# Patient Record
Sex: MALE | Race: WHITE | NOT HISPANIC OR LATINO | Employment: OTHER | URBAN - METROPOLITAN AREA
[De-identification: names, ages, dates, MRNs, and addresses within clinical notes are randomized per-mention and may not be internally consistent; named-entity substitution may affect disease eponyms.]

---

## 2019-06-27 ENCOUNTER — HOSPITAL ENCOUNTER (OUTPATIENT)
Dept: LAB | Facility: HOSPITAL | Age: 69
Discharge: HOME | End: 2019-06-27
Attending: INTERNAL MEDICINE

## 2019-06-27 LAB
ALBUMIN SERPL-MCNC: 4.1 G/DL (ref 3.5–5)
ALBUMIN/GLOB SERPL: 1.4 {RATIO} (ref 1.4–2.6)
ALP SERPL-CCNC: 76 U/L (ref 56–155)
ALT SERPL-CCNC: 21 U/L (ref 10–40)
ANION GAP SERPL CALC-SCNC: 20 MMOL/L (ref 8–19)
AST SERPL-CCNC: 18 U/L (ref 15–50)
BASOPHILS # BLD AUTO: 0.04 10*3/UL (ref 0–0.2)
BASOPHILS NFR BLD AUTO: 0.7 % (ref 0–3)
BILIRUB SERPL-MCNC: 0.77 MG/DL (ref 0.2–1.3)
BUN SERPL-MCNC: 14 MG/DL (ref 5–25)
BUN/CREAT SERPL: 15 {RATIO} (ref 6–20)
CALCIUM SERPL-MCNC: 8.8 MG/DL (ref 8.7–10.4)
CHLORIDE SERPL-SCNC: 105 MMOL/L (ref 99–111)
CHOLEST SERPL-MCNC: 166 MG/DL (ref 107–200)
CHOLEST/HDLC SERPL: 3.8 {RATIO} (ref 3–6)
CONV ABS IMM GRAN: 0.02 10*3/UL (ref 0–0.2)
CONV CO2: 23 MMOL/L (ref 22–32)
CONV IMMATURE GRAN: 0.4 % (ref 0–1.8)
CONV TOTAL PROTEIN: 7 G/DL (ref 6.3–8.2)
CREAT UR-MCNC: 0.94 MG/DL (ref 0.7–1.2)
DEPRECATED RDW RBC AUTO: 47.4 FL (ref 35.1–43.9)
EOSINOPHIL # BLD AUTO: 0.08 10*3/UL (ref 0–0.7)
EOSINOPHIL # BLD AUTO: 1.4 % (ref 0–7)
ERYTHROCYTE [DISTWIDTH] IN BLOOD BY AUTOMATED COUNT: 13.2 % (ref 11.6–14.4)
GFR SERPLBLD BASED ON 1.73 SQ M-ARVRAT: >60 ML/MIN/{1.73_M2}
GLOBULIN UR ELPH-MCNC: 2.9 G/DL (ref 2–3.5)
GLUCOSE SERPL-MCNC: 126 MG/DL (ref 70–99)
HBA1C MFR BLD: 14.5 G/DL (ref 14–18)
HCT VFR BLD AUTO: 45.8 % (ref 42–52)
HDLC SERPL-MCNC: 44 MG/DL (ref 40–60)
LDLC SERPL CALC-MCNC: 106 MG/DL (ref 70–100)
LYMPHOCYTES # BLD AUTO: 0.94 10*3/UL (ref 1–5)
MCH RBC QN AUTO: 30.6 PG (ref 27–31)
MCHC RBC AUTO-ENTMCNC: 31.7 G/DL (ref 33–37)
MCV RBC AUTO: 96.6 FL (ref 80–96)
MONOCYTES # BLD AUTO: 0.53 10*3/UL (ref 0.2–1.2)
MONOCYTES NFR BLD AUTO: 9.6 % (ref 3–10)
NEUTROPHILS # BLD AUTO: 3.92 10*3/UL (ref 2–8)
NEUTROPHILS NFR BLD AUTO: 70.9 % (ref 30–85)
NRBC CBCN: 0 % (ref 0–0.7)
OSMOLALITY SERPL CALC.SUM OF ELEC: 300 MOSM/KG (ref 273–304)
PLATELET # BLD AUTO: 138 10*3/UL (ref 130–400)
PMV BLD AUTO: 11.8 FL (ref 9.4–12.4)
POTASSIUM SERPL-SCNC: 4.1 MMOL/L (ref 3.5–5.3)
RBC # BLD AUTO: 4.74 10*6/UL (ref 4.7–6.1)
SODIUM SERPL-SCNC: 144 MMOL/L (ref 135–147)
TRIGL SERPL-MCNC: 81 MG/DL (ref 40–150)
VARIANT LYMPHS NFR BLD MANUAL: 17 % (ref 20–45)
VLDLC SERPL-MCNC: 16 MG/DL (ref 5–37)
WBC # BLD AUTO: 5.53 10*3/UL (ref 4.8–10.8)

## 2020-10-19 ENCOUNTER — OFFICE VISIT (OUTPATIENT)
Dept: CARDIOLOGY | Facility: CLINIC | Age: 70
End: 2020-10-19

## 2020-10-19 VITALS
SYSTOLIC BLOOD PRESSURE: 100 MMHG | RESPIRATION RATE: 18 BRPM | BODY MASS INDEX: 42.66 KG/M2 | WEIGHT: 315 LBS | DIASTOLIC BLOOD PRESSURE: 68 MMHG | HEIGHT: 72 IN | HEART RATE: 60 BPM

## 2020-10-19 DIAGNOSIS — Z95.2 S/P AVR: ICD-10-CM

## 2020-10-19 DIAGNOSIS — I35.0 AORTIC STENOSIS, MILD: Primary | ICD-10-CM

## 2020-10-19 DIAGNOSIS — I10 ESSENTIAL HYPERTENSION: ICD-10-CM

## 2020-10-19 DIAGNOSIS — I48.21 PERMANENT ATRIAL FIBRILLATION (HCC): ICD-10-CM

## 2020-10-19 DIAGNOSIS — Z95.810 ICD (IMPLANTABLE CARDIOVERTER-DEFIBRILLATOR), DUAL, IN SITU: ICD-10-CM

## 2020-10-19 PROCEDURE — 93289 INTERROG DEVICE EVAL HEART: CPT | Performed by: INTERNAL MEDICINE

## 2020-10-19 PROCEDURE — 99212 OFFICE O/P EST SF 10 MIN: CPT | Performed by: INTERNAL MEDICINE

## 2020-10-19 RX ORDER — LISINOPRIL 10 MG/1
10 TABLET ORAL DAILY
COMMUNITY

## 2020-10-19 RX ORDER — METOPROLOL TARTRATE 100 MG/1
100 TABLET ORAL 2 TIMES DAILY
COMMUNITY
Start: 2020-10-10

## 2020-10-19 RX ORDER — ASPIRIN 325 MG
325 TABLET ORAL 2 TIMES DAILY
COMMUNITY

## 2020-10-19 RX ORDER — DILTIAZEM HYDROCHLORIDE 120 MG/1
120 TABLET, FILM COATED ORAL DAILY
COMMUNITY

## 2020-10-19 RX ORDER — FUROSEMIDE 40 MG/1
40 TABLET ORAL DAILY
COMMUNITY

## 2020-10-19 NOTE — PROGRESS NOTES
Subjective:     Encounter Date:10/19/2020      Patient ID: Woodrow Alejandro is a 69 y.o. male.    Chief Complaint:  Followup ICD and AVR    HPI:  Mr Alejandro is a 68 yo with a past medical history significant for HTN, permanent AF and a NICM first diagnosed over 10 years ago.  He had an ICD implanted for primary prevention at the time.  His EF recovered.  In 2014 he was found to have severe aortic stenosis and had a bioprosthetic AVR, ligation of the DANICA and a MAZE.    Since he was last seen about a year ago, he has been doing well without symptoms of palpitations, chest pain, dyspnea or edema.  He remains fairly active without limitations.      The following portions of the patient's history were reviewed and updated as appropriate: allergies, current medications, past family history, past medical history, past social history, past surgical history and problem list.    Problem List:  Patient Active Problem List   Diagnosis   • Essential hypertension   • Permanent atrial fibrillation (CMS/HCC)   • S/P AVR   • ICD (implantable cardioverter-defibrillator), dual, in situ       Past Medical History:  Past Medical History:   Diagnosis Date   • Aortic valve replaced    • Atrial fibrillation (CMS/HCC)    • Hypertension        Past Surgical History:  No past surgical history on file.    Social History:  Social History     Socioeconomic History   • Marital status:      Spouse name: Not on file   • Number of children: Not on file   • Years of education: Not on file   • Highest education level: Not on file       Allergies:  No Known Allergies    Immunizations:    There is no immunization history on file for this patient.    ROS:  Review of Systems   Constitution: Negative for malaise/fatigue.   Cardiovascular: Negative for chest pain, dyspnea on exertion, irregular heartbeat, leg swelling, near-syncope, orthopnea, palpitations and paroxysmal nocturnal dyspnea.   Respiratory: Negative for shortness of breath.   "  All other systems reviewed and are negative.         Objective:         /68 (BP Location: Left arm, Patient Position: Sitting)   Pulse 60   Resp 18   Ht 182.9 cm (72\")   Wt (!) 147 kg (324 lb 9.6 oz)   BMI 44.02 kg/m²     Constitutional:       General: Not in acute distress.     Appearance: Well-developed.   Eyes:      General: No scleral icterus.     Conjunctiva/sclera: Conjunctivae normal.      Pupils: Pupils are equal, round, and reactive to light.   HENT:      Head: Normocephalic and atraumatic.   Neck:      Musculoskeletal: Normal range of motion.      Thyroid: No thyromegaly.   Pulmonary:      Effort: Pulmonary effort is normal.      Breath sounds: Normal breath sounds.   Cardiovascular:      Normal rate. Irregularly irregular rhythm.   Abdominal:      General: Bowel sounds are normal.      Palpations: Abdomen is soft.   Musculoskeletal: Normal range of motion.   Skin:     General: Skin is warm and dry.   Neurological:      Mental Status: Alert and oriented to person, place, and time.         In-Office Procedure(s):  Procedures  ICD eval interpreted by me  MDTD [id number]  Battery 5 yrs to mariely    R wave 9mv  Threshold 0.9V  Impedance 399 ohms    HV 48/64    Events - none    ASCVD RIsk Score::  The ASCVD Risk score (Blas DC Jr., et al., 2013) failed to calculate for the following reasons:    Cannot find a previous HDL lab    Cannot find a previous total cholesterol lab    The smoking status is invalid    Recent Radiology:  Imaging Results (Most Recent)     None          Lab Review:   No visits with results within 2 Month(s) from this visit.   Latest known visit with results is:   No results found for any previous visit.                Assessment:          Diagnosis Plan   1. Aortic stenosis, mild  Adult Transthoracic Echo Complete W/ Cont if Necessary Per Protocol   2. S/P AVR     3. Permanent atrial fibrillation (CMS/HCC)     4. ICD (implantable cardioverter-defibrillator), dual, in situ     5. " Essential hypertension            Plan:      1. AVR - asymptomatic but no echo for years, will get echo  2. Permanent AF - rate controlled, s/p DANICA ligation and on ASA only, [id number] of 2(age, HTN)  3. HTN - controlled  4. NICM - EF recovered, on lisinopril and metoprolol, repeat echo    RTC 1 year      Level of Care:                 Kirk Torres MD  10/19/20  .

## 2020-10-22 PROBLEM — Z95.2 S/P AVR: Status: ACTIVE | Noted: 2020-10-22

## 2020-10-22 PROBLEM — Z95.810 ICD (IMPLANTABLE CARDIOVERTER-DEFIBRILLATOR), DUAL, IN SITU: Status: ACTIVE | Noted: 2020-10-22

## 2020-10-22 PROBLEM — I48.21 PERMANENT ATRIAL FIBRILLATION: Status: ACTIVE | Noted: 2020-10-22

## 2020-10-22 PROBLEM — I10 ESSENTIAL HYPERTENSION: Status: ACTIVE | Noted: 2020-10-22

## 2021-01-12 ENCOUNTER — HOSPITAL ENCOUNTER (OUTPATIENT)
Dept: OTHER | Facility: HOSPITAL | Age: 71
Discharge: HOME | End: 2021-01-12
Attending: INTERNAL MEDICINE

## 2021-02-08 ENCOUNTER — HOSPITAL ENCOUNTER (OUTPATIENT)
Dept: VACCINE CLINIC | Facility: HOSPITAL | Age: 71
Discharge: HOME | End: 2021-02-08
Attending: INTERNAL MEDICINE

## 2021-08-23 ENCOUNTER — HOSPITAL ENCOUNTER (OUTPATIENT)
Dept: CARDIOLOGY | Facility: HOSPITAL | Age: 71
Discharge: HOME | End: 2021-08-23
Admitting: INTERNAL MEDICINE

## 2021-08-23 VITALS
SYSTOLIC BLOOD PRESSURE: 140 MMHG | BODY MASS INDEX: 42.66 KG/M2 | WEIGHT: 315 LBS | DIASTOLIC BLOOD PRESSURE: 80 MMHG | HEIGHT: 72 IN | HEART RATE: 74 BPM

## 2021-08-23 DIAGNOSIS — I35.0 AORTIC STENOSIS, MILD: ICD-10-CM

## 2021-08-23 LAB
AORTIC DIMENSIONLESS INDEX: 0.4 (DI)
ASCENDING AORTA: 3.6 CM
BH CV ECHO MEAS - ACS: 1.3 CM
BH CV ECHO MEAS - AO MAX PG (FULL): 25.1 MMHG
BH CV ECHO MEAS - AO MAX PG: 28.9 MMHG
BH CV ECHO MEAS - AO MEAN PG (FULL): 15 MMHG
BH CV ECHO MEAS - AO MEAN PG: 17 MMHG
BH CV ECHO MEAS - AO ROOT AREA (BSA CORRECTED): 0.77
BH CV ECHO MEAS - AO ROOT AREA: 3.1 CM^2
BH CV ECHO MEAS - AO ROOT DIAM: 2 CM
BH CV ECHO MEAS - AO V2 MAX: 269 CM/SEC
BH CV ECHO MEAS - AO V2 MEAN: 193 CM/SEC
BH CV ECHO MEAS - AO V2 VTI: 56.8 CM
BH CV ECHO MEAS - ASC AORTA: 3.6 CM
BH CV ECHO MEAS - AVA(I,A): 1.1 CM^2
BH CV ECHO MEAS - AVA(I,D): 1.1 CM^2
BH CV ECHO MEAS - AVA(V,A): 1.1 CM^2
BH CV ECHO MEAS - AVA(V,D): 1.1 CM^2
BH CV ECHO MEAS - BSA(HAYCOCK): 2.8 M^2
BH CV ECHO MEAS - BSA: 2.6 M^2
BH CV ECHO MEAS - BZI_BMI: 43.4 KILOGRAMS/M^2
BH CV ECHO MEAS - BZI_METRIC_HEIGHT: 182.9 CM
BH CV ECHO MEAS - BZI_METRIC_WEIGHT: 145.2 KG
BH CV ECHO MEAS - CONTRAST EF 4CH: 55 CM2
BH CV ECHO MEAS - EDV(CUBED): 216 ML
BH CV ECHO MEAS - EDV(MOD-SP2): 142 ML
BH CV ECHO MEAS - EDV(MOD-SP4): 123 ML
BH CV ECHO MEAS - EDV(TEICH): 180 ML
BH CV ECHO MEAS - EF(CUBED): 88.7 %
BH CV ECHO MEAS - EF(MOD-BP): 55.2 %
BH CV ECHO MEAS - EF(MOD-SP2): 53.5 %
BH CV ECHO MEAS - EF(MOD-SP4): 57.7 %
BH CV ECHO MEAS - EF(TEICH): 82.1 %
BH CV ECHO MEAS - ESV(CUBED): 24.4 ML
BH CV ECHO MEAS - ESV(MOD-SP2): 66 ML
BH CV ECHO MEAS - ESV(MOD-SP4): 52 ML
BH CV ECHO MEAS - ESV(TEICH): 32.2 ML
BH CV ECHO MEAS - FS: 51.7 %
BH CV ECHO MEAS - IVS/LVPW: 1
BH CV ECHO MEAS - IVSD: 1.2 CM
BH CV ECHO MEAS - LAT PEAK E' VEL: 15.4 CM/SEC
BH CV ECHO MEAS - LV DIASTOLIC VOL/BSA (35-75): 47.3 ML/M^2
BH CV ECHO MEAS - LV MASS(C)D: 314 GRAMS
BH CV ECHO MEAS - LV MASS(C)DI: 120.7 GRAMS/M^2
BH CV ECHO MEAS - LV MAX PG: 3.8 MMHG
BH CV ECHO MEAS - LV MEAN PG: 2 MMHG
BH CV ECHO MEAS - LV SYSTOLIC VOL/BSA (12-30): 20 ML/M^2
BH CV ECHO MEAS - LV V1 MAX: 97.5 CM/SEC
BH CV ECHO MEAS - LV V1 MEAN: 73.5 CM/SEC
BH CV ECHO MEAS - LV V1 VTI: 20.5 CM
BH CV ECHO MEAS - LVIDD: 6 CM
BH CV ECHO MEAS - LVIDS: 2.9 CM
BH CV ECHO MEAS - LVLD AP2: 8.2 CM
BH CV ECHO MEAS - LVLD AP4: 8.2 CM
BH CV ECHO MEAS - LVLS AP2: 7.4 CM
BH CV ECHO MEAS - LVLS AP4: 7.7 CM
BH CV ECHO MEAS - LVOT AREA (M): 3.1 CM^2
BH CV ECHO MEAS - LVOT AREA: 3.1 CM^2
BH CV ECHO MEAS - LVOT DIAM: 2 CM
BH CV ECHO MEAS - LVPWD: 1.2 CM
BH CV ECHO MEAS - MED PEAK E' VEL: 11.1 CM/SEC
BH CV ECHO MEAS - MR MAX PG: 90.6 MMHG
BH CV ECHO MEAS - MR MAX VEL: 476 CM/SEC
BH CV ECHO MEAS - MV DEC SLOPE: 624 CM/SEC^2
BH CV ECHO MEAS - MV DEC TIME: 0.23 SEC
BH CV ECHO MEAS - MV E MAX VEL: 142 CM/SEC
BH CV ECHO MEAS - PA ACC TIME: 0.02 SEC
BH CV ECHO MEAS - PA MAX PG (FULL): 4.1 MMHG
BH CV ECHO MEAS - PA MAX PG: 7.8 MMHG
BH CV ECHO MEAS - PA PR(ACCEL): 71.8 MMHG
BH CV ECHO MEAS - PA V2 MAX: 140 CM/SEC
BH CV ECHO MEAS - PI END-D VEL: 109 CM/SEC
BH CV ECHO MEAS - PULM DIAS VEL: 52.2 CM/SEC
BH CV ECHO MEAS - PULM S/D: 0.99
BH CV ECHO MEAS - PULM SYS VEL: 51.8 CM/SEC
BH CV ECHO MEAS - PVA(V,A): 3.1 CM^2
BH CV ECHO MEAS - PVA(V,D): 3.1 CM^2
BH CV ECHO MEAS - QP/QS: 1.1
BH CV ECHO MEAS - RAP SYSTOLE: 3 MMHG
BH CV ECHO MEAS - RV MAX PG: 3.7 MMHG
BH CV ECHO MEAS - RV MEAN PG: 2 MMHG
BH CV ECHO MEAS - RV V1 MAX: 96.6 CM/SEC
BH CV ECHO MEAS - RV V1 MEAN: 66.2 CM/SEC
BH CV ECHO MEAS - RV V1 VTI: 15 CM
BH CV ECHO MEAS - RVOT AREA: 4.5 CM^2
BH CV ECHO MEAS - RVOT DIAM: 2.4 CM
BH CV ECHO MEAS - RVSP: 38 MMHG
BH CV ECHO MEAS - SI(AO): 68.6 ML/M^2
BH CV ECHO MEAS - SI(CUBED): 73.7 ML/M^2
BH CV ECHO MEAS - SI(LVOT): 24.8 ML/M^2
BH CV ECHO MEAS - SI(MOD-SP2): 29.2 ML/M^2
BH CV ECHO MEAS - SI(MOD-SP4): 27.3 ML/M^2
BH CV ECHO MEAS - SI(TEICH): 56.8 ML/M^2
BH CV ECHO MEAS - SV(AO): 178.4 ML
BH CV ECHO MEAS - SV(CUBED): 191.6 ML
BH CV ECHO MEAS - SV(LVOT): 64.4 ML
BH CV ECHO MEAS - SV(MOD-SP2): 76 ML
BH CV ECHO MEAS - SV(MOD-SP4): 71 ML
BH CV ECHO MEAS - SV(RVOT): 67.9 ML
BH CV ECHO MEAS - SV(TEICH): 147.8 ML
BH CV ECHO MEAS - TAPSE (>1.6): 2.4 CM
BH CV ECHO MEAS - TR MAX PG: 35 MMHG
BH CV ECHO MEAS - TR MAX VEL: 296 CM/SEC
BH CV ECHO MEASUREMENTS AVERAGE E/E' RATIO: 10.72
BH CV XLRA - RV BASE: 5 CM
BH CV XLRA - RV LENGTH: 6.9 CM
BH CV XLRA - RV MID: 3.9 CM
BH CV XLRA - TDI S': 13.1 CM/SEC
LEFT ATRIUM VOLUME INDEX: 63.1 ML/M2
MAXIMAL PREDICTED HEART RATE: 150 BPM
STRESS TARGET HR: 128 BPM

## 2021-08-23 PROCEDURE — 25010000002 PERFLUTREN (DEFINITY) 8.476 MG IN SODIUM CHLORIDE (PF) 0.9 % 10 ML INJECTION: Performed by: INTERNAL MEDICINE

## 2021-08-23 PROCEDURE — 93306 TTE W/DOPPLER COMPLETE: CPT

## 2021-08-23 PROCEDURE — 93306 TTE W/DOPPLER COMPLETE: CPT | Performed by: INTERNAL MEDICINE

## 2021-08-23 RX ADMIN — SODIUM CHLORIDE 2 ML: 9 INJECTION INTRAMUSCULAR; INTRAVENOUS; SUBCUTANEOUS at 15:32

## 2022-11-09 ENCOUNTER — TELEPHONE (OUTPATIENT)
Dept: CARDIOLOGY | Facility: CLINIC | Age: 72
End: 2022-11-09

## 2022-11-09 DIAGNOSIS — I35.0 AORTIC STENOSIS, MILD: Primary | ICD-10-CM

## 2022-11-09 NOTE — TELEPHONE ENCOUNTER
Caller: MAZIN     Relationship: SELF     Best call back number: [phone]    What is the best time to reach you: ANY    Who are you requesting to speak with (clinical staff, provider,  specific staff member): ANY      What was the call regarding: PT ADVISED SINCE JULIEN IS RETIRING HE IS WANTING TO SWITCH TO DR. MASSIEL BOWLES IN [place]. HE ADVISED THAT HE WAS TOLD DR. VICTORIA NEEDS TO PUT IN A REFERRAL FOR HIM TO BE ABLE TO SWITCH.    HE WOULD ALSO LIKE TO THANK DR. VICTORIA FOR ALL THAT HE IS DONE AND IF POSSIBLY TELL HIM ON THE PHONE.

## 2023-01-17 ENCOUNTER — OFFICE VISIT (OUTPATIENT)
Dept: CARDIOLOGY | Facility: CLINIC | Age: 73
End: 2023-01-17
Payer: MEDICARE

## 2023-01-17 VITALS
DIASTOLIC BLOOD PRESSURE: 77 MMHG | HEIGHT: 72 IN | WEIGHT: 315 LBS | BODY MASS INDEX: 42.66 KG/M2 | HEART RATE: 91 BPM | SYSTOLIC BLOOD PRESSURE: 112 MMHG

## 2023-01-17 DIAGNOSIS — Z95.810 ICD (IMPLANTABLE CARDIOVERTER-DEFIBRILLATOR), DUAL, IN SITU: ICD-10-CM

## 2023-01-17 DIAGNOSIS — I10 ESSENTIAL HYPERTENSION: ICD-10-CM

## 2023-01-17 DIAGNOSIS — Z95.2 S/P AVR: Primary | ICD-10-CM

## 2023-01-17 DIAGNOSIS — I48.21 PERMANENT ATRIAL FIBRILLATION: ICD-10-CM

## 2023-01-17 PROCEDURE — 99214 OFFICE O/P EST MOD 30 MIN: CPT | Performed by: INTERNAL MEDICINE

## 2023-01-17 NOTE — PROGRESS NOTES
Chief Complaint  Establish Care, Hypertension, and Atrial Fibrillation    Subjective        Woodrow Alejandro presents to [facility]  History of present illness:    Patient is a 72-year-old male with past medical history significant for bioprosthetic aortic valve replacement back in 2014.  He did not have bypass at that time.  He did have a nonischemic cardiomyopathy and had a Medtronic AICD placement.  His ejection fraction then returned back to normal.  He has a history of hypertension and chronic atrial fibrillation.  He had tried warfarin in the past and a couple of the newer anticoagulants but had significant side effects.  He has declined anticoagulation in previous notes from cardiologist and he declines anticoagulation today.  He did have ligation of his left atrial appendage at the time of the aortic valve replacement.  He notes no chest pain.  He notes a little bit of edema and is on Lasix.  He has noted he has not been eating as well during the holiday months.  He is also noted that he is less active but wants to become more active.  He notes no previous tobacco.  He notes occasional alcohol.  Mother had a valve surgery and is alive at age 91.  Father had COPD and lived to 91 years old.      Past Medical History:   Diagnosis Date   • Aortic valve replaced    • Atrial fibrillation (HCC)    • Hypertension          History reviewed. No pertinent surgical history.       Social History     Socioeconomic History   • Marital status:    Tobacco Use   • Smoking status: Never   • Smokeless tobacco: Never   Substance and Sexual Activity   • Alcohol use: Yes   • Drug use: Never   • Sexual activity: Defer         History reviewed. No pertinent family history.       No Known Allergies         Current Outpatient Medications:   •  aspirin 325 MG tablet, Take 325 mg by mouth 2 (two) times a day., Disp: , Rfl:   •  dilTIAZem (CARDIZEM) 120 MG tablet, Take 120 mg by mouth Daily., Disp:  ", Rfl:   •  furosemide (LASIX) 40 MG tablet, Take 40 mg by mouth Daily., Disp: , Rfl:   •  lisinopril (PRINIVIL,ZESTRIL) 10 MG tablet, Take 10 mg by mouth Daily., Disp: , Rfl:   •  metoprolol tartrate (LOPRESSOR) 100 MG tablet, 100 mg 2 (two) times a day., Disp: , Rfl:       ROS:  Cardiac review of systems negative.    Objective     /77   Pulse 91   Ht 182.9 cm (72\")   Wt (!) 160 kg (352 lb)   BMI 47.74 kg/m²       General Appearance:   · well developed  · well nourished  HENT:   · oropharynx moist  · lips not cyanotic  Respiratory:  · no respiratory distress  · normal breath sounds  · no rales  Cardiovascular:  · no jugular venous distention  · regular rhythm  · S1 normal, S2 normal  · no S3, no S4   · no murmur  · no rub, no thrill  · No carotid bruit  · pedal pulses normal  · lower extremity edema: none    Musculoskeletal:  · no clubbing of fingers.   · normocephalic, head atraumatic  Skin:   · warm, dry  Psychiatric:  · judgement and insight appropriate  · normal mood and affect    ECHO:  Results for orders placed during the hospital encounter of 08/23/21    Adult Transthoracic Echo Complete W/ Cont if Necessary Per Protocol    Interpretation Summary  · Estimated right ventricular systolic pressure from tricuspid regurgitation is mildly elevated (35-45 mmHg).  · The left ventricular cavity is moderately dilated.  · Left ventricular wall thickness is consistent with mild concentric hypertrophy.  · Calculated left ventricular EF = 55.2% Estimated left ventricular EF was in agreement with the calculated left ventricular EF.  · Left ventricular diastolic function was normal.  · The right ventricular cavity is mild to moderately dilated.  · Left atrial volume is severely increased.  · The right atrial cavity is severely dilated.  · There is a bioprosthetic aortic valve present.  · Aortic valve dimensionless index is 0.4 .  · Moderate tricuspid valve regurgitation is present.  · Mild dilation of the " ascending aorta is present.    STRESS:    CATH:  No results found for this or any previous visit.    BMP:   Glucose   Date Value Ref Range Status   06/27/2019 126 (H) 70 - 99 mg/dL Final     BUN   Date Value Ref Range Status   06/27/2019 14 5 - 25 mg/dL Final     Creatinine   Date Value Ref Range Status   06/27/2019 0.94 0.70 - 1.20 mg/dL Final     Sodium   Date Value Ref Range Status   06/27/2019 144 135 - 147 mmol/L Final     Potassium   Date Value Ref Range Status   06/27/2019 4.1 3.5 - 5.3 mmol/L Final     Chloride   Date Value Ref Range Status   06/27/2019 105 99 - 111 mmol/L Final     CO2   Date Value Ref Range Status   06/27/2019 23 22 - 32 mmol/L Final     Calcium   Date Value Ref Range Status   06/27/2019 8.8 8.7 - 10.4 mg/dL Final     BUN/Creatinine Ratio   Date Value Ref Range Status   06/27/2019 15 6 - 20 [ratio] Final     Anion Gap   Date Value Ref Range Status   06/27/2019 20 (H) 8 - 19 mmol/L Final     LIPIDS:  Triglycerides   Date Value Ref Range Status   06/27/2019 81 40 - 150 mg/dL Final     Comment:     <150 mg/dL-Normal  150-199 mg/dL-Borderline High  200-499 mg/dL-High  >500 mg/dL- Very High       HDL Cholesterol   Date Value Ref Range Status   06/27/2019 44 40 - 60 mg/dL Final     Comment:     <40 mg/dL-Low  >60 mg/dL- Desirable       LDL Cholesterol    Date Value Ref Range Status   06/27/2019 106 (H) 70 - 100 mg/dL Final     Comment:     Recommended  LDL Levels  <100 mg/dL-Optimal  100-129 mg/dL-Near Optimal/above optimal  130-159 mg/dL-Borderline High  160-189 mg/dL-High  >190 mg/dL-Very High       VLDL Cholesterol   Date Value Ref Range Status   06/27/2019 16 5 - 37 mg/dL Final         Procedures             ASSESSMENT:  Encounter Diagnoses   Name Primary?   • S/P AVR Yes   • Permanent atrial fibrillation (HCC)    • ICD (implantable cardioverter-defibrillator), dual, in situ    • Essential hypertension          PLAN:    1.  Will get him scheduled for an AICD check.  The patient does not  apparently have home monitoring.  It is a Medtronic device.  It was placed for nonischemic cardiomyopathy but his EF has returned to normal.  2.  Continue the aspirin.  The patient again declines anticoagulation.  He did have left atrial appendage ligation at the time of his bioprosthetic aortic valve replacement.  3.  Blood pressures under good control.  4.  We will consider repeating an echocardiogram the next time we see him.  The last 1 was October 19, 2020 which showed severe biatrial enlargement along with moderate left and right ventricular enlargement.  His EF was normal.  He had no aortic insufficiency and his max/mean pressure gradient across the bioprosthetic aortic valve was 29/17 mmHg.  5.  Encouraged the patient to start a regular exercise program and call us if any exertional cardiac complaints.  6.  We will see back in 1 year, sooner if needed.          Patient was given instructions and counseling regarding his condition or for health maintenance advice. Please see specific information pulled into the AVS if appropriate.         Parker Newman MD   1/17/2023  12:17 EST

## 2023-01-31 ENCOUNTER — CLINICAL SUPPORT NO REQUIREMENTS (OUTPATIENT)
Dept: CARDIOLOGY | Facility: CLINIC | Age: 73
End: 2023-01-31
Payer: MEDICARE

## 2023-01-31 DIAGNOSIS — I42.8 OTHER CARDIOMYOPATHY: ICD-10-CM

## 2023-01-31 DIAGNOSIS — Z95.810 ICD (IMPLANTABLE CARDIOVERTER-DEFIBRILLATOR), DUAL, IN SITU: Primary | ICD-10-CM

## 2023-01-31 DIAGNOSIS — I48.21 PERMANENT ATRIAL FIBRILLATION: ICD-10-CM

## 2023-01-31 PROCEDURE — 93282 PRGRMG EVAL IMPLANTABLE DFB: CPT | Performed by: INTERNAL MEDICINE

## 2023-01-31 NOTE — PROGRESS NOTES
Normal Dual Chamber VVI ICD Device Interrogation and Device Testing.  Normal evaluation of device function and lead measurements.  No optimization was needed of parameters or maximization of device longevity.  Patient refuses home remote monitoring, I did try to convince him because his leads have been implanted since 1998, he has previously been shocked and he has 2 1/2 years on battery but he still politely refused.

## 2023-10-26 ENCOUNTER — TRANSCRIBE ORDERS (OUTPATIENT)
Dept: LAB | Facility: HOSPITAL | Age: 73
End: 2023-10-26
Payer: MEDICARE

## 2023-10-26 ENCOUNTER — LAB (OUTPATIENT)
Dept: LAB | Facility: HOSPITAL | Age: 73
End: 2023-10-26
Payer: MEDICARE

## 2023-10-26 DIAGNOSIS — I10 HYPERTENSION, ESSENTIAL: ICD-10-CM

## 2023-10-26 DIAGNOSIS — R97.20 ELEVATED PSA: ICD-10-CM

## 2023-10-26 DIAGNOSIS — R97.20 ELEVATED PSA: Primary | ICD-10-CM

## 2023-10-26 DIAGNOSIS — E78.00 PURE HYPERCHOLESTEROLEMIA: ICD-10-CM

## 2023-10-26 DIAGNOSIS — M10.072 ACUTE IDIOPATHIC GOUT OF LEFT FOOT: ICD-10-CM

## 2023-10-26 DIAGNOSIS — I25.10 CHRONIC CORONARY ARTERY DISEASE: ICD-10-CM

## 2023-10-26 LAB
ALBUMIN SERPL-MCNC: 4.3 G/DL (ref 3.5–5.2)
ALBUMIN/GLOB SERPL: 1.6 G/DL
ALP SERPL-CCNC: 63 U/L (ref 39–117)
ALT SERPL W P-5'-P-CCNC: 19 U/L (ref 1–41)
ANION GAP SERPL CALCULATED.3IONS-SCNC: 11.3 MMOL/L (ref 5–15)
AST SERPL-CCNC: 20 U/L (ref 1–40)
BASOPHILS # BLD AUTO: 0.05 10*3/MM3 (ref 0–0.2)
BASOPHILS NFR BLD AUTO: 0.9 % (ref 0–1.5)
BILIRUB SERPL-MCNC: 0.6 MG/DL (ref 0–1.2)
BUN SERPL-MCNC: 23 MG/DL (ref 8–23)
BUN/CREAT SERPL: 23 (ref 7–25)
CALCIUM SPEC-SCNC: 9 MG/DL (ref 8.6–10.5)
CHLORIDE SERPL-SCNC: 106 MMOL/L (ref 98–107)
CHOLEST SERPL-MCNC: 188 MG/DL (ref 0–200)
CO2 SERPL-SCNC: 23.7 MMOL/L (ref 22–29)
CREAT SERPL-MCNC: 1 MG/DL (ref 0.76–1.27)
DEPRECATED RDW RBC AUTO: 42.2 FL (ref 37–54)
EGFRCR SERPLBLD CKD-EPI 2021: 80 ML/MIN/1.73
EOSINOPHIL # BLD AUTO: 0.12 10*3/MM3 (ref 0–0.4)
EOSINOPHIL NFR BLD AUTO: 2.1 % (ref 0.3–6.2)
ERYTHROCYTE [DISTWIDTH] IN BLOOD BY AUTOMATED COUNT: 12.7 % (ref 12.3–15.4)
GLOBULIN UR ELPH-MCNC: 2.7 GM/DL
GLUCOSE SERPL-MCNC: 131 MG/DL (ref 65–99)
HCT VFR BLD AUTO: 44.5 % (ref 37.5–51)
HDLC SERPL-MCNC: 50 MG/DL (ref 40–60)
HGB BLD-MCNC: 15.2 G/DL (ref 13–17.7)
IMM GRANULOCYTES # BLD AUTO: 0.02 10*3/MM3 (ref 0–0.05)
IMM GRANULOCYTES NFR BLD AUTO: 0.3 % (ref 0–0.5)
LDLC SERPL CALC-MCNC: 117 MG/DL (ref 0–100)
LDLC/HDLC SERPL: 2.29 {RATIO}
LYMPHOCYTES # BLD AUTO: 1.14 10*3/MM3 (ref 0.7–3.1)
LYMPHOCYTES NFR BLD AUTO: 19.7 % (ref 19.6–45.3)
MCH RBC QN AUTO: 31.5 PG (ref 26.6–33)
MCHC RBC AUTO-ENTMCNC: 34.2 G/DL (ref 31.5–35.7)
MCV RBC AUTO: 92.3 FL (ref 79–97)
MONOCYTES # BLD AUTO: 0.53 10*3/MM3 (ref 0.1–0.9)
MONOCYTES NFR BLD AUTO: 9.1 % (ref 5–12)
NEUTROPHILS NFR BLD AUTO: 3.94 10*3/MM3 (ref 1.7–7)
NEUTROPHILS NFR BLD AUTO: 67.9 % (ref 42.7–76)
NRBC BLD AUTO-RTO: 0 /100 WBC (ref 0–0.2)
PLATELET # BLD AUTO: 147 10*3/MM3 (ref 140–450)
PMV BLD AUTO: 11 FL (ref 6–12)
POTASSIUM SERPL-SCNC: 4.6 MMOL/L (ref 3.5–5.2)
PROT SERPL-MCNC: 7 G/DL (ref 6–8.5)
PSA SERPL-MCNC: 0.34 NG/ML (ref 0–4)
RBC # BLD AUTO: 4.82 10*6/MM3 (ref 4.14–5.8)
SODIUM SERPL-SCNC: 141 MMOL/L (ref 136–145)
TRIGL SERPL-MCNC: 117 MG/DL (ref 0–150)
URATE SERPL-MCNC: 9.1 MG/DL (ref 3.4–7)
VLDLC SERPL-MCNC: 21 MG/DL (ref 5–40)
WBC NRBC COR # BLD: 5.8 10*3/MM3 (ref 3.4–10.8)

## 2023-10-26 PROCEDURE — 36415 COLL VENOUS BLD VENIPUNCTURE: CPT

## 2023-10-26 PROCEDURE — 84153 ASSAY OF PSA TOTAL: CPT

## 2023-10-26 PROCEDURE — 84550 ASSAY OF BLOOD/URIC ACID: CPT

## 2023-10-26 PROCEDURE — 80061 LIPID PANEL: CPT

## 2023-10-26 PROCEDURE — 85025 COMPLETE CBC W/AUTO DIFF WBC: CPT

## 2023-10-26 PROCEDURE — 80053 COMPREHEN METABOLIC PANEL: CPT

## 2023-10-31 ENCOUNTER — OFFICE VISIT (OUTPATIENT)
Dept: INTERNAL MEDICINE | Facility: CLINIC | Age: 73
End: 2023-10-31
Payer: MEDICARE

## 2023-10-31 VITALS
SYSTOLIC BLOOD PRESSURE: 124 MMHG | OXYGEN SATURATION: 94 % | TEMPERATURE: 98 F | HEIGHT: 72 IN | HEART RATE: 71 BPM | DIASTOLIC BLOOD PRESSURE: 90 MMHG | WEIGHT: 315 LBS | BODY MASS INDEX: 42.66 KG/M2

## 2023-10-31 DIAGNOSIS — L30.9 DERMATITIS: ICD-10-CM

## 2023-10-31 DIAGNOSIS — Z95.810 ICD (IMPLANTABLE CARDIOVERTER-DEFIBRILLATOR), DUAL, IN SITU: ICD-10-CM

## 2023-10-31 DIAGNOSIS — I48.21 PERMANENT ATRIAL FIBRILLATION: ICD-10-CM

## 2023-10-31 DIAGNOSIS — Z95.2 S/P AVR: ICD-10-CM

## 2023-10-31 DIAGNOSIS — R73.01 IFG (IMPAIRED FASTING GLUCOSE): ICD-10-CM

## 2023-10-31 DIAGNOSIS — E55.9 VITAMIN D DEFICIENCY: ICD-10-CM

## 2023-10-31 DIAGNOSIS — I10 ESSENTIAL HYPERTENSION: Primary | ICD-10-CM

## 2023-10-31 DIAGNOSIS — E78.2 MIXED HYPERLIPIDEMIA: ICD-10-CM

## 2023-10-31 PROCEDURE — 99204 OFFICE O/P NEW MOD 45 MIN: CPT | Performed by: INTERNAL MEDICINE

## 2023-10-31 PROCEDURE — 3074F SYST BP LT 130 MM HG: CPT | Performed by: INTERNAL MEDICINE

## 2023-10-31 PROCEDURE — 1170F FXNL STATUS ASSESSED: CPT | Performed by: INTERNAL MEDICINE

## 2023-10-31 PROCEDURE — 3080F DIAST BP >= 90 MM HG: CPT | Performed by: INTERNAL MEDICINE

## 2023-10-31 RX ORDER — ROSUVASTATIN CALCIUM 10 MG/1
10 TABLET, COATED ORAL DAILY
Qty: 90 TABLET | Refills: 3 | Status: SHIPPED | OUTPATIENT
Start: 2023-10-31

## 2023-10-31 RX ORDER — TRIAMCINOLONE ACETONIDE 1 MG/G
1 CREAM TOPICAL 2 TIMES DAILY
Qty: 60 G | Refills: 2 | Status: SHIPPED | OUTPATIENT
Start: 2023-10-31

## 2023-10-31 NOTE — PROGRESS NOTES
"CHIEF COMPLAINT/ HPI:  Establish Care (New pt establish care. Lab follow up. )    Patient has underlying coronary disease previous defibrillator was replaced at least twice, has had an aortic valve replaced he says he is doing well his cats been scratching him on the right arm, he is got some redness there,          Objective   Vital Signs  Vitals:    10/31/23 0938   BP: 124/90   Pulse: 71   Temp: 98 °F (36.7 °C)   SpO2: 94%   Weight: (!) 154 kg (339 lb)   Height: 182.9 cm (72.01\")      Body mass index is 45.97 kg/m².  Review of Systems   Constitutional: Negative.    HENT: Negative.     Eyes: Negative.    Respiratory: Negative.     Cardiovascular: Negative.    Gastrointestinal: Negative.    Endocrine: Negative.    Genitourinary: Negative.    Musculoskeletal: Negative.    Skin:  Positive for rash and wound.   Allergic/Immunologic: Negative.    Neurological: Negative.    Hematological: Negative.    Psychiatric/Behavioral: Negative.        Physical Exam  Constitutional:       General: He is not in acute distress.     Appearance: Normal appearance. He is obese.   HENT:      Head: Normocephalic.      Mouth/Throat:      Mouth: Mucous membranes are moist.   Eyes:      Conjunctiva/sclera: Conjunctivae normal.      Pupils: Pupils are equal, round, and reactive to light.   Cardiovascular:      Rate and Rhythm: Normal rate and regular rhythm.      Pulses: Normal pulses.      Heart sounds: Normal heart sounds.   Pulmonary:      Effort: Pulmonary effort is normal.      Breath sounds: Normal breath sounds.   Abdominal:      General: Bowel sounds are normal.      Palpations: Abdomen is soft.   Musculoskeletal:         General: No swelling. Normal range of motion.      Cervical back: Neck supple.   Skin:     General: Skin is warm and dry.      Coloration: Skin is not jaundiced.      Findings: Lesion (Patient has a red slightly excoriated lesion on the dorsum of the right hand with some linear ulcerations and some scratches and " - A1c 9.3%  - patient states that he takes 10U insulin in AM and 40U in PM but is unclear what type  - ADA diet, accuchecks, hypoglycemic protocol  - glucose at goal with no insulin at this time  - will continue to monitor  - SSI  - start basal bolus if glucoses consistently >180     - A1c 9.3%  - patient states that he takes 10U insulin in AM and 40U in PM but is unclear what type  - ADA diet, accuchecks, hypoglycemic protocol  - glucose at goal with no insulin at this time  - will continue to monitor  - SSI  - start basal bolus if glucoses consistently >180     - A1c 9.3%  - patient states that he takes 10U insulin in AM and 40U in PM but is unclear what type  - ADA diet, accuchecks, hypoglycemic protocol  - glucose at goal with no insulin at this time  - will continue to monitor  - SSI  - start basal bolus if glucoses consistently >180     - patient does not know home med  - intermittently HTN  - will continue to monitor off Rx for now       - patient does not know home med, likely it is ACEi or ARB for DM and HTN  - intermittently HTN  - will continue to monitor off Rx for now- BP generally controlled     - presents with right forehead and right hand cellulitis. Does not meet sepsis criteria. Failed outpatient PCN  - started on clindamycin for presumed MRSA infection  - no abscess formation at this time  - XR R hand showing soft tissue swelling  - MRI pending  - blood cultures NGTD  - orthopaedics consulted, will watch along. NPO at [facility] in case I&D is needed  - continue clindamycin     - presents with right forehead and right hand cellulitis. Does not meet sepsis criteria. Failed outpatient PCN  - started on clindamycin for presumed MRSA infection  - no abscess formation at this time  - XR R hand showing soft tissue swelling  - MRI pending  - blood cultures NGTD  - orthopaedics consulted- I&D 5/7  - continue clindamycin     - presents with right forehead and right hand cellulitis. Does not meet sepsis criteria. Failed outpatient PCN  - started on clindamycin for presumed MRSA infection  - no abscess formation at this time  - XR R hand showing soft tissue swelling  - blood cultures NGTD  - orthopaedics consulted- I&D 5/7  - continue clindamycin     - presents with right forehead and right hand cellulitis. Does not meet sepsis criteria. Failed outpatient PCN  - started on clindamycin for presumed MRSA infection  - no abscess formation at this time  - XR R hand showing soft tissue swelling  - blood cultures NGTD  - orthopaedics consulted- I&D 5/7  - continue clindamycin  -staph species pending (wound culture)     - see above     - see above  - open draining wound  - continue to monitor  - continue clindamycin      Cr 1.8 on arrival from 1.2 in 2017  Given IVF in ED  Resolved, renal function now at baseline  Avoid nephrotoxins, renally dose all medications    Cr 1.8 on arrival from 1.2 in 2017. Unknown if this is MYLA or progression of CKD  Given IVF in ED  Will continue to monitor  Avoid nephrotoxins, renally dose all medications    "bruises on the right forearm) and rash present.   Neurological:      General: No focal deficit present.      Mental Status: He is alert and oriented to person, place, and time. Mental status is at baseline.   Psychiatric:         Mood and Affect: Mood normal.         Behavior: Behavior normal.         Thought Content: Thought content normal.         Judgment: Judgment normal.     Patient has cerumen impaction left partial on the right, he also has a large rubbery slightly mobile nontender mass in the right anterior lower neck region not attached to the thyroid that I can tell, no carotid bruits,  Result Review :   No results found for: \"PROBNP\", \"BNP\"  CMP          10/26/2023    09:07   CMP   Glucose 131    BUN 23    Creatinine 1.00    EGFR 80.0    Sodium 141    Potassium 4.6    Chloride 106    Calcium 9.0    Total Protein 7.0    Albumin 4.3    Globulin 2.7    Total Bilirubin 0.6    Alkaline Phosphatase 63    AST (SGOT) 20    ALT (SGPT) 19    Albumin/Globulin Ratio 1.6    BUN/Creatinine Ratio 23.0    Anion Gap 11.3      CBC w/diff          10/26/2023    09:07   CBC w/Diff   WBC 5.80    RBC 4.82    Hemoglobin 15.2    Hematocrit 44.5    MCV 92.3    MCH 31.5    MCHC 34.2    RDW 12.7    Platelets 147    Neutrophil Rel % 67.9    Immature Granulocyte Rel % 0.3    Lymphocyte Rel % 19.7    Monocyte Rel % 9.1    Eosinophil Rel % 2.1    Basophil Rel % 0.9       Lipid Panel          10/26/2023    09:07   Lipid Panel   Total Cholesterol 188    Triglycerides 117    HDL Cholesterol 50    VLDL Cholesterol 21    LDL Cholesterol  117    LDL/HDL Ratio 2.29       No results found for: \"TSH\"   No results found for: \"FREET4\"      PSA          10/26/2023    09:07   PSA   PSA 0.341                     Visit Diagnoses:    ICD-10-CM ICD-9-CM   1. Essential hypertension  I10 401.9   2. ICD (implantable cardioverter-defibrillator), dual, in situ  Z95.810 V45.02   3. Permanent atrial fibrillation  I48.21 427.31   4. S/P AVR  Z95.2 V43.3   5. " Dermatitis  L30.9 692.9   6. Mixed hyperlipidemia  E78.2 272.2   7. Vitamin D deficiency  E55.9 268.9   8. IFG (impaired fasting glucose)  R73.01 790.21       Assessment and Plan   Diagnoses and all orders for this visit:    1. Essential hypertension (Primary)  -     triamcinolone (KENALOG) 0.1 % cream; Apply 1 application  topically to the appropriate area as directed 2 (Two) Times a Day.  Dispense: 60 g; Refill: 2  -     MicroAlbumin, Urine, Random - Urine, Clean Catch; Future  -     Hemoglobin A1c; Future  -     Comprehensive Metabolic Panel; Future  -     Lipid Panel; Future  -     Vitamin D,25-Hydroxy; Future  -     Vitamin B12 anemia; Future  -     Folate anemia; Future  -     TSH+Free T4; Future    2. ICD (implantable cardioverter-defibrillator), dual, in situ  -     triamcinolone (KENALOG) 0.1 % cream; Apply 1 application  topically to the appropriate area as directed 2 (Two) Times a Day.  Dispense: 60 g; Refill: 2  -     MicroAlbumin, Urine, Random - Urine, Clean Catch; Future  -     Hemoglobin A1c; Future  -     Comprehensive Metabolic Panel; Future  -     Lipid Panel; Future  -     Vitamin D,25-Hydroxy; Future  -     Vitamin B12 anemia; Future  -     Folate anemia; Future  -     TSH+Free T4; Future    3. Permanent atrial fibrillation  -     triamcinolone (KENALOG) 0.1 % cream; Apply 1 application  topically to the appropriate area as directed 2 (Two) Times a Day.  Dispense: 60 g; Refill: 2  -     MicroAlbumin, Urine, Random - Urine, Clean Catch; Future  -     Hemoglobin A1c; Future  -     Comprehensive Metabolic Panel; Future  -     Lipid Panel; Future  -     Vitamin D,25-Hydroxy; Future  -     Vitamin B12 anemia; Future  -     Folate anemia; Future  -     TSH+Free T4; Future    4. S/P AVR  -     triamcinolone (KENALOG) 0.1 % cream; Apply 1 application  topically to the appropriate area as directed 2 (Two) Times a Day.  Dispense: 60 g; Refill: 2  -     MicroAlbumin, Urine, Random - Urine, Clean Catch;  Future  -     Hemoglobin A1c; Future  -     Comprehensive Metabolic Panel; Future  -     Lipid Panel; Future  -     Vitamin D,25-Hydroxy; Future  -     Vitamin B12 anemia; Future  -     Folate anemia; Future  -     TSH+Free T4; Future    5. Dermatitis  -     triamcinolone (KENALOG) 0.1 % cream; Apply 1 application  topically to the appropriate area as directed 2 (Two) Times a Day.  Dispense: 60 g; Refill: 2  -     MicroAlbumin, Urine, Random - Urine, Clean Catch; Future  -     Hemoglobin A1c; Future  -     Comprehensive Metabolic Panel; Future  -     Lipid Panel; Future  -     Vitamin D,25-Hydroxy; Future  -     Vitamin B12 anemia; Future  -     Folate anemia; Future  -     TSH+Free T4; Future    6. Mixed hyperlipidemia  -     triamcinolone (KENALOG) 0.1 % cream; Apply 1 application  topically to the appropriate area as directed 2 (Two) Times a Day.  Dispense: 60 g; Refill: 2  -     MicroAlbumin, Urine, Random - Urine, Clean Catch; Future  -     Hemoglobin A1c; Future  -     Comprehensive Metabolic Panel; Future  -     Lipid Panel; Future  -     Vitamin D,25-Hydroxy; Future  -     Vitamin B12 anemia; Future  -     Folate anemia; Future  -     TSH+Free T4; Future    7. Vitamin D deficiency  -     triamcinolone (KENALOG) 0.1 % cream; Apply 1 application  topically to the appropriate area as directed 2 (Two) Times a Day.  Dispense: 60 g; Refill: 2  -     MicroAlbumin, Urine, Random - Urine, Clean Catch; Future  -     Hemoglobin A1c; Future  -     Comprehensive Metabolic Panel; Future  -     Lipid Panel; Future  -     Vitamin D,25-Hydroxy; Future  -     Vitamin B12 anemia; Future  -     Folate anemia; Future  -     TSH+Free T4; Future    8. IFG (impaired fasting glucose)  -     triamcinolone (KENALOG) 0.1 % cream; Apply 1 application  topically to the appropriate area as directed 2 (Two) Times a Day.  Dispense: 60 g; Refill: 2  -     MicroAlbumin, Urine, Random - Urine, Clean Catch; Future  -     Hemoglobin A1c;  Future  -     Comprehensive Metabolic Panel; Future  -     Lipid Panel; Future  -     Vitamin D,25-Hydroxy; Future  -     Vitamin B12 anemia; Future  -     Folate anemia; Future  -     TSH+Free T4; Future    Other orders  -     rosuvastatin (Crestor) 10 MG tablet; Take 1 tablet by mouth Daily.  Dispense: 90 tablet; Refill: 3    Cerumen impactions bilateral left greater than right we will try and remove October 31, 2023    Class 3 Severe Obesity (BMI >=40). Obesity-related health conditions include the following: hypertension and coronary heart disease. Obesity is unchanged. BMI is is above average; BMI management plan is completed. We discussed portion control, increasing exercise, and Weight Watchers or other Commercial based weight reduction program.     Hypertension, continue lisinopril 10 mg daily, Lopressor 100 mg twice a day, diltiazem 120 mg daily, Lasix 40 mg daily, aspirin 325 mg bid    Right neck anterior fullness rubbery nodule, patient says been there about 10 years or more has not changed will follow clinically no studies for now,    Coronary artery disease, previous implantable cardio defibrillator    Status post aortic valve replacement, follows up with Dr. ASH    Chronic atrial fibrillation---only on asa , tried coumadin in past     Mixed hyperlipidemia--recommend Crestor 10 mg daily, October 31, 2023    PSA 0.3 October 26, 2023      Follow Up   Return in about 2 months (around 12/31/2023).  Patient was given instructions and counseling regarding his condition or for health maintenance advice. Please see specific information pulled into the AVS if appropriate.          right normal/left normal

## 2023-11-06 ENCOUNTER — TELEPHONE (OUTPATIENT)
Dept: INTERNAL MEDICINE | Facility: CLINIC | Age: 73
End: 2023-11-06
Payer: MEDICARE

## 2023-12-05 ENCOUNTER — LAB (OUTPATIENT)
Dept: LAB | Facility: HOSPITAL | Age: 73
End: 2023-12-05
Payer: MEDICARE

## 2023-12-05 DIAGNOSIS — I48.21 PERMANENT ATRIAL FIBRILLATION: ICD-10-CM

## 2023-12-05 DIAGNOSIS — Z95.2 S/P AVR: ICD-10-CM

## 2023-12-05 DIAGNOSIS — Z95.810 ICD (IMPLANTABLE CARDIOVERTER-DEFIBRILLATOR), DUAL, IN SITU: ICD-10-CM

## 2023-12-05 DIAGNOSIS — E55.9 VITAMIN D DEFICIENCY: ICD-10-CM

## 2023-12-05 DIAGNOSIS — L30.9 DERMATITIS: ICD-10-CM

## 2023-12-05 DIAGNOSIS — R73.01 IFG (IMPAIRED FASTING GLUCOSE): ICD-10-CM

## 2023-12-05 DIAGNOSIS — I10 ESSENTIAL HYPERTENSION: ICD-10-CM

## 2023-12-05 DIAGNOSIS — E78.2 MIXED HYPERLIPIDEMIA: ICD-10-CM

## 2023-12-05 LAB
25(OH)D3 SERPL-MCNC: 30.5 NG/ML (ref 30–100)
ALBUMIN SERPL-MCNC: 4.4 G/DL (ref 3.5–5.2)
ALBUMIN UR-MCNC: 4 MG/DL
ALBUMIN/GLOB SERPL: 1.6 G/DL
ALP SERPL-CCNC: 80 U/L (ref 39–117)
ALT SERPL W P-5'-P-CCNC: 18 U/L (ref 1–41)
ANION GAP SERPL CALCULATED.3IONS-SCNC: 11.8 MMOL/L (ref 5–15)
AST SERPL-CCNC: 19 U/L (ref 1–40)
BILIRUB SERPL-MCNC: 0.8 MG/DL (ref 0–1.2)
BUN SERPL-MCNC: 19 MG/DL (ref 8–23)
BUN/CREAT SERPL: 17.8 (ref 7–25)
CALCIUM SPEC-SCNC: 9.1 MG/DL (ref 8.6–10.5)
CHLORIDE SERPL-SCNC: 108 MMOL/L (ref 98–107)
CHOLEST SERPL-MCNC: 128 MG/DL (ref 0–200)
CO2 SERPL-SCNC: 22.2 MMOL/L (ref 22–29)
CREAT SERPL-MCNC: 1.07 MG/DL (ref 0.76–1.27)
EGFRCR SERPLBLD CKD-EPI 2021: 73.3 ML/MIN/1.73
FOLATE SERPL-MCNC: 8.75 NG/ML (ref 4.78–24.2)
GLOBULIN UR ELPH-MCNC: 2.7 GM/DL
GLUCOSE SERPL-MCNC: 123 MG/DL (ref 65–99)
HBA1C MFR BLD: 5.7 % (ref 4.8–5.6)
HDLC SERPL-MCNC: 46 MG/DL (ref 40–60)
LDLC SERPL CALC-MCNC: 63 MG/DL (ref 0–100)
LDLC/HDLC SERPL: 1.35 {RATIO}
POTASSIUM SERPL-SCNC: 4.5 MMOL/L (ref 3.5–5.2)
PROT SERPL-MCNC: 7.1 G/DL (ref 6–8.5)
SODIUM SERPL-SCNC: 142 MMOL/L (ref 136–145)
T4 FREE SERPL-MCNC: 1.42 NG/DL (ref 0.93–1.7)
TRIGL SERPL-MCNC: 100 MG/DL (ref 0–150)
TSH SERPL DL<=0.05 MIU/L-ACNC: 1.48 UIU/ML (ref 0.27–4.2)
VIT B12 BLD-MCNC: 864 PG/ML (ref 211–946)
VLDLC SERPL-MCNC: 19 MG/DL (ref 5–40)

## 2023-12-05 PROCEDURE — 82306 VITAMIN D 25 HYDROXY: CPT

## 2023-12-05 PROCEDURE — 82043 UR ALBUMIN QUANTITATIVE: CPT

## 2023-12-05 PROCEDURE — 36415 COLL VENOUS BLD VENIPUNCTURE: CPT

## 2023-12-05 PROCEDURE — 83036 HEMOGLOBIN GLYCOSYLATED A1C: CPT

## 2023-12-05 PROCEDURE — 82607 VITAMIN B-12: CPT

## 2023-12-05 PROCEDURE — 80053 COMPREHEN METABOLIC PANEL: CPT

## 2023-12-05 PROCEDURE — 80061 LIPID PANEL: CPT

## 2023-12-05 PROCEDURE — 84443 ASSAY THYROID STIM HORMONE: CPT

## 2023-12-05 PROCEDURE — 82746 ASSAY OF FOLIC ACID SERUM: CPT

## 2023-12-05 PROCEDURE — 84439 ASSAY OF FREE THYROXINE: CPT

## 2023-12-12 ENCOUNTER — OFFICE VISIT (OUTPATIENT)
Dept: INTERNAL MEDICINE | Facility: CLINIC | Age: 73
End: 2023-12-12
Payer: MEDICARE

## 2023-12-12 VITALS
OXYGEN SATURATION: 94 % | HEART RATE: 60 BPM | TEMPERATURE: 97.8 F | WEIGHT: 315 LBS | BODY MASS INDEX: 42.66 KG/M2 | RESPIRATION RATE: 18 BRPM | DIASTOLIC BLOOD PRESSURE: 84 MMHG | SYSTOLIC BLOOD PRESSURE: 119 MMHG | HEIGHT: 72 IN

## 2023-12-12 DIAGNOSIS — Z95.2 S/P AVR: ICD-10-CM

## 2023-12-12 DIAGNOSIS — E78.2 MIXED HYPERLIPIDEMIA: ICD-10-CM

## 2023-12-12 DIAGNOSIS — Z00.00 MEDICARE ANNUAL WELLNESS VISIT, SUBSEQUENT: Primary | ICD-10-CM

## 2023-12-12 DIAGNOSIS — E55.9 VITAMIN D DEFICIENCY: ICD-10-CM

## 2023-12-12 DIAGNOSIS — I48.21 PERMANENT ATRIAL FIBRILLATION: ICD-10-CM

## 2023-12-12 DIAGNOSIS — I10 ESSENTIAL HYPERTENSION: ICD-10-CM

## 2023-12-12 DIAGNOSIS — R73.01 IFG (IMPAIRED FASTING GLUCOSE): ICD-10-CM

## 2023-12-12 DIAGNOSIS — Z95.810 ICD (IMPLANTABLE CARDIOVERTER-DEFIBRILLATOR), DUAL, IN SITU: ICD-10-CM

## 2023-12-12 DIAGNOSIS — L30.9 DERMATITIS: ICD-10-CM

## 2023-12-12 RX ORDER — TRIAMCINOLONE ACETONIDE 1 MG/G
1 CREAM TOPICAL 2 TIMES DAILY
Qty: 454 G | Refills: 2 | Status: SHIPPED | OUTPATIENT
Start: 2023-12-12

## 2023-12-12 NOTE — PROGRESS NOTES
"CHIEF COMPLAINT/ HPI: Patient is here to follow-up with history of A-fib, says he is doing well review labs,  Follow-up (2 month follow up/ labs done)              Objective   Vital Signs  Vitals:    12/12/23 1225   BP: 119/84   BP Location: Left arm   Patient Position: Sitting   Cuff Size: Large Adult   Pulse: 60   Resp: 18   Temp: 97.8 °F (36.6 °C)   TempSrc: Temporal   SpO2: 94%   Weight: (!) 153 kg (336 lb 9.6 oz)   Height: 182.9 cm (72.01\")      Body mass index is 45.64 kg/m².  Review of Systems   Constitutional: Negative.    HENT: Negative.     Eyes: Negative.    Respiratory: Negative.     Cardiovascular: Negative.    Gastrointestinal: Negative.    Endocrine: Negative.    Genitourinary: Negative.    Musculoskeletal: Negative.    Allergic/Immunologic: Negative.    Neurological: Negative.    Hematological: Negative.    Psychiatric/Behavioral: Negative.        Physical Exam  Constitutional:       General: He is not in acute distress.     Appearance: Normal appearance. He is obese.   HENT:      Head: Normocephalic.      Mouth/Throat:      Mouth: Mucous membranes are moist.   Eyes:      Conjunctiva/sclera: Conjunctivae normal.      Pupils: Pupils are equal, round, and reactive to light.   Cardiovascular:      Rate and Rhythm: Normal rate and regular rhythm. Rhythm irregular.      Pulses: Normal pulses.      Heart sounds: Normal heart sounds.   Pulmonary:      Effort: Pulmonary effort is normal.      Breath sounds: Normal breath sounds.   Musculoskeletal:         General: No swelling. Normal range of motion.      Cervical back: Neck supple.   Skin:     General: Skin is warm and dry.      Coloration: Skin is not jaundiced.   Neurological:      General: No focal deficit present.      Mental Status: He is alert and oriented to person, place, and time. Mental status is at baseline.   Psychiatric:         Mood and Affect: Mood normal.         Behavior: Behavior normal.         Thought Content: Thought content normal.    " "     Judgment: Judgment normal.     Healing skin abrasions on the right forearm area,    Right neck fullness,, soft rubbery, nontender no changes    Patient has trace edema stasis changes to the lower legs bilateral,    Radial pulse is irregular controlled 55-65 rate,  Result Review :   No results found for: \"PROBNP\", \"BNP\"  CMP          10/26/2023    09:07 12/5/2023    08:58   CMP   Glucose 131  123    BUN 23  19    Creatinine 1.00  1.07    EGFR 80.0  73.3    Sodium 141  142    Potassium 4.6  4.5    Chloride 106  108    Calcium 9.0  9.1    Total Protein 7.0  7.1    Albumin 4.3  4.4    Globulin 2.7  2.7    Total Bilirubin 0.6  0.8    Alkaline Phosphatase 63  80    AST (SGOT) 20  19    ALT (SGPT) 19  18    Albumin/Globulin Ratio 1.6  1.6    BUN/Creatinine Ratio 23.0  17.8    Anion Gap 11.3  11.8      CBC w/diff          10/26/2023    09:07   CBC w/Diff   WBC 5.80    RBC 4.82    Hemoglobin 15.2    Hematocrit 44.5    MCV 92.3    MCH 31.5    MCHC 34.2    RDW 12.7    Platelets 147    Neutrophil Rel % 67.9    Immature Granulocyte Rel % 0.3    Lymphocyte Rel % 19.7    Monocyte Rel % 9.1    Eosinophil Rel % 2.1    Basophil Rel % 0.9       Lipid Panel          10/26/2023    09:07 12/5/2023    08:58   Lipid Panel   Total Cholesterol 188  128    Triglycerides 117  100    HDL Cholesterol 50  46    VLDL Cholesterol 21  19    LDL Cholesterol  117  63    LDL/HDL Ratio 2.29  1.35       Lab Results   Component Value Date    TSH 1.480 12/05/2023      Lab Results   Component Value Date    FREET4 1.42 12/05/2023      A1C Last 3 Results          12/5/2023    08:58   HGBA1C Last 3 Results   Hemoglobin A1C 5.70       PSA          10/26/2023    09:07   PSA   PSA 0.341                     Visit Diagnoses:    ICD-10-CM ICD-9-CM   1. Medicare annual wellness visit, subsequent  Z00.00 V70.0   2. Essential hypertension  I10 401.9   3. ICD (implantable cardioverter-defibrillator), dual, in situ  Z95.810 V45.02   4. Mixed hyperlipidemia  E78.2 " 272.2   5. Permanent atrial fibrillation  I48.21 427.31   6. S/P AVR  Z95.2 V43.3   7. IFG (impaired fasting glucose)  R73.01 790.21   8. Vitamin D deficiency  E55.9 268.9   9. Dermatitis  L30.9 692.9       Assessment and Plan   Diagnoses and all orders for this visit:    1. Medicare annual wellness visit, subsequent (Primary)    2. Essential hypertension  -     CBC & Differential; Future  -     Hemoglobin A1c; Future  -     Comprehensive Metabolic Panel; Future  -     Lipid Panel; Future    3. ICD (implantable cardioverter-defibrillator), dual, in situ  -     CBC & Differential; Future  -     Hemoglobin A1c; Future  -     Comprehensive Metabolic Panel; Future  -     Lipid Panel; Future    4. Mixed hyperlipidemia  -     CBC & Differential; Future  -     Hemoglobin A1c; Future  -     Comprehensive Metabolic Panel; Future  -     Lipid Panel; Future    5. Permanent atrial fibrillation  -     CBC & Differential; Future  -     Hemoglobin A1c; Future  -     Comprehensive Metabolic Panel; Future  -     Lipid Panel; Future    6. S/P AVR  -     CBC & Differential; Future  -     Hemoglobin A1c; Future  -     Comprehensive Metabolic Panel; Future  -     Lipid Panel; Future    7. IFG (impaired fasting glucose)  -     CBC & Differential; Future  -     Hemoglobin A1c; Future  -     Comprehensive Metabolic Panel; Future  -     Lipid Panel; Future    8. Vitamin D deficiency  -     CBC & Differential; Future  -     Hemoglobin A1c; Future  -     Comprehensive Metabolic Panel; Future  -     Lipid Panel; Future    9. Dermatitis  -     CBC & Differential; Future  -     Hemoglobin A1c; Future  -     Comprehensive Metabolic Panel; Future  -     Lipid Panel; Future    Other orders  -     triamcinolone (KENALOG) 0.1 % cream; Apply 1 application  topically to the appropriate area as directed 2 (Two) Times a Day.  Dispense: 454 g; Refill: 2        Class 3 Severe Obesity (BMI >=40). Obesity-related health conditions include the following:  hypertension and coronary heart disease. Obesity is unchanged. BMI is is above average; BMI management plan is completed. We discussed portion control, increasing exercise, and Weight Watchers or other Commercial based weight reduction program.     Hypertension, continue lisinopril 10 mg daily, Lopressor 100 mg twice a day, diltiazem 120 mg daily, Lasix 40 mg daily, aspirin 325 mg bid    Impaired fasting glucose, hemoglobin A1c 5.7 December 5, 2023 urine microalbumin is 4, normal December 5, 2023    Right neck anterior fullness rubbery nodule, patient says been there about 10 years or more ----has not changed will follow clinically no studies for now,    Coronary artery disease, previous implantable cardio defibrillator    Status post aortic valve replacement, follows up with Dr. ASH    Chronic atrial fibrillation---only on asa , tried coumadin in past     Mixed hyperlipidemia--cont Crestor 10 mg daily, October 31, 2023    PSA 0.3 October 26, 2023        Follow Up   Return in about 6 months (around 6/12/2024).  Patient was given instructions and counseling regarding his condition or for health maintenance advice. Please see specific information pulled into the AVS if appropriate.         Answers submitted by the patient for this visit:  Primary Reason for Visit (Submitted on 12/5/2023)  What is the primary reason for your visit?: Other  Other (Submitted on 12/5/2023)  Please describe your symptoms.: Follow up appointment  Have you had these symptoms before?: Yes  How long have you been having these symptoms?: Greater than 2 weeks  Please list any medications you are currently taking for this condition.: Lisinipril , Dilitzen, Metaproplol  Please describe any probable cause for these symptoms. : Chronic Afib

## 2023-12-12 NOTE — PROGRESS NOTES
The ABCs of the Annual Wellness Visit  Subsequent Medicare Wellness Visit    Subjective      Woodrow Alejandro is a 73 y.o. male who presents for a Subsequent Medicare Wellness Visit.    The following portions of the patient's history were reviewed and   updated as appropriate: allergies, current medications, past family history, past medical history, past social history, past surgical history, and problem list.    Compared to one year ago, the patient feels his physical   health is the same.    Compared to one year ago, the patient feels his mental   health is the same.    Recent Hospitalizations:  He was not admitted to the hospital during the last year.       Current Medical Providers:  Patient Care Team:  Juan Perez MD as PCP - General (Internal Medicine)    Outpatient Medications Prior to Visit   Medication Sig Dispense Refill    aspirin 325 MG tablet Take 1 tablet by mouth 2 (two) times a day.      dilTIAZem (CARDIZEM) 120 MG tablet Take 1 tablet by mouth Daily.      furosemide (LASIX) 40 MG tablet Take 1 tablet by mouth Daily.      lisinopril (PRINIVIL,ZESTRIL) 10 MG tablet Take 1 tablet by mouth Daily.      metoprolol tartrate (LOPRESSOR) 100 MG tablet 1 tablet 2 (two) times a day.      rosuvastatin (Crestor) 10 MG tablet Take 1 tablet by mouth Daily. 90 tablet 3    triamcinolone (KENALOG) 0.1 % cream Apply 1 application  topically to the appropriate area as directed 2 (Two) Times a Day. 60 g 2     No facility-administered medications prior to visit.       No opioid medication identified on active medication list. I have reviewed chart for other potential  high risk medication/s and harmful drug interactions in the elderly.        Aspirin is on active medication list. Aspirin use is indicated based on review of current medical condition/s. Pros and cons of this therapy have been discussed today. Benefits of this medication outweigh potential harm.  Patient has been encouraged to continue  "taking this medication.  .      Patient Active Problem List   Diagnosis    Essential hypertension    Permanent atrial fibrillation    S/P AVR    ICD (implantable cardioverter-defibrillator), dual, in situ    Dermatitis    IFG (impaired fasting glucose)    Mixed hyperlipidemia    Vitamin D deficiency    Medicare annual wellness visit, subsequent     Advance Care Planning   Advance Care Planning     Advance Directive is not on file.  ACP discussion was held with the patient during this visit. Patient has an advance directive (not in EMR), copy requested.     Objective    Vitals:    12/12/23 1225   BP: 119/84   BP Location: Left arm   Patient Position: Sitting   Cuff Size: Large Adult   Pulse: 60   Resp: 18   Temp: 97.8 °F (36.6 °C)   TempSrc: Temporal   SpO2: 94%   Weight: (!) 153 kg (336 lb 9.6 oz)   Height: 182.9 cm (72.01\")     Estimated body mass index is 45.64 kg/m² as calculated from the following:    Height as of this encounter: 182.9 cm (72.01\").    Weight as of this encounter: 153 kg (336 lb 9.6 oz).           Does the patient have evidence of cognitive impairment?   No    Lab Results   Component Value Date    TRIG 100 12/05/2023    HDL 46 12/05/2023    LDL 63 12/05/2023    VLDL 19 12/05/2023    HGBA1C 5.70 (H) 12/05/2023          HEALTH RISK ASSESSMENT    Smoking Status:  Social History     Tobacco Use   Smoking Status Never   Smokeless Tobacco Never     Alcohol Consumption:  Social History     Substance and Sexual Activity   Alcohol Use Yes     Fall Risk Screen:    STEADI Fall Risk Assessment was completed, and patient is at LOW risk for falls.Assessment completed on:10/31/2023    Depression Screening:      10/31/2023     9:45 AM   PHQ-2/PHQ-9 Depression Screening   Little Interest or Pleasure in Doing Things 0-->not at all   Feeling Down, Depressed or Hopeless 0-->not at all   PHQ-9: Brief Depression Severity Measure Score 0       Health Habits and Functional and Cognitive Screening:      10/31/2023     " 9:00 AM   Functional & Cognitive Status   Do you have difficulty preparing food and eating? No   Do you have difficulty bathing yourself, getting dressed or grooming yourself? No   Do you have difficulty using the toilet? No   Do you have difficulty moving around from place to place? No   Do you have trouble with steps or getting out of a bed or a chair? No   Do you need help using the phone?  No   Are you deaf or do you have serious difficulty hearing?  No   Do you need help to go to places out of walking distance? No   Do you need help shopping? No   Do you need help preparing meals?  No   Do you need help with housework?  No   Do you need help with laundry? No   Do you need help taking your medications? No   Do you need help managing money? No   Do you ever drive or ride in a car without wearing a seat belt? No   Do you have difficulty concentrating, remembering or making decisions? No       Age-appropriate Screening Schedule:  Refer to the list below for future screening recommendations based on patient's age, sex and/or medical conditions. Orders for these recommended tests are listed in the plan section. The patient has been provided with a written plan.    Health Maintenance   Topic Date Due    COLORECTAL CANCER SCREENING  Never done    Pneumococcal Vaccine 65+ (1 - PCV) Never done    TDAP/TD VACCINES (1 - Tdap) Never done    HEPATITIS C SCREENING  Never done    INFLUENZA VACCINE  Never done    COVID-19 Vaccine (4 - 2023-24 season) 09/01/2023    BMI FOLLOWUP  10/31/2024    LIPID PANEL  12/05/2024    ANNUAL WELLNESS VISIT  12/12/2024    ZOSTER VACCINE  Completed                  CMS Preventative Services Quick Reference  Risk Factors Identified During Encounter:    None Identified    The above risks/problems have been discussed with the patient.  Pertinent information has been shared with the patient in the After Visit Summary.    Diagnoses and all orders for this visit:    1. Medicare annual wellness visit,  subsequent (Primary)    2. Essential hypertension  -     CBC & Differential; Future  -     Hemoglobin A1c; Future  -     Comprehensive Metabolic Panel; Future  -     Lipid Panel; Future    3. ICD (implantable cardioverter-defibrillator), dual, in situ  -     CBC & Differential; Future  -     Hemoglobin A1c; Future  -     Comprehensive Metabolic Panel; Future  -     Lipid Panel; Future    4. Mixed hyperlipidemia  -     CBC & Differential; Future  -     Hemoglobin A1c; Future  -     Comprehensive Metabolic Panel; Future  -     Lipid Panel; Future    5. Permanent atrial fibrillation  -     CBC & Differential; Future  -     Hemoglobin A1c; Future  -     Comprehensive Metabolic Panel; Future  -     Lipid Panel; Future    6. S/P AVR  -     CBC & Differential; Future  -     Hemoglobin A1c; Future  -     Comprehensive Metabolic Panel; Future  -     Lipid Panel; Future    7. IFG (impaired fasting glucose)  -     CBC & Differential; Future  -     Hemoglobin A1c; Future  -     Comprehensive Metabolic Panel; Future  -     Lipid Panel; Future    8. Vitamin D deficiency  -     CBC & Differential; Future  -     Hemoglobin A1c; Future  -     Comprehensive Metabolic Panel; Future  -     Lipid Panel; Future    9. Dermatitis  -     CBC & Differential; Future  -     Hemoglobin A1c; Future  -     Comprehensive Metabolic Panel; Future  -     Lipid Panel; Future    Other orders  -     triamcinolone (KENALOG) 0.1 % cream; Apply 1 application  topically to the appropriate area as directed 2 (Two) Times a Day.  Dispense: 454 g; Refill: 2        Follow Up:   Next Medicare Wellness visit to be scheduled in 1 year.      An After Visit Summary and PPPS were made available to the patient.            Answers submitted by the patient for this visit:  Primary Reason for Visit (Submitted on 12/5/2023)  What is the primary reason for your visit?: Other  Other (Submitted on 12/5/2023)  Please describe your symptoms.: Follow up appointment  Have you had  these symptoms before?: Yes  How long have you been having these symptoms?: Greater than 2 weeks  Please list any medications you are currently taking for this condition.: Lisinipril , Dilitzen, Metaproplol  Please describe any probable cause for these symptoms. : Chronic Afib

## 2023-12-28 RX ORDER — METOPROLOL TARTRATE 100 MG/1
100 TABLET ORAL 2 TIMES DAILY
Qty: 180 TABLET | Refills: 3 | Status: SHIPPED | OUTPATIENT
Start: 2023-12-28

## 2023-12-28 RX ORDER — DILTIAZEM HYDROCHLORIDE 120 MG/1
120 TABLET, FILM COATED ORAL DAILY
Qty: 90 TABLET | Refills: 3 | Status: SHIPPED | OUTPATIENT
Start: 2023-12-28

## 2023-12-28 RX ORDER — FUROSEMIDE 40 MG/1
40 TABLET ORAL DAILY
Qty: 90 TABLET | Refills: 3 | Status: SHIPPED | OUTPATIENT
Start: 2023-12-28

## 2023-12-28 RX ORDER — LISINOPRIL 10 MG/1
10 TABLET ORAL DAILY
Qty: 90 TABLET | Refills: 3 | Status: SHIPPED | OUTPATIENT
Start: 2023-12-28

## 2023-12-28 NOTE — TELEPHONE ENCOUNTER
"Caller: Woodrow Alejandro \"Ortiz\"    Relationship: Self    Best call back number: [phone]    Requested Prescriptions:   Requested Prescriptions     Pending Prescriptions Disp Refills    dilTIAZem (CARDIZEM) 120 MG tablet       Sig: Take 1 tablet by mouth Daily.    furosemide (LASIX) 40 MG tablet       Sig: Take 1 tablet by mouth Daily.    metoprolol tartrate (LOPRESSOR) 100 MG tablet       Si tablet.    lisinopril (PRINIVIL,ZESTRIL) 10 MG tablet       Sig: Take 1 tablet by mouth Daily.        Pharmacy where request should be sent: [facility] [address] [place] [address] [place] - [address] [facility] [phone] [facility] [phone] FX     Last office visit with prescribing clinician: 2023   Last telemedicine visit with prescribing clinician: Visit date not found   Next office visit with prescribing clinician: 2024     Additional details provided by patient: PATIENT IS NEEDING THESE FILLED AND SENT TO [facility]. HE IS A NEW PATIENT WITH THE OFFICE.     Does the patient have less than a 3 day supply:  [x] Yes  [] No    Would you like a call back once the refill request has been completed: [] Yes [x] No    If the office needs to give you a call back, can they leave a voicemail: [] Yes [x] No    Riaz Abarca Rep   23 15:13 EST       "

## 2024-01-01 ENCOUNTER — APPOINTMENT (OUTPATIENT)
Dept: NUCLEAR MEDICINE | Facility: HOSPITAL | Age: 74
DRG: 003 | End: 2024-01-01
Payer: MEDICARE

## 2024-01-01 ENCOUNTER — APPOINTMENT (OUTPATIENT)
Dept: GENERAL RADIOLOGY | Facility: HOSPITAL | Age: 74
DRG: 003 | End: 2024-01-01
Payer: MEDICARE

## 2024-01-01 ENCOUNTER — APPOINTMENT (OUTPATIENT)
Dept: CARDIOLOGY | Facility: HOSPITAL | Age: 74
DRG: 003 | End: 2024-01-01
Payer: MEDICARE

## 2024-01-01 ENCOUNTER — ANESTHESIA (OUTPATIENT)
Dept: CARDIOVASCULAR ICU | Facility: HOSPITAL | Age: 74
End: 2024-01-01
Payer: MEDICARE

## 2024-01-01 ENCOUNTER — ANESTHESIA EVENT (OUTPATIENT)
Dept: CARDIOVASCULAR ICU | Facility: HOSPITAL | Age: 74
End: 2024-01-01
Payer: MEDICARE

## 2024-01-01 ENCOUNTER — ANCILLARY PROCEDURE (OUTPATIENT)
Dept: PERIOP | Facility: HOSPITAL | Age: 74
DRG: 003 | End: 2024-01-01
Payer: MEDICARE

## 2024-01-01 ENCOUNTER — APPOINTMENT (OUTPATIENT)
Dept: ULTRASOUND IMAGING | Facility: HOSPITAL | Age: 74
DRG: 003 | End: 2024-01-01
Payer: MEDICARE

## 2024-01-01 ENCOUNTER — HOSPITAL ENCOUNTER (INPATIENT)
Facility: HOSPITAL | Age: 74
LOS: 35 days | DRG: 003 | End: 2024-11-27
Attending: THORACIC SURGERY (CARDIOTHORACIC VASCULAR SURGERY) | Admitting: THORACIC SURGERY (CARDIOTHORACIC VASCULAR SURGERY)
Payer: MEDICARE

## 2024-01-01 ENCOUNTER — APPOINTMENT (OUTPATIENT)
Dept: CT IMAGING | Facility: HOSPITAL | Age: 74
DRG: 003 | End: 2024-01-01
Payer: MEDICARE

## 2024-01-01 ENCOUNTER — DOCUMENTATION (OUTPATIENT)
Dept: CARDIOVASCULAR ICU | Facility: HOSPITAL | Age: 74
End: 2024-01-01
Payer: MEDICARE

## 2024-01-01 VITALS
TEMPERATURE: 100.9 F | OXYGEN SATURATION: 75 % | DIASTOLIC BLOOD PRESSURE: 59 MMHG | WEIGHT: 315 LBS | RESPIRATION RATE: 29 BRPM | HEART RATE: 78 BPM | HEIGHT: 69 IN | BODY MASS INDEX: 46.65 KG/M2 | SYSTOLIC BLOOD PRESSURE: 108 MMHG

## 2024-01-01 DIAGNOSIS — Z95.2 S/P AVR: ICD-10-CM

## 2024-01-01 DIAGNOSIS — Z95.2 S/P MVR (MITRAL VALVE REPLACEMENT): ICD-10-CM

## 2024-01-01 DIAGNOSIS — T82.857A STENOSIS OF PROSTHETIC AORTIC VALVE, INITIAL ENCOUNTER: Primary | ICD-10-CM

## 2024-01-01 LAB
25(OH)D3 SERPL-MCNC: 15.7 NG/ML (ref 30–100)
ABO GROUP BLD: NORMAL
ACT BLD: 129 SECONDS (ref 82–152)
ACT BLD: 129 SECONDS (ref 82–152)
ACT BLD: 141 SECONDS (ref 82–152)
ACT BLD: 153 SECONDS (ref 82–152)
ACT BLD: 285 SECONDS (ref 82–152)
ACT BLD: 366 SECONDS (ref 82–152)
ACT BLD: 371 SECONDS (ref 82–152)
ACT BLD: 371 SECONDS (ref 82–152)
ACT BLD: 423 SECONDS (ref 82–152)
ACT BLD: 435 SECONDS (ref 82–152)
ACT BLD: 441 SECONDS (ref 82–152)
ACT BLD: 452 SECONDS (ref 82–152)
ACT BLD: 487 SECONDS (ref 82–152)
ACT BLD: 510 SECONDS (ref 82–152)
ALBUMIN SERPL-MCNC: 1.8 G/DL (ref 3.5–5.2)
ALBUMIN SERPL-MCNC: 2 G/DL (ref 3.5–5.2)
ALBUMIN SERPL-MCNC: 2.1 G/DL (ref 3.5–5.2)
ALBUMIN SERPL-MCNC: 2.1 G/DL (ref 3.5–5.2)
ALBUMIN SERPL-MCNC: 2.2 G/DL (ref 3.5–5.2)
ALBUMIN SERPL-MCNC: 2.3 G/DL (ref 3.5–5.2)
ALBUMIN SERPL-MCNC: 2.4 G/DL (ref 3.5–5.2)
ALBUMIN SERPL-MCNC: 2.5 G/DL (ref 3.5–5.2)
ALBUMIN SERPL-MCNC: 2.6 G/DL (ref 3.5–5.2)
ALBUMIN SERPL-MCNC: 2.7 G/DL (ref 3.5–5.2)
ALBUMIN SERPL-MCNC: 2.8 G/DL (ref 3.5–5.2)
ALBUMIN SERPL-MCNC: 2.8 G/DL (ref 3.5–5.2)
ALBUMIN SERPL-MCNC: 2.9 G/DL (ref 3.5–5.2)
ALBUMIN SERPL-MCNC: 3 G/DL (ref 3.5–5.2)
ALBUMIN/GLOB SERPL: 0.5 G/DL
ALBUMIN/GLOB SERPL: 0.6 G/DL
ALBUMIN/GLOB SERPL: 0.6 G/DL
ALBUMIN/GLOB SERPL: 0.7 G/DL
ALBUMIN/GLOB SERPL: 0.7 G/DL
ALBUMIN/GLOB SERPL: 0.8 G/DL
ALBUMIN/GLOB SERPL: 0.9 G/DL
ALBUMIN/GLOB SERPL: 1 G/DL
ALBUMIN/GLOB SERPL: 1.1 G/DL
ALBUMIN/GLOB SERPL: 1.2 G/DL
ALBUMIN/GLOB SERPL: 1.2 G/DL
ALBUMIN/GLOB SERPL: 1.4 G/DL
ALBUMIN/GLOB SERPL: 1.4 G/DL
ALBUMIN/GLOB SERPL: 1.6 G/DL
ALP SERPL-CCNC: 107 U/L (ref 39–117)
ALP SERPL-CCNC: 108 U/L (ref 39–117)
ALP SERPL-CCNC: 108 U/L (ref 39–117)
ALP SERPL-CCNC: 109 U/L (ref 39–117)
ALP SERPL-CCNC: 110 U/L (ref 39–117)
ALP SERPL-CCNC: 112 U/L (ref 39–117)
ALP SERPL-CCNC: 112 U/L (ref 39–117)
ALP SERPL-CCNC: 114 U/L (ref 39–117)
ALP SERPL-CCNC: 115 U/L (ref 39–117)
ALP SERPL-CCNC: 136 U/L (ref 39–117)
ALP SERPL-CCNC: 162 U/L (ref 39–117)
ALP SERPL-CCNC: 57 U/L (ref 39–117)
ALP SERPL-CCNC: 59 U/L (ref 39–117)
ALP SERPL-CCNC: 60 U/L (ref 39–117)
ALP SERPL-CCNC: 62 U/L (ref 39–117)
ALP SERPL-CCNC: 71 U/L (ref 39–117)
ALP SERPL-CCNC: 75 U/L (ref 39–117)
ALP SERPL-CCNC: 77 U/L (ref 39–117)
ALP SERPL-CCNC: 92 U/L (ref 39–117)
ALP SERPL-CCNC: 97 U/L (ref 39–117)
ALT SERPL W P-5'-P-CCNC: 10 U/L (ref 1–41)
ALT SERPL W P-5'-P-CCNC: 11 U/L (ref 1–41)
ALT SERPL W P-5'-P-CCNC: 13 U/L (ref 1–41)
ALT SERPL W P-5'-P-CCNC: 14 U/L (ref 1–41)
ALT SERPL W P-5'-P-CCNC: 15 U/L (ref 1–41)
ALT SERPL W P-5'-P-CCNC: 15 U/L (ref 1–41)
ALT SERPL W P-5'-P-CCNC: 16 U/L (ref 1–41)
ALT SERPL W P-5'-P-CCNC: 16 U/L (ref 1–41)
ALT SERPL W P-5'-P-CCNC: 17 U/L (ref 1–41)
ALT SERPL W P-5'-P-CCNC: 18 U/L (ref 1–41)
ALT SERPL W P-5'-P-CCNC: 20 U/L (ref 1–41)
ALT SERPL W P-5'-P-CCNC: 29 U/L (ref 1–41)
ALT SERPL W P-5'-P-CCNC: 52 U/L (ref 1–41)
ALT SERPL W P-5'-P-CCNC: 6 U/L (ref 1–41)
ALT SERPL W P-5'-P-CCNC: 7 U/L (ref 1–41)
ALT SERPL W P-5'-P-CCNC: 7 U/L (ref 1–41)
ALT SERPL W P-5'-P-CCNC: <5 U/L (ref 1–41)
ALT SERPL W P-5'-P-CCNC: <5 U/L (ref 1–41)
ANION GAP SERPL CALCULATED.3IONS-SCNC: 10.4 MMOL/L (ref 5–15)
ANION GAP SERPL CALCULATED.3IONS-SCNC: 10.6 MMOL/L (ref 5–15)
ANION GAP SERPL CALCULATED.3IONS-SCNC: 10.8 MMOL/L (ref 5–15)
ANION GAP SERPL CALCULATED.3IONS-SCNC: 10.9 MMOL/L (ref 5–15)
ANION GAP SERPL CALCULATED.3IONS-SCNC: 11 MMOL/L (ref 5–15)
ANION GAP SERPL CALCULATED.3IONS-SCNC: 11.2 MMOL/L (ref 5–15)
ANION GAP SERPL CALCULATED.3IONS-SCNC: 11.4 MMOL/L (ref 5–15)
ANION GAP SERPL CALCULATED.3IONS-SCNC: 11.5 MMOL/L (ref 5–15)
ANION GAP SERPL CALCULATED.3IONS-SCNC: 11.7 MMOL/L (ref 5–15)
ANION GAP SERPL CALCULATED.3IONS-SCNC: 11.7 MMOL/L (ref 5–15)
ANION GAP SERPL CALCULATED.3IONS-SCNC: 11.9 MMOL/L (ref 5–15)
ANION GAP SERPL CALCULATED.3IONS-SCNC: 11.9 MMOL/L (ref 5–15)
ANION GAP SERPL CALCULATED.3IONS-SCNC: 12 MMOL/L (ref 5–15)
ANION GAP SERPL CALCULATED.3IONS-SCNC: 12.1 MMOL/L (ref 5–15)
ANION GAP SERPL CALCULATED.3IONS-SCNC: 12.1 MMOL/L (ref 5–15)
ANION GAP SERPL CALCULATED.3IONS-SCNC: 12.2 MMOL/L (ref 5–15)
ANION GAP SERPL CALCULATED.3IONS-SCNC: 12.4 MMOL/L (ref 5–15)
ANION GAP SERPL CALCULATED.3IONS-SCNC: 12.4 MMOL/L (ref 5–15)
ANION GAP SERPL CALCULATED.3IONS-SCNC: 12.5 MMOL/L (ref 5–15)
ANION GAP SERPL CALCULATED.3IONS-SCNC: 12.5 MMOL/L (ref 5–15)
ANION GAP SERPL CALCULATED.3IONS-SCNC: 12.6 MMOL/L (ref 5–15)
ANION GAP SERPL CALCULATED.3IONS-SCNC: 12.6 MMOL/L (ref 5–15)
ANION GAP SERPL CALCULATED.3IONS-SCNC: 12.8 MMOL/L (ref 5–15)
ANION GAP SERPL CALCULATED.3IONS-SCNC: 12.8 MMOL/L (ref 5–15)
ANION GAP SERPL CALCULATED.3IONS-SCNC: 12.9 MMOL/L (ref 5–15)
ANION GAP SERPL CALCULATED.3IONS-SCNC: 13 MMOL/L (ref 5–15)
ANION GAP SERPL CALCULATED.3IONS-SCNC: 13.1 MMOL/L (ref 5–15)
ANION GAP SERPL CALCULATED.3IONS-SCNC: 13.2 MMOL/L (ref 5–15)
ANION GAP SERPL CALCULATED.3IONS-SCNC: 13.3 MMOL/L (ref 5–15)
ANION GAP SERPL CALCULATED.3IONS-SCNC: 13.3 MMOL/L (ref 5–15)
ANION GAP SERPL CALCULATED.3IONS-SCNC: 13.5 MMOL/L (ref 5–15)
ANION GAP SERPL CALCULATED.3IONS-SCNC: 13.9 MMOL/L (ref 5–15)
ANION GAP SERPL CALCULATED.3IONS-SCNC: 14 MMOL/L (ref 5–15)
ANION GAP SERPL CALCULATED.3IONS-SCNC: 14.1 MMOL/L (ref 5–15)
ANION GAP SERPL CALCULATED.3IONS-SCNC: 14.1 MMOL/L (ref 5–15)
ANION GAP SERPL CALCULATED.3IONS-SCNC: 14.5 MMOL/L (ref 5–15)
ANION GAP SERPL CALCULATED.3IONS-SCNC: 14.8 MMOL/L (ref 5–15)
ANION GAP SERPL CALCULATED.3IONS-SCNC: 14.9 MMOL/L (ref 5–15)
ANION GAP SERPL CALCULATED.3IONS-SCNC: 15 MMOL/L (ref 5–15)
ANION GAP SERPL CALCULATED.3IONS-SCNC: 15.1 MMOL/L (ref 5–15)
ANION GAP SERPL CALCULATED.3IONS-SCNC: 15.7 MMOL/L (ref 5–15)
ANION GAP SERPL CALCULATED.3IONS-SCNC: 16 MMOL/L (ref 5–15)
ANION GAP SERPL CALCULATED.3IONS-SCNC: 16.2 MMOL/L (ref 5–15)
ANION GAP SERPL CALCULATED.3IONS-SCNC: 16.4 MMOL/L (ref 5–15)
ANION GAP SERPL CALCULATED.3IONS-SCNC: 16.6 MMOL/L (ref 5–15)
ANION GAP SERPL CALCULATED.3IONS-SCNC: 16.7 MMOL/L (ref 5–15)
ANION GAP SERPL CALCULATED.3IONS-SCNC: 17 MMOL/L (ref 5–15)
ANION GAP SERPL CALCULATED.3IONS-SCNC: 17.2 MMOL/L (ref 5–15)
ANION GAP SERPL CALCULATED.3IONS-SCNC: 21.8 MMOL/L (ref 5–15)
ANION GAP SERPL CALCULATED.3IONS-SCNC: 27.4 MMOL/L (ref 5–15)
ANION GAP SERPL CALCULATED.3IONS-SCNC: 7.3 MMOL/L (ref 5–15)
ANION GAP SERPL CALCULATED.3IONS-SCNC: 8.8 MMOL/L (ref 5–15)
ANION GAP SERPL CALCULATED.3IONS-SCNC: 8.8 MMOL/L (ref 5–15)
ANION GAP SERPL CALCULATED.3IONS-SCNC: 9 MMOL/L (ref 5–15)
ANION GAP SERPL CALCULATED.3IONS-SCNC: 9 MMOL/L (ref 5–15)
ANION GAP SERPL CALCULATED.3IONS-SCNC: 9.9 MMOL/L (ref 5–15)
ANISOCYTOSIS BLD QL: ABNORMAL
ANISOCYTOSIS BLD QL: ABNORMAL
ANTI-M(COLD): NORMAL
ANTI-M: NORMAL
APTT PPP: 32.3 SECONDS (ref 22.7–35.4)
APTT PPP: 33.6 SECONDS (ref 22.7–35.4)
APTT PPP: 40.8 SECONDS (ref 22.7–35.4)
APTT PPP: 46.1 SECONDS (ref 22.7–35.4)
ARTERIAL PATENCY WRIST A: ABNORMAL
ARTERIAL PATENCY WRIST A: POSITIVE
AST SERPL-CCNC: 101 U/L (ref 1–40)
AST SERPL-CCNC: 131 U/L (ref 1–40)
AST SERPL-CCNC: 18 U/L (ref 1–40)
AST SERPL-CCNC: 19 U/L (ref 1–40)
AST SERPL-CCNC: 20 U/L (ref 1–40)
AST SERPL-CCNC: 21 U/L (ref 1–40)
AST SERPL-CCNC: 21 U/L (ref 1–40)
AST SERPL-CCNC: 23 U/L (ref 1–40)
AST SERPL-CCNC: 24 U/L (ref 1–40)
AST SERPL-CCNC: 24 U/L (ref 1–40)
AST SERPL-CCNC: 25 U/L (ref 1–40)
AST SERPL-CCNC: 26 U/L (ref 1–40)
AST SERPL-CCNC: 27 U/L (ref 1–40)
AST SERPL-CCNC: 30 U/L (ref 1–40)
AST SERPL-CCNC: 31 U/L (ref 1–40)
AST SERPL-CCNC: 31 U/L (ref 1–40)
AST SERPL-CCNC: 36 U/L (ref 1–40)
AST SERPL-CCNC: 65 U/L (ref 1–40)
AST SERPL-CCNC: 71 U/L (ref 1–40)
AST SERPL-CCNC: 73 U/L (ref 1–40)
AST SERPL-CCNC: 82 U/L (ref 1–40)
AST SERPL-CCNC: 83 U/L (ref 1–40)
AST SERPL-CCNC: 89 U/L (ref 1–40)
ATMOSPHERIC PRESS: 742.4 MMHG
ATMOSPHERIC PRESS: 745.6 MMHG
ATMOSPHERIC PRESS: 745.6 MMHG
ATMOSPHERIC PRESS: 746 MMHG
ATMOSPHERIC PRESS: 747.3 MMHG
ATMOSPHERIC PRESS: 748.1 MMHG
ATMOSPHERIC PRESS: 748.3 MMHG
ATMOSPHERIC PRESS: 748.8 MMHG
ATMOSPHERIC PRESS: 749.3 MMHG
ATMOSPHERIC PRESS: 749.4 MMHG
ATMOSPHERIC PRESS: 749.8 MMHG
ATMOSPHERIC PRESS: 750 MMHG
ATMOSPHERIC PRESS: 750 MMHG
ATMOSPHERIC PRESS: 750.1 MMHG
ATMOSPHERIC PRESS: 750.3 MMHG
ATMOSPHERIC PRESS: 750.4 MMHG
ATMOSPHERIC PRESS: 750.6 MMHG
ATMOSPHERIC PRESS: 750.6 MMHG
ATMOSPHERIC PRESS: 751.3 MMHG
ATMOSPHERIC PRESS: 751.4 MMHG
ATMOSPHERIC PRESS: 751.5 MMHG
ATMOSPHERIC PRESS: 751.6 MMHG
ATMOSPHERIC PRESS: 752 MMHG
ATMOSPHERIC PRESS: 752.2 MMHG
ATMOSPHERIC PRESS: 752.3 MMHG
ATMOSPHERIC PRESS: 752.6 MMHG
ATMOSPHERIC PRESS: 752.7 MMHG
ATMOSPHERIC PRESS: 753.6 MMHG
ATMOSPHERIC PRESS: 754 MMHG
ATMOSPHERIC PRESS: 754.2 MMHG
ATMOSPHERIC PRESS: 754.3 MMHG
ATMOSPHERIC PRESS: 754.8 MMHG
ATMOSPHERIC PRESS: 755 MMHG
ATMOSPHERIC PRESS: 755.3 MMHG
ATMOSPHERIC PRESS: 755.7 MMHG
ATMOSPHERIC PRESS: 756.2 MMHG
ATMOSPHERIC PRESS: 756.3 MMHG
ATMOSPHERIC PRESS: 756.7 MMHG
ATMOSPHERIC PRESS: 756.8 MMHG
ATMOSPHERIC PRESS: 756.9 MMHG
ATMOSPHERIC PRESS: 757.5 MMHG
ATMOSPHERIC PRESS: 757.6 MMHG
ATMOSPHERIC PRESS: 757.6 MMHG
BACTERIA BLD CULT: ABNORMAL
BACTERIA BLD CULT: ABNORMAL
BACTERIA SPEC AEROBE CULT: ABNORMAL
BACTERIA SPEC AEROBE CULT: NO GROWTH
BACTERIA SPEC AEROBE CULT: NORMAL
BACTERIA SPEC ANAEROBE CULT: NORMAL
BACTERIA SPEC RESP CULT: ABNORMAL
BACTERIA SPEC RESP CULT: ABNORMAL
BACTERIA SPEC RESP CULT: NO GROWTH
BACTERIA SPEC RESP CULT: NORMAL
BACTERIA SPEC RESP CULT: NORMAL
BACTERIA UR QL AUTO: ABNORMAL /HPF
BACTERIA UR QL AUTO: ABNORMAL /HPF
BACTERIA UR QL AUTO: NORMAL /HPF
BASE EXCESS BLDA CALC-SCNC: -0.3 MMOL/L (ref 0–2)
BASE EXCESS BLDA CALC-SCNC: -0.4 MMOL/L (ref 0–2)
BASE EXCESS BLDA CALC-SCNC: -0.6 MMOL/L (ref 0–2)
BASE EXCESS BLDA CALC-SCNC: -0.8 MMOL/L (ref 0–2)
BASE EXCESS BLDA CALC-SCNC: -1.6 MMOL/L (ref 0–2)
BASE EXCESS BLDA CALC-SCNC: -1.6 MMOL/L (ref 0–2)
BASE EXCESS BLDA CALC-SCNC: -10 MMOL/L (ref -5–5)
BASE EXCESS BLDA CALC-SCNC: -10.2 MMOL/L (ref 0–2)
BASE EXCESS BLDA CALC-SCNC: -2 MMOL/L (ref -5–5)
BASE EXCESS BLDA CALC-SCNC: -2.5 MMOL/L (ref 0–2)
BASE EXCESS BLDA CALC-SCNC: -2.7 MMOL/L (ref 0–2)
BASE EXCESS BLDA CALC-SCNC: -3 MMOL/L (ref -5–5)
BASE EXCESS BLDA CALC-SCNC: -3 MMOL/L (ref -5–5)
BASE EXCESS BLDA CALC-SCNC: -4 MMOL/L (ref -5–5)
BASE EXCESS BLDA CALC-SCNC: -5 MMOL/L (ref -5–5)
BASE EXCESS BLDA CALC-SCNC: -5.5 MMOL/L (ref 0–2)
BASE EXCESS BLDA CALC-SCNC: -6 MMOL/L (ref -5–5)
BASE EXCESS BLDA CALC-SCNC: -6.5 MMOL/L (ref 0–2)
BASE EXCESS BLDA CALC-SCNC: -6.7 MMOL/L (ref 0–2)
BASE EXCESS BLDA CALC-SCNC: -6.7 MMOL/L (ref 0–2)
BASE EXCESS BLDA CALC-SCNC: -7 MMOL/L (ref -5–5)
BASE EXCESS BLDA CALC-SCNC: -8.6 MMOL/L (ref 0–2)
BASE EXCESS BLDA CALC-SCNC: 0.1 MMOL/L (ref 0–2)
BASE EXCESS BLDA CALC-SCNC: 0.8 MMOL/L (ref 0–2)
BASE EXCESS BLDA CALC-SCNC: 1 MMOL/L (ref -5–5)
BASE EXCESS BLDA CALC-SCNC: 1.5 MMOL/L (ref 0–2)
BASE EXCESS BLDA CALC-SCNC: 1.7 MMOL/L (ref 0–2)
BASE EXCESS BLDA CALC-SCNC: 1.8 MMOL/L (ref 0–2)
BASE EXCESS BLDA CALC-SCNC: 2 MMOL/L (ref -5–5)
BASE EXCESS BLDA CALC-SCNC: 2.1 MMOL/L (ref 0–2)
BASE EXCESS BLDA CALC-SCNC: 2.2 MMOL/L (ref 0–2)
BASE EXCESS BLDA CALC-SCNC: 2.7 MMOL/L (ref 0–2)
BASE EXCESS BLDA CALC-SCNC: 2.7 MMOL/L (ref 0–2)
BASE EXCESS BLDA CALC-SCNC: 3 MMOL/L (ref -5–5)
BASE EXCESS BLDA CALC-SCNC: 3 MMOL/L (ref 0–2)
BASE EXCESS BLDA CALC-SCNC: 3.2 MMOL/L (ref 0–2)
BASE EXCESS BLDA CALC-SCNC: 3.4 MMOL/L (ref 0–2)
BASE EXCESS BLDA CALC-SCNC: 3.6 MMOL/L (ref 0–2)
BASE EXCESS BLDA CALC-SCNC: 4 MMOL/L (ref 0–2)
BASE EXCESS BLDA CALC-SCNC: 4.1 MMOL/L (ref 0–2)
BASE EXCESS BLDA CALC-SCNC: 4.5 MMOL/L (ref 0–2)
BASE EXCESS BLDA CALC-SCNC: 4.7 MMOL/L (ref 0–2)
BASE EXCESS BLDA CALC-SCNC: 5.5 MMOL/L (ref 0–2)
BASE EXCESS BLDV CALC-SCNC: -1 MMOL/L (ref -2–2)
BASE EXCESS BLDV CALC-SCNC: -1.3 MMOL/L (ref -2–2)
BASE EXCESS BLDV CALC-SCNC: -1.7 MMOL/L (ref -2–2)
BASE EXCESS BLDV CALC-SCNC: -1.9 MMOL/L (ref -2–2)
BASE EXCESS BLDV CALC-SCNC: -2.9 MMOL/L (ref -2–2)
BASE EXCESS BLDV CALC-SCNC: -3 MMOL/L (ref -2–2)
BASE EXCESS BLDV CALC-SCNC: 1.1 MMOL/L (ref -2–2)
BASE EXCESS BLDV CALC-SCNC: 1.4 MMOL/L (ref -2–2)
BASE EXCESS BLDV CALC-SCNC: 1.7 MMOL/L (ref -2–2)
BASE EXCESS BLDV CALC-SCNC: 3.1 MMOL/L (ref -2–2)
BASE EXCESS BLDV CALC-SCNC: 3.4 MMOL/L (ref -2–2)
BASOPHILS # BLD AUTO: 0.02 10*3/MM3 (ref 0–0.2)
BASOPHILS # BLD AUTO: 0.02 10*3/MM3 (ref 0–0.2)
BASOPHILS # BLD AUTO: 0.03 10*3/MM3 (ref 0–0.2)
BASOPHILS # BLD AUTO: 0.04 10*3/MM3 (ref 0–0.2)
BASOPHILS # BLD AUTO: 0.05 10*3/MM3 (ref 0–0.2)
BASOPHILS # BLD AUTO: 0.06 10*3/MM3 (ref 0–0.2)
BASOPHILS # BLD AUTO: 0.08 10*3/MM3 (ref 0–0.2)
BASOPHILS # BLD MANUAL: 0 10*3/MM3 (ref 0–0.2)
BASOPHILS # BLD MANUAL: 0 10*3/MM3 (ref 0–0.2)
BASOPHILS NFR BLD AUTO: 0.2 % (ref 0–1.5)
BASOPHILS NFR BLD AUTO: 0.3 % (ref 0–1.5)
BASOPHILS NFR BLD AUTO: 0.4 % (ref 0–1.5)
BASOPHILS NFR BLD AUTO: 0.5 % (ref 0–1.5)
BASOPHILS NFR BLD AUTO: 0.5 % (ref 0–1.5)
BASOPHILS NFR BLD MANUAL: 0 % (ref 0–1.5)
BASOPHILS NFR BLD MANUAL: 0 % (ref 0–1.5)
BDY SITE: ABNORMAL
BH BB BLOOD EXPIRATION DATE: NORMAL
BH BB BLOOD TYPE BARCODE: 1700
BH BB BLOOD TYPE BARCODE: 5100
BH BB BLOOD TYPE BARCODE: 6200
BH BB BLOOD TYPE BARCODE: 6200
BH BB BLOOD TYPE BARCODE: 7300
BH BB DISPENSE STATUS: NORMAL
BH BB PRODUCT CODE: NORMAL
BH BB UNIT NUMBER: NORMAL
BH CV ECHO MEAS - AO MAX PG: 10.1 MMHG
BH CV ECHO MEAS - AO MEAN PG: 4.4 MMHG
BH CV ECHO MEAS - AO V2 MAX: 158.8 CM/SEC
BH CV ECHO MEAS - AO V2 VTI: 21.8 CM
BH CV ECHO MEAS - EDV(CUBED): 114.7 ML
BH CV ECHO MEAS - EDV(CUBED): 61.4 ML
BH CV ECHO MEAS - EDV(MOD-SP2): 109 ML
BH CV ECHO MEAS - EDV(MOD-SP2): 115 ML
BH CV ECHO MEAS - EDV(MOD-SP2): 129 ML
BH CV ECHO MEAS - EDV(MOD-SP2): 151 ML
BH CV ECHO MEAS - EDV(MOD-SP2): 182 ML
BH CV ECHO MEAS - EDV(MOD-SP2): 62 ML
BH CV ECHO MEAS - EDV(MOD-SP2): 96 ML
BH CV ECHO MEAS - EDV(MOD-SP4): 106 ML
BH CV ECHO MEAS - EDV(MOD-SP4): 107 ML
BH CV ECHO MEAS - EDV(MOD-SP4): 109 ML
BH CV ECHO MEAS - EDV(MOD-SP4): 109 ML
BH CV ECHO MEAS - EDV(MOD-SP4): 114 ML
BH CV ECHO MEAS - EDV(MOD-SP4): 67 ML
BH CV ECHO MEAS - EDV(MOD-SP4): 88 ML
BH CV ECHO MEAS - EDV(MOD-SP4): 90 ML
BH CV ECHO MEAS - EF(MOD-BP): 57.8 %
BH CV ECHO MEAS - EF(MOD-BP): 59.3 %
BH CV ECHO MEAS - EF(MOD-BP): 61.1 %
BH CV ECHO MEAS - EF(MOD-BP): 67.8 %
BH CV ECHO MEAS - EF(MOD-BP): 67.9 %
BH CV ECHO MEAS - EF(MOD-BP): 68.7 %
BH CV ECHO MEAS - EF(MOD-BP): 79.1 %
BH CV ECHO MEAS - EF(MOD-SP2): 58.3 %
BH CV ECHO MEAS - EF(MOD-SP2): 59 %
BH CV ECHO MEAS - EF(MOD-SP2): 61.2 %
BH CV ECHO MEAS - EF(MOD-SP2): 64.6 %
BH CV ECHO MEAS - EF(MOD-SP2): 67.8 %
BH CV ECHO MEAS - EF(MOD-SP2): 70.6 %
BH CV ECHO MEAS - EF(MOD-SP2): 80.2 %
BH CV ECHO MEAS - EF(MOD-SP4): 61.5 %
BH CV ECHO MEAS - EF(MOD-SP4): 63.3 %
BH CV ECHO MEAS - EF(MOD-SP4): 64.2 %
BH CV ECHO MEAS - EF(MOD-SP4): 67.3 %
BH CV ECHO MEAS - EF(MOD-SP4): 68.2 %
BH CV ECHO MEAS - EF(MOD-SP4): 68.7 %
BH CV ECHO MEAS - EF(MOD-SP4): 69.8 %
BH CV ECHO MEAS - EF(MOD-SP4): 78.9 %
BH CV ECHO MEAS - ESV(CUBED): 18 ML
BH CV ECHO MEAS - ESV(CUBED): 28.8 ML
BH CV ECHO MEAS - ESV(MOD-SP2): 28 ML
BH CV ECHO MEAS - ESV(MOD-SP2): 32 ML
BH CV ECHO MEAS - ESV(MOD-SP2): 34 ML
BH CV ECHO MEAS - ESV(MOD-SP2): 36 ML
BH CV ECHO MEAS - ESV(MOD-SP2): 37 ML
BH CV ECHO MEAS - ESV(MOD-SP2): 50 ML
BH CV ECHO MEAS - ESV(MOD-SP2): 63 ML
BH CV ECHO MEAS - ESV(MOD-SP4): 21 ML
BH CV ECHO MEAS - ESV(MOD-SP4): 24 ML
BH CV ECHO MEAS - ESV(MOD-SP4): 28 ML
BH CV ECHO MEAS - ESV(MOD-SP4): 32 ML
BH CV ECHO MEAS - ESV(MOD-SP4): 33 ML
BH CV ECHO MEAS - ESV(MOD-SP4): 35 ML
BH CV ECHO MEAS - ESV(MOD-SP4): 39 ML
BH CV ECHO MEAS - ESV(MOD-SP4): 42 ML
BH CV ECHO MEAS - FS: 33.5 %
BH CV ECHO MEAS - FS: 36.9 %
BH CV ECHO MEAS - IVS/LVPW: 0.99 CM
BH CV ECHO MEAS - IVS/LVPW: 1.02 CM
BH CV ECHO MEAS - IVSD: 1.08 CM
BH CV ECHO MEAS - IVSD: 1.23 CM
BH CV ECHO MEAS - LAT PEAK E' VEL: 10.1 CM/SEC
BH CV ECHO MEAS - LAT PEAK E' VEL: 10.2 CM/SEC
BH CV ECHO MEAS - LAT PEAK E' VEL: 8.2 CM/SEC
BH CV ECHO MEAS - LAT PEAK E' VEL: 9.8 CM/SEC
BH CV ECHO MEAS - LV DIASTOLIC VOL/BSA (35-75): 25.4 CM2
BH CV ECHO MEAS - LV DIASTOLIC VOL/BSA (35-75): 33.8 CM2
BH CV ECHO MEAS - LV DIASTOLIC VOL/BSA (35-75): 43.1 CM2
BH CV ECHO MEAS - LV MASS(C)D: 140.8 GRAMS
BH CV ECHO MEAS - LV MASS(C)D: 228.2 GRAMS
BH CV ECHO MEAS - LV MAX PG: 5 MMHG
BH CV ECHO MEAS - LV MEAN PG: 1.93 MMHG
BH CV ECHO MEAS - LV SYSTOLIC VOL/BSA (12-30): 12.4 CM2
BH CV ECHO MEAS - LV SYSTOLIC VOL/BSA (12-30): 8 CM2
BH CV ECHO MEAS - LV SYSTOLIC VOL/BSA (12-30): 9.1 CM2
BH CV ECHO MEAS - LV V1 MAX: 111.4 CM/SEC
BH CV ECHO MEAS - LV V1 VTI: 14.8 CM
BH CV ECHO MEAS - LVIDD: 3.9 CM
BH CV ECHO MEAS - LVIDD: 4.9 CM
BH CV ECHO MEAS - LVIDS: 2.6 CM
BH CV ECHO MEAS - LVIDS: 3.1 CM
BH CV ECHO MEAS - LVPWD: 1.1 CM
BH CV ECHO MEAS - LVPWD: 1.21 CM
BH CV ECHO MEAS - MED PEAK E' VEL: 10.1 CM/SEC
BH CV ECHO MEAS - MED PEAK E' VEL: 10.3 CM/SEC
BH CV ECHO MEAS - MED PEAK E' VEL: 6.6 CM/SEC
BH CV ECHO MEAS - MED PEAK E' VEL: 7.2 CM/SEC
BH CV ECHO MEAS - MR MAX PG: 136.6 MMHG
BH CV ECHO MEAS - MR MAX VEL: 584.4 CM/SEC
BH CV ECHO MEAS - MV A MAX VEL: 81.7 CM/SEC
BH CV ECHO MEAS - MV DEC SLOPE: 479.5 CM/SEC2
BH CV ECHO MEAS - MV DEC SLOPE: 542.9 CM/SEC2
BH CV ECHO MEAS - MV DEC SLOPE: 559 CM/SEC2
BH CV ECHO MEAS - MV DEC SLOPE: 632.4 CM/SEC2
BH CV ECHO MEAS - MV DEC SLOPE: 716.7 CM/SEC2
BH CV ECHO MEAS - MV DEC TIME: 0.21 SEC
BH CV ECHO MEAS - MV DEC TIME: 0.24 SEC
BH CV ECHO MEAS - MV DEC TIME: 0.25 SEC
BH CV ECHO MEAS - MV DEC TIME: 0.27 SEC
BH CV ECHO MEAS - MV DEC TIME: 556 SEC
BH CV ECHO MEAS - MV E MAX VEL: 119 CM/SEC
BH CV ECHO MEAS - MV E MAX VEL: 135.9 CM/SEC
BH CV ECHO MEAS - MV E MAX VEL: 137 CM/SEC
BH CV ECHO MEAS - MV E MAX VEL: 140 CM/SEC
BH CV ECHO MEAS - MV E MAX VEL: 144 CM/SEC
BH CV ECHO MEAS - MV E MAX VEL: 145 CM/SEC
BH CV ECHO MEAS - MV E MAX VEL: 149 CM/SEC
BH CV ECHO MEAS - MV E MAX VEL: 151 CM/SEC
BH CV ECHO MEAS - MV E/A: 1.78
BH CV ECHO MEAS - MV MAX PG: 10.1 MMHG
BH CV ECHO MEAS - MV MAX PG: 10.7 MMHG
BH CV ECHO MEAS - MV MAX PG: 13 MMHG
BH CV ECHO MEAS - MV MAX PG: 16.6 MMHG
BH CV ECHO MEAS - MV MAX PG: 7.1 MMHG
BH CV ECHO MEAS - MV MEAN PG: 1.73 MMHG
BH CV ECHO MEAS - MV MEAN PG: 2.28 MMHG
BH CV ECHO MEAS - MV MEAN PG: 4.4 MMHG
BH CV ECHO MEAS - MV MEAN PG: 5.2 MMHG
BH CV ECHO MEAS - MV MEAN PG: 5.6 MMHG
BH CV ECHO MEAS - MV MEAN PG: 5.9 MMHG
BH CV ECHO MEAS - MV P1/2T: 74.3 MSEC
BH CV ECHO MEAS - MV P1/2T: 76.6 MSEC
BH CV ECHO MEAS - MV P1/2T: 87.5 MSEC
BH CV ECHO MEAS - MV P1/2T: 91.9 MSEC
BH CV ECHO MEAS - MV P1/2T: 94.3 MSEC
BH CV ECHO MEAS - MV V2 VTI: 29.4 CM
BH CV ECHO MEAS - MV V2 VTI: 34.6 CM
BH CV ECHO MEAS - MV V2 VTI: 40.8 CM
BH CV ECHO MEAS - MV V2 VTI: 42.4 CM
BH CV ECHO MEAS - MV V2 VTI: 44.1 CM
BH CV ECHO MEAS - MVA(P1/2T): 2.33 CM2
BH CV ECHO MEAS - MVA(P1/2T): 2.4 CM2
BH CV ECHO MEAS - MVA(P1/2T): 2.5 CM2
BH CV ECHO MEAS - MVA(P1/2T): 2.9 CM2
BH CV ECHO MEAS - MVA(P1/2T): 3 CM2
BH CV ECHO MEAS - PA ACC TIME: 0.12 SEC
BH CV ECHO MEAS - PA V2 MAX: 139.8 CM/SEC
BH CV ECHO MEAS - RAP SYSTOLE: 15 MMHG
BH CV ECHO MEAS - RAP SYSTOLE: 15 MMHG
BH CV ECHO MEAS - RAP SYSTOLE: 3 MMHG
BH CV ECHO MEAS - RAP SYSTOLE: 3 MMHG
BH CV ECHO MEAS - RAP SYSTOLE: 8 MMHG
BH CV ECHO MEAS - RAP SYSTOLE: 8 MMHG
BH CV ECHO MEAS - RV MAX PG: 4.6 MMHG
BH CV ECHO MEAS - RV V1 MAX: 107.7 CM/SEC
BH CV ECHO MEAS - RV V1 VTI: 14.7 CM
BH CV ECHO MEAS - RVDD: 3.4 CM
BH CV ECHO MEAS - RVSP: 37 MMHG
BH CV ECHO MEAS - RVSP: 41 MMHG
BH CV ECHO MEAS - RVSP: 41 MMHG
BH CV ECHO MEAS - RVSP: 47 MMHG
BH CV ECHO MEAS - RVSP: 59.2 MMHG
BH CV ECHO MEAS - RVSP: 66.2 MMHG
BH CV ECHO MEAS - SV(MOD-SP2): 146 ML
BH CV ECHO MEAS - SV(MOD-SP2): 34 ML
BH CV ECHO MEAS - SV(MOD-SP2): 62 ML
BH CV ECHO MEAS - SV(MOD-SP2): 77 ML
BH CV ECHO MEAS - SV(MOD-SP2): 78 ML
BH CV ECHO MEAS - SV(MOD-SP2): 79 ML
BH CV ECHO MEAS - SV(MOD-SP2): 88 ML
BH CV ECHO MEAS - SV(MOD-SP4): 46 ML
BH CV ECHO MEAS - SV(MOD-SP4): 57 ML
BH CV ECHO MEAS - SV(MOD-SP4): 60 ML
BH CV ECHO MEAS - SV(MOD-SP4): 67 ML
BH CV ECHO MEAS - SV(MOD-SP4): 70 ML
BH CV ECHO MEAS - SV(MOD-SP4): 72 ML
BH CV ECHO MEAS - SV(MOD-SP4): 74 ML
BH CV ECHO MEAS - SV(MOD-SP4): 90 ML
BH CV ECHO MEAS - SVI(MOD-SP2): 12.8 ML/M2
BH CV ECHO MEAS - SVI(MOD-SP2): 55.2 ML/M2
BH CV ECHO MEAS - SVI(MOD-SP4): 17.4 ML/M2
BH CV ECHO MEAS - SVI(MOD-SP4): 21.4 ML/M2
BH CV ECHO MEAS - SVI(MOD-SP4): 34 ML/M2
BH CV ECHO MEAS - TAPSE (>1.6): 1.01 CM
BH CV ECHO MEAS - TAPSE (>1.6): 1.35 CM
BH CV ECHO MEAS - TAPSE (>1.6): 1.37 CM
BH CV ECHO MEAS - TR MAX PG: 29.8 MMHG
BH CV ECHO MEAS - TR MAX PG: 33.1 MMHG
BH CV ECHO MEAS - TR MAX PG: 34.2 MMHG
BH CV ECHO MEAS - TR MAX PG: 35.3 MMHG
BH CV ECHO MEAS - TR MAX PG: 38 MMHG
BH CV ECHO MEAS - TR MAX PG: 44.2 MMHG
BH CV ECHO MEAS - TR MAX PG: 44.4 MMHG
BH CV ECHO MEAS - TR MAX PG: 51.2 MMHG
BH CV ECHO MEAS - TR MAX VEL: 272.8 CM/SEC
BH CV ECHO MEAS - TR MAX VEL: 285 CM/SEC
BH CV ECHO MEAS - TR MAX VEL: 287.8 CM/SEC
BH CV ECHO MEAS - TR MAX VEL: 292.3 CM/SEC
BH CV ECHO MEAS - TR MAX VEL: 297.3 CM/SEC
BH CV ECHO MEAS - TR MAX VEL: 308.1 CM/SEC
BH CV ECHO MEAS - TR MAX VEL: 332.5 CM/SEC
BH CV ECHO MEAS - TR MAX VEL: 333.3 CM/SEC
BH CV ECHO MEAS - TR MAX VEL: 357.9 CM/SEC
BH CV ECHO MEAS RV FREE WALL STRAIN: -6 %
BH CV ECHO MEAS RV FREE WALL STRAIN: -7.7 %
BH CV ECHO MEAS RV FREE WALL STRAIN: -8.1 %
BH CV ECHO MEASUREMENTS AVERAGE E/E' RATIO: 14.07
BH CV ECHO MEASUREMENTS AVERAGE E/E' RATIO: 14.12
BH CV ECHO MEASUREMENTS AVERAGE E/E' RATIO: 17.26
BH CV ECHO MEASUREMENTS AVERAGE E/E' RATIO: 17.79
BH CV ECHO SHUNT ASSESSMENT PERFORMED (HIDDEN SCRIPTING): 1
BH CV GROIN LEFT HEMATOMA LONG LENGTH: 2.25 CM
BH CV LEA LEFT SFA PROX PSV: 85.8 CM/S
BH CV LEFT GROIN HEMATOMA LONG WIDTH: 1.33 CM
BH CV LEFT GROIN PSA PROCEDURE SCRIPTING LRR: 1
BH CV LOW VAS LEFT GASTRONEMIUS VESSEL: 1
BH CV LOWER VASCULAR LEFT COMMON FEMORAL AUGMENT: NORMAL
BH CV LOWER VASCULAR LEFT COMMON FEMORAL AUGMENT: NORMAL
BH CV LOWER VASCULAR LEFT COMMON FEMORAL COMPETENT: NORMAL
BH CV LOWER VASCULAR LEFT COMMON FEMORAL COMPETENT: NORMAL
BH CV LOWER VASCULAR LEFT COMMON FEMORAL COMPRESS: NORMAL
BH CV LOWER VASCULAR LEFT COMMON FEMORAL COMPRESS: NORMAL
BH CV LOWER VASCULAR LEFT COMMON FEMORAL PHASIC: NORMAL
BH CV LOWER VASCULAR LEFT COMMON FEMORAL PHASIC: NORMAL
BH CV LOWER VASCULAR LEFT COMMON FEMORAL SPONT: NORMAL
BH CV LOWER VASCULAR LEFT COMMON FEMORAL SPONT: NORMAL
BH CV LOWER VASCULAR LEFT DISTAL FEMORAL COMPRESS: NORMAL
BH CV LOWER VASCULAR LEFT DISTAL FEMORAL COMPRESS: NORMAL
BH CV LOWER VASCULAR LEFT GASTRONEMIUS COMPRESS: NORMAL
BH CV LOWER VASCULAR LEFT GASTRONEMIUS COMPRESS: NORMAL
BH CV LOWER VASCULAR LEFT GASTRONEMIUS THROMBUS: NORMAL
BH CV LOWER VASCULAR LEFT GREATER SAPH AK COMPRESS: NORMAL
BH CV LOWER VASCULAR LEFT GREATER SAPH AK COMPRESS: NORMAL
BH CV LOWER VASCULAR LEFT GREATER SAPH BK COMPRESS: NORMAL
BH CV LOWER VASCULAR LEFT GREATER SAPH BK COMPRESS: NORMAL
BH CV LOWER VASCULAR LEFT LESSER SAPH COMPRESS: NORMAL
BH CV LOWER VASCULAR LEFT LESSER SAPH COMPRESS: NORMAL
BH CV LOWER VASCULAR LEFT MID FEMORAL AUGMENT: NORMAL
BH CV LOWER VASCULAR LEFT MID FEMORAL AUGMENT: NORMAL
BH CV LOWER VASCULAR LEFT MID FEMORAL COMPETENT: NORMAL
BH CV LOWER VASCULAR LEFT MID FEMORAL COMPETENT: NORMAL
BH CV LOWER VASCULAR LEFT MID FEMORAL COMPRESS: NORMAL
BH CV LOWER VASCULAR LEFT MID FEMORAL COMPRESS: NORMAL
BH CV LOWER VASCULAR LEFT MID FEMORAL PHASIC: NORMAL
BH CV LOWER VASCULAR LEFT MID FEMORAL PHASIC: NORMAL
BH CV LOWER VASCULAR LEFT MID FEMORAL SPONT: NORMAL
BH CV LOWER VASCULAR LEFT MID FEMORAL SPONT: NORMAL
BH CV LOWER VASCULAR LEFT PERONEAL COMPRESS: NORMAL
BH CV LOWER VASCULAR LEFT PERONEAL COMPRESS: NORMAL
BH CV LOWER VASCULAR LEFT POPLITEAL AUGMENT: NORMAL
BH CV LOWER VASCULAR LEFT POPLITEAL AUGMENT: NORMAL
BH CV LOWER VASCULAR LEFT POPLITEAL COMPETENT: NORMAL
BH CV LOWER VASCULAR LEFT POPLITEAL COMPETENT: NORMAL
BH CV LOWER VASCULAR LEFT POPLITEAL COMPRESS: NORMAL
BH CV LOWER VASCULAR LEFT POPLITEAL COMPRESS: NORMAL
BH CV LOWER VASCULAR LEFT POPLITEAL PHASIC: NORMAL
BH CV LOWER VASCULAR LEFT POPLITEAL PHASIC: NORMAL
BH CV LOWER VASCULAR LEFT POPLITEAL SPONT: NORMAL
BH CV LOWER VASCULAR LEFT POPLITEAL SPONT: NORMAL
BH CV LOWER VASCULAR LEFT POSTERIOR TIBIAL COMPRESS: NORMAL
BH CV LOWER VASCULAR LEFT POSTERIOR TIBIAL COMPRESS: NORMAL
BH CV LOWER VASCULAR LEFT PROFUNDA FEMORAL COMPRESS: NORMAL
BH CV LOWER VASCULAR LEFT PROFUNDA FEMORAL COMPRESS: NORMAL
BH CV LOWER VASCULAR LEFT PROXIMAL FEMORAL COMPRESS: NORMAL
BH CV LOWER VASCULAR LEFT PROXIMAL FEMORAL COMPRESS: NORMAL
BH CV LOWER VASCULAR LEFT SAPHENOFEMORAL JUNCTION COMPRESS: NORMAL
BH CV LOWER VASCULAR LEFT SAPHENOFEMORAL JUNCTION COMPRESS: NORMAL
BH CV LOWER VASCULAR RIGHT COMMON FEMORAL AUGMENT: NORMAL
BH CV LOWER VASCULAR RIGHT COMMON FEMORAL AUGMENT: NORMAL
BH CV LOWER VASCULAR RIGHT COMMON FEMORAL COMPETENT: NORMAL
BH CV LOWER VASCULAR RIGHT COMMON FEMORAL COMPETENT: NORMAL
BH CV LOWER VASCULAR RIGHT COMMON FEMORAL COMPRESS: NORMAL
BH CV LOWER VASCULAR RIGHT COMMON FEMORAL COMPRESS: NORMAL
BH CV LOWER VASCULAR RIGHT COMMON FEMORAL PHASIC: NORMAL
BH CV LOWER VASCULAR RIGHT COMMON FEMORAL PHASIC: NORMAL
BH CV LOWER VASCULAR RIGHT COMMON FEMORAL SPONT: NORMAL
BH CV LOWER VASCULAR RIGHT COMMON FEMORAL SPONT: NORMAL
BH CV LOWER VASCULAR RIGHT DISTAL FEMORAL COMPRESS: NORMAL
BH CV LOWER VASCULAR RIGHT DISTAL FEMORAL COMPRESS: NORMAL
BH CV LOWER VASCULAR RIGHT GASTRONEMIUS COMPRESS: NORMAL
BH CV LOWER VASCULAR RIGHT GASTRONEMIUS COMPRESS: NORMAL
BH CV LOWER VASCULAR RIGHT GREATER SAPH AK COMPRESS: NORMAL
BH CV LOWER VASCULAR RIGHT GREATER SAPH AK COMPRESS: NORMAL
BH CV LOWER VASCULAR RIGHT GREATER SAPH BK COMPRESS: NORMAL
BH CV LOWER VASCULAR RIGHT GREATER SAPH BK COMPRESS: NORMAL
BH CV LOWER VASCULAR RIGHT LESSER SAPH COMPRESS: NORMAL
BH CV LOWER VASCULAR RIGHT LESSER SAPH COMPRESS: NORMAL
BH CV LOWER VASCULAR RIGHT MID FEMORAL AUGMENT: NORMAL
BH CV LOWER VASCULAR RIGHT MID FEMORAL AUGMENT: NORMAL
BH CV LOWER VASCULAR RIGHT MID FEMORAL COMPETENT: NORMAL
BH CV LOWER VASCULAR RIGHT MID FEMORAL COMPETENT: NORMAL
BH CV LOWER VASCULAR RIGHT MID FEMORAL COMPRESS: NORMAL
BH CV LOWER VASCULAR RIGHT MID FEMORAL COMPRESS: NORMAL
BH CV LOWER VASCULAR RIGHT MID FEMORAL PHASIC: NORMAL
BH CV LOWER VASCULAR RIGHT MID FEMORAL PHASIC: NORMAL
BH CV LOWER VASCULAR RIGHT MID FEMORAL SPONT: NORMAL
BH CV LOWER VASCULAR RIGHT MID FEMORAL SPONT: NORMAL
BH CV LOWER VASCULAR RIGHT PERONEAL COMPRESS: NORMAL
BH CV LOWER VASCULAR RIGHT PERONEAL COMPRESS: NORMAL
BH CV LOWER VASCULAR RIGHT POPLITEAL AUGMENT: NORMAL
BH CV LOWER VASCULAR RIGHT POPLITEAL AUGMENT: NORMAL
BH CV LOWER VASCULAR RIGHT POPLITEAL COMPETENT: NORMAL
BH CV LOWER VASCULAR RIGHT POPLITEAL COMPETENT: NORMAL
BH CV LOWER VASCULAR RIGHT POPLITEAL COMPRESS: NORMAL
BH CV LOWER VASCULAR RIGHT POPLITEAL COMPRESS: NORMAL
BH CV LOWER VASCULAR RIGHT POPLITEAL PHASIC: NORMAL
BH CV LOWER VASCULAR RIGHT POPLITEAL PHASIC: NORMAL
BH CV LOWER VASCULAR RIGHT POPLITEAL SPONT: NORMAL
BH CV LOWER VASCULAR RIGHT POPLITEAL SPONT: NORMAL
BH CV LOWER VASCULAR RIGHT POSTERIOR TIBIAL COMPRESS: NORMAL
BH CV LOWER VASCULAR RIGHT POSTERIOR TIBIAL COMPRESS: NORMAL
BH CV LOWER VASCULAR RIGHT PROFUNDA FEMORAL COMPRESS: NORMAL
BH CV LOWER VASCULAR RIGHT PROFUNDA FEMORAL COMPRESS: NORMAL
BH CV LOWER VASCULAR RIGHT PROXIMAL FEMORAL COMPRESS: NORMAL
BH CV LOWER VASCULAR RIGHT PROXIMAL FEMORAL COMPRESS: NORMAL
BH CV LOWER VASCULAR RIGHT SAPHENOFEMORAL JUNCTION COMPRESS: NORMAL
BH CV LOWER VASCULAR RIGHT SAPHENOFEMORAL JUNCTION COMPRESS: NORMAL
BH CV REST NUCLEAR ISOTOPE DOSE: 8.6 MCI
BH CV STRESS COMMENTS STAGE 1: NORMAL
BH CV STRESS DOSE REGADENOSON STAGE 1: 0.4
BH CV STRESS DURATION MIN STAGE 1: 0
BH CV STRESS DURATION SEC STAGE 1: 10
BH CV STRESS NUCLEAR ISOTOPE DOSE: 30.2 MCI
BH CV STRESS PROTOCOL 1: NORMAL
BH CV STRESS RECOVERY BP: NORMAL MMHG
BH CV STRESS RECOVERY HR: 91 BPM
BH CV STRESS STAGE 1: 1
BH CV UPPER VENOUS LEFT AXILLARY AUGMENT: NORMAL
BH CV UPPER VENOUS LEFT AXILLARY COLOR: 1
BH CV UPPER VENOUS LEFT AXILLARY COMPRESS: NORMAL
BH CV UPPER VENOUS LEFT AXILLARY PHASIC: NORMAL
BH CV UPPER VENOUS LEFT AXILLARY SPONT: NORMAL
BH CV UPPER VENOUS LEFT AXILLARY THROMBUS: NORMAL
BH CV UPPER VENOUS LEFT BASILIC FOREARM COMPRESS: NORMAL
BH CV UPPER VENOUS LEFT BASILIC UPPER COMPRESS: NORMAL
BH CV UPPER VENOUS LEFT BRACHIAL COMPRESS: NORMAL
BH CV UPPER VENOUS LEFT CEPHALIC FOREARM COLOR: 1
BH CV UPPER VENOUS LEFT CEPHALIC FOREARM COMPRESS: NORMAL
BH CV UPPER VENOUS LEFT CEPHALIC FOREARM THROMBUS: NORMAL
BH CV UPPER VENOUS LEFT CEPHALIC UPPER COMPRESS: NORMAL
BH CV UPPER VENOUS LEFT INTERNAL JUGULAR AUGMENT: NORMAL
BH CV UPPER VENOUS LEFT INTERNAL JUGULAR COMPRESS: NORMAL
BH CV UPPER VENOUS LEFT INTERNAL JUGULAR PHASIC: NORMAL
BH CV UPPER VENOUS LEFT INTERNAL JUGULAR SPONT: NORMAL
BH CV UPPER VENOUS LEFT RADIAL COMPRESS: NORMAL
BH CV UPPER VENOUS LEFT SUBCLAVIAN AUGMENT: NORMAL
BH CV UPPER VENOUS LEFT SUBCLAVIAN COMPRESS: NORMAL
BH CV UPPER VENOUS LEFT SUBCLAVIAN PHASIC: NORMAL
BH CV UPPER VENOUS LEFT SUBCLAVIAN SPONT: NORMAL
BH CV UPPER VENOUS LEFT ULNAR COMPRESS: NORMAL
BH CV UPPER VENOUS RIGHT AXILLARY AUGMENT: NORMAL
BH CV UPPER VENOUS RIGHT AXILLARY COMPRESS: NORMAL
BH CV UPPER VENOUS RIGHT AXILLARY PHASIC: NORMAL
BH CV UPPER VENOUS RIGHT AXILLARY SPONT: NORMAL
BH CV UPPER VENOUS RIGHT BASILIC FOREARM COMPRESS: NORMAL
BH CV UPPER VENOUS RIGHT BASILIC UPPER COLOR: 1
BH CV UPPER VENOUS RIGHT BASILIC UPPER COMPRESS: NORMAL
BH CV UPPER VENOUS RIGHT BASILIC UPPER THROMBUS: NORMAL
BH CV UPPER VENOUS RIGHT BRACHIAL COMPRESS: NORMAL
BH CV UPPER VENOUS RIGHT CEPHALIC FOREARM COMPRESS: NORMAL
BH CV UPPER VENOUS RIGHT CEPHALIC UPPER COMPRESS: NORMAL
BH CV UPPER VENOUS RIGHT INTERNAL JUGULAR AUGMENT: NORMAL
BH CV UPPER VENOUS RIGHT INTERNAL JUGULAR COMPRESS: NORMAL
BH CV UPPER VENOUS RIGHT INTERNAL JUGULAR PHASIC: NORMAL
BH CV UPPER VENOUS RIGHT INTERNAL JUGULAR SPONT: NORMAL
BH CV UPPER VENOUS RIGHT RADIAL COMPRESS: NORMAL
BH CV UPPER VENOUS RIGHT SUBCLAVIAN AUGMENT: NORMAL
BH CV UPPER VENOUS RIGHT SUBCLAVIAN COMPRESS: NORMAL
BH CV UPPER VENOUS RIGHT SUBCLAVIAN PHASIC: NORMAL
BH CV UPPER VENOUS RIGHT SUBCLAVIAN SPONT: NORMAL
BH CV UPPER VENOUS RIGHT ULNAR COMPRESS: NORMAL
BH CV VAS PRELIMINARY FINDINGS SCRIPTING: 1
BH CV VAS PRELIMINARY FINDINGS SCRIPTING: 1
BH CV XLRA - RV BASE: 3.9 CM
BH CV XLRA - RV BASE: 4.6 CM
BH CV XLRA - RV BASE: 5 CM
BH CV XLRA - RV BASE: 5.5 CM
BH CV XLRA - RV LENGTH: 6.5 CM
BH CV XLRA - RV LENGTH: 7 CM
BH CV XLRA - RV LENGTH: 7.4 CM
BH CV XLRA - RV LENGTH: 7.7 CM
BH CV XLRA - RV MID: 4.4 CM
BH CV XLRA - RV MID: 4.8 CM
BH CV XLRA - TDI S': 11.1 CM/SEC
BH CV XLRA - TDI S': 11.6 CM/SEC
BH CV XLRA - TDI S': 6.2 CM/SEC
BH CV XLRA - TDI S': 8.3 CM/SEC
BH CV XLRA MEAS - DIST GSV CALF DIST LEFT: 0.39 CM
BH CV XLRA MEAS - DIST GSV CALF DIST RIGHT: 0.6 CM
BH CV XLRA MEAS - DIST GSV THIGH DIST LEFT: 0.27 CM
BH CV XLRA MEAS - DIST GSV THIGH DIST RIGHT: 0.45 CM
BH CV XLRA MEAS - DIST LSV CALF DIST LEFT: 0.35 CM
BH CV XLRA MEAS - DIST LSV CALF DIST RIGHT: 0.31 CM
BH CV XLRA MEAS - GSV ANKLE DIST LEFT: 0.41 CM
BH CV XLRA MEAS - GSV ANKLE DIST RIGHT: 0.42 CM
BH CV XLRA MEAS - GSV KNEE DIST LEFT: 0.32 CM
BH CV XLRA MEAS - GSV KNEE DIST RIGHT: 0.39 CM
BH CV XLRA MEAS - GSV ORIGIN DIST LEFT: 0.56 CM
BH CV XLRA MEAS - GSV ORIGIN DIST RIGHT: 0.91 CM
BH CV XLRA MEAS - MID GSV CALF LEFT: 0.31 CM
BH CV XLRA MEAS - MID GSV CALF RIGHT: 0.38 CM
BH CV XLRA MEAS - MID GSV THIGH  LEFT: 0.29 CM
BH CV XLRA MEAS - MID GSV THIGH  RIGHT: 0.53 CM
BH CV XLRA MEAS - MID LSV CALF DIST LEFT: 0.4 CM
BH CV XLRA MEAS - MID LSV CALF DIST RIGHT: 0.34 CM
BH CV XLRA MEAS - PROX GSV CALF DIST LEFT: 0.3 CM
BH CV XLRA MEAS - PROX GSV CALF DIST RIGHT: 0.36 CM
BH CV XLRA MEAS - PROX GSV THIGH  LEFT: 0.54 CM
BH CV XLRA MEAS - PROX GSV THIGH  RIGHT: 0.44 CM
BH CV XLRA MEAS - PROX LSV CALF DIST LEFT: 0.42 CM
BH CV XLRA MEAS - PROX LSV CALF DIST RIGHT: 0.38 CM
BH CV XLRA MEAS LEFT DIST CCA EDV: -17.6 CM/SEC
BH CV XLRA MEAS LEFT DIST CCA PSV: -68 CM/SEC
BH CV XLRA MEAS LEFT DIST ICA EDV: -34.9 CM/SEC
BH CV XLRA MEAS LEFT DIST ICA PSV: -123 CM/SEC
BH CV XLRA MEAS LEFT ICA/CCA RATIO: 1.81
BH CV XLRA MEAS LEFT MID ICA EDV: -18.7 CM/SEC
BH CV XLRA MEAS LEFT MID ICA PSV: -61.6 CM/SEC
BH CV XLRA MEAS LEFT PROX CCA EDV: 13.7 CM/SEC
BH CV XLRA MEAS LEFT PROX CCA PSV: 65.3 CM/SEC
BH CV XLRA MEAS LEFT PROX ECA EDV: -13.7 CM/SEC
BH CV XLRA MEAS LEFT PROX ECA PSV: -73.5 CM/SEC
BH CV XLRA MEAS LEFT PROX ICA EDV: 17.6 CM/SEC
BH CV XLRA MEAS LEFT PROX ICA PSV: 60.9 CM/SEC
BH CV XLRA MEAS LEFT PROX SCLA PSV: 95.3 CM/SEC
BH CV XLRA MEAS LEFT VERTEBRAL A EDV: 22.6 CM/SEC
BH CV XLRA MEAS LEFT VERTEBRAL A PSV: 64.4 CM/SEC
BH CV XLRA MEAS RIGHT DIST CCA EDV: -12.3 CM/SEC
BH CV XLRA MEAS RIGHT DIST CCA PSV: -67.3 CM/SEC
BH CV XLRA MEAS RIGHT DIST ICA EDV: -23.6 CM/SEC
BH CV XLRA MEAS RIGHT DIST ICA PSV: -88.2 CM/SEC
BH CV XLRA MEAS RIGHT ICA/CCA RATIO: 1.46
BH CV XLRA MEAS RIGHT MID ICA EDV: -29.8 CM/SEC
BH CV XLRA MEAS RIGHT MID ICA PSV: -98.2 CM/SEC
BH CV XLRA MEAS RIGHT PROX CCA EDV: 13.3 CM/SEC
BH CV XLRA MEAS RIGHT PROX CCA PSV: 60.4 CM/SEC
BH CV XLRA MEAS RIGHT PROX ECA EDV: -10.3 CM/SEC
BH CV XLRA MEAS RIGHT PROX ECA PSV: -64.9 CM/SEC
BH CV XLRA MEAS RIGHT PROX ICA EDV: -21.6 CM/SEC
BH CV XLRA MEAS RIGHT PROX ICA PSV: -68.8 CM/SEC
BH CV XLRA MEAS RIGHT PROX SCLA PSV: 97 CM/SEC
BH CV XLRA MEAS RIGHT VERTEBRAL A EDV: 14.9 CM/SEC
BH CV XLRA MEAS RIGHT VERTEBRAL A PSV: 53.6 CM/SEC
BILIRUB CONJ SERPL-MCNC: 0.4 MG/DL (ref 0–0.3)
BILIRUB SERPL-MCNC: 0.4 MG/DL (ref 0–1.2)
BILIRUB SERPL-MCNC: 0.5 MG/DL (ref 0–1.2)
BILIRUB SERPL-MCNC: 0.6 MG/DL (ref 0–1.2)
BILIRUB SERPL-MCNC: 0.7 MG/DL (ref 0–1.2)
BILIRUB SERPL-MCNC: 0.8 MG/DL (ref 0–1.2)
BILIRUB SERPL-MCNC: 0.9 MG/DL (ref 0–1.2)
BILIRUB SERPL-MCNC: 0.9 MG/DL (ref 0–1.2)
BILIRUB SERPL-MCNC: 1.1 MG/DL (ref 0–1.2)
BILIRUB SERPL-MCNC: 1.5 MG/DL (ref 0–1.2)
BILIRUB UR QL STRIP: NEGATIVE
BLD GP AB SCN SERPL QL ELUTION: NEGATIVE
BLD GP AB SCN SERPL QL: NEGATIVE
BLD GP AB SCN SERPL QL: POSITIVE
BLD GP AB SCN SERPL QL: POSITIVE
BOTTLE TYPE: ABNORMAL
BOTTLE TYPE: ABNORMAL
BUN SERPL-MCNC: 11 MG/DL (ref 8–23)
BUN SERPL-MCNC: 11 MG/DL (ref 8–23)
BUN SERPL-MCNC: 14 MG/DL (ref 8–23)
BUN SERPL-MCNC: 15 MG/DL (ref 8–23)
BUN SERPL-MCNC: 16 MG/DL (ref 8–23)
BUN SERPL-MCNC: 17 MG/DL (ref 8–23)
BUN SERPL-MCNC: 17 MG/DL (ref 8–23)
BUN SERPL-MCNC: 18 MG/DL (ref 8–23)
BUN SERPL-MCNC: 19 MG/DL (ref 8–23)
BUN SERPL-MCNC: 19 MG/DL (ref 8–23)
BUN SERPL-MCNC: 20 MG/DL (ref 8–23)
BUN SERPL-MCNC: 20 MG/DL (ref 8–23)
BUN SERPL-MCNC: 21 MG/DL (ref 8–23)
BUN SERPL-MCNC: 23 MG/DL (ref 8–23)
BUN SERPL-MCNC: 24 MG/DL (ref 8–23)
BUN SERPL-MCNC: 25 MG/DL (ref 8–23)
BUN SERPL-MCNC: 26 MG/DL (ref 8–23)
BUN SERPL-MCNC: 26 MG/DL (ref 8–23)
BUN SERPL-MCNC: 28 MG/DL (ref 8–23)
BUN SERPL-MCNC: 28 MG/DL (ref 8–23)
BUN SERPL-MCNC: 30 MG/DL (ref 8–23)
BUN SERPL-MCNC: 31 MG/DL (ref 8–23)
BUN SERPL-MCNC: 33 MG/DL (ref 8–23)
BUN SERPL-MCNC: 35 MG/DL (ref 8–23)
BUN SERPL-MCNC: 35 MG/DL (ref 8–23)
BUN SERPL-MCNC: 37 MG/DL (ref 8–23)
BUN SERPL-MCNC: 39 MG/DL (ref 8–23)
BUN SERPL-MCNC: 40 MG/DL (ref 8–23)
BUN SERPL-MCNC: 41 MG/DL (ref 8–23)
BUN SERPL-MCNC: 42 MG/DL (ref 8–23)
BUN SERPL-MCNC: 42 MG/DL (ref 8–23)
BUN SERPL-MCNC: 44 MG/DL (ref 8–23)
BUN SERPL-MCNC: 45 MG/DL (ref 8–23)
BUN SERPL-MCNC: 47 MG/DL (ref 8–23)
BUN SERPL-MCNC: 47 MG/DL (ref 8–23)
BUN SERPL-MCNC: 48 MG/DL (ref 8–23)
BUN SERPL-MCNC: 55 MG/DL (ref 8–23)
BUN SERPL-MCNC: 55 MG/DL (ref 8–23)
BUN SERPL-MCNC: 56 MG/DL (ref 8–23)
BUN SERPL-MCNC: 58 MG/DL (ref 8–23)
BUN SERPL-MCNC: 63 MG/DL (ref 8–23)
BUN SERPL-MCNC: 64 MG/DL (ref 8–23)
BUN SERPL-MCNC: 69 MG/DL (ref 8–23)
BUN SERPL-MCNC: 7 MG/DL (ref 8–23)
BUN SERPL-MCNC: 7 MG/DL (ref 8–23)
BUN SERPL-MCNC: 70 MG/DL (ref 8–23)
BUN SERPL-MCNC: 80 MG/DL (ref 8–23)
BUN SERPL-MCNC: 82 MG/DL (ref 8–23)
BUN SERPL-MCNC: 9 MG/DL (ref 8–23)
BUN SERPL-MCNC: 92 MG/DL (ref 8–23)
BUN/CREAT SERPL: 10.6 (ref 7–25)
BUN/CREAT SERPL: 11.1 (ref 7–25)
BUN/CREAT SERPL: 11.4 (ref 7–25)
BUN/CREAT SERPL: 11.6 (ref 7–25)
BUN/CREAT SERPL: 11.8 (ref 7–25)
BUN/CREAT SERPL: 13.8 (ref 7–25)
BUN/CREAT SERPL: 14.8 (ref 7–25)
BUN/CREAT SERPL: 15 (ref 7–25)
BUN/CREAT SERPL: 15.5 (ref 7–25)
BUN/CREAT SERPL: 16.2 (ref 7–25)
BUN/CREAT SERPL: 16.6 (ref 7–25)
BUN/CREAT SERPL: 17.1 (ref 7–25)
BUN/CREAT SERPL: 18.2 (ref 7–25)
BUN/CREAT SERPL: 21.3 (ref 7–25)
BUN/CREAT SERPL: 21.4 (ref 7–25)
BUN/CREAT SERPL: 22.4 (ref 7–25)
BUN/CREAT SERPL: 22.4 (ref 7–25)
BUN/CREAT SERPL: 22.8 (ref 7–25)
BUN/CREAT SERPL: 23.1 (ref 7–25)
BUN/CREAT SERPL: 23.6 (ref 7–25)
BUN/CREAT SERPL: 24.1 (ref 7–25)
BUN/CREAT SERPL: 24.2 (ref 7–25)
BUN/CREAT SERPL: 25 (ref 7–25)
BUN/CREAT SERPL: 25.7 (ref 7–25)
BUN/CREAT SERPL: 26.1 (ref 7–25)
BUN/CREAT SERPL: 26.1 (ref 7–25)
BUN/CREAT SERPL: 26.4 (ref 7–25)
BUN/CREAT SERPL: 27 (ref 7–25)
BUN/CREAT SERPL: 28.6 (ref 7–25)
BUN/CREAT SERPL: 28.7 (ref 7–25)
BUN/CREAT SERPL: 28.9 (ref 7–25)
BUN/CREAT SERPL: 29 (ref 7–25)
BUN/CREAT SERPL: 29.7 (ref 7–25)
BUN/CREAT SERPL: 30.2 (ref 7–25)
BUN/CREAT SERPL: 30.3 (ref 7–25)
BUN/CREAT SERPL: 30.7 (ref 7–25)
BUN/CREAT SERPL: 31.4 (ref 7–25)
BUN/CREAT SERPL: 32 (ref 7–25)
BUN/CREAT SERPL: 32.1 (ref 7–25)
BUN/CREAT SERPL: 32.5 (ref 7–25)
BUN/CREAT SERPL: 32.7 (ref 7–25)
BUN/CREAT SERPL: 32.8 (ref 7–25)
BUN/CREAT SERPL: 32.8 (ref 7–25)
BUN/CREAT SERPL: 33.3 (ref 7–25)
BUN/CREAT SERPL: 33.3 (ref 7–25)
BUN/CREAT SERPL: 33.9 (ref 7–25)
BUN/CREAT SERPL: 34.4 (ref 7–25)
BUN/CREAT SERPL: 35.1 (ref 7–25)
BUN/CREAT SERPL: 35.3 (ref 7–25)
BUN/CREAT SERPL: 35.5 (ref 7–25)
BUN/CREAT SERPL: 35.6 (ref 7–25)
BUN/CREAT SERPL: 36 (ref 7–25)
BUN/CREAT SERPL: 36.1 (ref 7–25)
BUN/CREAT SERPL: 36.6 (ref 7–25)
BUN/CREAT SERPL: 37.2 (ref 7–25)
BUN/CREAT SERPL: 38 (ref 7–25)
BUN/CREAT SERPL: 38.1 (ref 7–25)
BUN/CREAT SERPL: 38.5 (ref 7–25)
BUN/CREAT SERPL: 38.6 (ref 7–25)
BUN/CREAT SERPL: 38.7 (ref 7–25)
BUN/CREAT SERPL: 40 (ref 7–25)
BUN/CREAT SERPL: 40 (ref 7–25)
BUN/CREAT SERPL: 40.3 (ref 7–25)
BUN/CREAT SERPL: 40.9 (ref 7–25)
BUN/CREAT SERPL: 41.1 (ref 7–25)
BUN/CREAT SERPL: 42.7 (ref 7–25)
BUN/CREAT SERPL: 6.3 (ref 7–25)
BUN/CREAT SERPL: 7.4 (ref 7–25)
BUN/CREAT SERPL: 7.7 (ref 7–25)
BUN/CREAT SERPL: 7.7 (ref 7–25)
BUN/CREAT SERPL: 7.8 (ref 7–25)
BUN/CREAT SERPL: 7.9 (ref 7–25)
BUN/CREAT SERPL: 8.4 (ref 7–25)
BUN/CREAT SERPL: 8.5 (ref 7–25)
BUN/CREAT SERPL: 8.8 (ref 7–25)
BUN/CREAT SERPL: 9.7 (ref 7–25)
BURR CELLS BLD QL SMEAR: ABNORMAL
CA-I SERPL ISE-MCNC: 1.02 MMOL/L (ref 1.15–1.35)
CA-I SERPL ISE-MCNC: 1.03 MMOL/L (ref 1.15–1.35)
CA-I SERPL ISE-MCNC: 1.04 MMOL/L (ref 1.15–1.35)
CA-I SERPL ISE-MCNC: 1.04 MMOL/L (ref 1.15–1.35)
CA-I SERPL ISE-MCNC: 1.06 MMOL/L (ref 1.15–1.35)
CA-I SERPL ISE-MCNC: 1.06 MMOL/L (ref 1.15–1.35)
CA-I SERPL ISE-MCNC: 1.07 MMOL/L (ref 1.15–1.35)
CA-I SERPL ISE-MCNC: 1.08 MMOL/L (ref 1.15–1.35)
CA-I SERPL ISE-MCNC: 1.09 MMOL/L (ref 1.15–1.35)
CA-I SERPL ISE-MCNC: 1.1 MMOL/L (ref 1.15–1.35)
CA-I SERPL ISE-MCNC: 1.12 MMOL/L (ref 1.15–1.35)
CA-I SERPL ISE-MCNC: 1.13 MMOL/L (ref 1.15–1.35)
CA-I SERPL ISE-MCNC: 1.13 MMOL/L (ref 1.15–1.35)
CA-I SERPL ISE-MCNC: 1.14 MMOL/L (ref 1.15–1.35)
CA-I SERPL ISE-MCNC: 1.15 MMOL/L (ref 1.15–1.35)
CA-I SERPL ISE-MCNC: 1.15 MMOL/L (ref 1.15–1.35)
CA-I SERPL ISE-MCNC: 1.17 MMOL/L (ref 1.15–1.35)
CA-I SERPL ISE-MCNC: 1.22 MMOL/L (ref 1.15–1.35)
CA-I SERPL ISE-MCNC: 1.27 MMOL/L (ref 1.15–1.35)
CALCIUM SPEC-SCNC: 6.4 MG/DL (ref 8.6–10.5)
CALCIUM SPEC-SCNC: 7.3 MG/DL (ref 8.6–10.5)
CALCIUM SPEC-SCNC: 7.4 MG/DL (ref 8.6–10.5)
CALCIUM SPEC-SCNC: 7.5 MG/DL (ref 8.6–10.5)
CALCIUM SPEC-SCNC: 7.5 MG/DL (ref 8.6–10.5)
CALCIUM SPEC-SCNC: 7.6 MG/DL (ref 8.6–10.5)
CALCIUM SPEC-SCNC: 7.6 MG/DL (ref 8.6–10.5)
CALCIUM SPEC-SCNC: 7.7 MG/DL (ref 8.6–10.5)
CALCIUM SPEC-SCNC: 7.8 MG/DL (ref 8.6–10.5)
CALCIUM SPEC-SCNC: 7.9 MG/DL (ref 8.6–10.5)
CALCIUM SPEC-SCNC: 8 MG/DL (ref 8.6–10.5)
CALCIUM SPEC-SCNC: 8.1 MG/DL (ref 8.6–10.5)
CALCIUM SPEC-SCNC: 8.2 MG/DL (ref 8.6–10.5)
CALCIUM SPEC-SCNC: 8.3 MG/DL (ref 8.6–10.5)
CALCIUM SPEC-SCNC: 8.4 MG/DL (ref 8.6–10.5)
CALCIUM SPEC-SCNC: 8.5 MG/DL (ref 8.6–10.5)
CALCIUM SPEC-SCNC: 8.6 MG/DL (ref 8.6–10.5)
CALCIUM SPEC-SCNC: 8.6 MG/DL (ref 8.6–10.5)
CALCIUM SPEC-SCNC: 8.7 MG/DL (ref 8.6–10.5)
CALCIUM SPEC-SCNC: 8.8 MG/DL (ref 8.6–10.5)
CALCIUM SPEC-SCNC: 9.1 MG/DL (ref 8.6–10.5)
CALCIUM SPEC-SCNC: 9.1 MG/DL (ref 8.6–10.5)
CATHETER CULTURE: NO GROWTH
CATHETER CULTURE: NORMAL
CHLORIDE SERPL-SCNC: 101 MMOL/L (ref 98–107)
CHLORIDE SERPL-SCNC: 101 MMOL/L (ref 98–107)
CHLORIDE SERPL-SCNC: 102 MMOL/L (ref 98–107)
CHLORIDE SERPL-SCNC: 103 MMOL/L (ref 98–107)
CHLORIDE SERPL-SCNC: 104 MMOL/L (ref 98–107)
CHLORIDE SERPL-SCNC: 105 MMOL/L (ref 98–107)
CHLORIDE SERPL-SCNC: 106 MMOL/L (ref 98–107)
CHLORIDE SERPL-SCNC: 107 MMOL/L (ref 98–107)
CHLORIDE SERPL-SCNC: 108 MMOL/L (ref 98–107)
CHLORIDE SERPL-SCNC: 109 MMOL/L (ref 98–107)
CHLORIDE SERPL-SCNC: 110 MMOL/L (ref 98–107)
CHLORIDE SERPL-SCNC: 111 MMOL/L (ref 98–107)
CHLORIDE SERPL-SCNC: 112 MMOL/L (ref 98–107)
CHLORIDE SERPL-SCNC: 117 MMOL/L (ref 98–107)
CHLORIDE SERPL-SCNC: 99 MMOL/L (ref 98–107)
CHOLEST SERPL-MCNC: 120 MG/DL (ref 0–200)
CHOLEST SERPL-MCNC: 131 MG/DL (ref 0–200)
CLARITY UR: ABNORMAL
CLARITY UR: CLEAR
CLARITY UR: CLEAR
CLOSE TME COLL+ADP + EPINEP PNL BLD: 34 % (ref 86–100)
CLOSE TME COLL+ADP + EPINEP PNL BLD: 43 % (ref 86–100)
CLOSE TME COLL+ADP + EPINEP PNL BLD: 48 % (ref 86–100)
CO2 BLDA-SCNC: 14.5 MMOL/L (ref 23–27)
CO2 BLDA-SCNC: 16.1 MMOL/L (ref 23–27)
CO2 BLDA-SCNC: 17 MMOL/L (ref 23–27)
CO2 BLDA-SCNC: 18.4 MMOL/L (ref 23–27)
CO2 BLDA-SCNC: 19.6 MMOL/L (ref 23–27)
CO2 BLDA-SCNC: 20 MMOL/L (ref 24–29)
CO2 BLDA-SCNC: 20 MMOL/L (ref 24–29)
CO2 BLDA-SCNC: 20.6 MMOL/L (ref 23–27)
CO2 BLDA-SCNC: 21 MMOL/L (ref 24–29)
CO2 BLDA-SCNC: 21.2 MMOL/L (ref 23–27)
CO2 BLDA-SCNC: 21.3 MMOL/L (ref 23–27)
CO2 BLDA-SCNC: 22.1 MMOL/L (ref 23–27)
CO2 BLDA-SCNC: 22.4 MMOL/L (ref 23–27)
CO2 BLDA-SCNC: 22.6 MMOL/L (ref 23–27)
CO2 BLDA-SCNC: 23 MMOL/L (ref 24–29)
CO2 BLDA-SCNC: 23.4 MMOL/L (ref 23–27)
CO2 BLDA-SCNC: 23.4 MMOL/L (ref 23–27)
CO2 BLDA-SCNC: 23.5 MMOL/L (ref 23–27)
CO2 BLDA-SCNC: 23.7 MMOL/L (ref 23–27)
CO2 BLDA-SCNC: 24 MMOL/L (ref 23–27)
CO2 BLDA-SCNC: 24 MMOL/L (ref 23–27)
CO2 BLDA-SCNC: 24 MMOL/L (ref 24–29)
CO2 BLDA-SCNC: 24.5 MMOL/L (ref 23–27)
CO2 BLDA-SCNC: 24.7 MMOL/L (ref 23–27)
CO2 BLDA-SCNC: 24.7 MMOL/L (ref 23–27)
CO2 BLDA-SCNC: 25 MMOL/L (ref 24–29)
CO2 BLDA-SCNC: 25.7 MMOL/L (ref 23–27)
CO2 BLDA-SCNC: 26 MMOL/L (ref 24–29)
CO2 BLDA-SCNC: 27 MMOL/L (ref 23–27)
CO2 BLDA-SCNC: 27 MMOL/L (ref 23–27)
CO2 BLDA-SCNC: 27.3 MMOL/L (ref 23–27)
CO2 BLDA-SCNC: 27.4 MMOL/L (ref 23–27)
CO2 BLDA-SCNC: 27.4 MMOL/L (ref 23–27)
CO2 BLDA-SCNC: 27.7 MMOL/L (ref 23–27)
CO2 BLDA-SCNC: 28 MMOL/L (ref 23–27)
CO2 BLDA-SCNC: 28 MMOL/L (ref 24–29)
CO2 BLDA-SCNC: 28.3 MMOL/L (ref 23–27)
CO2 BLDA-SCNC: 28.3 MMOL/L (ref 23–27)
CO2 BLDA-SCNC: 28.4 MMOL/L (ref 23–27)
CO2 BLDA-SCNC: 28.5 MMOL/L (ref 23–27)
CO2 BLDA-SCNC: 28.7 MMOL/L (ref 23–27)
CO2 BLDA-SCNC: 28.8 MMOL/L (ref 23–27)
CO2 BLDA-SCNC: 28.9 MMOL/L (ref 23–27)
CO2 BLDA-SCNC: 28.9 MMOL/L (ref 23–27)
CO2 BLDA-SCNC: 29 MMOL/L (ref 23–27)
CO2 BLDA-SCNC: 29 MMOL/L (ref 24–29)
CO2 BLDA-SCNC: 29 MMOL/L (ref 24–29)
CO2 BLDA-SCNC: 29.1 MMOL/L (ref 23–27)
CO2 BLDA-SCNC: 29.9 MMOL/L (ref 23–27)
CO2 BLDA-SCNC: 29.9 MMOL/L (ref 23–27)
CO2 BLDA-SCNC: 31.1 MMOL/L (ref 23–27)
CO2 SERPL-SCNC: 12.8 MMOL/L (ref 22–29)
CO2 SERPL-SCNC: 13 MMOL/L (ref 22–29)
CO2 SERPL-SCNC: 13.8 MMOL/L (ref 22–29)
CO2 SERPL-SCNC: 14.3 MMOL/L (ref 22–29)
CO2 SERPL-SCNC: 16.1 MMOL/L (ref 22–29)
CO2 SERPL-SCNC: 16.3 MMOL/L (ref 22–29)
CO2 SERPL-SCNC: 16.6 MMOL/L (ref 22–29)
CO2 SERPL-SCNC: 17 MMOL/L (ref 22–29)
CO2 SERPL-SCNC: 17 MMOL/L (ref 22–29)
CO2 SERPL-SCNC: 17.4 MMOL/L (ref 22–29)
CO2 SERPL-SCNC: 17.6 MMOL/L (ref 22–29)
CO2 SERPL-SCNC: 18 MMOL/L (ref 22–29)
CO2 SERPL-SCNC: 18.6 MMOL/L (ref 22–29)
CO2 SERPL-SCNC: 19 MMOL/L (ref 22–29)
CO2 SERPL-SCNC: 19 MMOL/L (ref 22–29)
CO2 SERPL-SCNC: 19.2 MMOL/L (ref 22–29)
CO2 SERPL-SCNC: 19.4 MMOL/L (ref 22–29)
CO2 SERPL-SCNC: 19.6 MMOL/L (ref 22–29)
CO2 SERPL-SCNC: 19.9 MMOL/L (ref 22–29)
CO2 SERPL-SCNC: 19.9 MMOL/L (ref 22–29)
CO2 SERPL-SCNC: 20 MMOL/L (ref 22–29)
CO2 SERPL-SCNC: 20.1 MMOL/L (ref 22–29)
CO2 SERPL-SCNC: 20.1 MMOL/L (ref 22–29)
CO2 SERPL-SCNC: 20.2 MMOL/L (ref 22–29)
CO2 SERPL-SCNC: 20.3 MMOL/L (ref 22–29)
CO2 SERPL-SCNC: 20.6 MMOL/L (ref 22–29)
CO2 SERPL-SCNC: 20.7 MMOL/L (ref 22–29)
CO2 SERPL-SCNC: 20.7 MMOL/L (ref 22–29)
CO2 SERPL-SCNC: 20.8 MMOL/L (ref 22–29)
CO2 SERPL-SCNC: 20.8 MMOL/L (ref 22–29)
CO2 SERPL-SCNC: 21 MMOL/L (ref 22–29)
CO2 SERPL-SCNC: 21.1 MMOL/L (ref 22–29)
CO2 SERPL-SCNC: 21.3 MMOL/L (ref 22–29)
CO2 SERPL-SCNC: 21.4 MMOL/L (ref 22–29)
CO2 SERPL-SCNC: 21.6 MMOL/L (ref 22–29)
CO2 SERPL-SCNC: 21.8 MMOL/L (ref 22–29)
CO2 SERPL-SCNC: 21.9 MMOL/L (ref 22–29)
CO2 SERPL-SCNC: 22 MMOL/L (ref 22–29)
CO2 SERPL-SCNC: 22.5 MMOL/L (ref 22–29)
CO2 SERPL-SCNC: 22.5 MMOL/L (ref 22–29)
CO2 SERPL-SCNC: 22.6 MMOL/L (ref 22–29)
CO2 SERPL-SCNC: 23 MMOL/L (ref 22–29)
CO2 SERPL-SCNC: 23.1 MMOL/L (ref 22–29)
CO2 SERPL-SCNC: 23.2 MMOL/L (ref 22–29)
CO2 SERPL-SCNC: 23.5 MMOL/L (ref 22–29)
CO2 SERPL-SCNC: 23.5 MMOL/L (ref 22–29)
CO2 SERPL-SCNC: 23.9 MMOL/L (ref 22–29)
CO2 SERPL-SCNC: 23.9 MMOL/L (ref 22–29)
CO2 SERPL-SCNC: 24 MMOL/L (ref 22–29)
CO2 SERPL-SCNC: 24.2 MMOL/L (ref 22–29)
CO2 SERPL-SCNC: 24.6 MMOL/L (ref 22–29)
CO2 SERPL-SCNC: 24.6 MMOL/L (ref 22–29)
CO2 SERPL-SCNC: 24.9 MMOL/L (ref 22–29)
CO2 SERPL-SCNC: 25 MMOL/L (ref 22–29)
CO2 SERPL-SCNC: 25 MMOL/L (ref 22–29)
CO2 SERPL-SCNC: 25.1 MMOL/L (ref 22–29)
CO2 SERPL-SCNC: 25.5 MMOL/L (ref 22–29)
CO2 SERPL-SCNC: 25.7 MMOL/L (ref 22–29)
CO2 SERPL-SCNC: 26 MMOL/L (ref 22–29)
CO2 SERPL-SCNC: 26 MMOL/L (ref 22–29)
CO2 SERPL-SCNC: 26.1 MMOL/L (ref 22–29)
CO2 SERPL-SCNC: 26.2 MMOL/L (ref 22–29)
CO2 SERPL-SCNC: 26.4 MMOL/L (ref 22–29)
COLOR UR: YELLOW
CREAT SERPL-MCNC: 0.6 MG/DL (ref 0.76–1.27)
CREAT SERPL-MCNC: 0.63 MG/DL (ref 0.76–1.27)
CREAT SERPL-MCNC: 0.64 MG/DL (ref 0.76–1.27)
CREAT SERPL-MCNC: 0.64 MG/DL (ref 0.76–1.27)
CREAT SERPL-MCNC: 0.65 MG/DL (ref 0.76–1.27)
CREAT SERPL-MCNC: 0.66 MG/DL (ref 0.76–1.27)
CREAT SERPL-MCNC: 0.66 MG/DL (ref 0.76–1.27)
CREAT SERPL-MCNC: 0.68 MG/DL (ref 0.76–1.27)
CREAT SERPL-MCNC: 0.69 MG/DL (ref 0.76–1.27)
CREAT SERPL-MCNC: 0.7 MG/DL (ref 0.76–1.27)
CREAT SERPL-MCNC: 0.7 MG/DL (ref 0.76–1.27)
CREAT SERPL-MCNC: 0.76 MG/DL (ref 0.76–1.27)
CREAT SERPL-MCNC: 0.76 MG/DL (ref 0.76–1.27)
CREAT SERPL-MCNC: 0.78 MG/DL (ref 0.76–1.27)
CREAT SERPL-MCNC: 0.79 MG/DL (ref 0.76–1.27)
CREAT SERPL-MCNC: 0.81 MG/DL (ref 0.76–1.27)
CREAT SERPL-MCNC: 0.83 MG/DL (ref 0.76–1.27)
CREAT SERPL-MCNC: 0.84 MG/DL (ref 0.76–1.27)
CREAT SERPL-MCNC: 0.84 MG/DL (ref 0.76–1.27)
CREAT SERPL-MCNC: 0.86 MG/DL (ref 0.76–1.27)
CREAT SERPL-MCNC: 0.9 MG/DL (ref 0.76–1.27)
CREAT SERPL-MCNC: 0.9 MG/DL (ref 0.76–1.27)
CREAT SERPL-MCNC: 0.94 MG/DL (ref 0.76–1.27)
CREAT SERPL-MCNC: 0.95 MG/DL (ref 0.76–1.27)
CREAT SERPL-MCNC: 1.01 MG/DL (ref 0.76–1.27)
CREAT SERPL-MCNC: 1.05 MG/DL (ref 0.76–1.27)
CREAT SERPL-MCNC: 1.06 MG/DL (ref 0.76–1.27)
CREAT SERPL-MCNC: 1.08 MG/DL (ref 0.76–1.27)
CREAT SERPL-MCNC: 1.09 MG/DL (ref 0.76–1.27)
CREAT SERPL-MCNC: 1.1 MG/DL (ref 0.76–1.27)
CREAT SERPL-MCNC: 1.14 MG/DL (ref 0.76–1.27)
CREAT SERPL-MCNC: 1.15 MG/DL (ref 0.76–1.27)
CREAT SERPL-MCNC: 1.16 MG/DL (ref 0.76–1.27)
CREAT SERPL-MCNC: 1.19 MG/DL (ref 0.76–1.27)
CREAT SERPL-MCNC: 1.21 MG/DL (ref 0.76–1.27)
CREAT SERPL-MCNC: 1.22 MG/DL (ref 0.76–1.27)
CREAT SERPL-MCNC: 1.23 MG/DL (ref 0.76–1.27)
CREAT SERPL-MCNC: 1.27 MG/DL (ref 0.76–1.27)
CREAT SERPL-MCNC: 1.32 MG/DL (ref 0.76–1.27)
CREAT SERPL-MCNC: 1.37 MG/DL (ref 0.76–1.27)
CREAT SERPL-MCNC: 1.43 MG/DL (ref 0.76–1.27)
CREAT SERPL-MCNC: 1.45 MG/DL (ref 0.76–1.27)
CREAT SERPL-MCNC: 1.46 MG/DL (ref 0.76–1.27)
CREAT SERPL-MCNC: 1.49 MG/DL (ref 0.76–1.27)
CREAT SERPL-MCNC: 1.53 MG/DL (ref 0.76–1.27)
CREAT SERPL-MCNC: 1.55 MG/DL (ref 0.76–1.27)
CREAT SERPL-MCNC: 1.56 MG/DL (ref 0.76–1.27)
CREAT SERPL-MCNC: 1.59 MG/DL (ref 0.76–1.27)
CREAT SERPL-MCNC: 1.63 MG/DL (ref 0.76–1.27)
CREAT SERPL-MCNC: 1.66 MG/DL (ref 0.76–1.27)
CREAT SERPL-MCNC: 1.67 MG/DL (ref 0.76–1.27)
CREAT SERPL-MCNC: 1.72 MG/DL (ref 0.76–1.27)
CREAT SERPL-MCNC: 1.77 MG/DL (ref 0.76–1.27)
CREAT SERPL-MCNC: 1.81 MG/DL (ref 0.76–1.27)
CREAT SERPL-MCNC: 1.84 MG/DL (ref 0.76–1.27)
CREAT SERPL-MCNC: 1.85 MG/DL (ref 0.76–1.27)
CREAT SERPL-MCNC: 1.89 MG/DL (ref 0.76–1.27)
CREAT SERPL-MCNC: 1.91 MG/DL (ref 0.76–1.27)
CREAT SERPL-MCNC: 1.93 MG/DL (ref 0.76–1.27)
CREAT SERPL-MCNC: 1.94 MG/DL (ref 0.76–1.27)
CREAT SERPL-MCNC: 1.95 MG/DL (ref 0.76–1.27)
CREAT SERPL-MCNC: 1.95 MG/DL (ref 0.76–1.27)
CREAT SERPL-MCNC: 2.4 MG/DL (ref 0.76–1.27)
CREAT SERPL-MCNC: 2.41 MG/DL (ref 0.76–1.27)
CREAT SERPL-MCNC: 2.52 MG/DL (ref 0.76–1.27)
CREAT SERPL-MCNC: 2.81 MG/DL (ref 0.76–1.27)
CREAT UR-MCNC: 12.9 MG/DL
CREAT UR-MCNC: 13.8 MG/DL
CROSSMATCH INTERPRETATION: NORMAL
CYTO UR: NORMAL
D-LACTATE SERPL-SCNC: 1.3 MMOL/L (ref 0.5–2)
D-LACTATE SERPL-SCNC: 2.7 MMOL/L (ref 0.5–2)
DAT C3: NEGATIVE
DAT IGG-SP REAG RBC-IMP: POSITIVE
DAT POLY-SP REAG RBC QL: POSITIVE
DEPRECATED RDW RBC AUTO: 47.8 FL (ref 37–54)
DEPRECATED RDW RBC AUTO: 48.6 FL (ref 37–54)
DEPRECATED RDW RBC AUTO: 49.3 FL (ref 37–54)
DEPRECATED RDW RBC AUTO: 49.7 FL (ref 37–54)
DEPRECATED RDW RBC AUTO: 50.3 FL (ref 37–54)
DEPRECATED RDW RBC AUTO: 50.4 FL (ref 37–54)
DEPRECATED RDW RBC AUTO: 51 FL (ref 37–54)
DEPRECATED RDW RBC AUTO: 52.4 FL (ref 37–54)
DEPRECATED RDW RBC AUTO: 52.7 FL (ref 37–54)
DEPRECATED RDW RBC AUTO: 52.8 FL (ref 37–54)
DEPRECATED RDW RBC AUTO: 54.5 FL (ref 37–54)
DEPRECATED RDW RBC AUTO: 54.5 FL (ref 37–54)
DEPRECATED RDW RBC AUTO: 54.8 FL (ref 37–54)
DEPRECATED RDW RBC AUTO: 54.9 FL (ref 37–54)
DEPRECATED RDW RBC AUTO: 55 FL (ref 37–54)
DEPRECATED RDW RBC AUTO: 55.1 FL (ref 37–54)
DEPRECATED RDW RBC AUTO: 55.1 FL (ref 37–54)
DEPRECATED RDW RBC AUTO: 55.3 FL (ref 37–54)
DEPRECATED RDW RBC AUTO: 55.3 FL (ref 37–54)
DEPRECATED RDW RBC AUTO: 55.4 FL (ref 37–54)
DEPRECATED RDW RBC AUTO: 55.5 FL (ref 37–54)
DEPRECATED RDW RBC AUTO: 55.7 FL (ref 37–54)
DEPRECATED RDW RBC AUTO: 55.7 FL (ref 37–54)
DEPRECATED RDW RBC AUTO: 56.4 FL (ref 37–54)
DEPRECATED RDW RBC AUTO: 56.4 FL (ref 37–54)
DEPRECATED RDW RBC AUTO: 56.5 FL (ref 37–54)
DEPRECATED RDW RBC AUTO: 56.6 FL (ref 37–54)
DEPRECATED RDW RBC AUTO: 56.6 FL (ref 37–54)
DEPRECATED RDW RBC AUTO: 56.8 FL (ref 37–54)
DEPRECATED RDW RBC AUTO: 57.2 FL (ref 37–54)
DEPRECATED RDW RBC AUTO: 57.3 FL (ref 37–54)
DEPRECATED RDW RBC AUTO: 57.4 FL (ref 37–54)
DEPRECATED RDW RBC AUTO: 57.5 FL (ref 37–54)
DEPRECATED RDW RBC AUTO: 57.5 FL (ref 37–54)
DEPRECATED RDW RBC AUTO: 57.8 FL (ref 37–54)
DEPRECATED RDW RBC AUTO: 57.9 FL (ref 37–54)
DEPRECATED RDW RBC AUTO: 57.9 FL (ref 37–54)
DEPRECATED RDW RBC AUTO: 58.1 FL (ref 37–54)
DEPRECATED RDW RBC AUTO: 58.1 FL (ref 37–54)
DEPRECATED RDW RBC AUTO: 58.4 FL (ref 37–54)
DEPRECATED RDW RBC AUTO: 58.9 FL (ref 37–54)
DEVICE COMMENT: ABNORMAL
DIGOXIN SERPL-MCNC: 1 NG/ML (ref 0.6–1.2)
DIGOXIN SERPL-MCNC: 1.5 NG/ML (ref 0.6–1.2)
EGFRCR SERPLBLD CKD-EPI 2021: 101.3 ML/MIN/1.73
EGFRCR SERPLBLD CKD-EPI 2021: 22.9 ML/MIN/1.73
EGFRCR SERPLBLD CKD-EPI 2021: 26.1 ML/MIN/1.73
EGFRCR SERPLBLD CKD-EPI 2021: 27.5 ML/MIN/1.73
EGFRCR SERPLBLD CKD-EPI 2021: 27.6 ML/MIN/1.73
EGFRCR SERPLBLD CKD-EPI 2021: 35.7 ML/MIN/1.73
EGFRCR SERPLBLD CKD-EPI 2021: 35.7 ML/MIN/1.73
EGFRCR SERPLBLD CKD-EPI 2021: 35.9 ML/MIN/1.73
EGFRCR SERPLBLD CKD-EPI 2021: 36.1 ML/MIN/1.73
EGFRCR SERPLBLD CKD-EPI 2021: 36.3 ML/MIN/1.73
EGFRCR SERPLBLD CKD-EPI 2021: 37 ML/MIN/1.73
EGFRCR SERPLBLD CKD-EPI 2021: 38 ML/MIN/1.73
EGFRCR SERPLBLD CKD-EPI 2021: 38.2 ML/MIN/1.73
EGFRCR SERPLBLD CKD-EPI 2021: 39 ML/MIN/1.73
EGFRCR SERPLBLD CKD-EPI 2021: 40.1 ML/MIN/1.73
EGFRCR SERPLBLD CKD-EPI 2021: 41.2 ML/MIN/1.73
EGFRCR SERPLBLD CKD-EPI 2021: 42.7 ML/MIN/1.73
EGFRCR SERPLBLD CKD-EPI 2021: 43 ML/MIN/1.73
EGFRCR SERPLBLD CKD-EPI 2021: 43.9 ML/MIN/1.73
EGFRCR SERPLBLD CKD-EPI 2021: 45.3 ML/MIN/1.73
EGFRCR SERPLBLD CKD-EPI 2021: 46.3 ML/MIN/1.73
EGFRCR SERPLBLD CKD-EPI 2021: 46.7 ML/MIN/1.73
EGFRCR SERPLBLD CKD-EPI 2021: 47.4 ML/MIN/1.73
EGFRCR SERPLBLD CKD-EPI 2021: 49.2 ML/MIN/1.73
EGFRCR SERPLBLD CKD-EPI 2021: 50.2 ML/MIN/1.73
EGFRCR SERPLBLD CKD-EPI 2021: 50.6 ML/MIN/1.73
EGFRCR SERPLBLD CKD-EPI 2021: 51.7 ML/MIN/1.73
EGFRCR SERPLBLD CKD-EPI 2021: 54.1 ML/MIN/1.73
EGFRCR SERPLBLD CKD-EPI 2021: 56.6 ML/MIN/1.73
EGFRCR SERPLBLD CKD-EPI 2021: 59.3 ML/MIN/1.73
EGFRCR SERPLBLD CKD-EPI 2021: 61.6 ML/MIN/1.73
EGFRCR SERPLBLD CKD-EPI 2021: 62.2 ML/MIN/1.73
EGFRCR SERPLBLD CKD-EPI 2021: 62.8 ML/MIN/1.73
EGFRCR SERPLBLD CKD-EPI 2021: 64.1 ML/MIN/1.73
EGFRCR SERPLBLD CKD-EPI 2021: 66.1 ML/MIN/1.73
EGFRCR SERPLBLD CKD-EPI 2021: 66.8 ML/MIN/1.73
EGFRCR SERPLBLD CKD-EPI 2021: 67.5 ML/MIN/1.73
EGFRCR SERPLBLD CKD-EPI 2021: 70.4 ML/MIN/1.73
EGFRCR SERPLBLD CKD-EPI 2021: 71.2 ML/MIN/1.73
EGFRCR SERPLBLD CKD-EPI 2021: 72 ML/MIN/1.73
EGFRCR SERPLBLD CKD-EPI 2021: 73.6 ML/MIN/1.73
EGFRCR SERPLBLD CKD-EPI 2021: 74.5 ML/MIN/1.73
EGFRCR SERPLBLD CKD-EPI 2021: 78 ML/MIN/1.73
EGFRCR SERPLBLD CKD-EPI 2021: 84 ML/MIN/1.73
EGFRCR SERPLBLD CKD-EPI 2021: 85.1 ML/MIN/1.73
EGFRCR SERPLBLD CKD-EPI 2021: 89.6 ML/MIN/1.73
EGFRCR SERPLBLD CKD-EPI 2021: 89.6 ML/MIN/1.73
EGFRCR SERPLBLD CKD-EPI 2021: 90.9 ML/MIN/1.73
EGFRCR SERPLBLD CKD-EPI 2021: 91.5 ML/MIN/1.73
EGFRCR SERPLBLD CKD-EPI 2021: 91.5 ML/MIN/1.73
EGFRCR SERPLBLD CKD-EPI 2021: 92.4 ML/MIN/1.73
EGFRCR SERPLBLD CKD-EPI 2021: 93.1 ML/MIN/1.73
EGFRCR SERPLBLD CKD-EPI 2021: 93.2 ML/MIN/1.73
EGFRCR SERPLBLD CKD-EPI 2021: 93.6 ML/MIN/1.73
EGFRCR SERPLBLD CKD-EPI 2021: 94.3 ML/MIN/1.73
EGFRCR SERPLBLD CKD-EPI 2021: 94.9 ML/MIN/1.73
EGFRCR SERPLBLD CKD-EPI 2021: 96.7 ML/MIN/1.73
EGFRCR SERPLBLD CKD-EPI 2021: 96.7 ML/MIN/1.73
EGFRCR SERPLBLD CKD-EPI 2021: 97.1 ML/MIN/1.73
EGFRCR SERPLBLD CKD-EPI 2021: 98.1 ML/MIN/1.73
EGFRCR SERPLBLD CKD-EPI 2021: 98.4 ML/MIN/1.73
EGFRCR SERPLBLD CKD-EPI 2021: 98.9 ML/MIN/1.73
EGFRCR SERPLBLD CKD-EPI 2021: 99 ML/MIN/1.73
EGFRCR SERPLBLD CKD-EPI 2021: 99.3 ML/MIN/1.73
EGFRCR SERPLBLD CKD-EPI 2021: 99.3 ML/MIN/1.73
EGFRCR SERPLBLD CKD-EPI 2021: 99.8 ML/MIN/1.73
EOSINOPHIL # BLD AUTO: 0.02 10*3/MM3 (ref 0–0.4)
EOSINOPHIL # BLD AUTO: 0.03 10*3/MM3 (ref 0–0.4)
EOSINOPHIL # BLD AUTO: 0.13 10*3/MM3 (ref 0–0.4)
EOSINOPHIL # BLD AUTO: 0.13 10*3/MM3 (ref 0–0.4)
EOSINOPHIL # BLD AUTO: 0.16 10*3/MM3 (ref 0–0.4)
EOSINOPHIL # BLD AUTO: 0.16 10*3/MM3 (ref 0–0.4)
EOSINOPHIL # BLD AUTO: 0.19 10*3/MM3 (ref 0–0.4)
EOSINOPHIL # BLD AUTO: 0.19 10*3/MM3 (ref 0–0.4)
EOSINOPHIL # BLD AUTO: 0.2 10*3/MM3 (ref 0–0.4)
EOSINOPHIL # BLD AUTO: 0.21 10*3/MM3 (ref 0–0.4)
EOSINOPHIL # BLD AUTO: 0.23 10*3/MM3 (ref 0–0.4)
EOSINOPHIL # BLD AUTO: 0.23 10*3/MM3 (ref 0–0.4)
EOSINOPHIL # BLD AUTO: 0.31 10*3/MM3 (ref 0–0.4)
EOSINOPHIL # BLD AUTO: 0.32 10*3/MM3 (ref 0–0.4)
EOSINOPHIL # BLD AUTO: 0.4 10*3/MM3 (ref 0–0.4)
EOSINOPHIL # BLD AUTO: 0.46 10*3/MM3 (ref 0–0.4)
EOSINOPHIL # BLD AUTO: 0.49 10*3/MM3 (ref 0–0.4)
EOSINOPHIL # BLD AUTO: 0.58 10*3/MM3 (ref 0–0.4)
EOSINOPHIL # BLD MANUAL: 0 10*3/MM3 (ref 0–0.4)
EOSINOPHIL # BLD MANUAL: 0.64 10*3/MM3 (ref 0–0.4)
EOSINOPHIL NFR BLD AUTO: 0.1 % (ref 0.3–6.2)
EOSINOPHIL NFR BLD AUTO: 0.2 % (ref 0.3–6.2)
EOSINOPHIL NFR BLD AUTO: 1.1 % (ref 0.3–6.2)
EOSINOPHIL NFR BLD AUTO: 1.1 % (ref 0.3–6.2)
EOSINOPHIL NFR BLD AUTO: 1.2 % (ref 0.3–6.2)
EOSINOPHIL NFR BLD AUTO: 1.2 % (ref 0.3–6.2)
EOSINOPHIL NFR BLD AUTO: 1.6 % (ref 0.3–6.2)
EOSINOPHIL NFR BLD AUTO: 1.6 % (ref 0.3–6.2)
EOSINOPHIL NFR BLD AUTO: 1.7 % (ref 0.3–6.2)
EOSINOPHIL NFR BLD AUTO: 1.9 % (ref 0.3–6.2)
EOSINOPHIL NFR BLD AUTO: 1.9 % (ref 0.3–6.2)
EOSINOPHIL NFR BLD AUTO: 2.3 % (ref 0.3–6.2)
EOSINOPHIL NFR BLD AUTO: 2.4 % (ref 0.3–6.2)
EOSINOPHIL NFR BLD AUTO: 3 % (ref 0.3–6.2)
EOSINOPHIL NFR BLD AUTO: 3 % (ref 0.3–6.2)
EOSINOPHIL NFR BLD AUTO: 3.5 % (ref 0.3–6.2)
EOSINOPHIL NFR BLD MANUAL: 0 % (ref 0.3–6.2)
EOSINOPHIL NFR BLD MANUAL: 4 % (ref 0.3–6.2)
ERYTHROCYTE [DISTWIDTH] IN BLOOD BY AUTOMATED COUNT: 14.8 % (ref 12.3–15.4)
ERYTHROCYTE [DISTWIDTH] IN BLOOD BY AUTOMATED COUNT: 14.8 % (ref 12.3–15.4)
ERYTHROCYTE [DISTWIDTH] IN BLOOD BY AUTOMATED COUNT: 15.2 % (ref 12.3–15.4)
ERYTHROCYTE [DISTWIDTH] IN BLOOD BY AUTOMATED COUNT: 15.2 % (ref 12.3–15.4)
ERYTHROCYTE [DISTWIDTH] IN BLOOD BY AUTOMATED COUNT: 15.4 % (ref 12.3–15.4)
ERYTHROCYTE [DISTWIDTH] IN BLOOD BY AUTOMATED COUNT: 15.4 % (ref 12.3–15.4)
ERYTHROCYTE [DISTWIDTH] IN BLOOD BY AUTOMATED COUNT: 15.5 % (ref 12.3–15.4)
ERYTHROCYTE [DISTWIDTH] IN BLOOD BY AUTOMATED COUNT: 15.6 % (ref 12.3–15.4)
ERYTHROCYTE [DISTWIDTH] IN BLOOD BY AUTOMATED COUNT: 15.7 % (ref 12.3–15.4)
ERYTHROCYTE [DISTWIDTH] IN BLOOD BY AUTOMATED COUNT: 15.7 % (ref 12.3–15.4)
ERYTHROCYTE [DISTWIDTH] IN BLOOD BY AUTOMATED COUNT: 16 % (ref 12.3–15.4)
ERYTHROCYTE [DISTWIDTH] IN BLOOD BY AUTOMATED COUNT: 16.1 % (ref 12.3–15.4)
ERYTHROCYTE [DISTWIDTH] IN BLOOD BY AUTOMATED COUNT: 16.2 % (ref 12.3–15.4)
ERYTHROCYTE [DISTWIDTH] IN BLOOD BY AUTOMATED COUNT: 16.2 % (ref 12.3–15.4)
ERYTHROCYTE [DISTWIDTH] IN BLOOD BY AUTOMATED COUNT: 16.3 % (ref 12.3–15.4)
ERYTHROCYTE [DISTWIDTH] IN BLOOD BY AUTOMATED COUNT: 16.3 % (ref 12.3–15.4)
ERYTHROCYTE [DISTWIDTH] IN BLOOD BY AUTOMATED COUNT: 16.6 % (ref 12.3–15.4)
ERYTHROCYTE [DISTWIDTH] IN BLOOD BY AUTOMATED COUNT: 16.9 % (ref 12.3–15.4)
ERYTHROCYTE [DISTWIDTH] IN BLOOD BY AUTOMATED COUNT: 17 % (ref 12.3–15.4)
ERYTHROCYTE [DISTWIDTH] IN BLOOD BY AUTOMATED COUNT: 17.1 % (ref 12.3–15.4)
ERYTHROCYTE [DISTWIDTH] IN BLOOD BY AUTOMATED COUNT: 17.2 % (ref 12.3–15.4)
ERYTHROCYTE [DISTWIDTH] IN BLOOD BY AUTOMATED COUNT: 17.2 % (ref 12.3–15.4)
ERYTHROCYTE [DISTWIDTH] IN BLOOD BY AUTOMATED COUNT: 17.3 % (ref 12.3–15.4)
ERYTHROCYTE [DISTWIDTH] IN BLOOD BY AUTOMATED COUNT: 17.4 % (ref 12.3–15.4)
ERYTHROCYTE [DISTWIDTH] IN BLOOD BY AUTOMATED COUNT: 17.5 % (ref 12.3–15.4)
ERYTHROCYTE [DISTWIDTH] IN BLOOD BY AUTOMATED COUNT: 17.6 % (ref 12.3–15.4)
ERYTHROCYTE [DISTWIDTH] IN BLOOD BY AUTOMATED COUNT: 17.7 % (ref 12.3–15.4)
FIBRINOGEN PPP-MCNC: 288 MG/DL (ref 219–464)
FIBRINOGEN PPP-MCNC: 289 MG/DL (ref 219–464)
FIBRINOGEN PPP-MCNC: 313 MG/DL (ref 219–464)
FIBRINOGEN PPP-MCNC: 345 MG/DL (ref 219–464)
FIBRINOGEN PPP-MCNC: 461 MG/DL (ref 219–464)
FUNGUS WND CULT: NORMAL
GLOBULIN UR ELPH-MCNC: 1.7 GM/DL
GLOBULIN UR ELPH-MCNC: 1.9 GM/DL
GLOBULIN UR ELPH-MCNC: 2 GM/DL
GLOBULIN UR ELPH-MCNC: 2.1 GM/DL
GLOBULIN UR ELPH-MCNC: 2.3 GM/DL
GLOBULIN UR ELPH-MCNC: 2.3 GM/DL
GLOBULIN UR ELPH-MCNC: 2.4 GM/DL
GLOBULIN UR ELPH-MCNC: 2.8 GM/DL
GLOBULIN UR ELPH-MCNC: 3 GM/DL
GLOBULIN UR ELPH-MCNC: 3 GM/DL
GLOBULIN UR ELPH-MCNC: 3.1 GM/DL
GLOBULIN UR ELPH-MCNC: 3.2 GM/DL
GLOBULIN UR ELPH-MCNC: 3.3 GM/DL
GLOBULIN UR ELPH-MCNC: 3.3 GM/DL
GLOBULIN UR ELPH-MCNC: 3.4 GM/DL
GLOBULIN UR ELPH-MCNC: 3.5 GM/DL
GLOBULIN UR ELPH-MCNC: 3.5 GM/DL
GLOBULIN UR ELPH-MCNC: 3.6 GM/DL
GLOBULIN UR ELPH-MCNC: 3.7 GM/DL
GLOBULIN UR ELPH-MCNC: 3.9 GM/DL
GLUCOSE BLDC GLUCOMTR-MCNC: 101 MG/DL (ref 70–130)
GLUCOSE BLDC GLUCOMTR-MCNC: 104 MG/DL (ref 70–130)
GLUCOSE BLDC GLUCOMTR-MCNC: 105 MG/DL (ref 70–130)
GLUCOSE BLDC GLUCOMTR-MCNC: 106 MG/DL (ref 70–130)
GLUCOSE BLDC GLUCOMTR-MCNC: 107 MG/DL (ref 70–130)
GLUCOSE BLDC GLUCOMTR-MCNC: 108 MG/DL (ref 70–130)
GLUCOSE BLDC GLUCOMTR-MCNC: 108 MG/DL (ref 70–130)
GLUCOSE BLDC GLUCOMTR-MCNC: 111 MG/DL (ref 70–130)
GLUCOSE BLDC GLUCOMTR-MCNC: 112 MG/DL (ref 70–130)
GLUCOSE BLDC GLUCOMTR-MCNC: 113 MG/DL (ref 70–130)
GLUCOSE BLDC GLUCOMTR-MCNC: 114 MG/DL (ref 70–130)
GLUCOSE BLDC GLUCOMTR-MCNC: 115 MG/DL (ref 70–130)
GLUCOSE BLDC GLUCOMTR-MCNC: 115 MG/DL (ref 70–130)
GLUCOSE BLDC GLUCOMTR-MCNC: 117 MG/DL (ref 70–130)
GLUCOSE BLDC GLUCOMTR-MCNC: 118 MG/DL (ref 70–130)
GLUCOSE BLDC GLUCOMTR-MCNC: 120 MG/DL (ref 70–130)
GLUCOSE BLDC GLUCOMTR-MCNC: 121 MG/DL (ref 70–130)
GLUCOSE BLDC GLUCOMTR-MCNC: 121 MG/DL (ref 70–130)
GLUCOSE BLDC GLUCOMTR-MCNC: 122 MG/DL (ref 70–130)
GLUCOSE BLDC GLUCOMTR-MCNC: 123 MG/DL (ref 70–130)
GLUCOSE BLDC GLUCOMTR-MCNC: 123 MG/DL (ref 70–130)
GLUCOSE BLDC GLUCOMTR-MCNC: 124 MG/DL (ref 70–130)
GLUCOSE BLDC GLUCOMTR-MCNC: 124 MG/DL (ref 70–130)
GLUCOSE BLDC GLUCOMTR-MCNC: 125 MG/DL (ref 70–130)
GLUCOSE BLDC GLUCOMTR-MCNC: 127 MG/DL (ref 70–130)
GLUCOSE BLDC GLUCOMTR-MCNC: 127 MG/DL (ref 70–130)
GLUCOSE BLDC GLUCOMTR-MCNC: 129 MG/DL (ref 70–130)
GLUCOSE BLDC GLUCOMTR-MCNC: 129 MG/DL (ref 70–130)
GLUCOSE BLDC GLUCOMTR-MCNC: 130 MG/DL (ref 70–130)
GLUCOSE BLDC GLUCOMTR-MCNC: 131 MG/DL (ref 70–130)
GLUCOSE BLDC GLUCOMTR-MCNC: 132 MG/DL (ref 70–130)
GLUCOSE BLDC GLUCOMTR-MCNC: 132 MG/DL (ref 70–130)
GLUCOSE BLDC GLUCOMTR-MCNC: 133 MG/DL (ref 70–130)
GLUCOSE BLDC GLUCOMTR-MCNC: 134 MG/DL (ref 70–130)
GLUCOSE BLDC GLUCOMTR-MCNC: 135 MG/DL (ref 70–130)
GLUCOSE BLDC GLUCOMTR-MCNC: 136 MG/DL (ref 70–130)
GLUCOSE BLDC GLUCOMTR-MCNC: 136 MG/DL (ref 70–130)
GLUCOSE BLDC GLUCOMTR-MCNC: 137 MG/DL (ref 70–130)
GLUCOSE BLDC GLUCOMTR-MCNC: 137 MG/DL (ref 70–130)
GLUCOSE BLDC GLUCOMTR-MCNC: 138 MG/DL (ref 70–130)
GLUCOSE BLDC GLUCOMTR-MCNC: 139 MG/DL (ref 70–130)
GLUCOSE BLDC GLUCOMTR-MCNC: 139 MG/DL (ref 70–130)
GLUCOSE BLDC GLUCOMTR-MCNC: 140 MG/DL (ref 70–130)
GLUCOSE BLDC GLUCOMTR-MCNC: 141 MG/DL (ref 70–130)
GLUCOSE BLDC GLUCOMTR-MCNC: 141 MG/DL (ref 70–130)
GLUCOSE BLDC GLUCOMTR-MCNC: 142 MG/DL (ref 70–130)
GLUCOSE BLDC GLUCOMTR-MCNC: 143 MG/DL (ref 70–130)
GLUCOSE BLDC GLUCOMTR-MCNC: 143 MG/DL (ref 70–130)
GLUCOSE BLDC GLUCOMTR-MCNC: 144 MG/DL (ref 70–130)
GLUCOSE BLDC GLUCOMTR-MCNC: 144 MG/DL (ref 70–130)
GLUCOSE BLDC GLUCOMTR-MCNC: 145 MG/DL (ref 70–130)
GLUCOSE BLDC GLUCOMTR-MCNC: 146 MG/DL (ref 70–130)
GLUCOSE BLDC GLUCOMTR-MCNC: 146 MG/DL (ref 70–130)
GLUCOSE BLDC GLUCOMTR-MCNC: 147 MG/DL (ref 70–130)
GLUCOSE BLDC GLUCOMTR-MCNC: 148 MG/DL (ref 70–130)
GLUCOSE BLDC GLUCOMTR-MCNC: 149 MG/DL (ref 70–130)
GLUCOSE BLDC GLUCOMTR-MCNC: 150 MG/DL (ref 70–130)
GLUCOSE BLDC GLUCOMTR-MCNC: 151 MG/DL (ref 70–130)
GLUCOSE BLDC GLUCOMTR-MCNC: 152 MG/DL (ref 70–130)
GLUCOSE BLDC GLUCOMTR-MCNC: 154 MG/DL (ref 70–130)
GLUCOSE BLDC GLUCOMTR-MCNC: 155 MG/DL (ref 70–130)
GLUCOSE BLDC GLUCOMTR-MCNC: 155 MG/DL (ref 70–130)
GLUCOSE BLDC GLUCOMTR-MCNC: 156 MG/DL (ref 70–130)
GLUCOSE BLDC GLUCOMTR-MCNC: 156 MG/DL (ref 70–130)
GLUCOSE BLDC GLUCOMTR-MCNC: 157 MG/DL (ref 70–130)
GLUCOSE BLDC GLUCOMTR-MCNC: 158 MG/DL (ref 70–130)
GLUCOSE BLDC GLUCOMTR-MCNC: 159 MG/DL (ref 70–130)
GLUCOSE BLDC GLUCOMTR-MCNC: 160 MG/DL (ref 70–130)
GLUCOSE BLDC GLUCOMTR-MCNC: 161 MG/DL (ref 70–130)
GLUCOSE BLDC GLUCOMTR-MCNC: 162 MG/DL (ref 70–130)
GLUCOSE BLDC GLUCOMTR-MCNC: 163 MG/DL (ref 70–130)
GLUCOSE BLDC GLUCOMTR-MCNC: 163 MG/DL (ref 70–130)
GLUCOSE BLDC GLUCOMTR-MCNC: 164 MG/DL (ref 70–130)
GLUCOSE BLDC GLUCOMTR-MCNC: 165 MG/DL (ref 70–130)
GLUCOSE BLDC GLUCOMTR-MCNC: 165 MG/DL (ref 70–130)
GLUCOSE BLDC GLUCOMTR-MCNC: 166 MG/DL (ref 70–130)
GLUCOSE BLDC GLUCOMTR-MCNC: 167 MG/DL (ref 70–130)
GLUCOSE BLDC GLUCOMTR-MCNC: 168 MG/DL (ref 70–130)
GLUCOSE BLDC GLUCOMTR-MCNC: 169 MG/DL (ref 70–130)
GLUCOSE BLDC GLUCOMTR-MCNC: 170 MG/DL (ref 70–130)
GLUCOSE BLDC GLUCOMTR-MCNC: 172 MG/DL (ref 70–130)
GLUCOSE BLDC GLUCOMTR-MCNC: 172 MG/DL (ref 70–130)
GLUCOSE BLDC GLUCOMTR-MCNC: 173 MG/DL (ref 70–130)
GLUCOSE BLDC GLUCOMTR-MCNC: 176 MG/DL (ref 70–130)
GLUCOSE BLDC GLUCOMTR-MCNC: 176 MG/DL (ref 70–130)
GLUCOSE BLDC GLUCOMTR-MCNC: 179 MG/DL (ref 70–130)
GLUCOSE BLDC GLUCOMTR-MCNC: 180 MG/DL (ref 70–130)
GLUCOSE BLDC GLUCOMTR-MCNC: 181 MG/DL (ref 70–130)
GLUCOSE BLDC GLUCOMTR-MCNC: 183 MG/DL (ref 70–130)
GLUCOSE BLDC GLUCOMTR-MCNC: 184 MG/DL (ref 70–130)
GLUCOSE BLDC GLUCOMTR-MCNC: 186 MG/DL (ref 70–130)
GLUCOSE BLDC GLUCOMTR-MCNC: 188 MG/DL (ref 70–130)
GLUCOSE BLDC GLUCOMTR-MCNC: 189 MG/DL (ref 70–130)
GLUCOSE BLDC GLUCOMTR-MCNC: 192 MG/DL (ref 70–130)
GLUCOSE BLDC GLUCOMTR-MCNC: 193 MG/DL (ref 70–130)
GLUCOSE BLDC GLUCOMTR-MCNC: 194 MG/DL (ref 70–130)
GLUCOSE BLDC GLUCOMTR-MCNC: 195 MG/DL (ref 70–130)
GLUCOSE BLDC GLUCOMTR-MCNC: 197 MG/DL (ref 70–130)
GLUCOSE BLDC GLUCOMTR-MCNC: 198 MG/DL (ref 70–130)
GLUCOSE BLDC GLUCOMTR-MCNC: 200 MG/DL (ref 70–130)
GLUCOSE BLDC GLUCOMTR-MCNC: 200 MG/DL (ref 70–130)
GLUCOSE BLDC GLUCOMTR-MCNC: 203 MG/DL (ref 70–130)
GLUCOSE BLDC GLUCOMTR-MCNC: 206 MG/DL (ref 70–130)
GLUCOSE BLDC GLUCOMTR-MCNC: 207 MG/DL (ref 70–130)
GLUCOSE BLDC GLUCOMTR-MCNC: 208 MG/DL (ref 70–130)
GLUCOSE BLDC GLUCOMTR-MCNC: 208 MG/DL (ref 70–130)
GLUCOSE BLDC GLUCOMTR-MCNC: 213 MG/DL (ref 70–130)
GLUCOSE BLDC GLUCOMTR-MCNC: 216 MG/DL (ref 70–130)
GLUCOSE BLDC GLUCOMTR-MCNC: 22 MG/DL (ref 70–130)
GLUCOSE BLDC GLUCOMTR-MCNC: 241 MG/DL (ref 70–130)
GLUCOSE BLDC GLUCOMTR-MCNC: 243 MG/DL (ref 70–130)
GLUCOSE BLDC GLUCOMTR-MCNC: 71 MG/DL (ref 70–130)
GLUCOSE BLDC GLUCOMTR-MCNC: 88 MG/DL (ref 70–130)
GLUCOSE BLDC GLUCOMTR-MCNC: 90 MG/DL (ref 70–130)
GLUCOSE BLDC GLUCOMTR-MCNC: 92 MG/DL (ref 70–130)
GLUCOSE BLDC GLUCOMTR-MCNC: 93 MG/DL (ref 70–130)
GLUCOSE BLDC GLUCOMTR-MCNC: 94 MG/DL (ref 70–130)
GLUCOSE BLDC GLUCOMTR-MCNC: 95 MG/DL (ref 70–130)
GLUCOSE BLDC GLUCOMTR-MCNC: 98 MG/DL (ref 70–130)
GLUCOSE BLDC GLUCOMTR-MCNC: 98 MG/DL (ref 70–130)
GLUCOSE BLDC GLUCOMTR-MCNC: 99 MG/DL (ref 70–130)
GLUCOSE SERPL-MCNC: 100 MG/DL (ref 65–99)
GLUCOSE SERPL-MCNC: 100 MG/DL (ref 65–99)
GLUCOSE SERPL-MCNC: 102 MG/DL (ref 65–99)
GLUCOSE SERPL-MCNC: 108 MG/DL (ref 65–99)
GLUCOSE SERPL-MCNC: 109 MG/DL (ref 65–99)
GLUCOSE SERPL-MCNC: 110 MG/DL (ref 65–99)
GLUCOSE SERPL-MCNC: 111 MG/DL (ref 65–99)
GLUCOSE SERPL-MCNC: 114 MG/DL (ref 65–99)
GLUCOSE SERPL-MCNC: 115 MG/DL (ref 65–99)
GLUCOSE SERPL-MCNC: 116 MG/DL (ref 65–99)
GLUCOSE SERPL-MCNC: 117 MG/DL (ref 65–99)
GLUCOSE SERPL-MCNC: 121 MG/DL (ref 65–99)
GLUCOSE SERPL-MCNC: 121 MG/DL (ref 65–99)
GLUCOSE SERPL-MCNC: 122 MG/DL (ref 65–99)
GLUCOSE SERPL-MCNC: 122 MG/DL (ref 65–99)
GLUCOSE SERPL-MCNC: 123 MG/DL (ref 65–99)
GLUCOSE SERPL-MCNC: 126 MG/DL (ref 65–99)
GLUCOSE SERPL-MCNC: 127 MG/DL (ref 65–99)
GLUCOSE SERPL-MCNC: 129 MG/DL (ref 65–99)
GLUCOSE SERPL-MCNC: 130 MG/DL (ref 65–99)
GLUCOSE SERPL-MCNC: 130 MG/DL (ref 65–99)
GLUCOSE SERPL-MCNC: 131 MG/DL (ref 65–99)
GLUCOSE SERPL-MCNC: 134 MG/DL (ref 65–99)
GLUCOSE SERPL-MCNC: 136 MG/DL (ref 65–99)
GLUCOSE SERPL-MCNC: 137 MG/DL (ref 65–99)
GLUCOSE SERPL-MCNC: 138 MG/DL (ref 65–99)
GLUCOSE SERPL-MCNC: 138 MG/DL (ref 65–99)
GLUCOSE SERPL-MCNC: 139 MG/DL (ref 65–99)
GLUCOSE SERPL-MCNC: 140 MG/DL (ref 65–99)
GLUCOSE SERPL-MCNC: 140 MG/DL (ref 65–99)
GLUCOSE SERPL-MCNC: 142 MG/DL (ref 65–99)
GLUCOSE SERPL-MCNC: 142 MG/DL (ref 65–99)
GLUCOSE SERPL-MCNC: 143 MG/DL (ref 65–99)
GLUCOSE SERPL-MCNC: 146 MG/DL (ref 65–99)
GLUCOSE SERPL-MCNC: 147 MG/DL (ref 65–99)
GLUCOSE SERPL-MCNC: 148 MG/DL (ref 65–99)
GLUCOSE SERPL-MCNC: 149 MG/DL (ref 65–99)
GLUCOSE SERPL-MCNC: 150 MG/DL (ref 65–99)
GLUCOSE SERPL-MCNC: 151 MG/DL (ref 65–99)
GLUCOSE SERPL-MCNC: 151 MG/DL (ref 65–99)
GLUCOSE SERPL-MCNC: 152 MG/DL (ref 65–99)
GLUCOSE SERPL-MCNC: 153 MG/DL (ref 65–99)
GLUCOSE SERPL-MCNC: 157 MG/DL (ref 65–99)
GLUCOSE SERPL-MCNC: 159 MG/DL (ref 65–99)
GLUCOSE SERPL-MCNC: 160 MG/DL (ref 65–99)
GLUCOSE SERPL-MCNC: 161 MG/DL (ref 65–99)
GLUCOSE SERPL-MCNC: 168 MG/DL (ref 65–99)
GLUCOSE SERPL-MCNC: 171 MG/DL (ref 65–99)
GLUCOSE SERPL-MCNC: 172 MG/DL (ref 65–99)
GLUCOSE SERPL-MCNC: 172 MG/DL (ref 65–99)
GLUCOSE SERPL-MCNC: 173 MG/DL (ref 65–99)
GLUCOSE SERPL-MCNC: 174 MG/DL (ref 65–99)
GLUCOSE SERPL-MCNC: 174 MG/DL (ref 65–99)
GLUCOSE SERPL-MCNC: 175 MG/DL (ref 65–99)
GLUCOSE SERPL-MCNC: 178 MG/DL (ref 65–99)
GLUCOSE SERPL-MCNC: 180 MG/DL (ref 65–99)
GLUCOSE SERPL-MCNC: 180 MG/DL (ref 65–99)
GLUCOSE SERPL-MCNC: 183 MG/DL (ref 65–99)
GLUCOSE SERPL-MCNC: 183 MG/DL (ref 65–99)
GLUCOSE SERPL-MCNC: 186 MG/DL (ref 65–99)
GLUCOSE SERPL-MCNC: 186 MG/DL (ref 65–99)
GLUCOSE SERPL-MCNC: 204 MG/DL (ref 65–99)
GLUCOSE SERPL-MCNC: 208 MG/DL (ref 65–99)
GLUCOSE SERPL-MCNC: 219 MG/DL (ref 65–99)
GLUCOSE SERPL-MCNC: 96 MG/DL (ref 65–99)
GLUCOSE UR STRIP-MCNC: NEGATIVE MG/DL
GRAM STN SPEC: ABNORMAL
GRAM STN SPEC: NORMAL
GRAN CASTS URNS QL MICRO: ABNORMAL /LPF
HBA1C MFR BLD: 5.3 % (ref 4.8–5.6)
HBV SURFACE AB SER RIA-ACNC: NORMAL
HBV SURFACE AG SERPL QL IA: NORMAL
HCO3 BLDA-SCNC: 13.8 MMOL/L (ref 22–28)
HCO3 BLDA-SCNC: 15.4 MMOL/L (ref 22–28)
HCO3 BLDA-SCNC: 16.3 MMOL/L (ref 22–28)
HCO3 BLDA-SCNC: 17.5 MMOL/L (ref 22–28)
HCO3 BLDA-SCNC: 18.2 MMOL/L (ref 22–26)
HCO3 BLDA-SCNC: 18.6 MMOL/L (ref 22–28)
HCO3 BLDA-SCNC: 19.2 MMOL/L (ref 22–26)
HCO3 BLDA-SCNC: 19.3 MMOL/L (ref 22–28)
HCO3 BLDA-SCNC: 20.3 MMOL/L (ref 22–26)
HCO3 BLDA-SCNC: 20.3 MMOL/L (ref 22–28)
HCO3 BLDA-SCNC: 20.4 MMOL/L (ref 22–28)
HCO3 BLDA-SCNC: 21.5 MMOL/L (ref 22–28)
HCO3 BLDA-SCNC: 22 MMOL/L (ref 22–26)
HCO3 BLDA-SCNC: 22.4 MMOL/L (ref 22–28)
HCO3 BLDA-SCNC: 22.5 MMOL/L (ref 22–28)
HCO3 BLDA-SCNC: 22.7 MMOL/L (ref 22–26)
HCO3 BLDA-SCNC: 23 MMOL/L (ref 22–28)
HCO3 BLDA-SCNC: 23.1 MMOL/L (ref 22–28)
HCO3 BLDA-SCNC: 23.2 MMOL/L (ref 22–26)
HCO3 BLDA-SCNC: 23.5 MMOL/L (ref 22–28)
HCO3 BLDA-SCNC: 24.1 MMOL/L (ref 22–26)
HCO3 BLDA-SCNC: 24.2 MMOL/L (ref 22–26)
HCO3 BLDA-SCNC: 24.5 MMOL/L (ref 22–28)
HCO3 BLDA-SCNC: 25.2 MMOL/L (ref 22–26)
HCO3 BLDA-SCNC: 25.9 MMOL/L (ref 22–28)
HCO3 BLDA-SCNC: 25.9 MMOL/L (ref 22–28)
HCO3 BLDA-SCNC: 26.1 MMOL/L (ref 22–28)
HCO3 BLDA-SCNC: 26.3 MMOL/L (ref 22–26)
HCO3 BLDA-SCNC: 26.3 MMOL/L (ref 22–28)
HCO3 BLDA-SCNC: 26.5 MMOL/L (ref 22–26)
HCO3 BLDA-SCNC: 26.5 MMOL/L (ref 22–26)
HCO3 BLDA-SCNC: 26.6 MMOL/L (ref 22–26)
HCO3 BLDA-SCNC: 26.6 MMOL/L (ref 22–26)
HCO3 BLDA-SCNC: 26.7 MMOL/L (ref 22–28)
HCO3 BLDA-SCNC: 26.7 MMOL/L (ref 22–28)
HCO3 BLDA-SCNC: 26.8 MMOL/L (ref 22–26)
HCO3 BLDA-SCNC: 26.8 MMOL/L (ref 22–28)
HCO3 BLDA-SCNC: 27.1 MMOL/L (ref 22–28)
HCO3 BLDA-SCNC: 27.2 MMOL/L (ref 22–26)
HCO3 BLDA-SCNC: 27.2 MMOL/L (ref 22–26)
HCO3 BLDA-SCNC: 27.2 MMOL/L (ref 22–28)
HCO3 BLDA-SCNC: 27.4 MMOL/L (ref 22–28)
HCO3 BLDA-SCNC: 27.5 MMOL/L (ref 22–28)
HCO3 BLDA-SCNC: 27.7 MMOL/L (ref 22–28)
HCO3 BLDA-SCNC: 27.8 MMOL/L (ref 22–28)
HCO3 BLDA-SCNC: 27.9 MMOL/L (ref 22–28)
HCO3 BLDA-SCNC: 28.6 MMOL/L (ref 22–28)
HCO3 BLDA-SCNC: 28.7 MMOL/L (ref 22–28)
HCO3 BLDA-SCNC: 29.8 MMOL/L (ref 22–28)
HCO3 BLDV-SCNC: 21.1 MMOL/L (ref 22–28)
HCO3 BLDV-SCNC: 21.5 MMOL/L (ref 22–28)
HCO3 BLDV-SCNC: 22.4 MMOL/L (ref 22–28)
HCO3 BLDV-SCNC: 22.6 MMOL/L (ref 22–28)
HCO3 BLDV-SCNC: 23.5 MMOL/L (ref 22–28)
HCO3 BLDV-SCNC: 23.6 MMOL/L (ref 22–28)
HCO3 BLDV-SCNC: 26.4 MMOL/L (ref 22–28)
HCO3 BLDV-SCNC: 26.6 MMOL/L (ref 22–28)
HCO3 BLDV-SCNC: 26.7 MMOL/L (ref 22–28)
HCO3 BLDV-SCNC: 27.2 MMOL/L (ref 22–28)
HCO3 BLDV-SCNC: 27.3 MMOL/L (ref 22–28)
HCT VFR BLD AUTO: 19.5 % (ref 37.5–51)
HCT VFR BLD AUTO: 21.3 % (ref 37.5–51)
HCT VFR BLD AUTO: 22.5 % (ref 37.5–51)
HCT VFR BLD AUTO: 24.1 % (ref 37.5–51)
HCT VFR BLD AUTO: 24.3 % (ref 37.5–51)
HCT VFR BLD AUTO: 24.4 % (ref 37.5–51)
HCT VFR BLD AUTO: 24.7 % (ref 37.5–51)
HCT VFR BLD AUTO: 24.9 % (ref 37.5–51)
HCT VFR BLD AUTO: 24.9 % (ref 37.5–51)
HCT VFR BLD AUTO: 25 % (ref 37.5–51)
HCT VFR BLD AUTO: 25.1 % (ref 37.5–51)
HCT VFR BLD AUTO: 25.3 % (ref 37.5–51)
HCT VFR BLD AUTO: 25.4 % (ref 37.5–51)
HCT VFR BLD AUTO: 25.5 % (ref 37.5–51)
HCT VFR BLD AUTO: 25.6 % (ref 37.5–51)
HCT VFR BLD AUTO: 26 % (ref 37.5–51)
HCT VFR BLD AUTO: 26.1 % (ref 37.5–51)
HCT VFR BLD AUTO: 26.4 % (ref 37.5–51)
HCT VFR BLD AUTO: 26.4 % (ref 37.5–51)
HCT VFR BLD AUTO: 26.5 % (ref 37.5–51)
HCT VFR BLD AUTO: 26.6 % (ref 37.5–51)
HCT VFR BLD AUTO: 26.7 % (ref 37.5–51)
HCT VFR BLD AUTO: 26.7 % (ref 37.5–51)
HCT VFR BLD AUTO: 26.8 % (ref 37.5–51)
HCT VFR BLD AUTO: 27 % (ref 37.5–51)
HCT VFR BLD AUTO: 27.1 % (ref 37.5–51)
HCT VFR BLD AUTO: 27.3 % (ref 37.5–51)
HCT VFR BLD AUTO: 27.5 % (ref 37.5–51)
HCT VFR BLD AUTO: 27.6 % (ref 37.5–51)
HCT VFR BLD AUTO: 27.7 % (ref 37.5–51)
HCT VFR BLD AUTO: 27.7 % (ref 37.5–51)
HCT VFR BLD AUTO: 27.9 % (ref 37.5–51)
HCT VFR BLD AUTO: 28 % (ref 37.5–51)
HCT VFR BLD AUTO: 28.4 % (ref 37.5–51)
HCT VFR BLD AUTO: 28.6 % (ref 37.5–51)
HCT VFR BLD AUTO: 28.8 % (ref 37.5–51)
HCT VFR BLD AUTO: 29.3 % (ref 37.5–51)
HCT VFR BLD AUTO: 29.4 % (ref 37.5–51)
HCT VFR BLD AUTO: 29.8 % (ref 37.5–51)
HCT VFR BLD AUTO: 31.4 % (ref 37.5–51)
HCT VFR BLDA CALC: 19 % (ref 38–51)
HCT VFR BLDA CALC: 19 % (ref 38–51)
HCT VFR BLDA CALC: 20 % (ref 38–51)
HCT VFR BLDA CALC: 21 % (ref 38–51)
HCT VFR BLDA CALC: 22 % (ref 38–51)
HCT VFR BLDA CALC: 23 % (ref 38–51)
HCT VFR BLDA CALC: 24 % (ref 38–51)
HCT VFR BLDA CALC: 26 % (ref 38–51)
HCT VFR BLDA CALC: 27 % (ref 38–51)
HCT VFR BLDA CALC: 33 % (ref 38–51)
HDLC SERPL-MCNC: 32 MG/DL (ref 40–60)
HDLC SERPL-MCNC: 36 MG/DL (ref 40–60)
HEMODILUTION: NO
HGB BLD-MCNC: 10 G/DL (ref 13–17.7)
HGB BLD-MCNC: 6.7 G/DL (ref 13–17.7)
HGB BLD-MCNC: 7 G/DL (ref 13–17.7)
HGB BLD-MCNC: 7.6 G/DL (ref 13–17.7)
HGB BLD-MCNC: 7.7 G/DL (ref 13–17.7)
HGB BLD-MCNC: 7.8 G/DL (ref 13–17.7)
HGB BLD-MCNC: 7.9 G/DL (ref 13–17.7)
HGB BLD-MCNC: 7.9 G/DL (ref 13–17.7)
HGB BLD-MCNC: 8 G/DL (ref 13–17.7)
HGB BLD-MCNC: 8 G/DL (ref 13–17.7)
HGB BLD-MCNC: 8.1 G/DL (ref 13–17.7)
HGB BLD-MCNC: 8.2 G/DL (ref 13–17.7)
HGB BLD-MCNC: 8.4 G/DL (ref 13–17.7)
HGB BLD-MCNC: 8.5 G/DL (ref 13–17.7)
HGB BLD-MCNC: 8.6 G/DL (ref 13–17.7)
HGB BLD-MCNC: 8.7 G/DL (ref 13–17.7)
HGB BLD-MCNC: 8.8 G/DL (ref 13–17.7)
HGB BLD-MCNC: 8.9 G/DL (ref 13–17.7)
HGB BLD-MCNC: 9 G/DL (ref 13–17.7)
HGB BLD-MCNC: 9 G/DL (ref 13–17.7)
HGB BLD-MCNC: 9.1 G/DL (ref 13–17.7)
HGB BLD-MCNC: 9.1 G/DL (ref 13–17.7)
HGB BLD-MCNC: 9.2 G/DL (ref 13–17.7)
HGB BLD-MCNC: 9.5 G/DL (ref 13–17.7)
HGB BLD-MCNC: 9.6 G/DL (ref 13–17.7)
HGB BLD-MCNC: 9.7 G/DL (ref 13–17.7)
HGB BLD-MCNC: 9.8 G/DL (ref 13–17.7)
HGB BLDA-MCNC: 11.2 G/DL (ref 12–17)
HGB BLDA-MCNC: 6.5 G/DL (ref 12–17)
HGB BLDA-MCNC: 6.5 G/DL (ref 12–17)
HGB BLDA-MCNC: 6.8 G/DL (ref 12–17)
HGB BLDA-MCNC: 7.1 G/DL (ref 12–17)
HGB BLDA-MCNC: 7.5 G/DL (ref 12–17)
HGB BLDA-MCNC: 7.8 G/DL (ref 12–17)
HGB BLDA-MCNC: 8.2 G/DL (ref 12–17)
HGB BLDA-MCNC: 8.8 G/DL (ref 12–17)
HGB BLDA-MCNC: 9.2 G/DL (ref 12–17)
HGB UR QL STRIP.AUTO: NEGATIVE
HYALINE CASTS UR QL AUTO: ABNORMAL /LPF
HYALINE CASTS UR QL AUTO: ABNORMAL /LPF
HYALINE CASTS UR QL AUTO: NORMAL /LPF
IMM GRANULOCYTES # BLD AUTO: 0.08 10*3/MM3 (ref 0–0.05)
IMM GRANULOCYTES # BLD AUTO: 0.09 10*3/MM3 (ref 0–0.05)
IMM GRANULOCYTES # BLD AUTO: 0.14 10*3/MM3 (ref 0–0.05)
IMM GRANULOCYTES # BLD AUTO: 0.15 10*3/MM3 (ref 0–0.05)
IMM GRANULOCYTES # BLD AUTO: 0.16 10*3/MM3 (ref 0–0.05)
IMM GRANULOCYTES # BLD AUTO: 0.17 10*3/MM3 (ref 0–0.05)
IMM GRANULOCYTES # BLD AUTO: 0.18 10*3/MM3 (ref 0–0.05)
IMM GRANULOCYTES # BLD AUTO: 0.19 10*3/MM3 (ref 0–0.05)
IMM GRANULOCYTES # BLD AUTO: 0.2 10*3/MM3 (ref 0–0.05)
IMM GRANULOCYTES # BLD AUTO: 0.27 10*3/MM3 (ref 0–0.05)
IMM GRANULOCYTES # BLD AUTO: 0.27 10*3/MM3 (ref 0–0.05)
IMM GRANULOCYTES # BLD AUTO: 0.34 10*3/MM3 (ref 0–0.05)
IMM GRANULOCYTES # BLD AUTO: 0.35 10*3/MM3 (ref 0–0.05)
IMM GRANULOCYTES # BLD AUTO: 0.36 10*3/MM3 (ref 0–0.05)
IMM GRANULOCYTES # BLD AUTO: 0.42 10*3/MM3 (ref 0–0.05)
IMM GRANULOCYTES # BLD AUTO: 0.5 10*3/MM3 (ref 0–0.05)
IMM GRANULOCYTES # BLD AUTO: 0.51 10*3/MM3 (ref 0–0.05)
IMM GRANULOCYTES # BLD AUTO: 0.69 10*3/MM3 (ref 0–0.05)
IMM GRANULOCYTES NFR BLD AUTO: 0.7 % (ref 0–0.5)
IMM GRANULOCYTES NFR BLD AUTO: 0.9 % (ref 0–0.5)
IMM GRANULOCYTES NFR BLD AUTO: 1.2 % (ref 0–0.5)
IMM GRANULOCYTES NFR BLD AUTO: 1.5 % (ref 0–0.5)
IMM GRANULOCYTES NFR BLD AUTO: 1.6 % (ref 0–0.5)
IMM GRANULOCYTES NFR BLD AUTO: 1.6 % (ref 0–0.5)
IMM GRANULOCYTES NFR BLD AUTO: 2 % (ref 0–0.5)
IMM GRANULOCYTES NFR BLD AUTO: 2.2 % (ref 0–0.5)
IMM GRANULOCYTES NFR BLD AUTO: 2.5 % (ref 0–0.5)
IMM GRANULOCYTES NFR BLD AUTO: 2.7 % (ref 0–0.5)
IMM GRANULOCYTES NFR BLD AUTO: 3 % (ref 0–0.5)
IMM GRANULOCYTES NFR BLD AUTO: 3.3 % (ref 0–0.5)
IMM GRANULOCYTES NFR BLD AUTO: 4 % (ref 0–0.5)
IMM GRANULOCYTES NFR BLD AUTO: 4.4 % (ref 0–0.5)
INHALED O2 CONCENTRATION: 100 %
INHALED O2 CONCENTRATION: 30 %
INHALED O2 CONCENTRATION: 40 %
INHALED O2 CONCENTRATION: 50 %
INHALED O2 CONCENTRATION: 70 %
INHALED O2 CONCENTRATION: 80 %
INHALED O2 CONCENTRATION: 85 %
INHALED O2 CONCENTRATION: 85 %
INHALED O2 CONCENTRATION: 90 %
INHALED O2 CONCENTRATION: 90 %
INR PPP: 1.1 (ref 0.9–1.1)
INR PPP: 1.16 (ref 0.9–1.1)
INR PPP: 1.2 (ref 0.9–1.1)
INR PPP: 1.21 (ref 0.9–1.1)
INR PPP: 1.35 (ref 0.9–1.1)
INR PPP: 1.38 (ref 0.9–1.1)
INR PPP: 1.87 (ref 0.9–1.1)
INR PPP: 2.3 (ref 0.8–1.2)
INR PPP: 2.3 (ref 0.8–1.2)
INSPIRATORY TIME: 1
ISOLATED FROM: ABNORMAL
KETONES UR QL STRIP: ABNORMAL
KETONES UR QL STRIP: NEGATIVE
KETONES UR QL STRIP: NEGATIVE
LAB AP CASE REPORT: NORMAL
LDLC SERPL CALC-MCNC: 66 MG/DL (ref 0–100)
LDLC SERPL CALC-MCNC: 77 MG/DL (ref 0–100)
LDLC/HDLC SERPL: 1.82 {RATIO}
LDLC/HDLC SERPL: 2.35 {RATIO}
LEFT ATRIUM VOLUME INDEX: 39.8 ML/M2
LEFT ATRIUM VOLUME INDEX: 45.9 ML/M2
LEFT ATRIUM VOLUME INDEX: 47.3 ML/M2
LEFT GROIN CFA SYS: 123 CM/SEC
LEUKOCYTE ESTERASE UR QL STRIP.AUTO: ABNORMAL
LEUKOCYTE ESTERASE UR QL STRIP.AUTO: ABNORMAL
LEUKOCYTE ESTERASE UR QL STRIP.AUTO: NEGATIVE
LV EF NUC BP: 55 %
LYMPHOCYTES # BLD AUTO: 0.51 10*3/MM3 (ref 0.7–3.1)
LYMPHOCYTES # BLD AUTO: 0.55 10*3/MM3 (ref 0.7–3.1)
LYMPHOCYTES # BLD AUTO: 0.55 10*3/MM3 (ref 0.7–3.1)
LYMPHOCYTES # BLD AUTO: 0.56 10*3/MM3 (ref 0.7–3.1)
LYMPHOCYTES # BLD AUTO: 0.64 10*3/MM3 (ref 0.7–3.1)
LYMPHOCYTES # BLD AUTO: 0.64 10*3/MM3 (ref 0.7–3.1)
LYMPHOCYTES # BLD AUTO: 0.66 10*3/MM3 (ref 0.7–3.1)
LYMPHOCYTES # BLD AUTO: 0.67 10*3/MM3 (ref 0.7–3.1)
LYMPHOCYTES # BLD AUTO: 0.77 10*3/MM3 (ref 0.7–3.1)
LYMPHOCYTES # BLD AUTO: 0.79 10*3/MM3 (ref 0.7–3.1)
LYMPHOCYTES # BLD AUTO: 0.8 10*3/MM3 (ref 0.7–3.1)
LYMPHOCYTES # BLD AUTO: 0.81 10*3/MM3 (ref 0.7–3.1)
LYMPHOCYTES # BLD AUTO: 0.81 10*3/MM3 (ref 0.7–3.1)
LYMPHOCYTES # BLD AUTO: 0.84 10*3/MM3 (ref 0.7–3.1)
LYMPHOCYTES # BLD AUTO: 0.89 10*3/MM3 (ref 0.7–3.1)
LYMPHOCYTES # BLD AUTO: 0.98 10*3/MM3 (ref 0.7–3.1)
LYMPHOCYTES # BLD AUTO: 1.3 10*3/MM3 (ref 0.7–3.1)
LYMPHOCYTES # BLD AUTO: 1.38 10*3/MM3 (ref 0.7–3.1)
LYMPHOCYTES # BLD MANUAL: 0.64 10*3/MM3 (ref 0.7–3.1)
LYMPHOCYTES # BLD MANUAL: 0.77 10*3/MM3 (ref 0.7–3.1)
LYMPHOCYTES NFR BLD AUTO: 3.5 % (ref 19.6–45.3)
LYMPHOCYTES NFR BLD AUTO: 3.8 % (ref 19.6–45.3)
LYMPHOCYTES NFR BLD AUTO: 4.2 % (ref 19.6–45.3)
LYMPHOCYTES NFR BLD AUTO: 4.3 % (ref 19.6–45.3)
LYMPHOCYTES NFR BLD AUTO: 4.6 % (ref 19.6–45.3)
LYMPHOCYTES NFR BLD AUTO: 4.7 % (ref 19.6–45.3)
LYMPHOCYTES NFR BLD AUTO: 4.8 % (ref 19.6–45.3)
LYMPHOCYTES NFR BLD AUTO: 5.2 % (ref 19.6–45.3)
LYMPHOCYTES NFR BLD AUTO: 5.2 % (ref 19.6–45.3)
LYMPHOCYTES NFR BLD AUTO: 5.7 % (ref 19.6–45.3)
LYMPHOCYTES NFR BLD AUTO: 5.8 % (ref 19.6–45.3)
LYMPHOCYTES NFR BLD AUTO: 5.9 % (ref 19.6–45.3)
LYMPHOCYTES NFR BLD AUTO: 5.9 % (ref 19.6–45.3)
LYMPHOCYTES NFR BLD AUTO: 6 % (ref 19.6–45.3)
LYMPHOCYTES NFR BLD AUTO: 7.3 % (ref 19.6–45.3)
LYMPHOCYTES NFR BLD AUTO: 8 % (ref 19.6–45.3)
LYMPHOCYTES NFR BLD AUTO: 8.6 % (ref 19.6–45.3)
LYMPHOCYTES NFR BLD AUTO: 8.8 % (ref 19.6–45.3)
LYMPHOCYTES NFR BLD MANUAL: 2 % (ref 5–12)
LYMPHOCYTES NFR BLD MANUAL: 5.1 % (ref 5–12)
Lab: ABNORMAL
Lab: ABNORMAL
MAGNESIUM SERPL-MCNC: 1.6 MG/DL (ref 1.6–2.4)
MAGNESIUM SERPL-MCNC: 1.8 MG/DL (ref 1.6–2.4)
MAGNESIUM SERPL-MCNC: 1.9 MG/DL (ref 1.6–2.4)
MAGNESIUM SERPL-MCNC: 1.9 MG/DL (ref 1.6–2.4)
MAGNESIUM SERPL-MCNC: 2 MG/DL (ref 1.6–2.4)
MAGNESIUM SERPL-MCNC: 2.1 MG/DL (ref 1.6–2.4)
MAGNESIUM SERPL-MCNC: 2.2 MG/DL (ref 1.6–2.4)
MAGNESIUM SERPL-MCNC: 2.3 MG/DL (ref 1.6–2.4)
MAGNESIUM SERPL-MCNC: 2.4 MG/DL (ref 1.6–2.4)
MAGNESIUM SERPL-MCNC: 2.5 MG/DL (ref 1.6–2.4)
MAGNESIUM SERPL-MCNC: 2.6 MG/DL (ref 1.6–2.4)
MAGNESIUM SERPL-MCNC: 2.8 MG/DL (ref 1.6–2.4)
MAGNESIUM SERPL-MCNC: 2.8 MG/DL (ref 1.6–2.4)
MAGNESIUM SERPL-MCNC: 3 MG/DL (ref 1.6–2.4)
MAGNESIUM SERPL-MCNC: 3.1 MG/DL (ref 1.6–2.4)
MAXIMAL PREDICTED HEART RATE: 147 BPM
MCH RBC QN AUTO: 28.2 PG (ref 26.6–33)
MCH RBC QN AUTO: 28.2 PG (ref 26.6–33)
MCH RBC QN AUTO: 28.4 PG (ref 26.6–33)
MCH RBC QN AUTO: 28.5 PG (ref 26.6–33)
MCH RBC QN AUTO: 28.5 PG (ref 26.6–33)
MCH RBC QN AUTO: 28.8 PG (ref 26.6–33)
MCH RBC QN AUTO: 28.8 PG (ref 26.6–33)
MCH RBC QN AUTO: 28.9 PG (ref 26.6–33)
MCH RBC QN AUTO: 29.1 PG (ref 26.6–33)
MCH RBC QN AUTO: 29.2 PG (ref 26.6–33)
MCH RBC QN AUTO: 29.3 PG (ref 26.6–33)
MCH RBC QN AUTO: 29.4 PG (ref 26.6–33)
MCH RBC QN AUTO: 29.6 PG (ref 26.6–33)
MCH RBC QN AUTO: 29.6 PG (ref 26.6–33)
MCH RBC QN AUTO: 29.8 PG (ref 26.6–33)
MCH RBC QN AUTO: 29.8 PG (ref 26.6–33)
MCH RBC QN AUTO: 29.9 PG (ref 26.6–33)
MCH RBC QN AUTO: 29.9 PG (ref 26.6–33)
MCH RBC QN AUTO: 30 PG (ref 26.6–33)
MCH RBC QN AUTO: 30.1 PG (ref 26.6–33)
MCH RBC QN AUTO: 30.2 PG (ref 26.6–33)
MCH RBC QN AUTO: 30.3 PG (ref 26.6–33)
MCH RBC QN AUTO: 30.3 PG (ref 26.6–33)
MCH RBC QN AUTO: 30.4 PG (ref 26.6–33)
MCH RBC QN AUTO: 30.5 PG (ref 26.6–33)
MCH RBC QN AUTO: 30.5 PG (ref 26.6–33)
MCH RBC QN AUTO: 30.7 PG (ref 26.6–33)
MCH RBC QN AUTO: 31.1 PG (ref 26.6–33)
MCH RBC QN AUTO: 31.1 PG (ref 26.6–33)
MCH RBC QN AUTO: 31.4 PG (ref 26.6–33)
MCH RBC QN AUTO: 31.5 PG (ref 26.6–33)
MCH RBC QN AUTO: 31.7 PG (ref 26.6–33)
MCH RBC QN AUTO: 31.8 PG (ref 26.6–33)
MCHC RBC AUTO-ENTMCNC: 30.4 G/DL (ref 31.5–35.7)
MCHC RBC AUTO-ENTMCNC: 30.9 G/DL (ref 31.5–35.7)
MCHC RBC AUTO-ENTMCNC: 30.9 G/DL (ref 31.5–35.7)
MCHC RBC AUTO-ENTMCNC: 31.1 G/DL (ref 31.5–35.7)
MCHC RBC AUTO-ENTMCNC: 31.5 G/DL (ref 31.5–35.7)
MCHC RBC AUTO-ENTMCNC: 31.6 G/DL (ref 31.5–35.7)
MCHC RBC AUTO-ENTMCNC: 31.6 G/DL (ref 31.5–35.7)
MCHC RBC AUTO-ENTMCNC: 31.7 G/DL (ref 31.5–35.7)
MCHC RBC AUTO-ENTMCNC: 31.8 G/DL (ref 31.5–35.7)
MCHC RBC AUTO-ENTMCNC: 31.8 G/DL (ref 31.5–35.7)
MCHC RBC AUTO-ENTMCNC: 32 G/DL (ref 31.5–35.7)
MCHC RBC AUTO-ENTMCNC: 32.1 G/DL (ref 31.5–35.7)
MCHC RBC AUTO-ENTMCNC: 32.2 G/DL (ref 31.5–35.7)
MCHC RBC AUTO-ENTMCNC: 32.3 G/DL (ref 31.5–35.7)
MCHC RBC AUTO-ENTMCNC: 32.3 G/DL (ref 31.5–35.7)
MCHC RBC AUTO-ENTMCNC: 32.4 G/DL (ref 31.5–35.7)
MCHC RBC AUTO-ENTMCNC: 32.5 G/DL (ref 31.5–35.7)
MCHC RBC AUTO-ENTMCNC: 32.8 G/DL (ref 31.5–35.7)
MCHC RBC AUTO-ENTMCNC: 32.9 G/DL (ref 31.5–35.7)
MCHC RBC AUTO-ENTMCNC: 33 G/DL (ref 31.5–35.7)
MCHC RBC AUTO-ENTMCNC: 33.2 G/DL (ref 31.5–35.7)
MCHC RBC AUTO-ENTMCNC: 33.7 G/DL (ref 31.5–35.7)
MCHC RBC AUTO-ENTMCNC: 33.8 G/DL (ref 31.5–35.7)
MCHC RBC AUTO-ENTMCNC: 34 G/DL (ref 31.5–35.7)
MCHC RBC AUTO-ENTMCNC: 34.1 G/DL (ref 31.5–35.7)
MCHC RBC AUTO-ENTMCNC: 34.2 G/DL (ref 31.5–35.7)
MCHC RBC AUTO-ENTMCNC: 34.4 G/DL (ref 31.5–35.7)
MCHC RBC AUTO-ENTMCNC: 34.7 G/DL (ref 31.5–35.7)
MCHC RBC AUTO-ENTMCNC: 34.8 G/DL (ref 31.5–35.7)
MCHC RBC AUTO-ENTMCNC: 34.8 G/DL (ref 31.5–35.7)
MCV RBC AUTO: 87.7 FL (ref 79–97)
MCV RBC AUTO: 88.6 FL (ref 79–97)
MCV RBC AUTO: 88.8 FL (ref 79–97)
MCV RBC AUTO: 88.9 FL (ref 79–97)
MCV RBC AUTO: 89.1 FL (ref 79–97)
MCV RBC AUTO: 89.3 FL (ref 79–97)
MCV RBC AUTO: 89.9 FL (ref 79–97)
MCV RBC AUTO: 89.9 FL (ref 79–97)
MCV RBC AUTO: 90 FL (ref 79–97)
MCV RBC AUTO: 90 FL (ref 79–97)
MCV RBC AUTO: 90.3 FL (ref 79–97)
MCV RBC AUTO: 90.3 FL (ref 79–97)
MCV RBC AUTO: 90.4 FL (ref 79–97)
MCV RBC AUTO: 90.5 FL (ref 79–97)
MCV RBC AUTO: 90.6 FL (ref 79–97)
MCV RBC AUTO: 90.6 FL (ref 79–97)
MCV RBC AUTO: 90.7 FL (ref 79–97)
MCV RBC AUTO: 91 FL (ref 79–97)
MCV RBC AUTO: 91 FL (ref 79–97)
MCV RBC AUTO: 91.1 FL (ref 79–97)
MCV RBC AUTO: 91.4 FL (ref 79–97)
MCV RBC AUTO: 91.6 FL (ref 79–97)
MCV RBC AUTO: 91.7 FL (ref 79–97)
MCV RBC AUTO: 91.9 FL (ref 79–97)
MCV RBC AUTO: 91.9 FL (ref 79–97)
MCV RBC AUTO: 92 FL (ref 79–97)
MCV RBC AUTO: 92.1 FL (ref 79–97)
MCV RBC AUTO: 92.2 FL (ref 79–97)
MCV RBC AUTO: 92.3 FL (ref 79–97)
MCV RBC AUTO: 92.4 FL (ref 79–97)
MCV RBC AUTO: 92.6 FL (ref 79–97)
MCV RBC AUTO: 93.1 FL (ref 79–97)
MCV RBC AUTO: 93.4 FL (ref 79–97)
MCV RBC AUTO: 93.4 FL (ref 79–97)
MCV RBC AUTO: 93.5 FL (ref 79–97)
MCV RBC AUTO: 94.2 FL (ref 79–97)
MEAN AIRWAY PRESSURE: 12
MEAN AIRWAY PRESSURE: 14
MEAN AIRWAY PRESSURE: 15
MEAN AIRWAY PRESSURE: 16
MEAN AIRWAY PRESSURE: 19
MEAN AIRWAY PRESSURE: 20
MEAN AIRWAY PRESSURE: 20
METAMYELOCYTES NFR BLD MANUAL: 1 % (ref 0–0)
MODALITY: ABNORMAL
MONOCYTES # BLD AUTO: 0.67 10*3/MM3 (ref 0.1–0.9)
MONOCYTES # BLD AUTO: 0.75 10*3/MM3 (ref 0.1–0.9)
MONOCYTES # BLD AUTO: 0.76 10*3/MM3 (ref 0.1–0.9)
MONOCYTES # BLD AUTO: 0.76 10*3/MM3 (ref 0.1–0.9)
MONOCYTES # BLD AUTO: 0.77 10*3/MM3 (ref 0.1–0.9)
MONOCYTES # BLD AUTO: 0.82 10*3/MM3 (ref 0.1–0.9)
MONOCYTES # BLD AUTO: 0.84 10*3/MM3 (ref 0.1–0.9)
MONOCYTES # BLD AUTO: 0.84 10*3/MM3 (ref 0.1–0.9)
MONOCYTES # BLD AUTO: 0.86 10*3/MM3 (ref 0.1–0.9)
MONOCYTES # BLD AUTO: 0.89 10*3/MM3 (ref 0.1–0.9)
MONOCYTES # BLD AUTO: 0.91 10*3/MM3 (ref 0.1–0.9)
MONOCYTES # BLD AUTO: 0.94 10*3/MM3 (ref 0.1–0.9)
MONOCYTES # BLD AUTO: 0.94 10*3/MM3 (ref 0.1–0.9)
MONOCYTES # BLD AUTO: 0.96 10*3/MM3 (ref 0.1–0.9)
MONOCYTES # BLD AUTO: 1.1 10*3/MM3 (ref 0.1–0.9)
MONOCYTES # BLD AUTO: 1.1 10*3/MM3 (ref 0.1–0.9)
MONOCYTES # BLD AUTO: 1.28 10*3/MM3 (ref 0.1–0.9)
MONOCYTES # BLD AUTO: 1.33 10*3/MM3 (ref 0.1–0.9)
MONOCYTES # BLD: 0.32 10*3/MM3 (ref 0.1–0.9)
MONOCYTES # BLD: 1.27 10*3/MM3 (ref 0.1–0.9)
MONOCYTES NFR BLD AUTO: 4.1 % (ref 5–12)
MONOCYTES NFR BLD AUTO: 4.3 % (ref 5–12)
MONOCYTES NFR BLD AUTO: 5.1 % (ref 5–12)
MONOCYTES NFR BLD AUTO: 5.5 % (ref 5–12)
MONOCYTES NFR BLD AUTO: 5.7 % (ref 5–12)
MONOCYTES NFR BLD AUTO: 5.9 % (ref 5–12)
MONOCYTES NFR BLD AUTO: 6.2 % (ref 5–12)
MONOCYTES NFR BLD AUTO: 6.3 % (ref 5–12)
MONOCYTES NFR BLD AUTO: 6.9 % (ref 5–12)
MONOCYTES NFR BLD AUTO: 7 % (ref 5–12)
MONOCYTES NFR BLD AUTO: 7.4 % (ref 5–12)
MONOCYTES NFR BLD AUTO: 7.6 % (ref 5–12)
MONOCYTES NFR BLD AUTO: 7.6 % (ref 5–12)
MONOCYTES NFR BLD AUTO: 7.7 % (ref 5–12)
MONOCYTES NFR BLD AUTO: 7.8 % (ref 5–12)
MONOCYTES NFR BLD AUTO: 8.3 % (ref 5–12)
MONOCYTES NFR BLD AUTO: 8.5 % (ref 5–12)
MONOCYTES NFR BLD AUTO: 8.9 % (ref 5–12)
MRSA DNA SPEC QL NAA+PROBE: NORMAL
MYCOBACTERIUM SPEC CULT: NORMAL
MYELOCYTES NFR BLD MANUAL: 4 % (ref 0–0)
NEUTROPHILS # BLD AUTO: 13.52 10*3/MM3 (ref 1.7–7)
NEUTROPHILS # BLD AUTO: 22.9 10*3/MM3 (ref 1.7–7)
NEUTROPHILS NFR BLD AUTO: 10.92 10*3/MM3 (ref 1.7–7)
NEUTROPHILS NFR BLD AUTO: 11.32 10*3/MM3 (ref 1.7–7)
NEUTROPHILS NFR BLD AUTO: 11.42 10*3/MM3 (ref 1.7–7)
NEUTROPHILS NFR BLD AUTO: 12.13 10*3/MM3 (ref 1.7–7)
NEUTROPHILS NFR BLD AUTO: 12.24 10*3/MM3 (ref 1.7–7)
NEUTROPHILS NFR BLD AUTO: 13.16 10*3/MM3 (ref 1.7–7)
NEUTROPHILS NFR BLD AUTO: 13.36 10*3/MM3 (ref 1.7–7)
NEUTROPHILS NFR BLD AUTO: 13.94 10*3/MM3 (ref 1.7–7)
NEUTROPHILS NFR BLD AUTO: 14.05 10*3/MM3 (ref 1.7–7)
NEUTROPHILS NFR BLD AUTO: 14.3 10*3/MM3 (ref 1.7–7)
NEUTROPHILS NFR BLD AUTO: 14.79 10*3/MM3 (ref 1.7–7)
NEUTROPHILS NFR BLD AUTO: 16.35 10*3/MM3 (ref 1.7–7)
NEUTROPHILS NFR BLD AUTO: 8.21 10*3/MM3 (ref 1.7–7)
NEUTROPHILS NFR BLD AUTO: 8.57 10*3/MM3 (ref 1.7–7)
NEUTROPHILS NFR BLD AUTO: 8.77 10*3/MM3 (ref 1.7–7)
NEUTROPHILS NFR BLD AUTO: 80.6 % (ref 42.7–76)
NEUTROPHILS NFR BLD AUTO: 80.9 % (ref 42.7–76)
NEUTROPHILS NFR BLD AUTO: 81.4 % (ref 42.7–76)
NEUTROPHILS NFR BLD AUTO: 81.4 % (ref 42.7–76)
NEUTROPHILS NFR BLD AUTO: 81.5 % (ref 42.7–76)
NEUTROPHILS NFR BLD AUTO: 81.6 % (ref 42.7–76)
NEUTROPHILS NFR BLD AUTO: 82 % (ref 42.7–76)
NEUTROPHILS NFR BLD AUTO: 82.9 % (ref 42.7–76)
NEUTROPHILS NFR BLD AUTO: 82.9 % (ref 42.7–76)
NEUTROPHILS NFR BLD AUTO: 83.5 % (ref 42.7–76)
NEUTROPHILS NFR BLD AUTO: 84.1 % (ref 42.7–76)
NEUTROPHILS NFR BLD AUTO: 85.2 % (ref 42.7–76)
NEUTROPHILS NFR BLD AUTO: 85.3 % (ref 42.7–76)
NEUTROPHILS NFR BLD AUTO: 85.5 % (ref 42.7–76)
NEUTROPHILS NFR BLD AUTO: 85.5 % (ref 42.7–76)
NEUTROPHILS NFR BLD AUTO: 85.9 % (ref 42.7–76)
NEUTROPHILS NFR BLD AUTO: 86.4 % (ref 42.7–76)
NEUTROPHILS NFR BLD AUTO: 87.5 % (ref 42.7–76)
NEUTROPHILS NFR BLD AUTO: 9.07 10*3/MM3 (ref 1.7–7)
NEUTROPHILS NFR BLD AUTO: 9.27 10*3/MM3 (ref 1.7–7)
NEUTROPHILS NFR BLD AUTO: 9.34 10*3/MM3 (ref 1.7–7)
NEUTROPHILS NFR BLD MANUAL: 84.8 % (ref 42.7–76)
NEUTROPHILS NFR BLD MANUAL: 91.8 % (ref 42.7–76)
NIGHT BLUE STAIN TISS: NORMAL
NITRITE UR QL STRIP: NEGATIVE
NITRITE UR QL STRIP: NEGATIVE
NITRITE UR QL STRIP: POSITIVE
NOTIFIED WHO: ABNORMAL
NOTIFIED WHO: ABNORMAL
NRBC BLD AUTO-RTO: 0 /100 WBC (ref 0–0.2)
NRBC BLD AUTO-RTO: 0.2 /100 WBC (ref 0–0.2)
NRBC BLD AUTO-RTO: 0.3 /100 WBC (ref 0–0.2)
NRBC BLD AUTO-RTO: 0.4 /100 WBC (ref 0–0.2)
NRBC BLD AUTO-RTO: 0.4 /100 WBC (ref 0–0.2)
NRBC BLD AUTO-RTO: 0.5 /100 WBC (ref 0–0.2)
NRBC BLD AUTO-RTO: 0.7 /100 WBC (ref 0–0.2)
NRBC BLD AUTO-RTO: 0.8 /100 WBC (ref 0–0.2)
NRBC SPEC MANUAL: 1 /100 WBC (ref 0–0.2)
NT-PROBNP SERPL-MCNC: 7084 PG/ML (ref 0–900)
O2 A-A PPRESDIFF RESPIRATORY: 0.1 MMHG
O2 A-A PPRESDIFF RESPIRATORY: 0.1 MMHG
O2 A-A PPRESDIFF RESPIRATORY: 0.2 MMHG
O2 A-A PPRESDIFF RESPIRATORY: 0.3 MMHG
O2 A-A PPRESDIFF RESPIRATORY: 0.4 MMHG
PATH REPORT.FINAL DX SPEC: NORMAL
PATH REPORT.GROSS SPEC: NORMAL
PAW @ PEAK INSP FLOW SETTING VENT: 23 CMH2O
PAW @ PEAK INSP FLOW SETTING VENT: 29 CMH2O
PAW @ PEAK INSP FLOW SETTING VENT: 30 CMH2O
PAW @ PEAK INSP FLOW SETTING VENT: 31 CMH2O
PAW @ PEAK INSP FLOW SETTING VENT: 31 CMH2O
PAW @ PEAK INSP FLOW SETTING VENT: 33 CMH2O
PAW @ PEAK INSP FLOW SETTING VENT: 35 CMH2O
PAW @ PEAK INSP FLOW SETTING VENT: 36 CMH2O
PAW @ PEAK INSP FLOW SETTING VENT: 37 CMH2O
PCO2 BLDA: 22.9 MM HG (ref 35–45)
PCO2 BLDA: 23.7 MM HG (ref 35–45)
PCO2 BLDA: 25.6 MM HG (ref 35–45)
PCO2 BLDA: 27.8 MM HG (ref 35–45)
PCO2 BLDA: 28 MM HG (ref 35–45)
PCO2 BLDA: 28.5 MM HG (ref 35–45)
PCO2 BLDA: 29.7 MM HG (ref 35–45)
PCO2 BLDA: 30 MM HG (ref 35–45)
PCO2 BLDA: 31.4 MM HG (ref 35–45)
PCO2 BLDA: 32.1 MM HG (ref 35–45)
PCO2 BLDA: 32.1 MM HG (ref 35–45)
PCO2 BLDA: 32.7 MM HG (ref 35–45)
PCO2 BLDA: 33.9 MM HG (ref 35–45)
PCO2 BLDA: 34.1 MM HG (ref 35–45)
PCO2 BLDA: 34.8 MM HG (ref 35–45)
PCO2 BLDA: 34.9 MM HG (ref 35–45)
PCO2 BLDA: 35.3 MM HG (ref 35–45)
PCO2 BLDA: 35.8 MM HG (ref 35–45)
PCO2 BLDA: 35.8 MM HG (ref 35–45)
PCO2 BLDA: 37.1 MM HG (ref 35–45)
PCO2 BLDA: 37.6 MM HG (ref 35–45)
PCO2 BLDA: 37.8 MM HG (ref 35–45)
PCO2 BLDA: 38.2 MM HG (ref 35–45)
PCO2 BLDA: 39.2 MM HG (ref 35–45)
PCO2 BLDA: 39.8 MM HG (ref 35–45)
PCO2 BLDA: 40 MM HG (ref 35–45)
PCO2 BLDA: 40.2 MM HG (ref 35–45)
PCO2 BLDA: 41 MM HG (ref 35–45)
PCO2 BLDA: 41.1 MM HG (ref 35–45)
PCO2 BLDA: 41.2 MM HG (ref 35–45)
PCO2 BLDA: 41.8 MM HG (ref 35–45)
PCO2 BLDA: 42.1 MM HG (ref 35–45)
PCO2 BLDA: 42.5 MM HG (ref 35–45)
PCO2 BLDA: 42.6 MM HG (ref 35–45)
PCO2 BLDA: 42.6 MM HG (ref 35–45)
PCO2 BLDA: 43.5 MM HG (ref 35–45)
PCO2 BLDA: 43.8 MM HG (ref 35–45)
PCO2 BLDA: 44.6 MM HG (ref 35–45)
PCO2 BLDA: 44.9 MM HG (ref 35–45)
PCO2 BLDA: 45.7 MM HG (ref 35–45)
PCO2 BLDA: 46 MM HG (ref 35–45)
PCO2 BLDA: 46.6 MM HG (ref 35–45)
PCO2 BLDA: 46.9 MM HG (ref 35–45)
PCO2 BLDA: 48.6 MM HG (ref 35–45)
PCO2 BLDA: 49.1 MM HG (ref 35–45)
PCO2 BLDA: 49.5 MM HG (ref 35–45)
PCO2 BLDA: 50 MM HG (ref 35–45)
PCO2 BLDA: 50.1 MM HG (ref 35–45)
PCO2 BLDA: 50.9 MM HG (ref 35–45)
PCO2 BLDV: 32.8 MM HG (ref 41–51)
PCO2 BLDV: 33.5 MM HG (ref 41–51)
PCO2 BLDV: 34.7 MM HG (ref 41–51)
PCO2 BLDV: 35.9 MM HG (ref 41–51)
PCO2 BLDV: 36.1 MM HG (ref 41–51)
PCO2 BLDV: 37.4 MM HG (ref 41–51)
PCO2 BLDV: 38.8 MM HG (ref 41–51)
PCO2 BLDV: 39.3 MM HG (ref 41–51)
PCO2 BLDV: 43.2 MM HG (ref 41–51)
PCO2 BLDV: 45.5 MM HG (ref 41–51)
PCO2 BLDV: 48.5 MM HG (ref 41–51)
PEEP RESPIRATORY: 10 CM[H2O]
PEEP RESPIRATORY: 5 CM[H2O]
PEEP RESPIRATORY: 8 CM[H2O]
PERCENT MAX PREDICTED HR: 66.67 %
PH BLDA: 7.17 PH UNITS (ref 7.35–7.6)
PH BLDA: 7.28 PH UNITS (ref 7.35–7.6)
PH BLDA: 7.29 PH UNITS (ref 7.35–7.45)
PH BLDA: 7.29 PH UNITS (ref 7.35–7.6)
PH BLDA: 7.3 PH UNITS (ref 7.35–7.6)
PH BLDA: 7.33 PH UNITS (ref 7.35–7.6)
PH BLDA: 7.33 PH UNITS (ref 7.35–7.6)
PH BLDA: 7.34 PH UNITS (ref 7.35–7.6)
PH BLDA: 7.35 PH UNITS (ref 7.35–7.45)
PH BLDA: 7.36 PH UNITS (ref 7.35–7.45)
PH BLDA: 7.36 PH UNITS (ref 7.35–7.6)
PH BLDA: 7.37 PH UNITS (ref 7.35–7.45)
PH BLDA: 7.37 PH UNITS (ref 7.35–7.6)
PH BLDA: 7.38 PH UNITS (ref 7.35–7.45)
PH BLDA: 7.39 PH UNITS (ref 7.35–7.45)
PH BLDA: 7.39 PH UNITS (ref 7.35–7.45)
PH BLDA: 7.39 PH UNITS (ref 7.35–7.6)
PH BLDA: 7.4 PH UNITS (ref 7.35–7.45)
PH BLDA: 7.4 PH UNITS (ref 7.35–7.45)
PH BLDA: 7.4 PH UNITS (ref 7.35–7.6)
PH BLDA: 7.4 PH UNITS (ref 7.35–7.6)
PH BLDA: 7.41 PH UNITS (ref 7.35–7.6)
PH BLDA: 7.42 PH UNITS (ref 7.35–7.45)
PH BLDA: 7.42 PH UNITS (ref 7.35–7.45)
PH BLDA: 7.42 PH UNITS (ref 7.35–7.6)
PH BLDA: 7.43 PH UNITS (ref 7.35–7.45)
PH BLDA: 7.44 PH UNITS (ref 7.35–7.45)
PH BLDA: 7.44 PH UNITS (ref 7.35–7.6)
PH BLDA: 7.45 PH UNITS (ref 7.35–7.45)
PH BLDA: 7.46 PH UNITS (ref 7.35–7.45)
PH BLDA: 7.47 PH UNITS (ref 7.35–7.45)
PH BLDA: 7.49 PH UNITS (ref 7.35–7.45)
PH BLDA: 7.49 PH UNITS (ref 7.35–7.45)
PH BLDA: 7.5 PH UNITS (ref 7.35–7.45)
PH BLDA: 7.5 PH UNITS (ref 7.35–7.45)
PH BLDV: 7.36 PH UNITS (ref 7.31–7.41)
PH BLDV: 7.37 PH UNITS (ref 7.31–7.41)
PH BLDV: 7.39 PH UNITS (ref 7.31–7.41)
PH BLDV: 7.4 PH UNITS (ref 7.31–7.41)
PH BLDV: 7.41 PH UNITS (ref 7.31–7.41)
PH BLDV: 7.41 PH UNITS (ref 7.31–7.41)
PH BLDV: 7.42 PH UNITS (ref 7.31–7.41)
PH BLDV: 7.45 PH UNITS (ref 7.31–7.41)
PH BLDV: 7.5 PH UNITS (ref 7.31–7.41)
PH UR STRIP.AUTO: 5.5 [PH] (ref 5–8)
PH UR STRIP.AUTO: 6 [PH] (ref 5–8)
PH UR STRIP.AUTO: <=5 [PH] (ref 5–8)
PHOSPHATE SERPL-MCNC: 1.9 MG/DL (ref 2.5–4.5)
PHOSPHATE SERPL-MCNC: 1.9 MG/DL (ref 2.5–4.5)
PHOSPHATE SERPL-MCNC: 2 MG/DL (ref 2.5–4.5)
PHOSPHATE SERPL-MCNC: 2.3 MG/DL (ref 2.5–4.5)
PHOSPHATE SERPL-MCNC: 2.4 MG/DL (ref 2.5–4.5)
PHOSPHATE SERPL-MCNC: 2.6 MG/DL (ref 2.5–4.5)
PHOSPHATE SERPL-MCNC: 2.7 MG/DL (ref 2.5–4.5)
PHOSPHATE SERPL-MCNC: 2.8 MG/DL (ref 2.5–4.5)
PHOSPHATE SERPL-MCNC: 2.9 MG/DL (ref 2.5–4.5)
PHOSPHATE SERPL-MCNC: 3 MG/DL (ref 2.5–4.5)
PHOSPHATE SERPL-MCNC: 3.1 MG/DL (ref 2.5–4.5)
PHOSPHATE SERPL-MCNC: 3.2 MG/DL (ref 2.5–4.5)
PHOSPHATE SERPL-MCNC: 3.3 MG/DL (ref 2.5–4.5)
PHOSPHATE SERPL-MCNC: 3.4 MG/DL (ref 2.5–4.5)
PHOSPHATE SERPL-MCNC: 3.6 MG/DL (ref 2.5–4.5)
PHOSPHATE SERPL-MCNC: 3.7 MG/DL (ref 2.5–4.5)
PHOSPHATE SERPL-MCNC: 3.8 MG/DL (ref 2.5–4.5)
PHOSPHATE SERPL-MCNC: 3.9 MG/DL (ref 2.5–4.5)
PHOSPHATE SERPL-MCNC: 3.9 MG/DL (ref 2.5–4.5)
PHOSPHATE SERPL-MCNC: 4.1 MG/DL (ref 2.5–4.5)
PHOSPHATE SERPL-MCNC: 4.2 MG/DL (ref 2.5–4.5)
PHOSPHATE SERPL-MCNC: 4.3 MG/DL (ref 2.5–4.5)
PHOSPHATE SERPL-MCNC: 4.4 MG/DL (ref 2.5–4.5)
PHOSPHATE SERPL-MCNC: 5.6 MG/DL (ref 2.5–4.5)
PHOSPHATE SERPL-MCNC: 6.2 MG/DL (ref 2.5–4.5)
PHOSPHATE SERPL-MCNC: 6.3 MG/DL (ref 2.5–4.5)
PLAT MORPH BLD: NORMAL
PLAT MORPH BLD: NORMAL
PLATELET # BLD AUTO: 102 10*3/MM3 (ref 140–450)
PLATELET # BLD AUTO: 103 10*3/MM3 (ref 140–450)
PLATELET # BLD AUTO: 104 10*3/MM3 (ref 140–450)
PLATELET # BLD AUTO: 105 10*3/MM3 (ref 140–450)
PLATELET # BLD AUTO: 108 10*3/MM3 (ref 140–450)
PLATELET # BLD AUTO: 108 10*3/MM3 (ref 140–450)
PLATELET # BLD AUTO: 109 10*3/MM3 (ref 140–450)
PLATELET # BLD AUTO: 111 10*3/MM3 (ref 140–450)
PLATELET # BLD AUTO: 111 10*3/MM3 (ref 140–450)
PLATELET # BLD AUTO: 115 10*3/MM3 (ref 140–450)
PLATELET # BLD AUTO: 122 10*3/MM3 (ref 140–450)
PLATELET # BLD AUTO: 130 10*3/MM3 (ref 140–450)
PLATELET # BLD AUTO: 136 10*3/MM3 (ref 140–450)
PLATELET # BLD AUTO: 137 10*3/MM3 (ref 140–450)
PLATELET # BLD AUTO: 141 10*3/MM3 (ref 140–450)
PLATELET # BLD AUTO: 147 10*3/MM3 (ref 140–450)
PLATELET # BLD AUTO: 156 10*3/MM3 (ref 140–450)
PLATELET # BLD AUTO: 158 10*3/MM3 (ref 140–450)
PLATELET # BLD AUTO: 158 10*3/MM3 (ref 140–450)
PLATELET # BLD AUTO: 159 10*3/MM3 (ref 140–450)
PLATELET # BLD AUTO: 161 10*3/MM3 (ref 140–450)
PLATELET # BLD AUTO: 165 10*3/MM3 (ref 140–450)
PLATELET # BLD AUTO: 166 10*3/MM3 (ref 140–450)
PLATELET # BLD AUTO: 167 10*3/MM3 (ref 140–450)
PLATELET # BLD AUTO: 186 10*3/MM3 (ref 140–450)
PLATELET # BLD AUTO: 193 10*3/MM3 (ref 140–450)
PLATELET # BLD AUTO: 197 10*3/MM3 (ref 140–450)
PLATELET # BLD AUTO: 203 10*3/MM3 (ref 140–450)
PLATELET # BLD AUTO: 207 10*3/MM3 (ref 140–450)
PLATELET # BLD AUTO: 215 10*3/MM3 (ref 140–450)
PLATELET # BLD AUTO: 226 10*3/MM3 (ref 140–450)
PLATELET # BLD AUTO: 248 10*3/MM3 (ref 140–450)
PLATELET # BLD AUTO: 260 10*3/MM3 (ref 140–450)
PLATELET # BLD AUTO: 269 10*3/MM3 (ref 140–450)
PLATELET # BLD AUTO: 270 10*3/MM3 (ref 140–450)
PLATELET # BLD AUTO: 72 10*3/MM3 (ref 140–450)
PLATELET # BLD AUTO: 75 10*3/MM3 (ref 140–450)
PLATELET # BLD AUTO: 80 10*3/MM3 (ref 140–450)
PLATELET # BLD AUTO: 82 10*3/MM3 (ref 140–450)
PLATELET # BLD AUTO: 87 10*3/MM3 (ref 140–450)
PLATELET # BLD AUTO: 91 10*3/MM3 (ref 140–450)
PLATELET # BLD AUTO: 92 10*3/MM3 (ref 140–450)
PLATELET # BLD AUTO: 93 10*3/MM3 (ref 140–450)
PLATELET # BLD AUTO: 94 10*3/MM3 (ref 140–450)
PLATELET # BLD AUTO: 98 10*3/MM3 (ref 140–450)
PLATELET # BLD AUTO: 99 10*3/MM3 (ref 140–450)
PMV BLD AUTO: 10 FL (ref 6–12)
PMV BLD AUTO: 10.1 FL (ref 6–12)
PMV BLD AUTO: 10.2 FL (ref 6–12)
PMV BLD AUTO: 10.3 FL (ref 6–12)
PMV BLD AUTO: 10.3 FL (ref 6–12)
PMV BLD AUTO: 10.4 FL (ref 6–12)
PMV BLD AUTO: 10.5 FL (ref 6–12)
PMV BLD AUTO: 10.5 FL (ref 6–12)
PMV BLD AUTO: 10.8 FL (ref 6–12)
PMV BLD AUTO: 9.1 FL (ref 6–12)
PMV BLD AUTO: 9.2 FL (ref 6–12)
PMV BLD AUTO: 9.3 FL (ref 6–12)
PMV BLD AUTO: 9.4 FL (ref 6–12)
PMV BLD AUTO: 9.4 FL (ref 6–12)
PMV BLD AUTO: 9.5 FL (ref 6–12)
PMV BLD AUTO: 9.5 FL (ref 6–12)
PMV BLD AUTO: 9.6 FL (ref 6–12)
PMV BLD AUTO: 9.7 FL (ref 6–12)
PMV BLD AUTO: 9.8 FL (ref 6–12)
PO2 BLD: 120 MM[HG] (ref 0–500)
PO2 BLD: 125 MM[HG] (ref 0–500)
PO2 BLD: 139 MM[HG] (ref 0–500)
PO2 BLD: 139 MM[HG] (ref 0–500)
PO2 BLD: 141 MM[HG] (ref 0–500)
PO2 BLD: 142 MM[HG] (ref 0–500)
PO2 BLD: 144 MM[HG] (ref 0–500)
PO2 BLD: 153 MM[HG] (ref 0–500)
PO2 BLD: 153 MM[HG] (ref 0–500)
PO2 BLD: 155 MM[HG] (ref 0–500)
PO2 BLD: 156 MM[HG] (ref 0–500)
PO2 BLD: 157 MM[HG] (ref 0–500)
PO2 BLD: 157 MM[HG] (ref 0–500)
PO2 BLD: 164 MM[HG] (ref 0–500)
PO2 BLD: 166 MM[HG] (ref 0–500)
PO2 BLD: 168 MM[HG] (ref 0–500)
PO2 BLD: 185 MM[HG] (ref 0–500)
PO2 BLD: 188 MM[HG] (ref 0–500)
PO2 BLD: 203 MM[HG] (ref 0–500)
PO2 BLD: 209 MM[HG] (ref 0–500)
PO2 BLD: 212 MM[HG] (ref 0–500)
PO2 BLD: 217 MM[HG] (ref 0–500)
PO2 BLD: 220 MM[HG] (ref 0–500)
PO2 BLD: 221 MM[HG] (ref 0–500)
PO2 BLD: 226 MM[HG] (ref 0–500)
PO2 BLD: 240 MM[HG] (ref 0–500)
PO2 BLD: 250 MM[HG] (ref 0–500)
PO2 BLD: 275 MM[HG] (ref 0–500)
PO2 BLD: 283 MM[HG] (ref 0–500)
PO2 BLD: 50 MM[HG] (ref 0–500)
PO2 BLD: 60 MM[HG] (ref 0–500)
PO2 BLDA: 100 MM HG (ref 80–100)
PO2 BLDA: 100.7 MM HG (ref 80–100)
PO2 BLDA: 110.1 MM HG (ref 80–100)
PO2 BLDA: 112 MMHG (ref 80–105)
PO2 BLDA: 113.1 MM HG (ref 80–100)
PO2 BLDA: 119.6 MM HG (ref 80–100)
PO2 BLDA: 124 MM HG (ref 80–100)
PO2 BLDA: 125.1 MM HG (ref 80–100)
PO2 BLDA: 136 MMHG (ref 80–105)
PO2 BLDA: 139.4 MM HG (ref 80–100)
PO2 BLDA: 149 MM HG (ref 80–100)
PO2 BLDA: 153.4 MM HG (ref 80–100)
PO2 BLDA: 157.5 MM HG (ref 80–100)
PO2 BLDA: 168.1 MM HG (ref 80–100)
PO2 BLDA: 188.1 MM HG (ref 80–100)
PO2 BLDA: 198 MMHG (ref 80–105)
PO2 BLDA: 279 MMHG (ref 80–105)
PO2 BLDA: 282.9 MM HG (ref 80–100)
PO2 BLDA: 318 MMHG (ref 80–105)
PO2 BLDA: 330 MMHG (ref 80–105)
PO2 BLDA: 365 MMHG (ref 80–105)
PO2 BLDA: 367 MMHG (ref 80–105)
PO2 BLDA: 370 MMHG (ref 80–105)
PO2 BLDA: 370 MMHG (ref 80–105)
PO2 BLDA: 408 MMHG (ref 80–105)
PO2 BLDA: 421 MMHG (ref 80–105)
PO2 BLDA: 43 MMHG (ref 80–105)
PO2 BLDA: 50.4 MM HG (ref 80–100)
PO2 BLDA: 59 MMHG (ref 80–105)
PO2 BLDA: 59.6 MM HG (ref 80–100)
PO2 BLDA: 62.9 MM HG (ref 80–100)
PO2 BLDA: 63.5 MM HG (ref 80–100)
PO2 BLDA: 65 MMHG (ref 80–105)
PO2 BLDA: 65.6 MM HG (ref 80–100)
PO2 BLDA: 69.4 MM HG (ref 80–100)
PO2 BLDA: 70.4 MM HG (ref 80–100)
PO2 BLDA: 71.1 MM HG (ref 80–100)
PO2 BLDA: 76 MMHG (ref 80–105)
PO2 BLDA: 76.6 MM HG (ref 80–100)
PO2 BLDA: 78.2 MM HG (ref 80–100)
PO2 BLDA: 78.5 MM HG (ref 80–100)
PO2 BLDA: 81 MM HG (ref 80–100)
PO2 BLDA: 82.1 MM HG (ref 80–100)
PO2 BLDA: 83.6 MM HG (ref 80–100)
PO2 BLDA: 86.9 MM HG (ref 80–100)
PO2 BLDA: 88.1 MM HG (ref 80–100)
PO2 BLDA: 88.4 MM HG (ref 80–100)
PO2 BLDA: 94 MMHG (ref 80–105)
PO2 BLDA: 95.9 MM HG (ref 80–100)
PO2 BLDV: 27.5 MM HG (ref 35–45)
PO2 BLDV: 30.8 MM HG (ref 35–45)
PO2 BLDV: 31.5 MM HG (ref 35–45)
PO2 BLDV: 31.8 MM HG (ref 35–45)
PO2 BLDV: 32.6 MM HG (ref 35–45)
PO2 BLDV: 35.3 MM HG (ref 35–45)
PO2 BLDV: 35.7 MM HG (ref 35–45)
PO2 BLDV: 36 MM HG (ref 35–45)
PO2 BLDV: 39.4 MM HG (ref 35–45)
PO2 BLDV: 39.8 MM HG (ref 35–45)
PO2 BLDV: 44 MM HG (ref 35–45)
POIKILOCYTOSIS BLD QL SMEAR: ABNORMAL
POLYCHROMASIA BLD QL SMEAR: ABNORMAL
POLYCHROMASIA BLD QL SMEAR: ABNORMAL
POTASSIUM BLDA-SCNC: 3.5 MMOL/L (ref 3.5–4.9)
POTASSIUM BLDA-SCNC: 3.5 MMOL/L (ref 3.5–4.9)
POTASSIUM BLDA-SCNC: 4 MMOL/L (ref 3.5–4.9)
POTASSIUM BLDA-SCNC: 4.1 MMOL/L (ref 3.5–4.9)
POTASSIUM BLDA-SCNC: 4.1 MMOL/L (ref 3.5–4.9)
POTASSIUM BLDA-SCNC: 4.2 MMOL/L (ref 3.5–4.9)
POTASSIUM BLDA-SCNC: 4.2 MMOL/L (ref 3.5–4.9)
POTASSIUM BLDA-SCNC: 4.4 MMOL/L (ref 3.5–4.9)
POTASSIUM BLDA-SCNC: 4.5 MMOL/L (ref 3.5–4.9)
POTASSIUM BLDA-SCNC: 4.6 MMOL/L (ref 3.5–4.9)
POTASSIUM BLDA-SCNC: 4.7 MMOL/L (ref 3.5–4.9)
POTASSIUM BLDA-SCNC: 4.8 MMOL/L (ref 3.5–4.9)
POTASSIUM BLDA-SCNC: 4.9 MMOL/L (ref 3.5–4.9)
POTASSIUM BLDA-SCNC: 5.2 MMOL/L (ref 3.5–4.9)
POTASSIUM BLDA-SCNC: 5.2 MMOL/L (ref 3.5–4.9)
POTASSIUM SERPL-SCNC: 2.7 MMOL/L (ref 3.5–5.2)
POTASSIUM SERPL-SCNC: 2.7 MMOL/L (ref 3.5–5.2)
POTASSIUM SERPL-SCNC: 3.2 MMOL/L (ref 3.5–5.2)
POTASSIUM SERPL-SCNC: 3.3 MMOL/L (ref 3.5–5.2)
POTASSIUM SERPL-SCNC: 3.4 MMOL/L (ref 3.5–5.2)
POTASSIUM SERPL-SCNC: 3.5 MMOL/L (ref 3.5–5.2)
POTASSIUM SERPL-SCNC: 3.6 MMOL/L (ref 3.5–5.2)
POTASSIUM SERPL-SCNC: 3.7 MMOL/L (ref 3.5–5.2)
POTASSIUM SERPL-SCNC: 3.8 MMOL/L (ref 3.5–5.2)
POTASSIUM SERPL-SCNC: 3.9 MMOL/L (ref 3.5–5.2)
POTASSIUM SERPL-SCNC: 4 MMOL/L (ref 3.5–5.2)
POTASSIUM SERPL-SCNC: 4.1 MMOL/L (ref 3.5–5.2)
POTASSIUM SERPL-SCNC: 4.2 MMOL/L (ref 3.5–5.2)
POTASSIUM SERPL-SCNC: 4.3 MMOL/L (ref 3.5–5.2)
POTASSIUM SERPL-SCNC: 4.4 MMOL/L (ref 3.5–5.2)
POTASSIUM SERPL-SCNC: 4.5 MMOL/L (ref 3.5–5.2)
POTASSIUM SERPL-SCNC: 4.6 MMOL/L (ref 3.5–5.2)
POTASSIUM SERPL-SCNC: 4.6 MMOL/L (ref 3.5–5.2)
POTASSIUM SERPL-SCNC: 4.7 MMOL/L (ref 3.5–5.2)
POTASSIUM SERPL-SCNC: 4.7 MMOL/L (ref 3.5–5.2)
POTASSIUM SERPL-SCNC: 4.8 MMOL/L (ref 3.5–5.2)
POTASSIUM SERPL-SCNC: 4.9 MMOL/L (ref 3.5–5.2)
POTASSIUM SERPL-SCNC: 5.2 MMOL/L (ref 3.5–5.2)
PREALB SERPL-MCNC: 6.8 MG/DL (ref 20–40)
PROCALCITONIN SERPL-MCNC: 0.68 NG/ML (ref 0–0.25)
PROCALCITONIN SERPL-MCNC: 1.17 NG/ML (ref 0–0.25)
PROT ?TM UR-MCNC: 12.4 MG/DL
PROT SERPL-MCNC: 4.4 G/DL (ref 6–8.5)
PROT SERPL-MCNC: 4.6 G/DL (ref 6–8.5)
PROT SERPL-MCNC: 4.7 G/DL (ref 6–8.5)
PROT SERPL-MCNC: 4.7 G/DL (ref 6–8.5)
PROT SERPL-MCNC: 4.8 G/DL (ref 6–8.5)
PROT SERPL-MCNC: 5 G/DL (ref 6–8.5)
PROT SERPL-MCNC: 5.5 G/DL (ref 6–8.5)
PROT SERPL-MCNC: 5.6 G/DL (ref 6–8.5)
PROT SERPL-MCNC: 5.6 G/DL (ref 6–8.5)
PROT SERPL-MCNC: 5.7 G/DL (ref 6–8.5)
PROT SERPL-MCNC: 5.8 G/DL (ref 6–8.5)
PROT SERPL-MCNC: 5.8 G/DL (ref 6–8.5)
PROT SERPL-MCNC: 6 G/DL (ref 6–8.5)
PROT SERPL-MCNC: 6.2 G/DL (ref 6–8.5)
PROT SERPL-MCNC: 6.3 G/DL (ref 6–8.5)
PROT SERPL-MCNC: 6.5 G/DL (ref 6–8.5)
PROT UR QL STRIP: ABNORMAL
PROT/CREAT UR: 898.6 MG/G CREA (ref 0–200)
PROTHROMBIN TIME: 14.5 SECONDS (ref 11.7–14.2)
PROTHROMBIN TIME: 15 SECONDS (ref 11.7–14.2)
PROTHROMBIN TIME: 15.4 SECONDS (ref 11.7–14.2)
PROTHROMBIN TIME: 15.5 SECONDS (ref 11.7–14.2)
PROTHROMBIN TIME: 16.9 SECONDS (ref 11.7–14.2)
PROTHROMBIN TIME: 17.2 SECONDS (ref 11.7–14.2)
PROTHROMBIN TIME: 21.7 SECONDS (ref 11.7–14.2)
PROTHROMBIN TIME: 22.7 SECONDS (ref 12.8–15.2)
PROTHROMBIN TIME: 23.3 SECONDS (ref 12.8–15.2)
PROX PFA PSV LEFT: 160.8 CM/SEC
PROX SFA PSV LEFT: 85.8 CM/SEC
PSV: 21 CMH2O
PSV: 21 CMH2O
QT INTERVAL: 364 MS
QT INTERVAL: 380 MS
QT INTERVAL: 386 MS
QT INTERVAL: 388 MS
QT INTERVAL: 399 MS
QT INTERVAL: 404 MS
QT INTERVAL: 408 MS
QT INTERVAL: 429 MS
QT INTERVAL: 432 MS
QT INTERVAL: 450 MS
QTC INTERVAL: 488 MS
QTC INTERVAL: 488 MS
QTC INTERVAL: 503 MS
QTC INTERVAL: 505 MS
QTC INTERVAL: 519 MS
QTC INTERVAL: 520 MS
QTC INTERVAL: 520 MS
QTC INTERVAL: 532 MS
QTC INTERVAL: 542 MS
QTC INTERVAL: 575 MS
RBC # BLD AUTO: 2.11 10*6/MM3 (ref 4.14–5.8)
RBC # BLD AUTO: 2.33 10*6/MM3 (ref 4.14–5.8)
RBC # BLD AUTO: 2.43 10*6/MM3 (ref 4.14–5.8)
RBC # BLD AUTO: 2.64 10*6/MM3 (ref 4.14–5.8)
RBC # BLD AUTO: 2.67 10*6/MM3 (ref 4.14–5.8)
RBC # BLD AUTO: 2.7 10*6/MM3 (ref 4.14–5.8)
RBC # BLD AUTO: 2.71 10*6/MM3 (ref 4.14–5.8)
RBC # BLD AUTO: 2.73 10*6/MM3 (ref 4.14–5.8)
RBC # BLD AUTO: 2.74 10*6/MM3 (ref 4.14–5.8)
RBC # BLD AUTO: 2.78 10*6/MM3 (ref 4.14–5.8)
RBC # BLD AUTO: 2.78 10*6/MM3 (ref 4.14–5.8)
RBC # BLD AUTO: 2.79 10*6/MM3 (ref 4.14–5.8)
RBC # BLD AUTO: 2.79 10*6/MM3 (ref 4.14–5.8)
RBC # BLD AUTO: 2.8 10*6/MM3 (ref 4.14–5.8)
RBC # BLD AUTO: 2.8 10*6/MM3 (ref 4.14–5.8)
RBC # BLD AUTO: 2.82 10*6/MM3 (ref 4.14–5.8)
RBC # BLD AUTO: 2.83 10*6/MM3 (ref 4.14–5.8)
RBC # BLD AUTO: 2.85 10*6/MM3 (ref 4.14–5.8)
RBC # BLD AUTO: 2.87 10*6/MM3 (ref 4.14–5.8)
RBC # BLD AUTO: 2.87 10*6/MM3 (ref 4.14–5.8)
RBC # BLD AUTO: 2.89 10*6/MM3 (ref 4.14–5.8)
RBC # BLD AUTO: 2.9 10*6/MM3 (ref 4.14–5.8)
RBC # BLD AUTO: 2.9 10*6/MM3 (ref 4.14–5.8)
RBC # BLD AUTO: 2.91 10*6/MM3 (ref 4.14–5.8)
RBC # BLD AUTO: 2.95 10*6/MM3 (ref 4.14–5.8)
RBC # BLD AUTO: 2.95 10*6/MM3 (ref 4.14–5.8)
RBC # BLD AUTO: 2.97 10*6/MM3 (ref 4.14–5.8)
RBC # BLD AUTO: 2.98 10*6/MM3 (ref 4.14–5.8)
RBC # BLD AUTO: 2.99 10*6/MM3 (ref 4.14–5.8)
RBC # BLD AUTO: 3.02 10*6/MM3 (ref 4.14–5.8)
RBC # BLD AUTO: 3.02 10*6/MM3 (ref 4.14–5.8)
RBC # BLD AUTO: 3.04 10*6/MM3 (ref 4.14–5.8)
RBC # BLD AUTO: 3.06 10*6/MM3 (ref 4.14–5.8)
RBC # BLD AUTO: 3.09 10*6/MM3 (ref 4.14–5.8)
RBC # BLD AUTO: 3.09 10*6/MM3 (ref 4.14–5.8)
RBC # BLD AUTO: 3.11 10*6/MM3 (ref 4.14–5.8)
RBC # BLD AUTO: 3.12 10*6/MM3 (ref 4.14–5.8)
RBC # BLD AUTO: 3.23 10*6/MM3 (ref 4.14–5.8)
RBC # BLD AUTO: 3.3 10*6/MM3 (ref 4.14–5.8)
RBC # BLD AUTO: 3.36 10*6/MM3 (ref 4.14–5.8)
RBC # UR STRIP: ABNORMAL /HPF
RBC # UR STRIP: ABNORMAL /HPF
RBC # UR STRIP: NORMAL /HPF
READ BACK: YES
READ BACK: YES
REF LAB TEST METHOD: ABNORMAL
REF LAB TEST METHOD: ABNORMAL
REF LAB TEST METHOD: NORMAL
RH BLD: POSITIVE
SAO2 % BLDA: 100 % (ref 95–98)
SAO2 % BLDA: 76 % (ref 95–98)
SAO2 % BLDA: 89 % (ref 95–98)
SAO2 % BLDA: 89 % (ref 95–98)
SAO2 % BLDA: 91 % (ref 95–98)
SAO2 % BLDA: 97 % (ref 95–98)
SAO2 % BLDA: 98 % (ref 95–98)
SAO2 % BLDA: 99 % (ref 95–98)
SAO2 % BLDCOA: 83 % (ref 92–98.5)
SAO2 % BLDCOA: 87.4 % (ref 92–98.5)
SAO2 % BLDCOA: 93.2 % (ref 92–98.5)
SAO2 % BLDCOA: 93.6 % (ref 92–98.5)
SAO2 % BLDCOA: 94.4 % (ref 92–98.5)
SAO2 % BLDCOA: 94.5 % (ref 92–98.5)
SAO2 % BLDCOA: 94.9 % (ref 92–98.5)
SAO2 % BLDCOA: 95.4 % (ref 92–98.5)
SAO2 % BLDCOA: 95.5 % (ref 92–98.5)
SAO2 % BLDCOA: 96.3 % (ref 92–98.5)
SAO2 % BLDCOA: 96.3 % (ref 92–98.5)
SAO2 % BLDCOA: 96.6 % (ref 92–98.5)
SAO2 % BLDCOA: 96.6 % (ref 92–98.5)
SAO2 % BLDCOA: 96.8 % (ref 92–98.5)
SAO2 % BLDCOA: 97.1 % (ref 92–98.5)
SAO2 % BLDCOA: 97.2 % (ref 92–98.5)
SAO2 % BLDCOA: 97.3 % (ref 92–98.5)
SAO2 % BLDCOA: 97.6 % (ref 92–98.5)
SAO2 % BLDCOA: 98.1 % (ref 92–98.5)
SAO2 % BLDCOA: 98.2 % (ref 92–98.5)
SAO2 % BLDCOA: 98.7 % (ref 92–98.5)
SAO2 % BLDCOA: 98.9 % (ref 92–98.5)
SAO2 % BLDCOA: 99.1 % (ref 92–98.5)
SAO2 % BLDCOA: 99.1 % (ref 92–98.5)
SAO2 % BLDCOA: 99.4 % (ref 92–98.5)
SAO2 % BLDCOA: 99.4 % (ref 92–98.5)
SAO2 % BLDCOA: 99.5 % (ref 92–98.5)
SAO2 % BLDCOA: 99.5 % (ref 92–98.5)
SAO2 % BLDCOA: 99.6 % (ref 92–98.5)
SAO2 % BLDCOA: 99.9 % (ref 92–98.5)
SAO2 % BLDCOV: 52.5 % (ref 45–75)
SAO2 % BLDCOV: 60.2 % (ref 45–75)
SAO2 % BLDCOV: 61 % (ref 45–75)
SAO2 % BLDCOV: 62.5 % (ref 45–75)
SAO2 % BLDCOV: 64.3 % (ref 45–75)
SAO2 % BLDCOV: 64.4 % (ref 45–75)
SAO2 % BLDCOV: 66.5 % (ref 45–75)
SAO2 % BLDCOV: 69.7 % (ref 45–75)
SAO2 % BLDCOV: 74.6 % (ref 45–75)
SAO2 % BLDCOV: 80.2 % (ref 45–75)
SAO2 % BLDCOV: 80.2 % (ref 45–75)
SET MECH RESP RATE: 10
SET MECH RESP RATE: 12
SET MECH RESP RATE: 14
SET MECH RESP RATE: 16
SET MECH RESP RATE: 17
SET MECH RESP RATE: 20
SET MECH RESP RATE: 22
SET MECH RESP RATE: 24
SMUDGE CELLS BLD QL SMEAR: ABNORMAL
SODIUM SERPL-SCNC: 132 MMOL/L (ref 136–145)
SODIUM SERPL-SCNC: 134 MMOL/L (ref 136–145)
SODIUM SERPL-SCNC: 135 MMOL/L (ref 136–145)
SODIUM SERPL-SCNC: 136 MMOL/L (ref 136–145)
SODIUM SERPL-SCNC: 137 MMOL/L (ref 136–145)
SODIUM SERPL-SCNC: 138 MMOL/L (ref 136–145)
SODIUM SERPL-SCNC: 139 MMOL/L (ref 136–145)
SODIUM SERPL-SCNC: 140 MMOL/L (ref 136–145)
SODIUM SERPL-SCNC: 141 MMOL/L (ref 136–145)
SODIUM SERPL-SCNC: 142 MMOL/L (ref 136–145)
SODIUM SERPL-SCNC: 143 MMOL/L (ref 136–145)
SODIUM SERPL-SCNC: 144 MMOL/L (ref 136–145)
SODIUM SERPL-SCNC: 145 MMOL/L (ref 136–145)
SODIUM SERPL-SCNC: 146 MMOL/L (ref 136–145)
SODIUM SERPL-SCNC: 147 MMOL/L (ref 136–145)
SODIUM SERPL-SCNC: 148 MMOL/L (ref 136–145)
SODIUM SERPL-SCNC: 149 MMOL/L (ref 136–145)
SODIUM SERPL-SCNC: 150 MMOL/L (ref 136–145)
SODIUM UR-SCNC: 113 MMOL/L
SP GR UR STRIP: 1.02 (ref 1–1.03)
SP GR UR STRIP: 1.03 (ref 1–1.03)
SP GR UR STRIP: >1.03 (ref 1–1.03)
SQUAMOUS #/AREA URNS HPF: ABNORMAL /HPF
SQUAMOUS #/AREA URNS HPF: ABNORMAL /HPF
SQUAMOUS #/AREA URNS HPF: NORMAL /HPF
STRESS BASELINE BP: NORMAL MMHG
STRESS BASELINE HR: 86 BPM
STRESS PERCENT HR: 78 %
STRESS POST PEAK BP: NORMAL MMHG
STRESS POST PEAK HR: 98 BPM
STRESS TARGET HR: 125 BPM
T&S EXPIRATION DATE: NORMAL
T4 FREE SERPL-MCNC: 0.92 NG/DL (ref 0.92–1.68)
TOTAL RATE: 10 BREATHS/MINUTE
TOTAL RATE: 11 BREATHS/MINUTE
TOTAL RATE: 12 BREATHS/MINUTE
TOTAL RATE: 16 BREATHS/MINUTE
TOTAL RATE: 17 BREATHS/MINUTE
TOTAL RATE: 18 BREATHS/MINUTE
TOTAL RATE: 20 BREATHS/MINUTE
TOTAL RATE: 22 BREATHS/MINUTE
TOTAL RATE: 22 BREATHS/MINUTE
TOTAL RATE: 23 BREATHS/MINUTE
TOTAL RATE: 24 BREATHS/MINUTE
TOTAL RATE: 25 BREATHS/MINUTE
TOTAL RATE: 26 BREATHS/MINUTE
TOTAL RATE: 27 BREATHS/MINUTE
TOTAL RATE: 29 BREATHS/MINUTE
TOTAL RATE: 29 BREATHS/MINUTE
TOTAL RATE: 30 BREATHS/MINUTE
TOTAL RATE: 31 BREATHS/MINUTE
TOTAL RATE: 32 BREATHS/MINUTE
TOTAL RATE: 33 BREATHS/MINUTE
TOTAL RATE: 33 BREATHS/MINUTE
TOTAL RATE: 34 BREATHS/MINUTE
TRIGL SERPL-MCNC: 119 MG/DL (ref 0–150)
TRIGL SERPL-MCNC: 160 MG/DL (ref 0–150)
TRIGL SERPL-MCNC: 261 MG/DL (ref 0–150)
TRIGL SERPL-MCNC: 93 MG/DL (ref 0–150)
TSH SERPL DL<=0.05 MIU/L-ACNC: 4.14 UIU/ML (ref 0.27–4.2)
UNIT  ABO: NORMAL
UNIT  RH: NORMAL
URATE SERPL-MCNC: 10.3 MG/DL (ref 3.4–7)
URATE SERPL-MCNC: 4.7 MG/DL (ref 3.4–7)
URATE SERPL-MCNC: 5 MG/DL (ref 3.4–7)
URATE SERPL-MCNC: 5.1 MG/DL (ref 3.4–7)
URATE SERPL-MCNC: 5.2 MG/DL (ref 3.4–7)
URATE SERPL-MCNC: 5.5 MG/DL (ref 3.4–7)
URATE SERPL-MCNC: 5.9 MG/DL (ref 3.4–7)
URATE SERPL-MCNC: 6 MG/DL (ref 3.4–7)
URATE SERPL-MCNC: 6.3 MG/DL (ref 3.4–7)
URATE SERPL-MCNC: 7.1 MG/DL (ref 3.4–7)
URATE SERPL-MCNC: 7.3 MG/DL (ref 3.4–7)
URATE SERPL-MCNC: 9.5 MG/DL (ref 3.4–7)
UROBILINOGEN UR QL STRIP: ABNORMAL
VANCOMYCIN SERPL-MCNC: 20.6 MCG/ML (ref 5–40)
VANCOMYCIN SERPL-MCNC: 28.3 MCG/ML (ref 5–40)
VANCOMYCIN TROUGH SERPL-MCNC: 21.7 MCG/ML (ref 5–20)
VARIANT LYMPHS NFR BLD MANUAL: 3.1 % (ref 19.6–45.3)
VARIANT LYMPHS NFR BLD MANUAL: 4 % (ref 19.6–45.3)
VENTILATOR MODE: ABNORMAL
VENTILATOR MODE: AC
VLDLC SERPL-MCNC: 18 MG/DL (ref 5–40)
VLDLC SERPL-MCNC: 22 MG/DL (ref 5–40)
VT ON VENT VENT: 1042 ML
VT ON VENT VENT: 1049 ML
VT ON VENT VENT: 450 ML
VT ON VENT VENT: 460 ML
VT ON VENT VENT: 470 ML
VT ON VENT VENT: 487 ML
VT ON VENT VENT: 500 ML
VT ON VENT VENT: 550 ML
VT ON VENT VENT: 555 ML
VT ON VENT VENT: 592 ML
VT ON VENT VENT: 600 ML
VT ON VENT VENT: 600 ML
VT ON VENT VENT: 620 ML
VT ON VENT VENT: 631 ML
VT ON VENT VENT: 683 ML
VT ON VENT VENT: 713 ML
VT ON VENT VENT: 716 ML
VT ON VENT VENT: 748 ML
VT ON VENT VENT: 749 ML
VT ON VENT VENT: 750 ML
VT ON VENT VENT: 800 ML
VT ON VENT VENT: 827 ML
VT ON VENT VENT: 850 ML
VT ON VENT VENT: 850 ML
WBC # UR STRIP: ABNORMAL /HPF
WBC # UR STRIP: ABNORMAL /HPF
WBC # UR STRIP: NORMAL /HPF
WBC MORPH BLD: NORMAL
WBC NRBC COR # BLD AUTO: 10.14 10*3/MM3 (ref 3.4–10.8)
WBC NRBC COR # BLD AUTO: 10.53 10*3/MM3 (ref 3.4–10.8)
WBC NRBC COR # BLD AUTO: 10.61 10*3/MM3 (ref 3.4–10.8)
WBC NRBC COR # BLD AUTO: 10.77 10*3/MM3 (ref 3.4–10.8)
WBC NRBC COR # BLD AUTO: 11.06 10*3/MM3 (ref 3.4–10.8)
WBC NRBC COR # BLD AUTO: 11.09 10*3/MM3 (ref 3.4–10.8)
WBC NRBC COR # BLD AUTO: 11.58 10*3/MM3 (ref 3.4–10.8)
WBC NRBC COR # BLD AUTO: 11.59 10*3/MM3 (ref 3.4–10.8)
WBC NRBC COR # BLD AUTO: 12.67 10*3/MM3 (ref 3.4–10.8)
WBC NRBC COR # BLD AUTO: 12.8 10*3/MM3 (ref 3.4–10.8)
WBC NRBC COR # BLD AUTO: 12.81 10*3/MM3 (ref 3.4–10.8)
WBC NRBC COR # BLD AUTO: 13.11 10*3/MM3 (ref 3.4–10.8)
WBC NRBC COR # BLD AUTO: 13.31 10*3/MM3 (ref 3.4–10.8)
WBC NRBC COR # BLD AUTO: 13.41 10*3/MM3 (ref 3.4–10.8)
WBC NRBC COR # BLD AUTO: 13.65 10*3/MM3 (ref 3.4–10.8)
WBC NRBC COR # BLD AUTO: 13.92 10*3/MM3 (ref 3.4–10.8)
WBC NRBC COR # BLD AUTO: 14.44 10*3/MM3 (ref 3.4–10.8)
WBC NRBC COR # BLD AUTO: 14.64 10*3/MM3 (ref 3.4–10.8)
WBC NRBC COR # BLD AUTO: 15.03 10*3/MM3 (ref 3.4–10.8)
WBC NRBC COR # BLD AUTO: 15.06 10*3/MM3 (ref 3.4–10.8)
WBC NRBC COR # BLD AUTO: 15.44 10*3/MM3 (ref 3.4–10.8)
WBC NRBC COR # BLD AUTO: 15.94 10*3/MM3 (ref 3.4–10.8)
WBC NRBC COR # BLD AUTO: 15.95 10*3/MM3 (ref 3.4–10.8)
WBC NRBC COR # BLD AUTO: 16.14 10*3/MM3 (ref 3.4–10.8)
WBC NRBC COR # BLD AUTO: 16.38 10*3/MM3 (ref 3.4–10.8)
WBC NRBC COR # BLD AUTO: 16.42 10*3/MM3 (ref 3.4–10.8)
WBC NRBC COR # BLD AUTO: 16.99 10*3/MM3 (ref 3.4–10.8)
WBC NRBC COR # BLD AUTO: 17.24 10*3/MM3 (ref 3.4–10.8)
WBC NRBC COR # BLD AUTO: 17.25 10*3/MM3 (ref 3.4–10.8)
WBC NRBC COR # BLD AUTO: 17.3 10*3/MM3 (ref 3.4–10.8)
WBC NRBC COR # BLD AUTO: 18.32 10*3/MM3 (ref 3.4–10.8)
WBC NRBC COR # BLD AUTO: 18.43 10*3/MM3 (ref 3.4–10.8)
WBC NRBC COR # BLD AUTO: 18.69 10*3/MM3 (ref 3.4–10.8)
WBC NRBC COR # BLD AUTO: 18.78 10*3/MM3 (ref 3.4–10.8)
WBC NRBC COR # BLD AUTO: 19.03 10*3/MM3 (ref 3.4–10.8)
WBC NRBC COR # BLD AUTO: 19.62 10*3/MM3 (ref 3.4–10.8)
WBC NRBC COR # BLD AUTO: 23.14 10*3/MM3 (ref 3.4–10.8)
WBC NRBC COR # BLD AUTO: 24.28 10*3/MM3 (ref 3.4–10.8)
WBC NRBC COR # BLD AUTO: 24.38 10*3/MM3 (ref 3.4–10.8)
WBC NRBC COR # BLD AUTO: 24.95 10*3/MM3 (ref 3.4–10.8)
WBC NRBC COR # BLD AUTO: 8.21 10*3/MM3 (ref 3.4–10.8)
WBC NRBC COR # BLD AUTO: 8.81 10*3/MM3 (ref 3.4–10.8)
WBC NRBC COR # BLD AUTO: 9.08 10*3/MM3 (ref 3.4–10.8)
WBC NRBC COR # BLD AUTO: 9.47 10*3/MM3 (ref 3.4–10.8)

## 2024-01-01 PROCEDURE — 80069 RENAL FUNCTION PANEL: CPT | Performed by: NURSE PRACTITIONER

## 2024-01-01 PROCEDURE — 25010000002 MILRINONE LACTATE IN DEXTROSE 20-5 MG/100ML-% SOLUTION: Performed by: THORACIC SURGERY (CARDIOTHORACIC VASCULAR SURGERY)

## 2024-01-01 PROCEDURE — 93005 ELECTROCARDIOGRAM TRACING: CPT | Performed by: NURSE PRACTITIONER

## 2024-01-01 PROCEDURE — 25010000002 ENOXAPARIN PER 10 MG: Performed by: NURSE PRACTITIONER

## 2024-01-01 PROCEDURE — 25010000002 PENICILLIN G POTASSIUM PER 600000 UNITS: Performed by: STUDENT IN AN ORGANIZED HEALTH CARE EDUCATION/TRAINING PROGRAM

## 2024-01-01 PROCEDURE — 99233 SBSQ HOSP IP/OBS HIGH 50: CPT | Performed by: INTERNAL MEDICINE

## 2024-01-01 PROCEDURE — 25010000002 VASOPRESSIN 20 UNIT/ML SOLUTION: Performed by: THORACIC SURGERY (CARDIOTHORACIC VASCULAR SURGERY)

## 2024-01-01 PROCEDURE — 99232 SBSQ HOSP IP/OBS MODERATE 35: CPT | Performed by: INTERNAL MEDICINE

## 2024-01-01 PROCEDURE — 71045 X-RAY EXAM CHEST 1 VIEW: CPT

## 2024-01-01 PROCEDURE — C1713 ANCHOR/SCREW BN/BN,TIS/BN: HCPCS | Performed by: THORACIC SURGERY (CARDIOTHORACIC VASCULAR SURGERY)

## 2024-01-01 PROCEDURE — 82948 REAGENT STRIP/BLOOD GLUCOSE: CPT

## 2024-01-01 PROCEDURE — 94799 UNLISTED PULMONARY SVC/PX: CPT

## 2024-01-01 PROCEDURE — 25010000002 MAGNESIUM SULFATE 2 GM/50ML SOLUTION: Performed by: NURSE PRACTITIONER

## 2024-01-01 PROCEDURE — 25010000002 LIDOCAINE PER 10 MG: Performed by: THORACIC SURGERY (CARDIOTHORACIC VASCULAR SURGERY)

## 2024-01-01 PROCEDURE — 80053 COMPREHEN METABOLIC PANEL: CPT | Performed by: INTERNAL MEDICINE

## 2024-01-01 PROCEDURE — 85018 HEMOGLOBIN: CPT

## 2024-01-01 PROCEDURE — 36430 TRANSFUSION BLD/BLD COMPNT: CPT

## 2024-01-01 PROCEDURE — P9016 RBC LEUKOCYTES REDUCED: HCPCS

## 2024-01-01 PROCEDURE — 93356 MYOCRD STRAIN IMG SPCKL TRCK: CPT | Performed by: INTERNAL MEDICINE

## 2024-01-01 PROCEDURE — 63710000001 INSULIN GLARGINE PER 5 UNITS: Performed by: THORACIC SURGERY (CARDIOTHORACIC VASCULAR SURGERY)

## 2024-01-01 PROCEDURE — 93308 TTE F-UP OR LMTD: CPT | Performed by: INTERNAL MEDICINE

## 2024-01-01 PROCEDURE — 25010000002 AMIODARONE IN DEXTROSE 5% 360-4.14 MG/200ML-% SOLUTION: Performed by: THORACIC SURGERY (CARDIOTHORACIC VASCULAR SURGERY)

## 2024-01-01 PROCEDURE — 84100 ASSAY OF PHOSPHORUS: CPT | Performed by: NURSE PRACTITIONER

## 2024-01-01 PROCEDURE — 25010000002 BUMETANIDE PER 0.5 MG: Performed by: INTERNAL MEDICINE

## 2024-01-01 PROCEDURE — 93320 DOPPLER ECHO COMPLETE: CPT

## 2024-01-01 PROCEDURE — 99024 POSTOP FOLLOW-UP VISIT: CPT | Performed by: THORACIC SURGERY (CARDIOTHORACIC VASCULAR SURGERY)

## 2024-01-01 PROCEDURE — 93325 DOPPLER ECHO COLOR FLOW MAPG: CPT | Performed by: INTERNAL MEDICINE

## 2024-01-01 PROCEDURE — 93970 EXTREMITY STUDY: CPT | Performed by: SURGERY

## 2024-01-01 PROCEDURE — 82570 ASSAY OF URINE CREATININE: CPT

## 2024-01-01 PROCEDURE — 85027 COMPLETE CBC AUTOMATED: CPT | Performed by: THORACIC SURGERY (CARDIOTHORACIC VASCULAR SURGERY)

## 2024-01-01 PROCEDURE — 80069 RENAL FUNCTION PANEL: CPT | Performed by: INTERNAL MEDICINE

## 2024-01-01 PROCEDURE — 93356 MYOCRD STRAIN IMG SPCKL TRCK: CPT

## 2024-01-01 PROCEDURE — 93321 DOPPLER ECHO F-UP/LMTD STD: CPT

## 2024-01-01 PROCEDURE — 25010000002 CALCIUM GLUCONATE 2-0.675 GM/100ML-% SOLUTION: Performed by: NURSE PRACTITIONER

## 2024-01-01 PROCEDURE — 86880 COOMBS TEST DIRECT: CPT | Performed by: NURSE PRACTITIONER

## 2024-01-01 PROCEDURE — 80069 RENAL FUNCTION PANEL: CPT

## 2024-01-01 PROCEDURE — 25010000002 MILRINONE LACTATE IN DEXTROSE 20-5 MG/100ML-% SOLUTION: Performed by: NURSE PRACTITIONER

## 2024-01-01 PROCEDURE — 86927 PLASMA FRESH FROZEN: CPT

## 2024-01-01 PROCEDURE — 99153 MOD SED SAME PHYS/QHP EA: CPT

## 2024-01-01 PROCEDURE — 25010000002 PROPOFOL 10 MG/ML EMULSION: Performed by: NURSE PRACTITIONER

## 2024-01-01 PROCEDURE — P9035 PLATELET PHERES LEUKOREDUCED: HCPCS

## 2024-01-01 PROCEDURE — C1729 CATH, DRAINAGE: HCPCS | Performed by: THORACIC SURGERY (CARDIOTHORACIC VASCULAR SURGERY)

## 2024-01-01 PROCEDURE — 84145 PROCALCITONIN (PCT): CPT | Performed by: STUDENT IN AN ORGANIZED HEALTH CARE EDUCATION/TRAINING PROGRAM

## 2024-01-01 PROCEDURE — 87147 CULTURE TYPE IMMUNOLOGIC: CPT | Performed by: STUDENT IN AN ORGANIZED HEALTH CARE EDUCATION/TRAINING PROGRAM

## 2024-01-01 PROCEDURE — 25010000002 VANCOMYCIN 750 MG RECONSTITUTED SOLUTION 1 EACH VIAL: Performed by: STUDENT IN AN ORGANIZED HEALTH CARE EDUCATION/TRAINING PROGRAM

## 2024-01-01 PROCEDURE — 25010000002 AMIODARONE IN DEXTROSE 5% 360-4.14 MG/200ML-% SOLUTION: Performed by: INTERNAL MEDICINE

## 2024-01-01 PROCEDURE — 99232 SBSQ HOSP IP/OBS MODERATE 35: CPT | Performed by: STUDENT IN AN ORGANIZED HEALTH CARE EDUCATION/TRAINING PROGRAM

## 2024-01-01 PROCEDURE — 83735 ASSAY OF MAGNESIUM: CPT | Performed by: INTERNAL MEDICINE

## 2024-01-01 PROCEDURE — 25810000003 SODIUM CHLORIDE 0.9 % SOLUTION 250 ML FLEX CONT: Performed by: THORACIC SURGERY (CARDIOTHORACIC VASCULAR SURGERY)

## 2024-01-01 PROCEDURE — 82803 BLOOD GASES ANY COMBINATION: CPT | Performed by: INTERNAL MEDICINE

## 2024-01-01 PROCEDURE — 84100 ASSAY OF PHOSPHORUS: CPT | Performed by: INTERNAL MEDICINE

## 2024-01-01 PROCEDURE — 87040 BLOOD CULTURE FOR BACTERIA: CPT | Performed by: STUDENT IN AN ORGANIZED HEALTH CARE EDUCATION/TRAINING PROGRAM

## 2024-01-01 PROCEDURE — 93005 ELECTROCARDIOGRAM TRACING: CPT | Performed by: THORACIC SURGERY (CARDIOTHORACIC VASCULAR SURGERY)

## 2024-01-01 PROCEDURE — 94761 N-INVAS EAR/PLS OXIMETRY MLT: CPT

## 2024-01-01 PROCEDURE — 25010000002 MILRINONE LACTATE 10 MG/10ML SOLUTION 10 ML VIAL: Performed by: THORACIC SURGERY (CARDIOTHORACIC VASCULAR SURGERY)

## 2024-01-01 PROCEDURE — 25010000002 LIDOCAINE PER 10 MG: Performed by: NURSE PRACTITIONER

## 2024-01-01 PROCEDURE — 93325 DOPPLER ECHO COLOR FLOW MAPG: CPT

## 2024-01-01 PROCEDURE — 25010000002 MORPHINE PER 10 MG: Performed by: HOSPITALIST

## 2024-01-01 PROCEDURE — 25010000002 ENOXAPARIN PER 10 MG: Performed by: PHYSICIAN ASSISTANT

## 2024-01-01 PROCEDURE — 86920 COMPATIBILITY TEST SPIN: CPT

## 2024-01-01 PROCEDURE — 80053 COMPREHEN METABOLIC PANEL: CPT | Performed by: NURSE PRACTITIONER

## 2024-01-01 PROCEDURE — 94664 DEMO&/EVAL PT USE INHALER: CPT

## 2024-01-01 PROCEDURE — 84550 ASSAY OF BLOOD/URIC ACID: CPT | Performed by: INTERNAL MEDICINE

## 2024-01-01 PROCEDURE — 93308 TTE F-UP OR LMTD: CPT

## 2024-01-01 PROCEDURE — 25010000002 HEPARIN (PORCINE) PER 1000 UNITS: Performed by: NURSE PRACTITIONER

## 2024-01-01 PROCEDURE — 94003 VENT MGMT INPAT SUBQ DAY: CPT

## 2024-01-01 PROCEDURE — 85025 COMPLETE CBC W/AUTO DIFF WBC: CPT | Performed by: NURSE PRACTITIONER

## 2024-01-01 PROCEDURE — 80069 RENAL FUNCTION PANEL: CPT | Performed by: THORACIC SURGERY (CARDIOTHORACIC VASCULAR SURGERY)

## 2024-01-01 PROCEDURE — 93016 CV STRESS TEST SUPVJ ONLY: CPT | Performed by: INTERNAL MEDICINE

## 2024-01-01 PROCEDURE — 85025 COMPLETE CBC W/AUTO DIFF WBC: CPT | Performed by: INTERNAL MEDICINE

## 2024-01-01 PROCEDURE — 86922 COMPATIBILITY TEST ANTIGLOB: CPT

## 2024-01-01 PROCEDURE — 78452 HT MUSCLE IMAGE SPECT MULT: CPT

## 2024-01-01 PROCEDURE — 0 TECHNETIUM SESTAMIBI: Performed by: THORACIC SURGERY (CARDIOTHORACIC VASCULAR SURGERY)

## 2024-01-01 PROCEDURE — 99232 SBSQ HOSP IP/OBS MODERATE 35: CPT | Performed by: SURGERY

## 2024-01-01 PROCEDURE — 25010000002 CEFEPIME PER 500 MG: Performed by: THORACIC SURGERY (CARDIOTHORACIC VASCULAR SURGERY)

## 2024-01-01 PROCEDURE — 76942 ECHO GUIDE FOR BIOPSY: CPT

## 2024-01-01 PROCEDURE — 25010000002 VANCOMYCIN 1 G RECONSTITUTED SOLUTION 1 EACH VIAL: Performed by: INTERNAL MEDICINE

## 2024-01-01 PROCEDURE — 82306 VITAMIN D 25 HYDROXY: CPT | Performed by: NURSE PRACTITIONER

## 2024-01-01 PROCEDURE — 84478 ASSAY OF TRIGLYCERIDES: CPT | Performed by: INTERNAL MEDICINE

## 2024-01-01 PROCEDURE — 94760 N-INVAS EAR/PLS OXIMETRY 1: CPT

## 2024-01-01 PROCEDURE — 0BJ08ZZ INSPECTION OF TRACHEOBRONCHIAL TREE, VIA NATURAL OR ARTIFICIAL OPENING ENDOSCOPIC: ICD-10-PCS | Performed by: STUDENT IN AN ORGANIZED HEALTH CARE EDUCATION/TRAINING PROGRAM

## 2024-01-01 PROCEDURE — 85347 COAGULATION TIME ACTIVATED: CPT

## 2024-01-01 PROCEDURE — 86900 BLOOD TYPING SEROLOGIC ABO: CPT

## 2024-01-01 PROCEDURE — 25010000002 AMIODARONE IN DEXTROSE 5% 150-4.21 MG/100ML-% SOLUTION

## 2024-01-01 PROCEDURE — 25010000002 CEFEPIME PER 500 MG: Performed by: INTERNAL MEDICINE

## 2024-01-01 PROCEDURE — 83735 ASSAY OF MAGNESIUM: CPT | Performed by: THORACIC SURGERY (CARDIOTHORACIC VASCULAR SURGERY)

## 2024-01-01 PROCEDURE — 02RX0KZ REPLACEMENT OF THORACIC AORTA, ASCENDING/ARCH WITH NONAUTOLOGOUS TISSUE SUBSTITUTE, OPEN APPROACH: ICD-10-PCS | Performed by: THORACIC SURGERY (CARDIOTHORACIC VASCULAR SURGERY)

## 2024-01-01 PROCEDURE — 93970 EXTREMITY STUDY: CPT

## 2024-01-01 PROCEDURE — 63710000001 INSULIN GLARGINE PER 5 UNITS: Performed by: NURSE PRACTITIONER

## 2024-01-01 PROCEDURE — 84132 ASSAY OF SERUM POTASSIUM: CPT | Performed by: THORACIC SURGERY (CARDIOTHORACIC VASCULAR SURGERY)

## 2024-01-01 PROCEDURE — 25510000001 PERFLUTREN 6.52 MG/ML SUSPENSION 2 ML VIAL: Performed by: NURSE PRACTITIONER

## 2024-01-01 PROCEDURE — 87116 MYCOBACTERIA CULTURE: CPT | Performed by: THORACIC SURGERY (CARDIOTHORACIC VASCULAR SURGERY)

## 2024-01-01 PROCEDURE — 86706 HEP B SURFACE ANTIBODY: CPT | Performed by: INTERNAL MEDICINE

## 2024-01-01 PROCEDURE — 25010000002 CEFAZOLIN PER 500 MG: Performed by: NURSE PRACTITIONER

## 2024-01-01 PROCEDURE — 83735 ASSAY OF MAGNESIUM: CPT

## 2024-01-01 PROCEDURE — 86901 BLOOD TYPING SEROLOGIC RH(D): CPT

## 2024-01-01 PROCEDURE — 99232 SBSQ HOSP IP/OBS MODERATE 35: CPT

## 2024-01-01 PROCEDURE — 33530 CORONARY ARTERY BYPASS/REOP: CPT

## 2024-01-01 PROCEDURE — 94762 N-INVAS EAR/PLS OXIMTRY CONT: CPT

## 2024-01-01 PROCEDURE — 87340 HEPATITIS B SURFACE AG IA: CPT | Performed by: INTERNAL MEDICINE

## 2024-01-01 PROCEDURE — 82803 BLOOD GASES ANY COMBINATION: CPT

## 2024-01-01 PROCEDURE — 25010000002 MAGNESIUM SULFATE 2 GM/50ML SOLUTION: Performed by: THORACIC SURGERY (CARDIOTHORACIC VASCULAR SURGERY)

## 2024-01-01 PROCEDURE — 25010000002 CALCIUM GLUCONATE 2-0.675 GM/100ML-% SOLUTION: Performed by: THORACIC SURGERY (CARDIOTHORACIC VASCULAR SURGERY)

## 2024-01-01 PROCEDURE — 86850 RBC ANTIBODY SCREEN: CPT | Performed by: NURSE PRACTITIONER

## 2024-01-01 PROCEDURE — 85027 COMPLETE CBC AUTOMATED: CPT

## 2024-01-01 PROCEDURE — 25010000002 CEFTRIAXONE PER 250 MG: Performed by: STUDENT IN AN ORGANIZED HEALTH CARE EDUCATION/TRAINING PROGRAM

## 2024-01-01 PROCEDURE — 25010000002 EPINEPHRINE 1 MG/ML SOLUTION 30 ML VIAL: Performed by: NURSE PRACTITIONER

## 2024-01-01 PROCEDURE — 82330 ASSAY OF CALCIUM: CPT | Performed by: INTERNAL MEDICINE

## 2024-01-01 PROCEDURE — 25010000002 PROPOFOL 10 MG/ML EMULSION: Performed by: THORACIC SURGERY (CARDIOTHORACIC VASCULAR SURGERY)

## 2024-01-01 PROCEDURE — 83735 ASSAY OF MAGNESIUM: CPT | Performed by: NURSE PRACTITIONER

## 2024-01-01 PROCEDURE — 85027 COMPLETE CBC AUTOMATED: CPT | Performed by: INTERNAL MEDICINE

## 2024-01-01 PROCEDURE — 33967 INSERT I-AORT PERCUT DEVICE: CPT | Performed by: THORACIC SURGERY (CARDIOTHORACIC VASCULAR SURGERY)

## 2024-01-01 PROCEDURE — 30233N1 TRANSFUSION OF NONAUTOLOGOUS RED BLOOD CELLS INTO PERIPHERAL VEIN, PERCUTANEOUS APPROACH: ICD-10-PCS | Performed by: THORACIC SURGERY (CARDIOTHORACIC VASCULAR SURGERY)

## 2024-01-01 PROCEDURE — 85027 COMPLETE CBC AUTOMATED: CPT | Performed by: NURSE PRACTITIONER

## 2024-01-01 PROCEDURE — 02UG0JZ SUPPLEMENT MITRAL VALVE WITH SYNTHETIC SUBSTITUTE, OPEN APPROACH: ICD-10-PCS | Performed by: THORACIC SURGERY (CARDIOTHORACIC VASCULAR SURGERY)

## 2024-01-01 PROCEDURE — 81001 URINALYSIS AUTO W/SCOPE: CPT | Performed by: NURSE PRACTITIONER

## 2024-01-01 PROCEDURE — 25010000002 DIGOXIN PER 500 MCG: Performed by: NURSE PRACTITIONER

## 2024-01-01 PROCEDURE — 25010000002 VASOPRESSIN 20 UNIT/ML SOLUTION: Performed by: NURSE PRACTITIONER

## 2024-01-01 PROCEDURE — 80048 BASIC METABOLIC PNL TOTAL CA: CPT | Performed by: PHYSICIAN ASSISTANT

## 2024-01-01 PROCEDURE — 25010000002 VANCOMYCIN 1 G RECONSTITUTED SOLUTION 1 EACH VIAL: Performed by: THORACIC SURGERY (CARDIOTHORACIC VASCULAR SURGERY)

## 2024-01-01 PROCEDURE — 82803 BLOOD GASES ANY COMBINATION: CPT | Performed by: STUDENT IN AN ORGANIZED HEALTH CARE EDUCATION/TRAINING PROGRAM

## 2024-01-01 PROCEDURE — 25010000002 POTASSIUM CHLORIDE PER 2 MEQ: Performed by: THORACIC SURGERY (CARDIOTHORACIC VASCULAR SURGERY)

## 2024-01-01 PROCEDURE — 25010000002 MORPHINE PER 10 MG: Performed by: NURSE PRACTITIONER

## 2024-01-01 PROCEDURE — 63710000001 INSULIN REGULAR HUMAN PER 5 UNITS: Performed by: PHYSICIAN ASSISTANT

## 2024-01-01 PROCEDURE — 84132 ASSAY OF SERUM POTASSIUM: CPT | Performed by: INTERNAL MEDICINE

## 2024-01-01 PROCEDURE — 82803 BLOOD GASES ANY COMBINATION: CPT | Performed by: NURSE PRACTITIONER

## 2024-01-01 PROCEDURE — 25010000002 REGADENOSON 0.4 MG/5ML SOLUTION: Performed by: THORACIC SURGERY (CARDIOTHORACIC VASCULAR SURGERY)

## 2024-01-01 PROCEDURE — 25010000002 PENICILLIN G POTASSIUM PER 600000 UNITS: Performed by: NURSE PRACTITIONER

## 2024-01-01 PROCEDURE — 76376 3D RENDER W/INTRP POSTPROCES: CPT

## 2024-01-01 PROCEDURE — 25010000002 ENOXAPARIN PER 10 MG

## 2024-01-01 PROCEDURE — 84156 ASSAY OF PROTEIN URINE: CPT

## 2024-01-01 PROCEDURE — 85576 BLOOD PLATELET AGGREGATION: CPT | Performed by: NURSE PRACTITIONER

## 2024-01-01 PROCEDURE — 85007 BL SMEAR W/DIFF WBC COUNT: CPT | Performed by: NURSE PRACTITIONER

## 2024-01-01 PROCEDURE — P9041 ALBUMIN (HUMAN),5%, 50ML: HCPCS | Performed by: NURSE PRACTITIONER

## 2024-01-01 PROCEDURE — 25810000003 SODIUM CHLORIDE 0.9 % SOLUTION 250 ML FLEX CONT: Performed by: NURSE PRACTITIONER

## 2024-01-01 PROCEDURE — 85014 HEMATOCRIT: CPT

## 2024-01-01 PROCEDURE — 83605 ASSAY OF LACTIC ACID: CPT | Performed by: NURSE PRACTITIONER

## 2024-01-01 PROCEDURE — 0JPT0PZ REMOVAL OF CARDIAC RHYTHM RELATED DEVICE FROM TRUNK SUBCUTANEOUS TISSUE AND FASCIA, OPEN APPROACH: ICD-10-PCS | Performed by: THORACIC SURGERY (CARDIOTHORACIC VASCULAR SURGERY)

## 2024-01-01 PROCEDURE — 25010000002 HEPARIN (PORCINE) PER 1000 UNITS: Performed by: INTERNAL MEDICINE

## 2024-01-01 PROCEDURE — 25010000002 CALCIUM GLUCONATE 2-0.675 GM/100ML-% SOLUTION

## 2024-01-01 PROCEDURE — 87070 CULTURE OTHR SPECIMN AEROBIC: CPT | Performed by: THORACIC SURGERY (CARDIOTHORACIC VASCULAR SURGERY)

## 2024-01-01 PROCEDURE — P9047 ALBUMIN (HUMAN), 25%, 50ML: HCPCS | Performed by: PHYSICIAN ASSISTANT

## 2024-01-01 PROCEDURE — 93308 TTE F-UP OR LMTD: CPT | Performed by: STUDENT IN AN ORGANIZED HEALTH CARE EDUCATION/TRAINING PROGRAM

## 2024-01-01 PROCEDURE — 74176 CT ABD & PELVIS W/O CONTRAST: CPT

## 2024-01-01 PROCEDURE — 85384 FIBRINOGEN ACTIVITY: CPT | Performed by: THORACIC SURGERY (CARDIOTHORACIC VASCULAR SURGERY)

## 2024-01-01 PROCEDURE — 93321 DOPPLER ECHO F-UP/LMTD STD: CPT | Performed by: INTERNAL MEDICINE

## 2024-01-01 PROCEDURE — P9100 PATHOGEN TEST FOR PLATELETS: HCPCS

## 2024-01-01 PROCEDURE — 0JD63ZZ EXTRACTION OF CHEST SUBCUTANEOUS TISSUE AND FASCIA, PERCUTANEOUS APPROACH: ICD-10-PCS | Performed by: THORACIC SURGERY (CARDIOTHORACIC VASCULAR SURGERY)

## 2024-01-01 PROCEDURE — 25010000002 FENTANYL CITRATE (PF) 50 MCG/ML SOLUTION: Performed by: INTERNAL MEDICINE

## 2024-01-01 PROCEDURE — 87205 SMEAR GRAM STAIN: CPT | Performed by: NURSE PRACTITIONER

## 2024-01-01 PROCEDURE — 25010000002 VANCOMYCIN 10 G RECONSTITUTED SOLUTION: Performed by: STUDENT IN AN ORGANIZED HEALTH CARE EDUCATION/TRAINING PROGRAM

## 2024-01-01 PROCEDURE — 0B9J8ZX DRAINAGE OF LEFT LOWER LUNG LOBE, VIA NATURAL OR ARTIFICIAL OPENING ENDOSCOPIC, DIAGNOSTIC: ICD-10-PCS | Performed by: STUDENT IN AN ORGANIZED HEALTH CARE EDUCATION/TRAINING PROGRAM

## 2024-01-01 PROCEDURE — 85610 PROTHROMBIN TIME: CPT | Performed by: NURSE PRACTITIONER

## 2024-01-01 PROCEDURE — 25010000002 AMIODARONE IN DEXTROSE 5% 360-4.14 MG/200ML-% SOLUTION: Performed by: NURSE PRACTITIONER

## 2024-01-01 PROCEDURE — 82947 ASSAY GLUCOSE BLOOD QUANT: CPT

## 2024-01-01 PROCEDURE — 80162 ASSAY OF DIGOXIN TOTAL: CPT | Performed by: INTERNAL MEDICINE

## 2024-01-01 PROCEDURE — 99231 SBSQ HOSP IP/OBS SF/LOW 25: CPT | Performed by: THORACIC SURGERY (CARDIOTHORACIC VASCULAR SURGERY)

## 2024-01-01 PROCEDURE — A9500 TC99M SESTAMIBI: HCPCS | Performed by: THORACIC SURGERY (CARDIOTHORACIC VASCULAR SURGERY)

## 2024-01-01 PROCEDURE — 99232 SBSQ HOSP IP/OBS MODERATE 35: CPT | Performed by: NURSE PRACTITIONER

## 2024-01-01 PROCEDURE — 84450 TRANSFERASE (AST) (SGOT): CPT | Performed by: THORACIC SURGERY (CARDIOTHORACIC VASCULAR SURGERY)

## 2024-01-01 PROCEDURE — 02HV33Z INSERTION OF INFUSION DEVICE INTO SUPERIOR VENA CAVA, PERCUTANEOUS APPROACH: ICD-10-PCS | Performed by: ANESTHESIOLOGY

## 2024-01-01 PROCEDURE — C1889 IMPLANT/INSERT DEVICE, NOC: HCPCS | Performed by: THORACIC SURGERY (CARDIOTHORACIC VASCULAR SURGERY)

## 2024-01-01 PROCEDURE — 87015 SPECIMEN INFECT AGNT CONCNTJ: CPT | Performed by: THORACIC SURGERY (CARDIOTHORACIC VASCULAR SURGERY)

## 2024-01-01 PROCEDURE — C1769 GUIDE WIRE: HCPCS | Performed by: THORACIC SURGERY (CARDIOTHORACIC VASCULAR SURGERY)

## 2024-01-01 PROCEDURE — 74018 RADEX ABDOMEN 1 VIEW: CPT

## 2024-01-01 PROCEDURE — 80202 ASSAY OF VANCOMYCIN: CPT | Performed by: THORACIC SURGERY (CARDIOTHORACIC VASCULAR SURGERY)

## 2024-01-01 PROCEDURE — 25010000002 HEPARIN (PORCINE) PER 1000 UNITS

## 2024-01-01 PROCEDURE — 80061 LIPID PANEL: CPT | Performed by: NURSE PRACTITIONER

## 2024-01-01 PROCEDURE — 25810000003 SODIUM CHLORIDE 0.9 % SOLUTION 250 ML FLEX CONT: Performed by: INTERNAL MEDICINE

## 2024-01-01 PROCEDURE — 25010000002 ALBUMIN HUMAN 5% PER 50 ML: Performed by: THORACIC SURGERY (CARDIOTHORACIC VASCULAR SURGERY)

## 2024-01-01 PROCEDURE — 25010000002 AMIODARONE IN DEXTROSE 5% 360-4.14 MG/200ML-% SOLUTION: Performed by: PHYSICIAN ASSISTANT

## 2024-01-01 PROCEDURE — 87205 SMEAR GRAM STAIN: CPT | Performed by: THORACIC SURGERY (CARDIOTHORACIC VASCULAR SURGERY)

## 2024-01-01 PROCEDURE — 86870 RBC ANTIBODY IDENTIFICATION: CPT

## 2024-01-01 PROCEDURE — 25010000002 AMIODARONE IN DEXTROSE 5% 360-4.14 MG/200ML-% SOLUTION

## 2024-01-01 PROCEDURE — 82803 BLOOD GASES ANY COMBINATION: CPT | Performed by: HOSPITALIST

## 2024-01-01 PROCEDURE — 86923 COMPATIBILITY TEST ELECTRIC: CPT

## 2024-01-01 PROCEDURE — 86901 BLOOD TYPING SEROLOGIC RH(D): CPT | Performed by: NURSE PRACTITIONER

## 2024-01-01 PROCEDURE — 87075 CULTR BACTERIA EXCEPT BLOOD: CPT | Performed by: THORACIC SURGERY (CARDIOTHORACIC VASCULAR SURGERY)

## 2024-01-01 PROCEDURE — 99233 SBSQ HOSP IP/OBS HIGH 50: CPT | Performed by: STUDENT IN AN ORGANIZED HEALTH CARE EDUCATION/TRAINING PROGRAM

## 2024-01-01 PROCEDURE — 25810000003 SODIUM CHLORIDE 0.9 % SOLUTION 250 ML FLEX CONT: Performed by: STUDENT IN AN ORGANIZED HEALTH CARE EDUCATION/TRAINING PROGRAM

## 2024-01-01 PROCEDURE — 84134 ASSAY OF PREALBUMIN: CPT | Performed by: NURSE PRACTITIONER

## 2024-01-01 PROCEDURE — 25010000002 CEFEPIME PER 500 MG: Performed by: STUDENT IN AN ORGANIZED HEALTH CARE EDUCATION/TRAINING PROGRAM

## 2024-01-01 PROCEDURE — 82330 ASSAY OF CALCIUM: CPT | Performed by: PHYSICIAN ASSISTANT

## 2024-01-01 PROCEDURE — 87154 CUL TYP ID BLD PTHGN 6+ TRGT: CPT | Performed by: STUDENT IN AN ORGANIZED HEALTH CARE EDUCATION/TRAINING PROGRAM

## 2024-01-01 PROCEDURE — 25010000002: Performed by: NURSE PRACTITIONER

## 2024-01-01 PROCEDURE — 83880 ASSAY OF NATRIURETIC PEPTIDE: CPT | Performed by: NURSE PRACTITIONER

## 2024-01-01 PROCEDURE — 86900 BLOOD TYPING SEROLOGIC ABO: CPT | Performed by: NURSE PRACTITIONER

## 2024-01-01 PROCEDURE — 63710000001 INSULIN REGULAR HUMAN PER 5 UNITS: Performed by: THORACIC SURGERY (CARDIOTHORACIC VASCULAR SURGERY)

## 2024-01-01 PROCEDURE — 75574 CT ANGIO HRT W/3D IMAGE: CPT | Performed by: INTERNAL MEDICINE

## 2024-01-01 PROCEDURE — 80053 COMPREHEN METABOLIC PANEL: CPT | Performed by: PHYSICIAN ASSISTANT

## 2024-01-01 PROCEDURE — 25010000002 PENICILLIN G POTASSIUM PER 600000 UNITS

## 2024-01-01 PROCEDURE — 93010 ELECTROCARDIOGRAM REPORT: CPT | Performed by: INTERNAL MEDICINE

## 2024-01-01 PROCEDURE — 93880 EXTRACRANIAL BILAT STUDY: CPT | Performed by: SURGERY

## 2024-01-01 PROCEDURE — 93320 DOPPLER ECHO COMPLETE: CPT | Performed by: INTERNAL MEDICINE

## 2024-01-01 PROCEDURE — 93018 CV STRESS TEST I&R ONLY: CPT | Performed by: INTERNAL MEDICINE

## 2024-01-01 PROCEDURE — 25010000002 MAGNESIUM SULFATE 2 GM/50ML SOLUTION

## 2024-01-01 PROCEDURE — 70450 CT HEAD/BRAIN W/O DYE: CPT

## 2024-01-01 PROCEDURE — 99223 1ST HOSP IP/OBS HIGH 75: CPT | Performed by: THORACIC SURGERY (CARDIOTHORACIC VASCULAR SURGERY)

## 2024-01-01 PROCEDURE — 83036 HEMOGLOBIN GLYCOSYLATED A1C: CPT | Performed by: PHYSICIAN ASSISTANT

## 2024-01-01 PROCEDURE — 87070 CULTURE OTHR SPECIMN AEROBIC: CPT | Performed by: STUDENT IN AN ORGANIZED HEALTH CARE EDUCATION/TRAINING PROGRAM

## 2024-01-01 PROCEDURE — 97530 THERAPEUTIC ACTIVITIES: CPT

## 2024-01-01 PROCEDURE — 25010000002 MICAFUNGIN SODIUM 100 MG RECONSTITUTED SOLUTION 1 EACH VIAL: Performed by: INTERNAL MEDICINE

## 2024-01-01 PROCEDURE — P9047 ALBUMIN (HUMAN), 25%, 50ML: HCPCS | Performed by: NURSE PRACTITIONER

## 2024-01-01 PROCEDURE — 25010000002 ALBUMIN HUMAN 25% PER 50 ML: Performed by: PHYSICIAN ASSISTANT

## 2024-01-01 PROCEDURE — 25010000002 MIDAZOLAM PER 1 MG: Performed by: INTERNAL MEDICINE

## 2024-01-01 PROCEDURE — 25010000002 CEFAZOLIN PER 500 MG: Performed by: THORACIC SURGERY (CARDIOTHORACIC VASCULAR SURGERY)

## 2024-01-01 PROCEDURE — 84460 ALANINE AMINO (ALT) (SGPT): CPT | Performed by: THORACIC SURGERY (CARDIOTHORACIC VASCULAR SURGERY)

## 2024-01-01 PROCEDURE — 25010000002 DIGOXIN PER 500 MCG: Performed by: INTERNAL MEDICINE

## 2024-01-01 PROCEDURE — 93312 ECHO TRANSESOPHAGEAL: CPT | Performed by: INTERNAL MEDICINE

## 2024-01-01 PROCEDURE — 78452 HT MUSCLE IMAGE SPECT MULT: CPT | Performed by: INTERNAL MEDICINE

## 2024-01-01 PROCEDURE — 5A02210 ASSISTANCE WITH CARDIAC OUTPUT USING BALLOON PUMP, CONTINUOUS: ICD-10-PCS | Performed by: THORACIC SURGERY (CARDIOTHORACIC VASCULAR SURGERY)

## 2024-01-01 PROCEDURE — 99215 OFFICE O/P EST HI 40 MIN: CPT | Performed by: STUDENT IN AN ORGANIZED HEALTH CARE EDUCATION/TRAINING PROGRAM

## 2024-01-01 PROCEDURE — 80048 BASIC METABOLIC PNL TOTAL CA: CPT | Performed by: STUDENT IN AN ORGANIZED HEALTH CARE EDUCATION/TRAINING PROGRAM

## 2024-01-01 PROCEDURE — C1768 GRAFT, VASCULAR: HCPCS | Performed by: THORACIC SURGERY (CARDIOTHORACIC VASCULAR SURGERY)

## 2024-01-01 PROCEDURE — 25010000002 PHENYLEPHRINE 10 MG/ML SOLUTION 5 ML VIAL: Performed by: THORACIC SURGERY (CARDIOTHORACIC VASCULAR SURGERY)

## 2024-01-01 PROCEDURE — 25010000002 MORPHINE PER 10 MG: Performed by: THORACIC SURGERY (CARDIOTHORACIC VASCULAR SURGERY)

## 2024-01-01 PROCEDURE — 84550 ASSAY OF BLOOD/URIC ACID: CPT

## 2024-01-01 PROCEDURE — 85014 HEMATOCRIT: CPT | Performed by: THORACIC SURGERY (CARDIOTHORACIC VASCULAR SURGERY)

## 2024-01-01 PROCEDURE — 93005 ELECTROCARDIOGRAM TRACING: CPT | Performed by: PHYSICIAN ASSISTANT

## 2024-01-01 PROCEDURE — 25010000002 EPINEPHRINE 1 MG/ML SOLUTION 30 ML VIAL: Performed by: THORACIC SURGERY (CARDIOTHORACIC VASCULAR SURGERY)

## 2024-01-01 PROCEDURE — 85610 PROTHROMBIN TIME: CPT | Performed by: PHYSICIAN ASSISTANT

## 2024-01-01 PROCEDURE — 86860 RBC ANTIBODY ELUTION: CPT | Performed by: NURSE PRACTITIONER

## 2024-01-01 PROCEDURE — 87205 SMEAR GRAM STAIN: CPT | Performed by: STUDENT IN AN ORGANIZED HEALTH CARE EDUCATION/TRAINING PROGRAM

## 2024-01-01 PROCEDURE — 25010000002 LORAZEPAM PER 2 MG: Performed by: NURSE PRACTITIONER

## 2024-01-01 PROCEDURE — 93880 EXTRACRANIAL BILAT STUDY: CPT

## 2024-01-01 PROCEDURE — P9012 CRYOPRECIPITATE EACH UNIT: HCPCS

## 2024-01-01 PROCEDURE — 85610 PROTHROMBIN TIME: CPT | Performed by: THORACIC SURGERY (CARDIOTHORACIC VASCULAR SURGERY)

## 2024-01-01 PROCEDURE — 71250 CT THORAX DX C-: CPT

## 2024-01-01 PROCEDURE — 33967 INSERT I-AORT PERCUT DEVICE: CPT

## 2024-01-01 PROCEDURE — 25010000002 PHENYLEPHRINE 10 MG/ML SOLUTION 5 ML VIAL: Performed by: NURSE PRACTITIONER

## 2024-01-01 PROCEDURE — 82330 ASSAY OF CALCIUM: CPT | Performed by: THORACIC SURGERY (CARDIOTHORACIC VASCULAR SURGERY)

## 2024-01-01 PROCEDURE — 80202 ASSAY OF VANCOMYCIN: CPT | Performed by: STUDENT IN AN ORGANIZED HEALTH CARE EDUCATION/TRAINING PROGRAM

## 2024-01-01 PROCEDURE — 25810000003 SODIUM CHLORIDE 0.9 % SOLUTION: Performed by: NURSE PRACTITIONER

## 2024-01-01 PROCEDURE — 87070 CULTURE OTHR SPECIMN AEROBIC: CPT | Performed by: NURSE PRACTITIONER

## 2024-01-01 PROCEDURE — 87086 URINE CULTURE/COLONY COUNT: CPT | Performed by: NURSE PRACTITIONER

## 2024-01-01 PROCEDURE — 85610 PROTHROMBIN TIME: CPT

## 2024-01-01 PROCEDURE — 86870 RBC ANTIBODY IDENTIFICATION: CPT | Performed by: NURSE PRACTITIONER

## 2024-01-01 PROCEDURE — 25010000002 ONDANSETRON PER 1 MG: Performed by: NURSE PRACTITIONER

## 2024-01-01 PROCEDURE — 5A1D70Z PERFORMANCE OF URINARY FILTRATION, INTERMITTENT, LESS THAN 6 HOURS PER DAY: ICD-10-PCS | Performed by: INTERNAL MEDICINE

## 2024-01-01 PROCEDURE — 85025 COMPLETE CBC W/AUTO DIFF WBC: CPT | Performed by: PHYSICIAN ASSISTANT

## 2024-01-01 PROCEDURE — C1751 CATH, INF, PER/CENT/MIDLINE: HCPCS | Performed by: STUDENT IN AN ORGANIZED HEALTH CARE EDUCATION/TRAINING PROGRAM

## 2024-01-01 PROCEDURE — 25010000002 ALBUMIN HUMAN 25% PER 50 ML: Performed by: NURSE PRACTITIONER

## 2024-01-01 PROCEDURE — 76376 3D RENDER W/INTRP POSTPROCES: CPT | Performed by: INTERNAL MEDICINE

## 2024-01-01 PROCEDURE — 84443 ASSAY THYROID STIM HORMONE: CPT | Performed by: INTERNAL MEDICINE

## 2024-01-01 PROCEDURE — 85007 BL SMEAR W/DIFF WBC COUNT: CPT | Performed by: INTERNAL MEDICINE

## 2024-01-01 PROCEDURE — 21750 REPAIR OF STERNUM SEPARATION: CPT | Performed by: THORACIC SURGERY (CARDIOTHORACIC VASCULAR SURGERY)

## 2024-01-01 PROCEDURE — 82330 ASSAY OF CALCIUM: CPT | Performed by: NURSE PRACTITIONER

## 2024-01-01 PROCEDURE — 97166 OT EVAL MOD COMPLEX 45 MIN: CPT

## 2024-01-01 PROCEDURE — 93017 CV STRESS TEST TRACING ONLY: CPT

## 2024-01-01 PROCEDURE — 25010000002 LIDOCAINE 1 % SOLUTION

## 2024-01-01 PROCEDURE — P9041 ALBUMIN (HUMAN),5%, 50ML: HCPCS | Performed by: THORACIC SURGERY (CARDIOTHORACIC VASCULAR SURGERY)

## 2024-01-01 PROCEDURE — 80048 BASIC METABOLIC PNL TOTAL CA: CPT

## 2024-01-01 PROCEDURE — 83735 ASSAY OF MAGNESIUM: CPT | Performed by: PHYSICIAN ASSISTANT

## 2024-01-01 PROCEDURE — 25010000002 GLYCOPYRROLATE 0.2 MG/ML SOLUTION: Performed by: NURSE PRACTITIONER

## 2024-01-01 PROCEDURE — 84100 ASSAY OF PHOSPHORUS: CPT | Performed by: THORACIC SURGERY (CARDIOTHORACIC VASCULAR SURGERY)

## 2024-01-01 PROCEDURE — 33426 REPAIR OF MITRAL VALVE: CPT | Performed by: THORACIC SURGERY (CARDIOTHORACIC VASCULAR SURGERY)

## 2024-01-01 PROCEDURE — 85025 COMPLETE CBC W/AUTO DIFF WBC: CPT

## 2024-01-01 PROCEDURE — 80061 LIPID PANEL: CPT | Performed by: PHYSICIAN ASSISTANT

## 2024-01-01 PROCEDURE — 02HV33Z INSERTION OF INFUSION DEVICE INTO SUPERIOR VENA CAVA, PERCUTANEOUS APPROACH: ICD-10-PCS | Performed by: HOSPITALIST

## 2024-01-01 PROCEDURE — 0B110F4 BYPASS TRACHEA TO CUTANEOUS WITH TRACHEOSTOMY DEVICE, OPEN APPROACH: ICD-10-PCS | Performed by: THORACIC SURGERY (CARDIOTHORACIC VASCULAR SURGERY)

## 2024-01-01 PROCEDURE — 99223 1ST HOSP IP/OBS HIGH 75: CPT

## 2024-01-01 PROCEDURE — 33530 CORONARY ARTERY BYPASS/REOP: CPT | Performed by: THORACIC SURGERY (CARDIOTHORACIC VASCULAR SURGERY)

## 2024-01-01 PROCEDURE — 76882 US LMTD JT/FCL EVL NVASC XTR: CPT

## 2024-01-01 PROCEDURE — 94002 VENT MGMT INPAT INIT DAY: CPT

## 2024-01-01 PROCEDURE — 5A1221Z PERFORMANCE OF CARDIAC OUTPUT, CONTINUOUS: ICD-10-PCS | Performed by: THORACIC SURGERY (CARDIOTHORACIC VASCULAR SURGERY)

## 2024-01-01 PROCEDURE — 25010000002 POTASSIUM CHLORIDE PER 2 MEQ: Performed by: INTERNAL MEDICINE

## 2024-01-01 PROCEDURE — 25010000002 CALCIUM GLUCONATE-NACL 1-0.675 GM/50ML-% SOLUTION: Performed by: THORACIC SURGERY (CARDIOTHORACIC VASCULAR SURGERY)

## 2024-01-01 PROCEDURE — 25010000002 BUMETANIDE PER 0.5 MG: Performed by: THORACIC SURGERY (CARDIOTHORACIC VASCULAR SURGERY)

## 2024-01-01 PROCEDURE — 25010000002 ACETAMINOPHEN 10 MG/ML SOLUTION: Performed by: NURSE PRACTITIONER

## 2024-01-01 PROCEDURE — 87205 SMEAR GRAM STAIN: CPT | Performed by: PHYSICIAN ASSISTANT

## 2024-01-01 PROCEDURE — 33510 CABG VEIN SINGLE: CPT

## 2024-01-01 PROCEDURE — 25010000003 DEXTROSE 5 % SOLUTION: Performed by: INTERNAL MEDICINE

## 2024-01-01 PROCEDURE — 25010000002 MICAFUNGIN SODIUM 100 MG RECONSTITUTED SOLUTION 1 EACH VIAL: Performed by: STUDENT IN AN ORGANIZED HEALTH CARE EDUCATION/TRAINING PROGRAM

## 2024-01-01 PROCEDURE — 99214 OFFICE O/P EST MOD 30 MIN: CPT | Performed by: INTERNAL MEDICINE

## 2024-01-01 PROCEDURE — C1751 CATH, INF, PER/CENT/MIDLINE: HCPCS

## 2024-01-01 PROCEDURE — 25010000002 BUMETANIDE PER 0.5 MG: Performed by: NURSE PRACTITIONER

## 2024-01-01 PROCEDURE — 93312 ECHO TRANSESOPHAGEAL: CPT

## 2024-01-01 PROCEDURE — 85730 THROMBOPLASTIN TIME PARTIAL: CPT | Performed by: NURSE PRACTITIONER

## 2024-01-01 PROCEDURE — 25010000002 VANCOMYCIN 10 G RECONSTITUTED SOLUTION: Performed by: NURSE PRACTITIONER

## 2024-01-01 PROCEDURE — 25010000002 POTASSIUM CHLORIDE 10 MEQ/100ML SOLUTION: Performed by: THORACIC SURGERY (CARDIOTHORACIC VASCULAR SURGERY)

## 2024-01-01 PROCEDURE — 99152 MOD SED SAME PHYS/QHP 5/>YRS: CPT

## 2024-01-01 PROCEDURE — 83735 ASSAY OF MAGNESIUM: CPT | Performed by: STUDENT IN AN ORGANIZED HEALTH CARE EDUCATION/TRAINING PROGRAM

## 2024-01-01 PROCEDURE — 84439 ASSAY OF FREE THYROXINE: CPT | Performed by: INTERNAL MEDICINE

## 2024-01-01 PROCEDURE — 85576 BLOOD PLATELET AGGREGATION: CPT

## 2024-01-01 PROCEDURE — 87176 TISSUE HOMOGENIZATION CULTR: CPT | Performed by: THORACIC SURGERY (CARDIOTHORACIC VASCULAR SURGERY)

## 2024-01-01 PROCEDURE — 25010000002 LIDOCAINE PER 10 MG: Performed by: PHYSICIAN ASSISTANT

## 2024-01-01 PROCEDURE — 25010000003 DEXTROSE 5 % SOLUTION: Performed by: HOSPITALIST

## 2024-01-01 PROCEDURE — 33863 ASCENDING AORTIC GRAFT: CPT | Performed by: THORACIC SURGERY (CARDIOTHORACIC VASCULAR SURGERY)

## 2024-01-01 PROCEDURE — 25010000002 ALBUMIN HUMAN 5% PER 50 ML: Performed by: NURSE PRACTITIONER

## 2024-01-01 PROCEDURE — 82803 BLOOD GASES ANY COMBINATION: CPT | Performed by: THORACIC SURGERY (CARDIOTHORACIC VASCULAR SURGERY)

## 2024-01-01 PROCEDURE — 93926 LOWER EXTREMITY STUDY: CPT | Performed by: SURGERY

## 2024-01-01 PROCEDURE — 93005 ELECTROCARDIOGRAM TRACING: CPT

## 2024-01-01 PROCEDURE — 5A1955Z RESPIRATORY VENTILATION, GREATER THAN 96 CONSECUTIVE HOURS: ICD-10-PCS | Performed by: INTERNAL MEDICINE

## 2024-01-01 PROCEDURE — 93356 MYOCRD STRAIN IMG SPCKL TRCK: CPT | Performed by: STUDENT IN AN ORGANIZED HEALTH CARE EDUCATION/TRAINING PROGRAM

## 2024-01-01 PROCEDURE — 25010000002 AMIODARONE IN DEXTROSE 5% 150-4.21 MG/100ML-% SOLUTION: Performed by: PHYSICIAN ASSISTANT

## 2024-01-01 PROCEDURE — 75574 CT ANGIO HRT W/3D IMAGE: CPT

## 2024-01-01 PROCEDURE — 93926 LOWER EXTREMITY STUDY: CPT

## 2024-01-01 PROCEDURE — 25810000003 SODIUM CHLORIDE 0.9 % SOLUTION: Performed by: STUDENT IN AN ORGANIZED HEALTH CARE EDUCATION/TRAINING PROGRAM

## 2024-01-01 PROCEDURE — 83605 ASSAY OF LACTIC ACID: CPT | Performed by: INTERNAL MEDICINE

## 2024-01-01 PROCEDURE — 33863 ASCENDING AORTIC GRAFT: CPT

## 2024-01-01 PROCEDURE — 85049 AUTOMATED PLATELET COUNT: CPT | Performed by: THORACIC SURGERY (CARDIOTHORACIC VASCULAR SURGERY)

## 2024-01-01 PROCEDURE — 25010000002 METOCLOPRAMIDE PER 10 MG: Performed by: NURSE PRACTITIONER

## 2024-01-01 PROCEDURE — 25010000002 MICAFUNGIN SODIUM 100 MG RECONSTITUTED SOLUTION 1 EACH VIAL: Performed by: THORACIC SURGERY (CARDIOTHORACIC VASCULAR SURGERY)

## 2024-01-01 PROCEDURE — 81001 URINALYSIS AUTO W/SCOPE: CPT | Performed by: PHYSICIAN ASSISTANT

## 2024-01-01 PROCEDURE — 87102 FUNGUS ISOLATION CULTURE: CPT | Performed by: THORACIC SURGERY (CARDIOTHORACIC VASCULAR SURGERY)

## 2024-01-01 PROCEDURE — 82248 BILIRUBIN DIRECT: CPT | Performed by: INTERNAL MEDICINE

## 2024-01-01 PROCEDURE — 85384 FIBRINOGEN ACTIVITY: CPT | Performed by: NURSE PRACTITIONER

## 2024-01-01 PROCEDURE — 25010000002 HYDRALAZINE PER 20 MG

## 2024-01-01 PROCEDURE — 87206 SMEAR FLUORESCENT/ACID STAI: CPT | Performed by: THORACIC SURGERY (CARDIOTHORACIC VASCULAR SURGERY)

## 2024-01-01 PROCEDURE — 33510 CABG VEIN SINGLE: CPT | Performed by: THORACIC SURGERY (CARDIOTHORACIC VASCULAR SURGERY)

## 2024-01-01 PROCEDURE — 94640 AIRWAY INHALATION TREATMENT: CPT

## 2024-01-01 PROCEDURE — 25510000001 PERFLUTREN 6.52 MG/ML SUSPENSION 2 ML VIAL: Performed by: THORACIC SURGERY (CARDIOTHORACIC VASCULAR SURGERY)

## 2024-01-01 PROCEDURE — 93325 DOPPLER ECHO COLOR FLOW MAPG: CPT | Performed by: STUDENT IN AN ORGANIZED HEALTH CARE EDUCATION/TRAINING PROGRAM

## 2024-01-01 PROCEDURE — 0W9B3ZZ DRAINAGE OF LEFT PLEURAL CAVITY, PERCUTANEOUS APPROACH: ICD-10-PCS | Performed by: THORACIC SURGERY (CARDIOTHORACIC VASCULAR SURGERY)

## 2024-01-01 PROCEDURE — 21750 REPAIR OF STERNUM SEPARATION: CPT | Performed by: SPECIALIST/TECHNOLOGIST, OTHER

## 2024-01-01 PROCEDURE — 85730 THROMBOPLASTIN TIME PARTIAL: CPT | Performed by: THORACIC SURGERY (CARDIOTHORACIC VASCULAR SURGERY)

## 2024-01-01 PROCEDURE — 25010000002 FENTANYL CITRATE (PF) 50 MCG/ML SOLUTION: Performed by: ANESTHESIOLOGY

## 2024-01-01 PROCEDURE — 84300 ASSAY OF URINE SODIUM: CPT

## 2024-01-01 PROCEDURE — 25010000002 MIDAZOLAM PER 1 MG: Performed by: ANESTHESIOLOGY

## 2024-01-01 PROCEDURE — 85018 HEMOGLOBIN: CPT | Performed by: THORACIC SURGERY (CARDIOTHORACIC VASCULAR SURGERY)

## 2024-01-01 PROCEDURE — 71046 X-RAY EXAM CHEST 2 VIEWS: CPT

## 2024-01-01 PROCEDURE — 33426 REPAIR OF MITRAL VALVE: CPT

## 2024-01-01 PROCEDURE — 36600 WITHDRAWAL OF ARTERIAL BLOOD: CPT | Performed by: NURSE PRACTITIONER

## 2024-01-01 PROCEDURE — 87186 SC STD MICRODIL/AGAR DIL: CPT | Performed by: STUDENT IN AN ORGANIZED HEALTH CARE EDUCATION/TRAINING PROGRAM

## 2024-01-01 PROCEDURE — 25010000002 POTASSIUM CHLORIDE PER 2 MEQ: Performed by: PHYSICIAN ASSISTANT

## 2024-01-01 PROCEDURE — 87641 MR-STAPH DNA AMP PROBE: CPT | Performed by: STUDENT IN AN ORGANIZED HEALTH CARE EDUCATION/TRAINING PROGRAM

## 2024-01-01 PROCEDURE — 88300 SURGICAL PATH GROSS: CPT | Performed by: THORACIC SURGERY (CARDIOTHORACIC VASCULAR SURGERY)

## 2024-01-01 PROCEDURE — 97162 PT EVAL MOD COMPLEX 30 MIN: CPT

## 2024-01-01 PROCEDURE — 25510000001 IOPAMIDOL PER 1 ML: Performed by: THORACIC SURGERY (CARDIOTHORACIC VASCULAR SURGERY)

## 2024-01-01 PROCEDURE — 87070 CULTURE OTHR SPECIMN AEROBIC: CPT | Performed by: PHYSICIAN ASSISTANT

## 2024-01-01 PROCEDURE — 31600 PLANNED TRACHEOSTOMY: CPT | Performed by: THORACIC SURGERY (CARDIOTHORACIC VASCULAR SURGERY)

## 2024-01-01 PROCEDURE — 93321 DOPPLER ECHO F-UP/LMTD STD: CPT | Performed by: STUDENT IN AN ORGANIZED HEALTH CARE EDUCATION/TRAINING PROGRAM

## 2024-01-01 PROCEDURE — 85025 COMPLETE CBC W/AUTO DIFF WBC: CPT | Performed by: THORACIC SURGERY (CARDIOTHORACIC VASCULAR SURGERY)

## 2024-01-01 PROCEDURE — P9059 PLASMA, FRZ BETWEEN 8-24HOUR: HCPCS

## 2024-01-01 DEVICE — INCISIONLINE PLEDGET SFT 133X25.5MM: Type: IMPLANTABLE DEVICE | Site: HEART | Status: FUNCTIONAL

## 2024-01-01 DEVICE — SS SUTURE, 3 PER SLEEVE
Type: IMPLANTABLE DEVICE | Site: STERNUM | Status: FUNCTIONAL
Brand: MYO/WIRE II

## 2024-01-01 DEVICE — COR-KNOT MINI® COMBO KITBASE PACKAGE TYPE - KITEACH STERILE PACKAGE KIT CONTAINS (2) SINGLE PATIENT USE COR-KNOT MINI® DEVICES AND (12) COR-KNOT® QUICK LOADS®.
Type: IMPLANTABLE DEVICE | Site: HEART | Status: FUNCTIONAL
Brand: COR-KNOT MINI®

## 2024-01-01 DEVICE — PTCH VASC XENOSURE PLS BIO 1X6CM: Type: IMPLANTABLE DEVICE | Site: HEART | Status: FUNCTIONAL

## 2024-01-01 DEVICE — ABSORBABLE HEMOSTAT (OXIDIZED REGENERATED CELLULOSE)
Type: IMPLANTABLE DEVICE | Site: HEART | Status: FUNCTIONAL
Brand: SURGICEL NU-KNIT

## 2024-01-01 DEVICE — FLOSEAL WITH RECOTHROM - 5ML
Type: IMPLANTABLE DEVICE | Site: HEART | Status: FUNCTIONAL
Brand: FLOSEAL HEMOSTATIC MATRIX

## 2024-01-01 DEVICE — ABSORBABLE HEMOSTAT (OXIDIZED REGENERATED CELLULOSE)
Type: IMPLANTABLE DEVICE | Site: CHEST WALL | Status: FUNCTIONAL
Brand: SURGICEL

## 2024-01-01 DEVICE — SS SUTURE, 4 PER SLEEVE
Type: IMPLANTABLE DEVICE | Site: STERNUM | Status: FUNCTIONAL
Brand: MYO/WIRE II

## 2024-01-01 DEVICE — SEALANT HEMO SURG COSEAL PREMIX 8ML: Type: IMPLANTABLE DEVICE | Site: CHEST | Status: FUNCTIONAL

## 2024-01-01 DEVICE — CLIP LIGAT VASC HORIZON TI SM/WD RED 24CT: Type: IMPLANTABLE DEVICE | Site: CHEST | Status: FUNCTIONAL

## 2024-01-01 DEVICE — IMPLANTABLE DEVICE: Type: IMPLANTABLE DEVICE | Site: HEART | Status: FUNCTIONAL

## 2024-01-01 DEVICE — HEMOST ABS SURGIFOAM SZ100 8X12 10MM: Type: IMPLANTABLE DEVICE | Site: HEART | Status: FUNCTIONAL

## 2024-01-01 RX ORDER — PANTOPRAZOLE SODIUM 40 MG/10ML
40 INJECTION, POWDER, LYOPHILIZED, FOR SOLUTION INTRAVENOUS ONCE
Status: DISCONTINUED | OUTPATIENT
Start: 2024-01-01 | End: 2024-01-01

## 2024-01-01 RX ORDER — HYDROMORPHONE HYDROCHLORIDE 1 MG/ML
0.5 INJECTION, SOLUTION INTRAMUSCULAR; INTRAVENOUS; SUBCUTANEOUS
Status: DISCONTINUED | OUTPATIENT
Start: 2024-01-01 | End: 2024-01-01 | Stop reason: HOSPADM

## 2024-01-01 RX ORDER — POTASSIUM CHLORIDE 29.8 MG/ML
20 INJECTION INTRAVENOUS
Status: COMPLETED | OUTPATIENT
Start: 2024-01-01 | End: 2024-01-01

## 2024-01-01 RX ORDER — DEXTROSE MONOHYDRATE 50 MG/ML
50 INJECTION, SOLUTION INTRAVENOUS CONTINUOUS
Status: DISCONTINUED | OUTPATIENT
Start: 2024-01-01 | End: 2024-01-01

## 2024-01-01 RX ORDER — POTASSIUM CHLORIDE 29.8 MG/ML
20 INJECTION INTRAVENOUS ONCE
Status: COMPLETED | OUTPATIENT
Start: 2024-01-01 | End: 2024-01-01

## 2024-01-01 RX ORDER — LORAZEPAM 2 MG/ML
1 INJECTION INTRAMUSCULAR
Status: DISCONTINUED | OUTPATIENT
Start: 2024-01-01 | End: 2024-01-01 | Stop reason: HOSPADM

## 2024-01-01 RX ORDER — GLYCOPYRROLATE 0.2 MG/ML
0.2 INJECTION INTRAMUSCULAR; INTRAVENOUS
Status: DISCONTINUED | OUTPATIENT
Start: 2024-01-01 | End: 2024-01-01 | Stop reason: HOSPADM

## 2024-01-01 RX ORDER — MIDODRINE HYDROCHLORIDE 5 MG/1
15 TABLET ORAL EVERY 8 HOURS
Status: DISCONTINUED | OUTPATIENT
Start: 2024-01-01 | End: 2024-01-01

## 2024-01-01 RX ORDER — HYDROCODONE BITARTRATE AND ACETAMINOPHEN 5; 325 MG/1; MG/1
2 TABLET ORAL EVERY 4 HOURS PRN
Status: DISPENSED | OUTPATIENT
Start: 2024-01-01 | End: 2024-01-01

## 2024-01-01 RX ORDER — ACETAMINOPHEN 325 MG/1
650 TABLET ORAL EVERY 4 HOURS PRN
Status: DISCONTINUED | OUTPATIENT
Start: 2024-01-01 | End: 2024-01-01

## 2024-01-01 RX ORDER — ALBUMIN HUMAN 50 G/1000ML
1500 SOLUTION INTRAVENOUS AS NEEDED
Status: ACTIVE | OUTPATIENT
Start: 2024-01-01 | End: 2024-01-01

## 2024-01-01 RX ORDER — DEXTROSE MONOHYDRATE 25 G/50ML
25 INJECTION, SOLUTION INTRAVENOUS
Status: DISCONTINUED | OUTPATIENT
Start: 2024-01-01 | End: 2024-01-01

## 2024-01-01 RX ORDER — DOPAMINE HYDROCHLORIDE 160 MG/100ML
2-20 INJECTION, SOLUTION INTRAVENOUS CONTINUOUS PRN
Status: DISCONTINUED | OUTPATIENT
Start: 2024-01-01 | End: 2024-01-01

## 2024-01-01 RX ORDER — KETOROLAC TROMETHAMINE 15 MG/ML
15 INJECTION, SOLUTION INTRAMUSCULAR; INTRAVENOUS EVERY 6 HOURS PRN
Status: DISCONTINUED | OUTPATIENT
Start: 2024-01-01 | End: 2024-01-01 | Stop reason: HOSPADM

## 2024-01-01 RX ORDER — POTASSIUM CHLORIDE 1.5 G/1.58G
40 POWDER, FOR SOLUTION ORAL EVERY 4 HOURS
Status: COMPLETED | OUTPATIENT
Start: 2024-01-01 | End: 2024-01-01

## 2024-01-01 RX ORDER — MIDODRINE HYDROCHLORIDE 5 MG/1
10 TABLET ORAL
Status: DISCONTINUED | OUTPATIENT
Start: 2024-01-01 | End: 2024-01-01

## 2024-01-01 RX ORDER — LORAZEPAM 2 MG/ML
2 INJECTION INTRAMUSCULAR
Status: DISCONTINUED | OUTPATIENT
Start: 2024-01-01 | End: 2024-01-01 | Stop reason: HOSPADM

## 2024-01-01 RX ORDER — DEXTROSE MONOHYDRATE 25 G/50ML
10-50 INJECTION, SOLUTION INTRAVENOUS
Status: DISCONTINUED | OUTPATIENT
Start: 2024-01-01 | End: 2024-01-01

## 2024-01-01 RX ORDER — ONDANSETRON 4 MG/1
4 TABLET, ORALLY DISINTEGRATING ORAL EVERY 6 HOURS PRN
Status: DISCONTINUED | OUTPATIENT
Start: 2024-01-01 | End: 2024-01-01

## 2024-01-01 RX ORDER — ACETAMINOPHEN 650 MG/1
650 SUPPOSITORY RECTAL EVERY 4 HOURS PRN
Status: DISCONTINUED | OUTPATIENT
Start: 2024-01-01 | End: 2024-01-01 | Stop reason: HOSPADM

## 2024-01-01 RX ORDER — SODIUM BICARBONATE 650 MG/1
650 TABLET ORAL 2 TIMES DAILY
Status: COMPLETED | OUTPATIENT
Start: 2024-01-01 | End: 2024-01-01

## 2024-01-01 RX ORDER — SILDENAFIL CITRATE 20 MG/1
20 TABLET ORAL 3 TIMES DAILY
Status: DISCONTINUED | OUTPATIENT
Start: 2024-01-01 | End: 2024-01-01

## 2024-01-01 RX ORDER — NICOTINE POLACRILEX 4 MG
15 LOZENGE BUCCAL
Status: DISCONTINUED | OUTPATIENT
Start: 2024-01-01 | End: 2024-01-01

## 2024-01-01 RX ORDER — METOPROLOL TARTRATE 50 MG
200 TABLET ORAL ONCE
OUTPATIENT
Start: 2024-01-01 | End: 2024-01-01

## 2024-01-01 RX ORDER — SODIUM CHLORIDE 0.9 % (FLUSH) 0.9 %
10 SYRINGE (ML) INJECTION EVERY 12 HOURS SCHEDULED
Status: DISCONTINUED | OUTPATIENT
Start: 2024-01-01 | End: 2024-01-01

## 2024-01-01 RX ORDER — POTASSIUM CHLORIDE 29.8 MG/ML
20 INJECTION INTRAVENOUS 3 TIMES DAILY
Status: DISCONTINUED | OUTPATIENT
Start: 2024-01-01 | End: 2024-01-01

## 2024-01-01 RX ORDER — MAGNESIUM SULFATE HEPTAHYDRATE 40 MG/ML
2 INJECTION, SOLUTION INTRAVENOUS ONCE
Status: COMPLETED | OUTPATIENT
Start: 2024-01-01 | End: 2024-01-01

## 2024-01-01 RX ORDER — CALCIUM GLUCONATE 20 MG/ML
2000 INJECTION, SOLUTION INTRAVENOUS ONCE
Status: COMPLETED | OUTPATIENT
Start: 2024-01-01 | End: 2024-01-01

## 2024-01-01 RX ORDER — LIDOCAINE HYDROCHLORIDE 20 MG/ML
SOLUTION OROPHARYNGEAL
Status: COMPLETED | OUTPATIENT
Start: 2024-01-01 | End: 2024-01-01

## 2024-01-01 RX ORDER — HEPARIN SODIUM 5000 [USP'U]/ML
5000 INJECTION, SOLUTION INTRAVENOUS; SUBCUTANEOUS EVERY 8 HOURS SCHEDULED
Status: DISCONTINUED | OUTPATIENT
Start: 2024-01-01 | End: 2024-01-01

## 2024-01-01 RX ORDER — SODIUM CHLORIDE 9 MG/ML
40 INJECTION, SOLUTION INTRAVENOUS AS NEEDED
Status: ACTIVE | OUTPATIENT
Start: 2024-01-01 | End: 2024-01-01

## 2024-01-01 RX ORDER — ERGOCALCIFEROL 1.25 MG/1
50000 CAPSULE, LIQUID FILLED ORAL
Status: DISCONTINUED | OUTPATIENT
Start: 2024-01-01 | End: 2024-01-01

## 2024-01-01 RX ORDER — METOPROLOL TARTRATE 25 MG/1
12.5 TABLET, FILM COATED ORAL EVERY 12 HOURS SCHEDULED
Status: DISCONTINUED | OUTPATIENT
Start: 2024-01-01 | End: 2024-01-01

## 2024-01-01 RX ORDER — MAGNESIUM SULFATE HEPTAHYDRATE 40 MG/ML
INJECTION, SOLUTION INTRAVENOUS
Status: COMPLETED
Start: 2024-01-01 | End: 2024-01-01

## 2024-01-01 RX ORDER — ASPIRIN 81 MG/1
81 TABLET, CHEWABLE ORAL DAILY
Status: DISCONTINUED | OUTPATIENT
Start: 2024-01-01 | End: 2024-01-01

## 2024-01-01 RX ORDER — CALCIUM CHLORIDE, MAGNESIUM CHLORIDE, SODIUM CHLORIDE, SODIUM BICARBONATE, POTASSIUM CHLORIDE AND SODIUM PHOSPHATE DIBASIC DIHYDRATE 3.68; 3.05; 6.34; 3.09; .314; .187 G/L; G/L; G/L; G/L; G/L; G/L
1500 INJECTION INTRAVENOUS CONTINUOUS
Status: DISCONTINUED | OUTPATIENT
Start: 2024-01-01 | End: 2024-01-01

## 2024-01-01 RX ORDER — DEXMEDETOMIDINE HYDROCHLORIDE 4 UG/ML
.2-1.5 INJECTION, SOLUTION INTRAVENOUS
Status: DISCONTINUED | OUTPATIENT
Start: 2024-01-01 | End: 2024-01-01

## 2024-01-01 RX ORDER — CALCIUM GLUCONATE 20 MG/ML
1000 INJECTION, SOLUTION INTRAVENOUS ONCE
Status: DISCONTINUED | OUTPATIENT
Start: 2024-01-01 | End: 2024-01-01

## 2024-01-01 RX ORDER — BUMETANIDE 0.25 MG/ML
1 INJECTION, SOLUTION INTRAMUSCULAR; INTRAVENOUS ONCE
Status: COMPLETED | OUTPATIENT
Start: 2024-01-01 | End: 2024-01-01

## 2024-01-01 RX ORDER — DIGOXIN 0.25 MG/ML
250 INJECTION INTRAMUSCULAR; INTRAVENOUS ONCE
Status: COMPLETED | OUTPATIENT
Start: 2024-01-01 | End: 2024-01-01

## 2024-01-01 RX ORDER — SODIUM CHLORIDE 0.9 % (FLUSH) 0.9 %
10 SYRINGE (ML) INJECTION AS NEEDED
Status: DISCONTINUED | OUTPATIENT
Start: 2024-01-01 | End: 2024-01-01

## 2024-01-01 RX ORDER — ACETAMINOPHEN 325 MG/1
650 TABLET ORAL EVERY 4 HOURS PRN
Status: DISCONTINUED | OUTPATIENT
Start: 2024-01-01 | End: 2024-01-01 | Stop reason: HOSPADM

## 2024-01-01 RX ORDER — SODIUM CHLORIDE 0.9 % (FLUSH) 0.9 %
10 SYRINGE (ML) INJECTION AS NEEDED
Status: DISCONTINUED | OUTPATIENT
Start: 2024-01-01 | End: 2024-01-01 | Stop reason: HOSPADM

## 2024-01-01 RX ORDER — POTASSIUM CHLORIDE 7.45 MG/ML
10 INJECTION INTRAVENOUS
Status: COMPLETED | OUTPATIENT
Start: 2024-01-01 | End: 2024-01-01

## 2024-01-01 RX ORDER — LISINOPRIL 10 MG/1
10 TABLET ORAL DAILY
Status: DISCONTINUED | OUTPATIENT
Start: 2024-01-01 | End: 2024-01-01

## 2024-01-01 RX ORDER — BUMETANIDE 0.25 MG/ML
4 INJECTION, SOLUTION INTRAMUSCULAR; INTRAVENOUS EVERY 6 HOURS
Status: COMPLETED | OUTPATIENT
Start: 2024-01-01 | End: 2024-01-01

## 2024-01-01 RX ORDER — POTASSIUM CHLORIDE 1.5 G/1.58G
40 POWDER, FOR SOLUTION ORAL 2 TIMES DAILY
Status: DISCONTINUED | OUTPATIENT
Start: 2024-01-01 | End: 2024-01-01

## 2024-01-01 RX ORDER — ACETAMINOPHEN 160 MG/5ML
650 SOLUTION ORAL EVERY 4 HOURS PRN
Status: DISCONTINUED | OUTPATIENT
Start: 2024-01-01 | End: 2024-01-01 | Stop reason: HOSPADM

## 2024-01-01 RX ORDER — MAGNESIUM HYDROXIDE 1200 MG/15ML
LIQUID ORAL AS NEEDED
Status: DISCONTINUED | OUTPATIENT
Start: 2024-01-01 | End: 2024-01-01 | Stop reason: HOSPADM

## 2024-01-01 RX ORDER — HEPARIN SODIUM 1000 [USP'U]/ML
INJECTION, SOLUTION INTRAVENOUS; SUBCUTANEOUS AS NEEDED
Status: DISCONTINUED | OUTPATIENT
Start: 2024-01-01 | End: 2024-01-01 | Stop reason: HOSPADM

## 2024-01-01 RX ORDER — FAMOTIDINE 10 MG/ML
20 INJECTION, SOLUTION INTRAVENOUS ONCE
Status: COMPLETED | OUTPATIENT
Start: 2024-01-01 | End: 2024-01-01

## 2024-01-01 RX ORDER — GLYCOPYRROLATE 0.2 MG/ML
0.4 INJECTION INTRAMUSCULAR; INTRAVENOUS
Status: DISCONTINUED | OUTPATIENT
Start: 2024-01-01 | End: 2024-01-01 | Stop reason: HOSPADM

## 2024-01-01 RX ORDER — ALPRAZOLAM 0.25 MG/1
0.25 TABLET ORAL EVERY 8 HOURS PRN
Status: DISCONTINUED | OUTPATIENT
Start: 2024-01-01 | End: 2024-01-01 | Stop reason: HOSPADM

## 2024-01-01 RX ORDER — POTASSIUM CHLORIDE 1.5 G/1.58G
40 POWDER, FOR SOLUTION ORAL ONCE
Status: COMPLETED | OUTPATIENT
Start: 2024-01-01 | End: 2024-01-01

## 2024-01-01 RX ORDER — BUMETANIDE 0.25 MG/ML
3 INJECTION, SOLUTION INTRAMUSCULAR; INTRAVENOUS EVERY 8 HOURS
Status: DISCONTINUED | OUTPATIENT
Start: 2024-01-01 | End: 2024-01-01

## 2024-01-01 RX ORDER — IVABRADINE 5 MG/1
15 TABLET, FILM COATED ORAL ONCE
OUTPATIENT
Start: 2024-01-01 | End: 2024-01-01

## 2024-01-01 RX ORDER — MORPHINE SULFATE 20 MG/ML
5 SOLUTION ORAL
Status: DISCONTINUED | OUTPATIENT
Start: 2024-01-01 | End: 2024-01-01 | Stop reason: HOSPADM

## 2024-01-01 RX ORDER — ALBUMIN (HUMAN) 12.5 G/50ML
12.5 SOLUTION INTRAVENOUS ONCE
Status: COMPLETED | OUTPATIENT
Start: 2024-01-01 | End: 2024-01-01

## 2024-01-01 RX ORDER — LORAZEPAM 2 MG/ML
0.5 INJECTION INTRAMUSCULAR
Status: DISCONTINUED | OUTPATIENT
Start: 2024-01-01 | End: 2024-01-01 | Stop reason: HOSPADM

## 2024-01-01 RX ORDER — MORPHINE SULFATE 2 MG/ML
1 INJECTION, SOLUTION INTRAMUSCULAR; INTRAVENOUS EVERY 4 HOURS PRN
Status: DISPENSED | OUTPATIENT
Start: 2024-01-01 | End: 2024-01-01

## 2024-01-01 RX ORDER — SENNOSIDES A AND B 8.6 MG/1
2 TABLET, FILM COATED ORAL NIGHTLY PRN
Status: DISCONTINUED | OUTPATIENT
Start: 2024-01-01 | End: 2024-01-01 | Stop reason: HOSPADM

## 2024-01-01 RX ORDER — IOPAMIDOL 755 MG/ML
85 INJECTION, SOLUTION INTRAVASCULAR
Status: COMPLETED | OUTPATIENT
Start: 2024-01-01 | End: 2024-01-01

## 2024-01-01 RX ORDER — CALCIUM CHLORIDE 100 MG/ML
1 INJECTION INTRAVENOUS; INTRAVENTRICULAR ONCE
Status: COMPLETED | OUTPATIENT
Start: 2024-01-01 | End: 2024-01-01

## 2024-01-01 RX ORDER — ATORVASTATIN CALCIUM 20 MG/1
40 TABLET, FILM COATED ORAL NIGHTLY
Status: DISCONTINUED | OUTPATIENT
Start: 2024-01-01 | End: 2024-01-01

## 2024-01-01 RX ORDER — SODIUM CHLORIDE 0.9 % (FLUSH) 0.9 %
20 SYRINGE (ML) INJECTION AS NEEDED
Status: DISCONTINUED | OUTPATIENT
Start: 2024-01-01 | End: 2024-01-01

## 2024-01-01 RX ORDER — MORPHINE SULFATE 20 MG/ML
20 SOLUTION ORAL
Status: DISCONTINUED | OUTPATIENT
Start: 2024-01-01 | End: 2024-01-01 | Stop reason: HOSPADM

## 2024-01-01 RX ORDER — FUROSEMIDE 40 MG/1
40 TABLET ORAL DAILY
Status: DISCONTINUED | OUTPATIENT
Start: 2024-01-01 | End: 2024-01-01

## 2024-01-01 RX ORDER — SENNOSIDES A AND B 8.6 MG/1
2 TABLET, FILM COATED ORAL NIGHTLY
Status: DISCONTINUED | OUTPATIENT
Start: 2024-01-01 | End: 2024-01-01

## 2024-01-01 RX ORDER — PANTOPRAZOLE SODIUM 40 MG/1
40 TABLET, DELAYED RELEASE ORAL
Status: DISCONTINUED | OUTPATIENT
Start: 2024-01-01 | End: 2024-01-01

## 2024-01-01 RX ORDER — ENOXAPARIN SODIUM 100 MG/ML
40 INJECTION SUBCUTANEOUS EVERY 12 HOURS
Status: DISCONTINUED | OUTPATIENT
Start: 2024-01-01 | End: 2024-01-01

## 2024-01-01 RX ORDER — METOPROLOL TARTRATE 50 MG
100 TABLET ORAL ONCE
OUTPATIENT
Start: 2024-01-01

## 2024-01-01 RX ORDER — SACCHAROMYCES BOULARDII 250 MG
250 CAPSULE ORAL 2 TIMES DAILY
Status: DISCONTINUED | OUTPATIENT
Start: 2024-01-01 | End: 2024-01-01

## 2024-01-01 RX ORDER — ACETAMINOPHEN 650 MG/1
650 SUPPOSITORY RECTAL EVERY 4 HOURS
Status: DISPENSED | OUTPATIENT
Start: 2024-01-01 | End: 2024-01-01

## 2024-01-01 RX ORDER — ERGOCALCIFEROL (VITAMIN D2) 200 MCG/ML
100 DROPS ORAL DAILY
Status: DISCONTINUED | OUTPATIENT
Start: 2024-01-01 | End: 2024-01-01

## 2024-01-01 RX ORDER — IPRATROPIUM BROMIDE AND ALBUTEROL SULFATE 2.5; .5 MG/3ML; MG/3ML
3 SOLUTION RESPIRATORY (INHALATION)
Status: DISCONTINUED | OUTPATIENT
Start: 2024-01-01 | End: 2024-01-01

## 2024-01-01 RX ORDER — NITROGLYCERIN 0.4 MG/1
0.4 TABLET SUBLINGUAL
Status: DISCONTINUED | OUTPATIENT
Start: 2024-01-01 | End: 2024-01-01 | Stop reason: HOSPADM

## 2024-01-01 RX ORDER — MORPHINE SULFATE 2 MG/ML
4 INJECTION, SOLUTION INTRAMUSCULAR; INTRAVENOUS
Status: DISCONTINUED | OUTPATIENT
Start: 2024-01-01 | End: 2024-01-01 | Stop reason: HOSPADM

## 2024-01-01 RX ORDER — ALBUMIN (HUMAN) 12.5 G/50ML
12.5 SOLUTION INTRAVENOUS AS NEEDED
Status: CANCELLED | OUTPATIENT
Start: 2024-01-01 | End: 2024-01-01

## 2024-01-01 RX ORDER — METOPROLOL TARTRATE 50 MG
50 TABLET ORAL ONCE
OUTPATIENT
Start: 2024-01-01

## 2024-01-01 RX ORDER — DIGOXIN 0.25 MG/ML
500 INJECTION INTRAMUSCULAR; INTRAVENOUS ONCE
Status: COMPLETED | OUTPATIENT
Start: 2024-01-01 | End: 2024-01-01

## 2024-01-01 RX ORDER — MORPHINE SULFATE 2 MG/ML
6 INJECTION, SOLUTION INTRAMUSCULAR; INTRAVENOUS
Status: DISCONTINUED | OUTPATIENT
Start: 2024-01-01 | End: 2024-01-01 | Stop reason: HOSPADM

## 2024-01-01 RX ORDER — SODIUM CHLORIDE 9 MG/ML
40 INJECTION, SOLUTION INTRAVENOUS AS NEEDED
Status: DISCONTINUED | OUTPATIENT
Start: 2024-01-01 | End: 2024-01-01

## 2024-01-01 RX ORDER — DIPHENOXYLATE HYDROCHLORIDE AND ATROPINE SULFATE 2.5; .025 MG/1; MG/1
1 TABLET ORAL
Status: DISCONTINUED | OUTPATIENT
Start: 2024-01-01 | End: 2024-01-01 | Stop reason: HOSPADM

## 2024-01-01 RX ORDER — LORAZEPAM 2 MG/ML
0.5 CONCENTRATE ORAL
Status: DISCONTINUED | OUTPATIENT
Start: 2024-01-01 | End: 2024-01-01 | Stop reason: HOSPADM

## 2024-01-01 RX ORDER — LIDOCAINE HYDROCHLORIDE 10 MG/ML
0.5 INJECTION, SOLUTION INFILTRATION; PERINEURAL ONCE AS NEEDED
Status: DISCONTINUED | OUTPATIENT
Start: 2024-01-01 | End: 2024-01-01 | Stop reason: HOSPADM

## 2024-01-01 RX ORDER — MILRINONE LACTATE 1 MG/ML
5 VIAL (ML) INTRAVENOUS
Status: DISCONTINUED | OUTPATIENT
Start: 2024-01-01 | End: 2024-01-01

## 2024-01-01 RX ORDER — BUMETANIDE 0.25 MG/ML
2 INJECTION, SOLUTION INTRAMUSCULAR; INTRAVENOUS EVERY 8 HOURS
Status: COMPLETED | OUTPATIENT
Start: 2024-01-01 | End: 2024-01-01

## 2024-01-01 RX ORDER — POTASSIUM CHLORIDE 1.5 G/1.58G
20 POWDER, FOR SOLUTION ORAL ONCE
Status: COMPLETED | OUTPATIENT
Start: 2024-01-01 | End: 2024-01-01

## 2024-01-01 RX ORDER — HYDRALAZINE HYDROCHLORIDE 20 MG/ML
20 INJECTION INTRAMUSCULAR; INTRAVENOUS EVERY 6 HOURS PRN
Status: DISCONTINUED | OUTPATIENT
Start: 2024-01-01 | End: 2024-01-01

## 2024-01-01 RX ORDER — BISACODYL 10 MG
10 SUPPOSITORY, RECTAL RECTAL DAILY PRN
Status: DISCONTINUED | OUTPATIENT
Start: 2024-01-01 | End: 2024-01-01 | Stop reason: HOSPADM

## 2024-01-01 RX ORDER — POTASSIUM CHLORIDE 1.5 G/1.58G
40 POWDER, FOR SOLUTION ORAL EVERY 4 HOURS
Status: DISCONTINUED | OUTPATIENT
Start: 2024-01-01 | End: 2024-01-01

## 2024-01-01 RX ORDER — METOPROLOL TARTRATE 1 MG/ML
5 INJECTION, SOLUTION INTRAVENOUS
OUTPATIENT
Start: 2024-01-01

## 2024-01-01 RX ORDER — IBUPROFEN 600 MG/1
1 TABLET ORAL
Status: DISCONTINUED | OUTPATIENT
Start: 2024-01-01 | End: 2024-01-01

## 2024-01-01 RX ORDER — ASPIRIN 81 MG/1
81 TABLET ORAL DAILY
Status: DISCONTINUED | OUTPATIENT
Start: 2024-01-01 | End: 2024-01-01

## 2024-01-01 RX ORDER — IPRATROPIUM BROMIDE AND ALBUTEROL SULFATE 2.5; .5 MG/3ML; MG/3ML
SOLUTION RESPIRATORY (INHALATION)
Status: COMPLETED
Start: 2024-01-01 | End: 2024-01-01

## 2024-01-01 RX ORDER — POTASSIUM CHLORIDE 29.8 MG/ML
20 INJECTION INTRAVENOUS EVERY 4 HOURS
Status: COMPLETED | OUTPATIENT
Start: 2024-01-01 | End: 2024-01-01

## 2024-01-01 RX ORDER — AMIODARONE HYDROCHLORIDE 200 MG/1
400 TABLET ORAL
Status: DISCONTINUED | OUTPATIENT
Start: 2024-01-01 | End: 2024-01-01

## 2024-01-01 RX ORDER — MEPERIDINE HYDROCHLORIDE 25 MG/ML
25 INJECTION INTRAMUSCULAR; INTRAVENOUS; SUBCUTANEOUS EVERY 4 HOURS PRN
Status: ACTIVE | OUTPATIENT
Start: 2024-01-01 | End: 2024-01-01

## 2024-01-01 RX ORDER — CALCIUM GLUCONATE 20 MG/ML
1000 INJECTION, SOLUTION INTRAVENOUS ONCE
Status: COMPLETED | OUTPATIENT
Start: 2024-01-01 | End: 2024-01-01

## 2024-01-01 RX ORDER — FENTANYL CITRATE 50 UG/ML
INJECTION, SOLUTION INTRAMUSCULAR; INTRAVENOUS
Status: COMPLETED | OUTPATIENT
Start: 2024-01-01 | End: 2024-01-01

## 2024-01-01 RX ORDER — METOPROLOL TARTRATE 25 MG/1
25 TABLET, FILM COATED ORAL ONCE
Status: COMPLETED | OUTPATIENT
Start: 2024-01-01 | End: 2024-01-01

## 2024-01-01 RX ORDER — NOREPINEPHRINE BITARTRATE 0.03 MG/ML
.02-.2 INJECTION, SOLUTION INTRAVENOUS CONTINUOUS PRN
Status: DISCONTINUED | OUTPATIENT
Start: 2024-01-01 | End: 2024-01-01

## 2024-01-01 RX ORDER — LIDOCAINE HYDROCHLORIDE ANHYDROUS AND DEXTROSE MONOHYDRATE 5; 400 G/100ML; MG/100ML
1 INJECTION, SOLUTION INTRAVENOUS CONTINUOUS
Status: DISCONTINUED | OUTPATIENT
Start: 2024-01-01 | End: 2024-01-01

## 2024-01-01 RX ORDER — POLYETHYLENE GLYCOL 3350 17 G/17G
17 POWDER, FOR SOLUTION ORAL DAILY PRN
Status: DISCONTINUED | OUTPATIENT
Start: 2024-01-01 | End: 2024-01-01

## 2024-01-01 RX ORDER — POTASSIUM CHLORIDE 1.5 G/1.58G
40 POWDER, FOR SOLUTION ORAL EVERY 4 HOURS
Status: DISPENSED | OUTPATIENT
Start: 2024-01-01 | End: 2024-01-01

## 2024-01-01 RX ORDER — MIDAZOLAM HYDROCHLORIDE 1 MG/ML
2 INJECTION, SOLUTION INTRAMUSCULAR; INTRAVENOUS
Status: DISCONTINUED | OUTPATIENT
Start: 2024-01-01 | End: 2024-01-01

## 2024-01-01 RX ORDER — SODIUM CHLORIDE 0.9 % (FLUSH) 0.9 %
10 SYRINGE (ML) INJECTION EVERY 12 HOURS SCHEDULED
Status: DISCONTINUED | OUTPATIENT
Start: 2024-01-01 | End: 2024-01-01 | Stop reason: HOSPADM

## 2024-01-01 RX ORDER — PHENYLEPHRINE HCL IN 0.9% NACL 0.5 MG/5ML
.2-2 SYRINGE (ML) INTRAVENOUS CONTINUOUS PRN
Status: DISCONTINUED | OUTPATIENT
Start: 2024-01-01 | End: 2024-01-01

## 2024-01-01 RX ORDER — METOPROLOL TARTRATE 50 MG
150 TABLET ORAL ONCE
OUTPATIENT
Start: 2024-01-01

## 2024-01-01 RX ORDER — MIDAZOLAM HYDROCHLORIDE 1 MG/ML
INJECTION, SOLUTION INTRAMUSCULAR; INTRAVENOUS
Status: COMPLETED | OUTPATIENT
Start: 2024-01-01 | End: 2024-01-01

## 2024-01-01 RX ORDER — BUMETANIDE 0.25 MG/ML
4 INJECTION, SOLUTION INTRAMUSCULAR; INTRAVENOUS ONCE
Status: COMPLETED | OUTPATIENT
Start: 2024-01-01 | End: 2024-01-01

## 2024-01-01 RX ORDER — LORAZEPAM 2 MG/ML
1 CONCENTRATE ORAL
Status: DISCONTINUED | OUTPATIENT
Start: 2024-01-01 | End: 2024-01-01 | Stop reason: HOSPADM

## 2024-01-01 RX ORDER — BISACODYL 10 MG
10 SUPPOSITORY, RECTAL RECTAL DAILY PRN
Status: DISCONTINUED | OUTPATIENT
Start: 2024-01-01 | End: 2024-01-01

## 2024-01-01 RX ORDER — POTASSIUM CHLORIDE 1.5 G/1.58G
40 POWDER, FOR SOLUTION ORAL 3 TIMES DAILY
Status: DISCONTINUED | OUTPATIENT
Start: 2024-01-01 | End: 2024-01-01

## 2024-01-01 RX ORDER — ENOXAPARIN SODIUM 100 MG/ML
40 INJECTION SUBCUTANEOUS EVERY 24 HOURS
Status: DISCONTINUED | OUTPATIENT
Start: 2024-01-01 | End: 2024-01-01

## 2024-01-01 RX ORDER — MIDAZOLAM HYDROCHLORIDE 1 MG/ML
0.5 INJECTION, SOLUTION INTRAMUSCULAR; INTRAVENOUS
Status: DISCONTINUED | OUTPATIENT
Start: 2024-01-01 | End: 2024-01-01 | Stop reason: HOSPADM

## 2024-01-01 RX ORDER — ALBUTEROL SULFATE 0.83 MG/ML
SOLUTION RESPIRATORY (INHALATION)
Status: COMPLETED
Start: 2024-01-01 | End: 2024-01-01

## 2024-01-01 RX ORDER — FERROUS SULFATE 325(65) MG
325 TABLET ORAL
Status: DISCONTINUED | OUTPATIENT
Start: 2024-01-01 | End: 2024-01-01

## 2024-01-01 RX ORDER — CEFAZOLIN 1 G/1
INJECTION, POWDER, FOR SOLUTION INTRAVENOUS AS NEEDED
Status: DISCONTINUED | OUTPATIENT
Start: 2024-01-01 | End: 2024-01-01 | Stop reason: HOSPADM

## 2024-01-01 RX ORDER — HYDRALAZINE HYDROCHLORIDE 20 MG/ML
10 INJECTION INTRAMUSCULAR; INTRAVENOUS EVERY 4 HOURS PRN
Status: DISCONTINUED | OUTPATIENT
Start: 2024-01-01 | End: 2024-01-01 | Stop reason: HOSPADM

## 2024-01-01 RX ORDER — ALBUMIN HUMAN 50 G/1000ML
12.5 SOLUTION INTRAVENOUS ONCE
Status: COMPLETED | OUTPATIENT
Start: 2024-01-01 | End: 2024-01-01

## 2024-01-01 RX ORDER — ENOXAPARIN SODIUM 150 MG/ML
1 INJECTION SUBCUTANEOUS EVERY 12 HOURS
Status: COMPLETED | OUTPATIENT
Start: 2024-01-01 | End: 2024-01-01

## 2024-01-01 RX ORDER — AMOXICILLIN 250 MG
2 CAPSULE ORAL 2 TIMES DAILY PRN
Status: DISCONTINUED | OUTPATIENT
Start: 2024-01-01 | End: 2024-01-01

## 2024-01-01 RX ORDER — BUMETANIDE 0.25 MG/ML
4 INJECTION, SOLUTION INTRAMUSCULAR; INTRAVENOUS EVERY 8 HOURS
Status: COMPLETED | OUTPATIENT
Start: 2024-01-01 | End: 2024-01-01

## 2024-01-01 RX ORDER — ONDANSETRON 2 MG/ML
4 INJECTION INTRAMUSCULAR; INTRAVENOUS EVERY 6 HOURS PRN
Status: DISCONTINUED | OUTPATIENT
Start: 2024-01-01 | End: 2024-01-01 | Stop reason: HOSPADM

## 2024-01-01 RX ORDER — POTASSIUM CHLORIDE 1.5 G/1.58G
40 POWDER, FOR SOLUTION ORAL 3 TIMES DAILY
Status: DISPENSED | OUTPATIENT
Start: 2024-01-01 | End: 2024-01-01

## 2024-01-01 RX ORDER — SODIUM CHLORIDE, SODIUM LACTATE, POTASSIUM CHLORIDE, CALCIUM CHLORIDE 600; 310; 30; 20 MG/100ML; MG/100ML; MG/100ML; MG/100ML
9 INJECTION, SOLUTION INTRAVENOUS CONTINUOUS
Status: DISCONTINUED | OUTPATIENT
Start: 2024-01-01 | End: 2024-01-01

## 2024-01-01 RX ORDER — NITROGLYCERIN 0.4 MG/1
0.8 TABLET SUBLINGUAL
Status: COMPLETED | OUTPATIENT
Start: 2024-01-01 | End: 2024-01-01

## 2024-01-01 RX ORDER — NALOXONE HCL 0.4 MG/ML
0.4 VIAL (ML) INJECTION
Status: DISCONTINUED | OUTPATIENT
Start: 2024-01-01 | End: 2024-01-01 | Stop reason: HOSPADM

## 2024-01-01 RX ORDER — POTASSIUM CHLORIDE 750 MG/1
40 TABLET, FILM COATED, EXTENDED RELEASE ORAL ONCE
Status: DISCONTINUED | OUTPATIENT
Start: 2024-01-01 | End: 2024-01-01

## 2024-01-01 RX ORDER — POTASSIUM CHLORIDE 750 MG/1
20 TABLET, FILM COATED, EXTENDED RELEASE ORAL DAILY
Status: DISCONTINUED | OUTPATIENT
Start: 2024-01-01 | End: 2024-01-01

## 2024-01-01 RX ORDER — NOREPINEPHRINE BITARTRATE 0.03 MG/ML
.02-.3 INJECTION, SOLUTION INTRAVENOUS
Status: DISCONTINUED | OUTPATIENT
Start: 2024-01-01 | End: 2024-01-01

## 2024-01-01 RX ORDER — OXYCODONE HYDROCHLORIDE 10 MG/1
10 TABLET ORAL EVERY 4 HOURS PRN
Status: DISCONTINUED | OUTPATIENT
Start: 2024-01-01 | End: 2024-01-01

## 2024-01-01 RX ORDER — POLYETHYLENE GLYCOL 3350 17 G/17G
17 POWDER, FOR SOLUTION ORAL DAILY PRN
Status: DISCONTINUED | OUTPATIENT
Start: 2024-01-01 | End: 2024-01-01 | Stop reason: HOSPADM

## 2024-01-01 RX ORDER — HEPARIN SODIUM (PORCINE) LOCK FLUSH IV SOLN 100 UNIT/ML 100 UNIT/ML
10000 SOLUTION INTRAVENOUS ONCE
Status: DISCONTINUED | OUTPATIENT
Start: 2024-01-01 | End: 2024-01-01

## 2024-01-01 RX ORDER — AMIODARONE HYDROCHLORIDE 200 MG/1
200 TABLET ORAL EVERY 12 HOURS SCHEDULED
Status: DISCONTINUED | OUTPATIENT
Start: 2024-01-01 | End: 2024-01-01

## 2024-01-01 RX ORDER — MIDODRINE HYDROCHLORIDE 5 MG/1
10 TABLET ORAL EVERY 8 HOURS
Status: DISCONTINUED | OUTPATIENT
Start: 2024-01-01 | End: 2024-01-01

## 2024-01-01 RX ORDER — ACETAMINOPHEN 160 MG/5ML
650 SOLUTION ORAL EVERY 4 HOURS PRN
Status: DISCONTINUED | OUTPATIENT
Start: 2024-01-01 | End: 2024-01-01

## 2024-01-01 RX ORDER — ACETAMINOPHEN 10 MG/ML
1000 INJECTION, SOLUTION INTRAVENOUS EVERY 8 HOURS
Status: COMPLETED | OUTPATIENT
Start: 2024-01-01 | End: 2024-01-01

## 2024-01-01 RX ORDER — ACETAMINOPHEN 10 MG/ML
1000 INJECTION, SOLUTION INTRAVENOUS EVERY 8 HOURS PRN
Status: COMPLETED | OUTPATIENT
Start: 2024-01-01 | End: 2024-01-01

## 2024-01-01 RX ORDER — HYDROCODONE BITARTRATE AND ACETAMINOPHEN 5; 325 MG/1; MG/1
1 TABLET ORAL EVERY 8 HOURS PRN
Status: DISCONTINUED | OUTPATIENT
Start: 2024-01-01 | End: 2024-01-01 | Stop reason: HOSPADM

## 2024-01-01 RX ORDER — MILRINONE LACTATE 0.2 MG/ML
0.12 INJECTION, SOLUTION INTRAVENOUS CONTINUOUS PRN
Status: DISCONTINUED | OUTPATIENT
Start: 2024-01-01 | End: 2024-01-01

## 2024-01-01 RX ORDER — DILTIAZEM HCL 60 MG
120 TABLET ORAL EVERY 12 HOURS SCHEDULED
Status: DISCONTINUED | OUTPATIENT
Start: 2024-01-01 | End: 2024-01-01

## 2024-01-01 RX ORDER — MORPHINE SULFATE 2 MG/ML
2 INJECTION, SOLUTION INTRAMUSCULAR; INTRAVENOUS
Status: DISPENSED | OUTPATIENT
Start: 2024-01-01 | End: 2024-01-01

## 2024-01-01 RX ORDER — CLONIDINE HYDROCHLORIDE 0.1 MG/1
0.2 TABLET ORAL EVERY 6 HOURS SCHEDULED
Status: DISCONTINUED | OUTPATIENT
Start: 2024-01-01 | End: 2024-01-01

## 2024-01-01 RX ORDER — ALPRAZOLAM 0.25 MG/1
0.25 TABLET ORAL EVERY 8 HOURS PRN
Status: ACTIVE | OUTPATIENT
Start: 2024-01-01 | End: 2024-01-01

## 2024-01-01 RX ORDER — METOPROLOL TARTRATE 25 MG/1
25 TABLET, FILM COATED ORAL ONCE
OUTPATIENT
Start: 2024-01-01

## 2024-01-01 RX ORDER — SODIUM CHLORIDE 0.9 % (FLUSH) 0.9 %
3-10 SYRINGE (ML) INJECTION AS NEEDED
Status: DISCONTINUED | OUTPATIENT
Start: 2024-01-01 | End: 2024-01-01 | Stop reason: HOSPADM

## 2024-01-01 RX ORDER — DEXTROSE MONOHYDRATE 50 MG/ML
75 INJECTION, SOLUTION INTRAVENOUS CONTINUOUS
Status: DISCONTINUED | OUTPATIENT
Start: 2024-01-01 | End: 2024-01-01

## 2024-01-01 RX ORDER — SILDENAFIL CITRATE 20 MG/1
20 TABLET ORAL 3 TIMES DAILY
Status: DISCONTINUED | OUTPATIENT
Start: 2024-01-01 | End: 2024-01-01 | Stop reason: CLARIF

## 2024-01-01 RX ORDER — VANCOMYCIN 2 GRAM/500 ML IN 0.9 % SODIUM CHLORIDE INTRAVENOUS
15 ONCE
Status: COMPLETED | OUTPATIENT
Start: 2024-01-01 | End: 2024-01-01

## 2024-01-01 RX ORDER — ACETAMINOPHEN 325 MG/1
650 TABLET ORAL EVERY 4 HOURS
Status: ACTIVE | OUTPATIENT
Start: 2024-01-01 | End: 2024-01-01

## 2024-01-01 RX ORDER — ALBUMIN (HUMAN) 12.5 G/50ML
12.5 SOLUTION INTRAVENOUS AS NEEDED
Status: CANCELLED | OUTPATIENT
Start: 2024-01-01 | End: 2024-11-28

## 2024-01-01 RX ORDER — LIDOCAINE HYDROCHLORIDE 10 MG/ML
INJECTION, SOLUTION INFILTRATION; PERINEURAL
Status: COMPLETED
Start: 2024-01-01 | End: 2024-01-01

## 2024-01-01 RX ORDER — METOPROLOL TARTRATE 50 MG
50 TABLET ORAL
OUTPATIENT
Start: 2024-01-01

## 2024-01-01 RX ORDER — MIDODRINE HYDROCHLORIDE 5 MG/1
5 TABLET ORAL
Status: DISCONTINUED | OUTPATIENT
Start: 2024-01-01 | End: 2024-01-01

## 2024-01-01 RX ORDER — ACETAMINOPHEN 160 MG/5ML
650 SOLUTION ORAL EVERY 4 HOURS
Status: ACTIVE | OUTPATIENT
Start: 2024-01-01 | End: 2024-01-01

## 2024-01-01 RX ORDER — CALCIUM CARBONATE 500 MG/1
2 TABLET, CHEWABLE ORAL 2 TIMES DAILY PRN
Status: DISCONTINUED | OUTPATIENT
Start: 2024-01-01 | End: 2024-01-01

## 2024-01-01 RX ORDER — SODIUM CHLORIDE 0.9 % (FLUSH) 0.9 %
3 SYRINGE (ML) INJECTION EVERY 12 HOURS SCHEDULED
Status: DISCONTINUED | OUTPATIENT
Start: 2024-01-01 | End: 2024-01-01 | Stop reason: HOSPADM

## 2024-01-01 RX ORDER — REGADENOSON 0.08 MG/ML
0.4 INJECTION, SOLUTION INTRAVENOUS
Status: COMPLETED | OUTPATIENT
Start: 2024-01-01 | End: 2024-01-01

## 2024-01-01 RX ORDER — METOCLOPRAMIDE HYDROCHLORIDE 5 MG/ML
10 INJECTION INTRAMUSCULAR; INTRAVENOUS EVERY 6 HOURS
Status: DISPENSED | OUTPATIENT
Start: 2024-01-01 | End: 2024-01-01

## 2024-01-01 RX ORDER — CHLORHEXIDINE GLUCONATE 500 MG/1
1 CLOTH TOPICAL EVERY 12 HOURS
Status: COMPLETED | OUTPATIENT
Start: 2024-01-01 | End: 2024-01-01

## 2024-01-01 RX ORDER — MORPHINE SULFATE 2 MG/ML
2 INJECTION, SOLUTION INTRAMUSCULAR; INTRAVENOUS
Status: DISCONTINUED | OUTPATIENT
Start: 2024-01-01 | End: 2024-01-01 | Stop reason: HOSPADM

## 2024-01-01 RX ORDER — NITROGLYCERIN 0.4 MG/1
0.4 TABLET SUBLINGUAL
Status: COMPLETED | OUTPATIENT
Start: 2024-01-01 | End: 2024-01-01

## 2024-01-01 RX ORDER — BISACODYL 5 MG/1
5 TABLET, DELAYED RELEASE ORAL DAILY PRN
Status: DISCONTINUED | OUTPATIENT
Start: 2024-01-01 | End: 2024-01-01

## 2024-01-01 RX ORDER — MORPHINE SULFATE 2 MG/ML
INJECTION, SOLUTION INTRAMUSCULAR; INTRAVENOUS
Status: ACTIVE
Start: 2024-01-01 | End: 2024-01-01

## 2024-01-01 RX ORDER — ALBUTEROL SULFATE 0.83 MG/ML
2.5 SOLUTION RESPIRATORY (INHALATION) EVERY 4 HOURS PRN
Status: DISCONTINUED | OUTPATIENT
Start: 2024-01-01 | End: 2024-01-01 | Stop reason: HOSPADM

## 2024-01-01 RX ORDER — HYDROCODONE BITARTRATE AND ACETAMINOPHEN 5; 325 MG/1; MG/1
1 TABLET ORAL EVERY 6 HOURS PRN
Status: DISPENSED | OUTPATIENT
Start: 2024-01-01 | End: 2024-01-01

## 2024-01-01 RX ORDER — METOPROLOL TARTRATE 25 MG/1
12.5 TABLET, FILM COATED ORAL
Status: COMPLETED | OUTPATIENT
Start: 2024-01-01 | End: 2024-01-01

## 2024-01-01 RX ORDER — ACETYLCYSTEINE 200 MG/ML
3 SOLUTION ORAL; RESPIRATORY (INHALATION)
Status: DISCONTINUED | OUTPATIENT
Start: 2024-01-01 | End: 2024-01-01

## 2024-01-01 RX ORDER — MAGNESIUM SULFATE HEPTAHYDRATE 40 MG/ML
2 INJECTION, SOLUTION INTRAVENOUS EVERY 8 HOURS
Status: DISPENSED | OUTPATIENT
Start: 2024-01-01 | End: 2024-01-01

## 2024-01-01 RX ORDER — MORPHINE SULFATE 2 MG/ML
4 INJECTION, SOLUTION INTRAMUSCULAR; INTRAVENOUS
Status: DISCONTINUED | OUTPATIENT
Start: 2024-01-01 | End: 2024-01-01

## 2024-01-01 RX ORDER — BISACODYL 5 MG/1
10 TABLET, DELAYED RELEASE ORAL DAILY PRN
Status: DISCONTINUED | OUTPATIENT
Start: 2024-01-01 | End: 2024-01-01 | Stop reason: HOSPADM

## 2024-01-01 RX ORDER — BUMETANIDE 0.25 MG/ML
1 INJECTION, SOLUTION INTRAMUSCULAR; INTRAVENOUS ONCE
Status: DISCONTINUED | OUTPATIENT
Start: 2024-01-01 | End: 2024-01-01

## 2024-01-01 RX ORDER — CHLORHEXIDINE GLUCONATE ORAL RINSE 1.2 MG/ML
15 SOLUTION DENTAL EVERY 12 HOURS
Status: DISCONTINUED | OUTPATIENT
Start: 2024-01-01 | End: 2024-01-01

## 2024-01-01 RX ORDER — MUPIROCIN 20 MG/G
OINTMENT TOPICAL AS NEEDED
Status: DISCONTINUED | OUTPATIENT
Start: 2024-01-01 | End: 2024-01-01 | Stop reason: HOSPADM

## 2024-01-01 RX ORDER — LORAZEPAM 2 MG/ML
2 CONCENTRATE ORAL
Status: DISCONTINUED | OUTPATIENT
Start: 2024-01-01 | End: 2024-01-01 | Stop reason: HOSPADM

## 2024-01-01 RX ORDER — CHLORHEXIDINE GLUCONATE ORAL RINSE 1.2 MG/ML
15 SOLUTION DENTAL EVERY 12 HOURS SCHEDULED
Status: COMPLETED | OUTPATIENT
Start: 2024-01-01 | End: 2024-01-01

## 2024-01-01 RX ORDER — HEPARIN SODIUM (PORCINE) LOCK FLUSH IV SOLN 100 UNIT/ML 100 UNIT/ML
1000 SOLUTION INTRAVENOUS ONCE
Status: DISCONTINUED | OUTPATIENT
Start: 2024-01-01 | End: 2024-01-01

## 2024-01-01 RX ORDER — NITROGLYCERIN 0.4 MG/1
0.4 TABLET SUBLINGUAL
Status: DISCONTINUED | OUTPATIENT
Start: 2024-01-01 | End: 2024-01-01

## 2024-01-01 RX ORDER — BUSPIRONE HYDROCHLORIDE 5 MG/1
5 TABLET ORAL 3 TIMES DAILY
Status: DISCONTINUED | OUTPATIENT
Start: 2024-01-01 | End: 2024-01-01

## 2024-01-01 RX ORDER — SODIUM CHLORIDE 9 MG/ML
INJECTION, SOLUTION INTRAVENOUS
Status: COMPLETED | OUTPATIENT
Start: 2024-01-01 | End: 2024-01-01

## 2024-01-01 RX ORDER — CARVEDILOL 3.12 MG/1
3.12 TABLET ORAL EVERY 12 HOURS SCHEDULED
Status: DISCONTINUED | OUTPATIENT
Start: 2024-01-01 | End: 2024-01-01

## 2024-01-01 RX ORDER — PANTOPRAZOLE SODIUM 40 MG/1
40 TABLET, DELAYED RELEASE ORAL EVERY MORNING
Status: DISCONTINUED | OUTPATIENT
Start: 2024-01-01 | End: 2024-01-01

## 2024-01-01 RX ORDER — SODIUM CHLORIDE FOR INHALATION 7 %
4 VIAL, NEBULIZER (ML) INHALATION
Status: DISCONTINUED | OUTPATIENT
Start: 2024-01-01 | End: 2024-01-01

## 2024-01-01 RX ORDER — NITROGLYCERIN 20 MG/100ML
5-200 INJECTION INTRAVENOUS
Status: DISCONTINUED | OUTPATIENT
Start: 2024-01-01 | End: 2024-01-01

## 2024-01-01 RX ORDER — MAGNESIUM SULFATE HEPTAHYDRATE 40 MG/ML
INJECTION, SOLUTION INTRAVENOUS
Status: DISCONTINUED
Start: 2024-01-01 | End: 2024-01-01 | Stop reason: WASHOUT

## 2024-01-01 RX ORDER — ALBUMIN (HUMAN) 12.5 G/50ML
25 SOLUTION INTRAVENOUS ONCE
Status: COMPLETED | OUTPATIENT
Start: 2024-01-01 | End: 2024-01-01

## 2024-01-01 RX ORDER — MIDODRINE HYDROCHLORIDE 5 MG/1
15 TABLET ORAL
Status: DISCONTINUED | OUTPATIENT
Start: 2024-01-01 | End: 2024-01-01

## 2024-01-01 RX ORDER — ACETAMINOPHEN 650 MG/1
650 SUPPOSITORY RECTAL EVERY 4 HOURS PRN
Status: DISCONTINUED | OUTPATIENT
Start: 2024-01-01 | End: 2024-01-01

## 2024-01-01 RX ORDER — MILRINONE LACTATE 0.2 MG/ML
0.25 INJECTION, SOLUTION INTRAVENOUS CONTINUOUS PRN
Status: DISCONTINUED | OUTPATIENT
Start: 2024-01-01 | End: 2024-01-01 | Stop reason: HOSPADM

## 2024-01-01 RX ORDER — ENOXAPARIN SODIUM 100 MG/ML
30 INJECTION SUBCUTANEOUS EVERY 12 HOURS
Status: DISCONTINUED | OUTPATIENT
Start: 2024-01-01 | End: 2024-01-01

## 2024-01-01 RX ORDER — HEPARIN SODIUM (PORCINE) LOCK FLUSH IV SOLN 100 UNIT/ML 100 UNIT/ML
100 SOLUTION INTRAVENOUS ONCE
Status: DISCONTINUED | OUTPATIENT
Start: 2024-01-01 | End: 2024-01-01

## 2024-01-01 RX ORDER — AMOXICILLIN 250 MG
2 CAPSULE ORAL NIGHTLY
Status: DISCONTINUED | OUTPATIENT
Start: 2024-01-01 | End: 2024-01-01

## 2024-01-01 RX ORDER — CYCLOBENZAPRINE HCL 10 MG
10 TABLET ORAL EVERY 8 HOURS PRN
Status: DISCONTINUED | OUTPATIENT
Start: 2024-01-01 | End: 2024-01-01 | Stop reason: HOSPADM

## 2024-01-01 RX ORDER — FENTANYL CITRATE 50 UG/ML
50 INJECTION, SOLUTION INTRAMUSCULAR; INTRAVENOUS ONCE AS NEEDED
Status: COMPLETED | OUTPATIENT
Start: 2024-01-01 | End: 2024-01-01

## 2024-01-01 RX ADMIN — ATORVASTATIN CALCIUM 40 MG: 20 TABLET, FILM COATED ORAL at 20:21

## 2024-01-01 RX ADMIN — CYCLOBENZAPRINE 10 MG: 10 TABLET, FILM COATED ORAL at 15:41

## 2024-01-01 RX ADMIN — GLYCOPYRROLATE 0.4 MG: 0.2 INJECTION INTRAMUSCULAR; INTRAVENOUS at 12:05

## 2024-01-01 RX ADMIN — ATORVASTATIN CALCIUM 40 MG: 20 TABLET, FILM COATED ORAL at 20:19

## 2024-01-01 RX ADMIN — PANTOPRAZOLE SODIUM 40 MG: 40 TABLET, DELAYED RELEASE ORAL at 16:48

## 2024-01-01 RX ADMIN — MUPIROCIN 1 APPLICATION: 20 OINTMENT TOPICAL at 09:52

## 2024-01-01 RX ADMIN — ENOXAPARIN SODIUM 30 MG: 100 INJECTION SUBCUTANEOUS at 17:28

## 2024-01-01 RX ADMIN — IPRATROPIUM BROMIDE AND ALBUTEROL SULFATE 3 ML: 2.5; .5 SOLUTION RESPIRATORY (INHALATION) at 12:35

## 2024-01-01 RX ADMIN — Medication 10 ML: at 08:15

## 2024-01-01 RX ADMIN — MUPIROCIN 1 APPLICATION: 20 OINTMENT TOPICAL at 08:14

## 2024-01-01 RX ADMIN — MORPHINE SULFATE 2 MG: 2 INJECTION, SOLUTION INTRAMUSCULAR; INTRAVENOUS at 00:45

## 2024-01-01 RX ADMIN — Medication 250 MG: at 20:17

## 2024-01-01 RX ADMIN — SILDENAFIL 20 MG: 20 TABLET, FILM COATED ORAL at 15:41

## 2024-01-01 RX ADMIN — CHLORHEXIDINE GLUCONATE 15 ML: 1.2 RINSE ORAL at 11:42

## 2024-01-01 RX ADMIN — ANTI-FUNGAL POWDER MICONAZOLE NITRATE TALC FREE 1 APPLICATION: 1.42 POWDER TOPICAL at 20:13

## 2024-01-01 RX ADMIN — ENOXAPARIN SODIUM 30 MG: 100 INJECTION SUBCUTANEOUS at 17:40

## 2024-01-01 RX ADMIN — ENOXAPARIN SODIUM 40 MG: 100 INJECTION SUBCUTANEOUS at 17:12

## 2024-01-01 RX ADMIN — INSULIN HUMAN 2 UNITS: 100 INJECTION, SOLUTION PARENTERAL at 12:13

## 2024-01-01 RX ADMIN — IPRATROPIUM BROMIDE AND ALBUTEROL SULFATE 3 ML: 2.5; .5 SOLUTION RESPIRATORY (INHALATION) at 19:27

## 2024-01-01 RX ADMIN — CHLORHEXIDINE GLUCONATE 15 ML: 1.2 RINSE ORAL at 12:10

## 2024-01-01 RX ADMIN — ENOXAPARIN SODIUM 40 MG: 100 INJECTION SUBCUTANEOUS at 09:35

## 2024-01-01 RX ADMIN — VASOPRESSIN 0.05 UNITS/MIN: 20 INJECTION, SOLUTION INTRAVENOUS at 13:27

## 2024-01-01 RX ADMIN — LANSOPRAZOLE 15 MG: 15 TABLET, ORALLY DISINTEGRATING ORAL at 05:02

## 2024-01-01 RX ADMIN — SILDENAFIL 20 MG: 20 TABLET, FILM COATED ORAL at 18:16

## 2024-01-01 RX ADMIN — Medication 10 ML: at 08:55

## 2024-01-01 RX ADMIN — IPRATROPIUM BROMIDE AND ALBUTEROL SULFATE 3 ML: 2.5; .5 SOLUTION RESPIRATORY (INHALATION) at 02:36

## 2024-01-01 RX ADMIN — MIDAZOLAM 2 MG: 1 INJECTION INTRAMUSCULAR; INTRAVENOUS at 10:21

## 2024-01-01 RX ADMIN — BUSPIRONE HYDROCHLORIDE 5 MG: 5 TABLET ORAL at 15:07

## 2024-01-01 RX ADMIN — VASOPRESSIN 0.06 UNITS/MIN: 20 INJECTION, SOLUTION INTRAVENOUS at 13:12

## 2024-01-01 RX ADMIN — CEFEPIME 2000 MG: 2 INJECTION, POWDER, FOR SOLUTION INTRAVENOUS at 01:08

## 2024-01-01 RX ADMIN — EPINEPHRINE 0.02 MCG/KG/MIN: 1 INJECTION INTRAMUSCULAR; INTRAVENOUS; SUBCUTANEOUS at 09:19

## 2024-01-01 RX ADMIN — MILRINONE LACTATE 0.25 MCG/KG/MIN: 0.2 INJECTION, SOLUTION INTRAVENOUS at 02:03

## 2024-01-01 RX ADMIN — CHLORHEXIDINE GLUCONATE 15 ML: 1.2 RINSE ORAL at 12:07

## 2024-01-01 RX ADMIN — ANTI-FUNGAL POWDER MICONAZOLE NITRATE TALC FREE 1 APPLICATION: 1.42 POWDER TOPICAL at 08:13

## 2024-01-01 RX ADMIN — VASOPRESSIN 0.04 UNITS/MIN: 20 INJECTION, SOLUTION INTRAVENOUS at 15:51

## 2024-01-01 RX ADMIN — LANSOPRAZOLE 15 MG: 15 TABLET, ORALLY DISINTEGRATING ORAL at 06:07

## 2024-01-01 RX ADMIN — ACETAMINOPHEN 1000 MG: 10 INJECTION INTRAVENOUS at 17:11

## 2024-01-01 RX ADMIN — Medication 10 ML: at 11:48

## 2024-01-01 RX ADMIN — CEFEPIME 2000 MG: 2 INJECTION, POWDER, FOR SOLUTION INTRAVENOUS at 02:07

## 2024-01-01 RX ADMIN — MIDODRINE HYDROCHLORIDE 10 MG: 5 TABLET ORAL at 06:37

## 2024-01-01 RX ADMIN — ANTI-FUNGAL POWDER MICONAZOLE NITRATE TALC FREE 1 APPLICATION: 1.42 POWDER TOPICAL at 21:16

## 2024-01-01 RX ADMIN — Medication 100 MCG: at 08:40

## 2024-01-01 RX ADMIN — PENICILLIN G POTASSIUM 4 MILLION UNITS: 5000000 INJECTION, POWDER, FOR SOLUTION INTRAMUSCULAR; INTRAVENOUS at 06:27

## 2024-01-01 RX ADMIN — MIDODRINE HYDROCHLORIDE 15 MG: 5 TABLET ORAL at 10:47

## 2024-01-01 RX ADMIN — ANTI-FUNGAL POWDER MICONAZOLE NITRATE TALC FREE 1 APPLICATION: 1.42 POWDER TOPICAL at 20:47

## 2024-01-01 RX ADMIN — DEXMEDETOMIDINE HYDROCHLORIDE IN SODIUM CHLORIDE 0.5 MCG/KG/HR: 4 INJECTION INTRAVENOUS at 04:08

## 2024-01-01 RX ADMIN — DEXMEDETOMIDINE HYDROCHLORIDE IN SODIUM CHLORIDE 0.5 MCG/KG/HR: 4 INJECTION INTRAVENOUS at 17:12

## 2024-01-01 RX ADMIN — DEXMEDETOMIDINE HYDROCHLORIDE IN SODIUM CHLORIDE 0.5 MCG/KG/HR: 4 INJECTION INTRAVENOUS at 22:34

## 2024-01-01 RX ADMIN — Medication 1 APPLICATION: at 08:21

## 2024-01-01 RX ADMIN — INSULIN HUMAN 2 UNITS: 100 INJECTION, SOLUTION PARENTERAL at 11:42

## 2024-01-01 RX ADMIN — Medication 10 ML: at 11:26

## 2024-01-01 RX ADMIN — IPRATROPIUM BROMIDE AND ALBUTEROL SULFATE 3 ML: 2.5; .5 SOLUTION RESPIRATORY (INHALATION) at 10:43

## 2024-01-01 RX ADMIN — DEXMEDETOMIDINE HYDROCHLORIDE IN SODIUM CHLORIDE 0.04 MCG/KG/HR: 4 INJECTION INTRAVENOUS at 00:43

## 2024-01-01 RX ADMIN — ACETAMINOPHEN 650 MG: 650 LIQUID ORAL at 18:14

## 2024-01-01 RX ADMIN — Medication 1 APPLICATION: at 08:29

## 2024-01-01 RX ADMIN — SILDENAFIL 20 MG: 20 TABLET, FILM COATED ORAL at 17:41

## 2024-01-01 RX ADMIN — BUMETANIDE 2 MG: 0.25 INJECTION INTRAMUSCULAR; INTRAVENOUS at 02:12

## 2024-01-01 RX ADMIN — INSULIN HUMAN 2 UNITS: 100 INJECTION, SOLUTION PARENTERAL at 18:01

## 2024-01-01 RX ADMIN — SODIUM CHLORIDE 2 MILLION UNITS: 9 INJECTION, SOLUTION INTRAVENOUS at 17:41

## 2024-01-01 RX ADMIN — MIDODRINE HYDROCHLORIDE 15 MG: 5 TABLET ORAL at 11:45

## 2024-01-01 RX ADMIN — Medication 250 MG: at 08:41

## 2024-01-01 RX ADMIN — GLYCERIN, HYPROMELLOSE, POLYETHYLENE GLYCOL 1 DROP: .2; .2; 1 LIQUID OPHTHALMIC at 16:45

## 2024-01-01 RX ADMIN — ENOXAPARIN SODIUM 40 MG: 100 INJECTION SUBCUTANEOUS at 18:04

## 2024-01-01 RX ADMIN — PENICILLIN G POTASSIUM 4 MILLION UNITS: 5000000 INJECTION, POWDER, FOR SOLUTION INTRAMUSCULAR; INTRAVENOUS at 14:00

## 2024-01-01 RX ADMIN — PROPOFOL INJECTABLE EMULSION 20 MCG/KG/MIN: 10 INJECTION, EMULSION INTRAVENOUS at 02:41

## 2024-01-01 RX ADMIN — PROPOFOL INJECTABLE EMULSION 25 MCG/KG/MIN: 10 INJECTION, EMULSION INTRAVENOUS at 20:22

## 2024-01-01 RX ADMIN — CHLORHEXIDINE GLUCONATE 15 ML: 1.2 RINSE ORAL at 11:14

## 2024-01-01 RX ADMIN — HYDROCODONE BITARTRATE AND ACETAMINOPHEN 1 TABLET: 5; 325 TABLET ORAL at 00:47

## 2024-01-01 RX ADMIN — MAGNESIUM SULFATE HEPTAHYDRATE 2 G: 40 INJECTION, SOLUTION INTRAVENOUS at 09:18

## 2024-01-01 RX ADMIN — CHLORHEXIDINE GLUCONATE 15 ML: 1.2 RINSE ORAL at 23:34

## 2024-01-01 RX ADMIN — IPRATROPIUM BROMIDE AND ALBUTEROL SULFATE 3 ML: 2.5; .5 SOLUTION RESPIRATORY (INHALATION) at 23:04

## 2024-01-01 RX ADMIN — MILRINONE LACTATE 0.12 MCG/KG/MIN: 0.2 INJECTION, SOLUTION INTRAVENOUS at 20:17

## 2024-01-01 RX ADMIN — CEFEPIME 2000 MG: 2 INJECTION, POWDER, FOR SOLUTION INTRAVENOUS at 17:36

## 2024-01-01 RX ADMIN — VASOPRESSIN 0.05 UNITS/MIN: 20 INJECTION, SOLUTION INTRAVENOUS at 16:00

## 2024-01-01 RX ADMIN — PENICILLIN G POTASSIUM 4 MILLION UNITS: 5000000 INJECTION, POWDER, FOR SOLUTION INTRAMUSCULAR; INTRAVENOUS at 09:46

## 2024-01-01 RX ADMIN — SILDENAFIL 20 MG: 20 TABLET, FILM COATED ORAL at 20:13

## 2024-01-01 RX ADMIN — CEFEPIME 2000 MG: 2 INJECTION, POWDER, FOR SOLUTION INTRAVENOUS at 21:42

## 2024-01-01 RX ADMIN — IPRATROPIUM BROMIDE AND ALBUTEROL SULFATE 3 ML: 2.5; .5 SOLUTION RESPIRATORY (INHALATION) at 06:17

## 2024-01-01 RX ADMIN — LIDOCAINE HYDROCHLORIDE 1 MG/MIN: 4 INJECTION, SOLUTION INTRAVENOUS at 21:52

## 2024-01-01 RX ADMIN — SILDENAFIL 20 MG: 20 TABLET, FILM COATED ORAL at 08:10

## 2024-01-01 RX ADMIN — POTASSIUM CHLORIDE 40 MEQ: 1.5 POWDER, FOR SOLUTION ORAL at 01:31

## 2024-01-01 RX ADMIN — MAGNESIUM SULFATE HEPTAHYDRATE 2 G: 40 INJECTION, SOLUTION INTRAVENOUS at 13:29

## 2024-01-01 RX ADMIN — VASOPRESSIN 0.05 UNITS/MIN: 20 INJECTION, SOLUTION INTRAVENOUS at 08:46

## 2024-01-01 RX ADMIN — INSULIN HUMAN 6.3 UNITS/HR: 1 INJECTION, SOLUTION INTRAVENOUS at 09:18

## 2024-01-01 RX ADMIN — CALCIUM CHLORIDE, MAGNESIUM CHLORIDE, SODIUM CHLORIDE, SODIUM BICARBONATE, POTASSIUM CHLORIDE AND SODIUM PHOSPHATE DIBASIC DIHYDRATE 1500 ML/HR: 3.68; 3.05; 6.34; 3.09; .314; .187 INJECTION INTRAVENOUS at 03:07

## 2024-01-01 RX ADMIN — INSULIN HUMAN 2 UNITS: 100 INJECTION, SOLUTION PARENTERAL at 17:40

## 2024-01-01 RX ADMIN — Medication 5 MG: at 22:07

## 2024-01-01 RX ADMIN — IPRATROPIUM BROMIDE AND ALBUTEROL SULFATE 3 ML: 2.5; .5 SOLUTION RESPIRATORY (INHALATION) at 23:41

## 2024-01-01 RX ADMIN — CALCIUM CHLORIDE, MAGNESIUM CHLORIDE, SODIUM CHLORIDE, SODIUM BICARBONATE, POTASSIUM CHLORIDE AND SODIUM PHOSPHATE DIBASIC DIHYDRATE 1500 ML/HR: 3.68; 3.05; 6.34; 3.09; .314; .187 INJECTION INTRAVENOUS at 18:01

## 2024-01-01 RX ADMIN — CHLORHEXIDINE GLUCONATE 15 ML: 1.2 RINSE ORAL at 13:58

## 2024-01-01 RX ADMIN — VANCOMYCIN HYDROCHLORIDE 750 MG: 750 INJECTION, POWDER, LYOPHILIZED, FOR SOLUTION INTRAVENOUS at 06:17

## 2024-01-01 RX ADMIN — IPRATROPIUM BROMIDE AND ALBUTEROL SULFATE 3 ML: 2.5; .5 SOLUTION RESPIRATORY (INHALATION) at 10:50

## 2024-01-01 RX ADMIN — AMIODARONE HYDROCHLORIDE 0.5 MG/MIN: 1.8 INJECTION, SOLUTION INTRAVENOUS at 23:14

## 2024-01-01 RX ADMIN — PENICILLIN G POTASSIUM 4 MILLION UNITS: 5000000 INJECTION, POWDER, FOR SOLUTION INTRAMUSCULAR; INTRAVENOUS at 12:32

## 2024-01-01 RX ADMIN — PENICILLIN G POTASSIUM 4 MILLION UNITS: 5000000 INJECTION, POWDER, FOR SOLUTION INTRAMUSCULAR; INTRAVENOUS at 01:16

## 2024-01-01 RX ADMIN — DIGOXIN 250 MCG: 0.25 INJECTION INTRAMUSCULAR; INTRAVENOUS at 14:16

## 2024-01-01 RX ADMIN — MIDODRINE HYDROCHLORIDE 15 MG: 5 TABLET ORAL at 06:52

## 2024-01-01 RX ADMIN — CHLORHEXIDINE GLUCONATE 15 ML: 1.2 RINSE ORAL at 23:46

## 2024-01-01 RX ADMIN — Medication 10 ML: at 20:29

## 2024-01-01 RX ADMIN — PANTOPRAZOLE SODIUM 40 MG: 40 TABLET, DELAYED RELEASE ORAL at 18:40

## 2024-01-01 RX ADMIN — ANTI-FUNGAL POWDER MICONAZOLE NITRATE TALC FREE 1 APPLICATION: 1.42 POWDER TOPICAL at 08:55

## 2024-01-01 RX ADMIN — Medication 10 ML: at 10:36

## 2024-01-01 RX ADMIN — IPRATROPIUM BROMIDE AND ALBUTEROL SULFATE 3 ML: 2.5; .5 SOLUTION RESPIRATORY (INHALATION) at 06:45

## 2024-01-01 RX ADMIN — POTASSIUM CHLORIDE 40 MEQ: 1.5 POWDER, FOR SOLUTION ORAL at 18:48

## 2024-01-01 RX ADMIN — IPRATROPIUM BROMIDE AND ALBUTEROL SULFATE 3 ML: 2.5; .5 SOLUTION RESPIRATORY (INHALATION) at 23:26

## 2024-01-01 RX ADMIN — ACETAMINOPHEN 1000 MG: 10 INJECTION INTRAVENOUS at 16:29

## 2024-01-01 RX ADMIN — PENICILLIN G POTASSIUM 4 MILLION UNITS: 5000000 INJECTION, POWDER, FOR SOLUTION INTRAMUSCULAR; INTRAVENOUS at 17:17

## 2024-01-01 RX ADMIN — METOCLOPRAMIDE HYDROCHLORIDE 10 MG: 5 INJECTION INTRAMUSCULAR; INTRAVENOUS at 01:52

## 2024-01-01 RX ADMIN — AMIODARONE HYDROCHLORIDE 0.5 MG/MIN: 1.8 INJECTION, SOLUTION INTRAVENOUS at 11:03

## 2024-01-01 RX ADMIN — MIDODRINE HYDROCHLORIDE 15 MG: 5 TABLET ORAL at 21:52

## 2024-01-01 RX ADMIN — DILTIAZEM HYDROCHLORIDE 120 MG: 60 TABLET, FILM COATED ORAL at 22:07

## 2024-01-01 RX ADMIN — Medication 1.4 MCG/KG/MIN: at 00:19

## 2024-01-01 RX ADMIN — MAGNESIUM SULFATE HEPTAHYDRATE 2 G: 40 INJECTION, SOLUTION INTRAVENOUS at 08:35

## 2024-01-01 RX ADMIN — POTASSIUM CHLORIDE 20 MEQ: 1.5 POWDER, FOR SOLUTION ORAL at 01:05

## 2024-01-01 RX ADMIN — VASOPRESSIN 0.05 UNITS/MIN: 20 INJECTION, SOLUTION INTRAVENOUS at 18:25

## 2024-01-01 RX ADMIN — POTASSIUM CHLORIDE 20 MEQ: 29.8 INJECTION, SOLUTION INTRAVENOUS at 06:46

## 2024-01-01 RX ADMIN — ASPIRIN 81 MG: 81 TABLET, CHEWABLE ORAL at 08:17

## 2024-01-01 RX ADMIN — Medication 4 ML: at 07:09

## 2024-01-01 RX ADMIN — Medication 250 MG: at 20:13

## 2024-01-01 RX ADMIN — ANTI-FUNGAL POWDER MICONAZOLE NITRATE TALC FREE 1 APPLICATION: 1.42 POWDER TOPICAL at 20:43

## 2024-01-01 RX ADMIN — MILRINONE LACTATE 0.25 MCG/KG/MIN: 0.2 INJECTION, SOLUTION INTRAVENOUS at 16:41

## 2024-01-01 RX ADMIN — Medication 100 MCG: at 08:19

## 2024-01-01 RX ADMIN — AMIODARONE HYDROCHLORIDE 150 MG: 1.5 INJECTION, SOLUTION INTRAVENOUS at 04:58

## 2024-01-01 RX ADMIN — IPRATROPIUM BROMIDE AND ALBUTEROL SULFATE 3 ML: 2.5; .5 SOLUTION RESPIRATORY (INHALATION) at 07:02

## 2024-01-01 RX ADMIN — Medication 10 ML: at 20:09

## 2024-01-01 RX ADMIN — CHLORHEXIDINE GLUCONATE 15 ML: 1.2 RINSE ORAL at 01:00

## 2024-01-01 RX ADMIN — HEPARIN SODIUM 5000 UNITS: 5000 INJECTION INTRAVENOUS; SUBCUTANEOUS at 21:05

## 2024-01-01 RX ADMIN — POTASSIUM CHLORIDE 20 MEQ: 29.8 INJECTION, SOLUTION INTRAVENOUS at 15:19

## 2024-01-01 RX ADMIN — IPRATROPIUM BROMIDE AND ALBUTEROL SULFATE 3 ML: 2.5; .5 SOLUTION RESPIRATORY (INHALATION) at 22:33

## 2024-01-01 RX ADMIN — MIDODRINE HYDROCHLORIDE 15 MG: 5 TABLET ORAL at 11:21

## 2024-01-01 RX ADMIN — DEXMEDETOMIDINE HYDROCHLORIDE IN SODIUM CHLORIDE 0.7 MCG/KG/HR: 4 INJECTION INTRAVENOUS at 21:58

## 2024-01-01 RX ADMIN — AMIODARONE HYDROCHLORIDE 0.5 MG/MIN: 1.8 INJECTION, SOLUTION INTRAVENOUS at 10:40

## 2024-01-01 RX ADMIN — CHLORHEXIDINE GLUCONATE 15 ML: 1.2 RINSE ORAL at 00:39

## 2024-01-01 RX ADMIN — MORPHINE SULFATE 2 MG: 2 INJECTION, SOLUTION INTRAMUSCULAR; INTRAVENOUS at 21:13

## 2024-01-01 RX ADMIN — INSULIN HUMAN 2 UNITS: 100 INJECTION, SOLUTION PARENTERAL at 00:49

## 2024-01-01 RX ADMIN — IPRATROPIUM BROMIDE AND ALBUTEROL SULFATE 3 ML: 2.5; .5 SOLUTION RESPIRATORY (INHALATION) at 00:06

## 2024-01-01 RX ADMIN — PENICILLIN G POTASSIUM 4 MILLION UNITS: 5000000 INJECTION, POWDER, FOR SOLUTION INTRAMUSCULAR; INTRAVENOUS at 13:11

## 2024-01-01 RX ADMIN — PANTOPRAZOLE SODIUM 40 MG: 40 TABLET, DELAYED RELEASE ORAL at 08:11

## 2024-01-01 RX ADMIN — VASOPRESSIN 0.05 UNITS/MIN: 20 INJECTION, SOLUTION INTRAVENOUS at 18:10

## 2024-01-01 RX ADMIN — HEPARIN SODIUM 1000 UNITS: 1000 INJECTION, SOLUTION INTRAVENOUS; SUBCUTANEOUS at 11:18

## 2024-01-01 RX ADMIN — DEXMEDETOMIDINE HYDROCHLORIDE IN SODIUM CHLORIDE 0.5 MCG/KG/HR: 4 INJECTION INTRAVENOUS at 21:51

## 2024-01-01 RX ADMIN — MILRINONE LACTATE 0.25 MCG/KG/MIN: 0.2 INJECTION, SOLUTION INTRAVENOUS at 19:03

## 2024-01-01 RX ADMIN — CHLORHEXIDINE GLUCONATE 15 ML: 1.2 RINSE ORAL at 12:30

## 2024-01-01 RX ADMIN — MIDODRINE HYDROCHLORIDE 15 MG: 5 TABLET ORAL at 17:12

## 2024-01-01 RX ADMIN — ASPIRIN 81 MG: 81 TABLET, CHEWABLE ORAL at 08:43

## 2024-01-01 RX ADMIN — CHLORHEXIDINE GLUCONATE 15 ML: 1.2 RINSE ORAL at 12:15

## 2024-01-01 RX ADMIN — PANTOPRAZOLE SODIUM 40 MG: 40 TABLET, DELAYED RELEASE ORAL at 19:25

## 2024-01-01 RX ADMIN — SILDENAFIL 20 MG: 20 TABLET, FILM COATED ORAL at 08:18

## 2024-01-01 RX ADMIN — BUSPIRONE HYDROCHLORIDE 5 MG: 5 TABLET ORAL at 08:12

## 2024-01-01 RX ADMIN — DEXMEDETOMIDINE HYDROCHLORIDE IN SODIUM CHLORIDE 0.4 MCG/KG/HR: 4 INJECTION INTRAVENOUS at 18:19

## 2024-01-01 RX ADMIN — LANSOPRAZOLE 15 MG: 15 TABLET, ORALLY DISINTEGRATING ORAL at 06:15

## 2024-01-01 RX ADMIN — PROPOFOL 15 MCG/KG/MIN: 10 INJECTION, EMULSION INTRAVENOUS at 16:21

## 2024-01-01 RX ADMIN — ANTI-FUNGAL POWDER MICONAZOLE NITRATE TALC FREE 1 APPLICATION: 1.42 POWDER TOPICAL at 09:22

## 2024-01-01 RX ADMIN — ANTI-FUNGAL POWDER MICONAZOLE NITRATE TALC FREE 1 APPLICATION: 1.42 POWDER TOPICAL at 22:23

## 2024-01-01 RX ADMIN — PENICILLIN G POTASSIUM 4 MILLION UNITS: 5000000 INJECTION, POWDER, FOR SOLUTION INTRAMUSCULAR; INTRAVENOUS at 05:03

## 2024-01-01 RX ADMIN — Medication 10 ML: at 08:14

## 2024-01-01 RX ADMIN — SILDENAFIL 20 MG: 20 TABLET, FILM COATED ORAL at 16:04

## 2024-01-01 RX ADMIN — ASPIRIN 81 MG: 81 TABLET, CHEWABLE ORAL at 08:28

## 2024-01-01 RX ADMIN — INSULIN GLARGINE 20 UNITS: 100 INJECTION, SOLUTION SUBCUTANEOUS at 08:18

## 2024-01-01 RX ADMIN — AMIODARONE HYDROCHLORIDE 0.5 MG/MIN: 1.8 INJECTION, SOLUTION INTRAVENOUS at 13:00

## 2024-01-01 RX ADMIN — PENICILLIN G POTASSIUM 4 MILLION UNITS: 5000000 INJECTION, POWDER, FOR SOLUTION INTRAMUSCULAR; INTRAVENOUS at 04:06

## 2024-01-01 RX ADMIN — IPRATROPIUM BROMIDE AND ALBUTEROL SULFATE 3 ML: 2.5; .5 SOLUTION RESPIRATORY (INHALATION) at 07:03

## 2024-01-01 RX ADMIN — MILRINONE LACTATE 0.12 MCG/KG/MIN: 0.2 INJECTION, SOLUTION INTRAVENOUS at 18:23

## 2024-01-01 RX ADMIN — AMIODARONE HYDROCHLORIDE 1 MG/MIN: 1.8 INJECTION, SOLUTION INTRAVENOUS at 14:49

## 2024-01-01 RX ADMIN — IPRATROPIUM BROMIDE AND ALBUTEROL SULFATE 3 ML: 2.5; .5 SOLUTION RESPIRATORY (INHALATION) at 23:24

## 2024-01-01 RX ADMIN — HEPARIN SODIUM 5000 UNITS: 5000 INJECTION INTRAVENOUS; SUBCUTANEOUS at 21:25

## 2024-01-01 RX ADMIN — FERROUS SULFATE TAB 325 MG (65 MG ELEMENTAL FE) 325 MG: 325 (65 FE) TAB at 08:40

## 2024-01-01 RX ADMIN — PENICILLIN G POTASSIUM 4 MILLION UNITS: 5000000 INJECTION, POWDER, FOR SOLUTION INTRAMUSCULAR; INTRAVENOUS at 04:30

## 2024-01-01 RX ADMIN — POTASSIUM CHLORIDE 40 MEQ: 1.5 POWDER, FOR SOLUTION ORAL at 13:33

## 2024-01-01 RX ADMIN — IPRATROPIUM BROMIDE AND ALBUTEROL SULFATE 3 ML: 2.5; .5 SOLUTION RESPIRATORY (INHALATION) at 14:19

## 2024-01-01 RX ADMIN — PROPOFOL INJECTABLE EMULSION 10 MCG/KG/MIN: 10 INJECTION, EMULSION INTRAVENOUS at 21:00

## 2024-01-01 RX ADMIN — ACETAMINOPHEN 650 MG: 650 LIQUID ORAL at 12:09

## 2024-01-01 RX ADMIN — SILDENAFIL 20 MG: 20 TABLET, FILM COATED ORAL at 20:12

## 2024-01-01 RX ADMIN — POTASSIUM CHLORIDE 20 MEQ: 29.8 INJECTION, SOLUTION INTRAVENOUS at 23:26

## 2024-01-01 RX ADMIN — VASOPRESSIN 0.05 UNITS/MIN: 20 INJECTION, SOLUTION INTRAVENOUS at 21:49

## 2024-01-01 RX ADMIN — DIGOXIN 250 MCG: 0.25 INJECTION INTRAMUSCULAR; INTRAVENOUS at 10:48

## 2024-01-01 RX ADMIN — PROPOFOL INJECTABLE EMULSION 30 MCG/KG/MIN: 10 INJECTION, EMULSION INTRAVENOUS at 05:31

## 2024-01-01 RX ADMIN — Medication 10 ML: at 08:45

## 2024-01-01 RX ADMIN — AMIODARONE HYDROCHLORIDE 1 MG/MIN: 1.8 INJECTION, SOLUTION INTRAVENOUS at 03:35

## 2024-01-01 RX ADMIN — ACETYLCYSTEINE 3 ML: 200 SOLUTION ORAL; RESPIRATORY (INHALATION) at 15:05

## 2024-01-01 RX ADMIN — Medication 10 ML: at 08:00

## 2024-01-01 RX ADMIN — HYDROCODONE BITARTRATE AND ACETAMINOPHEN 2 TABLET: 5; 325 TABLET ORAL at 19:32

## 2024-01-01 RX ADMIN — Medication 10 ML: at 08:19

## 2024-01-01 RX ADMIN — DEXMEDETOMIDINE HYDROCHLORIDE IN SODIUM CHLORIDE 0.5 MCG/KG/HR: 4 INJECTION INTRAVENOUS at 14:50

## 2024-01-01 RX ADMIN — CEFAZOLIN 2000 MG: 2 INJECTION, POWDER, FOR SOLUTION INTRAMUSCULAR; INTRAVENOUS at 01:14

## 2024-01-01 RX ADMIN — 0.12% CHLORHEXIDINE GLUCONATE 15 ML: 1.2 RINSE ORAL at 08:15

## 2024-01-01 RX ADMIN — MUPIROCIN 1 APPLICATION: 20 OINTMENT TOPICAL at 11:16

## 2024-01-01 RX ADMIN — MORPHINE SULFATE 2 MG: 2 INJECTION, SOLUTION INTRAMUSCULAR; INTRAVENOUS at 19:15

## 2024-01-01 RX ADMIN — MIDODRINE HYDROCHLORIDE 15 MG: 5 TABLET ORAL at 06:44

## 2024-01-01 RX ADMIN — IPRATROPIUM BROMIDE AND ALBUTEROL SULFATE 3 ML: 2.5; .5 SOLUTION RESPIRATORY (INHALATION) at 19:25

## 2024-01-01 RX ADMIN — POTASSIUM CHLORIDE 20 MEQ: 29.8 INJECTION, SOLUTION INTRAVENOUS at 05:02

## 2024-01-01 RX ADMIN — PROPOFOL INJECTABLE EMULSION 20 MCG/KG/MIN: 10 INJECTION, EMULSION INTRAVENOUS at 21:16

## 2024-01-01 RX ADMIN — INSULIN HUMAN 2 UNITS: 100 INJECTION, SOLUTION PARENTERAL at 05:28

## 2024-01-01 RX ADMIN — PENICILLIN G POTASSIUM 4 MILLION UNITS: 5000000 INJECTION, POWDER, FOR SOLUTION INTRAMUSCULAR; INTRAVENOUS at 21:29

## 2024-01-01 RX ADMIN — BUMETANIDE 4 MG: 0.25 INJECTION INTRAMUSCULAR; INTRAVENOUS at 18:53

## 2024-01-01 RX ADMIN — POTASSIUM CHLORIDE 20 MEQ: 29.8 INJECTION, SOLUTION INTRAVENOUS at 06:11

## 2024-01-01 RX ADMIN — CHLORHEXIDINE GLUCONATE 15 ML: 1.2 RINSE ORAL at 23:10

## 2024-01-01 RX ADMIN — CALCIUM GLUCONATE 2000 MG: 20 INJECTION, SOLUTION INTRAVENOUS at 14:03

## 2024-01-01 RX ADMIN — SILDENAFIL 20 MG: 20 TABLET, FILM COATED ORAL at 20:24

## 2024-01-01 RX ADMIN — ACETYLCYSTEINE 3 ML: 200 SOLUTION ORAL; RESPIRATORY (INHALATION) at 07:02

## 2024-01-01 RX ADMIN — POTASSIUM CHLORIDE 20 MEQ: 29.8 INJECTION, SOLUTION INTRAVENOUS at 10:51

## 2024-01-01 RX ADMIN — Medication 1.4 MCG/KG/MIN: at 04:58

## 2024-01-01 RX ADMIN — CHLORHEXIDINE GLUCONATE 15 ML: 1.2 RINSE ORAL at 11:26

## 2024-01-01 RX ADMIN — INSULIN HUMAN 2 UNITS: 100 INJECTION, SOLUTION PARENTERAL at 00:16

## 2024-01-01 RX ADMIN — DIGOXIN 500 MCG: 0.25 INJECTION INTRAMUSCULAR; INTRAVENOUS at 08:36

## 2024-01-01 RX ADMIN — IPRATROPIUM BROMIDE AND ALBUTEROL SULFATE 3 ML: 2.5; .5 SOLUTION RESPIRATORY (INHALATION) at 19:29

## 2024-01-01 RX ADMIN — ENOXAPARIN SODIUM 40 MG: 100 INJECTION SUBCUTANEOUS at 05:06

## 2024-01-01 RX ADMIN — CALCIUM GLUCONATE 1000 MG: 20 INJECTION, SOLUTION INTRAVENOUS at 06:15

## 2024-01-01 RX ADMIN — FERROUS SULFATE TAB 325 MG (65 MG ELEMENTAL FE) 325 MG: 325 (65 FE) TAB at 08:22

## 2024-01-01 RX ADMIN — PROPOFOL INJECTABLE EMULSION 50 MCG/KG/MIN: 10 INJECTION, EMULSION INTRAVENOUS at 20:23

## 2024-01-01 RX ADMIN — PERFLUTREN 3 ML: 6.52 INJECTION, SUSPENSION INTRAVENOUS at 14:18

## 2024-01-01 RX ADMIN — ENOXAPARIN SODIUM 40 MG: 100 INJECTION SUBCUTANEOUS at 18:48

## 2024-01-01 RX ADMIN — DEXMEDETOMIDINE HYDROCHLORIDE IN SODIUM CHLORIDE 0.7 MCG/KG/HR: 4 INJECTION INTRAVENOUS at 12:02

## 2024-01-01 RX ADMIN — PANTOPRAZOLE SODIUM 40 MG: 40 TABLET, DELAYED RELEASE ORAL at 07:25

## 2024-01-01 RX ADMIN — AMIODARONE HYDROCHLORIDE 0.5 MG/MIN: 1.8 INJECTION, SOLUTION INTRAVENOUS at 20:46

## 2024-01-01 RX ADMIN — BUMETANIDE 2 MG: 0.25 INJECTION INTRAMUSCULAR; INTRAVENOUS at 17:12

## 2024-01-01 RX ADMIN — NOREPINEPHRINE BITARTRATE 0.09 MCG/KG/MIN: 1 SOLUTION INTRAVENOUS at 07:38

## 2024-01-01 RX ADMIN — CHLORHEXIDINE GLUCONATE 15 ML: 1.2 RINSE ORAL at 11:50

## 2024-01-01 RX ADMIN — PROPOFOL INJECTABLE EMULSION 10 MCG/KG/MIN: 10 INJECTION, EMULSION INTRAVENOUS at 00:49

## 2024-01-01 RX ADMIN — POTASSIUM CHLORIDE 20 MEQ: 1.5 POWDER, FOR SOLUTION ORAL at 05:54

## 2024-01-01 RX ADMIN — PENICILLIN G POTASSIUM 4 MILLION UNITS: 5000000 INJECTION, POWDER, FOR SOLUTION INTRAMUSCULAR; INTRAVENOUS at 02:02

## 2024-01-01 RX ADMIN — ANTI-FUNGAL POWDER MICONAZOLE NITRATE TALC FREE 1 APPLICATION: 1.42 POWDER TOPICAL at 08:29

## 2024-01-01 RX ADMIN — Medication 10 ML: at 20:47

## 2024-01-01 RX ADMIN — PENICILLIN G POTASSIUM 4 MILLION UNITS: 5000000 INJECTION, POWDER, FOR SOLUTION INTRAMUSCULAR; INTRAVENOUS at 18:21

## 2024-01-01 RX ADMIN — VANCOMYCIN HYDROCHLORIDE 2000 MG: 10 INJECTION, POWDER, LYOPHILIZED, FOR SOLUTION INTRAVENOUS at 13:56

## 2024-01-01 RX ADMIN — INSULIN HUMAN 2 UNITS: 100 INJECTION, SOLUTION PARENTERAL at 07:57

## 2024-01-01 RX ADMIN — METOPROLOL TARTRATE 75 MG: 50 TABLET, FILM COATED ORAL at 08:43

## 2024-01-01 RX ADMIN — Medication 5 MG: at 21:04

## 2024-01-01 RX ADMIN — PROPOFOL INJECTABLE EMULSION 25 MCG/KG/MIN: 10 INJECTION, EMULSION INTRAVENOUS at 23:47

## 2024-01-01 RX ADMIN — ACETAMINOPHEN 1000 MG: 10 INJECTION INTRAVENOUS at 09:00

## 2024-01-01 RX ADMIN — PROPOFOL 10 MCG/KG/MIN: 10 INJECTION, EMULSION INTRAVENOUS at 20:46

## 2024-01-01 RX ADMIN — PENICILLIN G POTASSIUM 4 MILLION UNITS: 5000000 INJECTION, POWDER, FOR SOLUTION INTRAMUSCULAR; INTRAVENOUS at 00:32

## 2024-01-01 RX ADMIN — VASOPRESSIN 0.05 UNITS/MIN: 20 INJECTION, SOLUTION INTRAVENOUS at 11:03

## 2024-01-01 RX ADMIN — Medication 10 ML: at 08:22

## 2024-01-01 RX ADMIN — PENICILLIN G POTASSIUM 4 MILLION UNITS: 5000000 INJECTION, POWDER, FOR SOLUTION INTRAMUSCULAR; INTRAVENOUS at 14:40

## 2024-01-01 RX ADMIN — Medication 4 ML: at 20:05

## 2024-01-01 RX ADMIN — CHLORHEXIDINE GLUCONATE 1 APPLICATION: 500 CLOTH TOPICAL at 20:55

## 2024-01-01 RX ADMIN — CALCIUM GLUCONATE 2000 MG: 20 INJECTION, SOLUTION INTRAVENOUS at 08:14

## 2024-01-01 RX ADMIN — Medication 100 MCG: at 08:15

## 2024-01-01 RX ADMIN — VASOPRESSIN 0.06 UNITS/MIN: 20 INJECTION, SOLUTION INTRAVENOUS at 13:36

## 2024-01-01 RX ADMIN — LORAZEPAM 2 MG: 2 INJECTION INTRAMUSCULAR; INTRAVENOUS at 13:08

## 2024-01-01 RX ADMIN — HEPARIN SODIUM 5000 UNITS: 5000 INJECTION INTRAVENOUS; SUBCUTANEOUS at 05:01

## 2024-01-01 RX ADMIN — CHLORHEXIDINE GLUCONATE 15 ML: 1.2 RINSE ORAL at 00:26

## 2024-01-01 RX ADMIN — DILTIAZEM HYDROCHLORIDE 120 MG: 60 TABLET, FILM COATED ORAL at 20:39

## 2024-01-01 RX ADMIN — HEPARIN SODIUM 5000 UNITS: 5000 INJECTION INTRAVENOUS; SUBCUTANEOUS at 06:05

## 2024-01-01 RX ADMIN — ANTI-FUNGAL POWDER MICONAZOLE NITRATE TALC FREE 1 APPLICATION: 1.42 POWDER TOPICAL at 08:50

## 2024-01-01 RX ADMIN — PROPOFOL INJECTABLE EMULSION 10 MCG/KG/MIN: 10 INJECTION, EMULSION INTRAVENOUS at 23:44

## 2024-01-01 RX ADMIN — AMIODARONE HYDROCHLORIDE 1 MG/MIN: 1.8 INJECTION, SOLUTION INTRAVENOUS at 04:38

## 2024-01-01 RX ADMIN — DEXMEDETOMIDINE HYDROCHLORIDE IN SODIUM CHLORIDE 0.3 MCG/KG/HR: 4 INJECTION INTRAVENOUS at 17:59

## 2024-01-01 RX ADMIN — MIDODRINE HYDROCHLORIDE 15 MG: 5 TABLET ORAL at 16:36

## 2024-01-01 RX ADMIN — ASPIRIN 81 MG: 81 TABLET, CHEWABLE ORAL at 08:58

## 2024-01-01 RX ADMIN — ASPIRIN 81 MG: 81 TABLET, CHEWABLE ORAL at 08:14

## 2024-01-01 RX ADMIN — Medication 10 ML: at 20:16

## 2024-01-01 RX ADMIN — DEXMEDETOMIDINE HYDROCHLORIDE IN SODIUM CHLORIDE 0.5 MCG/KG/HR: 4 INJECTION INTRAVENOUS at 14:34

## 2024-01-01 RX ADMIN — AMIODARONE HYDROCHLORIDE 1 MG/MIN: 1.8 INJECTION, SOLUTION INTRAVENOUS at 22:34

## 2024-01-01 RX ADMIN — MILRINONE LACTATE 0.25 MCG/KG/MIN: 0.2 INJECTION, SOLUTION INTRAVENOUS at 12:22

## 2024-01-01 RX ADMIN — MIDODRINE HYDROCHLORIDE 15 MG: 5 TABLET ORAL at 12:34

## 2024-01-01 RX ADMIN — Medication 1 APPLICATION: at 20:14

## 2024-01-01 RX ADMIN — FENTANYL CITRATE 25 MCG: 50 INJECTION, SOLUTION INTRAMUSCULAR; INTRAVENOUS at 10:24

## 2024-01-01 RX ADMIN — POTASSIUM CHLORIDE 20 MEQ: 29.8 INJECTION, SOLUTION INTRAVENOUS at 01:29

## 2024-01-01 RX ADMIN — SILDENAFIL 20 MG: 20 TABLET, FILM COATED ORAL at 14:06

## 2024-01-01 RX ADMIN — AMIODARONE HYDROCHLORIDE 150 MG: 1.5 INJECTION, SOLUTION INTRAVENOUS at 23:39

## 2024-01-01 RX ADMIN — MORPHINE SULFATE 2 MG: 2 INJECTION, SOLUTION INTRAMUSCULAR; INTRAVENOUS at 11:53

## 2024-01-01 RX ADMIN — SILDENAFIL 20 MG: 20 TABLET, FILM COATED ORAL at 08:19

## 2024-01-01 RX ADMIN — DEXMEDETOMIDINE HYDROCHLORIDE IN SODIUM CHLORIDE 0.5 MCG/KG/HR: 4 INJECTION INTRAVENOUS at 20:42

## 2024-01-01 RX ADMIN — PROPOFOL INJECTABLE EMULSION 25 MCG/KG/MIN: 10 INJECTION, EMULSION INTRAVENOUS at 16:36

## 2024-01-01 RX ADMIN — CHLORHEXIDINE GLUCONATE 15 ML: 1.2 RINSE ORAL at 23:45

## 2024-01-01 RX ADMIN — VASOPRESSIN 0.06 UNITS/MIN: 20 INJECTION, SOLUTION INTRAVENOUS at 23:22

## 2024-01-01 RX ADMIN — VASOPRESSIN 0.04 UNITS/MIN: 20 INJECTION, SOLUTION INTRAVENOUS at 21:06

## 2024-01-01 RX ADMIN — Medication 1 APPLICATION: at 08:33

## 2024-01-01 RX ADMIN — Medication 10 ML: at 21:07

## 2024-01-01 RX ADMIN — METOPROLOL TARTRATE 75 MG: 50 TABLET, FILM COATED ORAL at 08:21

## 2024-01-01 RX ADMIN — MILRINONE LACTATE 0.12 MCG/KG/MIN: 0.2 INJECTION, SOLUTION INTRAVENOUS at 20:36

## 2024-01-01 RX ADMIN — INSULIN GLARGINE 15 UNITS: 100 INJECTION, SOLUTION SUBCUTANEOUS at 09:35

## 2024-01-01 RX ADMIN — ACETAMINOPHEN 1000 MG: 10 INJECTION INTRAVENOUS at 11:45

## 2024-01-01 RX ADMIN — CHLORHEXIDINE GLUCONATE 15 ML: 1.2 RINSE ORAL at 11:22

## 2024-01-01 RX ADMIN — DEXMEDETOMIDINE HYDROCHLORIDE IN SODIUM CHLORIDE 0.4 MCG/KG/HR: 4 INJECTION INTRAVENOUS at 13:57

## 2024-01-01 RX ADMIN — LIDOCAINE HYDROCHLORIDE 1 MG/MIN: 4 INJECTION, SOLUTION INTRAVENOUS at 01:07

## 2024-01-01 RX ADMIN — PENICILLIN G POTASSIUM 4 MILLION UNITS: 5000000 INJECTION, POWDER, FOR SOLUTION INTRAMUSCULAR; INTRAVENOUS at 22:30

## 2024-01-01 RX ADMIN — VASOPRESSIN 0.06 UNITS/MIN: 20 INJECTION, SOLUTION INTRAVENOUS at 18:33

## 2024-01-01 RX ADMIN — BUMETANIDE 4 MG: 0.25 INJECTION INTRAMUSCULAR; INTRAVENOUS at 11:25

## 2024-01-01 RX ADMIN — MORPHINE SULFATE 2 MG: 2 INJECTION, SOLUTION INTRAMUSCULAR; INTRAVENOUS at 02:00

## 2024-01-01 RX ADMIN — PENICILLIN G POTASSIUM 4 MILLION UNITS: 5000000 INJECTION, POWDER, FOR SOLUTION INTRAMUSCULAR; INTRAVENOUS at 13:33

## 2024-01-01 RX ADMIN — MILRINONE LACTATE 0.25 MCG/KG/MIN: 0.2 INJECTION, SOLUTION INTRAVENOUS at 20:32

## 2024-01-01 RX ADMIN — PANTOPRAZOLE SODIUM 40 MG: 40 TABLET, DELAYED RELEASE ORAL at 06:04

## 2024-01-01 RX ADMIN — BUSPIRONE HYDROCHLORIDE 5 MG: 5 TABLET ORAL at 20:12

## 2024-01-01 RX ADMIN — DEXMEDETOMIDINE HYDROCHLORIDE IN SODIUM CHLORIDE 0.3 MCG/KG/HR: 4 INJECTION INTRAVENOUS at 01:18

## 2024-01-01 RX ADMIN — AMIODARONE HYDROCHLORIDE 0.5 MG/MIN: 1.8 INJECTION, SOLUTION INTRAVENOUS at 08:16

## 2024-01-01 RX ADMIN — MILRINONE LACTATE 0.25 MCG/KG/MIN: 0.2 INJECTION, SOLUTION INTRAVENOUS at 06:45

## 2024-01-01 RX ADMIN — Medication 10 ML: at 20:24

## 2024-01-01 RX ADMIN — ENOXAPARIN SODIUM 40 MG: 100 INJECTION SUBCUTANEOUS at 05:13

## 2024-01-01 RX ADMIN — ENOXAPARIN SODIUM 135 MG: 150 INJECTION SUBCUTANEOUS at 17:06

## 2024-01-01 RX ADMIN — CEFEPIME 2000 MG: 2 INJECTION, POWDER, FOR SOLUTION INTRAVENOUS at 10:05

## 2024-01-01 RX ADMIN — ACETAMINOPHEN 650 MG: 650 LIQUID ORAL at 10:29

## 2024-01-01 RX ADMIN — SILDENAFIL 20 MG: 20 TABLET, FILM COATED ORAL at 08:29

## 2024-01-01 RX ADMIN — ACETYLCYSTEINE 3 ML: 200 SOLUTION ORAL; RESPIRATORY (INHALATION) at 20:07

## 2024-01-01 RX ADMIN — ANTI-FUNGAL POWDER MICONAZOLE NITRATE TALC FREE 1 APPLICATION: 1.42 POWDER TOPICAL at 08:41

## 2024-01-01 RX ADMIN — Medication 1 APPLICATION: at 20:19

## 2024-01-01 RX ADMIN — POTASSIUM CHLORIDE 20 MEQ: 1.5 POWDER, FOR SOLUTION ORAL at 05:36

## 2024-01-01 RX ADMIN — PROPOFOL 10 MCG/KG/MIN: 10 INJECTION, EMULSION INTRAVENOUS at 11:45

## 2024-01-01 RX ADMIN — LANSOPRAZOLE 15 MG: 15 TABLET, ORALLY DISINTEGRATING ORAL at 06:14

## 2024-01-01 RX ADMIN — BUMETANIDE 3 MG: 0.25 INJECTION INTRAMUSCULAR; INTRAVENOUS at 05:04

## 2024-01-01 RX ADMIN — CYCLOBENZAPRINE 10 MG: 10 TABLET, FILM COATED ORAL at 20:13

## 2024-01-01 RX ADMIN — SILDENAFIL 20 MG: 20 TABLET, FILM COATED ORAL at 09:02

## 2024-01-01 RX ADMIN — Medication 10 ML: at 10:10

## 2024-01-01 RX ADMIN — Medication 10 ML: at 11:25

## 2024-01-01 RX ADMIN — ANTI-FUNGAL POWDER MICONAZOLE NITRATE TALC FREE 1 APPLICATION: 1.42 POWDER TOPICAL at 02:44

## 2024-01-01 RX ADMIN — BUMETANIDE 2 MG: 0.25 INJECTION INTRAMUSCULAR; INTRAVENOUS at 09:02

## 2024-01-01 RX ADMIN — VASOPRESSIN 0.03 UNITS/MIN: 20 INJECTION, SOLUTION INTRAVENOUS at 13:28

## 2024-01-01 RX ADMIN — ACETYLCYSTEINE 3 ML: 200 SOLUTION ORAL; RESPIRATORY (INHALATION) at 19:29

## 2024-01-01 RX ADMIN — MIDODRINE HYDROCHLORIDE 15 MG: 5 TABLET ORAL at 09:35

## 2024-01-01 RX ADMIN — BUSPIRONE HYDROCHLORIDE 5 MG: 5 TABLET ORAL at 20:52

## 2024-01-01 RX ADMIN — EPINEPHRINE 0.02 MCG/KG/MIN: 1 INJECTION INTRAMUSCULAR; INTRAVENOUS; SUBCUTANEOUS at 08:39

## 2024-01-01 RX ADMIN — PENICILLIN G POTASSIUM 4 MILLION UNITS: 5000000 INJECTION, POWDER, FOR SOLUTION INTRAMUSCULAR; INTRAVENOUS at 08:51

## 2024-01-01 RX ADMIN — PENICILLIN G POTASSIUM 4 MILLION UNITS: 5000000 INJECTION, POWDER, FOR SOLUTION INTRAMUSCULAR; INTRAVENOUS at 16:47

## 2024-01-01 RX ADMIN — VASOPRESSIN 0.06 UNITS/MIN: 20 INJECTION, SOLUTION INTRAVENOUS at 23:47

## 2024-01-01 RX ADMIN — FERROUS SULFATE TAB 325 MG (65 MG ELEMENTAL FE) 325 MG: 325 (65 FE) TAB at 08:57

## 2024-01-01 RX ADMIN — POTASSIUM CHLORIDE 40 MEQ: 1.5 POWDER, FOR SOLUTION ORAL at 00:32

## 2024-01-01 RX ADMIN — AMIODARONE HYDROCHLORIDE 1 MG/MIN: 1.8 INJECTION, SOLUTION INTRAVENOUS at 09:09

## 2024-01-01 RX ADMIN — DEXMEDETOMIDINE HYDROCHLORIDE IN SODIUM CHLORIDE 0.5 MCG/KG/HR: 4 INJECTION INTRAVENOUS at 20:59

## 2024-01-01 RX ADMIN — HYDROCODONE BITARTRATE AND ACETAMINOPHEN 2 TABLET: 5; 325 TABLET ORAL at 12:11

## 2024-01-01 RX ADMIN — ATORVASTATIN CALCIUM 40 MG: 20 TABLET, FILM COATED ORAL at 20:46

## 2024-01-01 RX ADMIN — VASOPRESSIN 0.05 UNITS/MIN: 20 INJECTION, SOLUTION INTRAVENOUS at 13:41

## 2024-01-01 RX ADMIN — INSULIN GLARGINE 20 UNITS: 100 INJECTION, SOLUTION SUBCUTANEOUS at 08:40

## 2024-01-01 RX ADMIN — PHENYLEPHRINE HYDROCHLORIDE 0.9 MCG/KG/MIN: 10 INJECTION INTRAVENOUS at 09:19

## 2024-01-01 RX ADMIN — Medication 250 MG: at 20:43

## 2024-01-01 RX ADMIN — PROPOFOL 10 MCG/KG/MIN: 10 INJECTION, EMULSION INTRAVENOUS at 16:23

## 2024-01-01 RX ADMIN — INSULIN HUMAN 3.6 UNITS/HR: 1 INJECTION, SOLUTION INTRAVENOUS at 10:48

## 2024-01-01 RX ADMIN — CEFEPIME 2000 MG: 2 INJECTION, POWDER, FOR SOLUTION INTRAVENOUS at 10:21

## 2024-01-01 RX ADMIN — VASOPRESSIN 0.06 UNITS/MIN: 20 INJECTION, SOLUTION INTRAVENOUS at 00:49

## 2024-01-01 RX ADMIN — AMIODARONE HYDROCHLORIDE 360 MG: 1.8 INJECTION, SOLUTION INTRAVENOUS at 23:35

## 2024-01-01 RX ADMIN — AMIODARONE HYDROCHLORIDE 1 MG/MIN: 1.8 INJECTION, SOLUTION INTRAVENOUS at 16:00

## 2024-01-01 RX ADMIN — PENICILLIN G POTASSIUM 4 MILLION UNITS: 5000000 INJECTION, POWDER, FOR SOLUTION INTRAMUSCULAR; INTRAVENOUS at 04:36

## 2024-01-01 RX ADMIN — Medication 250 MG: at 20:01

## 2024-01-01 RX ADMIN — INSULIN HUMAN 2 UNITS: 100 INJECTION, SOLUTION PARENTERAL at 00:11

## 2024-01-01 RX ADMIN — INSULIN HUMAN 2 UNITS: 100 INJECTION, SOLUTION PARENTERAL at 06:17

## 2024-01-01 RX ADMIN — ASPIRIN 81 MG: 81 TABLET, CHEWABLE ORAL at 08:36

## 2024-01-01 RX ADMIN — HEPARIN SODIUM 5000 UNITS: 5000 INJECTION INTRAVENOUS; SUBCUTANEOUS at 14:50

## 2024-01-01 RX ADMIN — IPRATROPIUM BROMIDE AND ALBUTEROL SULFATE 3 ML: 2.5; .5 SOLUTION RESPIRATORY (INHALATION) at 19:52

## 2024-01-01 RX ADMIN — POTASSIUM CHLORIDE 20 MEQ: 29.8 INJECTION, SOLUTION INTRAVENOUS at 17:48

## 2024-01-01 RX ADMIN — POTASSIUM CHLORIDE 20 MEQ: 29.8 INJECTION, SOLUTION INTRAVENOUS at 14:40

## 2024-01-01 RX ADMIN — ATORVASTATIN CALCIUM 40 MG: 20 TABLET, FILM COATED ORAL at 20:02

## 2024-01-01 RX ADMIN — BUMETANIDE 1 MG/HR: 0.25 INJECTION INTRAMUSCULAR; INTRAVENOUS at 12:36

## 2024-01-01 RX ADMIN — POTASSIUM CHLORIDE 20 MEQ: 29.8 INJECTION, SOLUTION INTRAVENOUS at 15:29

## 2024-01-01 RX ADMIN — Medication 10 ML: at 22:10

## 2024-01-01 RX ADMIN — SODIUM CHLORIDE 2 MILLION UNITS: 9 INJECTION, SOLUTION INTRAVENOUS at 06:37

## 2024-01-01 RX ADMIN — VASOPRESSIN 0.04 UNITS/MIN: 20 INJECTION, SOLUTION INTRAVENOUS at 20:43

## 2024-01-01 RX ADMIN — PENICILLIN G POTASSIUM 4 MILLION UNITS: 5000000 INJECTION, POWDER, FOR SOLUTION INTRAMUSCULAR; INTRAVENOUS at 20:31

## 2024-01-01 RX ADMIN — PROPOFOL INJECTABLE EMULSION 15 MCG/KG/MIN: 10 INJECTION, EMULSION INTRAVENOUS at 12:14

## 2024-01-01 RX ADMIN — VANCOMYCIN HYDROCHLORIDE 750 MG: 750 INJECTION, POWDER, LYOPHILIZED, FOR SOLUTION INTRAVENOUS at 12:03

## 2024-01-01 RX ADMIN — VASOPRESSIN 0.06 UNITS/MIN: 20 INJECTION, SOLUTION INTRAVENOUS at 05:21

## 2024-01-01 RX ADMIN — ENOXAPARIN SODIUM 40 MG: 100 INJECTION SUBCUTANEOUS at 06:26

## 2024-01-01 RX ADMIN — BUMETANIDE 2 MG: 0.25 INJECTION INTRAMUSCULAR; INTRAVENOUS at 10:03

## 2024-01-01 RX ADMIN — DEXMEDETOMIDINE HYDROCHLORIDE IN SODIUM CHLORIDE 0.6 MCG/KG/HR: 4 INJECTION INTRAVENOUS at 02:14

## 2024-01-01 RX ADMIN — PROPOFOL INJECTABLE EMULSION 15 MCG/KG/MIN: 10 INJECTION, EMULSION INTRAVENOUS at 07:02

## 2024-01-01 RX ADMIN — ACETYLCYSTEINE 3 ML: 200 SOLUTION ORAL; RESPIRATORY (INHALATION) at 19:26

## 2024-01-01 RX ADMIN — Medication 10 ML: at 20:41

## 2024-01-01 RX ADMIN — BUMETANIDE 0.5 MG/HR: 0.25 INJECTION INTRAMUSCULAR; INTRAVENOUS at 19:45

## 2024-01-01 RX ADMIN — AMIODARONE HYDROCHLORIDE 1 MG/MIN: 1.8 INJECTION, SOLUTION INTRAVENOUS at 22:50

## 2024-01-01 RX ADMIN — ANTI-FUNGAL POWDER MICONAZOLE NITRATE TALC FREE 1 APPLICATION: 1.42 POWDER TOPICAL at 09:02

## 2024-01-01 RX ADMIN — HYDROCODONE BITARTRATE AND ACETAMINOPHEN 1 TABLET: 5; 325 TABLET ORAL at 12:07

## 2024-01-01 RX ADMIN — PROPOFOL INJECTABLE EMULSION 30 MCG/KG/MIN: 10 INJECTION, EMULSION INTRAVENOUS at 14:20

## 2024-01-01 RX ADMIN — SILDENAFIL 20 MG: 20 TABLET, FILM COATED ORAL at 20:52

## 2024-01-01 RX ADMIN — Medication 1 APPLICATION: at 08:19

## 2024-01-01 RX ADMIN — PROPOFOL INJECTABLE EMULSION 20 MCG/KG/MIN: 10 INJECTION, EMULSION INTRAVENOUS at 08:58

## 2024-01-01 RX ADMIN — INSULIN HUMAN 2 UNITS: 100 INJECTION, SOLUTION PARENTERAL at 17:19

## 2024-01-01 RX ADMIN — DEXMEDETOMIDINE HYDROCHLORIDE IN SODIUM CHLORIDE 0.4 MCG/KG/HR: 4 INJECTION INTRAVENOUS at 16:50

## 2024-01-01 RX ADMIN — CALCIUM GLUCONATE 2000 MG: 20 INJECTION, SOLUTION INTRAVENOUS at 22:57

## 2024-01-01 RX ADMIN — Medication 0.9 MCG/KG/MIN: at 17:52

## 2024-01-01 RX ADMIN — LIDOCAINE HYDROCHLORIDE 2 MG/MIN: 4 INJECTION, SOLUTION INTRAVENOUS at 20:21

## 2024-01-01 RX ADMIN — BUSPIRONE HYDROCHLORIDE 5 MG: 5 TABLET ORAL at 16:04

## 2024-01-01 RX ADMIN — VASOPRESSIN 0.05 UNITS/MIN: 20 INJECTION, SOLUTION INTRAVENOUS at 19:39

## 2024-01-01 RX ADMIN — IPRATROPIUM BROMIDE AND ALBUTEROL SULFATE 3 ML: 2.5; .5 SOLUTION RESPIRATORY (INHALATION) at 03:19

## 2024-01-01 RX ADMIN — POTASSIUM CHLORIDE 10 MEQ: 7.46 INJECTION, SOLUTION INTRAVENOUS at 05:48

## 2024-01-01 RX ADMIN — AMIODARONE HYDROCHLORIDE 1 MG/MIN: 1.8 INJECTION, SOLUTION INTRAVENOUS at 08:05

## 2024-01-01 RX ADMIN — DEXMEDETOMIDINE HYDROCHLORIDE IN SODIUM CHLORIDE 0.5 MCG/KG/HR: 4 INJECTION INTRAVENOUS at 11:55

## 2024-01-01 RX ADMIN — AMIODARONE HYDROCHLORIDE 0.5 MG/MIN: 1.8 INJECTION, SOLUTION INTRAVENOUS at 08:22

## 2024-01-01 RX ADMIN — Medication 100 MCG: at 08:09

## 2024-01-01 RX ADMIN — CHLORHEXIDINE GLUCONATE 15 ML: 1.2 RINSE ORAL at 00:47

## 2024-01-01 RX ADMIN — ACETYLCYSTEINE 3 ML: 200 SOLUTION ORAL; RESPIRATORY (INHALATION) at 20:34

## 2024-01-01 RX ADMIN — INSULIN HUMAN 3 UNITS: 100 INJECTION, SOLUTION PARENTERAL at 00:05

## 2024-01-01 RX ADMIN — SILDENAFIL 20 MG: 20 TABLET, FILM COATED ORAL at 08:14

## 2024-01-01 RX ADMIN — NOREPINEPHRINE BITARTRATE 0.09 MCG/KG/MIN: 1 SOLUTION INTRAVENOUS at 09:20

## 2024-01-01 RX ADMIN — CHLORHEXIDINE GLUCONATE 15 ML: 1.2 RINSE ORAL at 00:00

## 2024-01-01 RX ADMIN — ACETYLCYSTEINE 3 ML: 200 SOLUTION ORAL; RESPIRATORY (INHALATION) at 14:31

## 2024-01-01 RX ADMIN — LANSOPRAZOLE 15 MG: 15 TABLET, ORALLY DISINTEGRATING ORAL at 05:56

## 2024-01-01 RX ADMIN — CALCIUM CHLORIDE, MAGNESIUM CHLORIDE, SODIUM CHLORIDE, SODIUM BICARBONATE, POTASSIUM CHLORIDE AND SODIUM PHOSPHATE DIBASIC DIHYDRATE 1500 ML/HR: 3.68; 3.05; 6.34; 3.09; .314; .187 INJECTION INTRAVENOUS at 11:21

## 2024-01-01 RX ADMIN — LANSOPRAZOLE 15 MG: 15 TABLET, ORALLY DISINTEGRATING ORAL at 05:47

## 2024-01-01 RX ADMIN — PANTOPRAZOLE SODIUM 40 MG: 40 TABLET, DELAYED RELEASE ORAL at 08:43

## 2024-01-01 RX ADMIN — MIDODRINE HYDROCHLORIDE 15 MG: 5 TABLET ORAL at 08:14

## 2024-01-01 RX ADMIN — AMIODARONE HYDROCHLORIDE 200 MG: 200 TABLET ORAL at 08:36

## 2024-01-01 RX ADMIN — PROPOFOL INJECTABLE EMULSION 30 MCG/KG/MIN: 10 INJECTION, EMULSION INTRAVENOUS at 14:50

## 2024-01-01 RX ADMIN — MILRINONE LACTATE 0.38 MCG/KG/MIN: 0.2 INJECTION, SOLUTION INTRAVENOUS at 05:21

## 2024-01-01 RX ADMIN — DEXMEDETOMIDINE HYDROCHLORIDE IN SODIUM CHLORIDE 0.5 MCG/KG/HR: 4 INJECTION INTRAVENOUS at 02:48

## 2024-01-01 RX ADMIN — PROPOFOL INJECTABLE EMULSION 15 MCG/KG/MIN: 10 INJECTION, EMULSION INTRAVENOUS at 22:11

## 2024-01-01 RX ADMIN — HEPARIN SODIUM 5000 UNITS: 5000 INJECTION INTRAVENOUS; SUBCUTANEOUS at 05:34

## 2024-01-01 RX ADMIN — IPRATROPIUM BROMIDE AND ALBUTEROL SULFATE 3 ML: 2.5; .5 SOLUTION RESPIRATORY (INHALATION) at 04:46

## 2024-01-01 RX ADMIN — POTASSIUM CHLORIDE 20 MEQ: 29.8 INJECTION, SOLUTION INTRAVENOUS at 12:16

## 2024-01-01 RX ADMIN — DILTIAZEM HYDROCHLORIDE 120 MG: 60 TABLET, FILM COATED ORAL at 08:43

## 2024-01-01 RX ADMIN — SILDENAFIL 20 MG: 20 TABLET, FILM COATED ORAL at 16:41

## 2024-01-01 RX ADMIN — Medication 1 APPLICATION: at 20:20

## 2024-01-01 RX ADMIN — CEFEPIME 2000 MG: 2 INJECTION, POWDER, FOR SOLUTION INTRAVENOUS at 10:26

## 2024-01-01 RX ADMIN — BUMETANIDE 2 MG/HR: 0.25 INJECTION INTRAMUSCULAR; INTRAVENOUS at 23:14

## 2024-01-01 RX ADMIN — MILRINONE LACTATE 0.25 MCG/KG/MIN: 0.2 INJECTION, SOLUTION INTRAVENOUS at 15:51

## 2024-01-01 RX ADMIN — VASOPRESSIN 0.06 UNITS/MIN: 20 INJECTION, SOLUTION INTRAVENOUS at 10:41

## 2024-01-01 RX ADMIN — IPRATROPIUM BROMIDE AND ALBUTEROL SULFATE 3 ML: 2.5; .5 SOLUTION RESPIRATORY (INHALATION) at 23:23

## 2024-01-01 RX ADMIN — PENICILLIN G POTASSIUM 4 MILLION UNITS: 5000000 INJECTION, POWDER, FOR SOLUTION INTRAMUSCULAR; INTRAVENOUS at 10:46

## 2024-01-01 RX ADMIN — PENICILLIN G POTASSIUM 4 MILLION UNITS: 5000000 INJECTION, POWDER, FOR SOLUTION INTRAMUSCULAR; INTRAVENOUS at 19:44

## 2024-01-01 RX ADMIN — IPRATROPIUM BROMIDE AND ALBUTEROL SULFATE 3 ML: 2.5; .5 SOLUTION RESPIRATORY (INHALATION) at 03:15

## 2024-01-01 RX ADMIN — PENICILLIN G POTASSIUM 4 MILLION UNITS: 5000000 INJECTION, POWDER, FOR SOLUTION INTRAMUSCULAR; INTRAVENOUS at 17:36

## 2024-01-01 RX ADMIN — DEXMEDETOMIDINE HYDROCHLORIDE IN SODIUM CHLORIDE 0.4 MCG/KG/HR: 4 INJECTION INTRAVENOUS at 10:33

## 2024-01-01 RX ADMIN — SILDENAFIL 20 MG: 20 TABLET, FILM COATED ORAL at 08:22

## 2024-01-01 RX ADMIN — ACETAMINOPHEN 650 MG: 650 LIQUID ORAL at 03:00

## 2024-01-01 RX ADMIN — CEFEPIME 2000 MG: 2 INJECTION, POWDER, FOR SOLUTION INTRAVENOUS at 01:14

## 2024-01-01 RX ADMIN — DEXMEDETOMIDINE HYDROCHLORIDE IN SODIUM CHLORIDE 0.6 MCG/KG/HR: 4 INJECTION INTRAVENOUS at 21:47

## 2024-01-01 RX ADMIN — ACETAMINOPHEN 650 MG: 650 LIQUID ORAL at 12:00

## 2024-01-01 RX ADMIN — ACETYLCYSTEINE 3 ML: 200 SOLUTION ORAL; RESPIRATORY (INHALATION) at 14:00

## 2024-01-01 RX ADMIN — MILRINONE LACTATE 0.25 MCG/KG/MIN: 0.2 INJECTION, SOLUTION INTRAVENOUS at 08:14

## 2024-01-01 RX ADMIN — VANCOMYCIN HYDROCHLORIDE 750 MG: 750 INJECTION, POWDER, LYOPHILIZED, FOR SOLUTION INTRAVENOUS at 09:33

## 2024-01-01 RX ADMIN — CHLORHEXIDINE GLUCONATE 15 ML: 1.2 RINSE ORAL at 23:05

## 2024-01-01 RX ADMIN — PENICILLIN G POTASSIUM 4 MILLION UNITS: 5000000 INJECTION, POWDER, FOR SOLUTION INTRAMUSCULAR; INTRAVENOUS at 01:17

## 2024-01-01 RX ADMIN — LIDOCAINE HYDROCHLORIDE 1 MG/MIN: 4 INJECTION, SOLUTION INTRAVENOUS at 13:42

## 2024-01-01 RX ADMIN — Medication 5 MG: at 20:39

## 2024-01-01 RX ADMIN — PENICILLIN G POTASSIUM 4 MILLION UNITS: 5000000 INJECTION, POWDER, FOR SOLUTION INTRAMUSCULAR; INTRAVENOUS at 01:47

## 2024-01-01 RX ADMIN — Medication 250 MG: at 20:12

## 2024-01-01 RX ADMIN — HEPARIN SODIUM 5000 UNITS: 5000 INJECTION INTRAVENOUS; SUBCUTANEOUS at 13:37

## 2024-01-01 RX ADMIN — DEXMEDETOMIDINE HYDROCHLORIDE IN SODIUM CHLORIDE 0.5 MCG/KG/HR: 4 INJECTION INTRAVENOUS at 04:43

## 2024-01-01 RX ADMIN — AMIODARONE HYDROCHLORIDE 1 MG/MIN: 1.8 INJECTION, SOLUTION INTRAVENOUS at 06:02

## 2024-01-01 RX ADMIN — METOCLOPRAMIDE HYDROCHLORIDE 10 MG: 5 INJECTION INTRAMUSCULAR; INTRAVENOUS at 06:41

## 2024-01-01 RX ADMIN — ANTI-FUNGAL POWDER MICONAZOLE NITRATE TALC FREE 1 APPLICATION: 1.42 POWDER TOPICAL at 20:02

## 2024-01-01 RX ADMIN — MILRINONE LACTATE 5 MG: 1 INJECTION, SOLUTION INTRAVENOUS at 03:48

## 2024-01-01 RX ADMIN — Medication 100 MCG: at 09:03

## 2024-01-01 RX ADMIN — DEXMEDETOMIDINE HYDROCHLORIDE IN SODIUM CHLORIDE 0.5 MCG/KG/HR: 4 INJECTION INTRAVENOUS at 08:35

## 2024-01-01 RX ADMIN — ANTI-FUNGAL POWDER MICONAZOLE NITRATE TALC FREE 1 APPLICATION: 1.42 POWDER TOPICAL at 20:16

## 2024-01-01 RX ADMIN — PENICILLIN G POTASSIUM 4 MILLION UNITS: 5000000 INJECTION, POWDER, FOR SOLUTION INTRAMUSCULAR; INTRAVENOUS at 07:15

## 2024-01-01 RX ADMIN — MILRINONE LACTATE 0.12 MCG/KG/MIN: 0.2 INJECTION, SOLUTION INTRAVENOUS at 13:53

## 2024-01-01 RX ADMIN — AMIODARONE HYDROCHLORIDE 0.5 MG/MIN: 1.8 INJECTION, SOLUTION INTRAVENOUS at 15:51

## 2024-01-01 RX ADMIN — AMIODARONE HYDROCHLORIDE 0.5 MG/MIN: 1.8 INJECTION, SOLUTION INTRAVENOUS at 20:21

## 2024-01-01 RX ADMIN — DIBASIC SODIUM PHOSPHATE, MONOBASIC POTASSIUM PHOSPHATE AND MONOBASIC SODIUM PHOSPHATE 2 TABLET: 852; 155; 130 TABLET ORAL at 18:14

## 2024-01-01 RX ADMIN — PROPOFOL INJECTABLE EMULSION 15 MCG/KG/MIN: 10 INJECTION, EMULSION INTRAVENOUS at 04:43

## 2024-01-01 RX ADMIN — MILRINONE LACTATE 0.25 MCG/KG/MIN: 0.2 INJECTION, SOLUTION INTRAVENOUS at 04:35

## 2024-01-01 RX ADMIN — MUPIROCIN 1 APPLICATION: 20 OINTMENT TOPICAL at 21:04

## 2024-01-01 RX ADMIN — CALCIUM GLUCONATE 2000 MG: 20 INJECTION, SOLUTION INTRAVENOUS at 14:21

## 2024-01-01 RX ADMIN — MIDODRINE HYDROCHLORIDE 15 MG: 5 TABLET ORAL at 17:05

## 2024-01-01 RX ADMIN — LANSOPRAZOLE 15 MG: 15 TABLET, ORALLY DISINTEGRATING ORAL at 06:03

## 2024-01-01 RX ADMIN — VASOPRESSIN 0.06 UNITS/MIN: 20 INJECTION, SOLUTION INTRAVENOUS at 03:33

## 2024-01-01 RX ADMIN — SILDENAFIL 20 MG: 20 TABLET, FILM COATED ORAL at 08:27

## 2024-01-01 RX ADMIN — PENICILLIN G POTASSIUM 4 MILLION UNITS: 5000000 INJECTION, POWDER, FOR SOLUTION INTRAMUSCULAR; INTRAVENOUS at 09:05

## 2024-01-01 RX ADMIN — PENICILLIN G POTASSIUM 4 MILLION UNITS: 5000000 INJECTION, POWDER, FOR SOLUTION INTRAMUSCULAR; INTRAVENOUS at 13:42

## 2024-01-01 RX ADMIN — Medication 10 ML: at 08:17

## 2024-01-01 RX ADMIN — HEPARIN SODIUM 5000 UNITS: 5000 INJECTION INTRAVENOUS; SUBCUTANEOUS at 05:56

## 2024-01-01 RX ADMIN — AMIODARONE HYDROCHLORIDE 1 MG/MIN: 1.8 INJECTION, SOLUTION INTRAVENOUS at 03:29

## 2024-01-01 RX ADMIN — ASPIRIN 81 MG: 81 TABLET, CHEWABLE ORAL at 10:09

## 2024-01-01 RX ADMIN — Medication 100 MCG: at 08:48

## 2024-01-01 RX ADMIN — CEFTRIAXONE 2000 MG: 2 INJECTION, POWDER, FOR SOLUTION INTRAMUSCULAR; INTRAVENOUS at 10:52

## 2024-01-01 RX ADMIN — SENNOSIDES 2 TABLET: 8.6 TABLET, FILM COATED ORAL at 20:31

## 2024-01-01 RX ADMIN — AMIODARONE HYDROCHLORIDE 0.5 MG/MIN: 1.8 INJECTION, SOLUTION INTRAVENOUS at 11:54

## 2024-01-01 RX ADMIN — PENICILLIN G POTASSIUM 4 MILLION UNITS: 5000000 INJECTION, POWDER, FOR SOLUTION INTRAMUSCULAR; INTRAVENOUS at 16:36

## 2024-01-01 RX ADMIN — POTASSIUM CHLORIDE 20 MEQ: 29.8 INJECTION, SOLUTION INTRAVENOUS at 22:05

## 2024-01-01 RX ADMIN — ENOXAPARIN SODIUM 40 MG: 100 INJECTION SUBCUTANEOUS at 23:10

## 2024-01-01 RX ADMIN — PENICILLIN G POTASSIUM 4 MILLION UNITS: 5000000 INJECTION, POWDER, FOR SOLUTION INTRAMUSCULAR; INTRAVENOUS at 01:32

## 2024-01-01 RX ADMIN — Medication 10 ML: at 10:05

## 2024-01-01 RX ADMIN — IPRATROPIUM BROMIDE AND ALBUTEROL SULFATE 3 ML: 2.5; .5 SOLUTION RESPIRATORY (INHALATION) at 14:26

## 2024-01-01 RX ADMIN — AMIODARONE HYDROCHLORIDE 1 MG/MIN: 1.8 INJECTION, SOLUTION INTRAVENOUS at 10:16

## 2024-01-01 RX ADMIN — POTASSIUM CHLORIDE 20 MEQ: 29.8 INJECTION, SOLUTION INTRAVENOUS at 13:38

## 2024-01-01 RX ADMIN — IPRATROPIUM BROMIDE AND ALBUTEROL SULFATE 3 ML: 2.5; .5 SOLUTION RESPIRATORY (INHALATION) at 14:52

## 2024-01-01 RX ADMIN — MIDODRINE HYDROCHLORIDE 15 MG: 5 TABLET ORAL at 18:30

## 2024-01-01 RX ADMIN — Medication 4 ML: at 15:34

## 2024-01-01 RX ADMIN — Medication 10 ML: at 21:50

## 2024-01-01 RX ADMIN — DEXMEDETOMIDINE HYDROCHLORIDE IN SODIUM CHLORIDE 0.4 MCG/KG/HR: 4 INJECTION INTRAVENOUS at 10:08

## 2024-01-01 RX ADMIN — VASOPRESSIN 0.04 UNITS/MIN: 20 INJECTION, SOLUTION INTRAVENOUS at 11:50

## 2024-01-01 RX ADMIN — PENICILLIN G POTASSIUM 4 MILLION UNITS: 5000000 INJECTION, POWDER, FOR SOLUTION INTRAMUSCULAR; INTRAVENOUS at 10:04

## 2024-01-01 RX ADMIN — IPRATROPIUM BROMIDE AND ALBUTEROL SULFATE 3 ML: 2.5; .5 SOLUTION RESPIRATORY (INHALATION) at 14:45

## 2024-01-01 RX ADMIN — SILDENAFIL 20 MG: 20 TABLET, FILM COATED ORAL at 20:22

## 2024-01-01 RX ADMIN — IPRATROPIUM BROMIDE AND ALBUTEROL SULFATE 3 ML: 2.5; .5 SOLUTION RESPIRATORY (INHALATION) at 15:50

## 2024-01-01 RX ADMIN — AMIODARONE HYDROCHLORIDE 1 MG/MIN: 1.8 INJECTION, SOLUTION INTRAVENOUS at 16:36

## 2024-01-01 RX ADMIN — PENICILLIN G POTASSIUM 4 MILLION UNITS: 5000000 INJECTION, POWDER, FOR SOLUTION INTRAMUSCULAR; INTRAVENOUS at 18:03

## 2024-01-01 RX ADMIN — ACETYLCYSTEINE 3 ML: 200 SOLUTION ORAL; RESPIRATORY (INHALATION) at 19:54

## 2024-01-01 RX ADMIN — IPRATROPIUM BROMIDE AND ALBUTEROL SULFATE 3 ML: 2.5; .5 SOLUTION RESPIRATORY (INHALATION) at 23:47

## 2024-01-01 RX ADMIN — IPRATROPIUM BROMIDE AND ALBUTEROL SULFATE 3 ML: 2.5; .5 SOLUTION RESPIRATORY (INHALATION) at 03:53

## 2024-01-01 RX ADMIN — IPRATROPIUM BROMIDE AND ALBUTEROL SULFATE 3 ML: 2.5; .5 SOLUTION RESPIRATORY (INHALATION) at 03:32

## 2024-01-01 RX ADMIN — MORPHINE SULFATE 2 MG: 2 INJECTION, SOLUTION INTRAMUSCULAR; INTRAVENOUS at 04:07

## 2024-01-01 RX ADMIN — MORPHINE SULFATE 2 MG: 2 INJECTION, SOLUTION INTRAMUSCULAR; INTRAVENOUS at 16:55

## 2024-01-01 RX ADMIN — ENOXAPARIN SODIUM 40 MG: 100 INJECTION SUBCUTANEOUS at 18:36

## 2024-01-01 RX ADMIN — AMIODARONE HYDROCHLORIDE 1 MG/MIN: 1.8 INJECTION, SOLUTION INTRAVENOUS at 10:47

## 2024-01-01 RX ADMIN — AMIODARONE HYDROCHLORIDE 1 MG/MIN: 1.8 INJECTION, SOLUTION INTRAVENOUS at 05:03

## 2024-01-01 RX ADMIN — IOPAMIDOL 85 ML: 755 INJECTION, SOLUTION INTRAVENOUS at 13:44

## 2024-01-01 RX ADMIN — PROPOFOL INJECTABLE EMULSION 25 MCG/KG/MIN: 10 INJECTION, EMULSION INTRAVENOUS at 00:32

## 2024-01-01 RX ADMIN — MILRINONE LACTATE 0.12 MCG/KG/MIN: 0.2 INJECTION, SOLUTION INTRAVENOUS at 14:49

## 2024-01-01 RX ADMIN — MIDODRINE HYDROCHLORIDE 15 MG: 5 TABLET ORAL at 06:31

## 2024-01-01 RX ADMIN — AMIODARONE HYDROCHLORIDE 1 MG/MIN: 1.8 INJECTION, SOLUTION INTRAVENOUS at 04:40

## 2024-01-01 RX ADMIN — IPRATROPIUM BROMIDE AND ALBUTEROL SULFATE 3 ML: 2.5; .5 SOLUTION RESPIRATORY (INHALATION) at 14:18

## 2024-01-01 RX ADMIN — METOPROLOL TARTRATE 75 MG: 50 TABLET, FILM COATED ORAL at 08:17

## 2024-01-01 RX ADMIN — MILRINONE LACTATE 0.12 MCG/KG/MIN: 0.2 INJECTION, SOLUTION INTRAVENOUS at 20:24

## 2024-01-01 RX ADMIN — AMIODARONE HYDROCHLORIDE 1 MG/MIN: 1.8 INJECTION, SOLUTION INTRAVENOUS at 05:12

## 2024-01-01 RX ADMIN — ACETAMINOPHEN 1000 MG: 10 INJECTION INTRAVENOUS at 02:12

## 2024-01-01 RX ADMIN — IPRATROPIUM BROMIDE AND ALBUTEROL SULFATE 3 ML: .5; 3 SOLUTION RESPIRATORY (INHALATION) at 19:27

## 2024-01-01 RX ADMIN — Medication 10 ML: at 20:20

## 2024-01-01 RX ADMIN — CALCIUM CHLORIDE, MAGNESIUM CHLORIDE, SODIUM CHLORIDE, SODIUM BICARBONATE, POTASSIUM CHLORIDE AND SODIUM PHOSPHATE DIBASIC DIHYDRATE 1500 ML/HR: 3.68; 3.05; 6.34; 3.09; .314; .187 INJECTION INTRAVENOUS at 01:00

## 2024-01-01 RX ADMIN — MUPIROCIN 1 APPLICATION: 20 OINTMENT TOPICAL at 10:06

## 2024-01-01 RX ADMIN — AMIODARONE HYDROCHLORIDE 0.5 MG/MIN: 1.8 INJECTION, SOLUTION INTRAVENOUS at 09:26

## 2024-01-01 RX ADMIN — ACETAMINOPHEN 325MG 650 MG: 325 TABLET ORAL at 06:56

## 2024-01-01 RX ADMIN — MIDODRINE HYDROCHLORIDE 15 MG: 5 TABLET ORAL at 17:03

## 2024-01-01 RX ADMIN — BUSPIRONE HYDROCHLORIDE 5 MG: 5 TABLET ORAL at 16:21

## 2024-01-01 RX ADMIN — CEFEPIME 2000 MG: 2 INJECTION, POWDER, FOR SOLUTION INTRAVENOUS at 09:38

## 2024-01-01 RX ADMIN — CHLORHEXIDINE GLUCONATE 15 ML: 1.2 RINSE ORAL at 23:27

## 2024-01-01 RX ADMIN — HEPARIN SODIUM 5000 UNITS: 5000 INJECTION INTRAVENOUS; SUBCUTANEOUS at 06:03

## 2024-01-01 RX ADMIN — PROPOFOL INJECTABLE EMULSION 25 MCG/KG/MIN: 10 INJECTION, EMULSION INTRAVENOUS at 16:07

## 2024-01-01 RX ADMIN — FERROUS SULFATE TAB 325 MG (65 MG ELEMENTAL FE) 325 MG: 325 (65 FE) TAB at 08:11

## 2024-01-01 RX ADMIN — DIGOXIN 250 MCG: 0.25 INJECTION INTRAMUSCULAR; INTRAVENOUS at 14:32

## 2024-01-01 RX ADMIN — Medication 250 MG: at 20:19

## 2024-01-01 RX ADMIN — IPRATROPIUM BROMIDE AND ALBUTEROL SULFATE 3 ML: 2.5; .5 SOLUTION RESPIRATORY (INHALATION) at 13:59

## 2024-01-01 RX ADMIN — IPRATROPIUM BROMIDE AND ALBUTEROL SULFATE 3 ML: 2.5; .5 SOLUTION RESPIRATORY (INHALATION) at 23:14

## 2024-01-01 RX ADMIN — Medication 10 ML: at 20:54

## 2024-01-01 RX ADMIN — BUSPIRONE HYDROCHLORIDE 5 MG: 5 TABLET ORAL at 20:02

## 2024-01-01 RX ADMIN — Medication 10 ML: at 08:06

## 2024-01-01 RX ADMIN — IPRATROPIUM BROMIDE AND ALBUTEROL SULFATE 3 ML: 2.5; .5 SOLUTION RESPIRATORY (INHALATION) at 10:21

## 2024-01-01 RX ADMIN — Medication 10 ML: at 08:49

## 2024-01-01 RX ADMIN — DILTIAZEM HYDROCHLORIDE 120 MG: 60 TABLET, FILM COATED ORAL at 08:24

## 2024-01-01 RX ADMIN — ATORVASTATIN CALCIUM 40 MG: 20 TABLET, FILM COATED ORAL at 21:29

## 2024-01-01 RX ADMIN — POTASSIUM CHLORIDE 20 MEQ: 29.8 INJECTION, SOLUTION INTRAVENOUS at 14:30

## 2024-01-01 RX ADMIN — SILDENAFIL 20 MG: 20 TABLET, FILM COATED ORAL at 14:40

## 2024-01-01 RX ADMIN — CEFAZOLIN 2000 MG: 2 INJECTION, POWDER, FOR SOLUTION INTRAMUSCULAR; INTRAVENOUS at 16:39

## 2024-01-01 RX ADMIN — IPRATROPIUM BROMIDE AND ALBUTEROL SULFATE 3 ML: 2.5; .5 SOLUTION RESPIRATORY (INHALATION) at 19:42

## 2024-01-01 RX ADMIN — INSULIN HUMAN 2 UNITS: 100 INJECTION, SOLUTION PARENTERAL at 17:12

## 2024-01-01 RX ADMIN — AMIODARONE HYDROCHLORIDE 1 MG/MIN: 1.8 INJECTION, SOLUTION INTRAVENOUS at 11:22

## 2024-01-01 RX ADMIN — ACETYLCYSTEINE 3 ML: 200 SOLUTION ORAL; RESPIRATORY (INHALATION) at 19:45

## 2024-01-01 RX ADMIN — ALPRAZOLAM 0.25 MG: 0.25 TABLET ORAL at 19:15

## 2024-01-01 RX ADMIN — MILRINONE LACTATE 0.25 MCG/KG/MIN: 0.2 INJECTION, SOLUTION INTRAVENOUS at 22:22

## 2024-01-01 RX ADMIN — IPRATROPIUM BROMIDE AND ALBUTEROL SULFATE 3 ML: 2.5; .5 SOLUTION RESPIRATORY (INHALATION) at 14:51

## 2024-01-01 RX ADMIN — ATORVASTATIN CALCIUM 40 MG: 20 TABLET, FILM COATED ORAL at 20:24

## 2024-01-01 RX ADMIN — PENICILLIN G POTASSIUM 4 MILLION UNITS: 5000000 INJECTION, POWDER, FOR SOLUTION INTRAMUSCULAR; INTRAVENOUS at 00:57

## 2024-01-01 RX ADMIN — SILDENAFIL 20 MG: 20 TABLET, FILM COATED ORAL at 16:09

## 2024-01-01 RX ADMIN — PENICILLIN G POTASSIUM 4 MILLION UNITS: 5000000 INJECTION, POWDER, FOR SOLUTION INTRAMUSCULAR; INTRAVENOUS at 04:48

## 2024-01-01 RX ADMIN — IPRATROPIUM BROMIDE AND ALBUTEROL SULFATE 3 ML: 2.5; .5 SOLUTION RESPIRATORY (INHALATION) at 03:35

## 2024-01-01 RX ADMIN — PROPOFOL INJECTABLE EMULSION 15 MCG/KG/MIN: 10 INJECTION, EMULSION INTRAVENOUS at 08:36

## 2024-01-01 RX ADMIN — ANTI-FUNGAL POWDER MICONAZOLE NITRATE TALC FREE 1 APPLICATION: 1.42 POWDER TOPICAL at 08:19

## 2024-01-01 RX ADMIN — Medication 4 ML: at 07:19

## 2024-01-01 RX ADMIN — VANCOMYCIN HYDROCHLORIDE 750 MG: 750 INJECTION, POWDER, LYOPHILIZED, FOR SOLUTION INTRAVENOUS at 09:21

## 2024-01-01 RX ADMIN — PROPOFOL INJECTABLE EMULSION 20 MCG/KG/MIN: 10 INJECTION, EMULSION INTRAVENOUS at 03:33

## 2024-01-01 RX ADMIN — POTASSIUM CHLORIDE 40 MEQ: 1.5 POWDER, FOR SOLUTION ORAL at 09:36

## 2024-01-01 RX ADMIN — PROPOFOL 11.04 MCG/KG/MIN: 10 INJECTION, EMULSION INTRAVENOUS at 06:09

## 2024-01-01 RX ADMIN — MILRINONE LACTATE 0.25 MCG/KG/MIN: 0.2 INJECTION, SOLUTION INTRAVENOUS at 02:21

## 2024-01-01 RX ADMIN — Medication 250 MG: at 08:14

## 2024-01-01 RX ADMIN — MIDODRINE HYDROCHLORIDE 15 MG: 5 TABLET ORAL at 11:12

## 2024-01-01 RX ADMIN — METOPROLOL TARTRATE 75 MG: 50 TABLET, FILM COATED ORAL at 20:39

## 2024-01-01 RX ADMIN — IPRATROPIUM BROMIDE AND ALBUTEROL SULFATE 3 ML: 2.5; .5 SOLUTION RESPIRATORY (INHALATION) at 10:35

## 2024-01-01 RX ADMIN — ACETYLCYSTEINE 3 ML: 200 SOLUTION ORAL; RESPIRATORY (INHALATION) at 06:45

## 2024-01-01 RX ADMIN — POTASSIUM CHLORIDE 20 MEQ: 29.8 INJECTION, SOLUTION INTRAVENOUS at 15:44

## 2024-01-01 RX ADMIN — POTASSIUM CHLORIDE 20 MEQ: 29.8 INJECTION, SOLUTION INTRAVENOUS at 02:59

## 2024-01-01 RX ADMIN — BUSPIRONE HYDROCHLORIDE 5 MG: 5 TABLET ORAL at 08:17

## 2024-01-01 RX ADMIN — IPRATROPIUM BROMIDE AND ALBUTEROL SULFATE 3 ML: 2.5; .5 SOLUTION RESPIRATORY (INHALATION) at 14:30

## 2024-01-01 RX ADMIN — ANTI-FUNGAL POWDER MICONAZOLE NITRATE TALC FREE 1 APPLICATION: 1.42 POWDER TOPICAL at 21:00

## 2024-01-01 RX ADMIN — FERROUS SULFATE TAB 325 MG (65 MG ELEMENTAL FE) 325 MG: 325 (65 FE) TAB at 08:17

## 2024-01-01 RX ADMIN — ENOXAPARIN SODIUM 40 MG: 100 INJECTION SUBCUTANEOUS at 16:09

## 2024-01-01 RX ADMIN — MILRINONE LACTATE 0.25 MCG/KG/MIN: 0.2 INJECTION, SOLUTION INTRAVENOUS at 00:49

## 2024-01-01 RX ADMIN — INSULIN HUMAN 2 UNITS: 100 INJECTION, SOLUTION PARENTERAL at 17:06

## 2024-01-01 RX ADMIN — Medication 100 MCG: at 08:36

## 2024-01-01 RX ADMIN — ACETAMINOPHEN 1000 MG: 10 INJECTION INTRAVENOUS at 00:12

## 2024-01-01 RX ADMIN — BUSPIRONE HYDROCHLORIDE 5 MG: 5 TABLET ORAL at 20:17

## 2024-01-01 RX ADMIN — AMIODARONE HYDROCHLORIDE 1 MG/MIN: 1.8 INJECTION, SOLUTION INTRAVENOUS at 00:11

## 2024-01-01 RX ADMIN — Medication 10 ML: at 08:41

## 2024-01-01 RX ADMIN — MILRINONE LACTATE 0.25 MCG/KG/MIN: 0.2 INJECTION, SOLUTION INTRAVENOUS at 04:41

## 2024-01-01 RX ADMIN — PENICILLIN G POTASSIUM 4 MILLION UNITS: 5000000 INJECTION, POWDER, FOR SOLUTION INTRAMUSCULAR; INTRAVENOUS at 01:15

## 2024-01-01 RX ADMIN — ASPIRIN 81 MG: 81 TABLET, CHEWABLE ORAL at 09:15

## 2024-01-01 RX ADMIN — IPRATROPIUM BROMIDE AND ALBUTEROL SULFATE 3 ML: 2.5; .5 SOLUTION RESPIRATORY (INHALATION) at 15:04

## 2024-01-01 RX ADMIN — IPRATROPIUM BROMIDE AND ALBUTEROL SULFATE 3 ML: 2.5; .5 SOLUTION RESPIRATORY (INHALATION) at 03:57

## 2024-01-01 RX ADMIN — DEXMEDETOMIDINE HYDROCHLORIDE IN SODIUM CHLORIDE 0.7 MCG/KG/HR: 4 INJECTION INTRAVENOUS at 15:02

## 2024-01-01 RX ADMIN — VASOPRESSIN 0.06 UNITS/MIN: 20 INJECTION, SOLUTION INTRAVENOUS at 04:39

## 2024-01-01 RX ADMIN — INSULIN HUMAN 2 UNITS: 100 INJECTION, SOLUTION PARENTERAL at 11:44

## 2024-01-01 RX ADMIN — POTASSIUM CHLORIDE 40 MEQ: 1.5 POWDER, FOR SOLUTION ORAL at 09:02

## 2024-01-01 RX ADMIN — Medication 250 MG: at 10:06

## 2024-01-01 RX ADMIN — PENICILLIN G POTASSIUM 4 MILLION UNITS: 5000000 INJECTION, POWDER, FOR SOLUTION INTRAMUSCULAR; INTRAVENOUS at 11:42

## 2024-01-01 RX ADMIN — AMIODARONE HYDROCHLORIDE 0.5 MG/MIN: 1.8 INJECTION, SOLUTION INTRAVENOUS at 21:34

## 2024-01-01 RX ADMIN — ANTI-FUNGAL POWDER MICONAZOLE NITRATE TALC FREE 1 APPLICATION: 1.42 POWDER TOPICAL at 10:11

## 2024-01-01 RX ADMIN — VASOPRESSIN 0.06 UNITS/MIN: 20 INJECTION, SOLUTION INTRAVENOUS at 22:06

## 2024-01-01 RX ADMIN — HEPARIN SODIUM 1000 UNITS: 1000 INJECTION, SOLUTION INTRAVENOUS; SUBCUTANEOUS at 09:30

## 2024-01-01 RX ADMIN — CALCIUM GLUCONATE 2000 MG: 20 INJECTION, SOLUTION INTRAVENOUS at 14:59

## 2024-01-01 RX ADMIN — ACETAMINOPHEN 650 MG: 650 LIQUID ORAL at 02:35

## 2024-01-01 RX ADMIN — ANTI-FUNGAL POWDER MICONAZOLE NITRATE TALC FREE 1 APPLICATION: 1.42 POWDER TOPICAL at 09:58

## 2024-01-01 RX ADMIN — IPRATROPIUM BROMIDE AND ALBUTEROL SULFATE 3 ML: 2.5; .5 SOLUTION RESPIRATORY (INHALATION) at 15:06

## 2024-01-01 RX ADMIN — SILDENAFIL 20 MG: 20 TABLET, FILM COATED ORAL at 20:02

## 2024-01-01 RX ADMIN — MILRINONE LACTATE 0.25 MCG/KG/MIN: 0.2 INJECTION, SOLUTION INTRAVENOUS at 20:20

## 2024-01-01 RX ADMIN — AMIODARONE HYDROCHLORIDE 0.5 MG/MIN: 1.8 INJECTION, SOLUTION INTRAVENOUS at 02:00

## 2024-01-01 RX ADMIN — PERFLUTREN 2 ML: 6.52 INJECTION, SUSPENSION INTRAVENOUS at 11:36

## 2024-01-01 RX ADMIN — ENOXAPARIN SODIUM 40 MG: 100 INJECTION SUBCUTANEOUS at 17:16

## 2024-01-01 RX ADMIN — Medication 250 MG: at 08:29

## 2024-01-01 RX ADMIN — DEXMEDETOMIDINE HYDROCHLORIDE IN SODIUM CHLORIDE 0.8 MCG/KG/HR: 4 INJECTION INTRAVENOUS at 06:10

## 2024-01-01 RX ADMIN — CEFEPIME 2000 MG: 2 INJECTION, POWDER, FOR SOLUTION INTRAVENOUS at 10:01

## 2024-01-01 RX ADMIN — POTASSIUM CHLORIDE 20 MEQ: 29.8 INJECTION, SOLUTION INTRAVENOUS at 13:31

## 2024-01-01 RX ADMIN — ATORVASTATIN CALCIUM 40 MG: 20 TABLET, FILM COATED ORAL at 20:17

## 2024-01-01 RX ADMIN — LANSOPRAZOLE 15 MG: 15 TABLET, ORALLY DISINTEGRATING ORAL at 06:05

## 2024-01-01 RX ADMIN — ACETAMINOPHEN 650 MG: 650 LIQUID ORAL at 22:36

## 2024-01-01 RX ADMIN — Medication 1 APPLICATION: at 08:25

## 2024-01-01 RX ADMIN — Medication 1 APPLICATION: at 20:04

## 2024-01-01 RX ADMIN — MILRINONE LACTATE 0.12 MCG/KG/MIN: 0.2 INJECTION, SOLUTION INTRAVENOUS at 19:45

## 2024-01-01 RX ADMIN — TECHNETIUM TC 99M SESTAMIBI 1 DOSE: 1 INJECTION INTRAVENOUS at 12:31

## 2024-01-01 RX ADMIN — INSULIN HUMAN 2 UNITS: 100 INJECTION, SOLUTION PARENTERAL at 00:29

## 2024-01-01 RX ADMIN — VASOPRESSIN 0.05 UNITS/MIN: 20 INJECTION, SOLUTION INTRAVENOUS at 13:16

## 2024-01-01 RX ADMIN — DEXMEDETOMIDINE HYDROCHLORIDE IN SODIUM CHLORIDE 0.05 MCG/KG/HR: 4 INJECTION INTRAVENOUS at 03:43

## 2024-01-01 RX ADMIN — MILRINONE LACTATE 0.12 MCG/KG/MIN: 0.2 INJECTION, SOLUTION INTRAVENOUS at 04:44

## 2024-01-01 RX ADMIN — PROPOFOL INJECTABLE EMULSION 30 MCG/KG/MIN: 10 INJECTION, EMULSION INTRAVENOUS at 12:30

## 2024-01-01 RX ADMIN — ASPIRIN 81 MG: 81 TABLET, CHEWABLE ORAL at 08:13

## 2024-01-01 RX ADMIN — SILDENAFIL 20 MG: 20 TABLET, FILM COATED ORAL at 14:44

## 2024-01-01 RX ADMIN — SODIUM BICARBONATE 100 MEQ: 84 INJECTION INTRAVENOUS at 08:43

## 2024-01-01 RX ADMIN — MORPHINE SULFATE 2 MG: 2 INJECTION, SOLUTION INTRAMUSCULAR; INTRAVENOUS at 21:37

## 2024-01-01 RX ADMIN — SILDENAFIL 20 MG: 20 TABLET, FILM COATED ORAL at 09:16

## 2024-01-01 RX ADMIN — PROPOFOL INJECTABLE EMULSION 10 MCG/KG/MIN: 10 INJECTION, EMULSION INTRAVENOUS at 00:59

## 2024-01-01 RX ADMIN — CHLORHEXIDINE GLUCONATE 15 ML: 1.2 RINSE ORAL at 13:43

## 2024-01-01 RX ADMIN — INSULIN HUMAN 2 UNITS: 100 INJECTION, SOLUTION PARENTERAL at 00:53

## 2024-01-01 RX ADMIN — ASPIRIN 81 MG: 81 TABLET, CHEWABLE ORAL at 08:19

## 2024-01-01 RX ADMIN — ENOXAPARIN SODIUM 135 MG: 150 INJECTION SUBCUTANEOUS at 09:50

## 2024-01-01 RX ADMIN — IPRATROPIUM BROMIDE AND ALBUTEROL SULFATE 3 ML: 2.5; .5 SOLUTION RESPIRATORY (INHALATION) at 10:37

## 2024-01-01 RX ADMIN — VASOPRESSIN 0.06 UNITS/MIN: 20 INJECTION, SOLUTION INTRAVENOUS at 11:16

## 2024-01-01 RX ADMIN — BUMETANIDE 2 MG: 0.25 INJECTION INTRAMUSCULAR; INTRAVENOUS at 17:41

## 2024-01-01 RX ADMIN — Medication 100 MCG: at 09:36

## 2024-01-01 RX ADMIN — ANTI-FUNGAL POWDER MICONAZOLE NITRATE TALC FREE 1 APPLICATION: 1.42 POWDER TOPICAL at 20:14

## 2024-01-01 RX ADMIN — BUMETANIDE 2 MG: 0.25 INJECTION INTRAMUSCULAR; INTRAVENOUS at 05:03

## 2024-01-01 RX ADMIN — HYDROCODONE BITARTRATE AND ACETAMINOPHEN 2 TABLET: 5; 325 TABLET ORAL at 14:09

## 2024-01-01 RX ADMIN — ALPRAZOLAM 0.25 MG: 0.25 TABLET ORAL at 15:54

## 2024-01-01 RX ADMIN — IPRATROPIUM BROMIDE AND ALBUTEROL SULFATE 3 ML: 2.5; .5 SOLUTION RESPIRATORY (INHALATION) at 06:53

## 2024-01-01 RX ADMIN — ASPIRIN 81 MG: 81 TABLET, CHEWABLE ORAL at 18:40

## 2024-01-01 RX ADMIN — POTASSIUM CHLORIDE 20 MEQ: 29.8 INJECTION, SOLUTION INTRAVENOUS at 02:34

## 2024-01-01 RX ADMIN — MILRINONE LACTATE 0.12 MCG/KG/MIN: 0.2 INJECTION, SOLUTION INTRAVENOUS at 11:06

## 2024-01-01 RX ADMIN — ANTI-FUNGAL POWDER MICONAZOLE NITRATE TALC FREE 1 APPLICATION: 1.42 POWDER TOPICAL at 08:22

## 2024-01-01 RX ADMIN — DEXMEDETOMIDINE HYDROCHLORIDE IN SODIUM CHLORIDE 0.8 MCG/KG/HR: 4 INJECTION INTRAVENOUS at 22:46

## 2024-01-01 RX ADMIN — MIDODRINE HYDROCHLORIDE 15 MG: 5 TABLET ORAL at 18:24

## 2024-01-01 RX ADMIN — INSULIN HUMAN 2 UNITS: 100 INJECTION, SOLUTION PARENTERAL at 05:17

## 2024-01-01 RX ADMIN — ANTI-FUNGAL POWDER MICONAZOLE NITRATE TALC FREE 1 APPLICATION: 1.42 POWDER TOPICAL at 08:15

## 2024-01-01 RX ADMIN — Medication 100 MCG: at 08:16

## 2024-01-01 RX ADMIN — POTASSIUM CHLORIDE 20 MEQ: 750 TABLET, EXTENDED RELEASE ORAL at 08:43

## 2024-01-01 RX ADMIN — IPRATROPIUM BROMIDE AND ALBUTEROL SULFATE 3 ML: 2.5; .5 SOLUTION RESPIRATORY (INHALATION) at 04:27

## 2024-01-01 RX ADMIN — CHLORHEXIDINE GLUCONATE 15 ML: 1.2 RINSE ORAL at 23:25

## 2024-01-01 RX ADMIN — Medication 10 ML: at 20:18

## 2024-01-01 RX ADMIN — MIDODRINE HYDROCHLORIDE 10 MG: 5 TABLET ORAL at 14:31

## 2024-01-01 RX ADMIN — PENICILLIN G POTASSIUM 4 MILLION UNITS: 5000000 INJECTION, POWDER, FOR SOLUTION INTRAMUSCULAR; INTRAVENOUS at 20:08

## 2024-01-01 RX ADMIN — CYCLOBENZAPRINE 10 MG: 10 TABLET, FILM COATED ORAL at 04:14

## 2024-01-01 RX ADMIN — AMIODARONE HYDROCHLORIDE 1 MG/MIN: 1.8 INJECTION, SOLUTION INTRAVENOUS at 22:59

## 2024-01-01 RX ADMIN — SILDENAFIL 20 MG: 20 TABLET, FILM COATED ORAL at 20:26

## 2024-01-01 RX ADMIN — ANTI-FUNGAL POWDER MICONAZOLE NITRATE TALC FREE 1 APPLICATION: 1.42 POWDER TOPICAL at 10:01

## 2024-01-01 RX ADMIN — Medication 4 ML: at 19:49

## 2024-01-01 RX ADMIN — AMIODARONE HYDROCHLORIDE 1 MG/MIN: 1.8 INJECTION, SOLUTION INTRAVENOUS at 03:55

## 2024-01-01 RX ADMIN — POTASSIUM CHLORIDE 20 MEQ: 29.8 INJECTION, SOLUTION INTRAVENOUS at 17:16

## 2024-01-01 RX ADMIN — CARVEDILOL 3.12 MG: 3.12 TABLET, FILM COATED ORAL at 08:12

## 2024-01-01 RX ADMIN — SILDENAFIL 20 MG: 20 TABLET, FILM COATED ORAL at 10:06

## 2024-01-01 RX ADMIN — INSULIN GLARGINE 20 UNITS: 100 INJECTION, SOLUTION SUBCUTANEOUS at 14:24

## 2024-01-01 RX ADMIN — SILDENAFIL 20 MG: 20 TABLET, FILM COATED ORAL at 20:38

## 2024-01-01 RX ADMIN — PROPOFOL INJECTABLE EMULSION 5 MCG/KG/MIN: 10 INJECTION, EMULSION INTRAVENOUS at 01:28

## 2024-01-01 RX ADMIN — PENICILLIN G POTASSIUM 4 MILLION UNITS: 5000000 INJECTION, POWDER, FOR SOLUTION INTRAMUSCULAR; INTRAVENOUS at 08:49

## 2024-01-01 RX ADMIN — HEPARIN SODIUM 5000 UNITS: 5000 INJECTION INTRAVENOUS; SUBCUTANEOUS at 21:42

## 2024-01-01 RX ADMIN — VASOPRESSIN 0.04 UNITS/MIN: 20 INJECTION, SOLUTION INTRAVENOUS at 03:48

## 2024-01-01 RX ADMIN — Medication 10 ML: at 20:03

## 2024-01-01 RX ADMIN — Medication 100 MCG: at 09:09

## 2024-01-01 RX ADMIN — Medication 10 ML: at 20:23

## 2024-01-01 RX ADMIN — ALBUTEROL SULFATE: 0.83 SOLUTION RESPIRATORY (INHALATION) at 12:00

## 2024-01-01 RX ADMIN — IPRATROPIUM BROMIDE AND ALBUTEROL SULFATE 3 ML: 2.5; .5 SOLUTION RESPIRATORY (INHALATION) at 06:58

## 2024-01-01 RX ADMIN — ACETAMINOPHEN 650 MG: 650 LIQUID ORAL at 03:19

## 2024-01-01 RX ADMIN — IPRATROPIUM BROMIDE AND ALBUTEROL SULFATE 3 ML: 2.5; .5 SOLUTION RESPIRATORY (INHALATION) at 23:27

## 2024-01-01 RX ADMIN — ASPIRIN 81 MG: 81 TABLET, CHEWABLE ORAL at 08:40

## 2024-01-01 RX ADMIN — LANSOPRAZOLE 15 MG: 15 TABLET, ORALLY DISINTEGRATING ORAL at 05:12

## 2024-01-01 RX ADMIN — PROPOFOL INJECTABLE EMULSION 5 MCG/KG/MIN: 10 INJECTION, EMULSION INTRAVENOUS at 09:47

## 2024-01-01 RX ADMIN — Medication 0.9 MCG/KG/MIN: at 22:04

## 2024-01-01 RX ADMIN — ANTI-FUNGAL POWDER MICONAZOLE NITRATE TALC FREE 1 APPLICATION: 1.42 POWDER TOPICAL at 08:25

## 2024-01-01 RX ADMIN — ATORVASTATIN CALCIUM 40 MG: 20 TABLET, FILM COATED ORAL at 20:38

## 2024-01-01 RX ADMIN — CALCIUM GLUCONATE 2000 MG: 20 INJECTION, SOLUTION INTRAVENOUS at 09:19

## 2024-01-01 RX ADMIN — LANSOPRAZOLE 15 MG: 15 TABLET, ORALLY DISINTEGRATING ORAL at 05:04

## 2024-01-01 RX ADMIN — BUSPIRONE HYDROCHLORIDE 5 MG: 5 TABLET ORAL at 08:22

## 2024-01-01 RX ADMIN — Medication 10 ML: at 13:39

## 2024-01-01 RX ADMIN — POTASSIUM CHLORIDE 20 MEQ: 29.8 INJECTION, SOLUTION INTRAVENOUS at 16:51

## 2024-01-01 RX ADMIN — NOREPINEPHRINE BITARTRATE 0.01 MCG/KG/MIN: 32 SOLUTION INTRAVENOUS at 20:46

## 2024-01-01 RX ADMIN — PROPOFOL INJECTABLE EMULSION 30 MCG/KG/MIN: 10 INJECTION, EMULSION INTRAVENOUS at 23:12

## 2024-01-01 RX ADMIN — DEXMEDETOMIDINE HYDROCHLORIDE IN SODIUM CHLORIDE 0.4 MCG/KG/HR: 4 INJECTION INTRAVENOUS at 06:30

## 2024-01-01 RX ADMIN — VASOPRESSIN 0.06 UNITS/MIN: 20 INJECTION, SOLUTION INTRAVENOUS at 16:32

## 2024-01-01 RX ADMIN — LANSOPRAZOLE 15 MG: 15 TABLET, ORALLY DISINTEGRATING ORAL at 05:34

## 2024-01-01 RX ADMIN — ASPIRIN 81 MG: 81 TABLET, CHEWABLE ORAL at 10:07

## 2024-01-01 RX ADMIN — CALCIUM CHLORIDE, MAGNESIUM CHLORIDE, SODIUM CHLORIDE, SODIUM BICARBONATE, POTASSIUM CHLORIDE AND SODIUM PHOSPHATE DIBASIC DIHYDRATE 1500 ML/HR: 3.68; 3.05; 6.34; 3.09; .314; .187 INJECTION INTRAVENOUS at 02:10

## 2024-01-01 RX ADMIN — IPRATROPIUM BROMIDE AND ALBUTEROL SULFATE 3 ML: 2.5; .5 SOLUTION RESPIRATORY (INHALATION) at 19:26

## 2024-01-01 RX ADMIN — Medication 100 MCG: at 10:47

## 2024-01-01 RX ADMIN — PANTOPRAZOLE SODIUM 40 MG: 40 TABLET, DELAYED RELEASE ORAL at 06:07

## 2024-01-01 RX ADMIN — Medication 250 MG: at 20:38

## 2024-01-01 RX ADMIN — CHLORHEXIDINE GLUCONATE 15 ML: 1.2 RINSE ORAL at 13:17

## 2024-01-01 RX ADMIN — IPRATROPIUM BROMIDE AND ALBUTEROL SULFATE 3 ML: 2.5; .5 SOLUTION RESPIRATORY (INHALATION) at 21:43

## 2024-01-01 RX ADMIN — HYDROCODONE BITARTRATE AND ACETAMINOPHEN 1 TABLET: 5; 325 TABLET ORAL at 23:30

## 2024-01-01 RX ADMIN — Medication 5 MG: at 20:08

## 2024-01-01 RX ADMIN — PERFLUTREN 2 ML: 6.52 INJECTION, SUSPENSION INTRAVENOUS at 10:07

## 2024-01-01 RX ADMIN — AMIODARONE HYDROCHLORIDE 1 MG/MIN: 1.8 INJECTION, SOLUTION INTRAVENOUS at 17:05

## 2024-01-01 RX ADMIN — AMIODARONE HYDROCHLORIDE 400 MG: 200 TABLET ORAL at 09:15

## 2024-01-01 RX ADMIN — MORPHINE SULFATE 2 MG: 2 INJECTION, SOLUTION INTRAMUSCULAR; INTRAVENOUS at 04:14

## 2024-01-01 RX ADMIN — PENICILLIN G POTASSIUM 4 MILLION UNITS: 5000000 INJECTION, POWDER, FOR SOLUTION INTRAMUSCULAR; INTRAVENOUS at 09:52

## 2024-01-01 RX ADMIN — PROPOFOL INJECTABLE EMULSION 25 MCG/KG/MIN: 10 INJECTION, EMULSION INTRAVENOUS at 21:48

## 2024-01-01 RX ADMIN — Medication 1 APPLICATION: at 20:43

## 2024-01-01 RX ADMIN — MIDODRINE HYDROCHLORIDE 15 MG: 5 TABLET ORAL at 12:56

## 2024-01-01 RX ADMIN — INSULIN HUMAN 3 UNITS: 100 INJECTION, SOLUTION PARENTERAL at 12:34

## 2024-01-01 RX ADMIN — FERROUS SULFATE TAB 325 MG (65 MG ELEMENTAL FE) 325 MG: 325 (65 FE) TAB at 08:43

## 2024-01-01 RX ADMIN — TECHNETIUM TC 99M SESTAMIBI 1 DOSE: 1 INJECTION INTRAVENOUS at 10:48

## 2024-01-01 RX ADMIN — 0.12% CHLORHEXIDINE GLUCONATE 15 ML: 1.2 RINSE ORAL at 21:04

## 2024-01-01 RX ADMIN — SODIUM CHLORIDE 1000 MG: 9 INJECTION, SOLUTION INTRAVENOUS at 10:38

## 2024-01-01 RX ADMIN — VASOPRESSIN 0.02 UNITS/MIN: 20 INJECTION, SOLUTION INTRAVENOUS at 12:32

## 2024-01-01 RX ADMIN — BUSPIRONE HYDROCHLORIDE 5 MG: 5 TABLET ORAL at 15:12

## 2024-01-01 RX ADMIN — ENOXAPARIN SODIUM 40 MG: 100 INJECTION SUBCUTANEOUS at 05:04

## 2024-01-01 RX ADMIN — PHENYLEPHRINE HYDROCHLORIDE 1 MCG/KG/MIN: 10 INJECTION INTRAVENOUS at 16:43

## 2024-01-01 RX ADMIN — AMIODARONE HYDROCHLORIDE 0.5 MG/MIN: 1.8 INJECTION, SOLUTION INTRAVENOUS at 15:21

## 2024-01-01 RX ADMIN — Medication 10 ML: at 00:11

## 2024-01-01 RX ADMIN — DILTIAZEM HYDROCHLORIDE 120 MG: 60 TABLET, FILM COATED ORAL at 20:51

## 2024-01-01 RX ADMIN — EPINEPHRINE 0.02 MCG/KG/MIN: 1 INJECTION INTRAMUSCULAR; INTRAVENOUS; SUBCUTANEOUS at 10:12

## 2024-01-01 RX ADMIN — PROPOFOL INJECTABLE EMULSION 40 MCG/KG/MIN: 10 INJECTION, EMULSION INTRAVENOUS at 19:57

## 2024-01-01 RX ADMIN — DEXMEDETOMIDINE HYDROCHLORIDE IN SODIUM CHLORIDE 0.4 MCG/KG/HR: 4 INJECTION INTRAVENOUS at 11:21

## 2024-01-01 RX ADMIN — VANCOMYCIN HYDROCHLORIDE 3000 MG: 10 INJECTION, POWDER, LYOPHILIZED, FOR SOLUTION INTRAVENOUS at 13:14

## 2024-01-01 RX ADMIN — IPRATROPIUM BROMIDE AND ALBUTEROL SULFATE 3 ML: 2.5; .5 SOLUTION RESPIRATORY (INHALATION) at 15:34

## 2024-01-01 RX ADMIN — POTASSIUM CHLORIDE 20 MEQ: 750 TABLET, EXTENDED RELEASE ORAL at 08:11

## 2024-01-01 RX ADMIN — Medication 10 ML: at 20:26

## 2024-01-01 RX ADMIN — MORPHINE SULFATE 4 MG: 2 INJECTION, SOLUTION INTRAMUSCULAR; INTRAVENOUS at 13:08

## 2024-01-01 RX ADMIN — ANTI-FUNGAL POWDER MICONAZOLE NITRATE TALC FREE 1 APPLICATION: 1.42 POWDER TOPICAL at 08:42

## 2024-01-01 RX ADMIN — ASPIRIN 81 MG: 81 TABLET, CHEWABLE ORAL at 09:35

## 2024-01-01 RX ADMIN — SILDENAFIL 20 MG: 20 TABLET, FILM COATED ORAL at 08:21

## 2024-01-01 RX ADMIN — PENICILLIN G POTASSIUM 4 MILLION UNITS: 5000000 INJECTION, POWDER, FOR SOLUTION INTRAMUSCULAR; INTRAVENOUS at 05:27

## 2024-01-01 RX ADMIN — DEXMEDETOMIDINE HYDROCHLORIDE IN SODIUM CHLORIDE 0.4 MCG/KG/HR: 4 INJECTION INTRAVENOUS at 05:31

## 2024-01-01 RX ADMIN — MILRINONE LACTATE 0.25 MCG/KG/MIN: 0.2 INJECTION, SOLUTION INTRAVENOUS at 10:30

## 2024-01-01 RX ADMIN — CALCIUM GLUCONATE 2000 MG: 20 INJECTION, SOLUTION INTRAVENOUS at 00:29

## 2024-01-01 RX ADMIN — FENTANYL CITRATE 25 MCG: 50 INJECTION, SOLUTION INTRAMUSCULAR; INTRAVENOUS at 10:29

## 2024-01-01 RX ADMIN — SILDENAFIL 20 MG: 20 TABLET, FILM COATED ORAL at 09:03

## 2024-01-01 RX ADMIN — BUSPIRONE HYDROCHLORIDE 5 MG: 5 TABLET ORAL at 20:26

## 2024-01-01 RX ADMIN — SENNOSIDES 2 TABLET: 8.6 TABLET, FILM COATED ORAL at 21:29

## 2024-01-01 RX ADMIN — BUMETANIDE 4 MG: 0.25 INJECTION INTRAMUSCULAR; INTRAVENOUS at 02:16

## 2024-01-01 RX ADMIN — PROPOFOL INJECTABLE EMULSION 15 MCG/KG/MIN: 10 INJECTION, EMULSION INTRAVENOUS at 10:30

## 2024-01-01 RX ADMIN — DEXMEDETOMIDINE HYDROCHLORIDE IN SODIUM CHLORIDE 0.4 MCG/KG/HR: 4 INJECTION INTRAVENOUS at 15:14

## 2024-01-01 RX ADMIN — ATORVASTATIN CALCIUM 40 MG: 20 TABLET, FILM COATED ORAL at 20:31

## 2024-01-01 RX ADMIN — Medication 1 APPLICATION: at 08:15

## 2024-01-01 RX ADMIN — Medication 100 MCG: at 09:04

## 2024-01-01 RX ADMIN — PROPOFOL INJECTABLE EMULSION 10 MCG/KG/MIN: 10 INJECTION, EMULSION INTRAVENOUS at 02:45

## 2024-01-01 RX ADMIN — ALBUTEROL SULFATE: 2.5 SOLUTION RESPIRATORY (INHALATION) at 12:00

## 2024-01-01 RX ADMIN — SODIUM BICARBONATE 650 MG TABLET 650 MG: at 22:23

## 2024-01-01 RX ADMIN — Medication 250 MG: at 09:15

## 2024-01-01 RX ADMIN — POTASSIUM CHLORIDE 20 MEQ: 29.8 INJECTION, SOLUTION INTRAVENOUS at 00:21

## 2024-01-01 RX ADMIN — SODIUM BICARBONATE 650 MG TABLET 650 MG: at 14:03

## 2024-01-01 RX ADMIN — PENICILLIN G POTASSIUM 4 MILLION UNITS: 5000000 INJECTION, POWDER, FOR SOLUTION INTRAMUSCULAR; INTRAVENOUS at 17:02

## 2024-01-01 RX ADMIN — IPRATROPIUM BROMIDE AND ALBUTEROL SULFATE 3 ML: 2.5; .5 SOLUTION RESPIRATORY (INHALATION) at 14:47

## 2024-01-01 RX ADMIN — SENNOSIDES 2 TABLET: 8.6 TABLET, FILM COATED ORAL at 21:19

## 2024-01-01 RX ADMIN — SILDENAFIL 20 MG: 20 TABLET, FILM COATED ORAL at 20:00

## 2024-01-01 RX ADMIN — PROPOFOL INJECTABLE EMULSION 10 MCG/KG/MIN: 10 INJECTION, EMULSION INTRAVENOUS at 18:52

## 2024-01-01 RX ADMIN — PENICILLIN G POTASSIUM 4 MILLION UNITS: 5000000 INJECTION, POWDER, FOR SOLUTION INTRAMUSCULAR; INTRAVENOUS at 15:48

## 2024-01-01 RX ADMIN — SILDENAFIL 20 MG: 20 TABLET, FILM COATED ORAL at 21:16

## 2024-01-01 RX ADMIN — PENICILLIN G POTASSIUM 4 MILLION UNITS: 5000000 INJECTION, POWDER, FOR SOLUTION INTRAMUSCULAR; INTRAVENOUS at 08:43

## 2024-01-01 RX ADMIN — MIDODRINE HYDROCHLORIDE 15 MG: 5 TABLET ORAL at 06:32

## 2024-01-01 RX ADMIN — Medication 100 MCG: at 17:01

## 2024-01-01 RX ADMIN — SILDENAFIL 20 MG: 20 TABLET, FILM COATED ORAL at 08:36

## 2024-01-01 RX ADMIN — INSULIN HUMAN 2 UNITS: 100 INJECTION, SOLUTION PARENTERAL at 06:38

## 2024-01-01 RX ADMIN — IPRATROPIUM BROMIDE AND ALBUTEROL SULFATE 3 ML: 2.5; .5 SOLUTION RESPIRATORY (INHALATION) at 06:48

## 2024-01-01 RX ADMIN — BUSPIRONE HYDROCHLORIDE 5 MG: 5 TABLET ORAL at 15:13

## 2024-01-01 RX ADMIN — DEXMEDETOMIDINE HYDROCHLORIDE IN SODIUM CHLORIDE 0.5 MCG/KG/HR: 4 INJECTION INTRAVENOUS at 14:46

## 2024-01-01 RX ADMIN — MORPHINE SULFATE 2 MG: 2 INJECTION, SOLUTION INTRAMUSCULAR; INTRAVENOUS at 13:29

## 2024-01-01 RX ADMIN — ACETYLCYSTEINE 3 ML: 200 SOLUTION ORAL; RESPIRATORY (INHALATION) at 07:39

## 2024-01-01 RX ADMIN — CALCIUM CHLORIDE, MAGNESIUM CHLORIDE, SODIUM CHLORIDE, SODIUM BICARBONATE, POTASSIUM CHLORIDE AND SODIUM PHOSPHATE DIBASIC DIHYDRATE 1500 ML/HR: 3.68; 3.05; 6.34; 3.09; .314; .187 INJECTION INTRAVENOUS at 18:02

## 2024-01-01 RX ADMIN — PENICILLIN G POTASSIUM 4 MILLION UNITS: 5000000 INJECTION, POWDER, FOR SOLUTION INTRAMUSCULAR; INTRAVENOUS at 21:05

## 2024-01-01 RX ADMIN — PROPOFOL INJECTABLE EMULSION 15 MCG/KG/MIN: 10 INJECTION, EMULSION INTRAVENOUS at 08:22

## 2024-01-01 RX ADMIN — PANTOPRAZOLE SODIUM 40 MG: 40 TABLET, DELAYED RELEASE ORAL at 17:06

## 2024-01-01 RX ADMIN — POTASSIUM CHLORIDE 40 MEQ: 1.5 POWDER, FOR SOLUTION ORAL at 10:00

## 2024-01-01 RX ADMIN — PROPOFOL INJECTABLE EMULSION 20 MCG/KG/MIN: 10 INJECTION, EMULSION INTRAVENOUS at 07:55

## 2024-01-01 RX ADMIN — AMIODARONE HYDROCHLORIDE 0.5 MG/MIN: 1.8 INJECTION, SOLUTION INTRAVENOUS at 12:16

## 2024-01-01 RX ADMIN — ENOXAPARIN SODIUM 40 MG: 100 INJECTION SUBCUTANEOUS at 16:47

## 2024-01-01 RX ADMIN — MILRINONE LACTATE 0.12 MCG/KG/MIN: 0.2 INJECTION, SOLUTION INTRAVENOUS at 08:16

## 2024-01-01 RX ADMIN — ACETYLCYSTEINE 3 ML: 200 SOLUTION ORAL; RESPIRATORY (INHALATION) at 19:31

## 2024-01-01 RX ADMIN — IPRATROPIUM BROMIDE AND ALBUTEROL SULFATE 3 ML: 2.5; .5 SOLUTION RESPIRATORY (INHALATION) at 03:38

## 2024-01-01 RX ADMIN — CEFEPIME 2000 MG: 2 INJECTION, POWDER, FOR SOLUTION INTRAVENOUS at 01:28

## 2024-01-01 RX ADMIN — IPRATROPIUM BROMIDE AND ALBUTEROL SULFATE 3 ML: 2.5; .5 SOLUTION RESPIRATORY (INHALATION) at 11:00

## 2024-01-01 RX ADMIN — IPRATROPIUM BROMIDE AND ALBUTEROL SULFATE 3 ML: 2.5; .5 SOLUTION RESPIRATORY (INHALATION) at 09:58

## 2024-01-01 RX ADMIN — MILRINONE LACTATE 0.25 MCG/KG/MIN: 0.2 INJECTION, SOLUTION INTRAVENOUS at 13:12

## 2024-01-01 RX ADMIN — CHLORHEXIDINE GLUCONATE 15 ML: 1.2 RINSE ORAL at 11:45

## 2024-01-01 RX ADMIN — CHLORHEXIDINE GLUCONATE 15 ML: 1.2 RINSE ORAL at 23:23

## 2024-01-01 RX ADMIN — ATORVASTATIN CALCIUM 40 MG: 20 TABLET, FILM COATED ORAL at 20:11

## 2024-01-01 RX ADMIN — MILRINONE LACTATE 0.12 MCG/KG/MIN: 0.2 INJECTION, SOLUTION INTRAVENOUS at 13:17

## 2024-01-01 RX ADMIN — SODIUM CHLORIDE 2 MILLION UNITS: 9 INJECTION, SOLUTION INTRAVENOUS at 10:44

## 2024-01-01 RX ADMIN — CALCIUM GLUCONATE 2000 MG: 20 INJECTION, SOLUTION INTRAVENOUS at 01:36

## 2024-01-01 RX ADMIN — INSULIN HUMAN 2 UNITS: 100 INJECTION, SOLUTION PARENTERAL at 12:00

## 2024-01-01 RX ADMIN — SILDENAFIL 20 MG: 20 TABLET, FILM COATED ORAL at 14:03

## 2024-01-01 RX ADMIN — AMIODARONE HYDROCHLORIDE 0.5 MG/MIN: 1.8 INJECTION, SOLUTION INTRAVENOUS at 09:13

## 2024-01-01 RX ADMIN — SODIUM CHLORIDE 1000 MG: 9 INJECTION, SOLUTION INTRAVENOUS at 10:03

## 2024-01-01 RX ADMIN — POTASSIUM CHLORIDE 20 MEQ: 1.5 FOR SOLUTION ORAL at 10:46

## 2024-01-01 RX ADMIN — AMIODARONE HYDROCHLORIDE 1 MG/MIN: 1.8 INJECTION, SOLUTION INTRAVENOUS at 05:31

## 2024-01-01 RX ADMIN — LANSOPRAZOLE 15 MG: 15 TABLET, ORALLY DISINTEGRATING ORAL at 05:54

## 2024-01-01 RX ADMIN — DEXMEDETOMIDINE HYDROCHLORIDE IN SODIUM CHLORIDE 0.4 MCG/KG/HR: 4 INJECTION INTRAVENOUS at 00:59

## 2024-01-01 RX ADMIN — Medication 250 MG: at 08:10

## 2024-01-01 RX ADMIN — HEPARIN SODIUM 5000 UNITS: 5000 INJECTION INTRAVENOUS; SUBCUTANEOUS at 06:09

## 2024-01-01 RX ADMIN — PERFLUTREN 2 ML: 6.52 INJECTION, SUSPENSION INTRAVENOUS at 14:34

## 2024-01-01 RX ADMIN — HYDROCODONE BITARTRATE AND ACETAMINOPHEN 2 TABLET: 5; 325 TABLET ORAL at 14:04

## 2024-01-01 RX ADMIN — POTASSIUM CHLORIDE 40 MEQ: 1.5 POWDER, FOR SOLUTION ORAL at 09:03

## 2024-01-01 RX ADMIN — IPRATROPIUM BROMIDE AND ALBUTEROL SULFATE 3 ML: 2.5; .5 SOLUTION RESPIRATORY (INHALATION) at 23:20

## 2024-01-01 RX ADMIN — Medication 10 ML: at 20:39

## 2024-01-01 RX ADMIN — DILTIAZEM HYDROCHLORIDE 120 MG: 60 TABLET, FILM COATED ORAL at 08:57

## 2024-01-01 RX ADMIN — POTASSIUM CHLORIDE 40 MEQ: 1.5 POWDER, FOR SOLUTION ORAL at 21:29

## 2024-01-01 RX ADMIN — IPRATROPIUM BROMIDE AND ALBUTEROL SULFATE 3 ML: 2.5; .5 SOLUTION RESPIRATORY (INHALATION) at 03:36

## 2024-01-01 RX ADMIN — BUSPIRONE HYDROCHLORIDE 5 MG: 5 TABLET ORAL at 20:13

## 2024-01-01 RX ADMIN — Medication 10 ML: at 20:05

## 2024-01-01 RX ADMIN — Medication 100 MCG: at 10:26

## 2024-01-01 RX ADMIN — ANTI-FUNGAL POWDER MICONAZOLE NITRATE TALC FREE 1 APPLICATION: 1.42 POWDER TOPICAL at 20:57

## 2024-01-01 RX ADMIN — INSULIN HUMAN 2 UNITS: 100 INJECTION, SOLUTION PARENTERAL at 23:27

## 2024-01-01 RX ADMIN — MUPIROCIN 1 APPLICATION: 20 OINTMENT TOPICAL at 20:02

## 2024-01-01 RX ADMIN — SILDENAFIL 20 MG: 20 TABLET, FILM COATED ORAL at 09:07

## 2024-01-01 RX ADMIN — METOPROLOL TARTRATE 75 MG: 50 TABLET, FILM COATED ORAL at 22:07

## 2024-01-01 RX ADMIN — VASOPRESSIN 0.05 UNITS/MIN: 20 INJECTION, SOLUTION INTRAVENOUS at 00:30

## 2024-01-01 RX ADMIN — ACETYLCYSTEINE 3 ML: 200 SOLUTION ORAL; RESPIRATORY (INHALATION) at 14:29

## 2024-01-01 RX ADMIN — MILRINONE LACTATE 0.38 MCG/KG/MIN: 0.2 INJECTION, SOLUTION INTRAVENOUS at 16:27

## 2024-01-01 RX ADMIN — CHLORHEXIDINE GLUCONATE 15 ML: 1.2 RINSE ORAL at 11:28

## 2024-01-01 RX ADMIN — EPINEPHRINE 0.02 MCG/KG/MIN: 1 INJECTION INTRAMUSCULAR; INTRAVENOUS; SUBCUTANEOUS at 11:50

## 2024-01-01 RX ADMIN — PROPOFOL 10 MCG/KG/MIN: 10 INJECTION, EMULSION INTRAVENOUS at 18:22

## 2024-01-01 RX ADMIN — SILDENAFIL 20 MG: 20 TABLET, FILM COATED ORAL at 08:40

## 2024-01-01 RX ADMIN — PROPOFOL 10 MCG/KG/MIN: 10 INJECTION, EMULSION INTRAVENOUS at 01:16

## 2024-01-01 RX ADMIN — SILDENAFIL 20 MG: 20 TABLET, FILM COATED ORAL at 08:12

## 2024-01-01 RX ADMIN — VASOPRESSIN 0.05 UNITS/MIN: 20 INJECTION, SOLUTION INTRAVENOUS at 20:12

## 2024-01-01 RX ADMIN — MIDODRINE HYDROCHLORIDE 15 MG: 5 TABLET ORAL at 09:52

## 2024-01-01 RX ADMIN — IPRATROPIUM BROMIDE AND ALBUTEROL SULFATE 3 ML: 2.5; .5 SOLUTION RESPIRATORY (INHALATION) at 07:20

## 2024-01-01 RX ADMIN — VASOPRESSIN 0.06 UNITS/MIN: 20 INJECTION, SOLUTION INTRAVENOUS at 10:18

## 2024-01-01 RX ADMIN — MAGNESIUM SULFATE HEPTAHYDRATE 2 G: 40 INJECTION, SOLUTION INTRAVENOUS at 12:32

## 2024-01-01 RX ADMIN — CALCIUM GLUCONATE 2000 MG: 20 INJECTION, SOLUTION INTRAVENOUS at 16:04

## 2024-01-01 RX ADMIN — VASOPRESSIN 0.03 UNITS/MIN: 20 INJECTION, SOLUTION INTRAVENOUS at 20:12

## 2024-01-01 RX ADMIN — VASOPRESSIN 0.05 UNITS/MIN: 20 INJECTION, SOLUTION INTRAVENOUS at 11:50

## 2024-01-01 RX ADMIN — BUMETANIDE 3 MG: 0.25 INJECTION INTRAMUSCULAR; INTRAVENOUS at 21:18

## 2024-01-01 RX ADMIN — PROPOFOL 15 MCG/KG/MIN: 10 INJECTION, EMULSION INTRAVENOUS at 04:11

## 2024-01-01 RX ADMIN — MIDODRINE HYDROCHLORIDE 15 MG: 5 TABLET ORAL at 16:54

## 2024-01-01 RX ADMIN — DEXMEDETOMIDINE HYDROCHLORIDE IN SODIUM CHLORIDE 0.5 MCG/KG/HR: 4 INJECTION INTRAVENOUS at 21:58

## 2024-01-01 RX ADMIN — METOPROLOL TARTRATE 75 MG: 50 TABLET, FILM COATED ORAL at 20:07

## 2024-01-01 RX ADMIN — IPRATROPIUM BROMIDE AND ALBUTEROL SULFATE 3 ML: 2.5; .5 SOLUTION RESPIRATORY (INHALATION) at 10:48

## 2024-01-01 RX ADMIN — HEPARIN SODIUM 5000 UNITS: 5000 INJECTION INTRAVENOUS; SUBCUTANEOUS at 22:09

## 2024-01-01 RX ADMIN — IPRATROPIUM BROMIDE AND ALBUTEROL SULFATE 3 ML: 2.5; .5 SOLUTION RESPIRATORY (INHALATION) at 14:29

## 2024-01-01 RX ADMIN — EPINEPHRINE 0.01 MCG/KG/MIN: 1 INJECTION INTRAMUSCULAR; INTRAVENOUS; SUBCUTANEOUS at 22:26

## 2024-01-01 RX ADMIN — CEFEPIME 2000 MG: 2 INJECTION, POWDER, FOR SOLUTION INTRAVENOUS at 10:43

## 2024-01-01 RX ADMIN — IPRATROPIUM BROMIDE AND ALBUTEROL SULFATE 3 ML: 2.5; .5 SOLUTION RESPIRATORY (INHALATION) at 23:50

## 2024-01-01 RX ADMIN — CHLORHEXIDINE GLUCONATE 15 ML: 1.2 RINSE ORAL at 00:59

## 2024-01-01 RX ADMIN — VASOPRESSIN 0.04 UNITS/MIN: 20 INJECTION, SOLUTION INTRAVENOUS at 17:54

## 2024-01-01 RX ADMIN — ANTI-FUNGAL POWDER MICONAZOLE NITRATE TALC FREE 1 APPLICATION: 1.42 POWDER TOPICAL at 08:14

## 2024-01-01 RX ADMIN — POTASSIUM CHLORIDE 20 MEQ: 29.8 INJECTION, SOLUTION INTRAVENOUS at 18:50

## 2024-01-01 RX ADMIN — AMIODARONE HYDROCHLORIDE 0.5 MG/MIN: 1.8 INJECTION, SOLUTION INTRAVENOUS at 21:28

## 2024-01-01 RX ADMIN — DEXMEDETOMIDINE HYDROCHLORIDE IN SODIUM CHLORIDE 0.5 MCG/KG/HR: 4 INJECTION INTRAVENOUS at 08:58

## 2024-01-01 RX ADMIN — HYDROCODONE BITARTRATE AND ACETAMINOPHEN 2 TABLET: 5; 325 TABLET ORAL at 20:22

## 2024-01-01 RX ADMIN — ALPRAZOLAM 0.25 MG: 0.25 TABLET ORAL at 12:09

## 2024-01-01 RX ADMIN — IPRATROPIUM BROMIDE AND ALBUTEROL SULFATE 3 ML: 2.5; .5 SOLUTION RESPIRATORY (INHALATION) at 23:43

## 2024-01-01 RX ADMIN — MIDODRINE HYDROCHLORIDE 10 MG: 5 TABLET ORAL at 23:02

## 2024-01-01 RX ADMIN — PROPOFOL INJECTABLE EMULSION 25 MCG/KG/MIN: 10 INJECTION, EMULSION INTRAVENOUS at 12:01

## 2024-01-01 RX ADMIN — ASPIRIN 81 MG: 81 TABLET, COATED ORAL at 08:21

## 2024-01-01 RX ADMIN — VASOPRESSIN 0.06 UNITS/MIN: 20 INJECTION, SOLUTION INTRAVENOUS at 19:11

## 2024-01-01 RX ADMIN — MIDODRINE HYDROCHLORIDE 15 MG: 5 TABLET ORAL at 14:24

## 2024-01-01 RX ADMIN — POTASSIUM CHLORIDE 40 MEQ: 1.5 POWDER, FOR SOLUTION ORAL at 23:45

## 2024-01-01 RX ADMIN — CALCIUM GLUCONATE 2000 MG: 20 INJECTION, SOLUTION INTRAVENOUS at 12:17

## 2024-01-01 RX ADMIN — HEPARIN SODIUM 5000 UNITS: 5000 INJECTION INTRAVENOUS; SUBCUTANEOUS at 21:20

## 2024-01-01 RX ADMIN — ACETYLCYSTEINE 3 ML: 200 SOLUTION ORAL; RESPIRATORY (INHALATION) at 14:19

## 2024-01-01 RX ADMIN — VASOPRESSIN 0.05 UNITS/MIN: 20 INJECTION, SOLUTION INTRAVENOUS at 22:58

## 2024-01-01 RX ADMIN — ASPIRIN 81 MG: 81 TABLET, CHEWABLE ORAL at 09:02

## 2024-01-01 RX ADMIN — INSULIN GLARGINE 15 UNITS: 100 INJECTION, SOLUTION SUBCUTANEOUS at 09:00

## 2024-01-01 RX ADMIN — MILRINONE LACTATE 0.38 MCG/KG/MIN: 0.2 INJECTION, SOLUTION INTRAVENOUS at 11:12

## 2024-01-01 RX ADMIN — MIDODRINE HYDROCHLORIDE 15 MG: 5 TABLET ORAL at 06:38

## 2024-01-01 RX ADMIN — IPRATROPIUM BROMIDE AND ALBUTEROL SULFATE 3 ML: 2.5; .5 SOLUTION RESPIRATORY (INHALATION) at 03:31

## 2024-01-01 RX ADMIN — ACETYLCYSTEINE 3 ML: 200 SOLUTION ORAL; RESPIRATORY (INHALATION) at 14:16

## 2024-01-01 RX ADMIN — Medication 250 MG: at 20:02

## 2024-01-01 RX ADMIN — DILTIAZEM HYDROCHLORIDE 120 MG: 60 TABLET, FILM COATED ORAL at 20:07

## 2024-01-01 RX ADMIN — ANTI-FUNGAL POWDER MICONAZOLE NITRATE TALC FREE 1 APPLICATION: 1.42 POWDER TOPICAL at 20:01

## 2024-01-01 RX ADMIN — Medication 250 MG: at 20:22

## 2024-01-01 RX ADMIN — IPRATROPIUM BROMIDE AND ALBUTEROL SULFATE 3 ML: 2.5; .5 SOLUTION RESPIRATORY (INHALATION) at 15:16

## 2024-01-01 RX ADMIN — IPRATROPIUM BROMIDE AND ALBUTEROL SULFATE 3 ML: 2.5; .5 SOLUTION RESPIRATORY (INHALATION) at 11:16

## 2024-01-01 RX ADMIN — INSULIN HUMAN 2 UNITS: 100 INJECTION, SOLUTION PARENTERAL at 12:26

## 2024-01-01 RX ADMIN — MILRINONE LACTATE 0.25 MCG/KG/MIN: 0.2 INJECTION, SOLUTION INTRAVENOUS at 16:18

## 2024-01-01 RX ADMIN — IPRATROPIUM BROMIDE AND ALBUTEROL SULFATE 3 ML: 2.5; .5 SOLUTION RESPIRATORY (INHALATION) at 15:20

## 2024-01-01 RX ADMIN — VASOPRESSIN 0.06 UNITS/MIN: 20 INJECTION, SOLUTION INTRAVENOUS at 22:01

## 2024-01-01 RX ADMIN — Medication 2 PACKET: at 16:41

## 2024-01-01 RX ADMIN — ACETYLCYSTEINE 3 ML: 200 SOLUTION ORAL; RESPIRATORY (INHALATION) at 14:46

## 2024-01-01 RX ADMIN — INSULIN HUMAN 2 UNITS: 100 INJECTION, SOLUTION PARENTERAL at 05:54

## 2024-01-01 RX ADMIN — EPINEPHRINE 0.01 MCG/KG/MIN: 1 INJECTION INTRAMUSCULAR; INTRAVENOUS; SUBCUTANEOUS at 23:46

## 2024-01-01 RX ADMIN — CHLORHEXIDINE GLUCONATE 15 ML: 1.2 RINSE ORAL at 11:44

## 2024-01-01 RX ADMIN — POTASSIUM CHLORIDE 20 MEQ: 29.8 INJECTION, SOLUTION INTRAVENOUS at 06:03

## 2024-01-01 RX ADMIN — DEXMEDETOMIDINE HYDROCHLORIDE IN SODIUM CHLORIDE 0.5 MCG/KG/HR: 4 INJECTION INTRAVENOUS at 12:59

## 2024-01-01 RX ADMIN — PENICILLIN G POTASSIUM 4 MILLION UNITS: 5000000 INJECTION, POWDER, FOR SOLUTION INTRAMUSCULAR; INTRAVENOUS at 10:41

## 2024-01-01 RX ADMIN — ASPIRIN 81 MG: 81 TABLET, CHEWABLE ORAL at 08:22

## 2024-01-01 RX ADMIN — ACETYLCYSTEINE 3 ML: 200 SOLUTION ORAL; RESPIRATORY (INHALATION) at 06:55

## 2024-01-01 RX ADMIN — DEXTROSE MONOHYDRATE 75 ML/HR: 50 INJECTION, SOLUTION INTRAVENOUS at 13:20

## 2024-01-01 RX ADMIN — PENICILLIN G POTASSIUM 4 MILLION UNITS: 5000000 INJECTION, POWDER, FOR SOLUTION INTRAMUSCULAR; INTRAVENOUS at 09:18

## 2024-01-01 RX ADMIN — VASOPRESSIN 0.05 UNITS/MIN: 20 INJECTION, SOLUTION INTRAVENOUS at 18:20

## 2024-01-01 RX ADMIN — ASPIRIN 81 MG: 81 TABLET, CHEWABLE ORAL at 08:48

## 2024-01-01 RX ADMIN — PENICILLIN G POTASSIUM 4 MILLION UNITS: 5000000 INJECTION, POWDER, FOR SOLUTION INTRAMUSCULAR; INTRAVENOUS at 06:38

## 2024-01-01 RX ADMIN — ATORVASTATIN CALCIUM 40 MG: 20 TABLET, FILM COATED ORAL at 22:21

## 2024-01-01 RX ADMIN — CALCIUM GLUCONATE 2000 MG: 20 INJECTION, SOLUTION INTRAVENOUS at 05:53

## 2024-01-01 RX ADMIN — CALCIUM GLUCONATE 2000 MG: 20 INJECTION, SOLUTION INTRAVENOUS at 11:05

## 2024-01-01 RX ADMIN — ACETYLCYSTEINE 3 ML: 200 SOLUTION ORAL; RESPIRATORY (INHALATION) at 22:36

## 2024-01-01 RX ADMIN — MICAFUNGIN SODIUM 100 MG: 100 INJECTION, POWDER, LYOPHILIZED, FOR SOLUTION INTRAVENOUS at 03:46

## 2024-01-01 RX ADMIN — ACETAMINOPHEN 650 MG: 650 LIQUID ORAL at 08:23

## 2024-01-01 RX ADMIN — MILRINONE LACTATE 0.25 MCG/KG/MIN: 0.2 INJECTION, SOLUTION INTRAVENOUS at 21:52

## 2024-01-01 RX ADMIN — MAGNESIUM SULFATE IN WATER 2 G: 40 INJECTION, SOLUTION INTRAVENOUS at 08:36

## 2024-01-01 RX ADMIN — MILRINONE LACTATE 0.25 MCG/KG/MIN: 0.2 INJECTION, SOLUTION INTRAVENOUS at 20:59

## 2024-01-01 RX ADMIN — BUMETANIDE 1 MG/HR: 0.25 INJECTION INTRAMUSCULAR; INTRAVENOUS at 04:28

## 2024-01-01 RX ADMIN — CHLORHEXIDINE GLUCONATE 15 ML: 1.2 RINSE ORAL at 11:25

## 2024-01-01 RX ADMIN — POTASSIUM CHLORIDE 20 MEQ: 750 TABLET, EXTENDED RELEASE ORAL at 08:17

## 2024-01-01 RX ADMIN — PROPOFOL INJECTABLE EMULSION 40 MCG/KG/MIN: 10 INJECTION, EMULSION INTRAVENOUS at 16:56

## 2024-01-01 RX ADMIN — SILDENAFIL 20 MG: 20 TABLET, FILM COATED ORAL at 16:07

## 2024-01-01 RX ADMIN — PROPOFOL INJECTABLE EMULSION 35 MCG/KG/MIN: 10 INJECTION, EMULSION INTRAVENOUS at 16:55

## 2024-01-01 RX ADMIN — POTASSIUM CHLORIDE 40 MEQ: 1.5 POWDER, FOR SOLUTION ORAL at 16:09

## 2024-01-01 RX ADMIN — DEXMEDETOMIDINE HYDROCHLORIDE IN SODIUM CHLORIDE 0.3 MCG/KG/HR: 4 INJECTION INTRAVENOUS at 17:35

## 2024-01-01 RX ADMIN — HYDROCODONE BITARTRATE AND ACETAMINOPHEN 1 TABLET: 5; 325 TABLET ORAL at 14:57

## 2024-01-01 RX ADMIN — CALCIUM GLUCONATE 2000 MG: 20 INJECTION, SOLUTION INTRAVENOUS at 09:00

## 2024-01-01 RX ADMIN — ANTI-FUNGAL POWDER MICONAZOLE NITRATE TALC FREE 1 APPLICATION: 1.42 POWDER TOPICAL at 08:20

## 2024-01-01 RX ADMIN — POTASSIUM CHLORIDE 20 MEQ: 1.5 POWDER, FOR SOLUTION ORAL at 01:26

## 2024-01-01 RX ADMIN — PENICILLIN G POTASSIUM 4 MILLION UNITS: 5000000 INJECTION, POWDER, FOR SOLUTION INTRAMUSCULAR; INTRAVENOUS at 04:38

## 2024-01-01 RX ADMIN — POTASSIUM CHLORIDE 40 MEQ: 1.5 POWDER, FOR SOLUTION ORAL at 01:15

## 2024-01-01 RX ADMIN — CALCIUM CHLORIDE, MAGNESIUM CHLORIDE, SODIUM CHLORIDE, SODIUM BICARBONATE, POTASSIUM CHLORIDE AND SODIUM PHOSPHATE DIBASIC DIHYDRATE 1500 ML/HR: 3.68; 3.05; 6.34; 3.09; .314; .187 INJECTION INTRAVENOUS at 02:11

## 2024-01-01 RX ADMIN — HEPARIN SODIUM 5000 UNITS: 5000 INJECTION INTRAVENOUS; SUBCUTANEOUS at 21:18

## 2024-01-01 RX ADMIN — MILRINONE LACTATE 0.25 MCG/KG/MIN: 0.2 INJECTION, SOLUTION INTRAVENOUS at 04:39

## 2024-01-01 RX ADMIN — IPRATROPIUM BROMIDE AND ALBUTEROL SULFATE 3 ML: 2.5; .5 SOLUTION RESPIRATORY (INHALATION) at 19:30

## 2024-01-01 RX ADMIN — IPRATROPIUM BROMIDE AND ALBUTEROL SULFATE 3 ML: 2.5; .5 SOLUTION RESPIRATORY (INHALATION) at 19:54

## 2024-01-01 RX ADMIN — METOPROLOL TARTRATE 25 MG: 25 TABLET, FILM COATED ORAL at 11:52

## 2024-01-01 RX ADMIN — ASPIRIN 81 MG: 81 TABLET, CHEWABLE ORAL at 08:12

## 2024-01-01 RX ADMIN — Medication 10 ML: at 20:51

## 2024-01-01 RX ADMIN — BUMETANIDE 4 MG: 0.25 INJECTION INTRAMUSCULAR; INTRAVENOUS at 08:52

## 2024-01-01 RX ADMIN — IPRATROPIUM BROMIDE AND ALBUTEROL SULFATE 3 ML: 2.5; .5 SOLUTION RESPIRATORY (INHALATION) at 06:35

## 2024-01-01 RX ADMIN — PROPOFOL 50 MCG/KG/MIN: 10 INJECTION, EMULSION INTRAVENOUS at 13:36

## 2024-01-01 RX ADMIN — DEXMEDETOMIDINE HYDROCHLORIDE IN SODIUM CHLORIDE 1 MCG/KG/HR: 4 INJECTION INTRAVENOUS at 03:38

## 2024-01-01 RX ADMIN — Medication 10 ML: at 09:36

## 2024-01-01 RX ADMIN — PANTOPRAZOLE SODIUM 40 MG: 40 TABLET, DELAYED RELEASE ORAL at 06:03

## 2024-01-01 RX ADMIN — IPRATROPIUM BROMIDE AND ALBUTEROL SULFATE 3 ML: 2.5; .5 SOLUTION RESPIRATORY (INHALATION) at 07:30

## 2024-01-01 RX ADMIN — PROPOFOL INJECTABLE EMULSION 10 MCG/KG/MIN: 10 INJECTION, EMULSION INTRAVENOUS at 18:12

## 2024-01-01 RX ADMIN — IPRATROPIUM BROMIDE AND ALBUTEROL SULFATE 3 ML: 2.5; .5 SOLUTION RESPIRATORY (INHALATION) at 19:58

## 2024-01-01 RX ADMIN — POTASSIUM CHLORIDE 20 MEQ: 1.5 POWDER, FOR SOLUTION ORAL at 10:42

## 2024-01-01 RX ADMIN — HEPARIN SODIUM 5000 UNITS: 5000 INJECTION INTRAVENOUS; SUBCUTANEOUS at 21:13

## 2024-01-01 RX ADMIN — PROPOFOL INJECTABLE EMULSION 25 MCG/KG/MIN: 10 INJECTION, EMULSION INTRAVENOUS at 18:03

## 2024-01-01 RX ADMIN — MIDODRINE HYDROCHLORIDE 15 MG: 5 TABLET ORAL at 07:15

## 2024-01-01 RX ADMIN — Medication 1 APPLICATION: at 21:00

## 2024-01-01 RX ADMIN — CEFEPIME 2000 MG: 2 INJECTION, POWDER, FOR SOLUTION INTRAVENOUS at 10:40

## 2024-01-01 RX ADMIN — PANTOPRAZOLE SODIUM 40 MG: 40 TABLET, DELAYED RELEASE ORAL at 16:10

## 2024-01-01 RX ADMIN — SILDENAFIL 20 MG: 20 TABLET, FILM COATED ORAL at 16:36

## 2024-01-01 RX ADMIN — IPRATROPIUM BROMIDE AND ALBUTEROL SULFATE 3 ML: 2.5; .5 SOLUTION RESPIRATORY (INHALATION) at 06:56

## 2024-01-01 RX ADMIN — MUPIROCIN 1 APPLICATION: 20 OINTMENT TOPICAL at 08:11

## 2024-01-01 RX ADMIN — LANSOPRAZOLE 15 MG: 15 TABLET, ORALLY DISINTEGRATING ORAL at 05:03

## 2024-01-01 RX ADMIN — PROPOFOL 10 MCG/KG/MIN: 10 INJECTION, EMULSION INTRAVENOUS at 20:12

## 2024-01-01 RX ADMIN — CEFEPIME 2000 MG: 2 INJECTION, POWDER, FOR SOLUTION INTRAVENOUS at 02:05

## 2024-01-01 RX ADMIN — BUMETANIDE 2 MG/HR: 0.25 INJECTION INTRAMUSCULAR; INTRAVENOUS at 11:48

## 2024-01-01 RX ADMIN — MIDODRINE HYDROCHLORIDE 15 MG: 5 TABLET ORAL at 11:50

## 2024-01-01 RX ADMIN — HYDROCODONE BITARTRATE AND ACETAMINOPHEN 2 TABLET: 5; 325 TABLET ORAL at 14:31

## 2024-01-01 RX ADMIN — POTASSIUM CHLORIDE 20 MEQ: 29.8 INJECTION, SOLUTION INTRAVENOUS at 00:30

## 2024-01-01 RX ADMIN — PENICILLIN G POTASSIUM 4 MILLION UNITS: 5000000 INJECTION, POWDER, FOR SOLUTION INTRAMUSCULAR; INTRAVENOUS at 01:51

## 2024-01-01 RX ADMIN — HYDROCODONE BITARTRATE AND ACETAMINOPHEN 2 TABLET: 5; 325 TABLET ORAL at 20:00

## 2024-01-01 RX ADMIN — PHENYLEPHRINE HYDROCHLORIDE 0.9 MCG/KG/MIN: 10 INJECTION INTRAVENOUS at 19:13

## 2024-01-01 RX ADMIN — PENICILLIN G POTASSIUM 4 MILLION UNITS: 5000000 INJECTION, POWDER, FOR SOLUTION INTRAMUSCULAR; INTRAVENOUS at 04:32

## 2024-01-01 RX ADMIN — MUPIROCIN 1 APPLICATION: 20 OINTMENT TOPICAL at 22:03

## 2024-01-01 RX ADMIN — PENICILLIN G POTASSIUM 4 MILLION UNITS: 5000000 INJECTION, POWDER, FOR SOLUTION INTRAMUSCULAR; INTRAVENOUS at 21:00

## 2024-01-01 RX ADMIN — PROPOFOL INJECTABLE EMULSION 5 MCG/KG/MIN: 10 INJECTION, EMULSION INTRAVENOUS at 12:59

## 2024-01-01 RX ADMIN — MORPHINE SULFATE 1 MG: 2 INJECTION, SOLUTION INTRAMUSCULAR; INTRAVENOUS at 13:57

## 2024-01-01 RX ADMIN — BUMETANIDE 2 MG/HR: 0.25 INJECTION INTRAMUSCULAR; INTRAVENOUS at 09:23

## 2024-01-01 RX ADMIN — MIDODRINE HYDROCHLORIDE 15 MG: 5 TABLET ORAL at 06:42

## 2024-01-01 RX ADMIN — POTASSIUM CHLORIDE 20 MEQ: 29.8 INJECTION, SOLUTION INTRAVENOUS at 02:58

## 2024-01-01 RX ADMIN — IPRATROPIUM BROMIDE AND ALBUTEROL SULFATE 3 ML: 2.5; .5 SOLUTION RESPIRATORY (INHALATION) at 03:28

## 2024-01-01 RX ADMIN — CHLORHEXIDINE GLUCONATE 15 ML: 1.2 RINSE ORAL at 12:01

## 2024-01-01 RX ADMIN — POTASSIUM CHLORIDE 20 MEQ: 29.8 INJECTION, SOLUTION INTRAVENOUS at 22:06

## 2024-01-01 RX ADMIN — ACETYLCYSTEINE 3 ML: 200 SOLUTION ORAL; RESPIRATORY (INHALATION) at 07:01

## 2024-01-01 RX ADMIN — Medication 10 ML: at 22:06

## 2024-01-01 RX ADMIN — PENICILLIN G POTASSIUM 4 MILLION UNITS: 5000000 INJECTION, POWDER, FOR SOLUTION INTRAMUSCULAR; INTRAVENOUS at 16:39

## 2024-01-01 RX ADMIN — MILRINONE LACTATE 0.12 MCG/KG/MIN: 0.2 INJECTION, SOLUTION INTRAVENOUS at 20:46

## 2024-01-01 RX ADMIN — IPRATROPIUM BROMIDE AND ALBUTEROL SULFATE 3 ML: 2.5; .5 SOLUTION RESPIRATORY (INHALATION) at 10:36

## 2024-01-01 RX ADMIN — PROPOFOL INJECTABLE EMULSION 40 MCG/KG/MIN: 10 INJECTION, EMULSION INTRAVENOUS at 09:08

## 2024-01-01 RX ADMIN — MIDODRINE HYDROCHLORIDE 15 MG: 5 TABLET ORAL at 11:22

## 2024-01-01 RX ADMIN — LIDOCAINE HYDROCHLORIDE 10 ML: 20 SOLUTION ORAL at 10:10

## 2024-01-01 RX ADMIN — BUMETANIDE 4 MG: 0.25 INJECTION INTRAMUSCULAR; INTRAVENOUS at 19:45

## 2024-01-01 RX ADMIN — MIDODRINE HYDROCHLORIDE 15 MG: 5 TABLET ORAL at 16:32

## 2024-01-01 RX ADMIN — Medication 4 ML: at 06:57

## 2024-01-01 RX ADMIN — LANSOPRAZOLE 15 MG: 15 TABLET, ORALLY DISINTEGRATING ORAL at 05:13

## 2024-01-01 RX ADMIN — PENICILLIN G POTASSIUM 4 MILLION UNITS: 5000000 INJECTION, POWDER, FOR SOLUTION INTRAMUSCULAR; INTRAVENOUS at 12:42

## 2024-01-01 RX ADMIN — IPRATROPIUM BROMIDE AND ALBUTEROL SULFATE 3 ML: 2.5; .5 SOLUTION RESPIRATORY (INHALATION) at 10:30

## 2024-01-01 RX ADMIN — PANTOPRAZOLE SODIUM 40 MG: 40 TABLET, DELAYED RELEASE ORAL at 08:15

## 2024-01-01 RX ADMIN — PROPOFOL INJECTABLE EMULSION 25 MCG/KG/MIN: 10 INJECTION, EMULSION INTRAVENOUS at 04:21

## 2024-01-01 RX ADMIN — MILRINONE LACTATE 0.25 MCG/KG/MIN: 0.2 INJECTION, SOLUTION INTRAVENOUS at 16:50

## 2024-01-01 RX ADMIN — PENICILLIN G POTASSIUM 4 MILLION UNITS: 5000000 INJECTION, POWDER, FOR SOLUTION INTRAMUSCULAR; INTRAVENOUS at 15:27

## 2024-01-01 RX ADMIN — IPRATROPIUM BROMIDE AND ALBUTEROL SULFATE 3 ML: 2.5; .5 SOLUTION RESPIRATORY (INHALATION) at 19:23

## 2024-01-01 RX ADMIN — CEFEPIME 2000 MG: 2 INJECTION, POWDER, FOR SOLUTION INTRAVENOUS at 10:52

## 2024-01-01 RX ADMIN — Medication 10 ML: at 08:30

## 2024-01-01 RX ADMIN — Medication 1 APPLICATION: at 08:55

## 2024-01-01 RX ADMIN — SILDENAFIL 20 MG: 20 TABLET, FILM COATED ORAL at 09:35

## 2024-01-01 RX ADMIN — MILRINONE LACTATE 0.25 MCG/KG/MIN: 0.2 INJECTION, SOLUTION INTRAVENOUS at 04:43

## 2024-01-01 RX ADMIN — Medication 4 ML: at 07:04

## 2024-01-01 RX ADMIN — METOPROLOL TARTRATE 75 MG: 50 TABLET, FILM COATED ORAL at 21:04

## 2024-01-01 RX ADMIN — SILDENAFIL 20 MG: 20 TABLET, FILM COATED ORAL at 14:32

## 2024-01-01 RX ADMIN — PROPOFOL INJECTABLE EMULSION 15 MCG/KG/MIN: 10 INJECTION, EMULSION INTRAVENOUS at 14:33

## 2024-01-01 RX ADMIN — PROPOFOL INJECTABLE EMULSION 10 MCG/KG/MIN: 10 INJECTION, EMULSION INTRAVENOUS at 12:32

## 2024-01-01 RX ADMIN — VASOPRESSIN 0.06 UNITS/MIN: 20 INJECTION, SOLUTION INTRAVENOUS at 12:06

## 2024-01-01 RX ADMIN — DEXMEDETOMIDINE HYDROCHLORIDE IN SODIUM CHLORIDE 0.2 MCG/KG/HR: 4 INJECTION INTRAVENOUS at 09:48

## 2024-01-01 RX ADMIN — METOPROLOL TARTRATE 75 MG: 50 TABLET, FILM COATED ORAL at 08:57

## 2024-01-01 RX ADMIN — MILRINONE LACTATE 0.25 MCG/KG/MIN: 0.2 INJECTION, SOLUTION INTRAVENOUS at 10:42

## 2024-01-01 RX ADMIN — AMIODARONE HYDROCHLORIDE 0.5 MG/MIN: 1.8 INJECTION, SOLUTION INTRAVENOUS at 16:27

## 2024-01-01 RX ADMIN — AMIODARONE HYDROCHLORIDE 0.5 MG/MIN: 1.8 INJECTION, SOLUTION INTRAVENOUS at 23:45

## 2024-01-01 RX ADMIN — DEXMEDETOMIDINE HYDROCHLORIDE IN SODIUM CHLORIDE 0.5 MCG/KG/HR: 4 INJECTION INTRAVENOUS at 04:58

## 2024-01-01 RX ADMIN — INSULIN GLARGINE 20 UNITS: 100 INJECTION, SOLUTION SUBCUTANEOUS at 08:22

## 2024-01-01 RX ADMIN — ACETAMINOPHEN 650 MG: 650 LIQUID ORAL at 03:41

## 2024-01-01 RX ADMIN — Medication 1 APPLICATION: at 20:27

## 2024-01-01 RX ADMIN — MILRINONE LACTATE 0.12 MCG/KG/MIN: 0.2 INJECTION, SOLUTION INTRAVENOUS at 04:08

## 2024-01-01 RX ADMIN — PENICILLIN G POTASSIUM 4 MILLION UNITS: 5000000 INJECTION, POWDER, FOR SOLUTION INTRAMUSCULAR; INTRAVENOUS at 08:35

## 2024-01-01 RX ADMIN — MICAFUNGIN SODIUM 200 MG: 100 INJECTION, POWDER, LYOPHILIZED, FOR SOLUTION INTRAVENOUS at 02:45

## 2024-01-01 RX ADMIN — PROPOFOL 10 MCG/KG/MIN: 10 INJECTION, EMULSION INTRAVENOUS at 10:00

## 2024-01-01 RX ADMIN — PROPOFOL INJECTABLE EMULSION 25 MCG/KG/MIN: 10 INJECTION, EMULSION INTRAVENOUS at 11:55

## 2024-01-01 RX ADMIN — INSULIN GLARGINE 20 UNITS: 100 INJECTION, SOLUTION SUBCUTANEOUS at 08:27

## 2024-01-01 RX ADMIN — POTASSIUM CHLORIDE 40 MEQ: 1.5 POWDER, FOR SOLUTION ORAL at 13:13

## 2024-01-01 RX ADMIN — BUMETANIDE 2 MG: 0.25 INJECTION INTRAMUSCULAR; INTRAVENOUS at 23:42

## 2024-01-01 RX ADMIN — POTASSIUM CHLORIDE 40 MEQ: 1.5 POWDER, FOR SOLUTION ORAL at 20:01

## 2024-01-01 RX ADMIN — DEXMEDETOMIDINE HYDROCHLORIDE IN SODIUM CHLORIDE 0.4 MCG/KG/HR: 4 INJECTION INTRAVENOUS at 21:29

## 2024-01-01 RX ADMIN — IPRATROPIUM BROMIDE AND ALBUTEROL SULFATE 3 ML: 2.5; .5 SOLUTION RESPIRATORY (INHALATION) at 07:27

## 2024-01-01 RX ADMIN — ACETYLCYSTEINE 3 ML: 200 SOLUTION ORAL; RESPIRATORY (INHALATION) at 06:59

## 2024-01-01 RX ADMIN — INSULIN HUMAN 2 UNITS: 100 INJECTION, SOLUTION PARENTERAL at 05:58

## 2024-01-01 RX ADMIN — DIGOXIN 250 MCG: 0.25 INJECTION INTRAMUSCULAR; INTRAVENOUS at 08:53

## 2024-01-01 RX ADMIN — AMIODARONE HYDROCHLORIDE 0.5 MG/MIN: 1.8 INJECTION, SOLUTION INTRAVENOUS at 14:58

## 2024-01-01 RX ADMIN — Medication 1 MCG/KG/MIN: at 11:13

## 2024-01-01 RX ADMIN — ENOXAPARIN SODIUM 40 MG: 100 INJECTION SUBCUTANEOUS at 05:02

## 2024-01-01 RX ADMIN — VASOPRESSIN 0.06 UNITS/MIN: 20 INJECTION, SOLUTION INTRAVENOUS at 18:22

## 2024-01-01 RX ADMIN — AMIODARONE HYDROCHLORIDE 1 MG/MIN: 1.8 INJECTION, SOLUTION INTRAVENOUS at 16:49

## 2024-01-01 RX ADMIN — VASOPRESSIN 0.04 UNITS/MIN: 20 INJECTION, SOLUTION INTRAVENOUS at 02:15

## 2024-01-01 RX ADMIN — AMIODARONE HYDROCHLORIDE 0.5 MG/MIN: 1.8 INJECTION, SOLUTION INTRAVENOUS at 20:22

## 2024-01-01 RX ADMIN — PROPOFOL INJECTABLE EMULSION 10 MCG/KG/MIN: 10 INJECTION, EMULSION INTRAVENOUS at 15:51

## 2024-01-01 RX ADMIN — MILRINONE LACTATE 0.25 MCG/KG/MIN: 0.2 INJECTION, SOLUTION INTRAVENOUS at 17:54

## 2024-01-01 RX ADMIN — BUMETANIDE 2 MG: 0.25 INJECTION INTRAMUSCULAR; INTRAVENOUS at 13:23

## 2024-01-01 RX ADMIN — ACETYLCYSTEINE 3 ML: 200 SOLUTION ORAL; RESPIRATORY (INHALATION) at 06:57

## 2024-01-01 RX ADMIN — ANTI-FUNGAL POWDER MICONAZOLE NITRATE TALC FREE 1 APPLICATION: 1.42 POWDER TOPICAL at 10:26

## 2024-01-01 RX ADMIN — VASOPRESSIN 0.05 UNITS/MIN: 20 INJECTION, SOLUTION INTRAVENOUS at 07:04

## 2024-01-01 RX ADMIN — Medication 10 ML: at 20:57

## 2024-01-01 RX ADMIN — MAGNESIUM SULFATE IN WATER 2 G: 40 INJECTION, SOLUTION INTRAVENOUS at 17:17

## 2024-01-01 RX ADMIN — VASOPRESSIN 0.06 UNITS/MIN: 20 INJECTION, SOLUTION INTRAVENOUS at 17:04

## 2024-01-01 RX ADMIN — CHLORHEXIDINE GLUCONATE 15 ML: 1.2 RINSE ORAL at 12:25

## 2024-01-01 RX ADMIN — PENICILLIN G POTASSIUM 4 MILLION UNITS: 5000000 INJECTION, POWDER, FOR SOLUTION INTRAMUSCULAR; INTRAVENOUS at 10:18

## 2024-01-01 RX ADMIN — ACETYLCYSTEINE 3 ML: 200 SOLUTION ORAL; RESPIRATORY (INHALATION) at 06:18

## 2024-01-01 RX ADMIN — INSULIN HUMAN 2 UNITS: 100 INJECTION, SOLUTION PARENTERAL at 06:15

## 2024-01-01 RX ADMIN — VASOPRESSIN 0.06 UNITS/MIN: 20 INJECTION, SOLUTION INTRAVENOUS at 01:34

## 2024-01-01 RX ADMIN — ALBUMIN (HUMAN) 12.5 G: 0.25 INJECTION, SOLUTION INTRAVENOUS at 16:36

## 2024-01-01 RX ADMIN — ANTI-FUNGAL POWDER MICONAZOLE NITRATE TALC FREE 1 APPLICATION: 1.42 POWDER TOPICAL at 08:33

## 2024-01-01 RX ADMIN — BUMETANIDE 4 MG: 0.25 INJECTION INTRAMUSCULAR; INTRAVENOUS at 10:54

## 2024-01-01 RX ADMIN — AMIODARONE HYDROCHLORIDE 1 MG/MIN: 1.8 INJECTION, SOLUTION INTRAVENOUS at 22:03

## 2024-01-01 RX ADMIN — CHLORHEXIDINE GLUCONATE 15 ML: 1.2 RINSE ORAL at 23:26

## 2024-01-01 RX ADMIN — DEXMEDETOMIDINE HYDROCHLORIDE IN SODIUM CHLORIDE 0.6 MCG/KG/HR: 4 INJECTION INTRAVENOUS at 02:03

## 2024-01-01 RX ADMIN — INSULIN HUMAN 3 UNITS: 100 INJECTION, SOLUTION PARENTERAL at 12:25

## 2024-01-01 RX ADMIN — PROPOFOL INJECTABLE EMULSION 10 MCG/KG/MIN: 10 INJECTION, EMULSION INTRAVENOUS at 08:48

## 2024-01-01 RX ADMIN — EPINEPHRINE 0.02 MCG/KG/MIN: 1 INJECTION INTRAMUSCULAR; INTRAVENOUS; SUBCUTANEOUS at 20:24

## 2024-01-01 RX ADMIN — CHLORHEXIDINE GLUCONATE 15 ML: 1.2 RINSE ORAL at 11:12

## 2024-01-01 RX ADMIN — Medication 10 ML: at 08:08

## 2024-01-01 RX ADMIN — AMIODARONE HYDROCHLORIDE 1 MG/MIN: 1.8 INJECTION, SOLUTION INTRAVENOUS at 11:06

## 2024-01-01 RX ADMIN — PROPOFOL INJECTABLE EMULSION 20 MCG/KG/MIN: 10 INJECTION, EMULSION INTRAVENOUS at 20:01

## 2024-01-01 RX ADMIN — CYCLOBENZAPRINE 10 MG: 10 TABLET, FILM COATED ORAL at 00:40

## 2024-01-01 RX ADMIN — MAGNESIUM SULFATE IN WATER 2 G: 40 INJECTION, SOLUTION INTRAVENOUS at 10:15

## 2024-01-01 RX ADMIN — SILDENAFIL 20 MG: 20 TABLET, FILM COATED ORAL at 14:50

## 2024-01-01 RX ADMIN — Medication 1 APPLICATION: at 21:58

## 2024-01-01 RX ADMIN — PENICILLIN G POTASSIUM 4 MILLION UNITS: 5000000 INJECTION, POWDER, FOR SOLUTION INTRAMUSCULAR; INTRAVENOUS at 08:14

## 2024-01-01 RX ADMIN — Medication 10 ML: at 20:13

## 2024-01-01 RX ADMIN — ALBUMIN (HUMAN) 12.5 G: 12.5 INJECTION, SOLUTION INTRAVENOUS at 19:12

## 2024-01-01 RX ADMIN — ACETYLCYSTEINE 3 ML: 200 SOLUTION ORAL; RESPIRATORY (INHALATION) at 14:56

## 2024-01-01 RX ADMIN — POTASSIUM CHLORIDE 40 MEQ: 1.5 POWDER, FOR SOLUTION ORAL at 22:08

## 2024-01-01 RX ADMIN — ACETYLCYSTEINE 3 ML: 200 SOLUTION ORAL; RESPIRATORY (INHALATION) at 21:41

## 2024-01-01 RX ADMIN — ACETYLCYSTEINE 3 ML: 200 SOLUTION ORAL; RESPIRATORY (INHALATION) at 19:42

## 2024-01-01 RX ADMIN — VASOPRESSIN 0.06 UNITS/MIN: 20 INJECTION, SOLUTION INTRAVENOUS at 08:59

## 2024-01-01 RX ADMIN — IPRATROPIUM BROMIDE AND ALBUTEROL SULFATE 3 ML: 2.5; .5 SOLUTION RESPIRATORY (INHALATION) at 07:06

## 2024-01-01 RX ADMIN — SODIUM BICARBONATE 50 MEQ: 84 INJECTION INTRAVENOUS at 00:11

## 2024-01-01 RX ADMIN — ASPIRIN 81 MG: 81 TABLET, COATED ORAL at 09:52

## 2024-01-01 RX ADMIN — PHENYLEPHRINE HYDROCHLORIDE 1 MCG/KG/MIN: 10 INJECTION INTRAVENOUS at 03:35

## 2024-01-01 RX ADMIN — AMIODARONE HYDROCHLORIDE 1 MG/MIN: 1.8 INJECTION, SOLUTION INTRAVENOUS at 10:13

## 2024-01-01 RX ADMIN — SILDENAFIL 20 MG: 20 TABLET, FILM COATED ORAL at 20:17

## 2024-01-01 RX ADMIN — DIBASIC SODIUM PHOSPHATE, MONOBASIC POTASSIUM PHOSPHATE AND MONOBASIC SODIUM PHOSPHATE 2 TABLET: 852; 155; 130 TABLET ORAL at 12:05

## 2024-01-01 RX ADMIN — PENICILLIN G POTASSIUM 4 MILLION UNITS: 5000000 INJECTION, POWDER, FOR SOLUTION INTRAMUSCULAR; INTRAVENOUS at 20:40

## 2024-01-01 RX ADMIN — ANTI-FUNGAL POWDER MICONAZOLE NITRATE TALC FREE 1 APPLICATION: 1.42 POWDER TOPICAL at 08:39

## 2024-01-01 RX ADMIN — METOPROLOL TARTRATE 12.5 MG: 25 TABLET, FILM COATED ORAL at 09:10

## 2024-01-01 RX ADMIN — PENICILLIN G POTASSIUM 4 MILLION UNITS: 5000000 INJECTION, POWDER, FOR SOLUTION INTRAMUSCULAR; INTRAVENOUS at 23:26

## 2024-01-01 RX ADMIN — INSULIN HUMAN 3 UNITS: 100 INJECTION, SOLUTION PARENTERAL at 17:16

## 2024-01-01 RX ADMIN — IPRATROPIUM BROMIDE AND ALBUTEROL SULFATE 3 ML: 2.5; .5 SOLUTION RESPIRATORY (INHALATION) at 06:59

## 2024-01-01 RX ADMIN — Medication 10 ML: at 08:58

## 2024-01-01 RX ADMIN — MAGNESIUM SULFATE HEPTAHYDRATE 2 G: 40 INJECTION, SOLUTION INTRAVENOUS at 13:57

## 2024-01-01 RX ADMIN — REGADENOSON 0.4 MG: 0.08 INJECTION, SOLUTION INTRAVENOUS at 12:31

## 2024-01-01 RX ADMIN — FUROSEMIDE 40 MG: 40 TABLET ORAL at 08:17

## 2024-01-01 RX ADMIN — SILDENAFIL 20 MG: 20 TABLET, FILM COATED ORAL at 21:29

## 2024-01-01 RX ADMIN — MAGNESIUM SULFATE IN WATER 2 G: 40 INJECTION, SOLUTION INTRAVENOUS at 11:50

## 2024-01-01 RX ADMIN — ENOXAPARIN SODIUM 40 MG: 100 INJECTION SUBCUTANEOUS at 16:00

## 2024-01-01 RX ADMIN — POTASSIUM CHLORIDE 20 MEQ: 29.8 INJECTION, SOLUTION INTRAVENOUS at 10:10

## 2024-01-01 RX ADMIN — ALBUMIN (HUMAN) 12.5 G: 0.25 INJECTION, SOLUTION INTRAVENOUS at 13:31

## 2024-01-01 RX ADMIN — CEFEPIME 2000 MG: 2 INJECTION, POWDER, FOR SOLUTION INTRAVENOUS at 22:43

## 2024-01-01 RX ADMIN — ATORVASTATIN CALCIUM 40 MG: 20 TABLET, FILM COATED ORAL at 20:13

## 2024-01-01 RX ADMIN — PENICILLIN G POTASSIUM 4 MILLION UNITS: 5000000 INJECTION, POWDER, FOR SOLUTION INTRAMUSCULAR; INTRAVENOUS at 17:40

## 2024-01-01 RX ADMIN — AMIODARONE HYDROCHLORIDE 1 MG/MIN: 1.8 INJECTION, SOLUTION INTRAVENOUS at 02:59

## 2024-01-01 RX ADMIN — BUMETANIDE 4 MG: 0.25 INJECTION INTRAMUSCULAR; INTRAVENOUS at 09:48

## 2024-01-01 RX ADMIN — DILTIAZEM HYDROCHLORIDE 120 MG: 60 TABLET, FILM COATED ORAL at 21:04

## 2024-01-01 RX ADMIN — ALBUMIN (HUMAN) 12.5 G: 12.5 INJECTION, SOLUTION INTRAVENOUS at 15:31

## 2024-01-01 RX ADMIN — DILTIAZEM HYDROCHLORIDE 120 MG: 60 TABLET, FILM COATED ORAL at 20:08

## 2024-01-01 RX ADMIN — VASOPRESSIN 0.06 UNITS/MIN: 20 INJECTION, SOLUTION INTRAVENOUS at 20:46

## 2024-01-01 RX ADMIN — ALBUMIN (HUMAN) 12.5 G: 12.5 INJECTION, SOLUTION INTRAVENOUS at 09:01

## 2024-01-01 RX ADMIN — Medication 0.09 MCG/KG/MIN: at 06:29

## 2024-01-01 RX ADMIN — POTASSIUM CHLORIDE 20 MEQ: 29.8 INJECTION, SOLUTION INTRAVENOUS at 09:19

## 2024-01-01 RX ADMIN — SILDENAFIL 20 MG: 20 TABLET, FILM COATED ORAL at 15:59

## 2024-01-01 RX ADMIN — ALBUMIN (HUMAN) 25 G: 0.25 INJECTION, SOLUTION INTRAVENOUS at 15:51

## 2024-01-01 RX ADMIN — Medication 10 ML: at 09:09

## 2024-01-01 RX ADMIN — SENNOSIDES 2 TABLET: 8.6 TABLET, FILM COATED ORAL at 20:22

## 2024-01-01 RX ADMIN — PENICILLIN G POTASSIUM 4 MILLION UNITS: 5000000 INJECTION, POWDER, FOR SOLUTION INTRAMUSCULAR; INTRAVENOUS at 18:16

## 2024-01-01 RX ADMIN — CALCIUM GLUCONATE 2000 MG: 20 INJECTION, SOLUTION INTRAVENOUS at 12:10

## 2024-01-01 RX ADMIN — CHLORHEXIDINE GLUCONATE 15 ML: 1.2 RINSE ORAL at 13:29

## 2024-01-01 RX ADMIN — Medication 0.1 MCG/KG/MIN: at 01:39

## 2024-01-01 RX ADMIN — DEXMEDETOMIDINE HYDROCHLORIDE IN SODIUM CHLORIDE 0.5 MCG/KG/HR: 4 INJECTION INTRAVENOUS at 00:31

## 2024-01-01 RX ADMIN — Medication 1 APPLICATION: at 08:56

## 2024-01-01 RX ADMIN — Medication 1 APPLICATION: at 20:10

## 2024-01-01 RX ADMIN — ATORVASTATIN CALCIUM 40 MG: 20 TABLET, FILM COATED ORAL at 20:12

## 2024-01-01 RX ADMIN — Medication 250 MG: at 08:34

## 2024-01-01 RX ADMIN — PENICILLIN G POTASSIUM 4 MILLION UNITS: 5000000 INJECTION, POWDER, FOR SOLUTION INTRAMUSCULAR; INTRAVENOUS at 13:10

## 2024-01-01 RX ADMIN — PROPOFOL INJECTABLE EMULSION 30 MCG/KG/MIN: 10 INJECTION, EMULSION INTRAVENOUS at 02:54

## 2024-01-01 RX ADMIN — PROPOFOL INJECTABLE EMULSION 40 MCG/KG/MIN: 10 INJECTION, EMULSION INTRAVENOUS at 13:57

## 2024-01-01 RX ADMIN — POTASSIUM CHLORIDE 20 MEQ: 1.5 POWDER, FOR SOLUTION ORAL at 21:28

## 2024-01-01 RX ADMIN — MIDODRINE HYDROCHLORIDE 15 MG: 5 TABLET ORAL at 06:43

## 2024-01-01 RX ADMIN — DEXMEDETOMIDINE HYDROCHLORIDE IN SODIUM CHLORIDE 1 MCG/KG/HR: 4 INJECTION INTRAVENOUS at 01:14

## 2024-01-01 RX ADMIN — Medication 10 ML: at 09:08

## 2024-01-01 RX ADMIN — MILRINONE LACTATE 0.25 MCG/KG/MIN: 0.2 INJECTION, SOLUTION INTRAVENOUS at 06:43

## 2024-01-01 RX ADMIN — BUSPIRONE HYDROCHLORIDE 5 MG: 5 TABLET ORAL at 20:38

## 2024-01-01 RX ADMIN — DEXMEDETOMIDINE HYDROCHLORIDE IN SODIUM CHLORIDE 0.5 MCG/KG/HR: 4 INJECTION INTRAVENOUS at 20:22

## 2024-01-01 RX ADMIN — HYDROCODONE BITARTRATE AND ACETAMINOPHEN 2 TABLET: 5; 325 TABLET ORAL at 09:00

## 2024-01-01 RX ADMIN — MILRINONE LACTATE 0.38 MCG/KG/MIN: 0.2 INJECTION, SOLUTION INTRAVENOUS at 22:46

## 2024-01-01 RX ADMIN — DEXMEDETOMIDINE HYDROCHLORIDE IN SODIUM CHLORIDE 0.4 MCG/KG/HR: 4 INJECTION INTRAVENOUS at 20:20

## 2024-01-01 RX ADMIN — HYDROCODONE BITARTRATE AND ACETAMINOPHEN 2 TABLET: 5; 325 TABLET ORAL at 09:08

## 2024-01-01 RX ADMIN — AMIODARONE HYDROCHLORIDE 1 MG/MIN: 1.8 INJECTION, SOLUTION INTRAVENOUS at 09:39

## 2024-01-01 RX ADMIN — PENICILLIN G POTASSIUM 4 MILLION UNITS: 5000000 INJECTION, POWDER, FOR SOLUTION INTRAMUSCULAR; INTRAVENOUS at 07:30

## 2024-01-01 RX ADMIN — INSULIN HUMAN 2 UNITS: 100 INJECTION, SOLUTION PARENTERAL at 12:10

## 2024-01-01 RX ADMIN — DEXMEDETOMIDINE HYDROCHLORIDE IN SODIUM CHLORIDE 0.6 MCG/KG/HR: 4 INJECTION INTRAVENOUS at 06:54

## 2024-01-01 RX ADMIN — AMIODARONE HYDROCHLORIDE 0.5 MG/MIN: 1.8 INJECTION, SOLUTION INTRAVENOUS at 20:39

## 2024-01-01 RX ADMIN — HYDROCODONE BITARTRATE AND ACETAMINOPHEN 1 TABLET: 5; 325 TABLET ORAL at 17:38

## 2024-01-01 RX ADMIN — Medication 2 PACKET: at 07:59

## 2024-01-01 RX ADMIN — PENICILLIN G POTASSIUM 4 MILLION UNITS: 5000000 INJECTION, POWDER, FOR SOLUTION INTRAMUSCULAR; INTRAVENOUS at 04:10

## 2024-01-01 RX ADMIN — PENICILLIN G POTASSIUM 4 MILLION UNITS: 5000000 INJECTION, POWDER, FOR SOLUTION INTRAMUSCULAR; INTRAVENOUS at 05:40

## 2024-01-01 RX ADMIN — IPRATROPIUM BROMIDE AND ALBUTEROL SULFATE 3 ML: 2.5; .5 SOLUTION RESPIRATORY (INHALATION) at 11:51

## 2024-01-01 RX ADMIN — ANTI-FUNGAL POWDER MICONAZOLE NITRATE TALC FREE 1 APPLICATION: 1.42 POWDER TOPICAL at 20:19

## 2024-01-01 RX ADMIN — PROPOFOL INJECTABLE EMULSION 25 MCG/KG/MIN: 10 INJECTION, EMULSION INTRAVENOUS at 06:38

## 2024-01-01 RX ADMIN — CEFEPIME 2000 MG: 2 INJECTION, POWDER, FOR SOLUTION INTRAVENOUS at 10:24

## 2024-01-01 RX ADMIN — PENICILLIN G POTASSIUM 4 MILLION UNITS: 5000000 INJECTION, POWDER, FOR SOLUTION INTRAMUSCULAR; INTRAVENOUS at 05:54

## 2024-01-01 RX ADMIN — INSULIN HUMAN 2 UNITS: 100 INJECTION, SOLUTION PARENTERAL at 13:36

## 2024-01-01 RX ADMIN — CEFEPIME 2000 MG: 2 INJECTION, POWDER, FOR SOLUTION INTRAVENOUS at 18:26

## 2024-01-01 RX ADMIN — SILDENAFIL 20 MG: 20 TABLET, FILM COATED ORAL at 14:11

## 2024-01-01 RX ADMIN — DEXMEDETOMIDINE HYDROCHLORIDE IN SODIUM CHLORIDE 0.6 MCG/KG/HR: 4 INJECTION INTRAVENOUS at 18:42

## 2024-01-01 RX ADMIN — CALCIUM GLUCONATE 2000 MG: 20 INJECTION, SOLUTION INTRAVENOUS at 12:03

## 2024-01-01 RX ADMIN — Medication 100 MCG: at 08:12

## 2024-01-01 RX ADMIN — NOREPINEPHRINE BITARTRATE 0.02 MCG/KG/MIN: 1 SOLUTION INTRAVENOUS at 22:31

## 2024-01-01 RX ADMIN — Medication 100 MCG: at 08:14

## 2024-01-01 RX ADMIN — IPRATROPIUM BROMIDE AND ALBUTEROL SULFATE 3 ML: 2.5; .5 SOLUTION RESPIRATORY (INHALATION) at 15:41

## 2024-01-01 RX ADMIN — BUMETANIDE 1 MG/HR: 0.25 INJECTION INTRAMUSCULAR; INTRAVENOUS at 12:10

## 2024-01-01 RX ADMIN — MILRINONE LACTATE 0.25 MCG/KG/MIN: 0.2 INJECTION, SOLUTION INTRAVENOUS at 07:57

## 2024-01-01 RX ADMIN — DEXMEDETOMIDINE HYDROCHLORIDE IN SODIUM CHLORIDE 0.5 MCG/KG/HR: 4 INJECTION INTRAVENOUS at 12:22

## 2024-01-01 RX ADMIN — SODIUM CHLORIDE 2 MILLION UNITS: 9 INJECTION, SOLUTION INTRAVENOUS at 03:47

## 2024-01-01 RX ADMIN — CEFAZOLIN 2000 MG: 2 INJECTION, POWDER, FOR SOLUTION INTRAMUSCULAR; INTRAVENOUS at 08:52

## 2024-01-01 RX ADMIN — Medication 100 MCG: at 09:15

## 2024-01-01 RX ADMIN — CEFEPIME 2000 MG: 2 INJECTION, POWDER, FOR SOLUTION INTRAVENOUS at 01:44

## 2024-01-01 RX ADMIN — PROPOFOL INJECTABLE EMULSION 15 MCG/KG/MIN: 10 INJECTION, EMULSION INTRAVENOUS at 11:50

## 2024-01-01 RX ADMIN — ATORVASTATIN CALCIUM 40 MG: 20 TABLET, FILM COATED ORAL at 20:01

## 2024-01-01 RX ADMIN — FENTANYL CITRATE 25 MCG: 50 INJECTION, SOLUTION INTRAMUSCULAR; INTRAVENOUS at 10:21

## 2024-01-01 RX ADMIN — PENICILLIN G POTASSIUM 4 MILLION UNITS: 5000000 INJECTION, POWDER, FOR SOLUTION INTRAMUSCULAR; INTRAVENOUS at 21:09

## 2024-01-01 RX ADMIN — ASPIRIN 81 MG: 81 TABLET, CHEWABLE ORAL at 08:15

## 2024-01-01 RX ADMIN — MORPHINE SULFATE 2 MG: 2 INJECTION, SOLUTION INTRAMUSCULAR; INTRAVENOUS at 08:17

## 2024-01-01 RX ADMIN — PERFLUTREN 2 ML: 6.52 INJECTION, SUSPENSION INTRAVENOUS at 07:54

## 2024-01-01 RX ADMIN — GLYCERIN, HYPROMELLOSE, POLYETHYLENE GLYCOL 1 DROP: .2; .2; 1 LIQUID OPHTHALMIC at 10:18

## 2024-01-01 RX ADMIN — HYDROCODONE BITARTRATE AND ACETAMINOPHEN 2 TABLET: 5; 325 TABLET ORAL at 04:44

## 2024-01-01 RX ADMIN — CALCIUM GLUCONATE 2000 MG: 20 INJECTION, SOLUTION INTRAVENOUS at 13:51

## 2024-01-01 RX ADMIN — CEFEPIME 2000 MG: 2 INJECTION, POWDER, FOR SOLUTION INTRAVENOUS at 12:05

## 2024-01-01 RX ADMIN — SODIUM CHLORIDE 4 MILLION UNITS: 9 INJECTION, SOLUTION INTRAVENOUS at 10:08

## 2024-01-01 RX ADMIN — Medication 4 ML: at 19:56

## 2024-01-01 RX ADMIN — Medication 250 MG: at 08:22

## 2024-01-01 RX ADMIN — HYDROCODONE BITARTRATE AND ACETAMINOPHEN 1 TABLET: 5; 325 TABLET ORAL at 12:25

## 2024-01-01 RX ADMIN — SODIUM CHLORIDE 4 MILLION UNITS: 9 INJECTION, SOLUTION INTRAVENOUS at 20:00

## 2024-01-01 RX ADMIN — DEXMEDETOMIDINE HYDROCHLORIDE IN SODIUM CHLORIDE 0.4 MCG/KG/HR: 4 INJECTION INTRAVENOUS at 12:11

## 2024-01-01 RX ADMIN — SENNOSIDES AND DOCUSATE SODIUM 2 TABLET: 50; 8.6 TABLET ORAL at 21:16

## 2024-01-01 RX ADMIN — INSULIN HUMAN 2 UNITS: 100 INJECTION, SOLUTION PARENTERAL at 06:06

## 2024-01-01 RX ADMIN — LANSOPRAZOLE 15 MG: 15 TABLET, ORALLY DISINTEGRATING ORAL at 05:24

## 2024-01-01 RX ADMIN — HYDROCODONE BITARTRATE AND ACETAMINOPHEN 2 TABLET: 5; 325 TABLET ORAL at 06:15

## 2024-01-01 RX ADMIN — Medication 100 MCG: at 08:28

## 2024-01-01 RX ADMIN — BUSPIRONE HYDROCHLORIDE 5 MG: 5 TABLET ORAL at 17:24

## 2024-01-01 RX ADMIN — ENOXAPARIN SODIUM 30 MG: 100 INJECTION SUBCUTANEOUS at 05:27

## 2024-01-01 RX ADMIN — SILDENAFIL 20 MG: 20 TABLET, FILM COATED ORAL at 15:17

## 2024-01-01 RX ADMIN — PENICILLIN G POTASSIUM 4 MILLION UNITS: 5000000 INJECTION, POWDER, FOR SOLUTION INTRAMUSCULAR; INTRAVENOUS at 02:11

## 2024-01-01 RX ADMIN — PENICILLIN G POTASSIUM 4 MILLION UNITS: 5000000 INJECTION, POWDER, FOR SOLUTION INTRAMUSCULAR; INTRAVENOUS at 22:05

## 2024-01-01 RX ADMIN — IPRATROPIUM BROMIDE AND ALBUTEROL SULFATE 3 ML: 2.5; .5 SOLUTION RESPIRATORY (INHALATION) at 23:44

## 2024-01-01 RX ADMIN — MICAFUNGIN SODIUM 200 MG: 100 INJECTION, POWDER, LYOPHILIZED, FOR SOLUTION INTRAVENOUS at 02:16

## 2024-01-01 RX ADMIN — CALCIUM GLUCONATE 2000 MG: 20 INJECTION, SOLUTION INTRAVENOUS at 04:38

## 2024-01-01 RX ADMIN — INSULIN GLARGINE 20 UNITS: 100 INJECTION, SOLUTION SUBCUTANEOUS at 09:02

## 2024-01-01 RX ADMIN — MILRINONE LACTATE 0.38 MCG/KG/MIN: 0.2 INJECTION, SOLUTION INTRAVENOUS at 13:55

## 2024-01-01 RX ADMIN — AMIODARONE HYDROCHLORIDE 200 MG: 200 TABLET ORAL at 13:46

## 2024-01-01 RX ADMIN — PENICILLIN G POTASSIUM 4 MILLION UNITS: 5000000 INJECTION, POWDER, FOR SOLUTION INTRAMUSCULAR; INTRAVENOUS at 17:30

## 2024-01-01 RX ADMIN — MILRINONE LACTATE 0.25 MCG/KG/MIN: 0.2 INJECTION, SOLUTION INTRAVENOUS at 11:50

## 2024-01-01 RX ADMIN — MAGNESIUM SULFATE HEPTAHYDRATE 2 G: 40 INJECTION, SOLUTION INTRAVENOUS at 13:33

## 2024-01-01 RX ADMIN — ANTI-FUNGAL POWDER MICONAZOLE NITRATE TALC FREE 1 APPLICATION: 1.42 POWDER TOPICAL at 20:04

## 2024-01-01 RX ADMIN — INSULIN HUMAN 2 UNITS: 100 INJECTION, SOLUTION PARENTERAL at 00:52

## 2024-01-01 RX ADMIN — IPRATROPIUM BROMIDE AND ALBUTEROL SULFATE 3 ML: 2.5; .5 SOLUTION RESPIRATORY (INHALATION) at 04:40

## 2024-01-01 RX ADMIN — ENOXAPARIN SODIUM 40 MG: 100 INJECTION SUBCUTANEOUS at 18:14

## 2024-01-01 RX ADMIN — POTASSIUM CHLORIDE 20 MEQ: 29.8 INJECTION, SOLUTION INTRAVENOUS at 04:22

## 2024-01-01 RX ADMIN — Medication 10 ML: at 08:29

## 2024-01-01 RX ADMIN — PROPOFOL 10 MCG/KG/MIN: 10 INJECTION, EMULSION INTRAVENOUS at 00:49

## 2024-01-01 RX ADMIN — VASOPRESSIN 0.05 UNITS/MIN: 20 INJECTION, SOLUTION INTRAVENOUS at 05:12

## 2024-01-01 RX ADMIN — IPRATROPIUM BROMIDE AND ALBUTEROL SULFATE 3 ML: 2.5; .5 SOLUTION RESPIRATORY (INHALATION) at 10:39

## 2024-01-01 RX ADMIN — AMIODARONE HYDROCHLORIDE 1 MG/MIN: 1.8 INJECTION, SOLUTION INTRAVENOUS at 17:20

## 2024-01-01 RX ADMIN — Medication 2 PACKET: at 11:05

## 2024-01-01 RX ADMIN — MORPHINE SULFATE 1 MG: 2 INJECTION, SOLUTION INTRAMUSCULAR; INTRAVENOUS at 12:34

## 2024-01-01 RX ADMIN — VASOPRESSIN 0.06 UNITS/MIN: 20 INJECTION, SOLUTION INTRAVENOUS at 05:29

## 2024-01-01 RX ADMIN — POTASSIUM CHLORIDE 10 MEQ: 7.46 INJECTION, SOLUTION INTRAVENOUS at 06:44

## 2024-01-01 RX ADMIN — PENICILLIN G POTASSIUM 4 MILLION UNITS: 5000000 INJECTION, POWDER, FOR SOLUTION INTRAMUSCULAR; INTRAVENOUS at 16:09

## 2024-01-01 RX ADMIN — CEFAZOLIN 2000 MG: 2 INJECTION, POWDER, FOR SOLUTION INTRAMUSCULAR; INTRAVENOUS at 00:12

## 2024-01-01 RX ADMIN — MORPHINE SULFATE 2 MG: 2 INJECTION, SOLUTION INTRAMUSCULAR; INTRAVENOUS at 16:44

## 2024-01-01 RX ADMIN — DEXMEDETOMIDINE HYDROCHLORIDE IN SODIUM CHLORIDE 0.5 MCG/KG/HR: 4 INJECTION INTRAVENOUS at 10:04

## 2024-01-01 RX ADMIN — AMIODARONE HYDROCHLORIDE 0.5 MG/MIN: 1.8 INJECTION, SOLUTION INTRAVENOUS at 00:59

## 2024-01-01 RX ADMIN — NITROGLYCERIN 0.8 MG: 0.4 TABLET SUBLINGUAL at 13:39

## 2024-01-01 RX ADMIN — POTASSIUM CHLORIDE 20 MEQ: 750 TABLET, EXTENDED RELEASE ORAL at 08:14

## 2024-01-01 RX ADMIN — SODIUM CHLORIDE 2 MILLION UNITS: 9 INJECTION, SOLUTION INTRAVENOUS at 23:30

## 2024-01-01 RX ADMIN — Medication 10 ML: at 08:18

## 2024-01-01 RX ADMIN — PROPOFOL INJECTABLE EMULSION 15 MCG/KG/MIN: 10 INJECTION, EMULSION INTRAVENOUS at 14:42

## 2024-01-01 RX ADMIN — BUSPIRONE HYDROCHLORIDE 5 MG: 5 TABLET ORAL at 09:02

## 2024-01-01 RX ADMIN — PENICILLIN G POTASSIUM 4 MILLION UNITS: 5000000 INJECTION, POWDER, FOR SOLUTION INTRAMUSCULAR; INTRAVENOUS at 01:14

## 2024-01-01 RX ADMIN — Medication 1 APPLICATION: at 20:47

## 2024-01-01 RX ADMIN — MUPIROCIN 1 APPLICATION: 20 OINTMENT TOPICAL at 08:53

## 2024-01-01 RX ADMIN — Medication 100 MCG: at 08:35

## 2024-01-01 RX ADMIN — MUPIROCIN 1 APPLICATION: 20 OINTMENT TOPICAL at 00:12

## 2024-01-01 RX ADMIN — INSULIN HUMAN 2 UNITS: 100 INJECTION, SOLUTION PARENTERAL at 11:25

## 2024-01-01 RX ADMIN — PENICILLIN G POTASSIUM 4 MILLION UNITS: 5000000 INJECTION, POWDER, FOR SOLUTION INTRAMUSCULAR; INTRAVENOUS at 20:26

## 2024-01-01 RX ADMIN — POTASSIUM CHLORIDE 40 MEQ: 1.5 POWDER, FOR SOLUTION ORAL at 16:19

## 2024-01-01 RX ADMIN — PENICILLIN G POTASSIUM 4 MILLION UNITS: 5000000 INJECTION, POWDER, FOR SOLUTION INTRAMUSCULAR; INTRAVENOUS at 16:00

## 2024-01-01 RX ADMIN — ATORVASTATIN CALCIUM 40 MG: 20 TABLET, FILM COATED ORAL at 21:57

## 2024-01-01 RX ADMIN — AMIODARONE HYDROCHLORIDE 1 MG/MIN: 1.8 INJECTION, SOLUTION INTRAVENOUS at 04:48

## 2024-01-01 RX ADMIN — Medication 1 APPLICATION: at 08:13

## 2024-01-01 RX ADMIN — ATORVASTATIN CALCIUM 40 MG: 20 TABLET, FILM COATED ORAL at 21:16

## 2024-01-01 RX ADMIN — SILDENAFIL 20 MG: 20 TABLET, FILM COATED ORAL at 20:31

## 2024-01-01 RX ADMIN — Medication 100 MCG: at 10:11

## 2024-01-01 RX ADMIN — ACETAMINOPHEN 650 MG: 650 LIQUID ORAL at 23:22

## 2024-01-01 RX ADMIN — Medication 1 APPLICATION: at 15:59

## 2024-01-01 RX ADMIN — BUMETANIDE 4 MG: 0.25 INJECTION INTRAMUSCULAR; INTRAVENOUS at 20:20

## 2024-01-01 RX ADMIN — POTASSIUM CHLORIDE 20 MEQ: 750 TABLET, EXTENDED RELEASE ORAL at 08:40

## 2024-01-01 RX ADMIN — CALCIUM GLUCONATE 2000 MG: 20 INJECTION, SOLUTION INTRAVENOUS at 11:11

## 2024-01-01 RX ADMIN — AMIODARONE HYDROCHLORIDE 0.5 MG/MIN: 1.8 INJECTION, SOLUTION INTRAVENOUS at 11:51

## 2024-01-01 RX ADMIN — POTASSIUM CHLORIDE 20 MEQ: 29.8 INJECTION, SOLUTION INTRAVENOUS at 23:04

## 2024-01-01 RX ADMIN — DIGOXIN 250 MCG: 0.25 INJECTION INTRAMUSCULAR; INTRAVENOUS at 15:54

## 2024-01-01 RX ADMIN — PROPOFOL INJECTABLE EMULSION 40 MCG/KG/MIN: 10 INJECTION, EMULSION INTRAVENOUS at 11:35

## 2024-01-01 RX ADMIN — DILTIAZEM HYDROCHLORIDE 120 MG: 60 TABLET, FILM COATED ORAL at 08:10

## 2024-01-01 RX ADMIN — PENICILLIN G POTASSIUM 4 MILLION UNITS: 5000000 INJECTION, POWDER, FOR SOLUTION INTRAMUSCULAR; INTRAVENOUS at 00:39

## 2024-01-01 RX ADMIN — POTASSIUM CHLORIDE 20 MEQ: 29.8 INJECTION, SOLUTION INTRAVENOUS at 06:22

## 2024-01-01 RX ADMIN — MILRINONE LACTATE 0.12 MCG/KG/MIN: 0.2 INJECTION, SOLUTION INTRAVENOUS at 23:42

## 2024-01-01 RX ADMIN — HYDROCODONE BITARTRATE AND ACETAMINOPHEN 2 TABLET: 5; 325 TABLET ORAL at 11:28

## 2024-01-01 RX ADMIN — MORPHINE SULFATE 2 MG: 2 INJECTION, SOLUTION INTRAMUSCULAR; INTRAVENOUS at 04:52

## 2024-01-01 RX ADMIN — DEXMEDETOMIDINE HYDROCHLORIDE IN SODIUM CHLORIDE 0.5 MCG/KG/HR: 4 INJECTION INTRAVENOUS at 00:59

## 2024-01-01 RX ADMIN — MIDODRINE HYDROCHLORIDE 15 MG: 5 TABLET ORAL at 10:35

## 2024-01-01 RX ADMIN — MILRINONE LACTATE 0.25 MCG/KG/MIN: 0.2 INJECTION, SOLUTION INTRAVENOUS at 01:18

## 2024-01-01 RX ADMIN — SILDENAFIL 20 MG: 20 TABLET, FILM COATED ORAL at 15:21

## 2024-01-01 RX ADMIN — MIDODRINE HYDROCHLORIDE 10 MG: 5 TABLET ORAL at 06:32

## 2024-01-01 RX ADMIN — INSULIN GLARGINE 20 UNITS: 100 INJECTION, SOLUTION SUBCUTANEOUS at 08:09

## 2024-01-01 RX ADMIN — ASPIRIN 81 MG: 81 TABLET, CHEWABLE ORAL at 08:29

## 2024-01-01 RX ADMIN — ASPIRIN 81 MG: 81 TABLET, COATED ORAL at 08:14

## 2024-01-01 RX ADMIN — CHLORHEXIDINE GLUCONATE 15 ML: 1.2 RINSE ORAL at 00:41

## 2024-01-01 RX ADMIN — HYDROCODONE BITARTRATE AND ACETAMINOPHEN 1 TABLET: 5; 325 TABLET ORAL at 10:20

## 2024-01-01 RX ADMIN — CALCIUM GLUCONATE 2000 MG: 20 INJECTION, SOLUTION INTRAVENOUS at 13:57

## 2024-01-01 RX ADMIN — SILDENAFIL 20 MG: 20 TABLET, FILM COATED ORAL at 08:58

## 2024-01-01 RX ADMIN — DEXMEDETOMIDINE HYDROCHLORIDE IN SODIUM CHLORIDE 0.5 MCG/KG/HR: 4 INJECTION INTRAVENOUS at 18:23

## 2024-01-01 RX ADMIN — POTASSIUM CHLORIDE 40 MEQ: 1.5 FOR SOLUTION ORAL at 18:54

## 2024-01-01 RX ADMIN — AMIODARONE HYDROCHLORIDE 1 MG/MIN: 1.8 INJECTION, SOLUTION INTRAVENOUS at 09:30

## 2024-01-01 RX ADMIN — ANTI-FUNGAL POWDER MICONAZOLE NITRATE TALC FREE 1 APPLICATION: 1.42 POWDER TOPICAL at 09:04

## 2024-01-01 RX ADMIN — MILRINONE LACTATE 0.38 MCG/KG/MIN: 0.2 INJECTION, SOLUTION INTRAVENOUS at 14:20

## 2024-01-01 RX ADMIN — ASPIRIN 81 MG: 81 TABLET, CHEWABLE ORAL at 09:04

## 2024-01-01 RX ADMIN — VASOPRESSIN 0.06 UNITS/MIN: 20 INJECTION, SOLUTION INTRAVENOUS at 04:53

## 2024-01-01 RX ADMIN — INSULIN HUMAN 2 UNITS: 100 INJECTION, SOLUTION PARENTERAL at 17:52

## 2024-01-01 RX ADMIN — CHLORHEXIDINE GLUCONATE 15 ML: 1.2 RINSE ORAL at 23:12

## 2024-01-01 RX ADMIN — Medication 10 ML: at 08:36

## 2024-01-01 RX ADMIN — BUSPIRONE HYDROCHLORIDE 5 MG: 5 TABLET ORAL at 08:14

## 2024-01-01 RX ADMIN — BUMETANIDE 2 MG/HR: 0.25 INJECTION INTRAMUSCULAR; INTRAVENOUS at 22:04

## 2024-01-01 RX ADMIN — SODIUM CHLORIDE 4 MILLION UNITS: 9 INJECTION, SOLUTION INTRAVENOUS at 23:26

## 2024-01-01 RX ADMIN — IPRATROPIUM BROMIDE AND ALBUTEROL SULFATE 3 ML: 2.5; .5 SOLUTION RESPIRATORY (INHALATION) at 20:30

## 2024-01-01 RX ADMIN — ATORVASTATIN CALCIUM 40 MG: 20 TABLET, FILM COATED ORAL at 20:25

## 2024-01-01 RX ADMIN — DEXMEDETOMIDINE HYDROCHLORIDE IN SODIUM CHLORIDE 0.5 MCG/KG/HR: 4 INJECTION INTRAVENOUS at 08:09

## 2024-01-01 RX ADMIN — ACETYLCYSTEINE 3 ML: 200 SOLUTION ORAL; RESPIRATORY (INHALATION) at 15:55

## 2024-01-01 RX ADMIN — PENICILLIN G POTASSIUM 4 MILLION UNITS: 5000000 INJECTION, POWDER, FOR SOLUTION INTRAMUSCULAR; INTRAVENOUS at 10:00

## 2024-01-01 RX ADMIN — Medication 10 ML: at 20:04

## 2024-01-01 RX ADMIN — PROPOFOL INJECTABLE EMULSION 25 MCG/KG/MIN: 10 INJECTION, EMULSION INTRAVENOUS at 15:02

## 2024-01-01 RX ADMIN — VASOPRESSIN 0.05 UNITS/MIN: 20 INJECTION, SOLUTION INTRAVENOUS at 14:49

## 2024-01-01 RX ADMIN — DEXMEDETOMIDINE HYDROCHLORIDE IN SODIUM CHLORIDE 0.5 MCG/KG/HR: 4 INJECTION INTRAVENOUS at 11:18

## 2024-01-01 RX ADMIN — HYDROCODONE BITARTRATE AND ACETAMINOPHEN 2 TABLET: 5; 325 TABLET ORAL at 15:24

## 2024-01-01 RX ADMIN — ASPIRIN 81 MG: 81 TABLET, CHEWABLE ORAL at 08:35

## 2024-01-01 RX ADMIN — MUPIROCIN 1 APPLICATION: 20 OINTMENT TOPICAL at 21:16

## 2024-01-01 RX ADMIN — Medication 250 MG: at 20:25

## 2024-01-01 RX ADMIN — CEFEPIME 2000 MG: 2 INJECTION, POWDER, FOR SOLUTION INTRAVENOUS at 01:42

## 2024-01-01 RX ADMIN — PROPOFOL 10 MCG/KG/MIN: 10 INJECTION, EMULSION INTRAVENOUS at 19:52

## 2024-01-01 RX ADMIN — Medication 4 ML: at 19:20

## 2024-01-01 RX ADMIN — ENOXAPARIN SODIUM 40 MG: 100 INJECTION SUBCUTANEOUS at 05:01

## 2024-01-01 RX ADMIN — Medication 0.1 MCG/KG/MIN: at 21:47

## 2024-01-01 RX ADMIN — ENOXAPARIN SODIUM 40 MG: 100 INJECTION SUBCUTANEOUS at 05:54

## 2024-01-01 RX ADMIN — MAGNESIUM SULFATE HEPTAHYDRATE 2 G: 40 INJECTION, SOLUTION INTRAVENOUS at 14:06

## 2024-01-01 RX ADMIN — IPRATROPIUM BROMIDE AND ALBUTEROL SULFATE 3 ML: 2.5; .5 SOLUTION RESPIRATORY (INHALATION) at 06:57

## 2024-01-01 RX ADMIN — POTASSIUM CHLORIDE 20 MEQ: 29.8 INJECTION, SOLUTION INTRAVENOUS at 10:54

## 2024-01-01 RX ADMIN — DEXMEDETOMIDINE HYDROCHLORIDE IN SODIUM CHLORIDE 0.5 MCG/KG/HR: 4 INJECTION INTRAVENOUS at 16:36

## 2024-01-01 RX ADMIN — EPINEPHRINE 0.02 MCG/KG/MIN: 1 INJECTION INTRAMUSCULAR; INTRAVENOUS; SUBCUTANEOUS at 03:36

## 2024-01-01 RX ADMIN — Medication 10 ML: at 09:04

## 2024-01-01 RX ADMIN — LANSOPRAZOLE 15 MG: 15 TABLET, ORALLY DISINTEGRATING ORAL at 07:36

## 2024-01-01 RX ADMIN — AMIODARONE HYDROCHLORIDE 0.5 MG/MIN: 1.8 INJECTION, SOLUTION INTRAVENOUS at 21:41

## 2024-01-01 RX ADMIN — DEXMEDETOMIDINE HYDROCHLORIDE IN SODIUM CHLORIDE 0.04 MCG/KG/HR: 4 INJECTION INTRAVENOUS at 08:28

## 2024-01-01 RX ADMIN — CHLORHEXIDINE GLUCONATE 15 ML: 1.2 RINSE ORAL at 23:42

## 2024-01-01 RX ADMIN — HYDROCODONE BITARTRATE AND ACETAMINOPHEN 2 TABLET: 5; 325 TABLET ORAL at 09:15

## 2024-01-01 RX ADMIN — SILDENAFIL 20 MG: 20 TABLET, FILM COATED ORAL at 14:57

## 2024-01-01 RX ADMIN — PROPOFOL INJECTABLE EMULSION 25 MCG/KG/MIN: 10 INJECTION, EMULSION INTRAVENOUS at 00:20

## 2024-01-01 RX ADMIN — CEFAZOLIN 2000 MG: 2 INJECTION, POWDER, FOR SOLUTION INTRAMUSCULAR; INTRAVENOUS at 09:09

## 2024-01-01 RX ADMIN — DIGOXIN 250 MCG: 0.25 INJECTION INTRAMUSCULAR; INTRAVENOUS at 10:52

## 2024-01-01 RX ADMIN — PENICILLIN G POTASSIUM 4 MILLION UNITS: 5000000 INJECTION, POWDER, FOR SOLUTION INTRAMUSCULAR; INTRAVENOUS at 14:06

## 2024-01-01 RX ADMIN — HEPARIN SODIUM 5000 UNITS: 5000 INJECTION INTRAVENOUS; SUBCUTANEOUS at 13:57

## 2024-01-01 RX ADMIN — CEFEPIME 2000 MG: 2 INJECTION, POWDER, FOR SOLUTION INTRAVENOUS at 01:52

## 2024-01-01 RX ADMIN — PENICILLIN G POTASSIUM 4 MILLION UNITS: 5000000 INJECTION, POWDER, FOR SOLUTION INTRAMUSCULAR; INTRAVENOUS at 17:49

## 2024-01-01 RX ADMIN — PROPOFOL INJECTABLE EMULSION 20 MCG/KG/MIN: 10 INJECTION, EMULSION INTRAVENOUS at 21:24

## 2024-01-01 RX ADMIN — FERROUS SULFATE TAB 325 MG (65 MG ELEMENTAL FE) 325 MG: 325 (65 FE) TAB at 08:14

## 2024-01-01 RX ADMIN — IPRATROPIUM BROMIDE AND ALBUTEROL SULFATE 3 ML: 2.5; .5 SOLUTION RESPIRATORY (INHALATION) at 19:09

## 2024-01-01 RX ADMIN — AMIODARONE HYDROCHLORIDE 1 MG/MIN: 1.8 INJECTION, SOLUTION INTRAVENOUS at 21:24

## 2024-01-01 RX ADMIN — Medication 5 MG: at 20:51

## 2024-01-01 RX ADMIN — DEXTROSE MONOHYDRATE 50 ML/HR: 50 INJECTION, SOLUTION INTRAVENOUS at 10:14

## 2024-01-01 RX ADMIN — MILRINONE LACTATE 0.25 MCG/KG/MIN: 0.2 INJECTION, SOLUTION INTRAVENOUS at 19:23

## 2024-01-01 RX ADMIN — MIDODRINE HYDROCHLORIDE 15 MG: 5 TABLET ORAL at 11:30

## 2024-01-01 RX ADMIN — BUSPIRONE HYDROCHLORIDE 5 MG: 5 TABLET ORAL at 15:41

## 2024-01-01 RX ADMIN — SENNOSIDES 2 TABLET: 8.6 TABLET, FILM COATED ORAL at 20:00

## 2024-01-01 RX ADMIN — Medication 1 APPLICATION: at 09:02

## 2024-01-01 RX ADMIN — BUSPIRONE HYDROCHLORIDE 5 MG: 5 TABLET ORAL at 08:27

## 2024-01-01 RX ADMIN — PROPOFOL INJECTABLE EMULSION 25 MCG/KG/MIN: 10 INJECTION, EMULSION INTRAVENOUS at 06:09

## 2024-01-01 RX ADMIN — EPINEPHRINE 0.02 MCG/KG/MIN: 1 INJECTION INTRAMUSCULAR; INTRAVENOUS; SUBCUTANEOUS at 14:50

## 2024-01-01 RX ADMIN — Medication 10 ML: at 20:43

## 2024-01-01 RX ADMIN — VASOPRESSIN 0.05 UNITS/MIN: 20 INJECTION, SOLUTION INTRAVENOUS at 06:30

## 2024-01-01 RX ADMIN — AMIODARONE HYDROCHLORIDE 0.5 MG/MIN: 1.8 INJECTION, SOLUTION INTRAVENOUS at 13:45

## 2024-01-01 RX ADMIN — PENICILLIN G POTASSIUM 4 MILLION UNITS: 5000000 INJECTION, POWDER, FOR SOLUTION INTRAMUSCULAR; INTRAVENOUS at 01:21

## 2024-01-01 RX ADMIN — ACETYLCYSTEINE 3 ML: 200 SOLUTION ORAL; RESPIRATORY (INHALATION) at 06:34

## 2024-01-01 RX ADMIN — VASOPRESSIN 0.06 UNITS/MIN: 20 INJECTION, SOLUTION INTRAVENOUS at 10:25

## 2024-01-01 RX ADMIN — ENOXAPARIN SODIUM 40 MG: 100 INJECTION SUBCUTANEOUS at 17:47

## 2024-01-01 RX ADMIN — Medication 100 MCG: at 14:03

## 2024-01-01 RX ADMIN — POTASSIUM CHLORIDE 20 MEQ: 29.8 INJECTION, SOLUTION INTRAVENOUS at 09:46

## 2024-01-01 RX ADMIN — ACETYLCYSTEINE 3 ML: 200 SOLUTION ORAL; RESPIRATORY (INHALATION) at 15:25

## 2024-01-01 RX ADMIN — MICAFUNGIN SODIUM 100 MG: 100 INJECTION, POWDER, LYOPHILIZED, FOR SOLUTION INTRAVENOUS at 04:07

## 2024-01-01 RX ADMIN — PENICILLIN G POTASSIUM 4 MILLION UNITS: 5000000 INJECTION, POWDER, FOR SOLUTION INTRAMUSCULAR; INTRAVENOUS at 06:07

## 2024-01-01 RX ADMIN — PENICILLIN G POTASSIUM 4 MILLION UNITS: 5000000 INJECTION, POWDER, FOR SOLUTION INTRAMUSCULAR; INTRAVENOUS at 02:25

## 2024-01-01 RX ADMIN — INSULIN GLARGINE 20 UNITS: 100 INJECTION, SOLUTION SUBCUTANEOUS at 09:22

## 2024-01-01 RX ADMIN — PENICILLIN G POTASSIUM 4 MILLION UNITS: 5000000 INJECTION, POWDER, FOR SOLUTION INTRAMUSCULAR; INTRAVENOUS at 21:58

## 2024-01-01 RX ADMIN — INSULIN HUMAN 2 UNITS: 100 INJECTION, SOLUTION PARENTERAL at 18:27

## 2024-01-01 RX ADMIN — PHENYLEPHRINE HYDROCHLORIDE 1.3 MCG/KG/MIN: 10 INJECTION INTRAVENOUS at 20:20

## 2024-01-01 RX ADMIN — INSULIN GLARGINE 20 UNITS: 100 INJECTION, SOLUTION SUBCUTANEOUS at 08:33

## 2024-01-01 RX ADMIN — MILRINONE LACTATE 0.12 MCG/KG/MIN: 0.2 INJECTION, SOLUTION INTRAVENOUS at 18:57

## 2024-01-01 RX ADMIN — PENICILLIN G POTASSIUM 4 MILLION UNITS: 5000000 INJECTION, POWDER, FOR SOLUTION INTRAMUSCULAR; INTRAVENOUS at 04:47

## 2024-01-01 RX ADMIN — VASOPRESSIN 0.06 UNITS/MIN: 20 INJECTION, SOLUTION INTRAVENOUS at 23:32

## 2024-01-01 RX ADMIN — PROPOFOL INJECTABLE EMULSION 5 MCG/KG/MIN: 10 INJECTION, EMULSION INTRAVENOUS at 03:07

## 2024-01-01 RX ADMIN — PERFLUTREN 3 ML: 6.52 INJECTION, SUSPENSION INTRAVENOUS at 12:48

## 2024-01-01 RX ADMIN — VASOPRESSIN 0.05 UNITS/MIN: 20 INJECTION, SOLUTION INTRAVENOUS at 01:14

## 2024-01-01 RX ADMIN — Medication 0.1 MCG/KG/MIN: at 13:13

## 2024-01-01 RX ADMIN — DEXMEDETOMIDINE HYDROCHLORIDE IN SODIUM CHLORIDE 0.6 MCG/KG/HR: 4 INJECTION INTRAVENOUS at 20:23

## 2024-01-01 RX ADMIN — POTASSIUM CHLORIDE 40 MEQ: 1.5 POWDER, FOR SOLUTION ORAL at 21:01

## 2024-01-01 RX ADMIN — SILDENAFIL 20 MG: 20 TABLET, FILM COATED ORAL at 20:11

## 2024-01-01 RX ADMIN — CEFEPIME 2000 MG: 2 INJECTION, POWDER, FOR SOLUTION INTRAVENOUS at 17:34

## 2024-01-01 RX ADMIN — PENICILLIN G POTASSIUM 4 MILLION UNITS: 5000000 INJECTION, POWDER, FOR SOLUTION INTRAMUSCULAR; INTRAVENOUS at 16:55

## 2024-01-01 RX ADMIN — Medication 5 MG: at 22:05

## 2024-01-01 RX ADMIN — IPRATROPIUM BROMIDE AND ALBUTEROL SULFATE 3 ML: 2.5; .5 SOLUTION RESPIRATORY (INHALATION) at 20:06

## 2024-01-01 RX ADMIN — SILDENAFIL 20 MG: 20 TABLET, FILM COATED ORAL at 16:10

## 2024-01-01 RX ADMIN — DILTIAZEM HYDROCHLORIDE 120 MG: 60 TABLET, FILM COATED ORAL at 08:39

## 2024-01-01 RX ADMIN — PENICILLIN G POTASSIUM 4 MILLION UNITS: 5000000 INJECTION, POWDER, FOR SOLUTION INTRAMUSCULAR; INTRAVENOUS at 20:45

## 2024-01-01 RX ADMIN — POTASSIUM CHLORIDE 40 MEQ: 1.5 POWDER, FOR SOLUTION ORAL at 22:15

## 2024-01-01 RX ADMIN — HEPARIN SODIUM 5000 UNITS: 5000 INJECTION INTRAVENOUS; SUBCUTANEOUS at 14:25

## 2024-01-01 RX ADMIN — ACETYLCYSTEINE 3 ML: 200 SOLUTION ORAL; RESPIRATORY (INHALATION) at 06:50

## 2024-01-01 RX ADMIN — MICAFUNGIN SODIUM 200 MG: 100 INJECTION, POWDER, LYOPHILIZED, FOR SOLUTION INTRAVENOUS at 03:46

## 2024-01-01 RX ADMIN — AMIODARONE HYDROCHLORIDE 150 MG: 1.5 INJECTION, SOLUTION INTRAVENOUS at 14:30

## 2024-01-01 RX ADMIN — SENNOSIDES AND DOCUSATE SODIUM 2 TABLET: 50; 8.6 TABLET ORAL at 20:24

## 2024-01-01 RX ADMIN — LANSOPRAZOLE 15 MG: 15 TABLET, ORALLY DISINTEGRATING ORAL at 06:44

## 2024-01-01 RX ADMIN — PROPOFOL INJECTABLE EMULSION 20 MCG/KG/MIN: 10 INJECTION, EMULSION INTRAVENOUS at 03:56

## 2024-01-01 RX ADMIN — MIDODRINE HYDROCHLORIDE 5 MG: 5 TABLET ORAL at 10:42

## 2024-01-01 RX ADMIN — IPRATROPIUM BROMIDE AND ALBUTEROL SULFATE 3 ML: 2.5; .5 SOLUTION RESPIRATORY (INHALATION) at 19:44

## 2024-01-01 RX ADMIN — SILDENAFIL 20 MG: 20 TABLET, FILM COATED ORAL at 14:16

## 2024-01-01 RX ADMIN — PENICILLIN G POTASSIUM 4 MILLION UNITS: 5000000 INJECTION, POWDER, FOR SOLUTION INTRAMUSCULAR; INTRAVENOUS at 03:18

## 2024-01-01 RX ADMIN — PENICILLIN G POTASSIUM 4 MILLION UNITS: 5000000 INJECTION, POWDER, FOR SOLUTION INTRAMUSCULAR; INTRAVENOUS at 04:59

## 2024-01-01 RX ADMIN — ANTI-FUNGAL POWDER MICONAZOLE NITRATE TALC FREE 1 APPLICATION: 1.42 POWDER TOPICAL at 20:27

## 2024-01-01 RX ADMIN — CYCLOBENZAPRINE 10 MG: 10 TABLET, FILM COATED ORAL at 20:21

## 2024-01-01 RX ADMIN — INSULIN HUMAN 4 UNITS: 100 INJECTION, SOLUTION PARENTERAL at 11:14

## 2024-01-01 RX ADMIN — CALCIUM CHLORIDE 1 G: 100 INJECTION INTRAVENOUS; INTRAVENTRICULAR at 17:21

## 2024-01-01 RX ADMIN — PENICILLIN G POTASSIUM 4 MILLION UNITS: 5000000 INJECTION, POWDER, FOR SOLUTION INTRAMUSCULAR; INTRAVENOUS at 08:58

## 2024-01-01 RX ADMIN — INSULIN GLARGINE 20 UNITS: 100 INJECTION, SOLUTION SUBCUTANEOUS at 09:28

## 2024-01-01 RX ADMIN — POTASSIUM CHLORIDE 40 MEQ: 1.5 FOR SOLUTION ORAL at 04:34

## 2024-01-01 RX ADMIN — CHLORHEXIDINE GLUCONATE 15 ML: 1.2 RINSE ORAL at 00:15

## 2024-01-01 RX ADMIN — POTASSIUM CHLORIDE 20 MEQ: 29.8 INJECTION, SOLUTION INTRAVENOUS at 15:22

## 2024-01-01 RX ADMIN — VASOPRESSIN 0.04 UNITS/MIN: 20 INJECTION, SOLUTION INTRAVENOUS at 05:27

## 2024-01-01 RX ADMIN — CHLORHEXIDINE GLUCONATE 1 APPLICATION: 500 CLOTH TOPICAL at 08:29

## 2024-01-01 RX ADMIN — LIDOCAINE HYDROCHLORIDE 1 MG/MIN: 4 INJECTION, SOLUTION INTRAVENOUS at 04:33

## 2024-01-01 RX ADMIN — BUMETANIDE 2 MG/HR: 0.25 INJECTION INTRAMUSCULAR; INTRAVENOUS at 21:19

## 2024-01-01 RX ADMIN — Medication 250 MG: at 20:52

## 2024-01-01 RX ADMIN — MILRINONE LACTATE 5 MG: 1 INJECTION, SOLUTION INTRAVENOUS at 23:48

## 2024-01-01 RX ADMIN — SODIUM CHLORIDE 50 ML/HR: 9 INJECTION, SOLUTION INTRAVENOUS at 10:10

## 2024-01-01 RX ADMIN — INSULIN GLARGINE 20 UNITS: 100 INJECTION, SOLUTION SUBCUTANEOUS at 08:14

## 2024-01-01 RX ADMIN — CHLORHEXIDINE GLUCONATE 15 ML: 1.2 RINSE ORAL at 23:17

## 2024-01-01 RX ADMIN — HYDROCODONE BITARTRATE AND ACETAMINOPHEN 2 TABLET: 5; 325 TABLET ORAL at 16:31

## 2024-01-01 RX ADMIN — CHLORHEXIDINE GLUCONATE 15 ML: 1.2 RINSE ORAL at 11:16

## 2024-01-01 RX ADMIN — INSULIN GLARGINE 20 UNITS: 100 INJECTION, SOLUTION SUBCUTANEOUS at 10:07

## 2024-01-01 RX ADMIN — INSULIN HUMAN 2 UNITS: 100 INJECTION, SOLUTION PARENTERAL at 17:29

## 2024-01-01 RX ADMIN — HEPARIN SODIUM 5000 UNITS: 5000 INJECTION INTRAVENOUS; SUBCUTANEOUS at 21:54

## 2024-01-01 RX ADMIN — INSULIN HUMAN 1.2 UNITS/HR: 1 INJECTION, SOLUTION INTRAVENOUS at 01:39

## 2024-01-01 RX ADMIN — MIDAZOLAM 1 MG: 1 INJECTION INTRAMUSCULAR; INTRAVENOUS at 10:16

## 2024-01-01 RX ADMIN — Medication 1 APPLICATION: at 08:42

## 2024-01-01 RX ADMIN — LANSOPRAZOLE 15 MG: 15 TABLET, ORALLY DISINTEGRATING ORAL at 06:09

## 2024-01-01 RX ADMIN — PHENYLEPHRINE HYDROCHLORIDE 0.8 MCG/KG/MIN: 10 INJECTION INTRAVENOUS at 04:00

## 2024-01-01 RX ADMIN — CEFEPIME 2000 MG: 2 INJECTION, POWDER, FOR SOLUTION INTRAVENOUS at 01:54

## 2024-01-01 RX ADMIN — BUSPIRONE HYDROCHLORIDE 5 MG: 5 TABLET ORAL at 08:29

## 2024-01-01 RX ADMIN — DIBASIC SODIUM PHOSPHATE, MONOBASIC POTASSIUM PHOSPHATE AND MONOBASIC SODIUM PHOSPHATE 2 TABLET: 852; 155; 130 TABLET ORAL at 10:08

## 2024-01-01 RX ADMIN — Medication 10 ML: at 21:00

## 2024-01-01 RX ADMIN — MIDODRINE HYDROCHLORIDE 5 MG: 5 TABLET ORAL at 08:23

## 2024-01-01 RX ADMIN — SILDENAFIL 20 MG: 20 TABLET, FILM COATED ORAL at 13:27

## 2024-01-01 RX ADMIN — LIDOCAINE HYDROCHLORIDE 10 ML: 10 INJECTION, SOLUTION INFILTRATION; PERINEURAL at 10:46

## 2024-01-01 RX ADMIN — DEXMEDETOMIDINE HYDROCHLORIDE IN SODIUM CHLORIDE 0.5 MCG/KG/HR: 4 INJECTION INTRAVENOUS at 11:50

## 2024-01-01 RX ADMIN — MILRINONE LACTATE 0.38 MCG/KG/MIN: 0.2 INJECTION, SOLUTION INTRAVENOUS at 04:15

## 2024-01-01 RX ADMIN — VASOPRESSIN 0.05 UNITS/MIN: 20 INJECTION, SOLUTION INTRAVENOUS at 07:22

## 2024-01-01 RX ADMIN — ENOXAPARIN SODIUM 40 MG: 100 INJECTION SUBCUTANEOUS at 19:25

## 2024-01-01 RX ADMIN — POTASSIUM CHLORIDE 20 MEQ: 750 TABLET, EXTENDED RELEASE ORAL at 08:22

## 2024-01-01 RX ADMIN — SENNOSIDES 2 TABLET: 8.6 TABLET, FILM COATED ORAL at 21:57

## 2024-01-01 RX ADMIN — CEFEPIME 2000 MG: 2 INJECTION, POWDER, FOR SOLUTION INTRAVENOUS at 01:15

## 2024-01-01 RX ADMIN — CALCIUM GLUCONATE 2000 MG: 20 INJECTION, SOLUTION INTRAVENOUS at 12:14

## 2024-01-01 RX ADMIN — CALCIUM CHLORIDE, MAGNESIUM CHLORIDE, SODIUM CHLORIDE, SODIUM BICARBONATE, POTASSIUM CHLORIDE AND SODIUM PHOSPHATE DIBASIC DIHYDRATE 1500 ML/HR: 3.68; 3.05; 6.34; 3.09; .314; .187 INJECTION INTRAVENOUS at 05:16

## 2024-01-01 RX ADMIN — AMIODARONE HYDROCHLORIDE 1 MG/MIN: 1.8 INJECTION, SOLUTION INTRAVENOUS at 23:40

## 2024-01-01 RX ADMIN — PENICILLIN G POTASSIUM 4 MILLION UNITS: 5000000 INJECTION, POWDER, FOR SOLUTION INTRAMUSCULAR; INTRAVENOUS at 09:48

## 2024-01-01 RX ADMIN — Medication 250 MG: at 08:12

## 2024-01-01 RX ADMIN — HYDROCODONE BITARTRATE AND ACETAMINOPHEN 1 TABLET: 5; 325 TABLET ORAL at 08:19

## 2024-01-01 RX ADMIN — PENICILLIN G POTASSIUM 4 MILLION UNITS: 5000000 INJECTION, POWDER, FOR SOLUTION INTRAMUSCULAR; INTRAVENOUS at 01:19

## 2024-01-01 RX ADMIN — Medication 1 APPLICATION: at 10:01

## 2024-01-01 RX ADMIN — ACETYLCYSTEINE 3 ML: 200 SOLUTION ORAL; RESPIRATORY (INHALATION) at 06:56

## 2024-01-01 RX ADMIN — MIDODRINE HYDROCHLORIDE 15 MG: 5 TABLET ORAL at 16:51

## 2024-01-01 RX ADMIN — SILDENAFIL 20 MG: 20 TABLET, FILM COATED ORAL at 10:09

## 2024-01-01 RX ADMIN — CEFEPIME 2000 MG: 2 INJECTION, POWDER, FOR SOLUTION INTRAVENOUS at 18:25

## 2024-01-01 RX ADMIN — POTASSIUM CHLORIDE 40 MEQ: 1.5 POWDER, FOR SOLUTION ORAL at 21:38

## 2024-01-01 RX ADMIN — PROPOFOL INJECTABLE EMULSION 10 MCG/KG/MIN: 10 INJECTION, EMULSION INTRAVENOUS at 10:04

## 2024-01-01 RX ADMIN — Medication 1 APPLICATION: at 08:41

## 2024-01-01 RX ADMIN — ATORVASTATIN CALCIUM 40 MG: 20 TABLET, FILM COATED ORAL at 20:00

## 2024-01-01 RX ADMIN — BUSPIRONE HYDROCHLORIDE 5 MG: 5 TABLET ORAL at 16:44

## 2024-01-01 RX ADMIN — AMIODARONE HYDROCHLORIDE 0.5 MG/MIN: 1.8 INJECTION, SOLUTION INTRAVENOUS at 08:14

## 2024-01-01 RX ADMIN — INSULIN HUMAN 2 UNITS: 100 INJECTION, SOLUTION PARENTERAL at 06:36

## 2024-01-01 RX ADMIN — IPRATROPIUM BROMIDE AND ALBUTEROL SULFATE 3 ML: 2.5; .5 SOLUTION RESPIRATORY (INHALATION) at 03:11

## 2024-01-01 RX ADMIN — PHENYLEPHRINE HYDROCHLORIDE 1.1 MCG/KG/MIN: 10 INJECTION INTRAVENOUS at 05:27

## 2024-01-01 RX ADMIN — POTASSIUM CHLORIDE 20 MEQ: 29.8 INJECTION, SOLUTION INTRAVENOUS at 19:02

## 2024-01-01 RX ADMIN — ACETYLCYSTEINE 3 ML: 200 SOLUTION ORAL; RESPIRATORY (INHALATION) at 07:31

## 2024-01-01 RX ADMIN — PENICILLIN G POTASSIUM 4 MILLION UNITS: 5000000 INJECTION, POWDER, FOR SOLUTION INTRAMUSCULAR; INTRAVENOUS at 20:39

## 2024-01-01 RX ADMIN — Medication 5 MG: at 20:07

## 2024-01-01 RX ADMIN — NOREPINEPHRINE BITARTRATE 0.02 MCG/KG/MIN: 1 SOLUTION INTRAVENOUS at 11:49

## 2024-01-01 RX ADMIN — VANCOMYCIN HYDROCHLORIDE 750 MG: 750 INJECTION, POWDER, LYOPHILIZED, FOR SOLUTION INTRAVENOUS at 12:18

## 2024-01-01 RX ADMIN — ACETYLCYSTEINE 3 ML: 200 SOLUTION ORAL; RESPIRATORY (INHALATION) at 14:48

## 2024-01-01 RX ADMIN — MIDODRINE HYDROCHLORIDE 15 MG: 5 TABLET ORAL at 06:04

## 2024-01-01 RX ADMIN — CHLORHEXIDINE GLUCONATE 15 ML: 1.2 RINSE ORAL at 00:49

## 2024-01-01 RX ADMIN — ACETAMINOPHEN 650 MG: 650 LIQUID ORAL at 08:13

## 2024-01-01 RX ADMIN — METOPROLOL TARTRATE 75 MG: 50 TABLET, FILM COATED ORAL at 08:40

## 2024-01-01 RX ADMIN — HYDROCODONE BITARTRATE AND ACETAMINOPHEN 1 TABLET: 5; 325 TABLET ORAL at 18:23

## 2024-01-01 RX ADMIN — IPRATROPIUM BROMIDE AND ALBUTEROL SULFATE 3 ML: 2.5; .5 SOLUTION RESPIRATORY (INHALATION) at 10:54

## 2024-01-01 RX ADMIN — IPRATROPIUM BROMIDE AND ALBUTEROL SULFATE 3 ML: 2.5; .5 SOLUTION RESPIRATORY (INHALATION) at 00:05

## 2024-01-01 RX ADMIN — ONDANSETRON 4 MG: 2 INJECTION, SOLUTION INTRAMUSCULAR; INTRAVENOUS at 18:55

## 2024-01-01 RX ADMIN — BUSPIRONE HYDROCHLORIDE 5 MG: 5 TABLET ORAL at 17:41

## 2024-01-01 RX ADMIN — IPRATROPIUM BROMIDE AND ALBUTEROL SULFATE 3 ML: 2.5; .5 SOLUTION RESPIRATORY (INHALATION) at 10:53

## 2024-01-01 RX ADMIN — ACETYLCYSTEINE 3 ML: 200 SOLUTION ORAL; RESPIRATORY (INHALATION) at 14:26

## 2024-01-01 RX ADMIN — DEXMEDETOMIDINE HYDROCHLORIDE IN SODIUM CHLORIDE 0.4 MCG/KG/HR: 4 INJECTION INTRAVENOUS at 02:54

## 2024-01-01 RX ADMIN — ACETYLCYSTEINE 3 ML: 200 SOLUTION ORAL; RESPIRATORY (INHALATION) at 12:37

## 2024-01-01 RX ADMIN — CALCIUM CHLORIDE 1 G: 100 INJECTION INTRAVENOUS; INTRAVENTRICULAR at 07:04

## 2024-01-01 RX ADMIN — SILDENAFIL 20 MG: 20 TABLET, FILM COATED ORAL at 21:57

## 2024-01-01 RX ADMIN — CALCIUM GLUCONATE 2000 MG: 20 INJECTION, SOLUTION INTRAVENOUS at 11:20

## 2024-01-01 RX ADMIN — ANTI-FUNGAL POWDER MICONAZOLE NITRATE TALC FREE 1 APPLICATION: 1.42 POWDER TOPICAL at 20:10

## 2024-01-01 RX ADMIN — VASOPRESSIN 0.05 UNITS/MIN: 20 INJECTION, SOLUTION INTRAVENOUS at 05:35

## 2024-01-01 RX ADMIN — DEXMEDETOMIDINE HYDROCHLORIDE IN SODIUM CHLORIDE 0.4 MCG/KG/HR: 4 INJECTION INTRAVENOUS at 08:23

## 2024-01-01 RX ADMIN — CHLORHEXIDINE GLUCONATE 15 ML: 1.2 RINSE ORAL at 12:34

## 2024-01-01 RX ADMIN — CHLORHEXIDINE GLUCONATE 15 ML: 1.2 RINSE ORAL at 11:21

## 2024-01-01 RX ADMIN — PROPOFOL INJECTABLE EMULSION 25 MCG/KG/MIN: 10 INJECTION, EMULSION INTRAVENOUS at 13:11

## 2024-01-01 RX ADMIN — HYDROCODONE BITARTRATE AND ACETAMINOPHEN 2 TABLET: 5; 325 TABLET ORAL at 10:11

## 2024-01-01 RX ADMIN — BUSPIRONE HYDROCHLORIDE 5 MG: 5 TABLET ORAL at 08:41

## 2024-01-01 RX ADMIN — LANSOPRAZOLE 15 MG: 15 TABLET, ORALLY DISINTEGRATING ORAL at 05:28

## 2024-01-01 RX ADMIN — IPRATROPIUM BROMIDE AND ALBUTEROL SULFATE 3 ML: 2.5; .5 SOLUTION RESPIRATORY (INHALATION) at 10:20

## 2024-01-01 RX ADMIN — PROPOFOL 15 MCG/KG/MIN: 10 INJECTION, EMULSION INTRAVENOUS at 00:30

## 2024-01-01 RX ADMIN — SILDENAFIL 20 MG: 20 TABLET, FILM COATED ORAL at 20:21

## 2024-01-01 RX ADMIN — AMIODARONE HYDROCHLORIDE 1 MG/MIN: 1.8 INJECTION, SOLUTION INTRAVENOUS at 20:23

## 2024-01-01 RX ADMIN — DEXMEDETOMIDINE HYDROCHLORIDE IN SODIUM CHLORIDE 0.7 MCG/KG/HR: 4 INJECTION INTRAVENOUS at 04:36

## 2024-01-01 RX ADMIN — MILRINONE LACTATE 0.12 MCG/KG/MIN: 0.2 INJECTION, SOLUTION INTRAVENOUS at 00:53

## 2024-01-01 RX ADMIN — DEXMEDETOMIDINE HYDROCHLORIDE IN SODIUM CHLORIDE 0.5 MCG/KG/HR: 4 INJECTION INTRAVENOUS at 03:33

## 2024-01-01 RX ADMIN — Medication 250 MG: at 09:02

## 2024-01-01 RX ADMIN — Medication 1 APPLICATION: at 10:25

## 2024-01-01 RX ADMIN — SILDENAFIL 20 MG: 20 TABLET, FILM COATED ORAL at 14:25

## 2024-01-01 RX ADMIN — ANTI-FUNGAL POWDER MICONAZOLE NITRATE TALC FREE 1 APPLICATION: 1.42 POWDER TOPICAL at 20:20

## 2024-01-01 RX ADMIN — CHLORHEXIDINE GLUCONATE 15 ML: 1.2 RINSE ORAL at 12:26

## 2024-01-01 RX ADMIN — ACETYLCYSTEINE 3 ML: 200 SOLUTION ORAL; RESPIRATORY (INHALATION) at 07:03

## 2024-01-01 RX ADMIN — CEFEPIME 2000 MG: 2 INJECTION, POWDER, FOR SOLUTION INTRAVENOUS at 17:55

## 2024-01-01 RX ADMIN — METOPROLOL TARTRATE 75 MG: 50 TABLET, FILM COATED ORAL at 22:05

## 2024-01-01 RX ADMIN — HEPARIN SODIUM 5000 UNITS: 5000 INJECTION INTRAVENOUS; SUBCUTANEOUS at 08:22

## 2024-01-01 RX ADMIN — PENICILLIN G POTASSIUM 4 MILLION UNITS: 5000000 INJECTION, POWDER, FOR SOLUTION INTRAMUSCULAR; INTRAVENOUS at 21:19

## 2024-01-01 RX ADMIN — DILTIAZEM HYDROCHLORIDE 120 MG: 60 TABLET, FILM COATED ORAL at 22:05

## 2024-01-01 RX ADMIN — PANTOPRAZOLE SODIUM 40 MG: 40 TABLET, DELAYED RELEASE ORAL at 16:00

## 2024-01-01 RX ADMIN — POTASSIUM CHLORIDE 20 MEQ: 1.5 POWDER, FOR SOLUTION ORAL at 14:24

## 2024-01-01 RX ADMIN — Medication 100 MCG: at 10:10

## 2024-01-01 RX ADMIN — SILDENAFIL 20 MG: 20 TABLET, FILM COATED ORAL at 22:21

## 2024-01-01 RX ADMIN — PANTOPRAZOLE SODIUM 40 MG: 40 TABLET, DELAYED RELEASE ORAL at 17:49

## 2024-01-01 RX ADMIN — IPRATROPIUM BROMIDE AND ALBUTEROL SULFATE 3 ML: 2.5; .5 SOLUTION RESPIRATORY (INHALATION) at 03:39

## 2024-01-01 RX ADMIN — POTASSIUM CHLORIDE 40 MEQ: 1.5 FOR SOLUTION ORAL at 00:08

## 2024-01-01 RX ADMIN — MORPHINE SULFATE 2 MG: 2 INJECTION, SOLUTION INTRAMUSCULAR; INTRAVENOUS at 12:21

## 2024-01-01 RX ADMIN — BUMETANIDE 2 MG: 0.25 INJECTION INTRAMUSCULAR; INTRAVENOUS at 18:15

## 2024-01-01 RX ADMIN — PANTOPRAZOLE SODIUM 40 MG: 40 TABLET, DELAYED RELEASE ORAL at 06:42

## 2024-01-01 RX ADMIN — DEXMEDETOMIDINE HYDROCHLORIDE IN SODIUM CHLORIDE 0.6 MCG/KG/HR: 4 INJECTION INTRAVENOUS at 06:09

## 2024-01-01 RX ADMIN — DEXMEDETOMIDINE HYDROCHLORIDE IN SODIUM CHLORIDE 0.2 MCG/KG/HR: 4 INJECTION INTRAVENOUS at 12:27

## 2024-01-01 RX ADMIN — MIDODRINE HYDROCHLORIDE 10 MG: 5 TABLET ORAL at 17:16

## 2024-01-01 RX ADMIN — DEXMEDETOMIDINE HYDROCHLORIDE IN SODIUM CHLORIDE 0.5 MCG/KG/HR: 4 INJECTION INTRAVENOUS at 19:51

## 2024-01-01 RX ADMIN — POTASSIUM CHLORIDE 40 MEQ: 1.5 POWDER, FOR SOLUTION ORAL at 06:06

## 2024-01-01 RX ADMIN — METOPROLOL TARTRATE 75 MG: 50 TABLET, FILM COATED ORAL at 20:51

## 2024-01-01 RX ADMIN — Medication 1 APPLICATION: at 09:59

## 2024-01-01 RX ADMIN — BUMETANIDE 2 MG: 0.25 INJECTION INTRAMUSCULAR; INTRAVENOUS at 01:54

## 2024-01-01 RX ADMIN — Medication 4 ML: at 07:26

## 2024-01-01 RX ADMIN — Medication 10 ML: at 20:08

## 2024-01-01 RX ADMIN — FAMOTIDINE 20 MG: 10 INJECTION INTRAVENOUS at 10:16

## 2024-01-01 RX ADMIN — IPRATROPIUM BROMIDE AND ALBUTEROL SULFATE 3 ML: 2.5; .5 SOLUTION RESPIRATORY (INHALATION) at 00:08

## 2024-01-01 RX ADMIN — CEFEPIME 2000 MG: 2 INJECTION, POWDER, FOR SOLUTION INTRAVENOUS at 18:12

## 2024-01-01 RX ADMIN — AMIODARONE HYDROCHLORIDE 0.5 MG/MIN: 1.8 INJECTION, SOLUTION INTRAVENOUS at 20:37

## 2024-01-01 RX ADMIN — PROPOFOL INJECTABLE EMULSION 10 MCG/KG/MIN: 10 INJECTION, EMULSION INTRAVENOUS at 04:32

## 2024-01-01 RX ADMIN — IPRATROPIUM BROMIDE AND ALBUTEROL SULFATE 3 ML: 2.5; .5 SOLUTION RESPIRATORY (INHALATION) at 03:08

## 2024-01-01 RX ADMIN — PANTOPRAZOLE SODIUM 40 MG: 40 TABLET, DELAYED RELEASE ORAL at 06:27

## 2024-01-01 RX ADMIN — PANTOPRAZOLE SODIUM 40 MG: 40 TABLET, DELAYED RELEASE ORAL at 05:54

## 2024-01-01 RX ADMIN — IPRATROPIUM BROMIDE AND ALBUTEROL SULFATE 3 ML: 2.5; .5 SOLUTION RESPIRATORY (INHALATION) at 07:15

## 2024-01-01 RX ADMIN — BUSPIRONE HYDROCHLORIDE 5 MG: 5 TABLET ORAL at 20:19

## 2024-01-01 RX ADMIN — CEFEPIME 2000 MG: 2 INJECTION, POWDER, FOR SOLUTION INTRAVENOUS at 17:21

## 2024-01-01 RX ADMIN — CEFEPIME 2000 MG: 2 INJECTION, POWDER, FOR SOLUTION INTRAVENOUS at 18:02

## 2024-01-01 RX ADMIN — CEFEPIME 2000 MG: 2 INJECTION, POWDER, FOR SOLUTION INTRAVENOUS at 08:55

## 2024-01-01 RX ADMIN — PENICILLIN G POTASSIUM 4 MILLION UNITS: 5000000 INJECTION, POWDER, FOR SOLUTION INTRAMUSCULAR; INTRAVENOUS at 01:54

## 2024-01-01 RX ADMIN — PENICILLIN G POTASSIUM 4 MILLION UNITS: 5000000 INJECTION, POWDER, FOR SOLUTION INTRAMUSCULAR; INTRAVENOUS at 17:16

## 2024-01-01 RX ADMIN — POTASSIUM CHLORIDE 40 MEQ: 1.5 POWDER, FOR SOLUTION ORAL at 21:57

## 2024-01-01 RX ADMIN — HEPARIN SODIUM 5000 UNITS: 5000 INJECTION INTRAVENOUS; SUBCUTANEOUS at 13:29

## 2024-01-01 RX ADMIN — BUMETANIDE 1 MG: 0.25 INJECTION INTRAMUSCULAR; INTRAVENOUS at 06:36

## 2024-01-01 RX ADMIN — IPRATROPIUM BROMIDE AND ALBUTEROL SULFATE 3 ML: 2.5; .5 SOLUTION RESPIRATORY (INHALATION) at 10:32

## 2024-01-01 RX ADMIN — IPRATROPIUM BROMIDE AND ALBUTEROL SULFATE 3 ML: 2.5; .5 SOLUTION RESPIRATORY (INHALATION) at 00:30

## 2024-01-01 RX ADMIN — AMIODARONE HYDROCHLORIDE 1 MG/MIN: 1.8 INJECTION, SOLUTION INTRAVENOUS at 19:45

## 2024-01-01 RX ADMIN — SILDENAFIL 20 MG: 20 TABLET, FILM COATED ORAL at 20:01

## 2024-01-01 RX ADMIN — PHENYLEPHRINE HYDROCHLORIDE 0.5 MCG/KG/MIN: 10 INJECTION INTRAVENOUS at 20:24

## 2024-01-01 RX ADMIN — VASOPRESSIN 0.06 UNITS/MIN: 20 INJECTION, SOLUTION INTRAVENOUS at 04:00

## 2024-01-01 RX ADMIN — Medication 1 APPLICATION: at 20:16

## 2024-01-01 RX ADMIN — VASOPRESSIN 0.06 UNITS/MIN: 20 INJECTION, SOLUTION INTRAVENOUS at 03:30

## 2024-01-01 RX ADMIN — Medication 100 MCG: at 13:38

## 2024-01-01 RX ADMIN — PENICILLIN G POTASSIUM 4 MILLION UNITS: 5000000 INJECTION, POWDER, FOR SOLUTION INTRAMUSCULAR; INTRAVENOUS at 02:18

## 2024-01-01 RX ADMIN — ASPIRIN 81 MG: 81 TABLET, CHEWABLE ORAL at 08:10

## 2024-01-01 RX ADMIN — VASOPRESSIN 0.06 UNITS/MIN: 20 INJECTION, SOLUTION INTRAVENOUS at 18:54

## 2024-01-01 RX ADMIN — PENICILLIN G POTASSIUM 4 MILLION UNITS: 5000000 INJECTION, POWDER, FOR SOLUTION INTRAMUSCULAR; INTRAVENOUS at 14:21

## 2024-01-01 RX ADMIN — Medication 10 ML: at 21:31

## 2024-01-01 RX ADMIN — Medication 1 APPLICATION: at 20:02

## 2024-01-01 RX ADMIN — CEFEPIME 2000 MG: 2 INJECTION, POWDER, FOR SOLUTION INTRAVENOUS at 10:17

## 2024-01-01 RX ADMIN — AMIODARONE HYDROCHLORIDE 150 MG: 1.5 INJECTION, SOLUTION INTRAVENOUS at 23:13

## 2024-01-01 RX ADMIN — Medication 4 ML: at 19:25

## 2024-01-01 RX ADMIN — PENICILLIN G POTASSIUM 4 MILLION UNITS: 5000000 INJECTION, POWDER, FOR SOLUTION INTRAMUSCULAR; INTRAVENOUS at 09:09

## 2024-01-01 RX ADMIN — PENICILLIN G POTASSIUM 4 MILLION UNITS: 5000000 INJECTION, POWDER, FOR SOLUTION INTRAMUSCULAR; INTRAVENOUS at 11:46

## 2024-01-01 RX ADMIN — DEXMEDETOMIDINE HYDROCHLORIDE IN SODIUM CHLORIDE 0.7 MCG/KG/HR: 4 INJECTION INTRAVENOUS at 07:58

## 2024-01-01 RX ADMIN — ANTI-FUNGAL POWDER MICONAZOLE NITRATE TALC FREE 1 APPLICATION: 1.42 POWDER TOPICAL at 20:12

## 2024-01-01 RX ADMIN — DEXMEDETOMIDINE HYDROCHLORIDE IN SODIUM CHLORIDE 0.2 MCG/KG/HR: 4 INJECTION INTRAVENOUS at 04:01

## 2024-01-01 RX ADMIN — METOPROLOL TARTRATE 75 MG: 50 TABLET, FILM COATED ORAL at 08:14

## 2024-01-01 RX ADMIN — POTASSIUM CHLORIDE 20 MEQ: 750 TABLET, EXTENDED RELEASE ORAL at 08:57

## 2024-01-01 RX ADMIN — ANTI-FUNGAL POWDER MICONAZOLE NITRATE TALC FREE 1 APPLICATION: 1.42 POWDER TOPICAL at 21:59

## 2024-01-01 RX ADMIN — HEPARIN SODIUM 5000 UNITS: 5000 INJECTION INTRAVENOUS; SUBCUTANEOUS at 05:02

## 2024-01-01 RX ADMIN — DILTIAZEM HYDROCHLORIDE 120 MG: 60 TABLET, FILM COATED ORAL at 08:15

## 2024-01-01 RX ADMIN — VASOPRESSIN 0.05 UNITS/MIN: 20 INJECTION, SOLUTION INTRAVENOUS at 02:08

## 2024-01-01 RX ADMIN — ASPIRIN 81 MG: 81 TABLET, CHEWABLE ORAL at 09:07

## 2024-01-01 RX ADMIN — METOPROLOL TARTRATE 75 MG: 50 TABLET, FILM COATED ORAL at 20:08

## 2024-01-01 RX ADMIN — MIDODRINE HYDROCHLORIDE 10 MG: 5 TABLET ORAL at 15:41

## 2024-01-01 RX ADMIN — Medication 10 ML: at 19:45

## 2024-01-01 RX ADMIN — DIBASIC SODIUM PHOSPHATE, MONOBASIC POTASSIUM PHOSPHATE AND MONOBASIC SODIUM PHOSPHATE 2 TABLET: 852; 155; 130 TABLET ORAL at 20:22

## 2024-01-01 RX ADMIN — SILDENAFIL 20 MG: 20 TABLET, FILM COATED ORAL at 08:13

## 2024-01-01 RX ADMIN — DEXMEDETOMIDINE HYDROCHLORIDE IN SODIUM CHLORIDE 0.4 MCG/KG/HR: 4 INJECTION INTRAVENOUS at 06:43

## 2024-01-01 RX ADMIN — VASOPRESSIN 0.06 UNITS/MIN: 20 INJECTION, SOLUTION INTRAVENOUS at 21:41

## 2024-01-01 RX ADMIN — PENICILLIN G POTASSIUM 4 MILLION UNITS: 5000000 INJECTION, POWDER, FOR SOLUTION INTRAMUSCULAR; INTRAVENOUS at 13:34

## 2024-01-01 RX ADMIN — IPRATROPIUM BROMIDE AND ALBUTEROL SULFATE 3 ML: 2.5; .5 SOLUTION RESPIRATORY (INHALATION) at 19:18

## 2024-01-01 RX ADMIN — POTASSIUM CHLORIDE 40 MEQ: 1.5 POWDER, FOR SOLUTION ORAL at 15:24

## 2024-01-01 RX ADMIN — VANCOMYCIN HYDROCHLORIDE 750 MG: 750 INJECTION, POWDER, LYOPHILIZED, FOR SOLUTION INTRAVENOUS at 21:37

## 2024-01-01 RX ADMIN — DILTIAZEM HYDROCHLORIDE 120 MG: 60 TABLET, FILM COATED ORAL at 08:16

## 2024-01-01 RX ADMIN — VASOPRESSIN 0.04 UNITS/MIN: 20 INJECTION, SOLUTION INTRAVENOUS at 12:01

## 2024-01-01 RX ADMIN — PENICILLIN G POTASSIUM 4 MILLION UNITS: 5000000 INJECTION, POWDER, FOR SOLUTION INTRAMUSCULAR; INTRAVENOUS at 04:43

## 2024-01-01 RX ADMIN — INSULIN HUMAN 3 UNITS: 100 INJECTION, SOLUTION PARENTERAL at 23:45

## 2024-01-01 RX ADMIN — PENICILLIN G POTASSIUM 4 MILLION UNITS: 5000000 INJECTION, POWDER, FOR SOLUTION INTRAMUSCULAR; INTRAVENOUS at 23:24

## 2024-01-01 RX ADMIN — SILDENAFIL 20 MG: 20 TABLET, FILM COATED ORAL at 15:53

## 2024-01-01 RX ADMIN — AMIODARONE HYDROCHLORIDE 0.5 MG/MIN: 1.8 INJECTION, SOLUTION INTRAVENOUS at 20:17

## 2024-01-01 RX ADMIN — BUMETANIDE 1 MG: 0.25 INJECTION INTRAMUSCULAR; INTRAVENOUS at 15:07

## 2024-01-01 RX ADMIN — HEPARIN SODIUM 5000 UNITS: 5000 INJECTION INTRAVENOUS; SUBCUTANEOUS at 06:13

## 2024-01-01 RX ADMIN — SILDENAFIL 20 MG: 20 TABLET, FILM COATED ORAL at 08:35

## 2024-01-01 RX ADMIN — INSULIN GLARGINE 20 UNITS: 100 INJECTION, SOLUTION SUBCUTANEOUS at 08:29

## 2024-01-01 RX ADMIN — VASOPRESSIN 0.04 UNITS/MIN: 20 INJECTION, SOLUTION INTRAVENOUS at 04:12

## 2024-01-01 RX ADMIN — BUMETANIDE 2 MG: 0.25 INJECTION INTRAMUSCULAR; INTRAVENOUS at 21:16

## 2024-01-01 RX ADMIN — MIDODRINE HYDROCHLORIDE 15 MG: 5 TABLET ORAL at 06:47

## 2024-01-01 RX ADMIN — INSULIN HUMAN 2 UNITS: 100 INJECTION, SOLUTION PARENTERAL at 05:13

## 2024-01-01 RX ADMIN — SILDENAFIL 20 MG: 20 TABLET, FILM COATED ORAL at 08:48

## 2024-01-01 RX ADMIN — VASOPRESSIN 0.04 UNITS/MIN: 20 INJECTION, SOLUTION INTRAVENOUS at 14:11

## 2024-01-01 RX ADMIN — MUPIROCIN 1 APPLICATION: 20 OINTMENT TOPICAL at 20:20

## 2024-01-01 RX ADMIN — MILRINONE LACTATE 0.12 MCG/KG/MIN: 0.2 INJECTION, SOLUTION INTRAVENOUS at 04:53

## 2024-01-01 RX ADMIN — IPRATROPIUM BROMIDE AND ALBUTEROL SULFATE 3 ML: 2.5; .5 SOLUTION RESPIRATORY (INHALATION) at 19:34

## 2024-01-01 RX ADMIN — MIDODRINE HYDROCHLORIDE 15 MG: 5 TABLET ORAL at 10:48

## 2024-01-01 RX ADMIN — Medication 1 APPLICATION: at 20:22

## 2024-01-01 RX ADMIN — BUMETANIDE 2 MG: 0.25 INJECTION INTRAMUSCULAR; INTRAVENOUS at 08:20

## 2024-01-01 RX ADMIN — CEFEPIME 2000 MG: 2 INJECTION, POWDER, FOR SOLUTION INTRAVENOUS at 18:30

## 2024-01-01 RX ADMIN — SODIUM CHLORIDE 2 MILLION UNITS: 9 INJECTION, SOLUTION INTRAVENOUS at 12:33

## 2024-01-01 RX ADMIN — PROPOFOL INJECTABLE EMULSION 25 MCG/KG/MIN: 10 INJECTION, EMULSION INTRAVENOUS at 02:20

## 2024-01-01 RX ADMIN — Medication 10 ML: at 20:11

## 2024-01-01 RX ADMIN — ANTI-FUNGAL POWDER MICONAZOLE NITRATE TALC FREE 1 APPLICATION: 1.42 POWDER TOPICAL at 20:23

## 2024-01-01 RX ADMIN — Medication 10 ML: at 22:08

## 2024-01-01 RX ADMIN — AMIODARONE HYDROCHLORIDE 0.5 MG/MIN: 1.8 INJECTION, SOLUTION INTRAVENOUS at 13:17

## 2024-01-01 RX ADMIN — POTASSIUM CHLORIDE 40 MEQ: 1.5 POWDER, FOR SOLUTION ORAL at 08:36

## 2024-01-01 RX ADMIN — DEXMEDETOMIDINE HYDROCHLORIDE IN SODIUM CHLORIDE 0.5 MCG/KG/HR: 4 INJECTION INTRAVENOUS at 00:27

## 2024-01-01 RX ADMIN — SILDENAFIL 20 MG: 20 TABLET, FILM COATED ORAL at 14:34

## 2024-01-01 RX ADMIN — MILRINONE LACTATE 0.25 MCG/KG/MIN: 0.2 INJECTION, SOLUTION INTRAVENOUS at 18:57

## 2024-01-01 RX ADMIN — CEFEPIME 2000 MG: 2 INJECTION, POWDER, FOR SOLUTION INTRAVENOUS at 10:08

## 2024-01-01 RX ADMIN — MIDODRINE HYDROCHLORIDE 15 MG: 5 TABLET ORAL at 16:53

## 2024-01-01 RX ADMIN — ENOXAPARIN SODIUM 40 MG: 100 INJECTION SUBCUTANEOUS at 18:16

## 2024-01-01 RX ADMIN — LORAZEPAM 1 MG: 2 INJECTION INTRAMUSCULAR; INTRAVENOUS at 11:52

## 2024-01-01 RX ADMIN — AMIODARONE HYDROCHLORIDE 400 MG: 200 TABLET ORAL at 10:54

## 2024-01-01 RX ADMIN — VASOPRESSIN 0.05 UNITS/MIN: 20 INJECTION, SOLUTION INTRAVENOUS at 13:01

## 2024-01-01 RX ADMIN — ANTI-FUNGAL POWDER MICONAZOLE NITRATE TALC FREE 1 APPLICATION: 1.42 POWDER TOPICAL at 21:32

## 2024-01-01 RX ADMIN — POTASSIUM CHLORIDE 20 MEQ: 29.8 INJECTION, SOLUTION INTRAVENOUS at 04:32

## 2024-01-01 RX ADMIN — MAGNESIUM SULFATE IN WATER 2 G: 40 INJECTION, SOLUTION INTRAVENOUS at 09:19

## 2024-01-01 RX ADMIN — ENOXAPARIN SODIUM 30 MG: 100 INJECTION SUBCUTANEOUS at 06:43

## 2024-01-01 RX ADMIN — AMIODARONE HYDROCHLORIDE 0.5 MG/MIN: 1.8 INJECTION, SOLUTION INTRAVENOUS at 09:32

## 2024-01-01 RX ADMIN — Medication 250 MG: at 08:18

## 2024-01-01 RX ADMIN — VASOPRESSIN 0.05 UNITS/MIN: 20 INJECTION, SOLUTION INTRAVENOUS at 21:12

## 2024-01-01 RX ADMIN — SILDENAFIL 20 MG: 20 TABLET, FILM COATED ORAL at 20:25

## 2024-01-01 RX ADMIN — AMIODARONE HYDROCHLORIDE 1 MG/MIN: 1.8 INJECTION, SOLUTION INTRAVENOUS at 15:14

## 2024-01-01 RX ADMIN — MILRINONE LACTATE 0.12 MCG/KG/MIN: 0.2 INJECTION, SOLUTION INTRAVENOUS at 13:28

## 2024-01-01 RX ADMIN — VASOPRESSIN 0.06 UNITS/MIN: 20 INJECTION, SOLUTION INTRAVENOUS at 04:35

## 2024-01-01 RX ADMIN — BUMETANIDE 2 MG/HR: 0.25 INJECTION INTRAMUSCULAR; INTRAVENOUS at 08:35

## 2024-01-01 RX ADMIN — Medication 10 ML: at 08:26

## 2024-01-01 RX ADMIN — INSULIN HUMAN 3 UNITS: 100 INJECTION, SOLUTION PARENTERAL at 00:17

## 2024-01-01 RX ADMIN — HYDRALAZINE HYDROCHLORIDE 10 MG: 20 INJECTION INTRAMUSCULAR; INTRAVENOUS at 00:45

## 2024-01-01 RX ADMIN — POTASSIUM CHLORIDE 20 MEQ: 1.5 POWDER, FOR SOLUTION ORAL at 02:20

## 2024-01-01 RX ADMIN — MIDAZOLAM 2 MG: 1 INJECTION INTRAMUSCULAR; INTRAVENOUS at 10:23

## 2024-01-01 RX ADMIN — IPRATROPIUM BROMIDE AND ALBUTEROL SULFATE 3 ML: 2.5; .5 SOLUTION RESPIRATORY (INHALATION) at 03:26

## 2024-01-01 RX ADMIN — POTASSIUM CHLORIDE 40 MEQ: 1.5 POWDER, FOR SOLUTION ORAL at 03:19

## 2024-01-01 RX ADMIN — MIDODRINE HYDROCHLORIDE 15 MG: 5 TABLET ORAL at 17:24

## 2024-01-01 RX ADMIN — PERFLUTREN 2 ML: 6.52 INJECTION, SUSPENSION INTRAVENOUS at 11:11

## 2024-01-01 RX ADMIN — MAGNESIUM SULFATE HEPTAHYDRATE 2 G: 40 INJECTION, SOLUTION INTRAVENOUS at 09:52

## 2024-01-01 RX ADMIN — AMIODARONE HYDROCHLORIDE 200 MG: 200 TABLET ORAL at 20:00

## 2024-01-01 RX ADMIN — NOREPINEPHRINE BITARTRATE 0.02 MCG/KG/MIN: 32 SOLUTION INTRAVENOUS at 13:57

## 2024-01-01 RX ADMIN — INSULIN HUMAN 2 UNITS: 100 INJECTION, SOLUTION PARENTERAL at 01:18

## 2024-01-01 RX ADMIN — ANTI-FUNGAL POWDER MICONAZOLE NITRATE TALC FREE 1 APPLICATION: 1.42 POWDER TOPICAL at 08:56

## 2024-01-01 RX ADMIN — MORPHINE SULFATE 2 MG: 2 INJECTION, SOLUTION INTRAMUSCULAR; INTRAVENOUS at 00:40

## 2024-01-01 RX ADMIN — CHLORHEXIDINE GLUCONATE 15 ML: 1.2 RINSE ORAL at 11:40

## 2024-01-01 RX ADMIN — MORPHINE SULFATE 2 MG: 2 INJECTION, SOLUTION INTRAMUSCULAR; INTRAVENOUS at 08:45

## 2024-01-01 RX ADMIN — IPRATROPIUM BROMIDE AND ALBUTEROL SULFATE 3 ML: 2.5; .5 SOLUTION RESPIRATORY (INHALATION) at 14:23

## 2024-01-01 RX ADMIN — IPRATROPIUM BROMIDE AND ALBUTEROL SULFATE 3 ML: 2.5; .5 SOLUTION RESPIRATORY (INHALATION) at 19:19

## 2024-01-01 RX ADMIN — POTASSIUM CHLORIDE 20 MEQ: 29.8 INJECTION, SOLUTION INTRAVENOUS at 01:16

## 2024-01-01 RX ADMIN — MILRINONE LACTATE 0.25 MCG/KG/MIN: 0.2 INJECTION, SOLUTION INTRAVENOUS at 15:20

## 2024-01-01 RX ADMIN — SILDENAFIL 20 MG: 20 TABLET, FILM COATED ORAL at 20:19

## 2024-01-01 RX ADMIN — PROPOFOL 10 MCG/KG/MIN: 10 INJECTION, EMULSION INTRAVENOUS at 01:14

## 2024-01-01 RX ADMIN — INSULIN HUMAN 2 UNITS: 100 INJECTION, SOLUTION PARENTERAL at 17:57

## 2024-01-01 RX ADMIN — ENOXAPARIN SODIUM 40 MG: 100 INJECTION SUBCUTANEOUS at 05:32

## 2024-01-01 RX ADMIN — PENICILLIN G POTASSIUM 4 MILLION UNITS: 5000000 INJECTION, POWDER, FOR SOLUTION INTRAMUSCULAR; INTRAVENOUS at 06:42

## 2024-01-01 RX ADMIN — HEPARIN SODIUM 5000 UNITS: 5000 INJECTION INTRAVENOUS; SUBCUTANEOUS at 14:11

## 2024-01-01 RX ADMIN — DEXMEDETOMIDINE HYDROCHLORIDE IN SODIUM CHLORIDE 0.5 MCG/KG/HR: 4 INJECTION INTRAVENOUS at 02:58

## 2024-01-01 RX ADMIN — PENICILLIN G POTASSIUM 4 MILLION UNITS: 5000000 INJECTION, POWDER, FOR SOLUTION INTRAMUSCULAR; INTRAVENOUS at 12:58

## 2024-01-01 RX ADMIN — MILRINONE LACTATE 0.12 MCG/KG/MIN: 0.2 INJECTION, SOLUTION INTRAVENOUS at 04:28

## 2024-01-01 RX ADMIN — SENNOSIDES AND DOCUSATE SODIUM 2 TABLET: 50; 8.6 TABLET ORAL at 20:02

## 2024-01-01 RX ADMIN — ANTI-FUNGAL POWDER MICONAZOLE NITRATE TALC FREE 1 APPLICATION: 1.42 POWDER TOPICAL at 20:53

## 2024-01-01 RX ADMIN — VASOPRESSIN 0.05 UNITS/MIN: 20 INJECTION, SOLUTION INTRAVENOUS at 20:39

## 2024-01-01 RX ADMIN — AMIODARONE HYDROCHLORIDE 0.5 MG/MIN: 1.8 INJECTION, SOLUTION INTRAVENOUS at 09:06

## 2024-01-01 RX ADMIN — MILRINONE LACTATE 0.12 MCG/KG/MIN: 0.2 INJECTION, SOLUTION INTRAVENOUS at 13:00

## 2024-01-01 RX ADMIN — SODIUM CHLORIDE 4 MILLION UNITS: 9 INJECTION, SOLUTION INTRAVENOUS at 02:34

## 2024-01-01 RX ADMIN — ENOXAPARIN SODIUM 30 MG: 100 INJECTION SUBCUTANEOUS at 06:07

## 2024-01-01 RX ADMIN — AMIODARONE HYDROCHLORIDE 0.5 MG/MIN: 1.8 INJECTION, SOLUTION INTRAVENOUS at 17:54

## 2024-01-01 RX ADMIN — Medication 250 MG: at 08:19

## 2024-01-01 RX ADMIN — INSULIN HUMAN 2 UNITS: 100 INJECTION, SOLUTION PARENTERAL at 00:08

## 2024-01-01 RX ADMIN — PROPOFOL INJECTABLE EMULSION 30 MCG/KG/MIN: 10 INJECTION, EMULSION INTRAVENOUS at 10:16

## 2024-01-01 RX ADMIN — IPRATROPIUM BROMIDE AND ALBUTEROL SULFATE 3 ML: 2.5; .5 SOLUTION RESPIRATORY (INHALATION) at 00:15

## 2024-01-01 RX ADMIN — HYDROCODONE BITARTRATE AND ACETAMINOPHEN 1 TABLET: 5; 325 TABLET ORAL at 12:33

## 2024-01-01 RX ADMIN — INSULIN HUMAN 2 UNITS: 100 INJECTION, SOLUTION PARENTERAL at 12:03

## 2024-01-01 RX ADMIN — DEXMEDETOMIDINE HYDROCHLORIDE IN SODIUM CHLORIDE 0.5 MCG/KG/HR: 4 INJECTION INTRAVENOUS at 17:41

## 2024-01-01 RX ADMIN — Medication 10 ML: at 21:17

## 2024-01-01 RX ADMIN — INSULIN HUMAN 2 UNITS: 100 INJECTION, SOLUTION PARENTERAL at 18:14

## 2024-01-01 RX ADMIN — IPRATROPIUM BROMIDE AND ALBUTEROL SULFATE 3 ML: 2.5; .5 SOLUTION RESPIRATORY (INHALATION) at 04:15

## 2024-01-01 RX ADMIN — HEPARIN SODIUM 5000 UNITS: 5000 INJECTION INTRAVENOUS; SUBCUTANEOUS at 13:00

## 2024-01-01 RX ADMIN — CALCIUM GLUCONATE 2000 MG: 20 INJECTION, SOLUTION INTRAVENOUS at 13:30

## 2024-01-01 RX ADMIN — INSULIN HUMAN 2 UNITS: 100 INJECTION, SOLUTION PARENTERAL at 18:48

## 2024-01-01 RX ADMIN — PENICILLIN G POTASSIUM 4 MILLION UNITS: 5000000 INJECTION, POWDER, FOR SOLUTION INTRAMUSCULAR; INTRAVENOUS at 20:51

## 2024-01-01 RX ADMIN — PERFLUTREN 2 ML: 6.52 INJECTION, SUSPENSION INTRAVENOUS at 14:07

## 2024-01-01 RX ADMIN — DEXMEDETOMIDINE HYDROCHLORIDE IN SODIUM CHLORIDE 0.4 MCG/KG/HR: 4 INJECTION INTRAVENOUS at 16:41

## 2024-01-01 RX ADMIN — POTASSIUM CHLORIDE 40 MEQ: 1.5 POWDER, FOR SOLUTION ORAL at 06:50

## 2024-01-01 RX ADMIN — PROPOFOL 15 MCG/KG/MIN: 10 INJECTION, EMULSION INTRAVENOUS at 20:36

## 2024-01-01 RX ADMIN — VASOPRESSIN 0.04 UNITS/MIN: 20 INJECTION, SOLUTION INTRAVENOUS at 06:10

## 2024-01-01 RX ADMIN — DEXMEDETOMIDINE HYDROCHLORIDE IN SODIUM CHLORIDE 0.4 MCG/KG/HR: 4 INJECTION INTRAVENOUS at 13:50

## 2024-01-01 RX ADMIN — Medication 10 ML: at 08:16

## 2024-01-01 RX ADMIN — VASOPRESSIN 0.05 UNITS/MIN: 20 INJECTION, SOLUTION INTRAVENOUS at 06:53

## 2024-01-19 ENCOUNTER — OFFICE VISIT (OUTPATIENT)
Dept: CARDIOLOGY | Facility: CLINIC | Age: 74
End: 2024-01-19
Payer: MEDICARE

## 2024-01-19 VITALS
BODY MASS INDEX: 42.66 KG/M2 | WEIGHT: 315 LBS | HEIGHT: 72 IN | SYSTOLIC BLOOD PRESSURE: 112 MMHG | DIASTOLIC BLOOD PRESSURE: 68 MMHG | HEART RATE: 61 BPM

## 2024-01-19 DIAGNOSIS — Z95.2 S/P AVR: Primary | ICD-10-CM

## 2024-01-19 DIAGNOSIS — I10 ESSENTIAL HYPERTENSION: ICD-10-CM

## 2024-01-19 DIAGNOSIS — Z95.810 ICD (IMPLANTABLE CARDIOVERTER-DEFIBRILLATOR), DUAL, IN SITU: ICD-10-CM

## 2024-01-19 DIAGNOSIS — I48.21 PERMANENT ATRIAL FIBRILLATION: ICD-10-CM

## 2024-01-19 NOTE — PROGRESS NOTES
Chief Complaint  Follow-up (1 YEAR F/U. ), Hypertension, and Hyperlipidemia    Subjective        History of Present Illness  Woodrow Alejandro presents to [facility] for follow up.  Patient is a 72-year-old male with past medical history significant for bioprosthetic aortic valve replacement back in 2014.  He had nonischemic cardiomyopathy at that time and had [employer] AICD placement.  His ejection fraction then returned back to normal.  He has a history of hypertension and chronic atrial fibrillation.  He had tried warfarin in the past and a couple of the DOACs but had significant side effects.  He declines anticoagulation.  He did have ligation of his left atrial appendage at the time of the aortic valve replacement.  He notes no chest pain.  He denies any shortness of breath.  He has no dizziness, lightheadedness, syncope or presyncope.      Past Medical History:   Diagnosis Date    Aortic valve replaced     Atrial fibrillation     Hypertension        ALLERGY  No Known Allergies     History reviewed. No pertinent surgical history.     Social History     Socioeconomic History    Marital status:    Tobacco Use    Smoking status: Never    Smokeless tobacco: Never   Vaping Use    Vaping Use: Never used   Substance and Sexual Activity    Alcohol use: Yes    Drug use: Never    Sexual activity: Defer       History reviewed. No pertinent family history.     Current Outpatient Medications on File Prior to Visit   Medication Sig    aspirin 325 MG tablet Take 1 tablet by mouth 2 (two) times a day.    dilTIAZem (CARDIZEM) 120 MG tablet Take 1 tablet by mouth Daily.    furosemide (LASIX) 40 MG tablet Take 1 tablet by mouth Daily.    lisinopril (PRINIVIL,ZESTRIL) 10 MG tablet Take 1 tablet by mouth Daily.    metoprolol tartrate (LOPRESSOR) 100 MG tablet Take 1 tablet by mouth 2 (Two) Times a Day.    triamcinolone (KENALOG) 0.1 % cream Apply 1 application  topically to the appropriate  "area as directed 2 (Two) Times a Day.    [DISCONTINUED] rosuvastatin (Crestor) 10 MG tablet Take 1 tablet by mouth Daily. (Patient not taking: Reported on 1/19/2024)    [DISCONTINUED] triamcinolone (KENALOG) 0.1 % cream Apply 1 application  topically to the appropriate area as directed 2 (Two) Times a Day. (Patient not taking: Reported on 1/19/2024)     No current facility-administered medications on file prior to visit.       Objective   Vitals:    01/19/24 1138   BP: 112/68   Pulse: 61   Weight: (!) 155 kg (341 lb 9.6 oz)   Height: 182.9 cm (72.01\")       Physical Exam  Constitutional:       General: He is awake. He is not in acute distress.     Appearance: Normal appearance. He is obese.   HENT:      Head: Normocephalic.      Nose: Nose normal. No congestion.   Eyes:      Extraocular Movements: Extraocular movements intact.      Conjunctiva/sclera: Conjunctivae normal.      Pupils: Pupils are equal, round, and reactive to light.   Neck:      Thyroid: No thyromegaly.      Vascular: No JVD.   Cardiovascular:      Rate and Rhythm: Normal rate and regular rhythm.      Chest Wall: PMI is not displaced.      Pulses: Normal pulses.      Heart sounds: Normal heart sounds, S1 normal and S2 normal. No murmur heard.     No friction rub. No gallop. No S3 or S4 sounds.   Pulmonary:      Effort: Pulmonary effort is normal.      Breath sounds: Normal breath sounds. No wheezing, rhonchi or rales.   Abdominal:      General: Bowel sounds are normal.      Palpations: Abdomen is soft.      Tenderness: There is no abdominal tenderness.   Musculoskeletal:      Cervical back: No tenderness.      Right lower leg: No edema.      Left lower leg: No edema.   Lymphadenopathy:      Cervical: No cervical adenopathy.   Skin:     General: Skin is warm and dry.      Capillary Refill: Capillary refill takes less than 2 seconds.      Coloration: Skin is not cyanotic.      Findings: No petechiae or rash.      Nails: There is no clubbing. " "  Neurological:      Mental Status: He is alert.   Psychiatric:         Mood and Affect: Mood normal.         Behavior: Behavior is cooperative.           Result Review     The following data was reviewed by JAVI Martines on 01/19/24.    No results found for: \"PROBNP\"  CMP          10/26/2023    09:07 12/5/2023    08:58   CMP   Glucose 131  123    BUN 23  19    Creatinine 1.00  1.07    EGFR 80.0  73.3    Sodium 141  142    Potassium 4.6  4.5    Chloride 106  108    Calcium 9.0  9.1    Total Protein 7.0  7.1    Albumin 4.3  4.4    Globulin 2.7  2.7    Total Bilirubin 0.6  0.8    Alkaline Phosphatase 63  80    AST (SGOT) 20  19    ALT (SGPT) 19  18    Albumin/Globulin Ratio 1.6  1.6    BUN/Creatinine Ratio 23.0  17.8    Anion Gap 11.3  11.8      CBC w/diff          10/26/2023    09:07   CBC w/Diff   WBC 5.80    RBC 4.82    Hemoglobin 15.2    Hematocrit 44.5    MCV 92.3    MCH 31.5    MCHC 34.2    RDW 12.7    Platelets 147    Neutrophil Rel % 67.9    Immature Granulocyte Rel % 0.3    Lymphocyte Rel % 19.7    Monocyte Rel % 9.1    Eosinophil Rel % 2.1    Basophil Rel % 0.9       Lipid Panel          10/26/2023    09:07 12/5/2023    08:58   Lipid Panel   Total Cholesterol 188  128    Triglycerides 117  100    HDL Cholesterol 50  46    VLDL Cholesterol 21  19    LDL Cholesterol  117  63    LDL/HDL Ratio 2.29  1.35        Results for orders placed during the hospital encounter of 08/23/21    Adult Transthoracic Echo Complete W/ Cont if Necessary Per Protocol    Interpretation Summary  · Estimated right ventricular systolic pressure from tricuspid regurgitation is mildly elevated (35-45 mmHg).  · The left ventricular cavity is moderately dilated.  · Left ventricular wall thickness is consistent with mild concentric hypertrophy.  · Calculated left ventricular EF = 55.2% Estimated left ventricular EF was in agreement with the calculated left ventricular EF.  · Left ventricular diastolic function was normal.  · The " right ventricular cavity is mild to moderately dilated.  · Left atrial volume is severely increased.  · The right atrial cavity is severely dilated.  · There is a bioprosthetic aortic valve present.  · Aortic valve dimensionless index is 0.4 .  · Moderate tricuspid valve regurgitation is present.  · Mild dilation of the ascending aorta is present.           Procedures    Assessment & Plan  Diagnoses and all orders for this visit:    1. S/P AVR (Primary)  -     Adult Transthoracic Echo Complete w/ Color, Spectral and Contrast if necessary per protocol; Future    2. Permanent atrial fibrillation  -     Adult Transthoracic Echo Complete w/ Color, Spectral and Contrast if necessary per protocol; Future    3. ICD (implantable cardioverter-defibrillator), dual, in situ    4. Essential hypertension      1.  Status post AVR back in 2014 with left atrial appendage closure.  Plan to repeat an echocardiogram.  Patient seems to be doing well from that standpoint.  2.  History of permanent atrial fibrillation.  Patient declines anticoagulation.  Continue the aspirin.  3.  Device check is scheduled for the upcoming week.  4.  Blood pressure is under good control.  Continue current medications.  Follow Up   Return in about 1 year (around 1/19/2025) for With Dr. Newman.    Patient was given instructions and counseling regarding his condition or for health maintenance advice. Please see specific information pulled into the AVS if appropriate.     Maryann Soriano, APRN  01/19/24  13:15 EST    Dictated Utilizing Dragon Dictation

## 2024-01-31 ENCOUNTER — CLINICAL SUPPORT NO REQUIREMENTS (OUTPATIENT)
Dept: CARDIOLOGY | Facility: CLINIC | Age: 74
End: 2024-01-31
Payer: MEDICARE

## 2024-01-31 DIAGNOSIS — I48.21 PERMANENT ATRIAL FIBRILLATION: ICD-10-CM

## 2024-01-31 DIAGNOSIS — Z95.2 S/P AVR: ICD-10-CM

## 2024-01-31 DIAGNOSIS — Z95.810 ICD (IMPLANTABLE CARDIOVERTER-DEFIBRILLATOR), DUAL, IN SITU: Primary | ICD-10-CM

## 2024-01-31 DIAGNOSIS — I42.8 OTHER CARDIOMYOPATHY: ICD-10-CM

## 2024-01-31 NOTE — PROGRESS NOTES
Normal VVIR ICD Device Interrogation and Device Testing.  Normal evaluation of device function and lead measurements.  No optimization was needed of parameters or maximization of device longevity.  Patient is not on home remote follow up and only wants to come in once a year.  Mr. Alejandro has 21 months remaining on battery.  I explained to Mr. Alejandro that once he is down to a year remaining, that I will start doing more aggressive follow ups.  He understands that once the device reaches MARIAH that it will beep everyday.

## 2024-02-21 ENCOUNTER — HOSPITAL ENCOUNTER (OUTPATIENT)
Dept: CARDIOLOGY | Facility: HOSPITAL | Age: 74
Discharge: HOME | End: 2024-02-21
Payer: MEDICARE

## 2024-02-21 DIAGNOSIS — Z95.2 S/P AVR: ICD-10-CM

## 2024-02-21 DIAGNOSIS — I48.21 PERMANENT ATRIAL FIBRILLATION: ICD-10-CM

## 2024-02-21 PROCEDURE — 93306 TTE W/DOPPLER COMPLETE: CPT

## 2024-02-21 PROCEDURE — 25010000002 SULFUR HEXAFLUORIDE MICROSPH 60.7-25 MG RECONSTITUTED SUSPENSION: Performed by: INTERNAL MEDICINE

## 2024-02-21 RX ADMIN — SULFUR HEXAFLUORIDE 3 ML: KIT at 09:50

## 2024-02-23 LAB
AORTIC DIMENSIONLESS INDEX: 0.23 (DI)
BH CV ECHO MEAS - AO MAX PG: 37 MMHG
BH CV ECHO MEAS - AO MEAN PG: 22 MMHG
BH CV ECHO MEAS - AO ROOT DIAM: 3.3 CM
BH CV ECHO MEAS - AO V2 MAX: 304 CM/SEC
BH CV ECHO MEAS - AO V2 VTI: 70.4 CM
BH CV ECHO MEAS - AVA(I,D): 0.89 CM2
BH CV ECHO MEAS - EDV(CUBED): 231.5 ML
BH CV ECHO MEAS - EDV(MOD-SP2): 152 ML
BH CV ECHO MEAS - EDV(MOD-SP4): 109 ML
BH CV ECHO MEAS - EF(MOD-BP): 58.5 %
BH CV ECHO MEAS - EF(MOD-SP2): 65.4 %
BH CV ECHO MEAS - EF(MOD-SP4): 51.2 %
BH CV ECHO MEAS - ESV(CUBED): 74.1 ML
BH CV ECHO MEAS - ESV(MOD-SP2): 52.6 ML
BH CV ECHO MEAS - ESV(MOD-SP4): 53.2 ML
BH CV ECHO MEAS - FS: 31.6 %
BH CV ECHO MEAS - IVS/LVPW: 1.02 CM
BH CV ECHO MEAS - IVSD: 0.99 CM
BH CV ECHO MEAS - LA DIMENSION: 6.8 CM
BH CV ECHO MEAS - LAT PEAK E' VEL: 12.9 CM/SEC
BH CV ECHO MEAS - LV DIASTOLIC VOL/BSA (35-75): 40.8 CM2
BH CV ECHO MEAS - LV MASS(C)D: 249.7 GRAMS
BH CV ECHO MEAS - LV MAX PG: 2.23 MMHG
BH CV ECHO MEAS - LV MEAN PG: 1 MMHG
BH CV ECHO MEAS - LV SYSTOLIC VOL/BSA (12-30): 19.9 CM2
BH CV ECHO MEAS - LV V1 MAX: 74.6 CM/SEC
BH CV ECHO MEAS - LV V1 VTI: 16.5 CM
BH CV ECHO MEAS - LVIDD: 6.1 CM
BH CV ECHO MEAS - LVIDS: 4.2 CM
BH CV ECHO MEAS - LVOT AREA: 3.8 CM2
BH CV ECHO MEAS - LVOT DIAM: 2.2 CM
BH CV ECHO MEAS - LVPWD: 0.97 CM
BH CV ECHO MEAS - MED PEAK E' VEL: 12 CM/SEC
BH CV ECHO MEAS - MV DEC SLOPE: 487 CM/SEC2
BH CV ECHO MEAS - MV DEC TIME: 0.15 SEC
BH CV ECHO MEAS - MV E MAX VEL: 160 CM/SEC
BH CV ECHO MEAS - MV MAX PG: 9.46 MMHG
BH CV ECHO MEAS - MV MEAN PG: 3 MMHG
BH CV ECHO MEAS - MV P1/2T: 92.6 MSEC
BH CV ECHO MEAS - MV V2 VTI: 33.8 CM
BH CV ECHO MEAS - MVA(P1/2T): 2.38 CM2
BH CV ECHO MEAS - MVA(VTI): 1.86 CM2
BH CV ECHO MEAS - PA ACC TIME: 0.09 SEC
BH CV ECHO MEAS - PA V2 MAX: 98.9 CM/SEC
BH CV ECHO MEAS - PI END-D VEL: 112 CM/SEC
BH CV ECHO MEAS - SI(MOD-SP2): 37.2 ML/M2
BH CV ECHO MEAS - SI(MOD-SP4): 20.9 ML/M2
BH CV ECHO MEAS - SV(LVOT): 62.7 ML
BH CV ECHO MEAS - SV(MOD-SP2): 99.4 ML
BH CV ECHO MEAS - SV(MOD-SP4): 55.8 ML
BH CV ECHO MEAS - TAPSE (>1.6): 2.36 CM
BH CV ECHO MEAS - TR MAX PG: 24 MMHG
BH CV ECHO MEAS - TR MAX VEL: 245 CM/SEC
BH CV ECHO MEASUREMENTS AVERAGE E/E' RATIO: 12.85
BH CV XLRA - RV BASE: 5.2 CM
BH CV XLRA - RV MID: 4.1 CM
BH CV XLRA - TDI S': 7.9 CM/SEC
IVRT: 79 MS
LEFT ATRIUM VOLUME INDEX: 84.6 ML/M2

## 2024-03-04 ENCOUNTER — OFFICE VISIT (OUTPATIENT)
Dept: INTERNAL MEDICINE | Facility: CLINIC | Age: 74
End: 2024-03-04
Payer: MEDICARE

## 2024-03-04 VITALS
OXYGEN SATURATION: 92 % | SYSTOLIC BLOOD PRESSURE: 104 MMHG | TEMPERATURE: 98.2 F | WEIGHT: 315 LBS | HEART RATE: 60 BPM | HEIGHT: 72 IN | DIASTOLIC BLOOD PRESSURE: 72 MMHG | BODY MASS INDEX: 42.66 KG/M2

## 2024-03-04 DIAGNOSIS — M17.0 PRIMARY OSTEOARTHRITIS OF BOTH KNEES: Primary | ICD-10-CM

## 2024-03-04 PROCEDURE — 3074F SYST BP LT 130 MM HG: CPT | Performed by: INTERNAL MEDICINE

## 2024-03-04 PROCEDURE — 99213 OFFICE O/P EST LOW 20 MIN: CPT | Performed by: INTERNAL MEDICINE

## 2024-03-04 PROCEDURE — 3078F DIAST BP <80 MM HG: CPT | Performed by: INTERNAL MEDICINE

## 2024-03-04 PROCEDURE — 1170F FXNL STATUS ASSESSED: CPT | Performed by: INTERNAL MEDICINE

## 2024-03-04 RX ORDER — DEXAMETHASONE 4 MG/1
4 TABLET ORAL 2 TIMES DAILY WITH MEALS
Qty: 10 TABLET | Refills: 0 | Status: SHIPPED | OUTPATIENT
Start: 2024-03-04

## 2024-03-04 NOTE — PROGRESS NOTES
"CHIEF COMPLAINT/ HPI:  Pain (Bilateral Knee pain, Pt taking ibuprofen sometimes. Pt states maybe arthritis. )              Objective   Vital Signs  Vitals:    03/04/24 1413   BP: 104/72   Pulse: 60   Temp: 98.2 °F (36.8 °C)   SpO2: 92%   Weight: (!) 154 kg (340 lb)   Height: 182.9 cm (72.01\")      Body mass index is 46.1 kg/m².  Review of Systems   Constitutional: Negative.    HENT: Negative.     Eyes: Negative.    Respiratory: Negative.     Cardiovascular: Negative.    Gastrointestinal: Negative.    Endocrine: Negative.    Genitourinary: Negative.    Musculoskeletal:  Positive for arthralgias and gait problem.   Allergic/Immunologic: Negative.    Hematological: Negative.    Psychiatric/Behavioral: Negative.        Physical Exam  Constitutional:       General: He is not in acute distress.     Appearance: Normal appearance. He is obese.   HENT:      Head: Normocephalic.      Mouth/Throat:      Mouth: Mucous membranes are moist.   Eyes:      Conjunctiva/sclera: Conjunctivae normal.      Pupils: Pupils are equal, round, and reactive to light.   Cardiovascular:      Rate and Rhythm: Normal rate and regular rhythm.      Pulses: Normal pulses.      Heart sounds: Normal heart sounds.   Pulmonary:      Effort: Pulmonary effort is normal.      Breath sounds: Normal breath sounds.   Abdominal:      General: Bowel sounds are normal.      Palpations: Abdomen is soft.   Musculoskeletal:         General: Swelling present. Normal range of motion.      Cervical back: Neck supple.      Right lower leg: Edema present.      Left lower leg: Edema present.   Skin:     General: Skin is warm and dry.      Coloration: Skin is not jaundiced.   Neurological:      General: No focal deficit present.      Mental Status: He is alert and oriented to person, place, and time. Mental status is at baseline.   Psychiatric:         Mood and Affect: Mood normal.         Behavior: Behavior normal.         Thought Content: Thought content normal.         " "Judgment: Judgment normal.        Result Review :   No results found for: \"PROBNP\", \"BNP\"  CMP          10/26/2023    09:07 12/5/2023    08:58   CMP   Glucose 131  123    BUN 23  19    Creatinine 1.00  1.07    EGFR 80.0  73.3    Sodium 141  142    Potassium 4.6  4.5    Chloride 106  108    Calcium 9.0  9.1    Total Protein 7.0  7.1    Albumin 4.3  4.4    Globulin 2.7  2.7    Total Bilirubin 0.6  0.8    Alkaline Phosphatase 63  80    AST (SGOT) 20  19    ALT (SGPT) 19  18    Albumin/Globulin Ratio 1.6  1.6    BUN/Creatinine Ratio 23.0  17.8    Anion Gap 11.3  11.8      CBC w/diff          10/26/2023    09:07   CBC w/Diff   WBC 5.80    RBC 4.82    Hemoglobin 15.2    Hematocrit 44.5    MCV 92.3    MCH 31.5    MCHC 34.2    RDW 12.7    Platelets 147    Neutrophil Rel % 67.9    Immature Granulocyte Rel % 0.3    Lymphocyte Rel % 19.7    Monocyte Rel % 9.1    Eosinophil Rel % 2.1    Basophil Rel % 0.9       Lipid Panel          10/26/2023    09:07 12/5/2023    08:58   Lipid Panel   Total Cholesterol 188  128    Triglycerides 117  100    HDL Cholesterol 50  46    VLDL Cholesterol 21  19    LDL Cholesterol  117  63    LDL/HDL Ratio 2.29  1.35       Lab Results   Component Value Date    TSH 1.480 12/05/2023      Lab Results   Component Value Date    FREET4 1.42 12/05/2023      A1C Last 3 Results          12/5/2023    08:58   HGBA1C Last 3 Results   Hemoglobin A1C 5.70       PSA          10/26/2023    09:07   PSA   PSA 0.341                     Visit Diagnoses:    ICD-10-CM ICD-9-CM   1. Primary osteoarthritis of both knees  M17.0 715.16       Assessment and Plan   Diagnoses and all orders for this visit:    1. Primary osteoarthritis of both knees (Primary)  -     XR Knee 3 View Bilateral; Future    Other orders  -     diclofenac (VOLTAREN) 50 MG EC tablet; Take 1 tablet by mouth 2 (Two) Times a Day.  Dispense: 60 tablet; Refill: 5  -     dexAMETHasone (DECADRON) 4 MG tablet; Take 1 tablet by mouth 2 (Two) Times a Day With " Meals.  Dispense: 10 tablet; Refill: 0        Arthritis knees, left worse than right, discussed treatment options patient is really reluctant about surgery would like to consider injections, new treatment options in [place],--- consider Decadron for 5 days, Voltaren,, referral to orthopedic surgery versus the knee center in [place] for injections, patient has tried Osteo Bi-Flex, turmeric, consider physical therapy evaluation also, March 4, 2024    Class 3 Severe Obesity (BMI >=40). Obesity-related health conditions include the following: hypertension and coronary heart disease. Obesity is unchanged. BMI is is above average; BMI management plan is completed. We discussed portion control, increasing exercise, and Weight Watchers or other Commercial based weight reduction program.     Hypertension, continue lisinopril 10 mg daily, Lopressor 100 mg twice a day, diltiazem 120 mg daily, Lasix 40 mg daily, aspirin 325 mg bid    Impaired fasting glucose, hemoglobin A1c 5.7 December 5, 2023 urine microalbumin is 4, normal December 5, 2023    Right neck anterior fullness rubbery nodule, patient says been there about 10 years or more ----has not changed will follow clinically no studies for now,    Coronary artery disease, previous implantable cardio defibrillator    Status post aortic valve replacement, follows up with  A echocardiogram February 21, 2024 shows normal ejection fraction, no significant aortic valve issues, artificial valve appears to be intact, there is mild to moderate mitral regurgitation, some right atrial enlargement,    Chronic atrial fibrillation---only on asa , tried coumadin in past     Mixed hyperlipidemia--cont Crestor 10 mg daily, October 31, 2023    PSA 0.3 October 26, 2023                 Follow Up   No follow-ups on file.  Patient was given instructions and counseling regarding his condition or for health maintenance advice. Please see specific information pulled into the AVS if  appropriate.         Answers submitted by the patient for this visit:  Primary Reason for Visit (Submitted on 3/1/2024)  What is the primary reason for your visit?: Other  Other (Submitted on 3/1/2024)  Please describe your symptoms.: Knee joint pain progressively worse the last past year  Have you had these symptoms before?: Yes  How long have you been having these symptoms?: Greater than 2 weeks  Please describe any probable cause for these symptoms. : Arthritis

## 2024-04-11 ENCOUNTER — TELEPHONE (OUTPATIENT)
Dept: INTERNAL MEDICINE | Age: 74
End: 2024-04-11
Payer: MEDICARE

## 2024-04-11 DIAGNOSIS — I10 ESSENTIAL HYPERTENSION: Primary | ICD-10-CM

## 2024-04-11 RX ORDER — DILTIAZEM HYDROCHLORIDE 120 MG/1
120 TABLET, FILM COATED ORAL 2 TIMES DAILY
Qty: 180 TABLET | Refills: 3 | Status: SHIPPED | OUTPATIENT
Start: 2024-04-11

## 2024-04-11 NOTE — TELEPHONE ENCOUNTER
"Caller: Woodrow Alejandro \"Ortiz\"    Relationship: Self    Best call back number: [phone]     What medication are you requesting: dilTIAZem (CARDIZEM) 120 MG tablet 2 PILLS PER DAY    Have you had these symptoms before:    [x] Yes  [] No    Have you been treated for these symptoms before:   [x] Yes  [] No    If a prescription is needed, what is your preferred pharmacy and phone number: [facility] [address] [place] [address] [phone] [facility] [phone]      Additional notes: PATIENT STATES THAT HE HAS BEEN TAKING 2 PILLS PER DAY FOR QUITE SOME TIME BUT HIS MOST RECENT PRESCRIPTION STATES THAT HE IS TO TAKE 1 PER DAY    PATIENT WOULD LIKE FOR THAT TO BE CHANGED BACK TO 2 PILLS BEFORE HE HAS IT REFILLED AT THE PHARMACY  "

## 2024-05-06 ENCOUNTER — OFFICE VISIT (OUTPATIENT)
Dept: PODIATRY | Facility: CLINIC | Age: 74
End: 2024-05-06
Payer: MEDICARE

## 2024-05-06 VITALS
WEIGHT: 315 LBS | SYSTOLIC BLOOD PRESSURE: 106 MMHG | HEIGHT: 72 IN | DIASTOLIC BLOOD PRESSURE: 66 MMHG | TEMPERATURE: 97.7 F | HEART RATE: 59 BPM | OXYGEN SATURATION: 92 % | BODY MASS INDEX: 42.66 KG/M2

## 2024-05-06 DIAGNOSIS — M79.672 FOOT PAIN, BILATERAL: ICD-10-CM

## 2024-05-06 DIAGNOSIS — M79.671 FOOT PAIN, BILATERAL: ICD-10-CM

## 2024-05-06 DIAGNOSIS — B35.1 ONYCHOMYCOSIS: ICD-10-CM

## 2024-05-06 DIAGNOSIS — L60.0 ONYCHOCRYPTOSIS: Primary | ICD-10-CM

## 2024-05-06 PROCEDURE — 1160F RVW MEDS BY RX/DR IN RCRD: CPT | Performed by: PODIATRIST

## 2024-05-06 PROCEDURE — 11721 DEBRIDE NAIL 6 OR MORE: CPT | Performed by: PODIATRIST

## 2024-05-06 PROCEDURE — 99203 OFFICE O/P NEW LOW 30 MIN: CPT | Performed by: PODIATRIST

## 2024-05-06 PROCEDURE — 3074F SYST BP LT 130 MM HG: CPT | Performed by: PODIATRIST

## 2024-05-06 PROCEDURE — 3078F DIAST BP <80 MM HG: CPT | Performed by: PODIATRIST

## 2024-05-06 PROCEDURE — 1159F MED LIST DOCD IN RCRD: CPT | Performed by: PODIATRIST

## 2024-06-13 ENCOUNTER — LAB (OUTPATIENT)
Dept: LAB | Facility: HOSPITAL | Age: 74
End: 2024-06-13
Payer: MEDICARE

## 2024-06-13 DIAGNOSIS — E78.2 MIXED HYPERLIPIDEMIA: ICD-10-CM

## 2024-06-13 DIAGNOSIS — L30.9 DERMATITIS: ICD-10-CM

## 2024-06-13 DIAGNOSIS — E55.9 VITAMIN D DEFICIENCY: ICD-10-CM

## 2024-06-13 DIAGNOSIS — I48.21 PERMANENT ATRIAL FIBRILLATION: ICD-10-CM

## 2024-06-13 DIAGNOSIS — Z95.810 ICD (IMPLANTABLE CARDIOVERTER-DEFIBRILLATOR), DUAL, IN SITU: ICD-10-CM

## 2024-06-13 DIAGNOSIS — I10 ESSENTIAL HYPERTENSION: ICD-10-CM

## 2024-06-13 DIAGNOSIS — Z95.2 S/P AVR: ICD-10-CM

## 2024-06-13 DIAGNOSIS — R73.01 IFG (IMPAIRED FASTING GLUCOSE): ICD-10-CM

## 2024-06-13 LAB
ALBUMIN SERPL-MCNC: 4.5 G/DL (ref 3.5–5.2)
ALBUMIN/GLOB SERPL: 1.8 G/DL
ALP SERPL-CCNC: 81 U/L (ref 39–117)
ALT SERPL W P-5'-P-CCNC: 23 U/L (ref 1–41)
ANION GAP SERPL CALCULATED.3IONS-SCNC: 11.5 MMOL/L (ref 5–15)
AST SERPL-CCNC: 23 U/L (ref 1–40)
BASOPHILS # BLD AUTO: 0.05 10*3/MM3 (ref 0–0.2)
BASOPHILS NFR BLD AUTO: 0.8 % (ref 0–1.5)
BILIRUB SERPL-MCNC: 0.7 MG/DL (ref 0–1.2)
BUN SERPL-MCNC: 17 MG/DL (ref 8–23)
BUN/CREAT SERPL: 15.6 (ref 7–25)
CALCIUM SPEC-SCNC: 8.6 MG/DL (ref 8.6–10.5)
CHLORIDE SERPL-SCNC: 110 MMOL/L (ref 98–107)
CHOLEST SERPL-MCNC: 164 MG/DL (ref 0–200)
CO2 SERPL-SCNC: 21.5 MMOL/L (ref 22–29)
CREAT SERPL-MCNC: 1.09 MG/DL (ref 0.76–1.27)
DEPRECATED RDW RBC AUTO: 43.9 FL (ref 37–54)
EGFRCR SERPLBLD CKD-EPI 2021: 71.7 ML/MIN/1.73
EOSINOPHIL # BLD AUTO: 0.12 10*3/MM3 (ref 0–0.4)
EOSINOPHIL NFR BLD AUTO: 1.8 % (ref 0.3–6.2)
ERYTHROCYTE [DISTWIDTH] IN BLOOD BY AUTOMATED COUNT: 13 % (ref 12.3–15.4)
GLOBULIN UR ELPH-MCNC: 2.5 GM/DL
GLUCOSE SERPL-MCNC: 141 MG/DL (ref 65–99)
HBA1C MFR BLD: 5.7 % (ref 4.8–5.6)
HCT VFR BLD AUTO: 44.3 % (ref 37.5–51)
HDLC SERPL-MCNC: 48 MG/DL (ref 40–60)
HGB BLD-MCNC: 15.1 G/DL (ref 13–17.7)
IMM GRANULOCYTES # BLD AUTO: 0.01 10*3/MM3 (ref 0–0.05)
IMM GRANULOCYTES NFR BLD AUTO: 0.2 % (ref 0–0.5)
LDLC SERPL CALC-MCNC: 98 MG/DL (ref 0–100)
LDLC/HDLC SERPL: 2.02 {RATIO}
LYMPHOCYTES # BLD AUTO: 1.13 10*3/MM3 (ref 0.7–3.1)
LYMPHOCYTES NFR BLD AUTO: 17 % (ref 19.6–45.3)
MCH RBC QN AUTO: 32.1 PG (ref 26.6–33)
MCHC RBC AUTO-ENTMCNC: 34.1 G/DL (ref 31.5–35.7)
MCV RBC AUTO: 94.1 FL (ref 79–97)
MONOCYTES # BLD AUTO: 0.59 10*3/MM3 (ref 0.1–0.9)
MONOCYTES NFR BLD AUTO: 8.9 % (ref 5–12)
NEUTROPHILS NFR BLD AUTO: 4.74 10*3/MM3 (ref 1.7–7)
NEUTROPHILS NFR BLD AUTO: 71.3 % (ref 42.7–76)
NRBC BLD AUTO-RTO: 0 /100 WBC (ref 0–0.2)
PLATELET # BLD AUTO: 150 10*3/MM3 (ref 140–450)
PMV BLD AUTO: 11.1 FL (ref 6–12)
POTASSIUM SERPL-SCNC: 4.1 MMOL/L (ref 3.5–5.2)
PROT SERPL-MCNC: 7 G/DL (ref 6–8.5)
RBC # BLD AUTO: 4.71 10*6/MM3 (ref 4.14–5.8)
SODIUM SERPL-SCNC: 143 MMOL/L (ref 136–145)
TRIGL SERPL-MCNC: 96 MG/DL (ref 0–150)
VLDLC SERPL-MCNC: 18 MG/DL (ref 5–40)
WBC NRBC COR # BLD AUTO: 6.64 10*3/MM3 (ref 3.4–10.8)

## 2024-06-13 PROCEDURE — 85025 COMPLETE CBC W/AUTO DIFF WBC: CPT

## 2024-06-13 PROCEDURE — 83036 HEMOGLOBIN GLYCOSYLATED A1C: CPT

## 2024-06-13 PROCEDURE — 80061 LIPID PANEL: CPT

## 2024-06-13 PROCEDURE — 80053 COMPREHEN METABOLIC PANEL: CPT

## 2024-06-13 PROCEDURE — 36415 COLL VENOUS BLD VENIPUNCTURE: CPT

## 2024-06-17 ENCOUNTER — HOSPITAL ENCOUNTER (OUTPATIENT)
Dept: GENERAL RADIOLOGY | Facility: HOSPITAL | Age: 74
Discharge: HOME | End: 2024-06-17
Payer: MEDICARE

## 2024-06-17 DIAGNOSIS — M17.0 PRIMARY OSTEOARTHRITIS OF BOTH KNEES: ICD-10-CM

## 2024-06-17 DIAGNOSIS — M25.562 PAIN IN BOTH KNEES, UNSPECIFIED CHRONICITY: ICD-10-CM

## 2024-06-17 DIAGNOSIS — M25.561 PAIN IN BOTH KNEES, UNSPECIFIED CHRONICITY: ICD-10-CM

## 2024-06-17 PROCEDURE — 73562 X-RAY EXAM OF KNEE 3: CPT

## 2024-06-18 ENCOUNTER — TELEPHONE (OUTPATIENT)
Dept: INTERNAL MEDICINE | Age: 74
End: 2024-06-18
Payer: MEDICARE

## 2024-06-18 NOTE — TELEPHONE ENCOUNTER
HUB TO READ GIVEN TO PATIENT WHO STATES HE WOULD BE WILLING TO DO ORTHOPEDICS AND ALSO IS INQUIRING ABOUT CORTIZONE SHOTS IN THE KNEES FOR THE SUMMER AND MAYBE WINTER.     PATIENT IS ASKING FOR A CALL BACK TO ADVISE.

## 2024-06-18 NOTE — TELEPHONE ENCOUNTER
HUB may relay message to patient.     ----- Message from Juan Perez sent at 6/18/2024  8:55 AM EDT -----  Call patient tell him he has arthritis in the knee on both sides and he definitely needs to see an orthopedic doctor to discuss treatment plan otherwise he can continue with the current medications he is taking

## 2024-06-19 ENCOUNTER — TELEPHONE (OUTPATIENT)
Dept: INTERNAL MEDICINE | Age: 74
End: 2024-06-19
Payer: MEDICARE

## 2024-06-19 DIAGNOSIS — M25.562 ACUTE PAIN OF BOTH KNEES: Primary | ICD-10-CM

## 2024-06-19 DIAGNOSIS — M25.561 ACUTE PAIN OF BOTH KNEES: Primary | ICD-10-CM

## 2024-06-19 NOTE — TELEPHONE ENCOUNTER
----- Message from Juan Perez sent at 6/18/2024  8:55 AM EDT -----  Call patient tell him he has arthritis in the knee on both sides and he definitely needs to see an orthopedic doctor to discuss treatment plan otherwise he can continue with the current medications he is taking

## 2024-06-24 ENCOUNTER — OFFICE VISIT (OUTPATIENT)
Dept: INTERNAL MEDICINE | Age: 74
End: 2024-06-24
Payer: MEDICARE

## 2024-06-24 VITALS
WEIGHT: 315 LBS | OXYGEN SATURATION: 93 % | HEIGHT: 72 IN | TEMPERATURE: 98.7 F | BODY MASS INDEX: 42.66 KG/M2 | RESPIRATION RATE: 16 BRPM | DIASTOLIC BLOOD PRESSURE: 76 MMHG | HEART RATE: 59 BPM | SYSTOLIC BLOOD PRESSURE: 112 MMHG

## 2024-06-24 DIAGNOSIS — I10 ESSENTIAL HYPERTENSION: ICD-10-CM

## 2024-06-24 DIAGNOSIS — E78.2 MIXED HYPERLIPIDEMIA: ICD-10-CM

## 2024-06-24 DIAGNOSIS — M17.0 PRIMARY OSTEOARTHRITIS OF BOTH KNEES: ICD-10-CM

## 2024-06-24 DIAGNOSIS — Z12.5 SCREENING PSA (PROSTATE SPECIFIC ANTIGEN): ICD-10-CM

## 2024-06-24 DIAGNOSIS — R73.01 IFG (IMPAIRED FASTING GLUCOSE): ICD-10-CM

## 2024-06-24 DIAGNOSIS — E55.9 VITAMIN D DEFICIENCY: Primary | ICD-10-CM

## 2024-06-24 DIAGNOSIS — Z95.810 ICD (IMPLANTABLE CARDIOVERTER-DEFIBRILLATOR), DUAL, IN SITU: ICD-10-CM

## 2024-06-24 DIAGNOSIS — Z95.2 S/P AVR: ICD-10-CM

## 2024-06-24 PROCEDURE — 3078F DIAST BP <80 MM HG: CPT | Performed by: INTERNAL MEDICINE

## 2024-06-24 PROCEDURE — 99214 OFFICE O/P EST MOD 30 MIN: CPT | Performed by: INTERNAL MEDICINE

## 2024-06-24 PROCEDURE — 1125F AMNT PAIN NOTED PAIN PRSNT: CPT | Performed by: INTERNAL MEDICINE

## 2024-06-24 PROCEDURE — 3074F SYST BP LT 130 MM HG: CPT | Performed by: INTERNAL MEDICINE

## 2024-06-24 NOTE — PROGRESS NOTES
"CHIEF COMPLAINT/ HPI:  Osteoarthritis (Follow up //Pain today is 8/10 /stated there is swelling, warmth, and is a aching pain. /)              Objective   Vital Signs  Vitals:    06/24/24 1000   BP: 112/76   BP Location: Left arm   Patient Position: Sitting   Cuff Size: Adult   Pulse: 59   Resp: 16   Temp: 98.7 °F (37.1 °C)   TempSrc: Temporal   SpO2: 93%   Weight: (!) 155 kg (341 lb 9.6 oz)   Height: 182.9 cm (72\")      Body mass index is 46.33 kg/m².  Review of Systems knee pain, swelling gait dysfunction  Physical Exam  Constitutional:       General: He is not in acute distress.     Appearance: Normal appearance. He is obese.   HENT:      Head: Normocephalic.   Cardiovascular:      Rate and Rhythm: Normal rate and regular rhythm.      Pulses: Normal pulses.      Heart sounds: Normal heart sounds.   Pulmonary:      Effort: Pulmonary effort is normal.      Breath sounds: Normal breath sounds.   Abdominal:      General: Bowel sounds are normal.      Palpations: Abdomen is soft.   Musculoskeletal:         General: Swelling and deformity present. Normal range of motion.      Cervical back: Neck supple.   Skin:     General: Skin is warm and dry.      Coloration: Skin is not jaundiced.   Neurological:      General: No focal deficit present.      Mental Status: He is alert and oriented to person, place, and time. Mental status is at baseline.   Psychiatric:         Mood and Affect: Mood normal.         Behavior: Behavior normal.         Thought Content: Thought content normal.         Judgment: Judgment normal.        Result Review :   No results found for: \"PROBNP\", \"BNP\"  CMP          10/26/2023    09:07 12/5/2023    08:58 6/13/2024    09:45   CMP   Glucose 131  123  141    BUN 23  19  17    Creatinine 1.00  1.07  1.09    EGFR 80.0  73.3  71.7    Sodium 141  142  143    Potassium 4.6  4.5  4.1    Chloride 106  108  110    Calcium 9.0  9.1  8.6    Total Protein 7.0  7.1  7.0    Albumin 4.3  4.4  4.5    Globulin 2.7  2.7 "  2.5    Total Bilirubin 0.6  0.8  0.7    Alkaline Phosphatase 63  80  81    AST (SGOT) 20  19  23    ALT (SGPT) 19  18  23    Albumin/Globulin Ratio 1.6  1.6  1.8    BUN/Creatinine Ratio 23.0  17.8  15.6    Anion Gap 11.3  11.8  11.5      CBC w/diff          10/26/2023    09:07 6/13/2024    09:45   CBC w/Diff   WBC 5.80  6.64    RBC 4.82  4.71    Hemoglobin 15.2  15.1    Hematocrit 44.5  44.3    MCV 92.3  94.1    MCH 31.5  32.1    MCHC 34.2  34.1    RDW 12.7  13.0    Platelets 147  150    Neutrophil Rel % 67.9  71.3    Immature Granulocyte Rel % 0.3  0.2    Lymphocyte Rel % 19.7  17.0    Monocyte Rel % 9.1  8.9    Eosinophil Rel % 2.1  1.8    Basophil Rel % 0.9  0.8       Lipid Panel          10/26/2023    09:07 12/5/2023    08:58 6/13/2024    09:45   Lipid Panel   Total Cholesterol 188  128  164    Triglycerides 117  100  96    HDL Cholesterol 50  46  48    VLDL Cholesterol 21  19  18    LDL Cholesterol  117  63  98    LDL/HDL Ratio 2.29  1.35  2.02       Lab Results   Component Value Date    TSH 1.480 12/05/2023      Lab Results   Component Value Date    FREET4 1.42 12/05/2023      A1C Last 3 Results          12/5/2023    08:58 6/13/2024    09:45   HGBA1C Last 3 Results   Hemoglobin A1C 5.70  5.70       PSA          10/26/2023    09:07   PSA   PSA 0.341                     Visit Diagnoses:    ICD-10-CM ICD-9-CM   1. Vitamin D deficiency  E55.9 268.9   2. ICD (implantable cardioverter-defibrillator), dual, in situ  Z95.810 V45.02   3. Essential hypertension  I10 401.9   4. S/P AVR  Z95.2 V43.3   5. IFG (impaired fasting glucose)  R73.01 790.21   6. Primary osteoarthritis of both knees  M17.0 715.16   7. Mixed hyperlipidemia  E78.2 272.2   8. Screening PSA (prostate specific antigen)  Z12.5 V76.44       Assessment and Plan   Diagnoses and all orders for this visit:    1. Vitamin D deficiency (Primary)  -     CBC & Differential; Future  -     Comprehensive Metabolic Panel; Future  -     Lipid Panel; Future  -      Hemoglobin A1c; Future  -     PSA Screen; Future    2. ICD (implantable cardioverter-defibrillator), dual, in situ  -     CBC & Differential; Future  -     Comprehensive Metabolic Panel; Future  -     Lipid Panel; Future  -     Hemoglobin A1c; Future  -     PSA Screen; Future    3. Essential hypertension  -     CBC & Differential; Future  -     Comprehensive Metabolic Panel; Future  -     Lipid Panel; Future  -     Hemoglobin A1c; Future  -     PSA Screen; Future    4. S/P AVR  -     CBC & Differential; Future  -     Comprehensive Metabolic Panel; Future  -     Lipid Panel; Future  -     Hemoglobin A1c; Future  -     PSA Screen; Future    5. IFG (impaired fasting glucose)  -     CBC & Differential; Future  -     Comprehensive Metabolic Panel; Future  -     Lipid Panel; Future  -     Hemoglobin A1c; Future  -     PSA Screen; Future    6. Primary osteoarthritis of both knees  -     CBC & Differential; Future  -     Comprehensive Metabolic Panel; Future  -     Lipid Panel; Future  -     Hemoglobin A1c; Future  -     PSA Screen; Future    7. Mixed hyperlipidemia  -     CBC & Differential; Future  -     Comprehensive Metabolic Panel; Future  -     Lipid Panel; Future  -     Hemoglobin A1c; Future  -     PSA Screen; Future    8. Screening PSA (prostate specific antigen)  -     CBC & Differential; Future  -     Comprehensive Metabolic Panel; Future  -     Lipid Panel; Future  -     Hemoglobin A1c; Future  -     PSA Screen; Future    Other orders  -     Tirzepatide-Weight Management (ZEPBOUND) 2.5 MG/0.5ML solution auto-injector; Inject 0.5 mL under the skin into the appropriate area as directed 1 (One) Time Per Week.  Dispense: 2 mL; Refill: 5        Arthritis knees, left worse than right, discussed treatment options patient is really reluctant about surgery would like to consider injections, new treatment options in [place],--- consider Decadron for 5 days, Voltaren,, referral to orthopedic surgery versus the knee  center in [place] for injections, patient has tried Osteo Bi-Flex, turmeric, consider physical therapy evaluation also, March 4, 2024----patient has upcoming appoint Dr. Yepez, has tried different medications without much success, x-rays reviewed June 2024 showing severe tricompartmental arthritis bilateral,    Class 3 Severe Obesity (BMI >=40). Obesity-related health conditions include the following: hypertension and coronary heart disease. Obesity is unchanged. BMI is is above average; BMI management plan is completed. We discussed portion control, increasing exercise, and Weight Watchers or other Commercial based weight reduction program.--- Discussed losing weight prior to any upcoming knee surgery, consider weight loss medications to help if necessary--- we will send in zepbound to [facility] June 24, 2024 to try patient understands     Hypertension, continue lisinopril 10 mg daily, Lopressor 100 mg twice a day, diltiazem 120 mg daily, Lasix 40 mg daily, aspirin 325 mg bid    Impaired fasting glucose, hemoglobin A1c 5.7 December 5, 2023, no change June 13, 2024 ----urine microalbumin is 4, normal December 5, 2023    Right neck anterior fullness rubbery nodule, patient says been there about 10 years or more ----has not changed will follow clinically no studies for now,    Coronary artery disease, previous implantable cardio defibrillator---dr newman     Status post aortic valve replacement, follows up with Dr. Newman ---echocardiogram February 21, 2024 shows normal ejection fraction, no significant aortic valve issues, artificial valve appears to be intact, there is mild to moderate mitral regurgitation, some right atrial enlargement,    Chronic atrial fibrillation---only on asa , tried coumadin in past     Mixed hyperlipidemia--off Crestor 10 mg daily, ---    PSA 0.3 October 26, 2023                   Follow Up   Return in about 6 months (around 12/24/2024).  Patient was given instructions and counseling  regarding his condition or for health maintenance advice. Please see specific information pulled into the AVS if appropriate.           Answers submitted by the patient for this visit:  Primary Reason for Visit (Submitted on 6/18/2024)  What is the primary reason for your visit?: Other  Other (Submitted on 6/18/2024)  Please describe your symptoms.: Knee Pain  Have you had these symptoms before?: Yes  How long have you been having these symptoms?: Greater than 2 weeks

## 2024-06-25 ENCOUNTER — TELEPHONE (OUTPATIENT)
Dept: INTERNAL MEDICINE | Age: 74
End: 2024-06-25
Payer: MEDICARE

## 2024-06-25 NOTE — TELEPHONE ENCOUNTER
"  Caller: Woodrow Alejandro \"Ortiz\"    Relationship to patient: Self    Best call back number: [phone]     Patient is needing: NEEDING A LETTER OF NECESSITY FOR ZEPBOUND MEDICATION BECAUSE IT WAS DENIED.   "

## 2024-06-26 ENCOUNTER — TELEPHONE (OUTPATIENT)
Dept: INTERNAL MEDICINE | Age: 74
End: 2024-06-26

## 2024-06-26 NOTE — TELEPHONE ENCOUNTER
"    Caller: Woodrow Alejandro \"Ortiz\"    Relationship to patient: Self    Best call back number: [phone]     Patient is needing: PATIENT STATES THAT A PRIOR AUTHORIZATION IS NEEDED FOR HIS PRESCRIPTION FOR   Tirzepatide-Weight Management (ZEPBOUND) 2.5 MG/0.5ML solution auto-injector   THE FAX NUMBER IS: [phone]  ([facility])  CASE# [id number]  LETTER OF MEDICAL NECESSITY  NEEDED    " Left message for patient to discuss lab results. LFTs improved. Recommend checking LFTs again in 1 month. If continues to improve, not further follow up needed.

## 2024-06-28 ENCOUNTER — TELEPHONE (OUTPATIENT)
Dept: INTERNAL MEDICINE | Age: 74
End: 2024-06-28
Payer: MEDICARE

## 2024-06-28 NOTE — TELEPHONE ENCOUNTER
"Caller: Woodrow Alejandro \"Ortiz\"     Relationship to patient: Self     Best call back number: [phone]      Patient is needing: PATIENT STATES THAT A PRIOR AUTHORIZATION IS NEEDED FOR HIS PRESCRIPTION FOR   Tirzepatide-Weight Management (ZEPBOUND) 2.5 MG/0.5ML solution auto-injector   THE FAX NUMBER IS: [phone]  ([facility])  CASE# [id number]  LETTER OF MEDICAL NECESSITY  IS NEEDED.        Can you initiate this PA , I will ask Sharee about the letter portion.   "

## 2024-06-28 NOTE — TELEPHONE ENCOUNTER
This is the one that already got denied from the other day  the letter of necessity is the appeal that's all they need I'm pretty sure

## 2024-07-01 NOTE — TELEPHONE ENCOUNTER
"Caller: Woodrow Alejandro \"Ortiz\"    Relationship: Self    Best call back number: [phone]     What is the best time to reach you: ANY    Who are you requesting to speak with (clinical staff, provider,  specific staff member): CLINICAL STAFF    What was the call regarding: PATIENT CALLED AND WOULD LIKE AN UPDATE ON HIS ZEPBOUND  "

## 2024-07-02 ENCOUNTER — OFFICE VISIT (OUTPATIENT)
Dept: ORTHOPEDIC SURGERY | Facility: CLINIC | Age: 74
End: 2024-07-02
Payer: MEDICARE

## 2024-07-02 VITALS
HEART RATE: 60 BPM | DIASTOLIC BLOOD PRESSURE: 74 MMHG | WEIGHT: 315 LBS | SYSTOLIC BLOOD PRESSURE: 112 MMHG | HEIGHT: 72 IN | OXYGEN SATURATION: 96 % | BODY MASS INDEX: 42.66 KG/M2

## 2024-07-02 DIAGNOSIS — M17.0 BILATERAL PRIMARY OSTEOARTHRITIS OF KNEE: Primary | ICD-10-CM

## 2024-07-02 NOTE — TELEPHONE ENCOUNTER
"Caller: Woodrow Alejandro \"Ortiz\"    Relationship to patient: Self    Best call back number: [phone]     Patient is needing: REQUESTING AN UPDATE ON THIS.   "

## 2024-07-02 NOTE — PROGRESS NOTES
"Chief Complaint  Initial Evaluation of the Right Knee and Initial Evaluation of the Left Knee     Subjective      Woodrow Alejandro presents to [facility] for an evaluation of bilateral knee pain. He has had pain to his knee's for several years. He has chronic A-fib and has a defibrillator. He went to his family doctor where he had x-rays done on his knee's. He reports he has had prior steroid injections and gel injections in the past. He reports the steroid injections helped him a lot but the gel injections didn't give him much relief at all. He has pain with prolonged walking, going up and down the stairs and getting in and out of the car.       Allergies   Allergen Reactions    Diclofenac Sodium Dizziness        Social History     Socioeconomic History    Marital status:    Tobacco Use    Smoking status: Never    Smokeless tobacco: Never   Vaping Use    Vaping status: Never Used   Substance and Sexual Activity    Alcohol use: Yes     Alcohol/week: 4.0 standard drinks of alcohol     Types: 4 Cans of beer per week    Drug use: Never    Sexual activity: Not Currently        I reviewed the patient's chief complaint, history of present illness, review of systems, past medical history, surgical history, family history, social history, medications, and allergy list.     Review of Systems     Constitutional: Denies fevers, chills, weight loss  Cardiovascular: Denies chest pain, shortness of breath  Skin: Denies rashes, acute skin changes  Neurologic: Denies headache, loss of consciousness  MSK: Bilateral knee pain       Vital Signs:   /74   Pulse 60   Ht 182.9 cm (72\")   Wt (!) 155 kg (341 lb)   SpO2 96%   BMI 46.25 kg/m²            Ortho Exam    Physical Exam  General:Alert. No acute distress   Bilateral lower extremity: full extension, flexion to 120 degrees,  mild cellulitis and venosus insufficieny, mild varus deformity, stable to varus/valgus stress, stable to " anterior/posterior drawer, neurovascularly intact, calf soft, negative Lachman's, negative  Edwina's, positive EHL, FHL, GS, and TA. Sensation intact to all 5 nerves of the foot, non tender to the medial joint line  and lateral joint line      Procedures    Imaging Results (Most Recent)       None             Result Review :       XR Knee 3 View Right    Result Date: 6/18/2024  Narrative: XR KNEE 3 VW LEFT, XR KNEE 3 VW RIGHT Date of Exam: 6/17/2024 11:04 AM EDT Indication: BILATERAL KNEE PAIN Comparison: None available. Findings: Right knee: 3 views. Tricompartmental degenerative changes, moderate to severe in the medial and patellofemoral compartments. Mild varus deformity. No acute fracture or dislocation. Small suprapatellar effusion. No gross osseous lesion or periosteal reaction. Left knee: 3 views. Tricompartmental degenerative changes, severe in the medial compartment and moderate to severe in the patellofemoral. Mild varus deformity. No acute fracture or dislocation. No gross osseous lesion or periosteal reaction. Small suprapatellar effusion.     Impression: Impression: Bilateral tricompartmental osteoarthritis, worst in the medial and patellofemoral compartments, as above. Electronically Signed: Sumeet Green MD  6/18/2024 8:50 AM EDT  Workstation ID: [id number]    XR Knee 3 View Left    Result Date: 6/18/2024  Narrative: XR KNEE 3 VW LEFT, XR KNEE 3 VW RIGHT Date of Exam: 6/17/2024 11:04 AM EDT Indication: BILATERAL KNEE PAIN Comparison: None available. Findings: Right knee: 3 views. Tricompartmental degenerative changes, moderate to severe in the medial and patellofemoral compartments. Mild varus deformity. No acute fracture or dislocation. Small suprapatellar effusion. No gross osseous lesion or periosteal reaction. Left knee: 3 views. Tricompartmental degenerative changes, severe in the medial compartment and moderate to severe in the patellofemoral. Mild varus deformity. No acute fracture or  dislocation. No gross osseous lesion or periosteal reaction. Small suprapatellar effusion.     Impression: Impression: Bilateral tricompartmental osteoarthritis, worst in the medial and patellofemoral compartments, as above. Electronically Signed: Sumeet Green MD  6/18/2024 8:50 AM EDT  Workstation ID: [id number]            Assessment and Plan     Diagnoses and all orders for this visit:    1. Bilateral primary osteoarthritis of knee (Primary)        The patient presents here today for an evaluation of bilateral knee pain.     Will seek approval for bilateral knee Zilretta injections.     He will continue to lose weight and continue over the counter medications for pain control.     Educated on risk of elevated BMI.  Discussed options for weight loss/decreasing BMI prior to procedure including dietician consult, weight loss options and exercise program. and Call or return if worsening symptoms.    Follow Up     After Zilretta injection approved     Patient was given instructions and counseling regarding his condition or for health maintenance advice. Please see specific information pulled into the AVS if appropriate.     Scribed for Azar Yepez MD by Sylvie Gupta.  07/02/24   10:21 EDT    I have personally performed the services described in this document as scribed by the above individual and it is both accurate and complete. Azar Yepez MD 07/03/24

## 2024-07-15 ENCOUNTER — TELEPHONE (OUTPATIENT)
Dept: ORTHOPEDIC SURGERY | Facility: CLINIC | Age: 74
End: 2024-07-15
Payer: MEDICARE

## 2024-07-15 NOTE — TELEPHONE ENCOUNTER
----- Message from Letty ROBLES sent at 7/15/2024 10:26 AM EDT -----  Regarding: Bilateral Knee Zilretta  Bilateral knee Zilretta, no authorization required. Reference # [id number].   Yepez

## 2024-07-15 NOTE — TELEPHONE ENCOUNTER
APPT:  7-18 AT 2:00 PM JACQUI KNEE ZILRETTA INJ TO SEE DELON    PER PT, NO RECENT INJECTION.    [employer] MEDIC[employer]=NO PA REQ

## 2024-07-16 ENCOUNTER — TELEPHONE (OUTPATIENT)
Dept: INTERNAL MEDICINE | Age: 74
End: 2024-07-16

## 2024-07-16 NOTE — TELEPHONE ENCOUNTER
"  Caller: Woodrow Alejandro \"Ortiz\"    Relationship: Self    Best call back number:     [phone]     Who are you requesting to speak with (clinical staff, provider,  specific staff member): BETSEY    What was the call regarding: PATIENT STATES THAT HE NEEDS TO SPEAK WITH DR BURNS'S MA      "

## 2024-07-16 NOTE — TELEPHONE ENCOUNTER
Spoke w/ Pt  , he is going to call insurance and find out what is on formulary for oral weight loss, then call us back. *Pending

## 2024-07-18 ENCOUNTER — OFFICE VISIT (OUTPATIENT)
Dept: ORTHOPEDIC SURGERY | Facility: CLINIC | Age: 74
End: 2024-07-18
Payer: MEDICARE

## 2024-07-18 VITALS
DIASTOLIC BLOOD PRESSURE: 65 MMHG | OXYGEN SATURATION: 91 % | HEART RATE: 60 BPM | HEIGHT: 72 IN | SYSTOLIC BLOOD PRESSURE: 116 MMHG | BODY MASS INDEX: 42.66 KG/M2 | WEIGHT: 315 LBS

## 2024-07-18 DIAGNOSIS — M17.0 BILATERAL PRIMARY OSTEOARTHRITIS OF KNEE: Primary | ICD-10-CM

## 2024-07-18 RX ORDER — LIDOCAINE HYDROCHLORIDE 10 MG/ML
5 INJECTION, SOLUTION INFILTRATION; PERINEURAL
Status: COMPLETED | OUTPATIENT
Start: 2024-07-18 | End: 2024-07-18

## 2024-07-18 RX ADMIN — LIDOCAINE HYDROCHLORIDE 5 ML: 10 INJECTION, SOLUTION INFILTRATION; PERINEURAL at 14:26

## 2024-07-18 RX ADMIN — LIDOCAINE HYDROCHLORIDE 5 ML: 10 INJECTION, SOLUTION INFILTRATION; PERINEURAL at 14:28

## 2024-07-18 NOTE — PROGRESS NOTES
Chief Complaint  Follow-up of the Left Knee and Follow-up of the Right Knee    Subjective          Follow-up        Woodrow Alejandro is a 73 y.o. male  presents to [facility] for     Patient presents for follow-up evaluation of bilateral knee pain and bilateral knee osteoarthritis and to receive bilateral Zilretta injections.  Patient saw Dr. Yepez on 7/2/2024 Dr. Yepez discussed surgical versus conservative treatment patient and Dr. Yepez discussed weight loss prior to having knee replacement surgery patient is working on this.  He states his right knee is worse than his left he has had gel injections at the knee [facility] in [place] with no relief he has also tried steroid injections in the past.  He admits to pain with walking, going up and down stairs and getting in and out of a car.  He is here for bilateral Zilretta injections which were approved and he would like to move forward with this plan.      Allergies   Allergen Reactions    Diclofenac Sodium Dizziness        Social History     Socioeconomic History    Marital status:    Tobacco Use    Smoking status: Never    Smokeless tobacco: Never   Vaping Use    Vaping status: Never Used   Substance and Sexual Activity    Alcohol use: Yes     Alcohol/week: 4.0 standard drinks of alcohol     Types: 4 Cans of beer per week    Drug use: Never    Sexual activity: Not Currently        REVIEW OF SYSTEMS    Constitutional: Awake alert and oriented x3, no acute distress, denies fevers, chills, weight loss  Respiratory: No respiratory distress  Vascular: Brisk cap refill, Intact distal pulses, No cyanosis, compartments soft with no signs or symptoms of compartment syndrome or DVT.   Cardiovascular: Denies chest pain, shortness of breath  Skin: Denies rashes, acute skin changes  Neurologic: Denies headache, loss of consciousness  MSK: Bilateral knee pain      Objective   Vital Signs:   /65   Pulse 60    "Ht 182.9 cm (72.01\")   Wt (!) 155 kg (341 lb)   SpO2 91%   BMI 46.24 kg/m²     Body mass index is 46.24 kg/m².    Physical Exam       Bilateral lower extremity: full extension, flexion to 120 degrees,  mild cellulitis and venosus insufficieny, mild varus deformity, stable to varus/valgus stress, stable to anterior/posterior drawer, neurovascularly intact, calf soft, negative Lachman's, negative  Edwina's, positive EHL, FHL, GS, and TA. Sensation intact to all 5 nerves of the foot, non tender to the medial joint line  and lateral joint line         Large Joint :L Knee  Date/Time: 7/18/2024 2:26 PM  Consent given by: patient  Site marked: site marked  Timeout: Immediately prior to procedure a time out was called to verify the correct patient, procedure, equipment, support staff and site/side marked as required   Supporting Documentation  Indications: pain   Procedure Details  Location: knee - L knee  Preparation: Patient was prepped and draped in the usual sterile fashion  Needle gauge: 21 G.  Medications administered: 32 mg Triamcinolone Acetonide 32 MG; 5 mL lidocaine 1 %  Patient tolerance: patient tolerated the procedure well with no immediate complications      Large Joint: R knee  Date/Time: 7/18/2024 2:28 PM  Consent given by: patient  Site marked: site marked  Timeout: Immediately prior to procedure a time out was called to verify the correct patient, procedure, equipment, support staff and site/side marked as required   Supporting Documentation  Indications: pain   Procedure Details  Location: knee - R knee  Preparation: Patient was prepped and draped in the usual sterile fashion  Needle gauge: 21 G.  Medications administered: 32 mg Triamcinolone Acetonide 32 MG; 5 mL lidocaine 1 %  Patient tolerance: patient tolerated the procedure well with no immediate complications      This injection documentation was Scribed for CHANEL Arreola by Chikis Spaulding MA.  07/18/24   14:30 EDT   Imaging " Results (Most Recent)       None             Result Review :   The following data was reviewed by: CHANEL Arreola on 07/18/2024:               Assessment and Plan    Diagnoses and all orders for this visit:    1. Bilateral primary osteoarthritis of knee (Primary)    Other orders  -     Large Joint :L Knee  -     Large Joint: R knee        Discussed diagnosis and treatment options with the patient, he was advised to he can receive bilateral Zilretta injections which he tolerated well follow-up in 3 months per patient request    Call or return if worsening symptoms.    Follow Up   Return in about 3 months (around 10/18/2024) for Recheck.  Patient was given instructions and counseling regarding his condition or for health maintenance advice. Please see specific information pulled into the AVS if appropriate.       EMR Dragon/Transcription disclaimer:  Part of this note may be an electronic transcription/translation of spoken language to printed text using the Dragon Dictation System

## 2024-08-21 ENCOUNTER — TELEPHONE (OUTPATIENT)
Dept: INTERNAL MEDICINE | Age: 74
End: 2024-08-21

## 2024-08-28 PROCEDURE — 87086 URINE CULTURE/COLONY COUNT: CPT

## 2024-09-05 ENCOUNTER — LAB (OUTPATIENT)
Dept: LAB | Facility: HOSPITAL | Age: 74
End: 2024-09-05
Payer: MEDICARE

## 2024-09-05 ENCOUNTER — OFFICE VISIT (OUTPATIENT)
Dept: INTERNAL MEDICINE | Age: 74
End: 2024-09-05
Payer: MEDICARE

## 2024-09-05 VITALS
HEIGHT: 72 IN | DIASTOLIC BLOOD PRESSURE: 86 MMHG | WEIGHT: 315 LBS | BODY MASS INDEX: 42.66 KG/M2 | OXYGEN SATURATION: 93 % | SYSTOLIC BLOOD PRESSURE: 152 MMHG | HEART RATE: 83 BPM | TEMPERATURE: 98.2 F

## 2024-09-05 DIAGNOSIS — G25.2 COARSE TREMORS: ICD-10-CM

## 2024-09-05 DIAGNOSIS — Z95.2 S/P AVR: ICD-10-CM

## 2024-09-05 DIAGNOSIS — I11.0 HYPERTENSIVE HEART DISEASE WITH HEART FAILURE: ICD-10-CM

## 2024-09-05 DIAGNOSIS — R35.0 INCREASED URINARY FREQUENCY: ICD-10-CM

## 2024-09-05 DIAGNOSIS — E78.2 MIXED HYPERLIPIDEMIA: ICD-10-CM

## 2024-09-05 DIAGNOSIS — R53.1 WEAKNESS GENERALIZED: Primary | ICD-10-CM

## 2024-09-05 DIAGNOSIS — R73.01 IFG (IMPAIRED FASTING GLUCOSE): ICD-10-CM

## 2024-09-05 DIAGNOSIS — I48.21 PERMANENT ATRIAL FIBRILLATION: ICD-10-CM

## 2024-09-05 DIAGNOSIS — R80.9 PROTEINURIA, UNSPECIFIED TYPE: ICD-10-CM

## 2024-09-05 LAB
BASOPHILS # BLD AUTO: 0.02 10*3/MM3 (ref 0–0.2)
BASOPHILS NFR BLD AUTO: 0.2 % (ref 0–1.5)
CLUMPED PLATELETS: NORMAL
DEPRECATED RDW RBC AUTO: 47 FL (ref 37–54)
EOSINOPHIL # BLD AUTO: 0 10*3/MM3 (ref 0–0.4)
EOSINOPHIL NFR BLD AUTO: 0 % (ref 0.3–6.2)
ERYTHROCYTE [DISTWIDTH] IN BLOOD BY AUTOMATED COUNT: 14.1 % (ref 12.3–15.4)
HBA1C MFR BLD: 6.2 % (ref 4.8–5.6)
HCT VFR BLD AUTO: 36.6 % (ref 37.5–51)
HGB BLD-MCNC: 12.6 G/DL (ref 13–17.7)
IMM GRANULOCYTES # BLD AUTO: 0.1 10*3/MM3 (ref 0–0.05)
IMM GRANULOCYTES NFR BLD AUTO: 0.8 % (ref 0–0.5)
LYMPHOCYTES # BLD AUTO: 0.94 10*3/MM3 (ref 0.7–3.1)
LYMPHOCYTES NFR BLD AUTO: 7.4 % (ref 19.6–45.3)
MCH RBC QN AUTO: 31.6 PG (ref 26.6–33)
MCHC RBC AUTO-ENTMCNC: 34.4 G/DL (ref 31.5–35.7)
MCV RBC AUTO: 91.7 FL (ref 79–97)
MONOCYTES # BLD AUTO: 1.1 10*3/MM3 (ref 0.1–0.9)
MONOCYTES NFR BLD AUTO: 8.7 % (ref 5–12)
NEUTROPHILS NFR BLD AUTO: 10.49 10*3/MM3 (ref 1.7–7)
NEUTROPHILS NFR BLD AUTO: 82.9 % (ref 42.7–76)
PLAT MORPH BLD: NORMAL
PLATELET # BLD AUTO: 139 10*3/MM3 (ref 140–450)
PMV BLD AUTO: 11 FL (ref 6–12)
RBC # BLD AUTO: 3.99 10*6/MM3 (ref 4.14–5.8)
RBC MORPH BLD: NORMAL
WBC MORPH BLD: NORMAL
WBC NRBC COR # BLD AUTO: 12.65 10*3/MM3 (ref 3.4–10.8)

## 2024-09-05 PROCEDURE — 36415 COLL VENOUS BLD VENIPUNCTURE: CPT | Performed by: INTERNAL MEDICINE

## 2024-09-05 PROCEDURE — 1125F AMNT PAIN NOTED PAIN PRSNT: CPT | Performed by: INTERNAL MEDICINE

## 2024-09-05 PROCEDURE — 99214 OFFICE O/P EST MOD 30 MIN: CPT | Performed by: INTERNAL MEDICINE

## 2024-09-05 PROCEDURE — 3079F DIAST BP 80-89 MM HG: CPT | Performed by: INTERNAL MEDICINE

## 2024-09-05 PROCEDURE — 83036 HEMOGLOBIN GLYCOSYLATED A1C: CPT | Performed by: INTERNAL MEDICINE

## 2024-09-05 PROCEDURE — 85025 COMPLETE CBC W/AUTO DIFF WBC: CPT | Performed by: INTERNAL MEDICINE

## 2024-09-05 PROCEDURE — 85007 BL SMEAR W/DIFF WBC COUNT: CPT | Performed by: INTERNAL MEDICINE

## 2024-09-05 PROCEDURE — 3077F SYST BP >= 140 MM HG: CPT | Performed by: INTERNAL MEDICINE

## 2024-09-05 NOTE — PROGRESS NOTES
"CHIEF COMPLAINT/ HPI:  Urinary Frequency (Frequency in voiding , worse at night for over a month. Pt states urine at  was full of protein and sugars. Concern with pre-diabetes. Pt states weakness and increase in thirst. Pt is fasting if needed for labs. )    Recent dental work, did not feel well after that,          Objective   Vital Signs  Vitals:    09/05/24 1049   BP: 152/86   Pulse: 83   Temp: 98.2 °F (36.8 °C)   SpO2: 93%   Weight: (!) 145 kg (319 lb)   Height: 182.9 cm (72.01\")      Body mass index is 43.25 kg/m².  Review of Systems --urine freq , no weakness, no energy, urinating frequently, no appetite,, fatigued, sleeping a lot,  Not feeling well, shortness of breath, dyspnea on exertion    Physical Exam patient's cardiac exam reveals an irregularly irregular rhythm 1/6 systolic murmur he is got stasis dermatitis changes to the lower legs bilaterally, 1+ edema to 2+ bilateral lower legs, lungs are clear posterior bilateral, neck supple, fatty tissue mass right thyroid area felt unchanged from previous exams throat is clear dry mucosa sclera nonicteric alert and oriented x 3, abdomen soft obese  Result Review :   No results found for: \"PROBNP\", \"BNP\"  CMP          10/26/2023    09:07 12/5/2023    08:58 6/13/2024    09:45   CMP   Glucose 131  123  141    BUN 23  19  17    Creatinine 1.00  1.07  1.09    EGFR 80.0  73.3  71.7    Sodium 141  142  143    Potassium 4.6  4.5  4.1    Chloride 106  108  110    Calcium 9.0  9.1  8.6    Total Protein 7.0  7.1  7.0    Albumin 4.3  4.4  4.5    Globulin 2.7  2.7  2.5    Total Bilirubin 0.6  0.8  0.7    Alkaline Phosphatase 63  80  81    AST (SGOT) 20  19  23    ALT (SGPT) 19  18  23    Albumin/Globulin Ratio 1.6  1.6  1.8    BUN/Creatinine Ratio 23.0  17.8  15.6    Anion Gap 11.3  11.8  11.5      CBC w/diff          10/26/2023    09:07 6/13/2024    09:45   CBC w/Diff   WBC 5.80  6.64    RBC 4.82  4.71    Hemoglobin 15.2  15.1    Hematocrit 44.5  44.3    MCV 92.3  94.1  "   MCH 31.5  32.1    MCHC 34.2  34.1    RDW 12.7  13.0    Platelets 147  150    Neutrophil Rel % 67.9  71.3    Immature Granulocyte Rel % 0.3  0.2    Lymphocyte Rel % 19.7  17.0    Monocyte Rel % 9.1  8.9    Eosinophil Rel % 2.1  1.8    Basophil Rel % 0.9  0.8       Lipid Panel          10/26/2023    09:07 12/5/2023    08:58 6/13/2024    09:45   Lipid Panel   Total Cholesterol 188  128  164    Triglycerides 117  100  96    HDL Cholesterol 50  46  48    VLDL Cholesterol 21  19  18    LDL Cholesterol  117  63  98    LDL/HDL Ratio 2.29  1.35  2.02       Lab Results   Component Value Date    TSH 1.480 12/05/2023      Lab Results   Component Value Date    FREET4 1.42 12/05/2023      A1C Last 3 Results          12/5/2023    08:58 6/13/2024    09:45   HGBA1C Last 3 Results   Hemoglobin A1C 5.70  5.70       PSA          10/26/2023    09:07   PSA   PSA 0.341                     Visit Diagnoses:    ICD-10-CM ICD-9-CM   1. Weakness generalized  R53.1 780.79   2. Coarse tremors  G25.2 781.0   3. Increased urinary frequency  R35.0 788.41   4. IFG (impaired fasting glucose)  R73.01 790.21   5. S/P AVR  Z95.2 V43.3   6. Permanent atrial fibrillation  I48.21 427.31   7. Mixed hyperlipidemia  E78.2 272.2   8. Proteinuria, unspecified type  R80.9 791.0   9. Hypertensive heart disease with heart failure  I11.0 402.91     428.9       Assessment and Plan   Diagnoses and all orders for this visit:    1. Weakness generalized (Primary)  -     PSA DIAGNOSTIC; Future  -     Hemoglobin A1c; Future  -     Comprehensive Metabolic Panel; Future  -     CBC & Differential; Future  -     TSH+Free T4; Future  -     Lipase; Future  -     Amylase; Future  -     proBNP; Future    2. Coarse tremors  -     PSA DIAGNOSTIC; Future  -     Hemoglobin A1c; Future  -     Comprehensive Metabolic Panel; Future  -     CBC & Differential; Future  -     TSH+Free T4; Future  -     Lipase; Future  -     Amylase; Future  -     proBNP; Future    3. Increased urinary  frequency  -     PSA DIAGNOSTIC; Future  -     Hemoglobin A1c; Future  -     Comprehensive Metabolic Panel; Future  -     CBC & Differential; Future  -     TSH+Free T4; Future  -     Lipase; Future  -     Amylase; Future  -     proBNP; Future    4. IFG (impaired fasting glucose)  -     PSA DIAGNOSTIC; Future  -     Hemoglobin A1c; Future  -     Comprehensive Metabolic Panel; Future  -     CBC & Differential; Future  -     TSH+Free T4; Future  -     Lipase; Future  -     Amylase; Future  -     proBNP; Future    5. S/P AVR  -     PSA DIAGNOSTIC; Future  -     Hemoglobin A1c; Future  -     Comprehensive Metabolic Panel; Future  -     CBC & Differential; Future  -     TSH+Free T4; Future  -     Lipase; Future  -     Amylase; Future  -     proBNP; Future    6. Permanent atrial fibrillation  -     PSA DIAGNOSTIC; Future  -     Hemoglobin A1c; Future  -     Comprehensive Metabolic Panel; Future  -     CBC & Differential; Future  -     TSH+Free T4; Future  -     Lipase; Future  -     Amylase; Future  -     proBNP; Future    7. Mixed hyperlipidemia  -     PSA DIAGNOSTIC; Future  -     Hemoglobin A1c; Future  -     Comprehensive Metabolic Panel; Future  -     CBC & Differential; Future  -     TSH+Free T4; Future  -     Lipase; Future  -     Amylase; Future  -     proBNP; Future    8. Proteinuria, unspecified type  -     PSA DIAGNOSTIC; Future  -     Hemoglobin A1c; Future  -     Comprehensive Metabolic Panel; Future  -     CBC & Differential; Future  -     TSH+Free T4; Future  -     Lipase; Future  -     Amylase; Future  -     proBNP; Future    9. Hypertensive heart disease with heart failure  -     proBNP; Future    Urinary frequency  Proteinuria  Weight loss  General Malaise, discussion about differential diagnosis, will do blood work to rule out diabetes, other possibilities prostate infection etc.,  Patient is going to stop taking furosemide, he is going to also hold lisinopril, stop lisinopril for the next couple  weeks,      Arthritis knees, left worse than right, discussed treatment options patient is really reluctant about surgery would like to consider injections, new treatment options in [place],--- consider Decadron for 5 days, Voltaren,, referral to orthopedic surgery versus the knee center in [place] for injections, patient has tried Osteo Bi-Flex, turmeric, consider physical therapy evaluation also, March 4, 2024----patient has upcoming appoint Dr. Yepez, has tried different medications without much success, x-rays reviewed June 2024 showing severe tricompartmental arthritis bilateral,    Class 3 Severe Obesity (BMI >=40). Obesity-related health conditions include the following: hypertension and coronary heart disease. Obesity is unchanged. BMI is is above average; BMI management plan is completed. We discussed portion control, increasing exercise, and Weight Watchers or other Commercial based weight reduction program.--- Discussed losing weight prior to any upcoming knee surgery, consider weight loss medications to help if necessary--- we will send in zepbound to [facility] June 24, 2024 to try patient understands--- patient is never taken the medication due to approval process insurance issues etc., as of September 5, 2024----     Hypertension, currently with low blood pressures at home weakness, September 5, 2024 will stop lisinopril for now stop Lasix, continue Lopressor and diltiazem, follow-up lab work     Impaired fasting glucose, hemoglobin A1c 5.7 December 5, 2023, no change June 13, 2024 ----urine microalbumin is 4, normal December 5, 2023 as above    Right neck anterior fullness rubbery nodule, patient says been there about 10 years or more ----has not changed will follow clinically no studies for now,    Coronary artery disease, previous implantable cardio defibrillator---dr newman     Status post aortic valve replacement, follows up with Dr. Newman ---echocardiogram February 21, 2024 shows  normal ejection fraction, no significant aortic valve issues, artificial valve appears to be intact, there is mild to moderate mitral regurgitation, some right atrial enlargement,    Chronic atrial fibrillation---only on asa , tried coumadin in past, patient will continue rate control medications    Mixed hyperlipidemia--off Crestor 10 mg daily, ---    PSA 0.3 October 26, 2023                Follow Up   Return in about 2 weeks (around 9/19/2024).  Patient was given instructions and counseling regarding his condition or for health maintenance advice. Please see specific information pulled into the AVS if appropriate.           Answers submitted by the patient for this visit:  Primary Reason for Visit (Submitted on 9/4/2024)  What is the primary reason for your visit?: Painful Urination  Painful Urination Questionnaire (Submitted on 9/4/2024)  Chief Complaint: Dysuria  Chronicity: new  Onset: 1 to 4 weeks ago  Frequency: constantly  Progression since onset: unchanged  Pain - numeric: 10/10  Fever: no fever  Sexually active?: No  History of pyelonephritis?: No  discharge: Yes  hesitancy: Yes  sweats: Yes  Additional Information: Very frequent urination accompanied by no appetite and constant sleeping, high blood pressure, possibly high sugar, extreme fatigue, acid refux, hands shake, weakness

## 2024-09-06 ENCOUNTER — TELEPHONE (OUTPATIENT)
Dept: INTERNAL MEDICINE | Age: 74
End: 2024-09-06
Payer: MEDICARE

## 2024-09-06 ENCOUNTER — LAB (OUTPATIENT)
Dept: LAB | Facility: HOSPITAL | Age: 74
End: 2024-09-06
Payer: MEDICARE

## 2024-09-06 DIAGNOSIS — G25.2 COARSE TREMORS: ICD-10-CM

## 2024-09-06 DIAGNOSIS — E78.2 MIXED HYPERLIPIDEMIA: ICD-10-CM

## 2024-09-06 DIAGNOSIS — I11.0 HYPERTENSIVE HEART DISEASE WITH HEART FAILURE: ICD-10-CM

## 2024-09-06 DIAGNOSIS — R53.1 WEAKNESS GENERALIZED: ICD-10-CM

## 2024-09-06 DIAGNOSIS — R73.01 IFG (IMPAIRED FASTING GLUCOSE): ICD-10-CM

## 2024-09-06 DIAGNOSIS — Z95.2 S/P AVR: ICD-10-CM

## 2024-09-06 DIAGNOSIS — R35.0 INCREASED URINARY FREQUENCY: ICD-10-CM

## 2024-09-06 DIAGNOSIS — I48.21 PERMANENT ATRIAL FIBRILLATION: ICD-10-CM

## 2024-09-06 DIAGNOSIS — R80.9 PROTEINURIA, UNSPECIFIED TYPE: ICD-10-CM

## 2024-09-06 DIAGNOSIS — D72.825 BANDEMIA: Primary | ICD-10-CM

## 2024-09-06 LAB
ALBUMIN SERPL-MCNC: 3.1 G/DL (ref 3.5–5.2)
ALBUMIN/GLOB SERPL: 1 G/DL
ALP SERPL-CCNC: 69 U/L (ref 39–117)
ALT SERPL W P-5'-P-CCNC: 37 U/L (ref 1–41)
AMYLASE SERPL-CCNC: 39 U/L (ref 28–100)
ANION GAP SERPL CALCULATED.3IONS-SCNC: 14.4 MMOL/L (ref 5–15)
AST SERPL-CCNC: 38 U/L (ref 1–40)
BILIRUB SERPL-MCNC: 1 MG/DL (ref 0–1.2)
BUN SERPL-MCNC: 13 MG/DL (ref 8–23)
BUN/CREAT SERPL: 14 (ref 7–25)
CALCIUM SPEC-SCNC: 8.6 MG/DL (ref 8.6–10.5)
CHLORIDE SERPL-SCNC: 96 MMOL/L (ref 98–107)
CO2 SERPL-SCNC: 22.6 MMOL/L (ref 22–29)
CREAT SERPL-MCNC: 0.93 MG/DL (ref 0.76–1.27)
EGFRCR SERPLBLD CKD-EPI 2021: 86.7 ML/MIN/1.73
GLOBULIN UR ELPH-MCNC: 3.2 GM/DL
GLUCOSE SERPL-MCNC: 136 MG/DL (ref 65–99)
LIPASE SERPL-CCNC: 49 U/L (ref 13–60)
NT-PROBNP SERPL-MCNC: 3497 PG/ML (ref 0–900)
POTASSIUM SERPL-SCNC: 4 MMOL/L (ref 3.5–5.2)
PROT SERPL-MCNC: 6.3 G/DL (ref 6–8.5)
SODIUM SERPL-SCNC: 133 MMOL/L (ref 136–145)
TSH SERPL DL<=0.05 MIU/L-ACNC: 1.89 UIU/ML (ref 0.27–4.2)

## 2024-09-06 PROCEDURE — 80053 COMPREHEN METABOLIC PANEL: CPT

## 2024-09-06 PROCEDURE — 83690 ASSAY OF LIPASE: CPT

## 2024-09-06 PROCEDURE — 83880 ASSAY OF NATRIURETIC PEPTIDE: CPT

## 2024-09-06 PROCEDURE — 82150 ASSAY OF AMYLASE: CPT

## 2024-09-06 PROCEDURE — 84443 ASSAY THYROID STIM HORMONE: CPT

## 2024-09-06 NOTE — TELEPHONE ENCOUNTER
I called the patient's daughter some of the blood test have not been done and I do not know why they were not completed, but his diabetes test is not very high it is only 6.2, but I do not have his actual sugar number it appears that some of the chemistry panels appeared to be canceled for unclear reasons and I am not sure why, his white count was slightly elevated?  This can indicate an infection,, at this point I would not know what I am treating, if he is not any better he may need to come into the hospital or to the emergency room and be evaluated, consideration for getting the lab work and a chest x-ray which I have placed, I also ordered 2 blood cultures for him to do today before he starts taking some antibiotics, and the concern would be an aortic valve infection due to recent dental work and the fact that he is not feeling well I told her again that if he gets worse he is to come to the emergency room I have also sent in Augmentin 875 twice a day for 10 days to his pharmacy to start taking and they are going to go get the blood cultures a chest x-ray were waiting on the further lab studies to come back but again she knows to take him to the ER if he were to deteriorate

## 2024-09-06 NOTE — TELEPHONE ENCOUNTER
Caller: CHACORTA CLARK    Relationship: Emergency Contact    Best call back number: [phone]    What test was performed: BLOOD WORK     When was the test performed: 9.5.24    Where was the test performed: IN OFFICE     Additional notes: DAUGHTER STATES SHE SAW SOME OF THE RESULTS ON MYCHART AND IS CONCERNED. WOULD LIKE A CALL BACK ASAP.

## 2024-09-09 ENCOUNTER — TELEPHONE (OUTPATIENT)
Dept: INTERNAL MEDICINE | Age: 74
End: 2024-09-09

## 2024-09-09 NOTE — TELEPHONE ENCOUNTER
Caller: CHACORTA CLARK    Relationship: Emergency Contact    Best call back number: [phone]     What test was performed: BLOODWORK    When was the test performed: 9.6.24    Where was the test performed:  [facility]    Additional notes:

## 2024-09-09 NOTE — TELEPHONE ENCOUNTER
Call patient with an update find out how he is feeling if he is still running a fever and if he got his blood cultures done yet and is he taking the antibiotic??

## 2024-09-10 NOTE — TELEPHONE ENCOUNTER
Hub staff attempted to follow warm transfer process and was unsuccessful     Caller: CHACORTA CLARK    Relationship to patient: Emergency Contact    Best call back number: [phone] (patients number)    Patient is needing: PATIENTS DAUGHTER CALLED TO CHECK IN ON THIS, WOULD LIKE A CALL BACK TO DISCUSS    PATIENTS DAUGHTER STATES TO CALL PATIENTS NUMBER

## 2024-09-11 ENCOUNTER — TELEPHONE (OUTPATIENT)
Dept: INTERNAL MEDICINE | Age: 74
End: 2024-09-11
Payer: MEDICARE

## 2024-09-11 DIAGNOSIS — N41.0 ACUTE PROSTATITIS: ICD-10-CM

## 2024-09-11 DIAGNOSIS — I10 ESSENTIAL HYPERTENSION: Primary | ICD-10-CM

## 2024-09-11 DIAGNOSIS — R53.1 WEAKNESS GENERALIZED: ICD-10-CM

## 2024-09-11 DIAGNOSIS — I11.0 HYPERTENSIVE HEART DISEASE WITH HEART FAILURE: ICD-10-CM

## 2024-09-11 DIAGNOSIS — D72.825 BANDEMIA: ICD-10-CM

## 2024-09-11 DIAGNOSIS — R35.0 INCREASED URINARY FREQUENCY: ICD-10-CM

## 2024-09-11 NOTE — TELEPHONE ENCOUNTER
Spoke with patient, he still not feeling well he still very fatigued but no running no fevers he still getting up and urinating a lot during the night, I reviewed the labs from last week with him he is not going to do the blood cultures at this point, he has an appointment with me next week, I told him I like him to repeat the chemistry panel the white count since it was elevated the sodium was low I would like him to get a PSA test to make sure he does not have prostatitis I am going to check the proBNP level which was elevated last week and a sed rate, he still may need blood cultures and further testing, patient was also made aware if he gets worse or starts running fevers he is to come to the emergency room

## 2024-09-11 NOTE — TELEPHONE ENCOUNTER
Pt did not get the blood cultures done, pt does not have a fever and has 5 days of ABX left.   *asking for results of labs

## 2024-09-12 ENCOUNTER — LAB (OUTPATIENT)
Dept: LAB | Facility: HOSPITAL | Age: 74
End: 2024-09-12
Payer: MEDICARE

## 2024-09-12 DIAGNOSIS — R73.01 IFG (IMPAIRED FASTING GLUCOSE): ICD-10-CM

## 2024-09-12 DIAGNOSIS — I10 ESSENTIAL HYPERTENSION: ICD-10-CM

## 2024-09-12 DIAGNOSIS — I11.0 HYPERTENSIVE HEART DISEASE WITH HEART FAILURE: ICD-10-CM

## 2024-09-12 DIAGNOSIS — R53.1 WEAKNESS GENERALIZED: ICD-10-CM

## 2024-09-12 DIAGNOSIS — Z95.810 ICD (IMPLANTABLE CARDIOVERTER-DEFIBRILLATOR), DUAL, IN SITU: ICD-10-CM

## 2024-09-12 DIAGNOSIS — E55.9 VITAMIN D DEFICIENCY: ICD-10-CM

## 2024-09-12 DIAGNOSIS — Z12.5 SCREENING PSA (PROSTATE SPECIFIC ANTIGEN): ICD-10-CM

## 2024-09-12 DIAGNOSIS — E78.2 MIXED HYPERLIPIDEMIA: ICD-10-CM

## 2024-09-12 DIAGNOSIS — D72.825 BANDEMIA: ICD-10-CM

## 2024-09-12 DIAGNOSIS — M17.0 PRIMARY OSTEOARTHRITIS OF BOTH KNEES: ICD-10-CM

## 2024-09-12 DIAGNOSIS — N41.0 ACUTE PROSTATITIS: ICD-10-CM

## 2024-09-12 DIAGNOSIS — R35.0 INCREASED URINARY FREQUENCY: ICD-10-CM

## 2024-09-12 DIAGNOSIS — Z95.2 S/P AVR: ICD-10-CM

## 2024-09-12 LAB
ALBUMIN SERPL-MCNC: 3.4 G/DL (ref 3.5–5.2)
ALBUMIN/GLOB SERPL: 0.9 G/DL
ALP SERPL-CCNC: 81 U/L (ref 39–117)
ALT SERPL W P-5'-P-CCNC: 46 U/L (ref 1–41)
ANION GAP SERPL CALCULATED.3IONS-SCNC: 11.6 MMOL/L (ref 5–15)
AST SERPL-CCNC: 42 U/L (ref 1–40)
BASOPHILS # BLD AUTO: 0.04 10*3/MM3 (ref 0–0.2)
BASOPHILS NFR BLD AUTO: 0.3 % (ref 0–1.5)
BILIRUB SERPL-MCNC: 0.9 MG/DL (ref 0–1.2)
BUN SERPL-MCNC: 13 MG/DL (ref 8–23)
BUN/CREAT SERPL: 16.7 (ref 7–25)
CALCIUM SPEC-SCNC: 8.9 MG/DL (ref 8.6–10.5)
CHLORIDE SERPL-SCNC: 102 MMOL/L (ref 98–107)
CLUMPED PLATELETS: PRESENT
CO2 SERPL-SCNC: 24.4 MMOL/L (ref 22–29)
CREAT SERPL-MCNC: 0.78 MG/DL (ref 0.76–1.27)
DEPRECATED RDW RBC AUTO: 49.3 FL (ref 37–54)
EGFRCR SERPLBLD CKD-EPI 2021: 94.2 ML/MIN/1.73
EOSINOPHIL # BLD AUTO: 0.23 10*3/MM3 (ref 0–0.4)
EOSINOPHIL NFR BLD AUTO: 1.6 % (ref 0.3–6.2)
ERYTHROCYTE [DISTWIDTH] IN BLOOD BY AUTOMATED COUNT: 14.6 % (ref 12.3–15.4)
ERYTHROCYTE [SEDIMENTATION RATE] IN BLOOD: 60 MM/HR (ref 0–20)
GLOBULIN UR ELPH-MCNC: 3.6 GM/DL
GLUCOSE SERPL-MCNC: 120 MG/DL (ref 65–99)
HCT VFR BLD AUTO: 36.4 % (ref 37.5–51)
HGB BLD-MCNC: 12.1 G/DL (ref 13–17.7)
IMM GRANULOCYTES # BLD AUTO: 0.1 10*3/MM3 (ref 0–0.05)
IMM GRANULOCYTES NFR BLD AUTO: 0.7 % (ref 0–0.5)
LYMPHOCYTES # BLD AUTO: 0.94 10*3/MM3 (ref 0.7–3.1)
LYMPHOCYTES NFR BLD AUTO: 6.7 % (ref 19.6–45.3)
MCH RBC QN AUTO: 31 PG (ref 26.6–33)
MCHC RBC AUTO-ENTMCNC: 33.2 G/DL (ref 31.5–35.7)
MCV RBC AUTO: 93.3 FL (ref 79–97)
MONOCYTES # BLD AUTO: 0.7 10*3/MM3 (ref 0.1–0.9)
MONOCYTES NFR BLD AUTO: 5 % (ref 5–12)
NEUTROPHILS NFR BLD AUTO: 11.93 10*3/MM3 (ref 1.7–7)
NEUTROPHILS NFR BLD AUTO: 85.7 % (ref 42.7–76)
NRBC BLD AUTO-RTO: 0 /100 WBC (ref 0–0.2)
NT-PROBNP SERPL-MCNC: 2243 PG/ML (ref 0–900)
PLATELET # BLD AUTO: 227 10*3/MM3 (ref 140–450)
PMV BLD AUTO: 10.4 FL (ref 6–12)
POTASSIUM SERPL-SCNC: 4.6 MMOL/L (ref 3.5–5.2)
PROT SERPL-MCNC: 7 G/DL (ref 6–8.5)
PSA SERPL-MCNC: 0.22 NG/ML (ref 0–4)
RBC # BLD AUTO: 3.9 10*6/MM3 (ref 4.14–5.8)
RBC MORPH BLD: NORMAL
SODIUM SERPL-SCNC: 138 MMOL/L (ref 136–145)
WBC MORPH BLD: NORMAL
WBC NRBC COR # BLD AUTO: 13.94 10*3/MM3 (ref 3.4–10.8)

## 2024-09-12 PROCEDURE — 85652 RBC SED RATE AUTOMATED: CPT

## 2024-09-12 PROCEDURE — 36415 COLL VENOUS BLD VENIPUNCTURE: CPT

## 2024-09-12 PROCEDURE — 80053 COMPREHEN METABOLIC PANEL: CPT

## 2024-09-12 PROCEDURE — 85007 BL SMEAR W/DIFF WBC COUNT: CPT

## 2024-09-12 PROCEDURE — 85025 COMPLETE CBC W/AUTO DIFF WBC: CPT

## 2024-09-12 PROCEDURE — G0103 PSA SCREENING: HCPCS

## 2024-09-12 PROCEDURE — 83880 ASSAY OF NATRIURETIC PEPTIDE: CPT

## 2024-09-12 PROCEDURE — 87040 BLOOD CULTURE FOR BACTERIA: CPT

## 2024-09-13 ENCOUNTER — TELEPHONE (OUTPATIENT)
Dept: INTERNAL MEDICINE | Age: 74
End: 2024-09-13

## 2024-09-13 NOTE — TELEPHONE ENCOUNTER
Caller: CHACORTA CLARK (NOT ON VERBAL)    Relationship: Emergency Contact    Best call back number: [phone]     Caller requesting test results: DAUGHTER    What test was performed: BLOOD WORK    When was the test performed: 9/12/24    Where was the test performed: [facility]

## 2024-09-16 ENCOUNTER — TELEPHONE (OUTPATIENT)
Dept: INTERNAL MEDICINE | Age: 74
End: 2024-09-16

## 2024-09-16 NOTE — TELEPHONE ENCOUNTER
"  Caller: Woodrow Alejandro \"Ortiz\"    Relationship: Self    Best call back number:     [phone]       What was the call regarding: PATIENT STATES THAT HE IS FEELING BETTER BUT IS STILL FEELING A LITTLE WEAK AND HAVING TO GET UP FREQUENTLY IN THE NIGHT TO URINATE. HE STATES THAT HE HAS FINISHED HIS amoxicillin-clavulanate (AUGMENTIN) 875-125 MG per tablet  AND WANTS TO KNOW IF IT SHOULD BE REFILLED OR WHAT ELSE DR BURNS WANTS HIM TO DO ABOUT HIS SYMPTOMS.     PLEASE CALL PATIENT AND ADVISE       "

## 2024-09-17 ENCOUNTER — TELEPHONE (OUTPATIENT)
Dept: INTERNAL MEDICINE | Age: 74
End: 2024-09-17
Payer: MEDICARE

## 2024-09-17 DIAGNOSIS — D72.829 LEUKOCYTOSIS, UNSPECIFIED TYPE: ICD-10-CM

## 2024-09-17 DIAGNOSIS — A41.9 SEPSIS, DUE TO UNSPECIFIED ORGANISM, UNSPECIFIED WHETHER ACUTE ORGAN DYSFUNCTION PRESENT: Primary | ICD-10-CM

## 2024-09-17 LAB
BACTERIA SPEC AEROBE CULT: NORMAL
BACTERIA SPEC AEROBE CULT: NORMAL

## 2024-09-17 RX ORDER — LEVOFLOXACIN 750 MG/1
750 TABLET, FILM COATED ORAL DAILY
Qty: 10 TABLET | Refills: 0 | Status: SHIPPED | OUTPATIENT
Start: 2024-09-17

## 2024-09-17 NOTE — TELEPHONE ENCOUNTER
Pt is wanting to know his lab results and asking for stronger ABX as he is still feeling bad. Please advise, FEDERICO he has a upcoming appt this Thursday.

## 2024-09-17 NOTE — TELEPHONE ENCOUNTER
"      Hub staff attempted to follow warm transfer process and was unsuccessful     Caller: Woodrow Alejandro \"Ortiz\"    Relationship to patient: Self    Best call back number: [phone]     Patient is needing: PATIENT NEEDING TO SPEAK WITH CLINICAL STAFF.          "

## 2024-09-17 NOTE — TELEPHONE ENCOUNTER
"    Caller: Woodrow Alejandro \"Ortiz\"    Relationship to patient: Self    Best call back number: [phone]    Patient is needing: PATIENTS DAUGHTER CALLED REQUESTING TO SPEAK DIRECTLY WITH THE OFFICE. HUB UNABLE TO WARM TRANSFER. DAUGHTER WAS UPSET AND STATES THEY HAVE BEEN TRYING TO GET A CALL BACK SINCE FRIDAY REGARDING THE PATIENT STILL NOT FEELING WELL AND BEING OUT OF ANTIBIOTICS. DAUGHTER STATES SHE WOULD LIKE SOMEONE TO CALL HER FATHER BACK THIS MORNING AT THE NUMBER LISTED ABOVE.         "

## 2024-09-18 ENCOUNTER — HOSPITAL ENCOUNTER (OUTPATIENT)
Dept: CT IMAGING | Facility: HOSPITAL | Age: 74
Discharge: HOME | End: 2024-09-18
Admitting: INTERNAL MEDICINE
Payer: MEDICARE

## 2024-09-18 DIAGNOSIS — A41.9 SEPSIS, DUE TO UNSPECIFIED ORGANISM, UNSPECIFIED WHETHER ACUTE ORGAN DYSFUNCTION PRESENT: ICD-10-CM

## 2024-09-18 DIAGNOSIS — D72.829 LEUKOCYTOSIS, UNSPECIFIED TYPE: ICD-10-CM

## 2024-09-18 PROCEDURE — 74177 CT ABD & PELVIS W/CONTRAST: CPT

## 2024-09-18 PROCEDURE — 25510000001 IOPAMIDOL PER 1 ML: Performed by: INTERNAL MEDICINE

## 2024-09-18 PROCEDURE — 71260 CT THORAX DX C+: CPT

## 2024-09-18 RX ORDER — IOPAMIDOL 755 MG/ML
100 INJECTION, SOLUTION INTRAVASCULAR
Status: COMPLETED | OUTPATIENT
Start: 2024-09-18 | End: 2024-09-18

## 2024-09-18 RX ADMIN — IOPAMIDOL 100 ML: 755 INJECTION, SOLUTION INTRAVENOUS at 12:48

## 2024-09-19 ENCOUNTER — LAB (OUTPATIENT)
Dept: LAB | Facility: HOSPITAL | Age: 74
End: 2024-09-19
Payer: MEDICARE

## 2024-09-19 ENCOUNTER — OFFICE VISIT (OUTPATIENT)
Dept: INTERNAL MEDICINE | Age: 74
End: 2024-09-19
Payer: MEDICARE

## 2024-09-19 VITALS
OXYGEN SATURATION: 90 % | DIASTOLIC BLOOD PRESSURE: 78 MMHG | HEART RATE: 93 BPM | WEIGHT: 315 LBS | HEIGHT: 72 IN | BODY MASS INDEX: 42.66 KG/M2 | TEMPERATURE: 98.5 F | SYSTOLIC BLOOD PRESSURE: 125 MMHG

## 2024-09-19 DIAGNOSIS — R73.01 IFG (IMPAIRED FASTING GLUCOSE): ICD-10-CM

## 2024-09-19 DIAGNOSIS — I10 ESSENTIAL HYPERTENSION: ICD-10-CM

## 2024-09-19 DIAGNOSIS — R35.0 INCREASED URINARY FREQUENCY: ICD-10-CM

## 2024-09-19 DIAGNOSIS — Z95.2 S/P AVR: ICD-10-CM

## 2024-09-19 DIAGNOSIS — G25.2 COARSE TREMORS: Primary | ICD-10-CM

## 2024-09-19 DIAGNOSIS — I48.21 PERMANENT ATRIAL FIBRILLATION: ICD-10-CM

## 2024-09-19 DIAGNOSIS — R50.9 FEVER OF UNKNOWN ORIGIN (FUO): ICD-10-CM

## 2024-09-19 DIAGNOSIS — R53.1 WEAKNESS GENERALIZED: ICD-10-CM

## 2024-09-19 DIAGNOSIS — I10 ESSENTIAL HYPERTENSION: Primary | ICD-10-CM

## 2024-09-19 DIAGNOSIS — D72.825 BANDEMIA: ICD-10-CM

## 2024-09-19 LAB
ALBUMIN SERPL-MCNC: 3.6 G/DL (ref 3.5–5.2)
ALBUMIN/GLOB SERPL: 1.1 G/DL
ALP SERPL-CCNC: 89 U/L (ref 39–117)
ALT SERPL W P-5'-P-CCNC: 20 U/L (ref 1–41)
ANION GAP SERPL CALCULATED.3IONS-SCNC: 13 MMOL/L (ref 5–15)
AST SERPL-CCNC: 18 U/L (ref 1–40)
BASOPHILS # BLD AUTO: 0.05 10*3/MM3 (ref 0–0.2)
BASOPHILS NFR BLD AUTO: 0.3 % (ref 0–1.5)
BILIRUB SERPL-MCNC: 0.7 MG/DL (ref 0–1.2)
BUN SERPL-MCNC: 15 MG/DL (ref 8–23)
BUN/CREAT SERPL: 18.1 (ref 7–25)
CALCIUM SPEC-SCNC: 9.3 MG/DL (ref 8.6–10.5)
CHLORIDE SERPL-SCNC: 101 MMOL/L (ref 98–107)
CO2 SERPL-SCNC: 23 MMOL/L (ref 22–29)
CREAT SERPL-MCNC: 0.83 MG/DL (ref 0.76–1.27)
CRP SERPL-MCNC: 3.08 MG/DL (ref 0–0.5)
DEPRECATED RDW RBC AUTO: 48.1 FL (ref 37–54)
EGFRCR SERPLBLD CKD-EPI 2021: 92.4 ML/MIN/1.73
EOSINOPHIL # BLD AUTO: 0.04 10*3/MM3 (ref 0–0.4)
EOSINOPHIL NFR BLD AUTO: 0.3 % (ref 0.3–6.2)
ERYTHROCYTE [DISTWIDTH] IN BLOOD BY AUTOMATED COUNT: 14.8 % (ref 12.3–15.4)
ERYTHROCYTE [SEDIMENTATION RATE] IN BLOOD: 42 MM/HR (ref 0–20)
GLOBULIN UR ELPH-MCNC: 3.3 GM/DL
GLUCOSE SERPL-MCNC: 133 MG/DL (ref 65–99)
HCT VFR BLD AUTO: 34.1 % (ref 37.5–51)
HGB BLD-MCNC: 11.6 G/DL (ref 13–17.7)
IMM GRANULOCYTES # BLD AUTO: 0.09 10*3/MM3 (ref 0–0.05)
IMM GRANULOCYTES NFR BLD AUTO: 0.6 % (ref 0–0.5)
LYMPHOCYTES # BLD AUTO: 1.14 10*3/MM3 (ref 0.7–3.1)
LYMPHOCYTES NFR BLD AUTO: 7.7 % (ref 19.6–45.3)
MCH RBC QN AUTO: 30.9 PG (ref 26.6–33)
MCHC RBC AUTO-ENTMCNC: 34 G/DL (ref 31.5–35.7)
MCV RBC AUTO: 90.9 FL (ref 79–97)
MONOCYTES # BLD AUTO: 0.95 10*3/MM3 (ref 0.1–0.9)
MONOCYTES NFR BLD AUTO: 6.4 % (ref 5–12)
NEUTROPHILS NFR BLD AUTO: 12.5 10*3/MM3 (ref 1.7–7)
NEUTROPHILS NFR BLD AUTO: 84.7 % (ref 42.7–76)
NRBC BLD AUTO-RTO: 0 /100 WBC (ref 0–0.2)
PLATELET # BLD AUTO: 223 10*3/MM3 (ref 140–450)
PMV BLD AUTO: 9.2 FL (ref 6–12)
POTASSIUM SERPL-SCNC: 4.5 MMOL/L (ref 3.5–5.2)
PROT SERPL-MCNC: 6.9 G/DL (ref 6–8.5)
RBC # BLD AUTO: 3.75 10*6/MM3 (ref 4.14–5.8)
SODIUM SERPL-SCNC: 137 MMOL/L (ref 136–145)
WBC NRBC COR # BLD AUTO: 14.77 10*3/MM3 (ref 3.4–10.8)

## 2024-09-19 PROCEDURE — 3078F DIAST BP <80 MM HG: CPT | Performed by: INTERNAL MEDICINE

## 2024-09-19 PROCEDURE — 87468 ANAPLSMA PHGCYTOPHLM AMP PRB: CPT

## 2024-09-19 PROCEDURE — 87484 EHRLICHA CHAFFEENSIS AMP PRB: CPT

## 2024-09-19 PROCEDURE — 86618 LYME DISEASE ANTIBODY: CPT

## 2024-09-19 PROCEDURE — 99214 OFFICE O/P EST MOD 30 MIN: CPT | Performed by: INTERNAL MEDICINE

## 2024-09-19 PROCEDURE — 36415 COLL VENOUS BLD VENIPUNCTURE: CPT

## 2024-09-19 PROCEDURE — 87798 DETECT AGENT NOS DNA AMP: CPT

## 2024-09-19 PROCEDURE — 80053 COMPREHEN METABOLIC PANEL: CPT

## 2024-09-19 PROCEDURE — 85025 COMPLETE CBC W/AUTO DIFF WBC: CPT

## 2024-09-19 PROCEDURE — 1125F AMNT PAIN NOTED PAIN PRSNT: CPT | Performed by: INTERNAL MEDICINE

## 2024-09-19 PROCEDURE — 86140 C-REACTIVE PROTEIN: CPT

## 2024-09-19 PROCEDURE — 3074F SYST BP LT 130 MM HG: CPT | Performed by: INTERNAL MEDICINE

## 2024-09-19 PROCEDURE — 85652 RBC SED RATE AUTOMATED: CPT

## 2024-09-19 RX ORDER — AZITHROMYCIN 250 MG/1
TABLET, FILM COATED ORAL
Qty: 6 TABLET | Refills: 0 | Status: SHIPPED | OUTPATIENT
Start: 2024-09-19 | End: 2024-09-24

## 2024-09-20 ENCOUNTER — HOSPITAL ENCOUNTER (OUTPATIENT)
Dept: CARDIOLOGY | Facility: HOSPITAL | Age: 74
Discharge: HOME | End: 2024-09-20
Payer: MEDICARE

## 2024-09-20 DIAGNOSIS — Z95.2 S/P AVR: ICD-10-CM

## 2024-09-20 DIAGNOSIS — R35.0 INCREASED URINARY FREQUENCY: ICD-10-CM

## 2024-09-20 DIAGNOSIS — R53.1 WEAKNESS GENERALIZED: ICD-10-CM

## 2024-09-20 DIAGNOSIS — I10 ESSENTIAL HYPERTENSION: ICD-10-CM

## 2024-09-20 DIAGNOSIS — I48.21 PERMANENT ATRIAL FIBRILLATION: ICD-10-CM

## 2024-09-20 DIAGNOSIS — D72.825 BANDEMIA: ICD-10-CM

## 2024-09-20 DIAGNOSIS — R73.01 IFG (IMPAIRED FASTING GLUCOSE): ICD-10-CM

## 2024-09-20 PROCEDURE — 93306 TTE W/DOPPLER COMPLETE: CPT

## 2024-09-22 LAB
B BURGDOR IGG+IGM SER QL IA: NEGATIVE
BH CV ECHO MEAS - AO MAX PG: 55.1 MMHG
BH CV ECHO MEAS - AO MEAN PG: 29 MMHG
BH CV ECHO MEAS - AO ROOT DIAM: 4.4 CM
BH CV ECHO MEAS - AO V2 MAX: 371 CM/SEC
BH CV ECHO MEAS - AO V2 VTI: 70.1 CM
BH CV ECHO MEAS - AVA(I,D): 0.8 CM2
BH CV ECHO MEAS - EDV(CUBED): 157.5 ML
BH CV ECHO MEAS - EDV(MOD-SP2): 73.1 ML
BH CV ECHO MEAS - EDV(MOD-SP4): 73.9 ML
BH CV ECHO MEAS - EF(MOD-BP): 54.6 %
BH CV ECHO MEAS - EF(MOD-SP2): 57.3 %
BH CV ECHO MEAS - EF(MOD-SP4): 52.5 %
BH CV ECHO MEAS - ESV(CUBED): 59.3 ML
BH CV ECHO MEAS - ESV(MOD-SP2): 31.2 ML
BH CV ECHO MEAS - ESV(MOD-SP4): 35.1 ML
BH CV ECHO MEAS - FS: 27.8 %
BH CV ECHO MEAS - IVS/LVPW: 0.73 CM
BH CV ECHO MEAS - IVSD: 1.1 CM
BH CV ECHO MEAS - LA DIMENSION: 6.7 CM
BH CV ECHO MEAS - LAT PEAK E' VEL: 16 CM/SEC
BH CV ECHO MEAS - LV DIASTOLIC VOL/BSA (35-75): 28.4 CM2
BH CV ECHO MEAS - LV MASS(C)D: 295.6 GRAMS
BH CV ECHO MEAS - LV MAX PG: 3.5 MMHG
BH CV ECHO MEAS - LV MEAN PG: 2 MMHG
BH CV ECHO MEAS - LV SYSTOLIC VOL/BSA (12-30): 13.5 CM2
BH CV ECHO MEAS - LV V1 MAX: 93.2 CM/SEC
BH CV ECHO MEAS - LV V1 VTI: 17.9 CM
BH CV ECHO MEAS - LVIDD: 5.4 CM
BH CV ECHO MEAS - LVIDS: 3.9 CM
BH CV ECHO MEAS - LVOT AREA: 3.1 CM2
BH CV ECHO MEAS - LVOT DIAM: 2 CM
BH CV ECHO MEAS - LVPWD: 1.5 CM
BH CV ECHO MEAS - MED PEAK E' VEL: 10 CM/SEC
BH CV ECHO MEAS - MV DEC TIME: 0.15 SEC
BH CV ECHO MEAS - MV MEAN PG: 2 MMHG
BH CV ECHO MEAS - MV V2 VTI: 26.9 CM
BH CV ECHO MEAS - MVA(VTI): 2.09 CM2
BH CV ECHO MEAS - RVDD: 3.5 CM
BH CV ECHO MEAS - SV(LVOT): 56.2 ML
BH CV ECHO MEAS - SV(MOD-SP2): 41.9 ML
BH CV ECHO MEAS - SV(MOD-SP4): 38.8 ML
BH CV ECHO MEAS - SVI(LVOT): 21.6 ML/M2
BH CV ECHO MEAS - SVI(MOD-SP2): 16.1 ML/M2
BH CV ECHO MEAS - SVI(MOD-SP4): 14.9 ML/M2
BH CV ECHO MEAS - TAPSE (>1.6): 1.29 CM
BH CV ECHO MEAS - TR MAX PG: 39.7 MMHG
BH CV ECHO MEAS - TR MAX VEL: 315 CM/SEC
LEFT ATRIUM VOLUME INDEX: 39.2 ML/M2

## 2024-09-23 LAB — RICKETTSIA RICKETTSII DNA, RT: NOT DETECTED

## 2024-09-25 LAB
A PHAGOCYTOPH DNA BLD QL NAA+PROBE: NEGATIVE
EHRLICHIA SP., PCR: NEGATIVE

## 2024-09-26 ENCOUNTER — APPOINTMENT (OUTPATIENT)
Dept: CT IMAGING | Facility: HOSPITAL | Age: 74
DRG: 557 | End: 2024-09-26
Payer: MEDICARE

## 2024-09-26 ENCOUNTER — TELEPHONE (OUTPATIENT)
Dept: INTERNAL MEDICINE | Age: 74
End: 2024-09-26

## 2024-09-26 ENCOUNTER — HOSPITAL ENCOUNTER (INPATIENT)
Facility: HOSPITAL | Age: 74
LOS: 7 days | DRG: 557 | End: 2024-10-03
Attending: EMERGENCY MEDICINE | Admitting: HOSPITALIST
Payer: MEDICARE

## 2024-09-26 ENCOUNTER — APPOINTMENT (OUTPATIENT)
Dept: GENERAL RADIOLOGY | Facility: HOSPITAL | Age: 74
DRG: 557 | End: 2024-09-26
Payer: MEDICARE

## 2024-09-26 ENCOUNTER — APPOINTMENT (OUTPATIENT)
Dept: MRI IMAGING | Facility: HOSPITAL | Age: 74
DRG: 557 | End: 2024-09-26
Payer: MEDICARE

## 2024-09-26 DIAGNOSIS — R53.1 GENERALIZED WEAKNESS: ICD-10-CM

## 2024-09-26 DIAGNOSIS — R26.2 DIFFICULTY IN WALKING: ICD-10-CM

## 2024-09-26 DIAGNOSIS — D50.0 ANEMIA DUE TO GI BLOOD LOSS: ICD-10-CM

## 2024-09-26 DIAGNOSIS — S86.011A RUPTURE OF RIGHT ACHILLES TENDON, INITIAL ENCOUNTER: Primary | ICD-10-CM

## 2024-09-26 PROBLEM — S86.019A ACHILLES TENDON RUPTURE: Status: ACTIVE | Noted: 2024-09-26

## 2024-09-26 LAB
ALBUMIN SERPL-MCNC: 3.2 G/DL (ref 3.5–5.2)
ALBUMIN/GLOB SERPL: 0.9 G/DL
ALP SERPL-CCNC: 65 U/L (ref 39–117)
ALT SERPL W P-5'-P-CCNC: 11 U/L (ref 1–41)
ANION GAP SERPL CALCULATED.3IONS-SCNC: 13.4 MMOL/L (ref 5–15)
AST SERPL-CCNC: 16 U/L (ref 1–40)
BACTERIA UR QL AUTO: ABNORMAL /HPF
BASOPHILS # BLD AUTO: 0.06 10*3/MM3 (ref 0–0.2)
BASOPHILS NFR BLD AUTO: 0.3 % (ref 0–1.5)
BILIRUB SERPL-MCNC: 1 MG/DL (ref 0–1.2)
BILIRUB UR QL STRIP: ABNORMAL
BUN SERPL-MCNC: 13 MG/DL (ref 8–23)
BUN/CREAT SERPL: 14 (ref 7–25)
CALCIUM SPEC-SCNC: 8.4 MG/DL (ref 8.6–10.5)
CHLORIDE SERPL-SCNC: 96 MMOL/L (ref 98–107)
CLARITY UR: ABNORMAL
CO2 SERPL-SCNC: 21.6 MMOL/L (ref 22–29)
COLOR UR: ABNORMAL
CREAT SERPL-MCNC: 0.93 MG/DL (ref 0.76–1.27)
DEPRECATED RDW RBC AUTO: 55.7 FL (ref 37–54)
EGFRCR SERPLBLD CKD-EPI 2021: 86.7 ML/MIN/1.73
EOSINOPHIL # BLD AUTO: 0 10*3/MM3 (ref 0–0.4)
EOSINOPHIL NFR BLD AUTO: 0 % (ref 0.3–6.2)
ERYTHROCYTE [DISTWIDTH] IN BLOOD BY AUTOMATED COUNT: 16.4 % (ref 12.3–15.4)
FERRITIN SERPL-MCNC: 730.4 NG/ML (ref 30–400)
FOLATE SERPL-MCNC: 19.1 NG/ML (ref 4.78–24.2)
GEN 5 2HR TROPONIN T REFLEX: 62 NG/L
GLOBULIN UR ELPH-MCNC: 3.6 GM/DL
GLUCOSE SERPL-MCNC: 176 MG/DL (ref 65–99)
GLUCOSE UR STRIP-MCNC: NEGATIVE MG/DL
HCT VFR BLD AUTO: 36.5 % (ref 37.5–51)
HEMOCCULT STL QL IA: POSITIVE
HGB BLD-MCNC: 11.9 G/DL (ref 13–17.7)
HGB UR QL STRIP.AUTO: ABNORMAL
HOLD SPECIMEN: NORMAL
HOLD SPECIMEN: NORMAL
HYALINE CASTS UR QL AUTO: ABNORMAL /LPF
IMM GRANULOCYTES # BLD AUTO: 0.11 10*3/MM3 (ref 0–0.05)
IMM GRANULOCYTES NFR BLD AUTO: 0.6 % (ref 0–0.5)
INR PPP: 1.17 (ref 0.86–1.15)
IRON 24H UR-MRATE: 22 MCG/DL (ref 59–158)
IRON SATN MFR SERPL: 9 % (ref 20–50)
KETONES UR QL STRIP: ABNORMAL
LEUKOCYTE ESTERASE UR QL STRIP.AUTO: ABNORMAL
LYMPHOCYTES # BLD AUTO: 0.79 10*3/MM3 (ref 0.7–3.1)
LYMPHOCYTES NFR BLD AUTO: 4.1 % (ref 19.6–45.3)
MAGNESIUM SERPL-MCNC: 1.6 MG/DL (ref 1.6–2.4)
MAGNESIUM SERPL-MCNC: 1.8 MG/DL (ref 1.6–2.4)
MCH RBC QN AUTO: 30.7 PG (ref 26.6–33)
MCHC RBC AUTO-ENTMCNC: 32.6 G/DL (ref 31.5–35.7)
MCV RBC AUTO: 94.1 FL (ref 79–97)
MONOCYTES # BLD AUTO: 0.96 10*3/MM3 (ref 0.1–0.9)
MONOCYTES NFR BLD AUTO: 5 % (ref 5–12)
NEUTROPHILS NFR BLD AUTO: 17.28 10*3/MM3 (ref 1.7–7)
NEUTROPHILS NFR BLD AUTO: 90 % (ref 42.7–76)
NITRITE UR QL STRIP: NEGATIVE
NRBC BLD AUTO-RTO: 0 /100 WBC (ref 0–0.2)
PH UR STRIP.AUTO: 5.5 [PH] (ref 5–8)
PHOSPHATE SERPL-MCNC: 3.1 MG/DL (ref 2.5–4.5)
PLATELET # BLD AUTO: 179 10*3/MM3 (ref 140–450)
PMV BLD AUTO: 9 FL (ref 6–12)
POTASSIUM SERPL-SCNC: 4 MMOL/L (ref 3.5–5.2)
PROT SERPL-MCNC: 6.8 G/DL (ref 6–8.5)
PROT UR QL STRIP: ABNORMAL
PROTHROMBIN TIME: 15.2 SECONDS (ref 11.8–14.9)
RBC # BLD AUTO: 3.88 10*6/MM3 (ref 4.14–5.8)
RBC # UR STRIP: ABNORMAL /HPF
REF LAB TEST METHOD: ABNORMAL
RETICS # AUTO: 0.16 10*6/MM3 (ref 0.02–0.13)
RETICS/RBC NFR AUTO: 4.11 % (ref 0.7–1.9)
SODIUM SERPL-SCNC: 131 MMOL/L (ref 136–145)
SP GR UR STRIP: 1.02 (ref 1–1.03)
SQUAMOUS #/AREA URNS HPF: ABNORMAL /HPF
TIBC SERPL-MCNC: 247 MCG/DL (ref 298–536)
TRANS CELLS #/AREA URNS HPF: ABNORMAL /HPF
TRANSFERRIN SERPL-MCNC: 166 MG/DL (ref 200–360)
TROPONIN T DELTA: 1 NG/L
TROPONIN T SERPL HS-MCNC: 61 NG/L
UROBILINOGEN UR QL STRIP: ABNORMAL
VIT B12 BLD-MCNC: 495 PG/ML (ref 211–946)
WBC # UR STRIP: ABNORMAL /HPF
WBC NRBC COR # BLD AUTO: 19.2 10*3/MM3 (ref 3.4–10.8)
WHOLE BLOOD HOLD COAG: NORMAL
WHOLE BLOOD HOLD SPECIMEN: NORMAL

## 2024-09-26 PROCEDURE — 83540 ASSAY OF IRON: CPT | Performed by: HOSPITALIST

## 2024-09-26 PROCEDURE — 36415 COLL VENOUS BLD VENIPUNCTURE: CPT | Performed by: EMERGENCY MEDICINE

## 2024-09-26 PROCEDURE — 81001 URINALYSIS AUTO W/SCOPE: CPT | Performed by: EMERGENCY MEDICINE

## 2024-09-26 PROCEDURE — 82607 VITAMIN B-12: CPT | Performed by: HOSPITALIST

## 2024-09-26 PROCEDURE — 93005 ELECTROCARDIOGRAM TRACING: CPT

## 2024-09-26 PROCEDURE — 80053 COMPREHEN METABOLIC PANEL: CPT | Performed by: EMERGENCY MEDICINE

## 2024-09-26 PROCEDURE — 36415 COLL VENOUS BLD VENIPUNCTURE: CPT

## 2024-09-26 PROCEDURE — 84100 ASSAY OF PHOSPHORUS: CPT | Performed by: HOSPITALIST

## 2024-09-26 PROCEDURE — 83735 ASSAY OF MAGNESIUM: CPT | Performed by: HOSPITALIST

## 2024-09-26 PROCEDURE — 85045 AUTOMATED RETICULOCYTE COUNT: CPT | Performed by: HOSPITALIST

## 2024-09-26 PROCEDURE — 84484 ASSAY OF TROPONIN QUANT: CPT | Performed by: EMERGENCY MEDICINE

## 2024-09-26 PROCEDURE — 84466 ASSAY OF TRANSFERRIN: CPT | Performed by: HOSPITALIST

## 2024-09-26 PROCEDURE — 85610 PROTHROMBIN TIME: CPT | Performed by: EMERGENCY MEDICINE

## 2024-09-26 PROCEDURE — 99223 1ST HOSP IP/OBS HIGH 75: CPT | Performed by: HOSPITALIST

## 2024-09-26 PROCEDURE — 82746 ASSAY OF FOLIC ACID SERUM: CPT | Performed by: HOSPITALIST

## 2024-09-26 PROCEDURE — 99285 EMERGENCY DEPT VISIT HI MDM: CPT

## 2024-09-26 PROCEDURE — 73610 X-RAY EXAM OF ANKLE: CPT

## 2024-09-26 PROCEDURE — 82274 ASSAY TEST FOR BLOOD FECAL: CPT | Performed by: HOSPITALIST

## 2024-09-26 PROCEDURE — 25010000002 FUROSEMIDE PER 20 MG: Performed by: HOSPITALIST

## 2024-09-26 PROCEDURE — 93010 ELECTROCARDIOGRAM REPORT: CPT | Performed by: INTERNAL MEDICINE

## 2024-09-26 PROCEDURE — 82728 ASSAY OF FERRITIN: CPT | Performed by: HOSPITALIST

## 2024-09-26 PROCEDURE — 83735 ASSAY OF MAGNESIUM: CPT | Performed by: EMERGENCY MEDICINE

## 2024-09-26 PROCEDURE — 93005 ELECTROCARDIOGRAM TRACING: CPT | Performed by: EMERGENCY MEDICINE

## 2024-09-26 PROCEDURE — 73700 CT LOWER EXTREMITY W/O DYE: CPT

## 2024-09-26 PROCEDURE — 85025 COMPLETE CBC W/AUTO DIFF WBC: CPT | Performed by: EMERGENCY MEDICINE

## 2024-09-26 PROCEDURE — 71045 X-RAY EXAM CHEST 1 VIEW: CPT

## 2024-09-26 RX ORDER — AMOXICILLIN 250 MG
2 CAPSULE ORAL 2 TIMES DAILY
Status: DISCONTINUED | OUTPATIENT
Start: 2024-09-26 | End: 2024-10-03 | Stop reason: HOSPADM

## 2024-09-26 RX ORDER — ASPIRIN 325 MG
325 TABLET ORAL DAILY
Status: DISCONTINUED | OUTPATIENT
Start: 2024-09-26 | End: 2024-10-03 | Stop reason: HOSPADM

## 2024-09-26 RX ORDER — LISINOPRIL 10 MG/1
10 TABLET ORAL DAILY
Status: DISCONTINUED | OUTPATIENT
Start: 2024-09-26 | End: 2024-10-03 | Stop reason: HOSPADM

## 2024-09-26 RX ORDER — METOPROLOL TARTRATE 50 MG
100 TABLET ORAL 2 TIMES DAILY
Status: DISCONTINUED | OUTPATIENT
Start: 2024-09-26 | End: 2024-10-03 | Stop reason: HOSPADM

## 2024-09-26 RX ORDER — OXYCODONE HYDROCHLORIDE 5 MG/1
5 TABLET ORAL EVERY 6 HOURS PRN
Status: DISCONTINUED | OUTPATIENT
Start: 2024-09-26 | End: 2024-10-03 | Stop reason: HOSPADM

## 2024-09-26 RX ORDER — SODIUM CHLORIDE 0.9 % (FLUSH) 0.9 %
10 SYRINGE (ML) INJECTION AS NEEDED
Status: DISCONTINUED | OUTPATIENT
Start: 2024-09-26 | End: 2024-10-03 | Stop reason: HOSPADM

## 2024-09-26 RX ORDER — POLYETHYLENE GLYCOL 3350 17 G/17G
17 POWDER, FOR SOLUTION ORAL DAILY PRN
Status: DISCONTINUED | OUTPATIENT
Start: 2024-09-26 | End: 2024-10-03 | Stop reason: HOSPADM

## 2024-09-26 RX ORDER — ONDANSETRON 4 MG/1
4 TABLET, ORALLY DISINTEGRATING ORAL EVERY 6 HOURS PRN
Status: DISCONTINUED | OUTPATIENT
Start: 2024-09-26 | End: 2024-10-03 | Stop reason: HOSPADM

## 2024-09-26 RX ORDER — BISACODYL 10 MG
10 SUPPOSITORY, RECTAL RECTAL DAILY PRN
Status: DISCONTINUED | OUTPATIENT
Start: 2024-09-26 | End: 2024-10-03 | Stop reason: HOSPADM

## 2024-09-26 RX ORDER — FUROSEMIDE 40 MG
1 TABLET ORAL DAILY
Status: ON HOLD | COMMUNITY
Start: 2024-09-06

## 2024-09-26 RX ORDER — ACETAMINOPHEN 160 MG/5ML
650 SOLUTION ORAL EVERY 4 HOURS PRN
Status: DISCONTINUED | OUTPATIENT
Start: 2024-09-26 | End: 2024-10-03 | Stop reason: HOSPADM

## 2024-09-26 RX ORDER — DILTIAZEM HCL 60 MG
120 TABLET ORAL EVERY 12 HOURS SCHEDULED
Status: DISCONTINUED | OUTPATIENT
Start: 2024-09-26 | End: 2024-10-03 | Stop reason: HOSPADM

## 2024-09-26 RX ORDER — SODIUM CHLORIDE 0.9 % (FLUSH) 0.9 %
10 SYRINGE (ML) INJECTION EVERY 12 HOURS SCHEDULED
Status: DISCONTINUED | OUTPATIENT
Start: 2024-09-26 | End: 2024-10-03 | Stop reason: HOSPADM

## 2024-09-26 RX ORDER — LISINOPRIL 10 MG/1
1 TABLET ORAL DAILY
Status: ON HOLD | COMMUNITY
Start: 2024-09-06

## 2024-09-26 RX ORDER — FUROSEMIDE 10 MG/ML
40 INJECTION INTRAMUSCULAR; INTRAVENOUS EVERY 12 HOURS
Status: DISCONTINUED | OUTPATIENT
Start: 2024-09-26 | End: 2024-10-01

## 2024-09-26 RX ORDER — ACETAMINOPHEN 325 MG/1
650 TABLET ORAL EVERY 4 HOURS PRN
Status: DISCONTINUED | OUTPATIENT
Start: 2024-09-26 | End: 2024-10-03 | Stop reason: HOSPADM

## 2024-09-26 RX ORDER — SODIUM CHLORIDE 9 MG/ML
40 INJECTION, SOLUTION INTRAVENOUS AS NEEDED
Status: DISCONTINUED | OUTPATIENT
Start: 2024-09-26 | End: 2024-10-03 | Stop reason: HOSPADM

## 2024-09-26 RX ORDER — BISACODYL 5 MG/1
5 TABLET, DELAYED RELEASE ORAL DAILY PRN
Status: DISCONTINUED | OUTPATIENT
Start: 2024-09-26 | End: 2024-10-03 | Stop reason: HOSPADM

## 2024-09-26 RX ORDER — ONDANSETRON 2 MG/ML
4 INJECTION INTRAMUSCULAR; INTRAVENOUS EVERY 6 HOURS PRN
Status: DISCONTINUED | OUTPATIENT
Start: 2024-09-26 | End: 2024-10-03 | Stop reason: HOSPADM

## 2024-09-26 RX ORDER — ACETAMINOPHEN 650 MG/1
650 SUPPOSITORY RECTAL EVERY 4 HOURS PRN
Status: DISCONTINUED | OUTPATIENT
Start: 2024-09-26 | End: 2024-10-03 | Stop reason: HOSPADM

## 2024-09-26 RX ADMIN — DILTIAZEM HYDROCHLORIDE 120 MG: 60 TABLET, FILM COATED ORAL at 20:21

## 2024-09-26 RX ADMIN — METOPROLOL TARTRATE 100 MG: 50 TABLET, FILM COATED ORAL at 20:21

## 2024-09-26 RX ADMIN — Medication 10 ML: at 20:21

## 2024-09-26 RX ADMIN — LISINOPRIL 10 MG: 10 TABLET ORAL at 16:23

## 2024-09-26 RX ADMIN — Medication 5 MG: at 20:21

## 2024-09-26 RX ADMIN — SENNOSIDES AND DOCUSATE SODIUM 2 TABLET: 50; 8.6 TABLET ORAL at 20:21

## 2024-09-26 RX ADMIN — FUROSEMIDE 40 MG: 10 INJECTION, SOLUTION INTRAMUSCULAR; INTRAVENOUS at 16:23

## 2024-09-26 NOTE — TELEPHONE ENCOUNTER
Caller: CHACORTA CLARK    Relationship to patient: Emergency Contact    Best call back number:     [phone]     Patient is needing: PATIENT'S DAUGHTER CALLED TO INFORM DR BURNS THAT THE PATIENT WAS TAKEN BY AMBULANCE TO THE [facility] ER.     SHE STATES THAT THE PATIENT WAS WEAK AND SHAKING AND COULDN'T FEEL HIS LEGS.     PATIENT'S DAUGHTER STATES THAT SHE WOULD LIKE A CALLBACK FROM DR BURNS TO DISCUSS THE TEST RESULTS THAT THEY WERE ORIGINALLY COMING INTO THE OFFICE TO DISCUSS TODAY.

## 2024-09-26 NOTE — PAYOR COMM NOTE
"Woodrow Alejandro \"Ortiz\" (73 y.o. Male)     PATIENT INFORMATION  Name:  Woodrow Alejandro  MRN#:     [id number]  :  1950         ADMISSION INFORMATION  CLASS: Inpatient   DOS:  24    CURRENT ATTENDING PROVIDER INFORMATION  Name/NPI: Cecil Bradley DO [[id number]]  Phone:  Phone: [phone]  Fax:  [phone]      REQUESTING PROVIDER and RENDERING FACILITY  Name:  [facility]   NPI:  [id number]  TID:  [id number]  Address:      [address] [place] [address]  Phone:               [phone]  Fax:  [phone]    UTILIZATION REVIEW CONTACT INFORMATION  Phone:      [phone]  Fax:           [phone]      ADMISSION DIAGNOSIS  Achilles tendon rupture [S86.019A]  Generalized weakness [R53.1]  Rupture of right Achilles tendon, initial encounter [S86.011A]    ++++++++++++++++++++++++++++++++++++++++++++++++++++++++++++++++++++++++++++++++        Date of Birth   1950    Social Security Number       Address   [address] [place] [address]    Home Phone   [phone]    MRN   [id number]       Gnosticist   None    Marital Status                               Admission Date   24    Admission Type   Emergency    Admitting Provider   Cecil Bradley DO    Attending Provider   Cecil Bradley DO    Department, Room/Bed   [address] JOINT [place], 239/       Discharge Date       Discharge Disposition       Discharge Destination                                 Attending Provider: Cecil Bradley DO    Allergies: Diclofenac Sodium    Isolation: None   Infection: None   Code Status: CPR    Ht: 182.9 cm (72\")   Wt: 141 kg (309 lb 15.5 oz)    Admission Cmt: None   Principal Problem: Achilles tendon rupture [S86.019A]                   Active Insurance as of 2024       Primary Coverage       Payor Plan Insurance Group Employer/Plan Group    ANTHEM MEDICARE REPLACEMENT ANTHEM MEDICARE ADVANTAGE [id number]       Payor Plan " Address Payor Plan Phone Number Payor Plan Fax Number Effective Dates    [address] [phone]  2018 - None Entered    [facility] [id number]         Subscriber Name Subscriber Birth Date Member ID       WOODROW MELENDEZ 1950 [id number]                             Musculoskeletal Surgery or Procedure GRG Clinical Indications for Procedure       Indications Met   Last updated by Arina Salazar, RN on 2024 2724     Review Status Created By   Primary Completed Arina Salazar RN      Criteria Review   Musculoskeletal Surgery or Procedure GRG Clinical Indications for Procedure     Overall Determination: Indications Met     Criteria:  [×] Surgery or other procedure covered by this guideline is indicated for  1 or more  of the following  (1) (2) (3) (4):      [×] Procedure required for musculoskeletal congenital or acquired dysfunction; examples include :     Notes:  -- 2024  3:05 PM by Arina Salazar, RN --      To ED c/o pop in R ankle. Unable to stand. c/o severe pain. Has been on Levaquin PO for > 1 week 2/2 elevated white count.                   PMHx: Afib, HTN, Aortic valve replaced. OA.                   ED results:       CT chest: NAD      R ankle: No acute right ankle findings.      Prominent plantar calcaneal spur.                  Troponin 61      Na+ 131      PT/INR 15.2/1.17      WBC 19.20                  UA: trace ketones, trace blood, trace leukocytes, trace bacteria.                   Admit:       Ortho consult      Zofran IV prn      Lasix IV q12h      ck hemacult      PT/OT      Labs in AM.                   Additional labs pending.                   History & Physical        Cecil Bradley DO at 24 1342           [facility] ERIST HISTORY AND PHYSICAL  Date: 2024   Patient Name: Woodrow Melendez  : 1950  MRN: [id number]  Primary Care Physician:  Juan Perez MD  Date of admission: 2024    Subjectivepop in his ankle unable  to  Subjective     Chief Complaint: Pop in his ankle unable to stand    HPI:    Woodrow Alejandro is a 73 y.o. male who presents to the emergency room after having a pop in his ankle and now having difficulty standing.  Patient has been treated with Levaquin.    Arrival to the ED, patient's temperature 97.5, pulse of 85, respiratory 22, blood pressure 119/73, and he saturating 94% on room air.    Ankle x-ray shows no acute process.  Chest x-ray shows stable cardiomegaly.    High-sensitivity troponin is 61, sodium is 131, CO2 is 21.6, calcium is 8.4, INR is 1.17, white blood cell count is 19.20, hemoglobin is 11.9.    Personal History     Past Medical History:  Past Medical History:   Diagnosis Date    Aortic valve replaced     Arthritis 2018    Atrial fibrillation     Hypertension        Past Surgical History:  Past Surgical History:   Procedure Laterality Date    CARDIAC SURGERY         Family History:   Family History   Problem Relation Age of Onset    COPD Mother         Still living 93    Heart disease Mother     Arthritis Father     COPD Father          at 91.       Social History:   Social History     Socioeconomic History    Marital status:    Tobacco Use    Smoking status: Never     Passive exposure: Never    Smokeless tobacco: Never   Vaping Use    Vaping status: Never Used   Substance and Sexual Activity    Alcohol use: Yes     Alcohol/week: 4.0 standard drinks of alcohol     Types: 4 Cans of beer per week    Drug use: Never    Sexual activity: Not Currently       Home Medications:  aspirin, dilTIAZem, furosemide, levoFLOXacin, lisinopril, and metoprolol tartrate    Allergies:  Allergies   Allergen Reactions    Diclofenac Sodium Dizziness       Review of Systems   All systems were reviewed and negative except for: Lower extremity edema, pain    Objective  Objective     Vitals:   Temp:  [98.5 °F (36.9 °C)] 98.5 °F (36.9 °C)  Heart Rate:  [83-85] 83  Resp:  [22] 22  BP:  (110-119)/(73-77) 110/77    Physical Exam    Constitutional: Awake, alert, no acute distress   Eyes: Pupils equal, sclerae anicteric, no conjunctival injection   HENT: NCAT, mucous membranes moist   Neck: Supple, no thyromegaly, no lymphadenopathy, trachea midline   Respiratory: Clear to auscultation bilaterally, nonlabored respirations    Cardiovascular: RRR, no murmurs, rubs, or gallops, palpable pedal pulses bilaterally   Gastrointestinal: Positive bowel sounds, soft, nontender, nondistended   Musculoskeletal lower extremity   Psychiatric: Appropriate affect, cooperative   Neurologic: Oriented x 3, strength symmetric in all extremities, Cranial Nerves grossly intact to confrontation, speech clear   Skin: No rashes     Result Review   Result Review:  I have personally reviewed the results from the time of this admission to 9/26/2024 13:43 EDT and agree with these findings:  [x]  Laboratory  [x]  Microbiology  [x]  Radiology  [x]  EKG/Telemetry   [x]  Cardiology/Vascular   [x]  Pathology  [x]  Old records  []  Other:      Assessment & Plan  Assessment / Plan   Possible torn Achilles tendon  Anemia  Leukocytosis  Hypertension  Significant lower extremity edema      Plan:  -Ortho consulted for torn Achilles tendon.  MRI ordered.  -Complete anemia panel ordered  -Blood cultures ordered to determine source of leukocytosis  -Continue home blood pressure regimen  -Start IV Lasix.        VTE Prophylaxis:  Mechanical VTE prophylaxis orders are signed & held.          CODE STATUS:    Level Of Support Discussed With: Patient  Code Status (Patient has no pulse and is not breathing): CPR (Attempt to Resuscitate)  Medical Interventions (Patient has pulse or is breathing): Full Support      Admission Status:  I believe this patient meets inpatient status.    Electronically signed by Cecil Bradley DO, 09/26/24, 1:43 PM EDT.             Electronically signed by Cecil Bradley DO at 09/26/24 4772       Current Facility-Administered  Medications   Medication Dose Route Frequency Provider Last Rate Last Admin    acetaminophen (TYLENOL) tablet 650 mg  650 mg Oral Q4H PRN Bradley, Dimpi, DO        Or    acetaminophen (TYLENOL) 160 MG/5ML oral solution 650 mg  650 mg Oral Q4H PRN Bradley, Dimpi, DO        Or    acetaminophen (TYLENOL) suppository 650 mg  650 mg Rectal Q4H PRN Bradley, Dimpi, DO        [Held by provider] aspirin tablet 325 mg  325 mg Oral Daily Bradley, Dimpi, DO        sennosides-docusate (PERICOLACE) 8.6-50 MG per tablet 2 tablet  2 tablet Oral BID Bradley, Dimpi, DO        And    polyethylene glycol (MIRALAX) packet 17 g  17 g Oral Daily PRN Bradley, Dimpi, DO        And    bisacodyl (DULCOLAX) EC tablet 5 mg  5 mg Oral Daily PRN Bradley, Dimpi, DO        And    bisacodyl (DULCOLAX) suppository 10 mg  10 mg Rectal Daily PRN Bradley, Dimpi, DO        dilTIAZem (CARDIZEM) tablet 120 mg  120 mg Oral Q12H Bradley, Dimpi, DO        furosemide (LASIX) injection 40 mg  40 mg Intravenous Q12H Bradley, Dimpi, DO   40 mg at 09/26/24 1623    lisinopril (PRINIVIL,ZESTRIL) tablet 10 mg  10 mg Oral Daily Bradley, Dimpi, DO   10 mg at 09/26/24 1623    melatonin tablet 5 mg  5 mg Oral Nightly Bradley, Dimpi, DO        metoprolol tartrate (LOPRESSOR) tablet 100 mg  100 mg Oral BID Bradley, Dimpi, DO        ondansetron ODT (ZOFRAN-ODT) disintegrating tablet 4 mg  4 mg Oral Q6H PRN Bradley, Dimpi, DO        Or    ondansetron (ZOFRAN) injection 4 mg  4 mg Intravenous Q6H PRN Bradley, Dimpi, DO        oxyCODONE (ROXICODONE) immediate release tablet 5 mg  5 mg Oral Q6H PRN Bradley, Dimpi, DO        sodium chloride 0.9 % flush 10 mL  10 mL Intravenous PRN Juanito Duarte MD        sodium chloride 0.9 % flush 10 mL  10 mL Intravenous Q12H Bradley, Dimpi, DO        sodium chloride 0.9 % flush 10 mL  10 mL Intravenous PRN Bradley, Dimpi, DO        sodium chloride 0.9 % infusion 40 mL  40 mL Intravenous PRN Bradley, Dimpi, DO         Lab Results (last 24 hours)       Procedure Component Value  Units Date/Time    Magnesium [[id number]]  (Normal) Collected: 09/26/24 1430    Specimen: Blood from Arm, Left Updated: 09/26/24 1607     Magnesium 1.8 mg/dL     Phosphorus [[id number]]  (Normal) Collected: 09/26/24 1430    Specimen: Blood from Arm, Left Updated: 09/26/24 1607     Phosphorus 3.1 mg/dL     Ferritin [[id number]]  (Abnormal) Collected: 09/26/24 1430    Specimen: Blood from Arm, Left Updated: 09/26/24 1600     Ferritin 730.40 ng/mL     Narrative:      <12 ng/mL usually associated with Iron Deficiency Anemia. Above normal range levels may be due to Hepatic and/or Chronic Inflammatory Disease.  Results may be falsely decreased if patient taking Biotin.      Iron Profile [[id number]]  (Abnormal) Collected: 09/26/24 1430    Specimen: Blood from Arm, Left Updated: 09/26/24 1558     Iron 22 mcg/dL      Iron Saturation (TSAT) 9 %      Transferrin 166 mg/dL      TIBC 247 mcg/dL     Reticulocytes [[id number]]  (Abnormal) Collected: 09/26/24 1112    Specimen: Blood Updated: 09/26/24 1541     Reticulocyte % 4.11 %      Reticulocyte Absolute 0.1623 10*6/mm3     Folate [[id number]] Collected: 09/26/24 1112    Specimen: Blood Updated: 09/26/24 1538    Vitamin B12 [[id number]] Collected: 09/26/24 1112    Specimen: Blood Updated: 09/26/24 1538    High Sensitivity Troponin T 2Hr [[id number]]  (Abnormal) Collected: 09/26/24 1430    Specimen: Blood from Arm, Left Updated: 09/26/24 1504     HS Troponin T 62 ng/L      Troponin T Delta 1 ng/L     Narrative:      High Sensitive Troponin T Reference Range:  <14.0 ng/L- Negative Female for AMI  <22.0 ng/L- Negative Male for AMI  >=14 - Abnormal Female indicating possible myocardial injury.  >=22 - Abnormal Male indicating possible myocardial injury.   Clinicians would have to utilize clinical acumen, EKG, Troponin, and serial changes to determine if it is an Acute Myocardial Infarction or myocardial injury due to an underlying chronic condition.         Urinalysis, Microscopic Only -  Urine, Clean Catch [[id number]]  (Abnormal) Collected: 09/26/24 1254    Specimen: Urine, Clean Catch Updated: 09/26/24 1342     RBC, UA 3-5 /HPF      WBC, UA 3-5 /HPF      Bacteria, UA Trace /HPF      Squamous Epithelial Cells, UA 3-6 /HPF      Transitional Epithelial Cells, UA 3-6 /HPF      Hyaline Casts, UA 7-12 /LPF      Methodology Manual Light Microscopy    Urinalysis With Microscopic If Indicated (No Culture) - Urine, Clean Catch [[id number]]  (Abnormal) Collected: 09/26/24 1254    Specimen: Urine, Clean Catch Updated: 09/26/24 1335     Color, UA Dark Yellow     Appearance, UA Cloudy     pH, UA 5.5     Specific Gravity, UA 1.024     Glucose, UA Negative     Ketones, UA Trace     Bilirubin, UA Small (1+)     Blood, UA Trace     Protein, UA >=300 mg/dL (3+)     Leuk Esterase, UA Trace     Nitrite, UA Negative     Urobilinogen, UA 1.0 E.U./dL    Single High Sensitivity Troponin T [[id number]]  (Abnormal) Collected: 09/26/24 1112    Specimen: Blood Updated: 09/26/24 1201     HS Troponin T 61 ng/L     Narrative:      High Sensitive Troponin T Reference Range:  <14.0 ng/L- Negative Female for AMI  <22.0 ng/L- Negative Male for AMI  >=14 - Abnormal Female indicating possible myocardial injury.  >=22 - Abnormal Male indicating possible myocardial injury.   Clinicians would have to utilize clinical acumen, EKG, Troponin, and serial changes to determine if it is an Acute Myocardial Infarction or myocardial injury due to an underlying chronic condition.         Protime-INR [[id number]]  (Abnormal) Collected: 09/26/24 1112    Specimen: Blood Updated: 09/26/24 1147     Protime 15.2 Seconds      INR 1.17    Narrative:      Suggested Therapeutic Ranges For Oral Anticoagulant Therapy:  Level of Therapy                      INR Target Range  Standard Dose                            2.0-3.0  High Dose                                2.5-3.5  Patients not receiving anticoagulant  Therapy Normal Range                     0.86-1.15     Comprehensive Metabolic Panel [[id number]]  (Abnormal) Collected: 09/26/24 1112    Specimen: Blood Updated: 09/26/24 1139     Glucose 176 mg/dL      BUN 13 mg/dL      Creatinine 0.93 mg/dL      Sodium 131 mmol/L      Potassium 4.0 mmol/L      Chloride 96 mmol/L      CO2 21.6 mmol/L      Calcium 8.4 mg/dL      Total Protein 6.8 g/dL      Albumin 3.2 g/dL      ALT (SGPT) 11 U/L      AST (SGOT) 16 U/L      Alkaline Phosphatase 65 U/L      Total Bilirubin 1.0 mg/dL      Globulin 3.6 gm/dL      A/G Ratio 0.9 g/dL      BUN/Creatinine Ratio 14.0     Anion Gap 13.4 mmol/L      eGFR 86.7 mL/min/1.73     Narrative:      GFR Normal >60  Chronic Kidney Disease <60  Kidney Failure <15    The GFR formula is only valid for adults with stable renal function between ages 18 and 70.    Magnesium [[id number]]  (Normal) Collected: 09/26/24 1112    Specimen: Blood Updated: 09/26/24 1139     Magnesium 1.6 mg/dL     [place] Draw [[id number]] Collected: 09/26/24 1112    Specimen: Blood Updated: 09/26/24 1133    Narrative:      The following orders were created for panel order [place] Draw.  Procedure                               Abnormality         Status                     ---------                               -----------         ------                     Green Top (Gel)[[id number]]                                  Final result               Lavender Top[[id number]]                                     Final result               Gold Top - SST[[id number]]                                   Final result               Light Blue Top[[id number]]                                   Final result                 Please view results for these tests on the individual orders.    Green Top (Gel) [[id number]] Collected: 09/26/24 1112    Specimen: Blood Updated: 09/26/24 1133     Extra Tube Hold for add-ons.     Comment: Auto resulted.       CBC & Differential [[id number]]  (Abnormal) Collected: 09/26/24 1112    Specimen: Blood Updated: 09/26/24 1119     "Narrative:      The following orders were created for panel order CBC & Differential.  Procedure                               Abnormality         Status                     ---------                               -----------         ------                     CBC Auto Differential[[id number]]        Abnormal            Final result                 Please view results for these tests on the individual orders.    CBC Auto Differential [[id number]]  (Abnormal) Collected: 09/26/24 1112    Specimen: Blood Updated: 09/26/24 1119     WBC 19.20 10*3/mm3      RBC 3.88 10*6/mm3      Hemoglobin 11.9 g/dL      Hematocrit 36.5 %      MCV 94.1 fL      MCH 30.7 pg      MCHC 32.6 g/dL      RDW 16.4 %      RDW-SD 55.7 fl      MPV 9.0 fL      Platelets 179 10*3/mm3      Neutrophil % 90.0 %      Lymphocyte % 4.1 %      Monocyte % 5.0 %      Eosinophil % 0.0 %      Basophil % 0.3 %      Immature Grans % 0.6 %      Neutrophils, Absolute 17.28 10*3/mm3      Lymphocytes, Absolute 0.79 10*3/mm3      Monocytes, Absolute 0.96 10*3/mm3      Eosinophils, Absolute 0.00 10*3/mm3      Basophils, Absolute 0.06 10*3/mm3      Immature Grans, Absolute 0.11 10*3/mm3      nRBC 0.0 /100 WBC     Light Blue Top [[id number]] Collected: 09/26/24 1112    Specimen: Blood Updated: 09/26/24 1117     Extra Tube Hold for add-ons.     Comment: Auto resulted       Lavender Top [[id number]] Collected: 09/26/24 1112    Specimen: Blood Updated: 09/26/24 1116     Extra Tube hold for add-on     Comment: Auto resulted       Gold Top - SST [[id number]] Collected: 09/26/24 1112    Specimen: Blood Updated: 09/26/24 1116     Extra Tube Hold for add-ons.     Comment: Auto resulted.             Imaging Results (Last 24 Hours)       Procedure Component Value Units Date/Time    XR Ankle 3+ View Right [[id number]] Collected: 09/26/24 1154     Updated: 09/26/24 1158    Narrative:      XR ANKLE 3+ VW RIGHT    Date of Exam: 9/26/2024 11:52 AM EDT    Indication: \"pop\" in right ankle, rule " out fx (probable Yoko's rupture)    Comparison: None available.    Findings:  No fracture. No dislocation. No definite abnormal soft tissue thickening or fluid is seen along the course of the Achilles tendon at the posterior calcaneus. Moderate plantar calcaneal spur is present. Tibial talar alignment is normal.      Impression:      Impression:  No acute right ankle findings.  Prominent plantar calcaneal spur.      Electronically Signed: Siena Llamas MD    9/26/2024 11:55 AM EDT    Workstation ID: [id number]    XR Chest 1 View [[id number]] Collected: 09/26/24 1142     Updated: 09/26/24 1150    Narrative:      XR CHEST 1 VW    Date of Exam: 9/26/2024 11:28 AM EDT    Indication: Weak/Dizzy/AMS triage protocol    Comparison: CT chest 9/18/2024.    Findings:  There is mild linear subsegmental atelectasis in the right lower lobe. There are no dense lung consolidations. There is stable moderate cardiac enlargement with signs of aortic valve replacement and atrial appendage ligation. Pacemaker/defibrillator is   in place. No pleural effusion, pneumothorax or acute osseous abnormalities are identified.      Impression:      Impression:  Stable moderate cardiomegaly.  No acute chest findings.      Electronically Signed: Siena Llamas MD    9/26/2024 11:48 AM EDT    Workstation ID: [id number]          Orders (last 24 hrs)        Start     Ordered    09/27/24 0600  Basic Metabolic Panel  Daily       09/26/24 1536    09/27/24 0600  CBC & Differential  Daily       09/26/24 1536 09/26/24 2100  dilTIAZem (CARDIZEM) tablet 120 mg  Every 12 Hours Scheduled         09/26/24 1536 09/26/24 2100  metoprolol tartrate (LOPRESSOR) tablet 100 mg  2 Times Daily         09/26/24 1536 09/26/24 2100  sodium chloride 0.9 % flush 10 mL  Every 12 Hours Scheduled         09/26/24 1536 09/26/24 2100  melatonin tablet 5 mg  Nightly         09/26/24 1536 09/26/24 2100  sennosides-docusate (PERICOLACE) 8.6-50 MG per tablet 2 tablet  2  "Times Daily        Placed in \"And\" Linked Group    09/26/24 1536    09/26/24 1800  Oral Care  2 Times Daily       09/26/24 1536    09/26/24 1630  [Held by provider]  aspirin tablet 325 mg  Daily        (On hold since today at 1536 until manually unheld; held by Cecil Bradley DOHold Reason: Hold For Procedure)    09/26/24 1536    09/26/24 1630  lisinopril (PRINIVIL,ZESTRIL) tablet 10 mg  Daily         09/26/24 1536    09/26/24 1630  furosemide (LASIX) injection 40 mg  Every 12 Hours         09/26/24 1536    09/26/24 1600  Vital Signs  Every 4 Hours       09/26/24 1536    09/26/24 1537  Intake & Output  Every Shift       09/26/24 1536    09/26/24 1537  Weigh Patient  Once         09/26/24 1536    09/26/24 1537  Magnesium  Daily       09/26/24 1536    09/26/24 1537  Phosphorus  Daily       09/26/24 1536    09/26/24 1537  Insert Peripheral IV  Once         09/26/24 1536    09/26/24 1537  Saline Lock & Maintain IV Access  Continuous         09/26/24 1536    09/26/24 1537  Place Sequential Compression Device  Once         09/26/24 1536    09/26/24 1537  Maintain Sequential Compression Device  Continuous         09/26/24 1536    09/26/24 1537  Diet: Regular/House; Fluid Consistency: Thin (IDDSI 0)  Diet Effective Now         09/26/24 1536    09/26/24 1537  Inpatient Case Management  Consult  Once        Provider:  (Not yet assigned)    09/26/24 1536    09/26/24 1537  OT Consult: Eval & Treat ADL Performance Below Baseline  Once         09/26/24 1536    09/26/24 1537  PT Consult: Eval & Treat As Tolerated; Discharge Placement Assessment  Once         09/26/24 1536    09/26/24 1537  Iron Profile  Once         09/26/24 1536    09/26/24 1537  Ferritin  Once         09/26/24 1536    09/26/24 1537  Reticulocytes  Once         09/26/24 1536    09/26/24 1537  Vitamin B12  Once         09/26/24 1536    09/26/24 1537  Folate  Once         09/26/24 1536    09/26/24 1537  Occult Blood, Fecal By Immunoassay - Stool, Per " "Rectum  Once         09/26/24 1536    09/26/24 1536  ondansetron ODT (ZOFRAN-ODT) disintegrating tablet 4 mg  Every 6 Hours PRN        Placed in \"Or\" Linked Group    09/26/24 1536    09/26/24 1536  ondansetron (ZOFRAN) injection 4 mg  Every 6 Hours PRN        Placed in \"Or\" Linked Group    09/26/24 1536    09/26/24 1536  polyethylene glycol (MIRALAX) packet 17 g  Daily PRN        Placed in \"And\" Linked Group    09/26/24 1536    09/26/24 1536  bisacodyl (DULCOLAX) EC tablet 5 mg  Daily PRN        Placed in \"And\" Linked Group    09/26/24 1536    09/26/24 1536  bisacodyl (DULCOLAX) suppository 10 mg  Daily PRN        Placed in \"And\" Linked Group    09/26/24 1536    09/26/24 1536  acetaminophen (TYLENOL) tablet 650 mg  Every 4 Hours PRN        Placed in \"Or\" Linked Group    09/26/24 1536    09/26/24 1536  acetaminophen (TYLENOL) 160 MG/5ML oral solution 650 mg  Every 4 Hours PRN        Placed in \"Or\" Linked Group    09/26/24 1536    09/26/24 1536  acetaminophen (TYLENOL) suppository 650 mg  Every 4 Hours PRN        Placed in \"Or\" Linked Group    09/26/24 1536    09/26/24 1536  oxyCODONE (ROXICODONE) immediate release tablet 5 mg  Every 6 Hours PRN         09/26/24 1536    09/26/24 1536  sodium chloride 0.9 % flush 10 mL  As Needed         09/26/24 1536    09/26/24 1536  sodium chloride 0.9 % infusion 40 mL  As Needed         09/26/24 1536    09/26/24 1342  Code Status and Medical Interventions: CPR (Attempt to Resuscitate); Full Support  Continuous         09/26/24 1342    09/26/24 1316  Inpatient Admission  Once         09/26/24 1316    09/26/24 1312  High Sensitivity Troponin T 2Hr  PROCEDURE ONCE         09/26/24 1201    09/26/24 1308  Hospitalist (on-call MD unless specified)  Once        Specialty:  Hospitalist  Provider:  Cecil Bradley,     09/26/24 1307    09/26/24 1306  Urinalysis, Microscopic Only - Urine, Clean Catch  Once         09/26/24 1305    09/26/24 1209  MRI Ankle Right Without Contrast  1 Time " Imaging,   Status:  Canceled         09/26/24 1208    09/26/24 1139  Protime-INR  STAT         09/26/24 1138    09/26/24 1138  Orthopedics (on-call MD unless specified)  Once        Specialty:  Orthopedic Surgery  Provider:  Carmen Zuniga MD    09/26/24 1137    09/26/24 1133  XR Ankle 3+ View Right  1 Time Imaging         09/26/24 1132    09/26/24 1104  NPO Diet NPO Type: Strict NPO  Diet Effective Now,   Status:  Canceled         09/26/24 1103    09/26/24 1104  Undress & Gown  Once         09/26/24 1103    09/26/24 1104  Cardiac Monitoring  Continuous        Comments: Follow Standing Orders As Outlined in Process Instructions (Open Order Report to View Full Instructions)    09/26/24 1103    09/26/24 1104  Continuous Pulse Oximetry  Continuous         09/26/24 1103    09/26/24 1104  Vital Signs  Per Hospital Policy         09/26/24 1103    09/26/24 1104  Orthostatic Blood Pressure  Once         09/26/24 1103    09/26/24 1104  Fall Precautions  Continuous         09/26/24 1103    09/26/24 1104  XR Chest 1 View  1 Time Imaging         09/26/24 1103    09/26/24 1104  ECG 12 Lead ED Triage Standing Order; Weak / Dizzy / AMS  Once         09/26/24 1103    09/26/24 1104  POC Glucose Once  Once        Comments: Complete no more than 45 minutes prior to patient eating      09/26/24 1104    09/26/24 1104  Insert Peripheral IV  Once         09/26/24 1104    09/26/24 1104  [place] Draw  Once         09/26/24 1104    09/26/24 1104  CBC & Differential  Once         09/26/24 1104    09/26/24 1104  Comprehensive Metabolic Panel  Once         09/26/24 1104    09/26/24 1104  Single High Sensitivity Troponin T  Once         09/26/24 1104    09/26/24 1104  Magnesium  Once         09/26/24 1104    09/26/24 1104  Urinalysis With Microscopic If Indicated (No Culture) - Urine, Clean Catch  Once         09/26/24 1104    09/26/24 1104  Green Top (Gel)  PROCEDURE ONCE         09/26/24 1104    09/26/24 1104  Lavender Top  PROCEDURE ONCE          09/26/24 1104    09/26/24 1104  Gold Top - SST  PROCEDURE ONCE         09/26/24 1104    09/26/24 1104  Light Blue Top  PROCEDURE ONCE         09/26/24 1104    09/26/24 1104  CBC Auto Differential  PROCEDURE ONCE         09/26/24 1104    09/26/24 1103  sodium chloride 0.9 % flush 10 mL  As Needed         09/26/24 1104    09/06/24 0000  furosemide (LASIX) 40 MG tablet  Daily         09/26/24 1313    09/06/24 0000  lisinopril (PRINIVIL,ZESTRIL) 10 MG tablet  Daily         09/26/24 1313    Unscheduled  Oxygen Therapy- Nasal Cannula; Titrate 1-6 LPM Per SpO2; 90 - 95%  Continuous PRN       09/26/24 1103

## 2024-09-26 NOTE — CASE MANAGEMENT/SOCIAL WORK
Discharge Planning Assessment   Yudy     Patient Name: Woodrow Alejandro  MRN: [id number]  Today's Date: 9/26/2024    Admit Date: 9/26/2024        Discharge Needs Assessment       Row Name 09/26/24 1331       Living Environment    People in Home parent(s)    Name(s) of People in Home lives with mother, Maia, and cares for her    Current Living Arrangements home    Potentially Unsafe Housing Conditions none    In the past 12 months has the electric, gas, oil, or water company threatened to shut off services in your home? No    Primary Care Provided by self    Provides Primary Care For parent(s)    Family Caregiver if Needed none    Quality of Family Relationships supportive    Able to Return to Prior Arrangements yes       Resource/Environmental Concerns    Resource/Environmental Concerns none    Transportation Concerns none       Transportation Needs    In the past 12 months, has lack of transportation kept you from medical appointments or from getting medications? no    In the past 12 months, has lack of transportation kept you from meetings, work, or from getting things needed for daily living? No       Food Insecurity    Within the past 12 months, you worried that your food would run out before you got the money to buy more. Never true    Within the past 12 months, the food you bought just didn't last and you didn't have money to get more. Never true       Transition Planning    Patient/Family Anticipates Transition to home    Patient/Family Anticipated Services at Transition none    Transportation Anticipated car, drives self;family or friend will provide       Discharge Needs Assessment    Readmission Within the Last 30 Days no previous admission in last 30 days    Equipment Currently Used at Home none    Concerns to be Addressed no discharge needs identified    Anticipated Changes Related to Illness none    Equipment Needed After Discharge none                   Discharge Plan    No needs identified at  this time                 ILBIA Reeder

## 2024-09-26 NOTE — H&P
[facility]  Date: 2024   Patient Name: Woodrow Alejandro  : 1950  MRN: [id number]  Primary Care Physician:  Juan Perez MD  Date of admission: 2024    Subjective pop in his ankle unable to  Subjective     Chief Complaint: Pop in his ankle unable to stand    HPI:    Woodrow Alejandro is a 73 y.o. male who presents to the emergency room after having a pop in his ankle and now having difficulty standing.  Patient has been treated with Levaquin.    Arrival to the ED, patient's temperature 97.5, pulse of 85, respiratory 22, blood pressure 119/73, and he saturating 94% on room air.    Ankle x-ray shows no acute process.  Chest x-ray shows stable cardiomegaly.    High-sensitivity troponin is 61, sodium is 131, CO2 is 21.6, calcium is 8.4, INR is 1.17, white blood cell count is 19.20, hemoglobin is 11.9.    Personal History     Past Medical History:  Past Medical History:   Diagnosis Date    Aortic valve replaced     Arthritis 2018    Atrial fibrillation     Hypertension        Past Surgical History:  Past Surgical History:   Procedure Laterality Date    CARDIAC SURGERY         Family History:   Family History   Problem Relation Age of Onset    COPD Mother         Still living 93    Heart disease Mother     Arthritis Father     COPD Father          at 91.       Social History:   Social History     Socioeconomic History    Marital status:    Tobacco Use    Smoking status: Never     Passive exposure: Never    Smokeless tobacco: Never   Vaping Use    Vaping status: Never Used   Substance and Sexual Activity    Alcohol use: Yes     Alcohol/week: 4.0 standard drinks of alcohol     Types: 4 Cans of beer per week    Drug use: Never    Sexual activity: Not Currently       Home Medications:  aspirin, dilTIAZem, furosemide, levoFLOXacin, lisinopril, and metoprolol tartrate    Allergies:  Allergies   Allergen Reactions    Diclofenac Sodium  Dizziness       Review of Systems   All systems were reviewed and negative except for: Lower extremity edema, pain    Objective   Objective     Vitals:   Temp:  [98.5 °F (36.9 °C)] 98.5 °F (36.9 °C)  Heart Rate:  [83-85] 83  Resp:  [22] 22  BP: (110-119)/(73-77) 110/77    Physical Exam    Constitutional: Awake, alert, no acute distress   Eyes: Pupils equal, sclerae anicteric, no conjunctival injection   HENT: NCAT, mucous membranes moist   Neck: Supple, no thyromegaly, no lymphadenopathy, trachea midline   Respiratory: Clear to auscultation bilaterally, nonlabored respirations    Cardiovascular: RRR, no murmurs, rubs, or gallops, palpable pedal pulses bilaterally   Gastrointestinal: Positive bowel sounds, soft, nontender, nondistended   Musculoskeletal lower extremity   Psychiatric: Appropriate affect, cooperative   Neurologic: Oriented x 3, strength symmetric in all extremities, Cranial Nerves grossly intact to confrontation, speech clear   Skin: No rashes     Result Review    Result Review:  I have personally reviewed the results from the time of this admission to 9/26/2024 13:43 EDT and agree with these findings:  [x]  Laboratory  [x]  Microbiology  [x]  Radiology  [x]  EKG/Telemetry   [x]  Cardiology/Vascular   [x]  Pathology  [x]  Old records  []  Other:      Assessment & Plan   Assessment / Plan   Possible torn Achilles tendon  Anemia  Leukocytosis  Hypertension  Significant lower extremity edema      Plan:  -Ortho consulted for torn Achilles tendon.  MRI ordered.  -Complete anemia panel ordered  -Blood cultures ordered to determine source of leukocytosis  -Continue home blood pressure regimen  -Start IV Lasix.        VTE Prophylaxis:  Mechanical VTE prophylaxis orders are signed & held.          CODE STATUS:    Level Of Support Discussed With: Patient  Code Status (Patient has no pulse and is not breathing): CPR (Attempt to Resuscitate)  Medical Interventions (Patient has pulse or is breathing): Full  Support      Admission Status:  I believe this patient meets inpatient status.    Electronically signed by Cecil Bradley DO, 09/26/24, 1:43 PM EDT.

## 2024-09-26 NOTE — ED PROVIDER NOTES
Time: 11:32 AM EDT  Date of encounter:  2024  Independent Historian/Clinical History and Information was obtained by:   Patient and Family    History is limited by: N/A    Chief Complaint: Pop in right ankle, unable to stand now      History of Present Illness:  Patient is a 73 y.o. year old male who presents to the emergency department for evaluation of pop in right ankle now completely weak and unable to stand due to right ankle weakness.    It sounds like he has been on antibiotics including Levaquin recently for illness, and felt a sudden pop in the back of his right ankle and now unable to bear weight at all and unable to plantarflex at the ankle.    He is also having some separate issue of pain with raising his right leg at the hip and tenderness directly over the right anterior hip flexor muscle.    Sounds like he has been sick and weak and fatigued for some time now.          Patient Care Team  Primary Care Provider: Juan Perez MD    Past Medical History:     Allergies   Allergen Reactions    Diclofenac Sodium Dizziness     Past Medical History:   Diagnosis Date    Aortic valve replaced     Arthritis 2018    Atrial fibrillation     Hypertension      Past Surgical History:   Procedure Laterality Date    CARDIAC SURGERY       Family History   Problem Relation Age of Onset    COPD Mother         Still living 93    Heart disease Mother     Arthritis Father     COPD Father          at 91.       Home Medications:  Prior to Admission medications    Medication Sig Start Date End Date Taking? Authorizing Provider   aspirin 325 MG tablet Take 1 tablet by mouth 2 (two) times a day.    Provider, MD Eli   dilTIAZem (CARDIZEM) 120 MG tablet Take 1 tablet by mouth 2 (Two) Times a Day. 24   Juan Perez MD   levoFLOXacin (Levaquin) 750 MG tablet Take 1 tablet by mouth Daily. 24   Juan Perez MD   metoprolol tartrate (LOPRESSOR) 100 MG tablet Take 1  "tablet by mouth 2 (Two) Times a Day. 12/28/23   Juan Perez MD        Social History:   Social History     Tobacco Use    Smoking status: Never     Passive exposure: Never    Smokeless tobacco: Never   Vaping Use    Vaping status: Never Used   Substance Use Topics    Alcohol use: Yes     Alcohol/week: 4.0 standard drinks of alcohol     Types: 4 Cans of beer per week    Drug use: Never         Review of Systems:  Review of Systems   I performed a 10 point review of systems which was all negative, except for the positives found in the HPI above.  Physical Exam:  /52 (BP Location: Right arm, Patient Position: Lying)   Pulse 68   Temp 98.6 °F (37 °C) (Oral)   Resp 18   Ht 182.9 cm (72\")   Wt (!) 141 kg (309 lb 15.5 oz)   SpO2 93%   BMI 42.04 kg/m²     Physical Exam   General: Awake alert and in no obvious distress at rest    HEENT: Head normocephalic atraumatic, eyes PERRLA EOMI, nose normal, oropharynx normal.    Neck: Supple full range of motion, no meningismus, no lymphadenopathy    Heart: Regular rate and rhythm, no murmurs or rubs, 2+ radial pulses bilaterally    Lungs: Clear to auscultation bilaterally without wheezes or crackles, no respiratory distress    Abdomen: Soft, nontender, nondistended, no rebound or guarding    Skin: Warm, dry, no rash    Musculoskeletal: Normal range of motion of right ankle but patient unable to plantarflex much and abnormal [place] Squeeze test on the right, there appears to be a defect in the Achilles tendon felt on palpation concerning for at least a partial tear of the Achilles tendon, no lower extremity edema    Neurologic: Oriented x3, no motor deficits no sensory deficits    Psychiatric: Mood appears stable, no psychosis          Procedures:  Procedures      Medical Decision Making:      Comorbidities that affect care:    Obesity    External Notes reviewed:    None      The following orders were placed and all results were independently analyzed by " me:  Orders Placed This Encounter   Procedures    XR Chest 1 View    XR Ankle 3+ View Right    CT Lower Extremity Right Without Contrast    [place] Draw    Comprehensive Metabolic Panel    Single High Sensitivity Troponin T    Magnesium    Urinalysis With Microscopic If Indicated (No Culture) - Urine, Clean Catch    CBC Auto Differential    Protime-INR    High Sensitivity Troponin T 2Hr    Urinalysis, Microscopic Only - Urine, Clean Catch    Basic Metabolic Panel    Magnesium    Phosphorus    Iron Profile    Ferritin    Reticulocytes    Vitamin B12    Folate    Occult Blood, Fecal By Immunoassay - Stool, Per Rectum    CBC Auto Differential    Diet: Regular/House; Fluid Consistency: Thin (IDDSI 0)    Undress & Gown    Continuous Pulse Oximetry    Vital Signs    Orthostatic Blood Pressure    Vital Signs    Intake & Output    Weigh Patient    Oral Care    Saline Lock & Maintain IV Access    Place Sequential Compression Device    Maintain Sequential Compression Device    Apply tall boot (with a wedge) Until Discontinued    Code Status and Medical Interventions: CPR (Attempt to Resuscitate); Full Support    Orthopedics (on-call MD unless specified)    Hospitalist (on-call MD unless specified)    Inpatient Case Management  Consult    Inpatient Gastroenterology Consult    OT Consult: Eval & Treat ADL Performance Below Baseline    OT Consult: Eval & Treat ADL Performance Below Baseline, Discharge Placement Assessment, Adaptive Equipments Needs    OT Plan of Care Cert / Re-Cert    PT Consult: Eval & Treat As Tolerated; Discharge Placement Assessment    PT Consult: Eval & Treat As Tolerated; Discharge Placement Assessment, Functional Mobility Below Baseline    Oxygen Therapy- Nasal Cannula; Titrate 1-6 LPM Per SpO2; 90 - 95%    POC Glucose Once    ECG 12 Lead ED Triage Standing Order; Weak / Dizzy / AMS    ECG 12 Lead QT Measurement    Insert Peripheral IV    Insert Peripheral IV    Inpatient Admission    Fall  Precautions    CBC & Differential    Green Top (Gel)    Lavender Top    Gold Top - SST    Light Blue Top    CBC & Differential       Medications Given in the Emergency Department:  Medications   sodium chloride 0.9 % flush 10 mL (has no administration in time range)   aspirin tablet 325 mg ( Oral Dose Auto Held 10/12/24 0900)   dilTIAZem (CARDIZEM) tablet 120 mg (120 mg Oral Given 9/27/24 0855)   lisinopril (PRINIVIL,ZESTRIL) tablet 10 mg (10 mg Oral Given 9/27/24 0855)   metoprolol tartrate (LOPRESSOR) tablet 100 mg (100 mg Oral Given 9/27/24 0855)   sodium chloride 0.9 % flush 10 mL (10 mL Intravenous Given 9/27/24 0856)   sodium chloride 0.9 % flush 10 mL (has no administration in time range)   sodium chloride 0.9 % infusion 40 mL (has no administration in time range)   ondansetron ODT (ZOFRAN-ODT) disintegrating tablet 4 mg (has no administration in time range)     Or   ondansetron (ZOFRAN) injection 4 mg (has no administration in time range)   melatonin tablet 5 mg (5 mg Oral Given 9/26/24 2021)   sennosides-docusate (PERICOLACE) 8.6-50 MG per tablet 2 tablet (2 tablets Oral Not Given 9/27/24 0856)     And   polyethylene glycol (MIRALAX) packet 17 g (has no administration in time range)     And   bisacodyl (DULCOLAX) EC tablet 5 mg (has no administration in time range)     And   bisacodyl (DULCOLAX) suppository 10 mg (has no administration in time range)   acetaminophen (TYLENOL) tablet 650 mg (has no administration in time range)     Or   acetaminophen (TYLENOL) 160 MG/5ML oral solution 650 mg (has no administration in time range)     Or   acetaminophen (TYLENOL) suppository 650 mg (has no administration in time range)   oxyCODONE (ROXICODONE) immediate release tablet 5 mg (has no administration in time range)   furosemide (LASIX) injection 40 mg (40 mg Intravenous Given 9/27/24 0424)   potassium chloride (MICRO-K/KLOR-CON) CR capsule (40 mEq Oral Given 9/27/24 0855)        ED Course:         Labs:    Lab  Results (last 24 hours)       Procedure Component Value Units Date/Time    Occult Blood, Fecal By Immunoassay - Stool, Per Rectum [[id number]]  (Abnormal) Collected: 09/26/24 2101    Specimen: Stool from Per Rectum Updated: 09/26/24 2138     Occult Blood, Fecal by Immunoassay Positive    Basic Metabolic Panel [[id number]]  (Abnormal) Collected: 09/27/24 0421    Specimen: Blood from Hand, Right Updated: 09/27/24 0446     Glucose 155 mg/dL      BUN 19 mg/dL      Creatinine 1.04 mg/dL      Sodium 131 mmol/L      Potassium 3.4 mmol/L      Chloride 96 mmol/L      CO2 22.0 mmol/L      Calcium 8.7 mg/dL      BUN/Creatinine Ratio 18.3     Anion Gap 13.0 mmol/L      eGFR 75.8 mL/min/1.73     Narrative:      GFR Normal >60  Chronic Kidney Disease <60  Kidney Failure <15    The GFR formula is only valid for adults with stable renal function between ages 18 and 70.    CBC & Differential [[id number]]  (Abnormal) Collected: 09/27/24 0421    Specimen: Blood from Hand, Right Updated: 09/27/24 0428    Narrative:      The following orders were created for panel order CBC & Differential.  Procedure                               Abnormality         Status                     ---------                               -----------         ------                     CBC Auto Differential[[id number]]        Abnormal            Final result                 Please view results for these tests on the individual orders.    Magnesium [[id number]]  (Normal) Collected: 09/27/24 0421    Specimen: Blood from Hand, Right Updated: 09/27/24 0446     Magnesium 1.7 mg/dL     Phosphorus [[id number]]  (Normal) Collected: 09/27/24 0421    Specimen: Blood from Hand, Right Updated: 09/27/24 0446     Phosphorus 3.8 mg/dL     CBC Auto Differential [[id number]]  (Abnormal) Collected: 09/27/24 0421    Specimen: Blood from Hand, Right Updated: 09/27/24 0428     WBC 14.98 10*3/mm3      RBC 3.57 10*6/mm3      Hemoglobin 10.9 g/dL      Hematocrit 33.0 %      MCV 92.4 fL       MCH 30.5 pg      MCHC 33.0 g/dL      RDW 16.2 %      RDW-SD 54.5 fl      MPV 9.0 fL      Platelets 168 10*3/mm3      Neutrophil % 83.0 %      Lymphocyte % 7.5 %      Monocyte % 8.6 %      Eosinophil % 0.1 %      Basophil % 0.3 %      Immature Grans % 0.5 %      Neutrophils, Absolute 12.44 10*3/mm3      Lymphocytes, Absolute 1.13 10*3/mm3      Monocytes, Absolute 1.29 10*3/mm3      Eosinophils, Absolute 0.01 10*3/mm3      Basophils, Absolute 0.04 10*3/mm3      Immature Grans, Absolute 0.07 10*3/mm3      nRBC 0.0 /100 WBC              Imaging:    CT Lower Extremity Right Without Contrast    Result Date: 9/26/2024  CT LOWER EXTREMITY RIGHT WO CONTRAST Date of exam: 9/26/2024, 10:53 P.M., EDT. Indication: possible right Achilles tear (as per surgery) Comparison: 9/26/2024, 11:46 a.m. TECHNIQUE: Axial CT images were obtained of the right lower extremity (right ankle) without contrast administration. Reconstructed 2D coronal and sagittal images were also obtained. Automated exposure control and iterative construction methods were used. FINDINGS: No acute fracture or acute malalignment is seen. The right Achilles tendon has an irregular appearance located about 7.5 cm superior to its retrocalcaneal attachment. Soft tissue swelling is seen, especially involving the subcutaneous tissues, and particularly seen posteriorly as well as laterally, suggesting acute contusion(s). Consider MRI examination for further imaging assessment if clinically warranted and if not contraindicated. Degenerative and enthesopathic changes involve the right ankle and the right foot. No ectopic gas foci are seen. No definite focal sizable organized drainable fluid collection is seen. No unintended retained foreign body is suggested. Arterial calcifications are present. Additional benign soft tissue calcifications are present. Please note that the study was performed for soft tissue technique and not bone detail.     No acute fracture. No acute  malalignment. Please see above comments for further detail. Portions of this note were completed with a voice recognition program. Electronically Signed: Colton Mahajan MD  9/26/2024 11:05 PM EDT  Workstation ID: [id number]       Differential Diagnosis and Discussion:    Extremity Pain: Differential diagnosis includes but is not limited to soft tissue sprain, tendonitis, tendon injury, dislocation, fracture, deep vein thrombosis, arterial insufficiency, osteoarthritis, bursitis, and ligamentous damage.    All labs were reviewed and interpreted by me.  All X-rays impressions were independently interpreted by me.    MDM     Amount and/or Complexity of Data Reviewed  Clinical lab tests: reviewed  Decide to obtain previous medical records or to obtain history from someone other than the patient: yes               This patient is a pleasant 73-year-old male who has been feeling under the weather or ill for the past few weeks and has been on some Levaquin antibiotics, now presents with sudden pop in the right ankle and now unable to walk or bear any weight.    On my physical exam is concerning for a defect in the Achilles tendon on the right side and I consider rupture of the tendon or partial tear.    X-rays are negative for fracture or dislocation.    Patient is unable to walk or bear weight and I have consulted with orthopedic surgeon and we will bring him in for further imaging studies for probable Achilles tendon tear.                  Patient Care Considerations:          Consultants/Shared Management Plan:    Hospitalist: I have discussed the case with the admitting hospitalist who agrees to accept the patient for admission.  Consultant: I have discussed the case with the on-call orthopedic surgeon who agrees to consult on the patient.    Social Determinants of Health:    Patient has presented with family members who are responsible, reliable and will ensure follow up care.      Disposition and Care  Coordination:    Admit:   Through independent evaluation of the patient's history, physical, and imperical data, the patient meets criteria for inpatient admission to the hospital.        Final diagnoses:   Rupture of right Achilles tendon, initial encounter   Generalized weakness        ED Disposition       ED Disposition   Decision to Admit    Condition   --    Comment   Level of Care: Med/Surg [1]   Diagnosis: Achilles tendon rupture [[id number]]   Admitting Physician: YOLA SHIPLEY [[id number]]   Attending Physician: YOLA SHIPLEY [[id number]]   Certification: I Certify That Inpatient Hospital Services Are Medically Necessary For Greater Than 2 Midnights                 This medical record created using voice recognition software.             Juanito Duarte MD  09/27/24 2237

## 2024-09-26 NOTE — PLAN OF CARE
Goal Outcome Evaluation:  Plan of Care Reviewed With: patient, daughter           Outcome Evaluation: Patient alert and oriented x 4, calm, cooperative, and pleasant.  VS WNL.  LCTA.  Patient denies pain, but admits his R ankle/foot is painful if he moves it.  Patient sitting upright in chair with legs elevated.  +2 Edema noted to L LE, ankle, and foot.  Non pitting edema noted to R ankle and foot.  Patient awaiting Orthopedic consult.  Patient currently sitting upright in chair with call light in reach.

## 2024-09-27 ENCOUNTER — APPOINTMENT (OUTPATIENT)
Dept: MRI IMAGING | Facility: HOSPITAL | Age: 74
DRG: 557 | End: 2024-09-27
Payer: MEDICARE

## 2024-09-27 LAB
ANION GAP SERPL CALCULATED.3IONS-SCNC: 13 MMOL/L (ref 5–15)
BASOPHILS # BLD AUTO: 0.04 10*3/MM3 (ref 0–0.2)
BASOPHILS NFR BLD AUTO: 0.3 % (ref 0–1.5)
BUN SERPL-MCNC: 19 MG/DL (ref 8–23)
BUN/CREAT SERPL: 18.3 (ref 7–25)
CALCIUM SPEC-SCNC: 8.7 MG/DL (ref 8.6–10.5)
CHLORIDE SERPL-SCNC: 96 MMOL/L (ref 98–107)
CO2 SERPL-SCNC: 22 MMOL/L (ref 22–29)
CREAT SERPL-MCNC: 1.04 MG/DL (ref 0.76–1.27)
DEPRECATED RDW RBC AUTO: 54.5 FL (ref 37–54)
EGFRCR SERPLBLD CKD-EPI 2021: 75.8 ML/MIN/1.73
EOSINOPHIL # BLD AUTO: 0.01 10*3/MM3 (ref 0–0.4)
EOSINOPHIL NFR BLD AUTO: 0.1 % (ref 0.3–6.2)
ERYTHROCYTE [DISTWIDTH] IN BLOOD BY AUTOMATED COUNT: 16.2 % (ref 12.3–15.4)
GLUCOSE SERPL-MCNC: 155 MG/DL (ref 65–99)
HCT VFR BLD AUTO: 33 % (ref 37.5–51)
HGB BLD-MCNC: 10.9 G/DL (ref 13–17.7)
IMM GRANULOCYTES # BLD AUTO: 0.07 10*3/MM3 (ref 0–0.05)
IMM GRANULOCYTES NFR BLD AUTO: 0.5 % (ref 0–0.5)
LYMPHOCYTES # BLD AUTO: 1.13 10*3/MM3 (ref 0.7–3.1)
LYMPHOCYTES NFR BLD AUTO: 7.5 % (ref 19.6–45.3)
MAGNESIUM SERPL-MCNC: 1.7 MG/DL (ref 1.6–2.4)
MCH RBC QN AUTO: 30.5 PG (ref 26.6–33)
MCHC RBC AUTO-ENTMCNC: 33 G/DL (ref 31.5–35.7)
MCV RBC AUTO: 92.4 FL (ref 79–97)
MONOCYTES # BLD AUTO: 1.29 10*3/MM3 (ref 0.1–0.9)
MONOCYTES NFR BLD AUTO: 8.6 % (ref 5–12)
NEUTROPHILS NFR BLD AUTO: 12.44 10*3/MM3 (ref 1.7–7)
NEUTROPHILS NFR BLD AUTO: 83 % (ref 42.7–76)
NRBC BLD AUTO-RTO: 0 /100 WBC (ref 0–0.2)
PHOSPHATE SERPL-MCNC: 3.8 MG/DL (ref 2.5–4.5)
PLATELET # BLD AUTO: 168 10*3/MM3 (ref 140–450)
PMV BLD AUTO: 9 FL (ref 6–12)
POTASSIUM SERPL-SCNC: 3.4 MMOL/L (ref 3.5–5.2)
QT INTERVAL: 380 MS
QTC INTERVAL: 522 MS
RBC # BLD AUTO: 3.57 10*6/MM3 (ref 4.14–5.8)
SODIUM SERPL-SCNC: 131 MMOL/L (ref 136–145)
WBC NRBC COR # BLD AUTO: 14.98 10*3/MM3 (ref 3.4–10.8)

## 2024-09-27 PROCEDURE — 85025 COMPLETE CBC W/AUTO DIFF WBC: CPT | Performed by: HOSPITALIST

## 2024-09-27 PROCEDURE — 97161 PT EVAL LOW COMPLEX 20 MIN: CPT

## 2024-09-27 PROCEDURE — 80048 BASIC METABOLIC PNL TOTAL CA: CPT | Performed by: HOSPITALIST

## 2024-09-27 PROCEDURE — 25010000002 FUROSEMIDE PER 20 MG: Performed by: HOSPITALIST

## 2024-09-27 PROCEDURE — 99221 1ST HOSP IP/OBS SF/LOW 40: CPT | Performed by: ORTHOPAEDIC SURGERY

## 2024-09-27 PROCEDURE — 93010 ELECTROCARDIOGRAM REPORT: CPT | Performed by: INTERNAL MEDICINE

## 2024-09-27 PROCEDURE — 97535 SELF CARE MNGMENT TRAINING: CPT

## 2024-09-27 PROCEDURE — 97165 OT EVAL LOW COMPLEX 30 MIN: CPT

## 2024-09-27 PROCEDURE — 99232 SBSQ HOSP IP/OBS MODERATE 35: CPT | Performed by: FAMILY MEDICINE

## 2024-09-27 PROCEDURE — 83735 ASSAY OF MAGNESIUM: CPT | Performed by: HOSPITALIST

## 2024-09-27 PROCEDURE — 93005 ELECTROCARDIOGRAM TRACING: CPT | Performed by: FAMILY MEDICINE

## 2024-09-27 PROCEDURE — 84100 ASSAY OF PHOSPHORUS: CPT | Performed by: HOSPITALIST

## 2024-09-27 PROCEDURE — 99221 1ST HOSP IP/OBS SF/LOW 40: CPT | Performed by: INTERNAL MEDICINE

## 2024-09-27 RX ORDER — POTASSIUM CHLORIDE 750 MG/1
40 CAPSULE, EXTENDED RELEASE ORAL ONCE
Status: COMPLETED | OUTPATIENT
Start: 2024-09-27 | End: 2024-09-27

## 2024-09-27 RX ADMIN — DILTIAZEM HYDROCHLORIDE 120 MG: 60 TABLET, FILM COATED ORAL at 21:41

## 2024-09-27 RX ADMIN — Medication 5 MG: at 21:42

## 2024-09-27 RX ADMIN — DILTIAZEM HYDROCHLORIDE 120 MG: 60 TABLET, FILM COATED ORAL at 08:55

## 2024-09-27 RX ADMIN — FUROSEMIDE 40 MG: 10 INJECTION, SOLUTION INTRAMUSCULAR; INTRAVENOUS at 04:24

## 2024-09-27 RX ADMIN — Medication 10 ML: at 08:56

## 2024-09-27 RX ADMIN — POTASSIUM CHLORIDE 40 MEQ: 750 CAPSULE, EXTENDED RELEASE ORAL at 08:55

## 2024-09-27 RX ADMIN — FUROSEMIDE 40 MG: 10 INJECTION, SOLUTION INTRAMUSCULAR; INTRAVENOUS at 16:21

## 2024-09-27 RX ADMIN — Medication 10 ML: at 21:42

## 2024-09-27 RX ADMIN — LISINOPRIL 10 MG: 10 TABLET ORAL at 08:55

## 2024-09-27 RX ADMIN — METOPROLOL TARTRATE 100 MG: 50 TABLET, FILM COATED ORAL at 08:55

## 2024-09-27 RX ADMIN — METOPROLOL TARTRATE 100 MG: 50 TABLET, FILM COATED ORAL at 21:41

## 2024-09-27 NOTE — THERAPY EVALUATION
Patient Name: Woodrow Alejandro  : 1950    MRN: [id number]                              Today's Date: 2024       Admit Date: 2024    Visit Dx:     ICD-10-CM ICD-9-CM   1. Rupture of right Achilles tendon, initial encounter  S86.011A 845.09   2. Generalized weakness  R53.1 780.79   3. Difficulty in walking  R26.2 719.7     Patient Active Problem List   Diagnosis    Essential hypertension    Permanent atrial fibrillation    S/P AVR    ICD (implantable cardioverter-defibrillator), dual, in situ    Dermatitis    IFG (impaired fasting glucose)    Mixed hyperlipidemia    Vitamin D deficiency    Medicare annual wellness visit, subsequent    Primary osteoarthritis of both knees    Screening PSA (prostate specific antigen)    Bilateral primary osteoarthritis of knee    Coarse tremors    Weakness generalized    Increased urinary frequency    Bandemia    Achilles tendon rupture     Past Medical History:   Diagnosis Date    Aortic valve replaced     Arthritis 2018    Atrial fibrillation     Hypertension      Past Surgical History:   Procedure Laterality Date    CARDIAC SURGERY        General Information       Row Name 24 1337 24 1321       OT Time and Intention    Document Type therapy note (daily note)  -SC evaluation  -SC    Mode of Treatment individual therapy;occupational therapy  -SC individual therapy;occupational therapy  -SC      Row Name 24 1134          OT Time and Intention    Document Type --  -SC     Mode of Treatment --  -SC       Row Name 24 1337 24 1321       General Information    Patient Profile Reviewed yes  -SC yes  -SC    Prior Level of Function -- --  Patient lives and is primary CG for 94 y/o mother. PLOF is independence in the community. He has a walk in shower, stands to shower and did not previously use AE for mobility.  -SC    Existing Precautions/Restrictions other (see comments)  -SC other (see comments)  WBAT with boot  -SC      Row Name  09/27/24 1134          General Information    Patient Profile Reviewed --  -SC     Prior Level of Function --  Prior to hosp, patient was at [facility] for TPN and at [facility] for 2 wks prior due to pancreatitis. Before initial hospitalization, patient lived with wife and was independent in community. He used a cane at times for mobility and stands to shower.  -SC     Existing Precautions/Restrictions --  -SC     Barriers to Rehab --  -SC       Row Name 09/27/24 1321 09/27/24 1134       Occupational Profile    Reason for Services/Referral (Occupational Profile) Patient is a 73 year-old male admitted to [facility] on 9/26/24 s/p an achillies tendon injury.  OT consulted due to a decline in function/mobility.  No previous OT services for current condition.  -SC --  -SC    Successful Occupations (Occupational Profile) Patient is retired from [email or web address] at [employer].  -SC --    Patient Goals (Occupational Profile) I want to walk.  -SC --  -SC      Row Name 09/27/24 1321 09/27/24 1134       Living Environment    People in Home other (see comments)  Patient is primary CG for 93 year old mother.  -SC --  -SC      Row Name 09/27/24 1321 09/27/24 1134       Home Main Entrance    Number of Stairs, Main Entrance none  -SC --  -SC    Stair Railings, Main Entrance none  -SC --  -SC      Row Name 09/27/24 1321 09/27/24 1134       Stairs Within Home, Primary    Number of Stairs, Within Home, Primary ten  -SC none  -SC    Stair Railings, Within Home, Primary railings safe and in good condition  -SC none  -SC    Stairs Comment, Within Home, Primary Patient lives in a split level home. His sleeping area is downstairs and he must climb two sets of 5 stairs to main floor to care for his mother.  -SC --      Row Name 09/27/24 1337 09/27/24 1321       Cognition    Orientation Status (Cognition) oriented x 4  Patient is pleasant, cooperative and motivated to return to prior level of function.  -SC oriented x 4  -SC      Row Name 09/27/24 1134           Cognition    Orientation Status (Cognition) oriented x 3  -SC       Row Name 09/27/24 1321 09/27/24 1134       Safety Issues, Functional Mobility    Impairments Affecting Function (Mobility) balance;endurance/activity tolerance;strength  -SC endurance/activity tolerance  -SC              User Key  (r) = Recorded By, (t) = Taken By, (c) = Cosigned By      Initials Name Provider Type    SC Rosanna Corrales OT Occupational Therapist                     Mobility/ADL's       Row Name 09/27/24 1338          Transfers    Comment, (Transfers) Instructed patient on push up from chair vs pulling on walker. STS x 4 reps throughout session.  -SC       Row Name 09/27/24 1326          Sit-Stand Transfer    Sit-Stand [place] (Transfers) minimum assist (75% patient effort);1 person assist  -SC     Assistive Device (Sit-Stand Transfers) walker, front-wheeled  -[facility] Name 09/27/24 1326          Stand-Sit Transfer    Stand-Sit [place] (Transfers) minimum assist (75% patient effort);1 person assist  -SC     Assistive Device (Stand-Sit Transfers) walker, front-wheeled  -[facility] Name 09/27/24 1326          Functional Mobility    Functional Mobility- Comment Patient able to transfer from recliner to 3:1 at Min A using walker and NWB; all further mobility deferred due to patient waiting on newly ordered boot  -[facility] Name 09/27/24 1326          Activities of Daily Living    BADL Assessment/Intervention bathing;upper body dressing;lower body dressing;grooming;toileting  -SC       Row Name 09/27/24 1338 09/27/24 1326       Bathing Assessment/Intervention    [place] Level (Bathing) minimum assist (75% patient effort)  -SC minimum assist (75% patient effort)  -SC    Comment, (Bathing) Educated on use of long handled sponge for increased independence during bathing. Recommended use of shower chair for optimal safety.  -SC --      Row Name 09/27/24 1338 09/27/24 1326       Upper Body Dressing Assessment/Training     [place] Level (Upper Body Dressing) standby assist  -SC standby assist  -[facility] Name 09/27/24 1338 09/27/24 1326       Lower Body Dressing Assessment/Training    [place] Level (Lower Body Dressing) -- maximum assist (25% patient effort)  -SC    Comment, (Lower Body Dressing) Demonstrated sock aid and reacher for increased independence for LB dressing.  -SC --      Row Name 09/27/24 1338 09/27/24 1326       Grooming Assessment/Training    [place] Level (Grooming) standby assist  -SC standby assist  -SC    Comment, (Grooming) seated; set up  -SC --      Row Name 09/27/24 1326          Toileting Assessment/Training    [place] Level (Toileting) minimum assist (75% patient effort)  -SC               User Key  (r) = Recorded By, (t) = Taken By, (c) = Cosigned By      Initials Name Provider Type    SC Rosanna Corrales OT Occupational Therapist                   Obj/Interventions       Row Name 09/27/24 1329          Sensory Assessment (Somatosensory)    Sensory Assessment (Somatosensory) sensation intact  -[facility] 09/27/24 1329          Vision Assessment/Intervention    Visual Impairment/Limitations WFL  -[facility] 09/27/24 1329          Strength Comprehensive (MMT)    Comment, General Manual Muscle Testing (MMT) Assessment Biceps, triceps, and  4/5  -SC       Row Name 09/27/24 1329          Motor Skills    Motor Skills functional endurance;coordination  -SC     Coordination WFL  -SC     Functional Endurance fair-  -SC       Row Name 09/27/24 1329          Balance    Balance Interventions standing  -SC     Comment, Balance Min A  -SC               User Key  (r) = Recorded By, (t) = Taken By, (c) = Cosigned By      Initials Name Provider Type    SC Rosanna Corrales OT Occupational Therapist                   Goals/Plan       Row Name 09/27/24 1331          Bed Mobility Goal 1 (OT)    Activity/Assistive Device (Bed Mobility Goal 1, OT) bed mobility activities, all  -SC      [place] Level/Cues Needed (Bed Mobility Goal 1, OT) modified independence  -SC     Time Frame (Bed Mobility Goal 1, OT) long term goal (LTG);10 days  -SC       Row Name 09/27/24 1331          Transfer Goal 1 (OT)    Activity/Assistive Device (Transfer Goal 1, OT) transfers, all  -SC     [place] Level/Cues Needed (Transfer Goal 1, OT) modified independence  -SC     Time Frame (Transfer Goal 1, OT) long term goal (LTG);10 days  -SC       Row Name 09/27/24 1331          Bathing Goal 1 (OT)    Activity/Device (Bathing Goal 1, OT) bathing skills, all  -SC     [place] Level/Cues Needed (Bathing Goal 1, OT) modified independence  -SC     Time Frame (Bathing Goal 1, OT) long term goal (LTG);10 days  -SC       Row Name 09/27/24 1331          Dressing Goal 1 (OT)    Activity/Device (Dressing Goal 1, OT) dressing skills, all  -SC     [place]/Cues Needed (Dressing Goal 1, OT) modified independence  -SC     Time Frame (Dressing Goal 1, OT) long term goal (LTG);10 days  -SC       Row Name 09/27/24 1331          Toileting Goal 1 (OT)    Activity/Device (Toileting Goal 1, OT) toileting skills, all  -SC     [place] Level/Cues Needed (Toileting Goal 1, OT) modified independence  -SC     Time Frame (Toileting Goal 1, OT) long term goal (LTG);10 days  -SC       Row Name 09/27/24 1331          Strength Goal 1 (OT)    Strength Goal 1 (OT) Patient will improve upper body strength 4+/5 to support improved ADL function and mobility.  -SC     Time Frame (Strength Goal 1, OT) long term goal (LTG);10 days  -SC       Row Name 09/27/24 1331          Problem Specific Goal 1 (OT)    Problem Specific Goal 1 (OT) Patient will improve endurance to fair+ to support improved independence, function and safety in ADLs and during functional mobility.  -SC     Time Frame (Problem Specific Goal 1, OT) long term goal (LTG);10 days  -SC       Row Name 09/27/24 1331          Therapy Assessment/Plan (OT)    Planned Therapy Interventions  (OT) activity tolerance training;BADL retraining;adaptive equipment training;patient/caregiver education/training;occupation/activity based interventions;strengthening exercise;functional balance retraining  -SC               User Key  (r) = Recorded By, (t) = Taken By, (c) = Cosigned By      Initials Name Provider Type    SC Rosanna Corrales OT Occupational Therapist                   Clinical Impression       [facility] Name 09/27/24 1330          Pain Assessment    Pretreatment Pain Rating 0/10 - no pain  -SC     Posttreatment Pain Rating 0/10 - no pain  -SC       Row Name 09/27/24 1330          Plan of Care Review    Plan of Care Reviewed With patient  -SC     Progress no change  Evaluation  -SC     Outcome Evaluation Patient presents with limitations of strength, balance, and activity tolerance which impede his ability to perform ADLs/transfers as prior.  The skills of a therapist will be required to safely and effectively implement treatment plan to restore maximum level function.  -SC       Row Name 09/27/24 1330          Therapy Assessment/Plan (OT)    Rehab Potential (OT) good, to achieve stated therapy goals  -SC     Criteria for Skilled Therapeutic Interventions Met (OT) yes;meets criteria;skilled treatment is necessary  -SC     Therapy Frequency (OT) 5 times/wk  -SC       Row Name 09/27/24 1330          Therapy Plan Review/Discharge Plan (OT)    Anticipated Discharge Disposition (OT) inpatient rehabilitation facility  -SC       Row Name 09/27/24 1330          Positioning and Restraints    Pre-Treatment Position sitting in chair/recliner  -SC     Post Treatment Position chair  -SC     In Chair call light within reach;encouraged to call for assist;exit alarm on  -SC               User Key  (r) = Recorded By, (t) = Taken By, (c) = Cosigned By      Initials Name Provider Type    Rosanna Noel OT Occupational Therapist                   Outcome Measures       [facility] Name 09/27/24 133          How much help from  another is currently needed...    Putting on and taking off regular lower body clothing? 2  -SC     Bathing (including washing, rinsing, and drying) 2  -SC     Toileting (which includes using toilet bed pan or urinal) 3  -SC     Putting on and taking off regular upper body clothing 4  -SC     Taking care of personal grooming (such as brushing teeth) 4  -SC     Eating meals 4  -SC     AM-PAC 6 Clicks Score (OT) 19  -SC       Row Name 09/27/24 1100 09/27/24 0850       How much help from another person do you currently need...    Turning from your back to your side while in flat bed without using bedrails? 4  -LORIN (r) KL (t) LORIN (c) 4  -KD    Moving from lying on back to sitting on the side of a flat bed without bedrails? 4  -LORIN (r) KL (t) LORIN (c) 4  -KD    Moving to and from a bed to a chair (including a wheelchair)? 3  -LORIN (r) KL (t) LORIN (c) 3  -KD    Standing up from a chair using your arms (e.g., wheelchair, bedside chair)? 3  -LORIN (r) KL (t) LORIN (c) 3  -KD    Climbing 3-5 steps with a railing? 2  -LORIN (r) KL (t) LORIN (c) 2  -KD    To walk in hospital room? 2  -LORIN (r) KL (t) LORIN (c) 2  -KD    AM-PAC 6 Clicks Score (PT) 18  -LORIN (r) KL (t) 18  -KD    Highest Level of Mobility Goal 6 --> Walk 10 steps or more  -LORIN (r) KL (t) 6 --> Walk 10 steps or more  -KD      Row Name 09/27/24 1333 09/27/24 1100       Functional Assessment    Outcome Measure Options AM-PAC 6 Clicks Daily Activity (OT);Optimal Instrument  -SC AM-PAC 6 Clicks Basic Mobility (PT)  -LORIN (r) KL (t) LORIN (c)      Row Name 09/27/24 1333          Optimal Instrument    Optimal Instrument Optimal - 3  -SC     Bending/Stooping 4  -SC     Standing 3  -SC     Reaching 2  -SC     From the list, choose the 3 activities you would most like to be able to do without any difficulty Standing;Reaching;Bending/stooping  -SC     Total Score Optimal - 3 9  -SC               User Key  (r) = Recorded By, (t) = Taken By, (c) = Cosigned By      Initials Name Provider Type    LORIN Martinez  Mohsen PAYNE, PT Physical Therapist    SC Rosanna Corrales OT Occupational Therapist    Vicky Munoz RN Registered Nurse    Dori Elder, PT Student PT Student                      OT Recommendation and Plan  Planned Therapy Interventions (OT): activity tolerance training, BADL retraining, adaptive equipment training, patient/caregiver education/training, occupation/activity based interventions, strengthening exercise, functional balance retraining  Therapy Frequency (OT): 5 times/wk  Plan of Care Review  Plan of Care Reviewed With: patient  Progress: no change (Evaluation)  Outcome Evaluation: Patient presents with limitations of strength, balance, and activity tolerance which impede his ability to perform ADLs/transfers as prior.  The skills of a therapist will be required to safely and effectively implement treatment plan to restore maximum level function.     Time Calculation:         Time Calculation- OT       Row Name 09/27/24 1336             Time Calculation- OT    OT Received On 09/27/24  -SC      OT Goal Re-Cert Due Date 10/06/24  -SC         Timed Charges    44607 - OT Self Care/Mgmt Minutes 33  -SC         Untimed Charges    OT Eval/Re-eval Minutes 35  -SC         Total Minutes    Timed Charges Total Minutes 33  -SC      Untimed Charges Total Minutes 35  -SC       Total Minutes 68  -SC                User Key  (r) = Recorded By, (t) = Taken By, (c) = Cosigned By      Initials Name Provider Type    SC Rosanna Corrales OT Occupational Therapist                  Therapy Charges for Today       Code Description Service Date Service Provider Modifiers Qty    [id number]  OT SELF CARE/MGMT/TRAIN EA 15 MIN 9/27/2024 Rosanna Corrales OT GO 2    [id number]  OT EVAL LOW COMPLEXITY 3 9/27/2024 Rosanna Corrales OT GO 1                 Rosanna Corrales OT  9/27/2024

## 2024-09-27 NOTE — PLAN OF CARE
Goal Outcome Evaluation:  Plan of Care Reviewed With: patient        Progress: improving  Outcome Evaluation: Pt A&Ox4, VSS. Pt has denied pain throughout this shift. Pt has been stand and pivot from recliner to BSC. Pt awaiting further evaluation at this time.

## 2024-09-27 NOTE — THERAPY EVALUATION
Acute Care - Physical Therapy Initial Evaluation   Yudy     Patient Name: Woodrow Alejandro  : 1950  MRN: [id number]  Today's Date: 2024      Visit Dx:     ICD-10-CM ICD-9-CM   1. Rupture of right Achilles tendon, initial encounter  S86.011A 845.09   2. Generalized weakness  R53.1 780.79   3. Difficulty in walking  R26.2 719.7     Patient Active Problem List   Diagnosis    Essential hypertension    Permanent atrial fibrillation    S/P AVR    ICD (implantable cardioverter-defibrillator), dual, in situ    Dermatitis    IFG (impaired fasting glucose)    Mixed hyperlipidemia    Vitamin D deficiency    Medicare annual wellness visit, subsequent    Primary osteoarthritis of both knees    Screening PSA (prostate specific antigen)    Bilateral primary osteoarthritis of knee    Coarse tremors    Weakness generalized    Increased urinary frequency    Bandemia    Achilles tendon rupture     Past Medical History:   Diagnosis Date    Aortic valve replaced     Arthritis 2018    Atrial fibrillation     Hypertension      Past Surgical History:   Procedure Laterality Date    CARDIAC SURGERY       PT Assessment (Last 12 Hours)       PT Evaluation and Treatment       Row Name 24 1151          Physical Therapy Time and Intention    Subjective Information complains of;weakness;fatigue;pain (P)   -KL     Document Type evaluation (P)   -KL     Mode of Treatment individual therapy;physical therapy (P)   -KL     Total Minutes, Physical Therapy 40 (P)   -KL     Patient Effort adequate (P)   -KL     Symptoms Noted During/After Treatment increased pain (P)   -KL       Row Name 24 1151          General Information    Patient Profile Reviewed yes (P)   -KL     Patient Observations alert;cooperative;agree to therapy (P)   -KL     Prior Level of Function independent: (P)   -KL     Equipment Currently Used at Home none (P)   -KL     Existing Precautions/Restrictions no known precautions/restrictions (P)   -KL      Risks Reviewed patient: (P)   -KL     Benefits Reviewed patient: (P)   -KL     Barriers to Rehab none identified (P)   -       Row Name 09/27/24 1151          Previous Level of Function/Home Environm    Bathing/Grooming, Premorbid Functional Level independent (P)   -KL     Dressing, Premorbid Functional Level independent (P)   -KL     Eating/Feeding, Premorbid Functional Level independent (P)   -KL     Toileting, Premorbid Functional Level independent (P)   -KL     BADLs, Premorbid Functional Level independent (P)   -KL     IADLs, Premorbid Functional Level independent (P)   -KL     Bed Mobility, Premorbid Functional Level independent (P)   -KL     Transfers, Premorbid Functional Level independent (P)   -KL     Household Ambulation, Premorbid Functional Level independent (P)   -KL     Stairs, Premorbid Functional Level independent (P)   -KL     Community Ambulation, Premorbid Functional Level independent (P)   -KL       Row Name 09/27/24 1151          Living Environment    Current Living Arrangements home (P)   -KL     People in Home spouse (P)   -KL     Primary Care Provided by self (P)   -       Row Name 09/27/24 1151          Stairs Within Home, Primary    Number of Stairs, Within Home, Primary none (P)   -KL     Stair Railings, Within Home, Primary none (P)   -       Row Name 09/27/24 1151          Home Use of Assistive/Adaptive Equipment    Equipment Currently Used at Home none (P)   -       Row Name 09/27/24 1151          Range of Motion (ROM)    Range of Motion other (see comments) (P)   ROM is limited d/t WB precautions  -[facility] Name 09/27/24 1151          Strength (Manual Muscle Testing)    Strength (Manual Muscle Testing) other (see comments) (P)   MMTs not performed d/t WB precautions  -       Row Name 09/27/24 1151          Mobility    Extremity Weight-bearing Status right lower extremity (P)   -KL     Right Lower Extremity (Weight-bearing Status) non weight-bearing (NWB) (P)   -KL        Row Name 09/27/24 1151          Transfers    Transfers sit-stand transfer;stand-sit transfer (P)   -KL     Maintains Weight-bearing Status (Transfers) able to maintain (P)   -KL       Row Name 09/27/24 1151          Sit-Stand Transfer    Sit-Stand [place] (Transfers) minimum assist (75% patient effort);1 person assist (P)   -KL     Assistive Device (Sit-Stand Transfers) walker, front-wheeled (P)   -KL       Row Name 09/27/24 1151          Stand-Sit Transfer    Stand-Sit [place] (Transfers) minimum assist (75% patient effort);1 person assist (P)   -KL     Assistive Device (Stand-Sit Transfers) walker, front-wheeled (P)   -KL       Row Name 09/27/24 1151          Gait/Stairs (Locomotion)    Gait/Stairs Locomotion gait/ambulation assistive device (P)   -KL     [place] Level (Gait) minimum assist (75% patient effort);1 person assist (P)   -KL     Assistive Device (Gait) walker, front-wheeled (P)   -KL     Patient was able to Ambulate yes (P)   -KL     Distance in Feet (Gait) 1 (P)   -KL     Pattern (Gait) step-to (P)   -KL     Deviations/Abnormal Patterns (Gait) weight shifting decreased;stride length decreased (P)   -KL       Row Name 09/27/24 1151          Safety Issues, Functional Mobility    Impairments Affecting Function (Mobility) endurance/activity tolerance;range of motion (ROM) (P)   -KL       Row Name 09/27/24 1151          Balance    Balance Assessment standing dynamic balance (P)   -KL     Dynamic Standing Balance minimal assist;1-person assist (P)   -KL     Position/Device Used, Standing Balance walker, front-wheeled (P)   -KL     Balance Interventions standing (P)   -KL       Row Name 09/27/24 1151          Plan of Care Review    Plan of Care Reviewed With patient;daughter (P)   -KL     Progress improving (P)   -KL     Outcome Evaluation Pt presents to treatment with moderate strength and mobility deficits following admission to hospital. Was educated on ambulation with boot and was able to  ambulate 1 ft before sitting back down. Pt would continue to benefit from the use of skilled physical therapy to address BLE strength and muscular endurance required for ambulation. (P)   -       Row Name 09/27/24 1151          Positioning and Restraints    Pre-Treatment Position sitting in chair/recliner (P)   -KL     Post Treatment Position chair (P)   -KL     In Chair reclined (P)   -[facility] Name 09/27/24 1151          Therapy Assessment/Plan (PT)    Patient/Family Therapy Goals Statement (PT) Ambulate without pain (P)   -KL     Rehab Potential (PT) good, to achieve stated therapy goals (P)   -KL     Criteria for Skilled Interventions Met (PT) yes (P)   -KL     Therapy Frequency (PT) daily (P)   -KL     Predicted Duration of Therapy Intervention (PT) 10 days (P)   -KL     Problem List (PT) problems related to;balance;coordination;mobility;range of motion (ROM);strength;pain (P)   -KL     Activity Limitations Related to Problem List (PT) unable to ambulate safely;unable to transfer safely (P)   -       Row Name 09/27/24 1151          Therapy Plan Review/Discharge Plan (PT)    Therapy Plan Review (PT) evaluation/treatment results reviewed (P)   -       Row Name 09/27/24 1151          Physical Therapy Goals    Bed Mobility Goal Selection (PT) bed mobility, PT goal 1 (P)   -KL     Transfer Goal Selection (PT) transfer, PT goal 1 (P)   -KL     Gait Training Goal Selection (PT) gait training, PT goal 1 (P)   -[facility] Name 09/27/24 1151          Bed Mobility Goal 1 (PT)    Activity/Assistive Device (Bed Mobility Goal 1, PT) bed mobility activities, all (P)   -KL     [place] Level/Cues Needed (Bed Mobility Goal 1, PT) independent (P)   -KL     Time Frame (Bed Mobility Goal 1, PT) long term goal (LTG);10 days (P)   -       Row Name 09/27/24 1151          Transfer Goal 1 (PT)    Activity/Assistive Device (Transfer Goal 1, PT) transfers, all (P)   -KL     [place] Level/Cues Needed (Transfer Goal  1, PT) independent (P)   -     Time Frame (Transfer Goal 1, PT) long term goal (LTG);10 days (P)   -       Row Name 09/27/24 1151          Gait Training Goal 1 (PT)    Activity/Assistive Device (Gait Training Goal 1, PT) assistive device use (P)   -     [place] Level (Gait Training Goal 1, PT) independent (P)   -KL     Distance (Gait Training Goal 1, PT) 250 ft (P)   -KL     Time Frame (Gait Training Goal 1, PT) long term goal (LTG);10 days (P)   -               User Key  (r) = Recorded By, (t) = Taken By, (c) = Cosigned By      Initials Name Provider Type    Dori Elder, PT Student PT Student                    Physical Therapy Education       Title: PT OT SLP Therapies (Done)       Topic: Physical Therapy (Done)       Point: Mobility training (Done)       Learning Progress Summary             Patient Acceptance, E,TB, VU by  at 9/27/2024 1159                         Point: Home exercise program (Done)       Learning Progress Summary             Patient Acceptance, E,TB, VU by  at 9/27/2024 1159                         Point: Body mechanics (Done)       Learning Progress Summary             Patient Acceptance, E,TB, VU by  at 9/27/2024 1159                         Point: Precautions (Done)       Learning Progress Summary             Patient Acceptance, E,TB, VU by  at 9/27/2024 1159                                         User Key       Initials Effective Dates Name Provider Type Tay SHANKS 08/27/24 -  Dori Renee, PT Student PT Student PT                  PT Recommendation and Plan  Anticipated Discharge Disposition (PT): (P) inpatient rehabilitation facility  Planned Therapy Interventions (PT): (P) balance training, bed mobility training, home exercise program, gait training, ROM (range of motion), postural re-education, motor coordination training, stair training, strengthening, stretching, transfer training  Therapy Frequency (PT): (P) daily  Plan of Care Reviewed With: (P)  patient, daughter  Progress: (P) improving  Outcome Evaluation: (P) Pt presents to treatment with moderate strength and mobility deficits following admission to hospital. Was educated on ambulation with boot and was able to ambulate 1 ft before sitting back down. Pt would continue to benefit from the use of skilled physical therapy to address BLE strength and muscular endurance required for ambulation.   Outcome Measures       Row Name 09/27/24 1100             How much help from another person do you currently need...    Turning from your back to your side while in flat bed without using bedrails? 4 (P)   -KL      Moving from lying on back to sitting on the side of a flat bed without bedrails? 4 (P)   -KL      Moving to and from a bed to a chair (including a wheelchair)? 3 (P)   -KL      Standing up from a chair using your arms (e.g., wheelchair, bedside chair)? 3 (P)   -KL      Climbing 3-5 steps with a railing? 2 (P)   -KL      To walk in hospital room? 2 (P)   -KL      AM-PAC 6 Clicks Score (PT) 18 (P)   -KL      Highest Level of Mobility Goal 6 --> Walk 10 steps or more (P)   -KL         Functional Assessment    Outcome Measure Options AM-PAC 6 Clicks Basic Mobility (PT) (P)   -KL                User Key  (r) = Recorded By, (t) = Taken By, (c) = Cosigned By      Initials Name Provider Type    Dori Elder, PT Student PT Student                     Time Calculation:    PT Charges       Row Name 09/27/24 1151             Time Calculation    PT Received On 09/27/24 (P)   -      PT Goal Re-Cert Due Date 10/06/24 (P)   -KL         Time Calculation- PT    Total Timed Code Minutes- PT 40 minute(s) (P)   -KL         Untimed Charges    PT Eval/Re-eval Minutes 40 (P)   -KL         Total Minutes    Untimed Charges Total Minutes 40 (P)   -KL       Total Minutes 40 (P)   -                User Key  (r) = Recorded By, (t) = Taken By, (c) = Cosigned By      Initials Name Provider Type    Dori Elder, PT Student PT  Student                  Therapy Charges for Today       Code Description Service Date Service Provider Modifiers Qty    41512068834 HC PT EVAL LOW COMPLEXITY 3 9/27/2024 Dori Renee, PT Student GP 1            PT G-Codes  Outcome Measure Options: (P) AM-PAC 6 Clicks Basic Mobility (PT)  AM-PAC 6 Clicks Score (PT): (P) 18    Dori Renee PT Student  9/27/2024

## 2024-09-27 NOTE — PLAN OF CARE
Goal Outcome Evaluation:  Plan of Care Reviewed With: (P) patient, daughter        Progress: (P) improving  Outcome Evaluation: (P) Pt presents to treatment with moderate strength and mobility deficits following admission to hospital. Was educated on ambulation with boot and was able to ambulate 1 ft before sitting back down. Pt would continue to benefit from the use of skilled physical therapy to address BLE strength and muscular endurance required for ambulation.      Anticipated Discharge Disposition (PT): (P) inpatient rehabilitation facility

## 2024-09-27 NOTE — CONSULTS
[facility]  Initial Inpatient Consult Note    Referring Provider: Hospitalist    Reason for Consultation: FOBT positive, anemia    Subjective     History of present illness:    73 y.o. male with a past medical history of aortic valve replacement, atrial fibrillation, hypertension presented due to issues with his ankle.  GI was consulted due to patient having FOBT positive and anemia.  Patient states he does take aspirin along with Aleve and ibuprofen about 3 days a week and has done so for less than 6 months.  Patient denies any abdominal pain, nausea, vomiting, hematemesis, hematochezia, melena, and fevers.  Patient also states he has never had a colonoscopy and does not want to have a colonoscopy.    2024  Hgb-10.9  HCT-33.0  PT-15.2  INR-1.17  Iron-22  Ferritin-730.40  Iron sat-9  Transferrin-166  TIBC-247  WBC-14.98  Fecal occult-positive    Past Medical History:  Past Medical History:   Diagnosis Date    Aortic valve replaced     Arthritis 2018    Atrial fibrillation     Hypertension      Past Surgical History:  Past Surgical History:   Procedure Laterality Date    CARDIAC SURGERY        Social History:   Social History     Tobacco Use    Smoking status: Never     Passive exposure: Never    Smokeless tobacco: Never   Substance Use Topics    Alcohol use: Yes     Alcohol/week: 4.0 standard drinks of alcohol     Types: 4 Cans of beer per week      Family History:  Family History   Problem Relation Age of Onset    COPD Mother         Still living 93    Heart disease Mother     Arthritis Father     COPD Father          at 91.       Home Meds:  Medications Prior to Admission   Medication Sig Dispense Refill Last Dose    aspirin 325 MG tablet Take 1 tablet by mouth 2 (two) times a day.   2024    dilTIAZem (CARDIZEM) 120 MG tablet Take 1 tablet by mouth 2 (Two) Times a Day. 180 tablet 3 2024    furosemide (LASIX) 40 MG tablet Take 1 tablet by mouth Daily.   2024     levoFLOXacin (Levaquin) 750 MG tablet Take 1 tablet by mouth Daily. 10 tablet 0 9/25/2024    lisinopril (PRINIVIL,ZESTRIL) 10 MG tablet Take 1 tablet by mouth Daily.   9/25/2024    metoprolol tartrate (LOPRESSOR) 100 MG tablet Take 1 tablet by mouth 2 (Two) Times a Day. 180 tablet 3 9/26/2024     Current Meds:   [Held by provider] aspirin, 325 mg, Oral, Daily  dilTIAZem, 120 mg, Oral, Q12H  furosemide, 40 mg, Intravenous, Q12H  lisinopril, 10 mg, Oral, Daily  melatonin, 5 mg, Oral, Nightly  metoprolol tartrate, 100 mg, Oral, BID  senna-docusate sodium, 2 tablet, Oral, BID  sodium chloride, 10 mL, Intravenous, Q12H      Allergies:  Allergies   Allergen Reactions    Diclofenac Sodium Dizziness     Review of Systems  Pertinent items are noted in HPI 10 system review is negative except as mentioned HPI    Objective     Vital Signs  Temp:  [97.7 °F (36.5 °C)-98.7 °F (37.1 °C)] 98.6 °F (37 °C)  Heart Rate:  [] 68  Resp:  [18-20] 18  BP: ()/(40-74) 102/52  Physical Exam:  General Appearance:    Alert, cooperative, in no acute distress   Head:    Normocephalic, without obvious abnormality, atraumatic   Eyes:          conjunctivae and sclerae normal, no icterus   Throat:   no thrush, oral mucosa moist   Neck:   Supple, no adenopathy   Lungs:     Unlabored breathing     Heart:    Regular rhythm and normal rate    Chest Wall:    No abnormalities observed   Abdomen:     Soft, non distended, non tender   Extremities:   no edema, no redness   Skin:   No bruising or rash   Psychiatric:  normal mood and insight     Results Review:   I reviewed the patient's new clinical results.    Results from last 7 days   Lab Units 09/27/24  0421 09/26/24  1112   WBC 10*3/mm3 14.98* 19.20*   HEMOGLOBIN g/dL 10.9* 11.9*   HEMATOCRIT % 33.0* 36.5*   PLATELETS 10*3/mm3 168 179     Results from last 7 days   Lab Units 09/27/24  0421 09/26/24  1112   SODIUM mmol/L 131* 131*   POTASSIUM mmol/L 3.4* 4.0   CHLORIDE mmol/L 96* 96*   CO2  mmol/L 22.0 21.6*   BUN mg/dL 19 13   CREATININE mg/dL 1.04 0.93   CALCIUM mg/dL 8.7 8.4*   BILIRUBIN mg/dL  --  1.0   ALK PHOS U/L  --  65   ALT (SGPT) U/L  --  11   AST (SGOT) U/L  --  16   GLUCOSE mg/dL 155* 176*     Results from last 7 days   Lab Units 09/26/24  1112   INR  1.17*     Lab Results   Lab Value Date/Time    LIPASE 49 09/06/2024 1014       Radiology:  CT Lower Extremity Right Without Contrast    Result Date: 9/26/2024  No acute fracture. No acute malalignment. Please see above comments for further detail. Portions of this note were completed with a voice recognition program. Electronically Signed: Colton Mahajan MD  9/26/2024 11:05 PM EDT  Workstation ID: [id number]    XR Ankle 3+ View Right    Result Date: 9/26/2024  Impression: No acute right ankle findings. Prominent plantar calcaneal spur. Electronically Signed: Siena Llamas MD  9/26/2024 11:55 AM EDT  Workstation ID: [id number]    XR Chest 1 View    Result Date: 9/26/2024  Impression: Stable moderate cardiomegaly. No acute chest findings. Electronically Signed: Siena Llamas MD  9/26/2024 11:48 AM EDT  Workstation ID: [id number]      Assessment & Plan     Achilles tendon rupture       Assessment:  FOBT positive  Anemia    Plan:  Discussed with patient about performing colonoscopy on Monday-patient does not want to proceed with colonoscopy at this time due to age and other factors.  Advised patient we would continue to monitor H&H and that if his hemoglobin drop below 7 they would want to give blood transfusion  Advised patient to continue to monitor bowel movements  Patient understands and agrees to the plan      I discussed the patients findings and my recommendations with patient and family.    Electronically signed by JAVI Stroud, 09/27/24, 2:05 PM EDT.    Patient seen and data reviewed.  Patient has anemia with occult blood positive stool.  Never had a colonoscopy before.  Had a long discussion with the patient and his daughter about  the importance of doing EGD and colonoscopy and risk of not doing so including possibility of missing the diagnosis of early malignancy which by delaying diagnosis can become incurable and the cause of death.  They understood asked questions which were fully answered and requested not to do endoscopy procedures at this point.  They understand that if they change their mind they can always call my office.

## 2024-09-27 NOTE — NURSING NOTE
MRI ordered for patient this day.  MRI Screening completed and pacemaker and heart valve information supplied.  Patient does not qualify for an MRI because pacemaker is unsafe and mismatched leads.  Dr. Diallo notified.      Pericardial Tissue Heart Valve Serial # [id number] Model 3300TFX Position Aortic 25 MM 10/15/2014     Pacemaker   Guidant ICD 1861/630387 6/17/2002   Guidant Lead [id number] 9/2/1998   [employer] Lead [id number] 9/2/1998

## 2024-09-27 NOTE — PROGRESS NOTES
[facility]   Hospitalist Progress Note  Date: 2024  Patient Name: Woodrow Alejandro  : 1950  MRN: [id number]  Date of admission: 2024      Subjective   Subjective     Chief complaint: Pop in ankle, unable to stand    Summary:  73-year-old male with history of essential hypertension, permanent atrial fibrillation, history of bioprosthetic AVR, proteinuria, CAD, right upper lobe nodule, spleen lesion questionable cyst versus hemangioma, nonischemic cardiomyopathy with AICD in place, dyslipidemia, hospitalized after feeling a pop in his right ankle, has been on Levaquin for several days for a questionable cryptogenic infection according to him.  Questionable Achilles tear, orthopedics consulted, awaiting MRI coordination with rep to obtain MRI of the right ankle.  Found to be anemic, foun GI consulted, stool occult blood positive.  d    Interval follow-up: Seen and examined this morning, no acute distress, no acute major overnight events, still has difficulty with flexion and extension of his right ankle.  No fevers, chills, sweats.  Indicates he has had an ongoing infection and workup as outpatient for several weeks, in addition to most recently anemia, unclear etiology, had recent CT scans as outpatient, fecal occult blood positive on this hospitalization.  Holding aspirin.  Has not noticed bloody stools.    Review of systems:  All systems reviewed negative several weakness, fatigue, right ankle weakness and pain    Objective   Objective     Vitals:   Temp:  [97.7 °F (36.5 °C)-98.7 °F (37.1 °C)] 98.6 °F (37 °C)  Heart Rate:  [] 68  Resp:  [18-20] 18  BP: ()/(40-74) 102/52  Physical Exam               Constitutional: Awake, alert, no acute distress              Eyes: Pupils equal, sclerae anicteric, no conjunctival injection              HENT: NCAT, mucous membranes moist              Neck: Supple, no thyromegaly, no lymphadenopathy, trachea midline              Respiratory: Clear  to auscultation bilaterally, nonlabored respirations               Cardiovascular: RRR, no murmurs, rubs, or gallops, palpable pedal pulses bilaterally              Gastrointestinal: Positive bowel sounds, soft, nontender, nondistended              Musculoskeletal: Right lower extremity difficulty flexion and extension, distal extremity neurovascular              Psychiatric: Appropriate affect, cooperative              Neurologic: Oriented x 3, strength symmetric in all extremities, Cranial Nerves grossly intact to confrontation, speech clear              Skin: No rashes     Result Review    Result Review:  I have personally reviewed the pertinent results from the past 24 hours to 9/27/2024 12:53 EDT and agree with these findings:  [x]  Laboratory   CBC          9/19/2024    11:09 9/26/2024    11:12 9/27/2024    04:21   CBC   WBC 14.77  19.20  14.98    RBC 3.75  3.88  3.57    Hemoglobin 11.6  11.9  10.9    Hematocrit 34.1  36.5  33.0    MCV 90.9  94.1  92.4    MCH 30.9  30.7  30.5    MCHC 34.0  32.6  33.0    RDW 14.8  16.4  16.2    Platelets 223  179  168      BMP          9/19/2024    11:09 9/26/2024    11:12 9/27/2024    04:21   BMP   BUN 15  13  19    Creatinine 0.83  0.93  1.04    Sodium 137  131  131    Potassium 4.5  4.0  3.4    Chloride 101  96  96    CO2 23.0  21.6  22.0    Calcium 9.3  8.4  8.7      LIVER FUNCTION TESTS:      Lab 09/26/24  1112   TOTAL PROTEIN 6.8   ALBUMIN 3.2*   GLOBULIN 3.6   ALT (SGPT) 11   AST (SGOT) 16   BILIRUBIN 1.0   ALK PHOS 65       [x]  Microbiology   Microbiology Results (last 10 days)       ** No results found for the last 240 hours. **              [x]  Radiology CT Lower Extremity Right Without Contrast    Result Date: 9/26/2024  No acute fracture. No acute malalignment. Please see above comments for further detail. Portions of this note were completed with a voice recognition program. Electronically Signed: Colton Mahajan MD  9/26/2024 11:05 PM EDT  Workstation ID:  [id number]    XR Ankle 3+ View Right    Result Date: 9/26/2024  Impression: No acute right ankle findings. Prominent plantar calcaneal spur. Electronically Signed: Siena Llamas MD  9/26/2024 11:55 AM EDT  Workstation ID: [id number]    XR Chest 1 View    Result Date: 9/26/2024  Impression: Stable moderate cardiomegaly. No acute chest findings. Electronically Signed: Siena Llamas MD  9/26/2024 11:48 AM EDT  Workstation ID: [id number]       [x]  EKG/Telemetry   ECG 12 Lead QT Measurement   Final Result   HEART IPBF=985  bpm   RR Kormcgsh=519  ms   PA Interval=  ms   P Horizontal Axis=  deg   P Front Axis=  deg   QRSD Wdfazsvq=092  ms   QT Qjtjarlt=587  ms   OIkK=805  ms   QRS Axis=147  deg   T Wave Axis=-8  deg   - ABNORMAL ECG -   Atrial fibrillation   Ventricular premature complex   RBBB and LPFB   ST depr, consider ischemia, anterolateral lds   When compared with ECG of 26-Sep-2024 11:05:26,   No significant change   Electronically Signed By: Irineo Alatorre ([facility]) 2024-09-27 09:45:33   Date and Time of Study:2024-09-27 07:24:49      ECG 12 Lead ED Triage Standing Order; Weak / Dizzy / AMS   Preliminary Result   HEART RATE=94  bpm   RR Shfccetr=024  ms   PA Interval=  ms   P Horizontal Axis=  deg   P Front Axis=  deg   QRSD Yolggkqw=426  ms   QT Awallmoo=424  ms   CXsE=122  ms   QRS Axis=143  deg   T Wave Axis=-11  deg   - ABNORMAL ECG -   Atrial fibrillation   Paired ventricular premature complexes   RBBB and LPFB   Date and Time of Study:2024-09-26 11:05:26          [x]  Cardiology/Vascular   []  Pathology  [x]  Old records  []  Other:    Assessment & Plan   Assessment / Plan     Assessment/Plan:  Assessment:  Questionable right ankle Achilles tendon tear versus rupture  Acute anemia  Stool guaiac positive  Peripheral edema  History of bioprosthetic AVR  Proteinuria  CAD  AICD in place  Nonischemic cardiomyopathy  Dyslipidemia  Essential hypertension  Permanent atrial fibrillation     Plan:  Labs and imaging  reviewed  Orthopedic consultation appreciated; will need to work with AICD rep and radiology to coordinate MRI of the right ankle  Hold aspirin  Continue Cardizem 120 mg twice a day  Continue Lopressor 100 mg twice a day  Continue lisinopril 10 mg daily  Continue Lasix 40 mg IV twice daily to help with peripheral edema  Consulted GI, follow-up recommendations for positive stool guaiac in the setting of acute anemia  Potassium replacement 40 mill equivalents  A.m. labs  Full code  DVT prophylax with SCDs in the setting of questionable GI bleed  Clinical course dictate further management  Discussed with nurse at the bedside    VTE Prophylaxis:  Mechanical VTE prophylaxis orders are present.        CODE STATUS:   Level Of Support Discussed With: Patient  Code Status (Patient has no pulse and is not breathing): CPR (Attempt to Resuscitate)  Medical Interventions (Patient has pulse or is breathing): Full Support        Electronically signed by Joseluis Diallo MD, 9/27/2024, 12:53 EDT.    Portions of this documentation were transcribed electronically from a voice recognition software.  I confirm all data accurately represents the service(s) I performed at today's visit.

## 2024-09-27 NOTE — PLAN OF CARE
Goal Outcome Evaluation:  Plan of Care Reviewed With: patient           Outcome Evaluation: Patient alert and oriented x 4, calm, and cooperative.  VS WNL.  LCTA.  Patient recieved airwalk boot this shift and therapy services.  Gastro consulted r/t positive hemoccult test.  Patient up in chair and to commode with stand and pivot frequently throughout shift.  Daughter at bedside and supportive of patient.  Patient currently sitting upright in chair with call light in reach.

## 2024-09-27 NOTE — CONSULTS
[facility]   Consult Note    Patient Name: Woodrow Alejandro  : 1950  MRN: [id number]  Primary Care Physician:  Juan Perez MD  Referring Physician: Juan Perez, *  Date of admission: 2024    Inpatient Orthopedic Surgery Consult  Consult performed by: Carmen Zuniga MD  Consult ordered by: Juanito Duarte MD        Subjective   Subjective     Reason for Consult/ Chief Complaint: Right ankle pain/Achilles    History of Present Illness  Woodrow Alejandro is a 73 y.o. male felt a pop in his Achilles area yesterday.  Does have a history of recent Levaquin use.  Was admitted for possible Achilles tendon or tear and further GI workup.  Denies any significant fall or injury.  Does have tenderness in this.  Pain does not seem to be excruciating.    Review of Systems     Personal History     Past Medical History:   Diagnosis Date    Aortic valve replaced     Arthritis 2018    Atrial fibrillation     Hypertension        Past Surgical History:   Procedure Laterality Date    CARDIAC SURGERY         Family History: His family history includes Arthritis in his father; COPD in his father and mother; Heart disease in his mother.     Social History: He  reports that he has never smoked. He has never been exposed to tobacco smoke. He has never used smokeless tobacco. He reports current alcohol use of about 4.0 standard drinks of alcohol per week. He reports that he does not use drugs.    Home Medications:  aspirin, dilTIAZem, furosemide, levoFLOXacin, lisinopril, and metoprolol tartrate    Allergies:  He is allergic to diclofenac sodium.    Objective    Objective     Vitals:    Temp:  [97.7 °F (36.5 °C)-98.7 °F (37.1 °C)] 98.6 °F (37 °C)  Heart Rate:  [] 68  Resp:  [18-20] 18  BP: ()/(40-74) 102/52    Physical Exam  Awake and alert, sitting in chair.  Some mild to moderate tenderness to palpation.  No significant full-thickness defect.  Sensations intact.  Result Review     Result Review:  I have personally reviewed the results from the time of this admission to 9/27/2024 15:34 EDT and agree with these findings:  [x]  Laboratory list / accordion  []  Microbiology  [x]  Radiology  []  EKG/Telemetry   []  Cardiology/Vascular   []  Pathology  []  Old records  []  Other:  Most notable findings include: Cannot get MRI secondary to pacemaker      Assessment & Plan   Assessment / Plan     Brief Patient Summary:  Woodrow Alejandro is a 73 y.o. male felt a pop in his ankle with resultant pain.  Difficulty getting around.  Ordered a CAT scan to evaluate integrity of his tendon.  This was read as a partial tear.    Active Hospital Problems:  Active Hospital Problems    Diagnosis     **Achilles tendon rupture        Plan:   Boot, weight-bear as tolerated, PT/OT, follow-up placement 2 weeks as an outpatient.    Carmen Zuniga MD

## 2024-09-27 NOTE — PLAN OF CARE
Goal Outcome Evaluation:  Plan of Care Reviewed With: patient        Progress: no change (Evaluation)  Outcome Evaluation: Patient presents with limitations of strength, balance, and activity tolerance which impede his ability to perform ADLs/transfers as prior.  The skills of a therapist will be required to safely and effectively implement treatment plan to restore maximum level function.      Anticipated Discharge Disposition (OT): inpatient rehabilitation facility

## 2024-09-27 NOTE — H&P (VIEW-ONLY)
[facility], [facility]  Initial Inpatient Consult Note    Referring Provider: Hospitalist    Reason for Consultation: FOBT positive, anemia    Subjective     History of present illness:    73 y.o. male with a past medical history of aortic valve replacement, atrial fibrillation, hypertension presented due to issues with his ankle.  GI was consulted due to patient having FOBT positive and anemia.  Patient states he does take aspirin along with Aleve and ibuprofen about 3 days a week and has done so for less than 6 months.  Patient denies any abdominal pain, nausea, vomiting, hematemesis, hematochezia, melena, and fevers.  Patient also states he has never had a colonoscopy and does not want to have a colonoscopy.    2024  Hgb-10.9  HCT-33.0  PT-15.2  INR-1.17  Iron-22  Ferritin-730.40  Iron sat-9  Transferrin-166  TIBC-247  WBC-14.98  Fecal occult-positive    Past Medical History:  Past Medical History:   Diagnosis Date    Aortic valve replaced     Arthritis 2018    Atrial fibrillation     Hypertension      Past Surgical History:  Past Surgical History:   Procedure Laterality Date    CARDIAC SURGERY        Social History:   Social History     Tobacco Use    Smoking status: Never     Passive exposure: Never    Smokeless tobacco: Never   Substance Use Topics    Alcohol use: Yes     Alcohol/week: 4.0 standard drinks of alcohol     Types: 4 Cans of beer per week      Family History:  Family History   Problem Relation Age of Onset    COPD Mother         Still living 93    Heart disease Mother     Arthritis Father     COPD Father          at 91.       Home Meds:  Medications Prior to Admission   Medication Sig Dispense Refill Last Dose    aspirin 325 MG tablet Take 1 tablet by mouth 2 (two) times a day.   2024    dilTIAZem (CARDIZEM) 120 MG tablet Take 1 tablet by mouth 2 (Two) Times a Day. 180 tablet 3 2024    furosemide (LASIX) 40 MG tablet Take 1 tablet by mouth Daily.   2024     levoFLOXacin (Levaquin) 750 MG tablet Take 1 tablet by mouth Daily. 10 tablet 0 9/25/2024    lisinopril (PRINIVIL,ZESTRIL) 10 MG tablet Take 1 tablet by mouth Daily.   9/25/2024    metoprolol tartrate (LOPRESSOR) 100 MG tablet Take 1 tablet by mouth 2 (Two) Times a Day. 180 tablet 3 9/26/2024     Current Meds:   [Held by provider] aspirin, 325 mg, Oral, Daily  dilTIAZem, 120 mg, Oral, Q12H  furosemide, 40 mg, Intravenous, Q12H  lisinopril, 10 mg, Oral, Daily  melatonin, 5 mg, Oral, Nightly  metoprolol tartrate, 100 mg, Oral, BID  senna-docusate sodium, 2 tablet, Oral, BID  sodium chloride, 10 mL, Intravenous, Q12H      Allergies:  Allergies   Allergen Reactions    Diclofenac Sodium Dizziness     Review of Systems  Pertinent items are noted in HPI 10 system review is negative except as mentioned HPI    Objective     Vital Signs  Temp:  [97.7 °F (36.5 °C)-98.7 °F (37.1 °C)] 98.6 °F (37 °C)  Heart Rate:  [] 68  Resp:  [18-20] 18  BP: ()/(40-74) 102/52  Physical Exam:  General Appearance:    Alert, cooperative, in no acute distress   Head:    Normocephalic, without obvious abnormality, atraumatic   Eyes:          conjunctivae and sclerae normal, no icterus   Throat:   no thrush, oral mucosa moist   Neck:   Supple, no adenopathy   Lungs:     Unlabored breathing     Heart:    Regular rhythm and normal rate    Chest Wall:    No abnormalities observed   Abdomen:     Soft, non distended, non tender   Extremities:   no edema, no redness   Skin:   No bruising or rash   Psychiatric:  normal mood and insight     Results Review:   I reviewed the patient's new clinical results.    Results from last 7 days   Lab Units 09/27/24  0421 09/26/24  1112   WBC 10*3/mm3 14.98* 19.20*   HEMOGLOBIN g/dL 10.9* 11.9*   HEMATOCRIT % 33.0* 36.5*   PLATELETS 10*3/mm3 168 179     Results from last 7 days   Lab Units 09/27/24  0421 09/26/24  1112   SODIUM mmol/L 131* 131*   POTASSIUM mmol/L 3.4* 4.0   CHLORIDE mmol/L 96* 96*   CO2  mmol/L 22.0 21.6*   BUN mg/dL 19 13   CREATININE mg/dL 1.04 0.93   CALCIUM mg/dL 8.7 8.4*   BILIRUBIN mg/dL  --  1.0   ALK PHOS U/L  --  65   ALT (SGPT) U/L  --  11   AST (SGOT) U/L  --  16   GLUCOSE mg/dL 155* 176*     Results from last 7 days   Lab Units 09/26/24  1112   INR  1.17*     Lab Results   Lab Value Date/Time    LIPASE 49 09/06/2024 1014       Radiology:  CT Lower Extremity Right Without Contrast    Result Date: 9/26/2024  No acute fracture. No acute malalignment. Please see above comments for further detail. Portions of this note were completed with a voice recognition program. Electronically Signed: Colton Mahajan MD  9/26/2024 11:05 PM EDT  Workstation ID: [id number]    XR Ankle 3+ View Right    Result Date: 9/26/2024  Impression: No acute right ankle findings. Prominent plantar calcaneal spur. Electronically Signed: Siena Llamas MD  9/26/2024 11:55 AM EDT  Workstation ID: [id number]    XR Chest 1 View    Result Date: 9/26/2024  Impression: Stable moderate cardiomegaly. No acute chest findings. Electronically Signed: Siena Llamas MD  9/26/2024 11:48 AM EDT  Workstation ID: [id number]      Assessment & Plan     Achilles tendon rupture       Assessment:  FOBT positive  Anemia    Plan:  Discussed with patient about performing colonoscopy on Monday-patient does not want to proceed with colonoscopy at this time due to age and other factors.  Advised patient we would continue to monitor H&H and that if his hemoglobin drop below 7 they would want to give blood transfusion  Advised patient to continue to monitor bowel movements  Patient understands and agrees to the plan      I discussed the patients findings and my recommendations with patient and family.    Electronically signed by JAVI Stroud, 09/27/24, 2:05 PM EDT.    Patient seen and data reviewed.  Patient has anemia with occult blood positive stool.  Never had a colonoscopy before.  Had a long discussion with the patient and his daughter about  the importance of doing EGD and colonoscopy and risk of not doing so including possibility of missing the diagnosis of early malignancy which by delaying diagnosis can become incurable and the cause of death.  They understood asked questions which were fully answered and requested not to do endoscopy procedures at this point.  They understand that if they change their mind they can always call my office.

## 2024-09-28 LAB
ANION GAP SERPL CALCULATED.3IONS-SCNC: 12.3 MMOL/L (ref 5–15)
BASOPHILS # BLD AUTO: 0.05 10*3/MM3 (ref 0–0.2)
BASOPHILS NFR BLD AUTO: 0.3 % (ref 0–1.5)
BUN SERPL-MCNC: 21 MG/DL (ref 8–23)
BUN/CREAT SERPL: 23.6 (ref 7–25)
CALCIUM SPEC-SCNC: 8.5 MG/DL (ref 8.6–10.5)
CHLORIDE SERPL-SCNC: 96 MMOL/L (ref 98–107)
CO2 SERPL-SCNC: 21.7 MMOL/L (ref 22–29)
CREAT SERPL-MCNC: 0.89 MG/DL (ref 0.76–1.27)
DEPRECATED RDW RBC AUTO: 54.9 FL (ref 37–54)
EGFRCR SERPLBLD CKD-EPI 2021: 90.5 ML/MIN/1.73
EOSINOPHIL # BLD AUTO: 0.01 10*3/MM3 (ref 0–0.4)
EOSINOPHIL NFR BLD AUTO: 0.1 % (ref 0.3–6.2)
ERYTHROCYTE [DISTWIDTH] IN BLOOD BY AUTOMATED COUNT: 16 % (ref 12.3–15.4)
GLUCOSE SERPL-MCNC: 149 MG/DL (ref 65–99)
HCT VFR BLD AUTO: 32.4 % (ref 37.5–51)
HGB BLD-MCNC: 10.4 G/DL (ref 13–17.7)
IMM GRANULOCYTES # BLD AUTO: 0.08 10*3/MM3 (ref 0–0.05)
IMM GRANULOCYTES NFR BLD AUTO: 0.6 % (ref 0–0.5)
LYMPHOCYTES # BLD AUTO: 1.05 10*3/MM3 (ref 0.7–3.1)
LYMPHOCYTES NFR BLD AUTO: 7.3 % (ref 19.6–45.3)
MAGNESIUM SERPL-MCNC: 1.7 MG/DL (ref 1.6–2.4)
MCH RBC QN AUTO: 30 PG (ref 26.6–33)
MCHC RBC AUTO-ENTMCNC: 32.1 G/DL (ref 31.5–35.7)
MCV RBC AUTO: 93.4 FL (ref 79–97)
MONOCYTES # BLD AUTO: 1.52 10*3/MM3 (ref 0.1–0.9)
MONOCYTES NFR BLD AUTO: 10.6 % (ref 5–12)
NEUTROPHILS NFR BLD AUTO: 11.59 10*3/MM3 (ref 1.7–7)
NEUTROPHILS NFR BLD AUTO: 81.1 % (ref 42.7–76)
NRBC BLD AUTO-RTO: 0 /100 WBC (ref 0–0.2)
PHOSPHATE SERPL-MCNC: 3.4 MG/DL (ref 2.5–4.5)
PLATELET # BLD AUTO: 172 10*3/MM3 (ref 140–450)
PMV BLD AUTO: 9.7 FL (ref 6–12)
POTASSIUM SERPL-SCNC: 3.9 MMOL/L (ref 3.5–5.2)
RBC # BLD AUTO: 3.47 10*6/MM3 (ref 4.14–5.8)
SODIUM SERPL-SCNC: 130 MMOL/L (ref 136–145)
WBC NRBC COR # BLD AUTO: 14.3 10*3/MM3 (ref 3.4–10.8)

## 2024-09-28 PROCEDURE — 83735 ASSAY OF MAGNESIUM: CPT | Performed by: HOSPITALIST

## 2024-09-28 PROCEDURE — 84100 ASSAY OF PHOSPHORUS: CPT | Performed by: HOSPITALIST

## 2024-09-28 PROCEDURE — 25010000002 FUROSEMIDE PER 20 MG: Performed by: HOSPITALIST

## 2024-09-28 PROCEDURE — 80048 BASIC METABOLIC PNL TOTAL CA: CPT | Performed by: HOSPITALIST

## 2024-09-28 PROCEDURE — 85025 COMPLETE CBC W/AUTO DIFF WBC: CPT | Performed by: HOSPITALIST

## 2024-09-28 PROCEDURE — 99232 SBSQ HOSP IP/OBS MODERATE 35: CPT | Performed by: FAMILY MEDICINE

## 2024-09-28 RX ADMIN — SENNOSIDES AND DOCUSATE SODIUM 2 TABLET: 50; 8.6 TABLET ORAL at 20:51

## 2024-09-28 RX ADMIN — OXYCODONE 5 MG: 5 TABLET ORAL at 13:50

## 2024-09-28 RX ADMIN — DILTIAZEM HYDROCHLORIDE 120 MG: 60 TABLET, FILM COATED ORAL at 22:02

## 2024-09-28 RX ADMIN — METOPROLOL TARTRATE 100 MG: 50 TABLET, FILM COATED ORAL at 20:51

## 2024-09-28 RX ADMIN — DILTIAZEM HYDROCHLORIDE 120 MG: 60 TABLET, FILM COATED ORAL at 09:59

## 2024-09-28 RX ADMIN — Medication 5 MG: at 20:51

## 2024-09-28 RX ADMIN — ACETAMINOPHEN 650 MG: 325 TABLET ORAL at 19:05

## 2024-09-28 RX ADMIN — SENNOSIDES AND DOCUSATE SODIUM 2 TABLET: 50; 8.6 TABLET ORAL at 09:59

## 2024-09-28 RX ADMIN — LISINOPRIL 10 MG: 10 TABLET ORAL at 09:59

## 2024-09-28 RX ADMIN — FUROSEMIDE 40 MG: 10 INJECTION, SOLUTION INTRAMUSCULAR; INTRAVENOUS at 05:02

## 2024-09-28 RX ADMIN — Medication 10 ML: at 20:52

## 2024-09-28 RX ADMIN — METOPROLOL TARTRATE 100 MG: 50 TABLET, FILM COATED ORAL at 09:58

## 2024-09-28 RX ADMIN — Medication 10 ML: at 09:59

## 2024-09-28 RX ADMIN — FUROSEMIDE 40 MG: 10 INJECTION, SOLUTION INTRAMUSCULAR; INTRAVENOUS at 16:24

## 2024-09-28 RX ADMIN — OXYCODONE 5 MG: 5 TABLET ORAL at 06:24

## 2024-09-28 NOTE — PLAN OF CARE
Goal Outcome Evaluation:  Plan of Care Reviewed With: patient        Progress: no change  Outcome Evaluation: Patient A/Ox4. medicated for pain x1 with verbalized relief. BLE edematous and elevated. max assist of 2 for transfers with walker and gait belt with boot to RLE. patient voiding well. Patient family member at bedside. Patient currently in bed with call light in reach awaiting transfer.

## 2024-09-28 NOTE — PLAN OF CARE
Goal Outcome Evaluation:  Plan of Care Reviewed With: patient        Progress: no change  Outcome Evaluation: Pt. remains A & O X 4. BLE elevated PRN. Roxicodone given per orders once this shift for pain. Lasix administered per orders, pt voiding well, unable to measure due to difficulty catching urine.  No bowel movements this shift. Neurovascular checks remain intact, pain at times to bilateral ankles noted.

## 2024-09-28 NOTE — PROGRESS NOTES
Went to see this patient this morning.  Patient still does not want to consider doing EGD or colonoscopy.

## 2024-09-28 NOTE — PROGRESS NOTES
[facility]   Hospitalist Progress Note  Date: 2024  Patient Name: Woodrow Alejandro  : 1950  MRN: [id number]  Date of admission: 2024      Subjective   Subjective   Chief complaint: Pop in ankle, unable to stand    Summary:  73-year-old male with history of essential hypertension, permanent atrial fibrillation, history of bioprosthetic AVR, proteinuria, CAD, right upper lobe nodule, spleen lesion questionable cyst versus hemangioma, nonischemic cardiomyopathy with AICD in place, dyslipidemia, hospitalized after feeling a pop in his right ankle, has been on Levaquin for several days for a questionable cryptogenic infection according to him.  Questionable Achilles tear, orthopedics consulted, awaiting MRI coordination with rep to obtain MRI of the right ankle.  Found to be anemic, GI consulted, stool occult blood positive.  GI discussed colonoscopy with the patient, patient refused EGD and colonoscopy.  Repeat discussions patient, adamant about refusal for colonoscopy.  Could not get MRI due to incompatibility of AICD, Ortho recommendation boot, weightbearing as tolerated, outpatient therapy.    Interval follow-up: Seen and examined this morning, no acute distress, no acute major overnight events, still has difficulty with flexion and extension of his right ankle, boot at the bedside, discussed ongoing concerns of persistent elevation of white blood cell count and ongoing anemia and trace blood in stools, recommendation for colonoscopy, patient adamant about refusal of colonoscopy.  Daughter on the phone with him, patient still does not want to pursue colonoscopy despite counseling and education.  Remains weak and fatigued.  White blood cell count has been persistently elevated for quite some time, I suspect he has some intracolonic issue causing him to have persistent leukocytosis.  Hopefully he reconsiders colonoscopy for his benefit.  Lower extremity edema remains, remains on Lasix  IV.    Review of systems:  All systems reviewed negative several weakness, fatigue, right ankle weakness and pain    Objective   Objective     Vitals:   Temp:  [98.1 °F (36.7 °C)-99.1 °F (37.3 °C)] 99.1 °F (37.3 °C)  Heart Rate:  [] 65  Resp:  [12-18] 12  BP: ()/(61-67) 126/66  Physical Exam               Constitutional: Awake, alert, no acute distress sitting in the bedside chair              Eyes: Pupils equal, sclerae anicteric, no conjunctival injection              HENT: NCAT, mucous membranes moist              Neck: Supple, no thyromegaly, no lymphadenopathy, trachea midline              Respiratory: Clear to auscultation bilaterally, nonlabored respirations               Cardiovascular: RRR, no murmurs, rubs, or gallops, palpable pedal pulses bilaterally              Gastrointestinal: Positive bowel sounds, soft, nontender, nondistended              Musculoskeletal: Right lower extremity difficulty flexion and extension, distal extremity neurovascular              Psychiatric: Appropriate affect, cooperative              Neurologic: Oriented x 3, strength symmetric in all extremities, Cranial Nerves grossly intact to confrontation, speech clear              Skin: No rashes     Result Review    Result Review:  I have personally reviewed the pertinent results from the past 24 hours to 9/28/2024 11:59 EDT and agree with these findings:  [x]  Laboratory   CBC          9/26/2024    11:12 9/27/2024    04:21 9/28/2024    04:02   CBC   WBC 19.20  14.98  14.30    RBC 3.88  3.57  3.47    Hemoglobin 11.9  10.9  10.4    Hematocrit 36.5  33.0  32.4    MCV 94.1  92.4  93.4    MCH 30.7  30.5  30.0    MCHC 32.6  33.0  32.1    RDW 16.4  16.2  16.0    Platelets 179  168  172      BMP          9/26/2024    11:12 9/27/2024    04:21 9/28/2024    04:02   BMP   BUN 13  19  21    Creatinine 0.93  1.04  0.89    Sodium 131  131  130    Potassium 4.0  3.4  3.9    Chloride 96  96  96    CO2 21.6  22.0  21.7    Calcium 8.4   8.7  8.5      LIVER FUNCTION TESTS:      Lab 09/26/24  1112   TOTAL PROTEIN 6.8   ALBUMIN 3.2*   GLOBULIN 3.6   ALT (SGPT) 11   AST (SGOT) 16   BILIRUBIN 1.0   ALK PHOS 65       [x]  Microbiology   Microbiology Results (last 10 days)       ** No results found for the last 240 hours. **              [x]  Radiology CT Lower Extremity Right Without Contrast    Result Date: 9/26/2024  No acute fracture. No acute malalignment. Please see above comments for further detail. Portions of this note were completed with a voice recognition program. Electronically Signed: Colton Mahajan MD  9/26/2024 11:05 PM EDT  Workstation ID: [id number]    XR Ankle 3+ View Right    Result Date: 9/26/2024  Impression: No acute right ankle findings. Prominent plantar calcaneal spur. Electronically Signed: Siena Llamas MD  9/26/2024 11:55 AM EDT  Workstation ID: [id number]    XR Chest 1 View    Result Date: 9/26/2024  Impression: Stable moderate cardiomegaly. No acute chest findings. Electronically Signed: Siena Llamas MD  9/26/2024 11:48 AM EDT  Workstation ID: [id number]       [x]  EKG/Telemetry   ECG 12 Lead QT Measurement   Final Result   HEART IMDP=587  bpm   RR Tmvprdzc=625  ms   CT Interval=  ms   P Horizontal Axis=  deg   P Front Axis=  deg   QRSD Fycpnwrb=443  ms   QT Oajfslak=043  ms   XVoS=298  ms   QRS Axis=147  deg   T Wave Axis=-8  deg   - ABNORMAL ECG -   Atrial fibrillation   Ventricular premature complex   RBBB and LPFB   ST depr, consider ischemia, anterolateral lds   When compared with ECG of 26-Sep-2024 11:05:26,   No significant change   Electronically Signed By: Irineo Alatorre ([facility]) 2024-09-27 09:45:33   Date and Time of Study:2024-09-27 07:24:49      ECG 12 Lead ED Triage Standing Order; Weak / Dizzy / AMS   Preliminary Result   HEART RATE=94  bpm   RR Jttlttpp=200  ms   CT Interval=  ms   P Horizontal Axis=  deg   P Front Axis=  deg   QRSD Uywxezjn=859  ms   QT Pqsjfhpv=431  ms   NVlR=406  ms   QRS Axis=143  deg   T Wave  Axis=-11  deg   - ABNORMAL ECG -   Atrial fibrillation   Paired ventricular premature complexes   RBBB and LPFB   Date and Time of Study:2024-09-26 11:05:26          [x]  Cardiology/Vascular   []  Pathology  [x]  Old records  []  Other:    Assessment & Plan   Assessment / Plan     Assessment/Plan:  Assessment:  Questionable right ankle Achilles tendon tear versus rupture  Acute anemia  Stool guaiac positive  Peripheral edema  History of bioprosthetic AVR  Proteinuria  CAD  AICD in place  Nonischemic cardiomyopathy  Dyslipidemia  Essential hypertension  Permanent atrial fibrillation     Plan:  Labs and imaging reviewed  Counseled patient regarding recommendation for colonoscopy, patient declining, if patient reconsiders, notify MD  Orthopedic consultation appreciated; WBAT, Boot  Hold aspirin  Continue Cardizem 120 mg twice a day  Continue Lopressor 100 mg twice a day  Continue lisinopril 10 mg daily  Continue Lasix 40 mg IV twice daily to help with peripheral edema  Consulted GI, with positive stool guaiac, Dr. Mensah discussed with patient recommendation for colonoscopy, patient declined  A.m. labs  Full code  DVT prophylax with SCDs in the setting of concern for GI bleed  Clinical course dictate further management  Discussed with nurse at the bedside  VTE Prophylaxis:  Mechanical VTE prophylaxis orders are present.        CODE STATUS:   Level Of Support Discussed With: Patient  Code Status (Patient has no pulse and is not breathing): CPR (Attempt to Resuscitate)  Medical Interventions (Patient has pulse or is breathing): Full Support        Electronically signed by Joseluis Diallo MD, 9/28/2024, 11:59 EDT.    Portions of this documentation were transcribed electronically from a voice recognition software.  I confirm all data accurately represents the service(s) I performed at today's visit.

## 2024-09-28 NOTE — NURSING NOTE
"Patient daughter stated that the patient has changed his mind and now is agreeable to having a colonoscopy done. Dr Mensah notified and he stated\"ok I will see the patient again tomorrow and if he is agreeable at that time then plan to do colonoscopy on Monday\".   "

## 2024-09-28 NOTE — CONSULTS
[facility]   Consult Note    Patient Name: Woodrow Alejandro  : 1950  MRN: [id number]  Primary Care Physician:  Juan Perez MD  Referring Physician: Juan Perez, *  Date of admission: 2024    Consults  Subjective   Subjective     Reason for Consult/ Chief Complaint: Right ankle pain/Achilles    History of Present Illness  Woodrow Alejandro is a 73 y.o. male felt a pop in his Achilles area yesterday.  Does have a history of recent Levaquin use.  Was admitted for possible Achilles tendon or tear and further GI workup.  Denies any significant fall or injury.  Does have tenderness in this.  Pain does not seem to be excruciating.    Interval F/U: Boot when out of bed, pain control better, PT/OT    Review of Systems     Personal History     Past Medical History:   Diagnosis Date    Aortic valve replaced     Arthritis 2018    Atrial fibrillation     Hypertension        Past Surgical History:   Procedure Laterality Date    CARDIAC SURGERY         Family History: His family history includes Arthritis in his father; COPD in his father and mother; Heart disease in his mother.     Social History: He  reports that he has never smoked. He has never been exposed to tobacco smoke. He has never used smokeless tobacco. He reports current alcohol use of about 4.0 standard drinks of alcohol per week. He reports that he does not use drugs.    Home Medications:  aspirin, dilTIAZem, furosemide, levoFLOXacin, lisinopril, and metoprolol tartrate    Allergies:  He is allergic to diclofenac sodium.    Objective    Objective     Vitals:    Temp:  [98.1 °F (36.7 °C)-99.1 °F (37.3 °C)] 99.1 °F (37.3 °C)  Heart Rate:  [] 65  Resp:  [12-18] 12  BP: ()/(52-67) 126/66    Physical Exam  Awake and alert, sitting in chair.  Some mild to moderate tenderness to palpation.  No significant full-thickness defect.  Sensations intact.  Result Review    Result Review:  I have personally reviewed the  results from the time of this admission to 9/28/2024 09:09 EDT and agree with these findings:  [x]  Laboratory list / accordion  []  Microbiology  [x]  Radiology  []  EKG/Telemetry   []  Cardiology/Vascular   []  Pathology  []  Old records  []  Other:  Most notable findings include: Cannot get MRI secondary to pacemaker      Assessment & Plan   Assessment / Plan     Brief Patient Summary:  Woodrow Alejandro is a 73 y.o. male felt a pop in his ankle with resultant pain.  Difficulty getting around.  Ordered a CAT scan to evaluate integrity of his tendon.  This was read as a partial tear.    Active Hospital Problems:  Active Hospital Problems    Diagnosis     **Achilles tendon rupture        Plan:   Boot, weight-bear as tolerated, PT/OT, follow-up placement 2 weeks as an outpatient.    Carmen Zuniga MD

## 2024-09-29 LAB
ALBUMIN SERPL-MCNC: 2.9 G/DL (ref 3.5–5.2)
ALP SERPL-CCNC: 66 U/L (ref 39–117)
ALT SERPL W P-5'-P-CCNC: 15 U/L (ref 1–41)
ANION GAP SERPL CALCULATED.3IONS-SCNC: 13.7 MMOL/L (ref 5–15)
AST SERPL-CCNC: 21 U/L (ref 1–40)
BASOPHILS # BLD AUTO: 0.05 10*3/MM3 (ref 0–0.2)
BASOPHILS NFR BLD AUTO: 0.3 % (ref 0–1.5)
BILIRUB CONJ SERPL-MCNC: 0.4 MG/DL (ref 0–0.3)
BILIRUB INDIRECT SERPL-MCNC: 0.6 MG/DL
BILIRUB SERPL-MCNC: 1 MG/DL (ref 0–1.2)
BUN SERPL-MCNC: 20 MG/DL (ref 8–23)
BUN/CREAT SERPL: 23.8 (ref 7–25)
CALCIUM SPEC-SCNC: 8.9 MG/DL (ref 8.6–10.5)
CHLORIDE SERPL-SCNC: 94 MMOL/L (ref 98–107)
CO2 SERPL-SCNC: 23.3 MMOL/L (ref 22–29)
CREAT SERPL-MCNC: 0.84 MG/DL (ref 0.76–1.27)
DEPRECATED RDW RBC AUTO: 54.8 FL (ref 37–54)
EGFRCR SERPLBLD CKD-EPI 2021: 92.1 ML/MIN/1.73
EOSINOPHIL # BLD AUTO: 0.03 10*3/MM3 (ref 0–0.4)
EOSINOPHIL NFR BLD AUTO: 0.2 % (ref 0.3–6.2)
ERYTHROCYTE [DISTWIDTH] IN BLOOD BY AUTOMATED COUNT: 15.8 % (ref 12.3–15.4)
GLUCOSE SERPL-MCNC: 133 MG/DL (ref 65–99)
HCT VFR BLD AUTO: 34.6 % (ref 37.5–51)
HGB BLD-MCNC: 11.1 G/DL (ref 13–17.7)
IMM GRANULOCYTES # BLD AUTO: 0.12 10*3/MM3 (ref 0–0.05)
IMM GRANULOCYTES NFR BLD AUTO: 0.8 % (ref 0–0.5)
LYMPHOCYTES # BLD AUTO: 0.94 10*3/MM3 (ref 0.7–3.1)
LYMPHOCYTES NFR BLD AUTO: 6.4 % (ref 19.6–45.3)
MAGNESIUM SERPL-MCNC: 1.8 MG/DL (ref 1.6–2.4)
MCH RBC QN AUTO: 30.4 PG (ref 26.6–33)
MCHC RBC AUTO-ENTMCNC: 32.1 G/DL (ref 31.5–35.7)
MCV RBC AUTO: 94.8 FL (ref 79–97)
MONOCYTES # BLD AUTO: 1.53 10*3/MM3 (ref 0.1–0.9)
MONOCYTES NFR BLD AUTO: 10.4 % (ref 5–12)
NEUTROPHILS NFR BLD AUTO: 12.02 10*3/MM3 (ref 1.7–7)
NEUTROPHILS NFR BLD AUTO: 81.9 % (ref 42.7–76)
NRBC BLD AUTO-RTO: 0 /100 WBC (ref 0–0.2)
PHOSPHATE SERPL-MCNC: 3.2 MG/DL (ref 2.5–4.5)
PLATELET # BLD AUTO: 181 10*3/MM3 (ref 140–450)
PMV BLD AUTO: 9.4 FL (ref 6–12)
POTASSIUM SERPL-SCNC: 4 MMOL/L (ref 3.5–5.2)
PROT SERPL-MCNC: 7 G/DL (ref 6–8.5)
RBC # BLD AUTO: 3.65 10*6/MM3 (ref 4.14–5.8)
SODIUM SERPL-SCNC: 131 MMOL/L (ref 136–145)
WBC NRBC COR # BLD AUTO: 14.69 10*3/MM3 (ref 3.4–10.8)

## 2024-09-29 PROCEDURE — 25010000002 FUROSEMIDE PER 20 MG: Performed by: HOSPITALIST

## 2024-09-29 PROCEDURE — 85025 COMPLETE CBC W/AUTO DIFF WBC: CPT | Performed by: FAMILY MEDICINE

## 2024-09-29 PROCEDURE — 99232 SBSQ HOSP IP/OBS MODERATE 35: CPT | Performed by: INTERNAL MEDICINE

## 2024-09-29 PROCEDURE — 80076 HEPATIC FUNCTION PANEL: CPT | Performed by: FAMILY MEDICINE

## 2024-09-29 PROCEDURE — 83735 ASSAY OF MAGNESIUM: CPT | Performed by: FAMILY MEDICINE

## 2024-09-29 PROCEDURE — 80048 BASIC METABOLIC PNL TOTAL CA: CPT | Performed by: FAMILY MEDICINE

## 2024-09-29 PROCEDURE — 84100 ASSAY OF PHOSPHORUS: CPT | Performed by: FAMILY MEDICINE

## 2024-09-29 PROCEDURE — 99232 SBSQ HOSP IP/OBS MODERATE 35: CPT | Performed by: FAMILY MEDICINE

## 2024-09-29 RX ADMIN — ACETAMINOPHEN 650 MG: 325 TABLET ORAL at 23:39

## 2024-09-29 RX ADMIN — Medication 10 ML: at 08:26

## 2024-09-29 RX ADMIN — DILTIAZEM HYDROCHLORIDE 120 MG: 60 TABLET, FILM COATED ORAL at 20:22

## 2024-09-29 RX ADMIN — FUROSEMIDE 40 MG: 10 INJECTION, SOLUTION INTRAMUSCULAR; INTRAVENOUS at 04:39

## 2024-09-29 RX ADMIN — FUROSEMIDE 40 MG: 10 INJECTION, SOLUTION INTRAMUSCULAR; INTRAVENOUS at 15:30

## 2024-09-29 RX ADMIN — POLYETHYLENE GLYCOL 3350, SODIUM SULFATE ANHYDROUS, SODIUM BICARBONATE, SODIUM CHLORIDE, POTASSIUM CHLORIDE 2000 ML: 236; 22.74; 6.74; 5.86; 2.97 POWDER, FOR SOLUTION ORAL at 20:23

## 2024-09-29 RX ADMIN — LISINOPRIL 10 MG: 10 TABLET ORAL at 08:25

## 2024-09-29 RX ADMIN — METOPROLOL TARTRATE 100 MG: 50 TABLET, FILM COATED ORAL at 08:25

## 2024-09-29 RX ADMIN — ACETAMINOPHEN 650 MG: 325 TABLET ORAL at 15:30

## 2024-09-29 RX ADMIN — METOPROLOL TARTRATE 100 MG: 50 TABLET, FILM COATED ORAL at 20:22

## 2024-09-29 RX ADMIN — DILTIAZEM HYDROCHLORIDE 120 MG: 60 TABLET, FILM COATED ORAL at 10:07

## 2024-09-29 RX ADMIN — SENNOSIDES AND DOCUSATE SODIUM 2 TABLET: 50; 8.6 TABLET ORAL at 20:22

## 2024-09-29 RX ADMIN — Medication 5 MG: at 20:22

## 2024-09-29 RX ADMIN — Medication 10 ML: at 20:23

## 2024-09-29 NOTE — PLAN OF CARE
Goal Outcome Evaluation:  Plan of Care Reviewed With: patient           Outcome Evaluation: Patient Alert & oriented x3. able to make needs known. patient had elevated @ HS. Administer prn Tylenol. Tylenol was affective. patient voiding with no issues. no s/s or complaints of discomfort. call light within reach of patient. D/C plan: unsure at this time.

## 2024-09-29 NOTE — PROGRESS NOTES
[facility]  Inpatient Progress Note    Reason for Follow Up: Anemia    Subjective     Interval History:   Patient is doing fair.  No overt GI bleeding.  Patient is eating and having bowel movements without any problem.    Current Facility-Administered Medications:     acetaminophen (TYLENOL) tablet 650 mg, 650 mg, Oral, Q4H PRN, 650 mg at 09/28/24 1905 **OR** acetaminophen (TYLENOL) 160 MG/5ML oral solution 650 mg, 650 mg, Oral, Q4H PRN **OR** acetaminophen (TYLENOL) suppository 650 mg, 650 mg, Rectal, Q4H PRN, Bradley, Dimpi, DO    [Held by provider] aspirin tablet 325 mg, 325 mg, Oral, Daily, Bradley, Dimpi, DO    sennosides-docusate (PERICOLACE) 8.6-50 MG per tablet 2 tablet, 2 tablet, Oral, BID, 2 tablet at 09/28/24 2051 **AND** polyethylene glycol (MIRALAX) packet 17 g, 17 g, Oral, Daily PRN **AND** bisacodyl (DULCOLAX) EC tablet 5 mg, 5 mg, Oral, Daily PRN **AND** bisacodyl (DULCOLAX) suppository 10 mg, 10 mg, Rectal, Daily PRN, Bradley, Dimpi, DO    dilTIAZem (CARDIZEM) tablet 120 mg, 120 mg, Oral, Q12H, Bradley, Dimpi, DO, 120 mg at 09/28/24 2202    furosemide (LASIX) injection 40 mg, 40 mg, Intravenous, Q12H, Bradley, Dimpi, DO, 40 mg at 09/29/24 0439    lisinopril (PRINIVIL,ZESTRIL) tablet 10 mg, 10 mg, Oral, Daily, Bradley, Dimpi, DO, 10 mg at 09/29/24 0825    melatonin tablet 5 mg, 5 mg, Oral, Nightly, Bradley, Dimpi, DO, 5 mg at 09/28/24 2051    metoprolol tartrate (LOPRESSOR) tablet 100 mg, 100 mg, Oral, BID, Bradley, Dimpi, DO, 100 mg at 09/29/24 0825    ondansetron ODT (ZOFRAN-ODT) disintegrating tablet 4 mg, 4 mg, Oral, Q6H PRN **OR** ondansetron (ZOFRAN) injection 4 mg, 4 mg, Intravenous, Q6H PRN, Bradley, Dimpi, DO    oxyCODONE (ROXICODONE) immediate release tablet 5 mg, 5 mg, Oral, Q6H PRN, Bradley, Cecil, DO, 5 mg at 09/28/24 1350    polyethylene glycol (GoLYTELY) solution 2,000 mL, 2,000 mL, Oral, Q8H, Kurt Mensah MD    sodium chloride 0.9 % flush 10 mL, 10 mL, Intravenous, PRN,  Juanito Duarte MD    sodium chloride 0.9 % flush 10 mL, 10 mL, Intravenous, Q12H, Bradley, Dimpi, DO, 10 mL at 09/29/24 0826    sodium chloride 0.9 % flush 10 mL, 10 mL, Intravenous, PRN, Bradley, Dimpi, DO    sodium chloride 0.9 % infusion 40 mL, 40 mL, Intravenous, PRN, Bradley, Dimpi, DO  Review of Systems:    All system ROS is negative except what's mentioned in HPI.    Objective     Vital Signs  Temp:  [97.8 °F (36.6 °C)-100.7 °F (38.2 °C)] 99 °F (37.2 °C)  Heart Rate:  [] 94  Resp:  [18-24] 18  BP: ()/(51-81) 136/70  Body mass index is 42.04 kg/m².    Intake/Output Summary (Last 24 hours) at 9/29/2024 0903  Last data filed at 9/29/2024 0645  Gross per 24 hour   Intake 480 ml   Output 625 ml   Net -145 ml     No intake/output data recorded.     Physical Exam:  General     Alert and oriented, normal affect   Head:    Normocephalic, without obvious abnormality, atraumatic   Eyes:          conjunctivae and sclerae normal, no icterus, pupils round and reactive to light   Oral cavity: Moist and intact, normal dentation   Neck:   Supple, no adenopathy   Chest Wall/Lungs:    No abnormalities observed, equal on expansion bilaterally, no use of extrarespiratory muscles noted   Abdomen:     Soft, nondistended, nontender; normal bowel sounds, no hepatospleenomegaly   Extremities:   no edema, clubbing, or cyanosis   Skin:   No bruising or rash   Psychiatric:  normal mood and insight          Results Review:      Results from last 7 days   Lab Units 09/29/24  0541 09/28/24  0402 09/27/24  0421   WBC 10*3/mm3 14.69* 14.30* 14.98*   HEMOGLOBIN g/dL 11.1* 10.4* 10.9*   HEMATOCRIT % 34.6* 32.4* 33.0*   PLATELETS 10*3/mm3 181 172 168     Results from last 7 days   Lab Units 09/29/24  0541 09/28/24  0402 09/27/24  0421 09/26/24  1112   SODIUM mmol/L 131* 130* 131* 131*   POTASSIUM mmol/L 4.0 3.9 3.4* 4.0   CHLORIDE mmol/L 94* 96* 96* 96*   CO2 mmol/L 23.3 21.7* 22.0 21.6*   BUN mg/dL 20 21 19 13   CREATININE mg/dL 0.84  0.89 1.04 0.93   CALCIUM mg/dL 8.9 8.5* 8.7 8.4*   BILIRUBIN mg/dL 1.0  --   --  1.0   ALK PHOS U/L 66  --   --  65   ALT (SGPT) U/L 15  --   --  11   AST (SGOT) U/L 21  --   --  16   GLUCOSE mg/dL 133* 149* 155* 176*     Results from last 7 days   Lab Units 09/26/24  1112   INR  1.17*     Lab Results   Lab Value Date/Time    LIPASE 49 09/06/2024 1014         Assessment & Plan   Assessment:   Anemia due to GI blood loss.  Patient was hesitant to have colonoscopy.  I have had discussions with him on 2 different occasions explaining him the risks and benefits.  After talking to the family members patient has decided to proceed with colonoscopy.      Plan:   Colonoscopy in the morning    I discussed the patients findings and my recommendations with patient    Max Mensah MD

## 2024-09-29 NOTE — PLAN OF CARE
Goal Outcome Evaluation:  Plan of Care Reviewed With: patient        Progress: no change  Outcome Evaluation: Patient is alert and orientated x3 and able to make needs and wants known. Patient denies any pain or discomfort at this time. Patient is still agreeable to colonoscopy in the morning. No acute changes noted at this time. Plan of care reviewed with charge nurse, TAMANNA Olmos.

## 2024-09-29 NOTE — PROGRESS NOTES
[facility]   Hospitalist Progress Note  Date: 2024  Patient Name: Woodrow Alejandro  : 1950  MRN: [id number]  Date of admission: 2024      Subjective   Subjective   Chief complaint: Pop in ankle, unable to stand    Summary:  73-year-old male with history of essential hypertension, permanent atrial fibrillation, history of bioprosthetic AVR, proteinuria, CAD, right upper lobe nodule, spleen lesion questionable cyst versus hemangioma, nonischemic cardiomyopathy with AICD in place, dyslipidemia, hospitalized after feeling a pop in his right ankle, has been on Levaquin for several days for a questionable cryptogenic infection according to him.  Questionable Achilles tear, orthopedics consulted, awaiting MRI coordination with [facility] to obtain MRI of the right ankle.  Found to be anemic, GI consulted, stool occult blood positive.  GI discussed colonoscopy with the patient, patient refused EGD and colonoscopy.  Repeat discussions patient, adamant about refusal for colonoscopy, changed his mind 2024 to pursue endoscopic evaluations of his colon.  Could not get MRI due to incompatibility of AICD, Ortho recommendation boot, weightbearing as tolerated, outpatient therapy.    Interval follow-up: Seen and examined this morning, no acute distress, no acute major overnight events, still has difficulty with flexion and extension of his right ankle, tolerating wearing boot, agreed to pursue colonoscopy, GI following.  White blood cell count remains elevated at 14,000, hemoglobin 11.1, creatinine 0.84, BUN 20, potassium 4.0, sodium 131.    Review of systems:  All systems reviewed negative several weakness, fatigue, right ankle weakness and pain    Objective   Objective     Vitals:   Temp:  [97.8 °F (36.6 °C)-100.7 °F (38.2 °C)] 99 °F (37.2 °C)  Heart Rate:  [] 94  Resp:  [18-24] 18  BP: ()/(51-81) 136/70  Physical Exam               Constitutional: Awake, alert, no acute distress laying in  bed              Eyes: Pupils equal, sclerae anicteric, no conjunctival injection              HENT: NCAT, mucous membranes moist              Neck: Supple, no thyromegaly, no lymphadenopathy, trachea midline              Respiratory: Clear to auscultation bilaterally, nonlabored respirations               Cardiovascular: RRR, no murmurs, rubs, or gallops, palpable pedal pulses bilaterally              Gastrointestinal: Positive bowel sounds, soft, nontender, nondistended              Musculoskeletal: Right lower extremity difficulty flexion and extension, distal extremity neurovascular              Psychiatric: Appropriate affect, cooperative              Neurologic: Oriented x 3, strength symmetric in all extremities, Cranial Nerves grossly intact to confrontation, speech clear              Skin: No rashes   Result Review    Result Review:  I have personally reviewed the pertinent results from the past 24 hours to 9/29/2024 11:08 EDT and agree with these findings:  [x]  Laboratory   CBC          9/27/2024    04:21 9/28/2024    04:02 9/29/2024    05:41   CBC   WBC 14.98  14.30  14.69    RBC 3.57  3.47  3.65    Hemoglobin 10.9  10.4  11.1    Hematocrit 33.0  32.4  34.6    MCV 92.4  93.4  94.8    MCH 30.5  30.0  30.4    MCHC 33.0  32.1  32.1    RDW 16.2  16.0  15.8    Platelets 168  172  181      BMP          9/27/2024    04:21 9/28/2024    04:02 9/29/2024    05:41   BMP   BUN 19  21  20    Creatinine 1.04  0.89  0.84    Sodium 131  130  131    Potassium 3.4  3.9  4.0    Chloride 96  96  94    CO2 22.0  21.7  23.3    Calcium 8.7  8.5  8.9      LIVER FUNCTION TESTS:      Lab 09/29/24  0541 09/26/24  1112   TOTAL PROTEIN 7.0 6.8   ALBUMIN 2.9* 3.2*   GLOBULIN  --  3.6   ALT (SGPT) 15 11   AST (SGOT) 21 16   BILIRUBIN 1.0 1.0   INDIRECT BILIRUBIN 0.6  --    BILIRUBIN DIRECT 0.4*  --    ALK PHOS 66 65       [x]  Microbiology   Microbiology Results (last 10 days)       ** No results found for the last 240 hours. **               [x]  Radiology CT Lower Extremity Right Without Contrast    Result Date: 9/26/2024  No acute fracture. No acute malalignment. Please see above comments for further detail. Portions of this note were completed with a voice recognition program. Electronically Signed: Colton Mahajan MD  9/26/2024 11:05 PM EDT  Workstation ID: [id number]    XR Ankle 3+ View Right    Result Date: 9/26/2024  Impression: No acute right ankle findings. Prominent plantar calcaneal spur. Electronically Signed: Siena Llamas MD  9/26/2024 11:55 AM EDT  Workstation ID: [id number]    XR Chest 1 View    Result Date: 9/26/2024  Impression: Stable moderate cardiomegaly. No acute chest findings. Electronically Signed: Siena Llamas MD  9/26/2024 11:48 AM EDT  Workstation ID: [id number]       [x]  EKG/Telemetry   ECG 12 Lead QT Measurement   Final Result   HEART BCWP=549  bpm   RR Vpfpktso=779  ms   HI Interval=  ms   P Horizontal Axis=  deg   P Front Axis=  deg   QRSD Dajvujzb=393  ms   QT Elzovdxx=727  ms   QHwM=714  ms   QRS Axis=147  deg   T Wave Axis=-8  deg   - ABNORMAL ECG -   Atrial fibrillation   Ventricular premature complex   RBBB and LPFB   ST depr, consider ischemia, anterolateral lds   When compared with ECG of 26-Sep-2024 11:05:26,   No significant change   Electronically Signed By: Irineo Alatorre ([facility]) 2024-09-27 09:45:33   Date and Time of Study:2024-09-27 07:24:49      ECG 12 Lead ED Triage Standing Order; Weak / Dizzy / AMS   Preliminary Result   HEART RATE=94  bpm   RR Sqpdpmyc=031  ms   HI Interval=  ms   P Horizontal Axis=  deg   P Front Axis=  deg   QRSD Pyqtdeor=703  ms   QT Kcliscxr=077  ms   TVvI=447  ms   QRS Axis=143  deg   T Wave Axis=-11  deg   - ABNORMAL ECG -   Atrial fibrillation   Paired ventricular premature complexes   RBBB and LPFB   Date and Time of Study:2024-09-26 11:05:26          [x]  Cardiology/Vascular   []  Pathology  [x]  Old records  []  Other:    Assessment & Plan   Assessment / Plan      Assessment/Plan:  Assessment:  Questionable right ankle Achilles tendon tear versus rupture  Acute anemia  Stool guaiac positive  Peripheral edema  History of bioprosthetic AVR  Proteinuria  CAD  AICD in place  Nonischemic cardiomyopathy  Dyslipidemia  Essential hypertension  Permanent atrial fibrillation     Plan:  Labs and imaging reviewed  Start colon Prep for colonoscopy 9/30/2024  Orthopedic consultation appreciated; WBAT, Boot  Hold aspirin  Continue Cardizem 120 mg twice a day  Continue Lopressor 100 mg twice a day  Continue lisinopril 10 mg daily  Continue Lasix 40 mg IV twice daily to help with peripheral edema  Consulted GI, with positive stool guaiac, Dr. Mensah discussed with patient recommendation for colonoscopy, patient now accepting procedure  A.m. labs  Full code  DVT prophylax with SCDs in the setting of concern for GI bleed  Clinical course dictate further management  Discussed with nurse at the bedside  VTE Prophylaxis:  Mechanical VTE prophylaxis orders are present.        CODE STATUS:   Level Of Support Discussed With: Patient  Code Status (Patient has no pulse and is not breathing): CPR (Attempt to Resuscitate)  Medical Interventions (Patient has pulse or is breathing): Full Support        Electronically signed by Joseluis Diallo MD, 9/29/2024, 11:08 EDT.    Portions of this documentation were transcribed electronically from a voice recognition software.  I confirm all data accurately represents the service(s) I performed at today's visit.

## 2024-09-30 ENCOUNTER — ANESTHESIA EVENT (OUTPATIENT)
Dept: GASTROENTEROLOGY | Facility: HOSPITAL | Age: 74
End: 2024-09-30
Payer: MEDICARE

## 2024-09-30 ENCOUNTER — ANESTHESIA (OUTPATIENT)
Dept: GASTROENTEROLOGY | Facility: HOSPITAL | Age: 74
End: 2024-09-30
Payer: MEDICARE

## 2024-09-30 PROBLEM — D50.0 ANEMIA DUE TO GI BLOOD LOSS: Status: ACTIVE | Noted: 2024-09-26

## 2024-09-30 LAB
ALBUMIN SERPL-MCNC: 3 G/DL (ref 3.5–5.2)
ALP SERPL-CCNC: 65 U/L (ref 39–117)
ALT SERPL W P-5'-P-CCNC: 17 U/L (ref 1–41)
ANION GAP SERPL CALCULATED.3IONS-SCNC: 12.5 MMOL/L (ref 5–15)
AST SERPL-CCNC: 22 U/L (ref 1–40)
BASOPHILS # BLD AUTO: 0.05 10*3/MM3 (ref 0–0.2)
BASOPHILS NFR BLD AUTO: 0.3 % (ref 0–1.5)
BILIRUB CONJ SERPL-MCNC: 0.3 MG/DL (ref 0–0.3)
BILIRUB INDIRECT SERPL-MCNC: 0.5 MG/DL
BILIRUB SERPL-MCNC: 0.8 MG/DL (ref 0–1.2)
BUN SERPL-MCNC: 22 MG/DL (ref 8–23)
BUN/CREAT SERPL: 25.6 (ref 7–25)
CALCIUM SPEC-SCNC: 8.9 MG/DL (ref 8.6–10.5)
CHLORIDE SERPL-SCNC: 94 MMOL/L (ref 98–107)
CO2 SERPL-SCNC: 26.5 MMOL/L (ref 22–29)
CREAT SERPL-MCNC: 0.86 MG/DL (ref 0.76–1.27)
DEPRECATED RDW RBC AUTO: 52.3 FL (ref 37–54)
EGFRCR SERPLBLD CKD-EPI 2021: 91.4 ML/MIN/1.73
EOSINOPHIL # BLD AUTO: 0.09 10*3/MM3 (ref 0–0.4)
EOSINOPHIL NFR BLD AUTO: 0.6 % (ref 0.3–6.2)
ERYTHROCYTE [DISTWIDTH] IN BLOOD BY AUTOMATED COUNT: 15.3 % (ref 12.3–15.4)
GLUCOSE SERPL-MCNC: 139 MG/DL (ref 65–99)
HCT VFR BLD AUTO: 31.1 % (ref 37.5–51)
HGB BLD-MCNC: 10.2 G/DL (ref 13–17.7)
IMM GRANULOCYTES # BLD AUTO: 0.09 10*3/MM3 (ref 0–0.05)
IMM GRANULOCYTES NFR BLD AUTO: 0.6 % (ref 0–0.5)
LYMPHOCYTES # BLD AUTO: 1.03 10*3/MM3 (ref 0.7–3.1)
LYMPHOCYTES NFR BLD AUTO: 6.6 % (ref 19.6–45.3)
MAGNESIUM SERPL-MCNC: 1.8 MG/DL (ref 1.6–2.4)
MCH RBC QN AUTO: 30.4 PG (ref 26.6–33)
MCHC RBC AUTO-ENTMCNC: 32.8 G/DL (ref 31.5–35.7)
MCV RBC AUTO: 92.8 FL (ref 79–97)
MONOCYTES # BLD AUTO: 1.6 10*3/MM3 (ref 0.1–0.9)
MONOCYTES NFR BLD AUTO: 10.2 % (ref 5–12)
NEUTROPHILS NFR BLD AUTO: 12.82 10*3/MM3 (ref 1.7–7)
NEUTROPHILS NFR BLD AUTO: 81.7 % (ref 42.7–76)
NRBC BLD AUTO-RTO: 0 /100 WBC (ref 0–0.2)
PHOSPHATE SERPL-MCNC: 3.9 MG/DL (ref 2.5–4.5)
PLATELET # BLD AUTO: 183 10*3/MM3 (ref 140–450)
PMV BLD AUTO: 9.7 FL (ref 6–12)
POTASSIUM SERPL-SCNC: 3.3 MMOL/L (ref 3.5–5.2)
PROT SERPL-MCNC: 6.8 G/DL (ref 6–8.5)
RBC # BLD AUTO: 3.35 10*6/MM3 (ref 4.14–5.8)
SODIUM SERPL-SCNC: 133 MMOL/L (ref 136–145)
WBC NRBC COR # BLD AUTO: 15.68 10*3/MM3 (ref 3.4–10.8)

## 2024-09-30 PROCEDURE — 84100 ASSAY OF PHOSPHORUS: CPT | Performed by: FAMILY MEDICINE

## 2024-09-30 PROCEDURE — 83735 ASSAY OF MAGNESIUM: CPT | Performed by: FAMILY MEDICINE

## 2024-09-30 PROCEDURE — 43239 EGD BIOPSY SINGLE/MULTIPLE: CPT | Performed by: INTERNAL MEDICINE

## 2024-09-30 PROCEDURE — 25810000003 LACTATED RINGERS PER 1000 ML

## 2024-09-30 PROCEDURE — 25010000002 FENTANYL CITRATE (PF) 50 MCG/ML SOLUTION

## 2024-09-30 PROCEDURE — 25010000002 SUGAMMADEX 200 MG/2ML SOLUTION

## 2024-09-30 PROCEDURE — 85025 COMPLETE CBC W/AUTO DIFF WBC: CPT | Performed by: FAMILY MEDICINE

## 2024-09-30 PROCEDURE — 25010000002 POTASSIUM CHLORIDE 10 MEQ/100ML SOLUTION: Performed by: FAMILY MEDICINE

## 2024-09-30 PROCEDURE — 25010000002 ONDANSETRON PER 1 MG: Performed by: HOSPITALIST

## 2024-09-30 PROCEDURE — 25010000002 ONDANSETRON PER 1 MG

## 2024-09-30 PROCEDURE — 25010000002 FUROSEMIDE PER 20 MG: Performed by: HOSPITALIST

## 2024-09-30 PROCEDURE — 45390 COLONOSCOPY W/RESECTION: CPT | Performed by: INTERNAL MEDICINE

## 2024-09-30 PROCEDURE — 0DBH8ZX EXCISION OF CECUM, VIA NATURAL OR ARTIFICIAL OPENING ENDOSCOPIC, DIAGNOSTIC: ICD-10-PCS | Performed by: INTERNAL MEDICINE

## 2024-09-30 PROCEDURE — 99232 SBSQ HOSP IP/OBS MODERATE 35: CPT | Performed by: FAMILY MEDICINE

## 2024-09-30 PROCEDURE — 80076 HEPATIC FUNCTION PANEL: CPT | Performed by: FAMILY MEDICINE

## 2024-09-30 PROCEDURE — 45385 COLONOSCOPY W/LESION REMOVAL: CPT | Performed by: INTERNAL MEDICINE

## 2024-09-30 PROCEDURE — 80048 BASIC METABOLIC PNL TOTAL CA: CPT | Performed by: FAMILY MEDICINE

## 2024-09-30 PROCEDURE — 0DBK8ZX EXCISION OF ASCENDING COLON, VIA NATURAL OR ARTIFICIAL OPENING ENDOSCOPIC, DIAGNOSTIC: ICD-10-PCS | Performed by: INTERNAL MEDICINE

## 2024-09-30 PROCEDURE — 25010000002 PROPOFOL 500 MG/50ML EMULSION

## 2024-09-30 PROCEDURE — 0DB38ZX EXCISION OF LOWER ESOPHAGUS, VIA NATURAL OR ARTIFICIAL OPENING ENDOSCOPIC, DIAGNOSTIC: ICD-10-PCS | Performed by: INTERNAL MEDICINE

## 2024-09-30 PROCEDURE — 88305 TISSUE EXAM BY PATHOLOGIST: CPT | Performed by: INTERNAL MEDICINE

## 2024-09-30 DEVICE — DEV CLIP HEMO RESOLUTION360/ULTR 235CM 17MM STRL: Type: IMPLANTABLE DEVICE | Site: COLON | Status: FUNCTIONAL

## 2024-09-30 RX ORDER — SODIUM CHLORIDE, SODIUM LACTATE, POTASSIUM CHLORIDE, CALCIUM CHLORIDE 600; 310; 30; 20 MG/100ML; MG/100ML; MG/100ML; MG/100ML
INJECTION, SOLUTION INTRAVENOUS CONTINUOUS PRN
Status: DISCONTINUED | OUTPATIENT
Start: 2024-09-30 | End: 2024-09-30 | Stop reason: SURG

## 2024-09-30 RX ORDER — MAGNESIUM CARB/ALUMINUM HYDROX 105-160MG
296 TABLET,CHEWABLE ORAL ONCE
Status: COMPLETED | OUTPATIENT
Start: 2024-09-30 | End: 2024-09-30

## 2024-09-30 RX ORDER — ONDANSETRON 2 MG/ML
INJECTION INTRAMUSCULAR; INTRAVENOUS AS NEEDED
Status: DISCONTINUED | OUTPATIENT
Start: 2024-09-30 | End: 2024-09-30 | Stop reason: SURG

## 2024-09-30 RX ORDER — LIDOCAINE HYDROCHLORIDE 20 MG/ML
INJECTION, SOLUTION EPIDURAL; INFILTRATION; INTRACAUDAL; PERINEURAL AS NEEDED
Status: DISCONTINUED | OUTPATIENT
Start: 2024-09-30 | End: 2024-09-30 | Stop reason: SURG

## 2024-09-30 RX ORDER — ROCURONIUM BROMIDE 10 MG/ML
INJECTION, SOLUTION INTRAVENOUS AS NEEDED
Status: DISCONTINUED | OUTPATIENT
Start: 2024-09-30 | End: 2024-09-30 | Stop reason: SURG

## 2024-09-30 RX ORDER — SUCCINYLCHOLINE/SOD CL,ISO/PF 100 MG/5ML
SYRINGE (ML) INTRAVENOUS AS NEEDED
Status: DISCONTINUED | OUTPATIENT
Start: 2024-09-30 | End: 2024-09-30 | Stop reason: SURG

## 2024-09-30 RX ORDER — PHENYLEPHRINE HCL IN 0.9% NACL 1 MG/10 ML
SYRINGE (ML) INTRAVENOUS AS NEEDED
Status: DISCONTINUED | OUTPATIENT
Start: 2024-09-30 | End: 2024-09-30 | Stop reason: SURG

## 2024-09-30 RX ORDER — FENTANYL CITRATE 50 UG/ML
INJECTION, SOLUTION INTRAMUSCULAR; INTRAVENOUS AS NEEDED
Status: DISCONTINUED | OUTPATIENT
Start: 2024-09-30 | End: 2024-09-30 | Stop reason: SURG

## 2024-09-30 RX ORDER — PROPOFOL 10 MG/ML
INJECTION, EMULSION INTRAVENOUS AS NEEDED
Status: DISCONTINUED | OUTPATIENT
Start: 2024-09-30 | End: 2024-09-30 | Stop reason: SURG

## 2024-09-30 RX ORDER — POTASSIUM CHLORIDE 7.45 MG/ML
10 INJECTION INTRAVENOUS
Status: COMPLETED | OUTPATIENT
Start: 2024-09-30 | End: 2024-09-30

## 2024-09-30 RX ADMIN — SODIUM CHLORIDE, POTASSIUM CHLORIDE, SODIUM LACTATE AND CALCIUM CHLORIDE: 600; 310; 30; 20 INJECTION, SOLUTION INTRAVENOUS at 16:37

## 2024-09-30 RX ADMIN — POTASSIUM CHLORIDE 10 MEQ: 7.46 INJECTION, SOLUTION INTRAVENOUS at 10:48

## 2024-09-30 RX ADMIN — SUGAMMADEX 200 MG: 100 INJECTION, SOLUTION INTRAVENOUS at 17:33

## 2024-09-30 RX ADMIN — MAGNESIUM CITRATE 296 ML: 1.75 LIQUID ORAL at 09:04

## 2024-09-30 RX ADMIN — FENTANYL CITRATE 50 MCG: 50 INJECTION, SOLUTION INTRAMUSCULAR; INTRAVENOUS at 16:40

## 2024-09-30 RX ADMIN — DILTIAZEM HYDROCHLORIDE 120 MG: 60 TABLET, FILM COATED ORAL at 08:46

## 2024-09-30 RX ADMIN — Medication 10 ML: at 08:47

## 2024-09-30 RX ADMIN — ROCURONIUM BROMIDE 20 MG: 10 INJECTION, SOLUTION INTRAVENOUS at 17:15

## 2024-09-30 RX ADMIN — FUROSEMIDE 40 MG: 10 INJECTION, SOLUTION INTRAMUSCULAR; INTRAVENOUS at 18:15

## 2024-09-30 RX ADMIN — ONDANSETRON 4 MG: 2 INJECTION INTRAMUSCULAR; INTRAVENOUS at 11:51

## 2024-09-30 RX ADMIN — Medication 5 MG: at 20:38

## 2024-09-30 RX ADMIN — Medication 100 MG: at 16:40

## 2024-09-30 RX ADMIN — LIDOCAINE HYDROCHLORIDE 100 MG: 20 INJECTION, SOLUTION EPIDURAL; INFILTRATION; INTRACAUDAL; PERINEURAL at 16:40

## 2024-09-30 RX ADMIN — Medication 10 ML: at 20:38

## 2024-09-30 RX ADMIN — PROPOFOL 150 MG: 10 INJECTION, EMULSION INTRAVENOUS at 16:40

## 2024-09-30 RX ADMIN — POTASSIUM CHLORIDE 10 MEQ: 7.46 INJECTION, SOLUTION INTRAVENOUS at 11:51

## 2024-09-30 RX ADMIN — DILTIAZEM HYDROCHLORIDE 120 MG: 60 TABLET, FILM COATED ORAL at 20:38

## 2024-09-30 RX ADMIN — METOPROLOL TARTRATE 100 MG: 50 TABLET, FILM COATED ORAL at 20:38

## 2024-09-30 RX ADMIN — LISINOPRIL 10 MG: 10 TABLET ORAL at 08:47

## 2024-09-30 RX ADMIN — METOPROLOL TARTRATE 100 MG: 50 TABLET, FILM COATED ORAL at 08:47

## 2024-09-30 RX ADMIN — POTASSIUM CHLORIDE 10 MEQ: 7.46 INJECTION, SOLUTION INTRAVENOUS at 09:46

## 2024-09-30 RX ADMIN — ONDANSETRON 4 MG: 2 INJECTION INTRAMUSCULAR; INTRAVENOUS at 17:31

## 2024-09-30 RX ADMIN — FENTANYL CITRATE 50 MCG: 50 INJECTION, SOLUTION INTRAMUSCULAR; INTRAVENOUS at 17:04

## 2024-09-30 RX ADMIN — POLYETHYLENE GLYCOL 3350, SODIUM SULFATE ANHYDROUS, SODIUM BICARBONATE, SODIUM CHLORIDE, POTASSIUM CHLORIDE 2000 ML: 236; 22.74; 6.74; 5.86; 2.97 POWDER, FOR SOLUTION ORAL at 05:22

## 2024-09-30 RX ADMIN — POTASSIUM CHLORIDE 10 MEQ: 7.46 INJECTION, SOLUTION INTRAVENOUS at 08:46

## 2024-09-30 RX ADMIN — Medication 200 MCG: at 16:47

## 2024-09-30 RX ADMIN — FUROSEMIDE 40 MG: 10 INJECTION, SOLUTION INTRAMUSCULAR; INTRAVENOUS at 03:34

## 2024-09-30 NOTE — ANESTHESIA PREPROCEDURE EVALUATION
Anesthesia Evaluation     NPO Solid Status: > 8 hours  NPO Liquid Status: > 6 hours           Airway   Mallampati: II  TM distance: >3 FB  Neck ROM: full  No difficulty expected  Dental - normal exam     Pulmonary    (+) ,decreased breath sounds    ROS comment: Right upper lobe nodule  Cardiovascular - normal exam    Patient on routine beta blocker and Beta blocker given within 24 hours of surgery    (+) pacemaker ([employer]) ICD interrogated 6-12 months ago, hypertension 2 medications or greater, CAD, dysrhythmias (Right BBB) Atrial Fib, PVC, hyperlipidemia    ROS comment: Aortic Valve Replacement-2014    Neuro/Psych  (+) tremors  GI/Hepatic/Renal/Endo    (+) morbid obesity, GI bleeding     ROS Comment: History of Spleen Lesion    Musculoskeletal     Abdominal   (+) obese   Substance History      OB/GYN          Other   arthritis,     ROS/Med Hx Other: 9/26/24- 73-year-old male with history of essential hypertension, permanent atrial fibrillation, history of bioprosthetic AVR, proteinuria, CAD, right upper lobe nodule, spleen lesion questionable cyst versus hemangioma, nonischemic cardiomyopathy with AICD in place, dyslipidemia, hospitalized after feeling a pop in his right ankle, has been on Levaquin for several days for a questionable cryptogenic infection according to him.     09/30/24 05:48  Sodium: 133 (L)  Potassium: 3.3 (L)  Chloride: 94 (L)  CO2: 26.5  Anion Gap: 12.5  BUN: 22  Creatinine: 0.86  BUN/Creatinine Ratio: 25.6 (H)  eGFR: 91.4  Glucose: 139 (H)  Calcium: 8.9  Magnesium: 1.8  Phosphorus: 3.9  Alkaline Phosphatase: 65  Total Protein: 6.8  Albumin: 3.0 (L)  09/30/24 05:48  WBC: 15.68 (H)  RBC: 3.35 (L)  Hemoglobin: 10.2 (L)  Hematocrit: 31.1 (L)  Platelets: 183    ABNORMAL ECG -  Atrial fibrillation  Ventricular premature complex  RBBB and LPFB  ST depr, consider ischemia, anterolateral lds  When compared with ECG of 26-Sep-2024 11:05:26,  No significant change  Electronically Signed By:  Irineo Alatorre ([facility]) 2024-09-27 09:45:33  Date and Time of Study:2024-09-27 07:24:49    9/20/24- Echo  ·  Left ventricular ejection fraction appears to be 51 - 55%.  Inappropriate septal motion noted.  ·  The left ventricular cavity is borderline dilated.  ·  Left ventricular wall thickness is consistent with mild concentric hypertrophy.  ·  Left ventricular diastolic function was indeterminate.  ·  The right ventricular cavity is borderline dilated.  Electronic lead noted.  ·  The left atrial cavity is mild to moderately dilated.  ·  The right atrial cavity is mild to moderately  dilated.  ·  There is a bioprosthetic aortic valve present.  Max/mean pressure gradient is 55/29 mmHg.  No significant aortic insufficiency.  ·  Estimated right ventricular systolic pressure from tricuspid regurgitation is mildly elevated (35-45 mmHg).  ·  Mild to moderate mitral and tricuspid regurgitation.      Saw cardiologist in Jan 2023 with follow-up in 1 year                    Anesthesia Plan    ASA 4     general   total IV anesthesia  (Total IV Anesthesia    Patient understands anesthesia not responsible for dental damage.  )  intravenous induction     Anesthetic plan, risks, benefits, and alternatives have been provided, discussed and informed consent has been obtained with: patient.    Plan discussed with CRNA.        CODE STATUS:    Level Of Support Discussed With: Patient  Code Status (Patient has no pulse and is not breathing): CPR (Attempt to Resuscitate)  Medical Interventions (Patient has pulse or is breathing): Full Support

## 2024-09-30 NOTE — SIGNIFICANT NOTE
09/30/24 1512   OTHER   Discipline occupational therapist   Rehab Time/Intention   Session Not Performed patient unavailable for treatment  (in ENDO)

## 2024-09-30 NOTE — PLAN OF CARE
Goal Outcome Evaluation:  Plan of Care Reviewed With: spouse        Progress: no change  Outcome Evaluation: VSS. Patient AAO x4. Patient vomitted x2, medication administered per MAR. Colonoscopy completed during shift. No other concerns or complaints. Continue plan of care.

## 2024-09-30 NOTE — PLAN OF CARE
Goal Outcome Evaluation:  Plan of Care Reviewed With: patient              Pt Aaox4.VSS. Pt on NPO since midnight for colonoscopy procedure, consent form obtained during this shift. No acute events to report overnight. Cont POC

## 2024-09-30 NOTE — PROGRESS NOTES
[facility]   Hospitalist Progress Note  Date: 2024  Patient Name: Woodrow Alejandro  : 1950  MRN: [id number]  Date of admission: 2024      Subjective   Subjective   Chief complaint: Pop in ankle, unable to stand    Summary:  73-year-old male with history of essential hypertension, permanent atrial fibrillation, history of bioprosthetic AVR, proteinuria, CAD, right upper lobe nodule, spleen lesion questionable cyst versus hemangioma, nonischemic cardiomyopathy with AICD in place, dyslipidemia, hospitalized after feeling a pop in his right ankle, has been on Levaquin for several days for a questionable cryptogenic infection according to him.  Questionable Achilles tear, orthopedics consulted, awaiting MRI coordination with [facility] to obtain MRI of the right ankle.  Found to be anemic, GI consulted, stool occult blood positive.  GI discussed colonoscopy with the patient, patient refused EGD and colonoscopy.  Repeat discussions patient, adamant about refusal for colonoscopy, changed his mind 2024 to pursue endoscopic evaluations of his colon.  Could not get MRI due to incompatibility of AICD, Ortho recommendation boot, weightbearing as tolerated, outpatient therapy.    Interval follow-up: Seen and examined this morning, no acute distress, no acute major overnight events, still has difficulty with flexion and extension of his right ankle, tolerating wearing boot, tolerated colon prep overnight, stools are somewhat clear, sodium 133, potassium 3.3, creatinine 0.86, white blood cell count 15,000, hemoglobin 10.2.  Will see what colonoscopy shows today.    Review of systems:  All systems reviewed negative several weakness, fatigue, right ankle weakness and pain    Objective   Objective     Vitals:   Temp:  [98.1 °F (36.7 °C)-100.4 °F (38 °C)] 98.1 °F (36.7 °C)  Heart Rate:  [] 99  Resp:  [16-18] 18  BP: (104-130)/(55-79) 130/79  Physical Exam               Constitutional: Awake, alert, no  acute distress laying in bed comfortable              Eyes: Pupils equal, sclerae anicteric, no conjunctival injection              HENT: NCAT, mucous membranes moist              Neck: Supple, no thyromegaly, no lymphadenopathy, trachea midline              Respiratory: Clear to auscultation bilaterally, nonlabored respirations               Cardiovascular: RRR, no murmurs, rubs, or gallops, palpable pedal pulses bilaterally              Gastrointestinal: Positive bowel sounds, soft, nontender, nondistended              Musculoskeletal: Right lower extremity difficulty flexion and extension, distal extremity neurovascular              Psychiatric: Appropriate affect, cooperative              Neurologic: Oriented x 3, strength symmetric in all extremities, Cranial Nerves grossly intact to confrontation, speech clear              Skin: No rashes   Result Review    Result Review:  I have personally reviewed the pertinent results from the past 24 hours to 9/30/2024 09:56 EDT and agree with these findings:  [x]  Laboratory   CBC          9/28/2024    04:02 9/29/2024    05:41 9/30/2024    05:48   CBC   WBC 14.30  14.69  15.68    RBC 3.47  3.65  3.35    Hemoglobin 10.4  11.1  10.2    Hematocrit 32.4  34.6  31.1    MCV 93.4  94.8  92.8    MCH 30.0  30.4  30.4    MCHC 32.1  32.1  32.8    RDW 16.0  15.8  15.3    Platelets 172  181  183      BMP          9/28/2024    04:02 9/29/2024    05:41 9/30/2024    05:48   BMP   BUN 21  20  22    Creatinine 0.89  0.84  0.86    Sodium 130  131  133    Potassium 3.9  4.0  3.3    Chloride 96  94  94    CO2 21.7  23.3  26.5    Calcium 8.5  8.9  8.9      LIVER FUNCTION TESTS:      Lab 09/30/24  0548 09/29/24  0541 09/26/24  1112   TOTAL PROTEIN 6.8 7.0 6.8   ALBUMIN 3.0* 2.9* 3.2*   GLOBULIN  --   --  3.6   ALT (SGPT) 17 15 11   AST (SGOT) 22 21 16   BILIRUBIN 0.8 1.0 1.0   INDIRECT BILIRUBIN 0.5 0.6  --    BILIRUBIN DIRECT 0.3 0.4*  --    ALK PHOS 65 66 65       [x]  Microbiology    Microbiology Results (last 10 days)       ** No results found for the last 240 hours. **              [x]  Radiology CT Lower Extremity Right Without Contrast    Result Date: 9/26/2024  No acute fracture. No acute malalignment. Please see above comments for further detail. Portions of this note were completed with a voice recognition program. Electronically Signed: Colton Mahajan MD  9/26/2024 11:05 PM EDT  Workstation ID: [id number]    XR Ankle 3+ View Right    Result Date: 9/26/2024  Impression: No acute right ankle findings. Prominent plantar calcaneal spur. Electronically Signed: Siena Llamas MD  9/26/2024 11:55 AM EDT  Workstation ID: [id number]    XR Chest 1 View    Result Date: 9/26/2024  Impression: Stable moderate cardiomegaly. No acute chest findings. Electronically Signed: Siena Llamas MD  9/26/2024 11:48 AM EDT  Workstation ID: [id number]       [x]  EKG/Telemetry   ECG 12 Lead QT Measurement   Final Result   HEART JRWT=674  bpm   RR Awwvfdou=448  ms   MO Interval=  ms   P Horizontal Axis=  deg   P Front Axis=  deg   QRSD Kiplqpjj=916  ms   QT Kdjjnioy=273  ms   HVvU=484  ms   QRS Axis=147  deg   T Wave Axis=-8  deg   - ABNORMAL ECG -   Atrial fibrillation   Ventricular premature complex   RBBB and LPFB   ST depr, consider ischemia, anterolateral lds   When compared with ECG of 26-Sep-2024 11:05:26,   No significant change   Electronically Signed By: Irineo Alatorre ([facility]) 2024-09-27 09:45:33   Date and Time of Study:2024-09-27 07:24:49      ECG 12 Lead ED Triage Standing Order; Weak / Dizzy / AMS   Preliminary Result   HEART RATE=94  bpm   RR Yecmcdbd=736  ms   MO Interval=  ms   P Horizontal Axis=  deg   P Front Axis=  deg   QRSD Lnmsoxup=466  ms   QT Vlnhrecx=159  ms   ZFzV=513  ms   QRS Axis=143  deg   T Wave Axis=-11  deg   - ABNORMAL ECG -   Atrial fibrillation   Paired ventricular premature complexes   RBBB and LPFB   Date and Time of Study:2024-09-26 11:05:26          [x]  Cardiology/Vascular   []   Pathology  [x]  Old records  []  Other:    Assessment & Plan   Assessment / Plan     Assessment/Plan:  Assessment:  Questionable right ankle Achilles tendon tear versus rupture  Acute anemia  Stool guaiac positive  Peripheral edema  History of bioprosthetic AVR  Proteinuria  CAD  AICD in place  Nonischemic cardiomyopathy  Dyslipidemia  Essential hypertension  Permanent atrial fibrillation     Plan:  Labs and imaging reviewed  N.p.o. for colonoscopy resume diet thereafter  Potassium replacement 40 mill equivalents IV  Orthopedic consultation appreciated; WBAT, Boot  Hold aspirin  Continue Cardizem 120 mg twice a day  Continue Lopressor 100 mg twice a day  Continue lisinopril 10 mg daily  Continue Lasix 40 mg IV twice daily to help with peripheral edema, continued  Consulted GI, with positive stool guaiac, Dr. Mensah discussed with patient recommendation for colonoscopy, patient now accepting procedure  A.m. labs  Full code  DVT prophylax with SCDs in the setting of concern for GI bleed  Clinical course dictate further management  Discussed with nurse at the bedside  VTE Prophylaxis:  Mechanical VTE prophylaxis orders are present.        CODE STATUS:   Level Of Support Discussed With: Patient  Code Status (Patient has no pulse and is not breathing): CPR (Attempt to Resuscitate)  Medical Interventions (Patient has pulse or is breathing): Full Support        Electronically signed by Joseluis Diallo MD, 9/30/2024, 09:56 EDT.    Portions of this documentation were transcribed electronically from a voice recognition software.  I confirm all data accurately represents the service(s) I performed at today's visit.

## 2024-10-01 PROCEDURE — 25010000002 FUROSEMIDE PER 20 MG: Performed by: FAMILY MEDICINE

## 2024-10-01 PROCEDURE — 25010000002 FUROSEMIDE PER 20 MG: Performed by: HOSPITALIST

## 2024-10-01 PROCEDURE — 25010000002 PROCHLORPERAZINE 10 MG/2ML SOLUTION: Performed by: FAMILY MEDICINE

## 2024-10-01 PROCEDURE — 25010000002 ONDANSETRON PER 1 MG: Performed by: HOSPITALIST

## 2024-10-01 PROCEDURE — 99232 SBSQ HOSP IP/OBS MODERATE 35: CPT | Performed by: FAMILY MEDICINE

## 2024-10-01 RX ORDER — FUROSEMIDE 10 MG/ML
40 INJECTION INTRAMUSCULAR; INTRAVENOUS
Status: DISCONTINUED | OUTPATIENT
Start: 2024-10-01 | End: 2024-10-02

## 2024-10-01 RX ORDER — PROCHLORPERAZINE EDISYLATE 5 MG/ML
5 INJECTION INTRAMUSCULAR; INTRAVENOUS EVERY 6 HOURS PRN
Status: DISCONTINUED | OUTPATIENT
Start: 2024-10-01 | End: 2024-10-03 | Stop reason: HOSPADM

## 2024-10-01 RX ORDER — SIMETHICONE 80 MG
80 TABLET,CHEWABLE ORAL 4 TIMES DAILY PRN
Status: DISCONTINUED | OUTPATIENT
Start: 2024-10-01 | End: 2024-10-03 | Stop reason: HOSPADM

## 2024-10-01 RX ORDER — PANTOPRAZOLE SODIUM 40 MG/1
40 TABLET, DELAYED RELEASE ORAL
Status: DISCONTINUED | OUTPATIENT
Start: 2024-10-01 | End: 2024-10-03 | Stop reason: HOSPADM

## 2024-10-01 RX ADMIN — SIMETHICONE 80 MG: 80 TABLET, CHEWABLE ORAL at 18:00

## 2024-10-01 RX ADMIN — SENNOSIDES AND DOCUSATE SODIUM 2 TABLET: 50; 8.6 TABLET ORAL at 20:46

## 2024-10-01 RX ADMIN — Medication 10 ML: at 09:34

## 2024-10-01 RX ADMIN — LISINOPRIL 10 MG: 10 TABLET ORAL at 09:33

## 2024-10-01 RX ADMIN — Medication 5 MG: at 20:46

## 2024-10-01 RX ADMIN — Medication 10 ML: at 20:46

## 2024-10-01 RX ADMIN — DILTIAZEM HYDROCHLORIDE 120 MG: 60 TABLET, FILM COATED ORAL at 20:46

## 2024-10-01 RX ADMIN — ONDANSETRON 4 MG: 2 INJECTION INTRAMUSCULAR; INTRAVENOUS at 13:59

## 2024-10-01 RX ADMIN — METOPROLOL TARTRATE 100 MG: 50 TABLET, FILM COATED ORAL at 09:33

## 2024-10-01 RX ADMIN — DILTIAZEM HYDROCHLORIDE 120 MG: 60 TABLET, FILM COATED ORAL at 09:33

## 2024-10-01 RX ADMIN — PANTOPRAZOLE SODIUM 40 MG: 40 TABLET, DELAYED RELEASE ORAL at 13:59

## 2024-10-01 RX ADMIN — FUROSEMIDE 40 MG: 10 INJECTION, SOLUTION INTRAMUSCULAR; INTRAVENOUS at 18:00

## 2024-10-01 RX ADMIN — PANTOPRAZOLE SODIUM 40 MG: 40 TABLET, DELAYED RELEASE ORAL at 18:19

## 2024-10-01 RX ADMIN — FUROSEMIDE 40 MG: 10 INJECTION, SOLUTION INTRAMUSCULAR; INTRAVENOUS at 03:33

## 2024-10-01 RX ADMIN — ONDANSETRON 4 MG: 2 INJECTION INTRAMUSCULAR; INTRAVENOUS at 08:11

## 2024-10-01 RX ADMIN — PROCHLORPERAZINE EDISYLATE 5 MG: 5 INJECTION INTRAMUSCULAR; INTRAVENOUS at 18:00

## 2024-10-01 RX ADMIN — METOPROLOL TARTRATE 100 MG: 50 TABLET, FILM COATED ORAL at 20:46

## 2024-10-01 RX ADMIN — PROCHLORPERAZINE EDISYLATE 5 MG: 5 INJECTION INTRAMUSCULAR; INTRAVENOUS at 12:13

## 2024-10-01 NOTE — PLAN OF CARE
Goal Outcome Evaluation:  Plan of Care Reviewed With: patient        Progress: no change  Outcome Evaluation: Patient has had nausea on and off all day, with a fair amount of belching. Zofran and compazine only offer temporary relief. We will add Gas-X and see if this helps, but his EGD showed food and liquid in the stomach. He really seems like he does not want to do much. Refused PT after previously refusing colonoscopy and even nausea meds. No c/o pain, vitals WNL. Did not want to get up, has not eaten much.

## 2024-10-01 NOTE — ANESTHESIA POSTPROCEDURE EVALUATION
Patient: Woodrow Alejandro    Procedure Summary       Date: 09/30/24 Room / Location: [facility] ENDOSCOPY 4 / [facility] ENDOSCOPY    Anesthesia Start: 1637 Anesthesia Stop: 1751    Procedures:       COLONOSCOPY WITH HOT/COLD SNARE POLYPECTOMIES, ELEVIEW INJECTION,CLIPX1      ESOPHAGOGASTRODUODENOSCOPY WITH BIOPSIES Diagnosis:       Anemia due to GI blood loss      (Anemia due to GI blood loss [D50.0])    Surgeons: Kurt Mensah MD Provider: Gisselle Kathleen CRNA    Anesthesia Type: general ASA Status: 4            Anesthesia Type: general    Vitals  Vitals Value Taken Time   /77 09/30/24 1801   Temp 36.8 °C (98.3 °F) 09/30/24 1748   Pulse 105 09/30/24 1802   Resp 18 09/30/24 1801   SpO2 95 % 09/30/24 1802   Vitals shown include unfiled device data.        Post Anesthesia Care and Evaluation    Post-procedure mental status: acceptable.  Pain management: satisfactory to patient    Airway patency: patent  Anesthetic complications: No anesthetic complications    Cardiovascular status: acceptable  Respiratory status: acceptable    Comments: Per chart review

## 2024-10-01 NOTE — SIGNIFICANT NOTE
10/01/24 1138   Physical Therapy Time and Intention   Session Not Performed patient/family declined, not feeling well   Comment, Session Not Performed Pt has been nauseated, had meds, is rather lethargic, declined all treatment.

## 2024-10-01 NOTE — PLAN OF CARE
Goal Outcome Evaluation:  Plan of Care Reviewed With: patient           Problem: Pain Acute  Goal: Acceptable Pain Control and Functional Ability  Outcome: Progressing  Intervention: Prevent or Manage Pain  Recent Flowsheet Documentation  Taken 9/30/2024 2002 by Pina Hay RN  Sensory Stimulation Regulation:   quiet environment promoted   care clustered  Intervention: Optimize Psychosocial Wellbeing  Recent Flowsheet Documentation  Taken 9/30/2024 2002 by Pina Hay RN  Supportive Measures: active listening utilized  Diversional Activities:   television   smartphone     Problem: Fall Injury Risk  Goal: Absence of Fall and Fall-Related Injury  Outcome: Progressing     Problem: Adult Inpatient Plan of Care  Goal: Plan of Care Review  Outcome: Progressing  Flowsheets (Taken 10/1/2024 0447)  Plan of Care Reviewed With: patient  Goal: Patient-Specific Goal (Individualized)  Outcome: Progressing  Goal: Absence of Hospital-Acquired Illness or Injury  Outcome: Progressing  Intervention: Prevent and Manage VTE (Venous Thromboembolism) Risk  Recent Flowsheet Documentation  Taken 9/30/2024 2002 by Pina Hay RN  VTE Prevention/Management: sequential compression devices off  Goal: Optimal Comfort and Wellbeing  Outcome: Progressing  Intervention: Provide Person-Centered Care  Recent Flowsheet Documentation  Taken 9/30/2024 2002 by Pina Hay RN  Trust Relationship/Rapport:   care explained   choices provided   emotional support provided   empathic listening provided   questions answered   questions encouraged   reassurance provided   thoughts/feelings acknowledged  Goal: Readiness for Transition of Care  Outcome: Progressing     Problem: Hypertension Comorbidity  Goal: Blood Pressure in Desired Range  Outcome: Progressing     Problem: Skin Injury Risk Increased  Goal: Skin Health and Integrity  Outcome: Progressing        Pt alert and oriented. VSS. Pt vomited 2x during shift, medication  "offered  but refused intervention and  stated \"I'm okay\"  No acute events to report overnight. Continue plan if care.                              "

## 2024-10-01 NOTE — PROGRESS NOTES
[facility]   Hospitalist Progress Note  Date: 10/1/2024  Patient Name: Woodrow Alejandro  : 1950  MRN: [id number]  Date of admission: 2024      Subjective   Subjective   Chief complaint: Pop in ankle, unable to stand    Summary:  73-year-old male with history of essential hypertension, permanent atrial fibrillation, history of bioprosthetic AVR, proteinuria, CAD, right upper lobe nodule, spleen lesion questionable cyst versus hemangioma, nonischemic cardiomyopathy with AICD in place, dyslipidemia, hospitalized after feeling a pop in his right ankle, has been on Levaquin for several days for a questionable cryptogenic infection according to him.  Questionable Achilles tear, orthopedics consulted, awaiting MRI coordination with [facility] to obtain MRI of the right ankle.  Found to be anemic, GI consulted, stool occult blood positive.  GI discussed colonoscopy with the patient, patient refused EGD and colonoscopy.  Repeat discussions patient, adamant about refusal for colonoscopy, changed his mind 2024 to pursue endoscopic evaluations of his colon.  Could not get MRI due to incompatibility of AICD, Ortho recommendation boot, weightbearing as tolerated, outpatient therapy.  Status post EGD and colonoscopy, colonoscopy, polyps removed, EGD showed LA grade C reflux esophagitis with bleeding, biopsied, with excessive gastric fluid, placed on Protonix twice daily for 2 weeks thereafter will transition to daily.    Interval follow-up: Seen and examined this morning, no acute distress, no acute major overnight events, has some nausea this morning, found to have esophagitis on scopes, colon polyps, removed, lab holiday today.  Still has difficulty mobilizing, discussed with patient going to rehab, [facility] unable to offer bed, working through a list of facilities to find him 1.    Review of systems:  All systems reviewed negative several weakness, fatigue, right ankle weakness and pain, intermittent  nausea    Objective   Objective     Vitals:   Temp:  [97.3 °F (36.3 °C)-98.4 °F (36.9 °C)] 97.3 °F (36.3 °C)  Heart Rate:  [] 95  Resp:  [16-25] 18  BP: (118-144)/(65-89) 144/89  Flow (L/min):  [1-3] 2  Physical Exam               Constitutional: Awake, alert, no acute distress laying in bed comfortable sitting upright              Eyes: Pupils equal, sclerae anicteric, no conjunctival injection              HENT: NCAT, mucous membranes moist              Neck: Supple, no thyromegaly, no lymphadenopathy, trachea midline              Respiratory: Clear to auscultation bilaterally, nonlabored respirations               Cardiovascular: RRR, no murmurs, rubs, or gallops, palpable pedal pulses bilaterally              Gastrointestinal: Positive bowel sounds, soft, nontender, nondistended              Musculoskeletal: Right lower extremity difficulty flexion and extension, distal extremity neurovascular              Psychiatric: Appropriate affect, cooperative              Neurologic: Oriented x 3, strength symmetric in all extremities, Cranial Nerves grossly intact to confrontation, speech clear              Skin: No rashes   Result Review    Result Review:  I have personally reviewed the pertinent results from the past 24 hours to 10/1/2024 12:51 EDT and agree with these findings:  [x]  Laboratory   CBC          9/28/2024    04:02 9/29/2024    05:41 9/30/2024    05:48   CBC   WBC 14.30  14.69  15.68    RBC 3.47  3.65  3.35    Hemoglobin 10.4  11.1  10.2    Hematocrit 32.4  34.6  31.1    MCV 93.4  94.8  92.8    MCH 30.0  30.4  30.4    MCHC 32.1  32.1  32.8    RDW 16.0  15.8  15.3    Platelets 172  181  183      BMP          9/28/2024    04:02 9/29/2024    05:41 9/30/2024    05:48   BMP   BUN 21  20  22    Creatinine 0.89  0.84  0.86    Sodium 130  131  133    Potassium 3.9  4.0  3.3    Chloride 96  94  94    CO2 21.7  23.3  26.5    Calcium 8.5  8.9  8.9      LIVER FUNCTION TESTS:      Lab 09/30/24  0548  09/29/24  0541 09/26/24  1112   TOTAL PROTEIN 6.8 7.0 6.8   ALBUMIN 3.0* 2.9* 3.2*   GLOBULIN  --   --  3.6   ALT (SGPT) 17 15 11   AST (SGOT) 22 21 16   BILIRUBIN 0.8 1.0 1.0   INDIRECT BILIRUBIN 0.5 0.6  --    BILIRUBIN DIRECT 0.3 0.4*  --    ALK PHOS 65 66 65       [x]  Microbiology   Microbiology Results (last 10 days)       ** No results found for the last 240 hours. **              [x]  Radiology CT Lower Extremity Right Without Contrast    Result Date: 9/26/2024  No acute fracture. No acute malalignment. Please see above comments for further detail. Portions of this note were completed with a voice recognition program. Electronically Signed: Colton Mahajan MD  9/26/2024 11:05 PM EDT  Workstation ID: [id number]    XR Ankle 3+ View Right    Result Date: 9/26/2024  Impression: No acute right ankle findings. Prominent plantar calcaneal spur. Electronically Signed: Siena Llamas MD  9/26/2024 11:55 AM EDT  Workstation ID: [id number]    XR Chest 1 View    Result Date: 9/26/2024  Impression: Stable moderate cardiomegaly. No acute chest findings. Electronically Signed: Siena Llamas MD  9/26/2024 11:48 AM EDT  Workstation ID: [id number]       [x]  EKG/Telemetry   ECG 12 Lead QT Measurement   Final Result   HEART PDZU=602  bpm   RR Wmqsrdtm=852  ms   NJ Interval=  ms   P Horizontal Axis=  deg   P Front Axis=  deg   QRSD Perfpdor=495  ms   QT Ogqudimb=441  ms   BDlX=136  ms   QRS Axis=147  deg   T Wave Axis=-8  deg   - ABNORMAL ECG -   Atrial fibrillation   Ventricular premature complex   RBBB and LPFB   ST depr, consider ischemia, anterolateral lds   When compared with ECG of 26-Sep-2024 11:05:26,   No significant change   Electronically Signed By: Irineo Alatorre ([facility]) 2024-09-27 09:45:33   Date and Time of Study:2024-09-27 07:24:49      ECG 12 Lead ED Triage Standing Order; Weak / Dizzy / AMS   Preliminary Result   HEART RATE=94  bpm   RR Guxhvbig=392  ms   NJ Interval=  ms   P Horizontal Axis=  deg   P Front Axis=  deg    QRSD Wgzdbjcf=244  ms   QT Qfuwsrzw=462  ms   OUgV=484  ms   QRS Axis=143  deg   T Wave Axis=-11  deg   - ABNORMAL ECG -   Atrial fibrillation   Paired ventricular premature complexes   RBBB and LPFB   Date and Time of Study:2024-09-26 11:05:26          [x]  Cardiology/Vascular   []  Pathology  [x]  Old records  []  Other:    Assessment & Plan   Assessment / Plan     Assessment/Plan:  Assessment:  Questionable right ankle Achilles tendon tear versus rupture  Reflux esophagitis  Colon polyps  Acute anemia  Stool guaiac positive  Peripheral edema  History of bioprosthetic AVR  Proteinuria  CAD  AICD in place  Nonischemic cardiomyopathy  Dyslipidemia  Essential hypertension  Permanent atrial fibrillation     Plan:  Labs and imaging reviewed  Start Protonix 40 mg p.o. twice daily for 14 days thereafter transition to 40 mg daily  Zofran and Compazine for nausea  Wean off oxygen   working on placement  Orthopedic consultation appreciated; WBAT, Boot  Hold aspirin for 48 hours then resume on 10/3/2024  Continue Cardizem 120 mg twice a day  Continue Lopressor 100 mg twice a day  Continue lisinopril 10 mg daily  Continue Lasix 40 mg IV twice daily to help with peripheral edema, continued  Consulted GI, recommendations appreciated  A.m. labs  Full code  DVT prophylax with SCDs in the setting of concern for GI bleed  Clinical course dictate further management  Discussed with nurse at the bedside  VTE Prophylaxis:  Mechanical VTE prophylaxis orders are present.        CODE STATUS:   Level Of Support Discussed With: Patient  Code Status (Patient has no pulse and is not breathing): CPR (Attempt to Resuscitate)  Medical Interventions (Patient has pulse or is breathing): Full Support        Electronically signed by Joseluis Diallo MD, 10/1/2024, 12:51 EDT.    Portions of this documentation were transcribed electronically from a voice recognition software.  I confirm all data accurately represents the service(s) I  performed at today's visit.

## 2024-10-02 LAB
ALBUMIN SERPL-MCNC: 2.6 G/DL (ref 3.5–5.2)
ALP SERPL-CCNC: 71 U/L (ref 39–117)
ALT SERPL W P-5'-P-CCNC: 12 U/L (ref 1–41)
ANION GAP SERPL CALCULATED.3IONS-SCNC: 9.5 MMOL/L (ref 5–15)
AST SERPL-CCNC: 15 U/L (ref 1–40)
BASOPHILS # BLD AUTO: 0.06 10*3/MM3 (ref 0–0.2)
BASOPHILS NFR BLD AUTO: 0.5 % (ref 0–1.5)
BILIRUB CONJ SERPL-MCNC: 0.2 MG/DL (ref 0–0.3)
BILIRUB INDIRECT SERPL-MCNC: 0.3 MG/DL
BILIRUB SERPL-MCNC: 0.5 MG/DL (ref 0–1.2)
BUN SERPL-MCNC: 28 MG/DL (ref 8–23)
BUN/CREAT SERPL: 36.4 (ref 7–25)
CALCIUM SPEC-SCNC: 8.7 MG/DL (ref 8.6–10.5)
CHLORIDE SERPL-SCNC: 92 MMOL/L (ref 98–107)
CO2 SERPL-SCNC: 33.5 MMOL/L (ref 22–29)
CREAT SERPL-MCNC: 0.77 MG/DL (ref 0.76–1.27)
CYTO UR: NORMAL
DEPRECATED RDW RBC AUTO: 51.3 FL (ref 37–54)
EGFRCR SERPLBLD CKD-EPI 2021: 94.5 ML/MIN/1.73
EOSINOPHIL # BLD AUTO: 0.56 10*3/MM3 (ref 0–0.4)
EOSINOPHIL NFR BLD AUTO: 4.2 % (ref 0.3–6.2)
ERYTHROCYTE [DISTWIDTH] IN BLOOD BY AUTOMATED COUNT: 15 % (ref 12.3–15.4)
GLUCOSE SERPL-MCNC: 123 MG/DL (ref 65–99)
HCT VFR BLD AUTO: 32.4 % (ref 37.5–51)
HGB BLD-MCNC: 10.3 G/DL (ref 13–17.7)
IMM GRANULOCYTES # BLD AUTO: 0.08 10*3/MM3 (ref 0–0.05)
IMM GRANULOCYTES NFR BLD AUTO: 0.6 % (ref 0–0.5)
LAB AP CASE REPORT: NORMAL
LAB AP CLINICAL INFORMATION: NORMAL
LYMPHOCYTES # BLD AUTO: 1.01 10*3/MM3 (ref 0.7–3.1)
LYMPHOCYTES NFR BLD AUTO: 7.6 % (ref 19.6–45.3)
MAGNESIUM SERPL-MCNC: 2.4 MG/DL (ref 1.6–2.4)
MCH RBC QN AUTO: 29.7 PG (ref 26.6–33)
MCHC RBC AUTO-ENTMCNC: 31.8 G/DL (ref 31.5–35.7)
MCV RBC AUTO: 93.4 FL (ref 79–97)
MONOCYTES # BLD AUTO: 1.02 10*3/MM3 (ref 0.1–0.9)
MONOCYTES NFR BLD AUTO: 7.7 % (ref 5–12)
NEUTROPHILS NFR BLD AUTO: 10.57 10*3/MM3 (ref 1.7–7)
NEUTROPHILS NFR BLD AUTO: 79.4 % (ref 42.7–76)
NRBC BLD AUTO-RTO: 0 /100 WBC (ref 0–0.2)
PATH REPORT.FINAL DX SPEC: NORMAL
PATH REPORT.GROSS SPEC: NORMAL
PHOSPHATE SERPL-MCNC: 4.2 MG/DL (ref 2.5–4.5)
PLATELET # BLD AUTO: 233 10*3/MM3 (ref 140–450)
PMV BLD AUTO: 9.7 FL (ref 6–12)
POTASSIUM SERPL-SCNC: 3 MMOL/L (ref 3.5–5.2)
PROT SERPL-MCNC: 6.3 G/DL (ref 6–8.5)
QT INTERVAL: 388 MS
QTC INTERVAL: 487 MS
RBC # BLD AUTO: 3.47 10*6/MM3 (ref 4.14–5.8)
SODIUM SERPL-SCNC: 135 MMOL/L (ref 136–145)
WBC NRBC COR # BLD AUTO: 13.3 10*3/MM3 (ref 3.4–10.8)

## 2024-10-02 PROCEDURE — 85025 COMPLETE CBC W/AUTO DIFF WBC: CPT | Performed by: FAMILY MEDICINE

## 2024-10-02 PROCEDURE — 83735 ASSAY OF MAGNESIUM: CPT | Performed by: FAMILY MEDICINE

## 2024-10-02 PROCEDURE — 80048 BASIC METABOLIC PNL TOTAL CA: CPT | Performed by: FAMILY MEDICINE

## 2024-10-02 PROCEDURE — 25010000002 POTASSIUM CHLORIDE 10 MEQ/100ML SOLUTION: Performed by: FAMILY MEDICINE

## 2024-10-02 PROCEDURE — 80076 HEPATIC FUNCTION PANEL: CPT | Performed by: FAMILY MEDICINE

## 2024-10-02 PROCEDURE — 84100 ASSAY OF PHOSPHORUS: CPT | Performed by: FAMILY MEDICINE

## 2024-10-02 PROCEDURE — 99232 SBSQ HOSP IP/OBS MODERATE 35: CPT | Performed by: FAMILY MEDICINE

## 2024-10-02 RX ORDER — POTASSIUM CHLORIDE 7.45 MG/ML
10 INJECTION INTRAVENOUS
Status: COMPLETED | OUTPATIENT
Start: 2024-10-02 | End: 2024-10-02

## 2024-10-02 RX ORDER — FUROSEMIDE 40 MG
40 TABLET ORAL DAILY
Status: DISCONTINUED | OUTPATIENT
Start: 2024-10-02 | End: 2024-10-03 | Stop reason: HOSPADM

## 2024-10-02 RX ORDER — POTASSIUM CHLORIDE 750 MG/1
40 CAPSULE, EXTENDED RELEASE ORAL ONCE
Status: COMPLETED | OUTPATIENT
Start: 2024-10-02 | End: 2024-10-02

## 2024-10-02 RX ADMIN — POTASSIUM CHLORIDE 40 MEQ: 750 CAPSULE, EXTENDED RELEASE ORAL at 08:29

## 2024-10-02 RX ADMIN — PANTOPRAZOLE SODIUM 40 MG: 40 TABLET, DELAYED RELEASE ORAL at 17:35

## 2024-10-02 RX ADMIN — DILTIAZEM HYDROCHLORIDE 120 MG: 60 TABLET, FILM COATED ORAL at 08:29

## 2024-10-02 RX ADMIN — POTASSIUM CHLORIDE 10 MEQ: 7.46 INJECTION, SOLUTION INTRAVENOUS at 12:44

## 2024-10-02 RX ADMIN — POTASSIUM CHLORIDE 10 MEQ: 7.46 INJECTION, SOLUTION INTRAVENOUS at 11:26

## 2024-10-02 RX ADMIN — METOPROLOL TARTRATE 100 MG: 50 TABLET, FILM COATED ORAL at 22:26

## 2024-10-02 RX ADMIN — LISINOPRIL 10 MG: 10 TABLET ORAL at 08:30

## 2024-10-02 RX ADMIN — SENNOSIDES AND DOCUSATE SODIUM 2 TABLET: 50; 8.6 TABLET ORAL at 22:26

## 2024-10-02 RX ADMIN — METOPROLOL TARTRATE 100 MG: 50 TABLET, FILM COATED ORAL at 08:29

## 2024-10-02 RX ADMIN — Medication 5 MG: at 22:26

## 2024-10-02 RX ADMIN — POTASSIUM CHLORIDE 10 MEQ: 7.46 INJECTION, SOLUTION INTRAVENOUS at 10:07

## 2024-10-02 RX ADMIN — Medication 10 ML: at 08:31

## 2024-10-02 RX ADMIN — PANTOPRAZOLE SODIUM 40 MG: 40 TABLET, DELAYED RELEASE ORAL at 08:33

## 2024-10-02 RX ADMIN — SIMETHICONE 80 MG: 80 TABLET, CHEWABLE ORAL at 12:47

## 2024-10-02 RX ADMIN — Medication 10 ML: at 22:26

## 2024-10-02 RX ADMIN — DILTIAZEM HYDROCHLORIDE 120 MG: 60 TABLET, FILM COATED ORAL at 22:26

## 2024-10-02 RX ADMIN — POTASSIUM CHLORIDE 10 MEQ: 7.46 INJECTION, SOLUTION INTRAVENOUS at 08:30

## 2024-10-02 RX ADMIN — FUROSEMIDE 40 MG: 40 TABLET ORAL at 08:30

## 2024-10-02 NOTE — PLAN OF CARE
Goal Outcome Evaluation:  Plan of Care Reviewed With: patient        Progress: improving  Outcome Evaluation: Patient much better today, no nausea so far. No c/o pain, vitals WNL on 2L oxygen. Still not out of bed, hopefully tomorrow he will be ready. Dispo pending placement.

## 2024-10-02 NOTE — PROGRESS NOTES
[facility]   Hospitalist Progress Note  Date: 10/2/2024  Patient Name: Woodrow Alejandro  : 1950  MRN: [id number]  Date of admission: 2024      Subjective   Subjective     Chief complaint: Pop in ankle, unable to stand    Summary:  73-year-old male with history of essential hypertension, permanent atrial fibrillation, history of bioprosthetic AVR, proteinuria, CAD, right upper lobe nodule, spleen lesion questionable cyst versus hemangioma, nonischemic cardiomyopathy with AICD in place, dyslipidemia, hospitalized after feeling a pop in his right ankle, has been on Levaquin for several days for a questionable cryptogenic infection according to him.  Questionable Achilles tear, orthopedics consulted, awaiting MRI coordination with [facility] to obtain MRI of the right ankle.  Found to be anemic, GI consulted, stool occult blood positive.  GI discussed colonoscopy with the patient, patient refused EGD and colonoscopy.  Repeat discussions patient, adamant about refusal for colonoscopy, changed his mind 2024 to pursue endoscopic evaluations of his colon.  Could not get MRI due to incompatibility of AICD, Ortho recommendation boot, weightbearing as tolerated, outpatient therapy.  Status post EGD and colonoscopy, colonoscopy, polyps removed, EGD showed LA grade C reflux esophagitis with bleeding, biopsied, with excessive gastric fluid, placed on Protonix twice daily for 2 weeks thereafter will transition to daily.    Interval follow-up: Seen and examined this morning, no acute distress, no acute major overnight events, nausea and oral intolerance has significantly improved with twice daily introduction of PPI.  Creatinine 0.77, BUN 28, bicarb 33, potassium 3.0, sodium 135, white blood cell count 13,000, improved from previous day, hemoglobin 10.3.  Tissue pathology exam showed tubular adenoma, tubulovillous adenoma, and squamous mucosa with changes consistent with reflux esophagitis.  With introductions  of PPI, reflux esophagitis should improve.  Still has difficulty mobilizing his foot.  Will need to to pursue placement.    Review of systems:  All systems reviewed negative several weakness, fatigue, right ankle weakness     Objective   Objective     Vitals:   Temp:  [97.3 °F (36.3 °C)-98.3 °F (36.8 °C)] 97.7 °F (36.5 °C)  Heart Rate:  [80-95] 83  Resp:  [18] 18  BP: (108-168)/(61-89) 108/61  Flow (L/min):  [2] 2  Physical Exam               Constitutional: Awake, alert, no acute distress              Eyes: Pupils equal, sclerae anicteric, no conjunctival injection              HENT: NCAT, mucous membranes moist              Neck: Supple, no thyromegaly, no lymphadenopathy, trachea midline              Respiratory: Clear to auscultation bilaterally, nonlabored respirations               Cardiovascular: RRR, no murmurs, rubs, or gallops, palpable pedal pulses bilaterally              Gastrointestinal: Positive bowel sounds, soft, nontender, nondistended              Musculoskeletal: Right lower extremity difficulty flexion and extension, distal extremity neurovascular              Psychiatric: Appropriate affect, cooperative              Neurologic: Oriented x 3, strength symmetric in all extremities, Cranial Nerves grossly intact to confrontation, speech clear              Skin: No rashes     Result Review    Result Review:  I have personally reviewed the pertinent results from the past 24 hours to 10/2/2024 11:54 EDT and agree with these findings:  [x]  Laboratory   CBC          9/29/2024    05:41 9/30/2024    05:48 10/2/2024    05:13   CBC   WBC 14.69  15.68  13.30    RBC 3.65  3.35  3.47    Hemoglobin 11.1  10.2  10.3    Hematocrit 34.6  31.1  32.4    MCV 94.8  92.8  93.4    MCH 30.4  30.4  29.7    MCHC 32.1  32.8  31.8    RDW 15.8  15.3  15.0    Platelets 181  183  233      BMP          9/29/2024    05:41 9/30/2024    05:48 10/2/2024    05:13   BMP   BUN 20  22  28    Creatinine 0.84  0.86  0.77    Sodium 131   133  135    Potassium 4.0  3.3  3.0    Chloride 94  94  92    CO2 23.3  26.5  33.5    Calcium 8.9  8.9  8.7      LIVER FUNCTION TESTS:      Lab 10/02/24  0513 09/30/24  0548 09/29/24  0541 09/26/24  1112   TOTAL PROTEIN 6.3 6.8 7.0 6.8   ALBUMIN 2.6* 3.0* 2.9* 3.2*   GLOBULIN  --   --   --  3.6   ALT (SGPT) 12 17 15 11   AST (SGOT) 15 22 21 16   BILIRUBIN 0.5 0.8 1.0 1.0   INDIRECT BILIRUBIN 0.3 0.5 0.6  --    BILIRUBIN DIRECT 0.2 0.3 0.4*  --    ALK PHOS 71 65 66 65       [x]  Microbiology   Microbiology Results (last 10 days)       ** No results found for the last 240 hours. **              [x]  Radiology CT Lower Extremity Right Without Contrast    Result Date: 9/26/2024  No acute fracture. No acute malalignment. Please see above comments for further detail. Portions of this note were completed with a voice recognition program. Electronically Signed: Colton Mahajan MD  9/26/2024 11:05 PM EDT  Workstation ID: [id number]    XR Ankle 3+ View Right    Result Date: 9/26/2024  Impression: No acute right ankle findings. Prominent plantar calcaneal spur. Electronically Signed: Siean Llamas MD  9/26/2024 11:55 AM EDT  Workstation ID: [id number]    XR Chest 1 View    Result Date: 9/26/2024  Impression: Stable moderate cardiomegaly. No acute chest findings. Electronically Signed: Siena Llamas MD  9/26/2024 11:48 AM EDT  Workstation ID: [id number]       [x]  EKG/Telemetry   ECG 12 Lead QT Measurement   Final Result   HEART USXJ=971  bpm   RR Vpyeuznz=160  ms   PA Interval=  ms   P Horizontal Axis=  deg   P Front Axis=  deg   QRSD Ybgjmrbv=013  ms   QT Euwltkcz=626  ms   LDuV=390  ms   QRS Axis=147  deg   T Wave Axis=-8  deg   - ABNORMAL ECG -   Atrial fibrillation   Ventricular premature complex   RBBB and LPFB   ST depr, consider ischemia, anterolateral lds   When compared with ECG of 26-Sep-2024 11:05:26,   No significant change   Electronically Signed By: Irineo Alatorre ([facility]) 2024-09-27 09:45:33   Date and Time of  Study:2024-09-27 07:24:49      ECG 12 Lead ED Triage Standing Order; Weak / Dizzy / AMS   Preliminary Result   HEART RATE=94  bpm   RR Tzkstxvg=713  ms   TN Interval=  ms   P Horizontal Axis=  deg   P Front Axis=  deg   QRSD Fxdmqjes=661  ms   QT Zgvszbfa=493  ms   DOxU=049  ms   QRS Axis=143  deg   T Wave Axis=-11  deg   - ABNORMAL ECG -   Atrial fibrillation   Paired ventricular premature complexes   RBBB and LPFB   Date and Time of Study:2024-09-26 11:05:26      Telemetry Scan   Final Result          [x]  Cardiology/Vascular   []  Pathology  [x]  Old records  []  Other:    Assessment & Plan   Assessment / Plan     Assessment/Plan:  Assessment:  Questionable right ankle Achilles tendon tear versus rupture  Reflux esophagitis  Colon polyps  Acute anemia  Stool guaiac positive  Peripheral edema  History of bioprosthetic AVR  Proteinuria  CAD  AICD in place  Nonischemic cardiomyopathy  Dyslipidemia  Essential hypertension  Permanent atrial fibrillation     Plan:  Labs and imaging reviewed  Out of bed to chair  Continue Protonix 40 mg p.o. twice daily for 14 days thereafter transition to 40 mg daily  Zofran and Compazine for nausea   working on placement  Orthopedic consultation appreciated; WBAT, Boot  Hold aspirin for 48 hours then resume on 10/3/2024  Continue Cardizem 120 mg twice a day  Continue Lopressor 100 mg twice a day  Continue lisinopril 10 mg daily  Continue Lasix 40 mg daily  Potassium replacement 10 mill equivalents x 4 runs IV with additional p.o. 40 mill equivalents  Consulted GI, recommendations appreciated  A.m. labs  Full code  DVT prophylax with SCDs in the setting of concern for GI bleed  Clinical course dictate further management  Discussed with nurse at the bedside  VTE Prophylaxis:  Mechanical VTE prophylaxis orders are present.        CODE STATUS:   Level Of Support Discussed With: Patient  Code Status (Patient has no pulse and is not breathing): CPR (Attempt to  Resuscitate)  Medical Interventions (Patient has pulse or is breathing): Full Support        Electronically signed by Joseluis Diallo MD, 10/2/2024, 11:54 EDT.    Portions of this documentation were transcribed electronically from a voice recognition software.  I confirm all data accurately represents the service(s) I performed at today's visit.

## 2024-10-02 NOTE — CONSULTS
[facility]   Consult Note    Patient Name: Woodrow Alejandro  : 1950  MRN: [id number]  Primary Care Physician:  Juan Perez MD  Referring Physician: Juan Perez, *  Date of admission: 2024    Consults  Subjective   Subjective     Reason for Consult/ Chief Complaint: Right ankle pain/Achilles    Weakness - Generalized      Woodrow Alejandro is a 73 y.o. male felt a pop in his Achilles area yesterday.  Does have a history of recent Levaquin use.  Was admitted for possible Achilles tendon or tear and further GI workup.  Denies any significant fall or injury.  Does have tenderness in this.  Pain does not seem to be excruciating.    Interval F/U: Doing better, more awake and alert, he can now dorsiflex and plantarflex his ankle, recommend PT/OT, continue boot    Review of Systems     Personal History     Past Medical History:   Diagnosis Date    Aortic valve replaced     Arthritis 2018    Atrial fibrillation     Coronary artery disease     Hypertension        Past Surgical History:   Procedure Laterality Date    CARDIAC SURGERY      COLONOSCOPY      COLONOSCOPY N/A 2024    Procedure: COLONOSCOPY WITH HOT/COLD SNARE POLYPECTOMIES, ELEVIEW INJECTION,CLIPX1;  Surgeon: Kurt Mensah MD;  Location: [facility] ENDOSCOPY;  Service: Gastroenterology;  Laterality: N/A;  COLON POLYPS    ENDOSCOPY N/A 2024    Procedure: ESOPHAGOGASTRODUODENOSCOPY WITH BIOPSIES;  Surgeon: Kurt Mensah MD;  Location: [facility] ENDOSCOPY;  Service: Gastroenterology;  Laterality: N/A;  RETAINED FOOD IN STOMACH AND REFLUX ESOPHAGITIS    PACEMAKER IMPLANTATION         Family History: His family history includes Arthritis in his father; COPD in his father and mother; Heart disease in his mother.     Social History: He  reports that he has never smoked. He has never been exposed to tobacco smoke. He has never used smokeless tobacco. He reports current alcohol use of about 4.0 standard  drinks of alcohol per week. He reports that he does not use drugs.    Home Medications:  aspirin, dilTIAZem, furosemide, levoFLOXacin, lisinopril, and metoprolol tartrate    Allergies:  He is allergic to diclofenac sodium.    Objective    Objective     Vitals:    Temp:  [97.3 °F (36.3 °C)-98.3 °F (36.8 °C)] 97.8 °F (36.6 °C)  Heart Rate:  [80-98] 80  Resp:  [18] 18  BP: (109-168)/(61-89) 109/79  Flow (L/min):  [1-2] 2    Physical Exam  Awake and alert, sitting in chair.  Some mild to moderate tenderness to palpation.  No significant full-thickness defect.  Sensations intact.  Result Review    Result Review:  I have personally reviewed the results from the time of this admission to 10/2/2024 07:00 EDT and agree with these findings:  [x]  Laboratory list / accordion  []  Microbiology  [x]  Radiology  []  EKG/Telemetry   []  Cardiology/Vascular   []  Pathology  []  Old records  []  Other:  Most notable findings include: Cannot get MRI secondary to pacemaker      Assessment & Plan   Assessment / Plan     Brief Patient Summary:  Woodrow Alejandro is a 73 y.o. male felt a pop in his ankle with resultant pain.  Difficulty getting around.  Ordered a CAT scan to evaluate integrity of his tendon.  This was read as a partial tear.    Active Hospital Problems:  Active Hospital Problems    Diagnosis     **Achilles tendon rupture     Anemia due to GI blood loss        Plan:   PT/OT, boot when out of bed, gentle range of motion, possible rehab for immobility    Carmen Zuniga MD

## 2024-10-03 ENCOUNTER — HOSPITAL ENCOUNTER (INPATIENT)
Facility: HOSPITAL | Age: 74
DRG: 947 | End: 2024-10-03
Attending: FAMILY MEDICINE | Admitting: INTERNAL MEDICINE
Payer: MEDICARE

## 2024-10-03 VITALS
RESPIRATION RATE: 18 BRPM | WEIGHT: 309.97 LBS | SYSTOLIC BLOOD PRESSURE: 118 MMHG | BODY MASS INDEX: 41.98 KG/M2 | HEIGHT: 72 IN | TEMPERATURE: 98.2 F | OXYGEN SATURATION: 94 % | DIASTOLIC BLOOD PRESSURE: 67 MMHG | HEART RATE: 84 BPM

## 2024-10-03 DIAGNOSIS — S86.019S RUPTURE OF ACHILLES TENDON, UNSPECIFIED LATERALITY, SEQUELA: ICD-10-CM

## 2024-10-03 DIAGNOSIS — M17.0 PRIMARY OSTEOARTHRITIS OF BOTH KNEES: ICD-10-CM

## 2024-10-03 DIAGNOSIS — R53.81 PHYSICAL DEBILITY: ICD-10-CM

## 2024-10-03 DIAGNOSIS — R53.1 WEAKNESS GENERALIZED: ICD-10-CM

## 2024-10-03 DIAGNOSIS — R26.2 DIFFICULTY WALKING: ICD-10-CM

## 2024-10-03 DIAGNOSIS — M17.0 BILATERAL PRIMARY OSTEOARTHRITIS OF KNEE: ICD-10-CM

## 2024-10-03 DIAGNOSIS — Z78.9 DECREASED ACTIVITIES OF DAILY LIVING (ADL): Primary | ICD-10-CM

## 2024-10-03 LAB
ALBUMIN SERPL-MCNC: 2.8 G/DL (ref 3.5–5.2)
ALP SERPL-CCNC: 60 U/L (ref 39–117)
ALT SERPL W P-5'-P-CCNC: 15 U/L (ref 1–41)
ANION GAP SERPL CALCULATED.3IONS-SCNC: 9.6 MMOL/L (ref 5–15)
AST SERPL-CCNC: 19 U/L (ref 1–40)
BASOPHILS # BLD AUTO: 0.06 10*3/MM3 (ref 0–0.2)
BASOPHILS NFR BLD AUTO: 0.4 % (ref 0–1.5)
BILIRUB CONJ SERPL-MCNC: 0.2 MG/DL (ref 0–0.3)
BILIRUB INDIRECT SERPL-MCNC: 0.3 MG/DL
BILIRUB SERPL-MCNC: 0.5 MG/DL (ref 0–1.2)
BUN SERPL-MCNC: 28 MG/DL (ref 8–23)
BUN/CREAT SERPL: 36.4 (ref 7–25)
CALCIUM SPEC-SCNC: 8.4 MG/DL (ref 8.6–10.5)
CHLORIDE SERPL-SCNC: 94 MMOL/L (ref 98–107)
CO2 SERPL-SCNC: 30.4 MMOL/L (ref 22–29)
CREAT SERPL-MCNC: 0.77 MG/DL (ref 0.76–1.27)
DEPRECATED RDW RBC AUTO: 51.1 FL (ref 37–54)
EGFRCR SERPLBLD CKD-EPI 2021: 94.5 ML/MIN/1.73
EOSINOPHIL # BLD AUTO: 0.55 10*3/MM3 (ref 0–0.4)
EOSINOPHIL NFR BLD AUTO: 4.1 % (ref 0.3–6.2)
ERYTHROCYTE [DISTWIDTH] IN BLOOD BY AUTOMATED COUNT: 15 % (ref 12.3–15.4)
FLUAV SUBTYP SPEC NAA+PROBE: NOT DETECTED
FLUBV RNA ISLT QL NAA+PROBE: NOT DETECTED
GLUCOSE SERPL-MCNC: 137 MG/DL (ref 65–99)
HCT VFR BLD AUTO: 32.2 % (ref 37.5–51)
HGB BLD-MCNC: 10.3 G/DL (ref 13–17.7)
IMM GRANULOCYTES # BLD AUTO: 0.08 10*3/MM3 (ref 0–0.05)
IMM GRANULOCYTES NFR BLD AUTO: 0.6 % (ref 0–0.5)
LYMPHOCYTES # BLD AUTO: 0.9 10*3/MM3 (ref 0.7–3.1)
LYMPHOCYTES NFR BLD AUTO: 6.7 % (ref 19.6–45.3)
MAGNESIUM SERPL-MCNC: 2.2 MG/DL (ref 1.6–2.4)
MCH RBC QN AUTO: 29.9 PG (ref 26.6–33)
MCHC RBC AUTO-ENTMCNC: 32 G/DL (ref 31.5–35.7)
MCV RBC AUTO: 93.3 FL (ref 79–97)
MONOCYTES # BLD AUTO: 0.97 10*3/MM3 (ref 0.1–0.9)
MONOCYTES NFR BLD AUTO: 7.3 % (ref 5–12)
NEUTROPHILS NFR BLD AUTO: 10.8 10*3/MM3 (ref 1.7–7)
NEUTROPHILS NFR BLD AUTO: 80.9 % (ref 42.7–76)
NRBC BLD AUTO-RTO: 0 /100 WBC (ref 0–0.2)
PHOSPHATE SERPL-MCNC: 3.6 MG/DL (ref 2.5–4.5)
PLATELET # BLD AUTO: 224 10*3/MM3 (ref 140–450)
PMV BLD AUTO: 9.2 FL (ref 6–12)
POTASSIUM SERPL-SCNC: 4 MMOL/L (ref 3.5–5.2)
PROCALCITONIN SERPL-MCNC: 0.09 NG/ML (ref 0–0.25)
PROT SERPL-MCNC: 6.1 G/DL (ref 6–8.5)
RBC # BLD AUTO: 3.45 10*6/MM3 (ref 4.14–5.8)
RSV RNA NPH QL NAA+NON-PROBE: NOT DETECTED
SARS-COV-2 RNA RESP QL NAA+PROBE: NOT DETECTED
SODIUM SERPL-SCNC: 134 MMOL/L (ref 136–145)
WBC NRBC COR # BLD AUTO: 13.36 10*3/MM3 (ref 3.4–10.8)

## 2024-10-03 PROCEDURE — 80076 HEPATIC FUNCTION PANEL: CPT | Performed by: FAMILY MEDICINE

## 2024-10-03 PROCEDURE — 97110 THERAPEUTIC EXERCISES: CPT

## 2024-10-03 PROCEDURE — 97116 GAIT TRAINING THERAPY: CPT

## 2024-10-03 PROCEDURE — 97530 THERAPEUTIC ACTIVITIES: CPT

## 2024-10-03 PROCEDURE — 99239 HOSP IP/OBS DSCHRG MGMT >30: CPT | Performed by: FAMILY MEDICINE

## 2024-10-03 PROCEDURE — 84100 ASSAY OF PHOSPHORUS: CPT | Performed by: FAMILY MEDICINE

## 2024-10-03 PROCEDURE — 84145 PROCALCITONIN (PCT): CPT | Performed by: FAMILY MEDICINE

## 2024-10-03 PROCEDURE — 80048 BASIC METABOLIC PNL TOTAL CA: CPT | Performed by: FAMILY MEDICINE

## 2024-10-03 PROCEDURE — 87637 SARSCOV2&INF A&B&RSV AMP PRB: CPT | Performed by: FAMILY MEDICINE

## 2024-10-03 PROCEDURE — 83735 ASSAY OF MAGNESIUM: CPT | Performed by: FAMILY MEDICINE

## 2024-10-03 PROCEDURE — 85025 COMPLETE CBC W/AUTO DIFF WBC: CPT | Performed by: FAMILY MEDICINE

## 2024-10-03 RX ORDER — SIMETHICONE 80 MG
80 TABLET,CHEWABLE ORAL 4 TIMES DAILY PRN
Status: CANCELLED | OUTPATIENT
Start: 2024-10-03

## 2024-10-03 RX ORDER — ACETAMINOPHEN 325 MG/1
650 TABLET ORAL EVERY 4 HOURS PRN
Status: CANCELLED | OUTPATIENT
Start: 2024-10-03

## 2024-10-03 RX ORDER — ONDANSETRON 4 MG/1
4 TABLET, ORALLY DISINTEGRATING ORAL EVERY 6 HOURS PRN
Status: ACTIVE | OUTPATIENT
Start: 2024-10-03

## 2024-10-03 RX ORDER — MICONAZOLE NITRATE 20 MG/G
1 CREAM TOPICAL EVERY 12 HOURS SCHEDULED
Status: CANCELLED | OUTPATIENT
Start: 2024-10-03

## 2024-10-03 RX ORDER — ONDANSETRON 2 MG/ML
4 INJECTION INTRAMUSCULAR; INTRAVENOUS EVERY 6 HOURS PRN
Status: CANCELLED | OUTPATIENT
Start: 2024-10-03

## 2024-10-03 RX ORDER — SODIUM CHLORIDE 0.9 % (FLUSH) 0.9 %
10 SYRINGE (ML) INJECTION AS NEEDED
Status: CANCELLED | OUTPATIENT
Start: 2024-10-03

## 2024-10-03 RX ORDER — ASPIRIN 325 MG
325 TABLET ORAL DAILY
Status: CANCELLED | OUTPATIENT
Start: 2024-10-04

## 2024-10-03 RX ORDER — OXYCODONE HYDROCHLORIDE 5 MG/1
5 TABLET ORAL EVERY 6 HOURS PRN
Status: ACTIVE | OUTPATIENT
Start: 2024-10-03 | End: 2024-10-07

## 2024-10-03 RX ORDER — MICONAZOLE NITRATE 20 MG/G
1 CREAM TOPICAL EVERY 12 HOURS SCHEDULED
Status: DISCONTINUED | OUTPATIENT
Start: 2024-10-03 | End: 2024-10-04

## 2024-10-03 RX ORDER — DILTIAZEM HCL 60 MG
120 TABLET ORAL EVERY 12 HOURS SCHEDULED
Status: CANCELLED | OUTPATIENT
Start: 2024-10-03

## 2024-10-03 RX ORDER — SODIUM CHLORIDE 0.9 % (FLUSH) 0.9 %
10 SYRINGE (ML) INJECTION AS NEEDED
Status: ACTIVE | OUTPATIENT
Start: 2024-10-03

## 2024-10-03 RX ORDER — SODIUM CHLORIDE 9 MG/ML
40 INJECTION, SOLUTION INTRAVENOUS AS NEEDED
Status: ACTIVE | OUTPATIENT
Start: 2024-10-03

## 2024-10-03 RX ORDER — ONDANSETRON 2 MG/ML
4 INJECTION INTRAMUSCULAR; INTRAVENOUS EVERY 6 HOURS PRN
Status: ACTIVE | OUTPATIENT
Start: 2024-10-03

## 2024-10-03 RX ORDER — FUROSEMIDE 40 MG
40 TABLET ORAL DAILY
Status: CANCELLED | OUTPATIENT
Start: 2024-10-04

## 2024-10-03 RX ORDER — LISINOPRIL 10 MG/1
10 TABLET ORAL DAILY
Status: CANCELLED | OUTPATIENT
Start: 2024-10-04

## 2024-10-03 RX ORDER — METOPROLOL TARTRATE 50 MG
100 TABLET ORAL 2 TIMES DAILY
Status: DISPENSED | OUTPATIENT
Start: 2024-10-03

## 2024-10-03 RX ORDER — SODIUM CHLORIDE 0.9 % (FLUSH) 0.9 %
10 SYRINGE (ML) INJECTION EVERY 12 HOURS SCHEDULED
Status: ACTIVE | OUTPATIENT
Start: 2024-10-03

## 2024-10-03 RX ORDER — FUROSEMIDE 40 MG
40 TABLET ORAL DAILY
Status: DISCONTINUED | OUTPATIENT
Start: 2024-10-04 | End: 2024-10-14

## 2024-10-03 RX ORDER — SODIUM CHLORIDE 9 MG/ML
40 INJECTION, SOLUTION INTRAVENOUS AS NEEDED
Status: CANCELLED | OUTPATIENT
Start: 2024-10-03

## 2024-10-03 RX ORDER — SODIUM CHLORIDE 0.9 % (FLUSH) 0.9 %
10 SYRINGE (ML) INJECTION EVERY 12 HOURS SCHEDULED
Status: CANCELLED | OUTPATIENT
Start: 2024-10-03

## 2024-10-03 RX ORDER — POLYETHYLENE GLYCOL 3350 17 G/17G
17 POWDER, FOR SOLUTION ORAL DAILY PRN
Status: CANCELLED | OUTPATIENT
Start: 2024-10-03

## 2024-10-03 RX ORDER — ACETAMINOPHEN 650 MG/1
650 SUPPOSITORY RECTAL EVERY 4 HOURS PRN
Status: CANCELLED | OUTPATIENT
Start: 2024-10-03

## 2024-10-03 RX ORDER — PANTOPRAZOLE SODIUM 40 MG/1
40 TABLET, DELAYED RELEASE ORAL
Status: CANCELLED | OUTPATIENT
Start: 2024-10-03

## 2024-10-03 RX ORDER — PROCHLORPERAZINE EDISYLATE 5 MG/ML
5 INJECTION INTRAMUSCULAR; INTRAVENOUS EVERY 6 HOURS PRN
Status: ACTIVE | OUTPATIENT
Start: 2024-10-03 | End: 2024-10-17

## 2024-10-03 RX ORDER — DILTIAZEM HYDROCHLORIDE 30 MG/1
120 TABLET, FILM COATED ORAL EVERY 12 HOURS SCHEDULED
Status: DISPENSED | OUTPATIENT
Start: 2024-10-03

## 2024-10-03 RX ORDER — ONDANSETRON 4 MG/1
4 TABLET, ORALLY DISINTEGRATING ORAL EVERY 6 HOURS PRN
Status: CANCELLED | OUTPATIENT
Start: 2024-10-03

## 2024-10-03 RX ORDER — PROCHLORPERAZINE EDISYLATE 5 MG/ML
5 INJECTION INTRAMUSCULAR; INTRAVENOUS EVERY 6 HOURS PRN
Status: CANCELLED | OUTPATIENT
Start: 2024-10-03

## 2024-10-03 RX ORDER — ACETAMINOPHEN 160 MG/5ML
650 SOLUTION ORAL EVERY 4 HOURS PRN
Status: CANCELLED | OUTPATIENT
Start: 2024-10-03

## 2024-10-03 RX ORDER — POLYETHYLENE GLYCOL 3350 17 G/17G
17 POWDER, FOR SOLUTION ORAL DAILY PRN
Status: ACTIVE | OUTPATIENT
Start: 2024-10-03

## 2024-10-03 RX ORDER — ACETAMINOPHEN 650 MG/1
650 SUPPOSITORY RECTAL EVERY 4 HOURS PRN
Status: ACTIVE | OUTPATIENT
Start: 2024-10-03

## 2024-10-03 RX ORDER — ACETAMINOPHEN 325 MG/1
650 TABLET ORAL EVERY 4 HOURS PRN
Status: DISPENSED | OUTPATIENT
Start: 2024-10-03

## 2024-10-03 RX ORDER — AMOXICILLIN 250 MG
2 CAPSULE ORAL 2 TIMES DAILY
Status: CANCELLED | OUTPATIENT
Start: 2024-10-03

## 2024-10-03 RX ORDER — PANTOPRAZOLE SODIUM 40 MG/1
40 TABLET, DELAYED RELEASE ORAL
Status: DISPENSED | OUTPATIENT
Start: 2024-10-03

## 2024-10-03 RX ORDER — LISINOPRIL 2.5 MG/1
10 TABLET ORAL DAILY
Status: DISPENSED | OUTPATIENT
Start: 2024-10-04

## 2024-10-03 RX ORDER — ASPIRIN 325 MG
325 TABLET ORAL DAILY
Status: DISPENSED | OUTPATIENT
Start: 2024-10-04

## 2024-10-03 RX ORDER — MICONAZOLE NITRATE 20 MG/G
1 CREAM TOPICAL EVERY 12 HOURS SCHEDULED
Status: DISCONTINUED | OUTPATIENT
Start: 2024-10-03 | End: 2024-10-03 | Stop reason: HOSPADM

## 2024-10-03 RX ORDER — METOPROLOL TARTRATE 50 MG
100 TABLET ORAL 2 TIMES DAILY
Status: CANCELLED | OUTPATIENT
Start: 2024-10-03

## 2024-10-03 RX ORDER — BISACODYL 5 MG/1
5 TABLET, DELAYED RELEASE ORAL DAILY PRN
Status: ACTIVE | OUTPATIENT
Start: 2024-10-03

## 2024-10-03 RX ORDER — BISACODYL 10 MG
10 SUPPOSITORY, RECTAL RECTAL DAILY PRN
Status: CANCELLED | OUTPATIENT
Start: 2024-10-03

## 2024-10-03 RX ORDER — BISACODYL 5 MG/1
5 TABLET, DELAYED RELEASE ORAL DAILY PRN
Status: CANCELLED | OUTPATIENT
Start: 2024-10-03

## 2024-10-03 RX ORDER — SIMETHICONE 80 MG
80 TABLET,CHEWABLE ORAL 4 TIMES DAILY PRN
Status: ACTIVE | OUTPATIENT
Start: 2024-10-03

## 2024-10-03 RX ORDER — BISACODYL 10 MG
10 SUPPOSITORY, RECTAL RECTAL DAILY PRN
Status: ACTIVE | OUTPATIENT
Start: 2024-10-03

## 2024-10-03 RX ORDER — ACETAMINOPHEN 160 MG/5ML
650 SOLUTION ORAL EVERY 4 HOURS PRN
Status: ACTIVE | OUTPATIENT
Start: 2024-10-03

## 2024-10-03 RX ORDER — AMOXICILLIN 250 MG
2 CAPSULE ORAL 2 TIMES DAILY
Status: DISPENSED | OUTPATIENT
Start: 2024-10-03

## 2024-10-03 RX ORDER — OXYCODONE HYDROCHLORIDE 5 MG/1
5 TABLET ORAL EVERY 6 HOURS PRN
Status: CANCELLED | OUTPATIENT
Start: 2024-10-03 | End: 2024-10-06

## 2024-10-03 RX ADMIN — DILTIAZEM HYDROCHLORIDE 120 MG: 30 TABLET, FILM COATED ORAL at 22:46

## 2024-10-03 RX ADMIN — Medication 5 MG: at 22:48

## 2024-10-03 RX ADMIN — DILTIAZEM HYDROCHLORIDE 120 MG: 60 TABLET, FILM COATED ORAL at 09:32

## 2024-10-03 RX ADMIN — SENNOSIDES AND DOCUSATE SODIUM 2 TABLET: 50; 8.6 TABLET ORAL at 22:47

## 2024-10-03 RX ADMIN — TUBERCULIN PURIFIED PROTEIN DERIVATIVE 5 UNITS: 5 INJECTION, SOLUTION INTRADERMAL at 22:48

## 2024-10-03 RX ADMIN — METOPROLOL TARTRATE 100 MG: 50 TABLET, FILM COATED ORAL at 09:07

## 2024-10-03 RX ADMIN — PANTOPRAZOLE SODIUM 40 MG: 40 TABLET, DELAYED RELEASE ORAL at 17:14

## 2024-10-03 RX ADMIN — MICONAZOLE NITRATE 1 APPLICATION: 2 CREAM TOPICAL at 09:32

## 2024-10-03 RX ADMIN — PANTOPRAZOLE SODIUM 40 MG: 40 TABLET, DELAYED RELEASE ORAL at 09:07

## 2024-10-03 RX ADMIN — LISINOPRIL 10 MG: 10 TABLET ORAL at 09:07

## 2024-10-03 RX ADMIN — Medication 10 ML: at 09:08

## 2024-10-03 RX ADMIN — FUROSEMIDE 40 MG: 40 TABLET ORAL at 09:07

## 2024-10-03 RX ADMIN — METOPROLOL TARTRATE 100 MG: 50 TABLET, FILM COATED ORAL at 22:48

## 2024-10-03 NOTE — PLAN OF CARE
Problem: Adult Inpatient Plan of Care  Goal: Plan of Care Review  Outcome: Progressing  Flowsheets (Taken 10/3/2024 0609)  Progress: improving  Plan of Care Reviewed With: patient  Goal: Patient-Specific Goal (Individualized)  Outcome: Progressing  Goal: Absence of Hospital-Acquired Illness or Injury  Outcome: Progressing  Intervention: Identify and Manage Fall Risk  Recent Flowsheet Documentation  Taken 10/3/2024 0504 by Ritika Miller LPN  Safety Promotion/Fall Prevention: safety round/check completed  Taken 10/3/2024 0318 by Ritika Miller LPN  Safety Promotion/Fall Prevention: safety round/check completed  Taken 10/3/2024 0106 by Ritika Miller LPN  Safety Promotion/Fall Prevention: safety round/check completed  Taken 10/2/2024 2304 by Ritika Miller LPN  Safety Promotion/Fall Prevention: safety round/check completed  Taken 10/2/2024 2121 by Ritika Miller LPN  Safety Promotion/Fall Prevention: safety round/check completed  Taken 10/2/2024 1956 by Ritika Miller LPN  Safety Promotion/Fall Prevention: safety round/check completed  Intervention: Prevent Skin Injury  Recent Flowsheet Documentation  Taken 10/3/2024 0106 by Ritika Miller LPN  Skin Protection:   adhesive use limited   incontinence pads utilized  Intervention: Prevent and Manage VTE (Venous Thromboembolism) Risk  Recent Flowsheet Documentation  Taken 10/3/2024 0106 by Ritika Miller LPN  Range of Motion: active ROM (range of motion) encouraged  Intervention: Prevent Infection  Recent Flowsheet Documentation  Taken 10/3/2024 0106 by Ritika Miller LPN  Infection Prevention:   equipment surfaces disinfected   hand hygiene promoted  Goal: Optimal Comfort and Wellbeing  Outcome: Progressing  Intervention: Provide Person-Centered Care  Recent Flowsheet Documentation  Taken 10/3/2024 0106 by Ritika Miller LPN  Trust Relationship/Rapport:   care explained   choices provided   questions answered   reassurance provided   thoughts/feelings  acknowledged  Goal: Readiness for Transition of Care  Outcome: Progressing   Goal Outcome Evaluation:  Plan of Care Reviewed With: patient        Progress: improving       Vitals stable. No complaints of pain. Rested well throughout the night. Remains WDL on 2L NC. Will continue to monitor progress.

## 2024-10-03 NOTE — THERAPY TREATMENT NOTE
Patient Name: Woodrow Alejandro  : 1950    MRN: [id number]                              Today's Date: 10/3/2024       Admit Date: 2024    Visit Dx:     ICD-10-CM ICD-9-CM   1. Rupture of right Achilles tendon, initial encounter  S86.011A 845.09   2. Generalized weakness  R53.1 780.79   3. Difficulty in walking  R26.2 719.7   4. Anemia due to GI blood loss  D50.0 280.0     Patient Active Problem List   Diagnosis    Essential hypertension    Permanent atrial fibrillation    S/P AVR    ICD (implantable cardioverter-defibrillator), dual, in situ    Dermatitis    IFG (impaired fasting glucose)    Mixed hyperlipidemia    Vitamin D deficiency    Medicare annual wellness visit, subsequent    Primary osteoarthritis of both knees    Screening PSA (prostate specific antigen)    Bilateral primary osteoarthritis of knee    Coarse tremors    Weakness generalized    Increased urinary frequency    Bandemia    Achilles tendon rupture    Anemia due to GI blood loss     Past Medical History:   Diagnosis Date    Aortic valve replaced     Arthritis 2018    Atrial fibrillation     Coronary artery disease     Hypertension      Past Surgical History:   Procedure Laterality Date    CARDIAC SURGERY      COLONOSCOPY      COLONOSCOPY N/A 2024    Procedure: COLONOSCOPY WITH HOT/COLD SNARE POLYPECTOMIES, ELEVIEW INJECTION,CLIPX1;  Surgeon: Kurt Mensah MD;  Location: [facility] ENDOSCOPY;  Service: Gastroenterology;  Laterality: N/A;  COLON POLYPS    ENDOSCOPY N/A 2024    Procedure: ESOPHAGOGASTRODUODENOSCOPY WITH BIOPSIES;  Surgeon: Kurt Mensah MD;  Location: [facility] ENDOSCOPY;  Service: Gastroenterology;  Laterality: N/A;  RETAINED FOOD IN STOMACH AND REFLUX ESOPHAGITIS    PACEMAKER IMPLANTATION        General Information       Row Name 10/03/24 0937          OT Time and Intention    Document Type therapy note (daily note)  -PG     Mode of Treatment individual therapy;occupational therapy  -PG                User Key  (r) = Recorded By, (t) = Taken By, (c) = Cosigned By      Initials Name Provider Type    PG Kenrick Perez OT Occupational Therapist                     Mobility/ADL's       Row Name 10/03/24 0938          Transfers    Transfers bed-chair transfer  -PG       Row Name 10/03/24 0938          Bed-Chair Transfer    Bed-Chair Runnels (Transfers) contact guard;minimum assist (75% patient effort);verbal cues  -PG     Assistive Device (Bed-Chair Transfers) walker, standard  -PG     Comment, (Bed-Chair Transfer) Patient walked 20 feet in his room contact-guard assist and standard walker  -PG               User Key  (r) = Recorded By, (t) = Taken By, (c) = Cosigned By      Initials Name Provider Type    PG Kenrick Perez OT Occupational Therapist                   Obj/Interventions       Row Name 10/03/24 0938          Shoulder (Therapeutic Exercise)    Shoulder (Therapeutic Exercise) strengthening exercise  -PG     Shoulder Strengthening (Therapeutic Exercise) 15 repititions;green;resistance band  -PG       Row Name 10/03/24 0938          Elbow/Forearm (Therapeutic Exercise)    Elbow/Forearm (Therapeutic Exercise) strengthening exercise  -PG     Elbow/Forearm Strengthening (Therapeutic Exercise) 15 repititions;resistance band;green  -PG       Row Name 10/03/24 0938          Motor Skills    Therapeutic Exercise elbow/forearm;shoulder  -PG               User Key  (r) = Recorded By, (t) = Taken By, (c) = Cosigned By      Initials Name Provider Type    PG Kenrick Perez OT Occupational Therapist                   Goals/Plan    No documentation.                  Clinical Impression       Row Name 10/03/24 0939          Plan of Care Review    Progress improving  -PG     Outcome Evaluation Patient improving in therapy.  Patient completed chair transfer with contact-guard/minimal assist and was able to walk to the door using a standard walker and contact-guard assistance.  Patient participated in upper body  strengthening exercises with Thera-Band to improve ADL performance.  Recommend inpatient rehab before returning home  -PG               User Key  (r) = Recorded By, (t) = Taken By, (c) = Cosigned By      Initials Name Provider Type    PG Kenrick Perez OT Occupational Therapist                   Outcome Measures       Row Name 10/03/24 0940          How much help from another is currently needed...    Putting on and taking off regular lower body clothing? 1  -PG     Bathing (including washing, rinsing, and drying) 2  -PG     Toileting (which includes using toilet bed pan or urinal) 1  -PG     Putting on and taking off regular upper body clothing 3  -PG     Taking care of personal grooming (such as brushing teeth) 4  -PG     Eating meals 4  -PG     AM-PAC 6 Clicks Score (OT) 15  -PG       Row Name 10/03/24 0106          How much help from another person do you currently need...    Turning from your back to your side while in flat bed without using bedrails? 3  -MF     Moving from lying on back to sitting on the side of a flat bed without bedrails? 2  -MF     Moving to and from a bed to a chair (including a wheelchair)? 1  -MF     Standing up from a chair using your arms (e.g., wheelchair, bedside chair)? 1  -MF     Climbing 3-5 steps with a railing? 1  -MF     To walk in hospital room? 1  -MF     AM-PAC 6 Clicks Score (PT) 9  -MF     Highest Level of Mobility Goal 3 --> Sit at edge of bed  -MF       Row Name 10/03/24 0940          Functional Assessment    Outcome Measure Options Optimal Instrument;AM-PAC 6 Clicks Daily Activity (OT)  -PG       Row Name 10/03/24 0940          Optimal Instrument    Optimal Instrument Optimal - 3  -PG     Bending/Stooping 4  -PG     Standing 2  -PG     Reaching 2  -PG               User Key  (r) = Recorded By, (t) = Taken By, (c) = Cosigned By      Initials Name Provider Type    PG Kenrick Perez OT Occupational Therapist    Ritika Fletcher LPN Licensed Nurse                      OT  Recommendation and Plan     Plan of Care Review  Progress: improving  Outcome Evaluation: Patient improving in therapy.  Patient completed chair transfer with contact-guard/minimal assist and was able to walk to the door using a standard walker and contact-guard assistance.  Patient participated in upper body strengthening exercises with Thera-Band to improve ADL performance.  Recommend inpatient rehab before returning home     Time Calculation:         Time Calculation- OT       Row Name 10/03/24 0941             Time Calculation- OT    OT Received On 10/03/24  -PG      OT Goal Re-Cert Due Date 10/06/24  -PG         Timed Charges    89224 - OT Therapeutic Exercise Minutes 8  -PG      81092 - OT Therapeutic Activity Minutes 15  -PG         Total Minutes    Timed Charges Total Minutes 23  -PG       Total Minutes 23  -PG                User Key  (r) = Recorded By, (t) = Taken By, (c) = Cosigned By      Initials Name Provider Type    PG Kenrick Perez OT Occupational Therapist                  Therapy Charges for Today       Code Description Service Date Service Provider Modifiers Qty    [id number]  OT THER PROC EA 15 MIN 10/3/2024 Kenrick Perez OT GO 1    [id number]  OT THERAPEUTIC ACT EA 15 MIN 10/3/2024 Kenrick Perez OT GO 1                 Kenrick Perez OT  10/3/2024

## 2024-10-03 NOTE — THERAPY TREATMENT NOTE
Acute Care - Physical Therapy Treatment Note   Yudy     Patient Name: Woodrow Alejandro  : 1950  MRN: [id number]  Today's Date: 10/3/2024      Visit Dx:     ICD-10-CM ICD-9-CM   1. Rupture of right Achilles tendon, initial encounter  S86.011A 845.09   2. Generalized weakness  R53.1 780.79   3. Difficulty in walking  R26.2 719.7   4. Anemia due to GI blood loss  D50.0 280.0     Patient Active Problem List   Diagnosis    Essential hypertension    Permanent atrial fibrillation    S/P AVR    ICD (implantable cardioverter-defibrillator), dual, in situ    Dermatitis    IFG (impaired fasting glucose)    Mixed hyperlipidemia    Vitamin D deficiency    Medicare annual wellness visit, subsequent    Primary osteoarthritis of both knees    Screening PSA (prostate specific antigen)    Bilateral primary osteoarthritis of knee    Coarse tremors    Weakness generalized    Increased urinary frequency    Bandemia    Achilles tendon rupture    Anemia due to GI blood loss     Past Medical History:   Diagnosis Date    Aortic valve replaced     Arthritis 2018    Atrial fibrillation     Coronary artery disease     Hypertension      Past Surgical History:   Procedure Laterality Date    CARDIAC SURGERY      COLONOSCOPY      COLONOSCOPY N/A 2024    Procedure: COLONOSCOPY WITH HOT/COLD SNARE POLYPECTOMIES, ELEVIEW INJECTION,CLIPX1;  Surgeon: Kurt Mensah MD;  Location: [facility] ENDOSCOPY;  Service: Gastroenterology;  Laterality: N/A;  COLON POLYPS    ENDOSCOPY N/A 2024    Procedure: ESOPHAGOGASTRODUODENOSCOPY WITH BIOPSIES;  Surgeon: Kurt Mensah MD;  Location: [facility] ENDOSCOPY;  Service: Gastroenterology;  Laterality: N/A;  RETAINED FOOD IN STOMACH AND REFLUX ESOPHAGITIS    PACEMAKER IMPLANTATION       PT Assessment (Last 12 Hours)       PT Evaluation and Treatment       Row Name 10/03/24 1368          Physical Therapy Time and Intention    Subjective Information complains of;weakness;fatigue;pain   -DK     Document Type therapy note (daily note)  -DK     Mode of Treatment individual therapy;physical therapy  -DK     Patient Effort good  -DK     Symptoms Noted During/After Treatment fatigue;increased pain;shortness of breath  -DK     Comment Pt is very talkative, tends to stop performing activities / exercises when talking, requiring frequent cues to continue with tasks.  Pt requires assistance donning / doffing the CAM boot.  -       Row Name 10/03/24 1108          Pain    Pretreatment Pain Rating 5/10  -DK     Posttreatment Pain Rating 5/10  -DK     Pain Location - Side/Orientation Right  -DK     Pain Location generalized  -DK     Pain Location - foot  -DK     Pain Intervention(s) Repositioned;Ambulation/increased activity;Distraction;Therapeutic presence  -       Row Name 10/03/24 1108          Cognition    Affect/Mental Status (Cognition) confabulatory;WFL  -DK     Orientation Status (Cognition) oriented x 4  -DK     Follows Commands (Cognition) WFL  -DK     Cognitive Function WFL  -DK     Personal Safety Interventions gait belt;nonskid shoes/slippers when out of bed;supervised activity  -       Row Name 10/03/24 1108          Mobility    Extremity Weight-bearing Status right lower extremity  -DK     Right Lower Extremity (Weight-bearing Status) weight-bearing as tolerated (WBAT)  with CAM boot  -       Row Name 10/03/24 1108          Transfers    Transfers sit-stand transfer;stand-sit transfer  -       Row Name 10/03/24 1108          Sit-Stand Transfer    Sit-Stand Lillie (Transfers) contact guard;minimum assist (75% patient effort);1 person assist  -     Assistive Device (Sit-Stand Transfers) walker, front-wheeled  -       Row Name 10/03/24 1108          Stand-Sit Transfer    Stand-Sit Lillie (Transfers) contact guard;1 person assist  -     Assistive Device (Stand-Sit Transfers) walker, front-wheeled  -       Row Name 10/03/24 1108          Gait/Stairs (Locomotion)     Gait/Stairs Locomotion gait/ambulation independence;gait/ambulation assistive device;distance ambulated;gait pattern  -DK     [place] Level (Gait) contact guard;1 person assist  -DK     Assistive Device (Gait) walker, front-wheeled  -DK     Distance in Feet (Gait) 40  -DK     Pattern (Gait) step-to  -DK     Deviations/Abnormal Patterns (Gait) tapan decreased;festinating/shuffling;gait speed decreased;stride length decreased  -DK     Bilateral Gait Deviations forward flexed posture  -DK     Comment, (Gait/Stairs) Pt ambulated on room air with a rolling walker and a CAM boot on the right foot.  He returned to the recliner on alert post treatment.  -DK       Row Name 10/03/24 1108          Safety Issues, Functional Mobility    Safety Issues Affecting Function (Mobility) ability to follow commands;awareness of need for assistance;impulsivity;judgment;safety precaution awareness  -DK     Impairments Affecting Function (Mobility) balance;endurance/activity tolerance;pain;range of motion (ROM);strength  -DK       Row Name 10/03/24 1108          Balance    Balance Assessment sitting static balance;sitting dynamic balance;standing static balance;standing dynamic balance  -DK     Static Sitting Balance standby assist  -DK     Dynamic Sitting Balance standby assist  -DK     Position, Sitting Balance unsupported;sitting in chair  -DK     Static Standing Balance standby assist;contact guard;1-person assist  -DK     Dynamic Standing Balance contact guard;1-person assist  -DK     Position/Device Used, Standing Balance walker, front-wheeled  -DK     Balance Interventions standing;dynamic;tandem gait  -DK       Row Name 10/03/24 1108          Motor Skills    Motor Skills --  therapeutic exercises  -DK     Coordination WFL  -DK     Therapeutic Exercise hip;knee;ankle  -DK     Additional Documentation --  Pt requires frequent cues to stay focused on tasks, as he gets distracted while conversing.  -DK       Row Name 10/03/24  1108          Hip (Therapeutic Exercise)    Hip (Therapeutic Exercise) AROM (active range of motion)  -DK     Hip AROM (Therapeutic Exercise) bilateral;flexion;extension;aBduction;aDduction;sitting;20 repititions  -DK       Row Name 10/03/24 1108          Knee (Therapeutic Exercise)    Knee (Therapeutic Exercise) AROM (active range of motion)  -DK     Knee AROM (Therapeutic Exercise) bilateral;flexion;extension;LAQ (long arc quad);sitting;20 repititions  -       Row Name 10/03/24 1108          Ankle (Therapeutic Exercise)    Ankle (Therapeutic Exercise) AROM (active range of motion)  -DK     Ankle AROM (Therapeutic Exercise) bilateral;dorsiflexion;plantarflexion;sitting;20 repititions  -       Row Name 10/03/24 1108          Plan of Care Review    Plan of Care Reviewed With patient  -DK     Progress improving  -       Row Name 10/03/24 1108          Positioning and Restraints    Pre-Treatment Position sitting in chair/recliner  -DK     Post Treatment Position chair  -DK     In Chair reclined;call light within reach;encouraged to call for assist;exit alarm on;legs elevated  -       Row Name 10/03/24 1108          Therapy Assessment/Plan (PT)    Rehab Potential (PT) good, to achieve stated therapy goals  -DK     Criteria for Skilled Interventions Met (PT) skilled treatment is necessary  -DK     Therapy Frequency (PT) daily  -DK     Problem List (PT) problems related to;balance;mobility;strength;range of motion (ROM);pain  -DK     Activity Limitations Related to Problem List (PT) unable to ambulate safely;unable to transfer safely  -       Row Name 10/03/24 1108          Progress Summary (PT)    Progress Toward Functional Goals (PT) progress toward functional goals is good  -               User Key  (r) = Recorded By, (t) = Taken By, (c) = Cosigned By      Initials Name Provider Type    Melody Kwong PTA Physical Therapist Assistant                    Physical Therapy Education       Title: PT OT SLP  Therapies (Done)       Topic: Physical Therapy (Done)       Point: Mobility training (Done)       Learning Progress Summary             Patient Acceptance, E, VU by SC at 9/27/2024 1334    Acceptance, E,TB, VU by  at 9/27/2024 1159                         Point: Home exercise program (Done)       Learning Progress Summary             Patient Acceptance, E, VU by SC at 9/27/2024 1334    Acceptance, E,TB, VU by  at 9/27/2024 1159                         Point: Body mechanics (Done)       Learning Progress Summary             Patient Acceptance, E, VU by SC at 9/27/2024 1334    Acceptance, E,TB, VU by  at 9/27/2024 1159                         Point: Precautions (Done)       Learning Progress Summary             Patient Acceptance, E, VU by SC at 9/27/2024 1334    Acceptance, E,TB, VU by  at 9/27/2024 1159                                         User Key       Initials Effective Dates Name Provider Type Discipline    SC 02/05/24 -  Rosanna Corrales, OT Occupational Therapist OT     08/27/24 -  Dori Renee, PT Student PT Student PT                  PT Recommendation and Plan  Planned Therapy Interventions (PT): balance training, bed mobility training, gait training, home exercise program, strengthening, transfer training  Therapy Frequency (PT): daily  Progress Summary (PT)  Progress Toward Functional Goals (PT): progress toward functional goals is good  Plan of Care Reviewed With: patient  Progress: improving   Outcome Measures       Row Name 10/03/24 1107             How much help from another person do you currently need...    Turning from your back to your side while in flat bed without using bedrails? 3  -DK      Moving from lying on back to sitting on the side of a flat bed without bedrails? 2  -DK      Moving to and from a bed to a chair (including a wheelchair)? 3  -DK      Standing up from a chair using your arms (e.g., wheelchair, bedside chair)? 3  -DK      Climbing 3-5 steps with a railing? 2  -DK       To walk in hospital room? 3  -DK      AM-PAC 6 Clicks Score (PT) 16  -DK      Highest Level of Mobility Goal 5 --> Static standing  -DK         Functional Assessment    Outcome Measure Options AM-PAC 6 Clicks Basic Mobility (PT)  -DK                User Key  (r) = Recorded By, (t) = Taken By, (c) = Cosigned By      Initials Name Provider Type    Melody Kwong PTA Physical Therapist Assistant                     Time Calculation:    PT Charges       Row Name 10/03/24 1114             Time Calculation    PT Received On 10/03/24  -DK      PT Goal Re-Cert Due Date 10/06/24  -DK         Timed Charges    32847 - PT Therapeutic Exercise Minutes 18  -DK      73376 - Gait Training Minutes  8  -DK      07881 - PT Therapeutic Activity Minutes 18  -DK         Total Minutes    Timed Charges Total Minutes 44  -DK       Total Minutes 44  -DK                User Key  (r) = Recorded By, (t) = Taken By, (c) = Cosigned By      Initials Name Provider Type    Melody Kwong PTA Physical Therapist Assistant                  Therapy Charges for Today       Code Description Service Date Service Provider Modifiers Qty    73277828384 HC PT THER PROC EA 15 MIN 10/3/2024 Melody Garcia, PTA GP 1    22228471026 HC GAIT TRAINING EA 15 MIN 10/3/2024 Melody Garcia, GUDELIA GP 1    31927004466 HC PT THERAPEUTIC ACT EA 15 MIN 10/3/2024 Melody Garcia, GUDELIA GP 1            PT G-Codes  Outcome Measure Options: AM-PAC 6 Clicks Basic Mobility (PT)  AM-PAC 6 Clicks Score (PT): 16  AM-PAC 6 Clicks Score (OT): 15    Melody Garcia PTA  10/3/2024

## 2024-10-03 NOTE — SIGNIFICANT NOTE
10/03/24 1200   Coping/Psychosocial   Observed Emotional State calm;cooperative   Verbalized Emotional State relief;hopefulness   Trust Relationship/Rapport empathic listening provided   Involvement in Care interacting with patient   Additional Documentation Spiritual Care (Group)   Spiritual Care   Use of Spiritual Resources non-Jewish use of spiritual care   Spiritual Care Source  initiative   Spiritual Care Follow-Up follow-up, none required as presently assessed   Response to Spiritual Care receptive of support   Spiritual Care Interventions supportive conversation provided   Spiritual Care Visit Type initial   Receptivity to Spiritual Care visit welcomed

## 2024-10-03 NOTE — DISCHARGE SUMMARY
[facility]         HOSPITALIST  DISCHARGE SUMMARY    Patient Name: Woodrow Alejandro  : 1950  MRN: [id number]    Date of Admission: 2024  Date of Discharge:  10/3/2024    Primary Care Physician: Juan Perez MD    Consults       Date and Time Order Name Status Description    2024 12:51 PM Inpatient Gastroenterology Consult      2024  1:07 PM Hospitalist (on-call MD unless specified)      2024 11:37 AM Inpatient Orthopedic Surgery Consult Completed             Active and Resolved Hospital Problems:    Questionable right ankle Achilles tendon tear versus rupture  Reflux esophagitis  Colon polyps  Acute anemia  Stool guaiac positive  Peripheral edema  History of bioprosthetic AVR  Proteinuria  CAD  AICD in place  Nonischemic cardiomyopathy  Dyslipidemia  Essential hypertension  Permanent atrial fibrillation     Active Hospital Problems    Diagnosis POA   • **Achilles tendon rupture [S86.019A] Yes   • Anemia due to GI blood loss [D50.0] Unknown      Resolved Hospital Problems   No resolved problems to display.       Hospital Course     Hospital Course:  73-year-old male with history of essential hypertension, permanent atrial fibrillation, history of bioprosthetic AVR, proteinuria, CAD, right upper lobe nodule, spleen lesion questionable cyst versus hemangioma, nonischemic cardiomyopathy with AICD in place, dyslipidemia, hospitalized after feeling a pop in his right ankle, has been on Levaquin for several days for a questionable cryptogenic infection according to him.  Questionable Achilles tear, orthopedics consulted, awaiting MRI coordination with [facility] to obtain MRI of the right ankle.  Found to be anemic, GI consulted, stool occult blood positive.  GI discussed colonoscopy with the patient, patient refused EGD and colonoscopy.  Repeat discussions patient, adamant about refusal for colonoscopy, changed his mind 2024 to pursue endoscopic evaluations of  his colon.  Could not get MRI due to incompatibility of AICD, Ortho recommendation boot, weightbearing as tolerated, outpatient therapy.  Status post EGD and colonoscopy, colonoscopy, polyps removed, EGD showed LA grade C reflux esophagitis with bleeding, biopsied, with excessive gastric fluid, placed on Protonix twice daily for 2 weeks thereafter will transition to daily.  Given his boot and inability to ambulate safely, rehab was suggested, patient decided to pursue rehab placement.  Transition to [facility] nursing [facility] on 10/3/2024 pending negative COVID testing.  To follow-up with orthopedist as outpatient.  To resume aspirin 325 mg daily upon admission to the SNF.        Day of Discharge     Vital Signs:  Temp:  [97.3 °F (36.3 °C)-98.8 °F (37.1 °C)] 98.8 °F (37.1 °C)  Heart Rate:  [61-94] 61  Resp:  [18] 18  BP: ()/(57-67) 102/64  Flow (L/min):  [2] 2  Review of systems:  All systems reviewed negative several weakness, fatigue, right ankle weakness     Physical Exam                         Constitutional: Awake, alert, no acute distress              Eyes: Pupils equal, sclerae anicteric, no conjunctival injection              HENT: NCAT, mucous membranes moist              Neck: Supple, no thyromegaly, no lymphadenopathy, trachea midline              Respiratory: Clear to auscultation bilaterally, nonlabored respirations               Cardiovascular: RRR, no murmurs, rubs, or gallops, palpable pedal pulses bilaterally              Gastrointestinal: Positive bowel sounds, soft, nontender, nondistended              Musculoskeletal: Right lower extremity difficulty flexion and extension, distal extremity neurovascular              Psychiatric: Appropriate affect, cooperative              Neurologic: Oriented x 3, strength symmetric in all extremities, Cranial Nerves grossly intact to confrontation, speech clear              Skin: No rashes            Discharge Details        Discharge  Medications        ASK your doctor about these medications        Instructions Start Date   aspirin 325 MG tablet   325 mg, Oral, 2 times daily      dilTIAZem 120 MG tablet  Commonly known as: CARDIZEM   120 mg, Oral, 2 Times Daily      furosemide 40 MG tablet  Commonly known as: LASIX   1 tablet, Oral, Daily      levoFLOXacin 750 MG tablet  Commonly known as: Levaquin   750 mg, Oral, Daily      lisinopril 10 MG tablet  Commonly known as: PRINIVIL,ZESTRIL   1 tablet, Oral, Daily      metoprolol tartrate 100 MG tablet  Commonly known as: LOPRESSOR   100 mg, Oral, 2 Times Daily               Allergies   Allergen Reactions   • Diclofenac Sodium Dizziness       Discharge Disposition:  Skilled Nursing Facility ([facility])    Diet:  Hospital:  Diet Order   Procedures   • Diet: Regular/House; Fluid Consistency: Thin (IDDSI 0)       Discharge Activity:       CODE STATUS:  Code Status and Medical Interventions: CPR (Attempt to Resuscitate); Full Support   Ordered at: 09/26/24 1342     Level Of Support Discussed With:    Patient     Code Status (Patient has no pulse and is not breathing):    CPR (Attempt to Resuscitate)     Medical Interventions (Patient has pulse or is breathing):    Full Support         Future Appointments   Date Time Provider Department Center   10/8/2024  3:15 PM Juan Perez MD [facility] MEMCT [facility]   10/31/2024 10:45 AM Rangel Fernandez PA [facility] ORS RING [facility]   12/19/2024 10:15 AM Juan Perez MD [facility] MEMCT [facility]   1/17/2025 11:30 AM Parker Newman MD [facility] CD EDIXE [facility]   1/22/2025 10:00 AM [facility] CARD [place] DEVICE CHECK [facility] CD [place] [facility]           Pertinent  and/or Most Recent Results     PROCEDURES:   CT Lower Extremity Right Without Contrast    Result Date: 9/26/2024  CT LOWER EXTREMITY RIGHT WO CONTRAST Date of exam: 9/26/2024, 10:53 P.M., EDT. Indication: possible right Achilles tear (as per surgery) Comparison: 9/26/2024, 11:46 a.m. TECHNIQUE: Axial CT images  "were obtained of the right lower extremity (right ankle) without contrast administration. Reconstructed 2D coronal and sagittal images were also obtained. Automated exposure control and iterative construction methods were used. FINDINGS: No acute fracture or acute malalignment is seen. The right Achilles tendon has an irregular appearance located about 7.5 cm superior to its retrocalcaneal attachment. Soft tissue swelling is seen, especially involving the subcutaneous tissues, and particularly seen posteriorly as well as laterally, suggesting acute contusion(s). Consider MRI examination for further imaging assessment if clinically warranted and if not contraindicated. Degenerative and enthesopathic changes involve the right ankle and the right foot. No ectopic gas foci are seen. No definite focal sizable organized drainable fluid collection is seen. No unintended retained foreign body is suggested. Arterial calcifications are present. Additional benign soft tissue calcifications are present. Please note that the study was performed for soft tissue technique and not bone detail.     No acute fracture. No acute malalignment. Please see above comments for further detail. Portions of this note were completed with a voice recognition program. Electronically Signed: Colton Mahajan MD  9/26/2024 11:05 PM EDT  Workstation ID: [id number]    XR Ankle 3+ View Right    Result Date: 9/26/2024  XR ANKLE 3+ VW RIGHT Date of Exam: 9/26/2024 11:52 AM EDT Indication: \"pop\" in right ankle, rule out fx (probable Yoko's rupture) Comparison: None available. Findings: No fracture. No dislocation. No definite abnormal soft tissue thickening or fluid is seen along the course of the Achilles tendon at the posterior calcaneus. Moderate plantar calcaneal spur is present. Tibial talar alignment is normal.     Impression: No acute right ankle findings. Prominent plantar calcaneal spur. Electronically Signed: Siena Llamas MD  9/26/2024 11:55 " AM EDT  Workstation ID: [id number]    XR Chest 1 View    Result Date: 9/26/2024  XR CHEST 1 VW Date of Exam: 9/26/2024 11:28 AM EDT Indication: Weak/Dizzy/AMS triage protocol Comparison: CT chest 9/18/2024. Findings: There is mild linear subsegmental atelectasis in the right lower lobe. There are no dense lung consolidations. There is stable moderate cardiac enlargement with signs of aortic valve replacement and atrial appendage ligation. Pacemaker/defibrillator is in place. No pleural effusion, pneumothorax or acute osseous abnormalities are identified.     Impression: Stable moderate cardiomegaly. No acute chest findings. Electronically Signed: Siena Llamas MD  9/26/2024 11:48 AM EDT  Workstation ID: [id number]    Adult Transthoracic Echo Complete W/ Cont if Necessary Per Protocol    Result Date: 9/22/2024  •  Left ventricular ejection fraction appears to be 51 - 55%.  Inappropriate septal motion noted. •  The left ventricular cavity is borderline dilated. •  Left ventricular wall thickness is consistent with mild concentric hypertrophy. •  Left ventricular diastolic function was indeterminate. •  The right ventricular cavity is borderline dilated.  Electronic lead noted. •  The left atrial cavity is mild to moderately dilated. •  The right atrial cavity is mild to moderately  dilated. •  There is a bioprosthetic aortic valve present.  Max/mean pressure gradient is 55/29 mmHg.  No significant aortic insufficiency. •  Estimated right ventricular systolic pressure from tricuspid regurgitation is mildly elevated (35-45 mmHg). •  Mild to moderate mitral and tricuspid regurgitation.     CT Chest With Contrast Diagnostic    Result Date: 9/18/2024  CT CHEST W CONTRAST DIAGNOSTIC, CT ABDOMEN PELVIS W CONTRAST Date of Exam: 9/18/2024 12:15 PM EDT Indication: Abdominal pains sepsis fever. Comparison: None available. Technique: Axial CT images were obtained of the chest, abdomen and pelvis after the uneventful intravenous  administration of iodinated contrast.  Reconstructed coronal and sagittal images were also obtained. Automated exposure control and iterative construction methods were used. FINDINGS: Study is limited by motion artifact as well as streak artifact from the indwelling pacemaker. Chest: Mediastinum: Heart size is enlarged. Pacemaker from a left subclavian approach is in place. Streak artifact causes limitation of the study. Prosthetic valve at the aortic root noted. Aorta appears normal in caliber. Atherosclerotic changes are noted of the aorta and origin of the great vessels. Ascending aorta is normal in caliber. Evaluation of the base of the neck is somewhat limited by streak artifact from contrast bolus and left-sided pacemaker. Small lymph nodes within the mediastinum measure less than a centimeter in short axis diameter and are compatible with reactive changes. No suspicious pericardial effusion is noted Lungs/pleura: Lung volumes are low and there is motion limitation of the chest. Groundglass opacity in a perihilar and dependent distribution is favored represent atelectasis. Superimposed pneumonia cannot be excluded. No areas of dense consolidation noted. Peripheral noncalcified 5 mm nodule right upper lobe. No pleural effusion noted. Central airways appear grossly patent with limitation due to motion and low lung volumes. Soft Tissues/Bones: Question ossification of the syndesmophytes bilaterally as well as partial fusion of the SI joints. Abdomen/Pelvis: No free air is noted below the diaphragm Organs: Motion limited imaging of the right kidney demonstrates a 5 mm low-attenuation lesion too small to characterize but likely a cyst. Cortical scarring noted bilaterally no hydronephrosis noted. Spleen demonstrates a 4.7 x 3.6 cm low-attenuation lesion which may represent a cyst or hemangioma. Motion limited imaging of the liver, gallbladder and pancreas are grossly unremarkable adrenal glands are grossly  unremarkable GI/Bowel: Limited imaging of the stomach and small bowel demonstrate no acute abnormality ileocecal valve and appendix are grossly unremarkable in appearance. The colon demonstrates no acute abnormality Pelvis: Bladder is grossly unremarkable on limited imaging. Prostate is unremarkable. Calcification noted of the vas deferens. No suspicious fluid collection or adenopathy within the pelvis noted Peritoneum/Retroperitoneum: Atherosclerotic changes are noted of the aorta which is normal in caliber. There is no suspicious retroperitoneal adenopathy. Bones/Soft Tissues: No acute osseous abnormality partial fusion of the SI joints noted.     1. Study is limited by motion and streak artifact 2. Cardiomegaly without overt pulmonary edema 3. Groundglass opacities at the lung bases favored represent atelectasis. Atypical pneumonia cannot be excluded 3. Small nodular opacity right upper lobe. Optional follow-up at 12 months 4. No significant consolidation or pleural effusion 5. Low-attenuation lesion within the spleen likely a cyst or hemangioma 6. No definite acute intra-abdominal or intrapelvic abnormality noted. 7. Findings suggest possible ankylosing spondylitis. Please correlate clinically 8. Other findings as above Electronically Signed: Eric Canales MD  9/18/2024 1:31 PM EDT  Workstation ID: [id number]    CT Abdomen Pelvis With Contrast    Result Date: 9/18/2024  CT CHEST W CONTRAST DIAGNOSTIC, CT ABDOMEN PELVIS W CONTRAST Date of Exam: 9/18/2024 12:15 PM EDT Indication: Abdominal pains sepsis fever. Comparison: None available. Technique: Axial CT images were obtained of the chest, abdomen and pelvis after the uneventful intravenous administration of iodinated contrast.  Reconstructed coronal and sagittal images were also obtained. Automated exposure control and iterative construction methods were used. FINDINGS: Study is limited by motion artifact as well as streak artifact from the indwelling  pacemaker. Chest: Mediastinum: Heart size is enlarged. Pacemaker from a left subclavian approach is in place. Streak artifact causes limitation of the study. Prosthetic valve at the aortic root noted. Aorta appears normal in caliber. Atherosclerotic changes are noted of the aorta and origin of the great vessels. Ascending aorta is normal in caliber. Evaluation of the base of the neck is somewhat limited by streak artifact from contrast bolus and left-sided pacemaker. Small lymph nodes within the mediastinum measure less than a centimeter in short axis diameter and are compatible with reactive changes. No suspicious pericardial effusion is noted Lungs/pleura: Lung volumes are low and there is motion limitation of the chest. Groundglass opacity in a perihilar and dependent distribution is favored represent atelectasis. Superimposed pneumonia cannot be excluded. No areas of dense consolidation noted. Peripheral noncalcified 5 mm nodule right upper lobe. No pleural effusion noted. Central airways appear grossly patent with limitation due to motion and low lung volumes. Soft Tissues/Bones: Question ossification of the syndesmophytes bilaterally as well as partial fusion of the SI joints. Abdomen/Pelvis: No free air is noted below the diaphragm Organs: Motion limited imaging of the right kidney demonstrates a 5 mm low-attenuation lesion too small to characterize but likely a cyst. Cortical scarring noted bilaterally no hydronephrosis noted. Spleen demonstrates a 4.7 x 3.6 cm low-attenuation lesion which may represent a cyst or hemangioma. Motion limited imaging of the liver, gallbladder and pancreas are grossly unremarkable adrenal glands are grossly unremarkable GI/Bowel: Limited imaging of the stomach and small bowel demonstrate no acute abnormality ileocecal valve and appendix are grossly unremarkable in appearance. The colon demonstrates no acute abnormality Pelvis: Bladder is grossly unremarkable on limited imaging.  Prostate is unremarkable. Calcification noted of the vas deferens. No suspicious fluid collection or adenopathy within the pelvis noted Peritoneum/Retroperitoneum: Atherosclerotic changes are noted of the aorta which is normal in caliber. There is no suspicious retroperitoneal adenopathy. Bones/Soft Tissues: No acute osseous abnormality partial fusion of the SI joints noted.     1. Study is limited by motion and streak artifact 2. Cardiomegaly without overt pulmonary edema 3. Groundglass opacities at the lung bases favored represent atelectasis. Atypical pneumonia cannot be excluded 3. Small nodular opacity right upper lobe. Optional follow-up at 12 months 4. No significant consolidation or pleural effusion 5. Low-attenuation lesion within the spleen likely a cyst or hemangioma 6. No definite acute intra-abdominal or intrapelvic abnormality noted. 7. Findings suggest possible ankylosing spondylitis. Please correlate clinically 8. Other findings as above Electronically Signed: Eric Canales MD  9/18/2024 1:31 PM EDT  Workstation ID: [id number]       LAB RESULTS:      Lab 10/03/24  0512 10/02/24  0513 09/30/24  0548 09/29/24  0541 09/28/24  0402   WBC 13.36* 13.30* 15.68* 14.69* 14.30*   HEMOGLOBIN 10.3* 10.3* 10.2* 11.1* 10.4*   HEMATOCRIT 32.2* 32.4* 31.1* 34.6* 32.4*   PLATELETS 224 233 183 181 172   NEUTROS ABS 10.80* 10.57* 12.82* 12.02* 11.59*   IMMATURE GRANS (ABS) 0.08* 0.08* 0.09* 0.12* 0.08*   LYMPHS ABS 0.90 1.01 1.03 0.94 1.05   MONOS ABS 0.97* 1.02* 1.60* 1.53* 1.52*   EOS ABS 0.55* 0.56* 0.09 0.03 0.01   MCV 93.3 93.4 92.8 94.8 93.4   PROCALCITONIN 0.09  --   --   --   --          Lab 10/03/24  0512 10/02/24  0513 09/30/24  0548 09/29/24  0541 09/28/24  0402   SODIUM 134* 135* 133* 131* 130*   POTASSIUM 4.0 3.0* 3.3* 4.0 3.9   CHLORIDE 94* 92* 94* 94* 96*   CO2 30.4* 33.5* 26.5 23.3 21.7*   ANION GAP 9.6 9.5 12.5 13.7 12.3   BUN 28* 28* 22 20 21   CREATININE 0.77 0.77 0.86 0.84 0.89   EGFR 94.5 94.5  91.4 92.1 90.5   GLUCOSE 137* 123* 139* 133* 149*   CALCIUM 8.4* 8.7 8.9 8.9 8.5*   MAGNESIUM 2.2 2.4 1.8 1.8 1.7   PHOSPHORUS 3.6 4.2 3.9 3.2 3.4         Lab 10/03/24  0512 10/02/24  0513 09/30/24  0548 09/29/24  0541   TOTAL PROTEIN 6.1 6.3 6.8 7.0   ALBUMIN 2.8* 2.6* 3.0* 2.9*   ALT (SGPT) 15 12 17 15   AST (SGOT) 19 15 22 21   BILIRUBIN 0.5 0.5 0.8 1.0   INDIRECT BILIRUBIN 0.3 0.3 0.5 0.6   BILIRUBIN DIRECT 0.2 0.2 0.3 0.4*   ALK PHOS 60 71 65 66         Lab 09/26/24  1430   HSTROP T 62*             Lab 09/26/24  1430   IRON 22*   IRON SATURATION (TSAT) 9*   TIBC 247*   TRANSFERRIN 166*   FERRITIN 730.40*         Brief Urine Lab Results  (Last result in the past 365 days)        Color   Clarity   Blood   Leuk Est   Nitrite   Protein   CREAT   Urine HCG        09/26/24 1254 Dark Yellow   Cloudy   Trace   Trace   Negative   >=300 mg/dL (3+)                 Microbiology Results (last 10 days)       ** No results found for the last 240 hours. **            CT Lower Extremity Right Without Contrast    Result Date: 9/26/2024  Impression: No acute fracture. No acute malalignment. Please see above comments for further detail. Portions of this note were completed with a voice recognition program. Electronically Signed: Colton Mahajan MD  9/26/2024 11:05 PM EDT  Workstation ID: [id number]    XR Ankle 3+ View Right    Result Date: 9/26/2024  Impression: Impression: No acute right ankle findings. Prominent plantar calcaneal spur. Electronically Signed: Siena Llamas MD  9/26/2024 11:55 AM EDT  Workstation ID: [id number]    XR Chest 1 View    Result Date: 9/26/2024  Impression: Impression: Stable moderate cardiomegaly. No acute chest findings. Electronically Signed: Siena Llamas MD  9/26/2024 11:48 AM EDT  Workstation ID: [id number]    CT Chest With Contrast Diagnostic    Result Date: 9/18/2024  Impression: 1. Study is limited by motion and streak artifact 2. Cardiomegaly without overt pulmonary edema 3. Groundglass opacities  at the lung bases favored represent atelectasis. Atypical pneumonia cannot be excluded 3. Small nodular opacity right upper lobe. Optional follow-up at 12 months 4. No significant consolidation or pleural effusion 5. Low-attenuation lesion within the spleen likely a cyst or hemangioma 6. No definite acute intra-abdominal or intrapelvic abnormality noted. 7. Findings suggest possible ankylosing spondylitis. Please correlate clinically 8. Other findings as above Electronically Signed: Eric Canales MD  9/18/2024 1:31 PM EDT  Workstation ID: [id number]    CT Abdomen Pelvis With Contrast    Result Date: 9/18/2024  Impression: 1. Study is limited by motion and streak artifact 2. Cardiomegaly without overt pulmonary edema 3. Groundglass opacities at the lung bases favored represent atelectasis. Atypical pneumonia cannot be excluded 3. Small nodular opacity right upper lobe. Optional follow-up at 12 months 4. No significant consolidation or pleural effusion 5. Low-attenuation lesion within the spleen likely a cyst or hemangioma 6. No definite acute intra-abdominal or intrapelvic abnormality noted. 7. Findings suggest possible ankylosing spondylitis. Please correlate clinically 8. Other findings as above Electronically Signed: Eric Canales MD  9/18/2024 1:31 PM EDT  Workstation ID: [id number]             Results for orders placed during the hospital encounter of 09/20/24    Adult Transthoracic Echo Complete W/ Cont if Necessary Per Protocol    Interpretation Summary  •  Left ventricular ejection fraction appears to be 51 - 55%.  Inappropriate septal motion noted.  •  The left ventricular cavity is borderline dilated.  •  Left ventricular wall thickness is consistent with mild concentric hypertrophy.  •  Left ventricular diastolic function was indeterminate.  •  The right ventricular cavity is borderline dilated.  Electronic lead noted.  •  The left atrial cavity is mild to moderately dilated.  •  The right atrial cavity  is mild to moderately  dilated.  •  There is a bioprosthetic aortic valve present.  Max/mean pressure gradient is 55/29 mmHg.  No significant aortic insufficiency.  •  Estimated right ventricular systolic pressure from tricuspid regurgitation is mildly elevated (35-45 mmHg).  •  Mild to moderate mitral and tricuspid regurgitation.      Labs Pending at Discharge:  Pending Labs       Order Current Status    COVID PRE-OP / PRE-PROCEDURE SCREENING ORDER (NO ISOLATION) - Swab, Nasopharynx Collected (10/03/24 1334)    COVID-19, FLU A/B, RSV PCR 1 HR TAT - Swab, Nasopharynx Collected (10/03/24 1334)              Time spent on Discharge including face to face service:  35 minutes    Electronically signed by Joseluis Diallo MD, 10/03/24, 1:37 PM EDT.    Portions of this documentation were transcribed electronically from a voice recognition software.  I confirm all data accurately represents the service(s) I performed at today's visit.

## 2024-10-04 PROCEDURE — 97166 OT EVAL MOD COMPLEX 45 MIN: CPT

## 2024-10-04 PROCEDURE — 97535 SELF CARE MNGMENT TRAINING: CPT

## 2024-10-04 PROCEDURE — 99306 1ST NF CARE HIGH MDM 50: CPT | Performed by: PHYSICIAN ASSISTANT

## 2024-10-04 PROCEDURE — 97110 THERAPEUTIC EXERCISES: CPT

## 2024-10-04 PROCEDURE — 97530 THERAPEUTIC ACTIVITIES: CPT

## 2024-10-04 PROCEDURE — 97116 GAIT TRAINING THERAPY: CPT

## 2024-10-04 PROCEDURE — 97161 PT EVAL LOW COMPLEX 20 MIN: CPT

## 2024-10-04 RX ORDER — CLOBETASOL PROPIONATE 0.5 MG/G
1 CREAM TOPICAL
Status: DISCONTINUED | OUTPATIENT
Start: 2024-10-04 | End: 2024-10-10

## 2024-10-04 RX ORDER — FERROUS SULFATE 325(65) MG
325 TABLET ORAL
Status: DISPENSED | OUTPATIENT
Start: 2024-10-04

## 2024-10-04 RX ADMIN — LISINOPRIL 10 MG: 2.5 TABLET ORAL at 09:36

## 2024-10-04 RX ADMIN — FERROUS SULFATE TAB 325 MG (65 MG ELEMENTAL FE) 325 MG: 325 (65 FE) TAB at 09:36

## 2024-10-04 RX ADMIN — PANTOPRAZOLE SODIUM 40 MG: 40 TABLET, DELAYED RELEASE ORAL at 09:37

## 2024-10-04 RX ADMIN — FUROSEMIDE 40 MG: 40 TABLET ORAL at 09:36

## 2024-10-04 RX ADMIN — METOPROLOL TARTRATE 100 MG: 50 TABLET, FILM COATED ORAL at 09:36

## 2024-10-04 RX ADMIN — Medication 5 MG: at 21:13

## 2024-10-04 RX ADMIN — DILTIAZEM HYDROCHLORIDE 120 MG: 30 TABLET, FILM COATED ORAL at 21:13

## 2024-10-04 RX ADMIN — DILTIAZEM HYDROCHLORIDE 120 MG: 30 TABLET, FILM COATED ORAL at 09:37

## 2024-10-04 RX ADMIN — ASPIRIN 325 MG: 325 TABLET ORAL at 09:36

## 2024-10-04 RX ADMIN — MICONAZOLE NITRATE 1 APPLICATION: 2 CREAM TOPICAL at 09:38

## 2024-10-04 RX ADMIN — CLOBETASOL PROPIONATE CREAM USP, 0.05% 1 APPLICATION: 0.5 CREAM TOPICAL at 22:11

## 2024-10-04 RX ADMIN — METOPROLOL TARTRATE 100 MG: 50 TABLET, FILM COATED ORAL at 21:13

## 2024-10-04 RX ADMIN — PANTOPRAZOLE SODIUM 40 MG: 40 TABLET, DELAYED RELEASE ORAL at 17:02

## 2024-10-04 NOTE — PLAN OF CARE
Goal Outcome Evaluation:  Plan of Care Reviewed With: patient        Progress: no change  Outcome Evaluation: Aox4, call light and personal items within reach. 1x to the BSC, urinal at bedside. No c/o pain during the shift. Tolorated PT/OT well. Con't plan of care

## 2024-10-04 NOTE — H&P
[facility]  Date: 10/4/2024   Patient Name: Woodrow Alejandro  : 1950  MRN: [id number]  Primary Care Physician:  Juan Perez MD  Date of admission: 10/3/2024    Subjective   Subjective   Chief Complaint: Weakness after recent hospitalization    HPI:    Woodrow Alejandro is a 73 y.o. male recently hospitalized for anemia and also right Achilles tendon tear.  During hospitalization, gastroenterology, Dr. Mensah, performed EGD and colonoscopy.  EGD showed esophagitis with bleeding lower third of esophagus.  PPI recommended.  2 polyps (tubular adenomas) were removed on colonoscopy.  Orthopedist, Dr. Zuniga, was consulted and recommended gentle ROM, weightbearing as tolerated, PT, and walking boot for his Achilles tendon tear.  His home aspirin was subsequently resumed on 10/3/24.  He has been a good candidate for more rehab and has now been admitted to our SNF for more therapy.  He will be on PPI BID x 2 weeks, then transition to daily.    Patient is a very pleasant gentleman who is currently resting in recliner.  Alert and conversational.  In good spirits and pleasant demeanor.  He is already worked with OT and PT this morning.  He is doing well with therapy requirements thus far.  His right ankle range of motion has improved.  Right ankle pain reasonably controlled.  Has not had any bleeding issues.  He denies any lightheadedness or fatigue.  Hemoglobin remaines stable in the 10.3 range.  His white count remains elevated at 13.3.  Of note, there is some concern that recent Achilles tendon tear may be related to recent outpatient Levaquin Rx  Pertinent History   Past Medical History:    Active Ambulatory Problems     Diagnosis Date Noted    Essential hypertension 10/22/2020    Permanent atrial fibrillation 10/22/2020    S/P AVR 10/22/2020    ICD (implantable cardioverter-defibrillator), dual, in situ 10/22/2020    Dermatitis 10/31/2023    IFG (impaired  fasting glucose) 10/31/2023    Mixed hyperlipidemia 10/31/2023    Vitamin D deficiency 10/31/2023    Medicare annual wellness visit, subsequent 12/12/2023    Primary osteoarthritis of both knees 03/04/2024    Screening PSA (prostate specific antigen) 06/24/2024    Bilateral primary osteoarthritis of knee 07/18/2024    Coarse tremors 09/05/2024    Weakness generalized 09/05/2024    Increased urinary frequency 09/05/2024    Bandemia 09/19/2024    Achilles tendon rupture 09/26/2024    Anemia due to GI blood loss 09/26/2024     Resolved Ambulatory Problems     Diagnosis Date Noted    No Resolved Ambulatory Problems     Past Medical History:   Diagnosis Date    Aortic valve replaced     Arthritis 2018    Atrial fibrillation     Coronary artery disease     Hypertension        Past Surgical History:    Past Surgical History:   Procedure Laterality Date    CARDIAC SURGERY  2002    COLONOSCOPY      COLONOSCOPY N/A 9/30/2024    Procedure: COLONOSCOPY WITH HOT/COLD SNARE POLYPECTOMIES, ELEVIEW INJECTION,CLIPX1;  Surgeon: Kurt Mensah MD;  Location: [facility] ENDOSCOPY;  Service: Gastroenterology;  Laterality: N/A;  COLON POLYPS    ENDOSCOPY N/A 9/30/2024    Procedure: ESOPHAGOGASTRODUODENOSCOPY WITH BIOPSIES;  Surgeon: Kurt Mensah MD;  Location: [facility] ENDOSCOPY;  Service: Gastroenterology;  Laterality: N/A;  RETAINED FOOD IN STOMACH AND REFLUX ESOPHAGITIS    PACEMAKER IMPLANTATION         Social History:    for [employer] Currently.  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  Lives locally  Non-smoker  Social History     Socioeconomic History    Marital status:    Tobacco Use    Smoking status: Never     Passive exposure: Never    Smokeless tobacco: Never   Vaping Use    Vaping status: Never Used   Substance and Sexual Activity    Alcohol use: Yes     Alcohol/week: 4.0 standard drinks of alcohol     Types: 4 Cans of beer per week    Drug use: Never    Sexual activity: Not Currently       Family History:      Family History   Problem Relation Age of Onset    COPD Mother         Still living 93    Heart disease Mother     Arthritis Father     COPD Father          at 91.       Home Medications (reported)  Current Outpatient Medications   Medication Instructions    aspirin 325 mg, Oral, 2 times daily    dilTIAZem (CARDIZEM) 120 mg, Oral, 2 Times Daily    furosemide (LASIX) 40 MG tablet 1 tablet, Oral, Daily    levoFLOXacin (LEVAQUIN) 750 mg, Oral, Daily    lisinopril (PRINIVIL,ZESTRIL) 10 MG tablet 1 tablet, Oral, Daily    metoprolol tartrate (LOPRESSOR) 100 mg, Oral, 2 Times Daily       Allergies:  Allergies   Allergen Reactions    Diclofenac Sodium Dizziness       REVIEW OF SYSTEMS:   Weakness    Objective   Objective   Vitals:   Temp:  [97.9 °F (36.6 °C)-98.8 °F (37.1 °C)] 98.7 °F (37.1 °C)  Heart Rate:  [61-94] 84  Resp:  [16-18] 16  BP: ()/(50-67) 114/64  Flow (L/min):  [2] 2  PHYSICAL EXAM   CON: WN. WD. NAD.   NECK:  No thyromegaly. No stridor. Trachea midline.  RESP:  CTA. No wheezes. No crackles.  .   CV:  Rhythm irregular. Rate WNL. No murmur noted.  Trace plus bilateral pretibial edema.  Left chest AICD noted  GI:  Soft and nontender. Nondistended.  EXT: Peripheral pulses intact.  No joint deformities or cyanosis.  PSYCH:  Alert. Oriented. Normal affect and mood.  NEURO:  No dysarthria or aphasia.  SKIN: Bilateral chronic venous stasis changes.  No cellulitis    Result Review    Result Review:  I have personally reviewed the results from the time of this admission to 10/4/2024 06:49 EDT and agree with these findings:  []  Laboratory  []  Microbiology  []  Radiology  []  EKG/Telemetry   []  Cardiology/Vascular   []  Pathology  []  Old records  []  Other:    Assessment & Plan   Assessment / Plan   Assessment:    Weakness after recent hospitalization  Recent anemia  Recent EGD 24 by Dr. Mensah showing esophagitis (PPI recommended)  Recent colonoscopy 24 by Dr. Mensah with  polypectomy  Recent right Achilles tendon tear  Nonischemic cardiomyopathy, EF improved  A-fib (on aspirin only)  Bioprosthetic aortic valve replacement (Dr. Newman)  HTN  GERD  Anemia  CAD/AICD       Plan:    Admit to skilled nursing facility  Daily physical therapy  Monitor CBC periodically.  Monitor for any signs of infection.  Home aspirin has been resumed  Continue iron supplement  Continue PPI  Where boot when out of bed.  WBAT.  Gentle range of motion right ankle.  Additional recommendations pending clinical course    VTE Prophylaxis:  No VTE prophylaxis order currently exists.      CODE STATUS:      Level Of Support Discussed With: Patient  Code Status (Patient has no pulse and is not breathing): CPR (Attempt to Resuscitate)  Medical Interventions (Patient has pulse or is breathing): Full Support    More than 45 minutes total was spent today before, during, after patient encounter, including exam, discussion/counseling patient, coordinating care with healthcare staff, obtaining collateral history from family, reviewing prior studies/records/external documentation as well as documenting this encounter. Electronically signed by CHANEL Vargas, 10/04/24, 6:49 AM EDT.

## 2024-10-04 NOTE — PLAN OF CARE
Goal Outcome Evaluation:  Plan of Care Reviewed With: patient           Outcome Evaluation: Pt was a new admit to SNF yesterday. He is AO x4 and is a 2 assist to bsc with a walker but can be incontinent. No c/o pain or discomfort last night. Rash on back and buttocks. Needs boot on Right foot while OOB. Will continue with POC. Call light within reach.

## 2024-10-04 NOTE — THERAPY EVALUATION
SNF - Occupational Therapy Initial Evaluation   [facility]    Patient Name: Woodrow Alejandro  : 1950    MRN: [id number]                              Today's Date: 10/4/2024       Admit Date: 10/3/2024    Visit Dx:     ICD-10-CM ICD-9-CM   1. Decreased activities of daily living (ADL)  Z78.9 V49.89     Patient Active Problem List   Diagnosis    Essential hypertension    Permanent atrial fibrillation    S/P AVR    ICD (implantable cardioverter-defibrillator), dual, in situ    Dermatitis    IFG (impaired fasting glucose)    Mixed hyperlipidemia    Vitamin D deficiency    Medicare annual wellness visit, subsequent    Primary osteoarthritis of both knees    Screening PSA (prostate specific antigen)    Bilateral primary osteoarthritis of knee    Coarse tremors    Weakness generalized    Increased urinary frequency    Bandemia    Achilles tendon rupture    Anemia due to GI blood loss    Physical debility     Past Medical History:   Diagnosis Date    Aortic valve replaced     Arthritis 2018    Atrial fibrillation     Coronary artery disease     Hypertension      Past Surgical History:   Procedure Laterality Date    CARDIAC SURGERY      COLONOSCOPY      COLONOSCOPY N/A 2024    Procedure: COLONOSCOPY WITH HOT/COLD SNARE POLYPECTOMIES, ELEVIEW INJECTION,CLIPX1;  Surgeon: Kurt Mensah MD;  Location: [facility] ENDOSCOPY;  Service: Gastroenterology;  Laterality: N/A;  COLON POLYPS    ENDOSCOPY N/A 2024    Procedure: ESOPHAGOGASTRODUODENOSCOPY WITH BIOPSIES;  Surgeon: Kurt Mensah MD;  Location: [facility] ENDOSCOPY;  Service: Gastroenterology;  Laterality: N/A;  RETAINED FOOD IN STOMACH AND REFLUX ESOPHAGITIS    PACEMAKER IMPLANTATION        General Information       Row Name 10/04/24 1059 10/04/24 1042       OT Time and Intention    Document Type therapy note (daily note)  -EG evaluation  -EG    Mode of Treatment individual therapy;occupational therapy  -EG individual therapy;occupational therapy  " -EG      Row Name 10/04/24 1059 10/04/24 1042       General Information    Patient Profile Reviewed -- yes  Lives with 93-year-old mother whom he is typically the caregiver for; Driving and shopping prior; Ind. with all ADL's without the use of AD; walk-in shower with built in seat and grab bars; reports may own RW unsure from mothers previous needs  -EG    Prior Level of Function -- independent:;ADL's;cooking;cleaning;driving;all household mobility;community mobility  -EG    Existing Precautions/Restrictions fall;other (see comments)  CAM boot donned RLE  -EG fall;other (see comments)  CAM boot donned RLE  -EG    Barriers to Rehab -- none identified  -EG      Row Name 10/04/24 1042          Occupational Profile    Reason for Services/Referral (Occupational Profile) Patient is a 73-year-old male admitted to [facility] on 10/3/2024.  OT was consulted due to status post recent Achilles tendon injury of right lower extremity.  OT to assess for new onset of deficits and limitations in ADL/transfer performance.  No previous OT services for current condition.  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  -EG       Row Name 10/04/24 1042          Home Main Entrance    Number of Stairs, Main Entrance one  -EG     Stair Railings, Main Entrance railings safe and in good condition  -EG       Row Name 10/04/24 1042          Stairs Within Home, Primary    Stairs, Within Home, Primary 3 level split level home per patient and daughter report  -EG     Stair Railings, Within Home, Primary railings safe and in good condition  -EG       Row Name 10/04/24 1059 10/04/24 1042       Cognition    Orientation Status (Cognition) oriented x 4  -EG oriented x 4  -EG      Row Name 10/04/24 1059 10/04/24 1042       Safety Issues, Functional Mobility    Safety Issues " Affecting Function (Mobility) -- positioning of assistive device;judgment  -EG    Impairments Affecting Function (Mobility) balance;endurance/activity tolerance;pain;range of motion (ROM);strength  -EG balance;endurance/activity tolerance;pain;range of motion (ROM);strength  -EG              User Key  (r) = Recorded By, (t) = Taken By, (c) = Cosigned By      Initials Name Provider Type    EG Janessa Good OT Occupational Therapist                     Mobility/ADL's       Row Name 10/04/24 1045          Bed Mobility    Bed Mobility bed mobility (all) activities  -EG     All Activities, [place] (Bed Mobility) minimum assist (75% patient effort)  -EG       Row Name 10/04/24 1059 10/04/24 1045       Transfers    Transfers bed-chair transfer;sit-stand transfer;stand-sit transfer  -EG --    Comment, (Transfers) -- CGA for all transfers with use of rolling walker and cam boot donned  -EG      Row Name 10/04/24 1059          Bed-Chair Transfer    Bed-Chair [place] (Transfers) contact guard;minimum assist (75% patient effort);verbal cues  -EG     Assistive Device (Bed-Chair Transfers) walker, front-wheeled  -EG       Row Name 10/04/24 1059          Sit-Stand Transfer    Sit-Stand [place] (Transfers) contact guard;minimum assist (75% patient effort);1 person assist  -EG     Assistive Device (Sit-Stand Transfers) walker, front-wheeled  -EG       Row Name 10/04/24 1059          Stand-Sit Transfer    Stand-Sit [place] (Transfers) contact guard;1 person assist  -EG     Assistive Device (Stand-Sit Transfers) walker, front-wheeled  -EG       Row Name 10/04/24 1059 10/04/24 1045       Functional Mobility    Functional Mobility- Ind. Level contact guard assist;verbal cues required;nonverbal cues required (demo/gesture)  -EG contact guard assist;verbal cues required;nonverbal cues required (demo/gesture)  -EG    Functional Mobility- Device walker, front-wheeled  -EG walker, front-wheeled  -EG    Functional  Mobility- Comment CGA to perform functional mobility to doorway and back using rolling walker and cam boot donned on right lower extremity; small stepping pattern noted with reports of pain but no true instability during performance  -EG CGA to perform functional mobility to doorway and back using RW and CAM boot donned on RLE; small stepping pattern noted with reports of pain but no true unstability during performance  -EG      Row Name 10/04/24 1059 10/04/24 1045       Activities of Daily Living    BADL Assessment/Intervention bathing;upper body dressing;lower body dressing;grooming  -EG bathing;upper body dressing;lower body dressing;grooming;feeding;toileting  -EG      Row Name 10/04/24 1059 10/04/24 1045       Mobility    Extremity Weight-bearing Status right lower extremity  -EG right lower extremity  -EG    Right Lower Extremity (Weight-bearing Status) weight-bearing as tolerated (WBAT)  -EG weight-bearing as tolerated (WBAT)  With Cam boot  -EG      Row Name 10/04/24 1059 10/04/24 1045       Lower Body Dressing Assessment/Training    [place] Level (Lower Body Dressing) lower body dressing skills;moderate assist (50% patient effort)  -EG lower body dressing skills;moderate assist (50% patient effort)  -EG    Comment, (Lower Body Dressing) -- modA for assist with donning CAM boot and donning pants to waist at standing level due to impaired balance unsupported  -EG      Row Name 10/04/24 1059 10/04/24 1045       Upper Body Dressing Assessment/Training    [place] Level (Upper Body Dressing) upper body dressing skills;set up  -EG upper body dressing skills;set up  -EG      Row Name 10/04/24 1059 10/04/24 1045       Bathing Assessment/Intervention    [place] Level (Bathing) minimum assist (75% patient effort);bathing skills;lower body;upper body  -EG minimum assist (75% patient effort);bathing skills;lower body;upper body  -EG    Comment, (Bathing) -- sponge bath seated in recliner  -EG      Row  Name 10/04/24 1059 10/04/24 1045       Grooming Assessment/Training    [place] Level (Grooming) grooming skills;set up  -EG grooming skills;set up  -EG      Row Name 10/04/24 1045          Self-Feeding Assessment/Training    [place] Level (Feeding) feeding skills;set up  -EG       Row Name 10/04/24 1045          Toileting Assessment/Training    [place] Level (Toileting) toileting skills;minimum assist (75% patient effort);contact guard assist  -EG               User Key  (r) = Recorded By, (t) = Taken By, (c) = Cosigned By      Initials Name Provider Type    EG Janessa Good OT Occupational Therapist                   Obj/Interventions       Row Name 10/04/24 1100 10/04/24 1052       Sensory Assessment (Somatosensory)    Sensory Assessment (Somatosensory) sensation intact  -EG sensation intact  -EG      [facility] Name 10/04/24 1100 10/04/24 1052       Vision Assessment/Intervention    Visual Impairment/Limitations WFL  -EG WFL  -EG      Row Name 10/04/24 1052          Range of Motion Comprehensive    General Range of Motion bilateral upper extremity ROM WNL  -EG       Row Name 10/04/24 1052          Strength Comprehensive (MMT)    Comment, General Manual Muscle Testing (MMT) Assessment 4-/5 BUE's grossly all major joints  -EG       Row Name 10/04/24 1052          Motor Skills    Motor Skills coordination;functional endurance  -EG     Coordination WFL  -EG     Functional Endurance fair- with pain being main limiting factor at this time  -EG       Row Name 10/04/24 1100 10/04/24 1052       Balance    Balance Assessment -- sit to stand dynamic balance;standing static balance  -EG    Sit to Stand Dynamic Balance -- contact guard  -EG    Static Standing Balance -- contact guard  -EG    Position/Device Used, Standing Balance -- walker, front-wheeled  -EG    Balance Interventions standing;sit to stand;supported;static;dynamic;weight shifting activity;occupation based/functional task  -EG --              User  Key  (r) = Recorded By, (t) = Taken By, (c) = Cosigned By      Initials Name Provider Type    EG Janessa Good, OT Occupational Therapist                   Goals/Plan       Row Name 10/04/24 1055          Bed Mobility Goal 1 (OT)    Activity/Assistive Device (Bed Mobility Goal 1, OT) bed mobility activities, all  -EG     [place] Level/Cues Needed (Bed Mobility Goal 1, OT) modified independence  -EG     Time Frame (Bed Mobility Goal 1, OT) long term goal (LTG);30 days  -EG       Row Name 10/04/24 1055          Transfer Goal 1 (OT)    Activity/Assistive Device (Transfer Goal 1, OT) transfers, all  -EG     [place] Level/Cues Needed (Transfer Goal 1, OT) modified independence  -EG     Time Frame (Transfer Goal 1, OT) long term goal (LTG);30 days  -EG       Row Name 10/04/24 1055          Bathing Goal 1 (OT)    Activity/Device (Bathing Goal 1, OT) bathing skills, all  -EG     [place] Level/Cues Needed (Bathing Goal 1, OT) modified independence  -EG     Time Frame (Bathing Goal 1, OT) long term goal (LTG);30 days  -EG       Row Name 10/04/24 1055          Dressing Goal 1 (OT)    Activity/Device (Dressing Goal 1, OT) dressing skills, all  -EG     [place]/Cues Needed (Dressing Goal 1, OT) modified independence  -EG     Time Frame (Dressing Goal 1, OT) long term goal (LTG);30 days  -EG       Row Name 10/04/24 1055          Toileting Goal 1 (OT)    Activity/Device (Toileting Goal 1, OT) toileting skills, all  -EG     [place] Level/Cues Needed (Toileting Goal 1, OT) modified independence  -EG     Time Frame (Toileting Goal 1, OT) long term goal (LTG);30 days  -EG       Row Name 10/04/24 1055          Strength Goal 1 (OT)    Strength Goal 1 (OT) Patient will improve upper body strength 4+/5 to support improved ADL function and mobility.  -EG     Time Frame (Strength Goal 1, OT) long term goal (LTG);30 days  -EG       Row Name 10/04/24 1055          Problem Specific Goal 1 (OT)    Problem Specific  Goal 1 (OT) Patient will improve endurance to fair+ to support improved independence, function and safety in ADLs and during functional mobility.  -EG     Time Frame (Problem Specific Goal 1, OT) long term goal (LTG);30 days  -EG       Row Name 10/04/24 1055          Therapy Assessment/Plan (OT)    Planned Therapy Interventions (OT) activity tolerance training;functional balance retraining;occupation/activity based interventions;adaptive equipment training;BADL retraining;neuromuscular control/coordination retraining;patient/caregiver education/training;transfer/mobility retraining;strengthening exercise  -EG               User Key  (r) = Recorded By, (t) = Taken By, (c) = Cosigned By      Initials Name Provider Type    EG Janessa Good, JOSELIN Occupational Therapist                   Clinical Impression       Row Name 10/04/24 1101 10/04/24 1053       Pain Assessment    Pretreatment Pain Rating 0/10 - no pain  -EG 0/10 - no pain  -EG    Posttreatment Pain Rating 6/10  -EG 6/10  -EG    Pain Location - Side/Orientation Right  -EG Right  -EG    Pain Location generalized  -EG generalized  -EG    Pain Location - foot  -EG foot  -EG      Row Name 10/04/24 1101 10/04/24 1053       Plan of Care Review    Plan of Care Reviewed With patient  -EG patient  -EG    Progress no change  -EG no change  -EG    Outcome Evaluation Patient demonstrates need for OT services this date due to high level of pain in RLE causing in ability to perform ADL's and transfers ind; OT services noting impaired balance, endurance, strength and safety skills; Continue with outlined POC; Ax1w/RW and CAM boot donned; pleasant and cooperative; A&OX4  -EG Patient presents with limitations of decreased functional strength, balance, endurance and safety/insight skills requiring need for skilled OT services to facilitate return to prior level of function with ADLs.  -EG      Row Name 10/04/24 1101 10/04/24 1053       Therapy Assessment/Plan (OT)    Rehab  Potential (OT) good, to achieve stated therapy goals  -EG good, to achieve stated therapy goals  -EG    Criteria for Skilled Therapeutic Interventions Met (OT) yes;meets criteria;skilled treatment is necessary  -EG yes;meets criteria;skilled treatment is necessary  -EG    Therapy Frequency (OT) 5 times/wk  -EG 5 times/wk  -EG      Row Name 10/04/24 1053          Therapy Plan Review/Discharge Plan (OT)    Equipment Needs Upon Discharge (OT) walker, rolling;toileting equipment;dressing equipment  -EG     Anticipated Discharge Disposition (OT) home with home health  -EG       Row Name 10/04/24 1101 10/04/24 1053       Positioning and Restraints    Post Treatment Position chair  -EG chair  -EG    In Chair reclined;sitting;call light within reach;encouraged to call for assist;exit alarm on  -EG reclined;sitting;call light within reach;encouraged to call for assist;exit alarm on  -EG              User Key  (r) = Recorded By, (t) = Taken By, (c) = Cosigned By      Initials Name Provider Type    EG Janessa Good, JOSELIN Occupational Therapist                   Outcome Measures       Row Name 10/04/24 1102 10/04/24 1055       How much help from another is currently needed...    Putting on and taking off regular lower body clothing? 2  -EG 2  -EG    Bathing (including washing, rinsing, and drying) 2  -EG 2  -EG    Toileting (which includes using toilet bed pan or urinal) 3  -EG 3  -EG    Putting on and taking off regular upper body clothing 3  -EG 3  -EG    Taking care of personal grooming (such as brushing teeth) 4  -EG 4  -EG    Eating meals 4  -EG 4  -EG    AM-PAC 6 Clicks Score (OT) 18  -EG 18  -EG      Row Name 10/04/24 0910 10/04/24 0120       How much help from another person do you currently need...    Turning from your back to your side while in flat bed without using bedrails? 3  -AA 3  -BR    Moving from lying on back to sitting on the side of a flat bed without bedrails? 3  -AA 2  -BR    Moving to and from a bed to  a chair (including a wheelchair)? 3  -AA 3  -BR    Standing up from a chair using your arms (e.g., wheelchair, bedside chair)? 3  -AA 2  -BR    Climbing 3-5 steps with a railing? 2  -AA 2  -BR    To walk in hospital room? 3  -AA 3  -BR    AM-PAC 6 Clicks Score (PT) 17  -AA 15  -BR    Highest Level of Mobility Goal 5 --> Static standing  -AA 4 --> Transfer to chair/commode  -BR      Row Name 10/04/24 1102 10/04/24 1055       Functional Assessment    Outcome Measure Options AM-PAC 6 Clicks Daily Activity (OT);Optimal Instrument  -EG AM-PAC 6 Clicks Daily Activity (OT);Optimal Instrument  -EG      Row Name 10/04/24 1102 10/04/24 1055       Optimal Instrument    Optimal Instrument Optimal - 3  -EG Optimal - 3  -EG    Bending/Stooping 3  -EG 3  -EG    Standing 2  -EG 2  -EG    Reaching 1  -EG 1  -EG              User Key  (r) = Recorded By, (t) = Taken By, (c) = Cosigned By      Initials Name Provider Type    Kimberly Gonzalez, RN Registered Nurse    Eloisa Baca, RN Registered Nurse    Janessa Alvarez OT Occupational Therapist                  Section G  Mobility  Bed mobility - self performance: limited assistance (staff provide guided maneuvering of limbs or other non-weight bearing assistance)  Bed mobility support/assistance: One person assist  Transfer - self performance: limited assistance (staff provide guided maneuvering of limbs or other non-weight bearing assistance)  Transfer support/assistance: One person assist  Walking in room - self performance: limited assistance (staff provide guided maneuvering of limbs or other non-weight bearing assistance)  Walking in room support/assistance: One person assist  Walking in corridors/hallway - self performance: limited assistance (staff provide guided maneuvering of limbs or other non-weight bearing assistance)  Walking in corridors/hallway support/assistance: One person assist  Locomotion on unit - self performance: activity did not occur  Locomotion on unit  support/assistance: Activity did not occur  Locomotion off unit - self performance: activity did not occur  Locomotion off unit support/assistance: Activity did not occur  Dressing - self performance: limited assistance (staff provide guided maneuvering of limbs or other non-weight bearing assistance)  Dressing support/assistance: One person assist  Eating - self performance: independent  Eating support/assistance: Setup help only  Toileting - self performance: limited assistance (staff provide guided maneuvering of limbs or other non-weight bearing assistance)  Toileting support/assistance: One person assist  Personal hygiene - self performance: limited assistance (staff provide guided maneuvering of limbs or other non-weight bearing assistance)  Personal hygiene support/assistance: One person assist  Bathing  Bathing - self performance: Physical help with bathing (exclude washing back and hair for patient)  Bathing support/assistance: One person assist  Balance  Balance during transitions & walking: Not steady, requires assist to steady  Moving from seated to standing position: Not steady, requires assist to steady  Walking: Not steady, requires assist to steady  Turning around while walking: Not steady, requires assist to steady  Moving on and off toilet: Not steady, requires assist to steady  Surface-to-surface transfer: Not steady, requires assist to steady  Mobility devices: None were used  Range of Motion  Upper Extremity: No impairment  Lower Extremity: Impairment on one side  Section GG  Functional Ability/Goals, Adm (Section GG)  Self Care, Prior Functioning ([id number]): 3. Independent  Functional Cognition, Prior Functioning (EQ7780I): 3. Independent  Prior Device Use (PL9128): none of the above (Z)  Upper Extremity Range of Motion (QX4449K): No impairment  Lower Extremity Range of Motion (GA2849Q): Impairment on one side  Self Care, Admission (Section GG)  Eating: Self-Care Admission Performance  (MS9409Q6): setup or clean-up assistance (05)  Oral Hygiene: Self-Care Admission Performance ([id number]): setup or clean-up assistance (05)  Toileting Hygiene: Self-Care Admission Performance (FV4632B2): partial/moderate assistance (03)  Shower/Bathe Self: Self-Care Admission Performance (IC4609D6): partial/moderate assistance (03)  Upper Body Dressing: Self-Care Admission Performance (PQ3242T0): setup or clean-up assistance (05)  Lower Body Dressing: Self-Care Admission Performance (OD9170I4): partial/moderate assistance (03)  Putting On/Taking Off Footwear: Self-Care Admission Performance (GO8216M5): partial/moderate assistance (03)  Personal Hygiene: Self-Care Admission Performance (VA3356P3): supervision or touching assistance (04)  Mobility, Admission Performance (MH2183)  Chair/Bed-Chair Transfer: Mobility Admission Performance (AP4063O1): partial/moderate assistance (03)  Toilet Transfer: Mobility Admission Performance (FR1595T5): partial/moderate assistance (03)  Tub/shower Transfer: Mobility Admission Performance ([id number]): partial/moderate assistance (03)                Occupational Therapy Education       Title: PT OT SLP Therapies (Done)       Topic: Occupational Therapy (Done)       Point: ADL training (Done)       Description:   Instruct learner(s) on proper safety adaptation and remediation techniques during self care or transfers.   Instruct in proper use of assistive devices.                  Learning Progress Summary             Patient JING [place] VU by EG at 10/4/2024 1056    Comment: Education on compensatory techniques for ADL's  Education on AE  Education on fall risk prevention and general safety  Education on OT services                         Point: Home exercise program (Done)       Description:   Instruct learner(s) on appropriate technique for monitoring, assisting and/or progressing therapeutic exercises/activities.                  Learning Progress Summary             Patient Eager,  JING, VU by EG at 10/4/2024 1056    Comment: Education on compensatory techniques for ADL's  Education on AE  Education on fall risk prevention and general safety  Education on OT services                         Point: Precautions (Done)       Description:   Instruct learner(s) on prescribed precautions during self-care and functional transfers.                  Learning Progress Summary             Patient Maynorgilda JING VU by EG at 10/4/2024 1056    Comment: Education on compensatory techniques for ADL's  Education on AE  Education on fall risk prevention and general safety  Education on OT services                         Point: Body mechanics (Done)       Description:   Instruct learner(s) on proper positioning and spine alignment during self-care, functional mobility activities and/or exercises.                  Learning Progress Summary             Patient JING [place] VU by EG at 10/4/2024 1056    Comment: Education on compensatory techniques for ADL's  Education on AE  Education on fall risk prevention and general safety  Education on OT services                                         User Key       Initials Effective Dates Name Provider Type Discipline    EG 09/14/22 -  Janessa Good, OT Occupational Therapist OT                  OT Recommendation and Plan  Planned Therapy Interventions (OT): activity tolerance training, functional balance retraining, occupation/activity based interventions, adaptive equipment training, BADL retraining, neuromuscular control/coordination retraining, patient/caregiver education/training, transfer/mobility retraining, strengthening exercise  Therapy Frequency (OT): 5 times/wk  Plan of Care Review  Plan of Care Reviewed With: patient  Progress: no change  Outcome Evaluation: Patient demonstrates need for OT services this date due to high level of pain in RLE causing in ability to perform ADL's and transfers ind; OT services noting impaired balance, endurance, strength and safety  skills; Continue with outlined POC; Ax1w/RW and CAM boot donned; pleasant and cooperative; A&OX4     Time Calculation:   Evaluation Complexity (OT)  Review Occupational Profile/Medical/Therapy History Complexity: expanded/moderate complexity  Assessment, Occupational Performance/Identification of Deficit Complexity: 3-5 performance deficits  Clinical Decision Making Complexity (OT): detailed assessment/moderate complexity  Overall Complexity of Evaluation (OT): moderate complexity     Time Calculation- OT       Row Name 10/04/24 1102 10/04/24 1057          Time Calculation- OT    OT Received On 10/04/24  -EG 10/04/24  -EG     OT Goal Re-Cert Due Date 11/03/24  -EG 11/03/24  -EG        Timed Charges    50499 - OT Therapeutic Activity Minutes 16  -EG --     50741 - OT Self Care/Mgmt Minutes 29  -EG --        Untimed Charges    OT Eval/Re-eval Minutes -- 36  -EG        SNF Occupational Therapy Minutes    Skilled Minutes- OT 45 min  -EG --        Total Minutes    Timed Charges Total Minutes 45  -EG --     Untimed Charges Total Minutes -- 36  -EG      Total Minutes 45  -EG 36  -EG               User Key  (r) = Recorded By, (t) = Taken By, (c) = Cosigned By      Initials Name Provider Type    EG Janessa Good, OT Occupational Therapist                  Therapy Charges for Today       Code Description Service Date Service Provider Modifiers Qty    30742608570 HC OT EVAL MOD COMPLEXITY 3 10/4/2024 Janessa Good OT GO 1    10813340835 HC OT THERAPEUTIC ACT EA 15 MIN 10/4/2024 Janessa Good OT GO 1    46825238843 HC OT SELF CARE/MGMT/TRAIN EA 15 MIN 10/4/2024 Janessa Good OT GO 2                 Janessa Good OT  10/4/2024

## 2024-10-04 NOTE — THERAPY EVALUATION
SNF - Physical Therapy Initial Evaluation and Treatment Note   Yudy     Patient Name: Woodrow Alejandro  : 1950  MRN: [id number]  Today's Date: 10/4/2024      Visit Dx:    ICD-10-CM ICD-9-CM   1. Decreased activities of daily living (ADL)  Z78.9 V49.89   2. Difficulty walking  R26.2 719.7     Patient Active Problem List   Diagnosis    Essential hypertension    Permanent atrial fibrillation    S/P AVR    ICD (implantable cardioverter-defibrillator), dual, in situ    Dermatitis    IFG (impaired fasting glucose)    Mixed hyperlipidemia    Vitamin D deficiency    Medicare annual wellness visit, subsequent    Primary osteoarthritis of both knees    Screening PSA (prostate specific antigen)    Bilateral primary osteoarthritis of knee    Coarse tremors    Weakness generalized    Increased urinary frequency    Bandemia    Achilles tendon rupture    Anemia due to GI blood loss    Physical debility     Past Medical History:   Diagnosis Date    Aortic valve replaced     Arthritis 2018    Atrial fibrillation     Coronary artery disease     Hypertension      Past Surgical History:   Procedure Laterality Date    CARDIAC SURGERY      COLONOSCOPY      COLONOSCOPY N/A 2024    Procedure: COLONOSCOPY WITH HOT/COLD SNARE POLYPECTOMIES, ELEVIEW INJECTION,CLIPX1;  Surgeon: Kurt Mensah MD;  Location: [facility] ENDOSCOPY;  Service: Gastroenterology;  Laterality: N/A;  COLON POLYPS    ENDOSCOPY N/A 2024    Procedure: ESOPHAGOGASTRODUODENOSCOPY WITH BIOPSIES;  Surgeon: Kurt Mensah MD;  Location: [facility] ENDOSCOPY;  Service: Gastroenterology;  Laterality: N/A;  RETAINED FOOD IN STOMACH AND REFLUX ESOPHAGITIS    PACEMAKER IMPLANTATION         PT Assessment (Last 12 Hours)       PT Evaluation and Treatment       Row Name 10/04/24 1500          Physical Therapy Time and Intention    Subjective Information no complaints  -CS     Document Type evaluation;therapy note (daily note)  -CS     Mode of  Treatment individual therapy;physical therapy  -CS     Patient Effort good  -CS     Symptoms Noted During/After Treatment fatigue;increased pain  -CS       Row Name 10/04/24 1500          General Information    Patient Profile Reviewed yes  -CS     Patient Observations alert;cooperative;agree to therapy  -CS     Prior Level of Function independent:;all household mobility;gait;transfer;bed mobility;ADL's  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 He has a walk-in shower with grab bars and seat if needed. No home oxygen  -CS     Existing Precautions/Restrictions fall  CAM boot donned RLE and WBAT  -CS     Barriers to Rehab none identified  -CS       Row Name 10/04/24 1500          Living Environment    Current Living Arrangements home  -CS     People in Home parent(s)  -CS     Primary Care Provided by self  -CS       Row Name 10/04/24 1500          Home Main Entrance    Number of Stairs, Main Entrance one  -CS       Row Name 10/04/24 1500          Stairs Within Home, Primary    Stairs, Within Home, Primary split-level home three sets of steps with 5-8 steps per set  -CS     Stair Railings, Within Home, Primary railings safe and in good condition  -CS       Row Name 10/04/24 1500          Pain    Additional Documentation Pain Scale: FACES Pre/Post-Treatment (Group)  -CS       Row Name 10/04/24 1500          Pain Scale: FACES Pre/Post-Treatment    Pain: FACES Scale, Pretreatment 4-->hurts little more  -CS     Posttreatment Pain Rating 6-->hurts even more  -CS     Pain Location - Side/Orientation Right  -CS     Pain Location - foot;ankle  -CS       Row Name 10/04/24 1500          Cognition    Orientation Status (Cognition) oriented x 4  -CS       Row Name 10/04/24 1500          Range of Motion Comprehensive    General Range of Motion bilateral lower extremity ROM WFL  limited R ankle due to partial tear of achilles tendon  -CS        Row Name 10/04/24 1500          Strength Comprehensive (MMT)    General Manual Muscle Testing (MMT) Assessment lower extremity strength deficits identified  -CS     Comment, General Manual Muscle Testing (MMT) Assessment BLEs assessed at 4-/5 except for R ankle not tested due to partial achilles tendon tear  -       Row Name 10/04/24 1500          Bed Mobility    Bed Mobility bed mobility (all) activities  -     All Activities, Hodgeman (Bed Mobility) verbal cues;minimum assist (75% patient effort)  -CS     Bed Mobility, Safety Issues decreased use of legs for bridging/pushing;decreased use of arms for pushing/pulling  -     Assistive Device (Bed Mobility) bed rails;head of bed elevated  -       Row Name 10/04/24 1500          Transfers    Transfers sit-stand transfer;stand-sit transfer  -       Row Name 10/04/24 1500          Sit-Stand Transfer    Sit-Stand Hodgeman (Transfers) verbal cues;contact guard;minimum assist (75% patient effort)  -     Assistive Device (Sit-Stand Transfers) walker, front-wheeled  -       Row Name 10/04/24 1500          Stand-Sit Transfer    Stand-Sit Hodgeman (Transfers) verbal cues;contact guard;minimum assist (75% patient effort)  -     Assistive Device (Stand-Sit Transfers) walker, front-wheeled  -       Row Name 10/04/24 1500          Gait/Stairs (Locomotion)    Gait/Stairs Locomotion gait/ambulation assistive device  -     Hodgeman Level (Gait) verbal cues;contact guard  -     Assistive Device (Gait) walker, front-wheeled  -CS     Patient was able to Ambulate yes  -CS     Distance in Feet (Gait) 20  x2  -CS     Pattern (Gait) step-to  -CS     Deviations/Abnormal Patterns (Gait) gait speed decreased;stride length decreased;antalgic;weight shifting decreased  -CS     Bilateral Gait Deviations forward flexed posture  -CS     Right Sided Gait Deviations weight shift ability decreased  -       Row Name 10/04/24 1500          Safety Issues,  Functional Mobility    Safety Issues Affecting Function (Mobility) judgment;problem-solving;awareness of need for assistance;positioning of assistive device  -     Impairments Affecting Function (Mobility) balance;endurance/activity tolerance;pain;range of motion (ROM);strength  -       Row Name 10/04/24 1500          Balance    Balance Assessment standing dynamic balance  -     Dynamic Standing Balance verbal cues;minimal assist  -     Balance Interventions supported;sit to stand;static;weight shifting activity  -       Row Name 10/04/24 1500          Motor Skills    Therapeutic Exercise hip;knee;ankle  -       Row Name 10/04/24 1500          Hip (Therapeutic Exercise)    Hip (Therapeutic Exercise) strengthening exercise  -     Hip Strengthening (Therapeutic Exercise) bilateral;flexion;extension;aBduction;aDduction;30 repititions  -       Row Name 10/04/24 1500          Knee (Therapeutic Exercise)    Knee (Therapeutic Exercise) strengthening exercise;isometric exercises  -     Knee Isometrics (Therapeutic Exercise) bilateral;gluteal sets;quad sets;10 repetitions;2 sets;3 second hold  -     Knee Strengthening (Therapeutic Exercise) bilateral;heel slides;marching while seated;SLR (straight leg raise);SAQ (short arc quad);LAQ (long arc quad);30 repititions  -       Row Name 10/04/24 1500          Ankle (Therapeutic Exercise)    Ankle (Therapeutic Exercise) AROM (active range of motion)  -     Ankle AROM (Therapeutic Exercise) bilateral;dorsiflexion;plantarflexion;15 repititions;2 sets  -       Row Name 10/04/24 1500          Plan of Care Review    Plan of Care Reviewed With patient  -     Progress no change  -     Outcome Evaluation Pt presents with limitations that impede their ability to safely and independently transfer and ambulate. The skills of a therapist will be required to safely and effectively implement the following treatment plan to restore maximal level of function.  -        Row Name 10/04/24 1500          Positioning and Restraints    Pre-Treatment Position sitting in chair/recliner  -CS     Post Treatment Position chair  -CS     In Chair call light within reach;sitting;encouraged to call for assist;exit alarm on  -CS       Row Name 10/04/24 1500          Therapy Assessment/Plan (PT)    Rehab Potential (PT) good, to achieve stated therapy goals  -CS     Criteria for Skilled Interventions Met (PT) yes;skilled treatment is necessary  -CS     Therapy Frequency (PT) daily  -CS     Predicted Duration of Therapy Intervention (PT) 30 days  -CS     Problem List (PT) problems related to;balance;mobility;strength;pain;range of motion (ROM)  -CS     Activity Limitations Related to Problem List (PT) unable to ambulate safely;unable to transfer safely  -CS       Row Name 10/04/24 1500          PT Evaluation Complexity    History, PT Evaluation Complexity 1-2 personal factors and/or comorbidities  -CS     Examination of Body Systems (PT Eval Complexity) total of 4 or more elements  -CS     Clinical Presentation (PT Evaluation Complexity) stable  -CS     Clinical Decision Making (PT Evaluation Complexity) low complexity  -CS     Overall Complexity (PT Evaluation Complexity) low complexity  -CS       Row Name 10/04/24 1500          Therapy Plan Review/Discharge Plan (PT)    Therapy Plan Review (PT) evaluation/treatment results reviewed;patient  -CS       Row Name 10/04/24 1500          Physical Therapy Goals    Bed Mobility Goal Selection (PT) bed mobility, PT goal 1  -CS     Transfer Goal Selection (PT) transfer, PT goal 1  -CS     Gait Training Goal Selection (PT) gait training, PT goal 1  -CS     Stairs Goal Selection (PT) stairs, PT goal 1  -CS       Row Name 10/04/24 1500          Bed Mobility Goal 1 (PT)    Activity/Assistive Device (Bed Mobility Goal 1, PT) bed mobility activities, all  -CS     Bee Level/Cues Needed (Bed Mobility Goal 1, PT) independent  -CS     Time Frame (Bed  Mobility Goal 1, PT) long term goal (LTG);30 days  -CS       Row Name 10/04/24 1500          Transfer Goal 1 (PT)    Activity/Assistive Device (Transfer Goal 1, PT) sit-to-stand/stand-to-sit;bed-to-chair/chair-to-bed;walker, rolling  -CS     Halls Level/Cues Needed (Transfer Goal 1, PT) modified independence  -CS     Time Frame (Transfer Goal 1, PT) long term goal (LTG);30 days  -CS       Row Name 10/04/24 1500          Gait Training Goal 1 (PT)    Activity/Assistive Device (Gait Training Goal 1, PT) gait (walking locomotion);assistive device use;walker, rolling  -CS     Halls Level (Gait Training Goal 1, PT) modified independence  -CS     Distance (Gait Training Goal 1, PT) 300  -CS     Time Frame (Gait Training Goal 1, PT) long term goal (LTG);30 days  -CS       Row Name 10/04/24 1500          Stairs Goal 1 (PT)    Activity/Assistive Device (Stairs Goal 1, PT) ascending stairs;descending stairs;using handrail, left;using handrail, right  -CS     Halls Level/Cues Needed (Stairs Goal 1, PT) modified independence  -CS     Number of Stairs (Stairs Goal 1, PT) 10  -CS     Time Frame (Stairs Goal 1, PT) long term goal (LTG);30 days  -CS               User Key  (r) = Recorded By, (t) = Taken By, (c) = Cosigned By      Initials Name Provider Type    CS Fatemeh Unger PT Physical Therapist                  Section G              Section GG  Functional Ability/Goals, Adm (Section GG)  Indoor Mobility - Ambulation, Prior Function (OM1271K): 3. Independent  Stairs, Prior Function (EL5626F): 3. Independent     Mobility, Admission Performance (DV4085)  Roll Left & Right: Mobility Admission Performance (ZB9565C1): partial/moderate assistance (03)  Sit to Lying: Mobility Admission Performance (OG7318F7): partial/moderate assistance (03)  Lying to Sitting, Side of Bed: Mobility Admission Performance (QV2804B9): partial/moderate assistance (03)  Sit to Stand: Mobility Admission Performance (LG6242O5):  partial/moderate assistance (03)  Car Transfer: Mobility Admission Performance (DX6877W4): not attempted due to environmental limitations (10)  Walk 10 Feet: Mobility Admission Performance (PW1000L1): partial/moderate assistance (03)  Walk 50 Feet With Two Turns: Mobility Admission Performance (HW3695S4): not attempted, medical condition/safety concern (88)  Walk 150 Feet: Mobility Admission Performance (WC0709S2): not attempted, medical condition/safety concern (88)  Walk 10 Ft, Uneven Surfaces: Mobility Admission Performance (UF3299H5): not applicable (09)  1 Step/Curb: Mobility Admission Performance (RY8779Q6): not attempted, medical condition/safety concern (88)  4 Steps: Mobility Admission Performance (KA3557E2): not attempted, medical condition/safety concern (88)  12 Steps: Mobility Admission Performance (ZR9383P2): not attempted, medical condition/safety concern (88)  Picking up object: Mobility Admission Performance (SD5070T1): not attempted, medical condition/safety concern (88)  Use a Wheelchair and/or Scooter: Mobility (UF9678Y3): no (0)     RETIRED Mobility (GG), Discharge Goal (QH7461)  Use a Wheelchair and/or Scooter: Mobility (JN5683F8): no (0)     Mobility, Discharge Performance (XC2834)  Use a Wheelchair and/or Scooter: Mobility (SJ5151Q8): no (0)  Physical Therapy Education       Title: PT OT SLP Therapies (Done)       Topic: Physical Therapy (Resolved)       Point: Mobility training (Resolved)       Learner Progress:  Not documented in this visit.              Point: Home exercise program (Resolved)       Learner Progress:  Not documented in this visit.              Point: Body mechanics (Resolved)       Learner Progress:  Not documented in this visit.              Point: Precautions (Resolved)       Learner Progress:  Not documented in this visit.                                  PT Recommendation and Plan  Anticipated Discharge Disposition (PT): home with home health, home with outpatient  therapy services  Planned Therapy Interventions (PT): balance training, bed mobility training, gait training, transfer training, strengthening  Therapy Frequency (PT): daily  Plan of Care Reviewed With: patient  Progress: no change  Outcome Evaluation: Pt presents with limitations that impede their ability to safely and independently transfer and ambulate. The skills of a therapist will be required to safely and effectively implement the following treatment plan to restore maximal level of function.   Outcome Measures       Row Name 10/04/24 1400             How much help from another person do you currently need...    Turning from your back to your side while in flat bed without using bedrails? 2  -CS      Moving from lying on back to sitting on the side of a flat bed without bedrails? 2  -CS      Moving to and from a bed to a chair (including a wheelchair)? 3  -CS      Standing up from a chair using your arms (e.g., wheelchair, bedside chair)? 3  -CS      Climbing 3-5 steps with a railing? 2  -CS      To walk in hospital room? 3  -CS      AM-PAC 6 Clicks Score (PT) 15  -CS      Highest Level of Mobility Goal 4 --> Transfer to chair/commode  -CS         Functional Assessment    Outcome Measure Options AM-PAC 6 Clicks Basic Mobility (PT)  -CS                User Key  (r) = Recorded By, (t) = Taken By, (c) = Cosigned By      Initials Name Provider Type    Fatemeh Malcolm PT Physical Therapist                     Time Calculation:    PT Charges       Row Name 10/04/24 1458             Time Calculation    PT Received On 10/04/24  -      PT Goal Re-Cert Due Date 11/02/24  -         Timed Charges    09096 - PT Therapeutic Exercise Minutes 25  -CS      78686 - Gait Training Minutes  8  -CS      09675 - PT Therapeutic Activity Minutes 7  -CS         Untimed Charges    PT Eval/Re-eval Minutes 33  -CS         SNF Physical Therapy Minutes    Skilled Minutes- PT 40 min  -CS         Total Minutes    Timed Charges Total  Minutes 40  -CS      Untimed Charges Total Minutes 33  -CS       Total Minutes 73  -CS                User Key  (r) = Recorded By, (t) = Taken By, (c) = Cosigned By      Initials Name Provider Type    CS Fatemeh Unger, JONNY Physical Therapist                  Therapy Charges for Today       Code Description Service Date Service Provider Modifiers Qty    51409541074 HC PT THER PROC EA 15 MIN 10/4/2024 Fatemeh Unger, PT GP 2    84316691778 HC GAIT TRAINING EA 15 MIN 10/4/2024 Fatemeh Unger, PT GP 1    23219302220  PT EVAL LOW COMPLEXITY 3 10/4/2024 Fatemeh Unger, PT GP 1            PT G-Codes  Outcome Measure Options: AM-PAC 6 Clicks Basic Mobility (PT)  AM-PAC 6 Clicks Score (PT): 15  AM-PAC 6 Clicks Score (OT): 18    Fatemeh Unger PT  10/4/2024

## 2024-10-04 NOTE — SIGNIFICANT NOTE
Wound Eval / Progress Noted     Jimenes     Patient Name: Woodrow Alejandro  : 1950  MRN: [id number]  Today's Date: 10/4/2024                 Admit Date: 10/3/2024    Visit Dx:    ICD-10-CM ICD-9-CM   1. Decreased activities of daily living (ADL)  Z78.9 V49.89         Physical debility        Past Medical History:   Diagnosis Date    Aortic valve replaced     Arthritis 2018    Atrial fibrillation     Coronary artery disease     Hypertension         Past Surgical History:   Procedure Laterality Date    CARDIAC SURGERY      COLONOSCOPY      COLONOSCOPY N/A 2024    Procedure: COLONOSCOPY WITH HOT/COLD SNARE POLYPECTOMIES, ELEVIEW INJECTION,CLIPX1;  Surgeon: Kurt Mensah MD;  Location: [facility] ENDOSCOPY;  Service: Gastroenterology;  Laterality: N/A;  COLON POLYPS    ENDOSCOPY N/A 2024    Procedure: ESOPHAGOGASTRODUODENOSCOPY WITH BIOPSIES;  Surgeon: Kurt Mensah MD;  Location: [facility] ENDOSCOPY;  Service: Gastroenterology;  Laterality: N/A;  RETAINED FOOD IN STOMACH AND REFLUX ESOPHAGITIS    PACEMAKER IMPLANTATION           Physical Assessment:  Rash 10/03/24 1652 other (see comments) other (see comments) thoracic spine (Active)   Wound Image   10/04/24 1000   Distribution generalized 10/04/24 1000   Color red 10/04/24 1000   Configuration/Shape asymmetric;round 10/04/24 1000   Borders advancing;active;raised 10/04/24 1000   Characteristics itching;raised;scaly 10/04/24 1000   Lesion Size less than 0.5 cm 10/04/24 1000   Care, Rash cleansed with;sterile normal saline 10/04/24 1000       Rash 10/04/24 1240 Left cheek (Active)   Wound Image   10/04/24 1000   Distribution generalized 10/04/24 1000   Color red 10/04/24 1000   Configuration/Shape asymmetric;round 10/04/24 1000   Borders active;irregular;raised 10/04/24 1000   Characteristics itching 10/04/24 1000   Lesion Size less than 0.5 cm 10/04/24 1000   Care, Rash cleansed with;sterile normal saline 10/04/24 1000        Wound  Check / Follow-up:  Patient seen today for a wound consult.  Patient is awake alert and oriented at time of assessment, sitting in chair; patient is agreeable to wound check at this time.    Patient reports that he noticed a rash starting to form to his back over the last 3 days, unsure of cause; reports that the rash to his face had started yesterday  Spoke with primary RN etiology remains unknown.  Scattered raised bumps noted to the entire back with some areas with crusting present.  Patient does complain of itching and discomfort.  Cleanse with normal saline.  Will recommend application of topical steroid to the back and left cheek, along with daily bathing.    Patient denies any other skin break down at the time of assessment, declined to allow for further assessment.     Impression: rash to back and face    Short term goals:  regain skin integrity, skin protection, topical treatment, quality skin care and hygiene.    Nayeli Reyes RN    10/4/2024    12:42 EDT

## 2024-10-04 NOTE — PLAN OF CARE
Goal Outcome Evaluation:  Plan of Care Reviewed With: patient        Progress: no change  Outcome Evaluation: Pt presents with limitations that impede their ability to safely and independently transfer and ambulate. The skills of a therapist will be required to safely and effectively implement the following treatment plan to restore maximal level of function.      Anticipated Discharge Disposition (PT): home with home health, home with outpatient therapy services

## 2024-10-04 NOTE — PLAN OF CARE
Goal Outcome Evaluation:  Plan of Care Reviewed With: patient        Progress: no change  Outcome Evaluation: Patient presents with limitations of decreased functional strength, balance, endurance and safety/insight skills requiring need for skilled OT services to facilitate return to prior level of function with ADLs.      Anticipated Discharge Disposition (OT): home with home health

## 2024-10-05 PROCEDURE — 97110 THERAPEUTIC EXERCISES: CPT

## 2024-10-05 PROCEDURE — 97116 GAIT TRAINING THERAPY: CPT

## 2024-10-05 RX ADMIN — SENNOSIDES AND DOCUSATE SODIUM 2 TABLET: 50; 8.6 TABLET ORAL at 08:16

## 2024-10-05 RX ADMIN — Medication 5 MG: at 20:22

## 2024-10-05 RX ADMIN — FUROSEMIDE 40 MG: 40 TABLET ORAL at 08:16

## 2024-10-05 RX ADMIN — FERROUS SULFATE TAB 325 MG (65 MG ELEMENTAL FE) 325 MG: 325 (65 FE) TAB at 08:16

## 2024-10-05 RX ADMIN — METOPROLOL TARTRATE 100 MG: 50 TABLET, FILM COATED ORAL at 20:33

## 2024-10-05 RX ADMIN — DILTIAZEM HYDROCHLORIDE 120 MG: 30 TABLET, FILM COATED ORAL at 08:16

## 2024-10-05 RX ADMIN — DILTIAZEM HYDROCHLORIDE 120 MG: 30 TABLET, FILM COATED ORAL at 20:22

## 2024-10-05 RX ADMIN — ASPIRIN 325 MG: 325 TABLET ORAL at 08:16

## 2024-10-05 RX ADMIN — CLOBETASOL PROPIONATE CREAM USP, 0.05% 1 APPLICATION: 0.5 CREAM TOPICAL at 10:35

## 2024-10-05 RX ADMIN — PANTOPRAZOLE SODIUM 40 MG: 40 TABLET, DELAYED RELEASE ORAL at 17:22

## 2024-10-05 RX ADMIN — PANTOPRAZOLE SODIUM 40 MG: 40 TABLET, DELAYED RELEASE ORAL at 08:16

## 2024-10-05 RX ADMIN — CLOBETASOL PROPIONATE CREAM USP, 0.05% 1 APPLICATION: 0.5 CREAM TOPICAL at 22:16

## 2024-10-05 RX ADMIN — SENNOSIDES AND DOCUSATE SODIUM 2 TABLET: 50; 8.6 TABLET ORAL at 20:22

## 2024-10-05 NOTE — THERAPY TREATMENT NOTE
SNF - Physical Therapy Treatment Note   Yudy     Patient Name: Woodrow Alejandro  : 1950  MRN: [id number]  Today's Date: 10/5/2024      Visit Dx:    ICD-10-CM ICD-9-CM   1. Decreased activities of daily living (ADL)  Z78.9 V49.89   2. Difficulty walking  R26.2 719.7     Patient Active Problem List   Diagnosis    Essential hypertension    Permanent atrial fibrillation    S/P AVR    ICD (implantable cardioverter-defibrillator), dual, in situ    Dermatitis    IFG (impaired fasting glucose)    Mixed hyperlipidemia    Vitamin D deficiency    Medicare annual wellness visit, subsequent    Primary osteoarthritis of both knees    Screening PSA (prostate specific antigen)    Bilateral primary osteoarthritis of knee    Coarse tremors    Weakness generalized    Increased urinary frequency    Bandemia    Achilles tendon rupture    Anemia due to GI blood loss    Physical debility     Past Medical History:   Diagnosis Date    Aortic valve replaced     Arthritis 2018    Atrial fibrillation     Coronary artery disease     Hypertension      Past Surgical History:   Procedure Laterality Date    CARDIAC SURGERY      COLONOSCOPY      COLONOSCOPY N/A 2024    Procedure: COLONOSCOPY WITH HOT/COLD SNARE POLYPECTOMIES, ELEVIEW INJECTION,CLIPX1;  Surgeon: Kurt Mensah MD;  Location: [facility] ENDOSCOPY;  Service: Gastroenterology;  Laterality: N/A;  COLON POLYPS    ENDOSCOPY N/A 2024    Procedure: ESOPHAGOGASTRODUODENOSCOPY WITH BIOPSIES;  Surgeon: Kurt Mensah MD;  Location: [facility] ENDOSCOPY;  Service: Gastroenterology;  Laterality: N/A;  RETAINED FOOD IN STOMACH AND REFLUX ESOPHAGITIS    PACEMAKER IMPLANTATION         PT Assessment (Last 12 Hours)       PT Evaluation and Treatment       Row Name 10/05/24 0908          Physical Therapy Time and Intention    Document Type therapy note (daily note)  -WM     Mode of Treatment individual therapy;physical therapy  -WM     Patient Effort good  -WM      Symptoms Noted During/After Treatment fatigue  -       Row Name 10/05/24 0908          Sit-Stand Transfer    Sit-Stand Stella (Transfers) contact guard;minimum assist (75% patient effort);verbal cues  -     Assistive Device (Sit-Stand Transfers) walker, front-wheeled  -       Row Name 10/05/24 0908          Stand-Sit Transfer    Stand-Sit Stella (Transfers) contact guard;minimum assist (75% patient effort);verbal cues  -     Assistive Device (Stand-Sit Transfers) walker, front-wheeled  -       Row Name 10/05/24 0908          Gait/Stairs (Locomotion)    Stella Level (Gait) contact guard;verbal cues  -     Assistive Device (Gait) walker, front-wheeled  -     Distance in Feet (Gait) 40  -     Pattern (Gait) 4-point;step-through  -     Deviations/Abnormal Patterns (Gait) gait speed decreased;stride length decreased  -       Row Name 10/05/24 0908          Safety Issues, Functional Mobility    Impairments Affecting Function (Mobility) balance;endurance/activity tolerance;range of motion (ROM);strength  -       Row Name 10/05/24 0908          Hip (Therapeutic Exercise)    Hip (Therapeutic Exercise) isometric exercises  -     Hip Isometrics (Therapeutic Exercise) bilateral;gluteal sets;supine;10 repetitions;3 second hold;2 sets  -     Hip Strengthening (Therapeutic Exercise) bilateral;heel slides;marching while seated;sitting;20 repititions  Manual resistance  -       Row Name 10/05/24 0908          Knee (Therapeutic Exercise)    Knee AROM (Therapeutic Exercise) bilateral;LAQ (long arc quad);sitting;20 repititions  -     Knee Isometrics (Therapeutic Exercise) bilateral;quad sets;supine;10 repetitions;3 second hold;2 sets  -       Row Name 10/05/24 0908          Ankle (Therapeutic Exercise)    Ankle AROM (Therapeutic Exercise) left;dorsiflexion;plantarflexion;supine;20 repititions  -       Row Name 10/05/24 0908          Positioning and Restraints    Pre-Treatment Position  sitting in chair/recliner  -WM     Post Treatment Position chair  -WM     In Chair sitting;call light within reach;encouraged to call for assist;exit alarm on  -WM       Row Name 10/05/24 0908          Progress Summary (PT)    Progress Toward Functional Goals (PT) progress toward functional goals is good  -WM               User Key  (r) = Recorded By, (t) = Taken By, (c) = Cosigned By      Initials Name Provider Type     Dwaine Guzman PTA Physical Therapist Assistant                  Section G              Section GG                       Physical Therapy Education       Title: PT OT SLP Therapies (Done)       Topic: Physical Therapy (Resolved)       Point: Mobility training (Resolved)       Learner Progress:  Not documented in this visit.              Point: Home exercise program (Resolved)       Learner Progress:  Not documented in this visit.              Point: Body mechanics (Resolved)       Learner Progress:  Not documented in this visit.              Point: Precautions (Resolved)       Learner Progress:  Not documented in this visit.                                  PT Recommendation and Plan     Progress Summary (PT)  Progress Toward Functional Goals (PT): progress toward functional goals is good   Outcome Measures       Row Name 10/05/24 0912 10/04/24 1400          How much help from another person do you currently need...    Turning from your back to your side while in flat bed without using bedrails? 2  -WM 2  -CS     Moving from lying on back to sitting on the side of a flat bed without bedrails? 2  -WM 2  -CS     Moving to and from a bed to a chair (including a wheelchair)? 3  -WM 3  -CS     Standing up from a chair using your arms (e.g., wheelchair, bedside chair)? 3  -WM 3  -CS     Climbing 3-5 steps with a railing? 2  -WM 2  -CS     To walk in hospital room? 3  -WM 3  -CS     AM-PAC 6 Clicks Score (PT) 15  -WM 15  -CS     Highest Level of Mobility Goal 4 --> Transfer to chair/commode  -WM 4 -->  Transfer to chair/commode  -        Functional Assessment    Outcome Measure Options -- AM-PAC 6 Clicks Basic Mobility (PT)  -CS               User Key  (r) = Recorded By, (t) = Taken By, (c) = Cosigned By      Initials Name Provider Type    Dwaine Keith PTA Physical Therapist Assistant    Fatemeh Malcolm PT Physical Therapist                     Time Calculation:    PT Charges       Row Name 10/05/24 0907             Time Calculation    PT Received On 10/05/24  -WM         Timed Charges    48406 - PT Therapeutic Exercise Minutes 17  -WM      55005 - Gait Training Minutes  5  -WM      32638 - PT Therapeutic Activity Minutes 5  -WM         Total Minutes    Timed Charges Total Minutes 27  -WM       Total Minutes 27  -WM                User Key  (r) = Recorded By, (t) = Taken By, (c) = Cosigned By      Initials Name Provider Type    Dwaine Keith PTA Physical Therapist Assistant                      PT G-Codes  Outcome Measure Options: AM-PAC 6 Clicks Basic Mobility (PT)  AM-PAC 6 Clicks Score (PT): 15  AM-PAC 6 Clicks Score (OT): 18    Dwaine Guzman PTA  10/5/2024

## 2024-10-05 NOTE — PLAN OF CARE
Goal Outcome Evaluation:  Plan of Care Reviewed With: patient        Progress: improving  Outcome Evaluation: Resident alert and oriented x4. Able to make needs known to staff. Urinal used at bedside. Resident educated to call when urinal needs emptying instead of dumping in garbage can. Denies pain this shift. Call light and personal items within reach. Continue with current plan of care.

## 2024-10-06 LAB
ANION GAP SERPL CALCULATED.3IONS-SCNC: 13.9 MMOL/L (ref 5–15)
BILIRUB UR QL STRIP: NEGATIVE
BUN SERPL-MCNC: 18 MG/DL (ref 8–23)
BUN/CREAT SERPL: 18.6 (ref 7–25)
CALCIUM SPEC-SCNC: 8.1 MG/DL (ref 8.6–10.5)
CHLORIDE SERPL-SCNC: 98 MMOL/L (ref 98–107)
CLARITY UR: CLEAR
CO2 SERPL-SCNC: 23.1 MMOL/L (ref 22–29)
COLOR UR: YELLOW
CREAT SERPL-MCNC: 0.97 MG/DL (ref 0.76–1.27)
CRP SERPL-MCNC: 5.14 MG/DL (ref 0–0.5)
DEPRECATED RDW RBC AUTO: 53.1 FL (ref 37–54)
EGFRCR SERPLBLD CKD-EPI 2021: 82.4 ML/MIN/1.73
ERYTHROCYTE [DISTWIDTH] IN BLOOD BY AUTOMATED COUNT: 15.5 % (ref 12.3–15.4)
ERYTHROCYTE [SEDIMENTATION RATE] IN BLOOD: 58 MM/HR (ref 0–20)
GLUCOSE SERPL-MCNC: 133 MG/DL (ref 65–99)
GLUCOSE UR STRIP-MCNC: NEGATIVE MG/DL
HCT VFR BLD AUTO: 31.7 % (ref 37.5–51)
HGB BLD-MCNC: 10 G/DL (ref 13–17.7)
HGB UR QL STRIP.AUTO: NEGATIVE
INDURATION: 0 MM (ref 0–10)
KETONES UR QL STRIP: NEGATIVE
LEUKOCYTE ESTERASE UR QL STRIP.AUTO: NEGATIVE
Lab: NORMAL
Lab: NORMAL
MCH RBC QN AUTO: 29.6 PG (ref 26.6–33)
MCHC RBC AUTO-ENTMCNC: 31.5 G/DL (ref 31.5–35.7)
MCV RBC AUTO: 93.8 FL (ref 79–97)
NITRITE UR QL STRIP: NEGATIVE
PH UR STRIP.AUTO: 6.5 [PH] (ref 5–8)
PLATELET # BLD AUTO: 192 10*3/MM3 (ref 140–450)
PMV BLD AUTO: 9.5 FL (ref 6–12)
POTASSIUM SERPL-SCNC: 4 MMOL/L (ref 3.5–5.2)
PROCALCITONIN SERPL-MCNC: 0.09 NG/ML (ref 0–0.25)
PROT UR QL STRIP: ABNORMAL
RBC # BLD AUTO: 3.38 10*6/MM3 (ref 4.14–5.8)
SODIUM SERPL-SCNC: 135 MMOL/L (ref 136–145)
SP GR UR STRIP: 1.01 (ref 1–1.03)
TB SKIN TEST: NEGATIVE
UROBILINOGEN UR QL STRIP: ABNORMAL
WBC NRBC COR # BLD AUTO: 12.06 10*3/MM3 (ref 3.4–10.8)

## 2024-10-06 PROCEDURE — 84145 PROCALCITONIN (PCT): CPT | Performed by: PHYSICIAN ASSISTANT

## 2024-10-06 PROCEDURE — 99309 SBSQ NF CARE MODERATE MDM 30: CPT | Performed by: PHYSICIAN ASSISTANT

## 2024-10-06 PROCEDURE — 86140 C-REACTIVE PROTEIN: CPT | Performed by: PHYSICIAN ASSISTANT

## 2024-10-06 PROCEDURE — 81003 URINALYSIS AUTO W/O SCOPE: CPT | Performed by: PHYSICIAN ASSISTANT

## 2024-10-06 PROCEDURE — 85652 RBC SED RATE AUTOMATED: CPT | Performed by: PHYSICIAN ASSISTANT

## 2024-10-06 PROCEDURE — 80048 BASIC METABOLIC PNL TOTAL CA: CPT | Performed by: PHYSICIAN ASSISTANT

## 2024-10-06 PROCEDURE — 85027 COMPLETE CBC AUTOMATED: CPT | Performed by: PHYSICIAN ASSISTANT

## 2024-10-06 RX ORDER — DIPHENHYDRAMINE HCL 25 MG
50 CAPSULE ORAL EVERY 8 HOURS PRN
Status: ACTIVE | OUTPATIENT
Start: 2024-10-06 | End: 2024-10-20

## 2024-10-06 RX ADMIN — DILTIAZEM HYDROCHLORIDE 120 MG: 30 TABLET, FILM COATED ORAL at 09:49

## 2024-10-06 RX ADMIN — Medication 5 MG: at 20:06

## 2024-10-06 RX ADMIN — METOPROLOL TARTRATE 100 MG: 50 TABLET, FILM COATED ORAL at 09:48

## 2024-10-06 RX ADMIN — LISINOPRIL 10 MG: 2.5 TABLET ORAL at 09:49

## 2024-10-06 RX ADMIN — CLOBETASOL PROPIONATE CREAM USP, 0.05% 1 APPLICATION: 0.5 CREAM TOPICAL at 21:41

## 2024-10-06 RX ADMIN — SENNOSIDES AND DOCUSATE SODIUM 2 TABLET: 50; 8.6 TABLET ORAL at 09:49

## 2024-10-06 RX ADMIN — SENNOSIDES AND DOCUSATE SODIUM 2 TABLET: 50; 8.6 TABLET ORAL at 20:06

## 2024-10-06 RX ADMIN — METOPROLOL TARTRATE 100 MG: 50 TABLET, FILM COATED ORAL at 20:06

## 2024-10-06 RX ADMIN — FUROSEMIDE 40 MG: 40 TABLET ORAL at 09:49

## 2024-10-06 RX ADMIN — PANTOPRAZOLE SODIUM 40 MG: 40 TABLET, DELAYED RELEASE ORAL at 09:49

## 2024-10-06 RX ADMIN — PANTOPRAZOLE SODIUM 40 MG: 40 TABLET, DELAYED RELEASE ORAL at 17:44

## 2024-10-06 RX ADMIN — DILTIAZEM HYDROCHLORIDE 120 MG: 30 TABLET, FILM COATED ORAL at 20:06

## 2024-10-06 RX ADMIN — ASPIRIN 325 MG: 325 TABLET ORAL at 09:49

## 2024-10-06 RX ADMIN — CLOBETASOL PROPIONATE CREAM USP, 0.05% 1 APPLICATION: 0.5 CREAM TOPICAL at 10:15

## 2024-10-06 RX ADMIN — FERROUS SULFATE TAB 325 MG (65 MG ELEMENTAL FE) 325 MG: 325 (65 FE) TAB at 09:49

## 2024-10-06 NOTE — PLAN OF CARE
Goal Outcome Evaluation:  Plan of Care Reviewed With: patient        Progress: improving  Outcome Evaluation: Resident alert, oriented, able to make needs known to staff. transfers with assist of one to chair. uses urinal at BS independently. Some incontinence though d/t dexterity issues. PA in for f/u visit, realan voice his concern over having incont. issues. New orders received for bladderscan and UA. Specimen sent to lab, pendoing and bladderscan revealed 157cc of urine in bladder. Denies pain this shift. Continues to present with rash to back, neck, shoulders and ears, stable. Treatment as ordered. Resident pleasant and cooperative.

## 2024-10-06 NOTE — PROGRESS NOTES
[facility]   Hospitalist Progress Note       Patient Name: Woodrow Alejandro  : 1950  MRN: [id number]  Primary Care Physician: Juan Perez MD  Date of admission: 10/3/2024  Today's Date: 10/6/2024  Room / Bed:   310/1  Subjective   Chief Complaint: Weakness after recent hospitalization     HPI:  Woodrow Alejandro is a 73 y.o. male recently hospitalized for anemia and also right Achilles tendon tear.  During hospitalization, gastroenterology, Dr. Mensah, performed EGD and colonoscopy.  EGD showed esophagitis with bleeding lower third of esophagus.  PPI recommended.  2 polyps (tubular adenomas) were removed on colonoscopy.  Orthopedist, Dr. Zuniga, was consulted and recommended gentle ROM, weightbearing as tolerated, PT, and walking boot for his Achilles tendon tear.  His home aspirin was subsequently resumed on 10/3/24.  He has been a good candidate for more rehab and has now been admitted to our SNF for more therapy.  He will be on PPI BID x 2 weeks, then transition to daily.       Interval Followup: 10/6/2024    Up in chair.  Pleasant, appropriate, amiable.  R ankle pain and range of motion improving.  No set backs noted in rehab.  Tolerating current medical regimen.  Vital signs within normal parameters.  On room air.  Asking about urinary frequency. Not new.   Bladder scan post void w only 100cc.    Will check U/A...... neg for infection  Recent CT did not show any prostamegaly  WBC noted to be 12 (down from 19)      REVIEW OF SYSTEMS:   Weakness  Objective   Temp:  [98.4 °F (36.9 °C)-98.6 °F (37 °C)] 98.4 °F (36.9 °C)  Heart Rate:  [92-94] 92  Resp:  [18] 18  BP: (102-118)/(55-62) 113/59  PHYSICAL EXAM   CON: WN. WD. NAD.   NECK:  No thyromegaly. No stridor.   RESP:  CTA. No wheezes.   CV:  Rhythm irregular. Rate WNL. No murmur. Trace plus bilateral pretibial edema.  Left chest AICD noted   GI:  Soft and nontender. Nondistended.    EXT: Peripheral pulses intact.  No  cyanosis.  PSYCH:  Alert. Oriented. Normal affect and mood.  NEURO:  No dysarthria or aphasia.   SKIN: Bilateral chronic venous stasis changes noted.  Results from last 7 days   Lab Units 10/06/24  0450 10/03/24  0512 10/02/24  0513 09/30/24  0548   WBC 10*3/mm3 12.06* 13.36* 13.30* 15.68*   HEMOGLOBIN g/dL 10.0* 10.3* 10.3* 10.2*   HEMATOCRIT % 31.7* 32.2* 32.4* 31.1*   PLATELETS 10*3/mm3 192 224 233 183     Results from last 7 days   Lab Units 10/06/24  0450 10/03/24  0512 10/02/24  0513 09/30/24  0548   SODIUM mmol/L 135* 134* 135* 133*   POTASSIUM mmol/L 4.0 4.0 3.0* 3.3*   CO2 mmol/L 23.1 30.4* 33.5* 26.5   CHLORIDE mmol/L 98 94* 92* 94*   ANION GAP mmol/L 13.9 9.6 9.5 12.5   BUN mg/dL 18 28* 28* 22   CREATININE mg/dL 0.97 0.77 0.77 0.86   GLUCOSE mg/dL 133* 137* 123* 139*         COMPLEXITY OF DATA / DECISION MAKING     []  Moderate: One acute illness or mild exacerbation of chronic or 2 stable chronic or tx side effects   []  High:  Severe acute illness or severe exacerbation of chronic - potential for major debility / life threatening         I have personally reviewed the results from the time of this admission to 10/6/2024 14:51 EDT:  []  Laboratory:  []  Microbiology: []  Radiology:  []  Telemetry:   []  Cardiology/Vascular:  []  Pathology:  []  Prior external records:  []  Independent historian provided additional details:      []  Discussed case with specialists:    []  Independent interpretation of ECG/Imaging etc:             []  Moderate: Rx management, low risk surgery, suboptimal social situation   []  High:  Rx with close monitoring for toxicity, mod-high risk surgery, DNR decision, Comfort initiated, IV pain meds    Assessment / Plan   Assessment:     Weakness after recent hospitalization  Recent anemia  Recent EGD 9/30/24 by Dr. Mensah showing esophagitis (PPI recommended)  Recent colonoscopy 9/30/24 by Dr. Mensah with polypectomy  Recent right achilles tendon tear  Nonischemic cardiomyopathy,  EF improved  A-fib (on aspirin only)  Bioprosthetic aortic valve replacement (Dr. Newman)  HTN  GERD  Anemia  CAD/AICD  Noted on CT: Possible ankylosing spondylitis   Urinary frequency      Plan:     Continue daily physical therapy  Asking about urinary frequency. Bladder scan post void w only 100cc.  Check U/A...... neg for infection.  Reviewed recent CT which did not show any prostamegaly.  WBC noted to be 12 (down from 19)  Monitor CBC periodically.  Monitor for any signs of infection.  Home aspirin has been resumed  Continue iron supplement  Continue PPI  Where boot when out of bed.  WBAT.  Gentle range of motion right ankle.  Additional recommendations pending clinical courseAssessment:    VTE Prophylaxis:  No VTE prophylaxis order currently exists.    CODE STATUS:      Level Of Support Discussed With: Patient  Code Status (Patient has no pulse and is not breathing): CPR (Attempt to Resuscitate)  Medical Interventions (Patient has pulse or is breathing): Full Support       Electronically signed by CHANEL Vargas, 10/06/24, 2:51 PM EDT.

## 2024-10-06 NOTE — PLAN OF CARE
Goal Outcome Evaluation:  Plan of Care Reviewed With: patient        Progress: improving  Outcome Evaluation: Resident alert, oriented, able to make needs known to staff. Transfers with assist of one to chair/bed, uses urinal independently at BS. Difficulty with dexterity of holding urinal and keeping it steady x2 today and urine was spilled. incontinence care completed. continues to present with rash from neckears to buttock and front of thighs. cream applied as ordered. PA aware. Resident pleasant and cooperative.

## 2024-10-06 NOTE — PLAN OF CARE
Goal Outcome Evaluation:  Plan of Care Reviewed With: patient        Progress: improving  Outcome Evaluation: Resident alert and oriented x 4. Able to make needs known to staff. Used urinal this shift. Had many urinal spills. Educated to call for assistance as needed with urinal. Verbalizes understanding. Denies pain this shift. Transfers x1 assist from bed to chair with walker and gaitbelt. Call light, urinal, and personal items within reach. Continue with current plan of care.

## 2024-10-07 LAB — SARS-COV-2 RNA RESP QL NAA+PROBE: NOT DETECTED

## 2024-10-07 PROCEDURE — 97110 THERAPEUTIC EXERCISES: CPT

## 2024-10-07 PROCEDURE — 87635 SARS-COV-2 COVID-19 AMP PRB: CPT | Performed by: PHYSICIAN ASSISTANT

## 2024-10-07 PROCEDURE — 97530 THERAPEUTIC ACTIVITIES: CPT

## 2024-10-07 PROCEDURE — 97112 NEUROMUSCULAR REEDUCATION: CPT

## 2024-10-07 RX ORDER — OXYCODONE HYDROCHLORIDE 5 MG/1
5 TABLET ORAL EVERY 6 HOURS PRN
Status: ACTIVE | OUTPATIENT
Start: 2024-10-07 | End: 2024-10-21

## 2024-10-07 RX ADMIN — METOPROLOL TARTRATE 100 MG: 50 TABLET, FILM COATED ORAL at 20:11

## 2024-10-07 RX ADMIN — CLOBETASOL PROPIONATE CREAM USP, 0.05% 1 APPLICATION: 0.5 CREAM TOPICAL at 10:19

## 2024-10-07 RX ADMIN — LISINOPRIL 10 MG: 2.5 TABLET ORAL at 09:12

## 2024-10-07 RX ADMIN — FERROUS SULFATE TAB 325 MG (65 MG ELEMENTAL FE) 325 MG: 325 (65 FE) TAB at 09:13

## 2024-10-07 RX ADMIN — METOPROLOL TARTRATE 100 MG: 50 TABLET, FILM COATED ORAL at 09:13

## 2024-10-07 RX ADMIN — ASPIRIN 325 MG: 325 TABLET ORAL at 09:12

## 2024-10-07 RX ADMIN — Medication 5 MG: at 20:11

## 2024-10-07 RX ADMIN — DILTIAZEM HYDROCHLORIDE 120 MG: 30 TABLET, FILM COATED ORAL at 20:11

## 2024-10-07 RX ADMIN — PANTOPRAZOLE SODIUM 40 MG: 40 TABLET, DELAYED RELEASE ORAL at 17:21

## 2024-10-07 RX ADMIN — CLOBETASOL PROPIONATE CREAM USP, 0.05% 1 APPLICATION: 0.5 CREAM TOPICAL at 21:52

## 2024-10-07 RX ADMIN — PANTOPRAZOLE SODIUM 40 MG: 40 TABLET, DELAYED RELEASE ORAL at 09:13

## 2024-10-07 RX ADMIN — DILTIAZEM HYDROCHLORIDE 120 MG: 30 TABLET, FILM COATED ORAL at 09:12

## 2024-10-07 RX ADMIN — FUROSEMIDE 40 MG: 40 TABLET ORAL at 09:13

## 2024-10-07 NOTE — CONSULTS
"Nutrition Services    Patient Name: Woodrow Alejandro  YOB: 1950  MRN: [id number]  Admission date: 10/3/2024      CLINICAL NUTRITION ASSESSMENT      Reason for Assessment  Nursing Facility Admission   H&P:  Past Medical History:   Diagnosis Date    Aortic valve replaced     Arthritis 2018    Atrial fibrillation     Coronary artery disease     Hypertension         Current Problems:   Active Hospital Problems    Diagnosis     **Physical debility         Nutrition/Diet History         Narrative   Upon my visit, patient sitting in recliner, daughter in room. Pt reports NKFA. Denies difficulties c/s. Pt has his natural teeth, no reported mouth pain. No N/V/D/C. BM+. Drinks ONS on occasion, declines ordering at this time. EMR reports 100% meal intakes. No recent weight loss noted. No acute nutrition concerns or interventions at this time.     Section K completed.     Anthropometrics        Current Height, Weight Height: 182.9 cm (72\")  Weight: (!) 141 kg (310 lb 13.6 oz)   Current BMI Body mass index is 42.16 kg/m².   BMI Classification Obese Class III   % %   Adjusted Body Weight (ABW) N/A   Weight Hx  Wt Readings from Last 30 Encounters:   10/07/24 0448 (!) 141 kg (310 lb 13.6 oz)   10/03/24 1606 (!) 139 kg (306 lb)   09/26/24 1104 (!) 141 kg (309 lb 15.5 oz)   09/26/24 1103 (!) 145 kg (319 lb 10.7 oz)   09/19/24 1005 (!) 145 kg (319 lb)   09/05/24 1049 (!) 145 kg (319 lb)   07/18/24 1348 (!) 155 kg (341 lb)   07/02/24 1008 (!) 155 kg (341 lb)   06/24/24 1000 (!) 155 kg (341 lb 9.6 oz)   05/06/24 1401 (!) 154 kg (339 lb)   03/04/24 1413 (!) 154 kg (340 lb)   01/19/24 1138 (!) 155 kg (341 lb 9.6 oz)   12/12/23 1225 (!) 153 kg (336 lb 9.6 oz)   10/31/23 0938 (!) 154 kg (339 lb)   01/17/23 1137 (!) 160 kg (352 lb)   08/23/21 1531 (!) 145 kg (320 lb)   08/23/21 1427 (!) 145 kg (320 lb)   10/19/20 1014 (!) 147 kg (324 lb 9.6 oz)          Wt Change Observation -8.5% x 1 year, not clinically " significant     Estimated/Assessed Needs  Estimated Needs based on: Ideal Body Weight       Energy Requirements 30 kcal/kg    EST Needs (kcal/day) 2328 kcal       Protein Requirements 1.0-1.2 g/kg   EST Daily Needs (g/day) 78-94 g       Fluid Requirements 1 ml/kcal    Estimated Needs (mL/day) 2328 ml     Labs/Medications         Pertinent Labs Reviewed.   Results from last 7 days   Lab Units 10/06/24  0450 10/03/24  0512 10/02/24  0513   SODIUM mmol/L 135* 134* 135*   POTASSIUM mmol/L 4.0 4.0 3.0*   CHLORIDE mmol/L 98 94* 92*   CO2 mmol/L 23.1 30.4* 33.5*   BUN mg/dL 18 28* 28*   CREATININE mg/dL 0.97 0.77 0.77   CALCIUM mg/dL 8.1* 8.4* 8.7   BILIRUBIN mg/dL  --  0.5 0.5   ALK PHOS U/L  --  60 71   ALT (SGPT) U/L  --  15 12   AST (SGOT) U/L  --  19 15   GLUCOSE mg/dL 133* 137* 123*     Results from last 7 days   Lab Units 10/06/24  0450 10/03/24  0512 10/02/24  0513   MAGNESIUM mg/dL  --  2.2 2.4   PHOSPHORUS mg/dL  --  3.6 4.2   HEMOGLOBIN g/dL 10.0* 10.3* 10.3*   HEMATOCRIT % 31.7* 32.2* 32.4*     COVID19   Date Value Ref Range Status   10/07/2024 Not Detected Not Detected - Ref. Range Final     Lab Results   Component Value Date    HGBA1C 6.20 (H) 09/05/2024         Pertinent Medications Reviewed.     Malnutrition Severity Assessment              Nutrition Diagnosis         Nutrition Dx Problem 1 No nutrition diagnosis at this time.     Nutrition Intervention           Current Nutrition Orders & Evaluation of Intake       Current PO Diet Diet: Regular/House; Fluid Consistency: Thin (IDDSI 0)   Supplement No active supplement orders           Nutrition Intervention/Prescription        No nutrition interventions indicated at this time.        Medical Nutrition Therapy/Nutrition Education          Learner     Readiness Patient and Family  Acceptance     Method     Response Explanation  Verbalizes understanding     Monitor/Evaluation        Monitor Per protocol, PO intake, Pertinent labs, Weight, POC/GOC      Nutrition Discharge Plan         To be determined     Electronically signed by:  Christina Sampson RD  10/07/24 13:37 EDT

## 2024-10-07 NOTE — THERAPY TREATMENT NOTE
SNF - Occupational Therapy Treatment Note   Yudy    Patient Name: Woodrow Alejandro  : 1950    MRN: [id number]                              Today's Date: 10/7/2024       Admit Date: 10/3/2024    Visit Dx:     ICD-10-CM ICD-9-CM   1. Decreased activities of daily living (ADL)  Z78.9 V49.89   2. Difficulty walking  R26.2 719.7     Patient Active Problem List   Diagnosis    Essential hypertension    Permanent atrial fibrillation    S/P AVR    ICD (implantable cardioverter-defibrillator), dual, in situ    Dermatitis    IFG (impaired fasting glucose)    Mixed hyperlipidemia    Vitamin D deficiency    Medicare annual wellness visit, subsequent    Primary osteoarthritis of both knees    Screening PSA (prostate specific antigen)    Bilateral primary osteoarthritis of knee    Coarse tremors    Weakness generalized    Increased urinary frequency    Bandemia    Achilles tendon rupture    Anemia due to GI blood loss    Physical debility     Past Medical History:   Diagnosis Date    Aortic valve replaced     Arthritis 2018    Atrial fibrillation     Coronary artery disease     Hypertension      Past Surgical History:   Procedure Laterality Date    CARDIAC SURGERY      COLONOSCOPY      COLONOSCOPY N/A 2024    Procedure: COLONOSCOPY WITH HOT/COLD SNARE POLYPECTOMIES, ELEVIEW INJECTION,CLIPX1;  Surgeon: Kurt Mensah MD;  Location: [facility];  Service: Gastroenterology;  Laterality: N/A;  COLON POLYPS    ENDOSCOPY N/A 2024    Procedure: ESOPHAGOGASTRODUODENOSCOPY WITH BIOPSIES;  Surgeon: Kurt Mensah MD;  Location: [facility] ENDOSCOPY;  Service: Gastroenterology;  Laterality: N/A;  RETAINED FOOD IN STOMACH AND REFLUX ESOPHAGITIS    PACEMAKER IMPLANTATION        General Information       Row Name 10/07/24 1122          OT Time and Intention    Document Type therapy note (daily note)  -EG     Mode of Treatment individual therapy;occupational therapy  -EG       Row Name 10/07/24 1122           General Information    Existing Precautions/Restrictions fall  CAM boot donned on RLE WBAT  -EG       Row Name 10/07/24 1122          Cognition    Orientation Status (Cognition) oriented x 4  -EG       Row Name 10/07/24 1122          Safety Issues, Functional Mobility    Impairments Affecting Function (Mobility) balance;endurance/activity tolerance;range of motion (ROM);strength  -EG               User Key  (r) = Recorded By, (t) = Taken By, (c) = Cosigned By      Initials Name Provider Type    EG Janessa Good, JOSELIN Occupational Therapist                     Mobility/ADL's       Row Name 10/07/24 1122          Bed Mobility    Comment, (Bed Mobility) Up in chair upon OT arrival  -EG       Row Name 10/07/24 1122          Transfers    Transfers sit-stand transfer;stand-sit transfer  -EG     Comment, (Transfers) transfer in and out recliner; multiple chairs with arms in therapy gym all using RW and CAM boot donned RLE  -EG       Row Name 10/07/24 1122          Sit-Stand Transfer    Sit-Stand [place] (Transfers) contact guard  -EG     Assistive Device (Sit-Stand Transfers) walker, front-wheeled  -EG       Row Name 10/07/24 1122          Stand-Sit Transfer    Stand-Sit [place] (Transfers) contact guard  -EG     Assistive Device (Stand-Sit Transfers) walker, front-wheeled  -EG       Row Name 10/07/24 1122          Functional Mobility    Functional Mobility- Ind. Level contact guard assist;verbal cues required;nonverbal cues required (demo/gesture)  -EG     Functional Mobility- Device walker, front-wheeled  -EG     Functional Mobility- Comment Functional mobility performed to and from therapy gym using RW; short functional distances in gym no LOB; improved tolerance noted  -EG       Row Name 10/07/24 1122          Mobility    Extremity Weight-bearing Status right lower extremity  -EG     Right Lower Extremity (Weight-bearing Status) weight-bearing as tolerated (WBAT)  -EG               User Key  (r) =  Recorded By, (t) = Taken By, (c) = Cosigned By      Initials Name Provider Type    EG Janessa Good OT Occupational Therapist                   Obj/Interventions       Row Name 10/07/24 1124          Vision Assessment/Intervention    Visual Impairment/Limitations WFL  -EG       Row Name 10/07/24 1124          Shoulder (Therapeutic Exercise)    Shoulder (Therapeutic Exercise) strengthening exercise  -EG     Shoulder Strengthening (Therapeutic Exercise) bilateral;flexion;extension;scapular stabilization;horizontal aBduction/aDduction;external rotation;internal rotation;3 sets;15 repititions;sitting  4# dowel may[place]  -EG       Row Name 10/07/24 1124          Elbow/Forearm (Therapeutic Exercise)    Elbow/Forearm (Therapeutic Exercise) strengthening exercise  -EG     Elbow/Forearm Strengthening (Therapeutic Exercise) bilateral;flexion;extension;15 repititions;3 sets;sitting  4# dowel may[place]  -EG       Row Name 10/07/24 1124          Motor Skills    Motor Skills functional endurance  -EG     Functional Endurance fair on room air during mobility, exercise and balance training  -EG     Therapeutic Exercise shoulder;elbow/forearm;aerobic  -EG       Row Name 10/07/24 1124          Balance    Balance Interventions standing;sit to stand;supported;static;dynamic;weight shifting activity;occupation based/functional task;UE activity with balance activity  -EG     Comment, Balance Patient able to perform unsupported static standing with table top task 3:00 no LOb; BUE support standing alternating marches 2x15 no LOB; Patient with weight shifting laterally R/L no task performance 2x for 1-2 minutes  -EG       Row Name 10/07/24 1124          Aerobic Exercise    Type (Aerobic Exercise) arm bike  -EG     Comment, Aerobic Exercise (Therapeutic Exercise) Omnicycle performed 15:00 cardiac interval setting no rest break  -EG               User Key  (r) = Recorded By, (t) = Taken By, (c) = Cosigned By      Initials Name Provider Type    EG  Janessa Good, JOSELIN Occupational Therapist                   Goals/Plan    No documentation.                  Clinical Impression       Row Name 10/07/24 1128          Pain Assessment    Pretreatment Pain Rating 0/10 - no pain  -EG     Posttreatment Pain Rating 7/10  -EG     Pain Location - Side/Orientation Right  -EG       Row Name 10/07/24 1128          Plan of Care Review    Plan of Care Reviewed With patient  -EG     Progress improving  -EG     Outcome Evaluation Pleasant and cooperative; Limited complaints of pain unless standing with pressure on RLE; OT services noting improvements in endurance, balance and strength when performing mobility and transfers; OT will continue with POC; Ax1 w/RW and CAM boot donned  -EG       Row Name 10/07/24 1128          Therapy Assessment/Plan (OT)    Rehab Potential (OT) good, to achieve stated therapy goals  -EG     Criteria for Skilled Therapeutic Interventions Met (OT) yes;meets criteria;skilled treatment is necessary  -EG     Therapy Frequency (OT) 5 times/wk  -EG       Row Name 10/07/24 1128          Therapy Plan Review/Discharge Plan (OT)    Anticipated Discharge Disposition (OT) home with home health  -EG       Row Name 10/07/24 1128          Positioning and Restraints    Pre-Treatment Position sitting in chair/recliner  -EG     Post Treatment Position chair  -EG     In Chair reclined;sitting;call light within reach;encouraged to call for assist;exit alarm on  -EG               User Key  (r) = Recorded By, (t) = Taken By, (c) = Cosigned By      Initials Name Provider Type    EG Janessa Good, JOSELIN Occupational Therapist                   Outcome Measures       Row Name 10/07/24 1133          How much help from another is currently needed...    Putting on and taking off regular lower body clothing? 2  -EG     Bathing (including washing, rinsing, and drying) 2  -EG     Toileting (which includes using toilet bed pan or urinal) 3  -EG     Putting on and taking off regular  upper body clothing 3  -EG     Taking care of personal grooming (such as brushing teeth) 4  -EG     Eating meals 4  -EG     AM-PAC 6 Clicks Score (OT) 18  -EG       Row Name 10/07/24 0032          How much help from another person do you currently need...    Turning from your back to your side while in flat bed without using bedrails? 3  -TM     Moving from lying on back to sitting on the side of a flat bed without bedrails? 2  -TM     Moving to and from a bed to a chair (including a wheelchair)? 3  -TM     Standing up from a chair using your arms (e.g., wheelchair, bedside chair)? 3  -TM     Climbing 3-5 steps with a railing? 2  -TM     To walk in hospital room? 3  -TM     AM-PAC 6 Clicks Score (PT) 16  -TM     Highest Level of Mobility Goal 5 --> Static standing  -TM       Row Name 10/07/24 1133          Functional Assessment    Outcome Measure Options AM-[facility] 6 Clicks Daily Activity (OT);Optimal Instrument  -EG       Row Name 10/07/24 1133          Optimal Instrument    Optimal Instrument Optimal - 3  -EG     Bending/Stooping 3  -EG     Standing 2  -EG     Reaching 1  -EG               User Key  (r) = Recorded By, (t) = Taken By, (c) = Cosigned By      Initials Name Provider Type    TM Jenny Barba, RN Registered Nurse    EG Janessa Good OT Occupational Therapist                  Section G  Mobility  Bed mobility - self performance: limited assistance (staff provide guided maneuvering of limbs or other non-weight bearing assistance)  Bed mobility support/assistance: One person assist  Transfer - self performance: limited assistance (staff provide guided maneuvering of limbs or other non-weight bearing assistance)  Transfer support/assistance: One person assist  Walking in room - self performance: limited assistance (staff provide guided maneuvering of limbs or other non-weight bearing assistance)  Walking in room support/assistance: One person assist  Walking in corridors/hallway - self performance: limited  assistance (staff provide guided maneuvering of limbs or other non-weight bearing assistance)  Walking in corridors/hallway support/assistance: One person assist  Locomotion on unit - self performance: activity did not occur  Locomotion on unit support/assistance: Activity did not occur  Locomotion off unit - self performance: activity did not occur  Locomotion off unit support/assistance: Activity did not occur  Dressing - self performance: limited assistance (staff provide guided maneuvering of limbs or other non-weight bearing assistance)  Dressing support/assistance: One person assist  Eating - self performance: independent  Eating support/assistance: Setup help only  Toileting - self performance: limited assistance (staff provide guided maneuvering of limbs or other non-weight bearing assistance)  Toileting support/assistance: One person assist  Personal hygiene - self performance: limited assistance (staff provide guided maneuvering of limbs or other non-weight bearing assistance)  Personal hygiene support/assistance: One person assist  Bathing  Bathing - self performance: Physical help with bathing (exclude washing back and hair for patient)  Bathing support/assistance: One person assist  Balance  Balance during transitions & walking: Not steady, requires assist to steady  Moving from seated to standing position: Not steady, requires assist to steady  Walking: Not steady, requires assist to steady  Turning around while walking: Not steady, requires assist to steady  Moving on and off toilet: Not steady, requires assist to steady  Surface-to-surface transfer: Not steady, requires assist to steady  Mobility devices: None were used  Range of Motion  Upper Extremity: No impairment  Lower Extremity: Impairment on one side  Section GG  Functional Ability/Goals, Adm (Section GG)  Self Care, Prior Functioning (CG3720H): 3. Independent  Functional Cognition, Prior Functioning (WG9908I): 3. Independent  Prior Device  Use (EN7987): none of the above (Z)  Upper Extremity Range of Motion (LD9746T): No impairment  Lower Extremity Range of Motion (ZG8834A): Impairment on one side  Self Care, Admission (Section GG)  Eating: Self-Care Admission Performance (YS1344E3): setup or clean-up assistance (05)  Oral Hygiene: Self-Care Admission Performance (EY1736D0): setup or clean-up assistance (05)  Toileting Hygiene: Self-Care Admission Performance (ZG3413K5): partial/moderate assistance (03)  Shower/Bathe Self: Self-Care Admission Performance (FP2498R3): partial/moderate assistance (03)  Upper Body Dressing: Self-Care Admission Performance (SD4353P4): setup or clean-up assistance (05)  Lower Body Dressing: Self-Care Admission Performance (OQ4173Y4): partial/moderate assistance (03)  Putting On/Taking Off Footwear: Self-Care Admission Performance (BC4817C9): partial/moderate assistance (03)  Personal Hygiene: Self-Care Admission Performance (OH2383X6): supervision or touching assistance (04)  Mobility, Admission Performance (HW6121)  Chair/Bed-Chair Transfer: Mobility Admission Performance (AB2426F5): partial/moderate assistance (03)  Toilet Transfer: Mobility Admission Performance (PZ5972I0): partial/moderate assistance (03)  Tub/shower Transfer: Mobility Admission Performance (OY1990GU4): partial/moderate assistance (03)                Occupational Therapy Education       Title: PT OT SLP Therapies (Done)       Topic: Occupational Therapy (Done)       Point: ADL training (Done)       Description:   Instruct learner(s) on proper safety adaptation and remediation techniques during self care or transfers.   Instruct in proper use of assistive devices.                  Learning Progress Summary             Patient JING Moulton, VU by EG at 10/4/2024 3665    Comment: Education on compensatory techniques for ADL's  Education on AE  Education on fall risk prevention and general safety  Education on OT services                         Point: Home  exercise program (Done)       Description:   Instruct learner(s) on appropriate technique for monitoring, assisting and/or progressing therapeutic exercises/activities.                  Learning Progress Summary             Patient JING Moulton VU by ROMANA at 10/4/2024 1056    Comment: Education on compensatory techniques for ADL's  Education on AE  Education on fall risk prevention and general safety  Education on OT services                         Point: Precautions (Done)       Description:   Instruct learner(s) on prescribed precautions during self-care and functional transfers.                  Learning Progress Summary             JING Palacio VU by ROMANA at 10/4/2024 1056    Comment: Education on compensatory techniques for ADL's  Education on AE  Education on fall risk prevention and general safety  Education on OT services                         Point: Body mechanics (Done)       Description:   Instruct learner(s) on proper positioning and spine alignment during self-care, functional mobility activities and/or exercises.                  Learning Progress Summary             JING Palacio VU by ROMANA at 10/4/2024 1056    Comment: Education on compensatory techniques for ADL's  Education on AE  Education on fall risk prevention and general safety  Education on OT services                                         User Key       Initials Effective Dates Name Provider Type Discipline    EG 09/14/22 -  Janessa Good, OT Occupational Therapist OT                  OT Recommendation and Plan  Planned Therapy Interventions (OT): activity tolerance training, functional balance retraining, occupation/activity based interventions, adaptive equipment training, BADL retraining, neuromuscular control/coordination retraining, patient/caregiver education/training, transfer/mobility retraining, strengthening exercise  Therapy Frequency (OT): 5 times/wk  Plan of Care Review  Plan of Care Reviewed With: patient  Progress:  improving  Outcome Evaluation: Pleasant and cooperative; Limited complaints of pain unless standing with pressure on RLE; OT services noting improvements in endurance, balance and strength when performing mobility and transfers; OT will continue with POC; Ax1 w/RW and CAM boot donned     Time Calculation:   Evaluation Complexity (OT)  Review Occupational Profile/Medical/Therapy History Complexity: expanded/moderate complexity  Assessment, Occupational Performance/Identification of Deficit Complexity: 3-5 performance deficits  Clinical Decision Making Complexity (OT): detailed assessment/moderate complexity  Overall Complexity of Evaluation (OT): moderate complexity     Time Calculation- OT       Row Name 10/07/24 1133             Time Calculation- OT    OT Received On 10/07/24  -EG      OT Goal Re-Cert Due Date 11/03/24  -EG         Timed Charges    89939 - OT Therapeutic Exercise Minutes 31  -EG      44282 -  OT Neuromuscular Reeducation Minutes 12  -EG      97776 - OT Therapeutic Activity Minutes 11  -EG         SNF Occupational Therapy Minutes    Skilled Minutes- OT 54 min  -EG         Total Minutes    Timed Charges Total Minutes 54  -EG       Total Minutes 54  -EG                User Key  (r) = Recorded By, (t) = Taken By, (c) = Cosigned By      Initials Name Provider Type    EG Janessa Good OT Occupational Therapist                  Therapy Charges for Today       Code Description Service Date Service Provider Modifiers Qty    54082480842 HC OT NEUROMUSC RE EDUCATION EA 15 MIN 10/7/2024 Janessa Good OT GO 1    29310011373 HC OT THER PROC EA 15 MIN 10/7/2024 Janessa Good OT GO 2    53683992665 HC OT THERAPEUTIC ACT EA 15 MIN 10/7/2024 Janessa Good OT GO 1                 Janessa Good OT  10/7/2024

## 2024-10-07 NOTE — PLAN OF CARE
Goal Outcome Evaluation:              Outcome Evaluation: Pt.very pleasant today, tolerated food intake will, visiting with family in good spirits. Independent with bedside urinal. No c/o of pain or discomfort today.

## 2024-10-07 NOTE — THERAPY TREATMENT NOTE
SNF - Physical Therapy Treatment Note   Yudy     Patient Name: Woodrow Alejandro  : 1950  MRN: [id number]  Today's Date: 10/7/2024      Visit Dx:    ICD-10-CM ICD-9-CM   1. Decreased activities of daily living (ADL)  Z78.9 V49.89   2. Difficulty walking  R26.2 719.7     Patient Active Problem List   Diagnosis    Essential hypertension    Permanent atrial fibrillation    S/P AVR    ICD (implantable cardioverter-defibrillator), dual, in situ    Dermatitis    IFG (impaired fasting glucose)    Mixed hyperlipidemia    Vitamin D deficiency    Medicare annual wellness visit, subsequent    Primary osteoarthritis of both knees    Screening PSA (prostate specific antigen)    Bilateral primary osteoarthritis of knee    Coarse tremors    Weakness generalized    Increased urinary frequency    Bandemia    Achilles tendon rupture    Anemia due to GI blood loss    Physical debility     Past Medical History:   Diagnosis Date    Aortic valve replaced     Arthritis 2018    Atrial fibrillation     Coronary artery disease     Hypertension      Past Surgical History:   Procedure Laterality Date    CARDIAC SURGERY      COLONOSCOPY      COLONOSCOPY N/A 2024    Procedure: COLONOSCOPY WITH HOT/COLD SNARE POLYPECTOMIES, ELEVIEW INJECTION,CLIPX1;  Surgeon: Kurt Mensah MD;  Location: [facility] END[facility]COPY;  Service: Gastroenterology;  Laterality: N/A;  COLON POLYPS    ENDOSCOPY N/A 2024    Procedure: ESOPHAGOGASTRODUODENOSCOPY WITH BIOPSIES;  Surgeon: Kurt Mensah MD;  Location: [facility] ENDOSCOPY;  Service: Gastroenterology;  Laterality: N/A;  RETAINED FOOD IN STOMACH AND REFLUX ESOPHAGITIS    PACEMAKER IMPLANTATION         PT Assessment (Last 12 Hours)       PT Evaluation and Treatment       Row Name 10/07/24 1415          Physical Therapy Time and Intention    Document Type therapy note (daily note)  -WM     Mode of Treatment individual therapy;physical therapy  -WM     Patient Effort good  -WM      Symptoms Noted During/After Treatment fatigue  -       Row Name 10/07/24 1415          Sit-Stand Transfer    Sit-Stand [place] (Transfers) contact guard;verbal cues  -     Assistive Device (Sit-Stand Transfers) walker, front-wheeled  -WM       Row Name 10/07/24 1415          Stand-Sit Transfer    Stand-Sit [place] (Transfers) contact guard;verbal cues  -     Assistive Device (Stand-Sit Transfers) walker, front-wheeled  -WM       Row Name 10/07/24 1415          Gait/Stairs (Locomotion)    [place] Level (Gait) contact guard;verbal cues  -     Assistive Device (Gait) walker, front-wheeled  -WM     Distance in Feet (Gait) 75  x2  -WM     Pattern (Gait) 4-point;step-through  -WM     Deviations/Abnormal Patterns (Gait) gait speed decreased;stride length decreased  -       Row Name 10/07/24 1415          Safety Issues, Functional Mobility    Impairments Affecting Function (Mobility) balance;endurance/activity tolerance;range of motion (ROM);strength  -       Row Name 10/07/24 1415          Hip (Therapeutic Exercise)    Hip Strengthening (Therapeutic Exercise) bilateral;aBduction;aDduction;marching while seated;sitting;2 lb free weight;resistance band;green;15 repititions;2 sets  -       Row Name 10/07/24 1415          Knee (Therapeutic Exercise)    Knee Strengthening (Therapeutic Exercise) bilateral;LAQ (long arc quad);hamstring curls;sitting;2 lb free weight;resistance band;green;15 repititions;2 sets  -       Row Name 10/07/24 1415          Ankle (Therapeutic Exercise)    Ankle AROM (Therapeutic Exercise) left;dorsiflexion;plantarflexion;supine;30 repititions  -       Row Name 10/07/24 1415          Aerobic Exercise    Comment, Aerobic Exercise (Therapeutic Exercise) NuStep x 10 minutes, load 3  -       Row Name 10/07/24 1415          Positioning and Restraints    Pre-Treatment Position sitting in chair/recliner  -     Post Treatment Position chair  -     In Chair sitting;call light  within reach;encouraged to call for assist;exit alarm on  -WM       Row Name 10/07/24 1415          Progress Summary (PT)    Progress Toward Functional Goals (PT) progress toward functional goals is good  -WM               User Key  (r) = Recorded By, (t) = Taken By, (c) = Cosigned By      Initials Name Provider Type    WM Dwaine Guzman PTA Physical Therapist Assistant                  Section G              Section GG                       Physical Therapy Education       Title: PT OT SLP Therapies (Done)       Topic: Physical Therapy (Resolved)       Point: Mobility training (Resolved)       Learner Progress:  Not documented in this visit.              Point: Home exercise program (Resolved)       Learner Progress:  Not documented in this visit.              Point: Body mechanics (Resolved)       Learner Progress:  Not documented in this visit.              Point: Precautions (Resolved)       Learner Progress:  Not documented in this visit.                                  PT Recommendation and Plan     Progress Summary (PT)  Progress Toward Functional Goals (PT): progress toward functional goals is good   Outcome Measures       Row Name 10/07/24 1419 10/05/24 0912          How much help from another person do you currently need...    Turning from your back to your side while in flat bed without using bedrails? 3  -WM 2  -WM     Moving from lying on back to sitting on the side of a flat bed without bedrails? 2  -WM 2  -WM     Moving to and from a bed to a chair (including a wheelchair)? 3  -WM 3  -WM     Standing up from a chair using your arms (e.g., wheelchair, bedside chair)? 3  -WM 3  -WM     Climbing 3-5 steps with a railing? 2  -WM 2  -WM     To walk in hospital room? 3  -WM 3  -WM     AM-PAC 6 Clicks Score (PT) 16  -WM 15  -WM     Highest Level of Mobility Goal 5 --> Static standing  -WM 4 --> Transfer to chair/commode  -WM               User Key  (r) = Recorded By, (t) = Taken By, (c) = Cosigned By       Initials Name Provider Type    Dwaine Keith PTA Physical Therapist Assistant                     Time Calculation:    PT Charges       Row Name 10/07/24 1414             Time Calculation    PT Received On 10/07/24  -WM         Timed Charges    39880 - PT Therapeutic Exercise Minutes 24  -WM      29375 - Gait Training Minutes  6  -WM      94819 - PT Therapeutic Activity Minutes 9  -WM         SNF Physical Therapy Minutes    Skilled Minutes- PT 39 min  -WM         Total Minutes    Timed Charges Total Minutes 39  -WM       Total Minutes 39  -WM                User Key  (r) = Recorded By, (t) = Taken By, (c) = Cosigned By      Initials Name Provider Type    Dwaine Keith PTA Physical Therapist Assistant                      PT G-Codes  Outcome Measure Options: AM-PAC 6 Clicks Daily Activity (OT), Optimal Instrument  AM-PAC 6 Clicks Score (PT): 16  AM-PAC 6 Clicks Score (OT): 18    Dwaine Guzman PTA  10/7/2024

## 2024-10-07 NOTE — PLAN OF CARE
Goal Outcome Evaluation:  Plan of Care Reviewed With: patient        Progress: improving  Outcome Evaluation: Alert and oriented; forgetful. Able to make needs known to staff. Transfers to BR/BSc x1 person assist wearing camboot. Requested bedpan this morning for BM. Using urinal to void with assistance of staff. Wound care completed for rash. No complaints of pain voiced. Low grade temp this shift. Call light within reach; care plan ongoing.

## 2024-10-08 PROCEDURE — 97535 SELF CARE MNGMENT TRAINING: CPT

## 2024-10-08 PROCEDURE — 97110 THERAPEUTIC EXERCISES: CPT

## 2024-10-08 PROCEDURE — 97530 THERAPEUTIC ACTIVITIES: CPT

## 2024-10-08 RX ADMIN — DILTIAZEM HYDROCHLORIDE 120 MG: 30 TABLET, FILM COATED ORAL at 21:06

## 2024-10-08 RX ADMIN — FUROSEMIDE 40 MG: 40 TABLET ORAL at 08:10

## 2024-10-08 RX ADMIN — FERROUS SULFATE TAB 325 MG (65 MG ELEMENTAL FE) 325 MG: 325 (65 FE) TAB at 08:10

## 2024-10-08 RX ADMIN — CLOBETASOL PROPIONATE CREAM USP, 0.05% 1 APPLICATION: 0.5 CREAM TOPICAL at 09:59

## 2024-10-08 RX ADMIN — CLOBETASOL PROPIONATE CREAM USP, 0.05% 1 APPLICATION: 0.5 CREAM TOPICAL at 21:08

## 2024-10-08 RX ADMIN — METOPROLOL TARTRATE 100 MG: 50 TABLET, FILM COATED ORAL at 08:10

## 2024-10-08 RX ADMIN — METOPROLOL TARTRATE 100 MG: 50 TABLET, FILM COATED ORAL at 21:05

## 2024-10-08 RX ADMIN — DILTIAZEM HYDROCHLORIDE 120 MG: 30 TABLET, FILM COATED ORAL at 08:10

## 2024-10-08 RX ADMIN — ASPIRIN 325 MG: 325 TABLET ORAL at 08:10

## 2024-10-08 RX ADMIN — PANTOPRAZOLE SODIUM 40 MG: 40 TABLET, DELAYED RELEASE ORAL at 17:49

## 2024-10-08 RX ADMIN — SENNOSIDES AND DOCUSATE SODIUM 2 TABLET: 50; 8.6 TABLET ORAL at 08:10

## 2024-10-08 RX ADMIN — SENNOSIDES AND DOCUSATE SODIUM 2 TABLET: 50; 8.6 TABLET ORAL at 21:05

## 2024-10-08 RX ADMIN — Medication 5 MG: at 21:05

## 2024-10-08 RX ADMIN — PANTOPRAZOLE SODIUM 40 MG: 40 TABLET, DELAYED RELEASE ORAL at 08:10

## 2024-10-08 RX ADMIN — LISINOPRIL 10 MG: 2.5 TABLET ORAL at 08:10

## 2024-10-08 NOTE — PLAN OF CARE
Goal Outcome Evaluation:  Plan of Care Reviewed With: patient        Progress: improving  Outcome Evaluation: Alert and oriented x4; able to make needs known to staff. Transfers to BR x1 person assist using gait belt and walker wearing right camboot. Using urinal with staff assistance.  No requests for pain medication tonight. Low grade temp tonight of 99.7. Rash improving. Rested between care. Call light within reach; care plan ongoing.

## 2024-10-08 NOTE — THERAPY TREATMENT NOTE
SNF - Occupational Therapy Treatment Note   [place]    Patient Name: Woodrow Alejandro  : 1950    MRN: [id number]                              Today's Date: 10/8/2024       Admit Date: 10/3/2024    Visit Dx:     ICD-10-CM ICD-9-CM   1. Decreased activities of daily living (ADL)  Z78.9 V49.89   2. Difficulty walking  R26.2 719.7     Patient Active Problem List   Diagnosis    Essential hypertension    Permanent atrial fibrillation    S/P AVR    ICD (implantable cardioverter-defibrillator), dual, in situ    Dermatitis    IFG (impaired fasting glucose)    Mixed hyperlipidemia    Vitamin D deficiency    Medicare annual wellness visit, subsequent    Primary osteoarthritis of both knees    Screening PSA (prostate specific antigen)    Bilateral primary osteoarthritis of knee    Coarse tremors    Weakness generalized    Increased urinary frequency    Bandemia    Achilles tendon rupture    Anemia due to GI blood loss    Physical debility     Past Medical History:   Diagnosis Date    Aortic valve replaced     Arthritis 2018    Atrial fibrillation     Coronary artery disease     Hypertension      Past Surgical History:   Procedure Laterality Date    CARDIAC SURGERY      COLONOSCOPY      COLONOSCOPY N/A 2024    Procedure: COLONOSCOPY WITH HOT/COLD SNARE POLYPECTOMIES, ELEVIEW INJECTION,CLIPX1;  Surgeon: Kurt Mensah MD;  Location: [facility] ENDOSCOPY;  Service: Gastroenterology;  Laterality: N/A;  COLON POLYPS    ENDOSCOPY N/A 2024    Procedure: ESOPHAGOGASTRODUODENOSCOPY WITH BIOPSIES;  Surgeon: Kurt Mensah MD;  Location: [facility] ENDOSCOPY;  Service: Gastroenterology;  Laterality: N/A;  RETAINED FOOD IN STOMACH AND REFLUX ESOPHAGITIS    PACEMAKER IMPLANTATION        General Information       Row Name 10/08/24 1147          OT Time and Intention    Document Type therapy note (daily note)  -EG     Mode of Treatment individual therapy;occupational therapy  -EG       Row Name 10/08/24 1144           General Information    Existing Precautions/Restrictions fall  CAM boot donned RLE at all times when up  -EG       Row Name 10/08/24 1147          Cognition    Orientation Status (Cognition) oriented x 4  -EG       Row Name 10/08/24 1147          Safety Issues, Functional Mobility    Impairments Affecting Function (Mobility) balance;endurance/activity tolerance;range of motion (ROM);strength  -EG               User Key  (r) = Recorded By, (t) = Taken By, (c) = Cosigned By      Initials Name Provider Type    EG Janessa Good OT Occupational Therapist                     Mobility/ADL's       Row Name 10/08/24 1147          Transfers    Transfers bed-chair transfer;sit-stand transfer;stand-sit transfer;toilet transfer  -EG     Comment, (Transfers) in and out of recliner chair 2x during session using RW  -EG       Row Name 10/08/24 1147          Bed-Chair Transfer    Bed-Chair [place] (Transfers) contact guard;verbal cues  -EG     Assistive Device (Bed-Chair Transfers) walker, front-wheeled  -EG       Row Name 10/08/24 1147          Sit-Stand Transfer    Sit-Stand [place] (Transfers) contact guard;verbal cues  -EG     Assistive Device (Sit-Stand Transfers) walker, front-wheeled  -EG     Comment, (Sit-Stand Transfer) functional repetitions performed from recliner 3x5 to promote carryover and strength ind.  -EG       Row Name 10/08/24 1147          Stand-Sit Transfer    Stand-Sit [place] (Transfers) contact guard;verbal cues  -EG     Assistive Device (Stand-Sit Transfers) walker, front-wheeled  -EG       Row Name 10/08/24 1147          Toilet Transfer    [place] Level (Toilet Transfer) contact guard;verbal cues  -EG     Assistive Device (Toilet Transfer) walker, front-wheeled;grab bars/safety frame;commode chair  -EG       Row Name 10/08/24 1147          Functional Mobility    Functional Mobility- Ind. Level contact guard assist;verbal cues required;nonverbal cues required (demo/gesture)  -EG      Functional Mobility- Device walker, front-wheeled  -EG     Functional Mobility- Comment Mobility performed in room during ADL's back and forth to sink as well as bathroom as needed; short functional distances with reports of fatigue requiring rest breaks, no LOB noted during performance; occ. cues for safe and proper use of AD  -EG       Row Name 10/08/24 1147          Activities of Daily Living    BADL Assessment/Intervention bathing;upper body dressing;lower body dressing;grooming;toileting  -EG       Row Name 10/08/24 1147          Mobility    Extremity Weight-bearing Status right lower extremity  -EG     Right Lower Extremity (Weight-bearing Status) weight-bearing as tolerated (WBAT)  W/CAM boot donned  -EG       Row Name 10/08/24 1147          Lower Body Dressing Assessment/Training    [place] Level (Lower Body Dressing) lower body dressing skills;moderate assist (50% patient effort)  -EG     Assistive Devices (Lower Body Dressing) reacher  -EG     Comment, (Lower Body Dressing) OT provided patient with reacher; need for continued training and education to promote carryover on use and ind. performance; Patient continues to require assist for CAM boot donning  -EG       Row Name 10/08/24 1147          Upper Body Dressing Assessment/Training    [place] Level (Upper Body Dressing) upper body dressing skills;set up  -EG       Row Name 10/08/24 1147          Bathing Assessment/Intervention    [place] Level (Bathing) minimum assist (75% patient effort);bathing skills;lower body;upper body  -EG     Comment, (Bathing) Sponge bath performed while seated in recliner chair; extended time due to reporting need for rest breaks at times  -EG       Row Name 10/08/24 1147          Grooming Assessment/Training    [place] Level (Grooming) grooming skills;set up;wash face, hands;hair care, combing/brushing  -EG       Row Name 10/08/24 1147          Toileting Assessment/Training    [place] Level  (Toileting) toileting skills;minimum assist (75% patient effort);contact guard assist  -EG     Assistive Devices (Toileting) grab bar/safety frame;commode chair  -EG     Comment, (Toileting) Extended time this date due to bowel movements; emphasis and training/education on compensatory techniques for hyg performance in standing; Educated on use of rest breaks as needed; compensatory stradegies as needed; continues to require a spot check for hyg/ cleanliness at this time  -EG               User Key  (r) = Recorded By, (t) = Taken By, (c) = Cosigned By      Initials Name Provider Type    EG Janessa Good OT Occupational Therapist                   Obj/Interventions       Row Name 10/08/24 1151          Sensory Assessment (Somatosensory)    Sensory Assessment (Somatosensory) UE sensation intact  -EG       Row Name 10/08/24 1151          Vision Assessment/Intervention    Visual Impairment/Limitations WFL  -EG       Row Name 10/08/24 1151          Balance    Balance Interventions standing;sit to stand;supported;static;dynamic;weight shifting activity;occupation based/functional task  -EG     Comment, Balance Patient able to participate in static unsupported standing at recliner with no LOB; When asked to perform weight shifting or reaching patient will express doubts in abilities but is capable of performing  -EG               User Key  (r) = Recorded By, (t) = Taken By, (c) = Cosigned By      Initials Name Provider Type    EG Janessa Good, JOSELIN Occupational Therapist                   Goals/Plan    No documentation.                  Clinical Impression       Row Name 10/08/24 1159          Pain Scale: FACES Pre/Post-Treatment    Pain: FACES Scale, Pretreatment 2-->hurts little bit  -EG     Posttreatment Pain Rating 6-->hurts even more  -EG     Pain Location - Side/Orientation Right  -EG     Pain Location - ankle;foot  -EG       Row Name 10/08/24 1155          Plan of Care Review    Plan of Care Reviewed With  patient  -EG     Progress improving  -EG     Outcome Evaluation Pleasant and cooperative; Pain is a factor but able to push self most of the time; Patient expresses doubts on self performance but is capable of performing tasks; Noted improvement in ADL's this date with emphasis and education on toileting and hyg tasks; OT will continue to treat and follow POC; Ax1 w/RW and CAM boot donned.  -EG       Row Name 10/08/24 1154          Therapy Assessment/Plan (OT)    Rehab Potential (OT) good, to achieve stated therapy goals  -EG     Criteria for Skilled Therapeutic Interventions Met (OT) yes;meets criteria;skilled treatment is necessary  -EG       Row Name 10/08/24 1154          Therapy Plan Review/Discharge Plan (OT)    Anticipated Discharge Disposition (OT) home with home health  -EG       Row Name 10/08/24 1154          Positioning and Restraints    Pre-Treatment Position sitting in chair/recliner  -EG     Post Treatment Position chair  -EG     In Chair reclined;sitting;call light within reach;encouraged to call for assist;exit alarm on  -EG               User Key  (r) = Recorded By, (t) = Taken By, (c) = Cosigned By      Initials Name Provider Type    EG Janessa Good, OT Occupational Therapist                   Outcome Measures       Row Name 10/08/24 1156          How much help from another is currently needed...    Putting on and taking off regular lower body clothing? 2  -EG     Bathing (including washing, rinsing, and drying) 2  -EG     Toileting (which includes using toilet bed pan or urinal) 3  -EG     Putting on and taking off regular upper body clothing 3  -EG     Taking care of personal grooming (such as brushing teeth) 4  -EG     Eating meals 4  -EG     AM-PAC 6 Clicks Score (OT) 18  -EG       Row Name 10/08/24 1052          How much help from another person do you currently need...    Turning from your back to your side while in flat bed without using bedrails? 3  -FH     Moving from lying on back to  sitting on the side of a flat bed without bedrails? 2  -FH     Moving to and from a bed to a chair (including a wheelchair)? 3  -FH     Standing up from a chair using your arms (e.g., wheelchair, bedside chair)? 3  -FH     Climbing 3-5 steps with a railing? 2  -FH     To walk in hospital room? 3  -FH     AM-PAC 6 Clicks Score (PT) 16  -FH     Highest Level of Mobility Goal 5 --> Static standing  -FH       Row Name 10/08/24 1156          Functional Assessment    Outcome Measure Options AM-PAC 6 Clicks Daily Activity (OT);Optimal Instrument  -EG       Row Name 10/08/24 1156          Optimal Instrument    Optimal Instrument Optimal - 3  -EG     Bending/Stooping 3  -EG     Standing 2  -EG     Reaching 1  -EG               User Key  (r) = Recorded By, (t) = Taken By, (c) = Cosigned By      Initials Name Provider Type    Margie Tracey, RN Registered Nurse    EG Janessa Good OT Occupational Therapist                  Section G  Mobility  Bed mobility - self performance: limited assistance (staff provide guided maneuvering of limbs or other non-weight bearing assistance)  Bed mobility support/assistance: One person assist  Transfer - self performance: limited assistance (staff provide guided maneuvering of limbs or other non-weight bearing assistance)  Transfer support/assistance: One person assist  Walking in room - self performance: limited assistance (staff provide guided maneuvering of limbs or other non-weight bearing assistance)  Walking in room support/assistance: One person assist  Walking in corridors/hallway - self performance: limited assistance (staff provide guided maneuvering of limbs or other non-weight bearing assistance)  Walking in corridors/hallway support/assistance: One person assist  Locomotion on unit - self performance: activity did not occur  Locomotion on unit support/assistance: Activity did not occur  Locomotion off unit - self performance: activity did not occur  Locomotion off  unit support/assistance: Activity did not occur  Dressing - self performance: limited assistance (staff provide guided maneuvering of limbs or other non-weight bearing assistance)  Dressing support/assistance: One person assist  Eating - self performance: independent  Eating support/assistance: Setup help only  Toileting - self performance: limited assistance (staff provide guided maneuvering of limbs or other non-weight bearing assistance)  Toileting support/assistance: One person assist  Personal hygiene - self performance: limited assistance (staff provide guided maneuvering of limbs or other non-weight bearing assistance)  Personal hygiene support/assistance: One person assist  Bathing  Bathing - self performance: Physical help with bathing (exclude washing back and hair for patient)  Bathing support/assistance: One person assist  Balance  Balance during transitions & walking: Not steady, requires assist to steady  Moving from seated to standing position: Not steady, requires assist to steady  Walking: Not steady, requires assist to steady  Turning around while walking: Not steady, requires assist to steady  Moving on and off toilet: Not steady, requires assist to steady  Surface-to-surface transfer: Not steady, requires assist to steady  Mobility devices: None were used  Range of Motion  Upper Extremity: No impairment  Lower Extremity: Impairment on one side  Section GG  Functional Ability/Goals, Adm (Section GG)  Self Care, Prior Functioning (VW2076W): 3. Independent  Functional Cognition, Prior Functioning (EJ3093U): 3. Independent  Prior Device Use (KU4565): none of the above (Z)  Upper Extremity Range of Motion (ZX6858K): No impairment  Lower Extremity Range of Motion (NU5661M): Impairment on one side  Self Care, Admission (Section GG)  Eating: Self-Care Admission Performance (QS0569C2): setup or clean-up assistance (05)  Oral Hygiene: Self-Care Admission Performance (RH4387K0): setup or clean-up assistance  (05)  Toileting Hygiene: Self-Care Admission Performance (OD1986C3): partial/moderate assistance (03)  Shower/Bathe Self: Self-Care Admission Performance (KC5526A7): partial/moderate assistance (03)  Upper Body Dressing: Self-Care Admission Performance (SI7177S7): setup or clean-up assistance (05)  Lower Body Dressing: Self-Care Admission Performance ([id number]): partial/moderate assistance (03)  Putting On/Taking Off Footwear: Self-Care Admission Performance (II9375W4): partial/moderate assistance (03)  Personal Hygiene: Self-Care Admission Performance (LO3734Y5): supervision or touching assistance (04)  Mobility, Admission Performance (LY8925)  Chair/Bed-Chair Transfer: Mobility Admission Performance (ES2202P2): partial/moderate assistance (03)  Toilet Transfer: Mobility Admission Performance (ME1245F7): partial/moderate assistance (03)  Tub/shower Transfer: Mobility Admission Performance (BD0031MO3): partial/moderate assistance (03)                Occupational Therapy Education       Title: PT OT SLP Therapies (Done)       Topic: Occupational Therapy (Done)       Point: ADL training (Done)       Description:   Instruct learner(s) on proper safety adaptation and remediation techniques during self care or transfers.   Instruct in proper use of assistive devices.                  Learning Progress Summary             JING Palacio VU by ROMANA at 10/4/2024 1056    Comment: Education on compensatory techniques for ADL's  Education on AE  Education on fall risk prevention and general safety  Education on OT services                         Point: Home exercise program (Done)       Description:   Instruct learner(s) on appropriate technique for monitoring, assisting and/or progressing therapeutic exercises/activities.                  Learning Progress Summary             JING Palacio VU by ROMANA at 10/4/2024 1056    Comment: Education on compensatory techniques for ADL's  Education on AE  Education on fall risk  prevention and general safety  Education on OT services                         Point: Precautions (Done)       Description:   Instruct learner(s) on prescribed precautions during self-care and functional transfers.                  Learning Progress Summary             Patient JING Moulton VU by ROMANA at 10/4/2024 1056    Comment: Education on compensatory techniques for ADL's  Education on AE  Education on fall risk prevention and general safety  Education on OT services                         Point: Body mechanics (Done)       Description:   Instruct learner(s) on proper positioning and spine alignment during self-care, functional mobility activities and/or exercises.                  Learning Progress Summary             Patient JING [place] VU by ROMANA at 10/4/2024 1056    Comment: Education on compensatory techniques for ADL's  Education on AE  Education on fall risk prevention and general safety  Education on OT services                                         User Key       Initials Effective Dates Name Provider Type Discipline    EG 09/14/22 -  Janessa Good OT Occupational Therapist OT                  OT Recommendation and Plan  Planned Therapy Interventions (OT): activity tolerance training, functional balance retraining, occupation/activity based interventions, adaptive equipment training, BADL retraining, neuromuscular control/coordination retraining, patient/caregiver education/training, transfer/mobility retraining, strengthening exercise  Therapy Frequency (OT): 5 times/wk  Plan of Care Review  Plan of Care Reviewed With: patient  Progress: improving  Outcome Evaluation: Pleasant and cooperative; Pain is a factor but able to push self most of the time; Patient expresses doubts on self performance but is capable of performing tasks; Noted improvement in ADL's this date with emphasis and education on toileting and hyg tasks; OT will continue to treat and follow POC; Ax1 w/RW and CAM boot donned.     Time  Calculation:   Evaluation Complexity (OT)  Review Occupational Profile/Medical/Therapy History Complexity: expanded/moderate complexity  Assessment, Occupational Performance/Identification of Deficit Complexity: 3-5 performance deficits  Clinical Decision Making Complexity (OT): detailed assessment/moderate complexity  Overall Complexity of Evaluation (OT): moderate complexity     Time Calculation- OT       Row Name 10/08/24 1157             Time Calculation- OT    OT Received On 10/08/24  -EG      OT Goal Re-Cert Due Date 11/03/24  -EG         Timed Charges    89732 - OT Therapeutic Activity Minutes 16  -EG      41247 - OT Self Care/Mgmt Minutes 37  -EG         SNF Occupational Therapy Minutes    Skilled Minutes- OT 53 min  -EG         Total Minutes    Timed Charges Total Minutes 53  -EG       Total Minutes 53  -EG                User Key  (r) = Recorded By, (t) = Taken By, (c) = Cosigned By      Initials Name Provider Type    EG Janessa Good OT Occupational Therapist                  Therapy Charges for Today       Code Description Service Date Service Provider Modifiers Qty    64684398654 HC OT NEUROMUSC RE EDUCATION EA 15 MIN 10/7/2024 Janessa Good OT GO 1    91919718761 HC OT THER PROC EA 15 MIN 10/7/2024 Janessa Good OT GO 2    99522928059 HC OT THERAPEUTIC ACT EA 15 MIN 10/7/2024 Janessa Good OT GO 1    57412674806 HC OT THERAPEUTIC ACT EA 15 MIN 10/8/2024 Janessa Good OT GO 1    72436907327 HC OT SELF CARE/MGMT/TRAIN EA 15 MIN 10/8/2024 Janessa Good OT GO 3                 Janessa Good OT  10/8/2024

## 2024-10-08 NOTE — THERAPY TREATMENT NOTE
SNF - Physical Therapy Treatment Note   Yudy     Patient Name: Woodrow Alejandro  : 1950  MRN: [id number]  Today's Date: 10/8/2024      Visit Dx:    ICD-10-CM ICD-9-CM   1. Decreased activities of daily living (ADL)  Z78.9 V49.89   2. Difficulty walking  R26.2 719.7     Patient Active Problem List   Diagnosis    Essential hypertension    Permanent atrial fibrillation    S/P AVR    ICD (implantable cardioverter-defibrillator), dual, in situ    Dermatitis    IFG (impaired fasting glucose)    Mixed hyperlipidemia    Vitamin D deficiency    Medicare annual wellness visit, subsequent    Primary osteoarthritis of both knees    Screening PSA (prostate specific antigen)    Bilateral primary osteoarthritis of knee    Coarse tremors    Weakness generalized    Increased urinary frequency    Bandemia    Achilles tendon rupture    Anemia due to GI blood loss    Physical debility     Past Medical History:   Diagnosis Date    Aortic valve replaced     Arthritis 2018    Atrial fibrillation     Coronary artery disease     Hypertension      Past Surgical History:   Procedure Laterality Date    CARDIAC SURGERY      COLONOSCOPY      COLONOSCOPY N/A 2024    Procedure: COLONOSCOPY WITH HOT/COLD SNARE POLYPECTOMIES, ELEVIEW INJECTION,CLIPX1;  Surgeon: Kurt Mensah MD;  Location: [facility] ENDOSCOPY;  Service: Gastroenterology;  Laterality: N/A;  COLON POLYPS    ENDOSCOPY N/A 2024    Procedure: ESOPHAGOGASTRODUODENOSCOPY WITH BIOPSIES;  Surgeon: Kurt Mensah MD;  Location: [facility] ENDOSCOPY;  Service: Gastroenterology;  Laterality: N/A;  RETAINED FOOD IN STOMACH AND REFLUX ESOPHAGITIS    PACEMAKER IMPLANTATION         PT Assessment (Last 12 Hours)       PT Evaluation and Treatment       Row Name 10/08/24 1500          Physical Therapy Time and Intention    Subjective Information complains of;fatigue  -CS     Document Type therapy note (daily note)  -CS     Mode of Treatment individual  therapy;physical therapy  -CS     Patient Effort poor  -CS     Comment Pt reporting he is not willing to do any further exercises, go to the gym or walking for the therapy session.  -CS       Row Name 10/08/24 1500          Pain Scale: FACES Pre/Post-Treatment    Pain: FACES Scale, Pretreatment 6-->hurts even more  -CS     Posttreatment Pain Rating 6-->hurts even more  -CS     Pain Location - Side/Orientation Right  -CS     Pain Location - ankle;foot  -CS       Row Name 10/08/24 1500          Motor Skills    Therapeutic Exercise hip;ankle;knee  -CS       Row Name 10/08/24 1500          Hip (Therapeutic Exercise)    Hip (Therapeutic Exercise) AROM (active range of motion)  -CS     Hip AROM (Therapeutic Exercise) bilateral;flexion;extension;aBduction;aDduction;15 repititions  -CS       Row Name 10/08/24 1500          Knee (Therapeutic Exercise)    Knee (Therapeutic Exercise) AROM (active range of motion)  -     Knee AROM (Therapeutic Exercise) bilateral;LAQ (long arc quad);heel slides;SLR (straight leg raise);15 repititions  -       Row Name 10/08/24 1500          Ankle (Therapeutic Exercise)    Ankle (Therapeutic Exercise) AROM (active range of motion)  -     Ankle AROM (Therapeutic Exercise) bilateral;dorsiflexion;plantarflexion  -CS       Row Name 10/08/24 1500          Plan of Care Review    Plan of Care Reviewed With patient  -CS     Progress no change  -CS     Outcome Evaluation Continue plan of care  -       Row Name 10/08/24 1500          Progress Summary (PT)    Progress Toward Functional Goals (PT) progress toward functional goals is fair  -CS               User Key  (r) = Recorded By, (t) = Taken By, (c) = Cosigned By      Initials Name Provider Type    Fatemeh Malcolm, PT Physical Therapist                  Section G              Section GG  Functional Ability/Goals, Adm (Section GG)  Indoor Mobility - Ambulation, Prior Function (HT8061H): 3. Independent  Stairs, Prior Function (XQ8665J): 3.  Independent     Mobility, Admission Performance (GO1734)  Roll Left & Right: Mobility Admission Performance (EG9082U4): partial/moderate assistance (03)  Sit to Lying: Mobility Admission Performance (EZ3673F8): partial/moderate assistance (03)  Lying to Sitting, Side of Bed: Mobility Admission Performance (ZK2096R3): partial/moderate assistance (03)  Sit to Stand: Mobility Admission Performance (HO5658V3): partial/moderate assistance (03)  Car Transfer: Mobility Admission Performance (NU7385U7): not attempted due to environmental limitations (10)  Walk 10 Feet: Mobility Admission Performance (DK2466F8): partial/moderate assistance (03)  Walk 50 Feet With Two Turns: Mobility Admission Performance (IW8321Z3): not attempted, medical condition/safety concern (88)  Walk 150 Feet: Mobility Admission Performance (KN3081Y0): not attempted, medical condition/safety concern (88)  Walk 10 Ft, Uneven Surfaces: Mobility Admission Performance (FG1515N9): not applicable (09)  1 Step/Curb: Mobility Admission Performance (VA1961O8): not attempted, medical condition/safety concern (88)  4 Steps: Mobility Admission Performance (WB7198F8): not attempted, medical condition/safety concern (88)  12 Steps: Mobility Admission Performance (XV0605I1): not attempted, medical condition/safety concern (88)  Picking up object: Mobility Admission Performance (AE4671N7): not attempted, medical condition/safety concern (88)  Use a Wheelchair and/or Scooter: Mobility (FW2197R3): no (0)     RETIRED Mobility (GG), Discharge Goal (DX0454)  Use a Wheelchair and/or Scooter: Mobility (DT7979D6): no (0)     Mobility, Discharge Performance (EL6125)  Use a Wheelchair and/or Scooter: Mobility (XM1317F2): no (0)  Physical Therapy Education       Title: PT OT SLP Therapies (Done)       Topic: Physical Therapy (Resolved)       Point: Mobility training (Resolved)       Learner Progress:  Not documented in this visit.              Point: Home exercise program  (Resolved)       Learner Progress:  Not documented in this visit.              Point: Body mechanics (Resolved)       Learner Progress:  Not documented in this visit.              Point: Precautions (Resolved)       Learner Progress:  Not documented in this visit.                                  PT Recommendation and Plan  Anticipated Discharge Disposition (PT): home with home health, home with outpatient therapy services  Planned Therapy Interventions (PT): balance training, bed mobility training, gait training, transfer training, strengthening  Therapy Frequency (PT): 6 times/wk  Progress Summary (PT)  Progress Toward Functional Goals (PT): progress toward functional goals is fair  Plan of Care Reviewed With: patient  Progress: no change  Outcome Evaluation: Continue plan of care   Outcome Measures       Row Name 10/07/24 1419             How much help from another person do you currently need...    Turning from your back to your side while in flat bed without using bedrails? 3  -WM      Moving from lying on back to sitting on the side of a flat bed without bedrails? 2  -WM      Moving to and from a bed to a chair (including a wheelchair)? 3  -WM      Standing up from a chair using your arms (e.g., wheelchair, bedside chair)? 3  -WM      Climbing 3-5 steps with a railing? 2  -WM      To walk in hospital room? 3  -WM      AM-PAC 6 Clicks Score (PT) 16  -WM      Highest Level of Mobility Goal 5 --> Static standing  -WM                User Key  (r) = Recorded By, (t) = Taken By, (c) = Cosigned By      Initials Name Provider Type    Dwaine Keith PTA Physical Therapist Assistant                     Time Calculation:    PT Charges       Row Name 10/08/24 1518             Time Calculation    PT Received On 10/08/24  -CS         Timed Charges    26086 - PT Therapeutic Exercise Minutes 15  -CS         Total Minutes    Timed Charges Total Minutes 15  -CS       Total Minutes 15  -CS                User Key  (r) =  Recorded By, (t) = Taken By, (c) = Cosigned By      Initials Name Provider Type    CS Fatemeh Unger, PT Physical Therapist                  Therapy Charges for Today       Code Description Service Date Service Provider Modifiers Qty    58214495882 HC PT THER PROC EA 15 MIN 10/8/2024 Fatemeh Unger, JONNY GP 1            PT G-Codes  Outcome Measure Options: AM-PAC 6 Clicks Daily Activity (OT), Optimal Instrument  AM-PAC 6 Clicks Score (PT): 16  AM-PAC 6 Clicks Score (OT): 18    Fatemeh Unger PT  10/8/2024

## 2024-10-08 NOTE — PLAN OF CARE
Goal Outcome Evaluation:  Plan of Care Reviewed With: patient        Progress: improving  Outcome Evaluation: Resident alert, orietned, able to make needs known to staff. transfers with assit of one to chair/bathroom. continues to present with scrotal edema, and edema to lower extremites. Has difficulty with applying urinal and had staff assistance. No incontinence this shift. participated in therapy as ordered, tolerated well. new orders for labs in am. Resident pleasant and cooperative.

## 2024-10-09 ENCOUNTER — TELEPHONE (OUTPATIENT)
Dept: GASTROENTEROLOGY | Facility: CLINIC | Age: 74
End: 2024-10-09
Payer: MEDICARE

## 2024-10-09 LAB
ANION GAP SERPL CALCULATED.3IONS-SCNC: 12.8 MMOL/L (ref 5–15)
BASOPHILS # BLD AUTO: 0.03 10*3/MM3 (ref 0–0.2)
BASOPHILS NFR BLD AUTO: 0.2 % (ref 0–1.5)
BUN SERPL-MCNC: 14 MG/DL (ref 8–23)
BUN/CREAT SERPL: 17.7 (ref 7–25)
CALCIUM SPEC-SCNC: 8.4 MG/DL (ref 8.6–10.5)
CHLORIDE SERPL-SCNC: 97 MMOL/L (ref 98–107)
CO2 SERPL-SCNC: 23.2 MMOL/L (ref 22–29)
CREAT SERPL-MCNC: 0.79 MG/DL (ref 0.76–1.27)
DEPRECATED RDW RBC AUTO: 53.6 FL (ref 37–54)
EGFRCR SERPLBLD CKD-EPI 2021: 93.8 ML/MIN/1.73
EOSINOPHIL # BLD AUTO: 0.24 10*3/MM3 (ref 0–0.4)
EOSINOPHIL NFR BLD AUTO: 1.8 % (ref 0.3–6.2)
ERYTHROCYTE [DISTWIDTH] IN BLOOD BY AUTOMATED COUNT: 15.9 % (ref 12.3–15.4)
GLUCOSE SERPL-MCNC: 136 MG/DL (ref 65–99)
HCT VFR BLD AUTO: 30.2 % (ref 37.5–51)
HGB BLD-MCNC: 9.8 G/DL (ref 13–17.7)
IMM GRANULOCYTES # BLD AUTO: 0.12 10*3/MM3 (ref 0–0.05)
IMM GRANULOCYTES NFR BLD AUTO: 0.9 % (ref 0–0.5)
LYMPHOCYTES # BLD AUTO: 1.01 10*3/MM3 (ref 0.7–3.1)
LYMPHOCYTES NFR BLD AUTO: 7.7 % (ref 19.6–45.3)
MAGNESIUM SERPL-MCNC: 1.7 MG/DL (ref 1.6–2.4)
MCH RBC QN AUTO: 30 PG (ref 26.6–33)
MCHC RBC AUTO-ENTMCNC: 32.5 G/DL (ref 31.5–35.7)
MCV RBC AUTO: 92.4 FL (ref 79–97)
MONOCYTES # BLD AUTO: 0.89 10*3/MM3 (ref 0.1–0.9)
MONOCYTES NFR BLD AUTO: 6.8 % (ref 5–12)
NEUTROPHILS NFR BLD AUTO: 10.86 10*3/MM3 (ref 1.7–7)
NEUTROPHILS NFR BLD AUTO: 82.6 % (ref 42.7–76)
NRBC BLD AUTO-RTO: 0 /100 WBC (ref 0–0.2)
PLATELET # BLD AUTO: 169 10*3/MM3 (ref 140–450)
PMV BLD AUTO: 9.7 FL (ref 6–12)
POTASSIUM SERPL-SCNC: 3.9 MMOL/L (ref 3.5–5.2)
RBC # BLD AUTO: 3.27 10*6/MM3 (ref 4.14–5.8)
SODIUM SERPL-SCNC: 133 MMOL/L (ref 136–145)
WBC NRBC COR # BLD AUTO: 13.15 10*3/MM3 (ref 3.4–10.8)

## 2024-10-09 PROCEDURE — 99308 SBSQ NF CARE LOW MDM 20: CPT | Performed by: PHYSICIAN ASSISTANT

## 2024-10-09 PROCEDURE — 97110 THERAPEUTIC EXERCISES: CPT

## 2024-10-09 PROCEDURE — 97530 THERAPEUTIC ACTIVITIES: CPT

## 2024-10-09 PROCEDURE — 80048 BASIC METABOLIC PNL TOTAL CA: CPT | Performed by: PHYSICIAN ASSISTANT

## 2024-10-09 PROCEDURE — 85025 COMPLETE CBC W/AUTO DIFF WBC: CPT | Performed by: PHYSICIAN ASSISTANT

## 2024-10-09 PROCEDURE — 97112 NEUROMUSCULAR REEDUCATION: CPT

## 2024-10-09 PROCEDURE — 97116 GAIT TRAINING THERAPY: CPT

## 2024-10-09 PROCEDURE — 83735 ASSAY OF MAGNESIUM: CPT | Performed by: PHYSICIAN ASSISTANT

## 2024-10-09 RX ADMIN — DILTIAZEM HYDROCHLORIDE 120 MG: 30 TABLET, FILM COATED ORAL at 22:07

## 2024-10-09 RX ADMIN — PANTOPRAZOLE SODIUM 40 MG: 40 TABLET, DELAYED RELEASE ORAL at 17:39

## 2024-10-09 RX ADMIN — METOPROLOL TARTRATE 100 MG: 50 TABLET, FILM COATED ORAL at 08:48

## 2024-10-09 RX ADMIN — CLOBETASOL PROPIONATE CREAM USP, 0.05% 1 APPLICATION: 0.5 CREAM TOPICAL at 22:16

## 2024-10-09 RX ADMIN — DILTIAZEM HYDROCHLORIDE 120 MG: 30 TABLET, FILM COATED ORAL at 08:48

## 2024-10-09 RX ADMIN — PANTOPRAZOLE SODIUM 40 MG: 40 TABLET, DELAYED RELEASE ORAL at 08:48

## 2024-10-09 RX ADMIN — FERROUS SULFATE TAB 325 MG (65 MG ELEMENTAL FE) 325 MG: 325 (65 FE) TAB at 08:48

## 2024-10-09 RX ADMIN — METOPROLOL TARTRATE 100 MG: 50 TABLET, FILM COATED ORAL at 22:07

## 2024-10-09 RX ADMIN — SENNOSIDES AND DOCUSATE SODIUM 2 TABLET: 50; 8.6 TABLET ORAL at 08:48

## 2024-10-09 RX ADMIN — Medication 5 MG: at 22:07

## 2024-10-09 RX ADMIN — FUROSEMIDE 40 MG: 40 TABLET ORAL at 08:47

## 2024-10-09 RX ADMIN — SENNOSIDES AND DOCUSATE SODIUM 2 TABLET: 50; 8.6 TABLET ORAL at 22:07

## 2024-10-09 RX ADMIN — ASPIRIN 325 MG: 325 TABLET ORAL at 08:47

## 2024-10-09 RX ADMIN — CLOBETASOL PROPIONATE CREAM USP, 0.05% 1 APPLICATION: 0.5 CREAM TOPICAL at 15:51

## 2024-10-09 NOTE — PLAN OF CARE
Goal Outcome Evaluation:           Progress: no change  Outcome Evaluation: Rsd. is alert and oriented x4, able to make needs known to staff.  Rsd. transfers x1 assist with gaitbelt and rolling walker but refused to transfer to BSC/, requested bedpan this shift.  Urinal remains at bedside.  Rsd. needs assistance with urinal due to anatomy.  Rsd. denies pain, and has rested intermittently in between care.  Call light and personal items within reach.  Rsd. reminded to use call light for assistance, verbalizes understanding.  Will continue to monitor and notify on-coming staff.  Current plan of care remains in place at this time.

## 2024-10-09 NOTE — PROGRESS NOTES
[facility]   Hospitalist Progress Note       Patient Name: Woodrow Alejandro  : 1950  MRN: [id number]  Primary Care Physician: Juan Perez MD  Date of admission: 10/3/2024  Today's Date: 10/9/2024  Room / Bed:   [facility]/1  Subjective   Chief Complaint: Weakness after recent hospitalization     HPI:  Woodrow Alejandro is a 73 y.o. male recently hospitalized for anemia and also right Achilles tendon tear.  During hospitalization, gastroenterology, Dr. Mensah, performed EGD and colonoscopy.  EGD showed esophagitis with bleeding lower third of esophagus.  PPI recommended.  2 polyps (tubular adenomas) were removed on colonoscopy.  Orthopedist, Dr. Zuniga, was consulted and recommended gentle ROM, weightbearing as tolerated, PT, and walking boot for his Achilles tendon tear.  His home aspirin was subsequently resumed on 10/3/24.  He has been a good candidate for more rehab and has now been admitted to our SNF for more therapy.  He will be on PPI BID x 2 weeks, then transition to daily.       Interval Followup: Patient seen and examined resting comfortably no acute events the night hemodynamic stable remains in walking boot for partial tear of Achilles tendon leukocyte count remains mildly elevated at 13 but no signs of acute infection will continue to monitor.  Hemoglobin remained stable monitor for signs of bleeding    REVIEW OF SYSTEMS:   Weakness fatigue  Objective   Temp:  [99.1 °F (37.3 °C)-99.3 °F (37.4 °C)] 99.3 °F (37.4 °C)  Heart Rate:  [] 85  Resp:  [18] 18  BP: ()/(42-60) 100/56  PHYSICAL EXAM   Constitutional: Awake alert oriented no acute distress  Respiratory: Clear  Cardiovascular: RRR  GI: Abdomen soft nontender  Results from last 7 days   Lab Units 10/09/24  0451 10/06/24  0450 10/03/24  0512   WBC 10*3/mm3 13.15* 12.06* 13.36*   HEMOGLOBIN g/dL 9.8* 10.0* 10.3*   HEMATOCRIT % 30.2* 31.7* 32.2*   PLATELETS 10*3/mm3 169 192 224     Results from last 7 days   Lab Units  10/09/24  0451 10/06/24  0450 10/03/24  0512   SODIUM mmol/L 133* 135* 134*   POTASSIUM mmol/L 3.9 4.0 4.0   CO2 mmol/L 23.2 23.1 30.4*   CHLORIDE mmol/L 97* 98 94*   ANION GAP mmol/L 12.8 13.9 9.6   BUN mg/dL 14 18 28*   CREATININE mg/dL 0.79 0.97 0.77   GLUCOSE mg/dL 136* 133* 137*         COMPLEXITY OF DATA / DECISION MAKING     []  Moderate: One acute illness or mild exacerbation of chronic or 2 stable chronic or tx side effects   []  High:  Severe acute illness or severe exacerbation of chronic - potential for major debility / life threatening         I have personally reviewed the results from the time of this admission to 10/9/2024 12:01 EDT:  []  Laboratory:  []  Microbiology: []  Radiology:  []  Telemetry:   []  Cardiology/Vascular:  []  Pathology:  []  Prior external records:  []  Independent historian provided additional details:      []  Discussed case with specialists:    []  Independent interpretation of ECG/Imaging etc:             []  Moderate: Rx management, low risk surgery, suboptimal social situation   []  High:  Rx with close monitoring for toxicity, mod-high risk surgery, DNR decision, Comfort initiated, IV pain meds    Assessment / Plan   Assessment:     Weakness after recent hospitalization  Recent anemia  Recent EGD 9/30/24 by Dr. Mensah showing esophagitis (PPI recommended)  Recent colonoscopy 9/30/24 by Dr. Mensah with polypectomy  Recent right achilles tendon tear  Nonischemic cardiomyopathy, EF improved  A-fib (on aspirin only)  Bioprosthetic aortic valve replacement (Dr. Newman)  HTN  GERD  Anemia  CAD/AICD  Noted on CT: Possible ankylosing spondylitis   Urinary frequency      Plan:     Continue daily physical therapy  Asking about urinary frequency. Bladder scan post void w only 100cc.  Check U/A...... neg for infection.  Reviewed recent CT which did not show any prostamegaly.  Monitor CBC periodically.  Monitor for any signs of infection.  Home aspirin has been resumed  Continue iron  supplement  Continue PPI  Where boot when out of bed.  WBAT.  Gentle range of motion right ankle.  Additional recommendations pending clinical course    VTE Prophylaxis:  No VTE prophylaxis order currently exists.    CODE STATUS:      Level Of Support Discussed With: Patient  Code Status (Patient has no pulse and is not breathing): CPR (Attempt to Resuscitate)  Medical Interventions (Patient has pulse or is breathing): Full Support       Electronically signed by CHANEL Melo, 10/09/24, 12:04 PM EDT.

## 2024-10-09 NOTE — TELEPHONE ENCOUNTER
Patient currently admitted to [facility].  Will defer and follow.    Care Gap updated:  3 year colon recall placed

## 2024-10-09 NOTE — TELEPHONE ENCOUNTER
----- Message from Jimena Dean sent at 10/3/2024 11:36 AM EDT -----  Patient had colonoscopy with Dr. Mensah on 9/30/2024  Findings included-  One 5 mm polyp in the cecum.  One 18 mm polyp in the ascending colon.  Clip was placed.  Mucosal resection was performed.    Path-fragments of tubular adenoma (cecum)  Fragments of tubulovillous adenoma(Ascending colon)    Patient needs repeat colonoscopy in 3 years for surveillance    Patient also had EGD with Dr. Mensah on 9/30/2024  Findings included-  LA grade C reflux esophagitis with bleeding.  Excessive gastric fluid.  A medium amount of food in the stomach.  Normal duodenum.    Patient should be using Protonix 40 mg p.o. daily

## 2024-10-09 NOTE — THERAPY TREATMENT NOTE
SNF - Physical Therapy Treatment Note   Yudy     Patient Name: Woodrow Alejandro  : 1950  MRN: [id number]  Today's Date: 10/9/2024      Visit Dx:    ICD-10-CM ICD-9-CM   1. Decreased activities of daily living (ADL)  Z78.9 V49.89   2. Difficulty walking  R26.2 719.7     Patient Active Problem List   Diagnosis    Essential hypertension    Permanent atrial fibrillation    S/P AVR    ICD (implantable cardioverter-defibrillator), dual, in situ    Dermatitis    IFG (impaired fasting glucose)    Mixed hyperlipidemia    Vitamin D deficiency    Medicare annual wellness visit, subsequent    Primary osteoarthritis of both knees    Screening PSA (prostate specific antigen)    Bilateral primary osteoarthritis of knee    Coarse tremors    Weakness generalized    Increased urinary frequency    Bandemia    Achilles tendon rupture    Anemia due to GI blood loss    Physical debility     Past Medical History:   Diagnosis Date    Aortic valve replaced     Arthritis 2018    Atrial fibrillation     Coronary artery disease     Hypertension      Past Surgical History:   Procedure Laterality Date    CARDIAC SURGERY      COLONOSCOPY      COLONOSCOPY N/A 2024    Procedure: COLONOSCOPY WITH HOT/COLD SNARE POLYPECTOMIES, ELEVIEW INJECTION,CLIPX1;  Surgeon: Kurt Mensah MD;  Location: [facility] ENDOSCOPY;  Service: Gastroenterology;  Laterality: N/A;  COLON POLYPS    ENDOSCOPY N/A 2024    Procedure: ESOPHAGOGASTRODUODENOSCOPY WITH BIOPSIES;  Surgeon: Kurt Mensah MD;  Location: [facility] ENDOSCOPY;  Service: Gastroenterology;  Laterality: N/A;  RETAINED FOOD IN STOMACH AND REFLUX ESOPHAGITIS    PACEMAKER IMPLANTATION         PT Assessment (Last 12 Hours)       PT Evaluation and Treatment       Row Name 10/09/24 0858          Physical Therapy Time and Intention    Subjective Information no complaints  -VK     Document Type therapy note (daily note)  -VK     Mode of Treatment individual therapy;physical  therapy  -VK     Patient Effort adequate  -VK     Comment Pt asking when treatment will be over frequently throughout session  -VK       Row Name 10/09/24 0858          General Information    Patient Profile Reviewed yes  -VK     Existing Precautions/Restrictions fall  CAM boot donned RLE at all times when up  -VK       Row Name 10/09/24 0858          Cognition    Affect/Mental Status (Cognition) WFL  -VK     Orientation Status (Cognition) oriented x 4  -VK     Personal Safety Interventions nonskid shoes/slippers when out of bed;gait belt;fall prevention program maintained  -VK       Row Name 10/09/24 0858          Mobility    Extremity Weight-bearing Status right lower extremity  -VK     Right Lower Extremity (Weight-bearing Status) weight-bearing as tolerated (WBAT)  -VK       Row Name 10/09/24 0858          Transfers    Transfers sit-stand transfer;stand-sit transfer  -VK       Row Name 10/09/24 0858          Sit-Stand Transfer    Sit-Stand [place] (Transfers) contact guard;verbal cues  -VK     Assistive Device (Sit-Stand Transfers) walker, front-wheeled  -VK       Row Name 10/09/24 0858          Stand-Sit Transfer    Stand-Sit [place] (Transfers) contact guard;verbal cues  -VK     Assistive Device (Stand-Sit Transfers) walker, front-wheeled  -VK       Row Name 10/09/24 0858          Gait/Stairs (Locomotion)    [place] Level (Gait) contact guard;verbal cues  -VK     Assistive Device (Gait) walker, front-wheeled  -VK     Patient was able to Ambulate yes  -VK     Distance in Feet (Gait) 40  -VK     Pattern (Gait) 4-point;step-through  -VK     Deviations/Abnormal Patterns (Gait) gait speed decreased;stride length decreased;tapan decreased  -VK     Bilateral Gait Deviations forward flexed posture;heel strike decreased  -VK     Right Sided Gait Deviations weight shift ability decreased  -VK       Row Name 10/09/24 0858          Safety Issues, Functional Mobility    Impairments Affecting Function  (Mobility) balance;endurance/activity tolerance;range of motion (ROM);strength  -VK       Row Name 10/09/24 0858          Balance    Sit to Stand Dynamic Balance contact guard  -VK     Static Standing Balance contact guard  -VK     Dynamic Standing Balance contact guard  -VK     Position/Device Used, Standing Balance walker, front-wheeled  -VK       Row Name 10/09/24 0858          Hip (Therapeutic Exercise)    Hip Isometrics (Therapeutic Exercise) aDduction;10 repetitions;2 sets  -VK     Hip Strengthening (Therapeutic Exercise) bilateral;flexion;10 repetitions;2 sets  -VK       Row Name 10/09/24 0858          Knee (Therapeutic Exercise)    Knee Strengthening (Therapeutic Exercise) bilateral;LAQ (long arc quad);10 repetitions;2 sets  -VK       Row Name 10/09/24 0858          Ankle (Therapeutic Exercise)    Ankle (Therapeutic Exercise) AROM (active range of motion)  -VK     Ankle AROM (Therapeutic Exercise) right;dorsiflexion;plantarflexion;inversion;eversion;other (see comments);10 repetitions;2 sets  Towel scrunches  -VK       Row Name 10/09/24 0858          Positioning and Restraints    Pre-Treatment Position sitting in chair/recliner  -VK     Post Treatment Position chair  -VK     In Chair with OT  In gym  -VK       Row Name 10/09/24 0858          Progress Summary (PT)    Progress Toward Functional Goals (PT) progress toward functional goals is fair  -VK               User Key  (r) = Recorded By, (t) = Taken By, (c) = Cosigned By      Initials Name Provider Type    VK Karla Gaines PTA Physical Therapist Assistant                  Section G              Section GG                       Physical Therapy Education       Title: PT OT SLP Therapies (Done)       Topic: Physical Therapy (Resolved)       Point: Mobility training (Resolved)       Learner Progress:  Not documented in this visit.              Point: Home exercise program (Resolved)       Learner Progress:  Not documented in this visit.              Point:  Body mechanics (Resolved)       Learner Progress:  Not documented in this visit.              Point: Precautions (Resolved)       Learner Progress:  Not documented in this visit.                                  PT Recommendation and Plan     Progress Summary (PT)  Progress Toward Functional Goals (PT): progress toward functional goals is fair   Outcome Measures       Row Name 10/09/24 1300 10/07/24 1419          How much help from another person do you currently need...    Turning from your back to your side while in flat bed without using bedrails? 3  -VK 3  -WM     Moving from lying on back to sitting on the side of a flat bed without bedrails? 3  -VK 2  -WM     Moving to and from a bed to a chair (including a wheelchair)? 3  -VK 3  -WM     Standing up from a chair using your arms (e.g., wheelchair, bedside chair)? 3  -VK 3  -WM     Climbing 3-5 steps with a railing? 2  -VK 2  -WM     To walk in hospital room? 3  -VK 3  -WM     AM-PAC 6 Clicks Score (PT) 17  -VK 16  -WM     Highest Level of Mobility Goal 5 --> Static standing  -VK 5 --> Static standing  -WM               User Key  (r) = Recorded By, (t) = Taken By, (c) = Cosigned By      Initials Name Provider Type    WM Dwaine uGzman PTA Physical Therapist Assistant    Karla Ignacio PTA Physical Therapist Assistant                     Time Calculation:    PT Charges       Row Name 10/09/24 1333             Time Calculation    PT Received On 10/09/24  -VK         Timed Charges    19396 - PT Therapeutic Exercise Minutes 18  -VK      04817 - Gait Training Minutes  8  -VK      24477 - PT Therapeutic Activity Minutes 12  -VK         SNF Physical Therapy Minutes    Skilled Minutes- PT 38 min  -VK         Total Minutes    Timed Charges Total Minutes 38  -VK       Total Minutes 38  -VK                User Key  (r) = Recorded By, (t) = Taken By, (c) = Cosigned By      Initials Name Provider Type    VK Karla Gaines PTA Physical Therapist Assistant                   Therapy Charges for Today       Code Description Service Date Service Provider Modifiers Qty    67670820791 HC PT THER PROC EA 15 MIN 10/9/2024 Karla Gaines, PTA GP 1    72678960826 HC GAIT TRAINING EA 15 MIN 10/9/2024 Karla Gaines, PTA GP 1    01489923953 HC PT THERAPEUTIC ACT EA 15 MIN 10/9/2024 Karla Gaines, PTA GP 1            PT G-Codes  Outcome Measure Options: AM-PAC 6 Clicks Daily Activity (OT), Optimal Instrument  AM-PAC 6 Clicks Score (PT): 17  AM-PAC 6 Clicks Score (OT): 18    Karla Gaines PTA  10/9/2024

## 2024-10-09 NOTE — PLAN OF CARE
Goal Outcome Evaluation:  Plan of Care Reviewed With: patient        Progress: no change  Outcome Evaluation: PT is AAOx4, VSS, no c/o of SOA, pain or N/V/D, x1 assist with walker, up in chair, last BM noted 10/8, compliant with tx plan, bed/chair in low/locked position, alarms audible, call light and personal items within reach, continue with current POC at this time.

## 2024-10-09 NOTE — PROGRESS NOTES
"Nursing Facility Medication Regimen Review    Potentially Clinically Significant Medication Issues during this review: [x]Identified   []Not identified    Provider acknowledgement required?   [x]Yes   []No    Woodrow Alejandro is a 73 y.o.male admitted by Sriram Pelayo MD , on 10/3/2024  4:08 PM , for Physical debility [R53.81]    Visit Vitals  /56 (BP Location: Right arm, Patient Position: Sitting)   Pulse 85   Temp 99.3 °F (37.4 °C) (Oral)   Resp 18   Ht 182.9 cm (72\")   Wt (!) 139 kg (307 lb 1.6 oz)   SpO2 90%   BMI 41.65 kg/m²        Lab Results   Component Value Date    GLUCOSE 136 (H) 10/09/2024    BUN 14 10/09/2024    CREATININE 0.79 10/09/2024    BCR 17.7 10/09/2024    K 3.9 10/09/2024    CO2 23.2 10/09/2024    CALCIUM 8.4 (L) 10/09/2024    ALBUMIN 2.8 (L) 10/03/2024    LABIL2 1.4 06/27/2019    AST 19 10/03/2024    ALT 15 10/03/2024    WBC 13.15 (H) 10/09/2024    HGB 9.8 (L) 10/09/2024    HCT 30.2 (L) 10/09/2024    MCV 92.4 10/09/2024     10/09/2024        Active Ambulatory Problems     Diagnosis Date Noted    Essential hypertension 10/22/2020    Permanent atrial fibrillation 10/22/2020    S/P AVR 10/22/2020    ICD (implantable cardioverter-defibrillator), dual, in situ 10/22/2020    Dermatitis 10/31/2023    IFG (impaired fasting glucose) 10/31/2023    Mixed hyperlipidemia 10/31/2023    Vitamin D deficiency 10/31/2023    Medicare annual wellness visit, subsequent 12/12/2023    Primary osteoarthritis of both knees 03/04/2024    Screening PSA (prostate specific antigen) 06/24/2024    Bilateral primary osteoarthritis of knee 07/18/2024    Coarse tremors 09/05/2024    Weakness generalized 09/05/2024    Increased urinary frequency 09/05/2024    Bandemia 09/19/2024    Achilles tendon rupture 09/26/2024    Anemia due to GI blood loss 09/26/2024     Resolved Ambulatory Problems     Diagnosis Date Noted    No Resolved Ambulatory Problems     Past Medical History:   Diagnosis Date    Aortic valve " replaced     Arthritis 2018    Atrial fibrillation     Coronary artery disease     Hypertension         Hospital Medications (active)         Dose Frequency Indication    acetaminophen (TYLENOL) 160 MG/5ML oral solution 650 mg 650 mg Every 4 Hours PRN PRN Mild pain    acetaminophen (TYLENOL) suppository 650 mg 650 mg Every 4 Hours PRN PRN Mild pain    acetaminophen (TYLENOL) tablet 650 mg 650 mg Every 4 Hours PRN PRN Mild pain    aspirin tablet 325 mg 325 mg Daily Antiplatelet for bioprosthetic valve     bisacodyl (DULCOLAX) EC tablet 5 mg 5 mg Daily PRN Bowel regimen    bisacodyl (DULCOLAX) suppository 10 mg 10 mg Daily PRN Bowel regimen    clobetasol propionate (TEMOVATE) 0.05 % cream 1 Application 1 Application User Specified Wound care to left face and back    dilTIAZem (CARDIZEM) tablet 120 mg 120 mg Every 12 Hours Scheduled Essential hypertension    diphenhydrAMINE (BENADRYL) capsule 50 mg 50 mg Every 8 Hours PRN Itching    ferrous sulfate tablet 325 mg 325 mg Daily With Breakfast Anemia    furosemide (LASIX) tablet 40 mg 40 mg Daily Edema     lisinopril (PRINIVIL,ZESTRIL) tablet 10 mg ([Held by provider] since 10/9/2024  8:43 AM) 10 mg Daily Hypertension    melatonin tablet 5 mg 5 mg Nightly  Sleep    metoprolol tartrate (LOPRESSOR) tablet 100 mg 100 mg 2 Times Daily Hypertension/afib    ondansetron (ZOFRAN) injection 4 mg 4 mg Every 6 Hours PRN PRN nausea/vomiting    ondansetron ODT (ZOFRAN-ODT) disintegrating tablet 4 mg 4 mg Every 6 Hours PRN PRN nausea/vomiting    oxyCODONE (ROXICODONE) immediate release tablet 5 mg 5 mg Every 6 Hours PRN PRN severe pain    pantoprazole (PROTONIX) EC tablet 40 mg 40 mg 2 Times Daily Before Meals Reflux esophagitis     Pneumococcal 20-Kimberly Conj Vacc (PREVNAR-20) vaccine 0.5 mL 0.5 mL During Hospitalization Preventative vaccination    polyethylene glycol (MIRALAX) packet 17 g 17 g Daily PRN Bowel regimen    prochlorperazine (COMPAZINE) injection 5 mg 5 mg Every 6 Hours PRN  PRN nausea/vomiting    sennosides-docusate (PERICOLACE) 8.6-50 MG per tablet 2 tablet 2 tablet 2 Times Daily Bowel regimen    simethicone (MYLICON) chewable tablet 80 mg 80 mg 4 Times Daily PRN PRN flatulence            Psychotropic Medications  Diphenhydramine PRN  Prochlorperazine PRN    Potentially Clinically Significant Medication Issues/PharmD Recommendations:   Psychotropic Medication: PRN meds above to be given 14-day stop date per protocol.   Diphenhydramine on Beer's list given anticholinergic effects - recommend discontinuation and alternative therapy for itching if needed (patient has yet to use)  Prochlorperazine on Beer's list given increased risk of falls, delirium and dementia - recommend discontinuation as patient has not required any doses for N/V and has zofran PRN as an alternative  Opioid Use Review: oxycodone ordered PRN for severe pain - Beer's list as opioids increase risk of delirium; patient has not required medication with pain scores not exceeding 3; recommend to discontinue   Medication without an appropriate indication: None  Untreated indication: None  Missing Duration:   Above mentioned psychotropic meds  Oxycodone  Pantoprazole - plan for 2 weeks BID dosing; recommend adding stop date of 10/14 after 2100 dose and then switching to daily for a max of 8 weeks as appropriate as pantoprazole is also on the Beer's list due to increased risk of C. Diff, PNA, GI malignancies, bone loss and fractures  Excessive or Inadequate Dose: potentially hypertension medications given hypotension; consider decreasing dose of lisinopril or metoprolol   Ineffective Drug Therapy: None  Medication Adverse Reactions/Consequences: monitor for bleeding with aspirin 325 mg daily  Medication Monitoring: Continue to monitor blood pressure with hypertension medications; monitor for bleeding with aspirin 325 mg daily   Drug Interactions: None  Duplicate Therapy: None  PTA Medication Omissions (not currently  ordered): None  Safety: See above      Additional notes: Recommend annual influenza vaccine and Tdap in addition to pneumonia vaccine currently ordered for prevention. Of note, patient did have an Achilles tendon rupture and was recently prescribed levofloxacin. Would recommend to avoid prescribing in the future if able.      In completing this Drug Regimen Review for MARY Collier Taylor Johnson, PharmD/LELE/BCPS, have reviewed the electronic medical chart contents, including but not limited to the following: Medication Administration Records (MAR), Prescriber's orders, Progress, nursing and consultants' notes, Laboratory and diagnostic test results, Other sources of information about documented expressions or indications of distress and/or changes in condition.

## 2024-10-09 NOTE — THERAPY TREATMENT NOTE
SNF - Occupational Therapy Treatment Note   Yudy    Patient Name: Woodrow Alejandro  : 1950    MRN: [id number]                              Today's Date: 10/9/2024       Admit Date: 10/3/2024    Visit Dx:     ICD-10-CM ICD-9-CM   1. Decreased activities of daily living (ADL)  Z78.9 V49.89   2. Difficulty walking  R26.2 719.7     Patient Active Problem List   Diagnosis    Essential hypertension    Permanent atrial fibrillation    S/P AVR    ICD (implantable cardioverter-defibrillator), dual, in situ    Dermatitis    IFG (impaired fasting glucose)    Mixed hyperlipidemia    Vitamin D deficiency    Medicare annual wellness visit, subsequent    Primary osteoarthritis of both knees    Screening PSA (prostate specific antigen)    Bilateral primary osteoarthritis of knee    Coarse tremors    Weakness generalized    Increased urinary frequency    Bandemia    Achilles tendon rupture    Anemia due to GI blood loss    Physical debility     Past Medical History:   Diagnosis Date    Aortic valve replaced     Arthritis 2018    Atrial fibrillation     Coronary artery disease     Hypertension      Past Surgical History:   Procedure Laterality Date    CARDIAC SURGERY      COLONOSCOPY      COLONOSCOPY N/A 2024    Procedure: COLONOSCOPY WITH HOT/COLD SNARE POLYPECTOMIES, ELEVIEW INJECTION,CLIPX1;  Surgeon: Kurt Mensah MD;  Location: [facility] ENDOSCOPY;  Service: Gastroenterology;  Laterality: N/A;  COLON POLYPS    ENDOSCOPY N/A 2024    Procedure: ESOPHAGOGASTRODUODENOSCOPY WITH BIOPSIES;  Surgeon: Kurt Mensah MD;  Location: [facility] ENDOSCOPY;  Service: Gastroenterology;  Laterality: N/A;  RETAINED FOOD IN STOMACH AND REFLUX ESOPHAGITIS    PACEMAKER IMPLANTATION        General Information       Row Name 10/09/24 1112          OT Time and Intention    Document Type therapy note (daily note)  -EG     Mode of Treatment individual therapy;occupational therapy  -EG       Row Name 10/09/24 1112           General Information    Existing Precautions/Restrictions fall  CAM boot donned 24/7 RLE  -EG       Row Name 10/09/24 1112          Cognition    Orientation Status (Cognition) oriented x 4  -EG       Row Name 10/09/24 1112          Safety Issues, Functional Mobility    Impairments Affecting Function (Mobility) balance;endurance/activity tolerance;range of motion (ROM);strength  -EG               User Key  (r) = Recorded By, (t) = Taken By, (c) = Cosigned By      Initials Name Provider Type    EG Janessa Good OT Occupational Therapist                     Mobility/ADL's       Row Name 10/09/24 1113          Transfers    Transfers sit-stand transfer;stand-sit transfer  -EG     Comment, (Transfers) transfers in and out of multiple chairs in therapy gym with arms using RW, CGA consistenlty required with occ. cues for anterior weight shift and hand placement  -EG       Row Name 10/09/24 1113          Sit-Stand Transfer    Sit-Stand Rooks (Transfers) contact guard;verbal cues  -EG     Assistive Device (Sit-Stand Transfers) walker, front-wheeled  -EG       Row Name 10/09/24 1113          Stand-Sit Transfer    Stand-Sit Rooks (Transfers) contact guard;verbal cues  -EG     Assistive Device (Stand-Sit Transfers) walker, front-wheeled  -EG       Row Name 10/09/24 1113          Functional Mobility    Functional Mobility- Ind. Level contact guard assist;verbal cues required;nonverbal cues required (demo/gesture)  -EG     Functional Mobility- Device walker, front-wheeled  -EG     Functional Mobility- Comment Functional mobility performed to and from therapy gym using RW w/CAM boot donned; reports faituge and decreased WB overall on RLE but no LOB noted,continues to require occ. cues for safety and proper use of AD  -EG       Row Name 10/09/24 1113          Mobility    Extremity Weight-bearing Status right lower extremity  -EG     Right Lower Extremity (Weight-bearing Status) weight-bearing as tolerated  (WBAT)  W/CAM boot donned  -EG               User Key  (r) = Recorded By, (t) = Taken By, (c) = Cosigned By      Initials Name Provider Type    EG Janessa Good OT Occupational Therapist                   Obj/Interventions       Row Name 10/09/24 1114          Shoulder (Therapeutic Exercise)    Shoulder (Therapeutic Exercise) strengthening exercise  -EG     Shoulder Strengthening (Therapeutic Exercise) bilateral;flexion;extension;scapular stabilization;horizontal aBduction/aDduction;external rotation;internal rotation;15 repititions;sitting  4# dowel maynor  -EG       Row Name 10/09/24 1114          Elbow/Forearm (Therapeutic Exercise)    Elbow/Forearm (Therapeutic Exercise) strengthening exercise  -EG     Elbow/Forearm Strengthening (Therapeutic Exercise) bilateral;flexion;extension;15 repititions;sitting  4# dowel maynor  -EG       Row Name 10/09/24 1114          Motor Skills    Motor Skills functional endurance  -EG     Functional Endurance fair on room air  -EG     Therapeutic Exercise shoulder;elbow/forearm;aerobic;other (see comments)  Rickshaw tricep extensions performed 3x10 total of 20#'s with rest breaks in between sets  -EG       Row Name 10/09/24 1114          Balance    Balance Interventions standing;sit to stand;supported;static;dynamic;foam;weight shifting activity;occupation based/functional task;UE activity with balance activity;dynamic reaching  -EG     Comment, Balance Patient participated in static standing tasks on foam airex balance pad 2x for 30 second intervals with BUE support requiring breaks due to fear and anxiety; Patient able to stand static on level surfaces unsupported and perform BUE gross motor tasks using ball.  -EG       Row Name 10/09/24 1114          Aerobic Exercise    Type (Aerobic Exercise) arm bike  -EG     Comment, Aerobic Exercise (Therapeutic Exercise) Omnicycle performed 15:00 cardiac interval no rest break  -EG               User Key  (r) = Recorded By, (t) = Taken By,  (c) = Cosigned By      Initials Name Provider Type    Janessa Alvarez OT Occupational Therapist                   Goals/Plan    No documentation.                  Clinical Impression       Row Name 10/09/24 1121          Plan of Care Review    Plan of Care Reviewed With patient  -EG     Progress improving  -EG     Outcome Evaluation Pleasant and cooperative; Pain is inconsistent and can be limited by fear and anxiety at times; Overall small improvements noted in balance and endurance for functional tasks; Patient will benefit from continued training for strength, compensatory technique use in LB & standing balance for ADL's; Continue with POC; Ax1 w/RW and CAM boot donned.  -EG       Row Name 10/09/24 1121          Therapy Assessment/Plan (OT)    Rehab Potential (OT) good, to achieve stated therapy goals  -EG     Criteria for Skilled Therapeutic Interventions Met (OT) yes;meets criteria;skilled treatment is necessary  -EG     Therapy Frequency (OT) 5 times/wk  -EG       Row Name 10/09/24 1121          Therapy Plan Review/Discharge Plan (OT)    Anticipated Discharge Disposition (OT) home with home health  -EG       Row Name 10/09/24 1121          Positioning and Restraints    Pre-Treatment Position sitting in chair/recliner  -EG     Post Treatment Position chair  -EG     In Chair reclined;call light within reach;encouraged to call for assist;exit alarm on;sitting  -EG               User Key  (r) = Recorded By, (t) = Taken By, (c) = Cosigned By      Initials Name Provider Type    Janessa Alvarez OT Occupational Therapist                   Outcome Measures       Row Name 10/09/24 1123          How much help from another is currently needed...    Putting on and taking off regular lower body clothing? 2  -EG     Bathing (including washing, rinsing, and drying) 2  -EG     Toileting (which includes using toilet bed pan or urinal) 3  -EG     Putting on and taking off regular upper body clothing 3  -EG     Taking  care of personal grooming (such as brushing teeth) 4  -EG     Eating meals 4  -EG     AM-PAC 6 Clicks Score (OT) 18  -EG       Row Name 10/09/24 1000          How much help from another person do you currently need...    Turning from your back to your side while in flat bed without using bedrails? 3  -MT     Moving from lying on back to sitting on the side of a flat bed without bedrails? 2  -MT     Moving to and from a bed to a chair (including a wheelchair)? 3  -MT     Standing up from a chair using your arms (e.g., wheelchair, bedside chair)? 3  -MT     Climbing 3-5 steps with a railing? 2  -MT     To walk in hospital room? 3  -MT     AM-PAC 6 Clicks Score (PT) 16  -MT     Highest Level of Mobility Goal 5 --> Static standing  -MT       Row Name 10/09/24 1123          Functional Assessment    Outcome Measure Options AM-PAC 6 Clicks Daily Activity (OT);Optimal Instrument  -EG       Row Name 10/09/24 1123          Optimal Instrument    Optimal Instrument Optimal - 3  -EG     Bending/Stooping 3  -EG     Standing 2  -EG     Reaching 1  -EG               User Key  (r) = Recorded By, (t) = Taken By, (c) = Cosigned By      Initials Name Provider Type    Dalia Castellano LPN Licensed Nurse    EG Janessa Good OT Occupational Therapist                  Section G  Mobility  Bed mobility - self performance: limited assistance (staff provide guided maneuvering of limbs or other non-weight bearing assistance)  Bed mobility support/assistance: One person assist  Transfer - self performance: limited assistance (staff provide guided maneuvering of limbs or other non-weight bearing assistance)  Transfer support/assistance: One person assist  Walking in room - self performance: limited assistance (staff provide guided maneuvering of limbs or other non-weight bearing assistance)  Walking in room support/assistance: One person assist  Walking in corridors/hallway - self performance: limited assistance (staff provide guided  maneuvering of limbs or other non-weight bearing assistance)  Walking in corridors/hallway support/assistance: One person assist  Locomotion on unit - self performance: activity did not occur  Locomotion on unit support/assistance: Activity did not occur  Locomotion off unit - self performance: activity did not occur  Locomotion off unit support/assistance: Activity did not occur  Dressing - self performance: limited assistance (staff provide guided maneuvering of limbs or other non-weight bearing assistance)  Dressing support/assistance: One person assist  Eating - self performance: independent  Eating support/assistance: Setup help only  Toileting - self performance: limited assistance (staff provide guided maneuvering of limbs or other non-weight bearing assistance)  Toileting support/assistance: One person assist  Personal hygiene - self performance: limited assistance (staff provide guided maneuvering of limbs or other non-weight bearing assistance)  Personal hygiene support/assistance: One person assist  Bathing  Bathing - self performance: Physical help with bathing (exclude washing back and hair for patient)  Bathing support/assistance: One person assist  Balance  Balance during transitions & walking: Not steady, requires assist to steady  Moving from seated to standing position: Not steady, requires assist to steady  Walking: Not steady, requires assist to steady  Turning around while walking: Not steady, requires assist to steady  Moving on and off toilet: Not steady, requires assist to steady  Surface-to-surface transfer: Not steady, requires assist to steady  Mobility devices: None were used  Range of Motion  Upper Extremity: No impairment  Lower Extremity: Impairment on one side  Section GG  Functional Ability/Goals, Adm (Section GG)  Self Care, Prior Functioning (YJ0133E): 3. Independent  Functional Cognition, Prior Functioning (LX4389N): 3. Independent  Prior Device Use (LD5524): none of the above  (Z)  Upper Extremity Range of Motion (EW7593V): No impairment  Lower Extremity Range of Motion (XX9745D): Impairment on one side  Self Care, Admission (Section GG)  Eating: Self-Care Admission Performance (NU9649M5): setup or clean-up assistance (05)  Oral Hygiene: Self-Care Admission Performance (UH5011G4): setup or clean-up assistance (05)  Toileting Hygiene: Self-Care Admission Performance (VS7611B4): partial/moderate assistance (03)  Shower/Bathe Self: Self-Care Admission Performance (GG8857U5): partial/moderate assistance (03)  Upper Body Dressing: Self-Care Admission Performance (SH9483Y1): setup or clean-up assistance (05)  Lower Body Dressing: Self-Care Admission Performance (CD3285U5): partial/moderate assistance (03)  Putting On/Taking Off Footwear: Self-Care Admission Performance (CF5129O6): partial/moderate assistance (03)  Personal Hygiene: Self-Care Admission Performance (BT9806H2): supervision or touching assistance (04)  Mobility, Admission Performance (MQ0129)  Chair/Bed-Chair Transfer: Mobility Admission Performance (LS3585J4): partial/moderate assistance (03)  Toilet Transfer: Mobility Admission Performance (WC2867D5): partial/moderate assistance (03)  Tub/shower Transfer: Mobility Admission Performance (PY8939DJ4): partial/moderate assistance (03)                Occupational Therapy Education       Title: PT OT SLP Therapies (Done)       Topic: Occupational Therapy (Done)       Point: ADL training (Done)       Description:   Instruct learner(s) on proper safety adaptation and remediation techniques during self care or transfers.   Instruct in proper use of assistive devices.                  Learning Progress Summary             Patient JING Moulton VU by EG at 10/4/2024 1056    Comment: Education on compensatory techniques for ADL's  Education on AE  Education on fall risk prevention and general safety  Education on OT services                         Point: Home exercise program (Done)        Description:   Instruct learner(s) on appropriate technique for monitoring, assisting and/or progressing therapeutic exercises/activities.                  Learning Progress Summary             Patient JING Moulton VU by EG at 10/4/2024 1056    Comment: Education on compensatory techniques for ADL's  Education on AE  Education on fall risk prevention and general safety  Education on OT services                         Point: Precautions (Done)       Description:   Instruct learner(s) on prescribed precautions during self-care and functional transfers.                  Learning Progress Summary             JING Palacio VU by EG at 10/4/2024 1056    Comment: Education on compensatory techniques for ADL's  Education on AE  Education on fall risk prevention and general safety  Education on OT services                         Point: Body mechanics (Done)       Description:   Instruct learner(s) on proper positioning and spine alignment during self-care, functional mobility activities and/or exercises.                  Learning Progress Summary             Patient JING Moulton VU by EG at 10/4/2024 1056    Comment: Education on compensatory techniques for ADL's  Education on AE  Education on fall risk prevention and general safety  Education on OT services                                         User Key       Initials Effective Dates Name Provider Type Discipline    EG 09/14/22 -  Janessa Good OT Occupational Therapist OT                  OT Recommendation and Plan  Planned Therapy Interventions (OT): activity tolerance training, functional balance retraining, occupation/activity based interventions, adaptive equipment training, BADL retraining, neuromuscular control/coordination retraining, patient/caregiver education/training, transfer/mobility retraining, strengthening exercise  Therapy Frequency (OT): 5 times/wk  Plan of Care Review  Plan of Care Reviewed With: patient  Progress: improving  Outcome Evaluation:  Pleasant and cooperative; Pain is inconsistent and can be limited by fear and anxiety at times; Overall small improvements noted in balance and endurance for functional tasks; Patient will benefit from continued training for strength, compensatory technique use in LB & standing balance for ADL's; Continue with POC; Ax1 w/RW and CAM boot donned.     Time Calculation:   Evaluation Complexity (OT)  Review Occupational Profile/Medical/Therapy History Complexity: expanded/moderate complexity  Assessment, Occupational Performance/Identification of Deficit Complexity: 3-5 performance deficits  Clinical Decision Making Complexity (OT): detailed assessment/moderate complexity  Overall Complexity of Evaluation (OT): moderate complexity     Time Calculation- OT       Row Name 10/09/24 1124             Time Calculation- OT    OT Received On 10/09/24  -EG      OT Goal Re-Cert Due Date 11/03/24  -EG         Timed Charges    04698 - OT Therapeutic Exercise Minutes 29  -EG      64245 -  OT Neuromuscular Reeducation Minutes 11  -EG      76271 - OT Therapeutic Activity Minutes 16  -EG         SNF Occupational Therapy Minutes    Skilled Minutes- OT 56 min  -EG         Total Minutes    Timed Charges Total Minutes 56  -EG       Total Minutes 56  -EG                User Key  (r) = Recorded By, (t) = Taken By, (c) = Cosigned By      Initials Name Provider Type    EG Janessa Good OT Occupational Therapist                  Therapy Charges for Today       Code Description Service Date Service Provider Modifiers Qty    34054704013 HC OT THERAPEUTIC ACT EA 15 MIN 10/8/2024 Janessa Good OT GO 1    31954352155 HC OT SELF CARE/MGMT/TRAIN EA 15 MIN 10/8/2024 Janessa Good OT GO 3    99904844159 HC OT NEUROMUSC RE EDUCATION EA 15 MIN 10/9/2024 Janessa Good OT GO 1    03238031777 HC OT THER PROC EA 15 MIN 10/9/2024 Janessa Good OT GO 2    [id number] HC OT THERAPEUTIC ACT EA 15 MIN 10/9/2024 Janessa Good OT GO 1                  Janessa Good, OT  10/9/2024

## 2024-10-10 LAB — SARS-COV-2 RNA RESP QL NAA+PROBE: NOT DETECTED

## 2024-10-10 PROCEDURE — 97112 NEUROMUSCULAR REEDUCATION: CPT

## 2024-10-10 PROCEDURE — 97530 THERAPEUTIC ACTIVITIES: CPT

## 2024-10-10 PROCEDURE — 97110 THERAPEUTIC EXERCISES: CPT

## 2024-10-10 PROCEDURE — 87635 SARS-COV-2 COVID-19 AMP PRB: CPT | Performed by: PHYSICIAN ASSISTANT

## 2024-10-10 PROCEDURE — 97535 SELF CARE MNGMENT TRAINING: CPT

## 2024-10-10 RX ORDER — CLOBETASOL PROPIONATE 0.5 MG/G
1 CREAM TOPICAL
Status: DISPENSED | OUTPATIENT
Start: 2024-10-11 | End: 2024-10-18

## 2024-10-10 RX ADMIN — SENNOSIDES AND DOCUSATE SODIUM 2 TABLET: 50; 8.6 TABLET ORAL at 09:30

## 2024-10-10 RX ADMIN — Medication 5 MG: at 22:26

## 2024-10-10 RX ADMIN — ASPIRIN 325 MG: 325 TABLET ORAL at 09:30

## 2024-10-10 RX ADMIN — PANTOPRAZOLE SODIUM 40 MG: 40 TABLET, DELAYED RELEASE ORAL at 18:05

## 2024-10-10 RX ADMIN — FERROUS SULFATE TAB 325 MG (65 MG ELEMENTAL FE) 325 MG: 325 (65 FE) TAB at 08:16

## 2024-10-10 RX ADMIN — METOPROLOL TARTRATE 100 MG: 50 TABLET, FILM COATED ORAL at 22:26

## 2024-10-10 RX ADMIN — SENNOSIDES AND DOCUSATE SODIUM 2 TABLET: 50; 8.6 TABLET ORAL at 22:26

## 2024-10-10 RX ADMIN — DILTIAZEM HYDROCHLORIDE 120 MG: 30 TABLET, FILM COATED ORAL at 22:26

## 2024-10-10 RX ADMIN — PANTOPRAZOLE SODIUM 40 MG: 40 TABLET, DELAYED RELEASE ORAL at 08:16

## 2024-10-10 RX ADMIN — TUBERCULIN PURIFIED PROTEIN DERIVATIVE 5 UNITS: 5 INJECTION, SOLUTION INTRADERMAL at 22:29

## 2024-10-10 RX ADMIN — METOPROLOL TARTRATE 100 MG: 50 TABLET, FILM COATED ORAL at 09:30

## 2024-10-10 RX ADMIN — FUROSEMIDE 40 MG: 40 TABLET ORAL at 09:30

## 2024-10-10 RX ADMIN — DILTIAZEM HYDROCHLORIDE 120 MG: 30 TABLET, FILM COATED ORAL at 09:30

## 2024-10-10 RX ADMIN — CLOBETASOL PROPIONATE CREAM USP, 0.05% 1 APPLICATION: 0.5 CREAM TOPICAL at 11:04

## 2024-10-10 NOTE — THERAPY TREATMENT NOTE
SNF - Occupational Therapy Treatment Note   Yudy    Patient Name: Woodrow Alejandro  : 1950    MRN: [id number]                              Today's Date: 10/10/2024       Admit Date: 10/3/2024    Visit Dx:     ICD-10-CM ICD-9-CM   1. Decreased activities of daily living (ADL)  Z78.9 V49.89   2. Difficulty walking  R26.2 719.7     Patient Active Problem List   Diagnosis    Essential hypertension    Permanent atrial fibrillation    S/P AVR    ICD (implantable cardioverter-defibrillator), dual, in situ    Dermatitis    IFG (impaired fasting glucose)    Mixed hyperlipidemia    Vitamin D deficiency    Medicare annual wellness visit, subsequent    Primary osteoarthritis of both knees    Screening PSA (prostate specific antigen)    Bilateral primary osteoarthritis of knee    Coarse tremors    Weakness generalized    Increased urinary frequency    Bandemia    Achilles tendon rupture    Anemia due to GI blood loss    Physical debility     Past Medical History:   Diagnosis Date    Aortic valve replaced     Arthritis 2018    Atrial fibrillation     Coronary artery disease     Hypertension      Past Surgical History:   Procedure Laterality Date    CARDIAC SURGERY      COLONOSCOPY      COLONOSCOPY N/A 2024    Procedure: COLONOSCOPY WITH HOT/COLD SNARE POLYPECTOMIES, ELEVIEW INJECTION,CLIPX1;  Surgeon: Kurt Mensah MD;  Location: [facility] ENDOSCOPY;  Service: Gastroenterology;  Laterality: N/A;  COLON POLYPS    ENDOSCOPY N/A 2024    Procedure: ESOPHAGOGASTRODUODENOSCOPY WITH BIOPSIES;  Surgeon: Kurt Mensah MD;  Location: [facility] ENDOSCOPY;  Service: Gastroenterology;  Laterality: N/A;  RETAINED FOOD IN STOMACH AND REFLUX ESOPHAGITIS    PACEMAKER IMPLANTATION        General Information       Row Name 10/10/24 0931          OT Time and Intention    Document Type therapy note (daily note)  -EG     Mode of Treatment individual therapy;occupational therapy  -EG       Row Name 10/10/24 0931           General Information    Existing Precautions/Restrictions fall  CAM boot donned 24/7 OOB  -EG       Row Name 10/10/24 0931          Cognition    Orientation Status (Cognition) oriented x 4  -EG       Row Name 10/10/24 0931          Safety Issues, Functional Mobility    Impairments Affecting Function (Mobility) balance;endurance/activity tolerance;range of motion (ROM);strength  -EG               User Key  (r) = Recorded By, (t) = Taken By, (c) = Cosigned By      Initials Name Provider Type    EG Janessa Good OT Occupational Therapist                     Mobility/ADL's       Row Name 10/10/24 0931          Bed Mobility    Comment, (Bed Mobility) up in recliner upon OT arrival  -       Row Name 10/10/24 0931          Transfers    Comment, (Transfers) in and out of recliner chair; shower bench and chair with arms in therapy gym  -       Row Name 10/10/24 0931          Sit-Stand Transfer    Sit-Stand [place] (Transfers) contact guard;verbal cues;1 person assist  -EG     Assistive Device (Sit-Stand Transfers) walker, front-wheeled  -EG       Row Name 10/10/24 0931          Stand-Sit Transfer    Stand-Sit [place] (Transfers) contact guard;verbal cues;1 person assist  -EG     Assistive Device (Stand-Sit Transfers) walker, front-wheeled  -EG       Row Name 10/10/24 0931          Functional Mobility    Functional Mobility- Ind. Level contact guard assist;verbal cues required;nonverbal cues required (demo/gesture)  -EG     Functional Mobility- Device walker, front-wheeled  -EG     Functional Mobility- Comment Patient able to perform functional mobility to shower room no rest break using RW and CAM boot donned; Patient able to perform back to room but requires a seated rest break due to fatigue per his reports  -       Row Name 10/10/24 0931          Activities of Daily Living    BADL Assessment/Intervention bathing;upper body dressing;lower body dressing;grooming  -       Row Name 10/10/24 0931           Mobility    Extremity Weight-bearing Status right lower extremity  -EG     Right Lower Extremity (Weight-bearing Status) weight-bearing as tolerated (WBAT)  -EG       Row Name 10/10/24 0931          Lower Body Dressing Assessment/Training    [place] Level (Lower Body Dressing) lower body dressing skills;don;pants/bottoms;shoes/slippers;socks  -EG     Assistive Devices (Lower Body Dressing) reacher;sock-aid  -EG     Comment, (Lower Body Dressing) modA still required with education on use of sockaid and reacher, improved reacher use and compensatory technique stradegy follow through; Patient educated on sockaid use and limited carrover at this time increased difficulty navigating AE; TD for CAM boot donning  -EG       Row Name 10/10/24 0931          Upper Body Dressing Assessment/Training    [place] Level (Upper Body Dressing) upper body dressing skills;set up  -EG       Row Name 10/10/24 0931          Bathing Assessment/Intervention    [place] Level (Bathing) minimum assist (75% patient effort);bathing skills;lower body;upper body  -EG     Assistive Devices (Bathing) tub bench;hand-held shower spray hose;grab bar, tub/shower  -EG     Comment, (Bathing) extended time to perform; education on AE and DME use; possible need for adaptations at home and equipment needs; education on use of grab bars, education on wrapping boot due to needing donned at all times to stand and need for moisture prevention to maintain skin integrity  -EG       Row Name 10/10/24 0931          Grooming Assessment/Training    [place] Level (Grooming) grooming skills;set up;wash face, hands;hair care, combing/brushing  -EG               User Key  (r) = Recorded By, (t) = Taken By, (c) = Cosigned By      Initials Name Provider Type    EG Janessa Good OT Occupational Therapist                   Obj/Interventions       Row Name 10/10/24 0936          Sensory Assessment (Somatosensory)    Sensory Assessment  (Somatosensory) UE sensation intact  -EG       Row Name 10/10/24 0936          Vision Assessment/Intervention    Visual Impairment/Limitations WFL  -EG       Row Name 10/10/24 0936          Motor Skills    Motor Skills functional endurance  -EG     Functional Endurance fair/fair- on room air; inconsistent  -EG       Row Name 10/10/24 0936          Balance    Balance Interventions standing;sit to stand;supported;static;dynamic;weight shifting activity;occupation based/functional task  -EG     Comment, Balance Unsupported standing tasks performed tolerating 30-90 seconds inconsistently, patient continues to demonstrate anxiety with standing and decreased UE support; minimal weight shifting tolerated during standing tasks  -EG               User Key  (r) = Recorded By, (t) = Taken By, (c) = Cosigned By      Initials Name Provider Type    EG Janessa Good, JOSELIN Occupational Therapist                   Goals/Plan    No documentation.                  Clinical Impression       Row Name 10/10/24 0937          Pain Assessment    Pretreatment Pain Rating 0/10 - no pain  -EG     Posttreatment Pain Rating 3/10  -EG       Row Name 10/10/24 0937          Plan of Care Review    Plan of Care Reviewed With patient  -EG     Progress improving  -EG     Outcome Evaluation Pleasant and cooperative; Small functional gains noted with AE use for LB dressing this date; Continues to require extended time for tasks due to fatigue; Patient Needs increased education on CAM boot donning; OT services addressing ADL ind. during session with balance and endurance training for mobility as well; OT will continue with POC; AX1 w/RW and CAM boot.  -EG       Row Name 10/10/24 0937          Therapy Assessment/Plan (OT)    Rehab Potential (OT) good, to achieve stated therapy goals  -EG     Criteria for Skilled Therapeutic Interventions Met (OT) yes;meets criteria;skilled treatment is necessary  -EG     Therapy Frequency (OT) 5 times/wk  -EG       Row  Name 10/10/24 0937          Therapy Plan Review/Discharge Plan (OT)    Anticipated Discharge Disposition (OT) home with home health  -EG       Row Name 10/10/24 0937          Positioning and Restraints    Pre-Treatment Position sitting in chair/recliner  -EG     Post Treatment Position chair  -EG     In Chair reclined;sitting;call light within reach;encouraged to call for assist;exit alarm on  -EG               User Key  (r) = Recorded By, (t) = Taken By, (c) = Cosigned By      Initials Name Provider Type    EG Janessa Good, JOSELIN Occupational Therapist                   Outcome Measures       Row Name 10/10/24 0940          How much help from another is currently needed...    Putting on and taking off regular lower body clothing? 2  -EG     Bathing (including washing, rinsing, and drying) 3  -EG     Toileting (which includes using toilet bed pan or urinal) 3  -EG     Putting on and taking off regular upper body clothing 3  -EG     Taking care of personal grooming (such as brushing teeth) 4  -EG     Eating meals 4  -EG     AM-PAC 6 Clicks Score (OT) 19  -EG       Row Name 10/10/24 0911 10/09/24 2220       How much help from another person do you currently need...    Turning from your back to your side while in flat bed without using bedrails? 3  -WM 3  -TM    Moving from lying on back to sitting on the side of a flat bed without bedrails? 3  -WM 3  -TM    Moving to and from a bed to a chair (including a wheelchair)? 3  -WM 3  -TM    Standing up from a chair using your arms (e.g., wheelchair, bedside chair)? 3  -WM 3  -TM    Climbing 3-5 steps with a railing? 2  -WM 2  -TM    To walk in hospital room? 3  -WM 3  -TM    AM-PAC 6 Clicks Score (PT) 17  -WM 17  -TM    Highest Level of Mobility Goal 5 --> Static standing  -WM 5 --> Static standing  -TM      Row Name 10/10/24 0940          Functional Assessment    Outcome Measure Options AM-PAC 6 Clicks Daily Activity (OT);Optimal Instrument  -EG       Row Name 10/10/24  0940          Optimal Instrument    Optimal Instrument Optimal - 3  -EG     Bending/Stooping 3  -EG     Standing 2  -EG     Reaching 1  -EG               User Key  (r) = Recorded By, (t) = Taken By, (c) = Cosigned By      Initials Name Provider Type    TM Jenny Barba, RN Registered Nurse    Dwaine Keith PTA Physical Therapist Assistant    Janessa Alvarez OT Occupational Therapist                  Section G  Mobility  Bed mobility - self performance: limited assistance (staff provide guided maneuvering of limbs or other non-weight bearing assistance)  Bed mobility support/assistance: One person assist  Transfer - self performance: limited assistance (staff provide guided maneuvering of limbs or other non-weight bearing assistance)  Transfer support/assistance: One person assist  Walking in room - self performance: limited assistance (staff provide guided maneuvering of limbs or other non-weight bearing assistance)  Walking in room support/assistance: One person assist  Walking in corridors/hallway - self performance: limited assistance (staff provide guided maneuvering of limbs or other non-weight bearing assistance)  Walking in corridors/hallway support/assistance: One person assist  Locomotion on unit - self performance: activity did not occur  Locomotion on unit support/assistance: Activity did not occur  Locomotion off unit - self performance: activity did not occur  Locomotion off unit support/assistance: Activity did not occur  Dressing - self performance: limited assistance (staff provide guided maneuvering of limbs or other non-weight bearing assistance)  Dressing support/assistance: One person assist  Eating - self performance: independent  Eating support/assistance: Setup help only  Toileting - self performance: limited assistance (staff provide guided maneuvering of limbs or other non-weight bearing assistance)  Toileting support/assistance: One person assist  Personal hygiene - self  performance: limited assistance (staff provide guided maneuvering of limbs or other non-weight bearing assistance)  Personal hygiene support/assistance: One person assist  Bathing  Bathing - self performance: Physical help with bathing (exclude washing back and hair for patient)  Bathing support/assistance: One person assist  Balance  Balance during transitions & walking: Not steady, requires assist to steady  Moving from seated to standing position: Not steady, requires assist to steady  Walking: Not steady, requires assist to steady  Turning around while walking: Not steady, requires assist to steady  Moving on and off toilet: Not steady, requires assist to steady  Surface-to-surface transfer: Not steady, requires assist to steady  Mobility devices: None were used  Range of Motion  Upper Extremity: No impairment  Lower Extremity: Impairment on one side  Section GG  Functional Ability/Goals, Adm (Section GG)  Self Care, Prior Functioning (VL4012I): 3. Independent  Functional Cognition, Prior Functioning (XU8774V): 3. Independent  Prior Device Use (GN3130): none of the above (Z)  Upper Extremity Range of Motion (XB4906Y): No impairment  Lower Extremity Range of Motion (XW0243G): Impairment on one side  Self Care, Admission (Section GG)  Eating: Self-Care Admission Performance ([id number]): setup or clean-up assistance (05)  Oral Hygiene: Self-Care Admission Performance (RU5592W4): setup or clean-up assistance (05)  Toileting Hygiene: Self-Care Admission Performance (QS4098Y4): partial/moderate assistance (03)  Shower/Bathe Self: Self-Care Admission Performance (HW3288O0): partial/moderate assistance (03)  Upper Body Dressing: Self-Care Admission Performance (EQ8193W3): setup or clean-up assistance (05)  Lower Body Dressing: Self-Care Admission Performance ([id number]): partial/moderate assistance (03)  Putting On/Taking Off Footwear: Self-Care Admission Performance (BN9986F5): partial/moderate assistance (03)  Personal  Hygiene: Self-Care Admission Performance (JK2578N5): supervision or touching assistance (04)  Mobility, Admission Performance (WR4342)  Chair/Bed-Chair Transfer: Mobility Admission Performance (HC5124Q0): partial/moderate assistance (03)  Toilet Transfer: Mobility Admission Performance (DD9126U0): partial/moderate assistance (03)  Tub/shower Transfer: Mobility Admission Performance (VN1011XO0): partial/moderate assistance (03)                Occupational Therapy Education       Title: PT OT SLP Therapies (Done)       Topic: Occupational Therapy (Done)       Point: ADL training (Done)       Description:   Instruct learner(s) on proper safety adaptation and remediation techniques during self care or transfers.   Instruct in proper use of assistive devices.                  Learning Progress Summary             Patient JING Moulton VU by EG at 10/4/2024 1056    Comment: Education on compensatory techniques for ADL's  Education on AE  Education on fall risk prevention and general safety  Education on OT services                         Point: Home exercise program (Done)       Description:   Instruct learner(s) on appropriate technique for monitoring, assisting and/or progressing therapeutic exercises/activities.                  Learning Progress Summary             JING Palacio VU by ROMANA at 10/4/2024 1056    Comment: Education on compensatory techniques for ADL's  Education on AE  Education on fall risk prevention and general safety  Education on OT services                         Point: Precautions (Done)       Description:   Instruct learner(s) on prescribed precautions during self-care and functional transfers.                  Learning Progress Summary             Patient JING [place] VU by EG at 10/4/2024 1056    Comment: Education on compensatory techniques for ADL's  Education on AE  Education on fall risk prevention and general safety  Education on OT services                         Point: Body mechanics  (Done)       Description:   Instruct learner(s) on proper positioning and spine alignment during self-care, functional mobility activities and/or exercises.                  Learning Progress Summary             Patient JING [place]oul[place] VU by EG at 10/4/2024 5917    Comment: Education on compensatory techniques for ADL's  Education on AE  Education on fall risk prevention and general safety  Education on OT services                                         User Key       Initials Effective Dates Name Provider Type Discipline    EG 09/14/22 -  Janessa Good, OT Occupational Therapist OT                  OT Recommendation and Plan  Planned Therapy Interventions (OT): activity tolerance training, functional balance retraining, occupation/activity based interventions, adaptive equipment training, BADL retraining, neuromuscular control/coordination retraining, patient/caregiver education/training, transfer/mobility retraining, strengthening exercise  Therapy Frequency (OT): 5 times/wk  Plan of Care Review  Plan of Care Reviewed With: patient  Progress: improving  Outcome Evaluation: Pleasant and cooperative; Small functional gains noted with AE use for LB dressing this date; Continues to require extended time for tasks due to fatigue; Patient Needs increased education on CAM boot donning; OT services addressing ADL ind. during session with balance and endurance training for mobility as well; OT will continue with POC; AX1 w/RW and CAM boot.     Time Calculation:   Evaluation Complexity (OT)  Review Occupational Profile/Medical/Therapy History Complexity: expanded/moderate complexity  Assessment, Occupational Performance/Identification of Deficit Complexity: 3-5 performance deficits  Clinical Decision Making Complexity (OT): detailed assessment/moderate complexity  Overall Complexity of Evaluation (OT): moderate complexity     Time Calculation- OT       Row Name 10/10/24 0940 10/10/24 0903          Time Calculation- OT    OT  Received On 10/10/24  -EG --     OT Goal Re-Cert Due Date 11/03/24  -EG --        Timed Charges    56524 -  OT Neuromuscular Reeducation Minutes 8  -EG --     22237 - Gait Training Minutes  -- 6  -WM     24564 - OT Therapeutic Activity Minutes 14  -EG --     41377 - OT Self Care/Mgmt Minutes 33  -EG --        SNF Occupational Therapy Minutes    Skilled Minutes- OT 55 min  -EG --        Total Minutes    Timed Charges Total Minutes 55  -EG 6  -WM      Total Minutes 55  -EG 6  -WM               User Key  (r) = Recorded By, (t) = Taken By, (c) = Cosigned By      Initials Name Provider Type    Dwaine Keith PTA Physical Therapist Assistant    EG Janessa Good OT Occupational Therapist                  Therapy Charges for Today       Code Description Service Date Service Provider Modifiers Qty    76582417122 HC OT NEUROMUSC RE EDUCATION EA 15 MIN 10/9/2024 Janessa Good, OT GO 1    77240053586 HC OT THER PROC EA 15 MIN 10/9/2024 Janessa Good OT GO 2    87778773509 HC OT THERAPEUTIC ACT EA 15 MIN 10/9/2024 Janessa Good, OT GO 1    93694088515 HC OT NEUROMUSC RE EDUCATION EA 15 MIN 10/10/2024 Janessa Good, OT GO 1    09941406297 HC OT THERAPEUTIC ACT EA 15 MIN 10/10/2024 Janessa Good, OT GO 1    31851528773 HC OT SELF CARE/MGMT/TRAIN EA 15 MIN 10/10/2024 Janessa Good OT GO 2                 Janessa Good OT  10/10/2024

## 2024-10-10 NOTE — PLAN OF CARE
Goal Outcome Evaluation:  Plan of Care Reviewed With: patient        Progress: no change  Outcome Evaluation: Alert and oriented x4; able to make needs known to staff.  Transfers to BR x1 person assist with gait belt and walker wearing camboot to right foot; reinforced TTWB. SOA noted with exertion. No complaints of pain voiced. Call light within reach; care plan ongoing.

## 2024-10-10 NOTE — THERAPY TREATMENT NOTE
SNF - Physical Therapy Treatment Note   Yudy     Patient Name: Woodrow Alejandro  : 1950  MRN: [id number]  Today's Date: 10/10/2024      Visit Dx:    ICD-10-CM ICD-9-CM   1. Decreased activities of daily living (ADL)  Z78.9 V49.89   2. Difficulty walking  R26.2 719.7     Patient Active Problem List   Diagnosis    Essential hypertension    Permanent atrial fibrillation    S/P AVR    ICD (implantable cardioverter-defibrillator), dual, in situ    Dermatitis    IFG (impaired fasting glucose)    Mixed hyperlipidemia    Vitamin D deficiency    Medicare annual wellness visit, subsequent    Primary osteoarthritis of both knees    Screening PSA (prostate specific antigen)    Bilateral primary osteoarthritis of knee    Coarse tremors    Weakness generalized    Increased urinary frequency    Bandemia    Achilles tendon rupture    Anemia due to GI blood loss    Physical debility     Past Medical History:   Diagnosis Date    Aortic valve replaced     Arthritis 2018    Atrial fibrillation     Coronary artery disease     Hypertension      Past Surgical History:   Procedure Laterality Date    CARDIAC SURGERY      COLONOSCOPY      COLONOSCOPY N/A 2024    Procedure: COLONOSCOPY WITH HOT/COLD SNARE POLYPECTOMIES, ELEVIEW INJECTION,CLIPX1;  Surgeon: Kurt Mensah MD;  Location: [facility] ENDOSCOPY;  Service: Gastroenterology;  Laterality: N/A;  COLON POLYPS    ENDOSCOPY N/A 2024    Procedure: ESOPHAGOGASTRODUODENOSCOPY WITH BIOPSIES;  Surgeon: Kurt Mensah MD;  Location: [facility] ENDOSCOPY;  Service: Gastroenterology;  Laterality: N/A;  RETAINED FOOD IN STOMACH AND REFLUX ESOPHAGITIS    PACEMAKER IMPLANTATION         PT Assessment (Last 12 Hours)       PT Evaluation and Treatment       Row Name 10/10/24 0904          Physical Therapy Time and Intention    Document Type therapy note (daily note)  -WM     Mode of Treatment individual therapy;physical therapy  -WM     Patient Effort good  -WM      Symptoms Noted During/After Treatment fatigue  -       Row Name 10/10/24 0904          Bed Mobility    Bed Mobility supine-sit  -     Supine-Sit [place] (Bed Mobility) contact guard;standby assist;verbal cues  -     Assistive Device (Bed Mobility) bed rails  -       Row Name 10/10/24 0904          Sit-Stand Transfer    Sit-Stand [place] (Transfers) contact guard;minimum assist (75% patient effort);verbal cues;1 person assist  -     Assistive Device (Sit-Stand Transfers) walker, front-wheeled  -       Row Name 10/10/24 0904          Stand-Sit Transfer    Stand-Sit [place] (Transfers) contact guard;minimum assist (75% patient effort);verbal cues;1 person assist  -     Assistive Device (Stand-Sit Transfers) walker, front-wheeled  -       Row Name 10/10/24 0904          Gait/Stairs (Locomotion)    [place] Level (Gait) contact guard;verbal cues  -     Assistive Device (Gait) walker, front-wheeled  -     Distance in Feet (Gait) 55  x2  -     Pattern (Gait) 4-point;step-through  -     Deviations/Abnormal Patterns (Gait) gait speed decreased;stride length decreased  -       Row Name 10/10/24 0904          Safety Issues, Functional Mobility    Impairments Affecting Function (Mobility) balance;endurance/activity tolerance;range of motion (ROM);strength  -       Row Name 10/10/24 0904          Hip (Therapeutic Exercise)    Hip Strengthening (Therapeutic Exercise) bilateral;aBduction;aDduction;marching while seated;sitting;2 lb free weight;resistance band;green;15 repititions;2 sets  -       Row Name 10/10/24 0904          Knee (Therapeutic Exercise)    Knee Strengthening (Therapeutic Exercise) bilateral;LAQ (long arc quad);hamstring curls;sitting;2 lb free weight;resistance band;green;15 repititions;2 sets  -       Row Name 10/10/24 0904          Ankle (Therapeutic Exercise)    Ankle (Therapeutic Exercise) strengthening exercise  -     Ankle Strengthening (Therapeutic  Exercise) left;dorsiflexion;sitting;5 lb free weight;30 repititions  -       Row Name 10/10/24 0904          Aerobic Exercise    Comment, Aerobic Exercise (Therapeutic Exercise) NuStep x 15 minutes, load 3  -WM       Row Name 10/10/24 0904          Positioning and Restraints    Pre-Treatment Position in bed  -WM     Post Treatment Position chair  -WM     In Chair sitting;call light within reach;encouraged to call for assist;exit alarm on  -       Row Name 10/10/24 0904          Progress Summary (PT)    Progress Toward Functional Goals (PT) progress toward functional goals is fair  -WM               User Key  (r) = Recorded By, (t) = Taken By, (c) = Cosigned By      Initials Name Provider Type     Dwaine Guzman PTA Physical Therapist Assistant                  Section G              Section GG                       Physical Therapy Education       Title: PT OT SLP Therapies (Done)       Topic: Physical Therapy (Resolved)       Point: Mobility training (Resolved)       Learner Progress:  Not documented in this visit.              Point: Home exercise program (Resolved)       Learner Progress:  Not documented in this visit.              Point: Body mechanics (Resolved)       Learner Progress:  Not documented in this visit.              Point: Precautions (Resolved)       Learner Progress:  Not documented in this visit.                                  PT Recommendation and Plan     Progress Summary (PT)  Progress Toward Functional Goals (PT): progress toward functional goals is fair   Outcome Measures       Row Name 10/10/24 0911 10/09/24 1300 10/07/24 1419       How much help from another person do you currently need...    Turning from your back to your side while in flat bed without using bedrails? 3  -WM 3  -VK 3  -WM    Moving from lying on back to sitting on the side of a flat bed without bedrails? 3  -WM 3  -VK 2  -WM    Moving to and from a bed to a chair (including a wheelchair)? 3  -WM 3  -VK 3  -WM     Standing up from a chair using your arms (e.g., wheelchair, bedside chair)? 3  -WM 3  -VK 3  -WM    Climbing 3-5 steps with a railing? 2  -WM 2  -VK 2  -WM    To walk in hospital room? 3  -WM 3  -VK 3  -WM    AM-PAC 6 Clicks Score (PT) 17  -WM 17  -VK 16  -WM    Highest Level of Mobility Goal 5 --> Static standing  -WM 5 --> Static standing  -VK 5 --> Static standing  -WM              User Key  (r) = Recorded By, (t) = Taken By, (c) = Cosigned By      Initials Name Provider Type    Dwaine Keith PTA Physical Therapist Assistant    Karla Ignacio PTA Physical Therapist Assistant                     Time Calculation:    PT Charges       Row Name 10/10/24 0903             Time Calculation    PT Received On 10/10/24  -WM         Timed Charges    28655 - PT Therapeutic Exercise Minutes 27  -WM      08177 - Gait Training Minutes  6  -WM      86222 - PT Therapeutic Activity Minutes 8  -WM         SNF Physical Therapy Minutes    Skilled Minutes- PT 41 min  -WM         Total Minutes    Timed Charges Total Minutes 41  -WM       Total Minutes 41  -WM                User Key  (r) = Recorded By, (t) = Taken By, (c) = Cosigned By      Initials Name Provider Type    Dwaine Keith PTA Physical Therapist Assistant                      PT G-Codes  Outcome Measure Options: AM-PAC 6 Clicks Daily Activity (OT), Optimal Instrument  AM-PAC 6 Clicks Score (PT): 17  AM-PAC 6 Clicks Score (OT): 18    Dwaine Guzman PTA  10/10/2024

## 2024-10-10 NOTE — SIGNIFICANT NOTE
Wound Eval / Progress Noted     Yudy     Patient Name: Woodrow Alejandro  : 1950  MRN: [id number]  Today's Date: 10/10/2024                 Admit Date: 10/3/2024    Visit Dx:    ICD-10-CM ICD-9-CM   1. Decreased activities of daily living (ADL)  Z78.9 V49.89   2. Difficulty walking  R26.2 719.7         Physical debility        Past Medical History:   Diagnosis Date    Aortic valve replaced     Arthritis 2018    Atrial fibrillation     Coronary artery disease     Hypertension         Past Surgical History:   Procedure Laterality Date    CARDIAC SURGERY      COLONOSCOPY      COLONOSCOPY N/A 2024    Procedure: COLONOSCOPY WITH HOT/COLD SNARE POLYPECTOMIES, ELEVIEW INJECTION,CLIPX1;  Surgeon: Kurt Mensah MD;  Location: [facility] ENDOSCOPY;  Service: Gastroenterology;  Laterality: N/A;  COLON POLYPS    ENDOSCOPY N/A 2024    Procedure: ESOPHAGOGASTRODUODENOSCOPY WITH BIOPSIES;  Surgeon: Kurt Mensah MD;  Location: [facility] ENDOSCOPY;  Service: Gastroenterology;  Laterality: N/A;  RETAINED FOOD IN STOMACH AND REFLUX ESOPHAGITIS    PACEMAKER IMPLANTATION           Physical Assessment:   10/10/24 1100   Rash 10/03/24 1652 other (see comments) other (see comments) thoracic spine   Placement Date/Time: 10/03/24 1652   Side: (c) other (see comments)  Orientation: (c) other (see comments)  Location: thoracic spine   Wound Image    Distribution generalized   Color red   Configuration/Shape asymmetric   Borders diffuse   Characteristics dry;crusted   Lesion Size less than 0.5 cm   Care, Rash cleansed with;sterile normal saline    Left aspect of face    Wound Check / Follow-up:  Patient seen today for wound follow up. Patient is currently in skilled nursing facility for rehab.  Patient is awake, alert, and oriented.  Patient's daughter is present at bedside.  Patient reports he believes the area of rash on his back and face were caused by heat and moisture.  Patient states area was  previously itching and uncomfortable; however, this has resolved with current treatment.    Patient's back and left aspect of face present with scattered areas of dry, crusted red tissue.  Periwound tissue is dry with no discoloration noted.  Cleansed with normal saline and gauze, blotted dry.  Recommending quality skin care and hygiene with application of clobetasol propionate cream once a day for 7 days with final treatment being on 10/17/2024.    Patient denies any other areas of skin breakdown at this time.    Impression: Resolving rash to back and left aspect of face    Short term goals: Regain skin integrity, skin protection, topical treatment, quality skin care and hygiene    Nellie Kilpatrick RN    10/10/2024    13:26 EDT

## 2024-10-11 LAB
ANION GAP SERPL CALCULATED.3IONS-SCNC: 13.2 MMOL/L (ref 5–15)
BUN SERPL-MCNC: 16 MG/DL (ref 8–23)
BUN/CREAT SERPL: 19 (ref 7–25)
CALCIUM SPEC-SCNC: 8.4 MG/DL (ref 8.6–10.5)
CHLORIDE SERPL-SCNC: 96 MMOL/L (ref 98–107)
CO2 SERPL-SCNC: 22.8 MMOL/L (ref 22–29)
CREAT SERPL-MCNC: 0.84 MG/DL (ref 0.76–1.27)
DEPRECATED RDW RBC AUTO: 56.1 FL (ref 37–54)
EGFRCR SERPLBLD CKD-EPI 2021: 92.1 ML/MIN/1.73
ERYTHROCYTE [DISTWIDTH] IN BLOOD BY AUTOMATED COUNT: 15.8 % (ref 12.3–15.4)
GLUCOSE SERPL-MCNC: 136 MG/DL (ref 65–99)
HCT VFR BLD AUTO: 34.5 % (ref 37.5–51)
HGB BLD-MCNC: 10.5 G/DL (ref 13–17.7)
MCH RBC QN AUTO: 29.3 PG (ref 26.6–33)
MCHC RBC AUTO-ENTMCNC: 30.4 G/DL (ref 31.5–35.7)
MCV RBC AUTO: 96.4 FL (ref 79–97)
PLATELET # BLD AUTO: 149 10*3/MM3 (ref 140–450)
PMV BLD AUTO: 10.1 FL (ref 6–12)
POTASSIUM SERPL-SCNC: 4.2 MMOL/L (ref 3.5–5.2)
RBC # BLD AUTO: 3.58 10*6/MM3 (ref 4.14–5.8)
SODIUM SERPL-SCNC: 132 MMOL/L (ref 136–145)
WBC NRBC COR # BLD AUTO: 13.02 10*3/MM3 (ref 3.4–10.8)

## 2024-10-11 PROCEDURE — 85027 COMPLETE CBC AUTOMATED: CPT | Performed by: PHYSICIAN ASSISTANT

## 2024-10-11 PROCEDURE — 99308 SBSQ NF CARE LOW MDM 20: CPT | Performed by: PHYSICIAN ASSISTANT

## 2024-10-11 PROCEDURE — 80048 BASIC METABOLIC PNL TOTAL CA: CPT | Performed by: PHYSICIAN ASSISTANT

## 2024-10-11 PROCEDURE — 97110 THERAPEUTIC EXERCISES: CPT

## 2024-10-11 PROCEDURE — 97535 SELF CARE MNGMENT TRAINING: CPT

## 2024-10-11 PROCEDURE — 97530 THERAPEUTIC ACTIVITIES: CPT

## 2024-10-11 PROCEDURE — 97116 GAIT TRAINING THERAPY: CPT

## 2024-10-11 RX ADMIN — SENNOSIDES AND DOCUSATE SODIUM 2 TABLET: 50; 8.6 TABLET ORAL at 09:04

## 2024-10-11 RX ADMIN — DILTIAZEM HYDROCHLORIDE 120 MG: 30 TABLET, FILM COATED ORAL at 09:04

## 2024-10-11 RX ADMIN — DILTIAZEM HYDROCHLORIDE 120 MG: 30 TABLET, FILM COATED ORAL at 21:40

## 2024-10-11 RX ADMIN — PANTOPRAZOLE SODIUM 40 MG: 40 TABLET, DELAYED RELEASE ORAL at 17:14

## 2024-10-11 RX ADMIN — Medication 5 MG: at 21:40

## 2024-10-11 RX ADMIN — CLOBETASOL PROPIONATE CREAM USP, 0.05% 1 APPLICATION: 0.5 CREAM TOPICAL at 10:46

## 2024-10-11 RX ADMIN — FUROSEMIDE 40 MG: 40 TABLET ORAL at 09:04

## 2024-10-11 RX ADMIN — ASPIRIN 325 MG: 325 TABLET ORAL at 09:04

## 2024-10-11 RX ADMIN — METOPROLOL TARTRATE 100 MG: 50 TABLET, FILM COATED ORAL at 09:04

## 2024-10-11 RX ADMIN — METOPROLOL TARTRATE 100 MG: 50 TABLET, FILM COATED ORAL at 21:40

## 2024-10-11 RX ADMIN — PANTOPRAZOLE SODIUM 40 MG: 40 TABLET, DELAYED RELEASE ORAL at 07:57

## 2024-10-11 RX ADMIN — FERROUS SULFATE TAB 325 MG (65 MG ELEMENTAL FE) 325 MG: 325 (65 FE) TAB at 07:57

## 2024-10-11 NOTE — THERAPY TREATMENT NOTE
SNF - Physical Therapy Treatment Note   Yudy     Patient Name: Woodrow Alejandro  : 1950  MRN: [id number]  Today's Date: 10/11/2024      Visit Dx:    ICD-10-CM ICD-9-CM   1. Decreased activities of daily living (ADL)  Z78.9 V49.89   2. Difficulty walking  R26.2 719.7     Patient Active Problem List   Diagnosis    Essential hypertension    Permanent atrial fibrillation    S/P AVR    ICD (implantable cardioverter-defibrillator), dual, in situ    Dermatitis    IFG (impaired fasting glucose)    Mixed hyperlipidemia    Vitamin D deficiency    Medicare annual wellness visit, subsequent    Primary osteoarthritis of both knees    Screening PSA (prostate specific antigen)    Bilateral primary osteoarthritis of knee    Coarse tremors    Weakness generalized    Increased urinary frequency    Bandemia    Achilles tendon rupture    Anemia due to GI blood loss    Physical debility     Past Medical History:   Diagnosis Date    Aortic valve replaced     Arthritis 2018    Atrial fibrillation     Coronary artery disease     Hypertension      Past Surgical History:   Procedure Laterality Date    CARDIAC SURGERY      COLONOSCOPY      COLONOSCOPY N/A 2024    Procedure: COLONOSCOPY WITH HOT/COLD SNARE POLYPECTOMIES, ELEVIEW INJECTION,CLIPX1;  Surgeon: Kurt Mensah MD;  Location: [facility] ENDOSCOPY;  Service: Gastroenterology;  Laterality: N/A;  COLON POLYPS    ENDOSCOPY N/A 2024    Procedure: ESOPHAGOGASTRODUODENOSCOPY WITH BIOPSIES;  Surgeon: Kurt Mensah MD;  Location: [facility] ENDOSCOPY;  Service: Gastroenterology;  Laterality: N/A;  RETAINED FOOD IN STOMACH AND REFLUX ESOPHAGITIS    PACEMAKER IMPLANTATION         PT Assessment (Last 12 Hours)       PT Evaluation and Treatment       Row Name 10/11/24 0828          Physical Therapy Time and Intention    Subjective Information complains of;fatigue  -VK     Document Type therapy note (daily note)  -VK     Mode of Treatment individual  therapy;physical therapy  -VK     Patient Effort good  -VK       Row Name 10/11/24 0828          General Information    Patient Profile Reviewed yes  -VK     Existing Precautions/Restrictions fall  CAM boot donned on RLE at all times when up  -       Row Name 10/11/24 0828          Cognition    Affect/Mental Status (Cognition) WNL  -VK     Orientation Status (Cognition) oriented x 4  -VK     Personal Safety Interventions nonskid shoes/slippers when out of bed;gait belt;fall prevention program maintained  -       Row Name 10/11/24 0828          Mobility    Extremity Weight-bearing Status right lower extremity  -VK     Right Lower Extremity (Weight-bearing Status) weight-bearing as tolerated (WBAT)  -       Row Name 10/11/24 0828          Transfers    Transfers sit-stand transfer;stand-sit transfer;toilet transfer  -       Row Name 10/11/24 0828          Sit-Stand Transfer    Sit-Stand [place] (Transfers) minimum assist (75% patient effort);verbal cues  -VK     Assistive Device (Sit-Stand Transfers) walker, front-wheeled  -       Row Name 10/11/24 0828          Stand-Sit Transfer    Stand-Sit [place] (Transfers) contact guard;verbal cues  -VK     Assistive Device (Stand-Sit Transfers) walker, front-wheeled  -       Row Name 10/11/24 0828          Toilet Transfer    Type (Toilet Transfer) sit-stand;stand-sit  -VK     [place] Level (Toilet Transfer) contact guard;verbal cues  -VK     Assistive Device (Toilet Transfer) walker, front-wheeled;grab bars/safety frame;raised toilet seat  -       Row Name 10/11/24 0828          Gait/Stairs (Locomotion)    [place] Level (Gait) contact guard;minimum assist (75% patient effort);verbal cues  -VK     Assistive Device (Gait) walker, front-wheeled  -VK     Patient was able to Ambulate yes  -VK     Distance in Feet (Gait) --  55ft, 8ft, 65ft  -VK     Pattern (Gait) 4-point;step-through;step-to  -VK     Deviations/Abnormal Patterns (Gait) gait speed  decreased;stride length decreased;antalgic;tapan decreased  -VK     Bilateral Gait Deviations forward flexed posture;heel strike decreased  -VK     Right Sided Gait Deviations weight shift ability decreased  -VK     Gait Assessment/Intervention Pt had a very slight LOB today towards the left, likely due to CAM boot, was able to recover quickly with CGAx1 from PTA.  -VK       Row Name 10/11/24 0828          Safety Issues, Functional Mobility    Impairments Affecting Function (Mobility) balance;endurance/activity tolerance;range of motion (ROM);strength  -VK       Row Name 10/11/24 0828          Balance    Sit to Stand Dynamic Balance contact guard;minimal assist  -VK     Static Standing Balance contact guard  -VK     Dynamic Standing Balance contact guard;minimal assist  -VK     Position/Device Used, Standing Balance walker, front-wheeled  -VK     Balance Interventions sit to stand  x5 reps  -VK       Row Name 10/11/24 0828          Aerobic Exercise    Type (Aerobic Exercise) recumbent stationary bike  -VK     Comment, Aerobic Exercise (Therapeutic Exercise) 15 minutes, load 3  -VK       Row Name 10/11/24 0828          Positioning and Restraints    Pre-Treatment Position sitting in chair/recliner  -VK     Post Treatment Position chair  -VK     In Chair reclined;call light within reach;encouraged to call for assist;exit alarm on  -VK       Row Name 10/11/24 0828          Progress Summary (PT)    Progress Toward Functional Goals (PT) progress toward functional goals is fair;progress toward functional goals is good  -VK               User Key  (r) = Recorded By, (t) = Taken By, (c) = Cosigned By      Initials Name Provider Type    VK Karla Gaines PTA Physical Therapist Assistant                  Section G              Section GG                       Physical Therapy Education       Title: PT OT SLP Therapies (Done)       Topic: Physical Therapy (Resolved)       Point: Mobility training (Resolved)       Learner  Progress:  Not documented in this visit.              Point: Home exercise program (Resolved)       Learner Progress:  Not documented in this visit.              Point: Body mechanics (Resolved)       Learner Progress:  Not documented in this visit.              Point: Precautions (Resolved)       Learner Progress:  Not documented in this visit.                                  PT Recommendation and Plan     Progress Summary (PT)  Progress Toward Functional Goals (PT): progress toward functional goals is fair, progress toward functional goals is good   Outcome Measures       Row Name 10/11/24 1100 10/10/24 0911 10/09/24 1300       How much help from another person do you currently need...    Turning from your back to your side while in flat bed without using bedrails? 3  -VK 3  -WM 3  -VK    Moving from lying on back to sitting on the side of a flat bed without bedrails? 3  -VK 3  -WM 3  -VK    Moving to and from a bed to a chair (including a wheelchair)? 3  -VK 3  -WM 3  -VK    Standing up from a chair using your arms (e.g., wheelchair, bedside chair)? 3  -VK 3  -WM 3  -VK    Climbing 3-5 steps with a railing? 2  -VK 2  -WM 2  -VK    To walk in hospital room? 3  -VK 3  -WM 3  -VK    AM-PAC 6 Clicks Score (PT) 17  -VK 17  -WM 17  -VK    Highest Level of Mobility Goal 5 --> Static standing  -VK 5 --> Static standing  -WM 5 --> Static standing  -VK              User Key  (r) = Recorded By, (t) = Taken By, (c) = Cosigned By      Initials Name Provider Type     Dwaine Guzman PTA Physical Therapist Assistant    Karla Ignacio PTA Physical Therapist Assistant                     Time Calculation:    PT Charges       Row Name 10/11/24 1125             Time Calculation    PT Received On 10/11/24  -VK         Timed Charges    63884 - PT Therapeutic Exercise Minutes 19  -VK      85767 - Gait Training Minutes  12  -VK      69068 - PT Therapeutic Activity Minutes 26  -VK         SNF Physical Therapy Minutes    Skilled  Minutes- PT 57 min  -VK         Total Minutes    Timed Charges Total Minutes 57  -VK       Total Minutes 57  -VK                User Key  (r) = Recorded By, (t) = Taken By, (c) = Cosigned By      Initials Name Provider Type    Karla Ignacio PTA Physical Therapist Assistant                  Therapy Charges for Today       Code Description Service Date Service Provider Modifiers Qty    48356374008 HC PT THERAPEUTIC ACT EA 15 MIN 10/11/2024 Karla Gaines PTA GP 2    40818984907 HC GAIT TRAINING EA 15 MIN 10/11/2024 Karla Gaines PTA GP 1    07924246155 HC PT THER PROC EA 15 MIN 10/11/2024 Karla Gaines PTA GP 1            PT G-Codes  Outcome Measure Options: AM-PAC 6 Clicks Daily Activity (OT), Optimal Instrument  AM-PAC 6 Clicks Score (PT): 17  AM-PAC 6 Clicks Score (OT): 19    Karla Gaiens PTA  10/11/2024

## 2024-10-11 NOTE — THERAPY TREATMENT NOTE
SNF - Occupational Therapy Treatment Note   Yudy    Patient Name: Woodrow Alejandro  : 1950    MRN: [id number]                              Today's Date: 10/11/2024       Admit Date: 10/3/2024    Visit Dx:     ICD-10-CM ICD-9-CM   1. Decreased activities of daily living (ADL)  Z78.9 V49.89   2. Difficulty walking  R26.2 719.7     Patient Active Problem List   Diagnosis    Essential hypertension    Permanent atrial fibrillation    S/P AVR    ICD (implantable cardioverter-defibrillator), dual, in situ    Dermatitis    IFG (impaired fasting glucose)    Mixed hyperlipidemia    Vitamin D deficiency    Medicare annual wellness visit, subsequent    Primary osteoarthritis of both knees    Screening PSA (prostate specific antigen)    Bilateral primary osteoarthritis of knee    Coarse tremors    Weakness generalized    Increased urinary frequency    Bandemia    Achilles tendon rupture    Anemia due to GI blood loss    Physical debility     Past Medical History:   Diagnosis Date    Aortic valve replaced     Arthritis 2018    Atrial fibrillation     Coronary artery disease     Hypertension      Past Surgical History:   Procedure Laterality Date    CARDIAC SURGERY      COLONOSCOPY      COLONOSCOPY N/A 2024    Procedure: COLONOSCOPY WITH HOT/COLD SNARE POLYPECTOMIES, ELEVIEW INJECTION,CLIPX1;  Surgeon: Kurt Mensah MD;  Location: [facility] ENDOSCOPY;  Service: Gastroenterology;  Laterality: N/A;  COLON POLYPS    ENDOSCOPY N/A 2024    Procedure: ESOPHAGOGASTRODUODENOSCOPY WITH BIOPSIES;  Surgeon: Kurt Mensah MD;  Location: [facility] ENDOSCOPY;  Service: Gastroenterology;  Laterality: N/A;  RETAINED FOOD IN STOMACH AND REFLUX ESOPHAGITIS    PACEMAKER IMPLANTATION        General Information       Row Name 10/11/24 1138          OT Time and Intention    Document Type therapy note (daily note)  -EG     Mode of Treatment individual therapy;occupational therapy  -EG       Row Name 10/11/24 1138           General Information    Existing Precautions/Restrictions fall  CAM boot donned on RLE  -EG       Row Name 10/11/24 1138          Safety Issues, Functional Mobility    Impairments Affecting Function (Mobility) balance;endurance/activity tolerance;range of motion (ROM);strength  -EG               User Key  (r) = Recorded By, (t) = Taken By, (c) = Cosigned By      Initials Name Provider Type    EG Janessa Good OT Occupational Therapist                     Mobility/ADL's       Row Name 10/11/24 1139          Transfers    Transfers sit-stand transfer;stand-sit transfer  -EG     Comment, (Transfers) multiple chairs with arms in therapy gym; in and out of recliner  -EG       Row Name 10/11/24 1139          Bed-Chair Transfer    Bed-Chair Story (Transfers) contact guard;verbal cues;standby assist  -EG     Assistive Device (Bed-Chair Transfers) walker, front-wheeled  -EG       Row Name 10/11/24 1139          Sit-Stand Transfer    Sit-Stand Story (Transfers) contact guard;minimum assist (75% patient effort)  -EG     Assistive Device (Sit-Stand Transfers) walker, front-wheeled  -EG     Comment, (Sit-Stand Transfer) fatigues easily with performance; occ. need for a boost; 1x5 STS functional repetitive exercise performed for strengthening  -EG       Row Name 10/11/24 1139          Stand-Sit Transfer    Stand-Sit Story (Transfers) contact guard;verbal cues  -EG     Assistive Device (Stand-Sit Transfers) walker, front-wheeled  -EG       Row Name 10/11/24 1139          Functional Mobility    Functional Mobility- Ind. Level contact guard assist;verbal cues required;nonverbal cues required (demo/gesture)  -EG     Functional Mobility- Device walker, front-wheeled  -EG     Functional Mobility- Comment Patient performed mobility to and from therapy gym using RW, no LOB w/CAM boot donned  -EG       Row Name 10/11/24 1139          Activities of Daily Living    BADL Assessment/Intervention lower body  dressing  -EG       [facility] Name 10/11/24 1139          Mobility    Extremity Weight-bearing Status right lower extremity  -EG     Right Lower Extremity (Weight-bearing Status) weight-bearing as tolerated (WBAT)  -EG       Row Name 10/11/24 1139          Lower Body Dressing Assessment/Training    Piatt Level (Lower Body Dressing) lower body dressing skills;dependent (less than 25% patient effort);maximum assist (25% patient effort)  -EG     Comment, (Lower Body Dressing) Extended time and training for CAM boot donning; new boot continues to demonstrate poor fit and comfort for patient. OT collboarted with patient and interdisciplinary team on fit and adjustment  -EG               User Key  (r) = Recorded By, (t) = Taken By, (c) = Cosigned By      Initials Name Provider Type    EG Janessa Good OT Occupational Therapist                   Obj/Interventions       Row Name 10/11/24 1142          Shoulder (Therapeutic Exercise)    Shoulder (Therapeutic Exercise) strengthening exercise  -EG     Shoulder Strengthening (Therapeutic Exercise) bilateral;flexion;extension;horizontal aBduction/aDduction;external rotation;internal rotation;scapular stabilization;3 lb free weight;15 repititions;2 sets;sitting  -EG       Row Name 10/11/24 1142          Elbow/Forearm (Therapeutic Exercise)    Elbow/Forearm (Therapeutic Exercise) strengthening exercise  -EG     Elbow/Forearm Strengthening (Therapeutic Exercise) bilateral;flexion;extension;supination;pronation;3 lb free weight;15 repititions;2 sets;sitting  -EG       Row Name 10/11/24 1142          Motor Skills    Therapeutic Exercise shoulder;elbow/forearm;other (see comments);aerobic  chair push-ups for tricep strengthening 2x5  -EG       Row Name 10/11/24 1142          Balance    Balance Interventions sit to stand;standing;supported;static;dynamic;weight shifting activity;occupation based/functional task;UE activity with balance activity  -EG       Row Name 10/11/24 1142           Aerobic Exercise    Type (Aerobic Exercise) arm bike  -EG     Comment, Aerobic Exercise (Therapeutic Exercise) Omnicycle performed 15:00 no rest break on cardiac interval setting  -EG               User Key  (r) = Recorded By, (t) = Taken By, (c) = Cosigned By      Initials Name Provider Type    Janessa Alvarez, JOSELIN Occupational Therapist                   Goals/Plan    No documentation.                  Clinical Impression       Row Name 10/11/24 1144          Pain Scale: FACES Pre/Post-Treatment    Pain: FACES Scale, Pretreatment 0-->no hurt  -EG     Posttreatment Pain Rating 4-->hurts little more  -EG     Pain Location - Side/Orientation Right  -EG     Pain Location - foot;ankle  -EG       Row Name 10/11/24 1144          Plan of Care Review    Plan of Care Reviewed With patient  -EG     Progress improving  -EG     Outcome Evaluation Pleasant and cooperative; A&Ox4; improved pain; emphasis and training on CAM boot this date; OT training for functional strength in BUE's; endurance training via omnicycle; Potential for improvement noted; Continue with POC; Ax1 w/RW and CAM boot donned.  -EG       Row Name 10/11/24 1144          Therapy Assessment/Plan (OT)    Rehab Potential (OT) good, to achieve stated therapy goals  -EG     Criteria for Skilled Therapeutic Interventions Met (OT) yes;meets criteria;skilled treatment is necessary  -EG     Therapy Frequency (OT) 5 times/wk  -EG       Row Name 10/11/24 1144          Therapy Plan Review/Discharge Plan (OT)    Anticipated Discharge Disposition (OT) home with home health  -EG       [facility] Name 10/11/24 1144          Positioning and Restraints    Pre-Treatment Position sitting in chair/recliner  -EG     Post Treatment Position chair  -EG     In Chair reclined;sitting;call light within reach;encouraged to call for assist;exit alarm on  -EG               User Key  (r) = Recorded By, (t) = Taken By, (c) = Cosigned By      Initials Name Provider Type    ROMANA Good  Janessa, OT Occupational Therapist                   Outcome Measures       Row Name 10/11/24 1145          How much help from another is currently needed...    Putting on and taking off regular lower body clothing? 2  -EG     Bathing (including washing, rinsing, and drying) 3  -EG     Toileting (which includes using toilet bed pan or urinal) 3  -EG     Putting on and taking off regular upper body clothing 3  -EG     Taking care of personal grooming (such as brushing teeth) 4  -EG     Eating meals 4  -EG     AM-PAC 6 Clicks Score (OT) 19  -EG       Row Name 10/11/24 1100 10/11/24 0158       How much help from another person do you currently need...    Turning from your back to your side while in flat bed without using bedrails? 3  -VK 3  -FM    Moving from lying on back to sitting on the side of a flat bed without bedrails? 3  -VK 3  -FM    Moving to and from a bed to a chair (including a wheelchair)? 3  -VK 3  -FM    Standing up from a chair using your arms (e.g., wheelchair, bedside chair)? 3  -VK 3  -FM    Climbing 3-5 steps with a railing? 2  -VK 2  -FM    To walk in hospital room? 3  -VK 3  -FM    AM-PAC 6 Clicks Score (PT) 17  -VK 17  -FM    Highest Level of Mobility Goal 5 --> Static standing  -VK 5 --> Static standing  -FM      Row Name 10/11/24 1145          Functional Assessment    Outcome Measure Options AM-PAC 6 Clicks Daily Activity (OT);Optimal Instrument  -EG       Row Name 10/11/24 1145          Optimal Instrument    Optimal Instrument Optimal - 3  -EG     Bending/Stooping 3  -EG     Standing 2  -EG     Reaching 1  -EG               User Key  (r) = Recorded By, (t) = Taken By, (c) = Cosigned By      Initials Name Provider Type    FM Shira Mcwilliams, RN Registered Nurse    Janessa Alvarez, OT Occupational Therapist    Karla Ignacio PTA Physical Therapist Assistant                  Section G  Mobility  Bed mobility - self performance: limited assistance (staff provide guided maneuvering of limbs  or other non-weight bearing assistance)  Bed mobility support/assistance: One person assist  Transfer - self performance: limited assistance (staff provide guided maneuvering of limbs or other non-weight bearing assistance)  Transfer support/assistance: One person assist  Walking in room - self performance: limited assistance (staff provide guided maneuvering of limbs or other non-weight bearing assistance)  Walking in room support/assistance: One person assist  Walking in corridors/hallway - self performance: limited assistance (staff provide guided maneuvering of limbs or other non-weight bearing assistance)  Walking in corridors/hallway support/assistance: One person assist  Locomotion on unit - self performance: activity did not occur  Locomotion on unit support/assistance: Activity did not occur  Locomotion off unit - self performance: activity did not occur  Locomotion off unit support/assistance: Activity did not occur  Dressing - self performance: limited assistance (staff provide guided maneuvering of limbs or other non-weight bearing assistance)  Dressing support/assistance: One person assist  Eating - self performance: independent  Eating support/assistance: Setup help only  Toileting - self performance: limited assistance (staff provide guided maneuvering of limbs or other non-weight bearing assistance)  Toileting support/assistance: One person assist  Personal hygiene - self performance: limited assistance (staff provide guided maneuvering of limbs or other non-weight bearing assistance)  Personal hygiene support/assistance: One person assist  Bathing  Bathing - self performance: Physical help with bathing (exclude washing back and hair for patient)  Bathing support/assistance: One person assist  Balance  Balance during transitions & walking: Not steady, requires assist to steady  Moving from seated to standing position: Not steady, requires assist to steady  Walking: Not steady, requires assist to  steady  Turning around while walking: Not steady, requires assist to steady  Moving on and off toilet: Not steady, requires assist to steady  Surface-to-surface transfer: Not steady, requires assist to steady  Mobility devices: None were used  Range of Motion  Upper Extremity: No impairment  Lower Extremity: Impairment on one side  Section GG  Functional Ability/Goals, Adm (Section GG)  Self Care, Prior Functioning (KZ5739V): 3. Independent  Functional Cognition, Prior Functioning (WL9579J): 3. Independent  Prior Device Use (BK9959): none of the above (Z)  Upper Extremity Range of Motion (WF6573W): No impairment  Lower Extremity Range of Motion (ED0978C): Impairment on one side  Self Care, Admission (Section GG)  Eating: Self-Care Admission Performance (GT7388W0): setup or clean-up assistance (05)  Oral Hygiene: Self-Care Admission Performance (WJ5475F4): setup or clean-up assistance (05)  Toileting Hygiene: Self-Care Admission Performance (OE5542F3): partial/moderate assistance (03)  Shower/Bathe Self: Self-Care Admission Performance (SB7975D2): partial/moderate assistance (03)  Upper Body Dressing: Self-Care Admission Performance (WI1514M4): setup or clean-up assistance (05)  Lower Body Dressing: Self-Care Admission Performance (WM7404A9): partial/moderate assistance (03)  Putting On/Taking Off Footwear: Self-Care Admission Performance (DR5877Z6): partial/moderate assistance (03)  Personal Hygiene: Self-Care Admission Performance (QK1086H6): supervision or touching assistance (04)  Mobility, Admission Performance (FY9872)  Chair/Bed-Chair Transfer: Mobility Admission Performance (FT3777D8): partial/moderate assistance (03)  Toilet Transfer: Mobility Admission Performance (WK2770N3): partial/moderate assistance (03)  Tub/shower Transfer: Mobility Admission Performance (WH7147MI0): partial/moderate assistance (03)                Occupational Therapy Education       Title: PT OT SLP Therapies (Done)       Topic:  Occupational Therapy (Done)       Point: ADL training (Done)       Description:   Instruct learner(s) on proper safety adaptation and remediation techniques during self care or transfers.   Instruct in proper use of assistive devices.                  Learning Progress Summary             Patient JING Moulton VU by EG at 10/4/2024 1056    Comment: Education on compensatory techniques for ADL's  Education on AE  Education on fall risk prevention and general safety  Education on OT services                         Point: Home exercise program (Done)       Description:   Instruct learner(s) on appropriate technique for monitoring, assisting and/or progressing therapeutic exercises/activities.                  Learning Progress Summary             Patient JING Moulton VU by EG at 10/4/2024 1056    Comment: Education on compensatory techniques for ADL's  Education on AE  Education on fall risk prevention and general safety  Education on OT services                         Point: Precautions (Done)       Description:   Instruct learner(s) on prescribed precautions during self-care and functional transfers.                  Learning Progress Summary             Patient JING Moulton VU by EG at 10/4/2024 1056    Comment: Education on compensatory techniques for ADL's  Education on AE  Education on fall risk prevention and general safety  Education on OT services                         Point: Body mechanics (Done)       Description:   Instruct learner(s) on proper positioning and spine alignment during self-care, functional mobility activities and/or exercises.                  Learning Progress Summary             Patient JING Moulton VU by EG at 10/4/2024 1056    Comment: Education on compensatory techniques for ADL's  Education on AE  Education on fall risk prevention and general safety  Education on OT services                                         User Key       Initials Effective Dates Name Provider Type Discipline    EG  09/14/22 -  Janessa Good OT Occupational Therapist OT                  OT Recommendation and Plan  Planned Therapy Interventions (OT): activity tolerance training, functional balance retraining, occupation/activity based interventions, adaptive equipment training, BADL retraining, neuromuscular control/coordination retraining, patient/caregiver education/training, transfer/mobility retraining, strengthening exercise  Therapy Frequency (OT): 5 times/wk  Plan of Care Review  Plan of Care Reviewed With: patient  Progress: improving  Outcome Evaluation: Pleasant and cooperative; A&Ox4; improved pain; emphasis and training on CAM boot this date; OT training for functional strength in BUE's; endurance training via omnicycle; Potential for improvement noted; Continue with POC; Ax1 w/RW and CAM boot donned.     Time Calculation:   Evaluation Complexity (OT)  Review Occupational Profile/Medical/Therapy History Complexity: expanded/moderate complexity  Assessment, Occupational Performance/Identification of Deficit Complexity: 3-5 performance deficits  Clinical Decision Making Complexity (OT): detailed assessment/moderate complexity  Overall Complexity of Evaluation (OT): moderate complexity     Time Calculation- OT       Row Name 10/11/24 1146 10/11/24 1125          Time Calculation- OT    OT Received On 10/11/24  -EG --     OT Goal Re-Cert Due Date 11/03/24  -EG --        Timed Charges    05068 - OT Therapeutic Exercise Minutes 31  -EG --     53727 - Gait Training Minutes  -- 12  -VK     35633 - OT Therapeutic Activity Minutes 13  -EG --     64640 - OT Self Care/Mgmt Minutes 10  -EG --        SNF Occupational Therapy Minutes    Skilled Minutes- OT 54 min  -EG --        Total Minutes    Timed Charges Total Minutes 54  -EG 12  -VK      Total Minutes 54  -EG 12  -VK               User Key  (r) = Recorded By, (t) = Taken By, (c) = Cosigned By      Initials Name Provider Type    EG Janessa Good OT Occupational Therapist     Karla Ignacio, PTA Physical Therapist Assistant                  Therapy Charges for Today       Code Description Service Date Service Provider Modifiers Qty    51790074915 HC OT NEUROMUSC RE EDUCATION EA 15 MIN 10/10/2024 Janessa Good, OT GO 1    20830614381 HC OT THERAPEUTIC ACT EA 15 MIN 10/10/2024 Janessa Good, OT GO 1    52282101998 HC OT SELF CARE/MGMT/TRAIN EA 15 MIN 10/10/2024 Janessa Good, OT GO 2    87518049254 HC OT THER PROC EA 15 MIN 10/11/2024 Janessa Good, OT GO 2    17013228383 HC OT THERAPEUTIC ACT EA 15 MIN 10/11/2024 Janessa Good, OT GO 1    89255143959 HC OT SELF CARE/MGMT/TRAIN EA 15 MIN 10/11/2024 Janessa Good, OT GO 1                 Janessa Good OT  10/11/2024   Never smoker

## 2024-10-11 NOTE — PLAN OF CARE
Goal Outcome Evaluation:  Plan of Care Reviewed With: patient        Progress: improving  Outcome Evaluation: Alert and oriented and pleasant with staff. x1 assist for transfers and ambulation. No c/o pain requiring PRN pain medication noted this shift. Continues to show improved mobility with new CAM boot. Sitting up in recliner, call light in reach.

## 2024-10-11 NOTE — PROGRESS NOTES
[facility]   Hospitalist Progress Note       Patient Name: Woodrow Alejandro  : 1950  MRN: [id number]  Primary Care Physician: Juan Perez MD  Date of admission: 10/3/2024  Today's Date: 10/11/2024  Room / Bed:   [facility]/1  Subjective   Chief Complaint: Weakness after recent hospitalization     HPI:  Woodrow Alejandro is a 73 y.o. male recently hospitalized for anemia and also right Achilles tendon tear.  During hospitalization, gastroenterology, Dr. Mensah, performed EGD and colonoscopy.  EGD showed esophagitis with bleeding lower third of esophagus.  PPI recommended.  2 polyps (tubular adenomas) were removed on colonoscopy.  Orthopedist, Dr. Zuniga, was consulted and recommended gentle ROM, weightbearing as tolerated, PT, and walking boot for his Achilles tendon tear.  His home aspirin was subsequently resumed on 10/3/24.  He has been a good candidate for more rehab and has now been admitted to our SNF for more therapy.  He will be on PPI BID x 2 weeks, then transition to daily.       Interval Followup: Patient seen and examined resting comfortably no acute distress doing well with therapy asking if he can have repeat labs drawn today remains in walking boot for partial tear of of Achilles tendon making fair progress with therapy.    REVIEW OF SYSTEMS:   Weakness fatigue  Objective   Temp:  [97.9 °F (36.6 °C)-98.4 °F (36.9 °C)] 97.9 °F (36.6 °C)  Heart Rate:  [80-91] 91  Resp:  [18] 18  BP: (102-108)/(51-56) 102/51  PHYSICAL EXAM   Constitutional: Awake alert oriented no acute distress  Respiratory: Clear  Cardiovascular: RRR  GI: Abdomen soft nontender  Results from last 7 days   Lab Units 10/09/24  0451 10/06/24  0450   WBC 10*3/mm3 13.15* 12.06*   HEMOGLOBIN g/dL 9.8* 10.0*   HEMATOCRIT % 30.2* 31.7*   PLATELETS 10*3/mm3 169 192     Results from last 7 days   Lab Units 10/09/24  0451 10/06/24  0450   SODIUM mmol/L 133* 135*   POTASSIUM mmol/L 3.9 4.0   CO2 mmol/L 23.2 23.1   CHLORIDE  mmol/L 97* 98   ANION GAP mmol/L 12.8 13.9   BUN mg/dL 14 18   CREATININE mg/dL 0.79 0.97   GLUCOSE mg/dL 136* 133*         COMPLEXITY OF DATA / DECISION MAKING     []  Moderate: One acute illness or mild exacerbation of chronic or 2 stable chronic or tx side effects   []  High:  Severe acute illness or severe exacerbation of chronic - potential for major debility / life threatening         I have personally reviewed the results from the time of this admission to 10/11/2024 15:53 EDT:  []  Laboratory:  []  Microbiology: []  Radiology:  []  Telemetry:   []  Cardiology/Vascular:  []  Pathology:  []  Prior external records:  []  Independent historian provided additional details:      []  Discussed case with specialists:    []  Independent interpretation of ECG/Imaging etc:             []  Moderate: Rx management, low risk surgery, suboptimal social situation   []  High:  Rx with close monitoring for toxicity, mod-high risk surgery, DNR decision, Comfort initiated, IV pain meds    Assessment / Plan   Assessment:     Weakness after recent hospitalization  Recent anemia  Recent EGD 9/30/24 by Dr. Mensah showing esophagitis (PPI recommended)  Recent colonoscopy 9/30/24 by Dr. Mensah with polypectomy  Recent right achilles tendon tear  Nonischemic cardiomyopathy, EF improved  A-fib (on aspirin only)  Bioprosthetic aortic valve replacement (Dr. Newman)  HTN  GERD  Anemia  CAD/AICD  Noted on CT: Possible ankylosing spondylitis   Urinary frequency      Plan:     Continue daily physical therapy  Asking for repeat labs today we will get CBC BMP  Lisinopril on hold  Have resumed other home medications  Home aspirin has been resumed  Continue iron supplement  Continue PPI  Where boot when out of bed.  WBAT.  Gentle range of motion right ankle.  Additional recommendations pending clinical course    VTE Prophylaxis:  No VTE prophylaxis order currently exists.    CODE STATUS:      Level Of Support Discussed With: Patient  Code Status  (Patient has no pulse and is not breathing): CPR (Attempt to Resuscitate)  Medical Interventions (Patient has pulse or is breathing): Full Support       Electronically signed by CHANEL Melo, 10/11/24, 3:59 PM EDT.

## 2024-10-11 NOTE — PLAN OF CARE
Goal Outcome Evaluation:           Progress: improving    Outcome Evaluation: Rsd. is alert and oriented x4, able to make needs known to staff.  Rsd. transfers x1 assist with gaitbelt and rolling walker but refused to transfer to BSC/. Urinal remains at bedside.  Rsd. needs assistance with urinal due to anatomy.  Rsd. denies pain, and has rested intermittently in between care.  Call light and personal items within reach.  Rsd. reminded to use call light for assistance, verbalizes understanding.  Will continue to monitor and notify on-coming staff.  Current plan of care remains in place at this time.

## 2024-10-12 LAB
INDURATION: 0 MM (ref 0–10)
Lab: NORMAL
Lab: NORMAL
TB SKIN TEST: NEGATIVE

## 2024-10-12 PROCEDURE — 97116 GAIT TRAINING THERAPY: CPT

## 2024-10-12 PROCEDURE — 97110 THERAPEUTIC EXERCISES: CPT

## 2024-10-12 RX ADMIN — CLOBETASOL PROPIONATE CREAM USP, 0.05% 1 APPLICATION: 0.5 CREAM TOPICAL at 11:30

## 2024-10-12 RX ADMIN — DILTIAZEM HYDROCHLORIDE 120 MG: 30 TABLET, FILM COATED ORAL at 20:57

## 2024-10-12 RX ADMIN — ASPIRIN 325 MG: 325 TABLET ORAL at 08:48

## 2024-10-12 RX ADMIN — DILTIAZEM HYDROCHLORIDE 120 MG: 30 TABLET, FILM COATED ORAL at 08:46

## 2024-10-12 RX ADMIN — METOPROLOL TARTRATE 100 MG: 50 TABLET, FILM COATED ORAL at 20:57

## 2024-10-12 RX ADMIN — FERROUS SULFATE TAB 325 MG (65 MG ELEMENTAL FE) 325 MG: 325 (65 FE) TAB at 08:47

## 2024-10-12 RX ADMIN — SENNOSIDES AND DOCUSATE SODIUM 2 TABLET: 50; 8.6 TABLET ORAL at 08:47

## 2024-10-12 RX ADMIN — PANTOPRAZOLE SODIUM 40 MG: 40 TABLET, DELAYED RELEASE ORAL at 17:37

## 2024-10-12 RX ADMIN — FUROSEMIDE 40 MG: 40 TABLET ORAL at 08:48

## 2024-10-12 RX ADMIN — PANTOPRAZOLE SODIUM 40 MG: 40 TABLET, DELAYED RELEASE ORAL at 08:47

## 2024-10-12 RX ADMIN — Medication 5 MG: at 20:57

## 2024-10-12 RX ADMIN — METOPROLOL TARTRATE 100 MG: 50 TABLET, FILM COATED ORAL at 08:47

## 2024-10-12 NOTE — THERAPY TREATMENT NOTE
SNF - Physical Therapy Treatment Note   Yudy     Patient Name: Woodrow Alejandro  : 1950  MRN: [id number]  Today's Date: 10/12/2024      Visit Dx:    ICD-10-CM ICD-9-CM   1. Decreased activities of daily living (ADL)  Z78.9 V49.89   2. Difficulty walking  R26.2 719.7     Patient Active Problem List   Diagnosis    Essential hypertension    Permanent atrial fibrillation    S/P AVR    ICD (implantable cardioverter-defibrillator), dual, in situ    Dermatitis    IFG (impaired fasting glucose)    Mixed hyperlipidemia    Vitamin D deficiency    Medicare annual wellness visit, subsequent    Primary osteoarthritis of both knees    Screening PSA (prostate specific antigen)    Bilateral primary osteoarthritis of knee    Coarse tremors    Weakness generalized    Increased urinary frequency    Bandemia    Achilles tendon rupture    Anemia due to GI blood loss    Physical debility     Past Medical History:   Diagnosis Date    Aortic valve replaced     Arthritis 2018    Atrial fibrillation     Coronary artery disease     Hypertension      Past Surgical History:   Procedure Laterality Date    CARDIAC SURGERY      COLONOSCOPY      COLONOSCOPY N/A 2024    Procedure: COLONOSCOPY WITH HOT/COLD SNARE POLYPECTOMIES, ELEVIEW INJECTION,CLIPX1;  Surgeon: Kurt Mensah MD;  Location: [facility] ENDOSCOPY;  Service: Gastroenterology;  Laterality: N/A;  COLON POLYPS    ENDOSCOPY N/A 2024    Procedure: ESOPHAGOGASTRODUODENOSCOPY WITH BIOPSIES;  Surgeon: Kurt Mensah MD;  Location: [facility] ENDOSCOPY;  Service: Gastroenterology;  Laterality: N/A;  RETAINED FOOD IN STOMACH AND REFLUX ESOPHAGITIS    PACEMAKER IMPLANTATION         PT Assessment (Last 12 Hours)       PT Evaluation and Treatment       Row Name 10/12/24 1028          Physical Therapy Time and Intention    Document Type therapy note (daily note)  -WM     Mode of Treatment individual therapy;physical therapy  -WM     Patient Effort good  -WM      Symptoms Noted During/After Treatment fatigue  -       Row Name 10/12/24 1028          Sit-Stand Transfer    Sit-Stand Dukes (Transfers) contact guard  -WM     Assistive Device (Sit-Stand Transfers) walker, front-wheeled  -WM       Row Name 10/12/24 1028          Stand-Sit Transfer    Stand-Sit Dukes (Transfers) contact guard  -WM     Assistive Device (Stand-Sit Transfers) walker, front-wheeled  -WM       Row Name 10/12/24 1028          Gait/Stairs (Locomotion)    Dukes Level (Gait) contact guard  -     Assistive Device (Gait) walker, front-wheeled  -     Distance in Feet (Gait) 55  x 2  -     Pattern (Gait) 4-point;step-through  -     Deviations/Abnormal Patterns (Gait) gait speed decreased;stride length decreased  -       Row Name 10/12/24 1028          Safety Issues/Impairments Affecting Functional Mobility    Impairments Affecting Function (Mobility) balance;endurance/activity tolerance;strength  -       Row Name 10/12/24 1028          Aerobic Exercise    Comment, Aerobic Exercise (Therapeutic Exercise) NuStep x 15 minutes, load 3  -       Row Name 10/12/24 1028          Positioning and Restraints    Pre-Treatment Position sitting in chair/recliner  -     Post Treatment Position chair  -WM     In Chair reclined;call light within reach;encouraged to call for assist;exit alarm on  -       Row Name 10/12/24 1028          Progress Summary (PT)    Progress Toward Functional Goals (PT) progress toward functional goals is good  -WM               User Key  (r) = Recorded By, (t) = Taken By, (c) = Cosigned By      Initials Name Provider Type     Dwaine Guzman PTA Physical Therapist Assistant                  Section G              Section GG                       Physical Therapy Education       Title: PT OT SLP Therapies (Done)       Topic: Physical Therapy (Resolved)       Point: Mobility training (Resolved)       Learner Progress:  Not documented in this visit.               Point: Home exercise program (Resolved)       Learner Progress:  Not documented in this visit.              Point: Body mechanics (Resolved)       Learner Progress:  Not documented in this visit.              Point: Precautions (Resolved)       Learner Progress:  Not documented in this visit.                                  PT Recommendation and Plan     Progress Summary (PT)  Progress Toward Functional Goals (PT): progress toward functional goals is good   Outcome Measures       Row Name 10/12/24 1030 10/11/24 1100 10/10/24 0911       How much help from another person do you currently need...    Turning from your back to your side while in flat bed without using bedrails? 3  -WM 3  -VK 3  -WM    Moving from lying on back to sitting on the side of a flat bed without bedrails? 3  -WM 3  -VK 3  -WM    Moving to and from a bed to a chair (including a wheelchair)? 3  -WM 3  -VK 3  -WM    Standing up from a chair using your arms (e.g., wheelchair, bedside chair)? 3  -WM 3  -VK 3  -WM    Climbing 3-5 steps with a railing? 2  -WM 2  -VK 2  -WM    To walk in hospital room? 3  -WM 3  -VK 3  -WM    AM-PAC 6 Clicks Score (PT) 17  -WM 17  -VK 17  -WM    Highest Level of Mobility Goal 5 --> Static standing  -WM 5 --> Static standing  -VK 5 --> Static standing  -WM      Row Name 10/09/24 1300             How much help from another person do you currently need...    Turning from your back to your side while in flat bed without using bedrails? 3  -VK      Moving from lying on back to sitting on the side of a flat bed without bedrails? 3  -VK      Moving to and from a bed to a chair (including a wheelchair)? 3  -VK      Standing up from a chair using your arms (e.g., wheelchair, bedside chair)? 3  -VK      Climbing 3-5 steps with a railing? 2  -VK      To walk in hospital room? 3  -VK      AM-PAC 6 Clicks Score (PT) 17  -VK      Highest Level of Mobility Goal 5 --> Static standing  -VK                User Key  (r) = Recorded  By, (t) = Taken By, (c) = Cosigned By      Initials Name Provider Type    Dwaine Keith PTA Physical Therapist Assistant    Karla Ignacio PTA Physical Therapist Assistant                     Time Calculation:    PT Charges       Row Name 10/12/24 1027             Time Calculation    PT Received On 10/12/24  -WM         Timed Charges    21316 - PT Therapeutic Exercise Minutes 15  -WM      74059 - Gait Training Minutes  6  -WM      03871 - PT Therapeutic Activity Minutes 3  -WM         SNF Physical Therapy Minutes    Skilled Minutes- PT 24 min  -WM         Total Minutes    Timed Charges Total Minutes 24  -WM       Total Minutes 24  -WM                User Key  (r) = Recorded By, (t) = Taken By, (c) = Cosigned By      Initials Name Provider Type    Dwaine Keith PTA Physical Therapist Assistant                      PT G-Codes  Outcome Measure Options: AM-PAC 6 Clicks Daily Activity (OT), Optimal Instrument  AM-PAC 6 Clicks Score (PT): 17  AM-PAC 6 Clicks Score (OT): 19    Dwaine Guzman PTA  10/12/2024

## 2024-10-12 NOTE — PLAN OF CARE
Goal Outcome Evaluation:              Outcome Evaluation: Alert and oriented. Able to communicate needs. Denied pain. Transfers in room with one person assistnace. Pleasant with sfaff. Continue plan of care.

## 2024-10-13 RX ADMIN — METOPROLOL TARTRATE 100 MG: 50 TABLET, FILM COATED ORAL at 20:11

## 2024-10-13 RX ADMIN — METOPROLOL TARTRATE 100 MG: 50 TABLET, FILM COATED ORAL at 08:05

## 2024-10-13 RX ADMIN — PANTOPRAZOLE SODIUM 40 MG: 40 TABLET, DELAYED RELEASE ORAL at 17:09

## 2024-10-13 RX ADMIN — FUROSEMIDE 40 MG: 40 TABLET ORAL at 08:05

## 2024-10-13 RX ADMIN — FERROUS SULFATE TAB 325 MG (65 MG ELEMENTAL FE) 325 MG: 325 (65 FE) TAB at 08:05

## 2024-10-13 RX ADMIN — PANTOPRAZOLE SODIUM 40 MG: 40 TABLET, DELAYED RELEASE ORAL at 08:05

## 2024-10-13 RX ADMIN — CLOBETASOL PROPIONATE CREAM USP, 0.05% 1 APPLICATION: 0.5 CREAM TOPICAL at 15:37

## 2024-10-13 RX ADMIN — DILTIAZEM HYDROCHLORIDE 120 MG: 30 TABLET, FILM COATED ORAL at 08:04

## 2024-10-13 RX ADMIN — Medication 5 MG: at 20:11

## 2024-10-13 RX ADMIN — SENNOSIDES AND DOCUSATE SODIUM 2 TABLET: 50; 8.6 TABLET ORAL at 08:05

## 2024-10-13 RX ADMIN — DILTIAZEM HYDROCHLORIDE 120 MG: 30 TABLET, FILM COATED ORAL at 20:11

## 2024-10-13 RX ADMIN — ASPIRIN 325 MG: 325 TABLET ORAL at 08:05

## 2024-10-13 NOTE — PLAN OF CARE
Goal Outcome Evaluation:  Plan of Care Reviewed With: patient  Plan of Care Reviewed With: patient        Progress: improving  Outcome Evaluation: Resident alert, oriented, able to make needs known to staff. Transfers with assist of one to bathroom. Requires assist with urinal d/t scrotal edema. continues to be TTWB to RLE. [place] on as ordered. Rash to back resolving. Family and visitors at BS off and on most of afternoon. Resident pleasant and cooperative.

## 2024-10-13 NOTE — PLAN OF CARE
Goal Outcome Evaluation:              Outcome Evaluation: Alert and oriented. Vital signs stable. Transfers with one person assistance. TTWB to right foot. Denied pain. Continue plan of care.

## 2024-10-13 NOTE — PLAN OF CARE
"Goal Outcome Evaluation:  Plan of Care Reviewed With: patient        Progress: improving  Outcome Evaluation: Patient is alert and oriented. Pleasant with staff. Denies pain. Refused PRN pain meds offered. Patient stated that Dr. Mensah wanted patient to have labs drawn every other day. That order not found in [facility]. Dr. rice notified via [employer]. MD responded \"His hb looks stable recently. If dr Mensah wants it more frequently he can order them if needed.\" Voices needs. Con't current POC.                             "

## 2024-10-14 ENCOUNTER — APPOINTMENT (OUTPATIENT)
Dept: GENERAL RADIOLOGY | Facility: HOSPITAL | Age: 74
DRG: 947 | End: 2024-10-14
Payer: MEDICARE

## 2024-10-14 VITALS
HEIGHT: 72 IN | HEART RATE: 100 BPM | DIASTOLIC BLOOD PRESSURE: 79 MMHG | OXYGEN SATURATION: 95 % | SYSTOLIC BLOOD PRESSURE: 129 MMHG | RESPIRATION RATE: 18 BRPM | TEMPERATURE: 100.4 F | BODY MASS INDEX: 41.6 KG/M2 | WEIGHT: 307.1 LBS

## 2024-10-14 LAB
ALBUMIN SERPL-MCNC: 3 G/DL (ref 3.5–5.2)
ANION GAP SERPL CALCULATED.3IONS-SCNC: 18.3 MMOL/L (ref 5–15)
BACTERIA UR QL AUTO: NORMAL /HPF
BASOPHILS # BLD AUTO: 0.05 10*3/MM3 (ref 0–0.2)
BASOPHILS NFR BLD AUTO: 0.3 % (ref 0–1.5)
BILIRUB UR QL STRIP: NEGATIVE
BUN SERPL-MCNC: 15 MG/DL (ref 8–23)
BUN/CREAT SERPL: 14.7 (ref 7–25)
CALCIUM SPEC-SCNC: 8.7 MG/DL (ref 8.6–10.5)
CHLORIDE SERPL-SCNC: 96 MMOL/L (ref 98–107)
CLARITY UR: CLEAR
CO2 SERPL-SCNC: 18.7 MMOL/L (ref 22–29)
COLOR UR: YELLOW
CREAT SERPL-MCNC: 1.02 MG/DL (ref 0.76–1.27)
D-LACTATE SERPL-SCNC: 1.3 MMOL/L (ref 0.5–2)
D-LACTATE SERPL-SCNC: 2.1 MMOL/L (ref 0.5–2)
D-LACTATE SERPL-SCNC: 2.2 MMOL/L (ref 0.5–2)
DEPRECATED RDW RBC AUTO: 56.5 FL (ref 37–54)
EGFRCR SERPLBLD CKD-EPI 2021: 77.6 ML/MIN/1.73
EOSINOPHIL # BLD AUTO: 0.06 10*3/MM3 (ref 0–0.4)
EOSINOPHIL NFR BLD AUTO: 0.4 % (ref 0.3–6.2)
ERYTHROCYTE [DISTWIDTH] IN BLOOD BY AUTOMATED COUNT: 16.2 % (ref 12.3–15.4)
FLUAV SUBTYP SPEC NAA+PROBE: NOT DETECTED
FLUBV RNA ISLT QL NAA+PROBE: NOT DETECTED
GLUCOSE SERPL-MCNC: 129 MG/DL (ref 65–99)
GLUCOSE UR STRIP-MCNC: NEGATIVE MG/DL
HCT VFR BLD AUTO: 35.3 % (ref 37.5–51)
HGB BLD-MCNC: 11.1 G/DL (ref 13–17.7)
HGB UR QL STRIP.AUTO: ABNORMAL
HYALINE CASTS UR QL AUTO: NORMAL /LPF
IMM GRANULOCYTES # BLD AUTO: 0.1 10*3/MM3 (ref 0–0.05)
IMM GRANULOCYTES NFR BLD AUTO: 0.6 % (ref 0–0.5)
KETONES UR QL STRIP: NEGATIVE
LEUKOCYTE ESTERASE UR QL STRIP.AUTO: NEGATIVE
LYMPHOCYTES # BLD AUTO: 1.18 10*3/MM3 (ref 0.7–3.1)
LYMPHOCYTES NFR BLD AUTO: 7.3 % (ref 19.6–45.3)
MCH RBC QN AUTO: 29.8 PG (ref 26.6–33)
MCHC RBC AUTO-ENTMCNC: 31.4 G/DL (ref 31.5–35.7)
MCV RBC AUTO: 94.9 FL (ref 79–97)
MONOCYTES # BLD AUTO: 1.18 10*3/MM3 (ref 0.1–0.9)
MONOCYTES NFR BLD AUTO: 7.3 % (ref 5–12)
NEUTROPHILS NFR BLD AUTO: 13.61 10*3/MM3 (ref 1.7–7)
NEUTROPHILS NFR BLD AUTO: 84.1 % (ref 42.7–76)
NITRITE UR QL STRIP: NEGATIVE
NRBC BLD AUTO-RTO: 0 /100 WBC (ref 0–0.2)
PH UR STRIP.AUTO: 5.5 [PH] (ref 5–8)
PHOSPHATE SERPL-MCNC: 3.8 MG/DL (ref 2.5–4.5)
PLATELET # BLD AUTO: 162 10*3/MM3 (ref 140–450)
PMV BLD AUTO: 9.3 FL (ref 6–12)
POTASSIUM SERPL-SCNC: 4.6 MMOL/L (ref 3.5–5.2)
PROCALCITONIN SERPL-MCNC: 0.08 NG/ML (ref 0–0.25)
PROT UR QL STRIP: ABNORMAL
RBC # BLD AUTO: 3.72 10*6/MM3 (ref 4.14–5.8)
RBC # UR STRIP: NORMAL /HPF
REF LAB TEST METHOD: NORMAL
RSV RNA NPH QL NAA+NON-PROBE: NOT DETECTED
SARS-COV-2 RNA RESP QL NAA+PROBE: NOT DETECTED
SARS-COV-2 RNA RESP QL NAA+PROBE: NOT DETECTED
SODIUM SERPL-SCNC: 133 MMOL/L (ref 136–145)
SP GR UR STRIP: 1.01 (ref 1–1.03)
SQUAMOUS #/AREA URNS HPF: NORMAL /HPF
UROBILINOGEN UR QL STRIP: ABNORMAL
WBC # UR STRIP: NORMAL /HPF
WBC NRBC COR # BLD AUTO: 16.18 10*3/MM3 (ref 3.4–10.8)

## 2024-10-14 PROCEDURE — 97110 THERAPEUTIC EXERCISES: CPT

## 2024-10-14 PROCEDURE — 81001 URINALYSIS AUTO W/SCOPE: CPT | Performed by: PHYSICIAN ASSISTANT

## 2024-10-14 PROCEDURE — 85025 COMPLETE CBC W/AUTO DIFF WBC: CPT | Performed by: PHYSICIAN ASSISTANT

## 2024-10-14 PROCEDURE — 84145 PROCALCITONIN (PCT): CPT | Performed by: PHYSICIAN ASSISTANT

## 2024-10-14 PROCEDURE — 87637 SARSCOV2&INF A&B&RSV AMP PRB: CPT | Performed by: PHYSICIAN ASSISTANT

## 2024-10-14 PROCEDURE — 71045 X-RAY EXAM CHEST 1 VIEW: CPT

## 2024-10-14 PROCEDURE — 83605 ASSAY OF LACTIC ACID: CPT | Performed by: PHYSICIAN ASSISTANT

## 2024-10-14 PROCEDURE — 80069 RENAL FUNCTION PANEL: CPT | Performed by: PHYSICIAN ASSISTANT

## 2024-10-14 PROCEDURE — 99308 SBSQ NF CARE LOW MDM 20: CPT | Performed by: PHYSICIAN ASSISTANT

## 2024-10-14 PROCEDURE — 97530 THERAPEUTIC ACTIVITIES: CPT

## 2024-10-14 PROCEDURE — 97116 GAIT TRAINING THERAPY: CPT

## 2024-10-14 PROCEDURE — 87635 SARS-COV-2 COVID-19 AMP PRB: CPT | Performed by: PHYSICIAN ASSISTANT

## 2024-10-14 PROCEDURE — 97112 NEUROMUSCULAR REEDUCATION: CPT

## 2024-10-14 RX ORDER — FUROSEMIDE 40 MG
40 TABLET ORAL
Status: ACTIVE | OUTPATIENT
Start: 2024-10-14

## 2024-10-14 RX ADMIN — PANTOPRAZOLE SODIUM 40 MG: 40 TABLET, DELAYED RELEASE ORAL at 18:30

## 2024-10-14 RX ADMIN — DILTIAZEM HYDROCHLORIDE 120 MG: 30 TABLET, FILM COATED ORAL at 09:55

## 2024-10-14 RX ADMIN — ACETAMINOPHEN 650 MG: 325 TABLET ORAL at 21:53

## 2024-10-14 RX ADMIN — METOPROLOL TARTRATE 100 MG: 50 TABLET, FILM COATED ORAL at 09:55

## 2024-10-14 RX ADMIN — SENNOSIDES AND DOCUSATE SODIUM 2 TABLET: 50; 8.6 TABLET ORAL at 09:55

## 2024-10-14 RX ADMIN — ASPIRIN 325 MG: 325 TABLET ORAL at 09:55

## 2024-10-14 RX ADMIN — PANTOPRAZOLE SODIUM 40 MG: 40 TABLET, DELAYED RELEASE ORAL at 07:35

## 2024-10-14 RX ADMIN — DILTIAZEM HYDROCHLORIDE 120 MG: 30 TABLET, FILM COATED ORAL at 21:53

## 2024-10-14 RX ADMIN — METOPROLOL TARTRATE 100 MG: 50 TABLET, FILM COATED ORAL at 21:53

## 2024-10-14 RX ADMIN — SENNOSIDES AND DOCUSATE SODIUM 2 TABLET: 50; 8.6 TABLET ORAL at 21:53

## 2024-10-14 RX ADMIN — CLOBETASOL PROPIONATE CREAM USP, 0.05% 1 APPLICATION: 0.5 CREAM TOPICAL at 10:37

## 2024-10-14 RX ADMIN — Medication 5 MG: at 21:53

## 2024-10-14 RX ADMIN — FUROSEMIDE 40 MG: 40 TABLET ORAL at 09:55

## 2024-10-14 RX ADMIN — FERROUS SULFATE TAB 325 MG (65 MG ELEMENTAL FE) 325 MG: 325 (65 FE) TAB at 07:35

## 2024-10-14 NOTE — THERAPY TREATMENT NOTE
SNF - Physical Therapy Treatment Note   Yudy     Patient Name: Woodrow Alejandro  : 1950  MRN: [id number]  Today's Date: 10/14/2024      Visit Dx:    ICD-10-CM ICD-9-CM   1. Decreased activities of daily living (ADL)  Z78.9 V49.89   2. Difficulty walking  R26.2 719.7     Patient Active Problem List   Diagnosis    Essential hypertension    Permanent atrial fibrillation    S/P AVR    ICD (implantable cardioverter-defibrillator), dual, in situ    Dermatitis    IFG (impaired fasting glucose)    Mixed hyperlipidemia    Vitamin D deficiency    Medicare annual wellness visit, subsequent    Primary osteoarthritis of both knees    Screening PSA (prostate specific antigen)    Bilateral primary osteoarthritis of knee    Coarse tremors    Weakness generalized    Increased urinary frequency    Bandemia    Achilles tendon rupture    Anemia due to GI blood loss    Physical debility     Past Medical History:   Diagnosis Date    Aortic valve replaced     Arthritis 2018    Atrial fibrillation     Coronary artery disease     Hypertension      Past Surgical History:   Procedure Laterality Date    CARDIAC SURGERY      COLONOSCOPY      COLONOSCOPY N/A 2024    Procedure: COLONOSCOPY WITH HOT/COLD SNARE POLYPECTOMIES, ELEVIEW INJECTION,CLIPX1;  Surgeon: Kurt Mensah MD;  Location: [facility] ENDOSCOPY;  Service: Gastroenterology;  Laterality: N/A;  COLON POLYPS    ENDOSCOPY N/A 2024    Procedure: ESOPHAGOGASTRODUODENOSCOPY WITH BIOPSIES;  Surgeon: Kurt Mensah MD;  Location: [facility] ENDOSCOPY;  Service: Gastroenterology;  Laterality: N/A;  RETAINED FOOD IN STOMACH AND REFLUX ESOPHAGITIS    PACEMAKER IMPLANTATION         PT Assessment (Last 12 Hours)       PT Evaluation and Treatment       Row Name 10/14/24 1153          Physical Therapy Time and Intention    Document Type therapy note (daily note)  -WM     Mode of Treatment individual therapy;physical therapy  -WM     Patient Effort good  -WM      Symptoms Noted During/After Treatment fatigue  -       Row Name 10/14/24 1153          Sit-Stand Transfer    Sit-Stand [place] (Transfers) contact guard;minimum assist (75% patient effort);verbal cues;1 person assist  -     Assistive Device (Sit-Stand Transfers) walker, front-wheeled  -WM       Row Name 10/14/24 1153          Stand-Sit Transfer    Stand-Sit Mackay (Transfers) contact guard;verbal cues  -     Assistive Device (Stand-Sit Transfers) walker, front-wheeled  -       Row Name 10/14/24 1153          Gait/Stairs (Locomotion)    Mackay Level (Gait) contact guard  -     Assistive Device (Gait) walker, front-wheeled  -     Distance in Feet (Gait) 55  x 2  -WM     Pattern (Gait) 4-point;step-through  -     Deviations/Abnormal Patterns (Gait) gait speed decreased  -       Row Name 10/14/24 1153          Safety Issues/Impairments Affecting Functional Mobility    Impairments Affecting Function (Mobility) balance;endurance/activity tolerance;range of motion (ROM);strength  Cam boot RLE  -       Row Name 10/14/24 1153          Hip (Therapeutic Exercise)    Hip Isometrics (Therapeutic Exercise) bilateral;gluteal sets;supine;10 repetitions;5 repetitions;3 second hold;2 sets  -     Hip Strengthening (Therapeutic Exercise) bilateral;heel slides;supine;15 repititions;2 sets  Manual resistance  -       Row Name 10/14/24 1153          Knee (Therapeutic Exercise)    Knee Isometrics (Therapeutic Exercise) bilateral;quad sets;supine;10 repetitions;5 repetitions;3 second hold;2 sets  -     Knee Strengthening (Therapeutic Exercise) bilateral;marching while seated;LAQ (long arc quad);hamstring curls;sitting;3 lb free weight;resistance band;green;15 repititions;2 sets  3 lb wt on LLE  -       Row Name 10/14/24 1153          Ankle (Therapeutic Exercise)    Ankle AROM (Therapeutic Exercise) right;dorsiflexion;plantarflexion;supine;15 repititions;2 sets  -       Row Name 10/14/24 1153           Aerobic Exercise    Comment, Aerobic Exercise (Therapeutic Exercise) NuStep x 15 minutes, load 3  -WM       Row Name 10/14/24 1153          Positioning and Restraints    Pre-Treatment Position sitting in chair/recliner  -WM     Post Treatment Position chair  -WM     In Chair sitting;call light within reach;encouraged to call for assist;exit alarm on;with family/caregiver  -WM       Row Name 10/14/24 1153          Progress Summary (PT)    Progress Toward Functional Goals (PT) progress toward functional goals is good  -WM               User Key  (r) = Recorded By, (t) = Taken By, (c) = Cosigned By      Initials Name Provider Type    WM Dwaine Guzman PTA Physical Therapist Assistant                  Section G              Section GG                       Physical Therapy Education       Title: PT OT SLP Therapies (Done)       Topic: Physical Therapy (Resolved)       Point: Mobility training (Resolved)       Learner Progress:  Not documented in this visit.              Point: Home exercise program (Resolved)       Learner Progress:  Not documented in this visit.              Point: Body mechanics (Resolved)       Learner Progress:  Not documented in this visit.              Point: Precautions (Resolved)       Learner Progress:  Not documented in this visit.                                  PT Recommendation and Plan     Progress Summary (PT)  Progress Toward Functional Goals (PT): progress toward functional goals is good   Outcome Measures       Row Name 10/14/24 1159 10/12/24 1030          How much help from another person do you currently need...    Turning from your back to your side while in flat bed without using bedrails? 3  -WM 3  -WM     Moving from lying on back to sitting on the side of a flat bed without bedrails? 3  -WM 3  -WM     Moving to and from a bed to a chair (including a wheelchair)? 3  -WM 3  -WM     Standing up from a chair using your arms (e.g., wheelchair, bedside chair)? 2  -WM 3  -WM      Climbing 3-5 steps with a railing? 2  -WM 2  -WM     To walk in hospital room? 3  -WM 3  -WM     AM-PAC 6 Clicks Score (PT) 16  -WM 17  -WM     Highest Level of Mobility Goal 5 --> Static standing  -WM 5 --> Static standing  -WM               User Key  (r) = Recorded By, (t) = Taken By, (c) = Cosigned By      Initials Name Provider Type     Dwaine Guzman PTA Physical Therapist Assistant                     Time Calculation:    PT Charges       Row Name 10/14/24 1151             Time Calculation    PT Received On 10/14/24  -WM         Timed Charges    16209 - PT Therapeutic Exercise Minutes 29  -WM      56077 - Gait Training Minutes  6  -WM      12495 - PT Therapeutic Activity Minutes 5  -WM         SNF Physical Therapy Minutes    Skilled Minutes- PT 40 min  -WM         Total Minutes    Timed Charges Total Minutes 40  -WM       Total Minutes 40  -WM                User Key  (r) = Recorded By, (t) = Taken By, (c) = Cosigned By      Initials Name Provider Type     Dwaine Guzman PTA Physical Therapist Assistant                      PT G-Codes  Outcome Measure Options: AM-PAC 6 Clicks Daily Activity (OT), Optimal Instrument  AM-PAC 6 Clicks Score (PT): 16  AM-PAC 6 Clicks Score (OT): 19    Dwaine Guzman PTA  10/14/2024

## 2024-10-14 NOTE — THERAPY TREATMENT NOTE
SNF - Occupational Therapy Treatment Note   [place]    Patient Name: Woodrow Alejandro  : 1950    MRN: [id number]                              Today's Date: 10/14/2024       Admit Date: 10/3/2024    Visit Dx:     ICD-10-CM ICD-9-CM   1. Decreased activities of daily living (ADL)  Z78.9 V49.89   2. Difficulty walking  R26.2 719.7     Patient Active Problem List   Diagnosis    Essential hypertension    Permanent atrial fibrillation    S/P AVR    ICD (implantable cardioverter-defibrillator), dual, in situ    Dermatitis    IFG (impaired fasting glucose)    Mixed hyperlipidemia    Vitamin D deficiency    Medicare annual wellness visit, subsequent    Primary osteoarthritis of both knees    Screening PSA (prostate specific antigen)    Bilateral primary osteoarthritis of knee    Coarse tremors    Weakness generalized    Increased urinary frequency    Bandemia    Achilles tendon rupture    Anemia due to GI blood loss    Physical debility     Past Medical History:   Diagnosis Date    Aortic valve replaced     Arthritis 2018    Atrial fibrillation     Coronary artery disease     Hypertension      Past Surgical History:   Procedure Laterality Date    CARDIAC SURGERY      COLONOSCOPY      COLONOSCOPY N/A 2024    Procedure: COLONOSCOPY WITH HOT/COLD SNARE POLYPECTOMIES, ELEVIEW INJECTION,CLIPX1;  Surgeon: Kurt Mensah MD;  Location: [facility] ENDOSCOPY;  Service: Gastroenterology;  Laterality: N/A;  COLON POLYPS    ENDOSCOPY N/A 2024    Procedure: ESOPHAGOGASTRODUODENOSCOPY WITH BIOPSIES;  Surgeon: Kurt Mensah MD;  Location: [facility] ENDOSCOPY;  Service: Gastroenterology;  Laterality: N/A;  RETAINED FOOD IN STOMACH AND REFLUX ESOPHAGITIS    PACEMAKER IMPLANTATION        General Information       Row Name 10/14/24 1002          OT Time and Intention    Document Type therapy note (daily note)  -EG     Mode of Treatment individual therapy;occupational therapy  -EG     Patient Effort good  -EG        [facility] Name 10/14/24 1002          General Information    Existing Precautions/Restrictions fall  CAM boot donned RLE  -EG       [facility] Name 10/14/24 1002          Cognition    Orientation Status (Cognition) oriented x 4  -EG       [facility] Name 10/14/24 1002          Safety Issues/Impairments Affecting Functional Mobility    Impairments Affecting Function (Mobility) balance;endurance/activity tolerance;strength  -EG               User Key  (r) = Recorded By, (t) = Taken By, (c) = Cosigned By      Initials Name Provider Type    EG Janessa Good, JOSELIN Occupational Therapist                     Mobility/ADL's       Row Name 10/14/24 1004          Bed Mobility    Comment, (Bed Mobility) Up in chair upon OT arrival  -EG       Row Name 10/14/24 1004          Transfers    Transfers sit-stand transfer;stand-sit transfer  -EG     Comment, (Transfers) Multiple chairs with arms in therapy gym; in and out of recliner  -EG       Row Name 10/14/24 1004          Sit-Stand Transfer    Sit-Stand Angelina (Transfers) standby assist;contact guard;verbal cues  -EG     Assistive Device (Sit-Stand Transfers) walker, front-wheeled  -EG       Row Name 10/14/24 1004          Stand-Sit Transfer    Stand-Sit Angelina (Transfers) contact guard;standby assist  -EG     Assistive Device (Stand-Sit Transfers) walker, front-wheeled  -EG       Row Name 10/14/24 1004          Functional Mobility    Functional Mobility- Ind. Level contact guard assist;verbal cues required;nonverbal cues required (demo/gesture);standby assist  -EG     Functional Mobility- Device walker, front-wheeled  -EG     Functional Mobility- Comment Patient able to perform functional mobility to and from therapy gym; Participated in functional mobility obstacle course for small space manuevering using RW, educated and trained on use of reacher for picking up items from ground safely during mobility  -EG       [facility] Name 10/14/24 1004          Activities of Daily Living    BADL  Assessment/Intervention lower body dressing  -EG       Row Name 10/14/24 1004          Mobility    Extremity Weight-bearing Status right lower extremity  -EG     Right Lower Extremity (Weight-bearing Status) weight-bearing as tolerated (WBAT)  -EG       Row Name 10/14/24 1004          Lower Body Dressing Assessment/Training    [place] Level (Lower Body Dressing) lower body dressing skills;dependent (less than 25% patient effort);maximum assist (25% patient effort)  -EG     Comment, (Lower Body Dressing) Continued training on CAM boot donning with patient limited initiation; states daughter will assist with donning  -EG               User Key  (r) = Recorded By, (t) = Taken By, (c) = Cosigned By      Initials Name Provider Type    EG Janessa Good OT Occupational Therapist                   Obj/Interventions       Row Name 10/14/24 1007          Sensory Assessment (Somatosensory)    Sensory Assessment (Somatosensory) UE sensation intact  -EG       Row Name 10/14/24 1007          Motor Skills    Therapeutic Exercise aerobic;other (see comments)  Tricep extension performed 3x15 25#'s for sit to stand strengthening  -EG       Row Name 10/14/24 1007          Balance    Balance Interventions standing;sit to stand;supported;static;dynamic;weight shifting activity;occupation based/functional task;foam;UE activity with balance activity  -EG     Comment, Balance 2x5 modified step trianing on foam balance pad using RW for BUE support no LOB but close CGA provided with patients anxiety; Patient able to stand unsupported static with table top task 2-3 minutes; weight shifting causes need for UE support at this time with fear of falling  -EG       Row Name 10/14/24 1007          Aerobic Exercise    Type (Aerobic Exercise) arm bike  -EG     Comment, Aerobic Exercise (Therapeutic Exercise) 17:00 ominicycle performed cardiac interval setting no rest break  -EG               User Key  (r) = Recorded By, (t) = Taken By, (c) =  Cosigned By      Initials Name Provider Type    Janessa Alvarez OT Occupational Therapist                   Goals/Plan    No documentation.                  Clinical Impression       Row Name 10/14/24 1010          Pain Assessment    Pretreatment Pain Rating 0/10 - no pain  -EG     Posttreatment Pain Rating 6/10  -EG     Pain Location calf;foot  -EG     Pain Side/Orientation right  -EG       Row Name 10/14/24 1010          Plan of Care Review    Plan of Care Reviewed With patient  -EG     Progress improving  -EG     Outcome Evaluation Patient is pleasant and cooperative; A&Ox4; Continues to display need for assist with LB ADL's; increased fear with step negotiation and training but patient lives in split level home; OT services addressing balance, strength and endurance training this date; Continue to treat and address via current POC; Ax1 w/RW and CAM boot.  -EG       Row Name 10/14/24 1010          Therapy Plan Review/Discharge Plan (OT)    Anticipated Discharge Disposition (OT) home with home health  -EG       Row Name 10/14/24 1010          Positioning and Restraints    Pre-Treatment Position sitting in chair/recliner  -EG     Post Treatment Position chair  -EG     In Chair reclined;sitting;call light within reach;encouraged to call for assist;exit alarm on  -EG               User Key  (r) = Recorded By, (t) = Taken By, (c) = Cosigned By      Initials Name Provider Type    Janessa Alvarez OT Occupational Therapist                   Outcome Measures       Row Name 10/14/24 1011          How much help from another is currently needed...    Putting on and taking off regular lower body clothing? 2  -EG     Bathing (including washing, rinsing, and drying) 3  -EG     Toileting (which includes using toilet bed pan or urinal) 3  -EG     Putting on and taking off regular upper body clothing 3  -EG     Taking care of personal grooming (such as brushing teeth) 4  -EG     Eating meals 4  -EG     AM-PAC 6 Clicks  Score (OT) 19  -EG       Row Name 10/14/24 1011          Functional Assessment    Outcome Measure Options AM-PAC 6 Clicks Daily Activity (OT);Optimal Instrument  -EG       Row Name 10/14/24 1011          Optimal Instrument    Optimal Instrument Optimal - 3  -EG     Bending/Stooping 3  -EG     Standing 2  -EG     Reaching 1  -EG               User Key  (r) = Recorded By, (t) = Taken By, (c) = Cosigned By      Initials Name Provider Type    EG Janessa Good OT Occupational Therapist                  Section G  Mobility  Bed mobility - self performance: limited assistance (staff provide guided maneuvering of limbs or other non-weight bearing assistance)  Bed mobility support/assistance: One person assist  Transfer - self performance: limited assistance (staff provide guided maneuvering of limbs or other non-weight bearing assistance)  Transfer support/assistance: One person assist  Walking in room - self performance: limited assistance (staff provide guided maneuvering of limbs or other non-weight bearing assistance)  Walking in room support/assistance: One person assist  Walking in corridors/hallway - self performance: limited assistance (staff provide guided maneuvering of limbs or other non-weight bearing assistance)  Walking in corridors/hallway support/assistance: One person assist  Locomotion on unit - self performance: activity did not occur  Locomotion on unit support/assistance: Activity did not occur  Locomotion off unit - self performance: activity did not occur  Locomotion off unit support/assistance: Activity did not occur  Dressing - self performance: limited assistance (staff provide guided maneuvering of limbs or other non-weight bearing assistance)  Dressing support/assistance: One person assist  Eating - self performance: independent  Eating support/assistance: Setup help only  Toileting - self performance: limited assistance (staff provide guided maneuvering of limbs or other non-weight bearing  assistance)  Toileting support/assistance: One person assist  Personal hygiene - self performance: limited assistance (staff provide guided maneuvering of limbs or other non-weight bearing assistance)  Personal hygiene support/assistance: One person assist  Bathing  Bathing - self performance: Physical help with bathing (exclude washing back and hair for patient)  Bathing support/assistance: One person assist  Balance  Balance during transitions & walking: Not steady, requires assist to steady  Moving from seated to standing position: Not steady, requires assist to steady  Walking: Not steady, requires assist to steady  Turning around while walking: Not steady, requires assist to steady  Moving on and off toilet: Not steady, requires assist to steady  Surface-to-surface transfer: Not steady, requires assist to steady  Mobility devices: None were used  Range of Motion  Upper Extremity: No impairment  Lower Extremity: Impairment on one side  Section GG  Functional Ability/Goals, Adm (Section GG)  Self Care, Prior Functioning (RV5366J): 3. Independent  Functional Cognition, Prior Functioning (VI2052Z): 3. Independent  Prior Device Use (SB9116): none of the above (Z)  Upper Extremity Range of Motion (NC2279W): No impairment  Lower Extremity Range of Motion (CZ0122I): Impairment on one side  Self Care, Admission (Section GG)  Eating: Self-Care Admission Performance (BD4391Q9): setup or clean-up assistance (05)  Oral Hygiene: Self-Care Admission Performance (LI6658K4): setup or clean-up assistance (05)  Toileting Hygiene: Self-Care Admission Performance (JR0114P4): partial/moderate assistance (03)  Shower/Bathe Self: Self-Care Admission Performance (JW6475V3): partial/moderate assistance (03)  Upper Body Dressing: Self-Care Admission Performance (LG5199N8): setup or clean-up assistance (05)  Lower Body Dressing: Self-Care Admission Performance (IC9924H9): partial/moderate assistance (03)  Putting On/Taking Off Footwear:  Self-Care Admission Performance (FG5513H6): partial/moderate assistance (03)  Personal Hygiene: Self-Care Admission Performance (FV5439U4): supervision or touching assistance (04)  Mobility, Admission Performance (PM5816)  Chair/Bed-Chair Transfer: Mobility Admission Performance (AK0512G1): partial/moderate assistance (03)  Toilet Transfer: Mobility Admission Performance (XU2454X6): partial/moderate assistance (03)  Tub/shower Transfer: Mobility Admission Performance (OS3049WB9): partial/moderate assistance (03)                Occupational Therapy Education       Title: PT OT SLP Therapies (Done)       Topic: Occupational Therapy (Done)       Point: ADL training (Done)       Description:   Instruct learner(s) on proper safety adaptation and remediation techniques during self care or transfers.   Instruct in proper use of assistive devices.                  Learning Progress Summary            Patient JING Moulton VU by EG at 10/4/2024 1056    Comment: Education on compensatory techniques for ADL's  Education on AE  Education on fall risk prevention and general safety  Education on OT services                      Point: Home exercise program (Done)       Description:   Instruct learner(s) on appropriate technique for monitoring, assisting and/or progressing therapeutic exercises/activities.                  Learning Progress Summary            Patient JING Moulton VU by EG at 10/4/2024 1056    Comment: Education on compensatory techniques for ADL's  Education on AE  Education on fall risk prevention and general safety  Education on OT services                      Point: Precautions (Done)       Description:   Instruct learner(s) on prescribed precautions during self-care and functional transfers.                  Learning Progress Summary            Patient JING Moulton VU by EG at 10/4/2024 1056    Comment: Education on compensatory techniques for ADL's  Education on AE  Education on fall risk prevention and general  safety  Education on OT services                      Point: Body mechanics (Done)       Description:   Instruct learner(s) on proper positioning and spine alignment during self-care, functional mobility activities and/or exercises.                  Learning Progress Summary            Patient JING Moulton VU by EG at 10/4/2024 1056    Comment: Education on compensatory techniques for ADL's  Education on AE  Education on fall risk prevention and general safety  Education on OT services                                      User Key       Initials Effective Dates Name Provider Type Discipline    EG 09/14/22 -  Janessa Good, OT Occupational Therapist OT                  OT Recommendation and Plan  Planned Therapy Interventions (OT): activity tolerance training, functional balance retraining, occupation/activity based interventions, adaptive equipment training, BADL retraining, neuromuscular control/coordination retraining, patient/caregiver education/training, transfer/mobility retraining, strengthening exercise  Therapy Frequency (OT): 5 times/wk  Plan of Care Review  Plan of Care Reviewed With: patient  Progress: improving  Outcome Evaluation: Patient is pleasant and cooperative; A&Ox4; Continues to display need for assist with LB ADL's; increased fear with step negotiation and training but patient lives in split level home; OT services addressing balance, strength and endurance training this date; Continue to treat and address via current POC; Ax1 w/RW and CAM boot.     Time Calculation:   Evaluation Complexity (OT)  Review Occupational Profile/Medical/Therapy History Complexity: expanded/moderate complexity  Assessment, Occupational Performance/Identification of Deficit Complexity: 3-5 performance deficits  Clinical Decision Making Complexity (OT): detailed assessment/moderate complexity  Overall Complexity of Evaluation (OT): moderate complexity     Time Calculation- OT       Row Name 10/14/24 1012              Time Calculation- OT    OT Received On 10/14/24  -EG      OT Goal Re-Cert Due Date 11/03/24  -EG         Timed Charges    98266 - OT Therapeutic Exercise Minutes 25  -EG      01784 -  OT Neuromuscular Reeducation Minutes 12  -EG      82052 - OT Therapeutic Activity Minutes 13  -EG      09381 - OT Self Care/Mgmt Minutes 8  -EG         SNF Occupational Therapy Minutes    Skilled Minutes- OT 58 min  -EG         Total Minutes    Timed Charges Total Minutes 58  -EG       Total Minutes 58  -EG                User Key  (r) = Recorded By, (t) = Taken By, (c) = Cosigned By      Initials Name Provider Type    EG Janessa Good OT Occupational Therapist                  Therapy Charges for Today       Code Description Service Date Service Provider Modifiers Qty    77069326836 HC OT NEUROMUSC RE EDUCATION EA 15 MIN 10/14/2024 Janessa Good OT GO 1    20345472326 HC OT THER PROC EA 15 MIN 10/14/2024 Janessa Good OT GO 2    26507983321 HC OT THERAPEUTIC ACT EA 15 MIN 10/14/2024 Janessa Good OT GO 1                 Janessa Good OT  10/14/2024

## 2024-10-14 NOTE — PROGRESS NOTES
[facility]   Hospitalist Progress Note       Patient Name: Woodrow Alejandro  : 1950  MRN: [id number]  Primary Care Physician: Juan Perez MD  Date of admission: 10/3/2024  Today's Date: 10/14/2024  Room / Bed:   305/1  Subjective   Chief Complaint: Weakness after recent hospitalization     HPI:  Woodrow Alejandro is a 73 y.o. male recently hospitalized for anemia and also right Achilles tendon tear.  During hospitalization, gastroenterology, Dr. Mensah, performed EGD and colonoscopy.  EGD showed esophagitis with bleeding lower third of esophagus.  PPI recommended.  2 polyps (tubular adenomas) were removed on colonoscopy.  Orthopedist, Dr. Zuniga, was consulted and recommended gentle ROM, weightbearing as tolerated, PT, and walking boot for his Achilles tendon tear.  His home aspirin was subsequently resumed on 10/3/24.  He has been a good candidate for more rehab and has now been admitted to our SNF for more therapy.  He will be on PPI BID x 2 weeks, then transition to daily.       Interval Followup: Patient seen and examined doing well with therapy asking for repeat labs would like labs drawn every other day.  Complaining of increased lower extremity edema  REVIEW OF SYSTEMS:   Weakness fatigue  Objective   Temp:  [98.4 °F (36.9 °C)-99.1 °F (37.3 °C)] 98.4 °F (36.9 °C)  Heart Rate:  [] 88  Resp:  [18-20] 18  BP: (131-137)/(78-88) 131/78  PHYSICAL EXAM   Constitutional: Awake alert oriented no acute distress  Respiratory: Clear  Cardiovascular: RRR  GI: Abdomen soft nontender  Results from last 7 days   Lab Units 10/11/24  1623 10/09/24  0451   WBC 10*3/mm3 13.02* 13.15*   HEMOGLOBIN g/dL 10.5* 9.8*   HEMATOCRIT % 34.5* 30.2*   PLATELETS 10*3/mm3 149 169     Results from last 7 days   Lab Units 10/11/24  1623 10/09/24  0451   SODIUM mmol/L 132* 133*   POTASSIUM mmol/L 4.2 3.9   CO2 mmol/L 22.8 23.2   CHLORIDE mmol/L 96* 97*   ANION GAP mmol/L 13.2 12.8   BUN mg/dL 16 14    CREATININE mg/dL 0.84 0.79   GLUCOSE mg/dL 136* 136*         COMPLEXITY OF DATA / DECISION MAKING     []  Moderate: One acute illness or mild exacerbation of chronic or 2 stable chronic or tx side effects   []  High:  Severe acute illness or severe exacerbation of chronic - potential for major debility / life threatening         I have personally reviewed the results from the time of this admission to 10/14/2024 11:48 EDT:  []  Laboratory:  []  Microbiology: []  Radiology:  []  Telemetry:   []  Cardiology/Vascular:  []  Pathology:  []  Prior external records:  []  Independent historian provided additional details:      []  Discussed case with specialists:    []  Independent interpretation of ECG/Imaging etc:             []  Moderate: Rx management, low risk surgery, suboptimal social situation   []  High:  Rx with close monitoring for toxicity, mod-high risk surgery, DNR decision, Comfort initiated, IV pain meds    Assessment / Plan   Assessment:     Weakness after recent hospitalization  Recent anemia  Recent EGD 9/30/24 by Dr. Mensah showing esophagitis (PPI recommended)  Recent colonoscopy 9/30/24 by Dr. Mensah with polypectomy  Recent right achilles tendon tear  Nonischemic cardiomyopathy, EF improved  A-fib (on aspirin only)  Bioprosthetic aortic valve replacement (Dr. Newman)  HTN  GERD  Anemia  CAD/AICD  Noted on CT: Possible ankylosing spondylitis   Urinary frequency      Plan:     Continue daily physical therapy  Recheck CBC chemistries per patient request  Increase Lasix to twice daily  Lisinopril on hold  Have resumed other home medications  Home aspirin has been resumed  Continue iron supplement  Continue PPI  Where boot when out of bed.  WBAT.  Gentle range of motion right ankle.  Additional recommendations pending clinical course    VTE Prophylaxis:  No VTE prophylaxis order currently exists.    CODE STATUS:      Level Of Support Discussed With: Patient  Code Status (Patient has no pulse and is not  breathing): CPR (Attempt to Resuscitate)  Medical Interventions (Patient has pulse or is breathing): Full Support       Electronically signed by CHANEL Melo, 10/14/24, 11:51 AM EDT.

## 2024-10-14 NOTE — PLAN OF CARE
Goal Outcome Evaluation:  Plan of Care Reviewed With: patient           Outcome Evaluation: Alert and oriented X 4. Vital signs stable. Transfers in room with one person assistance. Requires assistance with urinal. No significant change. Continue plan of care,

## 2024-10-15 LAB
ALBUMIN SERPL-MCNC: 2.9 G/DL (ref 3.5–5.2)
ALP SERPL-CCNC: 73 U/L (ref 39–117)
ALT SERPL W P-5'-P-CCNC: 12 U/L (ref 1–41)
ANION GAP SERPL CALCULATED.3IONS-SCNC: 11.1 MMOL/L (ref 5–15)
AST SERPL-CCNC: 19 U/L (ref 1–40)
BASOPHILS # BLD AUTO: 0.03 10*3/MM3 (ref 0–0.2)
BASOPHILS NFR BLD AUTO: 0.2 % (ref 0–1.5)
BILIRUB CONJ SERPL-MCNC: 0.4 MG/DL (ref 0–0.3)
BILIRUB INDIRECT SERPL-MCNC: 0.5 MG/DL
BILIRUB SERPL-MCNC: 0.9 MG/DL (ref 0–1.2)
BUN SERPL-MCNC: 15 MG/DL (ref 8–23)
BUN/CREAT SERPL: 18.1 (ref 7–25)
CALCIUM SPEC-SCNC: 8.4 MG/DL (ref 8.6–10.5)
CHLORIDE SERPL-SCNC: 98 MMOL/L (ref 98–107)
CO2 SERPL-SCNC: 25.9 MMOL/L (ref 22–29)
CREAT SERPL-MCNC: 0.83 MG/DL (ref 0.76–1.27)
CYTO UR: NORMAL
DEPRECATED RDW RBC AUTO: 53.9 FL (ref 37–54)
EGFRCR SERPLBLD CKD-EPI 2021: 92.4 ML/MIN/1.73
EOSINOPHIL # BLD AUTO: 0.02 10*3/MM3 (ref 0–0.4)
EOSINOPHIL NFR BLD AUTO: 0.1 % (ref 0.3–6.2)
ERYTHROCYTE [DISTWIDTH] IN BLOOD BY AUTOMATED COUNT: 16.1 % (ref 12.3–15.4)
GLUCOSE SERPL-MCNC: 149 MG/DL (ref 65–99)
HCT VFR BLD AUTO: 29.8 % (ref 37.5–51)
HGB BLD-MCNC: 9.6 G/DL (ref 13–17.7)
IMM GRANULOCYTES # BLD AUTO: 0.1 10*3/MM3 (ref 0–0.05)
IMM GRANULOCYTES NFR BLD AUTO: 0.6 % (ref 0–0.5)
LAB AP CASE REPORT: NORMAL
LAB AP CLINICAL INFORMATION: NORMAL
LYMPHOCYTES # BLD AUTO: 0.98 10*3/MM3 (ref 0.7–3.1)
LYMPHOCYTES # BLD MANUAL: 1.18 10*3/MM3 (ref 0.7–3.1)
LYMPHOCYTES NFR BLD AUTO: 5.8 % (ref 19.6–45.3)
LYMPHOCYTES NFR BLD MANUAL: 9 % (ref 5–12)
MAGNESIUM SERPL-MCNC: 1.6 MG/DL (ref 1.6–2.4)
MCH RBC QN AUTO: 29.5 PG (ref 26.6–33)
MCHC RBC AUTO-ENTMCNC: 32.2 G/DL (ref 31.5–35.7)
MCV RBC AUTO: 91.7 FL (ref 79–97)
MONOCYTES # BLD AUTO: 1.42 10*3/MM3 (ref 0.1–0.9)
MONOCYTES # BLD: 1.51 10*3/MM3 (ref 0.1–0.9)
MONOCYTES NFR BLD AUTO: 8.4 % (ref 5–12)
NEUTROPHILS # BLD AUTO: 14.12 10*3/MM3 (ref 1.7–7)
NEUTROPHILS NFR BLD AUTO: 14.26 10*3/MM3 (ref 1.7–7)
NEUTROPHILS NFR BLD AUTO: 84.9 % (ref 42.7–76)
NEUTROPHILS NFR BLD MANUAL: 84 % (ref 42.7–76)
NRBC BLD AUTO-RTO: 0 /100 WBC (ref 0–0.2)
PATH REPORT.FINAL DX SPEC: NORMAL
PATH REPORT.GROSS SPEC: NORMAL
PATHOLOGY REVIEW: YES
PHOSPHATE SERPL-MCNC: 4.2 MG/DL (ref 2.5–4.5)
PLATELET # BLD AUTO: 141 10*3/MM3 (ref 140–450)
PMV BLD AUTO: 9.6 FL (ref 6–12)
POTASSIUM SERPL-SCNC: 3.4 MMOL/L (ref 3.5–5.2)
PROT SERPL-MCNC: 6.3 G/DL (ref 6–8.5)
RBC # BLD AUTO: 3.25 10*6/MM3 (ref 4.14–5.8)
RBC MORPH BLD: NORMAL
SMALL PLATELETS BLD QL SMEAR: ABNORMAL
SODIUM SERPL-SCNC: 135 MMOL/L (ref 136–145)
TOXIC GRANULATION: ABNORMAL
VARIANT LYMPHS NFR BLD MANUAL: 7 % (ref 19.6–45.3)
WBC NRBC COR # BLD AUTO: 16.81 10*3/MM3 (ref 3.4–10.8)

## 2024-10-15 PROCEDURE — 87040 BLOOD CULTURE FOR BACTERIA: CPT | Performed by: PHYSICIAN ASSISTANT

## 2024-10-15 PROCEDURE — 83020 HEMOGLOBIN ELECTROPHORESIS: CPT | Performed by: INTERNAL MEDICINE

## 2024-10-15 PROCEDURE — 88312 SPECIAL STAINS GROUP 1: CPT | Performed by: PHYSICIAN ASSISTANT

## 2024-10-15 PROCEDURE — 86235 NUCLEAR ANTIGEN ANTIBODY: CPT | Performed by: INTERNAL MEDICINE

## 2024-10-15 PROCEDURE — 87154 CUL TYP ID BLD PTHGN 6+ TRGT: CPT | Performed by: PHYSICIAN ASSISTANT

## 2024-10-15 PROCEDURE — 80076 HEPATIC FUNCTION PANEL: CPT | Performed by: PHYSICIAN ASSISTANT

## 2024-10-15 PROCEDURE — 99308 SBSQ NF CARE LOW MDM 20: CPT | Performed by: PHYSICIAN ASSISTANT

## 2024-10-15 PROCEDURE — 97535 SELF CARE MNGMENT TRAINING: CPT

## 2024-10-15 PROCEDURE — 83735 ASSAY OF MAGNESIUM: CPT | Performed by: PHYSICIAN ASSISTANT

## 2024-10-15 PROCEDURE — 97530 THERAPEUTIC ACTIVITIES: CPT

## 2024-10-15 PROCEDURE — 84100 ASSAY OF PHOSPHORUS: CPT | Performed by: PHYSICIAN ASSISTANT

## 2024-10-15 PROCEDURE — 87186 SC STD MICRODIL/AGAR DIL: CPT | Performed by: PHYSICIAN ASSISTANT

## 2024-10-15 PROCEDURE — 85025 COMPLETE CBC W/AUTO DIFF WBC: CPT | Performed by: PHYSICIAN ASSISTANT

## 2024-10-15 PROCEDURE — 80048 BASIC METABOLIC PNL TOTAL CA: CPT | Performed by: PHYSICIAN ASSISTANT

## 2024-10-15 PROCEDURE — 87077 CULTURE AEROBIC IDENTIFY: CPT | Performed by: PHYSICIAN ASSISTANT

## 2024-10-15 PROCEDURE — 86431 RHEUMATOID FACTOR QUANT: CPT | Performed by: INTERNAL MEDICINE

## 2024-10-15 PROCEDURE — 97110 THERAPEUTIC EXERCISES: CPT

## 2024-10-15 PROCEDURE — 97116 GAIT TRAINING THERAPY: CPT

## 2024-10-15 RX ORDER — FUROSEMIDE 40 MG/1
40 TABLET ORAL DAILY
Status: DISCONTINUED | OUTPATIENT
Start: 2024-10-16 | End: 2024-10-23 | Stop reason: HOSPADM

## 2024-10-15 RX ORDER — POTASSIUM CHLORIDE 750 MG/1
20 CAPSULE, EXTENDED RELEASE ORAL ONCE
Status: COMPLETED | OUTPATIENT
Start: 2024-10-15 | End: 2024-10-15

## 2024-10-15 RX ADMIN — Medication 5 MG: at 21:20

## 2024-10-15 RX ADMIN — POTASSIUM CHLORIDE 20 MEQ: 750 CAPSULE, EXTENDED RELEASE ORAL at 08:07

## 2024-10-15 RX ADMIN — ASPIRIN 325 MG: 325 TABLET ORAL at 08:07

## 2024-10-15 RX ADMIN — PANTOPRAZOLE SODIUM 40 MG: 40 TABLET, DELAYED RELEASE ORAL at 17:49

## 2024-10-15 RX ADMIN — DILTIAZEM HYDROCHLORIDE 120 MG: 30 TABLET, FILM COATED ORAL at 21:20

## 2024-10-15 RX ADMIN — DILTIAZEM HYDROCHLORIDE 120 MG: 30 TABLET, FILM COATED ORAL at 10:21

## 2024-10-15 RX ADMIN — SENNOSIDES AND DOCUSATE SODIUM 2 TABLET: 50; 8.6 TABLET ORAL at 21:20

## 2024-10-15 RX ADMIN — METOPROLOL TARTRATE 100 MG: 50 TABLET, FILM COATED ORAL at 21:20

## 2024-10-15 RX ADMIN — METOPROLOL TARTRATE 100 MG: 50 TABLET, FILM COATED ORAL at 08:08

## 2024-10-15 RX ADMIN — PANTOPRAZOLE SODIUM 40 MG: 40 TABLET, DELAYED RELEASE ORAL at 08:07

## 2024-10-15 RX ADMIN — FERROUS SULFATE TAB 325 MG (65 MG ELEMENTAL FE) 325 MG: 325 (65 FE) TAB at 08:07

## 2024-10-15 NOTE — PROGRESS NOTES
[facility]   Hospitalist Progress Note       Patient Name: Woodrow Alejandro  : 1950  MRN: [id number]  Primary Care Physician: Juan Perez MD  Date of admission: 10/3/2024  Today's Date: 10/15/2024  Room / Bed:   [facility]/1  Subjective   Chief Complaint: Weakness after recent hospitalization     HPI:  Woodrow Alejandro is a 73 y.o. male recently hospitalized for anemia and also right Achilles tendon tear.  During hospitalization, gastroenterology, Dr. Mensah, performed EGD and colonoscopy.  EGD showed esophagitis with bleeding lower third of esophagus.  PPI recommended.  2 polyps (tubular adenomas) were removed on colonoscopy.  Orthopedist, Dr. Zuniga, was consulted and recommended gentle ROM, weightbearing as tolerated, PT, and walking boot for his Achilles tendon tear.  His home aspirin was subsequently resumed on 10/3/24.  He has been a good candidate for more rehab and has now been admitted to our SNF for more therapy.  He will be on PPI BID x 2 weeks, then transition to daily.       Interval Followup: Patient seen and examined resting comfortably no fevers chills cough shortness of air abdominal pain diarrhea or dysuria patient requesting every other day labs leukocyte elevated 16 this appears to be a chronic issue we will check peripheral smear checked chest x-ray which negative urinalysis was negative procalcitonin within normal limits patient would likely benefit from hematology consultation screen done outpatient.      REVIEW OF SYSTEMS:   Weakness fatigue  Objective   Temp:  [98.1 °F (36.7 °C)-100.4 °F (38 °C)] 98.1 °F (36.7 °C)  Heart Rate:  [] 100  Resp:  [18] 18  BP: (111-143)/(50-79) 115/67  PHYSICAL EXAM   Constitutional: Awake alert oriented no acute distress  Respiratory: Clear  Cardiovascular: RRR  GI: Abdomen soft nontender  Results from last 7 days   Lab Units 10/15/24  0445 10/14/24  1154 10/11/24  1623 10/09/24  0451   WBC 10*3/mm3 16.81* 16.18* 13.02* 13.15*    HEMOGLOBIN g/dL 9.6* 11.1* 10.5* 9.8*   HEMATOCRIT % 29.8* 35.3* 34.5* 30.2*   PLATELETS 10*3/mm3 141 162 149 169     Results from last 7 days   Lab Units 10/15/24  0445 10/14/24  1154 10/11/24  1623 10/09/24  0451   SODIUM mmol/L 135* 133* 132* 133*   POTASSIUM mmol/L 3.4* 4.6 4.2 3.9   CO2 mmol/L 25.9 18.7* 22.8 23.2   CHLORIDE mmol/L 98 96* 96* 97*   ANION GAP mmol/L 11.1 18.3* 13.2 12.8   BUN mg/dL 15 15 16 14   CREATININE mg/dL 0.83 1.02 0.84 0.79   GLUCOSE mg/dL 149* 129* 136* 136*         COMPLEXITY OF DATA / DECISION MAKING     []  Moderate: One acute illness or mild exacerbation of chronic or 2 stable chronic or tx side effects   []  High:  Severe acute illness or severe exacerbation of chronic - potential for major debility / life threatening         I have personally reviewed the results from the time of this admission to 10/15/2024 13:25 EDT:  []  Laboratory:  []  Microbiology: []  Radiology:  []  Telemetry:   []  Cardiology/Vascular:  []  Pathology:  []  Prior external records:  []  Independent historian provided additional details:      []  Discussed case with specialists:    []  Independent interpretation of ECG/Imaging etc:             []  Moderate: Rx management, low risk surgery, suboptimal social situation   []  High:  Rx with close monitoring for toxicity, mod-high risk surgery, DNR decision, Comfort initiated, IV pain meds    Assessment / Plan   Assessment:     Weakness after recent hospitalization  Recent anemia  Recent EGD 9/30/24 by Dr. Mensah showing esophagitis (PPI recommended)  Recent colonoscopy 9/30/24 by Dr. Mensah with polypectomy  Recent right achilles tendon tear  Nonischemic cardiomyopathy, EF improved  A-fib (on aspirin only)  Bioprosthetic aortic valve replacement (Dr. Newman)  HTN  GERD  Anemia  CAD/AICD  Noted on CT: Possible ankylosing spondylitis   Urinary frequency      Plan:     Chest x-ray was negative urinalysis negative procalcitonin within normal limits given ongoing  leukocytosis we will check peripheral smear this appears to be chronically elevated will refer the patient to hematology outpatient  Resume Lasix tomorrow  Lisinopril on hold  Have resumed other home medications  Home aspirin has been resumed  Continue iron supplement  Continue PPI  Where boot when out of bed.  WBAT.  Gentle range of motion right ankle.  Additional recommendations pending clinical course    VTE Prophylaxis:  No VTE prophylaxis order currently exists.    CODE STATUS:      Level Of Support Discussed With: Patient  Code Status (Patient has no pulse and is not breathing): CPR (Attempt to Resuscitate)  Medical Interventions (Patient has pulse or is breathing): Full Support       Electronically signed by CHANEL Melo, 10/15/24, 1:27 PM EDT.

## 2024-10-15 NOTE — CONSULTS
[facility]   Consult Note    Patient Name: Woodrow Alejandro  : 1950  MRN: [id number]  Primary Care Physician:  Juan Perez MD  Date of admission: 10/3/2024    Subjective   Subjective     Reason for Consult: Anemia with persistent leukocytosis and splenic lesion peripheral smear showing increase in mature granulocytes and eosinophilia    HPI: Patient has been admitted for that appears to be cellulitis of the foot but he has a persistent leukocytosis he has had positive stool for occult blood and has a GI workup with the esophagitis gastritis and iron deficiency patient also shows persistent leukocytosis with eosinophilia and immature granulocytes possibility of myeloproliferative disorder will have to be ruled out with the splenic lesion hemoglobinopathy and lymphoproliferative disorder.  Have to be considered as well    Woodrow Alejandro is a 73 y.o. male Patient with the leukocytosis and possible myeloproliferative disorder also has anemia and iron deficiency with stool occult blood positive so patient may have reactive leukocytosis does have evidence of pneumonia and pulmonary infiltrates    Review of Systems   All systems were reviewed and negative except for: Patient admitted today with leukocytosis history of diarrhea iron deficiency anemia    Personal History     Past Medical History:   Diagnosis Date    Aortic valve replaced     Arthritis 2018    Atrial fibrillation     Coronary artery disease     Hypertension        Past Surgical History:   Procedure Laterality Date    CARDIAC SURGERY      COLONOSCOPY      COLONOSCOPY N/A 2024    Procedure: COLONOSCOPY WITH HOT/COLD SNARE POLYPECTOMIES, ELEVIEW INJECTION,CLIPX1;  Surgeon: Kurt Mensah MD;  Location: [facility] ENDOSCOPY;  Service: Gastroenterology;  Laterality: N/A;  COLON POLYPS    ENDOSCOPY N/A 2024    Procedure: ESOPHAGOGASTRODUODENOSCOPY WITH BIOPSIES;  Surgeon: Kurt Mensah MD;  Location: [facility]  ENDOSCOPY;  Service: Gastroenterology;  Laterality: N/A;  RETAINED FOOD IN STOMACH AND REFLUX ESOPHAGITIS    PACEMAKER IMPLANTATION         Family History: family history includes Arthritis in his father; COPD in his father and mother; Heart disease in his mother. Otherwise pertinent FHx was reviewed and not pertinent to current issue.    Social History:  reports that he has never smoked. He has never been exposed to tobacco smoke. He has never used smokeless tobacco. He reports current alcohol use of about 4.0 standard drinks of alcohol per week. He reports that he does not use drugs.    Home Medications:  aspirin, dilTIAZem, furosemide, levoFLOXacin, lisinopril, and metoprolol tartrate      Allergies:  Allergies   Allergen Reactions    Diclofenac Sodium Dizziness       Objective   Objective     Vitals:   Temp:  [98.1 °F (36.7 °C)-100.4 °F (38 °C)] 98.1 °F (36.7 °C)  Heart Rate:  [100-105] 100  Resp:  [18] 18  BP: (111-129)/(50-79) 115/67    Physical Exam    Constitutional: Alert and oriented not in acute distress rather obese   Eyes: Pupils equal reacting to light and accommodation   HENT: Normal   Neck: Normal   Respiratory: No rales or rhonchi   Cardiovascular: S1-S2 normal no S3 or S4   Gastrointestinal: No palpable organomegaly   Musculoskeletal: No deformities   Neurologic: No localizing signs  Skin: No rash      Result Review    Result Review:  I have personally reviewed the results from the time of this admission to 10/15/2024 18:13 EDT and agree with these findings:  [x]  Laboratory  []  Microbiology  [x]  Radiology  []  EKG/Telemetry   []  Cardiology/Vascular     Pathology  [][x]  Old records  []  Other:  Most notable findings include: Have been reviewed    Assessment & Plan   Assessment / Plan     Brief Patient Summary:  Woodrow Alejandro is a 73 y.o. male who Patient with persistent leukocytosis possible lymphoproliferative or myeloproliferative disorder    Active Hospital Problems:  Active Hospital  Problems    Diagnosis     **Physical debility        Plan:   Comprehensive hematological workup has been ordered to rule out possibility of myeloproliferative or lymphoproliferative disorder        Electronically signed by Kiet Lindsay MD, 10/15/24, 6:13 PM EDT.    Part of this note may be an electronic transcription/translation of spoken language to printed text using the Dragon Dictation System.

## 2024-10-15 NOTE — PLAN OF CARE
Goal Outcome Evaluation:  Plan of Care Reviewed With: patient        Progress: no change  Outcome Evaluation: Patient is alert and oriented x4. Denies pain but stated right foot is sore. Wears CAM boot while OOB. This afternoon patient c/o chills. Temp increased to 99.3F. WBC checked and was 16.18. Provider notified. See labs and results view. Note: provider notified of critical high lactate level. Provider called and spoke with daughter at length this afternoon about tests. More labs to be drawn in the morning. Con't current POC.

## 2024-10-15 NOTE — THERAPY TREATMENT NOTE
SNF - Physical Therapy Treatment Note   Yudy     Patient Name: Woodrow Alejandro  : 1950  MRN: [id number]  Today's Date: 10/15/2024      Visit Dx:    ICD-10-CM ICD-9-CM   1. Decreased activities of daily living (ADL)  Z78.9 V49.89   2. Difficulty walking  R26.2 719.7     Patient Active Problem List   Diagnosis    Essential hypertension    Permanent atrial fibrillation    S/P AVR    ICD (implantable cardioverter-defibrillator), dual, in situ    Dermatitis    IFG (impaired fasting glucose)    Mixed hyperlipidemia    Vitamin D deficiency    Medicare annual wellness visit, subsequent    Primary osteoarthritis of both knees    Screening PSA (prostate specific antigen)    Bilateral primary osteoarthritis of knee    Coarse tremors    Weakness generalized    Increased urinary frequency    Bandemia    Achilles tendon rupture    Anemia due to GI blood loss    Physical debility     Past Medical History:   Diagnosis Date    Aortic valve replaced     Arthritis 2018    Atrial fibrillation     Coronary artery disease     Hypertension      Past Surgical History:   Procedure Laterality Date    CARDIAC SURGERY      COLONOSCOPY      COLONOSCOPY N/A 2024    Procedure: COLONOSCOPY WITH HOT/COLD SNARE POLYPECTOMIES, ELEVIEW INJECTION,CLIPX1;  Surgeon: Kurt Mensah MD;  Location: [facility] ENDOSCOPY;  Service: Gastroenterology;  Laterality: N/A;  COLON POLYPS    ENDOSCOPY N/A 2024    Procedure: ESOPHAGOGASTRODUODENOSCOPY WITH BIOPSIES;  Surgeon: Kurt Mensah MD;  Location: [facility] ENDOSCOPY;  Service: Gastroenterology;  Laterality: N/A;  RETAINED FOOD IN STOMACH AND REFLUX ESOPHAGITIS    PACEMAKER IMPLANTATION         PT Assessment (Last 12 Hours)       PT Evaluation and Treatment       Row Name 10/15/24 0922          Physical Therapy Time and Intention    Subjective Information complains of;fatigue;dyspnea  Pt reports he did not get sleep last night due to being weighed at 3am and having multiple  blood draws throughout the night.  -VK     Document Type therapy note (daily note)  -VK     Mode of Treatment individual therapy;physical therapy  -VK     Patient Effort good  -VK       Row Name 10/15/24 0922          General Information    Patient Profile Reviewed yes  -VK     Existing Precautions/Restrictions fall  CAM boot donned RLE  -VK       Row Name 10/15/24 0922          Cognition    Affect/Mental Status (Cognition) WNL  -VK     Orientation Status (Cognition) oriented x 4  -VK     Personal Safety Interventions nonskid shoes/slippers when out of bed;gait belt;fall prevention program maintained  -VK       Row Name 10/15/24 0922          Mobility    Extremity Weight-bearing Status right lower extremity  -VK     Right Lower Extremity (Weight-bearing Status) weight-bearing as tolerated (WBAT)  -       Row Name 10/15/24 0922          Sit-Stand Transfer    Sit-Stand Harris (Transfers) minimum assist (75% patient effort);verbal cues;1 person assist  Assist varied throughout treatment. At times pt required MinAx1 to stand, but during sit-stand practice pt required SBAx1.  -VK     Assistive Device (Sit-Stand Transfers) walker, front-wheeled  -VK       Row Name 10/15/24 0922          Stand-Sit Transfer    Stand-Sit Harris (Transfers) standby assist;contact guard;verbal cues;1 person assist  -VK     Assistive Device (Stand-Sit Transfers) walker, front-wheeled  -VK       Row Name 10/15/24 0922          Toilet Transfer    Type (Toilet Transfer) sit-stand;stand-sit  -VK     Harris Level (Toilet Transfer) minimum assist (75% patient effort);verbal cues  -VK     Assistive Device (Toilet Transfer) raised toilet seat;walker, front-wheeled;grab bars/safety frame  -VK       Row Name 10/15/24 0922          Gait/Stairs (Locomotion)    Harris Level (Gait) contact guard;verbal cues  -VK     Assistive Device (Gait) walker, front-wheeled  -VK     Patient was able to Ambulate yes  -VK     Distance in Feet  (Gait) --  110ft, 10ft, 55ft  -VK     Pattern (Gait) 4-point;step-through  -VK     Deviations/Abnormal Patterns (Gait) tapan decreased;gait speed decreased;stride length decreased  -VK     Bilateral Gait Deviations forward flexed posture;heel strike decreased  -VK     Right Sided Gait Deviations weight shift ability decreased  -VK       Row Name 10/15/24 0922          Safety Issues/Impairments Affecting Functional Mobility    Impairments Affecting Function (Mobility) balance;endurance/activity tolerance;range of motion (ROM);strength  CAM boot RLE  -VK       Row Name 10/15/24 0922          Balance    Sit to Stand Dynamic Balance minimal assist  -VK     Static Standing Balance standby assist;contact guard  -VK     Dynamic Standing Balance contact guard  -VK     Position/Device Used, Standing Balance walker, front-wheeled  -VK     Balance Interventions sit to stand  Pt performed 5 sit-stands x2 with rest break between sets.  -VK       Row Name 10/15/24 0922          Aerobic Exercise    Type (Aerobic Exercise) recumbent stationary bike  -VK     Comment, Aerobic Exercise (Therapeutic Exercise) 15 minutes, load 4  -VK       Row Name 10/15/24 0922          Positioning and Restraints    Pre-Treatment Position sitting in chair/recliner  -VK     Post Treatment Position chair  -VK     In Chair reclined;call light within reach;encouraged to call for assist;exit alarm on  -VK       Row Name 10/15/24 0922          Progress Summary (PT)    Progress Toward Functional Goals (PT) progress toward functional goals is fair;progress toward functional goals is good  -VK               User Key  (r) = Recorded By, (t) = Taken By, (c) = Cosigned By      Initials Name Provider Type    VK Karla Gaines PTA Physical Therapist Assistant                  Section G              Section GG                       Physical Therapy Education       Title: PT OT SLP Therapies (Done)       Topic: Physical Therapy (Resolved)       Point: Mobility  training (Resolved)       Learner Progress:  Not documented in this visit.              Point: Home exercise program (Resolved)       Learner Progress:  Not documented in this visit.              Point: Body mechanics (Resolved)       Learner Progress:  Not documented in this visit.              Point: Precautions (Resolved)       Learner Progress:  Not documented in this visit.                                  PT Recommendation and Plan     Progress Summary (PT)  Progress Toward Functional Goals (PT): progress toward functional goals is fair, progress toward functional goals is good   Outcome Measures       Row Name 10/15/24 1141 10/14/24 1159          How much help from another person do you currently need...    Turning from your back to your side while in flat bed without using bedrails? 3  -VK 3  -WM     Moving from lying on back to sitting on the side of a flat bed without bedrails? 3  -VK 3  -WM     Moving to and from a bed to a chair (including a wheelchair)? 3  -VK 3  -WM     Standing up from a chair using your arms (e.g., wheelchair, bedside chair)? 3  -VK 2  -WM     Climbing 3-5 steps with a railing? 2  -VK 2  -WM     To walk in hospital room? 3  -VK 3  -WM     AM-PAC 6 Clicks Score (PT) 17  -VK 16  -WM     Highest Level of Mobility Goal 5 --> Static standing  -VK 5 --> Static standing  -WM               User Key  (r) = Recorded By, (t) = Taken By, (c) = Cosigned By      Initials Name Provider Type    WM Dwaine Guzman, PTA Physical Therapist Assistant    VK Karla Gaines PTA Physical Therapist Assistant                     Time Calculation:    PT Charges       Row Name 10/15/24 1150             Time Calculation    PT Received On 10/15/24  -VK         Timed Charges    33658 - PT Therapeutic Exercise Minutes 18  -VK      45202 - Gait Training Minutes  12  -VK      34056 - PT Therapeutic Activity Minutes 23  -VK         SNF Physical Therapy Minutes    Skilled Minutes- PT 53 min  -VK         Total Minutes     Timed Charges Total Minutes 53  -VK       Total Minutes 53  -VK                User Key  (r) = Recorded By, (t) = Taken By, (c) = Cosigned By      Initials Name Provider Type    Karla Ignacio PTA Physical Therapist Assistant                  Therapy Charges for Today       Code Description Service Date Service Provider Modifiers Qty    81787961863 HC PT THER PROC EA 15 MIN 10/15/2024 Karla Gaines, GUDELIA GP 1    70670177711 HC GAIT TRAINING EA 15 MIN 10/15/2024 Karla Gaines PTA GP 1    [id number]  PT THERAPEUTIC ACT EA 15 MIN 10/15/2024 Karla Gaines PTA GP 2            PT G-Codes  Outcome Measure Options: AM-PAC 6 Clicks Daily Activity (OT), Optimal Instrument  AM-PAC 6 Clicks Score (PT): 17  AM-PAC 6 Clicks Score (OT): 19    Karla Gaiens PTA  10/15/2024

## 2024-10-15 NOTE — THERAPY TREATMENT NOTE
SNF - Occupational Therapy Treatment Note   Yudy    Patient Name: Woodrow Alejandro  : 1950    MRN: [id number]                              Today's Date: 10/15/2024       Admit Date: 10/3/2024    Visit Dx:     ICD-10-CM ICD-9-CM   1. Decreased activities of daily living (ADL)  Z78.9 V49.89   2. Difficulty walking  R26.2 719.7     Patient Active Problem List   Diagnosis    Essential hypertension    Permanent atrial fibrillation    S/P AVR    ICD (implantable cardioverter-defibrillator), dual, in situ    Dermatitis    IFG (impaired fasting glucose)    Mixed hyperlipidemia    Vitamin D deficiency    Medicare annual wellness visit, subsequent    Primary osteoarthritis of both knees    Screening PSA (prostate specific antigen)    Bilateral primary osteoarthritis of knee    Coarse tremors    Weakness generalized    Increased urinary frequency    Bandemia    Achilles tendon rupture    Anemia due to GI blood loss    Physical debility     Past Medical History:   Diagnosis Date    Aortic valve replaced     Arthritis 2018    Atrial fibrillation     Coronary artery disease     Hypertension      Past Surgical History:   Procedure Laterality Date    CARDIAC SURGERY      COLONOSCOPY      COLONOSCOPY N/A 2024    Procedure: COLONOSCOPY WITH HOT/COLD SNARE POLYPECTOMIES, ELEVIEW INJECTION,CLIPX1;  Surgeon: Kurt Mensah MD;  Location: [facility] ENDOSCOPY;  Service: Gastroenterology;  Laterality: N/A;  COLON POLYPS    ENDOSCOPY N/A 2024    Procedure: ESOPHAGOGASTRODUODENOSCOPY WITH BIOPSIES;  Surgeon: Kurt Mensah MD;  Location: [facility] ENDOSCOPY;  Service: Gastroenterology;  Laterality: N/A;  RETAINED FOOD IN STOMACH AND REFLUX ESOPHAGITIS    PACEMAKER IMPLANTATION        General Information       Row Name 10/15/24 1133          OT Time and Intention    Subjective Information complains of;fatigue;weakness  -EG     Document Type therapy note (daily note)  -EG     Mode of Treatment individual  therapy;occupational therapy  -EG     Patient Effort good  -EG     Symptoms Noted During/After Treatment fatigue;shortness of breath  -EG       Row Name 10/15/24 1133          General Information    Existing Precautions/Restrictions fall  CAM boot donelena RLE  -EG       Row Name 10/15/24 1133          Cognition    Orientation Status (Cognition) oriented x 4  -EG       Row Name 10/15/24 1133          Safety Issues/Impairments Affecting Functional Mobility    Impairments Affecting Function (Mobility) balance;endurance/activity tolerance;range of motion (ROM);strength  -EG               User Key  (r) = Recorded By, (t) = Taken By, (c) = Cosigned By      Initials Name Provider Type    EG Janessa Good, OT Occupational Therapist                     Mobility/ADL's       Row Name 10/15/24 1135          Bed Mobility    Comment, (Bed Mobility) Up in chair upon OT arrival  -       Row Name 10/15/24 1135          Transfers    Transfers sit-stand transfer;stand-sit transfer  -EG     Comment, (Transfers) shower bench transfers; in and out of recliner; chair with arms in therapy gym using RW and CAM boot donned  -EG       Row Name 10/15/24 1135          Sit-Stand Transfer    Sit-Stand [place] (Transfers) contact guard;1 person assist  -EG     Assistive Device (Sit-Stand Transfers) walker, front-wheeled  -EG       Row Name 10/15/24 1135          Stand-Sit Transfer    Stand-Sit [place] (Transfers) contact guard;verbal cues  -EG     Assistive Device (Stand-Sit Transfers) walker, front-wheeled  -EG       Row Name 10/15/24 1135          Functional Mobility    Functional Mobility- Ind. Level contact guard assist;verbal cues required;nonverbal cues required (demo/gesture);standby assist  -EG     Functional Mobility- Device walker, front-wheeled  -EG     Functional Mobility- Comment Patient able to perform functional mobility to and from shower room using RW, one seated rest break required on way back to room due to reports  of increased fatigue and SOB  -EG       [facility] Name 10/15/24 1135          Activities of Daily Living    BADL Assessment/Intervention bathing;upper body dressing;lower body dressing;grooming  -EG       Row Name 10/15/24 1135          Mobility    Extremity Weight-bearing Status right lower extremity  -EG     Right Lower Extremity (Weight-bearing Status) weight-bearing as tolerated (WBAT)  -EG       Row Name 10/15/24 1135          Lower Body Dressing Assessment/Training    [place] Level (Lower Body Dressing) lower body dressing skills;moderate assist (50% patient effort)  -EG     Assistive Devices (Lower Body Dressing) reacher;sock-aid  -EG       Row Name 10/15/24 1135          Upper Body Dressing Assessment/Training    [place] Level (Upper Body Dressing) upper body dressing skills;set up  -EG       Row Name 10/15/24 1135          Bathing Assessment/Intervention    Delta Level (Bathing) minimum assist (75% patient effort);bathing skills;lower body;upper body  -EG     Assistive Devices (Bathing) tub bench;hand-held shower spray hose;grab bar, tub/shower  -EG       Row Name 10/15/24 1135          Grooming Assessment/Training    [place] Level (Grooming) grooming skills;set up;wash face, hands;hair care, combing/brushing  -EG               User Key  (r) = Recorded By, (t) = Taken By, (c) = Cosigned By      Initials Name Provider Type    EG Janessa Good OT Occupational Therapist                   Obj/Interventions       [facility] Name 10/15/24 1139          Sensory Assessment (Somatosensory)    Sensory Assessment (Somatosensory) UE sensation intact  -EG       Row Name 10/15/24 1139          Vision Assessment/Intervention    Visual Impairment/Limitations WFL  -EG       Row Name 10/15/24 1139          Balance    Balance Interventions sit to stand;supported;static;dynamic;weight shifting activity;occupation based/functional task  -EG               User Key  (r) = Recorded By, (t) = Taken By, (c) = Cosigned  By      Initials Name Provider Type    Janessa Alvarez OT Occupational Therapist                   Goals/Plan    No documentation.                  Clinical Impression       Row Name 10/15/24 1139          Plan of Care Review    Plan of Care Reviewed With patient  -EG     Progress no change  -EG     Outcome Evaluation Pleasant and cooperative; reports increased fatigue and SOB, with no sleep last night per patient report; Per nsg. notes elevated WBC and rnning a temp last night, provider to address; OT services will continue to follow current POC and address deficits in ADL/Mobility performance skills to promote a safe return home at [facility]; Ax1 w/RW and CAM boot  -EG       Row Name 10/15/24 1139          Therapy Assessment/Plan (OT)    Criteria for Skilled Therapeutic Interventions Met (OT) yes;meets criteria;skilled treatment is necessary  -EG     Therapy Frequency (OT) 5 times/wk  -EG       Row Name 10/15/24 1139          Therapy Plan Review/Discharge Plan (OT)    Anticipated Discharge Disposition (OT) home with home health  -EG       Row Name 10/15/24 1139          Positioning and Restraints    Post Treatment Position chair  -EG     In Chair reclined;sitting;call light within reach;exit alarm on;encouraged to call for assist  -EG               User Key  (r) = Recorded By, (t) = Taken By, (c) = Cosigned By      Initials Name Provider Type    EG Janessa Good OT Occupational Therapist                   Outcome Measures       Row Name 10/15/24 1141          How much help from another is currently needed...    Putting on and taking off regular lower body clothing? 2  -EG     Bathing (including washing, rinsing, and drying) 3  -EG     Toileting (which includes using toilet bed pan or urinal) 3  -EG     Putting on and taking off regular upper body clothing 3  -EG     Taking care of personal grooming (such as brushing teeth) 4  -EG     Eating meals 4  -EG     AM-PAC 6 Clicks Score (OT) 19  -EG       Row Name  10/15/24 1141          Functional Assessment    Outcome Measure Options AM-PAC 6 Clicks Daily Activity (OT);Optimal Instrument  -EG       Row Name 10/15/24 1141          Optimal Instrument    Optimal Instrument Optimal - 3  -EG     Bending/Stooping 3  -EG     Standing 2  -EG     Reaching 1  -EG               User Key  (r) = Recorded By, (t) = Taken By, (c) = Cosigned By      Initials Name Provider Type    EG Janessa Good OT Occupational Therapist                  Section G  Mobility  Bed mobility - self performance: limited assistance (staff provide guided maneuvering of limbs or other non-weight bearing assistance)  Bed mobility support/assistance: One person assist  Transfer - self performance: limited assistance (staff provide guided maneuvering of limbs or other non-weight bearing assistance)  Transfer support/assistance: One person assist  Walking in room - self performance: limited assistance (staff provide guided maneuvering of limbs or other non-weight bearing assistance)  Walking in room support/assistance: One person assist  Walking in corridors/hallway - self performance: limited assistance (staff provide guided maneuvering of limbs or other non-weight bearing assistance)  Walking in corridors/hallway support/assistance: One person assist  Locomotion on unit - self performance: activity did not occur  Locomotion on unit support/assistance: Activity did not occur  Locomotion off unit - self performance: activity did not occur  Locomotion off unit support/assistance: Activity did not occur  Dressing - self performance: limited assistance (staff provide guided maneuvering of limbs or other non-weight bearing assistance)  Dressing support/assistance: One person assist  Eating - self performance: independent  Eating support/assistance: Setup help only  Toileting - self performance: limited assistance (staff provide guided maneuvering of limbs or other non-weight bearing assistance)  Toileting  support/assistance: One person assist  Personal hygiene - self performance: limited assistance (staff provide guided maneuvering of limbs or other non-weight bearing assistance)  Personal hygiene support/assistance: One person assist  Bathing  Bathing - self performance: Physical help with bathing (exclude washing back and hair for patient)  Bathing support/assistance: One person assist  Balance  Balance during transitions & walking: Not steady, requires assist to steady  Moving from seated to standing position: Not steady, requires assist to steady  Walking: Not steady, requires assist to steady  Turning around while walking: Not steady, requires assist to steady  Moving on and off toilet: Not steady, requires assist to steady  Surface-to-surface transfer: Not steady, requires assist to steady  Mobility devices: None were used  Range of Motion  Upper Extremity: No impairment  Lower Extremity: Impairment on one side  Section GG  Functional Ability/Goals, Adm (Section GG)  Self Care, Prior Functioning (WG9628K): 3. Independent  Functional Cognition, Prior Functioning (TD0261H): 3. Independent  Prior Device Use (BQ4514): none of the above (Z)  Upper Extremity Range of Motion (JT0485I): No impairment  Lower Extremity Range of Motion (TS2470P): Impairment on one side  Self Care, Admission (Section GG)  Eating: Self-Care Admission Performance (VF1950Y5): setup or clean-up assistance (05)  Oral Hygiene: Self-Care Admission Performance (YV6450I3): setup or clean-up assistance (05)  Toileting Hygiene: Self-Care Admission Performance (IZ0553K0): partial/moderate assistance (03)  Shower/Bathe Self: Self-Care Admission Performance (WW6263X7): partial/moderate assistance (03)  Upper Body Dressing: Self-Care Admission Performance (BA5597L2): setup or clean-up assistance (05)  Lower Body Dressing: Self-Care Admission Performance (BS3859Y2): partial/moderate assistance (03)  Putting On/Taking Off Footwear: Self-Care Admission  Performance (MK4443E6): partial/moderate assistance (03)  Personal Hygiene: Self-Care Admission Performance (HA9130Y9): supervision or touching assistance (04)  Mobility, Admission Performance (JW5234)  Chair/Bed-Chair Transfer: Mobility Admission Performance (XV2897B2): partial/moderate assistance (03)  Toilet Transfer: Mobility Admission Performance (MG4858W5): partial/moderate assistance (03)  Tub/shower Transfer: Mobility Admission Performance ([id number]): partial/moderate assistance (03)                Occupational Therapy Education       Title: PT OT SLP Therapies (Done)       Topic: Occupational Therapy (Done)       Point: ADL training (Done)       Description:   Instruct learner(s) on proper safety adaptation and remediation techniques during self care or transfers.   Instruct in proper use of assistive devices.                  Learning Progress Summary            Patient JING Moulton VU by EG at 10/4/2024 1056    Comment: Education on compensatory techniques for ADL's  Education on AE  Education on fall risk prevention and general safety  Education on OT services                      Point: Home exercise program (Done)       Description:   Instruct learner(s) on appropriate technique for monitoring, assisting and/or progressing therapeutic exercises/activities.                  Learning Progress Summary            Patient JING Moulton VU by EG at 10/4/2024 1056    Comment: Education on compensatory techniques for ADL's  Education on AE  Education on fall risk prevention and general safety  Education on OT services                      Point: Precautions (Done)       Description:   Instruct learner(s) on prescribed precautions during self-care and functional transfers.                  Learning Progress Summary            Patient JING Moulton VU by EG at 10/4/2024 1056    Comment: Education on compensatory techniques for ADL's  Education on AE  Education on fall risk prevention and general safety  Education on OT  services                      Point: Body mechanics (Done)       Description:   Instruct learner(s) on proper positioning and spine alignment during self-care, functional mobility activities and/or exercises.                  Learning Progress Summary            Patient JING Moulton VU by EG at 10/4/2024 1056    Comment: Education on compensatory techniques for ADL's  Education on AE  Education on fall risk prevention and general safety  Education on OT services                                      User Key       Initials Effective Dates Name Provider Type Discipline    EG 09/14/22 -  Janessa Good, OT Occupational Therapist OT                  OT Recommendation and Plan  Planned Therapy Interventions (OT): activity tolerance training, functional balance retraining, occupation/activity based interventions, adaptive equipment training, BADL retraining, neuromuscular control/coordination retraining, patient/caregiver education/training, transfer/mobility retraining, strengthening exercise  Therapy Frequency (OT): 5 times/wk  Plan of Care Review  Plan of Care Reviewed With: patient  Progress: no change  Outcome Evaluation: Pleasant and cooperative; reports increased fatigue and SOB, with no sleep last night per patient report; Per nsg. notes elevated WBC and rnning a temp last night, provider to address; OT services will continue to follow current POC and address deficits in ADL/Mobility performance skills to promote a safe return home at [facility]; Ax1 w/RW and CAM boot     Time Calculation:   Evaluation Complexity (OT)  Review Occupational Profile/Medical/Therapy History Complexity: expanded/moderate complexity  Assessment, Occupational Performance/Identification of Deficit Complexity: 3-5 performance deficits  Clinical Decision Making Complexity (OT): detailed assessment/moderate complexity  Overall Complexity of Evaluation (OT): moderate complexity     Time Calculation- OT       Row Name 10/15/24 1141             Time  Calculation- OT    OT Received On 10/15/24  -EG      OT Goal Re-Cert Due Date 11/03/24  -EG         Timed Charges    68984 - OT Therapeutic Activity Minutes 13  -EG      20735 - OT Self Care/Mgmt Minutes 29  -EG         SNF Occupational Therapy Minutes    Skilled Minutes- OT 42 min  -EG         Total Minutes    Timed Charges Total Minutes 42  -EG       Total Minutes 42  -EG                User Key  (r) = Recorded By, (t) = Taken By, (c) = Cosigned By      Initials Name Provider Type    EG Janessa Good, OT Occupational Therapist                  Therapy Charges for Today       Code Description Service Date Service Provider Modifiers Qty    30853462263 HC OT NEUROMUSC RE EDUCATION EA 15 MIN 10/14/2024 Janessa Good, OT GO 1    86309554116 HC OT THER PROC EA 15 MIN 10/14/2024 Janessa Good OT GO 2    24826219915 HC OT THERAPEUTIC ACT EA 15 MIN 10/14/2024 Janessa Good OT GO 1    55838429888 HC OT THERAPEUTIC ACT EA 15 MIN 10/15/2024 Janessa Good OT GO 1    49434810193 HC OT SELF CARE/MGMT/TRAIN EA 15 MIN 10/15/2024 Janessa Good OT GO 2                 Janessa Good OT  10/15/2024

## 2024-10-15 NOTE — PLAN OF CARE
Goal Outcome Evaluation:           Progress: no change  Outcome Evaluation: Rsd. is alert and oriented x4, able to make needs known to staff.  Rsd. transfers x1 assist with gaitbelt and rolling walker but refused to transfer to BSC/. Urinal remains at bedside.  Rsd. needs assistance with urinal due to anatomy.  Rsd. denies pain, and has rested intermittently in between care.  Call light and personal items within reach.  Rsd. reminded to use call light for assistance, verbalizes understanding.  Will continue to monitor and notify on-coming staff.  Current plan of care remains in place at this time.

## 2024-10-16 LAB
ALBUMIN SERPL-MCNC: 2.8 G/DL (ref 3.5–5.2)
ALBUMIN/GLOB SERPL: 0.8 G/DL
ALP SERPL-CCNC: 74 U/L (ref 39–117)
ALT SERPL W P-5'-P-CCNC: 11 U/L (ref 1–41)
ANION GAP SERPL CALCULATED.3IONS-SCNC: 10.3 MMOL/L (ref 5–15)
AST SERPL-CCNC: 14 U/L (ref 1–40)
BASOPHILS # BLD AUTO: 0.04 10*3/MM3 (ref 0–0.2)
BASOPHILS NFR BLD AUTO: 0.3 % (ref 0–1.5)
BILIRUB SERPL-MCNC: 0.7 MG/DL (ref 0–1.2)
BUN SERPL-MCNC: 13 MG/DL (ref 8–23)
BUN/CREAT SERPL: 17.6 (ref 7–25)
CALCIUM SPEC-SCNC: 8.7 MG/DL (ref 8.6–10.5)
CHLORIDE SERPL-SCNC: 100 MMOL/L (ref 98–107)
CO2 SERPL-SCNC: 25.7 MMOL/L (ref 22–29)
CREAT SERPL-MCNC: 0.74 MG/DL (ref 0.76–1.27)
DEPRECATED RDW RBC AUTO: 52.2 FL (ref 37–54)
EGFRCR SERPLBLD CKD-EPI 2021: 95.7 ML/MIN/1.73
EOSINOPHIL # BLD AUTO: 0.06 10*3/MM3 (ref 0–0.4)
EOSINOPHIL NFR BLD AUTO: 0.5 % (ref 0.3–6.2)
ERYTHROCYTE [DISTWIDTH] IN BLOOD BY AUTOMATED COUNT: 15.9 % (ref 12.3–15.4)
FERRITIN SERPL-MCNC: 1146 NG/ML (ref 30–400)
FOLATE SERPL-MCNC: 6.62 NG/ML (ref 4.78–24.2)
GLOBULIN UR ELPH-MCNC: 3.4 GM/DL
GLUCOSE SERPL-MCNC: 137 MG/DL (ref 65–99)
HAPTOGLOB SERPL-MCNC: 67 MG/DL (ref 30–200)
HCT VFR BLD AUTO: 29 % (ref 37.5–51)
HCYS SERPL-MCNC: 14.6 UMOL/L (ref 0–15)
HGB BLD-MCNC: 9.5 G/DL (ref 13–17.7)
IMM GRANULOCYTES # BLD AUTO: 0.09 10*3/MM3 (ref 0–0.05)
IMM GRANULOCYTES NFR BLD AUTO: 0.7 % (ref 0–0.5)
IRON 24H UR-MRATE: 30 MCG/DL (ref 59–158)
IRON SATN MFR SERPL: 15 % (ref 20–50)
LYMPHOCYTES # BLD AUTO: 0.9 10*3/MM3 (ref 0.7–3.1)
LYMPHOCYTES NFR BLD AUTO: 6.9 % (ref 19.6–45.3)
MCH RBC QN AUTO: 29.7 PG (ref 26.6–33)
MCHC RBC AUTO-ENTMCNC: 32.8 G/DL (ref 31.5–35.7)
MCV RBC AUTO: 90.6 FL (ref 79–97)
MONOCYTES # BLD AUTO: 0.95 10*3/MM3 (ref 0.1–0.9)
MONOCYTES NFR BLD AUTO: 7.3 % (ref 5–12)
NEUTROPHILS NFR BLD AUTO: 11.06 10*3/MM3 (ref 1.7–7)
NEUTROPHILS NFR BLD AUTO: 84.3 % (ref 42.7–76)
NRBC BLD AUTO-RTO: 0 /100 WBC (ref 0–0.2)
PLATELET # BLD AUTO: 138 10*3/MM3 (ref 140–450)
PMV BLD AUTO: 9.5 FL (ref 6–12)
POTASSIUM SERPL-SCNC: 3.6 MMOL/L (ref 3.5–5.2)
PROT SERPL-MCNC: 6.2 G/DL (ref 6–8.5)
RBC # BLD AUTO: 3.2 10*6/MM3 (ref 4.14–5.8)
RETICS # AUTO: 0.11 10*6/MM3 (ref 0.02–0.13)
RETICS/RBC NFR AUTO: 3.41 % (ref 0.7–1.9)
SODIUM SERPL-SCNC: 136 MMOL/L (ref 136–145)
TIBC SERPL-MCNC: 197 MCG/DL (ref 298–536)
TRANSFERRIN SERPL-MCNC: 132 MG/DL (ref 200–360)
WBC NRBC COR # BLD AUTO: 13.1 10*3/MM3 (ref 3.4–10.8)

## 2024-10-16 PROCEDURE — 81207 BCR/ABL1 GENE MINOR BP: CPT

## 2024-10-16 PROCEDURE — 81206 BCR/ABL1 GENE MAJOR BP: CPT | Performed by: INTERNAL MEDICINE

## 2024-10-16 PROCEDURE — 86334 IMMUNOFIX E-PHORESIS SERUM: CPT | Performed by: INTERNAL MEDICINE

## 2024-10-16 PROCEDURE — 97530 THERAPEUTIC ACTIVITIES: CPT

## 2024-10-16 PROCEDURE — 82728 ASSAY OF FERRITIN: CPT | Performed by: INTERNAL MEDICINE

## 2024-10-16 PROCEDURE — 25010000002 NA FERRIC GLUC CPLX PER 12.5 MG: Performed by: INTERNAL MEDICINE

## 2024-10-16 PROCEDURE — 97112 NEUROMUSCULAR REEDUCATION: CPT

## 2024-10-16 PROCEDURE — 85025 COMPLETE CBC W/AUTO DIFF WBC: CPT | Performed by: INTERNAL MEDICINE

## 2024-10-16 PROCEDURE — 82784 ASSAY IGA/IGD/IGG/IGM EACH: CPT | Performed by: INTERNAL MEDICINE

## 2024-10-16 PROCEDURE — 82746 ASSAY OF FOLIC ACID SERUM: CPT | Performed by: INTERNAL MEDICINE

## 2024-10-16 PROCEDURE — 83540 ASSAY OF IRON: CPT | Performed by: INTERNAL MEDICINE

## 2024-10-16 PROCEDURE — 25810000003 SODIUM CHLORIDE 0.9 % SOLUTION: Performed by: INTERNAL MEDICINE

## 2024-10-16 PROCEDURE — 84466 ASSAY OF TRANSFERRIN: CPT | Performed by: INTERNAL MEDICINE

## 2024-10-16 PROCEDURE — 83010 ASSAY OF HAPTOGLOBIN QUANT: CPT | Performed by: INTERNAL MEDICINE

## 2024-10-16 PROCEDURE — 85045 AUTOMATED RETICULOCYTE COUNT: CPT | Performed by: INTERNAL MEDICINE

## 2024-10-16 PROCEDURE — 80053 COMPREHEN METABOLIC PANEL: CPT | Performed by: INTERNAL MEDICINE

## 2024-10-16 PROCEDURE — 97110 THERAPEUTIC EXERCISES: CPT

## 2024-10-16 PROCEDURE — 83521 IG LIGHT CHAINS FREE EACH: CPT | Performed by: INTERNAL MEDICINE

## 2024-10-16 PROCEDURE — 97116 GAIT TRAINING THERAPY: CPT

## 2024-10-16 PROCEDURE — 83090 ASSAY OF HOMOCYSTEINE: CPT | Performed by: INTERNAL MEDICINE

## 2024-10-16 RX ADMIN — METOPROLOL TARTRATE 100 MG: 50 TABLET, FILM COATED ORAL at 08:48

## 2024-10-16 RX ADMIN — FUROSEMIDE 40 MG: 40 TABLET ORAL at 08:48

## 2024-10-16 RX ADMIN — DILTIAZEM HYDROCHLORIDE 120 MG: 30 TABLET, FILM COATED ORAL at 20:24

## 2024-10-16 RX ADMIN — ASPIRIN 325 MG: 325 TABLET ORAL at 08:48

## 2024-10-16 RX ADMIN — METOPROLOL TARTRATE 100 MG: 50 TABLET, FILM COATED ORAL at 20:24

## 2024-10-16 RX ADMIN — SODIUM CHLORIDE 250 MG: 9 INJECTION, SOLUTION INTRAVENOUS at 10:29

## 2024-10-16 RX ADMIN — Medication 10 ML: at 09:21

## 2024-10-16 RX ADMIN — FERROUS SULFATE TAB 325 MG (65 MG ELEMENTAL FE) 325 MG: 325 (65 FE) TAB at 07:43

## 2024-10-16 RX ADMIN — DILTIAZEM HYDROCHLORIDE 120 MG: 30 TABLET, FILM COATED ORAL at 08:48

## 2024-10-16 RX ADMIN — Medication 5 MG: at 20:24

## 2024-10-16 RX ADMIN — PANTOPRAZOLE SODIUM 40 MG: 40 TABLET, DELAYED RELEASE ORAL at 17:23

## 2024-10-16 RX ADMIN — PANTOPRAZOLE SODIUM 40 MG: 40 TABLET, DELAYED RELEASE ORAL at 07:43

## 2024-10-16 RX ADMIN — Medication 10 ML: at 20:25

## 2024-10-16 RX ADMIN — SENNOSIDES AND DOCUSATE SODIUM 2 TABLET: 50; 8.6 TABLET ORAL at 20:25

## 2024-10-16 NOTE — PLAN OF CARE
Problem: Adult Inpatient Plan of Care  Goal: Plan of Care Review  Outcome: Progressing  Flowsheets (Taken 10/16/2024 1725)  Outcome Evaluation:   Pt oriented x4 and pleasant today   no paion reported   pt cooperative during PT/OT. No signifcant changes noted   no cocnerns or complaints noted.  Plan of Care Reviewed With:   patient   child     Problem: Adult Inpatient Plan of Care  Goal: Absence of Hospital-Acquired Illness or Injury  Outcome: Progressing  Intervention: Identify and Manage Fall Risk  Recent Flowsheet Documentation  Taken 10/16/2024 1535 by Jacki Ortiz RN  Safety Promotion/Fall Prevention:   activity supervised   assistive device/personal items within reach   clutter free environment maintained   fall prevention program maintained   room organization consistent   safety round/check completed  Taken 10/16/2024 1341 by Jacki Ortiz RN  Safety Promotion/Fall Prevention:   activity supervised   assistive device/personal items within reach   clutter free environment maintained   fall prevention program maintained   room organization consistent   safety round/check completed  Taken 10/16/2024 1135 by Jacki Ortiz RN  Safety Promotion/Fall Prevention:   activity supervised   assistive device/personal items within reach   clutter free environment maintained   fall prevention program maintained   room organization consistent   safety round/check completed  Taken 10/16/2024 0920 by Jacki Ortiz RN  Safety Promotion/Fall Prevention:   activity supervised   assistive device/personal items within reach   clutter free environment maintained   fall prevention program maintained   room organization consistent   safety round/check completed  Taken 10/16/2024 0845 by Jacki Ortiz RN  Safety Promotion/Fall Prevention:   activity supervised   assistive device/personal items within reach   clutter free environment maintained   fall prevention program maintained   room organization consistent    safety round/check completed   gait belt  Taken 10/16/2024 0731 by Jacki Oritz RN  Safety Promotion/Fall Prevention:   activity supervised   assistive device/personal items within reach   clutter free environment maintained   fall prevention program maintained   safety round/check completed   room organization consistent  Intervention: Prevent Skin Injury  Recent Flowsheet Documentation  Taken 10/16/2024 1535 by Jacki Ortiz RN  Body Position:   legs elevated   weight shifting  Taken 10/16/2024 1341 by Jacki Ortiz RN  Body Position:   legs elevated   weight shifting  Taken 10/16/2024 1135 by Jacki Ortiz RN  Body Position:   position changed independently   weight shifting  Taken 10/16/2024 0920 by Jacki Ortiz RN  Body Position:   legs elevated   weight shifting  Taken 10/16/2024 0845 by Jacki Ortiz RN  Body Position:   position changed independently   legs elevated   weight shifting  Skin Protection: skin sealant/moisture barrier applied  Taken 10/16/2024 0731 by Jacki Ortiz RN  Body Position:   legs elevated   weight shifting  Intervention: Prevent and Manage VTE (Venous Thromboembolism) Risk  Recent Flowsheet Documentation  Taken 10/16/2024 0845 by Jacki Ortiz RN  VTE Prevention/Management: (see MAR)   bilateral   SCDs (sequential compression devices) off   other (see comments)  Intervention: Prevent Infection  Recent Flowsheet Documentation  Taken 10/16/2024 0845 by Jacki Ortiz RN  Infection Prevention:   equipment surfaces disinfected   hand hygiene promoted     Problem: Adult Inpatient Plan of Care  Goal: Optimal Comfort and Wellbeing  Outcome: Progressing  Intervention: Provide Person-Centered Care  Recent Flowsheet Documentation  Taken 10/16/2024 0845 by Jacki Ortiz RN  Trust Relationship/Rapport:   care explained   choices provided   questions answered   questions encouraged     Problem: Anemia  Goal: Anemia Symptom Improvement  Outcome:  Progressing  Intervention: Monitor and Manage Anemia  Recent Flowsheet Documentation  Taken 10/16/2024 1535 by Jacki Ortiz RN  Safety Promotion/Fall Prevention:   activity supervised   assistive device/personal items within reach   clutter free environment maintained   fall prevention program maintained   room organization consistent   safety round/check completed  Taken 10/16/2024 1341 by Jacki Ortiz RN  Safety Promotion/Fall Prevention:   activity supervised   assistive device/personal items within reach   clutter free environment maintained   fall prevention program maintained   room organization consistent   safety round/check completed  Taken 10/16/2024 1135 by Jacki Ortiz RN  Safety Promotion/Fall Prevention:   activity supervised   assistive device/personal items within reach   clutter free environment maintained   fall prevention program maintained   room organization consistent   safety round/check completed  Taken 10/16/2024 0920 by Jacki Ortiz RN  Safety Promotion/Fall Prevention:   activity supervised   assistive device/personal items within reach   clutter free environment maintained   fall prevention program maintained   room organization consistent   safety round/check completed  Taken 10/16/2024 0845 by Jacki Ortiz RN  Safety Promotion/Fall Prevention:   activity supervised   assistive device/personal items within reach   clutter free environment maintained   fall prevention program maintained   room organization consistent   safety round/check completed   gait belt  Taken 10/16/2024 0731 by Jacki Ortiz RN  Safety Promotion/Fall Prevention:   activity supervised   assistive device/personal items within reach   clutter free environment maintained   fall prevention program maintained   safety round/check completed   room organization consistent     Problem: Fall Injury Risk  Goal: Absence of Fall and Fall-Related Injury  Outcome: Progressing  Intervention: Identify  and Manage Contributors  Recent Flowsheet Documentation  Taken 10/16/2024 1535 by Jacki Ortiz RN  Medication Review/Management: medications reviewed  Taken 10/16/2024 1341 by Jacki Ortiz RN  Medication Review/Management: medications reviewed  Taken 10/16/2024 1135 by Jacki Ortiz RN  Medication Review/Management: medications reviewed  Taken 10/16/2024 0920 by Jacki Ortiz RN  Medication Review/Management: medications reviewed  Taken 10/16/2024 0845 by Jacki Ortiz RN  Medication Review/Management: medications reviewed  Taken 10/16/2024 0731 by Jacki Ortiz RN  Medication Review/Management: medications reviewed  Intervention: Promote Injury-Free Environment  Recent Flowsheet Documentation  Taken 10/16/2024 1535 by Jacki Ortiz RN  Safety Promotion/Fall Prevention:   activity supervised   assistive device/personal items within reach   clutter free environment maintained   fall prevention program maintained   room organization consistent   safety round/check completed  Taken 10/16/2024 1341 by Jacki Ortiz RN  Safety Promotion/Fall Prevention:   activity supervised   assistive device/personal items within reach   clutter free environment maintained   fall prevention program maintained   room organization consistent   safety round/check completed  Taken 10/16/2024 1135 by Jacki Ortiz RN  Safety Promotion/Fall Prevention:   activity supervised   assistive device/personal items within reach   clutter free environment maintained   fall prevention program maintained   room organization consistent   safety round/check completed  Taken 10/16/2024 0920 by Jacki Ortiz RN  Safety Promotion/Fall Prevention:   activity supervised   assistive device/personal items within reach   clutter free environment maintained   fall prevention program maintained   room organization consistent   safety round/check completed  Taken 10/16/2024 0845 by Jacki Ortiz RN  Safety  Promotion/Fall Prevention:   activity supervised   assistive device/personal items within reach   clutter free environment maintained   fall prevention program maintained   room organization consistent   safety round/check completed   gait belt  Taken 10/16/2024 0731 by Jacki Ortiz RN  Safety Promotion/Fall Prevention:   activity supervised   assistive device/personal items within reach   clutter free environment maintained   fall prevention program maintained   safety round/check completed   room organization consistent     Problem: Comorbidity Management  Goal: Blood Pressure in Desired Range  Outcome: Progressing  Intervention: Maintain Blood Pressure Management  Recent Flowsheet Documentation  Taken 10/16/2024 1535 by Jacki Ortiz RN  Medication Review/Management: medications reviewed  Taken 10/16/2024 1341 by Jacki Oritz RN  Medication Review/Management: medications reviewed  Taken 10/16/2024 1135 by Jacki Ortiz RN  Medication Review/Management: medications reviewed  Taken 10/16/2024 0920 by Jacki Ortiz RN  Medication Review/Management: medications reviewed  Taken 10/16/2024 0845 by Jacki Ortiz RN  Medication Review/Management: medications reviewed  Taken 10/16/2024 0731 by Jacki Ortiz RN  Medication Review/Management: medications reviewed   Goal Outcome Evaluation:  Plan of Care Reviewed With: patient, child           Outcome Evaluation: Pt oriented x4 and pleasant today; no paion reported; pt cooperative during PT/OT. No signifcant changes noted; no cocnerns or complaints noted.

## 2024-10-16 NOTE — THERAPY TREATMENT NOTE
SNF - Occupational Therapy Treatment Note   Yudy    Patient Name: Woodrow Alejandro  : 1950    MRN: [id number]                              Today's Date: 10/16/2024       Admit Date: 10/3/2024    Visit Dx:     ICD-10-CM ICD-9-CM   1. Decreased activities of daily living (ADL)  Z78.9 V49.89   2. Difficulty walking  R26.2 719.7     Patient Active Problem List   Diagnosis    Essential hypertension    Permanent atrial fibrillation    S/P AVR    ICD (implantable cardioverter-defibrillator), dual, in situ    Dermatitis    IFG (impaired fasting glucose)    Mixed hyperlipidemia    Vitamin D deficiency    Medicare annual wellness visit, subsequent    Primary osteoarthritis of both knees    Screening PSA (prostate specific antigen)    Bilateral primary osteoarthritis of knee    Coarse tremors    Weakness generalized    Increased urinary frequency    Bandemia    Achilles tendon rupture    Anemia due to GI blood loss    Physical debility     Past Medical History:   Diagnosis Date    Aortic valve replaced     Arthritis 2018    Atrial fibrillation     Coronary artery disease     Hypertension      Past Surgical History:   Procedure Laterality Date    CARDIAC SURGERY      COLONOSCOPY      COLONOSCOPY N/A 2024    Procedure: COLONOSCOPY WITH HOT/COLD SNARE POLYPECTOMIES, ELEVIEW INJECTION,CLIPX1;  Surgeon: Kurt Mensah MD;  Location: [facility];  Service: Gastroenterology;  Laterality: N/A;  COLON POLYPS    ENDOSCOPY N/A 2024    Procedure: ESOPHAGOGASTRODUODENOSCOPY WITH BIOPSIES;  Surgeon: Kurt Mensah MD;  Location: [facility];  Service: Gastroenterology;  Laterality: N/A;  RETAINED FOOD IN STOMACH AND REFLUX ESOPHAGITIS    PACEMAKER IMPLANTATION        General Information       Row Name 10/16/24 1010          OT Time and Intention    Subjective Information complains of;fatigue  -EG     Document Type therapy note (daily note)  -EG     Mode of Treatment individual  therapy;occupational therapy  -EG     Patient Effort good  -EG       Row Name 10/16/24 1010          General Information    Existing Precautions/Restrictions fall  RLE CAM boot  -EG       Row Name 10/16/24 1010          Cognition    Orientation Status (Cognition) oriented x 4  -EG       Row Name 10/16/24 1010          Safety Issues/Impairments Affecting Functional Mobility    Impairments Affecting Function (Mobility) balance;endurance/activity tolerance;range of motion (ROM);strength  -EG               User Key  (r) = Recorded By, (t) = Taken By, (c) = Cosigned By      Initials Name Provider Type    EG Janessa Good OT Occupational Therapist                     Mobility/ADL's       Row Name 10/16/24 1010          Transfers    Transfers sit-stand transfer;stand-sit transfer;toilet transfer  -EG     Comment, (Transfers) multiple chairs with arms in therapy gym; in and out of recliner chair  -EG       Row Name 10/16/24 1010          Sit-Stand Transfer    Sit-Stand [place] (Transfers) contact guard;1 person assist  -EG     Assistive Device (Sit-Stand Transfers) walker, front-wheeled  -EG       Row Name 10/16/24 1010          Stand-Sit Transfer    Stand-Sit [place] (Transfers) contact guard;verbal cues  -EG     Assistive Device (Stand-Sit Transfers) walker, front-wheeled  -EG       Row Name 10/16/24 1010          Toilet Transfer    [place] Level (Toilet Transfer) contact guard;verbal cues  -EG     Assistive Device (Toilet Transfer) raised toilet seat;walker, front-wheeled;grab bars/safety frame  -EG       Row Name 10/16/24 1010          Functional Mobility    Functional Mobility- Ind. Level contact guard assist;verbal cues required;nonverbal cues required (demo/gesture);standby assist  -EG     Functional Mobility- Device walker, front-wheeled  -EG     Functional Mobility- Comment Patient able to perform functional mobility to and from therapy gym; to and from bathroom in room; participated in mobility  tasks using reaching to retrieve items from ground for at home performance and fall risk prevetion, cueing and education on safe small space manuevering and proper use of AD  -EG       Row Name 10/16/24 1010          Activities of Daily Living    BADL Assessment/Intervention toileting;grooming  -EG       Row Name 10/16/24 1010          Mobility    Extremity Weight-bearing Status right lower extremity  -EG     Right Lower Extremity (Weight-bearing Status) weight-bearing as tolerated (WBAT)  -EG       Row Name 10/16/24 1010          Toileting Assessment/Training    [place] Level (Toileting) toileting skills;minimum assist (75% patient effort);contact guard assist  -EG     Assistive Devices (Toileting) grab bar/safety frame;commode chair  -EG     Comment, (Toileting) Continued emphasis on bowel hygiene and cleanliness  -EG               User Key  (r) = Recorded By, (t) = Taken By, (c) = Cosigned By      Initials Name Provider Type    EG Janessa Good OT Occupational Therapist                   Obj/Interventions       Row Name 10/16/24 1013          Sensory Assessment (Somatosensory)    Sensory Assessment (Somatosensory) UE sensation intact  -EG       Row Name 10/16/24 1013          Vision Assessment/Intervention    Visual Impairment/Limitations WFL  -EG       Row Name 10/16/24 1013          Shoulder (Therapeutic Exercise)    Shoulder (Therapeutic Exercise) strengthening exercise  -EG     Shoulder Strengthening (Therapeutic Exercise) bilateral;flexion;extension;horizontal aBduction/aDduction;scapular stabilization;3 sets;15 repititions;sitting  5# dowel [place]  -EG       Row Name 10/16/24 1013          Elbow/Forearm (Therapeutic Exercise)    Elbow/Forearm (Therapeutic Exercise) strengthening exercise  -EG     Elbow/Forearm Strengthening (Therapeutic Exercise) bilateral;flexion;extension;15 repititions;3 sets;sitting  5# dowel [place]  -EG       Row Name 10/16/24 1013          Motor Skills    Therapeutic Exercise  "aerobic;shoulder;elbow/forearm  -EG       Row Name 10/16/24 1013          Balance    Balance Interventions standing;static;dynamic;weight shifting activity;occupation based/functional task;UE activity with balance activity  -EG     Comment, Balance Patient able to perform unsupported standing tasks for reaching outside base of support and tossing tasks 3x for 3-4 minutes incriments with prolonged breaks inbetween sets  -EG       Row Name 10/16/24 1013          Aerobic Exercise    Type (Aerobic Exercise) arm bike  -EG     Comment, Aerobic Exercise (Therapeutic Exercise) 10:00 omnicycle performed on cardiac setting  -EG               User Key  (r) = Recorded By, (t) = Taken By, (c) = Cosigned By      Initials Name Provider Type    EG Janessa Good, JOSELIN Occupational Therapist                   Goals/Plan    No documentation.                  Clinical Impression       Row Name 10/16/24 1015          Pain Assessment    Pretreatment Pain Rating 0/10 - no pain  -EG     Posttreatment Pain Rating 0/10 - no pain  -EG       Row Name 10/16/24 1015          Plan of Care Review    Plan of Care Reviewed With patient  -EG     Progress improving  -EG     Outcome Evaluation Pleasant and cooperative; no pain reported when asked; Patient with possible new cancer diagnosis and states feeling \"overwhelmed\"; OT provided therapuetic comfort and patient motivated to participate despite new concerns noted; OT addressing endurance, strength and balance for carryover into ADL's; Continue with POC; Ax1 w/RW and CAM boot.  -EG       Row Name 10/16/24 1015          Therapy Assessment/Plan (OT)    Criteria for Skilled Therapeutic Interventions Met (OT) yes;meets criteria;skilled treatment is necessary  -EG     Therapy Frequency (OT) 5 times/wk  -       Row Name 10/16/24 1015          Therapy Plan Review/Discharge Plan (OT)    Anticipated Discharge Disposition (OT) home with home health;other (see comments)  Possible care plan needed due to new " onset of possible cancer  -EG       Row Name 10/16/24 1015          Positioning and Restraints    Post Treatment Position chair  -EG     In Chair reclined;sitting;call light within reach;encouraged to call for assist;exit alarm on  -EG               User Key  (r) = Recorded By, (t) = Taken By, (c) = Cosigned By      Initials Name Provider Type    Janessa Alvarez, OT Occupational Therapist                   Outcome Measures       Row Name 10/16/24 1017          How much help from another is currently needed...    Putting on and taking off regular lower body clothing? 2  -EG     Bathing (including washing, rinsing, and drying) 3  -EG     Toileting (which includes using toilet bed pan or urinal) 3  -EG     Putting on and taking off regular upper body clothing 3  -EG     Taking care of personal grooming (such as brushing teeth) 4  -EG     Eating meals 4  -EG     AM-PAC 6 Clicks Score (OT) 19  -EG       Row Name 10/16/24 0845 10/16/24 0241       How much help from another person do you currently need...    Turning from your back to your side while in flat bed without using bedrails? 3  -KK 3  -FM    Moving from lying on back to sitting on the side of a flat bed without bedrails? 3  -KK 3  -FM    Moving to and from a bed to a chair (including a wheelchair)? 3  -KK 3  -FM    Standing up from a chair using your arms (e.g., wheelchair, bedside chair)? 3  -KK 3  -FM    Climbing 3-5 steps with a railing? 2  -KK 2  -FM    To walk in hospital room? 3  -KK 3  -FM    AM-PAC 6 Clicks Score (PT) 17  -KK 17  -FM    Highest Level of Mobility Goal 5 --> Static standing  -KK 5 --> Static standing  -FM      Row Name 10/16/24 1017          Functional Assessment    Outcome Measure Options AM-PAC 6 Clicks Daily Activity (OT);Optimal Instrument  -EG       Row Name 10/16/24 1017          Optimal Instrument    Optimal Instrument Optimal - 3  -EG     Bending/Stooping 3  -EG     Standing 2  -EG     Reaching 1  -EG               User Key  (r)  = Recorded By, (t) = Taken By, (c) = Cosigned By      Initials Name Provider Type    Shira Fontenot, RN Registered Nurse    Jacki Landin RN Registered Nurse    Janessa Alvarez OT Occupational Therapist                  Section G  Mobility  Bed mobility - self performance: limited assistance (staff provide guided maneuvering of limbs or other non-weight bearing assistance)  Bed mobility support/assistance: One person assist  Transfer - self performance: limited assistance (staff provide guided maneuvering of limbs or other non-weight bearing assistance)  Transfer support/assistance: One person assist  Walking in room - self performance: limited assistance (staff provide guided maneuvering of limbs or other non-weight bearing assistance)  Walking in room support/assistance: One person assist  Walking in corridors/hallway - self performance: limited assistance (staff provide guided maneuvering of limbs or other non-weight bearing assistance)  Walking in corridors/hallway support/assistance: One person assist  Locomotion on unit - self performance: activity did not occur  Locomotion on unit support/assistance: Activity did not occur  Locomotion off unit - self performance: activity did not occur  Locomotion off unit support/assistance: Activity did not occur  Dressing - self performance: limited assistance (staff provide guided maneuvering of limbs or other non-weight bearing assistance)  Dressing support/assistance: One person assist  Eating - self performance: independent  Eating support/assistance: Setup help only  Toileting - self performance: limited assistance (staff provide guided maneuvering of limbs or other non-weight bearing assistance)  Toileting support/assistance: One person assist  Personal hygiene - self performance: limited assistance (staff provide guided maneuvering of limbs or other non-weight bearing assistance)  Personal hygiene support/assistance: One person  assist  Bathing  Bathing - self performance: Physical help with bathing (exclude washing back and hair for patient)  Bathing support/assistance: One person assist  Balance  Balance during transitions & walking: Not steady, requires assist to steady  Moving from seated to standing position: Not steady, requires assist to steady  Walking: Not steady, requires assist to steady  Turning around while walking: Not steady, requires assist to steady  Moving on and off toilet: Not steady, requires assist to steady  Surface-to-surface transfer: Not steady, requires assist to steady  Mobility devices: None were used  Range of Motion  Upper Extremity: No impairment  Lower Extremity: Impairment on one side  Section GG  Functional Ability/Goals, Adm (Section GG)  Self Care, Prior Functioning (UA0638I): 3. Independent  Functional Cognition, Prior Functioning (CL3115G): 3. Independent  Prior Device Use (SR4537): none of the above (Z)  Upper Extremity Range of Motion (OA9453X): No impairment  Lower Extremity Range of Motion (LL9311S): Impairment on one side  Self Care, Admission (Section GG)  Eating: Self-Care Admission Performance (IJ9464C3): setup or clean-up assistance (05)  Oral Hygiene: Self-Care Admission Performance (VL7373F6): setup or clean-up assistance (05)  Toileting Hygiene: Self-Care Admission Performance (KM9658Q0): partial/moderate assistance (03)  Shower/Bathe Self: Self-Care Admission Performance (SX9363D1): partial/moderate assistance (03)  Upper Body Dressing: Self-Care Admission Performance (FB0803E7): setup or clean-up assistance (05)  Lower Body Dressing: Self-Care Admission Performance (SP2161I2): partial/moderate assistance (03)  Putting On/Taking Off Footwear: Self-Care Admission Performance (DU3020B2): partial/moderate assistance (03)  Personal Hygiene: Self-Care Admission Performance (DV4409L1): supervision or touching assistance (04)  Mobility, Admission Performance (QL2519)  Chair/Bed-Chair Transfer:  Mobility Admission Performance (KU3102K3): partial/moderate assistance (03)  Toilet Transfer: Mobility Admission Performance (SK0828Y2): partial/moderate assistance (03)  Tub/shower Transfer: Mobility Admission Performance (OF5250KO3): partial/moderate assistance (03)                Occupational Therapy Education       Title: PT OT SLP Therapies (Done)       Topic: Occupational Therapy (Done)       Point: ADL training (Done)       Description:   Instruct learner(s) on proper safety adaptation and remediation techniques during self care or transfers.   Instruct in proper use of assistive devices.                  Learning Progress Summary            Patient JING [place] VU by EG at 10/4/2024 1056    Comment: Education on compensatory techniques for ADL's  Education on AE  Education on fall risk prevention and general safety  Education on OT services                      Point: Home exercise program (Done)       Description:   Instruct learner(s) on appropriate technique for monitoring, assisting and/or progressing therapeutic exercises/activities.                  Learning Progress Summary            Patient JING [place] VU by EG at 10/4/2024 1056    Comment: Education on compensatory techniques for ADL's  Education on AE  Education on fall risk prevention and general safety  Education on OT services                      Point: Precautions (Done)       Description:   Instruct learner(s) on prescribed precautions during self-care and functional transfers.                  Learning Progress Summary            Patient JING [place] VU by EG at 10/4/2024 1056    Comment: Education on compensatory techniques for ADL's  Education on AE  Education on fall risk prevention and general safety  Education on OT services                      Point: Body mechanics (Done)       Description:   Instruct learner(s) on proper positioning and spine alignment during self-care, functional mobility activities and/or exercises.                   "Learning Progress Summary            Patient JING [place] VU by EG at 10/4/2024 1056    Comment: Education on compensatory techniques for ADL's  Education on AE  Education on fall risk prevention and general safety  Education on OT services                                      User Key       Initials Effective Dates Name Provider Type Discipline    EG 09/14/22 -  Janessa Good, OT Occupational Therapist OT                  OT Recommendation and Plan  Planned Therapy Interventions (OT): activity tolerance training, functional balance retraining, occupation/activity based interventions, adaptive equipment training, BADL retraining, neuromuscular control/coordination retraining, patient/caregiver education/training, transfer/mobility retraining, strengthening exercise  Therapy Frequency (OT): 5 times/wk  Plan of Care Review  Plan of Care Reviewed With: patient  Progress: improving  Outcome Evaluation: Pleasant and cooperative; no pain reported when asked; Patient with possible new cancer diagnosis and states feeling \"overwhelmed\"; OT provided therapuetic comfort and patient motivated to participate despite new concerns noted; OT addressing endurance, strength and balance for carryover into ADL's; Continue with POC; Ax1 w/RW and CAM boot.     Time Calculation:   Evaluation Complexity (OT)  Review Occupational Profile/Medical/Therapy History Complexity: expanded/moderate complexity  Assessment, Occupational Performance/Identification of Deficit Complexity: 3-5 performance deficits  Clinical Decision Making Complexity (OT): detailed assessment/moderate complexity  Overall Complexity of Evaluation (OT): moderate complexity     Time Calculation- OT       Row Name 10/16/24 1023             Time Calculation- OT    OT Received On 10/16/24  -EG      OT Goal Re-Cert Due Date 11/03/24  -EG         Timed Charges    52656 - OT Therapeutic Exercise Minutes 24  -EG      91908 -  OT Neuromuscular Reeducation Minutes 12  -EG      57560 " - OT Therapeutic Activity Minutes 15  -EG      46116 - OT Self Care/Mgmt Minutes 8  -EG         SNF Occupational Therapy Minutes    Skilled Minutes- OT 59 min  -EG         Total Minutes    Timed Charges Total Minutes 59  -EG       Total Minutes 59  -EG                User Key  (r) = Recorded By, (t) = Taken By, (c) = Cosigned By      Initials Name Provider Type    EG Janessa Good OT Occupational Therapist                  Therapy Charges for Today       Code Description Service Date Service Provider Modifiers Qty    58498081775 HC OT THERAPEUTIC ACT EA 15 MIN 10/15/2024 Janessa Good OT GO 1    68852603652 HC OT SELF CARE/MGMT/TRAIN EA 15 MIN 10/15/2024 Janessa Good OT GO 2    41752163142 HC OT NEUROMUSC RE EDUCATION EA 15 MIN 10/16/2024 Janessa Good OT GO 1    30979905369 HC OT THER PROC EA 15 MIN 10/16/2024 Janessa Good OT GO 2    91849080732 HC OT THERAPEUTIC ACT EA 15 MIN 10/16/2024 Janessa Good OT GO 1                 Janessa Good OT  10/16/2024

## 2024-10-16 NOTE — THERAPY TREATMENT NOTE
SNF - Physical Therapy Treatment Note   Yudy     Patient Name: Woodrow Alejandro  : 1950  MRN: [id number]  Today's Date: 10/16/2024      Visit Dx:    ICD-10-CM ICD-9-CM   1. Decreased activities of daily living (ADL)  Z78.9 V49.89   2. Difficulty walking  R26.2 719.7     Patient Active Problem List   Diagnosis    Essential hypertension    Permanent atrial fibrillation    S/P AVR    ICD (implantable cardioverter-defibrillator), dual, in situ    Dermatitis    IFG (impaired fasting glucose)    Mixed hyperlipidemia    Vitamin D deficiency    Medicare annual wellness visit, subsequent    Primary osteoarthritis of both knees    Screening PSA (prostate specific antigen)    Bilateral primary osteoarthritis of knee    Coarse tremors    Weakness generalized    Increased urinary frequency    Bandemia    Achilles tendon rupture    Anemia due to GI blood loss    Physical debility     Past Medical History:   Diagnosis Date    Aortic valve replaced     Arthritis 2018    Atrial fibrillation     Coronary artery disease     Hypertension      Past Surgical History:   Procedure Laterality Date    CARDIAC SURGERY      COLONOSCOPY      COLONOSCOPY N/A 2024    Procedure: COLONOSCOPY WITH HOT/COLD SNARE POLYPECTOMIES, ELEVIEW INJECTION,CLIPX1;  Surgeon: Kurt Mensah MD;  Location: [facility] ENDOSCOPY;  Service: Gastroenterology;  Laterality: N/A;  COLON POLYPS    ENDOSCOPY N/A 2024    Procedure: ESOPHAGOGASTRODUODENOSCOPY WITH BIOPSIES;  Surgeon: Kurt Mensah MD;  Location: [facility] ENDOSCOPY;  Service: Gastroenterology;  Laterality: N/A;  RETAINED FOOD IN STOMACH AND REFLUX ESOPHAGITIS    PACEMAKER IMPLANTATION         PT Assessment (Last 12 Hours)       PT Evaluation and Treatment       Row Name 10/16/24 1554          Physical Therapy Time and Intention    Subjective Information complains of;fatigue  Lack of sleep  -VK     Document Type therapy note (daily note)  -VK     Mode of Treatment  individual therapy;physical therapy  -VK     Patient Effort good  -VK     Comment Due to fatigue, pt declining further therex/ambulation today other than what was done.  -VK       Row Name 10/16/24 1010          General Information    Patient Profile Reviewed yes  -VK     Existing Precautions/Restrictions fall  CAM boot  -VK       Row Name 10/16/24 7077          Cognition    Affect/Mental Status (Cognition) WNL  -VK     Orientation Status (Cognition) oriented x 4  -VK     Personal Safety Interventions nonskid shoes/slippers when out of bed;gait belt;fall prevention program maintained  -VK       Row Name 10/16/24 4946          Sit-Stand Transfer    Sit-Stand Norman (Transfers) contact guard;minimum assist (75% patient effort);verbal cues  -VK     Assistive Device (Sit-Stand Transfers) walker, front-wheeled  -VK       Row Name 10/16/24 7274          Stand-Sit Transfer    Stand-Sit Norman (Transfers) contact guard;verbal cues  -VK     Assistive Device (Stand-Sit Transfers) walker, front-wheeled  -VK       Row Name 10/16/24 2030          Gait/Stairs (Locomotion)    Norman Level (Gait) contact guard;verbal cues  -VK     Assistive Device (Gait) walker, front-wheeled  -VK     Patient was able to Ambulate yes  -VK     Distance in Feet (Gait) --  40ft, 40ft  -VK     Pattern (Gait) 4-point;step-to  -VK     Deviations/Abnormal Patterns (Gait) tapan decreased;gait speed decreased;stride length decreased  -VK     Bilateral Gait Deviations forward flexed posture;heel strike decreased  -VK     Right Sided Gait Deviations weight shift ability decreased  -VK       Row Name 10/16/24 4532          Safety Issues/Impairments Affecting Functional Mobility    Impairments Affecting Function (Mobility) balance;endurance/activity tolerance;range of motion (ROM);strength  -VK       Row Name 10/16/24 5420          Balance    Sit to Stand Dynamic Balance contact guard;minimal assist  -VK     Static Standing Balance contact  guard  -VK     Dynamic Standing Balance contact guard;minimal assist  -VK     Position/Device Used, Standing Balance walker, front-wheeled  -VK     Balance Interventions sit to stand  -VK       Row Name 10/16/24 1555          Aerobic Exercise    Type (Aerobic Exercise) recumbent stationary bike  -VK     Comment, Aerobic Exercise (Therapeutic Exercise) 20 minutes, load 4  -VK       Row Name 10/16/24 1552          Positioning and Restraints    Pre-Treatment Position sitting in chair/recliner  -VK     Post Treatment Position chair  -VK     In Chair reclined;call light within reach;encouraged to call for assist;exit alarm on  -VK       Row Name 10/16/24 1557          Progress Summary (PT)    Progress Toward Functional Goals (PT) progress toward functional goals is fair  -VK               User Key  (r) = Recorded By, (t) = Taken By, (c) = Cosigned By      Initials Name Provider Type    Karla Ignacio PTA Physical Therapist Assistant                  Section G              Section GG                       Physical Therapy Education       Title: PT OT SLP Therapies (Done)       Topic: Physical Therapy (Resolved)       Point: Mobility training (Resolved)       Learner Progress:  Not documented in this visit.              Point: Home exercise program (Resolved)       Learner Progress:  Not documented in this visit.              Point: Body mechanics (Resolved)       Learner Progress:  Not documented in this visit.              Point: Precautions (Resolved)       Learner Progress:  Not documented in this visit.                                  PT Recommendation and Plan     Progress Summary (PT)  Progress Toward Functional Goals (PT): progress toward functional goals is fair   Outcome Measures       Row Name 10/16/24 1600 10/15/24 1141 10/14/24 1159       How much help from another person do you currently need...    Turning from your back to your side while in flat bed without using bedrails? 3  -VK 3  -VK 3  -WM     Moving from lying on back to sitting on the side of a flat bed without bedrails? 3  -VK 3  -VK 3  -WM    Moving to and from a bed to a chair (including a wheelchair)? 3  -VK 3  -VK 3  -WM    Standing up from a chair using your arms (e.g., wheelchair, bedside chair)? 3  -VK 3  -VK 2  -WM    Climbing 3-5 steps with a railing? 2  -VK 2  -VK 2  -WM    To walk in hospital room? 3  -VK 3  -VK 3  -WM    AM-PAC 6 Clicks Score (PT) 17  -VK 17  -VK 16  -WM    Highest Level of Mobility Goal 5 --> Static standing  -VK 5 --> Static standing  -VK 5 --> Static standing  -WM              User Key  (r) = Recorded By, (t) = Taken By, (c) = Cosigned By      Initials Name Provider Type    WM Dwaine Guzman PTA Physical Therapist Assistant    Karla Ignacio PTA Physical Therapist Assistant                     Time Calculation:    PT Charges       Row Name 10/16/24 1635             Time Calculation    PT Received On 10/16/24  -VK         Timed Charges    59253 - PT Therapeutic Exercise Minutes 20  -VK      33511 - Gait Training Minutes  8  -VK      35636 - PT Therapeutic Activity Minutes 10  -VK         SNF Physical Therapy Minutes    Skilled Minutes- PT 38 min  -VK         Total Minutes    Timed Charges Total Minutes 38  -VK       Total Minutes 38  -VK                User Key  (r) = Recorded By, (t) = Taken By, (c) = Cosigned By      Initials Name Provider Type    Karla Ignacio PTA Physical Therapist Assistant                  Therapy Charges for Today       Code Description Service Date Service Provider Modifiers Qty    34638217995 HC PT THER PROC EA 15 MIN 10/15/2024 Karla Gaines PTA GP 1    70924866559 HC GAIT TRAINING EA 15 MIN 10/15/2024 Karla Gaines PTA GP 1    94210000990 HC PT THERAPEUTIC ACT EA 15 MIN 10/15/2024 Karla Gaines PTA GP 2    69044197370 HC PT THER PROC EA 15 MIN 10/16/2024 Karla Gaines PTA GP 1    34950535195 HC GAIT TRAINING EA 15 MIN 10/16/2024 Karla Gaines PTA GP 1    19268119635 HC PT  THERAPEUTIC ACT EA 15 MIN 10/16/2024 Karla Gaines, PTA GP 1            PT G-Codes  Outcome Measure Options: AM-PAC 6 Clicks Daily Activity (OT), Optimal Instrument  AM-PAC 6 Clicks Score (PT): 17  AM-PAC 6 Clicks Score (OT): 19    Karla Gaines PTA  10/16/2024

## 2024-10-16 NOTE — PLAN OF CARE
Goal Outcome Evaluation:           Progress: no change   Outcome Evaluation: Rsd. is alert and oriented x4, able to make needs known to staff.  Rsd. transfers x1 assist with gaitbelt and rolling walker, urinal remains at bedside.  Rsd. needs assistance with urinal due to anatomy.  Rsd. denies pain, and has rested intermittently in between care.  Call light and personal items within reach.  Rsd. reminded to use call light for assistance, verbalizes understanding.  Will continue to monitor and notify on-coming staff.  Current plan of care remains in place at this time.Rsd. Had low grade temp. Earlier in shift now down to 99.5.  Labs drawn this am; awaiting results.

## 2024-10-16 NOTE — PLAN OF CARE
Goal Outcome Evaluation:  Plan of Care Reviewed With: patient        Progress: no change  Outcome Evaluation: Patient alert and oriented able to make needs known. Assist to the bathroom with his rolling walker and cam boot. Patient had large BM today. Refused cream for his rash stating he felt it was resolved. Patient afebrile this shift. Complaints of not sleeping well but in a pleasent and joking mood with family and staff. Cristino BUCHANAN spoke with daughter about patients labs. Hematology consult given. Blood cultures redrawn. no new concerns.

## 2024-10-16 NOTE — PROGRESS NOTES
[facility]     Progress Note    Patient Name: Woodrow Alejandro  : 1950  MRN: [id number]  Primary Care Physician:  Juan Perez MD  Date of admission: 10/3/2024    Subjective   Subjective     Chief Complaint: Iron deficiency anemia leukocytosis with stool occult blood positive but GI workup has been unremarkable except blood loss leukocytosis appears to be reactive possible because of iron deficiency with acute blood loss so we will give intravenous iron    HPI: Patient with leukocytosis different causes for possible reactive leukocytosis with infection inflammation in his heel on the right side as well as iron deficiency with acute GI blood loss and pneumonia however MPN workup is also in progress    Review of Systems   All systems were reviewed and negative except for: Has been reviewed    Objective   Objective     Vitals:   Temp:  [97.7 °F (36.5 °C)-100.8 °F (38.2 °C)] 97.7 °F (36.5 °C)  Heart Rate:  [100-104] 104  Resp:  [18] 18  BP: (111-131)/(63-73) 111/63    Physical Exam    Constitutional: Alert and oriented not in acute distress   Eyes: Pupils equal reacting to light and accommodation   HENT: Normal   Neck: Normal   Respiratory: No rales or rhonchi   Cardiovascular: S1-S2 normal no S3 or S4   Gastrointestinal: No palpable organomegaly   Musculoskeletal: No deformities   Neurologic: No localizing signs  Skin: No rash       Result Review    Result Review:  I have personally reviewed the results from the time of this admission to 10/16/2024 08:15 EDT and agree with these findings:  [x]  Laboratory  []  Microbiology  [x]  Radiology  []  EKG/Telemetry   []  Cardiology/Vascular   []  Pathology  Has been reviewed and discussed has been reviewed and discussed old records  []  Other:  Most notable findings include: Anemia and leukocytosis    Assessment & Plan   Assessment / Plan     Brief Patient Summary:  Woodrow Alejandro is a 73 y.o. male who patient with leukocytosis iron deficiency  anemia GI blood loss pneumonia and infection of the heel    Active Hospital Problems:  Active Hospital Problems    Diagnosis     **Physical debility        Plan:   Patient continues to be on physical therapy after the patient is able to ambulate    VTE Prophylaxis:  No VTE prophylaxis order currently exists.        CODE STATUS:   Level Of Support Discussed With: Patient  Code Status (Patient has no pulse and is not breathing): CPR (Attempt to Resuscitate)  Medical Interventions (Patient has pulse or is breathing): Full Support    Disposition:  I expect patient to be discharged after the patient is able to ambulate.    Electronically signed by Kiet Lindsay MD, 10/16/24, 8:15 AM EDT.      Part of this note may be an electronic transcription/translation of spoken language to printed text using the Dragon Dictation System.

## 2024-10-17 LAB
ALBUMIN SERPL-MCNC: 2.5 G/DL (ref 3.5–5.2)
ALBUMIN/GLOB SERPL: 0.7 G/DL
ALP SERPL-CCNC: 66 U/L (ref 39–117)
ALT SERPL W P-5'-P-CCNC: 10 U/L (ref 1–41)
ANION GAP SERPL CALCULATED.3IONS-SCNC: 10.5 MMOL/L (ref 5–15)
AST SERPL-CCNC: 14 U/L (ref 1–40)
BACTERIA BLD CULT: ABNORMAL
BASOPHILS # BLD AUTO: 0.04 10*3/MM3 (ref 0–0.2)
BASOPHILS NFR BLD AUTO: 0.4 % (ref 0–1.5)
BILIRUB SERPL-MCNC: 0.5 MG/DL (ref 0–1.2)
BOTTLE TYPE: ABNORMAL
BUN SERPL-MCNC: 14 MG/DL (ref 8–23)
BUN/CREAT SERPL: 17.9 (ref 7–25)
CALCIUM SPEC-SCNC: 8.3 MG/DL (ref 8.6–10.5)
CHLORIDE SERPL-SCNC: 100 MMOL/L (ref 98–107)
CHROMATIN AB SERPL-ACNC: <0.2 AI (ref 0–0.9)
CO2 SERPL-SCNC: 23.5 MMOL/L (ref 22–29)
CREAT SERPL-MCNC: 0.78 MG/DL (ref 0.76–1.27)
DEPRECATED RDW RBC AUTO: 57.1 FL (ref 37–54)
DSDNA AB SER-ACNC: <1 IU/ML (ref 0–9)
EGFRCR SERPLBLD CKD-EPI 2021: 94.2 ML/MIN/1.73
ENA RNP AB SER-ACNC: 0.2 AI (ref 0–0.9)
ENA SM AB SER-ACNC: <0.2 AI (ref 0–0.9)
ENA SS-A AB SER-ACNC: <0.2 AI (ref 0–0.9)
ENA SS-B AB SER-ACNC: <0.2 AI (ref 0–0.9)
EOSINOPHIL # BLD AUTO: 0.06 10*3/MM3 (ref 0–0.4)
EOSINOPHIL NFR BLD AUTO: 0.6 % (ref 0.3–6.2)
ERYTHROCYTE [DISTWIDTH] IN BLOOD BY AUTOMATED COUNT: 16.1 % (ref 12.3–15.4)
GLOBULIN UR ELPH-MCNC: 3.4 GM/DL
GLUCOSE SERPL-MCNC: 134 MG/DL (ref 65–99)
HCT VFR BLD AUTO: 29.4 % (ref 37.5–51)
HGB A MFR BLD ELPH: 97.4 % (ref 96.4–98.8)
HGB A2 MFR BLD ELPH: 2.6 % (ref 1.8–3.2)
HGB BLD-MCNC: 9.1 G/DL (ref 13–17.7)
HGB F MFR BLD ELPH: 0 % (ref 0–2)
HGB FRACT BLD-IMP: NORMAL
HGB S MFR BLD ELPH: 0 %
HOLD SPECIMEN: NORMAL
IGA SERPL-MCNC: 230 MG/DL (ref 61–437)
IGG SERPL-MCNC: 1069 MG/DL (ref 603–1613)
IGM SERPL-MCNC: 67 MG/DL (ref 15–143)
IMM GRANULOCYTES # BLD AUTO: 0.07 10*3/MM3 (ref 0–0.05)
IMM GRANULOCYTES NFR BLD AUTO: 0.7 % (ref 0–0.5)
KAPPA LC FREE SER-MCNC: 68.5 MG/L (ref 3.3–19.4)
KAPPA LC FREE/LAMBDA FREE SER: 2.01 {RATIO} (ref 0.26–1.65)
LAMBDA LC FREE SERPL-MCNC: 34.1 MG/L (ref 5.7–26.3)
LYMPHOCYTES # BLD AUTO: 0.72 10*3/MM3 (ref 0.7–3.1)
LYMPHOCYTES NFR BLD AUTO: 7.4 % (ref 19.6–45.3)
MAGNESIUM SERPL-MCNC: 1.7 MG/DL (ref 1.6–2.4)
MCH RBC QN AUTO: 29.6 PG (ref 26.6–33)
MCHC RBC AUTO-ENTMCNC: 31 G/DL (ref 31.5–35.7)
MCV RBC AUTO: 95.8 FL (ref 79–97)
MONOCYTES # BLD AUTO: 0.62 10*3/MM3 (ref 0.1–0.9)
MONOCYTES NFR BLD AUTO: 6.3 % (ref 5–12)
NEUTROPHILS NFR BLD AUTO: 8.26 10*3/MM3 (ref 1.7–7)
NEUTROPHILS NFR BLD AUTO: 84.6 % (ref 42.7–76)
NRBC BLD AUTO-RTO: 0 /100 WBC (ref 0–0.2)
PHOSPHATE SERPL-MCNC: 4 MG/DL (ref 2.5–4.5)
PLATELET # BLD AUTO: 126 10*3/MM3 (ref 140–450)
PMV BLD AUTO: 9.7 FL (ref 6–12)
POTASSIUM SERPL-SCNC: 3.7 MMOL/L (ref 3.5–5.2)
PROT PATTERN SERPL IFE-IMP: NORMAL
PROT SERPL-MCNC: 5.9 G/DL (ref 6–8.5)
RBC # BLD AUTO: 3.07 10*6/MM3 (ref 4.14–5.8)
RHEUMATOID FACT SERPL-ACNC: 19.3 IU/ML
SARS-COV-2 RNA RESP QL NAA+PROBE: NOT DETECTED
SODIUM SERPL-SCNC: 134 MMOL/L (ref 136–145)
WBC NRBC COR # BLD AUTO: 9.77 10*3/MM3 (ref 3.4–10.8)

## 2024-10-17 PROCEDURE — 25010000002 NA FERRIC GLUC CPLX PER 12.5 MG: Performed by: INTERNAL MEDICINE

## 2024-10-17 PROCEDURE — 97535 SELF CARE MNGMENT TRAINING: CPT

## 2024-10-17 PROCEDURE — 25010000002 CEFTRIAXONE PER 250 MG: Performed by: PHYSICIAN ASSISTANT

## 2024-10-17 PROCEDURE — 83735 ASSAY OF MAGNESIUM: CPT | Performed by: PHYSICIAN ASSISTANT

## 2024-10-17 PROCEDURE — 87635 SARS-COV-2 COVID-19 AMP PRB: CPT | Performed by: INTERNAL MEDICINE

## 2024-10-17 PROCEDURE — 25810000003 SODIUM CHLORIDE 0.9 % SOLUTION: Performed by: INTERNAL MEDICINE

## 2024-10-17 PROCEDURE — 97110 THERAPEUTIC EXERCISES: CPT

## 2024-10-17 PROCEDURE — 97530 THERAPEUTIC ACTIVITIES: CPT

## 2024-10-17 PROCEDURE — 87077 CULTURE AEROBIC IDENTIFY: CPT | Performed by: PHYSICIAN ASSISTANT

## 2024-10-17 PROCEDURE — 80053 COMPREHEN METABOLIC PANEL: CPT | Performed by: PHYSICIAN ASSISTANT

## 2024-10-17 PROCEDURE — 85025 COMPLETE CBC W/AUTO DIFF WBC: CPT | Performed by: PHYSICIAN ASSISTANT

## 2024-10-17 PROCEDURE — 88312 SPECIAL STAINS GROUP 1: CPT | Performed by: PHYSICIAN ASSISTANT

## 2024-10-17 PROCEDURE — 87040 BLOOD CULTURE FOR BACTERIA: CPT | Performed by: PHYSICIAN ASSISTANT

## 2024-10-17 PROCEDURE — 99309 SBSQ NF CARE MODERATE MDM 30: CPT | Performed by: PHYSICIAN ASSISTANT

## 2024-10-17 PROCEDURE — 84100 ASSAY OF PHOSPHORUS: CPT | Performed by: PHYSICIAN ASSISTANT

## 2024-10-17 PROCEDURE — 97116 GAIT TRAINING THERAPY: CPT

## 2024-10-17 RX ADMIN — PANTOPRAZOLE SODIUM 40 MG: 40 TABLET, DELAYED RELEASE ORAL at 08:14

## 2024-10-17 RX ADMIN — SODIUM CHLORIDE 250 MG: 9 INJECTION, SOLUTION INTRAVENOUS at 08:47

## 2024-10-17 RX ADMIN — PANTOPRAZOLE SODIUM 40 MG: 40 TABLET, DELAYED RELEASE ORAL at 16:56

## 2024-10-17 RX ADMIN — METOPROLOL TARTRATE 100 MG: 50 TABLET, FILM COATED ORAL at 21:51

## 2024-10-17 RX ADMIN — FUROSEMIDE 40 MG: 40 TABLET ORAL at 08:13

## 2024-10-17 RX ADMIN — DILTIAZEM HYDROCHLORIDE 120 MG: 30 TABLET, FILM COATED ORAL at 21:51

## 2024-10-17 RX ADMIN — ASPIRIN 325 MG: 325 TABLET ORAL at 08:13

## 2024-10-17 RX ADMIN — SENNOSIDES AND DOCUSATE SODIUM 2 TABLET: 50; 8.6 TABLET ORAL at 08:14

## 2024-10-17 RX ADMIN — SODIUM CHLORIDE 2000 MG: 9 INJECTION INTRAMUSCULAR; INTRAVENOUS; SUBCUTANEOUS at 16:56

## 2024-10-17 RX ADMIN — DILTIAZEM HYDROCHLORIDE 120 MG: 30 TABLET, FILM COATED ORAL at 08:13

## 2024-10-17 RX ADMIN — Medication 10 ML: at 08:14

## 2024-10-17 RX ADMIN — METOPROLOL TARTRATE 100 MG: 50 TABLET, FILM COATED ORAL at 08:14

## 2024-10-17 RX ADMIN — FERROUS SULFATE TAB 325 MG (65 MG ELEMENTAL FE) 325 MG: 325 (65 FE) TAB at 08:14

## 2024-10-17 RX ADMIN — Medication 10 ML: at 21:51

## 2024-10-17 RX ADMIN — Medication 5 MG: at 21:51

## 2024-10-17 NOTE — PROGRESS NOTES
[facility]     Progress Note    Patient Name: Woodrow Alejandro  : 1950  MRN: [id number]  Primary Care Physician:  Juan Perez MD  Date of admission: 10/3/2024    Subjective   Subjective     Chief Complaint: Patient referred because of anemia and leukocytosis it was felt that leukocytosis is reactive with does appear to be has already normalized patient has been given IV infusions patient is feeling much stronger with workup so far has been negative for myeloproliferative disorder    HPI: Patient with leukocytosis iron deficiency anemia with GI bleed stabilized also infection in the right heel which is getting better    Review of Systems   All systems were reviewed and negative except for: Has been reviewed    Objective   Objective     Vitals:   Temp:  [98.1 °F (36.7 °C)-98.2 °F (36.8 °C)] 98.2 °F (36.8 °C)  Heart Rate:  [84-92] 92  Resp:  [18] 18  BP: ()/(56-60) 102/56    Physical Exam    Constitutional: Alert and oriented not in acute distress   Eyes: Pupils equal reacting to light and accommodation   HENT: Normal   Neck: Normal   Respiratory: No rales or rhonchi   Cardiovascular: S1-S2 normal no S3 or S4   Gastrointestinal: No palpable organomegaly   Musculoskeletal: No localizing signs right sided wound on the heel   Neurologic: No localizing sign  Skin: No rash       Result Review    Result Review:  I have personally reviewed the results from the time of this admission to 10/17/2024 08:02 EDT and agree with these findings:  [x]  Laboratory  []  Microbiology  []  Radiology  []  EKG/Telemetry   []  Cardiology/Vascular   []  Pathology  []  Old records  []  Other:  Most notable findings include: Reviewed and discussed    Assessment & Plan   Assessment / Plan     Brief Patient Summary:  Woodrow Alejandro is a 73 y.o. male who patient with leukocytosis subsided anemia improving    Active Hospital Problems:  Active Hospital Problems    Diagnosis     **Physical debility        Plan:    Continue IV infusions physical therapy    VTE Prophylaxis:  No VTE prophylaxis order currently exists.        CODE STATUS:   Level Of Support Discussed With: Patient  Code Status (Patient has no pulse and is not breathing): CPR (Attempt to Resuscitate)  Medical Interventions (Patient has pulse or is breathing): Full Support    Disposition:  I expect patient to be discharged after the patient has been stabilized.    Electronically signed by Kiet Lindsay MD, 10/17/24, 8:02 AM EDT.      Part of this note may be an electronic transcription/translation of spoken language to printed text using the Dragon Dictation System.

## 2024-10-17 NOTE — THERAPY TREATMENT NOTE
SNF - Occupational Therapy Treatment Note   Yudy    Patient Name: Woodrow Alejandro  : 1950    MRN: [id number]                              Today's Date: 10/17/2024       Admit Date: 10/3/2024    Visit Dx:     ICD-10-CM ICD-9-CM   1. Decreased activities of daily living (ADL)  Z78.9 V49.89   2. Difficulty walking  R26.2 719.7     Patient Active Problem List   Diagnosis    Essential hypertension    Permanent atrial fibrillation    S/P AVR    ICD (implantable cardioverter-defibrillator), dual, in situ    Dermatitis    IFG (impaired fasting glucose)    Mixed hyperlipidemia    Vitamin D deficiency    Medicare annual wellness visit, subsequent    Primary osteoarthritis of both knees    Screening PSA (prostate specific antigen)    Bilateral primary osteoarthritis of knee    Coarse tremors    Weakness generalized    Increased urinary frequency    Bandemia    Achilles tendon rupture    Anemia due to GI blood loss    Physical debility     Past Medical History:   Diagnosis Date    Aortic valve replaced     Arthritis 2018    Atrial fibrillation     Coronary artery disease     Hypertension      Past Surgical History:   Procedure Laterality Date    CARDIAC SURGERY      COLONOSCOPY      COLONOSCOPY N/A 2024    Procedure: COLONOSCOPY WITH HOT/COLD SNARE POLYPECTOMIES, ELEVIEW INJECTION,CLIPX1;  Surgeon: Kurt Mensah MD;  Location: [facility] ENDOSCOPY;  Service: Gastroenterology;  Laterality: N/A;  COLON POLYPS    ENDOSCOPY N/A 2024    Procedure: ESOPHAGOGASTRODUODENOSCOPY WITH BIOPSIES;  Surgeon: Kurt Mensah MD;  Location: [facility] ENDOSCOPY;  Service: Gastroenterology;  Laterality: N/A;  RETAINED FOOD IN STOMACH AND REFLUX ESOPHAGITIS    PACEMAKER IMPLANTATION        General Information       Row Name 10/17/24 1151          OT Time and Intention    Document Type therapy note (daily note)  -EG     Mode of Treatment individual therapy;occupational therapy  -EG       Row Name 10/17/24 1150           General Information    Existing Precautions/Restrictions fall  CAM boot RLE  -EG       Row Name 10/17/24 1151          Cognition    Orientation Status (Cognition) oriented x 4  -EG       Row Name 10/17/24 1151          Safety Issues/Impairments Affecting Functional Mobility    Impairments Affecting Function (Mobility) balance;endurance/activity tolerance;range of motion (ROM);strength  -EG               User Key  (r) = Recorded By, (t) = Taken By, (c) = Cosigned By      Initials Name Provider Type    EG Janessa Good OT Occupational Therapist                     Mobility/ADL's       Row Name 10/17/24 1300          Transfers    Transfers bed-chair transfer;sit-stand transfer;stand-sit transfer;toilet transfer  -EG     Comment, (Transfers) shower bench transfers using RW and grab bars  -EG       Row Name 10/17/24 1300          Bed-Chair Transfer    Bed-Chair [place] (Transfers) standby assist  -EG     Assistive Device (Bed-Chair Transfers) walker, front-wheeled  -EG       Row Name 10/17/24 1300          Sit-Stand Transfer    Sit-Stand [place] (Transfers) standby assist  -EG     Assistive Device (Sit-Stand Transfers) walker, front-wheeled  -EG       Row Name 10/17/24 1300          Stand-Sit Transfer    Stand-Sit [place] (Transfers) standby assist  -EG     Assistive Device (Stand-Sit Transfers) walker, front-wheeled  -EG       Row Name 10/17/24 1300          Toilet Transfer    Type (Toilet Transfer) stand pivot/stand step  -EG     [place] Level (Toilet Transfer) standby assist  -EG     Assistive Device (Toilet Transfer) raised toilet seat;walker, front-wheeled;grab bars/safety frame  -EG       Row Name 10/17/24 1300          Functional Mobility    Functional Mobility- Ind. Level contact guard assist;verbal cues required;nonverbal cues required (demo/gesture);standby assist  -EG     Functional Mobility- Device walker, front-wheeled  -EG     Functional Mobility- Comment Functional mobility  performed to and from shower room using RW; in therapy gym as needed short funcitonal distances  -EG       Row Name 10/17/24 1300          Activities of Daily Living    BADL Assessment/Intervention bathing;upper body dressing;lower body dressing;grooming;toileting  -EG       Row Name 10/17/24 1300          Mobility    Extremity Weight-bearing Status right lower extremity  -EG     Right Lower Extremity (Weight-bearing Status) weight-bearing as tolerated (WBAT)  -EG       Row Name 10/17/24 1300          Lower Body Dressing Assessment/Training    [place] Level (Lower Body Dressing) lower body dressing skills;minimum assist (75% patient effort)  -EG     Assistive Devices (Lower Body Dressing) reacher  -EG     Comment, (Lower Body Dressing) improved ability to thread and use of reacher  -EG       Row Name 10/17/24 1300          Upper Body Dressing Assessment/Training    [place] Level (Upper Body Dressing) upper body dressing skills;set up  -EG       Row Name 10/17/24 1300          Bathing Assessment/Intervention    [place] Level (Bathing) minimum assist (75% patient effort);bathing skills;lower body;upper body  -EG     Assistive Devices (Bathing) tub bench;hand-held shower spray hose;grab bar, tub/shower  -EG       Row Name 10/17/24 1300          Grooming Assessment/Training    [place] Level (Grooming) grooming skills;set up;wash face, hands;hair care, combing/brushing  -EG       [facility] Name 10/17/24 1300          Toileting Assessment/Training    [place] Level (Toileting) toileting skills;minimum assist (75% patient effort);contact guard assist  -EG     Assistive Devices (Toileting) grab bar/safety frame;commode chair  -EG               User Key  (r) = Recorded By, (t) = Taken By, (c) = Cosigned By      Initials Name Provider Type    EG Janessa Good OT Occupational Therapist                   Obj/Interventions       Row Name 10/17/24 1302          Sensory Assessment (Somatosensory)    Sensory  Assessment (Somatosensory) UE sensation intact  -       Row Name 10/17/24 1302          Motor Skills    Therapeutic Exercise aerobic  -EG       Row Name 10/17/24 1302          Balance    Balance Interventions standing;sit to stand;supported;static;dynamic;weight shifting activity;occupation based/functional task  -[facility] Name 10/17/24 1302          Aerobic Exercise    Type (Aerobic Exercise) arm bike  -EG     Comment, Aerobic Exercise (Therapeutic Exercise) Omnicycle performed 12:00 no rest break cardiac interval setting  -EG               User Key  (r) = Recorded By, (t) = Taken By, (c) = Cosigned By      Initials Name Provider Type    EG Janessa Good, JOSELIN Occupational Therapist                   Goals/Plan    No documentation.                  Clinical Impression       Row Name 10/17/24 1303          Plan of Care Review    Plan of Care Reviewed With patient  -EG     Progress no change  -EG     Outcome Evaluation Patient is pleasant, cooperative, A&Ox4; Patient demonstrates some improvement with LB clothing mangement and donning using reacher and overall improved ROM/balance skills at seated level; Improvements with tolerance for mobility and sit to stand independence consistency; OT will continue to follow POC; Ax1 w/RW and CAM boot.  -[facility] Name 10/17/24 1303          Therapy Assessment/Plan (OT)    Criteria for Skilled Therapeutic Interventions Met (OT) yes;meets criteria;skilled treatment is necessary  -EG     Therapy Frequency (OT) 5 times/wk  -EG       Row Name 10/17/24 1303          Therapy Plan Review/Discharge Plan (OT)    Anticipated Discharge Disposition (OT) home with home health;other (see comments)  -EG       Row Name 10/17/24 1303          Positioning and Restraints    Pre-Treatment Position sitting in chair/recliner  -EG     In Chair reclined;sitting;call light within reach;encouraged to call for assist;exit alarm on  -               User Key  (r) = Recorded By, (t) = Taken By,  (c) = Cosigned By      Initials Name Provider Type    EG Janessa Good, OT Occupational Therapist                   Outcome Measures       Row Name 10/17/24 1304          How much help from another is currently needed...    Putting on and taking off regular lower body clothing? 2  -EG     Bathing (including washing, rinsing, and drying) 3  -EG     Toileting (which includes using toilet bed pan or urinal) 3  -EG     Putting on and taking off regular upper body clothing 4  -EG     Taking care of personal grooming (such as brushing teeth) 4  -EG     Eating meals 4  -EG     AM-PAC 6 Clicks Score (OT) 20  -EG       Row Name 10/17/24 0813 10/17/24 0157       How much help from another person do you currently need...    Turning from your back to your side while in flat bed without using bedrails? 3  -AF 3  -FM    Moving from lying on back to sitting on the side of a flat bed without bedrails? 3  -AF 3  -FM    Moving to and from a bed to a chair (including a wheelchair)? 3  -AF 3  -FM    Standing up from a chair using your arms (e.g., wheelchair, bedside chair)? 3  -AF 3  -FM    Climbing 3-5 steps with a railing? 2  -AF 2  -FM    To walk in hospital room? 3  -AF 3  -FM    AM-PAC 6 Clicks Score (PT) 17  -AF 17  -FM    Highest Level of Mobility Goal 5 --> Static standing  -AF 5 --> Static standing  -FM      Row Name 10/17/24 1304          Functional Assessment    Outcome Measure Options AM-PAC 6 Clicks Daily Activity (OT);Optimal Instrument  -EG       Row Name 10/17/24 1304          Optimal Instrument    Optimal Instrument Optimal - 3  -EG     Bending/Stooping 3  -EG     Standing 2  -EG     Reaching 1  -EG               User Key  (r) = Recorded By, (t) = Taken By, (c) = Cosigned By      Initials Name Provider Type    Shira Fontenot, RN Registered Nurse    Janessa Alvarez OT Occupational Therapist    AF Sintia Kaplan RN Registered Nurse                  Section G  Mobility  Bed mobility - self performance:  limited assistance (staff provide guided maneuvering of limbs or other non-weight bearing assistance)  Bed mobility support/assistance: One person assist  Transfer - self performance: limited assistance (staff provide guided maneuvering of limbs or other non-weight bearing assistance)  Transfer support/assistance: One person assist  Walking in room - self performance: limited assistance (staff provide guided maneuvering of limbs or other non-weight bearing assistance)  Walking in room support/assistance: One person assist  Walking in corridors/hallway - self performance: limited assistance (staff provide guided maneuvering of limbs or other non-weight bearing assistance)  Walking in corridors/hallway support/assistance: One person assist  Locomotion on unit - self performance: activity did not occur  Locomotion on unit support/assistance: Activity did not occur  Locomotion off unit - self performance: activity did not occur  Locomotion off unit support/assistance: Activity did not occur  Dressing - self performance: limited assistance (staff provide guided maneuvering of limbs or other non-weight bearing assistance)  Dressing support/assistance: One person assist  Eating - self performance: independent  Eating support/assistance: Setup help only  Toileting - self performance: limited assistance (staff provide guided maneuvering of limbs or other non-weight bearing assistance)  Toileting support/assistance: One person assist  Personal hygiene - self performance: limited assistance (staff provide guided maneuvering of limbs or other non-weight bearing assistance)  Personal hygiene support/assistance: One person assist  Bathing  Bathing - self performance: Physical help with bathing (exclude washing back and hair for patient)  Bathing support/assistance: One person assist  Balance  Balance during transitions & walking: Not steady, requires assist to steady  Moving from seated to standing position: Not steady, requires  assist to steady  Walking: Not steady, requires assist to steady  Turning around while walking: Not steady, requires assist to steady  Moving on and off toilet: Not steady, requires assist to steady  Surface-to-surface transfer: Not steady, requires assist to steady  Mobility devices: None were used  Range of Motion  Upper Extremity: No impairment  Lower Extremity: Impairment on one side  Section GG  Functional Ability/Goals, Adm (Section GG)  Self Care, Prior Functioning (MB3316D): 3. Independent  Functional Cognition, Prior Functioning (WT2426F): 3. Independent  Prior Device Use (UX1618): none of the above (Z)  Upper Extremity Range of Motion (WD1697T): No impairment  Lower Extremity Range of Motion (EZ3868T): Impairment on one side  Self Care, Admission (Section GG)  Eating: Self-Care Admission Performance (TY0890L8): setup or clean-up assistance (05)  Oral Hygiene: Self-Care Admission Performance (KC4255Z0): setup or clean-up assistance (05)  Toileting Hygiene: Self-Care Admission Performance (HR6274Z2): partial/moderate assistance (03)  Shower/Bathe Self: Self-Care Admission Performance (CQ7966E2): partial/moderate assistance (03)  Upper Body Dressing: Self-Care Admission Performance (MA1901Z4): setup or clean-up assistance (05)  Lower Body Dressing: Self-Care Admission Performance (NP3317L1): partial/moderate assistance (03)  Putting On/Taking Off Footwear: Self-Care Admission Performance (KB4698H3): partial/moderate assistance (03)  Personal Hygiene: Self-Care Admission Performance (WK5286T1): supervision or touching assistance (04)  Mobility, Admission Performance (OL0513)  Chair/Bed-Chair Transfer: Mobility Admission Performance (BJ1243N6): partial/moderate assistance (03)  Toilet Transfer: Mobility Admission Performance (IO8025C4): partial/moderate assistance (03)  Tub/shower Transfer: Mobility Admission Performance (XM4588WD9): partial/moderate assistance (03)                Occupational Therapy Education        Title: PT OT SLP Therapies (Done)       Topic: Occupational Therapy (Done)       Point: ADL training (Done)       Description:   Instruct learner(s) on proper safety adaptation and remediation techniques during self care or transfers.   Instruct in proper use of assistive devices.                  Learning Progress Summary            Patient JING [place]oul[place] VU by EG at 10/4/2024 1056    Comment: Education on compensatory techniques for ADL's  Education on AE  Education on fall risk prevention and general safety  Education on OT services                      Point: Home exercise program (Done)       Description:   Instruct learner(s) on appropriate technique for monitoring, assisting and/or progressing therapeutic exercises/activities.                  Learning Progress Summary            Patient JING [place] VU by EG at 10/4/2024 1056    Comment: Education on compensatory techniques for ADL's  Education on AE  Education on fall risk prevention and general safety  Education on OT services                      Point: Precautions (Done)       Description:   Instruct learner(s) on prescribed precautions during self-care and functional transfers.                  Learning Progress Summary            JING Palacio VU by EG at 10/4/2024 1056    Comment: Education on compensatory techniques for ADL's  Education on AE  Education on fall risk prevention and general safety  Education on OT services                      Point: Body mechanics (Done)       Description:   Instruct learner(s) on proper positioning and spine alignment during self-care, functional mobility activities and/or exercises.                  Learning Progress Summary            Patient JING [place]oul[place] VU by EG at 10/4/2024 1056    Comment: Education on compensatory techniques for ADL's  Education on AE  Education on fall risk prevention and general safety  Education on OT services                                      User Key       Initials Effective Dates Name  Provider Type Discipline    EG 09/14/22 -  Janessa Good OT Occupational Therapist OT                  OT Recommendation and Plan  Planned Therapy Interventions (OT): activity tolerance training, functional balance retraining, occupation/activity based interventions, adaptive equipment training, BADL retraining, neuromuscular control/coordination retraining, patient/caregiver education/training, transfer/mobility retraining, strengthening exercise  Therapy Frequency (OT): 5 times/wk  Plan of Care Review  Plan of Care Reviewed With: patient  Progress: no change  Outcome Evaluation: Patient is pleasant, cooperative, A&Ox4; Patient demonstrates some improvement with LB clothing mangement and donning using reacher and overall improved ROM/balance skills at seated level; Improvements with tolerance for mobility and sit to stand independence consistency; OT will continue to follow POC; Ax1 w/RW and CAM boot.     Time Calculation:   Evaluation Complexity (OT)  Review Occupational Profile/Medical/Therapy History Complexity: expanded/moderate complexity  Assessment, Occupational Performance/Identification of Deficit Complexity: 3-5 performance deficits  Clinical Decision Making Complexity (OT): detailed assessment/moderate complexity  Overall Complexity of Evaluation (OT): moderate complexity     Time Calculation- OT       Row Name 10/17/24 1306             Time Calculation- OT    OT Received On 10/17/24  -EG      OT Goal Re-Cert Due Date 11/03/24  -EG         Timed Charges    04340 - OT Therapeutic Exercise Minutes 12  -EG      02230 - OT Therapeutic Activity Minutes 15  -EG      82200 - OT Self Care/Mgmt Minutes 29  -EG         SNF Occupational Therapy Minutes    Skilled Minutes- OT 56 min  -EG         Total Minutes    Timed Charges Total Minutes 56  -EG       Total Minutes 56  -EG                User Key  (r) = Recorded By, (t) = Taken By, (c) = Cosigned By      Initials Name Provider Type    EG Janessa Good OT  Occupational Therapist                  Therapy Charges for Today       Code Description Service Date Service Provider Modifiers Qty    71075256244 HC OT NEUROMUSC RE EDUCATION EA 15 MIN 10/16/2024 Janessa Good, OT GO 1    34501233729 HC OT THER PROC EA 15 MIN 10/16/2024 Janessa Good, OT GO 2    01361327458 HC OT THERAPEUTIC ACT EA 15 MIN 10/16/2024 Janessa Good, OT GO 1    72584031411 HC OT THER PROC EA 15 MIN 10/17/2024 Janessa Good OT GO 1    01124245724 HC OT THERAPEUTIC ACT EA 15 MIN 10/17/2024 Janessa Good, OT GO 1    90018006206 HC OT SELF CARE/MGMT/TRAIN EA 15 MIN 10/17/2024 Janessa Good, OT GO 2                 Janessa Good OT  10/17/2024

## 2024-10-17 NOTE — THERAPY TREATMENT NOTE
SNF - Physical Therapy Treatment Note   Yudy     Patient Name: Woodrow Alejandro  : 1950  MRN: [id number]  Today's Date: 10/17/2024      Visit Dx:    ICD-10-CM ICD-9-CM   1. Decreased activities of daily living (ADL)  Z78.9 V49.89   2. Difficulty walking  R26.2 719.7     Patient Active Problem List   Diagnosis    Essential hypertension    Permanent atrial fibrillation    S/P AVR    ICD (implantable cardioverter-defibrillator), dual, in situ    Dermatitis    IFG (impaired fasting glucose)    Mixed hyperlipidemia    Vitamin D deficiency    Medicare annual wellness visit, subsequent    Primary osteoarthritis of both knees    Screening PSA (prostate specific antigen)    Bilateral primary osteoarthritis of knee    Coarse tremors    Weakness generalized    Increased urinary frequency    Bandemia    Achilles tendon rupture    Anemia due to GI blood loss    Physical debility     Past Medical History:   Diagnosis Date    Aortic valve replaced     Arthritis 2018    Atrial fibrillation     Coronary artery disease     Hypertension      Past Surgical History:   Procedure Laterality Date    CARDIAC SURGERY      COLONOSCOPY      COLONOSCOPY N/A 2024    Procedure: COLONOSCOPY WITH HOT/COLD SNARE POLYPECTOMIES, ELEVIEW INJECTION,CLIPX1;  Surgeon: Kurt Mensah MD;  Location: [facility] ENDOSCOPY;  Service: Gastroenterology;  Laterality: N/A;  COLON POLYPS    ENDOSCOPY N/A 2024    Procedure: ESOPHAGOGASTRODUODENOSCOPY WITH BIOPSIES;  Surgeon: Kurt Mensah MD;  Location: [facility] ENDOSCOPY;  Service: Gastroenterology;  Laterality: N/A;  RETAINED FOOD IN STOMACH AND REFLUX ESOPHAGITIS    PACEMAKER IMPLANTATION         PT Assessment (Last 12 Hours)       PT Evaluation and Treatment       Row Name 10/17/24 1348          Physical Therapy Time and Intention    Document Type therapy note (daily note)  -WM     Mode of Treatment individual therapy;physical therapy  -WM     Patient Effort good  -WM      Symptoms Noted During/After Treatment fatigue  -WM       Row Name 10/17/24 1348          Sit-Stand Transfer    Sit-Stand [place] (Transfers) contact guard;verbal cues  -     Assistive Device (Sit-Stand Transfers) walker, front-wheeled  -WM       Row Name 10/17/24 1348          Stand-Sit Transfer    Stand-Sit [place] (Transfers) contact guard;verbal cues  -     Assistive Device (Stand-Sit Transfers) walker, front-wheeled  -WM       Row Name 10/17/24 1348          Gait/Stairs (Locomotion)    [place] Level (Gait) contact guard  -     Assistive Device (Gait) walker, front-wheeled  -WM     Distance in Feet (Gait) 55  x 2  -WM     Pattern (Gait) 4-point;step-through  -WM     Deviations/Abnormal Patterns (Gait) gait speed decreased;stride length decreased  -WM     [place] Level (Stairs) minimum assist (75% patient effort);verbal cues;1 person assist  -WM     Handrail Location (Stairs) both sides  -WM     Number of Steps (Stairs) 2  -WM     Ascending Technique (Stairs) step-to-step  -WM     Descending Technique (Stairs) step-to-step  -WM     Stairs, Impairments strength decreased;impaired balance  -WM     Comment, (Gait/Stairs) Pt  states he descends steps backwards at home.  -       Row Name 10/17/24 1348          Safety Issues/Impairments Affecting Functional Mobility    Impairments Affecting Function (Mobility) balance;endurance/activity tolerance;strength  -       Row Name 10/17/24 1348          Hip (Therapeutic Exercise)    Hip Strengthening (Therapeutic Exercise) bilateral;aBduction;aDduction;marching while seated;sitting;3 lb free weight;resistance band;green;15 repititions;2 sets  -       Row Name 10/17/24 1348          Knee (Therapeutic Exercise)    Knee Strengthening (Therapeutic Exercise) bilateral;LAQ (long arc quad);hamstring curls;sitting;3 lb free weight;resistance band;green;15 repititions;2 sets  -       Row Name 10/17/24 1348          Aerobic Exercise    Comment, Aerobic  Exercise (Therapeutic Exercise) NuStep x 15 minutes, load 3  -WM       Row Name 10/17/24 1348          Positioning and Restraints    Pre-Treatment Position sitting in chair/recliner  -WM     Post Treatment Position chair  -WM     In Chair sitting;call light within reach;encouraged to call for assist;exit alarm on;with family/caregiver  -       Row Name 10/17/24 1342          Progress Summary (PT)    Progress Toward Functional Goals (PT) progress toward functional goals is fair  -WM               User Key  (r) = Recorded By, (t) = Taken By, (c) = Cosigned By      Initials Name Provider Type    Dwaine Keith PTA Physical Therapist Assistant                  Section G              Section GG                       Physical Therapy Education       Title: PT OT SLP Therapies (Done)       Topic: Physical Therapy (Resolved)       Point: Mobility training (Resolved)       Learner Progress:  Not documented in this visit.              Point: Home exercise program (Resolved)       Learner Progress:  Not documented in this visit.              Point: Body mechanics (Resolved)       Learner Progress:  Not documented in this visit.              Point: Precautions (Resolved)       Learner Progress:  Not documented in this visit.                                  PT Recommendation and Plan     Progress Summary (PT)  Progress Toward Functional Goals (PT): progress toward functional goals is fair   Outcome Measures       Row Name 10/17/24 1353 10/16/24 1600 10/15/24 1141       How much help from another person do you currently need...    Turning from your back to your side while in flat bed without using bedrails? 3  -WM 3  -VK 3  -VK    Moving from lying on back to sitting on the side of a flat bed without bedrails? 3  -WM 3  -VK 3  -VK    Moving to and from a bed to a chair (including a wheelchair)? 3  -WM 3  -VK 3  -VK    Standing up from a chair using your arms (e.g., wheelchair, bedside chair)? 3  -WM 3  -VK 3  -VK     Climbing 3-5 steps with a railing? 2  -WM 2  -VK 2  -VK    To walk in hospital room? 3  -WM 3  -VK 3  -VK    AM-PAC 6 Clicks Score (PT) 17  -WM 17  -VK 17  -VK    Highest Level of Mobility Goal 5 --> Static standing  -WM 5 --> Static standing  -VK 5 --> Static standing  -VK              User Key  (r) = Recorded By, (t) = Taken By, (c) = Cosigned By      Initials Name Provider Type    Dwaine Keith PTA Physical Therapist Assistant    Karla Ignacio PTA Physical Therapist Assistant                     Time Calculation:    PT Charges       Row Name 10/17/24 1346             Time Calculation    PT Received On 10/17/24  -WM         Timed Charges    99368 - PT Therapeutic Exercise Minutes 29  -WM      24406 - Gait Training Minutes  10  -WM      71451 - PT Therapeutic Activity Minutes 5  -WM         SNF Physical Therapy Minutes    Skilled Minutes- PT 44 min  -WM         Total Minutes    Timed Charges Total Minutes 44  -WM       Total Minutes 44  -WM                User Key  (r) = Recorded By, (t) = Taken By, (c) = Cosigned By      Initials Name Provider Type    Dwaine Keith PTA Physical Therapist Assistant                      PT G-Codes  Outcome Measure Options: AM-PAC 6 Clicks Daily Activity (OT), Optimal Instrument  AM-PAC 6 Clicks Score (PT): 17  AM-PAC 6 Clicks Score (OT): 20    Dwaine Guzman PTA  10/17/2024

## 2024-10-17 NOTE — PROGRESS NOTES
[facility]   Hospitalist Progress Note       Patient Name: Woodrow Alejandro  : 1950  MRN: [id number]  Primary Care Physician: Juan Perez MD  Date of admission: 10/3/2024  Today's Date: 10/17/2024  Room / Bed:   305/1  Subjective   Chief Complaint: Weakness after recent hospitalization     HPI:  Woodrow Alejandro is a 73 y.o. male recently hospitalized for anemia and also right Achilles tendon tear.  During hospitalization, gastroenterology, Dr. Mensah, performed EGD and colonoscopy.  EGD showed esophagitis with bleeding lower third of esophagus.  PPI recommended.  2 polyps (tubular adenomas) were removed on colonoscopy.  Orthopedist, Dr. Zuniga, was consulted and recommended gentle ROM, weightbearing as tolerated, PT, and walking boot for his Achilles tendon tear.  His home aspirin was subsequently resumed on 10/3/24.  He has been a good candidate for more rehab and has now been admitted to our SNF for more therapy.  He will be on PPI BID x 2 weeks, then transition to daily.       Interval Followup: 10/17/2024    Placed on Rocephin.  1 out of 2 blood cultures from 10/15 was positive for Streptococcus species.  Repeat 10/17 in progress.  Pro-[place] negative  WBC noted to be 9.7 (down from 19)  Hgb 9.1  Alert.  Resting comfortably.  Pleasant demeanor.  He denies any fevers or chills.  No cough or shortness of breath.  No diarrhea.  Hematologist following  Patient remains weak but making some progress.   DME needs:  Hospital bed.  The patient has a medical condition which required positioning of the body in ways not feasible in an ordinary bed. Patient requires frequent and immediate changes in body position.       REVIEW OF SYSTEMS:   Weakness  Objective   Temp:  [98.1 °F (36.7 °C)-98.2 °F (36.8 °C)] 98.2 °F (36.8 °C)  Heart Rate:  [84-92] 92  Resp:  [18] 18  BP: ()/(56-60) 102/56  PHYSICAL EXAM   CON: WN. WD. NAD.  Alert.  Polite/pleasant demeanor.  NECK:  No thyromegaly. No  stridor.   RESP:  CTA. No wheezes.   CV:  Rhythm irregular. Rate WNL. No murmur. Trace plus bilateral pretibial edema.  Left chest AICD noted   GI:  Soft and nontender.  EXT: Trace pretibial edema  PSYCH:  Alert. Oriented.  Calm mood  NEURO:  No dysarthria or aphasia.   SKIN: Bilateral chronic venous stasis changes noted.  Results from last 7 days   Lab Units 10/17/24  0448 10/16/24  0512 10/15/24  0445 10/14/24  1154 10/11/24  1623   WBC 10*3/mm3 9.77 13.10* 16.81* 16.18* 13.02*   HEMOGLOBIN g/dL 9.1* 9.5* 9.6* 11.1* 10.5*   HEMATOCRIT % 29.4* 29.0* 29.8* 35.3* 34.5*   PLATELETS 10*3/mm3 126* 138* 141 162 149     Results from last 7 days   Lab Units 10/17/24  0448 10/16/24  0512 10/15/24  0445 10/14/24  1154 10/11/24  1623   SODIUM mmol/L 134* 136 135* 133* 132*   POTASSIUM mmol/L 3.7 3.6 3.4* 4.6 4.2   CO2 mmol/L 23.5 25.7 25.9 18.7* 22.8   CHLORIDE mmol/L 100 100 98 96* 96*   ANION GAP mmol/L 10.5 10.3 11.1 18.3* 13.2   BUN mg/dL 14 13 15 15 16   CREATININE mg/dL 0.78 0.74* 0.83 1.02 0.84   GLUCOSE mg/dL 134* 137* 149* 129* 136*         COMPLEXITY OF DATA / DECISION MAKING     []  Moderate: One acute illness or mild exacerbation of chronic or 2 stable chronic or tx side effects   []  High:  Severe acute illness or severe exacerbation of chronic - potential for major debility / life threatening         I have personally reviewed the results from the time of this admission to 10/17/2024 16:55 EDT:  []  Laboratory:  []  Microbiology: []  Radiology:  []  Telemetry:   []  Cardiology/Vascular:  []  Pathology:  []  Prior external records:  []  Independent historian provided additional details:      []  Discussed case with specialists:    []  Independent interpretation of ECG/Imaging etc:             []  Moderate: Rx management, low risk surgery, suboptimal social situation   []  High:  Rx with close monitoring for toxicity, mod-high risk surgery, DNR decision, Comfort initiated, IV pain meds    Assessment / Plan    Assessment:     Weakness after recent hospitalization  Recent anemia  Recent EGD 9/30/24 by Dr. Mensah showing esophagitis (PPI recommended)  Recent colonoscopy 9/30/24 by Dr. Mensah with polypectomy  Recent right achilles tendon tear  Nonischemic cardiomyopathy, EF improved  A-fib (on aspirin only)  Bioprosthetic aortic valve replacement (Dr. Newman)  HTN  GERD  Anemia  CAD/AICD  Noted on CT: Possible ankylosing spondylitis   Urinary frequency  1 out of 2 blood cultures positive 10/15 Streptococcus species        Plan:     Hematology consulted  Resume Lasix.  Monitor volume status renal indices  Lisinopril on hold  Home aspirin has been resumed  Continue iron supplement  Continue PPI  Where boot when out of bed.  WBAT.  Gentle range of motion right ankle.  Rocephin added. 1 out of 2 blood cultures from 10/15 was positive for Streptococcus species.  Repeat 10/17 in progress.      VTE Prophylaxis:  No VTE prophylaxis order currently exists.    CODE STATUS:      Level Of Support Discussed With: Patient  Code Status (Patient has no pulse and is not breathing): CPR (Attempt to Resuscitate)  Medical Interventions (Patient has pulse or is breathing): Full Support       Electronically signed by CHANEL Vargas, 10/06/24, 2:51 PM EDT.

## 2024-10-17 NOTE — PLAN OF CARE
Goal Outcome Evaluation:  Plan of Care Reviewed With: patient        Progress: no change  Outcome Evaluation: Patient without complaints of pain this shift. Blood cultures came back positive for streptococcus. Repeat cultures were drawn and IV push Rocephin was added for a 10 day course. Patient participated in therapy and ambulated in dougherty with PCA with walker and in CAM boot

## 2024-10-18 PROCEDURE — 97535 SELF CARE MNGMENT TRAINING: CPT

## 2024-10-18 PROCEDURE — 97116 GAIT TRAINING THERAPY: CPT

## 2024-10-18 PROCEDURE — 97110 THERAPEUTIC EXERCISES: CPT

## 2024-10-18 PROCEDURE — 97530 THERAPEUTIC ACTIVITIES: CPT

## 2024-10-18 PROCEDURE — 25810000003 SODIUM CHLORIDE 0.9 % SOLUTION: Performed by: INTERNAL MEDICINE

## 2024-10-18 PROCEDURE — 25010000002 NA FERRIC GLUC CPLX PER 12.5 MG: Performed by: INTERNAL MEDICINE

## 2024-10-18 PROCEDURE — 25010000002 CEFTRIAXONE PER 250 MG: Performed by: PHYSICIAN ASSISTANT

## 2024-10-18 PROCEDURE — 97112 NEUROMUSCULAR REEDUCATION: CPT

## 2024-10-18 RX ADMIN — Medication 10 ML: at 09:24

## 2024-10-18 RX ADMIN — METOPROLOL TARTRATE 100 MG: 50 TABLET, FILM COATED ORAL at 09:22

## 2024-10-18 RX ADMIN — PANTOPRAZOLE SODIUM 40 MG: 40 TABLET, DELAYED RELEASE ORAL at 06:27

## 2024-10-18 RX ADMIN — SODIUM CHLORIDE 2000 MG: 9 INJECTION INTRAMUSCULAR; INTRAVENOUS; SUBCUTANEOUS at 14:59

## 2024-10-18 RX ADMIN — Medication 5 MG: at 21:24

## 2024-10-18 RX ADMIN — DILTIAZEM HYDROCHLORIDE 120 MG: 30 TABLET, FILM COATED ORAL at 21:24

## 2024-10-18 RX ADMIN — SENNOSIDES AND DOCUSATE SODIUM 2 TABLET: 50; 8.6 TABLET ORAL at 09:22

## 2024-10-18 RX ADMIN — FERROUS SULFATE TAB 325 MG (65 MG ELEMENTAL FE) 325 MG: 325 (65 FE) TAB at 08:11

## 2024-10-18 RX ADMIN — SENNOSIDES AND DOCUSATE SODIUM 1 TABLET: 50; 8.6 TABLET ORAL at 21:23

## 2024-10-18 RX ADMIN — PANTOPRAZOLE SODIUM 40 MG: 40 TABLET, DELAYED RELEASE ORAL at 17:48

## 2024-10-18 RX ADMIN — DILTIAZEM HYDROCHLORIDE 120 MG: 30 TABLET, FILM COATED ORAL at 09:22

## 2024-10-18 RX ADMIN — ASPIRIN 325 MG: 325 TABLET ORAL at 09:22

## 2024-10-18 RX ADMIN — METOPROLOL TARTRATE 100 MG: 50 TABLET, FILM COATED ORAL at 21:23

## 2024-10-18 RX ADMIN — Medication 10 ML: at 21:25

## 2024-10-18 RX ADMIN — SODIUM CHLORIDE 250 MG: 9 INJECTION, SOLUTION INTRAVENOUS at 09:22

## 2024-10-18 RX ADMIN — FUROSEMIDE 40 MG: 40 TABLET ORAL at 09:22

## 2024-10-18 NOTE — PLAN OF CARE
Goal Outcome Evaluation:  Plan of Care Reviewed With: patient  Plan of Care Reviewed With: patient        Progress: improving  Outcome Evaluation: Alert and oriented and pleasant with staff. x1 assist for transfers and ambulation. No c/o pain requiring PRN pain medication noted this shift. Continues on IV ferric gluconate and IV abx without adverse effect noted. Remains compliant with CAM  boot instructions. Sitting up in recliner, family at bedsdie, call light in reach.

## 2024-10-18 NOTE — PLAN OF CARE
Goal Outcome Evaluation:           Progress: improving  Outcome Evaluation: Vss. No complaints of pain. No acute changes noted. Pt currently up in chair. Continuing plan of care.

## 2024-10-18 NOTE — PROGRESS NOTES
[facility]     Progress Note    Patient Name: Woodrow Alejandro  : 1950  MRN: [id number]  Primary Care Physician:  Juan Perez MD  Date of admission: 10/3/2024    Subjective   Subjective     Chief Complaint: Patient stable in physical therapy myeloproliferative workup so far negative    HPI: With persistent leukocytosis has been improved since iron deficiency has been corrected    Review of Systems   All systems were reviewed and negative except for: Has been reviewed    Objective   Objective     Vitals:   Temp:  [97.3 °F (36.3 °C)-98.8 °F (37.1 °C)] 97.3 °F (36.3 °C)  Heart Rate:  [101-103] 103  Resp:  [18-20] 20  BP: (118-124)/(44-56) 118/56    Physical Exam    Constitutional: Alert and oriented not in acute distress   Eyes: Pupils equal reacting to light and accommodation   HENT: Normal   Neck: Normal   Respiratory: No rales or rhonchi   Cardiovascular: S1-S2 normal no S3 or S4   Gastrointestinal: No palpable organomegaly   Musculoskeletal: No deformities patient has boot in the cast over the right leg and foot   Neurologic: No localizing signs  Skin: No rash       Result Review    Result Review:  I have personally reviewed the results from the time of this admission to 10/18/2024 11:13 EDT and agree with these findings:  [x]  Laboratory  [x]  Microbiology  []  Radiology  []  EKG/Telemetry   []  Cardiology/Vascular   []  Pathology  []  Old records  []  Other:  Most notable findings include: Have been reviewed patient with leukocytosis not in acute distress    Assessment & Plan   Assessment / Plan     Brief Patient Summary:  Woodrow Alejandro is a 73 y.o. male who continue the current management physical therapy    Active Hospital Problems:  Active Hospital Problems    Diagnosis     **Physical debility        Plan:   Has been reviewed    VTE Prophylaxis:  No VTE prophylaxis order currently exists.        CODE STATUS:   Level Of Support Discussed With: Patient  Code Status (Patient  has no pulse and is not breathing): CPR (Attempt to Resuscitate)  Medical Interventions (Patient has pulse or is breathing): Full Support    Disposition:  I expect patient to be discharged after the patient has been stabilized.    Electronically signed by Kiet Lindsay MD, 10/18/24, 11:13 AM EDT.      Part of this note may be an electronic transcription/translation of spoken language to printed text using the Dragon Dictation System.

## 2024-10-18 NOTE — THERAPY TREATMENT NOTE
SNF - Occupational Therapy Treatment Note   Yudy    Patient Name: Woodrow Alejandro  : 1950    MRN: [id number]                              Today's Date: 10/18/2024       Admit Date: 10/3/2024    Visit Dx:     ICD-10-CM ICD-9-CM   1. Decreased activities of daily living (ADL)  Z78.9 V49.89   2. Difficulty walking  R26.2 719.7   3. Rupture of Achilles tendon, unspecified laterality, sequela  S86.019S 905.8   4. Weakness generalized  R53.1 780.79   5. Bilateral primary osteoarthritis of knee  M17.0 715.16   6. Primary osteoarthritis of both knees  M17.0 715.16   7. Physical debility  R53.81 799.3     Patient Active Problem List   Diagnosis    Essential hypertension    Permanent atrial fibrillation    S/P AVR    ICD (implantable cardioverter-defibrillator), dual, in situ    Dermatitis    IFG (impaired fasting glucose)    Mixed hyperlipidemia    Vitamin D deficiency    Medicare annual wellness visit, subsequent    Primary osteoarthritis of both knees    Screening PSA (prostate specific antigen)    Bilateral primary osteoarthritis of knee    Coarse tremors    Weakness generalized    Increased urinary frequency    Bandemia    Achilles tendon rupture    Anemia due to GI blood loss    Physical debility     Past Medical History:   Diagnosis Date    Aortic valve replaced     Arthritis 2018    Atrial fibrillation     Coronary artery disease     Hypertension      Past Surgical History:   Procedure Laterality Date    CARDIAC SURGERY      COLONOSCOPY      COLONOSCOPY N/A 2024    Procedure: COLONOSCOPY WITH HOT/COLD SNARE POLYPECTOMIES, ELEVIEW INJECTION,CLIPX1;  Surgeon: Kurt Mensah MD;  Location: [facility];  Service: Gastroenterology;  Laterality: N/A;  COLON POLYPS    ENDOSCOPY N/A 2024    Procedure: ESOPHAGOGASTRODUODENOSCOPY WITH BIOPSIES;  Surgeon: Kurt Mensah MD;  Location: [facility];  Service: Gastroenterology;  Laterality: N/A;  RETAINED FOOD IN STOMACH AND  REFLUX ESOPHAGITIS    PACEMAKER IMPLANTATION        General Information       Row Name 10/18/24 1229          OT Time and Intention    Document Type therapy note (daily note)  -EG     Mode of Treatment individual therapy;occupational therapy  -EG     Patient Effort good  -EG       Row Name 10/18/24 1229          General Information    Existing Precautions/Restrictions fall  WBAT w/CAM boot donned  -EG       Row Name 10/18/24 1229          Cognition    Orientation Status (Cognition) oriented x 4  -       Row Name 10/18/24 1229          Safety Issues/Impairments Affecting Functional Mobility    Impairments Affecting Function (Mobility) balance;endurance/activity tolerance;strength  -EG               User Key  (r) = Recorded By, (t) = Taken By, (c) = Cosigned By      Initials Name Provider Type    EG Janessa Good, OT Occupational Therapist                     Mobility/ADL's       Row Name 10/18/24 1230          Bed Mobility    Comment, (Bed Mobility) Up in chair upon OT arrival  -EG       Row Name 10/18/24 1230          Transfers    Transfers sit-stand transfer;stand-sit transfer  -EG     Comment, (Transfers) multiple chairs with arms in therapy gym; in and out of recliner chair all SBA with RW  -EG       Row Name 10/18/24 1230          Sit-Stand Transfer    Sit-Stand [place] (Transfers) standby assist  -EG     Assistive Device (Sit-Stand Transfers) walker, front-wheeled  -EG       Row Name 10/18/24 1230          Stand-Sit Transfer    Stand-Sit [place] (Transfers) standby assist  -EG     Assistive Device (Stand-Sit Transfers) walker, front-wheeled  -EG       Row Name 10/18/24 1230          Functional Mobility    Functional Mobility- Ind. Level contact guard assist;verbal cues required;nonverbal cues required (demo/gesture);standby assist  -EG     Functional Mobility- Device walker, front-wheeled  -EG     Functional Mobility- Comment Patient performed mobility to and from therapy gym using RW; in and  around unit with RW no LOB improved safety and proper use of AD noted  -EG       Row Name 10/18/24 1230          Activities of Daily Living    BADL Assessment/Intervention bathing;upper body dressing;lower body dressing  -EG       Row Name 10/18/24 1230          Mobility    Extremity Weight-bearing Status right lower extremity  -EG     Right Lower Extremity (Weight-bearing Status) weight-bearing as tolerated (WBAT)  -EG       Row Name 10/18/24 1230          Lower Body Dressing Assessment/Training    Routt Level (Lower Body Dressing) lower body dressing skills;contact guard assist  -EG     Assistive Devices (Lower Body Dressing) reacher  -EG     Comment, (Lower Body Dressing) education and training on reacher use and different techniques to promote ability to thread pants ind.; Patien dons CAM boot after pants donning; CAM boot is still dependent to don at this time but patient states daughter will don for him  -EG       Row Name 10/18/24 1230          Upper Body Dressing Assessment/Training    Routt Level (Upper Body Dressing) upper body dressing skills;set up  -EG       Row Name 10/18/24 1230          Bathing Assessment/Intervention    Routt Level (Bathing) minimum assist (75% patient effort);bathing skills;lower body;upper body;contact guard assist  -EG       Row Name 10/18/24 1230          Grooming Assessment/Training    Routt Level (Grooming) grooming skills;wash face, hands  -EG               User Key  (r) = Recorded By, (t) = Taken By, (c) = Cosigned By      Initials Name Provider Type    EG Janessa Good OT Occupational Therapist                   Obj/Interventions       Row Name 10/18/24 1233          Sensory Assessment (Somatosensory)    Sensory Assessment (Somatosensory) UE sensation intact  -EG       Row Name 10/18/24 1233          Vision Assessment/Intervention    Visual Impairment/Limitations WFL  -EG       Row Name 10/18/24 1233          Motor Skills    Therapeutic  Exercise aerobic;other (see comments)  Rickshaw tricep extension exercises 3x20 with 30#'s  -EG       Row Name 10/18/24 1233          Balance    Balance Interventions sit to stand;supported;static;dynamic;weight shifting activity;occupation based/functional task;UE activity with balance activity;standing;minimal challenge  -EG     Comment, Balance Patient able to stand unsupported and perform BUE gross motor tasks with ball in all planes weight shifting without LOB 2x15; Patient able to manuever up and down 2 steps 2 trials with BUE support from hand rails, cueing and education on safety and stepping stradegies; Unsupported standing tolerated 5:00 with table top task performance  -EG       Row Name 10/18/24 1233          Aerobic Exercise    Type (Aerobic Exercise) arm bike  -EG     Comment, Aerobic Exercise (Therapeutic Exercise) Omnicycle performed 10:00 no rest break on cardiac interval setting  -EG               User Key  (r) = Recorded By, (t) = Taken By, (c) = Cosigned By      Initials Name Provider Type    EG Janessa Good OT Occupational Therapist                   Goals/Plan    No documentation.                  Clinical Impression       Row Name 10/18/24 1257          Pain Assessment    Pretreatment Pain Rating 0/10 - no pain  -EG     Posttreatment Pain Rating 0/10 - no pain  -EG       Row Name 10/18/24 1257          Plan of Care Review    Plan of Care Reviewed With patient  -EG     Progress improving  -EG     Outcome Evaluation Pleasant and cooperative; A&Ox4; Patient is demonstrating functional gains in all performance areas of strength, balance, endurance and safety; OT educated on differing compensatory techniques with good carrover for LB dressing ind.; Patient able to navigate minimal steps this date safely without physical assist from OT but CGA for fall risk; OT will continue to treat and follow POC; Ax1 w/RW; OT notified nsg of possible need for ortho consult due to last consult stating for  patient to follow up 2wks as outpatient and it has now been 3wks.  -EG       Row Name 10/18/24 1257          Therapy Assessment/Plan (OT)    Rehab Potential (OT) good  -EG     Criteria for Skilled Therapeutic Interventions Met (OT) yes;meets criteria;skilled treatment is necessary  -EG     Therapy Frequency (OT) 5 times/wk  -EG       Row Name 10/18/24 1257          Therapy Plan Review/Discharge Plan (OT)    Anticipated Discharge Disposition (OT) home with home health;other (see comments)  -EG       Row Name 10/18/24 1257          Positioning and Restraints    Pre-Treatment Position sitting in chair/recliner  -EG     Post Treatment Position chair  -EG     In Chair reclined;sitting;call light within reach;encouraged to call for assist;exit alarm on  -EG               User Key  (r) = Recorded By, (t) = Taken By, (c) = Cosigned By      Initials Name Provider Type    Janessa Alvarez OT Occupational Therapist                   Outcome Measures       Row Name 10/18/24 1304          How much help from another is currently needed...    Putting on and taking off regular lower body clothing? 3  -EG     Bathing (including washing, rinsing, and drying) 3  -EG     Toileting (which includes using toilet bed pan or urinal) 3  -EG     Putting on and taking off regular upper body clothing 4  -EG     Taking care of personal grooming (such as brushing teeth) 4  -EG     Eating meals 4  -EG     AM-PAC 6 Clicks Score (OT) 21  -EG       Row Name 10/18/24 1304          Functional Assessment    Outcome Measure Options AM-PAC 6 Clicks Daily Activity (OT);Optimal Instrument  -EG       Row Name 10/18/24 1304          Optimal Instrument    Optimal Instrument Optimal - 3  -EG     Bending/Stooping 3  -EG     Standing 2  -EG     Reaching 1  -EG               User Key  (r) = Recorded By, (t) = Taken By, (c) = Cosigned By      Initials Name Provider Type    Janessa Alvarez OT Occupational Therapist                  Section G  Mobility  Bed  mobility - self performance: limited assistance (staff provide guided maneuvering of limbs or other non-weight bearing assistance)  Bed mobility support/assistance: One person assist  Transfer - self performance: limited assistance (staff provide guided maneuvering of limbs or other non-weight bearing assistance)  Transfer support/assistance: One person assist  Walking in room - self performance: limited assistance (staff provide guided maneuvering of limbs or other non-weight bearing assistance)  Walking in room support/assistance: One person assist  Walking in corridors/hallway - self performance: limited assistance (staff provide guided maneuvering of limbs or other non-weight bearing assistance)  Walking in corridors/hallway support/assistance: One person assist  Locomotion on unit - self performance: activity did not occur  Locomotion on unit support/assistance: Activity did not occur  Locomotion off unit - self performance: activity did not occur  Locomotion off unit support/assistance: Activity did not occur  Dressing - self performance: limited assistance (staff provide guided maneuvering of limbs or other non-weight bearing assistance)  Dressing support/assistance: One person assist  Eating - self performance: independent  Eating support/assistance: Setup help only  Toileting - self performance: limited assistance (staff provide guided maneuvering of limbs or other non-weight bearing assistance)  Toileting support/assistance: One person assist  Personal hygiene - self performance: limited assistance (staff provide guided maneuvering of limbs or other non-weight bearing assistance)  Personal hygiene support/assistance: One person assist  Bathing  Bathing - self performance: Physical help with bathing (exclude washing back and hair for patient)  Bathing support/assistance: One person assist  Balance  Balance during transitions & walking: Not steady, requires assist to steady  Moving from seated to standing  position: Not steady, requires assist to steady  Walking: Not steady, requires assist to steady  Turning around while walking: Not steady, requires assist to steady  Moving on and off toilet: Not steady, requires assist to steady  Surface-to-surface transfer: Not steady, requires assist to steady  Mobility devices: None were used  Range of Motion  Upper Extremity: No impairment  Lower Extremity: Impairment on one side  Section GG  Functional Ability/Goals, Adm (Section GG)  Self Care, Prior Functioning (QD9186O): 3. Independent  Functional Cognition, Prior Functioning (MR6786Q): 3. Independent  Prior Device Use (JO4487): none of the above (Z)  Upper Extremity Range of Motion (IT5962F): No impairment  Lower Extremity Range of Motion (GG8911N): Impairment on one side  Self Care, Admission (Section GG)  Eating: Self-Care Admission Performance (GU3503A2): setup or clean-up assistance (05)  Oral Hygiene: Self-Care Admission Performance (AQ7385I6): setup or clean-up assistance (05)  Toileting Hygiene: Self-Care Admission Performance (GA4811R6): partial/moderate assistance (03)  Shower/Bathe Self: Self-Care Admission Performance (MI7196U9): partial/moderate assistance (03)  Upper Body Dressing: Self-Care Admission Performance (OL1350N3): setup or clean-up assistance (05)  Lower Body Dressing: Self-Care Admission Performance (CA2405S3): partial/moderate assistance (03)  Putting On/Taking Off Footwear: Self-Care Admission Performance (VH1740R3): partial/moderate assistance (03)  Personal Hygiene: Self-Care Admission Performance (OG2542V0): supervision or touching assistance (04)  Mobility, Admission Performance (VH4668)  Chair/Bed-Chair Transfer: Mobility Admission Performance (YB4531H8): partial/moderate assistance (03)  Toilet Transfer: Mobility Admission Performance (SQ9107T7): partial/moderate assistance (03)  Tub/shower Transfer: Mobility Admission Performance (GM5921QR5): partial/moderate assistance (03)                 Occupational Therapy Education       Title: PT OT SLP Therapies (Done)       Topic: Occupational Therapy (Done)       Point: ADL training (Done)       Description:   Instruct learner(s) on proper safety adaptation and remediation techniques during self care or transfers.   Instruct in proper use of assistive devices.                  Learning Progress Summary            Patient JING Moulton VU by EG at 10/4/2024 1056    Comment: Education on compensatory techniques for ADL's  Education on AE  Education on fall risk prevention and general safety  Education on OT services                      Point: Home exercise program (Done)       Description:   Instruct learner(s) on appropriate technique for monitoring, assisting and/or progressing therapeutic exercises/activities.                  Learning Progress Summary            Patient JING Moulton VU by EG at 10/4/2024 1056    Comment: Education on compensatory techniques for ADL's  Education on AE  Education on fall risk prevention and general safety  Education on OT services                      Point: Precautions (Done)       Description:   Instruct learner(s) on prescribed precautions during self-care and functional transfers.                  Learning Progress Summary            Patient JING Moulton VU by EG at 10/4/2024 1056    Comment: Education on compensatory techniques for ADL's  Education on AE  Education on fall risk prevention and general safety  Education on OT services                      Point: Body mechanics (Done)       Description:   Instruct learner(s) on proper positioning and spine alignment during self-care, functional mobility activities and/or exercises.                  Learning Progress Summary            Patient JING Moulton VU by EG at 10/4/2024 1056    Comment: Education on compensatory techniques for ADL's  Education on AE  Education on fall risk prevention and general safety  Education on OT services                                      User Key        Initials Effective Dates Name Provider Type Discipline    EG 09/14/22 -  Janessa Good, OT Occupational Therapist OT                  OT Recommendation and Plan  Planned Therapy Interventions (OT): activity tolerance training, functional balance retraining, occupation/activity based interventions, adaptive equipment training, BADL retraining, neuromuscular control/coordination retraining, patient/caregiver education/training, transfer/mobility retraining, strengthening exercise  Therapy Frequency (OT): 5 times/wk  Plan of Care Review  Plan of Care Reviewed With: patient  Progress: improving  Outcome Evaluation: Pleasant and cooperative; A&Ox4; Patient is demonstrating functional gains in all performance areas of strength, balance, endurance and safety; OT educated on differing compensatory techniques with good carrover for LB dressing ind.; Patient able to navigate minimal steps this date safely without physical assist from OT but CGA for fall risk; OT will continue to treat and follow POC; Ax1 w/RW; OT notified nsg of possible need for ortho consult due to last consult stating for patient to follow up 2wks as outpatient and it has now been 3wks.     Time Calculation:   Evaluation Complexity (OT)  Review Occupational Profile/Medical/Therapy History Complexity: expanded/moderate complexity  Assessment, Occupational Performance/Identification of Deficit Complexity: 3-5 performance deficits  Clinical Decision Making Complexity (OT): detailed assessment/moderate complexity  Overall Complexity of Evaluation (OT): moderate complexity     Time Calculation- OT       Row Name 10/18/24 1309 10/18/24 1305          Time Calculation- OT    OT Received On -- 10/18/24  -EG     OT Goal Re-Cert Due Date -- 11/03/24  -EG        Timed Charges    45640 - OT Therapeutic Exercise Minutes -- 16  -EG     37864 -  OT Neuromuscular Reeducation Minutes -- 14  -EG     50998 - Gait Training Minutes  10  -WM --     08947 - OT Therapeutic  Activity Minutes -- 7  -EG     31943 - OT Self Care/Mgmt Minutes -- 21  -EG        SNF Occupational Therapy Minutes    Skilled Minutes- OT -- 58 min  -EG        Total Minutes    Timed Charges Total Minutes 10  -WM 58  -EG      Total Minutes 10  -WM 58  -EG               User Key  (r) = Recorded By, (t) = Taken By, (c) = Cosigned By      Initials Name Provider Type     Dwaine Guzman PTA Physical Therapist Assistant    EG Janessa Good OT Occupational Therapist                  Therapy Charges for Today       Code Description Service Date Service Provider Modifiers Qty    75655565674 HC OT THER PROC EA 15 MIN 10/17/2024 Janessa Good, OT GO 1    61036136611 HC OT THERAPEUTIC ACT EA 15 MIN 10/17/2024 Janessa Good OT GO 1    05043964897 HC OT SELF CARE/MGMT/TRAIN EA 15 MIN 10/17/2024 Janessa Good OT GO 2    18407454106 HC OT NEUROMUSC RE EDUCATION EA 15 MIN 10/18/2024 Janessa Good OT GO 1    52713389559 HC OT THER PROC EA 15 MIN 10/18/2024 Janessa Good, OT GO 1    68112700495 HC OT THERAPEUTIC ACT EA 15 MIN 10/18/2024 Janessa Good OT GO 1    11625471500 HC OT SELF CARE/MGMT/TRAIN EA 15 MIN 10/18/2024 Janessa Good OT GO 1                 Janessa Good OT  10/18/2024

## 2024-10-18 NOTE — THERAPY TREATMENT NOTE
SNF - Physical Therapy Treatment Note   Yudy     Patient Name: Woodrow Alejandro  : 1950  MRN: [id number]  Today's Date: 10/18/2024      Visit Dx:    ICD-10-CM ICD-9-CM   1. Decreased activities of daily living (ADL)  Z78.9 V49.89   2. Difficulty walking  R26.2 719.7   3. Rupture of Achilles tendon, unspecified laterality, sequela  S86.019S 905.8   4. Weakness generalized  R53.1 780.79   5. Bilateral primary osteoarthritis of knee  M17.0 715.16   6. Primary osteoarthritis of both knees  M17.0 715.16   7. Physical debility  R53.81 799.3     Patient Active Problem List   Diagnosis    Essential hypertension    Permanent atrial fibrillation    S/P AVR    ICD (implantable cardioverter-defibrillator), dual, in situ    Dermatitis    IFG (impaired fasting glucose)    Mixed hyperlipidemia    Vitamin D deficiency    Medicare annual wellness visit, subsequent    Primary osteoarthritis of both knees    Screening PSA (prostate specific antigen)    Bilateral primary osteoarthritis of knee    Coarse tremors    Weakness generalized    Increased urinary frequency    Bandemia    Achilles tendon rupture    Anemia due to GI blood loss    Physical debility     Past Medical History:   Diagnosis Date    Aortic valve replaced     Arthritis 2018    Atrial fibrillation     Coronary artery disease     Hypertension      Past Surgical History:   Procedure Laterality Date    CARDIAC SURGERY      COLONOSCOPY      COLONOSCOPY N/A 2024    Procedure: COLONOSCOPY WITH HOT/COLD SNARE POLYPECTOMIES, ELEVIEW INJECTION,CLIPX1;  Surgeon: Kurt Mensah MD;  Location: [facility] ENDOSCOPY;  Service: Gastroenterology;  Laterality: N/A;  COLON POLYPS    ENDOSCOPY N/A 2024    Procedure: ESOPHAGOGASTRODUODENOSCOPY WITH BIOPSIES;  Surgeon: Kurt Mensah MD;  Location: [facility] ENDOSCOPY;  Service: Gastroenterology;  Laterality: N/A;  RETAINED FOOD IN STOMACH AND REFLUX ESOPHAGITIS    PACEMAKER IMPLANTATION         PT  Assessment (Last 12 Hours)       PT Evaluation and Treatment       Row Name 10/18/24 1310          Physical Therapy Time and Intention    Document Type therapy note (daily note)  -WM     Mode of Treatment individual therapy;physical therapy  -WM     Patient Effort good  -WM     Symptoms Noted During/After Treatment fatigue  -WM       Row Name 10/18/24 1310          Sit-Stand Transfer    Sit-Stand [place] (Transfers) contact guard  -WM     Assistive Device (Sit-Stand Transfers) walker, front-wheeled  -WM       Row Name 10/18/24 1310          Stand-Sit Transfer    Stand-Sit [place] (Transfers) contact guard  -WM     Assistive Device (Stand-Sit Transfers) walker, front-wheeled  -WM       Row Name 10/18/24 1310          Gait/Stairs (Locomotion)    [place] Level (Gait) contact guard  -WM     Assistive Device (Gait) walker, front-wheeled  -WM     Distance in Feet (Gait) 55  x 2  -WM     Pattern (Gait) 4-point;step-through  -WM     Deviations/Abnormal Patterns (Gait) gait speed decreased  -WM     [place] Level (Stairs) contact guard;verbal cues  -WM     Handrail Location (Stairs) both sides  -WM     Number of Steps (Stairs) 2 x 2  -WM     Ascending Technique (Stairs) step-to-step  -WM     Descending Technique (Stairs) step-to-step  -WM     Stairs, Impairments strength decreased;impaired balance  -WM     Comment, (Gait/Stairs) Pt descends steps backwards  -WM       Row Name 10/18/24 1310          Safety Issues/Impairments Affecting Functional Mobility    Impairments Affecting Function (Mobility) balance;endurance/activity tolerance;strength  -WM       Row Name 10/18/24 1310          Hip (Therapeutic Exercise)    Hip Isometrics (Therapeutic Exercise) bilateral;gluteal sets;supine;10 repetitions;5 repetitions;3 second hold;2 sets  -WM     Hip Strengthening (Therapeutic Exercise) bilateral;aBduction;aDduction;heel slides;marching while seated;sitting;supine;3 lb free weight;resistance band;green;15  repititions;2 sets  Manual resistance  -       Row Name 10/18/24 1310          Knee (Therapeutic Exercise)    Knee Isometrics (Therapeutic Exercise) bilateral;quad sets;supine;10 repetitions;5 repetitions;3 second hold;2 sets  -     Knee Strengthening (Therapeutic Exercise) bilateral;LAQ (long arc quad);hamstring curls;sitting;3 lb free weight;resistance tubing;green;15 repititions;2 sets  -       Row Name 10/18/24 1310          Ankle (Therapeutic Exercise)    Ankle AROM (Therapeutic Exercise) bilateral;dorsiflexion;plantarflexion;supine;30 repititions  -       Row Name 10/18/24 1310          Aerobic Exercise    Comment, Aerobic Exercise (Therapeutic Exercise) NuStep x 15 minutes, load 5  -       Row Name 10/18/24 1310          Positioning and Restraints    Pre-Treatment Position sitting in chair/recliner  -     Post Treatment Position chair  -WM     In Chair sitting;call light within reach;encouraged to call for assist;exit alarm on  -       Row Name 10/18/24 1310          Progress Summary (PT)    Progress Toward Functional Goals (PT) progress toward functional goals is fair;progress toward functional goals is good  -               User Key  (r) = Recorded By, (t) = Taken By, (c) = Cosigned By      Initials Name Provider Type    Dwaine Keith PTA Physical Therapist Assistant                  Section G              Section GG                       Physical Therapy Education       Title: PT OT SLP Therapies (Done)       Topic: Physical Therapy (Resolved)       Point: Mobility training (Resolved)       Learner Progress:  Not documented in this visit.              Point: Home exercise program (Resolved)       Learner Progress:  Not documented in this visit.              Point: Body mechanics (Resolved)       Learner Progress:  Not documented in this visit.              Point: Precautions (Resolved)       Learner Progress:  Not documented in this visit.                                  PT  Recommendation and Plan     Progress Summary (PT)  Progress Toward Functional Goals (PT): progress toward functional goals is fair, progress toward functional goals is good   Outcome Measures       Row Name 10/18/24 1317 10/17/24 1353 10/16/24 1600       How much help from another person do you currently need...    Turning from your back to your side while in flat bed without using bedrails? 3  -WM 3  -WM 3  -VK    Moving from lying on back to sitting on the side of a flat bed without bedrails? 3  -WM 3  -WM 3  -VK    Moving to and from a bed to a chair (including a wheelchair)? 3  -WM 3  -WM 3  -VK    Standing up from a chair using your arms (e.g., wheelchair, bedside chair)? 3  -WM 3  -WM 3  -VK    Climbing 3-5 steps with a railing? 3  -WM 2  -WM 2  -VK    To walk in hospital room? 3  -WM 3  -WM 3  -VK    AM-PAC 6 Clicks Score (PT) 18  -WM 17  -WM 17  -VK    Highest Level of Mobility Goal 6 --> Walk 10 steps or more  -WM 5 --> Static standing  -WM 5 --> Static standing  -VK              User Key  (r) = Recorded By, (t) = Taken By, (c) = Cosigned By      Initials Name Provider Type    Dwaine Keith PTA Physical Therapist Assistant    Karla Ignacio PTA Physical Therapist Assistant                     Time Calculation:    PT Charges       Row Name 10/18/24 1309             Time Calculation    PT Received On 10/18/24  -WM         Timed Charges    62793 - PT Therapeutic Exercise Minutes 30  -WM      07306 - Gait Training Minutes  10  -WM      16019 - PT Therapeutic Activity Minutes 5  -WM         SNF Physical Therapy Minutes    Skilled Minutes- PT 45 min  -WM         Total Minutes    Timed Charges Total Minutes 45  -WM       Total Minutes 45  -WM                User Key  (r) = Recorded By, (t) = Taken By, (c) = Cosigned By      Initials Name Provider Type    Dwaine Keith PTA Physical Therapist Assistant                  Therapy Charges for Today       Code Description Service Date Service Provider  Modifiers Qty    24548933604 HC PT THER PROC EA 15 MIN 10/17/2024 Dwaine Guzman, GUDELIA GP 2    35805256497 HC GAIT TRAINING EA 15 MIN 10/17/2024 Dwaine Guzman PTA GP 1            PT G-Codes  Outcome Measure Options: AM-PAC 6 Clicks Daily Activity (OT), Optimal Instrument  AM-PAC 6 Clicks Score (PT): 18  AM-PAC 6 Clicks Score (OT): 21    Dwaine Guzman PTA  10/18/2024

## 2024-10-19 ENCOUNTER — APPOINTMENT (OUTPATIENT)
Dept: CARDIOLOGY | Facility: HOSPITAL | Age: 74
DRG: 947 | End: 2024-10-19
Payer: MEDICARE

## 2024-10-19 LAB
ALBUMIN SERPL-MCNC: 2.9 G/DL (ref 3.5–5.2)
ALBUMIN/GLOB SERPL: 0.9 G/DL
ALP SERPL-CCNC: 71 U/L (ref 39–117)
ALT SERPL W P-5'-P-CCNC: 12 U/L (ref 1–41)
ANION GAP SERPL CALCULATED.3IONS-SCNC: 12.4 MMOL/L (ref 5–15)
AORTIC DIMENSIONLESS INDEX: 0.16 (DI)
AST SERPL-CCNC: 19 U/L (ref 1–40)
BASOPHILS # BLD AUTO: 0.04 10*3/MM3 (ref 0–0.2)
BASOPHILS NFR BLD AUTO: 0.4 % (ref 0–1.5)
BH CV ECHO MEAS - AO MAX PG: 101.2 MMHG
BH CV ECHO MEAS - AO MEAN PG: 68.1 MMHG
BH CV ECHO MEAS - AO ROOT DIAM: 3.8 CM
BH CV ECHO MEAS - AO V2 MAX: 502.9 CM/SEC
BH CV ECHO MEAS - AO V2 VTI: 125.3 CM
BH CV ECHO MEAS - AVA(I,D): 0.58 CM2
BH CV ECHO MEAS - EDV(CUBED): 148.8 ML
BH CV ECHO MEAS - EDV(MOD-SP2): 101 ML
BH CV ECHO MEAS - EDV(MOD-SP4): 111 ML
BH CV ECHO MEAS - EF(MOD-SP2): 51.4 %
BH CV ECHO MEAS - EF(MOD-SP4): 65.1 %
BH CV ECHO MEAS - ESV(CUBED): 55.9 ML
BH CV ECHO MEAS - ESV(MOD-SP2): 49.1 ML
BH CV ECHO MEAS - ESV(MOD-SP4): 38.7 ML
BH CV ECHO MEAS - FS: 27.9 %
BH CV ECHO MEAS - IVS/LVPW: 1.26 CM
BH CV ECHO MEAS - IVSD: 1.4 CM
BH CV ECHO MEAS - LA DIMENSION: 6.6 CM
BH CV ECHO MEAS - LV DIASTOLIC VOL/BSA (35-75): 43.2 CM2
BH CV ECHO MEAS - LV MASS(C)D: 272.5 GRAMS
BH CV ECHO MEAS - LV MAX PG: 3.6 MMHG
BH CV ECHO MEAS - LV MEAN PG: 2.34 MMHG
BH CV ECHO MEAS - LV SYSTOLIC VOL/BSA (12-30): 15.1 CM2
BH CV ECHO MEAS - LV V1 MAX: 94.3 CM/SEC
BH CV ECHO MEAS - LV V1 VTI: 20.3 CM
BH CV ECHO MEAS - LVIDD: 5.3 CM
BH CV ECHO MEAS - LVIDS: 3.8 CM
BH CV ECHO MEAS - LVOT AREA: 3.6 CM2
BH CV ECHO MEAS - LVOT DIAM: 2.13 CM
BH CV ECHO MEAS - LVPWD: 1.2 CM
BH CV ECHO MEAS - MV DEC TIME: 0.23 SEC
BH CV ECHO MEAS - MV MAX PG: 9.2 MMHG
BH CV ECHO MEAS - MV MEAN PG: 2.8 MMHG
BH CV ECHO MEAS - MV V2 VTI: 38 CM
BH CV ECHO MEAS - MVA(VTI): 1.9 CM2
BH CV ECHO MEAS - RAP SYSTOLE: 8 MMHG
BH CV ECHO MEAS - RVDD: 3.8 CM
BH CV ECHO MEAS - RVSP: 59 MMHG
BH CV ECHO MEAS - SV(LVOT): 72.4 ML
BH CV ECHO MEAS - SV(MOD-SP2): 51.9 ML
BH CV ECHO MEAS - SV(MOD-SP4): 72.3 ML
BH CV ECHO MEAS - SVI(LVOT): 28.2 ML/M2
BH CV ECHO MEAS - SVI(MOD-SP2): 20.2 ML/M2
BH CV ECHO MEAS - SVI(MOD-SP4): 28.1 ML/M2
BH CV ECHO MEAS - TAPSE (>1.6): 1.34 CM
BH CV ECHO MEAS - TR MAX PG: 50.7 MMHG
BH CV ECHO MEAS - TR MAX VEL: 355.9 CM/SEC
BILIRUB SERPL-MCNC: 0.4 MG/DL (ref 0–1.2)
BUN SERPL-MCNC: 12 MG/DL (ref 8–23)
BUN/CREAT SERPL: 15 (ref 7–25)
CALCIUM SPEC-SCNC: 8.5 MG/DL (ref 8.6–10.5)
CHLORIDE SERPL-SCNC: 100 MMOL/L (ref 98–107)
CO2 SERPL-SCNC: 23.6 MMOL/L (ref 22–29)
CREAT SERPL-MCNC: 0.8 MG/DL (ref 0.76–1.27)
CRP SERPL-MCNC: 6.75 MG/DL (ref 0–0.5)
DEPRECATED RDW RBC AUTO: 55.7 FL (ref 37–54)
EGFRCR SERPLBLD CKD-EPI 2021: 93.4 ML/MIN/1.73
EOSINOPHIL # BLD AUTO: 0.17 10*3/MM3 (ref 0–0.4)
EOSINOPHIL NFR BLD AUTO: 1.5 % (ref 0.3–6.2)
ERYTHROCYTE [DISTWIDTH] IN BLOOD BY AUTOMATED COUNT: 16.5 % (ref 12.3–15.4)
GLOBULIN UR ELPH-MCNC: 3.3 GM/DL
GLUCOSE SERPL-MCNC: 138 MG/DL (ref 65–99)
HCT VFR BLD AUTO: 29.1 % (ref 37.5–51)
HGB BLD-MCNC: 9.2 G/DL (ref 13–17.7)
IMM GRANULOCYTES # BLD AUTO: 0.09 10*3/MM3 (ref 0–0.05)
IMM GRANULOCYTES NFR BLD AUTO: 0.8 % (ref 0–0.5)
LEFT ATRIUM VOLUME INDEX: 71.2 ML/M2
LYMPHOCYTES # BLD AUTO: 0.89 10*3/MM3 (ref 0.7–3.1)
LYMPHOCYTES NFR BLD AUTO: 7.8 % (ref 19.6–45.3)
MCH RBC QN AUTO: 29.7 PG (ref 26.6–33)
MCHC RBC AUTO-ENTMCNC: 31.6 G/DL (ref 31.5–35.7)
MCV RBC AUTO: 93.9 FL (ref 79–97)
MONOCYTES # BLD AUTO: 0.86 10*3/MM3 (ref 0.1–0.9)
MONOCYTES NFR BLD AUTO: 7.5 % (ref 5–12)
NEUTROPHILS NFR BLD AUTO: 82 % (ref 42.7–76)
NEUTROPHILS NFR BLD AUTO: 9.35 10*3/MM3 (ref 1.7–7)
NRBC BLD AUTO-RTO: 0 /100 WBC (ref 0–0.2)
PLATELET # BLD AUTO: 149 10*3/MM3 (ref 140–450)
PMV BLD AUTO: 9.5 FL (ref 6–12)
POTASSIUM SERPL-SCNC: 3.9 MMOL/L (ref 3.5–5.2)
PROCALCITONIN SERPL-MCNC: 0.12 NG/ML (ref 0–0.25)
PROT SERPL-MCNC: 6.2 G/DL (ref 6–8.5)
RBC # BLD AUTO: 3.1 10*6/MM3 (ref 4.14–5.8)
SODIUM SERPL-SCNC: 136 MMOL/L (ref 136–145)
WBC NRBC COR # BLD AUTO: 11.4 10*3/MM3 (ref 3.4–10.8)

## 2024-10-19 PROCEDURE — 97110 THERAPEUTIC EXERCISES: CPT

## 2024-10-19 PROCEDURE — 80053 COMPREHEN METABOLIC PANEL: CPT | Performed by: PHYSICIAN ASSISTANT

## 2024-10-19 PROCEDURE — 93306 TTE W/DOPPLER COMPLETE: CPT | Performed by: INTERNAL MEDICINE

## 2024-10-19 PROCEDURE — 99309 SBSQ NF CARE MODERATE MDM 30: CPT | Performed by: PHYSICIAN ASSISTANT

## 2024-10-19 PROCEDURE — 97116 GAIT TRAINING THERAPY: CPT

## 2024-10-19 PROCEDURE — 25010000002 CEFTRIAXONE PER 250 MG: Performed by: PHYSICIAN ASSISTANT

## 2024-10-19 PROCEDURE — 85025 COMPLETE CBC W/AUTO DIFF WBC: CPT | Performed by: PHYSICIAN ASSISTANT

## 2024-10-19 PROCEDURE — 84145 PROCALCITONIN (PCT): CPT | Performed by: PHYSICIAN ASSISTANT

## 2024-10-19 PROCEDURE — 86140 C-REACTIVE PROTEIN: CPT | Performed by: PHYSICIAN ASSISTANT

## 2024-10-19 PROCEDURE — 93306 TTE W/DOPPLER COMPLETE: CPT

## 2024-10-19 RX ADMIN — Medication 10 ML: at 20:02

## 2024-10-19 RX ADMIN — SODIUM CHLORIDE 2000 MG: 9 INJECTION INTRAMUSCULAR; INTRAVENOUS; SUBCUTANEOUS at 14:53

## 2024-10-19 RX ADMIN — DILTIAZEM HYDROCHLORIDE 120 MG: 30 TABLET, FILM COATED ORAL at 09:40

## 2024-10-19 RX ADMIN — DILTIAZEM HYDROCHLORIDE 120 MG: 30 TABLET, FILM COATED ORAL at 20:01

## 2024-10-19 RX ADMIN — FUROSEMIDE 40 MG: 40 TABLET ORAL at 09:40

## 2024-10-19 RX ADMIN — PANTOPRAZOLE SODIUM 40 MG: 40 TABLET, DELAYED RELEASE ORAL at 17:44

## 2024-10-19 RX ADMIN — SENNOSIDES AND DOCUSATE SODIUM 2 TABLET: 50; 8.6 TABLET ORAL at 09:40

## 2024-10-19 RX ADMIN — METOPROLOL TARTRATE 100 MG: 50 TABLET, FILM COATED ORAL at 09:40

## 2024-10-19 RX ADMIN — Medication 5 MG: at 20:01

## 2024-10-19 RX ADMIN — ASPIRIN 325 MG: 325 TABLET ORAL at 09:40

## 2024-10-19 RX ADMIN — METOPROLOL TARTRATE 100 MG: 50 TABLET, FILM COATED ORAL at 20:01

## 2024-10-19 RX ADMIN — PANTOPRAZOLE SODIUM 40 MG: 40 TABLET, DELAYED RELEASE ORAL at 07:51

## 2024-10-19 RX ADMIN — Medication 10 ML: at 09:41

## 2024-10-19 RX ADMIN — FERROUS SULFATE TAB 325 MG (65 MG ELEMENTAL FE) 325 MG: 325 (65 FE) TAB at 09:40

## 2024-10-19 NOTE — PLAN OF CARE
Goal Outcome Evaluation:  Plan of Care Reviewed With: patient        Progress: improving  Outcome Evaluation: Alert and oriented. Able to communicate needs. Vital signs stable with irregular heart rate. Tranfers in room with one person assistance. Denied pain. Continues with CAM boot and IV antibiotic therapy. Continue plan of care.

## 2024-10-19 NOTE — THERAPY TREATMENT NOTE
SNF - Physical Therapy Progress Note   Yudy     Patient Name: Woodrow Alejandro  : 1950  MRN: [id number]  Today's Date: 10/19/2024      Visit Dx:    ICD-10-CM ICD-9-CM   1. Decreased activities of daily living (ADL)  Z78.9 V49.89   2. Difficulty walking  R26.2 719.7   3. Rupture of Achilles tendon, unspecified laterality, sequela  S86.019S 905.8   4. Weakness generalized  R53.1 780.79   5. Bilateral primary osteoarthritis of knee  M17.0 715.16   6. Primary osteoarthritis of both knees  M17.0 715.16   7. Physical debility  R53.81 799.3     Patient Active Problem List   Diagnosis    Essential hypertension    Permanent atrial fibrillation    S/P AVR    ICD (implantable cardioverter-defibrillator), dual, in situ    Dermatitis    IFG (impaired fasting glucose)    Mixed hyperlipidemia    Vitamin D deficiency    Medicare annual wellness visit, subsequent    Primary osteoarthritis of both knees    Screening PSA (prostate specific antigen)    Bilateral primary osteoarthritis of knee    Coarse tremors    Weakness generalized    Increased urinary frequency    Bandemia    Achilles tendon rupture    Anemia due to GI blood loss    Physical debility     Past Medical History:   Diagnosis Date    Aortic valve replaced     Arthritis 2018    Atrial fibrillation     Coronary artery disease     Hypertension      Past Surgical History:   Procedure Laterality Date    CARDIAC SURGERY      COLONOSCOPY      COLONOSCOPY N/A 2024    Procedure: COLONOSCOPY WITH HOT/COLD SNARE POLYPECTOMIES, ELEVIEW INJECTION,CLIPX1;  Surgeon: Kurt Mensah MD;  Location: [facility] ENDOSCOPY;  Service: Gastroenterology;  Laterality: N/A;  COLON POLYPS    ENDOSCOPY N/A 2024    Procedure: ESOPHAGOGASTRODUODENOSCOPY WITH BIOPSIES;  Surgeon: Kurt Mensah MD;  Location: [facility] ENDOSCOPY;  Service: Gastroenterology;  Laterality: N/A;  RETAINED FOOD IN STOMACH AND REFLUX ESOPHAGITIS    PACEMAKER IMPLANTATION         PT  Assessment (Last 12 Hours)       PT Evaluation and Treatment       Row Name 10/19/24 1300          Physical Therapy Time and Intention    Subjective Information no complaints  -CS     Document Type therapy note (daily note)  -CS     Mode of Treatment individual therapy;physical therapy  -CS     Patient Effort good  -CS     Symptoms Noted During/After Treatment fatigue  -CS       Row Name 10/19/24 1300          Pain    Pretreatment Pain Rating 0/10 - no pain  -CS     Posttreatment Pain Rating 0/10 - no pain  -CS       Row Name 10/19/24 1300          Sit-Stand Transfer    Sit-Stand [place] (Transfers) contact guard  -CS     Assistive Device (Sit-Stand Transfers) walker, front-wheeled  -CS       Row Name 10/19/24 1300          Stand-Sit Transfer    Stand-Sit [place] (Transfers) contact guard  -CS     Assistive Device (Stand-Sit Transfers) walker, front-wheeled  -CS       Row Name 10/19/24 1300          Gait/Stairs (Locomotion)    [place] Level (Gait) contact guard  -CS     Assistive Device (Gait) walker, front-wheeled  -CS     Distance in Feet (Gait) 120  + 55  -CS     Pattern (Gait) 4-point;step-through  -CS     Deviations/Abnormal Patterns (Gait) gait speed decreased  -CS     Bilateral Gait Deviations forward flexed posture;heel strike decreased  -CS       Row Name 10/19/24 1300          Motor Skills    Therapeutic Exercise aerobic  -CS       Row Name 10/19/24 1300          Aerobic Exercise    Time Performed (Aerobic Exercise) 10:14  -CS     Comment, Aerobic Exercise (Therapeutic Exercise) Nu-Step 465 steps at 45 SPM on load 6  -CS       Row Name 10/19/24 1300          Positioning and Restraints    Pre-Treatment Position sitting in chair/recliner  -CS     Post Treatment Position chair  -CS     In Chair reclined;call light within reach;encouraged to call for assist;exit alarm on  -CS       Row Name 10/19/24 1300          Progress Summary (PT)    Progress Toward Functional Goals (PT) progress toward  functional goals is good  -CS               User Key  (r) = Recorded By, (t) = Taken By, (c) = Cosigned By      Initials Name Provider Type    Humberto Deleon PTA Physical Therapist Assistant                  Section G              Section GG                       Physical Therapy Education       Title: PT OT SLP Therapies (Done)       Topic: Physical Therapy (Resolved)       Point: Mobility training (Resolved)       Learner Progress:  Not documented in this visit.              Point: Home exercise program (Resolved)       Learner Progress:  Not documented in this visit.              Point: Body mechanics (Resolved)       Learner Progress:  Not documented in this visit.              Point: Precautions (Resolved)       Learner Progress:  Not documented in this visit.                                  PT Recommendation and Plan     Progress Summary (PT)  Progress Toward Functional Goals (PT): progress toward functional goals is good   Outcome Measures       Row Name 10/19/24 1300 10/18/24 1317 10/17/24 1353       How much help from another person do you currently need...    Turning from your back to your side while in flat bed without using bedrails? 3  -CS 3  -WM 3  -WM    Moving from lying on back to sitting on the side of a flat bed without bedrails? 3  -CS 3  -WM 3  -WM    Moving to and from a bed to a chair (including a wheelchair)? 3  -CS 3  -WM 3  -WM    Standing up from a chair using your arms (e.g., wheelchair, bedside chair)? 3  -CS 3  -WM 3  -WM    Climbing 3-5 steps with a railing? 3  -CS 3  -WM 2  -WM    To walk in hospital room? 3  -CS 3  -WM 3  -WM    AM-PAC 6 Clicks Score (PT) 18  -CS 18  -WM 17  -WM    Highest Level of Mobility Goal 6 --> Walk 10 steps or more  -CS 6 --> Walk 10 steps or more  -WM 5 --> Static standing  -WM       Functional Assessment    Outcome Measure Options AM-PAC 6 Clicks Basic Mobility (PT)  -CS -- --      Row Name 10/16/24 1600             How much help from another person  do you currently need...    Turning from your back to your side while in flat bed without using bedrails? 3  -VK      Moving from lying on back to sitting on the side of a flat bed without bedrails? 3  -VK      Moving to and from a bed to a chair (including a wheelchair)? 3  -VK      Standing up from a chair using your arms (e.g., wheelchair, bedside chair)? 3  -VK      Climbing 3-5 steps with a railing? 2  -VK      To walk in hospital room? 3  -VK      AM-PAC 6 Clicks Score (PT) 17  -VK      Highest Level of Mobility Goal 5 --> Static standing  -VK                User Key  (r) = Recorded By, (t) = Taken By, (c) = Cosigned By      Initials Name Provider Type    WM Dwaine Guzman PTA Physical Therapist Assistant    Humberto Deleon PTA Physical Therapist Assistant    Karla Ignacio PTA Physical Therapist Assistant                     Time Calculation:    PT Charges       Row Name 10/19/24 1339             Time Calculation    Start Time 1008  -CS      PT Received On 10/19/24  -CS         Timed Charges    10096 - PT Therapeutic Exercise Minutes 12  -CS      24868 - Gait Training Minutes  15  -CS      05323 - PT Therapeutic Activity Minutes 3  -CS         SNF Physical Therapy Minutes    Skilled Minutes- PT 30 min  -CS         Total Minutes    Timed Charges Total Minutes 30  -CS       Total Minutes 30  -CS                User Key  (r) = Recorded By, (t) = Taken By, (c) = Cosigned By      Initials Name Provider Type    CS Humberto Collins PTA Physical Therapist Assistant                  Therapy Charges for Today       Code Description Service Date Service Provider Modifiers Qty    84579456867 HC PT THER PROC EA 15 MIN 10/19/2024 Humberto Collins PTA GP 1    64339261675 HC GAIT TRAINING EA 15 MIN 10/19/2024 Humberto Collins PTA GP 1            PT G-Codes  Outcome Measure Options: AM-PAC 6 Clicks Basic Mobility (PT)  AM-PAC 6 Clicks Score (PT): 18  AM-PAC 6 Clicks Score (OT): 21    Humberto Collins  PTA  10/19/2024

## 2024-10-19 NOTE — PROGRESS NOTES
[facility]   Hospitalist Progress Note       Patient Name: Woodrow Alejandro  : 1950  MRN: [id number]  Primary Care Physician: Juan Perez MD  Date of admission: 10/3/2024  Today's Date: 10/19/2024  Room / Bed:   305/1  Subjective   Chief Complaint: Weakness after recent hospitalization     HPI:  Woodrow Alejandro is a 73 y.o. male recently hospitalized for anemia and also right Achilles tendon tear.  During hospitalization, gastroenterology, Dr. Mensah, performed EGD and colonoscopy.  EGD showed esophagitis with bleeding lower third of esophagus.  PPI recommended.  2 polyps (tubular adenomas) were removed on colonoscopy.  Orthopedist, Dr. Zuniga, was consulted and recommended gentle ROM, weightbearing as tolerated, PT, and walking boot for his Achilles tendon tear.  His home aspirin was subsequently resumed on 10/3/24.  He has been a good candidate for more rehab and has now been admitted to our SNF for more therapy.  He will be on PPI BID x 2 weeks, then transition to daily.       Interval Followup: 10/19/2024    Patient up in recliner.  Amiable.  Does not look toxic.  Appropriate conversation.  Eating breakfast.  Appetite good.  No fevers or chills.  Making progress with PT.  Denies any diarrhea  No cough or shortness of air  White count 11  Serum electrophoresis with elevated free light chains  Blood culture 10/15 strep but repeat on 10/17 negative today.  Was started on ceftriaxone.  T97.7  /75  95% on room air  Day 3 of ceftriaxone.   Echo ordered.      REVIEW OF SYSTEMS:   Weakness  Objective   Temp:  [97.7 °F (36.5 °C)-98.1 °F (36.7 °C)] 97.7 °F (36.5 °C)  Heart Rate:  [84-96] 96  Resp:  [18] 18  BP: (112-143)/(62-75) 143/75  PHYSICAL EXAM   CON: WN. WD. NAD.  Alert.  Polite/pleasant demeanor.  NECK:  No thyromegaly. No stridor.   RESP:  CTA. No wheezes.   CV:  Rhythm irregular. Rate WNL. No murmur. Trace plus bilateral pretibial edema.  Left chest AICD noted   GI:  Soft  and nontender.  EXT: Trace pretibial edema. R foot in cam boot  PSYCH:  Alert. Oriented.  Calm mood  NEURO:  No dysarthria or aphasia.   SKIN: Bilateral chronic venous stasis changes noted.  Results from last 7 days   Lab Units 10/19/24  0451 10/17/24  0448 10/16/24  0512 10/15/24  0445 10/14/24  1154   WBC 10*3/mm3 11.40* 9.77 13.10* 16.81* 16.18*   HEMOGLOBIN g/dL 9.2* 9.1* 9.5* 9.6* 11.1*   HEMATOCRIT % 29.1* 29.4* 29.0* 29.8* 35.3*   PLATELETS 10*3/mm3 149 126* 138* 141 162     Results from last 7 days   Lab Units 10/19/24  0451 10/17/24  0448 10/16/24  0512 10/15/24  0445 10/14/24  1154   SODIUM mmol/L 136 134* 136 135* 133*   POTASSIUM mmol/L 3.9 3.7 3.6 3.4* 4.6   CO2 mmol/L 23.6 23.5 25.7 25.9 18.7*   CHLORIDE mmol/L 100 100 100 98 96*   ANION GAP mmol/L 12.4 10.5 10.3 11.1 18.3*   BUN mg/dL 12 14 13 15 15   CREATININE mg/dL 0.80 0.78 0.74* 0.83 1.02   GLUCOSE mg/dL 138* 134* 137* 149* 129*         COMPLEXITY OF DATA / DECISION MAKING     []  Moderate: One acute illness or mild exacerbation of chronic or 2 stable chronic or tx side effects   []  High:  Severe acute illness or severe exacerbation of chronic - potential for major debility / life threatening         I have personally reviewed the results from the time of this admission to 10/19/2024 08:52 EDT:  []  Laboratory:  []  Microbiology: []  Radiology:  []  Telemetry:   []  Cardiology/Vascular:  []  Pathology:  []  Prior external records:  []  Independent historian provided additional details:      []  Discussed case with specialists:    []  Independent interpretation of ECG/Imaging etc:             []  Moderate: Rx management, low risk surgery, suboptimal social situation   []  High:  Rx with close monitoring for toxicity, mod-high risk surgery, DNR decision, Comfort initiated, IV pain meds    Assessment / Plan   Assessment:     Weakness after recent hospitalization  Recent anemia  Recent EGD 9/30/24 by Dr. Mensah showing esophagitis (PPI  recommended)  Recent colonoscopy 9/30/24 by Dr. Mensah with polypectomy  Recent right achilles tendon tear  Nonischemic cardiomyopathy, EF improved  A-fib (on aspirin only)  Bioprosthetic aortic valve replacement (Dr. Newman)  HTN  GERD  Anemia  CAD/AICD  Noted on CT: Possible ankylosing spondylitis   Urinary frequency  1 out of 2 blood cultures positive 10/15 Streptococcus species        Plan:     Hematology consulted .... Serum protein electrophoresis with elevated free light chains  Rocephin x 2 weeks added. Blood cultures from 10/15 was positive for Streptococcus species.  Blood cultures repeated 10/17 prior to antibiotic administration.  These have remained negative.  Echo ordered  Resume Lasix.  Monitor volume status renal indices  Lisinopril on hold  Home aspirin has been resumed  Continue iron supplement  Continue PPI  CAM boot per ortho          VTE Prophylaxis:  No VTE prophylaxis order currently exists.    CODE STATUS:      Level Of Support Discussed With: Patient  Code Status (Patient has no pulse and is not breathing): CPR (Attempt to Resuscitate)  Medical Interventions (Patient has pulse or is breathing): Full Support       Electronically signed by CHANEL Vargas, 10/06/24, 2:51 PM EDT.

## 2024-10-19 NOTE — PLAN OF CARE
Goal Outcome Evaluation:  Plan of Care Reviewed With: patient  Plan of Care Reviewed With: patient        Progress: no change  Outcome Evaluation: Aox4, call light and personal items within reach. Able to make needs known to staff. No c/o pain during the shift. 1x to the BR with walker and right boot. Echo ordered today (see labs). Con't with IV ABX. Skin care given. Con't plan of care

## 2024-10-19 NOTE — PROGRESS NOTES
[facility]     Progress Note    Patient Name: Woodrow Alejandro  : 1950  MRN: [id number]  Primary Care Physician:  Juan Perez MD  Date of admission: 10/3/2024    Subjective   Subjective     Chief Complaint: Patient is stable doing well hematological status is stable will continue current management no new complaints hematological status stable have discussed the findings with the patient    HPI: Leukocytosis has subsided    Review of Systems   All systems were reviewed and negative except for: Has been reviewed    Objective   Objective     Vitals:   Temp:  [97.7 °F (36.5 °C)-98.1 °F (36.7 °C)] 97.7 °F (36.5 °C)  Heart Rate:  [84-96] 96  Resp:  [18] 18  BP: (112-143)/(62-75) 143/75    Physical Exam    Constitutional: Alert and oriented not in acute distress   Eyes: Pupils equal reacting to light and accommodation   HENT: Normal   Neck: Normal   Respiratory: No rales or rhonchi   Cardiovascular: S1-S2 normal no S3 or S4   Gastrointestinal: No palpable organomegaly   Musculoskeletal: Patient continues to have dressings over the heel no localizing signs   Neurologic: No localizing signs  Skin: No rash       Result Review    Result Review:  I have personally reviewed the results from the time of this admission to 10/19/2024 10:32 EDT and agree with these findings:  [x]  Laboratory  []  Microbiology  []  Radiology  []  EKG/Telemetry   []  Cardiology/Vascular   []  Pathology  []  Old records  []  Other:  Most notable findings include: Reviewed and discussed    Assessment & Plan   Assessment / Plan     Brief Patient Summary:  Woodrow Alejandro is a 73 y.o. male who patient with leukocytosis stable    Active Hospital Problems:  Active Hospital Problems    Diagnosis     **Physical debility        Plan:   Continue physical therapy    VTE Prophylaxis:  No VTE prophylaxis order currently exists.        CODE STATUS:   Level Of Support Discussed With: Patient  Code Status (Patient has no pulse and  is not breathing): CPR (Attempt to Resuscitate)  Medical Interventions (Patient has pulse or is breathing): Full Support    Disposition:  I expect patient to be discharged after the patient has been stabilized.    Electronically signed by Kiet Lindsay MD, 10/19/24, 10:32 AM EDT.      Part of this note may be an electronic transcription/translation of spoken language to printed text using the Dragon Dictation System.

## 2024-10-20 LAB — Lab: NORMAL

## 2024-10-20 PROCEDURE — 99309 SBSQ NF CARE MODERATE MDM 30: CPT | Performed by: PHYSICIAN ASSISTANT

## 2024-10-20 PROCEDURE — 25010000002 CEFTRIAXONE PER 250 MG: Performed by: PHYSICIAN ASSISTANT

## 2024-10-20 RX ADMIN — DILTIAZEM HYDROCHLORIDE 120 MG: 30 TABLET, FILM COATED ORAL at 21:53

## 2024-10-20 RX ADMIN — Medication 10 ML: at 21:55

## 2024-10-20 RX ADMIN — PANTOPRAZOLE SODIUM 40 MG: 40 TABLET, DELAYED RELEASE ORAL at 17:13

## 2024-10-20 RX ADMIN — SODIUM CHLORIDE 2000 MG: 9 INJECTION INTRAMUSCULAR; INTRAVENOUS; SUBCUTANEOUS at 14:09

## 2024-10-20 RX ADMIN — Medication 10 ML: at 08:27

## 2024-10-20 RX ADMIN — PANTOPRAZOLE SODIUM 40 MG: 40 TABLET, DELAYED RELEASE ORAL at 07:27

## 2024-10-20 RX ADMIN — FERROUS SULFATE TAB 325 MG (65 MG ELEMENTAL FE) 325 MG: 325 (65 FE) TAB at 07:27

## 2024-10-20 RX ADMIN — DILTIAZEM HYDROCHLORIDE 120 MG: 30 TABLET, FILM COATED ORAL at 08:24

## 2024-10-20 RX ADMIN — METOPROLOL TARTRATE 75 MG: 50 TABLET, FILM COATED ORAL at 21:53

## 2024-10-20 RX ADMIN — SENNOSIDES AND DOCUSATE SODIUM 2 TABLET: 50; 8.6 TABLET ORAL at 21:53

## 2024-10-20 RX ADMIN — ASPIRIN 325 MG: 325 TABLET ORAL at 08:24

## 2024-10-20 RX ADMIN — Medication 5 MG: at 21:53

## 2024-10-20 NOTE — PLAN OF CARE
Goal Outcome Evaluation:  Plan of Care Reviewed With: patient  Plan of Care Reviewed With: patient        Progress: no change  Outcome Evaluation: Aox4, call light and personal items within reach. Able to make needs known to staff. No c/o pain during the shift. 1x to the BR with walker and right boot. Skin care given. New IV placed, con't IV ABX. Family at bedside. Con't plan of care

## 2024-10-20 NOTE — PROGRESS NOTES
[facility]     Progress Note    Patient Name: Woodrow Alejandro  : 1950  MRN: [id number]  Primary Care Physician:  Juan Peerz MD  Date of admission: 10/3/2024    Subjective   Subjective     Chief Complaint: Patient stable and doing well not in acute distress on antibiotics for cellulitis    HPI: Patient has questions about how long he is going to get antibiotics have been explained to him the plan as ordered by primary care physicians    Review of Systems   All systems were reviewed and negative except for: Has been reviewed    Objective   Objective     Vitals:   Temp:  [97.9 °F (36.6 °C)-98.4 °F (36.9 °C)] 97.9 °F (36.6 °C)  Heart Rate:  [83-96] 83  Resp:  [16-18] 16  BP: ()/(56-76) 95/58    Physical Exam    Constitutional: Alert and oriented not in acute distress   Eyes: Pupils equal reacting to light and accommodation   HENT: Normal   Neck: Normal   Respiratory: No rales or rhonchi   Cardiovascular: S1-S2 normal no S3 or S4   Gastrointestinal: No palpable organomegaly   Musculoskeletal: No deformities   Neurologic: No localizing signs  Skin: No rash       Result Review    Result Review:  I have personally reviewed the results from the time of this admission to 10/20/2024 11:54 EDT and agree with these findings:  [x]  Laboratory  []  Microbiology  []  Radiology  []  EKG/Telemetry   []  Cardiology/Vascular   []  Pathology  []  Old records  []  Other:  Most notable findings include: Has been reviewed and discussed    Assessment & Plan   Assessment / Plan     Brief Patient Summary:  Woodrow Alejandro is a 73 y.o. male who workup for [facility] in progress    Active Hospital Problems:  Active Hospital Problems    Diagnosis     **Physical debility        Plan:   Continue as per primary    VTE Prophylaxis:  No VTE prophylaxis order currently exists.        CODE STATUS:   Level Of Support Discussed With: Patient  Code Status (Patient has no pulse and is not breathing): CPR (Attempt to  Resuscitate)  Medical Interventions (Patient has pulse or is breathing): Full Support    Disposition:  I expect patient to be discharged after the patient has been stabilized.    Electronically signed by Kiet Lindsay MD, 10/20/24, 11:54 AM EDT.      Part of this note may be an electronic transcription/translation of spoken language to printed text using the Dragon Dictation System.

## 2024-10-20 NOTE — PROGRESS NOTES
[facility]   Hospitalist Progress Note       Patient Name: Woodrow Alejandro  : 1950  MRN: [id number]  Primary Care Physician: Juan Perez MD  Date of admission: 10/3/2024  Today's Date: 10/20/2024  Room / Bed:   [facility]/1  Subjective   Chief Complaint: Weakness after recent hospitalization     HPI:  Woodrow Alejandro is a 73 y.o. male recently hospitalized for anemia and also right Achilles tendon tear.  During hospitalization, gastroenterology, Dr. Mensah, performed EGD and colonoscopy.  EGD showed esophagitis with bleeding lower third of esophagus.  PPI recommended.  2 polyps (tubular adenomas) were removed on colonoscopy.  Orthopedist, Dr. Zuniga, was consulted and recommended gentle ROM, weightbearing as tolerated, PT, and walking boot for his Achilles tendon tear.  His home aspirin was subsequently resumed on 10/3/24.  He has been a good candidate for more rehab and has now been admitted to our SNF for more therapy.  He will be on PPI BID x 2 weeks, then transition to daily.       Interval Followup: 10/20/2024    In good spirits.  Up in recliner.  Does not look toxic.    Denies any fevers or chills   Denies any orthopnea PND LAND chest pains syncope   Discussed echo results.  Will reach out to his cardiologist, Dr. Newman.    Repeat blood cultures from 10/17 remain negative.    Tolerating IV ceftriaxone   Will discuss with ID   T97.9  BP 99/60  96% on room air  Day 4 of ceftriaxone.         REVIEW OF SYSTEMS:   Weakness  Objective   Temp:  [97.9 °F (36.6 °C)-98.4 °F (36.9 °C)] 97.9 °F (36.6 °C)  Heart Rate:  [83-96] 83  Resp:  [16-18] 16  BP: ()/(56-76) 95/58  PHYSICAL EXAM   CON: WN. WD. NAD.  Alert.  Polite/pleasant demeanor.  NECK:  No thyromegaly. No stridor.   RESP:  CTA. No wheezes.   CV:  Rhythm irregular. Rate WNL. 2/6 SM noted. Trace + pretib edema.  Left chest AICD noted   GI:  Soft and nontender.  EXT: Trace pretibial edema. R foot in cam boot  PSYCH:  Alert. Oriented.   Calm mood  NEURO:  No dysarthria or aphasia.   SKIN: Bilateral chronic venous stasis changes noted.  Results from last 7 days   Lab Units 10/19/24  0451 10/17/24  0448 10/16/24  0512 10/15/24  0445 10/14/24  1154   WBC 10*3/mm3 11.40* 9.77 13.10* 16.81* 16.18*   HEMOGLOBIN g/dL 9.2* 9.1* 9.5* 9.6* 11.1*   HEMATOCRIT % 29.1* 29.4* 29.0* 29.8* 35.3*   PLATELETS 10*3/mm3 149 126* 138* 141 162     Results from last 7 days   Lab Units 10/19/24  0451 10/17/24  0448 10/16/24  0512 10/15/24  0445 10/14/24  1154   SODIUM mmol/L 136 134* 136 135* 133*   POTASSIUM mmol/L 3.9 3.7 3.6 3.4* 4.6   CO2 mmol/L 23.6 23.5 25.7 25.9 18.7*   CHLORIDE mmol/L 100 100 100 98 96*   ANION GAP mmol/L 12.4 10.5 10.3 11.1 18.3*   BUN mg/dL 12 14 13 15 15   CREATININE mg/dL 0.80 0.78 0.74* 0.83 1.02   GLUCOSE mg/dL 138* 134* 137* 149* 129*         COMPLEXITY OF DATA / DECISION MAKING     []  Moderate: One acute illness or mild exacerbation of chronic or 2 stable chronic or tx side effects   []  High:  Severe acute illness or severe exacerbation of chronic - potential for major debility / life threatening         I have personally reviewed the results from the time of this admission to 10/20/2024 12:09 EDT:  []  Laboratory:  []  Microbiology: []  Radiology:  []  Telemetry:   []  Cardiology/Vascular:  []  Pathology:  []  Prior external records:  []  Independent historian provided additional details:      []  Discussed case with specialists:    []  Independent interpretation of ECG/Imaging etc:             []  Moderate: Rx management, low risk surgery, suboptimal social situation   []  High:  Rx with close monitoring for toxicity, mod-high risk surgery, DNR decision, Comfort initiated, IV pain meds    Assessment / Plan   Assessment:     Weakness after recent hospitalization  Recent anemia  Recent EGD 9/30/24 by Dr. Mensah showing esophagitis (PPI recommended)  Recent colonoscopy 9/30/24 by Dr. Mensah with polypectomy  Recent right achilles tendon  tear  Nonischemic cardiomyopathy, EF improved  A-fib (on aspirin only)  Bioprosthetic aortic valve replacement (Dr. Newman)  HTN  GERD  Anemia  CAD/AICD  Noted on CT: Possible ankylosing spondylitis   Urinary frequency  1 out of 2 blood cultures positive 10/15 Streptococcus species        Plan:     Rocephin x 2 weeks added.   Blood cultures from 10/15 were positive for Streptococcus species.  Blood cultures repeated 10/17 ...These have remained negative.  Echo ...... EF 51-55%.  Significant gradient across bioprosthetic aortic valve 101/68 (severe stenosis).  This is significantly more elevated than 1 month ago.  RVSP greater than 55.  Resume Lasix.  Monitor volume status renal indices  Hematology consulted  Lisinopril on hold  Home aspirin has been resumed  Continue iron supplement  Continue PPI  CAM boot per ortho          VTE Prophylaxis:  No VTE prophylaxis order currently exists.    CODE STATUS:      Level Of Support Discussed With: Patient  Code Status (Patient has no pulse and is not breathing): CPR (Attempt to Resuscitate)  Medical Interventions (Patient has pulse or is breathing): Full Support

## 2024-10-20 NOTE — PLAN OF CARE
Goal Outcome Evaluation:  Plan of Care Reviewed With: patient  Plan of Care Reviewed With: patient        Progress: no change  Outcome Evaluation: Alert and oriented x4; able to make needs known to staff. Slept in recliner most of shift. Transfers to  x1 person assist wearing camboot to right foot with gait belt and walker. No complaints of pain. Call light within reach; care plan ongoing.

## 2024-10-21 LAB
ALBUMIN SERPL-MCNC: 2.9 G/DL (ref 3.5–5.2)
ALBUMIN/GLOB SERPL: 0.9 G/DL
ALP SERPL-CCNC: 71 U/L (ref 39–117)
ALT SERPL W P-5'-P-CCNC: 13 U/L (ref 1–41)
ANION GAP SERPL CALCULATED.3IONS-SCNC: 11.2 MMOL/L (ref 5–15)
AST SERPL-CCNC: 18 U/L (ref 1–40)
BACTERIA SPEC AEROBE CULT: ABNORMAL
BACTERIA SPEC AEROBE CULT: ABNORMAL
BASOPHILS # BLD AUTO: 0.03 10*3/MM3 (ref 0–0.2)
BASOPHILS NFR BLD AUTO: 0.3 % (ref 0–1.5)
BILIRUB SERPL-MCNC: 0.4 MG/DL (ref 0–1.2)
BUN SERPL-MCNC: 13 MG/DL (ref 8–23)
BUN/CREAT SERPL: 18.3 (ref 7–25)
CALCIUM SPEC-SCNC: 8.4 MG/DL (ref 8.6–10.5)
CHLORIDE SERPL-SCNC: 102 MMOL/L (ref 98–107)
CO2 SERPL-SCNC: 24.8 MMOL/L (ref 22–29)
CREAT SERPL-MCNC: 0.71 MG/DL (ref 0.76–1.27)
DEPRECATED RDW RBC AUTO: 56.9 FL (ref 37–54)
EGFRCR SERPLBLD CKD-EPI 2021: 96.9 ML/MIN/1.73
EOSINOPHIL # BLD AUTO: 0.21 10*3/MM3 (ref 0–0.4)
EOSINOPHIL NFR BLD AUTO: 2.2 % (ref 0.3–6.2)
ERYTHROCYTE [DISTWIDTH] IN BLOOD BY AUTOMATED COUNT: 16.9 % (ref 12.3–15.4)
GLOBULIN UR ELPH-MCNC: 3.2 GM/DL
GLUCOSE SERPL-MCNC: 131 MG/DL (ref 65–99)
GRAM STN SPEC: ABNORMAL
GRAM STN SPEC: ABNORMAL
HCT VFR BLD AUTO: 28.6 % (ref 37.5–51)
HGB BLD-MCNC: 8.9 G/DL (ref 13–17.7)
IMM GRANULOCYTES # BLD AUTO: 0.05 10*3/MM3 (ref 0–0.05)
IMM GRANULOCYTES NFR BLD AUTO: 0.5 % (ref 0–0.5)
ISOLATED FROM: ABNORMAL
ISOLATED FROM: ABNORMAL
LYMPHOCYTES # BLD AUTO: 0.93 10*3/MM3 (ref 0.7–3.1)
LYMPHOCYTES NFR BLD AUTO: 9.8 % (ref 19.6–45.3)
MAGNESIUM SERPL-MCNC: 1.7 MG/DL (ref 1.6–2.4)
MCH RBC QN AUTO: 29.3 PG (ref 26.6–33)
MCHC RBC AUTO-ENTMCNC: 31.1 G/DL (ref 31.5–35.7)
MCV RBC AUTO: 94.1 FL (ref 79–97)
MONOCYTES # BLD AUTO: 0.66 10*3/MM3 (ref 0.1–0.9)
MONOCYTES NFR BLD AUTO: 7 % (ref 5–12)
NEUTROPHILS NFR BLD AUTO: 7.59 10*3/MM3 (ref 1.7–7)
NEUTROPHILS NFR BLD AUTO: 80.2 % (ref 42.7–76)
NRBC BLD AUTO-RTO: 0 /100 WBC (ref 0–0.2)
NT-PROBNP SERPL-MCNC: 5804 PG/ML (ref 0–900)
PHOSPHATE SERPL-MCNC: 3.9 MG/DL (ref 2.5–4.5)
PLATELET # BLD AUTO: 153 10*3/MM3 (ref 140–450)
PMV BLD AUTO: 9.9 FL (ref 6–12)
POTASSIUM SERPL-SCNC: 3.9 MMOL/L (ref 3.5–5.2)
PROCALCITONIN SERPL-MCNC: 0.11 NG/ML (ref 0–0.25)
PROT SERPL-MCNC: 6.1 G/DL (ref 6–8.5)
RBC # BLD AUTO: 3.04 10*6/MM3 (ref 4.14–5.8)
SARS-COV-2 RNA RESP QL NAA+PROBE: NOT DETECTED
SODIUM SERPL-SCNC: 138 MMOL/L (ref 136–145)
WBC NRBC COR # BLD AUTO: 9.47 10*3/MM3 (ref 3.4–10.8)

## 2024-10-21 PROCEDURE — 97110 THERAPEUTIC EXERCISES: CPT

## 2024-10-21 PROCEDURE — 99310 SBSQ NF CARE HIGH MDM 45: CPT | Performed by: INTERNAL MEDICINE

## 2024-10-21 PROCEDURE — 97530 THERAPEUTIC ACTIVITIES: CPT

## 2024-10-21 PROCEDURE — 97116 GAIT TRAINING THERAPY: CPT

## 2024-10-21 PROCEDURE — 84145 PROCALCITONIN (PCT): CPT | Performed by: PHYSICIAN ASSISTANT

## 2024-10-21 PROCEDURE — 97112 NEUROMUSCULAR REEDUCATION: CPT

## 2024-10-21 PROCEDURE — 84100 ASSAY OF PHOSPHORUS: CPT | Performed by: PHYSICIAN ASSISTANT

## 2024-10-21 PROCEDURE — 87635 SARS-COV-2 COVID-19 AMP PRB: CPT | Performed by: PHYSICIAN ASSISTANT

## 2024-10-21 PROCEDURE — 83880 ASSAY OF NATRIURETIC PEPTIDE: CPT | Performed by: PHYSICIAN ASSISTANT

## 2024-10-21 PROCEDURE — 85025 COMPLETE CBC W/AUTO DIFF WBC: CPT | Performed by: PHYSICIAN ASSISTANT

## 2024-10-21 PROCEDURE — 80053 COMPREHEN METABOLIC PANEL: CPT | Performed by: PHYSICIAN ASSISTANT

## 2024-10-21 PROCEDURE — 25010000002 CEFTRIAXONE PER 250 MG: Performed by: PHYSICIAN ASSISTANT

## 2024-10-21 PROCEDURE — 83735 ASSAY OF MAGNESIUM: CPT | Performed by: PHYSICIAN ASSISTANT

## 2024-10-21 RX ADMIN — SODIUM CHLORIDE 2000 MG: 9 INJECTION INTRAMUSCULAR; INTRAVENOUS; SUBCUTANEOUS at 14:36

## 2024-10-21 RX ADMIN — DILTIAZEM HYDROCHLORIDE 120 MG: 30 TABLET, FILM COATED ORAL at 08:26

## 2024-10-21 RX ADMIN — SENNOSIDES AND DOCUSATE SODIUM 2 TABLET: 50; 8.6 TABLET ORAL at 21:22

## 2024-10-21 RX ADMIN — ASPIRIN 325 MG: 325 TABLET ORAL at 08:25

## 2024-10-21 RX ADMIN — DILTIAZEM HYDROCHLORIDE 120 MG: 30 TABLET, FILM COATED ORAL at 21:18

## 2024-10-21 RX ADMIN — FERROUS SULFATE TAB 325 MG (65 MG ELEMENTAL FE) 325 MG: 325 (65 FE) TAB at 08:26

## 2024-10-21 RX ADMIN — Medication 10 ML: at 21:25

## 2024-10-21 RX ADMIN — METOPROLOL TARTRATE 75 MG: 50 TABLET, FILM COATED ORAL at 08:25

## 2024-10-21 RX ADMIN — METOPROLOL TARTRATE 75 MG: 50 TABLET, FILM COATED ORAL at 21:19

## 2024-10-21 RX ADMIN — SENNOSIDES AND DOCUSATE SODIUM 2 TABLET: 50; 8.6 TABLET ORAL at 08:25

## 2024-10-21 RX ADMIN — PANTOPRAZOLE SODIUM 40 MG: 40 TABLET, DELAYED RELEASE ORAL at 08:26

## 2024-10-21 RX ADMIN — Medication 10 ML: at 08:28

## 2024-10-21 RX ADMIN — PANTOPRAZOLE SODIUM 40 MG: 40 TABLET, DELAYED RELEASE ORAL at 17:13

## 2024-10-21 RX ADMIN — Medication 5 MG: at 21:23

## 2024-10-21 RX ADMIN — FUROSEMIDE 40 MG: 40 TABLET ORAL at 08:26

## 2024-10-21 NOTE — THERAPY TREATMENT NOTE
SNF - Occupational Therapy Treatment Note   Yudy    Patient Name: Woodrow Alejandro  : 1950    MRN: [id number]                              Today's Date: 10/21/2024       Admit Date: 10/3/2024    Visit Dx:     ICD-10-CM ICD-9-CM   1. Decreased activities of daily living (ADL)  Z78.9 V49.89   2. Difficulty walking  R26.2 719.7   3. Rupture of Achilles tendon, unspecified laterality, sequela  S86.019S 905.8   4. Weakness generalized  R53.1 780.79   5. Bilateral primary osteoarthritis of knee  M17.0 715.16   6. Primary osteoarthritis of both knees  M17.0 715.16   7. Physical debility  R53.81 799.3     Patient Active Problem List   Diagnosis    Essential hypertension    Permanent atrial fibrillation    S/P AVR    ICD (implantable cardioverter-defibrillator), dual, in situ    Dermatitis    IFG (impaired fasting glucose)    Mixed hyperlipidemia    Vitamin D deficiency    Medicare annual wellness visit, subsequent    Primary osteoarthritis of both knees    Screening PSA (prostate specific antigen)    Bilateral primary osteoarthritis of knee    Coarse tremors    Weakness generalized    Increased urinary frequency    Bandemia    Achilles tendon rupture    Anemia due to GI blood loss    Physical debility     Past Medical History:   Diagnosis Date    Aortic valve replaced     Arthritis 2018    Atrial fibrillation     Coronary artery disease     Hypertension      Past Surgical History:   Procedure Laterality Date    CARDIAC SURGERY      COLONOSCOPY      COLONOSCOPY N/A 2024    Procedure: COLONOSCOPY WITH HOT/COLD SNARE POLYPECTOMIES, ELEVIEW INJECTION,CLIPX1;  Surgeon: Kurt Mensah MD;  Location: [facility] ENDOSCOPY;  Service: Gastroenterology;  Laterality: N/A;  COLON POLYPS    ENDOSCOPY N/A 2024    Procedure: ESOPHAGOGASTRODUODENOSCOPY WITH BIOPSIES;  Surgeon: Kurt Mensah MD;  Location: [facility] ENDOSCOPY;  Service: Gastroenterology;  Laterality: N/A;  RETAINED FOOD IN STOMACH AND  REFLUX ESOPHAGITIS    PACEMAKER IMPLANTATION        General Information       Row Name 10/21/24 1038          OT Time and Intention    Document Type therapy note (daily note)  -EG     Mode of Treatment individual therapy;occupational therapy  -EG     Patient Effort good  -EG       Row Name 10/21/24 1038          General Information    Existing Precautions/Restrictions fall  WBAT w/CAM boot  -EG       Row Name 10/21/24 1038          Cognition    Orientation Status (Cognition) oriented x 4  -EG       Row Name 10/21/24 1038          Safety Issues/Impairments Affecting Functional Mobility    Impairments Affecting Function (Mobility) balance;endurance/activity tolerance;range of motion (ROM);strength  -EG               User Key  (r) = Recorded By, (t) = Taken By, (c) = Cosigned By      Initials Name Provider Type    EG Janessa Good, OT Occupational Therapist                     Mobility/ADL's       Row Name 10/21/24 1039          Transfers    Transfers sit-stand transfer;stand-sit transfer  -EG     Comment, (Transfers) multiple chairs with arms in therapy gym all using RW  -EG       Row Name 10/21/24 1039          Bed-Chair Transfer    Bed-Chair [place] (Transfers) standby assist;supervision  -EG     Assistive Device (Bed-Chair Transfers) walker, front-wheeled  -EG       Row Name 10/21/24 1039          Sit-Stand Transfer    Sit-Stand [place] (Transfers) standby assist;supervision  -EG     Assistive Device (Sit-Stand Transfers) walker, front-wheeled  -EG       Row Name 10/21/24 1039          Stand-Sit Transfer    Stand-Sit [place] (Transfers) standby assist;set up  -EG     Assistive Device (Stand-Sit Transfers) walker, front-wheeled  -EG       Row Name 10/21/24 1039          Functional Mobility    Functional Mobility- Ind. Level verbal cues required;nonverbal cues required (demo/gesture);standby assist;supervision required  -EG     Functional Mobility- Device walker, front-wheeled  -EG     Functional  Mobility- Comment Patient is performing functional mobility to and from therapy gym using RW w/CAM boot donned, no LOB with good safety using RW  -EG       Row Name 10/21/24 1039          Mobility    Extremity Weight-bearing Status right lower extremity  -EG     Right Lower Extremity (Weight-bearing Status) weight-bearing as tolerated (WBAT)  -EG               User Key  (r) = Recorded By, (t) = Taken By, (c) = Cosigned By      Initials Name Provider Type    EG Janessa Good OT Occupational Therapist                   Obj/Interventions       Row Name 10/21/24 1058          Sensory Assessment (Somatosensory)    Sensory Assessment (Somatosensory) UE sensation intact  -EG       Row Name 10/21/24 1058          Vision Assessment/Intervention    Visual Impairment/Limitations WFL  -EG       Row Name 10/21/24 1058          Motor Skills    Motor Skills functional endurance  -EG     Functional Endurance endurance training for standing and mobility tolerance; fair/fair- inconsistent with room air only  -EG     Therapeutic Exercise aerobic  -EG       Row Name 10/21/24 1058          Balance    Balance Interventions standing;sit to stand;supported;static;dynamic;foam;UE activity with balance activity;weight shifting activity;occupation based/functional task  -EG     Comment, Balance Collaboration on task segmentation and balance skills needed for bowel hyg; Unsupported standing with ball performing rotation and twist at core both R and L to simulate movements for bowel hyg, patient with minimal participation due to anxiety and fear of falling; Downgraded to half twist/rotation both R/L unsupported with reaching no LOB noted during performance; Unsupported standing on foam balance pad 4x for 30 second incriments with rest breaks inbetween sets; Patient able to go up and down set of 3 steps 2x with unilateral hand rail using educated techniques and stepping stradegies no LOB  -EG       Row Name 10/21/24 1058          Aerobic  Exercise    Type (Aerobic Exercise) arm bike  -EG     Comment, Aerobic Exercise (Therapeutic Exercise) Omnicycle 15:00 no rest break on cardiac interval setting  -EG               User Key  (r) = Recorded By, (t) = Taken By, (c) = Cosigned By      Initials Name Provider Type    Janessa Alvarez OT Occupational Therapist                   Goals/Plan    No documentation.                  Clinical Impression       Row Name 10/21/24 1106          Plan of Care Review    Plan of Care Reviewed With patient  -EG     Progress improving  -EG     Outcome Evaluation Patient with no reports of pain; OT and patient collaborated on addressing balance skills for bowel hyg. due to that being patients main concern for returning home as well as step nagivation in split level home; Patient participated in balance focused interventions this date unsupported and body awareness challenging this date; Mobility training continues to improve; OT continue to treat and follow POC; Ax1 w/RW and CAM boot donned.  -EG       Row Name 10/21/24 1106          Therapy Assessment/Plan (OT)    Rehab Potential (OT) good  -EG     Criteria for Skilled Therapeutic Interventions Met (OT) yes;meets criteria;skilled treatment is necessary  -EG     Therapy Frequency (OT) 5 times/wk  -EG       Row Name 10/21/24 1106          Therapy Plan Review/Discharge Plan (OT)    Anticipated Discharge Disposition (OT) home with home health;other (see comments)  -EG       Row Name 10/21/24 1106          Positioning and Restraints    Pre-Treatment Position sitting in chair/recliner  -EG     Post Treatment Position chair  -EG     In Chair reclined;sitting;call light within reach;encouraged to call for assist;exit alarm on  -EG               User Key  (r) = Recorded By, (t) = Taken By, (c) = Cosigned By      Initials Name Provider Type    Janessa Alvarez OT Occupational Therapist                   Outcome Measures       Row Name 10/21/24 1108          How much help from  another is currently needed...    Putting on and taking off regular lower body clothing? 3  -EG     Bathing (including washing, rinsing, and drying) 3  -EG     Toileting (which includes using toilet bed pan or urinal) 3  -EG     Putting on and taking off regular upper body clothing 4  -EG     Taking care of personal grooming (such as brushing teeth) 4  -EG     Eating meals 4  -EG     AM-PAC 6 Clicks Score (OT) 21  -EG       Row Name 10/21/24 0825 10/21/24 0811       How much help from another person do you currently need...    Turning from your back to your side while in flat bed without using bedrails? 3  -CA 3  -WM    Moving from lying on back to sitting on the side of a flat bed without bedrails? 3  -CA 3  -WM    Moving to and from a bed to a chair (including a wheelchair)? 3  -CA 3  -WM    Standing up from a chair using your arms (e.g., wheelchair, bedside chair)? 3  -CA 3  -WM    Climbing 3-5 steps with a railing? 2  -CA 3  -WM    To walk in hospital room? 3  -CA 3  -WM    AM-PAC 6 Clicks Score (PT) 17  -CA 18  -WM    Highest Level of Mobility Goal 5 --> Static standing  -CA 6 --> Walk 10 steps or more  -WM      Row Name 10/21/24 1108          Functional Assessment    Outcome Measure Options AM-PAC 6 Clicks Daily Activity (OT);Optimal Instrument  -EG       Row Name 10/21/24 1108          Optimal Instrument    Optimal Instrument Optimal - 3  -EG     Bending/Stooping 3  -EG     Standing 2  -EG     Reaching 1  -EG               User Key  (r) = Recorded By, (t) = Taken By, (c) = Cosigned By      Initials Name Provider Type    CA Kayla Estrella, RN Registered Nurse    Dwaine Keith PTA Physical Therapist Assistant    EG Janessa Good OT Occupational Therapist                  Section G  Mobility  Bed mobility - self performance: limited assistance (staff provide guided maneuvering of limbs or other non-weight bearing assistance)  Bed mobility support/assistance: One person assist  Transfer - self  performance: limited assistance (staff provide guided maneuvering of limbs or other non-weight bearing assistance)  Transfer support/assistance: One person assist  Walking in room - self performance: limited assistance (staff provide guided maneuvering of limbs or other non-weight bearing assistance)  Walking in room support/assistance: One person assist  Walking in corridors/hallway - self performance: limited assistance (staff provide guided maneuvering of limbs or other non-weight bearing assistance)  Walking in corridors/hallway support/assistance: One person assist  Locomotion on unit - self performance: activity did not occur  Locomotion on unit support/assistance: Activity did not occur  Locomotion off unit - self performance: activity did not occur  Locomotion off unit support/assistance: Activity did not occur  Dressing - self performance: limited assistance (staff provide guided maneuvering of limbs or other non-weight bearing assistance)  Dressing support/assistance: One person assist  Eating - self performance: independent  Eating support/assistance: Setup help only  Toileting - self performance: limited assistance (staff provide guided maneuvering of limbs or other non-weight bearing assistance)  Toileting support/assistance: One person assist  Personal hygiene - self performance: limited assistance (staff provide guided maneuvering of limbs or other non-weight bearing assistance)  Personal hygiene support/assistance: One person assist  Bathing  Bathing - self performance: Physical help with bathing (exclude washing back and hair for patient)  Bathing support/assistance: One person assist  Balance  Balance during transitions & walking: Not steady, requires assist to steady  Moving from seated to standing position: Not steady, requires assist to steady  Walking: Not steady, requires assist to steady  Turning around while walking: Not steady, requires assist to steady  Moving on and off toilet: Not  steady, requires assist to steady  Surface-to-surface transfer: Not steady, requires assist to steady  Mobility devices: None were used  Range of Motion  Upper Extremity: No impairment  Lower Extremity: Impairment on one side  Section GG  Functional Ability/Goals, Adm (Section GG)  Self Care, Prior Functioning (MU8352W): 3. Independent  Functional Cognition, Prior Functioning (YY3366D): 3. Independent  Prior Device Use (AH2070): none of the above (Z)  Upper Extremity Range of Motion (QJ3179N): No impairment  Lower Extremity Range of Motion (PF3280V): Impairment on one side  Self Care, Admission (Section GG)  Eating: Self-Care Admission Performance (ZV3817Z3): setup or clean-up assistance (05)  Oral Hygiene: Self-Care Admission Performance (FI6147G2): setup or clean-up assistance (05)  Toileting Hygiene: Self-Care Admission Performance (PE0539A7): partial/moderate assistance (03)  Shower/Bathe Self: Self-Care Admission Performance (ZX4335O7): partial/moderate assistance (03)  Upper Body Dressing: Self-Care Admission Performance (WL8910B6): setup or clean-up assistance (05)  Lower Body Dressing: Self-Care Admission Performance (BC5118B1): partial/moderate assistance (03)  Putting On/Taking Off Footwear: Self-Care Admission Performance (HE3418K5): partial/moderate assistance (03)  Personal Hygiene: Self-Care Admission Performance (JF4937Z7): supervision or touching assistance (04)  Mobility, Admission Performance (LM1156)  Chair/Bed-Chair Transfer: Mobility Admission Performance (MK7780U6): partial/moderate assistance (03)  Toilet Transfer: Mobility Admission Performance (IU5731Z6): partial/moderate assistance (03)  Tub/shower Transfer: Mobility Admission Performance (OZ7438SH0): partial/moderate assistance (03)                Occupational Therapy Education       Title: PT OT SLP Therapies (Done)       Topic: Occupational Therapy (Done)       Point: ADL training (Done)       Description:   Instruct learner(s) on proper  safety adaptation and remediation techniques during self care or transfers.   Instruct in proper use of assistive devices.                  Learning Progress Summary            Patient JING [place] VU by EG at 10/4/2024 1056    Comment: Education on compensatory techniques for ADL's  Education on AE  Education on fall risk prevention and general safety  Education on OT services                      Point: Home exercise program (Done)       Description:   Instruct learner(s) on appropriate technique for monitoring, assisting and/or progressing therapeutic exercises/activities.                  Learning Progress Summary            Patient JING [place] VU by EG at 10/4/2024 1056    Comment: Education on compensatory techniques for ADL's  Education on AE  Education on fall risk prevention and general safety  Education on OT services                      Point: Precautions (Done)       Description:   Instruct learner(s) on prescribed precautions during self-care and functional transfers.                  Learning Progress Summary            Patient JING [place] VU by EG at 10/4/2024 1056    Comment: Education on compensatory techniques for ADL's  Education on AE  Education on fall risk prevention and general safety  Education on OT services                      Point: Body mechanics (Done)       Description:   Instruct learner(s) on proper positioning and spine alignment during self-care, functional mobility activities and/or exercises.                  Learning Progress Summary            Patient JING [place] VU by EG at 10/4/2024 1056    Comment: Education on compensatory techniques for ADL's  Education on AE  Education on fall risk prevention and general safety  Education on OT services                                      User Key       Initials Effective Dates Name Provider Type Discipline    EG 09/14/22 -  Janessa Good, OT Occupational Therapist OT                  OT Recommendation and Plan  Planned Therapy Interventions  (OT): activity tolerance training, functional balance retraining, occupation/activity based interventions, adaptive equipment training, BADL retraining, neuromuscular control/coordination retraining, patient/caregiver education/training, transfer/mobility retraining, strengthening exercise  Therapy Frequency (OT): 5 times/wk  Plan of Care Review  Plan of Care Reviewed With: patient  Progress: improving  Outcome Evaluation: Patient with no reports of pain; OT and patient collaborated on addressing balance skills for bowel hyg. due to that being patients main concern for returning home as well as step nagivation in split level home; Patient participated in balance focused interventions this date unsupported and body awareness challenging this date; Mobility training continues to improve; OT continue to treat and follow POC; Ax1 w/RW and CAM boot donned.     Time Calculation:   Evaluation Complexity (OT)  Review Occupational Profile/Medical/Therapy History Complexity: expanded/moderate complexity  Assessment, Occupational Performance/Identification of Deficit Complexity: 3-5 performance deficits  Clinical Decision Making Complexity (OT): detailed assessment/moderate complexity  Overall Complexity of Evaluation (OT): moderate complexity     Time Calculation- OT       Row Name 10/21/24 1108 10/21/24 0804          Time Calculation- OT    OT Received On 10/21/24  -EG --     OT Goal Re-Cert Due Date 11/03/24  -EG --        Timed Charges    22325 - OT Therapeutic Exercise Minutes 15  -EG --     93520 -  OT Neuromuscular Reeducation Minutes 21  -EG --     71381 - Gait Training Minutes  -- 10  -WM     39101 - OT Therapeutic Activity Minutes 18  -EG --        SNF Occupational Therapy Minutes    Skilled Minutes- OT 54 min  -EG --        Total Minutes    Timed Charges Total Minutes 54  -EG 10  -WM      Total Minutes 54  -EG 10  -WM               User Key  (r) = Recorded By, (t) = Taken By, (c) = Cosigned By      Initials Name  Provider Type    Dwaine Keith, GUDELIA Physical Therapist Assistant    Janessa Alvarez OT Occupational Therapist                  Therapy Charges for Today       Code Description Service Date Service Provider Modifiers Qty    [id number] HC OT NEUROMUSC RE EDUCATION EA 15 MIN 10/21/2024 Janessa Good OT GO 2    [id number]  OT THER PROC EA 15 MIN 10/21/2024 Janessa Good OT GO 1    [id number]  OT THERAPEUTIC ACT EA 15 MIN 10/21/2024 Janessa Good OT GO 1                 Janessa Good OT  10/21/2024

## 2024-10-21 NOTE — PROGRESS NOTES
[facility]   Hospitalist Progress Note       Patient Name: Woodrow Alejandro  : 1950  MRN: [id number]  Primary Care Physician: Juan Perez MD  Date of admission: 10/3/2024  Today's Date: 10/21/2024  Room / Bed:   305/1  Subjective   Chief Complaint: Weakness after recent hospitalization     HPI:  Woodrow Alejandro is a 73 y.o. male recently hospitalized for anemia and also right Achilles tendon tear.  During hospitalization, gastroenterology, Dr. Mensah, performed EGD and colonoscopy.  EGD showed esophagitis with bleeding lower third of esophagus.  PPI recommended.  2 polyps (tubular adenomas) were removed on colonoscopy.  Orthopedist, Dr. Zuniga, was consulted and recommended gentle ROM, weightbearing as tolerated, PT, and walking boot for his Achilles tendon tear.  His home aspirin was subsequently resumed on 10/3/24.  He has been a good candidate for more rehab and has now been admitted to our SNF for more therapy.  He will be on PPI BID x 2 weeks, then transition to daily.       Interval Followup: 10/21/2024    Up in gym.  Working on exercise machine.  Able to carry on a conversation while exercising.  No significant dyspnea with exertion.  No chest pains.  No fevers.  Recent echo with significant aortic stenosis of the bioprosthetic valve  Recent blood cultures positive on 10/15 for strep mitis  Repeat blood cultures positive on 10/17  Started on ceftriaxone after 10/17 cultures drawn  Tolerating current regimen  Afebrile  White count normalized        REVIEW OF SYSTEMS:   Weakness  Objective   Temp:  [97.5 °F (36.4 °C)-98.1 °F (36.7 °C)] 97.5 °F (36.4 °C)  Heart Rate:  [72-93] 72  Resp:  [20-22] 20  BP: (104-119)/(58-64) 104/58  PHYSICAL EXAM   CON: WN. WD. NAD.  Alert.  Polite/pleasant demeanor.  NECK:  No thyromegaly. No stridor.   RESP:  CTA. No wheezes.   CV:  Rhythm irregular. Rate WNL. 2/6 SM noted. Trace + pretib edema.  Left chest AICD noted   GI:  Soft and  nontender.  EXT: Trace pretibial edema. R foot in cam boot  PSYCH:  Alert. Oriented.  Calm mood  NEURO:  No dysarthria or aphasia.   SKIN: Bilateral chronic venous stasis changes noted.  Results from last 7 days   Lab Units 10/21/24  0455 10/19/24  0451 10/17/24  0448 10/16/24  0512 10/15/24  0445   WBC 10*3/mm3 9.47 11.40* 9.77 13.10* 16.81*   HEMOGLOBIN g/dL 8.9* 9.2* 9.1* 9.5* 9.6*   HEMATOCRIT % 28.6* 29.1* 29.4* 29.0* 29.8*   PLATELETS 10*3/mm3 153 149 126* 138* 141     Results from last 7 days   Lab Units 10/21/24  0455 10/19/24  0451 10/17/24  0448 10/16/24  0512 10/15/24  0445   SODIUM mmol/L 138 136 134* 136 135*   POTASSIUM mmol/L 3.9 3.9 3.7 3.6 3.4*   CO2 mmol/L 24.8 23.6 23.5 25.7 25.9   CHLORIDE mmol/L 102 100 100 100 98   ANION GAP mmol/L 11.2 12.4 10.5 10.3 11.1   BUN mg/dL 13 12 14 13 15   CREATININE mg/dL 0.71* 0.80 0.78 0.74* 0.83   GLUCOSE mg/dL 131* 138* 134* 137* 149*         COMPLEXITY OF DATA / DECISION MAKING     []  Moderate: One acute illness or mild exacerbation of chronic or 2 stable chronic or tx side effects   []  High:  Severe acute illness or severe exacerbation of chronic - potential for major debility / life threatening         I have personally reviewed the results from the time of this admission to 10/21/2024 12:29 EDT:  []  Laboratory:  []  Microbiology: []  Radiology:  []  Telemetry:   []  Cardiology/Vascular:  []  Pathology:  []  Prior external records:  []  Independent historian provided additional details:      []  Discussed case with specialists:    []  Independent interpretation of ECG/Imaging etc:             []  Moderate: Rx management, low risk surgery, suboptimal social situation   []  High:  Rx with close monitoring for toxicity, mod-high risk surgery, DNR decision, Comfort initiated, IV pain meds    Assessment / Plan   Assessment:     Weakness after recent hospitalization  Recent anemia  Recent EGD 9/30/24 by Dr. Mensah showing esophagitis (PPI recommended)  Recent  colonoscopy 9/30/24 by Dr. Mensah with polypectomy  Recent right achilles tendon tear  Nonischemic cardiomyopathy, EF improved  A-fib (on aspirin only)  Bioprosthetic aortic valve replacement (follows w Dr. Newman)  HTN  GERD  Anemia  CAD/AICD  Noted on CT: Possible ankylosing spondylitis   Urinary frequency  Blood cultures positive 10/15 Streptococcus mitis        Plan:     Will reach out to infectious disease for consultation/opinion  Will consult his cardiologist  IV Rocephin 2g/day (first dose 10/17/24)  Blood cultures from 10/15 were positive for Streptococcus mitis.  Blood cultures repeated 10/17 ... Positive  Echo ...... EF 51-55%.  Significant gradient across bioprosthetic aortic valve 101/68 (severe stenosis).  This is significantly more elevated than 1 month ago.  RVSP greater than 55.  Resume Lasix.  Monitor volume status renal indices  Hematology consulted  Lisinopril on hold  Home aspirin has been resumed  Continue iron supplement  Continue PPI  CAM boot per ortho          VTE Prophylaxis:  No VTE prophylaxis order currently exists.    CODE STATUS:      Level Of Support Discussed With: Patient  Code Status (Patient has no pulse and is not breathing): CPR (Attempt to Resuscitate)  Medical Interventions (Patient has pulse or is breathing): Full Support         Patient independently seen and evaluated, agree with assessment and plan, above documentation reflects plan put forth during bedside rounds.  More than 51% of the time of this patient encounter was performed by me.    Interval history:  No acute events overnight, patient states he is feeling better, patient's white blood cell count is downtrending.  Patient did have repeat blood cultures were positive for strep species.  Of note these were drawn prior to starting antibiotic therapy.  Patient does have known bioprosthetic valve as well as AICD.  Patient has had a chronic leukocytosis for some time but seems to be resolving now with appropriate  antibiotic therapy    GEN: No acute distress  HEENT: Moist mucous membranes  LUNGS: Equal chest rise bilaterally  CARDIAC: Regular rate and rhythm  NEURO: Moving all 4 extremities spontaneously  SKIN: No obvious breakdown    Plan:  Agree with assessment and plan as above  Continue 2 g Rocephin  Repeat blood cultures  2D echo EF 51 to 55%, significant gradient across the bioprosthetic valve showing severe stenosis  Hematology consulted and following  Continue Lasix  Continue aspirin  Consult cardiology as patient will likely need transesophageal echocardiogram  Consult ID for strep bacteremia in the setting of bioprosthetic valve  Clinical course will dictate further management  Continue PT/OT    Time spent: Time spent involving patient care including face-to-face encounter 50 minutes    Electronically signed by Matt Parks MD, 10/21/2024, 13:02 EDT.

## 2024-10-21 NOTE — PROGRESS NOTES
Psychiatric     Progress Note    Patient Name: Woodrow Alejandro  : 1950  MRN: [id number]  Primary Care Physician:  Juan Perez MD  Date of admission: 10/3/2024    Subjective   Subjective     Chief Complaint: Patient stable and doing well has had his physical therapy today leukocytosis has resolved    HPI: Patient is stable and appears to be improving    Review of Systems   All systems were reviewed and negative except for: Has been reviewed alert and oriented not in acute distress    Objective   Objective     Vitals:   Temp:  [97.5 °F (36.4 °C)-98.1 °F (36.7 °C)] 97.5 °F (36.4 °C)  Heart Rate:  [72-93] 72  Resp:  [20-22] 20  BP: ()/(58-64) 104/58    Physical Exam    Constitutional: Alert and oriented not in acute distress   Eyes: Pupils equal reacting to light and accommodation   HENT: Normal   Neck: Normal    Respiratory: No rales or rhonchi   Cardiovascular: S1S2 normal no S3 or S4   Gastrointestinal: No palpable organomegaly   Musculoskeletal: Patient still dressing and boot over the right foot   Neurologic: No localizing sign  Skin: No rash       Result Review    Result Review:  I have personally reviewed the results from the time of this admission to 10/21/2024 08:03 EDT and agree with these findings:  [x]  Laboratory  []  Microbiology  []  Radiology  []  EKG/Telemetry   []  Cardiology/Vascular   []  Pathology  []  Old records  []  Other:  Most notable findings include: Discussed    Assessment & Plan   Assessment / Plan     Brief Patient Summary:  Woodrow Alejandro is a 73 y.o. male who patient with cellulitis and leukocytosis resolved    Active Hospital Problems:  Active Hospital Problems    Diagnosis     **Physical debility        Plan:   Continue the current management with physical therapy    VTE Prophylaxis:  No VTE prophylaxis order currently exists.        CODE STATUS:   Level Of Support Discussed With: Patient  Code Status (Patient has no pulse and is not breathing):  CPR (Attempt to Resuscitate)  Medical Interventions (Patient has pulse or is breathing): Full Support    Disposition:  I expect patient to be discharged after the patient has been stabilized.    Electronically signed by Kiet Lindsay MD, 10/21/24, 8:03 AM EDT.      Part of this note may be an electronic transcription/translation of spoken language to printed text using the Dragon Dictation System.

## 2024-10-21 NOTE — THERAPY TREATMENT NOTE
SNF - Physical Therapy Treatment Note   [facility]     Patient Name: Woodrow Alejandro  : 1950  MRN: [id number]  Today's Date: 10/21/2024      Visit Dx:    ICD-10-CM ICD-9-CM   1. Decreased activities of daily living (ADL)  Z78.9 V49.89   2. Difficulty walking  R26.2 719.7   3. Rupture of Achilles tendon, unspecified laterality, sequela  S86.019S 905.8   4. Weakness generalized  R53.1 780.79   5. Bilateral primary osteoarthritis of knee  M17.0 715.16   6. Primary osteoarthritis of both knees  M17.0 715.16   7. Physical debility  R53.81 799.3     Patient Active Problem List   Diagnosis    Essential hypertension    Permanent atrial fibrillation    S/P AVR    ICD (implantable cardioverter-defibrillator), dual, in situ    Dermatitis    IFG (impaired fasting glucose)    Mixed hyperlipidemia    Vitamin D deficiency    Medicare annual wellness visit, subsequent    Primary osteoarthritis of both knees    Screening PSA (prostate specific antigen)    Bilateral primary osteoarthritis of knee    Coarse tremors    Weakness generalized    Increased urinary frequency    Bandemia    Achilles tendon rupture    Anemia due to GI blood loss    Physical debility     Past Medical History:   Diagnosis Date    Aortic valve replaced     Arthritis 2018    Atrial fibrillation     Coronary artery disease     Hypertension      Past Surgical History:   Procedure Laterality Date    CARDIAC SURGERY      COLONOSCOPY      COLONOSCOPY N/A 2024    Procedure: COLONOSCOPY WITH HOT/COLD SNARE POLYPECTOMIES, ELEVIEW INJECTION,CLIPX1;  Surgeon: Kurt Mensah MD;  Location: formerly [facility] ENDOSCOPY;  Service: Gastroenterology;  Laterality: N/A;  COLON POLYPS    ENDOSCOPY N/A 2024    Procedure: ESOPHAGOGASTRODUODENOSCOPY WITH BIOPSIES;  Surgeon: uKrt Mensah MD;  Location: formerly [facility] ENDOSCOPY;  Service: Gastroenterology;  Laterality: N/A;  RETAINED FOOD IN STOMACH AND REFLUX ESOPHAGITIS    PACEMAKER IMPLANTATION         PT  Assessment (Last 12 Hours)       PT Evaluation and Treatment       Row Name 10/21/24 0805          Physical Therapy Time and Intention    Document Type therapy note (daily note)  -WM     Mode of Treatment individual therapy;physical therapy  -WM     Patient Effort good  -WM     Symptoms Noted During/After Treatment fatigue  -       Row Name 10/21/24 0805          Sit-Stand Transfer    Sit-Stand [place] (Transfers) contact guard;standby assist  -WM     Assistive Device (Sit-Stand Transfers) walker, front-wheeled  -WM       Row Name 10/21/24 0805          Stand-Sit Transfer    Stand-Sit [place] (Transfers) contact guard;standby assist  -WM     Assistive Device (Stand-Sit Transfers) walker, front-wheeled  -WM       Row Name 10/21/24 0805          Gait/Stairs (Locomotion)    [place] Level (Gait) contact guard;standby assist  -WM     Assistive Device (Gait) walker, front-wheeled  -WM     Distance in Feet (Gait) 120  -WM     Pattern (Gait) 4-point;step-through  -WM     Deviations/Abnormal Patterns (Gait) gait speed decreased;stride length decreased  -       Row Name 10/21/24 0805          Safety Issues/Impairments Affecting Functional Mobility    Impairments Affecting Function (Mobility) balance;endurance/activity tolerance;range of motion (ROM);strength  -       Row Name 10/21/24 0805          Hip (Therapeutic Exercise)    Hip AROM (Therapeutic Exercise) bilateral;aBduction;aDduction;supine;15 repititions  2 sets  -     Hip Isometrics (Therapeutic Exercise) bilateral;gluteal sets;supine;10 repetitions;5 repetitions;3 second hold;2 sets  -     Hip Strengthening (Therapeutic Exercise) bilateral;aBduction;aDduction;heel slides;marching while seated;sitting;supine;2 lb free weight;resistance band;green;3 repetitions;15 repititions;2 sets  Manual resistance  -       Row Name 10/21/24 0805          Knee (Therapeutic Exercise)    Knee Isometrics (Therapeutic Exercise) bilateral;quad sets;supine;10  repetitions;5 repetitions;3 second hold;2 sets  -     Knee Strengthening (Therapeutic Exercise) bilateral;LAQ (long arc quad);hamstring curls;sitting;3 lb free weight;resistance band;green;15 repititions;2 sets  -       Row Name 10/21/24 0805          Ankle (Therapeutic Exercise)    Ankle AROM (Therapeutic Exercise) left;dorsiflexion;plantarflexion;supine;30 repititions  -       Row Name 10/21/24 0805          Aerobic Exercise    Time Performed (Aerobic Exercise) NuStep x 15 minutes, load 5  -       Row Name 10/21/24 0805          Positioning and Restraints    Pre-Treatment Position sitting in chair/recliner  -     Post Treatment Position chair  -WM     In Chair sitting;call light within reach;encouraged to call for assist;exit alarm on  -       Row Name 10/21/24 0805          Progress Summary (PT)    Progress Toward Functional Goals (PT) progress toward functional goals is good  -               User Key  (r) = Recorded By, (t) = Taken By, (c) = Cosigned By      Initials Name Provider Type    Dwaine Keith PTA Physical Therapist Assistant                  Section G              Section GG                       Physical Therapy Education       Title: PT OT SLP Therapies (Done)       Topic: Physical Therapy (Resolved)       Point: Mobility training (Resolved)       Learner Progress:  Not documented in this visit.              Point: Home exercise program (Resolved)       Learner Progress:  Not documented in this visit.              Point: Body mechanics (Resolved)       Learner Progress:  Not documented in this visit.              Point: Precautions (Resolved)       Learner Progress:  Not documented in this visit.                                  PT Recommendation and Plan     Progress Summary (PT)  Progress Toward Functional Goals (PT): progress toward functional goals is good   Outcome Measures       Row Name 10/21/24 0811 10/19/24 1300 10/18/24 1317       How much help from another person do you  currently need...    Turning from your back to your side while in flat bed without using bedrails? 3  -WM 3  -CS 3  -WM    Moving from lying on back to sitting on the side of a flat bed without bedrails? 3  -WM 3  -CS 3  -WM    Moving to and from a bed to a chair (including a wheelchair)? 3  -WM 3  -CS 3  -WM    Standing up from a chair using your arms (e.g., wheelchair, bedside chair)? 3  -WM 3  -CS 3  -WM    Climbing 3-5 steps with a railing? 3  -WM 3  -CS 3  -WM    To walk in hospital room? 3  -WM 3  -CS 3  -WM    AM-PAC 6 Clicks Score (PT) 18  -WM 18  -CS 18  -WM    Highest Level of Mobility Goal 6 --> Walk 10 steps or more  -WM 6 --> Walk 10 steps or more  -CS 6 --> Walk 10 steps or more  -WM       Functional Assessment    Outcome Measure Options -- AM-PAC 6 Clicks Basic Mobility (PT)  -CS --              User Key  (r) = Recorded By, (t) = Taken By, (c) = Cosigned By      Initials Name Provider Type     Dwaine Guzman PTA Physical Therapist Assistant    Humberto Deleon PTA Physical Therapist Assistant                     Time Calculation:    PT Charges       Row Name 10/21/24 0804             Time Calculation    PT Received On 10/21/24  -WM         Timed Charges    09367 - PT Therapeutic Exercise Minutes 30  -WM      90869 - Gait Training Minutes  10  -WM      12866 - PT Therapeutic Activity Minutes 6  -WM         SNF Physical Therapy Minutes    Skilled Minutes- PT 46 min  -WM         Total Minutes    Timed Charges Total Minutes 46  -WM       Total Minutes 46  -WM                User Key  (r) = Recorded By, (t) = Taken By, (c) = Cosigned By      Initials Name Provider Type     Dwaine Guzman PTA Physical Therapist Assistant                      PT G-Codes  Outcome Measure Options: AM-PAC 6 Clicks Basic Mobility (PT)  AM-PAC 6 Clicks Score (PT): 18  AM-PAC 6 Clicks Score (OT): 21    Dwaine Guzman PTA  10/21/2024

## 2024-10-21 NOTE — PLAN OF CARE
Goal Outcome Evaluation:      Patient able to get some rest throughout the night. Vitals Stable. Takes meds whole. Assist x1 with the boot on the right foot up to the bathroom. Continue plan of care.

## 2024-10-22 LAB
INTERPRETATION: NEGATIVE
LAB DIRECTOR NAME PROVIDER: NORMAL
LABORATORY COMMENT REPORT: NORMAL
REF LAB TEST METHOD: NORMAL
T(ABL1,BCR)B2A2/CONTROL BLD/T: NORMAL %
T(ABL1,BCR)B3A2/CONTROL BLD/T: NORMAL %
T(ABL1,BCR)E1A2/CONTROL BLD/T: NORMAL %

## 2024-10-22 PROCEDURE — 25010000002 PENICILLIN G POTASSIUM PER 600000 UNITS: Performed by: INTERNAL MEDICINE

## 2024-10-22 PROCEDURE — 97530 THERAPEUTIC ACTIVITIES: CPT

## 2024-10-22 PROCEDURE — 97110 THERAPEUTIC EXERCISES: CPT

## 2024-10-22 PROCEDURE — 99222 1ST HOSP IP/OBS MODERATE 55: CPT | Performed by: INTERNAL MEDICINE

## 2024-10-22 PROCEDURE — 97535 SELF CARE MNGMENT TRAINING: CPT

## 2024-10-22 PROCEDURE — 97116 GAIT TRAINING THERAPY: CPT

## 2024-10-22 PROCEDURE — 99309 SBSQ NF CARE MODERATE MDM 30: CPT | Performed by: PHYSICIAN ASSISTANT

## 2024-10-22 RX ADMIN — Medication 10 ML: at 08:59

## 2024-10-22 RX ADMIN — METOPROLOL TARTRATE 75 MG: 50 TABLET, FILM COATED ORAL at 21:45

## 2024-10-22 RX ADMIN — SENNOSIDES AND DOCUSATE SODIUM 2 TABLET: 50; 8.6 TABLET ORAL at 08:57

## 2024-10-22 RX ADMIN — Medication 5 MG: at 21:46

## 2024-10-22 RX ADMIN — FERROUS SULFATE TAB 325 MG (65 MG ELEMENTAL FE) 325 MG: 325 (65 FE) TAB at 08:57

## 2024-10-22 RX ADMIN — Medication 10 ML: at 21:47

## 2024-10-22 RX ADMIN — DILTIAZEM HYDROCHLORIDE 120 MG: 30 TABLET, FILM COATED ORAL at 21:45

## 2024-10-22 RX ADMIN — METOPROLOL TARTRATE 75 MG: 50 TABLET, FILM COATED ORAL at 08:57

## 2024-10-22 RX ADMIN — DILTIAZEM HYDROCHLORIDE 120 MG: 30 TABLET, FILM COATED ORAL at 08:57

## 2024-10-22 RX ADMIN — SENNOSIDES AND DOCUSATE SODIUM 2 TABLET: 50; 8.6 TABLET ORAL at 21:46

## 2024-10-22 RX ADMIN — PANTOPRAZOLE SODIUM 40 MG: 40 TABLET, DELAYED RELEASE ORAL at 08:57

## 2024-10-22 RX ADMIN — PANTOPRAZOLE SODIUM 40 MG: 40 TABLET, DELAYED RELEASE ORAL at 18:30

## 2024-10-22 RX ADMIN — ASPIRIN 325 MG: 325 TABLET ORAL at 08:57

## 2024-10-22 RX ADMIN — FUROSEMIDE 40 MG: 40 TABLET ORAL at 08:57

## 2024-10-22 NOTE — PROGRESS NOTES
[facility]     Progress Note    Patient Name: Woodrow Alejandro  : 1950  MRN: [id number]  Primary Care Physician:  Juan Perez MD  Date of admission: 10/3/2024    Subjective   Subjective     Chief Complaint: Stable and doing well making good progress no evidence of MPN    HPI: So far has been negative for immunoelectrophoresis pending    Review of Systems   All systems were reviewed and negative except for: Has been reviewed    Objective   Objective     Vitals:   Temp:  [98.1 °F (36.7 °C)-98.2 °F (36.8 °C)] 98.2 °F (36.8 °C)  Heart Rate:  [86-89] 89  Resp:  [16-18] 18  BP: (116-135)/(62-77) 135/77    Physical Exam    Constitutional: Alert oriented not in acute distress   Eyes: Pupils equal reacting to light and accommodation   HENT: Normal   Neck: Normal   Respiratory: No rales or rhonchi   Cardiovascular: Heart S1-S2 normal no S3 or S4   Gastrointestinal: No palpable organomegaly   Musculoskeletal: Right heel again covered with dressings   Neurologic: No localizing signs   Skin:   No rash      Result Review    Result Review:  I have personally reviewed the results from the time of this admission to 10/22/2024 08:21 EDT and agree with these findings:  [x]  Laboratory  []  Microbiology  []  Radiology  []  EKG/Telemetry   []  Cardiology/Vascular   []  Pathology  []  Old records  []  Other:  Most notable findings include: Have been reviewed and discussed    Assessment & Plan   Assessment / Plan     Brief Patient Summary:  Woodrow Alejandro is a 73 y.o. male who patient with leukocytosis which has resolved    Active Hospital Problems:  Active Hospital Problems    Diagnosis     **Physical debility        Plan:   Continue physical therapy    VTE Prophylaxis:  No VTE prophylaxis order currently exists.        CODE STATUS:   Level Of Support Discussed With: Patient  Code Status (Patient has no pulse and is not breathing): CPR (Attempt to Resuscitate)  Medical Interventions (Patient has pulse  or is breathing): Full Support    Disposition:  I expect patient to be discharged as per primary.    Electronically signed by Kiet Lindsay MD, 10/22/24, 8:21 AM EDT.      Part of this note may be an electronic transcription/translation of spoken language to printed text using the Dragon Dictation System.

## 2024-10-22 NOTE — CONSULTS
[facility]   Cardiology Consult Note    Patient Name: Woodrow Alejandro  : 1950  MRN: [id number]  Primary Care Physician:  Juan Perez MD  Referring Physician: Joseluis RAMOS MD  Date of admission: 10/3/2024    Subjective   Subjective     Reason for Consult/ Chief Complaint: Fevers chills, Achilles tear    HPI:  Woodrow Alejandro is a 73 y.o. male patient is a 72-year-old male with past medical history of bioprosthetic aortic valve replacement back in .  He initially had a nonischemic cardiomyopathy and received a Medtronic AICD placement.  His ejection fraction returned back to normal.  He has a history of hypertension and chronic atrial fibrillation.  He tried warfarin and the newer anticoagulants but did not tolerate them and declined anticoagulation.  He did have ligation of his left atrial appendage at the time of the aortic valve replacement.  He states he had dental work done and then about 2 days later he started feeling bad with fevers and chills.  He apparently was treated with Levaquin.  He subsequently developed an Achilles tendon tear.  Since being in the hospital he is had blood cultures positive for strep mitis.  He had a repeat echocardiogram of the bioprosthetic aortic valve that showed the gradients max/mean of 101/68 mmHg.    Review of Systems   All systems were reviewed and negative except for: Cardiac review of systems negative    Personal History     Past Medical History:   Diagnosis Date    Aortic valve replaced     Arthritis 2018    Atrial fibrillation     Coronary artery disease     Hypertension         Past medical history reviewed      Family History: family history includes Arthritis in his father; COPD in his father and mother; Heart disease in his mother. Otherwise pertinent FHx was reviewed and not pertinent to current issue.    Social History:  reports that he has never smoked. He has never been exposed to tobacco smoke. He has never used  smokeless tobacco. He reports current alcohol use of about 4.0 standard drinks of alcohol per week. He reports that he does not use drugs.    Home Medications:  aspirin, dilTIAZem, furosemide, levoFLOXacin, lisinopril, and metoprolol tartrate    Allergies:  Allergies   Allergen Reactions    Diclofenac Sodium Dizziness       Objective    Objective     Vitals:   Temp:  [98.1 °F (36.7 °C)-98.2 °F (36.8 °C)] 98.2 °F (36.8 °C)  Heart Rate:  [86-89] 89  Resp:  [16-18] 18  BP: (116-135)/(62-77) 135/77      Physical Exam:   Constitutional: Awake, alert, No acute distress    Eyes: PERRLA, sclerae anicteric, no conjunctival injection   HENT: NCAT, mucous membranes moist   Neck: Supple, no thyromegaly, no lymphadenopathy, trachea midline   Respiratory: Clear to auscultation bilaterally, nonlabored respirations    Cardiovascular: RRR, no murmurs, rubs, or gallops, palpable pedal pulses bilaterally   Gastrointestinal: Positive bowel sounds, soft, nontender, nondistended   Musculoskeletal: No bilateral ankle edema, no clubbing or cyanosis to extremities   Psychiatric: Appropriate affect, cooperative   Neurologic: Oriented x 3, strength symmetric in all extremities, Cranial Nerves grossly intact to confrontation, speech clear   Skin: No rashes     Result Review    Result Review:  I have personally reviewed the results from the time of this admission to 10/22/2024 16:26 EDT and agree with these findings:  [x]  Laboratory  []  Microbiology  [x]  Radiology  [x]  EKG/Telemetry   [x]  Cardiology/Vascular   []  Pathology  [x]  Old records  []  Other:  Most notable findings include:     CMP          10/17/2024    04:48 10/19/2024    04:51 10/21/2024    04:55   CMP   Glucose 134  138  131    BUN 14  12  13    Creatinine 0.78  0.80  0.71    EGFR 94.2  93.4  96.9    Sodium 134  136  138    Potassium 3.7  3.9  3.9    Chloride 100  100  102    Calcium 8.3  8.5  8.4    Total Protein 5.9  6.2  6.1    Albumin 2.5  2.9  2.9    Globulin 3.4  3.3   3.2    Total Bilirubin 0.5  0.4  0.4    Alkaline Phosphatase 66  71  71    AST (SGOT) 14  19  18    ALT (SGPT) 10  12  13    Albumin/Globulin Ratio 0.7  0.9  0.9    BUN/Creatinine Ratio 17.9  15.0  18.3    Anion Gap 10.5  12.4  11.2       CBC          10/17/2024    04:48 10/19/2024    04:51 10/21/2024    04:55   CBC   WBC 9.77  11.40  9.47    RBC 3.07  3.10  3.04    Hemoglobin 9.1  9.2  8.9    Hematocrit 29.4  29.1  28.6    MCV 95.8  93.9  94.1    MCH 29.6  29.7  29.3    MCHC 31.0  31.6  31.1    RDW 16.1  16.5  16.9    Platelets 126  149  153       Lab Results   Component Value Date    TROPONINT 62 (C) 09/26/2024         Assessment & Plan   Assessment / Plan     Brief Patient Summary:  Woodrow Alejandro is a 73 y.o. male who has a history of bioprosthetic aortic valve replacement back in 2014.  He had a history of nonischemic cardiomyopathy and got an AICD but his EF returned to normal.    Active Hospital Problems:  Active Hospital Problems    Diagnosis     **Physical debility        Assessment:  1.  Status post remote bioprosthetic aortic valve  2.  Bacteremia  3.  History of Medtronic AICD    Plan:   1.  Patient's blood cultures were positive for strep mitis.  These have been positive on the 15th and 17 October.  He has noted fevers and chills since a dental procedure and was treated initially with the Levaquin.  2.  I saw the patient about a year and a half ago.  He had an echocardiogram from August 2021 that showed MAC/mean pressure gradient across to his aortic valve of 29/17 mmHg.  We repeated an echocardiogram on 2/24 and his gradients were 37/22 mmHg.  His primary care repeated 19/20/24 and his gradients were 55/29 mmHg.  With his bacteremia he just recently had 1 done 10/19/2024 which showed normal ejection fraction but his gradients up to 101/68 mmHg.  3.  I am going to make the patient n.p.o. at midnight.  I am going to discussed the case with the structural cardiologist and CT surgeons in [place].   Will consider doing a transesophageal echocardiogram tomorrow a.m.  I want to get their recommendations first.  I do not think we can just go ahead and fix this valve at this time with the bacteremia.    Electronically signed by Parker Newman MD, 10/22/24, 4:26 PM EDT.

## 2024-10-22 NOTE — PROGRESS NOTES
[facility]   Hospitalist Progress Note       Patient Name: Woodrow Alejandro  : 1950  MRN: [id number]  Primary Care Physician: Juan Perez MD  Date of admission: 10/3/2024  Today's Date: 10/22/2024  Room / Bed:   305/1  Subjective   Chief Complaint: Weakness after recent hospitalization     HPI:  Woodrow Alejandro is a 73 y.o. male recently hospitalized for anemia and also right Achilles tendon tear.  During hospitalization, gastroenterology, Dr. Mensah, performed EGD and colonoscopy.  EGD showed esophagitis with bleeding lower third of esophagus.  PPI recommended.  2 polyps (tubular adenomas) were removed on colonoscopy.  Orthopedist, Dr. Zuniga, was consulted and recommended gentle ROM, weightbearing as tolerated, PT, and walking boot for his Achilles tendon tear.  His home aspirin was subsequently resumed on 10/3/24.  He has been a good candidate for more rehab and has now been admitted to our SNF for more therapy.  He will be on PPI BID x 2 weeks, then transition to daily.       Interval Followup: 10/22/2024    Daughter at bedside.  Discussed positive blood cultures with them  Discussed pending cardiology consult  Discussed continuing IV antibiotics.  Ceftriaxone being narrowed to pen G.  (This was reviewed with ID pharmacist and ID MD)  Discussed recent echo showing significant gradient across bioprosthetic AVR  Mild lower extremity edema, baseline  On room air.  Doing well with exercises.  No LAND.  Recent echo with significant aortic stenosis of the bioprosthetic valve  Recent blood cultures positive on 10/15 for strep mitis  Repeat blood cultures positive on 10/17 GPC  Was started on antibiotics after second set drawn      REVIEW OF SYSTEMS:   Weakness  Objective   Temp:  [98.1 °F (36.7 °C)-98.2 °F (36.8 °C)] 98.2 °F (36.8 °C)  Heart Rate:  [86-89] 89  Resp:  [16-18] 18  BP: (116-135)/(62-77) 135/77  PHYSICAL EXAM   CON: WN. WD. NAD.  Alert.  Polite/pleasant demeanor.  NECK:  No  thyromegaly. No stridor.   RESP:  CTA. No wheezes.   CV:  Rhythm irregular. Rate WNL. 2/6 SM noted. Trace + pretib edema.  Left chest AICD noted   GI:  Soft and nontender.  EXT: Trace pretibial edema. R foot in cam boot  PSYCH:  Alert. Oriented.  Calm mood  NEURO:  No dysarthria or aphasia.   SKIN: Bilateral chronic venous stasis changes noted.  Results from last 7 days   Lab Units 10/21/24  0455 10/19/24  0451 10/17/24  0448 10/16/24  0512   WBC 10*3/mm3 9.47 11.40* 9.77 13.10*   HEMOGLOBIN g/dL 8.9* 9.2* 9.1* 9.5*   HEMATOCRIT % 28.6* 29.1* 29.4* 29.0*   PLATELETS 10*3/mm3 153 149 126* 138*     Results from last 7 days   Lab Units 10/21/24  0455 10/19/24  0451 10/17/24  0448 10/16/24  0512   SODIUM mmol/L 138 136 134* 136   POTASSIUM mmol/L 3.9 3.9 3.7 3.6   CO2 mmol/L 24.8 23.6 23.5 25.7   CHLORIDE mmol/L 102 100 100 100   ANION GAP mmol/L 11.2 12.4 10.5 10.3   BUN mg/dL 13 12 14 13   CREATININE mg/dL 0.71* 0.80 0.78 0.74*   GLUCOSE mg/dL 131* 138* 134* 137*         COMPLEXITY OF DATA / DECISION MAKING     []  Moderate: One acute illness or mild exacerbation of chronic or 2 stable chronic or tx side effects   []  High:  Severe acute illness or severe exacerbation of chronic - potential for major debility / life threatening         I have personally reviewed the results from the time of this admission to 10/22/2024 12:07 EDT:  []  Laboratory:  []  Microbiology: []  Radiology:  []  Telemetry:   []  Cardiology/Vascular:  []  Pathology:  []  Prior external records:  []  Independent historian provided additional details:      []  Discussed case with specialists:    []  Independent interpretation of ECG/Imaging etc:             []  Moderate: Rx management, low risk surgery, suboptimal social situation   []  High:  Rx with close monitoring for toxicity, mod-high risk surgery, DNR decision, Comfort initiated, IV pain meds    Assessment / Plan   Assessment:     Weakness after recent hospitalization  Recent anemia  Recent  EGD 9/30/24 by Dr. Mensah showing esophagitis (PPI recommended)  Recent colonoscopy 9/30/24 by Dr. Mensah with polypectomy  Recent right achilles tendon tear  Nonischemic cardiomyopathy, EF improved  A-fib (on aspirin only)  Bioprosthetic aortic valve replacement (follows w Dr. Newman)  HTN  GERD  Anemia  CAD/AICD  Noted on CT: Possible ankylosing spondylitis   Urinary frequency  Blood cultures positive 10/15 Streptococcus mitis        Plan:     Cardiologist consulted.  IV antibiotics (first dose 10/17/24) at least 2 weeks  Blood cultures from 10/15 were positive for Streptococcus mitis.  Blood cultures repeated 10/17 ... Positive gpc  Echo ...... EF 51-55%.  Significant gradient across bioprosthetic aortic valve 101/68 (severe stenosis).  This is significantly more elevated than 1 month ago.  RVSP greater than 55.  Resume Lasix.  Monitor volume status renal indices  Hematology consulted  Lisinopril on hold  Home aspirin has been resumed  Continue iron supplement  Continue PPI  CAM boot per ortho    VTE Prophylaxis:  No VTE prophylaxis order currently exists.    CODE STATUS:      Level Of Support Discussed With: Patient  Code Status (Patient has no pulse and is not breathing): CPR (Attempt to Resuscitate)  Medical Interventions (Patient has pulse or is breathing): Full Support

## 2024-10-22 NOTE — THERAPY TREATMENT NOTE
SNF - Physical Therapy Treatment Note   [facility]     Patient Name: Woodrow Alejandro  : 1950  MRN: [id number]  Today's Date: 10/22/2024      Visit Dx:    ICD-10-CM ICD-9-CM   1. Decreased activities of daily living (ADL)  Z78.9 V49.89   2. Difficulty walking  R26.2 719.7   3. Rupture of Achilles tendon, unspecified laterality, sequela  S86.019S 905.8   4. Weakness generalized  R53.1 780.79   5. Bilateral primary osteoarthritis of knee  M17.0 715.16   6. Primary osteoarthritis of both knees  M17.0 715.16   7. Physical debility  R53.81 799.3     Patient Active Problem List   Diagnosis    Essential hypertension    Permanent atrial fibrillation    S/P AVR    ICD (implantable cardioverter-defibrillator), dual, in situ    Dermatitis    IFG (impaired fasting glucose)    Mixed hyperlipidemia    Vitamin D deficiency    Medicare annual wellness visit, subsequent    Primary osteoarthritis of both knees    Screening PSA (prostate specific antigen)    Bilateral primary osteoarthritis of knee    Coarse tremors    Weakness generalized    Increased urinary frequency    Bandemia    Achilles tendon rupture    Anemia due to GI blood loss    Physical debility     Past Medical History:   Diagnosis Date    Aortic valve replaced     Arthritis 2018    Atrial fibrillation     Coronary artery disease     Hypertension      Past Surgical History:   Procedure Laterality Date    CARDIAC SURGERY      COLONOSCOPY      COLONOSCOPY N/A 2024    Procedure: COLONOSCOPY WITH HOT/COLD SNARE POLYPECTOMIES, ELEVIEW INJECTION,CLIPX1;  Surgeon: Kurt Mensah MD;  Location: [facility] ENDOSCOPY;  Service: Gastroenterology;  Laterality: N/A;  COLON POLYPS    ENDOSCOPY N/A 2024    Procedure: ESOPHAGOGASTRODUODENOSCOPY WITH BIOPSIES;  Surgeon: Kurt Mensah MD;  Location: [facility] ENDOSCOPY;  Service: Gastroenterology;  Laterality: N/A;  RETAINED FOOD IN STOMACH AND REFLUX ESOPHAGITIS    PACEMAKER IMPLANTATION         PT  Assessment (Last 12 Hours)       PT Evaluation and Treatment       Row Name 10/22/24 1122          Physical Therapy Time and Intention    Subjective Information complains of;fatigue  -VK     Document Type therapy note (daily note)  -VK     Mode of Treatment individual therapy;physical therapy  -VK     Patient Effort good  -VK       Row Name 10/22/24 1122          General Information    Patient Profile Reviewed yes  -VK     Existing Precautions/Restrictions fall  RLE cam boot  -VK       Row Name 10/22/24 1122          Pain    Pre/Posttreatment Pain Comment At end of treatment pt reported he started to feel throbbing in RLE.  -VK       Row Name 10/22/24 1122          Cognition    Affect/Mental Status (Cognition) WNL  -VK     Orientation Status (Cognition) oriented x 4  -VK     Personal Safety Interventions nonskid shoes/slippers when out of bed;gait belt;fall prevention program maintained  -VK       Row Name 10/22/24 1122          Mobility    Extremity Weight-bearing Status right lower extremity  -VK     Right Lower Extremity (Weight-bearing Status) weight-bearing as tolerated (WBAT)  -VK       Row Name 10/22/24 1122          Sit-Stand Transfer    Sit-Stand [place] (Transfers) standby assist;contact guard;verbal cues  -VK     Assistive Device (Sit-Stand Transfers) walker, front-wheeled  -VK       Row Name 10/22/24 1122          Stand-Sit Transfer    Stand-Sit [place] (Transfers) standby assist  -VK     Assistive Device (Stand-Sit Transfers) walker, front-wheeled  -VK       Row Name 10/22/24 1122          Gait/Stairs (Locomotion)    [place] Level (Gait) contact guard;standby assist  -VK     Assistive Device (Gait) walker, front-wheeled  -VK     Patient was able to Ambulate yes  -VK     Distance in Feet (Gait) --  70ft, 6ft, 70ft  -VK     Pattern (Gait) 4-point;step-to  -VK     Deviations/Abnormal Patterns (Gait) gait speed decreased;stride length decreased;antalgic;tapan decreased  -VK     Bilateral  Gait Deviations forward flexed posture;heel strike decreased  -VK     Right Sided Gait Deviations weight shift ability decreased;heel strike decreased  -VK     [place] Level (Stairs) contact guard;minimum assist (75% patient effort);verbal cues  -VK     Assistive Device (Stairs) walker, front-wheeled  -VK     Handrail Location (Stairs) right side (ascending);left side (descending)  -VK     Number of Steps (Stairs) 4x2  -VK     Ascending Technique (Stairs) step-to-step  -VK     Descending Technique (Stairs) step-to-step  When going down the stairs, pt goes down sideways.  -VK     Stairs, Safety Issues balance decreased during turns;sequencing ability decreased;weight-shifting ability decreased  -VK     Stairs, Impairments strength decreased;impaired balance  -VK       Row Name 10/22/24 1122          Safety Issues/Impairments Affecting Functional Mobility    Impairments Affecting Function (Mobility) balance;endurance/activity tolerance;range of motion (ROM);strength  -VK       Row Name 10/22/24 1122          Balance    Sit to Stand Dynamic Balance standby assist;contact guard  -VK     Static Standing Balance standby assist  -VK     Dynamic Standing Balance standby assist;contact guard  -VK     Position/Device Used, Standing Balance walker, front-wheeled  -VK     Balance Interventions sit to stand  x5 reps  -VK       Row Name 10/22/24 1122          Aerobic Exercise    Type (Aerobic Exercise) recumbent stationary bike  -VK     Time Performed (Aerobic Exercise) 17 minutes  -VK     Comment, Aerobic Exercise (Therapeutic Exercise) Load 5  -VK       Row Name 10/22/24 1122          Positioning and Restraints    Pre-Treatment Position sitting in chair/recliner  -VK     Post Treatment Position chair  -VK     In Chair reclined;call light within reach;encouraged to call for assist;exit alarm on;with other staff;with family/caregiver  -VK       Row Name 10/22/24 1122          Progress Summary (PT)    Progress Toward  Functional Goals (PT) progress toward functional goals is fair;progress toward functional goals is good  -VK               User Key  (r) = Recorded By, (t) = Taken By, (c) = Cosigned By      Initials Name Provider Type    VK Karla Gaines PTA Physical Therapist Assistant                  Section G              Section GG                       Physical Therapy Education       Title: PT OT SLP Therapies (Done)       Topic: Physical Therapy (Resolved)       Point: Mobility training (Resolved)       Learner Progress:  Not documented in this visit.              Point: Home exercise program (Resolved)       Learner Progress:  Not documented in this visit.              Point: Body mechanics (Resolved)       Learner Progress:  Not documented in this visit.              Point: Precautions (Resolved)       Learner Progress:  Not documented in this visit.                                  PT Recommendation and Plan     Progress Summary (PT)  Progress Toward Functional Goals (PT): progress toward functional goals is fair, progress toward functional goals is good   Outcome Measures       Row Name 10/22/24 1300 10/21/24 0811          How much help from another person do you currently need...    Turning from your back to your side while in flat bed without using bedrails? 3  -VK 3  -WM     Moving from lying on back to sitting on the side of a flat bed without bedrails? 3  -VK 3  -WM     Moving to and from a bed to a chair (including a wheelchair)? 3  -VK 3  -WM     Standing up from a chair using your arms (e.g., wheelchair, bedside chair)? 3  -VK 3  -WM     Climbing 3-5 steps with a railing? 3  -VK 3  -WM     To walk in hospital room? 3  -VK 3  -WM     AM-PAC 6 Clicks Score (PT) 18  -VK 18  -WM     Highest Level of Mobility Goal 6 --> Walk 10 steps or more  -VK 6 --> Walk 10 steps or more  -WM               User Key  (r) = Recorded By, (t) = Taken By, (c) = Cosigned By      Initials Name Provider Type    Dwaine Keith PTA  Physical Therapist Assistant    Karla Ignacio PTA Physical Therapist Assistant                     Time Calculation:    PT Charges       Row Name 10/22/24 1308             Time Calculation    PT Received On 10/22/24  -      PT Goal Re-Cert Due Date --  -VK         Timed Charges    78283 - PT Therapeutic Exercise Minutes 20  -VK      09147 - Gait Training Minutes  10  -VK      74277 - PT Therapeutic Activity Minutes 16  -VK         SNF Physical Therapy Minutes    Skilled Minutes- PT 46 min  -VK         Total Minutes    Timed Charges Total Minutes 46  -VK       Total Minutes 46  -VK                User Key  (r) = Recorded By, (t) = Taken By, (c) = Cosigned By      Initials Name Provider Type    Karla Ignacio PTA Physical Therapist Assistant                  Therapy Charges for Today       Code Description Service Date Service Provider Modifiers Qty    07212127151 HC PT THER PROC EA 15 MIN 10/22/2024 Karla Gaines PTA GP 1    54040183099 HC GAIT TRAINING EA 15 MIN 10/22/2024 Karla Gaines PTA GP 1    71216765499 HC PT THERAPEUTIC ACT EA 15 MIN 10/22/2024 Karla Gaines PTA GP 1            PT G-Codes  Outcome Measure Options: AM-PAC 6 Clicks Daily Activity (OT), Optimal Instrument  AM-PAC 6 Clicks Score (PT): 18  AM-PAC 6 Clicks Score (OT): 21    Karla Gaines PTA  10/22/2024

## 2024-10-22 NOTE — THERAPY TREATMENT NOTE
SNF - Occupational Therapy Treatment Note   Yudy    Patient Name: Woodrow Alejandro  : 1950    MRN: [id number]                              Today's Date: 10/22/2024       Admit Date: 10/3/2024    Visit Dx:     ICD-10-CM ICD-9-CM   1. Decreased activities of daily living (ADL)  Z78.9 V49.89   2. Difficulty walking  R26.2 719.7   3. Rupture of Achilles tendon, unspecified laterality, sequela  S86.019S 905.8   4. Weakness generalized  R53.1 780.79   5. Bilateral primary osteoarthritis of knee  M17.0 715.16   6. Primary osteoarthritis of both knees  M17.0 715.16   7. Physical debility  R53.81 799.3     Patient Active Problem List   Diagnosis    Essential hypertension    Permanent atrial fibrillation    S/P AVR    ICD (implantable cardioverter-defibrillator), dual, in situ    Dermatitis    IFG (impaired fasting glucose)    Mixed hyperlipidemia    Vitamin D deficiency    Medicare annual wellness visit, subsequent    Primary osteoarthritis of both knees    Screening PSA (prostate specific antigen)    Bilateral primary osteoarthritis of knee    Coarse tremors    Weakness generalized    Increased urinary frequency    Bandemia    Achilles tendon rupture    Anemia due to GI blood loss    Physical debility     Past Medical History:   Diagnosis Date    Aortic valve replaced     Arthritis 2018    Atrial fibrillation     Coronary artery disease     Hypertension      Past Surgical History:   Procedure Laterality Date    CARDIAC SURGERY      COLONOSCOPY      COLONOSCOPY N/A 2024    Procedure: COLONOSCOPY WITH HOT/COLD SNARE POLYPECTOMIES, ELEVIEW INJECTION,CLIPX1;  Surgeon: Kurt Mensah MD;  Location: [facility] ENDOSCOPY;  Service: Gastroenterology;  Laterality: N/A;  COLON POLYPS    ENDOSCOPY N/A 2024    Procedure: ESOPHAGOGASTRODUODENOSCOPY WITH BIOPSIES;  Surgeon: Kurt Mensah MD;  Location: [facility] ENDOSCOPY;  Service: Gastroenterology;  Laterality: N/A;  RETAINED FOOD IN STOMACH AND  REFLUX ESOPHAGITIS    PACEMAKER IMPLANTATION        General Information       Row Name 10/22/24 1038          OT Time and Intention    Subjective Information weakness;complains of  -EG     Document Type therapy note (daily note)  -EG     Mode of Treatment individual therapy;occupational therapy  -EG     Patient Effort good  -EG       Row Name 10/22/24 1038          General Information    Patient Profile Reviewed yes  -EG     Existing Precautions/Restrictions fall  CAM boot on RLE  -EG       Row Name 10/22/24 1038          Cognition    Orientation Status (Cognition) oriented x 4  -EG       Row Name 10/22/24 1038          Safety Issues/Impairments Affecting Functional Mobility    Impairments Affecting Function (Mobility) balance;endurance/activity tolerance;range of motion (ROM);strength  -EG               User Key  (r) = Recorded By, (t) = Taken By, (c) = Cosigned By      Initials Name Provider Type    EG Janessa Good, JOSELIN Occupational Therapist                     Mobility/ADL's       Row Name 10/22/24 1039          Transfers    Transfers sit-stand transfer;stand-sit transfer  -EG     Comment, (Transfers) in and out of recliner chair, shower bench transfer and chair with arms in gym all SBA using RW  -EG       Row Name 10/22/24 1039          Sit-Stand Transfer    Sit-Stand [place] (Transfers) standby assist;supervision  -EG     Assistive Device (Sit-Stand Transfers) walker, front-wheeled  -EG       [facility] Name 10/22/24 1039          Stand-Sit Transfer    Stand-Sit [place] (Transfers) standby assist;set up  -EG     Assistive Device (Stand-Sit Transfers) walker, front-wheeled  -EG       [facility] Name 10/22/24 1039          Functional Mobility    Functional Mobility- Ind. Level verbal cues required;nonverbal cues required (demo/gesture);standby assist;supervision required  -EG     Functional Mobility- Device walker, front-wheeled  -EG     Functional Mobility- Comment Functional mobility performed to and from  shower room, to and from therapy gym all using RW and CAM boot donned on RLE; no LOB and good tolerance noted this date  -EG       Row Name 10/22/24 1039          Mobility    Extremity Weight-bearing Status right lower extremity  -EG     Right Lower Extremity (Weight-bearing Status) weight-bearing as tolerated (WBAT)  -EG       Row Name 10/22/24 1039          Lower Body Dressing Assessment/Training    [place] Level (Lower Body Dressing) lower body dressing skills;contact guard assist;minimum assist (75% patient effort)  -EG     Assistive Devices (Lower Body Dressing) reacher  -EG     Comment, (Lower Body Dressing) Patient declines training for CAM boot donned, continues to state daughter will do it for him; Reacher use at seated tub bench level has improved and extended time required  -EG       Row Name 10/22/24 1039          Upper Body Dressing Assessment/Training    [place] Level (Upper Body Dressing) upper body dressing skills;set up  -EG       Row Name 10/22/24 1039          Bathing Assessment/Intervention    [place] Level (Bathing) minimum assist (75% patient effort);bathing skills;lower body;upper body;contact guard assist  -EG     Assistive Devices (Bathing) tub bench;hand-held shower spray hose;grab bar, tub/shower  -EG       Row Name 10/22/24 1039          Grooming Assessment/Training    [place] Level (Grooming) grooming skills;wash face, hands;hair care, combing/brushing  -EG               User Key  (r) = Recorded By, (t) = Taken By, (c) = Cosigned By      Initials Name Provider Type    EG Janessa Good OT Occupational Therapist                   Obj/Interventions       Row Name 10/22/24 1043          Sensory Assessment (Somatosensory)    Sensory Assessment (Somatosensory) UE sensation intact  -EG       Row Name 10/22/24 1043          Vision Assessment/Intervention    Visual Impairment/Limitations WFL  -EG       Row Name 10/22/24 1043          Motor Skills    Therapeutic Exercise  aerobic  -EG       Row Name 10/22/24 1043          Balance    Balance Interventions standing;sit to stand;supported;static;dynamic;weight shifting activity;occupation based/functional task;UE activity with balance activity  -EG       Row Name 10/22/24 1043          Aerobic Exercise    Type (Aerobic Exercise) arm bike  -EG     Comment, Aerobic Exercise (Therapeutic Exercise) Omnicycle on level 3 cardiac interval setting no rest break 15:00  -EG               User Key  (r) = Recorded By, (t) = Taken By, (c) = Cosigned By      Initials Name Provider Type    EG Janessa Good, JOSELIN Occupational Therapist                   Goals/Plan    No documentation.                  Clinical Impression       Row Name 10/22/24 1051          Pain Assessment    Pretreatment Pain Rating 0/10 - no pain  -EG     Posttreatment Pain Rating 0/10 - no pain  -EG       Row Name 10/22/24 1051          Plan of Care Review    Plan of Care Reviewed With patient  -EG     Progress no change  -EG     Outcome Evaluation Patient reporting no pain when asked by OT during session; Patient demonstrates SBA level for mobility and transfers during ADL's; OT educated on approaching a plateau with skilled services at this level; Continues to state unable to don CAM boot, OT no to address because daughter will perform at home for him per patient report; Karla using reacher for LB dressing overall; set-up level for UB ALD's. Continue with POC; Ax1 w/RW and CAM boot.  -EG       Row Name 10/22/24 1051          Therapy Assessment/Plan (OT)    Rehab Potential (OT) good  -EG     Criteria for Skilled Therapeutic Interventions Met (OT) yes;meets criteria;skilled treatment is necessary  -EG     Therapy Frequency (OT) 5 times/wk  -EG       Row Name 10/22/24 1051          Therapy Plan Review/Discharge Plan (OT)    Anticipated Discharge Disposition (OT) home with home health;other (see comments)  -EG       Row Name 10/22/24 1051          Positioning and Restraints     Pre-Treatment Position sitting in chair/recliner  -EG     Post Treatment Position chair  -EG     In Chair reclined;sitting;call light within reach;encouraged to call for assist;exit alarm on  -EG               User Key  (r) = Recorded By, (t) = Taken By, (c) = Cosigned By      Initials Name Provider Type    EG Janessa Good, JOSELIN Occupational Therapist                   Outcome Measures       Row Name 10/22/24 1053          How much help from another is currently needed...    Putting on and taking off regular lower body clothing? 3  -EG     Bathing (including washing, rinsing, and drying) 3  -EG     Toileting (which includes using toilet bed pan or urinal) 3  -EG     Putting on and taking off regular upper body clothing 4  -EG     Taking care of personal grooming (such as brushing teeth) 4  -EG     Eating meals 4  -EG     AM-PAC 6 Clicks Score (OT) 21  -EG       Row Name 10/21/24 2300          How much help from another person do you currently need...    Turning from your back to your side while in flat bed without using bedrails? 3  -BR     Moving from lying on back to sitting on the side of a flat bed without bedrails? 3  -BR     Moving to and from a bed to a chair (including a wheelchair)? 3  -BR     Standing up from a chair using your arms (e.g., wheelchair, bedside chair)? 3  -BR     Climbing 3-5 steps with a railing? 2  -BR     To walk in hospital room? 3  -BR     AM-PAC 6 Clicks Score (PT) 17  -BR     Highest Level of Mobility Goal 5 --> Static standing  -BR       Row Name 10/22/24 1053          Functional Assessment    Outcome Measure Options AM-PAC 6 Clicks Daily Activity (OT);Optimal Instrument  -EG       Row Name 10/22/24 1053          Optimal Instrument    Optimal Instrument Optimal - 3  -EG     Bending/Stooping 3  -EG     Standing 2  -EG     Reaching 1  -EG               User Key  (r) = Recorded By, (t) = Taken By, (c) = Cosigned By      Initials Name Provider Type    Kimberly Gonzalez RN Registered  Nurse    Janessa Alvarez, OT Occupational Therapist                  Section G  Mobility  Bed mobility - self performance: limited assistance (staff provide guided maneuvering of limbs or other non-weight bearing assistance)  Bed mobility support/assistance: One person assist  Transfer - self performance: limited assistance (staff provide guided maneuvering of limbs or other non-weight bearing assistance)  Transfer support/assistance: One person assist  Walking in room - self performance: limited assistance (staff provide guided maneuvering of limbs or other non-weight bearing assistance)  Walking in room support/assistance: One person assist  Walking in corridors/hallway - self performance: limited assistance (staff provide guided maneuvering of limbs or other non-weight bearing assistance)  Walking in corridors/hallway support/assistance: One person assist  Locomotion on unit - self performance: activity did not occur  Locomotion on unit support/assistance: Activity did not occur  Locomotion off unit - self performance: activity did not occur  Locomotion off unit support/assistance: Activity did not occur  Dressing - self performance: limited assistance (staff provide guided maneuvering of limbs or other non-weight bearing assistance)  Dressing support/assistance: One person assist  Eating - self performance: independent  Eating support/assistance: Setup help only  Toileting - self performance: limited assistance (staff provide guided maneuvering of limbs or other non-weight bearing assistance)  Toileting support/assistance: One person assist  Personal hygiene - self performance: limited assistance (staff provide guided maneuvering of limbs or other non-weight bearing assistance)  Personal hygiene support/assistance: One person assist  Bathing  Bathing - self performance: Physical help with bathing (exclude washing back and hair for patient)  Bathing support/assistance: One person assist  Balance  Balance  during transitions & walking: Not steady, requires assist to steady  Moving from seated to standing position: Not steady, requires assist to steady  Walking: Not steady, requires assist to steady  Turning around while walking: Not steady, requires assist to steady  Moving on and off toilet: Not steady, requires assist to steady  Surface-to-surface transfer: Not steady, requires assist to steady  Mobility devices: None were used  Range of Motion  Upper Extremity: No impairment  Lower Extremity: Impairment on one side  Section GG  Functional Ability/Goals, Adm (Section GG)  Self Care, Prior Functioning (IX7376H): 3. Independent  Functional Cognition, Prior Functioning (HL4326Z): 3. Independent  Prior Device Use (DV5534): none of the above (Z)  Upper Extremity Range of Motion (DS1592N): No impairment  Lower Extremity Range of Motion (CS3609I): Impairment on one side  Self Care, Admission (Section GG)  Eating: Self-Care Admission Performance (DL3844Z7): setup or clean-up assistance (05)  Oral Hygiene: Self-Care Admission Performance (UH2070R9): setup or clean-up assistance (05)  Toileting Hygiene: Self-Care Admission Performance (XP7009D6): partial/moderate assistance (03)  Shower/Bathe Self: Self-Care Admission Performance (AR7734L2): partial/moderate assistance (03)  Upper Body Dressing: Self-Care Admission Performance (DF0476Q9): setup or clean-up assistance (05)  Lower Body Dressing: Self-Care Admission Performance (OO9689X9): partial/moderate assistance (03)  Putting On/Taking Off Footwear: Self-Care Admission Performance (PW0378B2): partial/moderate assistance (03)  Personal Hygiene: Self-Care Admission Performance (ON1159M2): supervision or touching assistance (04)  Mobility, Admission Performance (LS3170)  Chair/Bed-Chair Transfer: Mobility Admission Performance (ET9403S4): partial/moderate assistance (03)  Toilet Transfer: Mobility Admission Performance (UD8414J2): partial/moderate assistance (03)  Tub/shower  Transfer: Mobility Admission Performance (MM7876FJ7): partial/moderate assistance (03)                Occupational Therapy Education       Title: PT OT SLP Therapies (Done)       Topic: Occupational Therapy (Done)       Point: ADL training (Done)       Description:   Instruct learner(s) on proper safety adaptation and remediation techniques during self care or transfers.   Instruct in proper use of assistive devices.                  Learning Progress Summary            Patient JING [place] VU by EG at 10/4/2024 1056    Comment: Education on compensatory techniques for ADL's  Education on AE  Education on fall risk prevention and general safety  Education on OT services                      Point: Home exercise program (Done)       Description:   Instruct learner(s) on appropriate technique for monitoring, assisting and/or progressing therapeutic exercises/activities.                  Learning Progress Summary            Patient JING [place] VU by EG at 10/4/2024 1056    Comment: Education on compensatory techniques for ADL's  Education on AE  Education on fall risk prevention and general safety  Education on OT services                      Point: Precautions (Done)       Description:   Instruct learner(s) on prescribed precautions during self-care and functional transfers.                  Learning Progress Summary            Patient JING [place] VU by EG at 10/4/2024 1056    Comment: Education on compensatory techniques for ADL's  Education on AE  Education on fall risk prevention and general safety  Education on OT services                      Point: Body mechanics (Done)       Description:   Instruct learner(s) on proper positioning and spine alignment during self-care, functional mobility activities and/or exercises.                  Learning Progress Summary            Patient JING [place] VU by EG at 10/4/2024 1056    Comment: Education on compensatory techniques for ADL's  Education on AE  Education on fall risk  prevention and general safety  Education on OT services                                      User Key       Initials Effective Dates Name Provider Type Discipline    EG 09/14/22 -  Janessa Good, OT Occupational Therapist OT                  OT Recommendation and Plan  Planned Therapy Interventions (OT): activity tolerance training, functional balance retraining, occupation/activity based interventions, adaptive equipment training, BADL retraining, neuromuscular control/coordination retraining, patient/caregiver education/training, transfer/mobility retraining, strengthening exercise  Therapy Frequency (OT): 5 times/wk  Plan of Care Review  Plan of Care Reviewed With: patient  Progress: no change  Outcome Evaluation: Patient reporting no pain when asked by OT during session; Patient demonstrates SBA level for mobility and transfers during ADL's; OT educated on approaching a plateau with skilled services at this level; Continues to state unable to don CAM boot, OT no to address because daughter will perform at home for him per patient report; Karla using reacher for LB dressing overall; set-up level for UB ALD's. Continue with POC; Ax1 w/RW and CAM boot.     Time Calculation:   Evaluation Complexity (OT)  Review Occupational Profile/Medical/Therapy History Complexity: expanded/moderate complexity  Assessment, Occupational Performance/Identification of Deficit Complexity: 3-5 performance deficits  Clinical Decision Making Complexity (OT): detailed assessment/moderate complexity  Overall Complexity of Evaluation (OT): moderate complexity     Time Calculation- OT       Row Name 10/22/24 1054             Time Calculation- OT    OT Received On 10/22/24  -EG      OT Goal Re-Cert Due Date 11/03/24  -EG         Timed Charges    09776 - OT Therapeutic Exercise Minutes 15  -EG      93074 - OT Therapeutic Activity Minutes 13  -EG      37067 - OT Self Care/Mgmt Minutes 27  -EG         SNF Occupational Therapy Minutes     Skilled Minutes- OT 55 min  -EG         Total Minutes    Timed Charges Total Minutes 55  -EG       Total Minutes 55  -EG                User Key  (r) = Recorded By, (t) = Taken By, (c) = Cosigned By      Initials Name Provider Type    EG Janessa Good OT Occupational Therapist                  Therapy Charges for Today       Code Description Service Date Service Provider Modifiers Qty    13001807987 HC OT NEUROMUSC RE EDUCATION EA 15 MIN 10/21/2024 Janessa Good OT GO 2    38327398492 HC OT THER PROC EA 15 MIN 10/21/2024 Janessa Good, OT GO 1    52260352672 HC OT THERAPEUTIC ACT EA 15 MIN 10/21/2024 Janessa Good OT GO 1    94269250110 HC OT THER PROC EA 15 MIN 10/22/2024 Janessa Good OT GO 1    29237061801 HC OT THERAPEUTIC ACT EA 15 MIN 10/22/2024 Janessa Good, OT GO 1    21007364289 HC OT SELF CARE/MGMT/TRAIN EA 15 MIN 10/22/2024 Janessa Good OT GO 2                 Janessa Good OT  10/22/2024

## 2024-10-22 NOTE — PLAN OF CARE
Goal Outcome Evaluation:  Plan of Care Reviewed With: patient  Plan of Care Reviewed With: patient           Outcome Evaluation: Pt is AO x 4 and VSS. He uses the urinal at bedside and walks to the bathroom. He wears a [place] on right foot when OOB. No c/o pain or discomfort. Will continue with his POC. Call light and personal items within reach.

## 2024-10-22 NOTE — PLAN OF CARE
Goal Outcome Evaluation:  Plan of Care Reviewed With: patient        Progress: no change  Outcome Evaluation: Patient is alert and oriented x4. Denies pain. Con't on IV ATB with no adverse reaction noted. Blood culture resulted positive today and provider notified. Voices needs. Con't current POC.

## 2024-10-23 VITALS
HEART RATE: 90 BPM | WEIGHT: 313.49 LBS | DIASTOLIC BLOOD PRESSURE: 54 MMHG | BODY MASS INDEX: 42.46 KG/M2 | TEMPERATURE: 98.2 F | SYSTOLIC BLOOD PRESSURE: 131 MMHG | HEIGHT: 72 IN | OXYGEN SATURATION: 92 % | RESPIRATION RATE: 20 BRPM

## 2024-10-23 LAB
BACTERIA SPEC AEROBE CULT: ABNORMAL
BACTERIA SPEC AEROBE CULT: ABNORMAL
GRAM STN SPEC: ABNORMAL
GRAM STN SPEC: ABNORMAL
ISOLATED FROM: ABNORMAL
ISOLATED FROM: ABNORMAL

## 2024-10-23 PROCEDURE — 25010000002 PENICILLIN G POTASSIUM PER 600000 UNITS: Performed by: INTERNAL MEDICINE

## 2024-10-23 PROCEDURE — 97530 THERAPEUTIC ACTIVITIES: CPT

## 2024-10-23 PROCEDURE — 97110 THERAPEUTIC EXERCISES: CPT

## 2024-10-23 PROCEDURE — 87040 BLOOD CULTURE FOR BACTERIA: CPT | Performed by: PHYSICIAN ASSISTANT

## 2024-10-23 PROCEDURE — 99316 NF DSCHRG MGMT 30 MIN+: CPT | Performed by: PHYSICIAN ASSISTANT

## 2024-10-23 RX ORDER — METOPROLOL TARTRATE 75 MG/1
75 TABLET, FILM COATED ORAL 2 TIMES DAILY
Status: ON HOLD
Start: 2024-10-23 | End: 2024-11-22

## 2024-10-23 RX ORDER — PANTOPRAZOLE SODIUM 40 MG/1
40 TABLET, DELAYED RELEASE ORAL
Qty: 30 TABLET | Refills: 0 | Status: ON HOLD | OUTPATIENT
Start: 2024-10-23 | End: 2024-11-22

## 2024-10-23 RX ORDER — LISINOPRIL 10 MG/1
10 TABLET ORAL DAILY
Status: ON HOLD
Start: 2024-10-23

## 2024-10-23 RX ADMIN — DILTIAZEM HYDROCHLORIDE 120 MG: 30 TABLET, FILM COATED ORAL at 09:42

## 2024-10-23 RX ADMIN — SENNOSIDES AND DOCUSATE SODIUM 2 TABLET: 50; 8.6 TABLET ORAL at 09:42

## 2024-10-23 RX ADMIN — ASPIRIN 325 MG: 325 TABLET ORAL at 09:42

## 2024-10-23 RX ADMIN — METOPROLOL TARTRATE 75 MG: 50 TABLET, FILM COATED ORAL at 09:42

## 2024-10-23 RX ADMIN — FUROSEMIDE 40 MG: 40 TABLET ORAL at 09:42

## 2024-10-23 NOTE — PLAN OF CARE
Goal Outcome Evaluation:  Plan of Care Reviewed With: patient  Plan of Care Reviewed With: patient           Outcome Evaluation: Pt is AO x 4 and VSS. He is a 1 assist to the bathroom with a walker or uses a urinal. He is toe touch weight bearing and wears a cam boot on his right foot when up. Continuing IV antibiotics. NPO after midnight for a possible JOLIE today. No c/o pain or discomfort. Will continue with his plan of care. CAll light and personal items within reach

## 2024-10-23 NOTE — PROGRESS NOTES
[facility]     Progress Note    Patient Name: Woodrow Alejandro  : 1950  MRN: [id number]  Primary Care Physician:  Juan Perez MD  Date of admission: 10/3/2024    Subjective   Subjective     Chief Complaint: Patient with history of MPN stable waiting for cardiology workup    HPI: MPN no definite findings at this time    Review of Systems   All systems were reviewed and negative except for: Has been reviewed    Objective   Objective     Vitals:   Temp:  [98.1 °F (36.7 °C)-98.2 °F (36.8 °C)] 98.2 °F (36.8 °C)  Heart Rate:  [] 90  Resp:  [20] 20  BP: (131-136)/(54-80) 131/54    Physical Exam    Constitutional: Alert and oriented not in acute distress   Eyes: Pupils equal reacting to light and accommodation   HENT: Normal   Neck: Normal   Respiratory: No rales or rhonchi   Cardiovascular: S1-S2 normal no S3 or S4   Gastrointestinal: No palpable organomegaly   Musculoskeletal: Patient has edema bilateral   Neurologic: No localizing signs  Skin: No rash       Result Review    Result Review:  I have personally reviewed the results from the time of this admission to 10/23/2024 08:09 EDT and agree with these findings:  [x]  Laboratory  []  Microbiology  []  Radiology  []  EKG/Telemetry   []  Cardiology/Vascular   []  Pathology  []  Old records  []  Other:  Most notable findings include: Have been discussed    Assessment & Plan   Assessment / Plan     Brief Patient Summary:  Woodrow Alejandro is a 73 y.o. male who patient getting workup with cardiology    Active Hospital Problems:  Active Hospital Problems    Diagnosis     **Physical debility        Plan:   Continue as per primary    VTE Prophylaxis:  No VTE prophylaxis order currently exists.        CODE STATUS:   Level Of Support Discussed With: Patient  Code Status (Patient has no pulse and is not breathing): CPR (Attempt to Resuscitate)  Medical Interventions (Patient has pulse or is breathing): Full Support    Disposition:  I expect  patient to be discharged as per primary.    Electronically signed by Kiet Lindsay MD, 10/23/24, 8:09 AM EDT.      Part of this note may be an electronic transcription/translation of spoken language to printed text using the Dragon Dictation System.

## 2024-10-23 NOTE — SIGNIFICANT NOTE
10/23/24 1039   OTHER   Discipline occupational therapist   Rehab Time/Intention   Session Not Performed patient unavailable for treatment  (Scope scheduled; patient not feeling well)   Therapy Assessment/Plan (PT)   Criteria for Skilled Interventions Met (PT) yes;skilled treatment is necessary

## 2024-10-23 NOTE — DISCHARGE SUMMARY
[facility]         HOSPITALIST  DISCHARGE SUMMARY    Patient Name: Woodrow Alejandro  : 1950  MRN: [id number]    Date of Admission: 10/3/2024  Date of Discharge: 10/23/2024  Primary Care Physician: Juan Perez MD  Reason for admission:  Weakness    Final diagnosis:  Weakness after recent hospitalization  Streptococcus mitis bacteremia blood cultures +10/15 and 10/17 repeat cultures 10/23 pending  Status post remote bioprosthetic aortic valve now with findings consistent with severe stenosis of the bioprosthetic valve  History of [employer] AICD placement  Recent anemia  Recent EGD 24 by Dr. Mensah showing esophagitis (PPI recommended)  Recent colonoscopy 24 by Dr. Mensah with polypectomy  Recent right achilles tendon tear follow with orthopedic surgery Dr. Zuniga  Nonischemic cardiomyopathy, EF improved  A-fib (on aspirin only)  Bioprosthetic aortic valve replacement (follows w Dr. Newman)  HTN  GERD  Anemia  CAD/AICD  Noted on CT: Possible ankylosing spondylitis   Urinary frequency      Consults       Date and Time Order Name Status Description    10/21/2024 12:28 PM Inpatient Cardiology Consult      10/15/2024  5:05 PM Hematology & Oncology Inpatient Consult      10/3/2024  4:24 PM Inpatient Gastroenterology Consult      10/3/2024  4:24 PM Hospitalist (on-call MD unless specified)      2024 11:37 AM Inpatient Orthopedic Surgery Consult Completed             Active and Resolved Hospital Problems:  Active Hospital Problems    Diagnosis POA    **Physical debility [R53.81] Yes      Resolved Hospital Problems   No resolved problems to display.       Hospital Course     Hospital Course:  Woodrow Alejandro is a 73 y.o. male past medical history significant for bioprosthetic aortic valve replacement in  as well as nonischemic cardiomyopathy status post Medtronic AICD placement, chronic atrial fibrillation on aspirin only, hypertension, hyperlipidemia recently  hospitalized for anemia as well as Achilles tendon tear during the hospitalization EGD and colonoscopy performed by Dr. Engel showed esophagitis with bleeding lower third of the esophagus PPI recommended two polyps tubular adenomas removed.   orthopedic surgery Dr. Zuniga was consulted for Achilles tendon tear recommending walking boot and weightbearing as tolerated will need continued follow-up and repeat imaging in the outpatient setting.  Patient was stabilized transferred to the skilled nursing facility for ongoing therapy.  While in the skilled nursing facility it was noted patient had what appeared to be chronic leukocytosis evidently patient had extensive workup in the outpatient setting prior to admission with negative blood cultures and no identifiable source.  Further workup initiated in skilled nursing facility procalcitonin remain normal however repeat blood cultures returned positive for Streptococcus Mitis on 1015 and 1017.  Patient started on appropriate antibiotics 2D echocardiogram obtained which showed worsening aortic valve stenosis which is now severe cardiology consulted and has recommended the patient be transferred to tertiary care center for infectious disease consultation as well as for consultation with structural cardiologist and CT surgery.  Dr. Florian has graciously accepted this patient he will be transferred in hemodynamically stable condition for ongoing evaluation and treatment.        DISCHARGE Follow Up Recommendations for labs and diagnostics: As above      Day of Discharge     Vital Signs:  Temp:  [98.1 °F (36.7 °C)-98.2 °F (36.8 °C)] 98.2 °F (36.8 °C)  Heart Rate:  [] 90  Resp:  [20] 20  BP: (131-136)/(54-80) 131/54  Physical Exam:   Constitutional: Awake alert  Respiratory: Clear  Cardiovascular: RRR systolic murmur noted  GI: Abdomen soft nontender      Discharge Details        Discharge Medications        New Medications        Instructions Start Date   pantoprazole 40 MG  EC tablet  Commonly known as: PROTONIX   40 mg, Oral, 2 Times Daily Before Meals      penicillin g   4 Million Units, Intravenous, Every 4 Hours Scheduled             Changes to Medications        Instructions Start Date   Metoprolol Tartrate 75 MG tablet  What changed:   medication strength  how much to take   75 mg, Oral, 2 Times Daily             Continue These Medications        Instructions Start Date   aspirin 325 MG tablet   325 mg, Oral, 2 times daily      dilTIAZem 120 MG tablet  Commonly known as: CARDIZEM   120 mg, Oral, 2 Times Daily      furosemide 40 MG tablet  Commonly known as: LASIX   1 tablet, Oral, Daily      lisinopril 10 MG tablet  Commonly known as: PRINIVIL,ZESTRIL   10 mg, Oral, Daily             Stop These Medications      levoFLOXacin 750 MG tablet  Commonly known as: Levaquin              Allergies   Allergen Reactions    Diclofenac Sodium Dizziness       Discharge Disposition:  Short Term Hospital (DC - External)    Diet:  Hospital:  Diet Order   Procedures    Diet: Cardiac; Low Sodium (2g); Fluid Consistency: Thin (IDDSI 0)       Discharge Activity:       CODE STATUS:  Code Status and Medical Interventions: CPR (Attempt to Resuscitate); Full Support   Ordered at: 10/03/24 1624     Level Of Support Discussed With:    Patient     Code Status (Patient has no pulse and is not breathing):    CPR (Attempt to Resuscitate)     Medical Interventions (Patient has pulse or is breathing):    Full Support         Future Appointments   Date Time Provider Department Center   11/15/2024  1:20 PM Nas Antonio MD [facility] ID CROW CROW   12/19/2024 10:15 AM Juan Perez MD [facility] PC MEMCT ANTWAN   1/17/2025 11:30 AM Parker Newman MD [facility] CD EDIXE [facility]   1/22/2025 10:00 AM [facility] CARD ETOWN DEVICE CHECK [facility] CD ETISIDRO [facility]           Pertinent  and/or Most Recent Results     PROCEDURES:   None    LAB RESULTS:      Lab 10/21/24  0455 10/19/24  0451 10/17/24  0448   WBC 9.47 11.40* 9.77    HEMOGLOBIN 8.9* 9.2* 9.1*   HEMATOCRIT 28.6* 29.1* 29.4*   PLATELETS 153 149 126*   NEUTROS ABS 7.59* 9.35* 8.26*   IMMATURE GRANS (ABS) 0.05 0.09* 0.07*   LYMPHS ABS 0.93 0.89 0.72   MONOS ABS 0.66 0.86 0.62   EOS ABS 0.21 0.17 0.06   MCV 94.1 93.9 95.8   CRP  --  6.75*  --    PROCALCITONIN 0.11 0.12  --          Lab 10/21/24  0455 10/19/24  0451 10/17/24  0448   SODIUM 138 136 134*   POTASSIUM 3.9 3.9 3.7   CHLORIDE 102 100 100   CO2 24.8 23.6 23.5   ANION GAP 11.2 12.4 10.5   BUN 13 12 14   CREATININE 0.71* 0.80 0.78   EGFR 96.9 93.4 94.2   GLUCOSE 131* 138* 134*   CALCIUM 8.4* 8.5* 8.3*   MAGNESIUM 1.7  --  1.7   PHOSPHORUS 3.9  --  4.0         Lab 10/21/24  0455 10/19/24  0451 10/17/24  0448   TOTAL PROTEIN 6.1 6.2 5.9*   ALBUMIN 2.9* 2.9* 2.5*   GLOBULIN 3.2 3.3 3.4   ALT (SGPT) 13 12 10   AST (SGOT) 18 19 14   BILIRUBIN 0.4 0.4 0.5   ALK PHOS 71 71 66         Lab 10/21/24  0455   PROBNP 5,804.0*                 Brief Urine Lab Results  (Last result in the past 365 days)        Color   Clarity   Blood   Leuk Est   Nitrite   Protein   CREAT   Urine HCG        10/14/24 1736 Yellow   Clear   Small (1+)   Negative   Negative   100 mg/dL (2+)                 Microbiology Results (last 10 days)       Procedure Component Value - Date/Time    COVID PRE-OP / PRE-PROCEDURE SCREENING ORDER (NO ISOLATION) - Swab, Nasal Cavity [[id number]]  (Normal) Collected: 10/21/24 0755    Lab Status: Final result Specimen: Swab from Nasal Cavity Updated: 10/21/24 1227    Narrative:      The following orders were created for panel order COVID PRE-OP / PRE-PROCEDURE SCREENING ORDER (NO ISOLATION) - Swab, Nasal Cavity.  Procedure                               Abnormality         Status                     ---------                               -----------         ------                     COVID-19,CEPHEID/GALLITO,CO...[[id number]]  Normal              Final result                 Please view results for these tests on the individual  orders.    COVID-19,CEPHEID/GALLITO,COR/ALMITA/PAD/ANTWAN/LAG/SERGIO IN-HOUSE,NP SWAB IN TRANSPORT MEDIA 1 HR TAT, RT-PCR - Swab, Nasopharynx [[id number]]  (Normal) Collected: 10/21/24 0752    Lab Status: Final result Specimen: Swab from Nasopharynx Updated: 10/21/24 1227     COVID19 Not Detected    Narrative:      Fact sheet for providers: https://www.fda.gov/media/793386/download     Fact sheet for patients: https://www.fda.gov/media/586342/download  Fact sheet for providers: https://www.fda.gov/media/943927/download     Fact sheet for patients: https://www.fda.gov/media/322512/download    Blood Culture - Blood, Hand, Left [[id number]]  (Abnormal) Collected: 10/17/24 1536    Lab Status: Final result Specimen: Blood from Hand, Left Updated: 10/23/24 0615     Blood Culture Streptococcus mitis / oralis     Isolated from Anaerobic Bottle     Gram Stain Anaerobic Bottle Gram positive cocci in pairs, chains and clusters    Narrative:      refer to previous blood culture collected on 10/15/2024 1757 for MICs.      Blood Culture - Blood, Hand, Right [[id number]]  (Abnormal) Collected: 10/17/24 1536    Lab Status: Final result Specimen: Blood from Hand, Right Updated: 10/23/24 0615     Blood Culture Streptococcus mitis / oralis     Isolated from --     Gram Stain Anaerobic Bottle Gram positive cocci in pairs, chains and clusters    Narrative:      refer to previous blood culture collected on 10/15/2024 1757 for MICs.      COVID PRE-OP / PRE-PROCEDURE SCREENING ORDER (NO ISOLATION) - Swab, Nasal Cavity [[id number]]  (Normal) Collected: 10/17/24 0813    Lab Status: Final result Specimen: Swab from Nasal Cavity Updated: 10/17/24 1207    Narrative:      The following orders were created for panel order COVID PRE-OP / PRE-PROCEDURE SCREENING ORDER (NO ISOLATION) - Swab, Nasal Cavity.  Procedure                               Abnormality         Status                     ---------                               -----------         ------                      COVID-19,CEPHEID/GALLITO,CO...[[id number]]  Normal              Final result                 Please view results for these tests on the individual orders.    COVID-19,CEPHEID/GALLITO,COR/ALMITA/PAD/ANTWAN/LAG/SERGIO IN-HOUSE,NP SWAB IN TRANSPORT MEDIA 1 HR TAT, RT-PCR - Swab, Nasopharynx [[id number]]  (Normal) Collected: 10/17/24 0813    Lab Status: Final result Specimen: Swab from Nasopharynx Updated: 10/17/24 1207     COVID19 Not Detected    Narrative:      Fact sheet for providers: https://www.fda.gov/media/849659/download     Fact sheet for patients: https://www.fda.gov/media/077888/download  Fact sheet for providers: https://www.fda.gov/media/625168/download     Fact sheet for patients: https://www.fda.gov/media/187802/download    Blood Culture - Blood, Arm, Left [[id number]]  (Abnormal)  (Susceptibility) Collected: 10/15/24 1757    Lab Status: Final result Specimen: Blood from Arm, Left Updated: 10/21/24 0715     Blood Culture Streptococcus mitis / oralis     Isolated from Anaerobic Bottle     Gram Stain Anaerobic Bottle Gram positive cocci in pairs and chains    Susceptibility        Streptococcus mitis / oralis      AARON      Ceftriaxone Susceptible      Penicillin G Susceptible      Vancomycin Susceptible                           Blood Culture - Blood, Hand, Right [[id number]]  (Abnormal)  (Susceptibility) Collected: 10/15/24 1757    Lab Status: Final result Specimen: Blood from Hand, Right Updated: 10/21/24 0717     Blood Culture Streptococcus mitis / oralis     Isolated from Anaerobic Bottle     Gram Stain Anaerobic Bottle Gram positive cocci in pairs and chains    Narrative:             Susceptibility        Streptococcus mitis / oralis      AARON      Ceftriaxone Susceptible      Penicillin G Susceptible      Vancomycin Susceptible                           Blood Culture ID, PCR - Blood, Arm, Left [[id number]]  (Abnormal) Collected: 10/15/24 1757    Lab Status: Final result Specimen: Blood from Arm, Left Updated:  10/17/24 1155     BCID, PCR Streptococcus spp, not A, B, or pneumoniae. Identification by BCID2 PCR.     BOTTLE TYPE Anaerobic Bottle    COVID PRE-OP / PRE-PROCEDURE SCREENING ORDER (NO ISOLATION) - Swab, Nasopharynx [[id number]]  (Normal) Collected: 10/14/24 1656    Lab Status: Final result Specimen: Swab from Nasopharynx Updated: 10/14/24 1740    Narrative:      The following orders were created for panel order COVID PRE-OP / PRE-PROCEDURE SCREENING ORDER (NO ISOLATION) - Swab, Nasopharynx.  Procedure                               Abnormality         Status                     ---------                               -----------         ------                     COVID-19, FLU A/B, RSV P...[[id number]]  Normal              Final result                 Please view results for these tests on the individual orders.    COVID-19, FLU A/B, RSV PCR 1 HR TAT - Swab, Nasopharynx [[id number]]  (Normal) Collected: 10/14/24 1656    Lab Status: Final result Specimen: Swab from Nasopharynx Updated: 10/14/24 1740     COVID19 Not Detected     Influenza A PCR Not Detected     Influenza B PCR Not Detected     RSV, PCR Not Detected    Narrative:      Fact sheet for providers: https://www.fda.gov/media/782314/download    Fact sheet for patients: https://www.fda.gov/media/216218/download    Test performed by PCR.    COVID PRE-OP / PRE-PROCEDURE SCREENING ORDER (NO ISOLATION) - Swab, Nasal Cavity [[id number]]  (Normal) Collected: 10/14/24 0734    Lab Status: Final result Specimen: Swab from Nasal Cavity Updated: 10/14/24 1201    Narrative:      The following orders were created for panel order COVID PRE-OP / PRE-PROCEDURE SCREENING ORDER (NO ISOLATION) - Swab, Nasal Cavity.  Procedure                               Abnormality         Status                     ---------                               -----------         ------                     COVID-19,CEPHEID/GALLITO,CO...[[id number]]  Normal              Final result                 Please  view results for these tests on the individual orders.    COVID-19,CEPHEID/GALLITO,COR/ALMITA/PAD/ANTWAN/LAG/SERGIO IN-HOUSE,NP SWAB IN TRANSPORT MEDIA 1 HR TAT, RT-PCR - Swab, Nasopharynx [[id number]]  (Normal) Collected: 10/14/24 0734    Lab Status: Final result Specimen: Swab from Nasopharynx Updated: 10/14/24 1201     COVID19 Not Detected    Narrative:      Fact sheet for providers: https://www.fda.gov/media/229267/download     Fact sheet for patients: https://www.fda.gov/media/910806/download  Fact sheet for providers: https://www.fda.gov/media/804765/download     Fact sheet for patients: https://www.fda.gov/media/887662/download            XR Chest 1 View    Result Date: 10/14/2024  Impression: Impression: 1.Chronic findings without acute process identified. Electronically Signed: Primitivo Snyder MD  10/14/2024 4:58 PM EDT  Workstation ID: [id number]    CT Lower Extremity Right Without Contrast    Result Date: 9/26/2024  Impression: No acute fracture. No acute malalignment. Please see above comments for further detail. Portions of this note were completed with a voice recognition program. Electronically Signed: Colton Mahajan MD  9/26/2024 11:05 PM EDT  Workstation ID: [id number]    XR Ankle 3+ View Right    Result Date: 9/26/2024  Impression: Impression: No acute right ankle findings. Prominent plantar calcaneal spur. Electronically Signed: Siena Llamas MD  9/26/2024 11:55 AM EDT  Workstation ID: [id number]    XR Chest 1 View    Result Date: 9/26/2024  Impression: Impression: Stable moderate cardiomegaly. No acute chest findings. Electronically Signed: Siena Llamas MD  9/26/2024 11:48 AM EDT  Workstation ID: [id number]             Results for orders placed during the hospital encounter of 10/03/24    Adult Transthoracic Echo Complete W/ Cont if Necessary Per Protocol    Interpretation Summary    Left ventricular systolic function is low normal. Left ventricular ejection fraction appears to be 51 - 55%.    Left ventricular wall  thickness is consistent with mild concentric hypertrophy.    Left ventricular diastolic function was indeterminate.    There is moderate to severe biatrial enlargement.    There is a bioprosthetic aortic valve present. The prosthetic aortic valve peak and mean gradients are elevated.  The peak and mean gradient across aortic valve was 101/68 mmHg.  Findings are consistent with severe stenosis of the bioprosthetic valve.    There is mild to moderate mitral regurgitation.    There is mild tricuspid regurgitation.  Estimated right ventricular systolic pressure from tricuspid regurgitation is markedly elevated (>55 mmHg).      Labs Pending at Discharge:  Pending Labs       Order Current Status    Blood Culture - Blood, Hand, Left In process    Blood Culture - Blood, Hand, Right In process              Time spent on Discharge including face to face service: 35 minutes    Electronically signed by CHANEL Melo, 10/23/24, 2:08 PM EDT.

## 2024-10-23 NOTE — PLAN OF CARE
Goal Outcome Evaluation:  Plan of Care Reviewed With: patient        Progress: no change  Outcome Evaluation: Pt's HR is in the 90s. Recent BP is 146/77. Pt on RA. Plan per cardiolody note is for JOLIE tomorrow, NPO at midnight. No c/o of pain at this moment.

## 2024-10-23 NOTE — PLAN OF CARE
Goal Outcome Evaluation:  Plan of Care Reviewed With: patient  Plan of Care Reviewed With: patient        Progress: no change  Outcome Evaluation: PT is AAOx4, VSS, no c/o of acute pain requesting pain meds, no c/o of SOA, N/V/D, BM noted this shift, tolerating diet, NPO after midnight per Dr. Newman, daughter updated at bedside, bed/chair in low/locked position, alarm audible, call light and personal items within reach, continue with current POC at this time.

## 2024-10-23 NOTE — THERAPY TREATMENT NOTE
SNF - Physical Therapy Treatment Note   Yudy     Patient Name: Woodrow Alejandro  : 1950  MRN: [id number]  Today's Date: 10/23/2024      Visit Dx:    ICD-10-CM ICD-9-CM   1. Decreased activities of daily living (ADL)  Z78.9 V49.89   2. Difficulty walking  R26.2 719.7   3. Rupture of Achilles tendon, unspecified laterality, sequela  S86.019S 905.8   4. Weakness generalized  R53.1 780.79   5. Bilateral primary osteoarthritis of knee  M17.0 715.16   6. Primary osteoarthritis of both knees  M17.0 715.16   7. Physical debility  R53.81 799.3     Patient Active Problem List   Diagnosis    Essential hypertension    Permanent atrial fibrillation    S/P AVR    ICD (implantable cardioverter-defibrillator), dual, in situ    Dermatitis    IFG (impaired fasting glucose)    Mixed hyperlipidemia    Vitamin D deficiency    Medicare annual wellness visit, subsequent    Primary osteoarthritis of both knees    Screening PSA (prostate specific antigen)    Bilateral primary osteoarthritis of knee    Coarse tremors    Weakness generalized    Increased urinary frequency    Bandemia    Achilles tendon rupture    Anemia due to GI blood loss    Physical debility     Past Medical History:   Diagnosis Date    Aortic valve replaced     Arthritis 2018    Atrial fibrillation     Coronary artery disease     Hypertension      Past Surgical History:   Procedure Laterality Date    CARDIAC SURGERY      COLONOSCOPY      COLONOSCOPY N/A 2024    Procedure: COLONOSCOPY WITH HOT/COLD SNARE POLYPECTOMIES, ELEVIEW INJECTION,CLIPX1;  Surgeon: Kurt Mensah MD;  Location: [facility] ENDOSCOPY;  Service: Gastroenterology;  Laterality: N/A;  COLON POLYPS    ENDOSCOPY N/A 2024    Procedure: ESOPHAGOGASTRODUODENOSCOPY WITH BIOPSIES;  Surgeon: Kurt Mensah MD;  Location: [facility] ENDOSCOPY;  Service: Gastroenterology;  Laterality: N/A;  RETAINED FOOD IN STOMACH AND REFLUX ESOPHAGITIS    PACEMAKER IMPLANTATION         PT  Assessment (Last 12 Hours)       PT Evaluation and Treatment       Row Name 10/23/24 0934          Physical Therapy Time and Intention    Subjective Information complains of;pain;swelling  In LLE  -VK     Document Type therapy note (daily note)  -VK     Mode of Treatment individual therapy;physical therapy  -VK     Patient Effort adequate  Pt declining transfers and ambulation today, only agreeable to therex.  -VK       Row Name 10/23/24 0934          General Information    Patient Profile Reviewed yes  -VK       Row Name 10/23/24 0934          Pain    Pain Location foot  -VK     Pain Side/Orientation left  -VK     Pain Intervention(s) Nursing Notified;Repositioned;Rest  -VK       Row Name 10/23/24 0934          Cognition    Affect/Mental Status (Cognition) WNL  -VK     Orientation Status (Cognition) oriented x 4  -VK     Personal Safety Interventions nonskid shoes/slippers when out of bed;gait belt;fall prevention program maintained  -VK       Row Name 10/23/24 0934          Mobility    Extremity Weight-bearing Status right lower extremity  -VK     Right Lower Extremity (Weight-bearing Status) weight-bearing as tolerated (WBAT)  -VK       Row Name 10/23/24 0934          Transfers    Comment, (Transfers) Pt declining transfers due to pain/swelling in LLE. Educated patient that movement could help with the swelling.  -VK       Row Name 10/23/24 0934          Gait/Stairs (Locomotion)    Patient was able to Ambulate no, other medical factors prevent ambulation  -VK     Reason Patient was unable to Ambulate Uncontrolled Pain;Other (Comment)  Pt declines ambulation x2 today due to pain and swelling in LLE.  -VK       Row Name 10/23/24 0934          Safety Issues/Impairments Affecting Functional Mobility    Impairments Affecting Function (Mobility) balance;endurance/activity tolerance;range of motion (ROM);strength  -VK       Row Name 10/23/24 0934          Hip (Therapeutic Exercise)    Hip Isometrics (Therapeutic  Exercise) aDduction;gluteal sets;10 repetitions;2 sets  -       Row Name 10/23/24 0934          Knee (Therapeutic Exercise)    Knee Isometrics (Therapeutic Exercise) quad sets;10 repetitions;2 sets  -     Knee Strengthening (Therapeutic Exercise) bilateral;SLR (straight leg raise);10 repetitions;2 sets  AAROM  -       Row Name 10/23/24 0934          Ankle (Therapeutic Exercise)    Ankle AROM (Therapeutic Exercise) bilateral;dorsiflexion;30 repititions  -       Row Name 10/23/24 0934          Positioning and Restraints    Pre-Treatment Position sitting in chair/recliner  -VK     Post Treatment Position chair  -VK     In Chair reclined;call light within reach;encouraged to call for assist;exit alarm on;notified nsg  -       Row Name 10/23/24 0934          Progress Summary (PT)    Progress Toward Functional Goals (PT) progress toward functional goals is fair  -               User Key  (r) = Recorded By, (t) = Taken By, (c) = Cosigned By      Initials Name Provider Type    Karla Ignacio PTA Physical Therapist Assistant                  Section G              Section GG                       Physical Therapy Education       Title: PT OT SLP Therapies (Done)       Topic: Physical Therapy (Resolved)       Point: Mobility training (Resolved)       Learner Progress:  Not documented in this visit.              Point: Home exercise program (Resolved)       Learner Progress:  Not documented in this visit.              Point: Body mechanics (Resolved)       Learner Progress:  Not documented in this visit.              Point: Precautions (Resolved)       Learner Progress:  Not documented in this visit.                                  PT Recommendation and Plan     Progress Summary (PT)  Progress Toward Functional Goals (PT): progress toward functional goals is fair   Outcome Measures       Row Name 10/23/24 1200 10/22/24 1300 10/21/24 0811       How much help from another person do you currently need...     Turning from your back to your side while in flat bed without using bedrails? 4  -VK 3  -VK 3  -WM    Moving from lying on back to sitting on the side of a flat bed without bedrails? 3  -VK 3  -VK 3  -WM    Moving to and from a bed to a chair (including a wheelchair)? 3  -VK 3  -VK 3  -WM    Standing up from a chair using your arms (e.g., wheelchair, bedside chair)? 3  -VK 3  -VK 3  -WM    Climbing 3-5 steps with a railing? 3  -VK 3  -VK 3  -WM    To walk in hospital room? 3  -VK 3  -VK 3  -WM    AM-PAC 6 Clicks Score (PT) 19  -VK 18  -VK 18  -WM    Highest Level of Mobility Goal 6 --> Walk 10 steps or more  -VK 6 --> Walk 10 steps or more  -VK 6 --> Walk 10 steps or more  -WM              User Key  (r) = Recorded By, (t) = Taken By, (c) = Cosigned By      Initials Name Provider Type     Dwaine Guzman PTA Physical Therapist Assistant    Karla Ignacio PTA Physical Therapist Assistant                     Time Calculation:    PT Charges       Row Name 10/23/24 1212             Time Calculation    PT Received On 10/23/24  -VK         Timed Charges    52733 - PT Therapeutic Exercise Minutes 19  -VK      52098 - PT Therapeutic Activity Minutes 11  -VK         SNF Physical Therapy Minutes    Skilled Minutes- PT 30 min  -VK         Total Minutes    Timed Charges Total Minutes 30  -VK       Total Minutes 30  -VK                User Key  (r) = Recorded By, (t) = Taken By, (c) = Cosigned By      Initials Name Provider Type     Karla Gaines PTA Physical Therapist Assistant                  Therapy Charges for Today       Code Description Service Date Service Provider Modifiers Qty    32209994548 HC PT THER PROC EA 15 MIN 10/22/2024 Karla Gaines PTA GP 1    15628147594 HC GAIT TRAINING EA 15 MIN 10/22/2024 Karla Gaines PTA GP 1    35329875071 HC PT THERAPEUTIC ACT EA 15 MIN 10/22/2024 Karla Gaines PTA GP 1    88379464066 HC PT THER PROC EA 15 MIN 10/23/2024 Karla Gaines PTA GP 1    51004032075 HC PT  THERAPEUTIC ACT EA 15 MIN 10/23/2024 Karla Gaines, PTA GP 1            PT G-Codes  Outcome Measure Options: AM-PAC 6 Clicks Daily Activity (OT), Optimal Instrument  AM-PAC 6 Clicks Score (PT): 19  AM-PAC 6 Clicks Score (OT): 21    Karla Gaines PTA  10/23/2024

## 2024-10-23 NOTE — PLAN OF CARE
Goal Outcome Evaluation:  Plan of Care Reviewed With: patient  Plan of Care Reviewed With: patient        Progress: improving  Alert and oriented and pleasant with staff this shift. X1 assist for transfers and ambulation. No c/o pain requiring PRN pain medication noted this shift. Sitting up in recliner, family at bedsdie, call light in reach. Pt seen by cardiologist this shift, recommended transfer to [place] for surgical consult and possible procedure. Pt Dc'd and transferred by [facility] this shift.

## 2024-10-24 PROBLEM — D64.9 ANEMIA: Status: ACTIVE | Noted: 2024-10-24

## 2024-10-24 PROBLEM — T82.857A STENOSIS OF PROSTHETIC AORTIC VALVE: Status: ACTIVE | Noted: 2024-10-24

## 2024-10-24 PROBLEM — R78.81 BACTEREMIA: Status: ACTIVE | Noted: 2024-10-24

## 2024-10-24 NOTE — THERAPY DISCHARGE NOTE
SNF - Occupational Therapy Discharge   Jimenes    Patient Name: Woodrow Alejandro  : 1950    MRN: [id number]                              Today's Date: 10/24/2024       Admit Date: 10/3/2024    Visit Dx:     ICD-10-CM ICD-9-CM   1. Decreased activities of daily living (ADL)  Z78.9 V49.89   2. Difficulty walking  R26.2 719.7   3. Rupture of Achilles tendon, unspecified laterality, sequela  S86.019S 905.8   4. Weakness generalized  R53.1 780.79   5. Bilateral primary osteoarthritis of knee  M17.0 715.16   6. Primary osteoarthritis of both knees  M17.0 715.16   7. Physical debility  R53.81 799.3     Patient Active Problem List   Diagnosis    Essential hypertension    Permanent atrial fibrillation    S/P AVR    ICD (implantable cardioverter-defibrillator), dual, in situ    Dermatitis    IFG (impaired fasting glucose)    Mixed hyperlipidemia    Vitamin D deficiency    Medicare annual wellness visit, subsequent    Primary osteoarthritis of both knees    Screening PSA (prostate specific antigen)    Bilateral primary osteoarthritis of knee    Coarse tremors    Weakness generalized    Increased urinary frequency    Bandemia    Achilles tendon rupture    Anemia due to GI blood loss    Physical debility     Past Medical History:   Diagnosis Date    Aortic valve replaced     Arthritis 2018    Atrial fibrillation     Coronary artery disease     Hypertension      Past Surgical History:   Procedure Laterality Date    CARDIAC SURGERY      COLONOSCOPY      COLONOSCOPY N/A 2024    Procedure: COLONOSCOPY WITH HOT/COLD SNARE POLYPECTOMIES, ELEVIEW INJECTION,CLIPX1;  Surgeon: Kurt Mensah MD;  Location: [facility] ENDOSCOPY;  Service: Gastroenterology;  Laterality: N/A;  COLON POLYPS    ENDOSCOPY N/A 2024    Procedure: ESOPHAGOGASTRODUODENOSCOPY WITH BIOPSIES;  Surgeon: Kurt Mensah MD;  Location: [facility] ENDOSCOPY;  Service: Gastroenterology;  Laterality: N/A;  RETAINED FOOD IN STOMACH AND REFLUX  ESOPHAGITIS    PACEMAKER IMPLANTATION        General Information    No documentation.                  Mobility/ADL's    No documentation.                  Obj/Interventions    No documentation.                  Goals/Plan       Row Name 10/24/24 1045          Bed Mobility Goal 1 (OT)    Activity/Assistive Device (Bed Mobility Goal 1, OT) bed mobility activities, all  -EG     Park Level/Cues Needed (Bed Mobility Goal 1, OT) modified independence  -EG     Time Frame (Bed Mobility Goal 1, OT) long term goal (LTG);30 days  -EG     Progress/Outcomes (Bed Mobility Goal 1, OT) goal met  -EG       Row Name 10/24/24 1045          Transfer Goal 1 (OT)    Activity/Assistive Device (Transfer Goal 1, OT) transfers, all  -EG     Park Level/Cues Needed (Transfer Goal 1, OT) modified independence  -EG     Time Frame (Transfer Goal 1, OT) long term goal (LTG);30 days  -EG     Progress/Outcome (Transfer Goal 1, OT) goal partially met;good progress toward goal  -EG       Row Name 10/24/24 1045          Bathing Goal 1 (OT)    Activity/Device (Bathing Goal 1, OT) bathing skills, all  -EG     Park Level/Cues Needed (Bathing Goal 1, OT) modified independence  -EG     Time Frame (Bathing Goal 1, OT) long term goal (LTG);30 days  -EG     Progress/Outcomes (Bathing Goal 1, OT) good progress toward goal;goal partially met  -EG       Row Name 10/24/24 1045          Dressing Goal 1 (OT)    Activity/Device (Dressing Goal 1, OT) dressing skills, all  -EG     Park/Cues Needed (Dressing Goal 1, OT) modified independence  -EG     Time Frame (Dressing Goal 1, OT) long term goal (LTG);30 days  -EG     Progress/Outcome (Dressing Goal 1, OT) good progress toward goal;goal partially met  -EG       Row Name 10/24/24 1045          Toileting Goal 1 (OT)    Activity/Device (Toileting Goal 1, OT) toileting skills, all  -EG     Park Level/Cues Needed (Toileting Goal 1, OT) modified independence  -EG     Time Frame  (Toileting Goal 1, OT) long term goal (LTG);30 days  -EG     Progress/Outcome (Toileting Goal 1, OT) goal partially met;good progress toward goal  -EG       Row Name 10/24/24 1045          Strength Goal 1 (OT)    Strength Goal 1 (OT) Patient will improve upper body strength 4+/5 to support improved ADL function and mobility.  -EG     Time Frame (Strength Goal 1, OT) long term goal (LTG);30 days  -EG     Progress/Outcome (Strength Goal 1, OT) goal met  -EG       Row Name 10/24/24 1045          Problem Specific Goal 1 (OT)    Problem Specific Goal 1 (OT) Patient will improve endurance to fair+ to support improved independence, function and safety in ADLs and during functional mobility.  -EG     Time Frame (Problem Specific Goal 1, OT) long term goal (LTG);30 days  -EG     Progress/Outcome (Problem Specific Goal 1, OT) goal met  -EG               User Key  (r) = Recorded By, (t) = Taken By, (c) = Cosigned By      Initials Name Provider Type    EG Janessa Good OT Occupational Therapist                   Clinical Impression    No documentation.                  Outcome Measures    No documentation.                 Section G  Mobility  Bed mobility - self performance: limited assistance (staff provide guided maneuvering of limbs or other non-weight bearing assistance)  Bed mobility support/assistance: One person assist  Transfer - self performance: limited assistance (staff provide guided maneuvering of limbs or other non-weight bearing assistance)  Transfer support/assistance: One person assist  Walking in room - self performance: limited assistance (staff provide guided maneuvering of limbs or other non-weight bearing assistance)  Walking in room support/assistance: One person assist  Walking in corridors/hallway - self performance: limited assistance (staff provide guided maneuvering of limbs or other non-weight bearing assistance)  Walking in corridors/hallway support/assistance: One person assist  Locomotion on  unit - self performance: activity did not occur  Locomotion on unit support/assistance: Activity did not occur  Locomotion off unit - self performance: activity did not occur  Locomotion off unit support/assistance: Activity did not occur  Dressing - self performance: limited assistance (staff provide guided maneuvering of limbs or other non-weight bearing assistance)  Dressing support/assistance: One person assist  Eating - self performance: independent  Eating support/assistance: Setup help only  Toileting - self performance: limited assistance (staff provide guided maneuvering of limbs or other non-weight bearing assistance)  Toileting support/assistance: One person assist  Personal hygiene - self performance: limited assistance (staff provide guided maneuvering of limbs or other non-weight bearing assistance)  Personal hygiene support/assistance: One person assist  Bathing  Bathing - self performance: Physical help with bathing (exclude washing back and hair for patient)  Bathing support/assistance: One person assist  Balance  Balance during transitions & walking: Not steady, requires assist to steady  Moving from seated to standing position: Not steady, requires assist to steady  Walking: Not steady, requires assist to steady  Turning around while walking: Not steady, requires assist to steady  Moving on and off toilet: Not steady, requires assist to steady  Surface-to-surface transfer: Not steady, requires assist to steady  Mobility devices: None were used  Range of Motion  Upper Extremity: No impairment  Lower Extremity: Impairment on one side  Section GG  Functional Ability/Goals, Adm (Section GG)  Self Care, Prior Functioning (AK8075J): 3. Independent  Functional Cognition, Prior Functioning (DW3883T): 3. Independent  Prior Device Use (DB8496): none of the above (Z)  Upper Extremity Range of Motion (KV8988G): No impairment  Lower Extremity Range of Motion (WV4400V): Impairment on one side  Self Care,  Admission (Section GG)  Eating: Self-Care Admission Performance (SR1340F2): setup or clean-up assistance (05)  Oral Hygiene: Self-Care Admission Performance (CV7262P2): setup or clean-up assistance (05)  Toileting Hygiene: Self-Care Admission Performance (KQ9972Z0): partial/moderate assistance (03)  Shower/Bathe Self: Self-Care Admission Performance (AH1979Z3): partial/moderate assistance (03)  Upper Body Dressing: Self-Care Admission Performance (YZ4844S5): setup or clean-up assistance (05)  Lower Body Dressing: Self-Care Admission Performance (JH6512O6): partial/moderate assistance (03)  Putting On/Taking Off Footwear: Self-Care Admission Performance (OR6368U4): partial/moderate assistance (03)  Personal Hygiene: Self-Care Admission Performance (WV2224K4): supervision or touching assistance (04)  Mobility, Admission Performance (ZZ5269)  Chair/Bed-Chair Transfer: Mobility Admission Performance (XW5315O3): partial/moderate assistance (03)  Toilet Transfer: Mobility Admission Performance (VB0641O1): partial/moderate assistance (03)  Tub/shower Transfer: Mobility Admission Performance (KL9146CS6): partial/moderate assistance (03)        Self Care, Discharge Performance (CJ0918)  Eating: Self-Care Discharge Performance (XR7719Y6): independent (06)  Oral Hygiene: Self-Care Discharge Performance (CH2068Y8): independent (06)  Toileting Hygiene: Self-Care Discharge Performance (VN0340C3): supervision or touching assistance (04)  Shower/Bathe Self: Self-Care Discharge Performance (AB9722M2): supervision or touching assistance (04)  Upper Body Dressing: Self-Care Discharge Performance (XP4138R6): supervision or touching assistance (04)  Lower Body Dressing: Self-Care Discharge Performance (YG2945M6): partial/moderate assistance (03)  Putting On/Taking Off Footwear: Self-Care Discharge Performance (JY9053O4): partial/moderate assistance (03)  Personal Hygiene: Self-Care Discharge Performance (AS2890Z7): partial/moderate  assistance (03)       Occupational Therapy Education       Title: PT OT SLP Therapies (Resolved)       Topic: Occupational Therapy (Resolved)       Point: ADL training (Resolved)       Description:   Instruct learner(s) on proper safety adaptation and remediation techniques during self care or transfers.   Instruct in proper use of assistive devices.                  Learning Progress Summary            Patient JING Moulton VU by EG at 10/4/2024 1056    Comment: Education on compensatory techniques for ADL's  Education on AE  Education on fall risk prevention and general safety  Education on OT services                      Point: Home exercise program (Resolved)       Description:   Instruct learner(s) on appropriate technique for monitoring, assisting and/or progressing therapeutic exercises/activities.                  Learning Progress Summary            Patient JING Moulton VU by EG at 10/4/2024 1056    Comment: Education on compensatory techniques for ADL's  Education on AE  Education on fall risk prevention and general safety  Education on OT services                      Point: Precautions (Resolved)       Description:   Instruct learner(s) on prescribed precautions during self-care and functional transfers.                  Learning Progress Summary            Patient JING Moulton VU by EG at 10/4/2024 1056    Comment: Education on compensatory techniques for ADL's  Education on AE  Education on fall risk prevention and general safety  Education on OT services                      Point: Body mechanics (Resolved)       Description:   Instruct learner(s) on proper positioning and spine alignment during self-care, functional mobility activities and/or exercises.                  Learning Progress Summary            Patient JING Moulton VU by EG at 10/4/2024 1056    Comment: Education on compensatory techniques for ADL's  Education on AE  Education on fall risk prevention and general safety  Education on OT services                                       User Key       Initials Effective Dates Name Provider Type Discipline    EG 09/14/22 -  Janessa Good, OT Occupational Therapist OT                  OT Recommendation and Plan  Planned Therapy Interventions (OT): activity tolerance training, functional balance retraining, occupation/activity based interventions, adaptive equipment training, BADL retraining, neuromuscular control/coordination retraining, patient/caregiver education/training, transfer/mobility retraining, strengthening exercise  Therapy Frequency (OT): 5 times/wk  Plan of Care Review  Plan of Care Reviewed With: patient  Progress: no change  Outcome Evaluation: Patient reporting no pain when asked by OT during session; Patient demonstrates SBA level for mobility and transfers during ADL's; OT educated on approaching a plateau with skilled services at this level; Continues to state unable to don CAM boot, OT no to address because daughter will perform at home for him per patient report; Karla using reacher for LB dressing overall; set-up level for UB ALD's. Continue with POC; Ax1 w/RW and CAM boot.  Plan of Care Reviewed With: patient  Outcome Evaluation: Patient reporting no pain when asked by OT during session; Patient demonstrates SBA level for mobility and transfers during ADL's; OT educated on approaching a plateau with skilled services at this level; Continues to state unable to don CAM boot, OT no to address because daughter will perform at home for him per patient report; Karla using reacher for LB dressing overall; set-up level for UB ALD's. Continue with POC; Ax1 w/RW and CAM boot.     Time Calculation:   Evaluation Complexity (OT)  Review Occupational Profile/Medical/Therapy History Complexity: expanded/moderate complexity  Assessment, Occupational Performance/Identification of Deficit Complexity: 3-5 performance deficits  Clinical Decision Making Complexity (OT): detailed assessment/moderate  complexity  Overall Complexity of Evaluation (OT): moderate complexity               Janessa Good, OT  10/24/2024

## 2024-10-24 NOTE — H&P
Name: Woodrow Alejandro ADMIT: 10/23/2024   : 1950  PCP: Juan Perez MD    MRN: [id number] LOS: 1 days   AGE/SEX: 73 y.o. male  ROOM: 2210/1     Chief Complaint:  Prosthetic aortic stenosis  Possible endocarditis    Subjective   History of Present Illness  Patient is a 73 y.o. male with a past medical history including tissue aortic valve replacement/maze/left atrial appendage ligation (), NICM status post AICD placement, hypertension, and atrial fibrillation unable to tolerate anticoagulation. Presented to the [facility] ED 1 month ago after having a pop in his ankle and severe pain. He was seen by orthopedics and diagnosed with a partial right Achilles tear. Unable to get MRI due to AICD incompatibility. Plan was for walking boot and outpatient follow-up. During this admission he was also found to have anemia and positive fecal occult stool. GI was consulted he underwent EGD and colonoscopy. The EGD showed esophagitis with bleeding in the lower third of esophagus and 2 polyps (tubular adenomas) were removed on colonoscopy. Due to his Achilles tear and limited mobility he was transferred to rehab on 10/4 for continued therapy. During this time developed leukocytosis and cultures were drawn. Blood cultures came back with strep mitis. Started on IV antibiotics echocardiogram ordered. Echo showed elevated gradients on prosthetic aortic valve with concerns for severe stenosis and cardiology consulted. JOLIE not completed. He was transferred to [facility] for surgical evaluation. Of note, he reports prior to all this he had dental work and developed fever/chills 2 days later. He was treated with Levaquin and subsequently had the achilles tendon tear.    Past Medical History:   Diagnosis Date    Aortic valve replaced     Arthritis 2018    Atrial fibrillation     Coronary artery disease     Hypertension      Past Surgical History:   Procedure Laterality Date    CARDIAC SURGERY       COLONOSCOPY      COLONOSCOPY N/A 2024    Procedure: COLONOSCOPY WITH HOT/COLD SNARE POLYPECTOMIES, ELEVIEW INJECTION,CLIPX1;  Surgeon: Kurt Mensah MD;  Location: [facility] ENDOSCOPY;  Service: Gastroenterology;  Laterality: N/A;  COLON POLYPS    ENDOSCOPY N/A 2024    Procedure: ESOPHAGOGASTRODUODENOSCOPY WITH BIOPSIES;  Surgeon: Kurt Mensah MD;  Location: [facility] ENDOSCOPY;  Service: Gastroenterology;  Laterality: N/A;  RETAINED FOOD IN STOMACH AND REFLUX ESOPHAGITIS    PACEMAKER IMPLANTATION       Family History   Problem Relation Age of Onset    COPD Mother         Still living 93    Heart disease Mother     Arthritis Father     COPD Father          at 91.     Social History     Tobacco Use    Smoking status: Never     Passive exposure: Never    Smokeless tobacco: Never   Vaping Use    Vaping status: Never Used   Substance Use Topics    Alcohol use: Yes     Alcohol/week: 4.0 standard drinks of alcohol     Types: 4 Cans of beer per week    Drug use: Never     Medications Prior to Admission   Medication Sig Dispense Refill Last Dose/Taking    aspirin 325 MG tablet Take 1 tablet by mouth 2 (two) times a day.   10/23/2024 at  9:00 AM    dilTIAZem (CARDIZEM) 120 MG tablet Take 1 tablet by mouth 2 (Two) Times a Day. 180 tablet 3 10/23/2024 at  9:00 AM    furosemide (LASIX) 40 MG tablet Take 1 tablet by mouth Daily.   10/23/2024 at  9:00 AM    lisinopril (PRINIVIL,ZESTRIL) 10 MG tablet Take 1 tablet by mouth Daily.   10/23/2024 at  9:00 AM    metoprolol tartrate 75 MG tablet Take 75 mg by mouth 2 (Two) Times a Day for 30 days.   10/23/2024 at  9:00 AM    pantoprazole (PROTONIX) 40 MG EC tablet Take 1 tablet by mouth 2 (Two) Times a Day Before Meals for 30 days. 30 tablet 0 10/23/2024 at  6:00 AM    penicillin g 4 MU/100 mL 0.9% sodium chloride IVPB Infuse 100 mL into a venous catheter Every 4 (Four) Hours for 43 doses. Indications: Bacteria in the Blood 4300 mL 0 Noon      Allergies:  Diclofenac sodium    Review of Systems   Constitutional:  Positive for activity change, chills and fever.   Cardiovascular:  Positive for leg swelling.   Musculoskeletal:  Positive for gait problem.   All other systems reviewed and are negative.       Objective    Vital Signs  Temp:  [98 °F (36.7 °C)-99.7 °F (37.6 °C)] 98 °F (36.7 °C)  Heart Rate:  [] 89  Resp:  [16-18] 16  BP: (110-146)/(58-90) 113/71     on   ;   Device (Oxygen Therapy): room air  Body mass index is 42.34 kg/m².    Physical Exam  Vitals reviewed.   Constitutional:       General: He is not in acute distress.     Appearance: He is obese. He is not toxic-appearing.   HENT:      Head: Normocephalic and atraumatic.      Nose: Nose normal. No congestion or rhinorrhea.      Mouth/Throat:      Mouth: Mucous membranes are moist.      Pharynx: Oropharynx is clear.   Eyes:      Extraocular Movements: Extraocular movements intact.      Conjunctiva/sclera: Conjunctivae normal.      Pupils: Pupils are equal, round, and reactive to light.   Neck:      Vascular: No carotid bruit.   Cardiovascular:      Rate and Rhythm: Normal rate and regular rhythm.      Heart sounds: Murmur heard.   Pulmonary:      Effort: Pulmonary effort is normal.      Breath sounds: Normal breath sounds.   Musculoskeletal:         General: Normal range of motion.      Cervical back: Normal range of motion and neck supple.      Right lower leg: Edema present.      Left lower leg: Edema present.   Skin:     General: Skin is warm and dry.      Comments: Venous stasis    Neurological:      General: No focal deficit present.      Mental Status: He is alert and oriented to person, place, and time.   Psychiatric:         Mood and Affect: Mood normal.         Thought Content: Thought content normal.         Results Review:   I reviewed the patient's new clinical results.    WBC WBC   Date Value Ref Range Status   10/24/2024 10.14 3.40 - 10.80 10*3/mm3 Final      HGB Hemoglobin  "  Date Value Ref Range Status   10/24/2024 8.7 (L) 13.0 - 17.7 g/dL Final      HCT Hematocrit   Date Value Ref Range Status   10/24/2024 27.0 (L) 37.5 - 51.0 % Final      Platelets Platelets   Date Value Ref Range Status   10/24/2024 137 (L) 140 - 450 10*3/mm3 Final        PT/INR:    Protime   Date Value Ref Range Status   10/24/2024 16.9 (H) 11.7 - 14.2 Seconds Final   /  INR   Date Value Ref Range Status   10/24/2024 1.35 (H) 0.90 - 1.10 Final       Sodium Sodium   Date Value Ref Range Status   10/24/2024 134 (L) 136 - 145 mmol/L Final      Potassium Potassium   Date Value Ref Range Status   10/24/2024 3.6 3.5 - 5.2 mmol/L Final      Chloride Chloride   Date Value Ref Range Status   10/24/2024 99 98 - 107 mmol/L Final      Bicarbonate CO2   Date Value Ref Range Status   10/24/2024 26.2 22.0 - 29.0 mmol/L Final      BUN BUN   Date Value Ref Range Status   10/24/2024 9 8 - 23 mg/dL Final      Creatinine Creatinine   Date Value Ref Range Status   10/24/2024 0.76 0.76 - 1.27 mg/dL Final      Calcium Calcium   Date Value Ref Range Status   10/24/2024 8.1 (L) 8.6 - 10.5 mg/dL Final      Magnesium No results found for: \"MG\"       Assessment & Plan       * No active hospital problems. *      Assessment & Plan      - Prosthetic aortic valve stenosis, h/o AVR (tissue)/maze/DANICA ligation (2014)  - Possible endocarditis, likely need JOLIE   - Bacteremia, blood cultures positive strep mitis  - Atrial fibrillation, unable to tolerate anticoagulation  - Hypertension  - NICM status post [employer] AICD  - Anemia, s/p EGD/colonoscopy with esophagitis, polyp removal  - Right achilles tendon tear--- walking boot and PT per ortho at [facility]  - Pre-diabetes -- improved at 5.3    Patient arrived from [facility] yesterday evening, denies any current complaints  ID consulted, recommend current antibiotic regimen for 6 weeks, follow up cultures to evaluate clearance   Consult placed for Cardiology evaluation/JOLIE   Workup underway, Dr. Florian to " give definitive recommendation upon reviewing future studies     Bryant Mcclure PA-C    Addendum  Patient was seen and examined by me, agree with the findings above.  I have reviewed the 2D echocardiogram and interpreted the results myself.  He is status post aortic valve replacement 10 years ago with a 25 mm pericardial valve.  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  The patient had partial antibiotic treatment but unfortunately developed tendon rupture due to Levaquin.  Given his prosthetic valve infection I recommend further evaluation for possible redo surgery and aortic valve or root replacement and possible mitral valve repair or replacement as well if there is involvement of the fibrosa.  I recommend a cardiac cath and a JOLIE to further delineate the anatomy and the presence or coronary disease before surgery.  Workup is ongoing for possible surgery next week  Javon Florian MD

## 2024-10-24 NOTE — PLAN OF CARE
Goal Outcome Evaluation:  Plan of Care Reviewed With: patient        Progress: no change  Outcome Evaluation: Patient with no c/o pain this pm. Patient b/p 110's/50-80's HR 's remains afib. Patient HR elevates in the 130-150's when he is out of bed. Patient continues to recieve IV antibiotics every 4 hours. Will continue to monitor and await discharge plans. Patient has boot for his right leg at bedside for his ruptured achilles.

## 2024-10-24 NOTE — THERAPY DISCHARGE NOTE
SNF - Physical Therapy Discharge   [facility]     Patient Name: Woodrow Alejandro  : 1950  MRN: [id number]  Today's Date: 10/24/2024                Admit Date: 10/3/2024    Visit Dx:    ICD-10-CM ICD-9-CM   1. Decreased activities of daily living (ADL)  Z78.9 V49.89   2. Difficulty walking  R26.2 719.7   3. Rupture of Achilles tendon, unspecified laterality, sequela  S86.019S 905.8   4. Weakness generalized  R53.1 780.79   5. Bilateral primary osteoarthritis of knee  M17.0 715.16   6. Primary osteoarthritis of both knees  M17.0 715.16   7. Physical debility  R53.81 799.3     Patient Active Problem List   Diagnosis    Essential hypertension    Permanent atrial fibrillation    S/P AVR    ICD (implantable cardioverter-defibrillator), dual, in situ    Dermatitis    IFG (impaired fasting glucose)    Mixed hyperlipidemia    Vitamin D deficiency    Medicare annual wellness visit, subsequent    Primary osteoarthritis of both knees    Screening PSA (prostate specific antigen)    Bilateral primary osteoarthritis of knee    Coarse tremors    Weakness generalized    Increased urinary frequency    Bandemia    Achilles tendon rupture    Anemia due to GI blood loss    Physical debility     Past Medical History:   Diagnosis Date    Aortic valve replaced     Arthritis 2018    Atrial fibrillation     Coronary artery disease     Hypertension      Past Surgical History:   Procedure Laterality Date    CARDIAC SURGERY      COLONOSCOPY      COLONOSCOPY N/A 2024    Procedure: COLONOSCOPY WITH HOT/COLD SNARE POLYPECTOMIES, ELEVIEW INJECTION,CLIPX1;  Surgeon: Kurt Mensah MD;  Location: [facility] ENDOSCOPY;  Service: Gastroenterology;  Laterality: N/A;  COLON POLYPS    ENDOSCOPY N/A 2024    Procedure: ESOPHAGOGASTRODUODENOSCOPY WITH BIOPSIES;  Surgeon: Kurt Mensah MD;  Location: [facility] ENDOSCOPY;  Service: Gastroenterology;  Laterality: N/A;  RETAINED FOOD IN STOMACH AND REFLUX ESOPHAGITIS    PACEMAKER  IMPLANTATION         PT Assessment (Last 12 Hours)       PT Evaluation and Treatment       Row Name 10/24/24 0948          Discharge Summary (PT)    Additional Documentation Discharge Summary (PT) (Group)  -VK       Row Name 10/24/24 0945          Discharge Summary (PT)    Reason for Discharge (PT Discharge Summary) patient discharged from this facility;other (see comments)  Pt discharged to another hospital for a procedure.  -VK     Outcomes Achieved Upon Discharge (PT Discharge Summary) progress was made toward goals  -VK     Transfer to Another Level of Care or Facility (PT Discharge Summary) other (see comments)  Continued skilled PT  -VK               User Key  (r) = Recorded By, (t) = Taken By, (c) = Cosigned By      Initials Name Provider Type    VK Karla Gaines PTA Physical Therapist Assistant                  Section G              Section GG                         Physical Therapy Education       Title: PT OT SLP Therapies (Resolved)       Topic: Physical Therapy (Resolved)       Point: Mobility training (Resolved)       Learner Progress:  Not documented in this visit.              Point: Home exercise program (Resolved)       Learner Progress:  Not documented in this visit.              Point: Body mechanics (Resolved)       Learner Progress:  Not documented in this visit.              Point: Precautions (Resolved)       Learner Progress:  Not documented in this visit.                                    PT Recommendation and Plan     Progress Summary (PT)  Progress Toward Functional Goals (PT): progress toward functional goals is fair     Outcome Measures       Row Name 10/23/24 1200 10/22/24 1300          How much help from another person do you currently need...    Turning from your back to your side while in flat bed without using bedrails? 4  -VK 3  -VK     Moving from lying on back to sitting on the side of a flat bed without bedrails? 3  -VK 3  -VK     Moving to and from a bed to a chair  (including a wheelchair)? 3  -VK 3  -VK     Standing up from a chair using your arms (e.g., wheelchair, bedside chair)? 3  -VK 3  -VK     Climbing 3-5 steps with a railing? 3  -VK 3  -VK     To walk in hospital room? 3  -VK 3  -VK     AM-PAC 6 Clicks Score (PT) 19  -VK 18  -VK     Highest Level of Mobility Goal 6 --> Walk 10 steps or more  -VK 6 --> Walk 10 steps or more  -VK               User Key  (r) = Recorded By, (t) = Taken By, (c) = Cosigned By      Initials Name Provider Type    Karla Ignacio PTA Physical Therapist Assistant                     Time Calculation:     Therapy Charges for Today       Code Description Service Date Service Provider Modifiers Qty    92286366118  PT THER PROC EA 15 MIN 10/23/2024 Karla Gaines PTA GP 1    39704210562 HC PT THERAPEUTIC ACT EA 15 MIN 10/23/2024 Karla Gaines PTA GP 1            PT G-Codes  Outcome Measure Options: AM-PAC 6 Clicks Daily Activity (OT), Optimal Instrument  AM-PAC 6 Clicks Score (PT): 19  AM-PAC 6 Clicks Score (OT): 21         Karla Gaines PTA  10/24/2024

## 2024-10-24 NOTE — DISCHARGE PLACEMENT REQUEST
"Woodrow Melendez W \"Ortiz\" (73 y.o. Male)       Date of Birth   1950    Social Security Number       Address   [address] [place] [address]    Home Phone   [phone]    MRN   [id number]       Church   None    Marital Status                               Admission Date   10/23/24    Admission Type   Urgent    Admitting Provider   Javon Florian MD    Attending Provider   Javon Florian MD    Department, Room/Bed   [facility] CARDIOVASC UNIT, 2210/1       Discharge Date       Discharge Disposition       Discharge Destination                                 Attending Provider: Javon Florian MD    Allergies: Diclofenac Sodium    Isolation: None   Infection: None   Code Status: Prior    Ht: 182.9 cm (72\")   Wt: 142 kg (312 lb 3.2 oz)    Admission Cmt: None   Principal Problem: None                  Active Insurance as of 10/23/2024       Primary Coverage       Payor Plan Insurance Group Employer/Plan Group    ANTHEM MEDICARE REPLACEMENT ANTHEM MEDICARE ADVANTAGE [id number]       Payor Plan Address Payor Plan Phone Number Payor Plan Fax Number Effective Dates    [address] [phone]  9/1/2018 - None Entered    [facility] [id number]         Subscriber Name Subscriber Birth Date Member ID       WOODROW MELENDEZ 1950 [id number]                     Emergency Contacts        (Rel.) Home Phone Work Phone Mobile Phone    Maia Gustafson (Mother) [phone] -- [phone]    EDUARDOCHACORTA (Daughter) -- -- [phone]                "

## 2024-10-24 NOTE — THERAPY DISCHARGE NOTE
SNF - Physical Therapy Discharge   [facility]     Patient Name: Woodrow Alejandro  : 1950  MRN: [id number]  Today's Date: 10/24/2024                Admit Date: 10/3/2024    Visit Dx:    ICD-10-CM ICD-9-CM   1. Decreased activities of daily living (ADL)  Z78.9 V49.89   2. Difficulty walking  R26.2 719.7   3. Rupture of Achilles tendon, unspecified laterality, sequela  S86.019S 905.8   4. Weakness generalized  R53.1 780.79   5. Bilateral primary osteoarthritis of knee  M17.0 715.16   6. Primary osteoarthritis of both knees  M17.0 715.16   7. Physical debility  R53.81 799.3     Patient Active Problem List   Diagnosis    Essential hypertension    Permanent atrial fibrillation    S/P AVR    ICD (implantable cardioverter-defibrillator), dual, in situ    Dermatitis    IFG (impaired fasting glucose)    Mixed hyperlipidemia    Vitamin D deficiency    Medicare annual wellness visit, subsequent    Primary osteoarthritis of both knees    Screening PSA (prostate specific antigen)    Bilateral primary osteoarthritis of knee    Coarse tremors    Weakness generalized    Increased urinary frequency    Bandemia    Achilles tendon rupture    Anemia due to GI blood loss    Physical debility     Past Medical History:   Diagnosis Date    Aortic valve replaced     Arthritis 2018    Atrial fibrillation     Coronary artery disease     Hypertension      Past Surgical History:   Procedure Laterality Date    CARDIAC SURGERY      COLONOSCOPY      COLONOSCOPY N/A 2024    Procedure: COLONOSCOPY WITH HOT/COLD SNARE POLYPECTOMIES, ELEVIEW INJECTION,CLIPX1;  Surgeon: Kurt Mensah MD;  Location: [facility] ENDOSCOPY;  Service: Gastroenterology;  Laterality: N/A;  COLON POLYPS    ENDOSCOPY N/A 2024    Procedure: ESOPHAGOGASTRODUODENOSCOPY WITH BIOPSIES;  Surgeon: Kurt Mensah MD;  Location: [facility] ENDOSCOPY;  Service: Gastroenterology;  Laterality: N/A;  RETAINED FOOD IN STOMACH AND REFLUX ESOPHAGITIS    PACEMAKER  IMPLANTATION         PT Assessment (Last 12 Hours)       PT Evaluation and Treatment       Row Name 10/24/24 0948          Discharge Summary (PT)    Additional Documentation Discharge Summary (PT) (Group)  -VK       Row Name 10/24/24 0948          Discharge Summary (PT)    Reason for Discharge (PT Discharge Summary) patient discharged from this facility;other (see comments)  Pt discharged to another hospital for a procedure.  -VK     Outcomes Achieved Upon Discharge (PT Discharge Summary) progress was made toward goals  -VK     Transfer to Another Level of Care or Facility (PT Discharge Summary) other (see comments)  Continued skilled PT  -VK               User Key  (r) = Recorded By, (t) = Taken By, (c) = Cosigned By      Initials Name Provider Type    VK Karla Gaines PTA Physical Therapist Assistant                  Section G              Section GG                    Mobility, Discharge Performance (DS4899)  Roll Left & Right: Mobility Discharge ([id number]): supervision or touching assistance (04)  Sit to Lying: Mobility Discharge Performance (UD8894D4): supervision or touching assistance (04)  Lying to Sitting, Side of Bed: Mobility Discharge Performance (GW3637C6): supervision or touching assistance (04)  Sit to Stand: Mobility Discharge Performance (SV4523A3): supervision or touching assistance (04)  Chair/Bed-Chair Transfer: Mobility Discharge Performance (BS1882R6): supervision or touching assistance (04)  Toilet Transfer: Mobility Discharge Performance (JZ4372P9): supervision or touching assistance (04)  Tub/shower Transfer: Mobility Discharge Performance (SB6728QD8): partial/moderate assistance (03)  Car Transfer: Mobility Discharge Performance (WA7532E1): partial/moderate assistance (03)  Walk 10 Feet: Mobility Discharge Performance (BT7561P0): supervision or touching assistance (04)  Walk 50 Feet With Two Turns: Mobility Discharge Performance (QY4394A0): supervision or touching assistance (04)  Walk  150 Feet: Mobility Discharge Performance (CO5456S9): partial/moderate assistance (03)  Walk 10 Ft, Uneven Surfaces: Mobility Discharge Performance (WF8937Z7): partial/moderate assistance (03)  1 Step/Curb: Mobility Discharge Performance (KD6061Z1): partial/moderate assistance (03)  4 Steps: Mobility Discharge Performance (TD1717I5): partial/moderate assistance (03)  12 Steps: Mobility Discharge Performance (GE6903M3): substantial/maximal assistance (02)  Picking up object: Mobility Discharge Performance (WD0336E5): partial/moderate assistance (03)  Wheel 50 Ft Two Turns: Mobility Discharge Performance (QC7065M5): not applicable (09)  Wheel 150 Feet: Mobility Discharge Performance (AA5382E9): not applicable (09)    Physical Therapy Education       Title: PT OT SLP Therapies (Resolved)       Topic: Physical Therapy (Resolved)       Point: Mobility training (Resolved)       Learner Progress:  Not documented in this visit.              Point: Home exercise program (Resolved)       Learner Progress:  Not documented in this visit.              Point: Body mechanics (Resolved)       Learner Progress:  Not documented in this visit.              Point: Precautions (Resolved)       Learner Progress:  Not documented in this visit.                                    PT Recommendation and Plan     Progress Summary (PT)  Progress Toward Functional Goals (PT): progress toward functional goals is fair     Outcome Measures       Row Name 10/23/24 1200 10/22/24 1300          How much help from another person do you currently need...    Turning from your back to your side while in flat bed without using bedrails? 4  -VK 3  -VK     Moving from lying on back to sitting on the side of a flat bed without bedrails? 3  -VK 3  -VK     Moving to and from a bed to a chair (including a wheelchair)? 3  -VK 3  -VK     Standing up from a chair using your arms (e.g., wheelchair, bedside chair)? 3  -VK 3  -VK     Climbing 3-5 steps with a railing? 3   -VK 3  -VK     To walk in hospital room? 3  -VK 3  -VK     AM-PAC 6 Clicks Score (PT) 19  -VK 18  -VK     Highest Level of Mobility Goal 6 --> Walk 10 steps or more  -VK 6 --> Walk 10 steps or more  -VK               User Key  (r) = Recorded By, (t) = Taken By, (c) = Cosigned By      Initials Name Provider Type    Karla Ignacio PTA Physical Therapist Assistant                     Time Calculation:    PT Charges       Row Name 10/24/24 0956             Time Calculation    PT Received On 10/24/24  -VK                User Key  (r) = Recorded By, (t) = Taken By, (c) = Cosigned By      Initials Name Provider Type    Karla Ignacio PTA Physical Therapist Assistant                  Therapy Charges for Today       Code Description Service Date Service Provider Modifiers Qty    72663669453 HC PT THER PROC EA 15 MIN 10/23/2024 Karla Gaines PTA GP 1    13688918941 HC PT THERAPEUTIC ACT EA 15 MIN 10/23/2024 Karla Gaines PTA GP 1            PT G-Codes  Outcome Measure Options: AM-PAC 6 Clicks Daily Activity (OT), Optimal Instrument  AM-PAC 6 Clicks Score (PT): 19  AM-PAC 6 Clicks Score (OT): 21         Karla Gaines PTA  10/24/2024

## 2024-10-24 NOTE — PLAN OF CARE
Goal Outcome Evaluation:  Plan of Care Reviewed With: patient        Progress: no change  Outcome Evaluation: Pt is in Afib. HR in the 80s. Last BP was 117/69. Pt on RA. Pt scheduled for a left heart cath 10/24/25 at 1200, will be NPO at midnight. No c/o pain and further complaints at this moment.

## 2024-10-24 NOTE — CONSULTS
Patient Name: Woodrow Alejandro  :1950  73 y.o.    Date of Admission: 10/23/2024  Date of Consultation:  10/24/24  Encounter Provider: Arthur Garcia III, MD  Place of Service: [facility] CARDIOLOGY  Referring Provider: Javon Florian MD  Patient Care Team:  Juan Perez MD as PCP - General (Internal Medicine)      Chief complaint: endocarditis     History of Present Illness: Woodrow Alejandro is a 73 year old pt with a history of HTN, chronic Afib not on AC due to intolerance, bioprosthetic aortic valve replacement back in  (Dr. Ness at [facility]), with left atrial appendage ligation, nonischemic cardiomyopathy s/p medtronic AICD placement and recovered EF.     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  We are consulted this afternoon to see the patient to arrange for a transesophageal echocardiogram tomorrow.    Currently complains of some discomfort where he has the ruptured Achilles tendon.  No chest pain or chest discomfort.  Generalized malaise.  Denies shortness of breath.    An ECHO in  showed MAC/mean pressure gradient across to his aortic valve of 29/17 mmHg. We repeated an echocardiogram on  and his gradients were 37/22 mmHg. His primary care repeated 24 and his gradients were 55/29 mmHg. With his bacteremia he just recently had 1 done 10/19/2024 which showed normal ejection fraction but his gradients up to 101/68 mmHg.     Pt presented to [facility] from a skilled nursing facility due to leukocytosis and found to have positive blood cultures with strep mitis. Workup there showed severe thickening of the aortic valve.  Patient was seen in consult by Dr. Newman  or ECHO showing bioprosthetic aortic valve that showed the gradients max/mean of 101/68 mmHg. Patient was transferred here for cardiothoracic surgery evaluation.  ID was consulted for  endocarditis.    ECHO 10/19/24  Left ventricular systolic function is low normal. Left ventricular ejection fraction appears to be 51 - 55%.    Left ventricular wall thickness is consistent with mild concentric hypertrophy.    Left ventricular diastolic function was indeterminate.    There is moderate to severe biatrial enlargement.    There is a bioprosthetic aortic valve present. The prosthetic aortic valve peak and mean gradients are elevated.  The peak and mean gradient across aortic valve was 101/68 mmHg.  Findings are consistent with severe stenosis of the bioprosthetic valve.    There is mild to moderate mitral regurgitation.    There is mild tricuspid regurgitation.  Estimated right ventricular systolic pressure from tricuspid regurgitation is markedly elevated (>55 mmHg).    Past Medical History:   Diagnosis Date    Aortic valve replaced     Arthritis 2018    Atrial fibrillation     Coronary artery disease     Hypertension        Past Surgical History:   Procedure Laterality Date    CARDIAC SURGERY  2002    COLONOSCOPY      COLONOSCOPY N/A 9/30/2024    Procedure: COLONOSCOPY WITH HOT/COLD SNARE POLYPECTOMIES, ELEVIEW INJECTION,CLIPX1;  Surgeon: Kurt Mensah MD;  Location: [facility] ENDOSCOPY;  Service: Gastroenterology;  Laterality: N/A;  COLON POLYPS    ENDOSCOPY N/A 9/30/2024    Procedure: ESOPHAGOGASTRODUODENOSCOPY WITH BIOPSIES;  Surgeon: Kurt Mensah MD;  Location: [facility] ENDOSCOPY;  Service: Gastroenterology;  Laterality: N/A;  RETAINED FOOD IN STOMACH AND REFLUX ESOPHAGITIS    PACEMAKER IMPLANTATION           Prior to Admission medications    Medication Sig Start Date End Date Taking? Authorizing Provider   aspirin 325 MG tablet Take 1 tablet by mouth 2 (two) times a day.   Yes Provider, MD Eli   dilTIAZem (CARDIZEM) 120 MG tablet Take 1 tablet by mouth 2 (Two) Times a Day. 4/11/24  Yes Juan Perez MD   furosemide (LASIX) 40 MG tablet Take 1 tablet by mouth Daily.  24  Yes Provider, MD Eli   lisinopril (PRINIVIL,ZESTRIL) 10 MG tablet Take 1 tablet by mouth Daily. 10/23/24  Yes Cristino Arana PA   metoprolol tartrate 75 MG tablet Take 75 mg by mouth 2 (Two) Times a Day for 30 days. 10/23/24 11/22/24 Yes Cristino Arana PA   pantoprazole (PROTONIX) 40 MG EC tablet Take 1 tablet by mouth 2 (Two) Times a Day Before Meals for 30 days. 10/23/24 11/22/24 Yes Cristino Arana PA   penicillin g 4 MU/100 mL 0.9% sodium chloride IVPB Infuse 100 mL into a venous catheter Every 4 (Four) Hours for 43 doses. Indications: Bacteria in the Blood 10/23/24 10/31/24  Cristino Arana PA       Allergies   Allergen Reactions    Diclofenac Sodium Dizziness       Social History     Socioeconomic History    Marital status:    Tobacco Use    Smoking status: Never     Passive exposure: Never    Smokeless tobacco: Never   Vaping Use    Vaping status: Never Used   Substance and Sexual Activity    Alcohol use: Yes     Alcohol/week: 4.0 standard drinks of alcohol     Types: 4 Cans of beer per week    Drug use: Never    Sexual activity: Not Currently       Family History   Problem Relation Age of Onset    COPD Mother         Still living 93    Heart disease Mother     Arthritis Father     COPD Father          at 91.       REVIEW OF SYSTEMS:   All systems reviewed.  Pertinent positives identified in HPI.  All other systems are negative.      Objective:     Vitals:    10/24/24 0003 10/24/24 0416 10/24/24 0737 10/24/24 1137   BP: 110/58 116/63 124/90 113/71   BP Location: Right arm Right arm Right arm Right arm   Patient Position: Lying Lying Lying Lying   Pulse: 89 91 89    Resp: 16 16 16 16   Temp: 99.2 °F (37.3 °C) 99.7 °F (37.6 °C) 98.1 °F (36.7 °C) 98 °F (36.7 °C)   TempSrc: Oral Oral Oral Oral   Weight:         Body mass index is 42.34 kg/m².    Physical Exam:  General Appearance:    Alert, cooperative, in no acute distress   Head:    Normocephalic, without obvious  abnormality   Eyes:            Lids and lashes normal, conjunctivae and sclerae normal, no icterus, no pallor, corneas clear   Ears:    Ears appear intact with no abnormalities noted   Throat:   No oral lesions, oral mucosa moist   Neck:   No adenopathy, supple, trachea midline, no thyromegaly, no carotid bruit, no JVD   Back:     No kyphosis present, no erythema or scars, no tenderness to palpation    Lungs:     Clear to auscultation,respirations regular, even and unlabored    Heart:    Regular rhythm and normal rate, normal S1 and S2, 2/6 systolic murmur, no gallop, no rub, no click   Chest Wall:    No abnormalities observed   Abdomen:     Normal bowel sounds, no masses, no organomegaly, soft        non-tender, non-distended, no guarding   Extremities:   Moves all extremities well, no edema, no cyanosis, no redness   Pulses:  Bilateral carotids brisk   Skin:  Psychiatric:   No bleeding or rash    Alert and oriented, normal mood and affect         Lab Review:     Results from last 7 days   Lab Units 10/24/24  0304   SODIUM mmol/L 134*   POTASSIUM mmol/L 3.6   CHLORIDE mmol/L 99   CO2 mmol/L 26.2   BUN mg/dL 9   CREATININE mg/dL 0.76   CALCIUM mg/dL 8.1*   BILIRUBIN mg/dL 0.7   ALK PHOS U/L 77   ALT (SGPT) U/L 15   AST (SGOT) U/L 25   GLUCOSE mg/dL 129*         Results from last 7 days   Lab Units 10/24/24  0304   WBC 10*3/mm3 10.14   HEMOGLOBIN g/dL 8.7*   HEMATOCRIT % 27.0*   PLATELETS 10*3/mm3 137*     Results from last 7 days   Lab Units 10/24/24  0304   INR  1.35*     Results from last 7 days   Lab Units 10/21/24  0455   MAGNESIUM mg/dL 1.7     Results from last 7 days   Lab Units 10/24/24  0304   CHOLESTEROL mg/dL 120   TRIGLYCERIDES mg/dL 93   HDL CHOL mg/dL 36*   LDL CHOL mg/dL 66                                   I personally viewed and interpreted the patient's EKG/Telemetry data.      Current Facility-Administered Medications:     acetaminophen (TYLENOL) tablet 650 mg, 650 mg, Oral, Q4H PRN **OR**  acetaminophen (TYLENOL) 160 MG/5ML oral solution 650 mg, 650 mg, Oral, Q4H PRN **OR** acetaminophen (TYLENOL) suppository 650 mg, 650 mg, Rectal, Q4H PRN, Dheeraj Pathak PA-C    aspirin chewable tablet 81 mg, 81 mg, Oral, Daily, Bryant Mcclure PA-C, 81 mg at 10/24/24 0817    sennosides-docusate (PERICOLACE) 8.6-50 MG per tablet 2 tablet, 2 tablet, Oral, BID PRN **AND** polyethylene glycol (MIRALAX) packet 17 g, 17 g, Oral, Daily PRN **AND** bisacodyl (DULCOLAX) EC tablet 5 mg, 5 mg, Oral, Daily PRN **AND** bisacodyl (DULCOLAX) suppository 10 mg, 10 mg, Rectal, Daily PRN, Dheeraj Pathak PA-C    calcium carbonate (TUMS) chewable tablet 500 mg (200 mg elemental), 2 tablet, Oral, BID PRN, Dheeraj Pathak PA-C    dilTIAZem (CARDIZEM) tablet 120 mg, 120 mg, Oral, Q12H, Bryant Mcclure PA-C, 120 mg at 10/24/24 0816    ferrous sulfate tablet 325 mg, 325 mg, Oral, Daily With Breakfast, Bryant Mcclure PA-C, 325 mg at 10/24/24 0817    furosemide (LASIX) tablet 40 mg, 40 mg, Oral, Daily, Bryant Mcclure PA-C, 40 mg at 10/24/24 0817    hydrALAZINE (APRESOLINE) injection 20 mg, 20 mg, Intravenous, Q6H PRN, Dheeraj Pathak PA-C    [Held by provider] lisinopril (PRINIVIL,ZESTRIL) tablet 10 mg, 10 mg, Oral, Daily, Bryant Mcclure PA-C    melatonin tablet 5 mg, 5 mg, Oral, Nightly, Bryant Mcclure PA-C, 5 mg at 10/23/24 2039    metoprolol tartrate (LOPRESSOR) tablet 75 mg, 75 mg, Oral, BID, Bryant Mcclure PA-C, 75 mg at 10/24/24 0817    nitroglycerin (NITROSTAT) SL tablet 0.4 mg, 0.4 mg, Sublingual, Q5 Min PRN, Dheeraj Pathak PA-C    ondansetron ODT (ZOFRAN-ODT) disintegrating tablet 4 mg, 4 mg, Oral, Q6H PRN, Dheeraj Pathak PA-C    pantoprazole (PROTONIX) EC tablet 40 mg, 40 mg, Oral, BID AC, Bryant Mcclure PA-C, 40 mg at 10/24/24 0725    penicillin G potassium 4 Million Units in sodium chloride 0.9 % 100 mL IVPB, 4 Million Units, Intravenous, Q4H, Gregg Diaz DO, 4 Million Units at 10/24/24 1041    potassium chloride  (K-DUR,KLOR-CON) ER tablet 20 mEq, 20 mEq, Oral, Daily, Mcclure, Bryant, PA-C, 20 mEq at 10/24/24 0817    sodium chloride 0.9 % flush 10 mL, 10 mL, Intravenous, Q12H, Dheeraj Pathak C, PA-C, 10 mL at 10/24/24 0808    sodium chloride 0.9 % flush 10 mL, 10 mL, Intravenous, PRN, Dheeraj Pathak C, PA-C    sodium chloride 0.9 % infusion 40 mL, 40 mL, Intravenous, PRN, Hook Dheeraj C, PA-C    Assessment and Plan:       Active Hospital Problems    Diagnosis  POA    Bacteremia [R78.81]  Yes    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     1.  Prosthetic aortic valve stenosis, history of bioprosthetic aortic valve replacement 2014 by Dr. Ness at [facility].  Echo in September had demonstrated increased gradients, echo just performed at [facility] in [place] shows substantial increase in this.  While not definitive, I have personally reviewed interpret the images very suspicious of a vegetation on the aortic valve.  We will plan for a transesophageal echocardiogram in the morning.  2.  Possible endocarditis, ID is following, we will await the results of the transesophageal echocardiogram  3.  Bacteremia for strep mitis  4.  Atrial fibrillation, not on anticoagulation  5.  Anemia, status post EGD/colonoscopy with esophagitis  6.  Nonischemic cardiomyopathy with recovery of function,  7.  Medtronic AICD in place  8.  Coronary artery calcification seen on most recent CT scan, no known hemodynamically significant CAD.    Addendum: Discussed with Dr. Florian, he would also like to have a left heart cath performed in dissipation of possible surgical intervention at the beginning of the week, we will arrange for left heart  catheterization tomorrow afternoon with plans for the transesophageal echocardiogram in the morning.    Arthur Garcia III, MD  10/24/24  14:25 EDT

## 2024-10-24 NOTE — CONSULTS
"Referring Provider: Javon Florian MD  Reason for Consultation:     endocarditis         Subjective   History of present illness: Patient is a 73-year-old male with past medical history of Medtronic AICD placement with bioprosthetic aortic valve replacement 2014, right Achilles tear, and atrial fibrillation who presented to [facility] from skilled nursing facility due to leukocytosis and found to have positive blood cultures with strep mitis.  Workup at outside facility showed thickening of the aortic valve that was deemed severe and recommended transfer to [facility] for cardiothoracic surgery evaluation.  ID consulted for \"endocarditis\".     He had had a recent dental procedure and subsequently received levofloxacin.  Presented after starting levofloxacin with Achilles tendon rupture.    During admission patient with low-grade fevers and initial leukocytosis that has since resolved.   Procalcitonin remained negative throughout and blood cultures positive on 10/15 and 10/17 for strep mitis.  He has been maintained on initially ceftriaxone and now de-escalated to penicillin G.    Today patient reports that he is doing well and tolerating antibiotics without difficulty.  States he is tired from lack of sleep.  Denies any side effects with the antibiotics.  Denies any further fevers or chills.  Repeat blood cultures from 10/23 are no growth to date.    Past Medical History:   Diagnosis Date    Aortic valve replaced     Arthritis 2018    Atrial fibrillation     Coronary artery disease     Hypertension        Past Surgical History:   Procedure Laterality Date    CARDIAC SURGERY  2002    COLONOSCOPY      COLONOSCOPY N/A 9/30/2024    Procedure: COLONOSCOPY WITH HOT/COLD SNARE POLYPECTOMIES, ELEVIEW INJECTION,CLIPX1;  Surgeon: Kurt Mensah MD;  Location: [facility] ENDOSCOPY;  Service: Gastroenterology;  Laterality: N/A;  COLON POLYPS    ENDOSCOPY N/A 9/30/2024    Procedure: " ESOPHAGOGASTRODUODENOSCOPY WITH BIOPSIES;  Surgeon: Kurt Mensah MD;  Location: [facility] ENDOSCOPY;  Service: Gastroenterology;  Laterality: N/A;  RETAINED FOOD IN STOMACH AND REFLUX ESOPHAGITIS    PACEMAKER IMPLANTATION         family history includes Arthritis in his father; COPD in his father and mother; Heart disease in his mother.     reports that he has never smoked. He has never been exposed to tobacco smoke. He has never used smokeless tobacco. He reports current alcohol use of about 4.0 standard drinks of alcohol per week. He reports that he does not use drugs.     Allergies   Allergen Reactions    Diclofenac Sodium Dizziness       Medication:  Antibiotics:  Anti-Infectives (From admission, onward)      Ordered     Dose/Rate Route Frequency Start Stop    10/23/24 1709  penicillin G potassium 4 Million Units in sodium chloride 0.9 % 100 mL IVPB        Ordering Provider: Bryant Mcclure PA-C    4 Million Units  over 30 Minutes Intravenous Every 4 Hours Scheduled 10/23/24 2000 10/30/24 5687              Objective     Physical Exam:   Vital Signs   Temp:  [98.1 °F (36.7 °C)-99.7 °F (37.6 °C)] 98.1 °F (36.7 °C)  Heart Rate:  [] 89  Resp:  [16-18] 16  BP: (110-146)/(58-90) 124/90    GENERAL: Awake and alert, in no acute distress.   HEENT: Oropharynx is clear. Hearing is grossly normal.   EYES: PERRL. No conjunctival injection. No lid lag.   LUNGS: Normal work of breathing  GI: Soft, nontender, nondistended.   SKIN: Warm and dry without cutaneous eruptions in exposed areas  PSYCHIATRIC: Appropriate mood, affect, insight, and judgment.     Results Review:   I reviewed the patient's new clinical results.  I reviewed the patient's new imaging results and agree with the interpretation.  I reviewed the patient's other test results and agree with the interpretation    Lab Results   Component Value Date    WBC 10.14 10/24/2024    HGB 8.7 (L) 10/24/2024    HCT 27.0 (L) 10/24/2024    MCV 93.1 10/24/2024    PLT  "137 (L) 10/24/2024       No results found for: \"VANCOPEAK\", \"VANCOTROUGH\", \"VANCORANDOM\"    Lab Results   Component Value Date    GLUCOSE 129 (H) 10/24/2024    BUN 9 10/24/2024    CREATININE 0.76 10/24/2024    BCR 11.8 10/24/2024    CO2 26.2 10/24/2024    CALCIUM 8.1 (L) 10/24/2024    ALBUMIN 2.8 (L) 10/24/2024    LABIL2 1.4 06/27/2019    AST 25 10/24/2024    ALT 15 10/24/2024         Estimated Creatinine Clearance: 126.1 mL/min (by C-G formula based on SCr of 0.76 mg/dL).      Microbiology:  10/3 COVID-negative  10/10 COVID-negative  10/14 COVID-negative  10/15 blood cultures 2 out of 2 strep mitis  10/17 COVID-negative  10/17 blood cultures 2 out of 2 strep mitis  10/21 COVID-negative  10/23 blood cultures in process    Radiology:  10/14 chest x-ray reviewed by me with no acute process.  Pacemaker in place.    Assessment     #Strep mitis endocarditis  #Status post bioprosthetic aortic valve replacement 2014  #Status post AICD  #Atrial fibrillation  #Achilles tendon rupture     Suspect strep mitis may be from dental procedure which proceeded his admission.  Agree with penicillin G 4,000,000 units every 4 hours.  Follow-up repeat cultures to assure clearance.  Possible plans for JOLIE and will follow-up with cardiothoracic surgery's recommendations.  Patient is likely going to need 6 weeks of IV therapy for endocarditis.    Thank you for this consult.  We will continue to follow along and tailor antibiotics as the patient's clinical course evolves.    "

## 2024-10-24 NOTE — CASE MANAGEMENT/SOCIAL WORK
Discharge Planning Assessment  [facility]     Patient Name: Woodrow Alejandro  MRN: [id number]  Today's Date: 10/24/2024    Admit Date: 10/23/2024    Plan: HH w/ IV home infusion vs [facility] vs [facility]   Discharge Needs Assessment       Row Name 10/24/24 1308       Living Environment    People in Home parent(s)    Name(s) of People in Home Mother/Maia Irenerer lives with him and he is caregiver for shopping and transportation but she is IADL's.    Current Living Arrangements home    Potentially Unsafe Housing Conditions none    In the past 12 months has the electric, gas, oil, or water company threatened to shut off services in your home? No    Primary Care Provided by self    Provides Primary Care For parent(s)    Family Caregiver if Needed child(benito), adult    Family Caregiver Names Abby Melendez/daughter and a sister    Quality of Family Relationships helpful;involved;supportive    Able to Return to Prior Arrangements yes       Resource/Environmental Concerns    Resource/Environmental Concerns home accessibility    Home Accessibility Concerns stairs to enter home  6 steps to enter.    Transportation Concerns none       Transportation Needs    In the past 12 months, has lack of transportation kept you from medical appointments or from getting medications? no    In the past 12 months, has lack of transportation kept you from meetings, work, or from getting things needed for daily living? No       Food Insecurity    Within the past 12 months, you worried that your food would run out before you got the money to buy more. Never true    Within the past 12 months, the food you bought just didn't last and you didn't have money to get more. Never true       Transition Planning    Patient/Family Anticipates Transition to inpatient rehabilitation facility    Patient/Family Anticipated Services at Transition home health care;rehabilitation services    Transportation Anticipated family or friend  "will provide       Discharge Needs Assessment    Readmission Within the Last 30 Days current reason for admission unrelated to previous admission    Current Outpatient/Agency/Support Group skilled nursing facility    Equipment Currently Used at Home bp cuff;scales;grab bar;shower chair;other (see comments)  high commode.    Concerns to be Addressed discharge planning;adjustment to diagnosis/illness    Do you want help finding or keeping work or a job? I do not need or want help    Do you want help with school or training? For example, starting or completing job training or getting a high school diploma, GED or equivalent No    Anticipated Changes Related to Illness none    Equipment Needed After Discharge other (see comments)  TBD    Outpatient/Agency/Support Group Needs infusion therapy, home;inpatient rehabilitation facility    Discharge Facility/Level of Care Needs home with home health;rehabilitation facility    Provided Post Acute Provider List? N/A    Patient's Choice of Community Agency(s) Pt requested a referral to [facility] and [facility] (reports he is not for sure if he has any rehab days left).                   Discharge Plan       Row Name 10/24/24 1328       Plan    Plan HH w/ IV home infusion vs [facility] vs [facility]    Plan Comments CCP spoke with patient and his sister/Farida, at bedside and introduced self and role.  Patient gave CCP permission to speak in front of sister.  Discharge planning discussed.  Information on face sheet verified.  Patient admitted to [facility] yesterday afternoon from the skilled nursing unit at [facility].  Pt reports he has been there for, \"several weeks\" for rehab.  CCP called and spoke with Juanita/MDS Coordinator at [facility] ([phone]), and she confirmed patient has been there from October 3-23rd.  Prior to his [facility] hospitalization, Pt was IADL's, retired, drives and is the caregiver for his " "93-year-old mother/Maia.  Maia is independent with her ADL's, but patient provides all other needs (transportation, cooking, shopping).  Patient lives in a \"split level\" home with six steps to enter from the garage.  Patients PCP confirmed as, Juan Perez and his home pharmacy is [facility].  Patient has the following DME- rolling walker (prn), BP cuff, scale, high commode, shower chair and grab bars.  Pt also has a right orthotic leg boot at bedside (reports he has an Achilles injury).  Patient was transferred here due to diagnosis of endocarditis.  Infectious Disease has been consulted and per their note, patient may require an additional 6 weeks of IV abx.  Patient reports he is hopeful to be strong enough to d/c home and he will have support of his daughter/Abby Melendez.  If going home is not an option, he would consider going back to [facility] or to Inpatient Acute Rehab at [facility].  Referrals for both rehabs have been called.  Jacki following for [facility].  Monse or Juanita will follow for [facility].  PT WILL REQUIRE [facility] FOR REHAB.  CCP will continue to follow….…Rosanna CARCAMO /RAMIREZ.                  Continued Care and Services - Admitted Since 10/23/2024       Destination       Service Provider Request Status Services Address Phone Fax Patient Preferred    [facility] Considering -- [address] [place] [id number] [phone] --    [facility] Pending - Request Sent -- [address] [place] [id number] [phone] --                  Expected Discharge Date and Time       Expected Discharge Date Expected Discharge Time    Oct 28, 2024            Demographic Summary       Row Name 10/24/24 0180       General Information    Admission Type inpatient    Arrived From subacute/long-term acute care  [facility] " Skilled Nursing Unit    Required Notices Provided Important Message from Medicare    Referral Source admission list;nursing    Reason for Consult discharge planning    Preferred Language English       Contact Information    Permission Granted to Share Info With family/designee    Contact Information Comments Abby Melendez/daughter; Maia/mother                   Functional Status       Row Name 10/24/24 2520       Functional Status    Usual Activity Tolerance good    Current Activity Tolerance fair       Physical Activity    On average, how many days per week do you engage in moderate to strenuous exercise (like a brisk walk)? 0 days    On average, how many minutes do you engage in exercise at this level? 0 min    Number of minutes of exercise per week 0       Assessment of Health Literacy    How often do you have someone help you read hospital materials? Never    Health Literacy Good       Functional Status, IADL    Medications independent    Meal Preparation independent    Housekeeping independent    Laundry independent    Shopping independent    If for any reason you need help with day-to-day activities such as bathing, preparing meals, shopping, managing finances, etc., do you get the help you need? I don't need any help       Mental Status    General Appearance WDL WDL       Mental Status Summary    Recent Changes in Mental Status/Cognitive Functioning no changes       Employment/    Employment Status retired                   Psychosocial    No documentation.                  Abuse/Neglect    No documentation.                  Legal    No documentation.                  Substance Abuse    No documentation.                  Patient Forms    No documentation.                     Rosanna Aldrich RN

## 2024-10-25 PROBLEM — T82.857A PROSTHETIC AORTIC VALVE STENOSIS: Status: ACTIVE | Noted: 2024-10-25

## 2024-10-25 NOTE — PLAN OF CARE
Goal Outcome Evaluation:         Pt alert and oriented and follows commands. No c/o pain and discomfort. JOLIE and CTA done today. OOB to chair today. Boot to rt foot in place from previous achilles tear. NPO after MN for ST in am vitals stable plan of care in progress

## 2024-10-25 NOTE — SIGNIFICANT NOTE
10/25/24 0809   OTHER   Discipline physical therapist   Rehab Time/Intention   Session Not Performed other (see comments)  (Plan L heart cath today, PT holding and will follow up next service date)   Recommendation   PT - Next Appointment 10/26/24

## 2024-10-25 NOTE — PROGRESS NOTES
LOS: 2 days     Chief Complaint: Endocarditis    Interval History: Patient reports he is doing well this morning.  Tolerating antibiotics.  Plans for further imaging with JOLIE and possible heart cath today.    Vital Signs  Temp:  [98 °F (36.7 °C)-99.7 °F (37.6 °C)] 99.7 °F (37.6 °C)  Heart Rate:  [] 100  Resp:  [16] 16  BP: ()/(51-88) 109/51    Physical Exam:  General: In no acute distress  HEENT: Oropharynx clear, moist mucous membranes  Respiratory: Normal work of breathing  Skin: No rashes or lesions in exposed areas  Extremities: No edema, cyanosis  Access: Peripheral IV    Antibiotics:  Anti-Infectives (From admission, onward)      Ordered     Dose/Rate Route Frequency Start Stop    10/23/24 1709  penicillin G potassium 4 Million Units in sodium chloride 0.9 % 100 mL IVPB        Ordering Provider: Gregg Diaz,     4 Million Units  over 30 Minutes Intravenous Every 4 Hours Scheduled 10/23/24 2000 12/04/24 1959             Results Review:     I reviewed the patient's new clinical results.    Lab Results   Component Value Date    WBC 10.14 10/24/2024    HGB 8.7 (L) 10/24/2024    HCT 27.0 (L) 10/24/2024    MCV 93.1 10/24/2024     (L) 10/24/2024     Lab Results   Component Value Date    GLUCOSE 129 (H) 10/24/2024    BUN 9 10/24/2024    CREATININE 0.76 10/24/2024    BCR 11.8 10/24/2024    CO2 26.2 10/24/2024    CALCIUM 8.1 (L) 10/24/2024    ALBUMIN 2.8 (L) 10/24/2024    LABIL2 1.4 06/27/2019    AST 25 10/24/2024    ALT 15 10/24/2024       Microbiology:  10/3 COVID-negative  10/10 COVID-negative  10/14 COVID-negative  10/15 blood cultures 2 out of 2 strep mitis  10/17 COVID-negative  10/17 blood cultures 2 out of 2 strep mitis  10/21 COVID-negative  10/23 blood cultures in process    Assessment    #Strep mitis endocarditis  #Status post bioprosthetic aortic valve replacement 2014  #Status post AICD  #Atrial fibrillation  #Achilles tendon rupture    Repeat blood cultures no growth to  date.  Continue penicillin G 4,000,000 units every 4 hours for strep mitis endocarditis.  Will follow-up results of JOLIE, heart cath and surgical planning.  Will likely need 6 weeks of therapy.    Plan to follow peripherally over the weekend and see again on Monday.  Please call with any questions.

## 2024-10-25 NOTE — PROGRESS NOTES
" LOS: 2 days   Patient Care Team:  Juan Perez MD as PCP - General (Internal Medicine)    Chief Complaint: Suspected endocarditis, severe prosthetic aortic valve stenosis     Subjective     Subjective    Patient underwent JOLIE this morning, no new complaints    Objective     Vital Signs  Temp:  [98 °F (36.7 °C)-99.7 °F (37.6 °C)] 99.7 °F (37.6 °C)  Heart Rate:  [] 85  Resp:  [16] 16  BP: ()/(54-88) 97/57  Body mass index is 42.46 kg/m².    Intake/Output Summary (Last 24 hours) at 10/25/2024 0820  Last data filed at 10/25/2024 0151  Gross per 24 hour   Intake --   Output 1250 ml   Net -1250 ml     No intake/output data recorded.      Wt Readings from Last 3 Encounters:   10/25/24 (!) 142 kg (313 lb 0.9 oz)   10/23/24 (!) 142 kg (313 lb 7.9 oz)   09/26/24 (!) 141 kg (309 lb 15.5 oz)       Flowsheet Rows      Flowsheet Row First Filed Value   Admission Height 182.9 cm (72\") Documented at 10/25/2024 0434   Admission Weight 142 kg (312 lb 3.2 oz) Documented at 10/23/2024 1711            Objective:  General Appearance:  Comfortable, in no acute distress and well-appearing.    Vital signs: (most recent): Blood pressure 94/77, pulse 78, temperature 99.7 °F (37.6 °C), temperature source Oral, resp. rate 16, height 182.9 cm (72\"), weight (!) 142 kg (313 lb), SpO2 98%.  Vital signs are normal.  No fever.    Output: Producing urine.    Lungs:  Normal effort and normal respiratory rate.    Heart: Normal rate.  Regular rhythm.    Extremities: Normal range of motion.  There is no dependent edema.    Neurological: Patient is alert and oriented to person, place and time.    Skin:  Warm and dry.                Results Review:        Results from last 7 days   Lab Units 10/24/24  0304 10/21/24  0455 10/19/24  0451   WBC 10*3/mm3 10.14 9.47 11.40*   HEMOGLOBIN g/dL 8.7* 8.9* 9.2*   HEMATOCRIT % 27.0* 28.6* 29.1*   PLATELETS 10*3/mm3 137* 153 149         PT/INR:    Protime   Date Value Ref Range Status " Addended by: Jason Davison on: 1/23/2019 12:04 PM     Modules accepted: Orders   10/24/2024 16.9 (H) 11.7 - 14.2 Seconds Final   /  INR   Date Value Ref Range Status   10/24/2024 1.35 (H) 0.90 - 1.10 Final       Results from last 7 days   Lab Units 10/24/24  0304 10/21/24  0455 10/19/24  0451   SODIUM mmol/L 134* 138 136   POTASSIUM mmol/L 3.6 3.9 3.9   CHLORIDE mmol/L 99 102 100   CO2 mmol/L 26.2 24.8 23.6   BUN mg/dL 9 13 12   CREATININE mg/dL 0.76 0.71* 0.80   GLUCOSE mg/dL 129* 131* 138*   CALCIUM mg/dL 8.1* 8.4* 8.5*         Scheduled Meds:  aspirin, 81 mg, Oral, Daily  dilTIAZem, 120 mg, Oral, Q12H  enoxaparin, 40 mg, Subcutaneous, Q24H  ferrous sulfate, 325 mg, Oral, Daily With Breakfast  furosemide, 40 mg, Oral, Daily  [Held by provider] lisinopril, 10 mg, Oral, Daily  melatonin, 5 mg, Oral, Nightly  metoprolol tartrate, 75 mg, Oral, BID  pantoprazole, 40 mg, Oral, BID AC  penicillin g (potassium), 4 Million Units, Intravenous, Q4H  potassium chloride, 20 mEq, Oral, Daily  sodium chloride, 10 mL, Intravenous, Q12H        Infusions:         Assessment & Plan         Essential hypertension    Permanent atrial fibrillation    S/P AVR    ICD (implantable cardioverter-defibrillator), dual, in situ    Achilles tendon rupture    Bacteremia    Stenosis of prosthetic aortic valve    Anemia      Assessment & Plan    - Prosthetic aortic valve stenosis, h/o AVR (tissue)/maze/DANICA ligation (2014)  - Possible endocarditis, likely need JOLIE   - Bacteremia, blood cultures positive strep mitis  - Atrial fibrillation, unable to tolerate anticoagulation  - Hypertension  - NICM status post Medtronic AICD  - Anemia, s/p EGD/colonoscopy with esophagitis, polyp removal  - Right achilles tendon tear--- walking boot and PT per ortho at [facility]  - Pre-diabetes -- improved at 5.3    Patient underwent JOLIE this morning, just got back to room   Venous duplex showed patient to have acute DVT of the left gastrocnemius, discussed with Dr. Florian, continue Lovenox  ID following, recommend current antibiotic regimen for 6  weeks, repeat cultures obtained on 10/23 NTD  JOLIE to be reviewed by Dr. Florian, report currently pending, patient will need cardiac cath   Workup underway, definitive recommendation TONY Mcclure PA-C  10/25/24  08:20 EDT

## 2024-10-25 NOTE — PLAN OF CARE
Goal Outcome Evaluation:  Plan of Care Reviewed With: patient        Progress: no change  Outcome Evaluation: Patient c/o pain in his bilateral feet and ankles, patient declines to take anything for his pain and states that he is not sure what if anything will help his pain. Patient b/p 's/50-80's HR 's remains in afib. Will continue to monitor and await discharge plans.

## 2024-10-25 NOTE — PROGRESS NOTES
Reviewed the results of the coronary CT angiogram with Dr. Barnett.  Due to shielding from the bioprosthetic valve, AICD wires, morbid obesity this is not a diagnostic study for the entire car length of all 3 coronary arteries.  Coronary calcification is relatively modest.  Statistical likelihood of hemodynamically significant disease is lessened at this point, but we will arrange for pharmacologic nuclear perfusion study be performed in the morning.

## 2024-10-25 NOTE — SIGNIFICANT NOTE
Plan to go for L heart cath today at noon. Will hold OT and f/u next serviced date for evaluation.

## 2024-10-25 NOTE — PROGRESS NOTES
"    Patient Name: Woodrow Alejandro  :1950  73 y.o.      Patient Care Team:  Juan Perez MD as PCP - General (Internal Medicine)    Chief Complaint:     Interval History:        Objective   Vital Signs  Temp:  [98 °F (36.7 °C)-99.7 °F (37.6 °C)] 99.7 °F (37.6 °C)  Heart Rate:  [] 76  Resp:  [14-18] 16  BP: ()/(44-88) 104/59    Intake/Output Summary (Last 24 hours) at 10/25/2024 1116  Last data filed at 10/25/2024 0839  Gross per 24 hour   Intake 180 ml   Output 1300 ml   Net -1120 ml     Flowsheet Rows      Flowsheet Row First Filed Value   Admission Height 182.9 cm (72\") Documented at 10/25/2024 0434   Admission Weight 142 kg (312 lb 3.2 oz) Documented at 10/23/2024 1711            Physical Exam:   General Appearance:    Alert, cooperative, in no acute distress   Lungs:     Clear to auscultation.  Normal respiratory effort and rate.      Heart:    Regular rhythm and normal rate, normal S1 and S2, no murmurs, gallops or rubs.     Chest Wall:    No abnormalities observed   Abdomen:     Soft, nontender, positive bowel sounds.     Extremities:   no cyanosis, clubbing or edema.  No marked joint deformities.  Adequate musculoskeletal strength.       Results Review:    Results from last 7 days   Lab Units 10/24/24  0304   SODIUM mmol/L 134*   POTASSIUM mmol/L 3.6   CHLORIDE mmol/L 99   CO2 mmol/L 26.2   BUN mg/dL 9   CREATININE mg/dL 0.76   GLUCOSE mg/dL 129*   CALCIUM mg/dL 8.1*         Results from last 7 days   Lab Units 10/24/24  0304   WBC 10*3/mm3 10.14   HEMOGLOBIN g/dL 8.7*   HEMATOCRIT % 27.0*   PLATELETS 10*3/mm3 137*     Results from last 7 days   Lab Units 10/24/24  0304   INR  1.35*     Results from last 7 days   Lab Units 10/21/24  0455   MAGNESIUM mg/dL 1.7     Results from last 7 days   Lab Units 10/24/24  0304   CHOLESTEROL mg/dL 120   TRIGLYCERIDES mg/dL 93   HDL CHOL mg/dL 36*   LDL CHOL mg/dL 66               Medication Review:   aspirin, 81 mg, Oral, " Daily  dilTIAZem, 120 mg, Oral, Q12H  enoxaparin, 40 mg, Subcutaneous, Q24H  ferrous sulfate, 325 mg, Oral, Daily With Breakfast  melatonin, 5 mg, Oral, Nightly  metoprolol tartrate, 25 mg, Oral, Once  metoprolol tartrate, 75 mg, Oral, BID  pantoprazole, 40 mg, Oral, BID AC  penicillin g (potassium), 4 Million Units, Intravenous, Q4H  potassium chloride, 20 mEq, Oral, Daily  sodium chloride, 10 mL, Intravenous, Q12H              Assessment & Plan     Active Hospital Problems    Diagnosis  POA    **Prosthetic aortic valve stenosis [T82.857A]  Yes    Bacteremia [R78.81]  Yes    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     1.  Prosthetic aortic valve stenosis with endocarditis, history of bioprosthetic aortic valve replacement 2014 by Dr. Ness at [facility].  Transesophageal echo images performed demonstrates multiple vegetation on the bioprosthetic aortic valve, largest extends into the acing aorta approximately 3.1 cm above the sewing ring.  It appears that the vegetation extends beyond the ostia of the coronary arteries.  Discussed with Dr. Florian, Dr. Cavanaugh, and we will cancel the left heart catheterization due to a substantial risk of embolization of this vegetation if a diagnostic heart catheterization was performed.  Today we will proceed with a coronary CTA this afternoon.  Image quality will be impacted both by his obesity as well as by the sewing ring.  May also be limited somewhat by the coronary calcification noted on his prior CTA.  We will give an additional 25 mg metoprolol tartrate to try to slow down his heart rate further to facilitate optimization of image quality.   10 of the results of the coronary CTA we may need pharmacologic nuclear perfusion study as well.  2.  Endocarditis, ID is following, on antibiotics  3.  Bacteremia for strep mitis  4.  Atrial fibrillation, not on anticoagulation due to #5  5.  Anemia, status post EGD/colonoscopy with esophagitis  6.  Nonischemic cardiomyopathy with recovery of function,  7.  Medtronic AICD in place  8.  Coronary artery calcification seen on most recent CT scan, no known hemodynamically significant CAD.  9.  Mitral regurgitation-moderate to severe mitral regurgitation is present which appears due to poor apposition of the mitral valve leaflets.    Arthur Garcia III, MD  [facility]  10/25/24  11:16 EDT

## 2024-10-26 NOTE — THERAPY EVALUATION
Patient Name: Woodrow Alejandro  : 1950    MRN: [id number]                              Today's Date: 10/26/2024       Admit Date: 10/23/2024    Visit Dx: No diagnosis found.  Patient Active Problem List   Diagnosis    Essential hypertension    Permanent atrial fibrillation    S/P AVR    ICD (implantable cardioverter-defibrillator), dual, in situ    Dermatitis    IFG (impaired fasting glucose)    Mixed hyperlipidemia    Vitamin D deficiency    Medicare annual wellness visit, subsequent    Primary osteoarthritis of both knees    Screening PSA (prostate specific antigen)    Bilateral primary osteoarthritis of knee    Coarse tremors    Weakness generalized    Increased urinary frequency    Bandemia    Achilles tendon rupture    Anemia due to GI blood loss    Physical debility    Bacteremia    Stenosis of prosthetic aortic valve    Anemia    Prosthetic aortic valve stenosis     Past Medical History:   Diagnosis Date    Aortic valve replaced     Arthritis 2018    Atrial fibrillation     Coronary artery disease     Hypertension      Past Surgical History:   Procedure Laterality Date    CARDIAC SURGERY      COLONOSCOPY      COLONOSCOPY N/A 2024    Procedure: COLONOSCOPY WITH HOT/COLD SNARE POLYPECTOMIES, ELEVIEW INJECTION,CLIPX1;  Surgeon: Kurt Mensah MD;  Location: [facility] ENDOSCOPY;  Service: Gastroenterology;  Laterality: N/A;  COLON POLYPS    ENDOSCOPY N/A 2024    Procedure: ESOPHAGOGASTRODUODENOSCOPY WITH BIOPSIES;  Surgeon: Kurt Mensah MD;  Location: [facility] ENDOSCOPY;  Service: Gastroenterology;  Laterality: N/A;  RETAINED FOOD IN STOMACH AND REFLUX ESOPHAGITIS    PACEMAKER IMPLANTATION        General Information       Row Name 10/26/24 0806          OT Time and Intention    Subjective Information no complaints  -BC     Document Type evaluation  -BC     Mode of Treatment individual therapy;occupational therapy  -BC     Patient Effort good  -BC       Row Name 10/26/24 0806           General Information    Patient Profile Reviewed yes  -BC     Prior Level of Function independent:;all household mobility;ADL's;community mobility  -BC     Existing Precautions/Restrictions fall  -BC     Barriers to Rehab medically complex  -BC       Row Name 10/26/24 0806          Occupational Profile    Reason for Services/Referral (Occupational Profile) RLE Cam boot when Out of bed, IND PLF.  -BC     Occupational History/Life Experiences (Occupational Profile) DC'd from [facility]  -BC       Row Name 10/26/24 0806          Living Environment    People in Home child(benito), adult  -BC       Row Name 10/26/24 0806          Home Main Entrance    Number of Stairs, Main Entrance one  -BC     Stair Railings, Main Entrance railings safe and in good condition  -BC       Row Name 10/26/24 0806          Stairs Within Home, Primary    Stairs, Within Home, Primary split level home, down 5 steps to get to bedroom, bathroom. Kitchen and laundry are upstairs 5 steps + 5  -BC     Number of Stairs, Within Home, Primary five;ten  -BC     Stair Railings, Within Home, Primary railings safe and in good condition  -BC       Row Name 10/26/24 0806          Cognition    Orientation Status (Cognition) oriented x 4  -BC       Row Name 10/26/24 0806          Safety Issues/Impairments Affecting Functional Mobility    Impairments Affecting Function (Mobility) balance;endurance/activity tolerance;range of motion (ROM);strength;motor planning  -BC               User Key  (r) = Recorded By, (t) = Taken By, (c) = Cosigned By      Initials Name Provider Type    BC Leticia Rodríguez OT Occupational Therapist                     Mobility/ADL's       Row Name 10/26/24 0809          Bed Mobility    Bed Mobility supine-sit  -BC     All Activities, [place] (Bed Mobility) verbal cues;supervision  -BC       Row Name 10/26/24 0809          Transfers    Transfers sit-stand transfer;stand-sit transfer;bed-chair transfer  -BC       Row Name  10/26/24 0809          Bed-Chair Transfer    Bed-Chair [place] (Transfers) supervision;contact guard  -BC     Assistive Device (Bed-Chair Transfers) walker, front-wheeled  -BC       Row Name 10/26/24 0809          Sit-Stand Transfer    Sit-Stand [place] (Transfers) minimum assist (75% patient effort);verbal cues  -BC     Assistive Device (Sit-Stand Transfers) walker, front-wheeled  -BC     Comment, (Sit-Stand Transfer) Min A for small boost up from EOB  -BC       Row Name 10/26/24 0809          Stand-Sit Transfer    Stand-Sit [place] (Transfers) standby assist;verbal cues  -BC     Assistive Device (Stand-Sit Transfers) walker, front-wheeled  -BC     Comment, (Stand-Sit Transfer) cues for reach back  -BC       Row Name 10/26/24 0809          Functional Mobility    Functional Mobility- Comment short distance in room from standing at EOB and ~4-5 steps over to chair, encouraged Pt to continue up in room w/ RLE CAM boot on and RWx to which Pt declined.  -BC     Patient was able to Ambulate yes  -BC       Row Name 10/26/24 0809          Activities of Daily Living    BADL Assessment/Intervention lower body dressing  -BC       Row Name 10/26/24 08          Mobility    Extremity Weight-bearing Status right lower extremity  -BC     Right Lower Extremity (Weight-bearing Status) weight-bearing as tolerated (WBAT)  W CAM  -BC       Row Name 10/26/24 0809          Lower Body Dressing Assessment/Training    Comment, (Lower Body Dressing) cues and encouragement to assist w/ RLE CAM boot, total A for don. declines  -BC       Row Name 10/26/24 0809          Upper Body Dressing Assessment/Training    [place] Level (Upper Body Dressing) upper body dressing skills;standby assist  -BC       Row Name 10/26/24 0809          Grooming Assessment/Training    [place] Level (Grooming) grooming skills;wash face, hands;set up;hair care, combing/brushing  -BC               User Key  (r) = Recorded By, (t) = Taken By,  (c) = Cosigned By      Initials Name Provider Type    Leticia Shirley OT Occupational Therapist                   Obj/Interventions       Row Name 10/26/24 1203          Sensory Assessment (Somatosensory)    Sensory Assessment (Somatosensory) UE sensation intact  -BC       Row Name 10/26/24 1203          Vision Assessment/Intervention    Visual Impairment/Limitations WFL  -BC       Row Name 10/26/24 1203          Range of Motion Comprehensive    General Range of Motion bilateral upper extremity ROM WFL  -BC       Row Name 10/26/24 1203          Strength Comprehensive (MMT)    General Manual Muscle Testing (MMT) Assessment lower extremity strength deficits identified  -BC     Comment, General Manual Muscle Testing (MMT) Assessment BUEs WFLs, gross weakness but functional ~3+ to 4/5 --BLE gen'd weakness but functional  -BC       Row Name 10/26/24 1203          Motor Skills    Coordination WFL  -BC       Row Name 10/26/24 1203          Balance    Sit to Stand Dynamic Balance minimal assist  -BC     Comment, Balance sit<>stand w/ FWW and CGA for safety w/ dynamic mvmts/turns.  -BC               User Key  (r) = Recorded By, (t) = Taken By, (c) = Cosigned By      Initials Name Provider Type    Leticia Shirley OT Occupational Therapist                   Goals/Plan       Row Name 10/26/24 1211          Bed Mobility Goal 1 (OT)    Activity/Assistive Device (Bed Mobility Goal 1, OT) bed mobility activities, all  -BC     [place] Level/Cues Needed (Bed Mobility Goal 1, OT) modified independence  -BC     Time Frame (Bed Mobility Goal 1, OT) short term goal (STG);2 weeks  -BC     Progress/Outcomes (Bed Mobility Goal 1, OT) new goal  -BC       Row Name 10/26/24 1211          Transfer Goal 1 (OT)    Activity/Assistive Device (Transfer Goal 1, OT) sit-to-stand/stand-to-sit;bed-to-chair/chair-to-bed;toilet  -BC     [place] Level/Cues Needed (Transfer Goal 1, OT) supervision required  -BC     Time Frame (Transfer  Goal 1, OT) short term goal (STG);2 weeks  -BC     Progress/Outcome (Transfer Goal 1, OT) new goal  -BC       Row Name 10/26/24 1211          Dressing Goal 1 (OT)    Activity/Device (Dressing Goal 1, OT) upper body dressing;lower body dressing  -BC     [place]/Cues Needed (Dressing Goal 1, OT) standby assist  -BC     Time Frame (Dressing Goal 1, OT) short term goal (STG);2 weeks  -BC     Progress/Outcome (Dressing Goal 1, OT) new goal  -BC       Row Name 10/26/24 1211          Toileting Goal 1 (OT)    Activity/Device (Toileting Goal 1, OT) toileting skills, all;adjust/manage clothing;perform perineal hygiene  -BC     [place] Level/Cues Needed (Toileting Goal 1, OT) standby assist  -BC     Time Frame (Toileting Goal 1, OT) short term goal (STG);2 weeks  -BC     Progress/Outcome (Toileting Goal 1, OT) new goal  -BC       Row Name 10/26/24 1211          Grooming Goal 1 (OT)    Activity/Device (Grooming Goal 1, OT) grooming skills, all  -BC     [place] (Grooming Goal 1, OT) supervision required  -BC     Time Frame (Grooming Goal 1, OT) 2 weeks;short term goal (STG)  -BC     Strategies/Barriers (Grooming Goal 1, OT) standing sinkside for ADL IND  -BC     Progress/Outcome (Grooming Goal 1, OT) new goal  -BC       Row Name 10/26/24 1211          Problem Specific Goal 1 (OT)    Problem Specific Goal 1 (OT) Pt will increase ADL IND for 3 step ADL in room w/ walker and close sup A.  -BC     Time Frame (Problem Specific Goal 1, OT) 2 weeks;short term goal (STG)  -BC     Progress/Outcome (Problem Specific Goal 1, OT) new goal  -BC       Row Name 10/26/24 1211          Therapy Assessment/Plan (OT)    Planned Therapy Interventions (OT) activity tolerance training;BADL retraining;functional balance retraining;neuromuscular control/coordination retraining;patient/caregiver education/training;ROM/therapeutic exercise;strengthening exercise;transfer/mobility retraining  -BC               User Key  (r) = Recorded By,  (t) = Taken By, (c) = Cosigned By      Initials Name Provider Type    BC Leticia Rodríguez, OT Occupational Therapist                   Clinical Impression       Row Name 10/26/24 1202          Pain Assessment    Pretreatment Pain Rating 0/10 - no pain  -BC     Posttreatment Pain Rating 0/10 - no pain  -BC     Pre/Posttreatment Pain Comment declined any RLE pain today.  -BC       Row Name 10/26/24 1208          Plan of Care Review    Plan of Care Reviewed With patient  -BC     Progress no change  -BC     Outcome Evaluation Pt is a 72 y/o M admitted to [facility] 10/23 from [facility] facility for cardiac w/u, aortic valve endocarditis with vegetation, severe prosthetic aortic valve stenosis. Pt w/ recent tendon injury requiring use of CAM boot to RLE to which he verbalized he was recieving rehab for. Pt endorses IND PLF, with new onset deficits w/ ADL IND/function, would benefit from continued skilled OT services while IP level to return to highest level of function, improve ADL performance. REC: return back to [facility]/SNF when medically stable.  -BC       Row Name 10/26/24 1200          Therapy Assessment/Plan (OT)    Patient/Family Therapy Goal Statement (OT) move better  -BC     Rehab Potential (OT) good  -BC     Criteria for Skilled Therapeutic Interventions Met (OT) yes;meets criteria;skilled treatment is necessary  -BC     Therapy Frequency (OT) 3 times/wk  -BC     Predicted Duration of Therapy Intervention (OT) 2 weeks  -BC       Row Name 10/26/24 1209          Therapy Plan Review/Discharge Plan (OT)    Anticipated Discharge Disposition (OT) skilled nursing facility  -BC       Row Name 10/26/24 1207          Vital Signs    Posttreatment Heart Rate (beats/min) 100  -BC     Post SpO2 (%) 93  -BC     O2 Delivery Post Treatment room air  -BC     Pre Patient Position Supine  -BC     Intra Patient Position Sitting  -BC     Post Patient Position Sitting  -BC       Row Name 10/26/24 1203          Positioning and Restraints     Pre-Treatment Position in bed  -BC     Post Treatment Position chair  -BC     In Chair with nsg;call light within reach;encouraged to call for assist;reclined  -BC               User Key  (r) = Recorded By, (t) = Taken By, (c) = Cosigned By      Initials Name Provider Type    Leticia Shirley, OT Occupational Therapist                   Outcome Measures       Row Name 10/26/24 1217          How much help from another is currently needed...    Putting on and taking off regular lower body clothing? 2  -BC     Bathing (including washing, rinsing, and drying) 2  -BC     Toileting (which includes using toilet bed pan or urinal) 3  -BC     Putting on and taking off regular upper body clothing 3  -BC     Taking care of personal grooming (such as brushing teeth) 3  -BC     Eating meals 4  -BC     AM-PAC 6 Clicks Score (OT) 17  -BC       Row Name 10/26/24 1211 10/26/24 0814       How much help from another person do you currently need...    Turning from your back to your side while in flat bed without using bedrails? 3  -CS 3  -CB    Moving from lying on back to sitting on the side of a flat bed without bedrails? 3  -CS 3  -CB    Moving to and from a bed to a chair (including a wheelchair)? 3  -CS 3  -CB    Standing up from a chair using your arms (e.g., wheelchair, bedside chair)? 3  -CS 3  -CB    Climbing 3-5 steps with a railing? 3  -CS 3  -CB    To walk in hospital room? 3  -CS 3  -CB    AM-PAC 6 Clicks Score (PT) 18  -CS 18  -CB    Highest Level of Mobility Goal 6 --> Walk 10 steps or more  -CS 6 --> Walk 10 steps or more  -CB      Row Name 10/26/24 1217 10/26/24 1211       Functional Assessment    Outcome Measure Options AM-PAC 6 Clicks Daily Activity (OT)  -BC AM-PAC 6 Clicks Basic Mobility (PT)  -CS              User Key  (r) = Recorded By, (t) = Taken By, (c) = Cosigned By      Initials Name Provider Type    Angelita Roca, RN Registered Nurse    CS Yesy Mendoza, PT Physical Therapist    Leticia Shirley, OT  Occupational Therapist                    Occupational Therapy Education        No education to display                  OT Recommendation and Plan  Planned Therapy Interventions (OT): activity tolerance training, BADL retraining, functional balance retraining, neuromuscular control/coordination retraining, patient/caregiver education/training, ROM/therapeutic exercise, strengthening exercise, transfer/mobility retraining  Therapy Frequency (OT): 3 times/wk  Plan of Care Review  Plan of Care Reviewed With: patient  Progress: no change  Outcome Evaluation: Pt is a 72 y/o M admitted to [facility] 10/23 from [facility] facility for cardiac w/u, aortic valve endocarditis with vegetation, severe prosthetic aortic valve stenosis. Pt w/ recent tendon injury requiring use of CAM boot to RLE to which he verbalized he was recieving rehab for. Pt endorses IND PLF, with new onset deficits w/ ADL IND/function, would benefit from continued skilled OT services while IP level to return to highest level of function, improve ADL performance. REC: return back to [facility]/SNF when medically stable.     Time Calculation:   Evaluation Complexity (OT)  Review Occupational Profile/Medical/Therapy History Complexity: expanded/moderate complexity  Assessment, Occupational Performance/Identification of Deficit Complexity: 3-5 performance deficits  Clinical Decision Making Complexity (OT): detailed assessment/moderate complexity  Overall Complexity of Evaluation (OT): moderate complexity     Time Calculation- OT       Row Name 10/26/24 1217             Time Calculation- OT    OT Start Time 0805  -BC      OT Stop Time 0827  -BC      OT Time Calculation (min) 22 min  -BC      Total Timed Code Minutes- OT 10 minute(s)  -BC      OT Received On 10/26/24  -BC      OT - Next Appointment 10/29/24  -BC      OT Goal Re-Cert Due Date 11/09/24  -BC         Timed Charges    61071 - OT Therapeutic Activity Minutes 10  -BC         Total Minutes    Timed Charges Total Minutes  10  -BC       Total Minutes 10  -BC                User Key  (r) = Recorded By, (t) = Taken By, (c) = Cosigned By      Initials Name Provider Type    BC Leticia Rodríguez OT Occupational Therapist                  Therapy Charges for Today       Code Description Service Date Service Provider Modifiers Qty    [id number]  OT THERAPEUTIC ACT EA 15 MIN 10/26/2024 Leticia Rodríguez OT GO 1    [id number]  OT EVAL MOD COMPLEXITY 2 10/26/2024 Leticia Rodríguez OT GO 1                 Leticia Rodríguez OT  10/26/2024

## 2024-10-26 NOTE — THERAPY EVALUATION
Patient Name: Woodrow Alejandro  : 1950    MRN: [id number]                              Today's Date: 10/26/2024       Admit Date: 10/23/2024    Visit Dx: No diagnosis found.  Patient Active Problem List   Diagnosis    Essential hypertension    Permanent atrial fibrillation    S/P AVR    ICD (implantable cardioverter-defibrillator), dual, in situ    Dermatitis    IFG (impaired fasting glucose)    Mixed hyperlipidemia    Vitamin D deficiency    Medicare annual wellness visit, subsequent    Primary osteoarthritis of both knees    Screening PSA (prostate specific antigen)    Bilateral primary osteoarthritis of knee    Coarse tremors    Weakness generalized    Increased urinary frequency    Bandemia    Achilles tendon rupture    Anemia due to GI blood loss    Physical debility    Bacteremia    Stenosis of prosthetic aortic valve    Anemia    Prosthetic aortic valve stenosis     Past Medical History:   Diagnosis Date    Aortic valve replaced     Arthritis 2018    Atrial fibrillation     Coronary artery disease     Hypertension      Past Surgical History:   Procedure Laterality Date    CARDIAC SURGERY      COLONOSCOPY      COLONOSCOPY N/A 2024    Procedure: COLONOSCOPY WITH HOT/COLD SNARE POLYPECTOMIES, ELEVIEW INJECTION,CLIPX1;  Surgeon: Kurt Mensah MD;  Location: [facility] ENDOSCOPY;  Service: Gastroenterology;  Laterality: N/A;  COLON POLYPS    ENDOSCOPY N/A 2024    Procedure: ESOPHAGOGASTRODUODENOSCOPY WITH BIOPSIES;  Surgeon: Kurt Mensah MD;  Location: [facility] ENDOSCOPY;  Service: Gastroenterology;  Laterality: N/A;  RETAINED FOOD IN STOMACH AND REFLUX ESOPHAGITIS    PACEMAKER IMPLANTATION        General Information       Row Name 10/26/24 1156          Physical Therapy Time and Intention    Document Type evaluation  -CS     Mode of Treatment individual therapy;physical therapy  -CS       Row Name 10/26/24 1156          General Information    Patient Profile Reviewed yes  -CS      Prior Level of Function gait;transfer;bed mobility  from rehab - Ax1 to ambulate with PT  -CS     Existing Precautions/Restrictions fall;other (see comments)  R LE WBAT in boot from recent partial achilles tear  -CS     Barriers to Rehab none identified  -CS       Row Name 10/26/24 1156          Living Environment    People in Home facility resident  from rehab  -CS       Row Name 10/26/24 1156          Cognition    Orientation Status (Cognition) oriented x 4  -CS       Row Name 10/26/24 1156          Safety Issues/Impairments Affecting Functional Mobility    Impairments Affecting Function (Mobility) balance;endurance/activity tolerance;range of motion (ROM);strength  -CS               User Key  (r) = Recorded By, (t) = Taken By, (c) = Cosigned By      Initials Name Provider Type    CS Yesy Mendoza PT Physical Therapist                   Mobility       Row Name 10/26/24 1200          Bed Mobility    Comment, (Bed Mobility) NT - UIC  -CS       Row Name 10/26/24 1200          Sit-Stand Transfer    Sit-Stand [place] (Transfers) minimum assist (75% patient effort);verbal cues  -CS     Assistive Device (Sit-Stand Transfers) walker, front-wheeled  -CS     Comment, (Sit-Stand Transfer) VC for UE placement  -CS       Row Name 10/26/24 1200          Gait/Stairs (Locomotion)    [place] Level (Gait) contact guard;verbal cues  -CS     Assistive Device (Gait) walker, front-wheeled  -CS     Distance in Feet (Gait) 40  -CS     Deviations/Abnormal Patterns (Gait) antalgic;tapan decreased;stride length decreased  -CS     Bilateral Gait Deviations forward flexed posture;heel strike decreased  -CS     Right Sided Gait Deviations weight shift ability decreased  -CS     Comment, (Gait/Stairs) slow antalgic gait; no LOB; fatigues quickly  -CS       Row Name 10/26/24 1200          Mobility    Extremity Weight-bearing Status right lower extremity  -CS     Right Lower Extremity (Weight-bearing Status) weight-bearing as  tolerated (WBAT)  CAM boot  -CS               User Key  (r) = Recorded By, (t) = Taken By, (c) = Cosigned By      Initials Name Provider Type    Yesy Chowdhury PT Physical Therapist                   Obj/Interventions       Row Name 10/26/24 1203          Balance    Balance Assessment sitting static balance;sitting dynamic balance;standing static balance;standing dynamic balance  -CS     Static Sitting Balance modified independence  Simultaneous filing. User may be unaware of other data.  -CS     Dynamic Sitting Balance modified independence  Simultaneous filing. User may be unaware of other data.  -CS     Position, Sitting Balance supported;sitting in chair  Simultaneous filing. User may be unaware of other data.  -CS     Static Standing Balance standby assist  Simultaneous filing. User may be unaware of other data.  -CS     Dynamic Standing Balance contact guard  Simultaneous filing. User may be unaware of other data.  -CS     Position/Device Used, Standing Balance supported;walker, front-wheeled  Simultaneous filing. User may be unaware of other data.  -CS               User Key  (r) = Recorded By, (t) = Taken By, (c) = Cosigned By      Initials Name Provider Type    Yesy Chowdhury PT Physical Therapist                   Goals/Plan       Row Name 10/26/24 1211          Bed Mobility Goal 1 (PT)    Activity/Assistive Device (Bed Mobility Goal 1, PT) bed mobility activities, all  -CS     [place] Level/Cues Needed (Bed Mobility Goal 1, PT) supervision required  -CS     Time Frame (Bed Mobility Goal 1, PT) 1 week  -CS       Row Name 10/26/24 1211          Transfer Goal 1 (PT)    Activity/Assistive Device (Transfer Goal 1, PT) sit-to-stand/stand-to-sit;bed-to-chair/chair-to-bed  -CS     [place] Level/Cues Needed (Transfer Goal 1, PT) standby assist  -CS     Time Frame (Transfer Goal 1, PT) 1 week  -CS       Row Name 10/26/24 1211          Gait Training Goal 1 (PT)    Activity/Assistive Device (Gait  Training Goal 1, PT) gait (walking locomotion);assistive device use;decrease fall risk;improve balance and speed;increase endurance/gait distance  -CS     [place] Level (Gait Training Goal 1, PT) standby assist  -CS     Distance (Gait Training Goal 1, PT) 100'  -CS     Time Frame (Gait Training Goal 1, PT) 1 week  -CS               User Key  (r) = Recorded By, (t) = Taken By, (c) = Cosigned By      Initials Name Provider Type    CS Yesy Mendoza, PT Physical Therapist                   Clinical Impression       Row Name 10/26/24 1204          Pain    Pretreatment Pain Rating 7/10  -CS     Posttreatment Pain Rating 7/10  -CS     Pain Location foot  -CS     Pain Side/Orientation bilateral  -CS     Pain Management Interventions activity modification encouraged  -CS     Response to Pain Interventions functional ability improved  -CS       Row Name 10/26/24 1208          Plan of Care Review    Plan of Care Reviewed With patient  -CS     Outcome Evaluation Pt is a 74 y/o M admitted to [facility] for cardiac work-up. Pt underwent a JOLIE yesterday revealing vegetation on aortic valve and severe bioprosthetic aortic valve stenosis. Pt plans to do a stress test today. Further work-up revealed an acute L LE DVT but on Lovenox and RN gave OK for PT eval. Pt admitted from [facility] where he was at rehab requiring an Ax1 to ambulate with a RW. Pt recently had a R partial achilles tear and is WBAT in CAM boot. Pt presents to PT with generalized weakness and decreased endurance. Pt stood requiring min A and ambulated 40' c RW requiring CGA. Pt demo's a slow antalgic gait but overall steady gait with RW. Pt does fatigue quickly with minimal activity. PT encouraged pt to sit UIC and mobilize with staff as tolerated. PT recommends pt return to rehab at D/C to continue to address functional deficits.  -CS       Row Name 10/26/24 1201          Therapy Assessment/Plan (PT)    Rehab Potential (PT) good  -CS     Criteria for Skilled  Interventions Met (PT) yes;meets criteria  -CS     Therapy Frequency (PT) 6 times/wk  -CS       Row Name 10/26/24 1204          Positioning and Restraints    Pre-Treatment Position sitting in chair/recliner  -CS     Post Treatment Position chair  -CS     In Chair reclined;call light within reach;encouraged to call for assist;exit alarm on  -CS               User Key  (r) = Recorded By, (t) = Taken By, (c) = Cosigned By      Initials Name Provider Type    Yesy Chowdhury, JONNY Physical Therapist                   Outcome Measures       Row Name 10/26/24 1211 10/26/24 0814       How much help from another person do you currently need...    Turning from your back to your side while in flat bed without using bedrails? 3  -CS 3  -CB    Moving from lying on back to sitting on the side of a flat bed without bedrails? 3  -CS 3  -CB    Moving to and from a bed to a chair (including a wheelchair)? 3  -CS 3  -CB    Standing up from a chair using your arms (e.g., wheelchair, bedside chair)? 3  -CS 3  -CB    Climbing 3-5 steps with a railing? 3  -CS 3  -CB    To walk in hospital room? 3  -CS 3  -CB    AM-PAC 6 Clicks Score (PT) 18  -CS 18  -CB    Highest Level of Mobility Goal 6 --> Walk 10 steps or more  -CS 6 --> Walk 10 steps or more  -CB      Row Name 10/26/24 1211          Functional Assessment    Outcome Measure Options AM-PAC 6 Clicks Basic Mobility (PT)  -CS               User Key  (r) = Recorded By, (t) = Taken By, (c) = Cosigned By      Initials Name Provider Type    Angelita Roca, RN Registered Nurse    Yesy Chowdhury, JONNY Physical Therapist                                 Physical Therapy Education       Title: PT OT SLP Therapies (In Progress)       Topic: Physical Therapy (In Progress)       Point: Mobility training (Done)       Learning Progress Summary            Patient Acceptance, E,TB, VU,DU,NR by  at 10/26/2024 1211                      Point: Home exercise program (Not Started)       Learner  Progress:  Not documented in this visit.              Point: Body mechanics (Done)       Learning Progress Summary            Patient Acceptance, E,TB, VU,DU,NR by  at 10/26/2024 1211                      Point: Precautions (Done)       Learning Progress Summary            Patient Acceptance, E,TB, VU,DU,NR by  at 10/26/2024 1211                                      User Key       Initials Effective Dates Name Provider Type Discipline     09/22/22 -  Yesy Mendoza, PT Physical Therapist PT                  PT Recommendation and Plan     Outcome Evaluation: Pt is a 72 y/o M admitted to [facility] for cardiac work-up. Pt underwent a JOLIE yesterday revealing vegetation on aortic valve and severe bioprosthetic aortic valve stenosis. Pt plans to do a stress test today. Further work-up revealed an acute L LE DVT but on Lovenox and RN gave OK for PT eval. Pt admitted from [facility] where he was at rehab requiring an Ax1 to ambulate with a RW. Pt recently had a R partial achilles tear and is WBAT in CAM boot. Pt presents to PT with generalized weakness and decreased endurance. Pt stood requiring min A and ambulated 40' c RW requiring CGA. Pt demo's a slow antalgic gait but overall steady gait with RW. Pt does fatigue quickly with minimal activity. PT encouraged pt to sit UIC and mobilize with staff as tolerated. PT recommends pt return to rehab at D/C to continue to address functional deficits.     Time Calculation:         PT Charges       Row Name 10/26/24 1211             Time Calculation    Start Time 0957  -CS      Stop Time 1011  -CS      Time Calculation (min) 14 min  -CS      PT Received On 10/26/24  -      PT - Next Appointment 10/28/24  -      PT Goal Re-Cert Due Date 11/02/24  -         Time Calculation- PT    Total Timed Code Minutes- PT 10 minute(s)  -CS         Timed Charges    89663 - PT Therapeutic Activity Minutes 10  -CS         Total Minutes    Timed Charges Total Minutes 10  -CS       Total  Minutes 10  -CS                User Key  (r) = Recorded By, (t) = Taken By, (c) = Cosigned By      Initials Name Provider Type    CS Yesy Mendoza, PT Physical Therapist                  Therapy Charges for Today       Code Description Service Date Service Provider Modifiers Qty    20413188267  PT THERAPEUTIC ACT EA 15 MIN 10/26/2024 Yesy Mendoza, PT GP 1    [id number]  PT EVAL MOD COMPLEXITY 3 10/26/2024 Yesy Mendoza, PT GP 1            PT G-Codes  Outcome Measure Options: AM-PAC 6 Clicks Basic Mobility (PT)  AM-PAC 6 Clicks Score (PT): 18  PT Discharge Summary  Anticipated Discharge Disposition (PT): inpatient rehabilitation facility    Yesy Mendoza PT  10/26/2024

## 2024-10-26 NOTE — PLAN OF CARE
Goal Outcome Evaluation:  Plan of Care Reviewed With: patient        Progress: improving  Outcome Evaluation: Patient with no c/o chest pain or shortness of air. Patient remains on 2 liters per nasal cannula. Patient b/p 100-130's/50-80's HR 's remains afib with some paced beats. Patient NPO for am stress test. Will continue to monitor and await discharge plans.

## 2024-10-26 NOTE — PLAN OF CARE
Goal Outcome Evaluation:  Plan of Care Reviewed With: patient, family           Outcome Evaluation: A fib on monitor, room air, blood pressures atble. No c/o pain. Ambulating with assist x1 and walker. Stress test today completed. Awaiting further surgical reccomendations regarding AVR. no further complaints.

## 2024-10-26 NOTE — PROGRESS NOTES
" LOS: 3 days   Patient Care Team:  Juan Perez MD as PCP - General (Internal Medicine)    Chief Complaint: Aortic valve endocarditis with vegetation, severe prosthetic aortic valve stenosis     Subjective     Subjective    Patient sitting in bedside chair, no new complaints     Objective     Vital Signs  Temp:  [97.7 °F (36.5 °C)-98.9 °F (37.2 °C)] 97.8 °F (36.6 °C)  Heart Rate:  [] 95  Resp:  [14-22] 20  BP: ()/(44-81) 128/81  Body mass index is 42.45 kg/m².    Intake/Output Summary (Last 24 hours) at 10/26/2024 0853  Last data filed at 10/26/2024 0438  Gross per 24 hour   Intake 120 ml   Output 250 ml   Net -130 ml     No intake/output data recorded.      Wt Readings from Last 3 Encounters:   10/25/24 (!) 142 kg (313 lb)   10/23/24 (!) 142 kg (313 lb 7.9 oz)   09/26/24 (!) 141 kg (309 lb 15.5 oz)       Flowsheet Rows      Flowsheet Row First Filed Value   Admission Height 182.9 cm (72\") Documented at 10/25/2024 0434   Admission Weight 142 kg (312 lb 3.2 oz) Documented at 10/23/2024 1711            Objective:  General Appearance:  Comfortable, in no acute distress and well-appearing.    Vital signs: (most recent): Blood pressure 128/81, pulse 95, temperature 97.8 °F (36.6 °C), temperature source Oral, resp. rate 20, height 182.9 cm (72\"), weight (!) 142 kg (313 lb), SpO2 96%.  Vital signs are normal.  No fever.    Output: Producing urine.    Lungs:  Normal effort and normal respiratory rate.    Heart: Normal rate.  Regular rhythm.    Extremities: Normal range of motion.  There is no dependent edema.    Neurological: Patient is alert and oriented to person, place and time.    Skin:  Warm and dry.                Results Review:        Results from last 7 days   Lab Units 10/24/24  0304 10/21/24  0455   WBC 10*3/mm3 10.14 9.47   HEMOGLOBIN g/dL 8.7* 8.9*   HEMATOCRIT % 27.0* 28.6*   PLATELETS 10*3/mm3 137* 153         PT/INR:    Protime   Date Value Ref Range Status   10/24/2024 16.9 (H) 11.7 - " 14.2 Seconds Final   /  INR   Date Value Ref Range Status   10/24/2024 1.35 (H) 0.90 - 1.10 Final       Results from last 7 days   Lab Units 10/24/24  0304 10/21/24  0455   SODIUM mmol/L 134* 138   POTASSIUM mmol/L 3.6 3.9   CHLORIDE mmol/L 99 102   CO2 mmol/L 26.2 24.8   BUN mg/dL 9 13   CREATININE mg/dL 0.76 0.71*   GLUCOSE mg/dL 129* 131*   CALCIUM mg/dL 8.1* 8.4*         Scheduled Meds:  aspirin, 81 mg, Oral, Daily  dilTIAZem, 120 mg, Oral, Q12H  enoxaparin, 40 mg, Subcutaneous, Q24H  ferrous sulfate, 325 mg, Oral, Daily With Breakfast  melatonin, 5 mg, Oral, Nightly  metoprolol tartrate, 75 mg, Oral, BID  pantoprazole, 40 mg, Oral, BID AC  penicillin g (potassium), 4 Million Units, Intravenous, Q4H  potassium chloride, 20 mEq, Oral, Daily  sodium chloride, 10 mL, Intravenous, Q12H        Infusions:         Assessment & Plan         Prosthetic aortic valve stenosis    Essential hypertension    Permanent atrial fibrillation    S/P AVR    ICD (implantable cardioverter-defibrillator), dual, in situ    Achilles tendon rupture    Bacteremia    Stenosis of prosthetic aortic valve    Anemia      Assessment & Plan    - Prosthetic aortic valve stenosis, h/o AVR (tissue)/maze/DANICA ligation (2014)  - Possible endocarditis, likely need JOLIE   - Bacteremia, blood cultures positive strep mitis  - Atrial fibrillation, unable to tolerate anticoagulation  - Hypertension  - NICM status post Medtronic AICD  - Anemia, s/p EGD/colonoscopy with esophagitis, polyp removal  - Right achilles tendon tear--- walking boot and PT per ortho at [facility]  - Pre-diabetes -- improved at 5.3    JOLIE from yesterday with vegetation on aortic valve and severe bioprosthetic aortic valve stenosis   Cardiac CT completed yesterday, poor study, they are planning to do stress test today   Venous duplex 8/24 with acute DVT of the left gastrocnemius, continue Lovenox  ID following, recommend current antibiotic regimen for 6 weeks, repeat cultures obtained on  10/23 NTD  Workup ongoing, definitive recommendation TBD after assessing coronaries   Continue OT/PT, mobilize    Bryant Mcclure PA-C  10/26/24  08:53 EDT

## 2024-10-26 NOTE — PROGRESS NOTES
LOS: 3 days   Patient Care Team:  Juan Perez MD as PCP - General (Internal Medicine)    Chief Complaint: follow up endocarditis     Interval History: He was up in the chair with family at bedside on my exam. No acute events overnight or this morning.     Vital Signs:  Temp:  [97.8 °F (36.6 °C)-98.9 °F (37.2 °C)] 98.1 °F (36.7 °C)  Heart Rate:  [] 95  Resp:  [16-20] 16  BP: ()/(57-81) 128/81    Intake/Output Summary (Last 24 hours) at 10/26/2024 1656  Last data filed at 10/26/2024 0438  Gross per 24 hour   Intake 360 ml   Output 250 ml   Net 110 ml        Physical Exam  Vitals reviewed.   Constitutional:       General: He is not in acute distress.  HENT:      Head: Normocephalic.      Nose: Nose normal.   Eyes:      Extraocular Movements: Extraocular movements intact.      Pupils: Pupils are equal, round, and reactive to light.   Cardiovascular:      Rate and Rhythm: Normal rate and regular rhythm.      Pulses: Normal pulses.      Heart sounds: Heart sounds not distant. Murmur heard.      No friction rub. No gallop. No S3 or S4 sounds.   Pulmonary:      Effort: Pulmonary effort is normal.      Breath sounds: Normal breath sounds.   Abdominal:      General: Abdomen is flat. Bowel sounds are normal.      Palpations: Abdomen is soft.      Tenderness: There is no abdominal tenderness.   Skin:     General: Skin is warm and dry.   Neurological:      General: No focal deficit present.      Mental Status: He is alert and oriented to person, place, and time. Mental status is at baseline.   Psychiatric:         Mood and Affect: Mood normal.         Behavior: Behavior normal.         Results Review:    Results from last 7 days   Lab Units 10/24/24  0304   SODIUM mmol/L 134*   POTASSIUM mmol/L 3.6   CHLORIDE mmol/L 99   CO2 mmol/L 26.2   BUN mg/dL 9   CREATININE mg/dL 0.76   GLUCOSE mg/dL 129*   CALCIUM mg/dL 8.1*         Results from last 7 days   Lab Units 10/24/24  0304   WBC 10*3/mm3 10.14    HEMOGLOBIN g/dL 8.7*   HEMATOCRIT % 27.0*   PLATELETS 10*3/mm3 137*     Results from last 7 days   Lab Units 10/24/24  0304   INR  1.35*     Results from last 7 days   Lab Units 10/24/24  0304   CHOLESTEROL mg/dL 120     Results from last 7 days   Lab Units 10/21/24  0455   MAGNESIUM mg/dL 1.7     Results from last 7 days   Lab Units 10/24/24  0304   CHOLESTEROL mg/dL 120   TRIGLYCERIDES mg/dL 93   HDL CHOL mg/dL 36*   LDL CHOL mg/dL 66       I reviewed the patient's new clinical results.        Assessment & Plan:  Prosthetic aortic valve stenosis  History of bioprosthetic aortic valve replacement in 2014 by Dr. Ness at [facility].   Endocarditis  JOLIE on 10/25/24 demonstrated multiple vegetations on the bioprosthetic aortic valve.  Bacteremia  Atrial fibrillation  Not on anticoagulation secondary to #5  Anemia  Status post EGD/colonoscopy with esophagitis   Nonischemic cardiomyopathy with recovery of function  Medtronic AICD in place  Mitral regurgitation  Moderate to severe, appears to be due to poor apposition of the mitral valve leaflets   Coronary artery calcification  Seen on most recent CT scan, reviewed by Dr. Garcia and Dr. Barnett, coronary calcification relatively modest but coronary CTA was not a diagnostic study.   Pharmacologic nuclear perfusion study today was a grossly normal study with no evidence of reversible ischemia, impressions consistent with a low risk study.     Jacki Victor, APRN  10/26/24  16:56 EDT

## 2024-10-26 NOTE — PLAN OF CARE
Goal Outcome Evaluation:  Plan of Care Reviewed With: patient        Progress: no change  Outcome Evaluation: Pt is a 72 y/o M admitted to [facility] 10/23 from [facility] facility for cardiac w/u, aortic valve endocarditis with vegetation, severe prosthetic aortic valve stenosis. Pt w/ recent tendon injury requiring use of CAM boot to RLE to which he verbalized he was recieving rehab for. Pt endorses IND PLF, with new onset deficits w/ ADL IND/function, would benefit from continued skilled OT services while IP level to return to highest level of function, improve ADL performance. REC: return back to [facility]/SNF when medically stable.    Anticipated Discharge Disposition (OT): skilled nursing facility

## 2024-10-26 NOTE — PLAN OF CARE
Goal Outcome Evaluation:  Plan of Care Reviewed With: patient           Outcome Evaluation: Pt is a 72 y/o M admitted to [facility] for cardiac work-up. Pt underwent a JOLIE yesterday revealing vegetation on aortic valve and severe bioprosthetic aortic valve stenosis. Pt plans to do a stress test today. Further work-up revealed an acute L LE DVT but on Lovenox and RN gave OK for PT eval. Pt admitted from [facility] where he was at rehab requiring an Ax1 to ambulate with a RW. Pt recently had a R partial achilles tear and is WBAT in [facility] boot. Pt presents to PT with generalized weakness and decreased endurance. Pt stood requiring min A and ambulated 40' c RW requiring CGA. Pt demo's a slow antalgic gait but overall steady gait with RW. Pt does fatigue quickly with minimal activity. PT encouraged pt to sit UIC and mobilize with staff as tolerated. PT recommends pt return to rehab at D/C to continue to address functional deficits.    Anticipated Discharge Disposition (PT): inpatient rehabilitation facility

## 2024-10-27 NOTE — PROGRESS NOTES
LOS: 4 days   Patient Care Team:  Juan Perez MD as PCP - General (Internal Medicine)    Chief Complaint: follow up endocarditis     Interval History: He was up in the chair with family at bedside on my exam. No acute events overnight or this morning.     Vital Signs:  Temp:  [98.4 °F (36.9 °C)-98.9 °F (37.2 °C)] 98.6 °F (37 °C)  Heart Rate:  [] 93  Resp:  [18] 18  BP: ()/(60-88) 87/73    Intake/Output Summary (Last 24 hours) at 10/27/2024 1529  Last data filed at 10/27/2024 1347  Gross per 24 hour   Intake 480 ml   Output 700 ml   Net -220 ml        Physical Exam  Vitals reviewed.   Constitutional:       General: He is not in acute distress.  HENT:      Head: Normocephalic.      Nose: Nose normal.   Eyes:      Extraocular Movements: Extraocular movements intact.      Pupils: Pupils are equal, round, and reactive to light.   Cardiovascular:      Rate and Rhythm: Normal rate and regular rhythm.      Pulses: Normal pulses.      Heart sounds: Heart sounds not distant. Murmur heard.      No friction rub. No gallop. No S3 or S4 sounds.   Pulmonary:      Effort: Pulmonary effort is normal.      Breath sounds: Normal breath sounds.   Abdominal:      General: Abdomen is flat. Bowel sounds are normal.      Palpations: Abdomen is soft.      Tenderness: There is no abdominal tenderness.   Skin:     General: Skin is warm and dry.   Neurological:      General: No focal deficit present.      Mental Status: He is alert and oriented to person, place, and time. Mental status is at baseline.   Psychiatric:         Mood and Affect: Mood normal.         Behavior: Behavior normal.         Results Review:    Results from last 7 days   Lab Units 10/27/24  0930   SODIUM mmol/L 136   POTASSIUM mmol/L 4.1   CHLORIDE mmol/L 101   CO2 mmol/L 26.0   BUN mg/dL 9   CREATININE mg/dL 0.81   GLUCOSE mg/dL 143*   CALCIUM mg/dL 8.7         Results from last 7 days   Lab Units 10/27/24  0930   WBC 10*3/mm3 9.08   HEMOGLOBIN  g/dL 9.5*   HEMATOCRIT % 29.3*   PLATELETS 10*3/mm3 158     Results from last 7 days   Lab Units 10/27/24  0930 10/24/24  0304   INR  1.20* 1.35*     Results from last 7 days   Lab Units 10/24/24  0304   CHOLESTEROL mg/dL 120     Results from last 7 days   Lab Units 10/21/24  0455   MAGNESIUM mg/dL 1.7     Results from last 7 days   Lab Units 10/24/24  0304   CHOLESTEROL mg/dL 120   TRIGLYCERIDES mg/dL 93   HDL CHOL mg/dL 36*   LDL CHOL mg/dL 66       I reviewed the patient's new clinical results.        Assessment & Plan:  Prosthetic aortic valve stenosis  History of bioprosthetic aortic valve replacement in 2014 by Dr. Ness at [facility].   Endocarditis  JOLIE on 10/25/24 demonstrated multiple vegetations on the bioprosthetic aortic valve.  Bacteremia  Atrial fibrillation  Not on anticoagulation secondary to #5  Anemia  Status post EGD/colonoscopy with esophagitis   Nonischemic cardiomyopathy with recovery of function  Medtronic AICD in place  Mitral regurgitation  Moderate to severe, appears to be due to poor apposition of the mitral valve leaflets   Coronary artery calcification  Seen on most recent CT scan, reviewed by Dr. Garcia and Dr. Barnett, coronary calcification relatively modest but coronary CTA was not a diagnostic study.   Pharmacologic nuclear perfusion study on 10/26/24 was a grossly normal study with no evidence of reversible ischemia, impressions consistent with a low risk study.     Jacki Victor, APRN  10/27/24  15:29 EDT

## 2024-10-27 NOTE — PROGRESS NOTES
" LOS: 4 days   Patient Care Team:  Juan Perez MD as PCP - General (Internal Medicine)    Chief Complaint: Aortic valve endocarditis with vegetation, severe prosthetic aortic valve stenosis     Subjective     Subjective    Patient sitting in bedside chair, no new complaints     Objective     Vital Signs  Temp:  [98.1 °F (36.7 °C)-98.9 °F (37.2 °C)] 98.6 °F (37 °C)  Heart Rate:  [] 96  Resp:  [16-18] 18  BP: (108-138)/(60-88) 138/88  Body mass index is 42.45 kg/m².    Intake/Output Summary (Last 24 hours) at 10/27/2024 0826  Last data filed at 10/27/2024 0808  Gross per 24 hour   Intake --   Output 700 ml   Net -700 ml     I/O this shift:  In: -   Out: 400 [Urine:400]      Wt Readings from Last 3 Encounters:   10/25/24 (!) 142 kg (313 lb)   10/23/24 (!) 142 kg (313 lb 7.9 oz)   09/26/24 (!) 141 kg (309 lb 15.5 oz)       Flowsheet Rows      Flowsheet Row First Filed Value   Admission Height 182.9 cm (72\") Documented at 10/25/2024 0434   Admission Weight 142 kg (312 lb 3.2 oz) Documented at 10/23/2024 1711            Objective:  General Appearance:  Comfortable, in no acute distress and well-appearing.    Vital signs: (most recent): Blood pressure 138/88, pulse 85, temperature 98.6 °F (37 °C), temperature source Oral, resp. rate 18, height 182.9 cm (72\"), weight (!) 142 kg (313 lb), SpO2 92%.  Vital signs are normal.  No fever.    Output: Producing urine.    Lungs:  Normal effort and normal respiratory rate.    Heart: Normal rate.  Regular rhythm.    Extremities: Normal range of motion.  There is no dependent edema.    Neurological: Patient is alert and oriented to person, place and time.    Skin:  Warm and dry.                Results Review:        Results from last 7 days   Lab Units 10/24/24  0304 10/21/24  0455   WBC 10*3/mm3 10.14 9.47   HEMOGLOBIN g/dL 8.7* 8.9*   HEMATOCRIT % 27.0* 28.6*   PLATELETS 10*3/mm3 137* 153         PT/INR:    No results found for: \"PROTIME\"  /  No results found for: " "\"INR\"      Results from last 7 days   Lab Units 10/24/24  0304 10/21/24  0455   SODIUM mmol/L 134* 138   POTASSIUM mmol/L 3.6 3.9   CHLORIDE mmol/L 99 102   CO2 mmol/L 26.2 24.8   BUN mg/dL 9 13   CREATININE mg/dL 0.76 0.71*   GLUCOSE mg/dL 129* 131*   CALCIUM mg/dL 8.1* 8.4*         Scheduled Meds:  aspirin, 81 mg, Oral, Daily  dilTIAZem, 120 mg, Oral, Q12H  enoxaparin, 40 mg, Subcutaneous, Q24H  ferrous sulfate, 325 mg, Oral, Daily With Breakfast  melatonin, 5 mg, Oral, Nightly  metoprolol tartrate, 75 mg, Oral, BID  pantoprazole, 40 mg, Oral, BID AC  penicillin g (potassium), 4 Million Units, Intravenous, Q4H  potassium chloride, 20 mEq, Oral, Daily  sodium chloride, 10 mL, Intravenous, Q12H        Infusions:         Assessment & Plan         Prosthetic aortic valve stenosis    Essential hypertension    Permanent atrial fibrillation    S/P AVR    ICD (implantable cardioverter-defibrillator), dual, in situ    Achilles tendon rupture    Bacteremia    Stenosis of prosthetic aortic valve    Anemia      Assessment & Plan    - Prosthetic aortic valve stenosis, h/o AVR (tissue)/maze/DANICA ligation (2014)  - Possible endocarditis, likely need JOLIE   - Bacteremia, blood cultures positive strep mitis  - Atrial fibrillation, unable to tolerate anticoagulation  - Hypertension  - NICM status post Medtronic AICD  - Anemia, s/p EGD/colonoscopy with esophagitis, polyp removal  - Right achilles tendon tear--- walking boot and PT per ortho at [facility]  - Pre-diabetes -- improved at 5.3    JOLIE with vegetation on aortic valve and severe bioprosthetic aortic valve stenosis   Cardiac CT completed but poor study, stress test completed yesterday  Venous duplex 8/24 with acute DVT of the left gastrocnemius, continue Lovenox  ID following, recommend current antibiotic regimen for 6 weeks, repeat cultures obtained on 10/23 NTD  Workup ongoing, definitive recommendation TBD, expecting surgery sometime the following week   Continue OT/PT, " mobilize    Bryant Mcclure PA-C  10/27/24  08:26 EDT

## 2024-10-28 LAB
BACTERIA SPEC AEROBE CULT: NORMAL
BACTERIA SPEC AEROBE CULT: NORMAL

## 2024-10-28 NOTE — PLAN OF CARE
Goal Outcome Evaluation:  Plan of Care Reviewed With: patient           Outcome Evaluation: Patient is agreeable to PT this AM. He completed x2 STS to rwx requiring CGA-Karla and then ambulated 40ft using rwx requiring CGA. Pt with CAM boot to RLE during session and reports pain 7/10. Pt completed seated ther ex and VC/demo for technique. Will continue to progress as pt tolerates.    Anticipated Discharge Disposition (PT): inpatient rehabilitation facility

## 2024-10-28 NOTE — THERAPY TREATMENT NOTE
Patient Name: Woodrow Alejandro  : 1950    MRN: [id number]                              Today's Date: 10/28/2024       Admit Date: 10/23/2024    Visit Dx: No diagnosis found.  Patient Active Problem List   Diagnosis    Essential hypertension    Permanent atrial fibrillation    S/P AVR    ICD (implantable cardioverter-defibrillator), dual, in situ    Dermatitis    IFG (impaired fasting glucose)    Mixed hyperlipidemia    Vitamin D deficiency    Medicare annual wellness visit, subsequent    Primary osteoarthritis of both knees    Screening PSA (prostate specific antigen)    Bilateral primary osteoarthritis of knee    Coarse tremors    Weakness generalized    Increased urinary frequency    Bandemia    Achilles tendon rupture    Anemia due to GI blood loss    Physical debility    Bacteremia    Stenosis of prosthetic aortic valve    Anemia    Prosthetic aortic valve stenosis     Past Medical History:   Diagnosis Date    Aortic valve replaced     Arthritis 2018    Atrial fibrillation     Coronary artery disease     Hypertension      Past Surgical History:   Procedure Laterality Date    CARDIAC SURGERY      COLONOSCOPY      COLONOSCOPY N/A 2024    Procedure: COLONOSCOPY WITH HOT/COLD SNARE POLYPECTOMIES, ELEVIEW INJECTION,CLIPX1;  Surgeon: Kurt Mensah MD;  Location: [facility] ENDOSCOPY;  Service: Gastroenterology;  Laterality: N/A;  COLON POLYPS    ENDOSCOPY N/A 2024    Procedure: ESOPHAGOGASTRODUODENOSCOPY WITH BIOPSIES;  Surgeon: Kurt Mensah MD;  Location: [facility] ENDOSCOPY;  Service: Gastroenterology;  Laterality: N/A;  RETAINED FOOD IN STOMACH AND REFLUX ESOPHAGITIS    PACEMAKER IMPLANTATION        General Information       Row Name 10/28/24 0915          Physical Therapy Time and Intention    Document Type therapy note (daily note)  -CB     Mode of Treatment individual therapy;physical therapy  -CB       Row Name 10/28/24 0915          General Information    Existing  Precautions/Restrictions fall;other (see comments)  WBAT RLE in CAM boot  -CB       Row Name 10/28/24 0915          Cognition    Orientation Status (Cognition) oriented x 4  -CB       Row Name 10/28/24 0915          Safety Issues/Impairments Affecting Functional Mobility    Impairments Affecting Function (Mobility) balance;endurance/activity tolerance;range of motion (ROM);strength  -CB               User Key  (r) = Recorded By, (t) = Taken By, (c) = Cosigned By      Initials Name Provider Type    CB Angelica Scales, JONNY Physical Therapist                   Mobility       Row Name 10/28/24 0916          Bed Mobility    Comment, (Bed Mobility) UIC  -CB       Row Name 10/28/24 0916          Sit-Stand Transfer    Sit-Stand [place] (Transfers) contact guard;minimum assist (75% patient effort);verbal cues  -CB     Assistive Device (Sit-Stand Transfers) walker, front-wheeled  -CB     Comment, (Sit-Stand Transfer) x2  -CB       Row Name 10/28/24 0916          Gait/Stairs (Locomotion)    [place] Level (Gait) contact guard;verbal cues  -CB     Assistive Device (Gait) walker, front-wheeled  -CB     Distance in Feet (Gait) 40  -CB     Deviations/Abnormal Patterns (Gait) antalgic;tapan decreased;stride length decreased  -CB     Bilateral Gait Deviations forward flexed posture;heel strike decreased  -CB     Right Sided Gait Deviations weight shift ability decreased  -CB     Comment, (Gait/Stairs) no overt LOB; fatigues quickly  -CB       Row Name 10/28/24 0916          Mobility    Extremity Weight-bearing Status right lower extremity  -CB     Right Lower Extremity (Weight-bearing Status) weight-bearing as tolerated (WBAT)  -CB               User Key  (r) = Recorded By, (t) = Taken By, (c) = Cosigned By      Initials Name Provider Type    Angelica Landers, PT Physical Therapist                   Obj/Interventions       Row Name 10/28/24 0918          Motor Skills    Therapeutic Exercise --  AP LLE; TRACY irvining and DERIAN  x10 reps  -CB       Row Name 10/28/24 0918          Balance    Balance Assessment standing static balance;standing dynamic balance  -CB     Static Standing Balance contact guard;verbal cues  -CB     Dynamic Standing Balance contact guard;verbal cues  -CB     Position/Device Used, Standing Balance supported;walker, front-wheeled  -CB     Balance Interventions standing;sit to stand;supported;static;dynamic;minimal challenge  -CB               User Key  (r) = Recorded By, (t) = Taken By, (c) = Cosigned By      Initials Name Provider Type    Angelica Landers, PT Physical Therapist                   Goals/Plan    No documentation.                  Clinical Impression       Row Name 10/28/24 0919          Pain    Pretreatment Pain Rating 7/10  -CB     Posttreatment Pain Rating 7/10  -CB     Pain Location foot  -CB     Pain Side/Orientation right  -CB       Row Name 10/28/24 0919          Plan of Care Review    Plan of Care Reviewed With patient  -CB     Outcome Evaluation Patient is agreeable to PT this AM. He completed x2 STS to rwx requiring CGA-Karla and then ambulated 40ft using rwx requiring CGA. Pt with CAM boot to RLE during session and reports pain 7/10. Pt completed seated ther ex and VC/demo for technique. Will continue to progress as pt tolerates.  -CB       Row Name 10/28/24 0919          Positioning and Restraints    Pre-Treatment Position sitting in chair/recliner  -CB     Post Treatment Position chair  -CB     In Chair notified nsg;reclined;call light within reach;encouraged to call for assist;exit alarm on  -CB               User Key  (r) = Recorded By, (t) = Taken By, (c) = Cosigned By      Initials Name Provider Type    Angelica Landers, PT Physical Therapist                   Outcome Measures       Row Name 10/28/24 0920 10/28/24 0819       How much help from another person do you currently need...    Turning from your back to your side while in flat bed without using bedrails? 3  -CB 3  -AA    Moving  from lying on back to sitting on the side of a flat bed without bedrails? 2  -CB 3  -AA    Moving to and from a bed to a chair (including a wheelchair)? 3  -CB 3  -AA    Standing up from a chair using your arms (e.g., wheelchair, bedside chair)? 3  -CB 3  -AA    Climbing 3-5 steps with a railing? 2  -CB 3  -AA    To walk in hospital room? 3  -CB 3  -AA    AM-PAC 6 Clicks Score (PT) 16  -CB 18  -AA    Highest Level of Mobility Goal 5 --> Static standing  -CB 6 --> Walk 10 steps or more  -AA      Row Name 10/28/24 0920          Functional Assessment    Outcome Measure Options AM-PAC 6 Clicks Basic Mobility (PT)  -CB               User Key  (r) = Recorded By, (t) = Taken By, (c) = Cosigned By      Initials Name Provider Type    CB Angelica Scales, PT Physical Therapist    Carol Ewing RN Registered Nurse                                 Physical Therapy Education       Title: PT OT SLP Therapies (Done)       Topic: Physical Therapy (Done)       Point: Mobility training (Done)       Learning Progress Summary            Patient Acceptance, E,TB,D, VU,NR by  at 10/28/2024 0926    Acceptance, E,TB, VU,DU,NR by  at 10/26/2024 1211                      Point: Home exercise program (Done)       Learning Progress Summary            Patient Acceptance, E,TB,D, VU,NR by  at 10/28/2024 0926                      Point: Body mechanics (Done)       Learning Progress Summary            Patient Acceptance, E,TB,D, VU,NR by  at 10/28/2024 0926    Acceptance, E,TB, VU,DU,NR by  at 10/26/2024 1211                      Point: Precautions (Done)       Learning Progress Summary            Patient Acceptance, E,TB,D, VU,NR by  at 10/28/2024 0926    Acceptance, E,TB, VU,DU,NR by  at 10/26/2024 1211                                      User Key       Initials Effective Dates Name Provider Type Discipline    CB 10/22/21 -  Angelica Scales, PT Physical Therapist PT    CS 09/22/22 -  Yesy Mendoza, PT Physical Therapist PT                   PT Recommendation and Plan     Outcome Evaluation: Patient is agreeable to PT this AM. He completed x2 STS to rwx requiring CGA-Karla and then ambulated 40ft using rwx requiring CGA. Pt with CAM boot to RLE during session and reports pain 7/10. Pt completed seated ther ex and VC/demo for technique. Will continue to progress as pt tolerates.     Time Calculation:         PT Charges       Row Name 10/28/24 0926             Time Calculation    Start Time 0905  -CB      Stop Time 0920  -CB      Time Calculation (min) 15 min  -CB      PT Received On 10/28/24  -CB      PT - Next Appointment 10/29/24  -CB         Time Calculation- PT    Total Timed Code Minutes- PT 15 minute(s)  -CB         Timed Charges    58624 - PT Therapeutic Exercise Minutes 7  -CB      09790 - PT Therapeutic Activity Minutes 8  -CB         Total Minutes    Timed Charges Total Minutes 15  -CB       Total Minutes 15  -CB                User Key  (r) = Recorded By, (t) = Taken By, (c) = Cosigned By      Initials Name Provider Type    CB Angelica Scales, PT Physical Therapist                  Therapy Charges for Today       Code Description Service Date Service Provider Modifiers Qty    [id number]  PT THERAPEUTIC ACT EA 15 MIN 10/28/2024 Angelica Scales, PT GP 1            PT G-Codes  Outcome Measure Options: AM-PAC 6 Clicks Basic Mobility (PT)  AM-PAC 6 Clicks Score (PT): 16  AM-PAC 6 Clicks Score (OT): 17  PT Discharge Summary  Anticipated Discharge Disposition (PT): inpatient rehabilitation facility    Angelica Scales PT  10/28/2024

## 2024-10-28 NOTE — PROGRESS NOTES
" LOS: 5 days   Patient Care Team:  Juan Perez MD as PCP - General (Internal Medicine)    Chief Complaint: Aortic valve endocarditis with vegetation, severe prosthetic aortic valve stenosis     Subjective     Subjective    Patient sitting in bedside chair, no new complaints     Objective     Vital Signs  Temp:  [97.7 °F (36.5 °C)-98.4 °F (36.9 °C)] 97.7 °F (36.5 °C)  Heart Rate:  [] 108  Resp:  [18] 18  BP: ()/(53-86) 139/86  Body mass index is 42.45 kg/m².    Intake/Output Summary (Last 24 hours) at 10/28/2024 1037  Last data filed at 10/28/2024 0918  Gross per 24 hour   Intake 240 ml   Output 300 ml   Net -60 ml     I/O this shift:  In: -   Out: 200 [Urine:200]      Wt Readings from Last 3 Encounters:   10/25/24 (!) 142 kg (313 lb)   10/23/24 (!) 142 kg (313 lb 7.9 oz)   09/26/24 (!) 141 kg (309 lb 15.5 oz)       Flowsheet Rows      Flowsheet Row First Filed Value   Admission Height 182.9 cm (72\") Documented at 10/25/2024 0434   Admission Weight 142 kg (312 lb 3.2 oz) Documented at 10/23/2024 1711            Objective:  General Appearance:  Comfortable, in no acute distress and well-appearing.    Vital signs: (most recent): Blood pressure 139/86, pulse 108, temperature 97.7 °F (36.5 °C), temperature source Oral, resp. rate 18, height 182.9 cm (72\"), weight (!) 142 kg (313 lb), SpO2 94%.  Vital signs are normal.  No fever.    Output: Producing urine.    Lungs:  Normal effort and normal respiratory rate.    Heart: Normal rate.  Regular rhythm.    Extremities: Normal range of motion.  There is no dependent edema.    Neurological: Patient is alert and oriented to person, place and time.    Skin:  Warm and dry.                Results Review:        Results from last 7 days   Lab Units 10/28/24  0341 10/27/24  0930 10/24/24  0304   WBC 10*3/mm3 8.21 9.08 10.14   HEMOGLOBIN g/dL 8.6* 9.5* 8.7*   HEMATOCRIT % 26.8* 29.3* 27.0*   PLATELETS 10*3/mm3 158 158 137*         PT/INR:    Protime   Date " Value Ref Range Status   10/27/2024 15.4 (H) 11.7 - 14.2 Seconds Final     /  INR   Date Value Ref Range Status   10/27/2024 1.20 (H) 0.90 - 1.10 Final         Results from last 7 days   Lab Units 10/28/24  0341 10/27/24  0930 10/24/24  0304   SODIUM mmol/L 138 136 134*   POTASSIUM mmol/L 3.9 4.1 3.6   CHLORIDE mmol/L 105 101 99   CO2 mmol/L 25.7 26.0 26.2   BUN mg/dL 11 9 9   CREATININE mg/dL 0.68* 0.81 0.76   GLUCOSE mg/dL 138* 143* 129*   CALCIUM mg/dL 8.5* 8.7 8.1*         Scheduled Meds:  [START ON 10/29/2024] chlorhexidine, 15 mL, Mouth/Throat, Q12H  [START ON 10/29/2024] Chlorhexidine Gluconate Cloth, 1 Application, Topical, Q12H  dilTIAZem, 120 mg, Oral, Q12H  ferrous sulfate, 325 mg, Oral, Daily With Breakfast  heparin (porcine), 5,000 Units, Subcutaneous, Q8H  melatonin, 5 mg, Oral, Nightly  [START ON 10/30/2024] metoprolol tartrate, 12.5 mg, Oral, On Call to OR  metoprolol tartrate, 75 mg, Oral, BID  [START ON 10/29/2024] mupirocin, 1 Application, Each Nare, Q12H  pantoprazole, 40 mg, Oral, BID AC  penicillin g (potassium), 4 Million Units, Intravenous, Q4H  potassium chloride, 20 mEq, Oral, Daily  sodium chloride, 10 mL, Intravenous, Q12H  [START ON 10/30/2024] vancomycin, 15 mg/kg, Intravenous, On Call to OR        Infusions:         Assessment & Plan         Prosthetic aortic valve stenosis    Essential hypertension    Permanent atrial fibrillation    S/P AVR    ICD (implantable cardioverter-defibrillator), dual, in situ    Achilles tendon rupture    Bacteremia    Stenosis of prosthetic aortic valve    Anemia      Assessment & Plan    - Prosthetic aortic valve stenosis, h/o AVR (tissue)/maze/DANICA ligation (2014)  - Possible endocarditis, likely need JOLIE   - Bacteremia, blood cultures positive strep mitis  - Atrial fibrillation, unable to tolerate anticoagulation  - Hypertension  - NICM status post Medtronic AICD  - Anemia, s/p EGD/colonoscopy with esophagitis, polyp removal  - Right achilles tendon  tear--- walking boot and PT per ortho at [facility]  - Pre-diabetes -- improved at 5.3    JOLIE with vegetation on aortic valve and severe bioprosthetic aortic valve stenosis   Cardiac CT completed but poor study, stress test completed yesterday  Venous duplex 8/24 with acute DVT of the left gastrocnemius, continue heparin  ID following, recommend current antibiotic regimen for 6 weeks, repeat cultures obtained on 10/23 NTD  Hgb 8.6-- transfuse 1unit PRBCs today  Plt ag 43- will recheck.  Plan for OR Wednesday reoperative AVR/AV conduit, MVR, AICD removal with Dr. Florian   Continue OT/PT, mobilize    JAVI Nichole  10/28/24  10:37 EDT

## 2024-10-28 NOTE — PROGRESS NOTES
LOS: 5 days     Chief Complaint: Endocarditis    Interval History: Patient reports doing very well this morning.  Enjoying his breakfast.  Denies any acute complaints.  States that he is tolerating antibiotics well.    Vital Signs  Temp:  [97.7 °F (36.5 °C)-98.4 °F (36.9 °C)] 97.9 °F (36.6 °C)  Heart Rate:  [] 114  Resp:  [18] 18  BP: ()/(53-86) 111/72    Physical Exam:  General: In no acute distress  HEENT: Oropharynx clear, moist mucous membranes  Respiratory: Normal work of breathing  Skin: No rashes or lesions in exposed areas  Extremities: No edema, cyanosis  Access: Peripheral IV    Antibiotics:  Anti-Infectives (From admission, onward)      Ordered     Dose/Rate Route Frequency Start Stop    10/23/24 1709  penicillin G potassium 4 Million Units in sodium chloride 0.9 % 100 mL IVPB        Ordering Provider: Gregg Diaz DO    4 Million Units  over 30 Minutes Intravenous Every 4 Hours Scheduled 10/23/24 2000 12/04/24 9834             Results Review:     I reviewed the patient's new clinical results.    Lab Results   Component Value Date    WBC 8.21 10/28/2024    HGB 8.6 (L) 10/28/2024    HCT 26.8 (L) 10/28/2024    MCV 93.4 10/28/2024     10/28/2024     Lab Results   Component Value Date    GLUCOSE 138 (H) 10/28/2024    BUN 11 10/28/2024    CREATININE 0.68 (L) 10/28/2024    BCR 16.2 10/28/2024    CO2 25.7 10/28/2024    CALCIUM 8.5 (L) 10/28/2024    ALBUMIN 2.8 (L) 10/24/2024    LABIL2 1.4 06/27/2019    AST 25 10/24/2024    ALT 15 10/24/2024       Microbiology:  10/3 COVID-negative  10/10 COVID-negative  10/14 COVID-negative  10/15 blood cultures 2 out of 2 strep mitis  10/17 COVID-negative  10/17 blood cultures 2 out of 2 strep mitis  10/21 COVID-negative  10/23 blood cultures no growth today    Assessment    #Strep mitis endocarditis  #Status post bioprosthetic aortic valve replacement 2014  #Status post AICD  #Atrial fibrillation  #Achilles tendon rupture    Repeat blood cultures  remain no growth.  JOLIE unfortunately with valvular vegetations.  Continue penicillin G 4,000,000 units every 4 hours for strep mitis endocarditis.  Planning for minimum of 6 weeks of antibiotics.

## 2024-10-28 NOTE — PROGRESS NOTES
LOS: 5 days   Patient Care Team:  Juan Perez MD as PCP - General (Internal Medicine)    Chief Complaint: Follow-up bioprosthetic AVR endocarditis, mitral regurgitation, persistent atrial fibrillation.    Interval History: No acute events.  No new chest pain or shortness of breath.  Remains in rate controlled atrial fibrillation.    Vital Signs:  Temp:  [97.4 °F (36.3 °C)-98.4 °F (36.9 °C)] 97.4 °F (36.3 °C)  Heart Rate:  [] 69  Resp:  [18] 18  BP: (106-139)/(53-86) 127/81    Intake/Output Summary (Last 24 hours) at 10/28/2024 1332  Last data filed at 10/28/2024 0918  Gross per 24 hour   Intake 240 ml   Output 300 ml   Net -60 ml       Physical Exam:   General Appearance:    No acute distress, alert and oriented x4   Lungs:     Clear to auscultation bilaterally     Heart:    Irregularly irregular rhythm with a normal rate.  II/VI SM RUSB and LUSB.   Abdomen:     Soft, nontender, nondistended.    Extremities:   Trace edema of the lower extremities.     Results Review:    Results from last 7 days   Lab Units 10/28/24  0341   SODIUM mmol/L 138   POTASSIUM mmol/L 3.9   CHLORIDE mmol/L 105   CO2 mmol/L 25.7   BUN mg/dL 11   CREATININE mg/dL 0.68*   GLUCOSE mg/dL 138*   CALCIUM mg/dL 8.5*         Results from last 7 days   Lab Units 10/28/24  0341   WBC 10*3/mm3 8.21   HEMOGLOBIN g/dL 8.6*   HEMATOCRIT % 26.8*   PLATELETS 10*3/mm3 158     Results from last 7 days   Lab Units 10/27/24  0930 10/24/24  0304   INR  1.20* 1.35*     Results from last 7 days   Lab Units 10/24/24  0304   CHOLESTEROL mg/dL 120         Results from last 7 days   Lab Units 10/24/24  0304   CHOLESTEROL mg/dL 120   TRIGLYCERIDES mg/dL 93   HDL CHOL mg/dL 36*   LDL CHOL mg/dL 66       I reviewed the patient's new clinical results.        Assessment:  1.  Status post bioprosthetic aortic valve replacement in 2014  2.  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  Moderate to severe mitral regurgitation  4.  Persistent atrial fibrillation  5.  Anemia, likely multifactorial  6.  Grade C esophagitis by EGD on 9/30/2024  7.  Status post colon polypectomy by colonoscopy on 9/30/2024  8.  History of nonischemic cardiomyopathy with recovered ejection fraction  9.  Right Achilles tendon tear  10.  Status post Medtronic ICD placement  11.  Acute left lower extremity DVT on 10/24/2024  12.  Hypertension    Plan:  -Discussed with surgery.  Plans for OR on 10/30/2024 with Dr. Florian (reoperative AVR and AV conduit, mitral valve replacement, and ICD removal).    -Transfusing 1 unit of packed red blood cells today per cardiovascular surgery    -He has not been on systemic anticoagulation because of recurrent anemia.  This can be readdressed postoperatively.  He is currently on heparin DVT prophylaxis subcu 5000 units every 8 hours.    -Atrial fibrillation rate is controlled.  Continue metoprolol 75 mg twice daily and diltiazem 120 mg every 12 hours.    -Antibiotics and duration per infectious disease.    Antwan Farmer MD  10/28/24  13:32 EDT

## 2024-10-29 ENCOUNTER — ANESTHESIA EVENT (OUTPATIENT)
Dept: PERIOP | Facility: HOSPITAL | Age: 74
End: 2024-10-29
Payer: MEDICARE

## 2024-10-29 NOTE — PROGRESS NOTES
LOS: 6 days     Chief Complaint: Endocarditis    Interval History: Patient resting comfortably this morning.  Plans for surgery tomorrow.  Tolerating antibiotics.    Vital Signs  Temp:  [97.4 °F (36.3 °C)-99 °F (37.2 °C)] 97.9 °F (36.6 °C)  Heart Rate:  [] 86  Resp:  [18] 18  BP: (112-150)/(61-83) 150/81    Physical Exam:  General: In no acute distress  HEENT: Oropharynx clear, moist mucous membranes  Respiratory: Normal work of breathing  Skin: No rashes or lesions in exposed areas  Extremities: No edema, cyanosis  Access: Peripheral IV    Antibiotics:  Anti-Infectives (From admission, onward)      Ordered     Dose/Rate Route Frequency Start Stop    10/28/24 1034  vancomycin IVPB 2000 mg in 0.9% Sodium Chloride 500 mL        Ordering Provider: Samantha Salvador APRN    15 mg/kg × 142 kg Intravenous Once 10/28/24 1045 10/28/24 1356    10/23/24 1709  penicillin G potassium 4 Million Units in sodium chloride 0.9 % 100 mL IVPB        Ordering Provider: Gregg Diaz DO    4 Million Units  over 30 Minutes Intravenous Every 4 Hours Scheduled 10/23/24 2000 12/05/24 0129             Results Review:     I reviewed the patient's new clinical results.    Lab Results   Component Value Date    WBC 8.21 10/28/2024    HGB 8.6 (L) 10/28/2024    HCT 26.8 (L) 10/28/2024    MCV 93.4 10/28/2024     10/28/2024     Lab Results   Component Value Date    GLUCOSE 138 (H) 10/28/2024    BUN 11 10/28/2024    CREATININE 0.68 (L) 10/28/2024    BCR 16.2 10/28/2024    CO2 25.7 10/28/2024    CALCIUM 8.5 (L) 10/28/2024    ALBUMIN 2.8 (L) 10/24/2024    LABIL2 1.4 06/27/2019    AST 25 10/24/2024    ALT 15 10/24/2024       Microbiology:  10/3 COVID-negative  10/10 COVID-negative  10/14 COVID-negative  10/15 blood cultures 2 out of 2 strep mitis  10/17 COVID-negative  10/17 blood cultures 2 out of 2 strep mitis  10/21 COVID-negative  10/23 blood cultures no growth today    Assessment    #Strep mitis endocarditis  #Status post  bioprosthetic aortic valve replacement 2014  #Status post AICD  #Atrial fibrillation  #Achilles tendon rupture    Fortunately appears to have cleared blood cultures. Continue penicillin G 4,000,000 units every 4 hours for strep mitis endocarditis.  Planning for minimum of 6 weeks of antibiotics.  Plan to follow-up operative findings.

## 2024-10-29 NOTE — SIGNIFICANT NOTE
10/29/24 1547   OTHER   Discipline occupational therapist   Rehab Time/Intention   Session Not Performed patient/family declined treatment  (Pt politely declined therapy today stating he wanted to rest for his sx tomorrow. He also reported injurying his L wrist making it difficult for him to WB on his rwx. OT to follow up)   Recommendation   OT - Next Appointment 10/30/24

## 2024-10-29 NOTE — PROGRESS NOTES
" LOS: 6 days   Patient Care Team:  Juan Perez MD as PCP - General (Internal Medicine)    Chief Complaint: Aortic valve endocarditis with vegetation, severe prosthetic aortic valve stenosis     Subjective     Subjective    Patient resting in bed, seems anxious about tomorrow     Objective     Vital Signs  Temp:  [97.4 °F (36.3 °C)-99 °F (37.2 °C)] 97.9 °F (36.6 °C)  Heart Rate:  [] 86  Resp:  [18] 18  BP: (112-150)/(61-83) 150/81  Body mass index is 42.45 kg/m².    Intake/Output Summary (Last 24 hours) at 10/29/2024 0949  Last data filed at 10/29/2024 0740  Gross per 24 hour   Intake --   Output 1000 ml   Net -1000 ml     I/O this shift:  In: -   Out: 225 [Urine:225]      Wt Readings from Last 3 Encounters:   10/25/24 (!) 142 kg (313 lb)   10/23/24 (!) 142 kg (313 lb 7.9 oz)   09/26/24 (!) 141 kg (309 lb 15.5 oz)       Flowsheet Rows      Flowsheet Row First Filed Value   Admission Height 182.9 cm (72\") Documented at 10/25/2024 0434   Admission Weight 142 kg (312 lb 3.2 oz) Documented at 10/23/2024 1711            Objective:  General Appearance:  Comfortable, in no acute distress and well-appearing.    Vital signs: (most recent): Blood pressure 150/81, pulse 86, temperature 97.9 °F (36.6 °C), temperature source Oral, resp. rate 18, height 182.9 cm (72\"), weight (!) 142 kg (313 lb), SpO2 95%.  Vital signs are normal.  No fever.    Output: Producing urine.    Lungs:  Normal effort and normal respiratory rate.    Heart: Normal rate.  Regular rhythm.    Extremities: Normal range of motion.  There is no dependent edema.    Neurological: Patient is alert and oriented to person, place and time.    Skin:  Warm and dry.                Results Review:        Results from last 7 days   Lab Units 10/28/24  0341 10/27/24  0930 10/24/24  0304   WBC 10*3/mm3 8.21 9.08 10.14   HEMOGLOBIN g/dL 8.6* 9.5* 8.7*   HEMATOCRIT % 26.8* 29.3* 27.0*   PLATELETS 10*3/mm3 158 158 137*         PT/INR:    Protime   Date Value " Ref Range Status   10/27/2024 15.4 (H) 11.7 - 14.2 Seconds Final     /  INR   Date Value Ref Range Status   10/27/2024 1.20 (H) 0.90 - 1.10 Final         Results from last 7 days   Lab Units 10/28/24  0341 10/27/24  0930 10/24/24  0304   SODIUM mmol/L 138 136 134*   POTASSIUM mmol/L 3.9 4.1 3.6   CHLORIDE mmol/L 105 101 99   CO2 mmol/L 25.7 26.0 26.2   BUN mg/dL 11 9 9   CREATININE mg/dL 0.68* 0.81 0.76   GLUCOSE mg/dL 138* 143* 129*   CALCIUM mg/dL 8.5* 8.7 8.1*         Scheduled Meds:  chlorhexidine, 15 mL, Mouth/Throat, Q12H  Chlorhexidine Gluconate Cloth, 1 Application, Topical, Q12H  dilTIAZem, 120 mg, Oral, Q12H  enoxaparin, 1 mg/kg, Subcutaneous, Q12H  ferrous sulfate, 325 mg, Oral, Daily With Breakfast  melatonin, 5 mg, Oral, Nightly  [START ON 10/30/2024] metoprolol tartrate, 12.5 mg, Oral, On Call to OR  metoprolol tartrate, 75 mg, Oral, BID  mupirocin, 1 Application, Each Nare, Q12H  pantoprazole, 40 mg, Oral, BID AC  penicillin g (potassium), 4 Million Units, Intravenous, Q4H  potassium chloride, 20 mEq, Oral, Daily  sodium chloride, 10 mL, Intravenous, Q12H        Infusions:         Assessment & Plan         Prosthetic aortic valve stenosis    Essential hypertension    Permanent atrial fibrillation    S/P AVR    ICD (implantable cardioverter-defibrillator), dual, in situ    Achilles tendon rupture    Bacteremia    Stenosis of prosthetic aortic valve    Anemia      Assessment & Plan    - Prosthetic aortic valve stenosis, h/o AVR (tissue)/maze/DANICA ligation (2014)  - Possible endocarditis, likely need JOLIE   - Bacteremia, blood cultures positive strep mitis  - Atrial fibrillation, unable to tolerate anticoagulation  - Hypertension  - NICM status post Medtronic AICD  - Anemia, s/p EGD/colonoscopy with esophagitis, polyp removal  - Right achilles tendon tear--- walking boot and PT per ortho at [facility]  - Pre-diabetes -- improved at 5.3    JOLIE with vegetation on aortic valve and severe bioprosthetic aortic  valve stenosis   Venous duplex 8/24 with acute DVT of the left gastrocnemius, continue heparin  ID following, recommend current antibiotic regimen for 6 weeks, repeat cultures obtained on 10/23 NTD  Awaiting results of CBC and platelet aggregation, was transfused yesterday, may need another unit depending on results  Plan for OR tomorrow, reoperative AVR/AV conduit, MVR, AICD removal with Dr. Florian   Continue OT/PT, mobilize    Bryant Mcclure PA-C  10/29/24  09:49 EDT

## 2024-10-29 NOTE — SIGNIFICANT NOTE
"   10/29/24 8277   OTHER   Discipline physical therapist   Rehab Time/Intention   Session Not Performed patient/family declined treatment  (pt decline PT this PM d/t sore wrist and \"having a day\". plan for OR tomorrow noted, PT will f/u)   Recommendation   PT - Next Appointment 10/31/24       "

## 2024-10-29 NOTE — PROGRESS NOTES
"    Patient Name: Woodrow Alejandro  :1950  73 y.o.      Patient Care Team:  Juan Perez MD as PCP - General (Internal Medicine)    Chief Complaint: follow up prosthetic valve endocarditis     Interval History: sitting up in the chair. Left wrist is sore. Feet hurt. No SOA or chest pain.        Objective   Vital Signs  Temp:  [97.4 °F (36.3 °C)-99 °F (37.2 °C)] 97.9 °F (36.6 °C)  Heart Rate:  [] 86  Resp:  [18] 18  BP: (112-150)/(61-83) 150/81    Intake/Output Summary (Last 24 hours) at 10/29/2024 1033  Last data filed at 10/29/2024 0740  Gross per 24 hour   Intake --   Output 1000 ml   Net -1000 ml     Flowsheet Rows      Flowsheet Row First Filed Value   Admission Height 182.9 cm (72\") Documented at 10/25/2024 0434   Admission Weight 142 kg (312 lb 3.2 oz) Documented at 10/23/2024 1711            Physical Exam:   General Appearance:    Alert, cooperative, in no acute distress   Lungs:     Clear to auscultation.  Normal respiratory effort and rate.      Heart:    Regular rhythm and normal rate, normal S1 and S2, no murmurs, gallops or rubs.     Chest Wall:    No abnormalities observed   Abdomen:     Soft, nontender, positive bowel sounds.     Extremities:   no cyanosis, clubbing or edema.  No marked joint deformities.  Adequate musculoskeletal strength.       Results Review:    Results from last 7 days   Lab Units 10/29/24  0952   SODIUM mmol/L 137   POTASSIUM mmol/L 4.2   CHLORIDE mmol/L 102   CO2 mmol/L 23.1   BUN mg/dL 7*   CREATININE mg/dL 0.83   GLUCOSE mg/dL 148*   CALCIUM mg/dL 8.5*         Results from last 7 days   Lab Units 10/29/24  0952   WBC 10*3/mm3 11.09*   HEMOGLOBIN g/dL 9.7*   HEMATOCRIT % 31.4*   PLATELETS 10*3/mm3 197     Results from last 7 days   Lab Units 10/29/24  0952 10/27/24  0930 10/24/24  0304   INR  1.16* 1.20* 1.35*   APTT seconds 32.3  --   --      Results from last 7 days   Lab Units 10/29/24  0952   MAGNESIUM mg/dL 1.8     Results from last 7 days "   Lab Units 10/29/24  0952   CHOLESTEROL mg/dL 131   TRIGLYCERIDES mg/dL 119   HDL CHOL mg/dL 32*   LDL CHOL mg/dL 77               Medication Review:   chlorhexidine, 15 mL, Mouth/Throat, Q12H  Chlorhexidine Gluconate Cloth, 1 Application, Topical, Q12H  dilTIAZem, 120 mg, Oral, Q12H  ferrous sulfate, 325 mg, Oral, Daily With Breakfast  melatonin, 5 mg, Oral, Nightly  [START ON 10/30/2024] metoprolol tartrate, 12.5 mg, Oral, On Call to OR  metoprolol tartrate, 75 mg, Oral, BID  mupirocin, 1 Application, Each Nare, Q12H  pantoprazole, 40 mg, Oral, BID AC  penicillin g (potassium), 4 Million Units, Intravenous, Q4H  potassium chloride, 20 mEq, Oral, Daily  sodium chloride, 10 mL, Intravenous, Q12H              Assessment & Plan   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 Multiple vegetations and severe prosthetic valve stenosis by JOLIE 10/25/24.   Aortic valve stenosis status post AVR 2014 , MAZE and DANICA ligation at that time as well.   Moderate to severe mitral valve regurgitation  Persistent atrial fibrillation, per primary cards (Dr. Newman), patient has tried warfarin as well as the newer anticoagulants but did not tolerate them and has declined further anticoagulation.   History of nonischemic cardiomyopathy status post Medtronic ICD, recovered LVEF.   Grade C esophagitis by EGD 9/30/24  Right achilles tendon tear  Acute left lower extremity DVT (calf) 10/24/24. Got two doses of therapeutic lovenox 10/28.   RBBB  Stress yesterday - myocardial perfusion imaging grossly normal myocardial perfusion with no reversible ischemia.     JAVI Lin  [facility]  10/29/24  10:33 EDT

## 2024-10-30 ENCOUNTER — ANESTHESIA (OUTPATIENT)
Dept: PERIOP | Facility: HOSPITAL | Age: 74
End: 2024-10-30
Payer: MEDICARE

## 2024-10-30 PROCEDURE — 25010000002 VASOPRESSIN 20 UNIT/ML SOLUTION: Performed by: ANESTHESIOLOGY

## 2024-10-30 PROCEDURE — 25010000002 PROTAMINE SULFATE PER 10 MG: Performed by: ANESTHESIOLOGY

## 2024-10-30 PROCEDURE — 25010000002 PROPOFOL 10 MG/ML EMULSION: Performed by: ANESTHESIOLOGY

## 2024-10-30 PROCEDURE — 25010000002 PHENYLEPHRINE 10 MG/ML SOLUTION 5 ML VIAL: Performed by: ANESTHESIOLOGY

## 2024-10-30 PROCEDURE — 25010000002 CEFAZOLIN PER 500 MG

## 2024-10-30 PROCEDURE — 25010000002 EPINEPHRINE 1 MG/10ML SOLUTION PREFILLED SYRINGE: Performed by: ANESTHESIOLOGY

## 2024-10-30 PROCEDURE — 25010000002 AMIODARONE IN DEXTROSE 5% 360-4.14 MG/200ML-% SOLUTION: Performed by: ANESTHESIOLOGY

## 2024-10-30 PROCEDURE — 25010000002 FENTANYL CITRATE (PF) 250 MCG/5ML SOLUTION: Performed by: ANESTHESIOLOGY

## 2024-10-30 PROCEDURE — 25010000002 MILRINONE LACTATE 10 MG/10ML SOLUTION 10 ML VIAL: Performed by: ANESTHESIOLOGY

## 2024-10-30 PROCEDURE — 25010000002 EPINEPHRINE PER 0.1 MG: Performed by: ANESTHESIOLOGY

## 2024-10-30 PROCEDURE — 25010000002 COAGULATION FACTOR VIIA RECOMB PER 1 MCG: Performed by: THORACIC SURGERY (CARDIOTHORACIC VASCULAR SURGERY)

## 2024-10-30 PROCEDURE — 25810000003 SODIUM CHLORIDE 0.9 % SOLUTION 250 ML FLEX CONT: Performed by: ANESTHESIOLOGY

## 2024-10-30 PROCEDURE — C1751 CATH, INF, PER/CENT/MIDLINE: HCPCS | Performed by: ANESTHESIOLOGY

## 2024-10-30 PROCEDURE — 25010000002 AMIODARONE PER 30 MG: Performed by: ANESTHESIOLOGY

## 2024-10-30 PROCEDURE — 93318 ECHO TRANSESOPHAGEAL INTRAOP: CPT | Performed by: ANESTHESIOLOGY

## 2024-10-30 PROCEDURE — 25010000002: Performed by: THORACIC SURGERY (CARDIOTHORACIC VASCULAR SURGERY)

## 2024-10-30 PROCEDURE — 25010000002 LIDOCAINE PER 10 MG: Performed by: ANESTHESIOLOGY

## 2024-10-30 PROCEDURE — 25010000002 VASOPRESSIN 20 UNIT/ML SOLUTION 1 ML VIAL: Performed by: ANESTHESIOLOGY

## 2024-10-30 PROCEDURE — 25810000003 SODIUM CHLORIDE 0.9 % SOLUTION: Performed by: ANESTHESIOLOGY

## 2024-10-30 PROCEDURE — 25010000002 PHENYLEPHRINE 10 MG/ML SOLUTION: Performed by: ANESTHESIOLOGY

## 2024-10-30 PROCEDURE — 25010000002 MAGNESIUM SULFATE PER 500 MG OF MAGNESIUM: Performed by: ANESTHESIOLOGY

## 2024-10-30 RX ORDER — CALCIUM CHLORIDE 100 MG/ML
INJECTION INTRAVENOUS; INTRAVENTRICULAR AS NEEDED
Status: DISCONTINUED | OUTPATIENT
Start: 2024-10-30 | End: 2024-10-30 | Stop reason: SURG

## 2024-10-30 RX ORDER — ROCURONIUM BROMIDE 10 MG/ML
INJECTION, SOLUTION INTRAVENOUS AS NEEDED
Status: DISCONTINUED | OUTPATIENT
Start: 2024-10-30 | End: 2024-10-30 | Stop reason: SURG

## 2024-10-30 RX ORDER — PHENYLEPHRINE HYDROCHLORIDE 10 MG/ML
INJECTION INTRAVENOUS AS NEEDED
Status: DISCONTINUED | OUTPATIENT
Start: 2024-10-30 | End: 2024-10-30

## 2024-10-30 RX ORDER — AMINOCAPROIC ACID 250 MG/ML
INJECTION, SOLUTION INTRAVENOUS AS NEEDED
Status: DISCONTINUED | OUTPATIENT
Start: 2024-10-30 | End: 2024-10-30 | Stop reason: SURG

## 2024-10-30 RX ORDER — SODIUM CHLORIDE 9 MG/ML
INJECTION, SOLUTION INTRAVENOUS CONTINUOUS PRN
Status: DISCONTINUED | OUTPATIENT
Start: 2024-10-30 | End: 2024-10-30 | Stop reason: SURG

## 2024-10-30 RX ORDER — FENTANYL CITRATE 50 UG/ML
INJECTION, SOLUTION INTRAMUSCULAR; INTRAVENOUS AS NEEDED
Status: DISCONTINUED | OUTPATIENT
Start: 2024-10-30 | End: 2024-10-30 | Stop reason: SURG

## 2024-10-30 RX ORDER — NOREPINEPHRINE BITARTRATE 1 MG/ML
INJECTION, SOLUTION INTRAVENOUS CONTINUOUS PRN
Status: DISCONTINUED | OUTPATIENT
Start: 2024-10-30 | End: 2024-10-30 | Stop reason: SDUPTHER

## 2024-10-30 RX ORDER — MAGNESIUM SULFATE HEPTAHYDRATE 500 MG/ML
INJECTION, SOLUTION INTRAMUSCULAR; INTRAVENOUS AS NEEDED
Status: DISCONTINUED | OUTPATIENT
Start: 2024-10-30 | End: 2024-10-30 | Stop reason: SURG

## 2024-10-30 RX ORDER — AMIODARONE HYDROCHLORIDE 150 MG/3ML
INJECTION, SOLUTION INTRAVENOUS AS NEEDED
Status: DISCONTINUED | OUTPATIENT
Start: 2024-10-30 | End: 2024-10-30 | Stop reason: SURG

## 2024-10-30 RX ORDER — LIDOCAINE HYDROCHLORIDE ANHYDROUS AND DEXTROSE MONOHYDRATE 5; 400 G/100ML; MG/100ML
INJECTION, SOLUTION INTRAVENOUS CONTINUOUS PRN
Status: DISCONTINUED | OUTPATIENT
Start: 2024-10-30 | End: 2024-10-30 | Stop reason: SURG

## 2024-10-30 RX ORDER — PROPOFOL 10 MG/ML
VIAL (ML) INTRAVENOUS AS NEEDED
Status: DISCONTINUED | OUTPATIENT
Start: 2024-10-30 | End: 2024-10-30 | Stop reason: SDUPTHER

## 2024-10-30 RX ORDER — HEPARIN SODIUM 1000 [USP'U]/ML
INJECTION, SOLUTION INTRAVENOUS; SUBCUTANEOUS AS NEEDED
Status: DISCONTINUED | OUTPATIENT
Start: 2024-10-30 | End: 2024-10-30 | Stop reason: SURG

## 2024-10-30 RX ORDER — PROTAMINE SULFATE 10 MG/ML
INJECTION, SOLUTION INTRAVENOUS AS NEEDED
Status: DISCONTINUED | OUTPATIENT
Start: 2024-10-30 | End: 2024-10-30 | Stop reason: SURG

## 2024-10-30 RX ORDER — VASOPRESSIN 20 U/ML
INJECTION PARENTERAL AS NEEDED
Status: DISCONTINUED | OUTPATIENT
Start: 2024-10-30 | End: 2024-10-30 | Stop reason: SURG

## 2024-10-30 RX ADMIN — ROCURONIUM BROMIDE 50 MG: 10 INJECTION, SOLUTION INTRAVENOUS at 18:28

## 2024-10-30 RX ADMIN — ROCURONIUM BROMIDE 50 MG: 10 INJECTION, SOLUTION INTRAVENOUS at 20:58

## 2024-10-30 RX ADMIN — PHENYLEPHRINE HYDROCHLORIDE 0.5 MCG/KG/MIN: 10 INJECTION, SOLUTION INTRAVENOUS at 20:13

## 2024-10-30 RX ADMIN — SODIUM CHLORIDE 4 UNITS/HR: 9 INJECTION, SOLUTION INTRAVENOUS at 13:58

## 2024-10-30 RX ADMIN — SODIUM CHLORIDE 3000 MG: 900 INJECTION INTRAVENOUS at 11:33

## 2024-10-30 RX ADMIN — SODIUM CHLORIDE 1 MCG/KG/HR: 9 INJECTION, SOLUTION INTRAVENOUS at 11:35

## 2024-10-30 RX ADMIN — FENTANYL CITRATE 250 MCG: 50 INJECTION, SOLUTION INTRAMUSCULAR; INTRAVENOUS at 12:17

## 2024-10-30 RX ADMIN — HEPARIN SODIUM 35000 UNITS: 1000 INJECTION, SOLUTION INTRAVENOUS; SUBCUTANEOUS at 17:39

## 2024-10-30 RX ADMIN — VASOPRESSIN 1 UNITS: 20 INJECTION INTRAVENOUS at 17:41

## 2024-10-30 RX ADMIN — SODIUM CHLORIDE: 9 INJECTION, SOLUTION INTRAVENOUS at 11:32

## 2024-10-30 RX ADMIN — SODIUM BICARBONATE 50 MEQ: 84 INJECTION, SOLUTION INTRAVENOUS at 21:06

## 2024-10-30 RX ADMIN — PHENYLEPHRINE HYDROCHLORIDE 100 MCG: 10 INJECTION INTRAVENOUS at 13:16

## 2024-10-30 RX ADMIN — SODIUM CHLORIDE 3 G: 900 INJECTION INTRAVENOUS at 15:36

## 2024-10-30 RX ADMIN — LIDOCAINE HYDROCHLORIDE 2 MG/MIN: 4 INJECTION, SOLUTION INTRAVENOUS at 21:14

## 2024-10-30 RX ADMIN — SODIUM BICARBONATE 50 MEQ: 84 INJECTION, SOLUTION INTRAVENOUS at 21:09

## 2024-10-30 RX ADMIN — AMINOCAPROIC ACID 10 G: 250 INJECTION, SOLUTION INTRAVENOUS at 16:56

## 2024-10-30 RX ADMIN — HEPARIN SODIUM 35000 UNITS: 1000 INJECTION, SOLUTION INTRAVENOUS; SUBCUTANEOUS at 13:11

## 2024-10-30 RX ADMIN — PHENYLEPHRINE HYDROCHLORIDE 100 MCG: 10 INJECTION INTRAVENOUS at 17:14

## 2024-10-30 RX ADMIN — SODIUM CHLORIDE 3 G: 900 INJECTION INTRAVENOUS at 19:36

## 2024-10-30 RX ADMIN — ROCURONIUM BROMIDE 60 MG: 10 INJECTION, SOLUTION INTRAVENOUS at 11:35

## 2024-10-30 RX ADMIN — VASOPRESSIN 2 UNITS: 20 INJECTION INTRAVENOUS at 17:36

## 2024-10-30 RX ADMIN — SODIUM BICARBONATE 50 MEQ: 84 INJECTION, SOLUTION INTRAVENOUS at 21:58

## 2024-10-30 RX ADMIN — ROCURONIUM BROMIDE 30 MG: 10 INJECTION, SOLUTION INTRAVENOUS at 13:48

## 2024-10-30 RX ADMIN — VASOPRESSIN 3 UNITS: 20 INJECTION INTRAVENOUS at 17:24

## 2024-10-30 RX ADMIN — AMINOCAPROIC ACID 10 G: 250 INJECTION, SOLUTION INTRAVENOUS at 12:14

## 2024-10-30 RX ADMIN — VASOPRESSIN 2 UNITS: 20 INJECTION INTRAVENOUS at 17:22

## 2024-10-30 RX ADMIN — EPINEPHRINE 1 MG: 0.1 INJECTION INTRACARDIAC; INTRAVENOUS at 20:32

## 2024-10-30 RX ADMIN — AMIODARONE HYDROCHLORIDE 1 MG/MIN: 1.8 INJECTION, SOLUTION INTRAVENOUS at 16:55

## 2024-10-30 RX ADMIN — ROCURONIUM BROMIDE 20 MG: 10 INJECTION, SOLUTION INTRAVENOUS at 16:25

## 2024-10-30 RX ADMIN — EPINEPHRINE 1 MG: 0.1 INJECTION INTRACARDIAC; INTRAVENOUS at 21:57

## 2024-10-30 RX ADMIN — MAGNESIUM SULFATE HEPTAHYDRATE 2 G: 500 INJECTION, SOLUTION INTRAMUSCULAR; INTRAVENOUS at 16:33

## 2024-10-30 RX ADMIN — SODIUM CHLORIDE: 9 INJECTION, SOLUTION INTRAVENOUS at 19:16

## 2024-10-30 RX ADMIN — PROPOFOL 50 MCG/KG/MIN: 10 INJECTION, EMULSION INTRAVENOUS at 11:35

## 2024-10-30 RX ADMIN — ROCURONIUM BROMIDE 20 MG: 10 INJECTION, SOLUTION INTRAVENOUS at 17:41

## 2024-10-30 RX ADMIN — SODIUM CHLORIDE: 9 INJECTION, SOLUTION INTRAVENOUS at 11:28

## 2024-10-30 RX ADMIN — AMIODARONE HYDROCHLORIDE 150 MG: 50 INJECTION, SOLUTION INTRAVENOUS at 16:37

## 2024-10-30 RX ADMIN — NOREPINEPHRINE BITARTRATE 0.02 MCG/KG/MIN: 1 SOLUTION INTRAVENOUS at 11:46

## 2024-10-30 RX ADMIN — CALCIUM CHLORIDE 1 G: 100 INJECTION, SOLUTION INTRAVENOUS at 20:33

## 2024-10-30 RX ADMIN — VASOPRESSIN 0.02 UNITS/MIN: 20 INJECTION, SOLUTION INTRAVENOUS at 19:34

## 2024-10-30 RX ADMIN — PROTAMINE SULFATE 350 MG: 10 INJECTION, SOLUTION INTRAVENOUS at 19:02

## 2024-10-30 RX ADMIN — SODIUM CHLORIDE 0.25 MCG/KG/MIN: 0.9 INJECTION, SOLUTION INTRAVENOUS at 12:57

## 2024-10-30 RX ADMIN — VASOPRESSIN 1 UNITS: 20 INJECTION INTRAVENOUS at 17:47

## 2024-10-30 RX ADMIN — SODIUM CHLORIDE: 9 INJECTION, SOLUTION INTRAVENOUS at 11:18

## 2024-10-30 RX ADMIN — VASOPRESSIN 2 UNITS: 20 INJECTION INTRAVENOUS at 17:31

## 2024-10-30 RX ADMIN — COAGULATION FACTOR VIIA (RECOMBINANT) 1.5 MG: KIT at 20:09

## 2024-10-30 RX ADMIN — SODIUM CHLORIDE: 9 INJECTION, SOLUTION INTRAVENOUS at 22:27

## 2024-10-30 RX ADMIN — ROCURONIUM BROMIDE 20 MG: 10 INJECTION, SOLUTION INTRAVENOUS at 15:24

## 2024-10-30 RX ADMIN — EPINEPHRINE 0.02 MCG/KG/MIN: 1 INJECTION, SOLUTION, CONCENTRATE INTRAVENOUS at 12:57

## 2024-10-30 RX ADMIN — PROPOFOL 100 MG: 10 INJECTION, EMULSION INTRAVENOUS at 11:35

## 2024-10-30 RX ADMIN — PHENYLEPHRINE HYDROCHLORIDE 100 MCG: 10 INJECTION INTRAVENOUS at 13:14

## 2024-10-30 RX ADMIN — HYDROXOCOBALAMIN 5 G: 5 INJECTION, POWDER, LYOPHILIZED, FOR SOLUTION INTRAVENOUS at 20:29

## 2024-10-30 NOTE — ANESTHESIA PROCEDURE NOTES
Pulmonary Artery Catheter      Patient reassessed immediately prior to procedure    Patient location during procedure: OR  Start time: 10/30/2024 10:26 AM  Stop Time:10/30/2024 10:46 AM  Indications: central pressure monitoring, vascular access and MD/Surgeon request  Staff  Anesthesiologist: Lionel Valencia MD  Preanesthetic Checklist  Completed: patient identified and risks and benefits discussed  Central Line Prep  Sterile Tech:cap, gloves, gown, mask and sterile barriers  Prep: chloraprep  Patient monitoring: blood pressure monitoring, continuous pulse oximetry and EKG  Central Line Procedure  Laterality:right  Location:internal jugular  Catheter Type:[place]  Catheter Size:7.5 Fr  Guidance:ultrasound guided  PROCEDURE NOTE/ULTRASOUND INTERPRETATION.  Using ultrasound guidance the potential vascular sites for insertion of the catheter were visualized to determine the patency of the vessel to be used for vascular access.  After selecting the appropriate site for insertion, the needle was visualized under ultrasound being inserted into the internal jugular vein, followed by ultrasound confirmation of wire and catheter placement. There were no abnormalities seen on ultrasound; an image was taken; and the patient tolerated the procedure with no complications.   Assessment  Post procedure:biopatch applied, line sutured, occlusive dressing applied and secured with tape  Assessement:blood return through all ports and free fluid flow  Complications:no  Patient Tolerance:patient tolerated the procedure well with no apparent complications

## 2024-10-30 NOTE — ANESTHESIA PROCEDURE NOTES
Airway  Urgency: elective    Date/Time: 10/30/2024 11:39 AM  Airway not difficult    General Information and Staff    Patient location during procedure: OR  Anesthesiologist: Azar Macias MD    Indications and Patient Condition  Indications for airway management: airway protection    Preoxygenated: yes  Mask difficulty assessment: 1 - vent by mask    Final Airway Details  Final airway type: endotracheal airway      Successful airway: ETT  Cuffed: yes   Successful intubation technique: direct laryngoscopy  Facilitating devices/methods: intubating stylet  Endotracheal tube insertion site: oral  Blade: Eli  Blade size: 4  ETT size (mm): 8.0  Cormack-Lehane Classification: grade IIa - partial view of glottis  Placement verified by: chest auscultation   Measured from: lips  ETT/EBT  to lips (cm): 22  Number of attempts at approach: 1  Assessment: lips, teeth, and gum same as pre-op and atraumatic intubation

## 2024-10-30 NOTE — ANESTHESIA PROCEDURE NOTES
Diagnostic IntraOp Dano    Procedure Performed: Diagnostic IntraOp Dano       Start Time:        End Time:      Preanesthesia Checklist:  Patient identified, IV assessed, risks and benefits discussed, monitors and equipment assessed, procedure being performed at surgeon's request and anesthesia consent obtained.    General Procedure Information  Diagnostic Indications for Echo:  assessment of ascending aorta, assessment of surgical repair and hemodynamic monitoring  Physician Requesting Echo: Javon Florian MD  CPT Code:  82556, 03153  ICD Code for Medical Necessity:  I34.0, I70.0  Location performed:  OR  Intubated  Bite block placed  Heart visualized  Probe Insertion:  Easy  Probe Type:  Multiplane  Modalities:  Color flow mapping, pulse wave Doppler and continuous wave Doppler    Echocardiographic and Doppler Measurements    Ventricles    Right Ventricle:  Cavity size dilated.  Hypertrophy not present.  Thrombus not present.  Global function mildly impaired.    Left Ventricle:  Cavity size dilated.  Thrombus not present.  Global Function mildly impaired.  Ejection Fraction 58%.    Other Ventricular Findings:       LVEDV 155 mL        Valves    Aortic Valve:  Annulus bioprosthetic.  Stenosis mild.  Mean Gradient: 9 mmHg.  Regurgitation absent.  Leaflets vegetative.  Leaflet motions restricted.      Mitral Valve:  Annulus dilated.  Stenosis not present.  Mean Gradient: 1 mmHg.  Regurgitation moderate.  Leaflets normal.  Leaflet motions normal.      Tricuspid Valve:  Annulus dilated.  Stenosis not present.  Regurgitation mild.  Leaflets normal.    Other Valve Findings:       Anterior mitral leaflet length 3.1 cm    Aorta    Ascending Aorta:  Size normal.  Diameter 3.5 cm.  Dissection not present.  Plaque thickness less than 3 mm.  Mobile plaque not present.    Aortic Arch:  Size normal.  Diameter 3 cm.  Dissection not present.  Plaque thickness less than 3 mm.  Mobile plaque not present.    Descending Aorta:  Size  normal.  Diameter 2.5 cm.  Dissection not present.  Plaque thickness less than 3 mm.  Mobile plaque not present.        Atria    Right Atrium:  Size dilated.  Spontaneous echo contrast present.  Thrombus not present.  Tumor not present.  Device not present.      Left Atrium:  Size dilated.  Spontaneous echo contrast present.  Thrombus not present.  Tumor not present.  Device not present.    Left atrial appendage normal.          Diastolic Function Measurements:  Diastolic Dysfunction Grade= indeterminate  E=  51.7 ms  A=  ms  E/A Ratio=   DT=  108 ms  S/D=  .6  IVRT=    Other Findings  Pericardium:  pericardial effusion  Pulmonary Venous Flow:  blunted (decreased) systolic flow    Anesthesia Information  Performed Personally  Anesthesiologist:  Azar Macias MD      Echocardiogram Comments:       Postbypass results:

## 2024-10-30 NOTE — ANESTHESIA PROCEDURE NOTES
Central Line      Patient reassessed immediately prior to procedure    Patient location during procedure: pre-op  Start time: 10/30/2024 10:26 AM  Stop Time:10/30/2024 10:46 AM  Indications: vascular access and central pressure monitoring  Staff  Anesthesiologist: Lionel Valencia MD  Preanesthetic Checklist  Completed: patient identified and risks and benefits discussed  Central Line Prep  Sterile Tech:cap, gloves, gown, mask and sterile barriers  Prep: chloraprep  Patient monitoring: blood pressure monitoring, continuous pulse oximetry and EKG  Central Line Procedure  Laterality:right  Location:internal jugular  Catheter Type:Cordis  Catheter Size:9 Fr  Guidance:ultrasound guided  PROCEDURE NOTE/ULTRASOUND INTERPRETATION.  Using ultrasound guidance the potential vascular sites for insertion of the catheter were visualized to determine the patency of the vessel to be used for vascular access.  After selecting the appropriate site for insertion, the needle was visualized under ultrasound being inserted into the internal jugular vein, followed by ultrasound confirmation of wire and catheter placement. There were no abnormalities seen on ultrasound; an image was taken; and the patient tolerated the procedure with no complications. Images: still images obtained, printed/placed on chart  Assessment  Post procedure:biopatch applied, line sutured, occlusive dressing applied and secured with tape  Assessement:blood return through all ports and chest x-ray ordered  Complications:no  Patient Tolerance:patient tolerated the procedure well with no apparent complications  Additional Notes  Ultrasound Interpretation:  Using ultrasound guidance the potential vascular sites for insertion of the catheter were visualized to determine the patency of the vessel to be used for vascular access.  After selecting the appropriate site for insertion, the needle was visualized under ultrasound being inserted into the vessel, followed by  ultrasound confirmation of wire and catheter placement.  There were no abnormalities seen on ultrasound; an image was taken/ and the patient tolerated the procedure with no complications.

## 2024-10-30 NOTE — PLAN OF CARE
Goal Outcome Evaluation:   Pt is progressing in a positive direction. No change to care plan indicated.        Problem: Adult Inpatient Plan of Care  Goal: Plan of Care Review  Outcome: Progressing  Flowsheets  Taken 10/30/2024 0259 by Dhaval Lanier RN  Progress: improving  Taken 10/28/2024 0919 by Angelica Scales PT  Plan of Care Reviewed With: patient     Problem: Adult Inpatient Plan of Care  Goal: Plan of Care Review  Outcome: Progressing  Flowsheets  Taken 10/30/2024 0259 by Dhaval Lanier RN  Progress: improving  Taken 10/28/2024 0919 by Angelica Scales PT  Plan of Care Reviewed With: patient  Goal: Patient-Specific Goal (Individualized)  Outcome: Progressing  Goal: Absence of Hospital-Acquired Illness or Injury  Outcome: Progressing  Intervention: Prevent and Manage VTE (Venous Thromboembolism) Risk  Recent Flowsheet Documentation  Taken 10/29/2024 2000 by Dhaval Lanier RN  VTE Prevention/Management: (Per pt request) SCDs (sequential compression devices) off  Goal: Optimal Comfort and Wellbeing  Outcome: Progressing  Intervention: Provide Person-Centered Care  Recent Flowsheet Documentation  Taken 10/29/2024 2000 by Dhaval Lanier RN  Trust Relationship/Rapport:   thoughts/feelings acknowledged   reassurance provided   empathic listening provided   emotional support provided  Goal: Readiness for Transition of Care  Outcome: Progressing     Problem: Skin Injury Risk Increased  Goal: Skin Health and Integrity  Outcome: Progressing     Problem: Comorbidity Management  Goal: Blood Pressure in Desired Range  Outcome: Progressing     Problem: Fall Injury Risk  Goal: Absence of Fall and Fall-Related Injury  Outcome: Progressing     Progress: improving

## 2024-10-30 NOTE — ANESTHESIA PREPROCEDURE EVALUATION
Anesthesia Evaluation     NPO Solid Status: > 8 hours             Airway   Mallampati: II  Dental      Pulmonary    (-) COPD, sleep apnea, not a smoker    ROS comment: Negative patient screen for PO    Cardiovascular     (+) hypertension, valvular problems/murmurs (previous bioprosthetic AVR with endocarditis) AS and MR, CAD, dysrhythmias Atrial Fib, hyperlipidemia      Neuro/Psych  GI/Hepatic/Renal/Endo    (+) morbid obesity    Musculoskeletal     Abdominal    Substance History      OB/GYN          Other                      Anesthesia Plan    ASA 4     general     Postoperative Plan: Expected vent after surgery  Anesthetic plan, risks, benefits, and alternatives have been provided, discussed and informed consent has been obtained with: patient and child.    CODE STATUS:    Code Status (Patient has no pulse and is not breathing): CPR (Attempt to Resuscitate)  Medical Interventions (Patient has pulse or is breathing): Full Support

## 2024-10-30 NOTE — ANESTHESIA PROCEDURE NOTES
Arterial Line      Patient reassessed immediately prior to procedure    Patient location during procedure: pre-op  Start time: 10/30/2024 10:15 AM  Stop Time:10/30/2024 10:17 AM       Line placed for hemodynamic monitoring.  Performed By   Anesthesiologist: Lionel Valencia MD   Preanesthetic Checklist  Completed: patient identified and risks and benefits discussed  Arterial Line Prep    Sterile Tech: gloves  Prep: ChloraPrep  Patient monitoring: blood pressure monitoring, continuous pulse oximetry and EKG  Arterial Line Procedure   Laterality:left  Location:  radial artery  Catheter size: 20 G   Guidance: ultrasound guided  PROCEDURE NOTE/ULTRASOUND INTERPRETATION.  Using ultrasound guidance the potential vascular sites for insertion of the catheter were visualized to determine the patency of the vessel to be used for vascular access.  After selecting the appropriate site for insertion, the needle was visualized under ultrasound being inserted into the radial artery, followed by ultrasound confirmation of wire and catheter placement. There were no abnormalities seen on ultrasound; an image was taken; and the patient tolerated the procedure with no complications.   Successful placement: yes Images: still images obtained, printed/placed on the chart  Post Assessment   Dressing Type: occlusive dressing applied and secured with tape.   Complications no   Patient Tolerance: patient tolerated the procedure well with no apparent complications  Additional Notes  Using ultrasound guidance the potential vascular sites for insertion of the catheter were visualized to determine the patency of the vessel to be used for vascular access.  After selecting the appropriate site for insertion, the needle was visualized under ultrasound being inserted into the artery, followed by ultrasound confirmation of wire and catheter placement.  There were no abnormalities seen on ultrasound; an image was taken/ and the patient tolerated the  procedure with no complications.

## 2024-10-30 NOTE — PLAN OF CARE
Problem: Adult Inpatient Plan of Care  Goal: Plan of Care Review  10/30/2024 0308 by Dhaval Lanier RN  Outcome: Progressing  Flowsheets  Taken 10/30/2024 0308 by Dhaval Lanier RN  Progress: improving  Outcome Evaluation: Pre-op shower and shave was given. Pt had no questions for RN in reltion to the procedure.  Taken 10/28/2024 0919 by Angelica Scales, PT  Plan of Care Reviewed With: patient  10/30/2024 0259 by Dhaval Lanier RN  Outcome: Progressing  Flowsheets  Taken 10/30/2024 0259 by Dhaval Lanier RN  Progress: improving  Taken 10/28/2024 0919 by Angelica Scales, JONNY  Plan of Care Reviewed With: patient  Goal: Patient-Specific Goal (Individualized)  10/30/2024 0308 by Dhaval Lanier RN  Outcome: Progressing  10/30/2024 0259 by Dhaval Lanier RN  Outcome: Progressing  Goal: Absence of Hospital-Acquired Illness or Injury  10/30/2024 0308 by Dhaval Lanier RN  Outcome: Progressing  10/30/2024 0259 by Dhaval Lanier RN  Outcome: Progressing  Intervention: Prevent and Manage VTE (Venous Thromboembolism) Risk  Recent Flowsheet Documentation  Taken 10/29/2024 2000 by Dhaval Lanier RN  VTE Prevention/Management: (Per pt request) SCDs (sequential compression devices) off  Goal: Optimal Comfort and Wellbeing  10/30/2024 0308 by Dhaval Lanier RN  Outcome: Progressing  10/30/2024 0259 by Dhaval Lanier RN  Outcome: Progressing  Intervention: Provide Person-Centered Care  Recent Flowsheet Documentation  Taken 10/29/2024 2000 by Dhaval Lanier RN  Trust Relationship/Rapport:   thoughts/feelings acknowledged   reassurance provided   empathic listening provided   emotional support provided  Goal: Readiness for Transition of Care  10/30/2024 0308 by Dhaval Lanier RN  Outcome: Progressing  10/30/2024 0259 by Dhaval Lanier RN  Outcome: Progressing     Problem: Skin Injury Risk Increased  Goal: Skin Health and Integrity  10/30/2024 0308 by Dhaval Lanier RN  Outcome: Progressing  10/30/2024 0259 by Dhaval Lanier RN  Outcome: Progressing      Problem: Comorbidity Management  Goal: Blood Pressure in Desired Range  10/30/2024 0308 by Dhaval Lanier RN  Outcome: Progressing  10/30/2024 0259 by Dhaval Lanier RN  Outcome: Progressing     Problem: Fall Injury Risk  Goal: Absence of Fall and Fall-Related Injury  10/30/2024 0308 by Dhaval Lanier RN  Outcome: Progressing  10/30/2024 0259 by Dhaval Lanier RN  Outcome: Progressing   Goal Outcome Evaluation:           Progress: improving  Outcome Evaluation: Pre-op shower and shave was given. Pt had no questions for RN in reltion to the procedure.

## 2024-10-31 NOTE — ANESTHESIA PROCEDURE NOTES
Diagnostic IntraOp Dano    Procedure Performed: Diagnostic IntraOp Dano       Start Time:        End Time:      Preanesthesia Checklist:  Patient identified, IV assessed, risks and benefits discussed, monitors and equipment assessed, procedure being performed at surgeon's request and anesthesia consent obtained.    General Procedure Information  DANO Placed for monitoring purposes only -- This is not a diagnostic DANO  Physician Requesting Echo: Javon Florian MD  Location performed:  ICU  Intubated  Bite block placed  Heart visualized  Probe Insertion:  Easy  Probe Type:  Multiplane  Modalities:  Color flow mapping, continuous wave Doppler and pulse wave Doppler    Echocardiographic and Doppler Measurements            Aorta    Other Aortic Findings:                               Anesthesia Information  Performed Personally  Anesthesiologist:  Anuj Aguilar MD      Echocardiogram Comments:       Limited exam performed in ICU on POD1, inotropes epi 0.02 & milrinone 0.375    RV - severely impaired systolic function  LV - severe impaired systolic function, EF 20-25%, underfilled, practically kissing papillary muscles, global hypokinesis  Diastolic function - grade 2 dysfunction    RA: dilated  LA: dilated    AV - s/p bioprosthetic homograft without paravalvular leak  MV - s/p annuloplasty, well seated without leak, mean gradient 2mmHg, no regurgitation seen  TV - moderate to severe regurgitation

## 2024-10-31 NOTE — PROGRESS NOTES
LOS: 8 days   Patient Care Team:  Juan Perez MD as PCP - General (Internal Medicine)    Chief Complaint: Follow-up bioprosthetic AVR endocarditis, mitral regurgitation, persistent atrial fibrillation.    Interval History: He remains critically ill after his surgery yesterday.  He is on 4 pressors, milrinone, lidocaine, and amiodarone.  He remains intubated.  IABP at 2:1.    Vital Signs:  Temp:  [96.4 °F (35.8 °C)-99.7 °F (37.6 °C)] 99.7 °F (37.6 °C)  Heart Rate:  [43-97] 85  Resp:  [12-16] 13  BP: ()/() 114/73  Arterial Line BP: ()/(44-65) 91/54  FiO2 (%):  [100 %] 100 %    Intake/Output Summary (Last 24 hours) at 10/31/2024 1425  Last data filed at 10/31/2024 1315  Gross per 24 hour   Intake 33788 ml   Output 2165 ml   Net 8977 ml       Physical Exam:   General Appearance:    Intubated and sedated.   Lungs:     Rhonchi bilaterally     Heart:    Regular rhythm with a normal rate.  Balloon pump audible.   Abdomen:     Soft, nontender, nondistended.    Extremities:    2+ generalized edema.     Results Review:    Results from last 7 days   Lab Units 10/31/24  0346   SODIUM mmol/L 148*   POTASSIUM mmol/L 4.0   CHLORIDE mmol/L 104   CO2 mmol/L 16.6*   BUN mg/dL 11   CREATININE mg/dL 1.49*   GLUCOSE mg/dL 186*   CALCIUM mg/dL 9.1         Results from last 7 days   Lab Units 10/31/24  1349   WBC 10*3/mm3 16.99*   HEMOGLOBIN g/dL 10.0*   HEMATOCRIT % 29.4*   PLATELETS 10*3/mm3 111*     Results from last 7 days   Lab Units 10/31/24  0346 10/30/24  2325 10/30/24  2148 10/30/24  1916 10/30/24  1907   INR  1.38* 1.21* 1.10   < > 1.87*   APTT seconds  --  46.1* 40.8*  --  33.6    < > = values in this interval not displayed.     Results from last 7 days   Lab Units 10/29/24  0952   CHOLESTEROL mg/dL 131     Results from last 7 days   Lab Units 10/31/24  0346   MAGNESIUM mg/dL 2.8*     Results from last 7 days   Lab Units 10/29/24  0952   CHOLESTEROL mg/dL 131   TRIGLYCERIDES mg/dL 119   HDL CHOL  mg/dL 32*   LDL CHOL mg/dL 77       I reviewed the patient's new clinical results.        Assessment:  1.  Status post bioprosthetic aortic valve replacement in 2014  2.  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  Moderate to severe mitral regurgitation  4.  Status post reoperative AV root replacement, ICD removal, and IABP placement on 10/30/2021  5.  Postoperative shock, multifactorial  6.  Acute kidney injury  7.  History of nonischemic cardiomyopathy with recovered ejection fraction  8.  Anemia, acute on chronic  9.  Postoperative thrombocytopenia  10.  Persistent atrial fibrillation  11.  Ventricular tachycardia postoperatively  12.  Grade C esophagitis by EGD on 9/30/2024  13.  Acute left lower extremity DVT on 10/24/2024  14.  Right Achilles tendon tear  15.  Postoperative junctional rhythm    Plan:  -He is critically ill postoperatively.  He is currently on epinephrine, Levophed, vasopressin, Robinson-Synephrine, inhaled Primacor, lidocaine, amiodarone, and an IABP at 2:1.    -He received a significant amount of blood products in the OR.  Nephrology has been consulted for acute kidney injury.  Bumex 4 mg IV x 2 doses have been ordered.    -Junctional rhythm with normal rate this morning.  Continue to monitor.    -Again, he did have ventricular tachycardia postoperatively.  Currently on lidocaine and amiodarone drips.    -Chest is still open.  This will need closure at some point when he is more stable.    -Very critically ill.  Cardiology will continue to follow.    Antwan Farmer MD  10/31/24  14:25 EDT

## 2024-10-31 NOTE — PROGRESS NOTES
LOS: 8 days     Chief Complaint: Endocarditis    Interval History: Status post aortic root replacement yesterday and remains intubated.  Requiring intra-aortic balloon pump.  Currently on multiple pressors and antiarrhythmics.    Vital Signs  Temp:  [96.4 °F (35.8 °C)-98.3 °F (36.8 °C)] 98.1 °F (36.7 °C)  Heart Rate:  [43-97] 89  Resp:  [16-20] 16  BP: (104-140)/() 110/46  Arterial Line BP: ()/(44-74) 97/51  FiO2 (%):  [100 %] 100 %    Physical Exam:  General: Intubated and sedated  HEENT: ET tube in place  Respiratory: Multiple chest tubes.  : Ugarte catheter  Skin: Operative dressing over the anterior chest.  Access: Right IJ CVC.  Arterial line.  Peripheral line.    Antibiotics:  Anti-Infectives (From admission, onward)      Ordered     Dose/Rate Route Frequency Start Stop    10/30/24 2306  ceFAZolin 2000 mg IVPB in 100 mL NS (MBP)        Ordering Provider: Samantha Salvador APRN    2,000 mg  over 30 Minutes Intravenous Every 8 Hours 10/31/24 0000 11/01/24 1559    10/29/24 1518  ceFAZolin 2000 mg IVPB in 100 mL NS (MBP)        Ordering Provider: Bryant Mcclure PA-C    2,000 mg  over 30 Minutes Intravenous Once 10/30/24 0600 10/30/24 1936    10/28/24 1034  vancomycin IVPB 2000 mg in 0.9% Sodium Chloride 500 mL        Ordering Provider: Samantha Salvador APRN    15 mg/kg × 142 kg Intravenous Once 10/28/24 1045 10/28/24 1356    10/23/24 1709  penicillin G potassium 4 Million Units in sodium chloride 0.9 % 100 mL IVPB        Ordering Provider: Samantha aSlvador APRN    4 Million Units  over 30 Minutes Intravenous Every 4 Hours Scheduled 10/23/24 2000 12/05/24 0129             Results Review:     I reviewed the patient's new clinical results.    Lab Results   Component Value Date    WBC 24.38 (H) 10/31/2024    HGB 8.2 (L) 10/31/2024    HCT 24.1 (L) 10/31/2024    MCV 90.3 10/31/2024     (L) 10/31/2024     Lab Results   Component Value Date    GLUCOSE 186 (H) 10/31/2024    BUN 11 10/31/2024     CREATININE 1.49 (H) 10/31/2024    BCR 7.4 10/31/2024    CO2 16.6 (L) 10/31/2024    CALCIUM 9.1 10/31/2024    ALBUMIN 2.9 (L) 10/31/2024    LABIL2 1.4 06/27/2019    AST 65 (H) 10/30/2024    ALT 16 10/30/2024       Microbiology:  10/3 COVID-negative  10/10 COVID-negative  10/14 COVID-negative  10/15 blood cultures 2 out of 2 strep mitis  10/17 COVID-negative  10/17 blood cultures 2 out of 2 strep mitis  10/21 COVID-negative  10/23 blood cultures no growth today  10/30 operative cultures from the aortic valve no growth to date    Assessment    #Strep mitis endocarditis  #Status post bioprosthetic aortic valve replacement 2014  #Status post aortic root replacement in the setting of prosthetic aortic valve endocarditis and annular abscess on 10/30  #Status post removal of pacemaker generator and intracardiac portion of the leads with retained pocket and venous leads  #Atrial fibrillation  #Achilles tendon rupture    Continue penicillin G 4,000,000 units every 4 hours for strep mitis endocarditis.  Planning for minimum of 6 weeks of antibiotics but final end date depended on intraoperative cultures.    ID will follow.

## 2024-10-31 NOTE — PLAN OF CARE
Goal Outcome Evaluation:                      Pt came to CVR at 2308. AVR MV repair. NS in 80s with runs of VT. On jett, vaso, levo, epi, milrinone, lidocaine, amio, dex, and prop. Converts on his own to sinus- did not need to shock. 1 unit PRBC given per Dr. Florian. Hemoglobin now stable. 1 amp bicarb given per Dr. Florian.adequate UO. Last index 2.11. IABP maintained 1:2 throughout the night. Care on going.

## 2024-10-31 NOTE — CONSULTS
[facility] Consult Note      Patient Name: Woodrow Alejandro  : 1950  MRN: [id number]  Primary Care Physician:  Juan Perez MD  Referring Physician: Javon Florian MD  Date of admission: 10/23/2024    Subjective     Reason for Consult: NATHANIEL    HPI:   Woodrow Alejandro is a 73 y.o. male with normal kidney function at baseline, prior prosthetic aortic valve replacement, cardiomyopathy s/p AICD, CAD, A-fib intolerant of AC, HTN, and in rehab recently for partial right Achilles tear and esophageal bleeding, was found to have prosthetic aortic valve stenosis and vegetation with Streptococcus mitis.      On 10/30, patient underwent aortic root replacement and removal of AICD by Dr. Florian.  During the procedure, patient received multiple blood products (2 units platelets, 8 units FFP, 10 units cryo, 4 units PRBCs); was also defibrillated multiple times and started on multiple inotropes and pressors.  IABP in place; sternum is not closed, although skin is.    Patient is currently intubated and sedated.    Review of Systems:   Unobtainable    Personal History     Past Medical History:   Diagnosis Date    Achilles tendon injury     right    Aortic valve replaced     Arthritis 2018    Atrial fibrillation     Coronary artery disease     History of transfusion     Hypertension        Past Surgical History:   Procedure Laterality Date    CARDIAC SURGERY      COLONOSCOPY      COLONOSCOPY N/A 2024    Procedure: COLONOSCOPY WITH HOT/COLD SNARE POLYPECTOMIES, ELEVIEW INJECTION,CLIPX1;  Surgeon: Kurt Mensah MD;  Location: [facility] ENDOSCOPY;  Service: Gastroenterology;  Laterality: N/A;  COLON POLYPS    ENDOSCOPY N/A 2024    Procedure: ESOPHAGOGASTRODUODENOSCOPY WITH BIOPSIES;  Surgeon: Kurt Mensah MD;  Location: [facility] ENDOSCOPY;  Service: Gastroenterology;  Laterality: N/A;  RETAINED FOOD IN STOMACH AND REFLUX ESOPHAGITIS    PACEMAKER  IMPLANTATION         Family History: family history includes Arthritis in his father; COPD in his father and mother; Heart disease in his mother.    Social History:  reports that he has never smoked. He has never been exposed to tobacco smoke. He has never used smokeless tobacco. He reports current alcohol use of about 4.0 standard drinks of alcohol per week. He reports that he does not use drugs.    Home Medications:  Prior to Admission medications    Medication Sig Start Date End Date Taking? Authorizing Provider   aspirin 325 MG tablet Take 1 tablet by mouth 2 (two) times a day.   Yes Eli Alcaraz MD   dilTIAZem (CARDIZEM) 120 MG tablet Take 1 tablet by mouth 2 (Two) Times a Day. 4/11/24  Yes Juan Perez MD   furosemide (LASIX) 40 MG tablet Take 1 tablet by mouth Daily. 9/6/24  Yes Eli Alcaraz MD   lisinopril (PRINIVIL,ZESTRIL) 10 MG tablet Take 1 tablet by mouth Daily. 10/23/24  Yes Cristino Araan PA   metoprolol tartrate 75 MG tablet Take 75 mg by mouth 2 (Two) Times a Day for 30 days. 10/23/24 11/22/24 Yes Cristino Arana PA   pantoprazole (PROTONIX) 40 MG EC tablet Take 1 tablet by mouth 2 (Two) Times a Day Before Meals for 30 days. 10/23/24 11/22/24 Yes Cristino Arana PA   penicillin g 4 MU/100 mL 0.9% sodium chloride IVPB Infuse 100 mL into a venous catheter Every 4 (Four) Hours for 43 doses. Indications: Bacteria in the Blood 10/23/24 10/31/24  Cristino Arana PA       Allergies:  Allergies   Allergen Reactions    Diclofenac Sodium Dizziness       Objective     Vitals:   Temp:  [96.4 °F (35.8 °C)-98.4 °F (36.9 °C)] 98.4 °F (36.9 °C)  Heart Rate:  [43-97] 87  Resp:  [12-18] 12  BP: (104-138)/() 106/61  Arterial Line BP: ()/(44-74) 91/50  Flow (L/min) (Oxygen Therapy):  [2] 2  FiO2 (%):  [100 %] 100 %    Intake/Output Summary (Last 24 hours) at 10/31/2024 1019  Last data filed at 10/31/2024 1000  Gross per 24 hour   Intake 08570 ml   Output 2195 ml   Net  8679 ml       Physical Exam:   Constitutional: Intubated and sedated, ill-appearing; overweight; CVP 18  HEENT: Sclera anicteric, no conjunctival injection  Neck: no JVD; R IJ  Respiratory: Coarse anteriorly, on 100% FiO2 and 7.5 PEEP  Cardiovascular: Irregularly irregular, no rub; midsternal and left upper chest dressing   Gastrointestinal: Absent BS, soft, distended, no grimacing, chest tube in place  : No palpable bladder; Ugarte in place with bright red urine (received vitamin B12)  Musculoskeletal: 2+ generalized edema, 3+ pitting edema feet and legs; no clubbing or cyanosis; left groin arterial line in place  Psychiatric: Unable to assess  Neurologic: Unable to assess  Skin: Warm and dry       Scheduled Meds:     acetaminophen, 1,000 mg, Intravenous, Q8H  acetaminophen, 650 mg, Oral, Q4H   Or  acetaminophen, 650 mg, Oral, Q4H   Or  acetaminophen, 650 mg, Rectal, Q4H  aspirin, 81 mg, Oral, Daily  atorvastatin, 40 mg, Oral, Nightly  ceFAZolin, 2,000 mg, Intravenous, Q8H  chlorhexidine, 15 mL, Mouth/Throat, Q12H  enoxaparin, 40 mg, Subcutaneous, Q12H  magnesium sulfate, 2 g, Intravenous, Q8H  metoclopramide, 10 mg, Intravenous, Q6H  metoprolol tartrate, 12.5 mg, Oral, Q12H  mupirocin, 1 Application, Each Nare, BID  pantoprazole, 40 mg, Intravenous, Once   Followed by  pantoprazole, 40 mg, Oral, QAM  penicillin g (potassium), 4 Million Units, Intravenous, Q4H  senna-docusate sodium, 2 tablet, Oral, Nightly      IV Meds:   amiodarone, 0.5 mg/min, Last Rate: 0.5 mg/min (10/31/24 0940)  clevidipine, 2-32 mg/hr  dexmedetomidine, 0.2-1.5 mcg/kg/hr, Last Rate: 0.5 mcg/kg/hr (10/31/24 0458)  DOPamine, 2-20 mcg/kg/min  EPINEPHrine, 0.02-0.1 mcg/kg/min, Last Rate: 0.02 mcg/kg/min (10/31/24 0700)  insulin, 0-100 Units/hr, Last Rate: 8.1 Units/hr (10/31/24 0919)  lidocaine in D5W, 1 mg/min, Last Rate: 1 mg/min (10/31/24 0947)  milrinone, 0.25-0.375 mcg/kg/min, Last Rate: 0.375 mcg/kg/min (10/31/24 5068)  niCARdipine, 5-15  mg/hr  nitroglycerin, 5-200 mcg/min  norepinephrine, 0.02-0.2 mcg/kg/min, Last Rate: 0.1 mcg/kg/min (10/31/24 0139)  phenylephrine, 0.2-2 mcg/kg/min, Last Rate: 1 mcg/kg/min (10/31/24 1011)  propofol, 5-50 mcg/kg/min, Last Rate: 15 mcg/kg/min (10/31/24 0822)  vasopressin, 0.02-0.1 Units/min, Last Rate: 0.06 Units/min (10/31/24 0859)        Results Reviewed:   I have personally reviewed the results from the time of this admission to 10/31/2024 10:19 EDT     Lab Results   Component Value Date    GLUCOSE 186 (H) 10/31/2024    CALCIUM 9.1 10/31/2024     (H) 10/31/2024    K 4.0 10/31/2024    CO2 16.6 (L) 10/31/2024     10/31/2024    BUN 11 10/31/2024    CREATININE 1.49 (H) 10/31/2024    BCR 7.4 10/31/2024    ANIONGAP 27.4 (H) 10/31/2024      Lab Results   Component Value Date    MG 2.8 (H) 10/31/2024    PHOS 4.2 10/31/2024    ALBUMIN 2.9 (L) 10/31/2024           Assessment / Plan       Prosthetic aortic valve stenosis    Essential hypertension    Permanent atrial fibrillation    S/P AVR    ICD (implantable cardioverter-defibrillator), dual, in situ    Achilles tendon rupture    Bacteremia    Stenosis of prosthetic aortic valve    Anemia      ASSESSMENT:  NATHANIEL, non-oliguric, prerenal +/- ATN, due to cardiogenic and hemorrhagic shock with expected hemodynamic changes following major cardiac surgery 10/30, requiring multiple defibrillations and volume support.  Volume excess by exam, with positive fluid balance of nearly 9 L; potassium normal; hypernatremia.  Respiratory alkalosis and metabolic acidosis.   Cardiogenic/hemorrhagic shock, s/p large amount of blood products in the OR, received another unit PRBC last night, and one extra unit PRBC pending to be given this morning.  On 5 vasopressors, amiodarone drip, and milrinone drip  Prosthetic aortic valve stenosis/vegetation, s/p aortic root replacement and AICD removal, followed by cardiothoracic surgery  Endocarditis and annular abscess/bacteremia due to Strep  mayur.  Followed by ID, recommends continuing penicillin G 4000 1000 units every 4 hours for minimum of 6 weeks  History of A-fib, unable to tolerate anticoagulation  Hypertension, now hypotensive, on 5 pressors    PLAN:  Bumex 4 mg IV for 2 doses today and gauge response in UOP and blood pressure   FENa  High likelihood that renal function will worsen before it improves given current hemodynamic instability  Surveillance labs      Thank you for involving us in the care of Woodrow Alejandro.  Please feel free to call with any questions.    Slim Verde MD  10/31/24  10:19 EDT    [facility]  [phone]      Please note that portions of this note were completed with a voice recognition program.

## 2024-10-31 NOTE — PROGRESS NOTES
" LOS: 8 days   Patient Care Team:  Juan Perez MD as PCP - General (Internal Medicine)    Chief Complaint: post op follow-up    Subjective      Vital Signs  Temp:  [96.4 °F (35.8 °C)-98.3 °F (36.8 °C)] 97.7 °F (36.5 °C)  Heart Rate:  [43-97] 92  Resp:  [16-20] 16  BP: (106-140)/() 106/60  Arterial Line BP: ()/(44-74) 91/52  FiO2 (%):  [100 %] 100 %  Body mass index is 44.79 kg/m².    Intake/Output Summary (Last 24 hours) at 10/31/2024 0716  Last data filed at 10/31/2024 0700  Gross per 24 hour   Intake 73706 ml   Output 2060 ml   Net 8814 ml     No intake/output data recorded.    Chest tube drainage last 8 hours         10/25/24  0434 10/25/24  1211 10/31/24  0615   Weight: (!) 142 kg (313 lb 0.9 oz) (!) 142 kg (313 lb) (!) 150 kg (330 lb 4 oz)         Objective:  Vital signs: (most recent): Blood pressure 109/67, pulse 89, temperature 99.1 °F (37.3 °C), resp. rate 12, height 182.9 cm (72\"), weight (!) 150 kg (330 lb 4 oz), SpO2 100%.                Physical Exam:   General Appearance: awake and alert, no acute distress   Lungs:  ventilator   Heart:  IABP sounds   Abdomen: hypoactive BS    Skin: clean, dry, intact   Neuro: alert and oriented, no focal deficits.     Results Review:        WBC WBC   Date Value Ref Range Status   10/31/2024 24.38 (H) 3.40 - 10.80 10*3/mm3 Final   10/30/2024 24.95 (H) 3.40 - 10.80 10*3/mm3 Final   10/30/2024 24.28 (H) 3.40 - 10.80 10*3/mm3 Final   10/30/2024 23.14 (H) 3.40 - 10.80 10*3/mm3 Final   10/30/2024 8.81 3.40 - 10.80 10*3/mm3 Final   10/29/2024 11.09 (H) 3.40 - 10.80 10*3/mm3 Final      HGB Hemoglobin   Date Value Ref Range Status   10/31/2024 8.2 (L) 13.0 - 17.7 g/dL Final   10/30/2024 7.7 (L) 13.0 - 17.7 g/dL Final   10/30/2024 7.8 (C) 12.0 - 17.0 g/dL Final   10/30/2024 6.7 (C) 13.0 - 17.7 g/dL Final   10/30/2024 6.8 (C) 12.0 - 17.0 g/dL Final   10/30/2024 6.5 (C) 12.0 - 17.0 g/dL Final   10/30/2024 7.5 (C) 12.0 - 17.0 g/dL Final   10/30/2024 7.0 (L) " 13.0 - 17.7 g/dL Final   10/30/2024 7.8 (C) 12.0 - 17.0 g/dL Final   10/30/2024 7.8 (C) 12.0 - 17.0 g/dL Final   10/30/2024 6.5 (C) 12.0 - 17.0 g/dL Final   10/30/2024 7.1 (C) 12.0 - 17.0 g/dL Final   10/30/2024 8.2 (L) 12.0 - 17.0 g/dL Final   10/30/2024 7.8 (C) 12.0 - 17.0 g/dL Final   10/30/2024 8.8 (L) 12.0 - 17.0 g/dL Final   10/30/2024 8.8 (L) 12.0 - 17.0 g/dL Final   10/30/2024 8.8 (L) 12.0 - 17.0 g/dL Final   10/30/2024 8.8 (L) 12.0 - 17.0 g/dL Final   10/30/2024 8.8 (L) 12.0 - 17.0 g/dL Final   10/30/2024 9.2 (L) 12.0 - 17.0 g/dL Final   10/30/2024 8.8 (L) 13.0 - 17.7 g/dL Final   10/29/2024 9.7 (L) 13.0 - 17.7 g/dL Final      HCT Hematocrit   Date Value Ref Range Status   10/31/2024 24.1 (L) 37.5 - 51.0 % Final   10/30/2024 22.5 (L) 37.5 - 51.0 % Final   10/30/2024 23 (L) 38 - 51 % Final   10/30/2024 19.5 (C) 37.5 - 51.0 % Final   10/30/2024 20 (L) 38 - 51 % Final   10/30/2024 19 (L) 38 - 51 % Final   10/30/2024 22 (L) 38 - 51 % Final   10/30/2024 21.3 (L) 37.5 - 51.0 % Final   10/30/2024 23 (L) 38 - 51 % Final   10/30/2024 23 (L) 38 - 51 % Final   10/30/2024 19 (L) 38 - 51 % Final   10/30/2024 21 (L) 38 - 51 % Final   10/30/2024 24 (L) 38 - 51 % Final   10/30/2024 23 (L) 38 - 51 % Final   10/30/2024 26 (L) 38 - 51 % Final   10/30/2024 26 (L) 38 - 51 % Final   10/30/2024 26 (L) 38 - 51 % Final   10/30/2024 26 (L) 38 - 51 % Final   10/30/2024 26 (L) 38 - 51 % Final   10/30/2024 27 (L) 38 - 51 % Final   10/30/2024 26.1 (L) 37.5 - 51.0 % Final   10/29/2024 31.4 (L) 37.5 - 51.0 % Final      Platelets Platelets   Date Value Ref Range Status   10/31/2024 115 (L) 140 - 450 10*3/mm3 Final   10/30/2024 98 (L) 140 - 450 10*3/mm3 Final   10/30/2024 93 (L) 140 - 450 10*3/mm3 Final   10/30/2024 130 (L) 140 - 450 10*3/mm3 Final   10/30/2024 167 140 - 450 10*3/mm3 Final   10/30/2024 141 140 - 450 10*3/mm3 Final   10/30/2024 166 140 - 450 10*3/mm3 Final   10/29/2024 197 140 - 450 10*3/mm3 Final        PT/INR:    Protime    Date Value Ref Range Status   10/31/2024 17.2 (H) 11.7 - 14.2 Seconds Final   10/30/2024 15.5 (H) 11.7 - 14.2 Seconds Final   10/30/2024 14.5 (H) 11.7 - 14.2 Seconds Final   10/30/2024 23.3 (H) 12.8 - 15.2 seconds Final     Comment:     Serial Number: [id number]:  [id number]   10/30/2024 21.7 (H) 11.7 - 14.2 Seconds Final   10/30/2024 22.7 (H) 12.8 - 15.2 seconds Final     Comment:     Serial Number: [id number]:  [id number]   10/29/2024 15.0 (H) 11.7 - 14.2 Seconds Final   /  INR   Date Value Ref Range Status   10/31/2024 1.38 (H) 0.90 - 1.10 Final   10/30/2024 1.21 (H) 0.90 - 1.10 Final   10/30/2024 1.10 0.90 - 1.10 Final   10/30/2024 2.3 (H) 0.8 - 1.2 Final   10/30/2024 1.87 (H) 0.90 - 1.10 Final   10/30/2024 2.3 (H) 0.8 - 1.2 Final   10/29/2024 1.16 (H) 0.90 - 1.10 Final       Sodium Sodium   Date Value Ref Range Status   10/31/2024 148 (H) 136 - 145 mmol/L Final   10/30/2024 150 (H) 136 - 145 mmol/L Final   10/30/2024 134 (L) 136 - 145 mmol/L Final   10/29/2024 137 136 - 145 mmol/L Final      Potassium Potassium   Date Value Ref Range Status   10/31/2024 4.0 3.5 - 5.2 mmol/L Final     Comment:     Slight hemolysis detected by analyzer. Result may be falsely elevated.   10/30/2024 3.3 (L) 3.5 - 5.2 mmol/L Final     Comment:     Slight hemolysis detected by analyzer. Result may be falsely elevated.   10/30/2024 4.0 3.5 - 5.2 mmol/L Final   10/29/2024 4.2 3.5 - 5.2 mmol/L Final      Chloride Chloride   Date Value Ref Range Status   10/31/2024 104 98 - 107 mmol/L Final   10/30/2024 109 (H) 98 - 107 mmol/L Final   10/30/2024 101 98 - 107 mmol/L Final   10/29/2024 102 98 - 107 mmol/L Final      Bicarbonate CO2   Date Value Ref Range Status   10/31/2024 16.6 (L) 22.0 - 29.0 mmol/L Final   10/30/2024 19.2 (L) 22.0 - 29.0 mmol/L Final   10/30/2024 24.2 22.0 - 29.0 mmol/L Final   10/29/2024 23.1 22.0 - 29.0 mmol/L Final      BUN BUN   Date Value Ref Range Status   10/31/2024 11 8 - 23 mg/dL Final   10/30/2024 9 8 - 23 mg/dL  Final   10/30/2024 7 (L) 8 - 23 mg/dL Final   10/29/2024 7 (L) 8 - 23 mg/dL Final      Creatinine Creatinine   Date Value Ref Range Status   10/31/2024 1.49 (H) 0.76 - 1.27 mg/dL Final   10/30/2024 1.43 (H) 0.76 - 1.27 mg/dL Final   10/30/2024 0.66 (L) 0.76 - 1.27 mg/dL Final   10/29/2024 0.83 0.76 - 1.27 mg/dL Final      Calcium Calcium   Date Value Ref Range Status   10/31/2024 9.1 8.6 - 10.5 mg/dL Final   10/30/2024 8.7 8.6 - 10.5 mg/dL Final   10/30/2024 8.4 (L) 8.6 - 10.5 mg/dL Final   10/29/2024 8.5 (L) 8.6 - 10.5 mg/dL Final      Magnesium Magnesium   Date Value Ref Range Status   10/31/2024 2.8 (H) 1.6 - 2.4 mg/dL Final   10/30/2024 3.0 (H) 1.6 - 2.4 mg/dL Final   10/29/2024 1.8 1.6 - 2.4 mg/dL Final          acetaminophen, 1,000 mg, Intravenous, Q8H  acetaminophen, 650 mg, Oral, Q4H   Or  acetaminophen, 650 mg, Oral, Q4H   Or  acetaminophen, 650 mg, Rectal, Q4H  aspirin, 81 mg, Oral, Daily  atorvastatin, 40 mg, Oral, Nightly  ceFAZolin, 2,000 mg, Intravenous, Q8H  chlorhexidine, 15 mL, Mouth/Throat, Q12H  enoxaparin, 40 mg, Subcutaneous, Q12H  magnesium sulfate, 2 g, Intravenous, Q8H  metoclopramide, 10 mg, Intravenous, Q6H  metoprolol tartrate, 12.5 mg, Oral, Q12H  milrinone lactate (PRIMACOR) 5 mg in sodium chloride 0.9 % 15 mL nebulization, 5 mg, Nebulization, Q4H - RT  mupirocin, 1 Application, Each Nare, BID  pantoprazole, 40 mg, Intravenous, Once   Followed by  pantoprazole, 40 mg, Oral, QAM  penicillin g (potassium), 4 Million Units, Intravenous, Q4H  senna-docusate sodium, 2 tablet, Oral, Nightly      amiodarone, 1 mg/min, Last Rate: 1 mg/min (10/31/24 0602)  clevidipine, 2-32 mg/hr  dexmedetomidine, 0.2-1.5 mcg/kg/hr, Last Rate: 0.5 mcg/kg/hr (10/31/24 4390)  DOPamine, 2-20 mcg/kg/min  EPINEPHrine, 0.02-0.1 mcg/kg/min  insulin, 0-100 Units/hr, Last Rate: 6.3 Units/hr (10/31/24 0701)  milrinone, 0.25-0.375 mcg/kg/min, Last Rate: 0.375 mcg/kg/min (10/31/24 3672)  niCARdipine, 5-15  mg/hr  nitroglycerin, 5-200 mcg/min  norepinephrine, 0.02-0.2 mcg/kg/min, Last Rate: 0.1 mcg/kg/min (10/31/24 0139)  phenylephrine, 0.2-2 mcg/kg/min, Last Rate: 1.4 mcg/kg/min (10/31/24 0548)  propofol, 5-50 mcg/kg/min, Last Rate: 15 mcg/kg/min (10/31/24 3468)  vasopressin, 0.03 Units/min, Last Rate: 0.02 Units/min (10/31/24 8456)              Prosthetic aortic valve stenosis    Essential hypertension    Permanent atrial fibrillation    S/P AVR    ICD (implantable cardioverter-defibrillator), dual, in situ    Achilles tendon rupture    Bacteremia    Stenosis of prosthetic aortic valve    Anemia      Assessment & Plan    - Prosthetic aortic valve stenosis, h/o AVR (tissue)/maze/DANICA ligation (2014)- s/p reoperative sternotomy AV root replacement 27mm cryopreserved homograft with right nuvia cabrol, AICD removal, IABP placement- [facility] 10/31/2024  - Possible endocarditis, likely need JOLIE   - Bacteremia, blood cultures positive strep mitis  - Atrial fibrillation, unable to tolerate anticoagulation  - Hypertension  - NICM status post [employer] AICD  - Anemia, s/p EGD/colonoscopy with esophagitis, polyp removal  - Right achilles tendon tear--- walking boot and PT per ortho at [facility]  - Pre-diabetes -- improved at 5.3     POD#1  Remains intubated/sedated  Requiring a lot of support. IABP 1:2, Inhaled primacor, epinephrine 0.02, levophed 0.1, vaso 0.06, milrinone 0.375, jett-synephrine 0.7, lidocaine and amiodarone -- weaning as able. Hgb 8.2-- transfuse 1unit PRBCs  Accelerated junctional  CVP 15, received a lot of product yesterday.  He is positive almost 9L in I&Os. Creatinine 1.49, anion gap elevated, CO2 16-- renal consulted.  His chest is open, Dr. Florian to decide on when to take to the OR for closure.   Will contact anesthesia Dr. Florian would like to get a JOLIE to evaluate LV and RV function.  Continue supportive care    JAVI Nichole  10/31/24  07:16 EDT

## 2024-10-31 NOTE — OP NOTE
Operative Note    Date of Dictation: 10/31/24    Date of Procedure: 10/30/2024    Referring Physician: Parker Newman MD    Preoperative diagnosis:   1.  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  Streptococcus mitis bacteremia  3.  Moderate to severe mitral regurgitation  4.  Status post pacemakers-ICD placement 11 years ago  5.  LV dysfunction  6.  Morbid obesity with a BMI of 44.7 kg/m2  7.  Persistent atrial fibrillation  8.  Chronic anticoagulation    Postoperative diagnosis:   Same    Procedure:   1.  Redo sternotomy with extensive lysis of adhesions  2.  Infected aortic valve prosthetic explant and debridement of periannular abscess  3.  Aortic root replacement with a 27 mm cryopreserved homograft with coronary button reimplantation  4.  Mitral valve repair with a 28 mm Medtronic flexible band annuloplasty  5.  Removal of intracardiac pacing and defibrillator leads to the level of the mid superior vena cava  6.  Explant of ICD pacer generator and proximal part of the leads and capping of leads  7.  Percutaneous placement of left common femoral artery intra-aortic   balloon pump  8.  Conversion of right coronary ostia direct implant to a vein bypass to the proximal RCA and pericardial patch angioplasty of coronary button openning    Surgeon: Javon Florian MD     Assistants: Assistant: Ronaldo Hillman MD (main) Tiffany Cooper CSA; Bryant Mcclure PA-C were responsible for performing the following activities: Retraction, Suction, Irrigation, Suturing, Closing, Placing Dressing, Harvesting of Vessels, Bypass Grafting, and all aspects of this complex cardiac case  and their skilled assistance was necessary for the success of this case.    Anesthesia: General endotracheal anesthesia and JOLIE    Findings:  Prosthetic valve endocarditis with vegetations and periaortic annular destruction and abscess.  Biventricular dysfunction worsening post bypass.  Severe scarring and  attachment of the pacing leads in the proximal superior vena cava    Estimated Blood Loss: Approximately 800 cc, most of it recovered with a Cell Saver device and cardiotomy suckers and was retransfuse to the patient    STS Data:    The patient was explained the risks (STS risk score calculated for AVR), benefits and alternatives of surgery and agreed to proceed. The antibiotics and b blockers were given in the STS required window.  Counseling was given about diet, alcohol and tobacco use as needed    Description of the procedure:     The patient was placed supine on the operative table. Anesthesia was given and lines placed. The patient was prepped and draped using the usual sterile technique. A median sternotomy was performed with a scalpel in the line of the prior incisions and the layers carried down to the sternum using the electrocautery.  The wires from the previous sternal closure were cut and removed.  The sternum was splited in the midline using a transverse oscillating saw. Hemostasis was achieved.  There was significant oozing which was generalized and most likely related to intrinsic coagulopathy.  Dissection was performed below each sternal table and and a sternal retractor was placed and anterior mediastinum was exposed.  It took approximately 60 minutes of more of time to dissect around the distal ascending aorta and right side of the heart to obtain cannulation sites in the superior inferior vena cava and the distal ascending aorta and proximal aortic arch. 300 units of IV heparin was given to maintain the ACT over 400.  Cannulation sutures were placed in the ascending aorta and both superior and inferior vena cava. Small cannulas were placed and aorto bicaval cardiopulmonary bypass was started. Cardioplegia cannulas were placed and then the ascending aorta was clamped. One liter of cold blood cardioplegia was given in an antegrade fashion to achieved diastolic arrest and further doses every 15  minutes thereafter also using retrograde infusion and right coronary ostia cardioplegia.    A transverse proximal aortotomy was performed in the line of the previous aortotomy and the valve was exposed.  It was a 25 mm pericardial prosthesis which was partially blocked due to dense thick vegetations indicating subacute infection but there was active destruction mainly in the right and noncoronary part of the annulus.  Using a 15 blade I was able to cut the sutures and gradually detach the valve from the annulus and then following removing the suture material and infected material.  Gram stains and culture were sent for the tissue and then further debridement was performed to remove all infected and necrotic tissue.  The area was painted with rifampin solution.  In my opinion, an aortic root was needed to be able to reconstruct the proximal aorta and annulus and due to the infection homograft was my best option.  I selected a 27 mm cryopreserved homograft which was prepared as recommended by the biomedical company.  I developed each coronary ostia bottom for later reattachment but the right coronary ostia has significant calcification and partial dissection but in my opinion was still amenable for implantation.  I gave a dose of cardioplegia intermittently through the right coronary ostia to protect the right ventricle.  I used 3-0 Ethibond sutures with a V5 needle a single sutures circumferentially on the annulus with big bites incorporating with amount of tissue due to frailty.  Once the homograft was ready I trimmed the excess of tissue around the annulus and then oriented it anatomically and then passed the sutures through the graft annulus incorporating also periannular muscle.  I did send the graft in place and I tie all the sutures incorporating a pericardial strip for buttressing.  Following I develop a small opening in the sinus of the graft corresponding to the left coronary button and then anastomosed the  left coronary button using a 5-0 Prolene continuous suture.  After that, I measured the graft to the mid ascending aorta and performed the graft to aorta anastomosis using a 4-0 Prolene continuous suture.  I infused cardioplegia in antegrade fashion to distend the graft and then find the best place to connect the right coronary button.  Using an 11 blade I made a small opening and then I placed a 4 mm punch and then complete anastomosis between the right coronary button and the homograft coronary opening using a 5-0 Prolene continuous suture.  Due to the calcium he was difficult anastomosis but he looked hemostatic and I placed a few reinforcement sutures.  Following, I open the left atrium and the right interatrial groove and expose the mitral valve.  There was annular calcification in the posterior medial part of the anterior mitral valve leaflet corresponding to the A3 segment but in my opinion it was repairable.  I used 2-0 Ethibond nonpledgeted sutures in a plication fashion from trigone to trigone posteriorly around the annulus.  I selected a Medtronic flexible band, 28 mm which was soaked in rifampin and then I passed the sutures symmetrically through the band and use the core knot device to secure all sutures.  I tested the valve and there was no mitral regurgitation with infusion of saline under pressure.  Following, I closed the left atrium using 0 Prolene continuous sutures.  The patient was systemically rewarmed while I work on the right atrium.  I snared both the superior inferior vena cava with umbilical tapes and tourniquets and then I opened the right atrium.  I removed the coronary sinus catheter and then gradually I  the right atrial and right ventricular endocardial leads from the wall of the right atrium and from the posterior wall of the right ventricle.  I used a blade with care not to perforate the ventricle.  After that I  part of the lead from the attachment in the superior  vena cava wall at the attachment it went high into the innominate vein.  Using heavy scissors I transected both leads at that point and sending both for culture.  The patient was systemically rewarmed and I gave the 1 dose of cardioplegia in an antegrade fashion and then I remove the aortic cross-clamp with steep suction on the aortic vent.  I closed the right atrium using a double layer of 4-0 Prolene continuous suture.  Following, I opened the pacemaker pocket and removed the hypertrophic skin scar.  Using electrocautery I transected the fatty tissues to the fibrotic envelope covering the generator and then I transected to the fibrous scar and gradually  the leads from the scar to the point of insertion in the infraclavicular area.  I tried to remove the leads but as I suspected with densely attached in the proximal part of the superior vena cava and at that point I thought it would be too risky with high risk of venous injury if I continue traction.  I cut the leads and I Them and then I irrigated profusely the pacemaker cavity with antibiotics and then closed the wound in layers using Vicryl sutures and staples for the skin  The patient rhythm was initially atrial fibrillation but I gave amiodarone intravenously and the patient was able to be paced with temporary wires AV.  I allowed to eject to remove air from the cavities and there was acceptable contractility by dilatation of the right ventricle and decreased function of both ventricles which improved with inotropic support.  We came off bypass and with the matching dose of protamine as well as blood products empirically such as platelets, cryoprecipitate and FFP with coagulation tests that were abnormal.  Both pleural spaces were drained from large effusions.  I identified few areas of anastomotic bleeding and I placed repair sutures.  We came a bypass after removing air from the cavities under JOLIE guidance.  There was continued bleeding that was  identified coming from the right coronary patch posteriorly or may be from the suture line of the homograft.  It was my judgment that the only way to repair that area was on bypass and possible with a second myocardial arrest.  A piece of vein was harvested with an open technique from the right thigh for possible use.  A full dose of heparin was given and I placed pursestring for cannulation in the distal ascending aorta and right atrium and the patient was recannulated and carpal bypass was commenced.  I placed again antegrade and radial cardioplegia cannulas and vent.  The ascending aorta was clamped and cardioplegia was given with good myocardial arrest.  I took down the right coronary ostia anastomosis and again under pressure check the suture line and there was no leakage therefore the right coronary button was the problem.  At that point I decided to do an extension of the proximal right coronary with a piece of reverse saphenous vein graft.  I transected the right coronary ostia and the coronary artery had diameter of approximately 3 to 4 mm.  I performed anastomosis between the reverse vein and the artery using a 6-0 Prolene continuous suture.  I tested anastomosis was good flow.  I selected glutaraldehyde preserved bovine pericardium and I placed a small patch to close the opening into the homograft corresponding to the previous anastomosis.  I used a 5-0 Prolene continuous suture to anastomose the patch to the opening in the homograft.  Following I measured the vein graft to the ascending aorta and then I used a 4 mm punch and a 6 appalling continuous suture to complete the vein anastomosis to the aorta.  I remove air from the aorta and then I tied down the anastomosis and then I gave the warm dose of cardioplegia and then completion I remove the aortic cross-clamp with steep suction on the aortic vent.  I checked anastomotic size and there was no further bleeding.  At this point the patient was weaned  from cardiopulmonary bypass but there was some RV and LV dysfunction.  I checked the venous graft with Doppler and there was evidence of flow.  We came off bypass and we get the matching dose of protamine and gradually the RV and LV function improved.  At this point we performed hemostasis and once hemostasis was achieved we placed 3 #28 Maori chest tubes in the mediastinum and 1 in the left pleural space.  I proceeded to close the sternum using single and double wires and reinforcement on both sides.  We closed the sternal wound in layers using reabsorbable suture material.  Before closing the skin, the patient had an episode of ventricular fibrillation that required multiple external cardioversion.  The patient stabilized some however he required more inotropic support and then fibrillated again and at that point I decided to open the chest again and cardiovert him with paddles.  The patient was acidotic and anemic due to the coagulopathy and required multiple transfusions.  Patient to continue to fibrillate on and off responding to defibrillation.  He was clear to me that RV dysfunction was the main problem.  I checked the vein graft with a needle and there was good flow and multiple times with a Doppler that showed adequate signals.  At this point an intra-aortic balloon pump was placed percutaneous through the left common femoral artery and it was placed to one-to-one with improvement of the hemodynamics but there was an escalation in the use of pressors and inotropes and also was started inhaled vasodilators.  We attempt to close the sternum once more but the patient did not tolerate it.  I irrigated the cavity profusely with antibiotic solution.  Then it was my judgment to leave the chest open but closing the skin in protection for possible infection.  The patient stabilized to the point that could be transferred.  A sterile dressings were applied and the chest tubes were connected to the Pleur-evac.  Of  note, the patient received also more blood products and factor VII dose to reverse the cardiopulmonary bypass induced coagulopathy. The perioperative JOLIE showed the homograft function without aortic insufficiency and no mitral regurgitation.  The function of the RV was severely depressed with dilatation and the LV was moderately depressed.        Specimen removed: Infected prosthetic aortic valve, LV and RV pacing leads and generator.    CPB time: 204 + 63 minutes = 267 minutes    Aortic clamp time: 182 + 32 minutes = 214 minutes    Complications: Coronary anastomotic bleeding requiring a second cardiopulmonary bypass run and myocardial arrest and repair with vein graft anastomosis to the proximal RCA.  RV dysfunction most likely protection related.  Cardiopulmonary bypass related coagulopathy    Condition: Critical and unstable

## 2024-10-31 NOTE — PROGRESS NOTES
Brief op note    Preop and postop diagnosis the same  Severe prosthetic aortic valve endocarditis with annular abscess due to Streptococcus mitis, severe mitral regurgitation and all AICD and pacing leads and generator at risk for infection  Procedures: Aortic root replacement with 27 mm cryopreserved homograft with coronary button reimplantation converted to right Gilbert Cabrol due to bleeding, removal of intracardiac leads as well as pacemaker ICD generator and part of the leads in the pocket but there was significant fibrosis high in the superior vena cava that preclude safe removal other from the chest or from the pocket  Patient has significant coagulopathy requiring multiple products and factor VII (half dose).  The patient had multiple episodes of fibrillation of unknown etiology.  Initially we believe it was related to other thrombosis of the vein graft to the right coronary artery versus acidosis and anemia.  The patient had the chest closed twice and need to be reopened in the operating room for defibrillation.  The patient improved after an intra-aortic balloon pump was placed and correction of the acidosis and anemia and better hemostasis.  The patient was transferred to the cardiovascular ICU in very critical but stable condition on multiple inotropes and pressors.  He is in atrial fibrillation with a heart rate in the 90s to 100 and systolic blood pressure around 100-110 millimeters of mercury.  I had discussed with the daughter the intraoperative findings and events and she is aware of the critical situation and if further fibrillation occurs then defibrillation and medical management will be the only option given that the sternum is not close only the skin.  I am optimistic that after the last hour of stability he may gradually improved.    Full op note to follow   Home with Home Care

## 2024-10-31 NOTE — SIGNIFICANT NOTE
10/31/24 1102   OTHER   Discipline physical therapist   Rehab Time/Intention   Session Not Performed other (see comments)  (pt remains on vent following OR. PT will f/u)   Recommendation   PT - Next Appointment 11/04/24

## 2024-11-01 ENCOUNTER — ANESTHESIA EVENT (OUTPATIENT)
Dept: PERIOP | Facility: HOSPITAL | Age: 74
End: 2024-11-01
Payer: MEDICARE

## 2024-11-01 NOTE — ANESTHESIA POSTPROCEDURE EVALUATION
"Patient: Woodrow Alejandro    Procedure Summary       Date: 10/30/24 Room / Location: [place] [address] / [facility] CARDIOVASCULAR OPERATING ROOM    Anesthesia Start: 1115 Anesthesia Stop: 2316    Procedures:       REOP STERNOTOMY; TRANSESOPHOGEAL ECHOCARDIOGRAM;AORTIC ROOT REPLACEMENT WITH HOMOGRAFT; MITRAL VALVE REPAIR; OPEN VEIN HARVEST RIGHT SAPHENOUS VEIN; AICD GENERATOR EXPLANTATION; INTRA-AORTIC BALLOON PUMP PLACEMENT; PRP (Chest)      AORTIC VALVE CONDUIT; possible homograft; ACID removal (Chest)      TRANSESOPHAGEAL ECHOCARDIOGRAM WITH ANESTHESIA (Chest) Diagnosis:       Stenosis of prosthetic aortic valve, initial encounter      (Stenosis of prosthetic aortic valve, initial encounter [T82.347Q])    Surgeons: Javon Florian MD Provider: Azar Macias MD    Anesthesia Type: general ASA Status: 4            Anesthesia Type: general    Vitals  Vitals Value Taken Time   /75 11/01/24 0710   Temp 37.3 °C (99.14 °F) 11/01/24 0715   Pulse 111 11/01/24 0715   Resp 16 11/01/24 0340   SpO2 100 % 11/01/24 0715   Vitals shown include unfiled device data.        Post Anesthesia Care and Evaluation    Pain management: adequate    Airway patency: patent    Cardiovascular status: acceptable  Respiratory status: acceptable  Hydration status: acceptable    Comments: /48   Pulse (!) 123   Temp 37.3 °C (99.1 °F)   Resp 16   Ht 182.9 cm (72\")   Wt (!) 150 kg (330 lb 4 oz)   SpO2 100%   BMI 44.79 kg/m²     Critically ill.  Complicated postop course      "

## 2024-11-01 NOTE — PROGRESS NOTES
[facility] Progress Note      Patient Name: Woodrow Alejandro  : 1950  MRN: [id number]  Primary Care Physician:  Juan Perez MD  Date of admission: 10/23/2024    Subjective     Interval History:   Remains intubated and sedated; multiple pressors; + IABP  Urine output yesterday 3.5 L; on 85% FiO2    Review of Systems:   Not obtainable from the patient    Objective     Vitals:   Temp:  [98.1 °F (36.7 °C)-100 °F (37.8 °C)] 99.1 °F (37.3 °C)  Heart Rate:  [] 115  Resp:  [12-16] 16  BP: ()/(37-89) 100/67  Arterial Line BP: ()/(46-68) 80/49  FiO2 (%):  [90 %-100 %] 90 %    Intake/Output Summary (Last 24 hours) at 2024 0810  Last data filed at 2024 0703  Gross per 24 hour   Intake 6770 ml   Output 4085 ml   Net 2685 ml       Physical Exam:    Constitutional: Intubated and sedated; overweight; CVP 16  HEENT: Sclera anicteric, no conjunctival injection  Neck: no JVD; R IJ  Respiratory: Coarse anteriorly, on 85% FiO2 and 7.5 PEEP  Cardiovascular: Irregularly irregular, no rub; midsternal and left upper chest dressing   Gastrointestinal: Hypoactive BS, soft, distended, no grimacing, chest tube in place  : No palpable bladder; Ugarte in place with bright red urine (received vitamin B12)  Musculoskeletal: 2+ generalized edema, 3+ pitting edema feet and legs; no clubbing or cyanosis; left groin arterial line in place  Psychiatric: Unable to assess  Neurologic: Unable to assess  Skin: Warm and dry       Scheduled Meds:     amiodarone in dextrose 5%, , ,   aspirin, 81 mg, Oral, Daily  atorvastatin, 40 mg, Oral, Nightly  ceFAZolin, 2,000 mg, Intravenous, Q8H  chlorhexidine, 15 mL, Mouth/Throat, Q12H  enoxaparin, 40 mg, Subcutaneous, Q12H  metoprolol tartrate, 12.5 mg, Oral, Q12H  mupirocin, 1 Application, Each Nare, BID  pantoprazole, 40 mg, Intravenous, Once   Followed by  pantoprazole, 40 mg, Oral, QAM  penicillin g (potassium), 4 Million Units,  Intravenous, Q4H  senna-docusate sodium, 2 tablet, Oral, Nightly  sodium chloride, 10 mL, Intravenous, Q12H      IV Meds:   clevidipine, 2-32 mg/hr  dexmedetomidine, 0.2-1.5 mcg/kg/hr, Last Rate: 0.4 mcg/kg/hr (11/01/24 0630)  DOPamine, 2-20 mcg/kg/min  EPINEPHrine, 0.02-0.1 mcg/kg/min  insulin, 0-100 Units/hr, Last Rate: 3.5 Units/hr (11/01/24 0703)  lidocaine in D5W, 1 mg/min, Last Rate: 1 mg/min (11/01/24 0107)  milrinone, 0.25-0.375 mcg/kg/min, Last Rate: 0.375 mcg/kg/min (11/01/24 0521)  niCARdipine, 5-15 mg/hr  nitroglycerin, 5-200 mcg/min  norepinephrine, 0.02-0.2 mcg/kg/min  Pharmacy Consult,   phenylephrine, 0.2-2 mcg/kg/min  propofol, 5-50 mcg/kg/min, Last Rate: 40 mcg/kg/min (11/01/24 0756)  vasopressin, 0.02-0.1 Units/min, Last Rate: 0.06 Units/min (11/01/24 0521)        Results Reviewed:   I have personally reviewed the results from the time of this admission to 11/1/2024 08:10 EDT     Results from last 7 days   Lab Units 11/01/24  0350 10/31/24  2348 10/31/24  2333   SODIUM mmol/L 149* 147* 149*  149*   POTASSIUM mmol/L 4.6 4.8 4.8  4.8   CHLORIDE mmol/L 111* 110* 111*  111*   CO2 mmol/L 23.2 25.1 23.9  23.9   BUN mg/dL 15 16 15  15   CREATININE mg/dL 1.93* 1.89* 1.95*  1.95*   CALCIUM mg/dL 8.7 8.3* 8.7  8.7   BILIRUBIN mg/dL 0.5 0.6 0.6   ALK PHOS U/L 62 57 59   ALT (SGPT) U/L 6 10 10   AST (SGOT) U/L 83* 82* 89*   GLUCOSE mg/dL 149* 151* 131*  131*       Estimated Creatinine Clearance: 51.6 mL/min (A) (by C-G formula based on SCr of 1.93 mg/dL (H)).    Results from last 7 days   Lab Units 10/31/24  2348 10/31/24  2333 10/31/24  0346 10/30/24  2325   MAGNESIUM mg/dL 2.5* 2.5* 2.8* 3.0*   PHOSPHORUS mg/dL  --  6.2* 4.2 2.9       Results from last 7 days   Lab Units 10/31/24  2348 10/31/24  0346   URIC ACID mg/dL 6.3 6.0       Results from last 7 days   Lab Units 11/01/24  0350 10/31/24  2348 10/31/24  1349 10/31/24  0346 10/30/24  2325   WBC 10*3/mm3 17.24* 15.03* 16.99* 24.38* 24.95*    HEMOGLOBIN g/dL 8.8* 8.7* 10.0* 8.2* 7.7*   PLATELETS 10*3/mm3 109* 99* 111* 115* 98*       Results from last 7 days   Lab Units 10/31/24  0346 10/30/24  2325 10/30/24  2148 10/30/24  1916 10/30/24  1907   INR  1.38* 1.21* 1.10 2.3* 1.87*       Assessment / Plan     ASSESSMENT:  NATHANIEL, non-oliguric, unchanged, prerenal --> ATN, due to cardiogenic and hemorrhagic shock with expected hemodynamic changes following major cardiac surgery 10/30, requiring multiple defibrillations and volume support.  Volume excess by exam, improving with good response to diuretic; potassium normal; hypernatremia.  Normal anion gap     Cardiogenic/hemorrhagic shock, s/p large amount of blood products in the OR.  On 5 vasopressors, IABP, amiodarone drip, and milrinone drip  Prosthetic aortic valve stenosis/vegetation, s/p aortic root replacement and AICD removal, followed by cardiothoracic surgery  Endocarditis and annular abscess/bacteremia due to Strep mitis.  Followed by ID, recommends continuing penicillin G 4000 1000 units every 4 hours for minimum of 6 weeks  History of A-fib, unable to tolerate anticoagulation  Hypertension, now hypotensive, on 5 pressors    PLAN:  1.  Continue IV Bumex 4 mg q6h x 3 doses to keep fluid balance negative  2.  Surveillance labs, monitoring for diuretic toxicity    Thank you for involving us in the care of Woodrow Alejandro.  Please feel free to call with any questions.    Slim Verde MD  11/01/24  08:10 EDT    [facility]  [phone]    Please note that portions of this note were completed with a voice recognition program.

## 2024-11-01 NOTE — PROGRESS NOTES
LOS: 9 days   Patient Care Team:  Juan Perez MD as PCP - General (Internal Medicine)    Chief Complaint: Follow-up bioprosthetic AVR endocarditis, mitral regurgitation, persistent atrial fibrillation.    Interval History: He is still on 4 pressors, milrinone, lidocaine, and amiodarone.  He is IABP should be at 1:2.  He was in atrial fibrillation with mild RVR.  I did attempt to cardiovert him twice without success.    Vital Signs:  Temp:  [98.8 °F (37.1 °C)-100 °F (37.8 °C)] 99.3 °F (37.4 °C)  Heart Rate:  [] 118  Resp:  [12-18] 17  BP: ()/(37-94) 118/62  Arterial Line BP: ()/(46-68) 96/59  FiO2 (%):  [69 %-100 %] 69 %    Intake/Output Summary (Last 24 hours) at 11/1/2024 1612  Last data filed at 11/1/2024 1500  Gross per 24 hour   Intake 8397.45 ml   Output 6955 ml   Net 1442.45 ml       Physical Exam:   General Appearance:    Intubated and sedated.  Appears critically ill.   Lungs:     Rhonchi bilaterally anteriorly.    Heart:    Regular rhythm with a normal rate.  Balloon pump audible.   Abdomen:     Soft, nontender, nondistended.    Extremities:    2+ generalized edema.     Results Review:    Results from last 7 days   Lab Units 11/01/24  1029   SODIUM mmol/L 145   POTASSIUM mmol/L 4.5   CHLORIDE mmol/L 107   CO2 mmol/L 24.9   BUN mg/dL 17   CREATININE mg/dL 1.94*   GLUCOSE mg/dL 149*   CALCIUM mg/dL 8.5*         Results from last 7 days   Lab Units 11/01/24  1029   WBC 10*3/mm3 18.32*   HEMOGLOBIN g/dL 8.5*   HEMATOCRIT % 24.9*   PLATELETS 10*3/mm3 105*     Results from last 7 days   Lab Units 10/31/24  0346 10/30/24  2325 10/30/24  2148 10/30/24  1916 10/30/24  1907   INR  1.38* 1.21* 1.10   < > 1.87*   APTT seconds  --  46.1* 40.8*  --  33.6    < > = values in this interval not displayed.     Results from last 7 days   Lab Units 10/29/24  0952   CHOLESTEROL mg/dL 131     Results from last 7 days   Lab Units 11/01/24  1029   MAGNESIUM mg/dL 2.4     Results from last 7 days   Lab  Units 10/29/24  0952   CHOLESTEROL mg/dL 131   TRIGLYCERIDES mg/dL 119   HDL CHOL mg/dL 32*   LDL CHOL mg/dL 77       I reviewed the patient's new clinical results.        Assessment:  1.  Status post bioprosthetic aortic valve replacement in 2014  2.  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  Moderate to severe mitral regurgitation  4.  Status post reoperative AV root replacement, mitral valve repair, ICD removal, SVG-RCA, and IABP placement on 10/30/2021  5.  Postoperative shock, multifactorial  6.  Acute kidney injury  7.  History of nonischemic cardiomyopathy with recovered ejection fraction  8.  Anemia, acute on chronic  9.  Postoperative thrombocytopenia  10.  Persistent atrial fibrillation  11.  Ventricular tachycardia postoperatively  12.  Grade C esophagitis by EGD on 9/30/2024  13.  Acute left lower extremity DVT on 10/24/2024  14.  Right Achilles tendon tear  15.  Postoperative junctional rhythm    Plan:  -Again, I did attempt to cardiovert him twice today to see if this would help his hemodynamics.  However, he did not cardiovert with 2 separate 200 J shocks.  I also feel like the odds of keeping him in sinus rhythm are extremely low given his current situation.    -He remains on high doses of 4 pressors (epinephrine, Levophed, vasopressin, and Robinson-Synephrine).  He is also on lidocaine and amiodarone drips.  His IABP is at 1:2, although the atrial fibrillation is interfering with this.    -Per Dr. Verde, continue Bumex 4 mg IV every 6 hours x 3 doses.  His renal status is obviously tenuous.  He did receive over 9 L of blood products in the OR.    -Again, he did have ventricular tachycardia postoperatively.  Currently on lidocaine and amiodarone drips.    -Chest is still open.  This will need closure at some point when he is more stable.    -Remains very critically ill.  Cardiology will  continue to follow.    Antwan Farmer MD  11/01/24  16:12 EDT

## 2024-11-01 NOTE — PROGRESS NOTES
" LOS: 9 days   Patient Care Team:  Juan Perez MD as PCP - General (Internal Medicine)    Chief Complaint: post op follow-up    Subjective  Intubated/sedated    Vital Signs  Temp:  [97.9 °F (36.6 °C)-100 °F (37.8 °C)] 99.1 °F (37.3 °C)  Heart Rate:  [] 123  Resp:  [12-16] 16  BP: ()/(37-89) 104/48  Arterial Line BP: ()/(46-68) 91/55  FiO2 (%):  [90 %-100 %] 90 %  Body mass index is 44.79 kg/m².    Intake/Output Summary (Last 24 hours) at 11/1/2024 0708  Last data filed at 11/1/2024 0700  Gross per 24 hour   Intake 5144 ml   Output 3935 ml   Net 1209 ml     No intake/output data recorded.    Chest tube drainage last 8 hours         10/25/24  0434 10/25/24  1211 10/31/24  0615   Weight: (!) 142 kg (313 lb 0.9 oz) (!) 142 kg (313 lb) (!) 150 kg (330 lb 4 oz)         Objective:  Vital signs: (most recent): Blood pressure 117/56, pulse 112, temperature 99.3 °F (37.4 °C), temperature source Core, resp. rate 18, height 182.9 cm (72\"), weight (!) 150 kg (330 lb 4 oz), SpO2 100%.                Physical Exam:   General Appearance: awake and alert, no acute distress   Lungs:  ventilator   Heart:  IABP sounds   Abdomen: hypoactive BS    Skin: clean, dry, intact   Neuro: alert and oriented, no focal deficits.     Results Review:        WBC WBC   Date Value Ref Range Status   11/01/2024 17.24 (H) 3.40 - 10.80 10*3/mm3 Final   10/31/2024 15.03 (H) 3.40 - 10.80 10*3/mm3 Final   10/31/2024 16.99 (H) 3.40 - 10.80 10*3/mm3 Final   10/31/2024 24.38 (H) 3.40 - 10.80 10*3/mm3 Final   10/30/2024 24.95 (H) 3.40 - 10.80 10*3/mm3 Final   10/30/2024 24.28 (H) 3.40 - 10.80 10*3/mm3 Final   10/30/2024 23.14 (H) 3.40 - 10.80 10*3/mm3 Final   10/30/2024 8.81 3.40 - 10.80 10*3/mm3 Final   10/29/2024 11.09 (H) 3.40 - 10.80 10*3/mm3 Final      HGB Hemoglobin   Date Value Ref Range Status   11/01/2024 8.8 (L) 13.0 - 17.7 g/dL Final   10/31/2024 8.7 (L) 13.0 - 17.7 g/dL Final   10/31/2024 10.0 (L) 13.0 - 17.7 g/dL Final "   10/31/2024 8.2 (L) 13.0 - 17.7 g/dL Final   10/30/2024 7.7 (L) 13.0 - 17.7 g/dL Final   10/30/2024 7.8 (C) 12.0 - 17.0 g/dL Final   10/30/2024 6.7 (C) 13.0 - 17.7 g/dL Final   10/30/2024 6.8 (C) 12.0 - 17.0 g/dL Final   10/30/2024 6.5 (C) 12.0 - 17.0 g/dL Final   10/30/2024 7.5 (C) 12.0 - 17.0 g/dL Final   10/30/2024 7.0 (L) 13.0 - 17.7 g/dL Final   10/30/2024 7.8 (C) 12.0 - 17.0 g/dL Final   10/30/2024 7.8 (C) 12.0 - 17.0 g/dL Final   10/30/2024 6.5 (C) 12.0 - 17.0 g/dL Final   10/30/2024 7.1 (C) 12.0 - 17.0 g/dL Final   10/30/2024 8.2 (L) 12.0 - 17.0 g/dL Final   10/30/2024 7.8 (C) 12.0 - 17.0 g/dL Final   10/30/2024 8.8 (L) 12.0 - 17.0 g/dL Final   10/30/2024 8.8 (L) 12.0 - 17.0 g/dL Final   10/30/2024 8.8 (L) 12.0 - 17.0 g/dL Final   10/30/2024 8.8 (L) 12.0 - 17.0 g/dL Final   10/30/2024 8.8 (L) 12.0 - 17.0 g/dL Final   10/30/2024 9.2 (L) 12.0 - 17.0 g/dL Final   10/30/2024 8.8 (L) 13.0 - 17.7 g/dL Final   10/29/2024 9.7 (L) 13.0 - 17.7 g/dL Final      HCT Hematocrit   Date Value Ref Range Status   11/01/2024 25.3 (L) 37.5 - 51.0 % Final   10/31/2024 25.0 (L) 37.5 - 51.0 % Final   10/31/2024 29.4 (L) 37.5 - 51.0 % Final   10/31/2024 24.1 (L) 37.5 - 51.0 % Final   10/30/2024 22.5 (L) 37.5 - 51.0 % Final   10/30/2024 23 (L) 38 - 51 % Final   10/30/2024 19.5 (C) 37.5 - 51.0 % Final   10/30/2024 20 (L) 38 - 51 % Final   10/30/2024 19 (L) 38 - 51 % Final   10/30/2024 22 (L) 38 - 51 % Final   10/30/2024 21.3 (L) 37.5 - 51.0 % Final   10/30/2024 23 (L) 38 - 51 % Final   10/30/2024 23 (L) 38 - 51 % Final   10/30/2024 19 (L) 38 - 51 % Final   10/30/2024 21 (L) 38 - 51 % Final   10/30/2024 24 (L) 38 - 51 % Final   10/30/2024 23 (L) 38 - 51 % Final   10/30/2024 26 (L) 38 - 51 % Final   10/30/2024 26 (L) 38 - 51 % Final   10/30/2024 26 (L) 38 - 51 % Final   10/30/2024 26 (L) 38 - 51 % Final   10/30/2024 26 (L) 38 - 51 % Final   10/30/2024 27 (L) 38 - 51 % Final   10/30/2024 26.1 (L) 37.5 - 51.0 % Final   10/29/2024 31.4  (L) 37.5 - 51.0 % Final      Platelets Platelets   Date Value Ref Range Status   11/01/2024 109 (L) 140 - 450 10*3/mm3 Final   10/31/2024 99 (L) 140 - 450 10*3/mm3 Final   10/31/2024 111 (L) 140 - 450 10*3/mm3 Final   10/31/2024 115 (L) 140 - 450 10*3/mm3 Final   10/30/2024 98 (L) 140 - 450 10*3/mm3 Final   10/30/2024 93 (L) 140 - 450 10*3/mm3 Final   10/30/2024 130 (L) 140 - 450 10*3/mm3 Final   10/30/2024 167 140 - 450 10*3/mm3 Final   10/30/2024 141 140 - 450 10*3/mm3 Final   10/30/2024 166 140 - 450 10*3/mm3 Final   10/29/2024 197 140 - 450 10*3/mm3 Final        PT/INR:    Protime   Date Value Ref Range Status   10/31/2024 17.2 (H) 11.7 - 14.2 Seconds Final   10/30/2024 15.5 (H) 11.7 - 14.2 Seconds Final   10/30/2024 14.5 (H) 11.7 - 14.2 Seconds Final   10/30/2024 23.3 (H) 12.8 - 15.2 seconds Final     Comment:     Serial Number: [id number]:  [id number]   10/30/2024 21.7 (H) 11.7 - 14.2 Seconds Final   10/30/2024 22.7 (H) 12.8 - 15.2 seconds Final     Comment:     Serial Number: [id number]:  [id number]   10/29/2024 15.0 (H) 11.7 - 14.2 Seconds Final   /  INR   Date Value Ref Range Status   10/31/2024 1.38 (H) 0.90 - 1.10 Final   10/30/2024 1.21 (H) 0.90 - 1.10 Final   10/30/2024 1.10 0.90 - 1.10 Final   10/30/2024 2.3 (H) 0.8 - 1.2 Final   10/30/2024 1.87 (H) 0.90 - 1.10 Final   10/30/2024 2.3 (H) 0.8 - 1.2 Final   10/29/2024 1.16 (H) 0.90 - 1.10 Final       Sodium Sodium   Date Value Ref Range Status   11/01/2024 149 (H) 136 - 145 mmol/L Final   10/31/2024 147 (H) 136 - 145 mmol/L Final   10/31/2024 149 (H) 136 - 145 mmol/L Final   10/31/2024 149 (H) 136 - 145 mmol/L Final   10/31/2024 149 (H) 136 - 145 mmol/L Final   10/31/2024 148 (H) 136 - 145 mmol/L Final   10/30/2024 150 (H) 136 - 145 mmol/L Final   10/30/2024 134 (L) 136 - 145 mmol/L Final   10/29/2024 137 136 - 145 mmol/L Final      Potassium Potassium   Date Value Ref Range Status   11/01/2024 4.6 3.5 - 5.2 mmol/L Final     Comment:     Slight hemolysis  detected by analyzer. Result may be falsely elevated.   10/31/2024 4.8 3.5 - 5.2 mmol/L Final     Comment:     Slight hemolysis detected by analyzer. Result may be falsely elevated.   10/31/2024 4.8 3.5 - 5.2 mmol/L Final     Comment:     Slight hemolysis detected by analyzer. Result may be falsely elevated.   10/31/2024 4.8 3.5 - 5.2 mmol/L Final     Comment:     Slight hemolysis detected by analyzer. Result may be falsely elevated.   10/31/2024 4.8 3.5 - 5.2 mmol/L Final     Comment:     Slight hemolysis detected by analyzer. Result may be falsely elevated.   10/31/2024 4.0 3.5 - 5.2 mmol/L Final     Comment:     Slight hemolysis detected by analyzer. Result may be falsely elevated.   10/30/2024 3.3 (L) 3.5 - 5.2 mmol/L Final     Comment:     Slight hemolysis detected by analyzer. Result may be falsely elevated.   10/30/2024 4.0 3.5 - 5.2 mmol/L Final   10/29/2024 4.2 3.5 - 5.2 mmol/L Final      Chloride Chloride   Date Value Ref Range Status   11/01/2024 111 (H) 98 - 107 mmol/L Final   10/31/2024 110 (H) 98 - 107 mmol/L Final   10/31/2024 111 (H) 98 - 107 mmol/L Final   10/31/2024 111 (H) 98 - 107 mmol/L Final   10/31/2024 111 (H) 98 - 107 mmol/L Final   10/31/2024 104 98 - 107 mmol/L Final   10/30/2024 109 (H) 98 - 107 mmol/L Final   10/30/2024 101 98 - 107 mmol/L Final   10/29/2024 102 98 - 107 mmol/L Final      Bicarbonate CO2   Date Value Ref Range Status   11/01/2024 23.2 22.0 - 29.0 mmol/L Final   10/31/2024 25.1 22.0 - 29.0 mmol/L Final   10/31/2024 23.9 22.0 - 29.0 mmol/L Final   10/31/2024 23.9 22.0 - 29.0 mmol/L Final   10/31/2024 23.5 22.0 - 29.0 mmol/L Final   10/31/2024 16.6 (L) 22.0 - 29.0 mmol/L Final   10/30/2024 19.2 (L) 22.0 - 29.0 mmol/L Final   10/30/2024 24.2 22.0 - 29.0 mmol/L Final   10/29/2024 23.1 22.0 - 29.0 mmol/L Final      BUN BUN   Date Value Ref Range Status   11/01/2024 15 8 - 23 mg/dL Final   10/31/2024 16 8 - 23 mg/dL Final   10/31/2024 15 8 - 23 mg/dL Final   10/31/2024 15 8 -  23 mg/dL Final   10/31/2024 14 8 - 23 mg/dL Final   10/31/2024 11 8 - 23 mg/dL Final   10/30/2024 9 8 - 23 mg/dL Final   10/30/2024 7 (L) 8 - 23 mg/dL Final   10/29/2024 7 (L) 8 - 23 mg/dL Final      Creatinine Creatinine   Date Value Ref Range Status   11/01/2024 1.93 (H) 0.76 - 1.27 mg/dL Final   10/31/2024 1.89 (H) 0.76 - 1.27 mg/dL Final   10/31/2024 1.95 (H) 0.76 - 1.27 mg/dL Final   10/31/2024 1.95 (H) 0.76 - 1.27 mg/dL Final   10/31/2024 1.77 (H) 0.76 - 1.27 mg/dL Final   10/31/2024 1.49 (H) 0.76 - 1.27 mg/dL Final   10/30/2024 1.43 (H) 0.76 - 1.27 mg/dL Final   10/30/2024 0.66 (L) 0.76 - 1.27 mg/dL Final   10/29/2024 0.83 0.76 - 1.27 mg/dL Final      Calcium Calcium   Date Value Ref Range Status   11/01/2024 8.7 8.6 - 10.5 mg/dL Final   10/31/2024 8.3 (L) 8.6 - 10.5 mg/dL Final   10/31/2024 8.7 8.6 - 10.5 mg/dL Final   10/31/2024 8.7 8.6 - 10.5 mg/dL Final   10/31/2024 8.3 (L) 8.6 - 10.5 mg/dL Final   10/31/2024 9.1 8.6 - 10.5 mg/dL Final   10/30/2024 8.7 8.6 - 10.5 mg/dL Final   10/30/2024 8.4 (L) 8.6 - 10.5 mg/dL Final   10/29/2024 8.5 (L) 8.6 - 10.5 mg/dL Final      Magnesium Magnesium   Date Value Ref Range Status   10/31/2024 2.5 (H) 1.6 - 2.4 mg/dL Final   10/31/2024 2.5 (H) 1.6 - 2.4 mg/dL Final   10/31/2024 2.8 (H) 1.6 - 2.4 mg/dL Final   10/30/2024 3.0 (H) 1.6 - 2.4 mg/dL Final   10/29/2024 1.8 1.6 - 2.4 mg/dL Final          amiodarone in dextrose 5%, , ,   aspirin, 81 mg, Oral, Daily  atorvastatin, 40 mg, Oral, Nightly  ceFAZolin, 2,000 mg, Intravenous, Q8H  chlorhexidine, 15 mL, Mouth/Throat, Q12H  enoxaparin, 40 mg, Subcutaneous, Q12H  metoprolol tartrate, 12.5 mg, Oral, Q12H  mupirocin, 1 Application, Each Nare, BID  pantoprazole, 40 mg, Intravenous, Once   Followed by  pantoprazole, 40 mg, Oral, QAM  penicillin g (potassium), 4 Million Units, Intravenous, Q4H  senna-docusate sodium, 2 tablet, Oral, Nightly  sodium chloride, 10 mL, Intravenous, Q12H      clevidipine, 2-32 mg/hr  dexmedetomidine,  0.2-1.5 mcg/kg/hr, Last Rate: 0.4 mcg/kg/hr (11/01/24 0630)  DOPamine, 2-20 mcg/kg/min  EPINEPHrine, 0.02-0.1 mcg/kg/min, Last Rate: 0.02 mcg/kg/min (10/31/24 1450)  insulin, 0-100 Units/hr, Last Rate: 3.5 Units/hr (11/01/24 0703)  lidocaine in D5W, 1 mg/min, Last Rate: 1 mg/min (11/01/24 0107)  milrinone, 0.25-0.375 mcg/kg/min, Last Rate: 0.375 mcg/kg/min (11/01/24 0521)  niCARdipine, 5-15 mg/hr  nitroglycerin, 5-200 mcg/min  norepinephrine, 0.02-0.2 mcg/kg/min, Last Rate: 0.09 mcg/kg/min (11/01/24 0629)  Pharmacy Consult,   phenylephrine, 0.2-2 mcg/kg/min, Last Rate: 0.85 mcg/kg/min (11/01/24 0242)  propofol, 5-50 mcg/kg/min, Last Rate: 20 mcg/kg/min (11/01/24 0241)  vasopressin, 0.02-0.1 Units/min, Last Rate: 0.06 Units/min (11/01/24 0521)              Prosthetic aortic valve stenosis    Essential hypertension    Permanent atrial fibrillation    S/P AVR    ICD (implantable cardioverter-defibrillator), dual, in situ    Achilles tendon rupture    Bacteremia    Stenosis of prosthetic aortic valve    Anemia      Assessment & Plan    - Prosthetic aortic valve stenosis, h/o AVR (tissue)/maze/DANICA ligation (2014)- s/p reoperative sternotomy AV root replacement 27mm cryopreserved homograft with right nuvia cabrol, AICD removal, IABP placement- Pagni 10/31/2024  - Possible endocarditis, likely need JOLIE   - Bacteremia, blood cultures positive strep mitis  - Atrial fibrillation, unable to tolerate anticoagulation  - Hypertension  - NICM status post [employer] AICD  - Anemia, s/p EGD/colonoscopy with esophagitis, polyp removal  - Right achilles tendon tear--- walking boot and PT per ortho at [facility]  - Pre-diabetes -- improved at 5.3     POD#2  Remains intubated/sedated  Requiring a lot of inotropic and pressor support. Levophed, vaso, epinephrine, vasopressin and milrinone  Now in atrial fibrillation with RVR, rate 120s.  Continue lidocaine and amiodarone  Continue IV antibiotics  Hgb 8.5-- transfuse 1unit PRBCs  Creatinine  1.9-- renal following.  Plans for sternal exploration and wash out tomorrow per Dr. Florian.  Remains critical   Continue supportive care    JAVI Nichole  11/01/24  07:08 EDT

## 2024-11-01 NOTE — CONSULTS
"              Patient Care Team:  Juan Perez MD as PCP - General (Internal Medicine)      Subjective     Patient is a 73 y.o. male.  Consulted for ventilator management.  This gentleman is in the cardiac recovery unit on 10/30 he underwent redo sternotomy with extensive lysis of adhesions infected aortic valve prosthetic explant and debridement of periannular abscess aortic root replacement with cryopreserved homograft with coronary button reimplantation, mitral valve repair with Medtronic flexible band annuloplasty, removal of intracardiac pacing and defibrillator leads, explant of ICD pacemaker generator, placement of intra-aortic balloon pump.  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  Postoperatively has had acute kidney injury nephrology is following, infectious disease, cardiology\" cardiothoracic surgery.  Patient was on a ventilator earlier today pH 7.29 pCO2 40 pO2 of 60 this was on assist-control of 600 rate of 20 PEEP of 8 FiO2 100% his tidal volumes were increased to 800 cc his respiratory rate decreased to 12 and his blood gas showed a pH of 7.42 pCO2 41 and pO2 of 168.  In addition to the aortic pump patient is on nitrous oxide infusion.  He is on multiple drips for blood pressure and nurse reports difficulty maintaining his blood pressure with this most of his blood pressures have been low.  I spoke with attending RN and respiratory therapy no one knows exactly why the tidal volume went up he is now in excess of 10 mL/kg ideal body ideal body weight and his peak airway pressures are 46 to 47 cm his plateau pressures are 34 to 36 cm.  His O2 sat is 100% on 90% FiO2 they just dropped it down.      Review of Systems:  Unable to obtain      History  Past Medical History:   Diagnosis " Date    Achilles tendon injury     right    Aortic valve replaced     Arthritis 2018    Atrial fibrillation     Coronary artery disease     History of transfusion     Hypertension      Past Surgical History:   Procedure Laterality Date    AORTIC VALVE CONDUIT N/A 10/30/2024    Procedure: AORTIC VALVE CONDUIT; possible homograft; ACID removal;  Surgeon: Javon Florian MD;  Location: [facility];  Service: Cardiothoracic;  Laterality: N/A;    AORTIC VALVE REPAIR/REPLACEMENT MITRAL VALVE REPAIR/REPLACEMENT N/A 10/30/2024    Procedure: REOP STERNOTOMY; TRANSESOPHOGEAL ECHOCARDIOGRAM;AORTIC ROOT REPLACEMENT WITH HOMOGRAFT; MITRAL VALVE REPAIR; OPEN VEIN HARVEST RIGHT SAPHENOUS VEIN; AICD GENERATOR EXPLANTATION; INTRA-AORTIC BALLOON PUMP PLACEMENT; PRP;  Surgeon: Javon Florian MD;  Location: [facility];  Service: Cardiothoracic;  Laterality: N/A;    CARDIAC SURGERY  2002    COLONOSCOPY      COLONOSCOPY N/A 9/30/2024    Procedure: COLONOSCOPY WITH HOT/COLD SNARE POLYPECTOMIES, ELEVIEW INJECTION,CLIPX1;  Surgeon: Kurt Mensah MD;  Location: [facility] ENDOSCOPY;  Service: Gastroenterology;  Laterality: N/A;  COLON POLYPS    ENDOSCOPY N/A 9/30/2024    Procedure: ESOPHAGOGASTRODUODENOSCOPY WITH BIOPSIES;  Surgeon: Kurt Mensah MD;  Location: [facility] ENDOSCOPY;  Service: Gastroenterology;  Laterality: N/A;  RETAINED FOOD IN STOMACH AND REFLUX ESOPHAGITIS    PACEMAKER IMPLANTATION      TRANSESOPHAGEAL ECHOCARDIOGRAM (JOLIE) N/A 10/30/2024    Procedure: TRANSESOPHAGEAL ECHOCARDIOGRAM WITH ANESTHESIA;  Surgeon: Javon Florian MD;  Location: [facility];  Service: Cardiothoracic;  Laterality: N/A;     Social History     Socioeconomic History    Marital status:    Tobacco Use    Smoking status: Never     Passive exposure: Never    Smokeless tobacco: Never   Vaping Use    Vaping status: Never Used   Substance and Sexual Activity    Alcohol use: Yes     Alcohol/week: 4.0 standard drinks of alcohol      Types: 4 Cans of beer per week    Drug use: Never    Sexual activity: Not Currently     Family History   Problem Relation Age of Onset    COPD Mother         Still living 93    Heart disease Mother     Arthritis Father     COPD Father          at 91.         Allergies:  Diclofenac sodium    Medications:  Prior to Admission medications    Medication Sig Start Date End Date Taking? Authorizing Provider   aspirin 325 MG tablet Take 1 tablet by mouth 2 (two) times a day.   Yes Eli Alcaraz MD   dilTIAZem (CARDIZEM) 120 MG tablet Take 1 tablet by mouth 2 (Two) Times a Day. 24  Yes Juan Perez MD   furosemide (LASIX) 40 MG tablet Take 1 tablet by mouth Daily. 24  Yes Eli Alcaraz MD   lisinopril (PRINIVIL,ZESTRIL) 10 MG tablet Take 1 tablet by mouth Daily. 10/23/24  Yes Cristino Arana PA   metoprolol tartrate 75 MG tablet Take 75 mg by mouth 2 (Two) Times a Day for 30 days. 10/23/24 11/22/24 Yes Cristino Arana PA   pantoprazole (PROTONIX) 40 MG EC tablet Take 1 tablet by mouth 2 (Two) Times a Day Before Meals for 30 days. 10/23/24 11/22/24 Yes Cristino Arana PA   penicillin g 4 MU/100 mL 0.9% sodium chloride IVPB Infuse 100 mL into a venous catheter Every 4 (Four) Hours for 43 doses. Indications: Bacteria in the Blood 10/23/24 10/31/24  Cristino Arana PA     amiodarone in dextrose 5%, , ,   aspirin, 81 mg, Oral, Daily  atorvastatin, 40 mg, Oral, Nightly  bumetanide, 4 mg, Intravenous, Q8H  ceFAZolin, 2,000 mg, Intravenous, Q8H  chlorhexidine, 15 mL, Mouth/Throat, Q12H  enoxaparin, 40 mg, Subcutaneous, Q12H  metoprolol tartrate, 12.5 mg, Oral, Q12H  mupirocin, 1 Application, Each Nare, BID  pantoprazole, 40 mg, Intravenous, Once   Followed by  pantoprazole, 40 mg, Oral, QAM  penicillin g (potassium), 4 Million Units, Intravenous, Q4H  senna-docusate sodium, 2 tablet, Oral, Nightly  sodium chloride, 10 mL, Intravenous, Q12H  sodium chloride, 10 mL, Intravenous,  "Q12H  sodium chloride, 10 mL, Intravenous, Q12H  sodium chloride, 10 mL, Intravenous, Q12H  sodium chloride, 10 mL, Intravenous, Q12H      amiodarone, 0.5 mg/min, Last Rate: 1 mg/min (10/31/24 2340)  clevidipine, 2-32 mg/hr  dexmedetomidine, 0.2-1.5 mcg/kg/hr, Last Rate: 0.4 mcg/kg/hr (10/31/24 2330)  DOPamine, 2-20 mcg/kg/min  EPINEPHrine, 0.02-0.1 mcg/kg/min, Last Rate: 0.02 mcg/kg/min (10/31/24 1450)  insulin, 0-100 Units/hr, Last Rate: 2.8 Units/hr (11/01/24 0008)  lidocaine in D5W, 1 mg/min, Last Rate: 1 mg/min (10/31/24 2330)  milrinone, 0.25-0.375 mcg/kg/min, Last Rate: 0.375 mcg/kg/min (10/31/24 2246)  niCARdipine, 5-15 mg/hr  nitroglycerin, 5-200 mcg/min  norepinephrine, 0.02-0.2 mcg/kg/min, Last Rate: 0.1 mcg/kg/min (10/31/24 2147)  phenylephrine, 0.2-2 mcg/kg/min, Last Rate: 1 mcg/kg/min (10/31/24 2330)  propofol, 5-50 mcg/kg/min, Last Rate: 20 mcg/kg/min (10/31/24 2359)  vasopressin, 0.02-0.1 Units/min, Last Rate: 0.06 Units/min (10/31/24 2206)        Objective     Vital Signs  Vital Sign Min/Max for last 24 hours  Temp  Min: 96.4 °F (35.8 °C)  Max: 100 °F (37.8 °C)   BP  Min: 97/50  Max: 134/45   Pulse  Min: 85  Max: 135   Resp  Min: 12  Max: 16   SpO2  Min: 100 %  Max: 100 %   No data recorded   Weight  Min: 150 kg (330 lb 4 oz)  Max: 150 kg (330 lb 4 oz)       Intake/Output Summary (Last 24 hours) at 11/1/2024 0027  Last data filed at 10/31/2024 2300  Gross per 24 hour   Intake 6881 ml   Output 2220 ml   Net 4661 ml     I/O this shift:  In: -   Out: 90 [Chest Tube:90]  Last Weight and Admission Weight        10/31/24  0615   Weight: (!) 150 kg (330 lb 4 oz)     Flowsheet Rows      Flowsheet Row First Filed Value   Admission Height 182.9 cm (72\") Documented at 10/25/2024 0434   Admission Weight 142 kg (312 lb 3.2 oz) Documented at 10/23/2024 1711            Body mass index is 44.79 kg/m².           Physical Exam:  General Appearance: We have a morbidly obese white male orally intubated with what looks " like an 8 endotracheal tube at about 24 cm at the lips he is on volume control plus rate of 12 tidal volume 800 PEEP of 7.5 FiO2 90% SpO2 of 1 peak inspiratory pressures are 46 to 47 cm his plateau pressures are 34 to 36 cm tidal volume is in excess of 10 mL/kg ideal body weight  Eyes: Conjunctiva are clear and anicteric pupils look to be equal very sluggish response  ENT: Oral endotracheal tube orogastric tube is well mucous membranes are dry  Neck: Trachea midline there is a right IJ cordis with [place] type catheter, neck is obese I do not see jugular venous distention  Lungs: breath sounds bilaterally coarse wet breath sounds even with the ventilator  Cardiac: Seems to be a regular rhythm a lot of sounds he has the balloon pump sounds that predominate  Abdomen: Massively obese soft nontender his best I can tell he does not have any response really to painful stimuli he is heavily sedated  : Ugarte catheter dependent drainage  Musculoskeletal: Morbidly obese with edema has the left femoral intra-aortic balloon pump  Skin: Warm a little wet/diaphoretic  Neuro: He is heavily sedated and unresponsive even to nailbed pressure  Extremities/P Vascular: No clubbing amazingly even with his pressures he is not cyanotic or mottled he has a radial A-line  MSE: Unable to assess      Labs:  Results from last 7 days   Lab Units 10/31/24  2348 10/31/24  2333 10/31/24  1349 10/31/24  0346 10/30/24  2325 10/30/24  0344 10/29/24  0952   GLUCOSE mg/dL 151* 131*  131* 137* 186* 149* 127* 148*   SODIUM mmol/L 147* 149*  149* 149* 148* 150* 134* 137   POTASSIUM mmol/L 4.8 4.8  4.8 4.8 4.0 3.3* 4.0 4.2   MAGNESIUM mg/dL 2.5* 2.5*  --  2.8* 3.0*  --  1.8   CO2 mmol/L 25.1 23.9  23.9 23.5 16.6* 19.2* 24.2 23.1   CHLORIDE mmol/L 110* 111*  111* 111* 104 109* 101 102   ANION GAP mmol/L 11.9 14.1  14.1 14.5 27.4* 21.8* 8.8 11.9   CREATININE mg/dL 1.89* 1.95*  1.95* 1.77* 1.49* 1.43* 0.66* 0.83   BUN mg/dL 16 15  15 14 11 9 7* 7*    BUN / CREAT RATIO  8.5 7.7  7.7 7.9 7.4 6.3* 10.6 8.4   CALCIUM mg/dL 8.3* 8.7  8.7 8.3* 9.1 8.7 8.4* 8.5*   ALK PHOS U/L 57 59 60  --   --   --  75   TOTAL PROTEIN g/dL 4.8* 4.6* 4.7*  --   --   --  6.5   ALT (SGPT) U/L 10 10 18  --  16  --  11   AST (SGOT) U/L 82* 89* 101*  --  65*  --  21   BILIRUBIN mg/dL 0.6 0.6 0.7  --   --   --  0.6   ALBUMIN g/dL 2.4* 2.7*  2.7* 2.6* 2.9* 2.5*  --  3.0*   GLOBULIN gm/dL 2.4 1.9 2.1  --   --   --  3.5     Estimated Creatinine Clearance: 52.7 mL/min (A) (by C-G formula based on SCr of 1.89 mg/dL (H)).      Results from last 7 days   Lab Units 10/31/24  2348 10/31/24  1349 10/31/24  0346 10/30/24  2325 10/30/24  2216 10/30/24  2148 10/30/24  2128 10/30/24  1916 10/30/24  1907 10/30/24  1834 10/30/24  1218 10/30/24  0344 10/29/24  0952   WBC 10*3/mm3 15.03* 16.99* 24.38* 24.95*  --  24.28*  --   --  23.14*  --   --  8.81 11.09*   RBC 10*6/mm3 2.85* 3.30* 2.67* 2.43*  --  2.11*  --   --  2.33*  --   --  2.83* 3.36*   HEMOGLOBIN g/dL 8.7* 10.0* 8.2* 7.7*  --  6.7*  --   --  7.0*  --   --  8.8* 9.7*   HEMOGLOBIN, POC g/dL  --   --   --   --  7.8*  --  6.8*   < >  --   --    < >  --   --    HEMATOCRIT % 25.0* 29.4* 24.1* 22.5*  --  19.5*  --   --  21.3*  --   --  26.1* 31.4*   HEMATOCRIT POC %  --   --   --   --  23*  --  20*   < >  --   --    < >  --   --    MCV fL 87.7 89.1 90.3 92.6  --  92.4  --   --  91.4  --   --  92.2 93.5   MCH pg 30.5 30.3 30.7 31.7  --  31.8  --   --  30.0  --   --  31.1 28.9   MCHC g/dL 34.8 34.0 34.0 34.2  --  34.4  --   --  32.9  --   --  33.7 30.9*   RDW % 15.6* 15.7* 15.4 15.2  --  14.8  --   --  15.2  --   --  16.2* 16.3*   RDW-SD fl 48.6 50.4 50.4 50.4  --  49.3  --   --  49.7  --   --  55.0* 55.4*   MPV fL 10.0 10.1 10.4 9.7  --  9.4  --   --  9.3  --   --  9.1 9.2   PLATELETS 10*3/mm3 99* 111* 115* 98*  --  93*  --   --  130* 167   < > 166 197   NEUTROPHIL % % 81.6*  --   --   --   --   --   --   --   --   --   --   --  83.5*   LYMPHOCYTE % %  8.6*  --   --   --   --   --   --   --   --   --   --   --  7.3*   MONOCYTES % % 8.5  --   --   --   --   --   --   --   --   --   --   --  6.9   EOSINOPHIL % % 0.1*  --   --   --   --   --   --   --   --   --   --   --  1.2   BASOPHIL % % 0.3  --   --   --   --   --   --   --   --   --   --   --  0.4   IMM GRAN % % 0.9*  --   --   --   --   --   --   --   --   --   --   --  0.7*   NEUTROS ABS 10*3/mm3 12.24*  --   --  22.90*  --   --   --   --   --   --   --   --  9.27*   LYMPHS ABS 10*3/mm3 1.30  --   --   --   --   --   --   --   --   --   --   --  0.81   MONOS ABS 10*3/mm3 1.28*  --   --   --   --   --   --   --   --   --   --   --  0.76   EOS ABS 10*3/mm3 0.02  --   --  0.00  --   --   --   --   --   --   --   --  0.13   BASOS ABS 10*3/mm3 0.05  --   --  0.00  --   --   --   --   --   --   --   --  0.04   IMMATURE GRANS (ABS) 10*3/mm3 0.14*  --   --   --   --   --   --   --   --   --   --   --  0.08*   NRBC /100 WBC 0.5*  --   --  0.2  --   --   --   --   --   --   --   --  0.0    < > = values in this interval not displayed.     Results from last 7 days   Lab Units 10/31/24  2350   PH, ARTERIAL pH units 7.420   PO2 ART mm Hg 168.1*   PCO2, ARTERIAL mm Hg 41.2   HCO3 ART mmol/L 26.7         Results from last 7 days   Lab Units 10/29/24  0952   PROBNP pg/mL 7,084.0*             Results from last 7 days   Lab Units 10/31/24  0346 10/30/24  2325 10/30/24  2148 10/30/24  1916 10/30/24  1907 10/30/24  1708 10/29/24  0952   INR  1.38* 1.21* 1.10 2.3* 1.87* 2.3* 1.16*     Microbiology Results (last 10 days)       Procedure Component Value - Date/Time    Hardware / Foreign Body Culture - Hardware / Foreign Body, Chest, Left [[id number]] Collected: 10/30/24 1949    Lab Status: Preliminary result Specimen: Hardware / Foreign Body from Chest, Left Updated: 10/31/24 0726     Hardware / Foreign Body Culture No growth     Gram Stain Rare (1+) WBCs seen      No organisms seen    Hardware / Foreign Body Culture - Hardware /  Foreign Body, Chest, Left [[id number]] Collected: 10/30/24 1943    Lab Status: Preliminary result Specimen: Hardware / Foreign Body from Chest, Left Updated: 10/31/24 0726     Hardware / Foreign Body Culture No growth     Gram Stain No WBCs or organisms seen    Hardware / Foreign Body Culture - Hardware / Foreign Body, Heart [[id number]] Collected: 10/30/24 1632    Lab Status: Preliminary result Specimen: Hardware / Foreign Body from Heart Updated: 10/31/24 0726     Hardware / Foreign Body Culture No growth     Gram Stain Few (2+) WBCs seen      No organisms seen    Tissue / Bone Culture - Tissue, Aortic valve [[id number]] Collected: 10/30/24 1356    Lab Status: Preliminary result Specimen: Tissue from Aortic valve Updated: 10/31/24 0718     Tissue Culture No growth     Gram Stain Rare (1+) WBCs seen      No organisms seen    AFB Culture - Tissue, Aortic valve [[id number]] Collected: 10/30/24 1356    Lab Status: Preliminary result Specimen: Tissue from Aortic valve Updated: 10/31/24 1848     AFB Stain No acid fast bacilli seen on direct smear    Wound Culture - Swab, Aortic valve [[id number]] Collected: 10/30/24 1353    Lab Status: Preliminary result Specimen: Swab from Aortic valve Updated: 10/31/24 0727     Wound Culture No growth     Gram Stain Few (2+) WBCs seen      No organisms seen    Urine Culture - Urine, Urine, Clean Catch [[id number]]  (Normal) Collected: 10/28/24 1415    Lab Status: Final result Specimen: Urine, Clean Catch Updated: 10/29/24 2151     Urine Culture No growth    Blood Culture - Blood, Hand, Left [[id number]]  (Normal) Collected: 10/23/24 0955    Lab Status: Final result Specimen: Blood from Hand, Left Updated: 10/28/24 1030     Blood Culture No growth at 5 days    Blood Culture - Blood, Hand, Right [[id number]]  (Normal) Collected: 10/23/24 0955    Lab Status: Final result Specimen: Blood from Hand, Right Updated: 10/28/24 1131     Blood Culture No growth at 5 days              Diagnostics:  XR  Chest 1 View    Result Date: 10/31/2024  SINGLE VIEW OF THE CHEST  HISTORY: Orogastric tube placement  COMPARISON: None available.  FINDINGS: Orogastric tube appears to extend into the upper abdomen. The patient's [place] catheter has retracted, and is likely positioned within the right ventricle. Tubes and lines are otherwise stable. Cardiomegaly and vascular congestion are noted. Bibasilar consolidation is noted. Bilateral effusions are suspected. No pneumothorax is seen.       1. Orogastric tube appears to extend into the upper abdomen. 2. [place] catheter has retracted, and may be positioned within the right ventricle.  This report was finalized on 10/31/2024 9:51 PM by Dr. Alexandria Dahl M.D on Workstation: [id number]      XR Abdomen KUB    Addendum Date: 10/31/2024    ADDENDUM: Discussed by telephone with patient's nurse, Letty, 2044, 10/31/2024.  This report was finalized on 10/31/2024 8:47 PM by Dr. Giacomo Burton M.D on Workstation: [id number]      Result Date: 10/31/2024  XR ABDOMEN KUB-  INDICATIONS: Orogastric tube placement  TECHNIQUE: FRONTAL VIEW OF THE ABDOMEN  COMPARISON: None available  FINDINGS:  As several tubes are present over the abdomen, it is difficult to distinguish with certainty which tube is the orogastric tube, or what the location of the orogastric tube is; as such, additional imaging at the level of the chest and upper abdomen is recommended for further evaluation. The bowel gas pattern is nonobstructive.       As described.   This report was finalized on 10/31/2024 8:40 PM by Dr. Giacomo Burton M.D on Workstation: [id number]      XR Chest 1 View    Result Date: 10/31/2024  XR CHEST 1 VW-  HISTORY: 73-year-old male status post aortic root replacement, removal of intracardiac leads as well as pacemaker ICD generator and part of the leads. Intra-aortic balloon pump. Significant coagulopathy.  FINDINGS: Distal tip of ET tube is 1.5 cm proximal to the yoni. 2 cm withdrawal is  recommended. Right IJ [place] catheter tip is within the main pulmonary artery outflow tract. Intra-aortic balloon pump marker is approximately 7 cm distal to the yoni. No pneumothorax.  There is improved aeration at the right lung, but the remaining perihilar airspace consolidations need to be followed. There is no definite interstitial edema. No significant change in the enlarged cardiac silhouette. No pneumothorax.  This report was finalized on 10/31/2024 4:31 PM by Dr. Bernadette Gaming M.D on Workstation: [id number]      Diagnostic IntraOp Dano    Result Date: 10/31/2024  Anuj Aguilar MD     10/31/2024  3:58 PM Diagnostic IntraOp Dano Procedure Performed: Diagnostic IntraOp Dano      Start Time:      End Time:  Preanesthesia Checklist: Patient identified, IV assessed, risks and benefits discussed, monitors and equipment assessed, procedure being performed at surgeon's request and anesthesia consent obtained. General Procedure Information DANO Placed for monitoring purposes only -- This is not a diagnostic DANO Physician Requesting Echo: Javon Florian MD Location performed:  ICU Intubated Bite block placed Heart visualized Probe Insertion:  Easy Probe Type:  Multiplane Modalities:  Color flow mapping, continuous wave Doppler and pulse wave Doppler Echocardiographic and Doppler Measurements Aorta Other Aortic Findings:      Anesthesia Information Performed Personally Anesthesiologist:  Anuj Aguilar MD Echocardiogram Comments:      Limited exam performed in ICU on POD1, inotropes epi 0.02 & milrinone 0.375 RV - severely impaired systolic function LV - severe impaired systolic function, EF 20-25%, underfilled, practically kissing papillary muscles, global hypokinesis Diastolic function - grade 2 dysfunction RA: dilated LA: dilated AV - s/p bioprosthetic homograft without paravalvular leak MV - s/p annuloplasty, well seated without leak, mean gradient 2mmHg, no regurgitation seen TV - moderate to severe  regurgitation    XR Chest 1 View    Result Date: 10/31/2024  SINGLE VIEW OF THE CHEST  HISTORY: Postop line check  COMPARISON: October 30, 2024  FINDINGS: Tubes and lines appear to be stable when compared to the earlier study. No pneumothorax is seen. There is vascular congestion. There is bibasilar atelectasis. There is a left pleural effusion. As previously noted, intracardiac pacemaker leads have been removed, and the generator also appears to have been removed.      Postoperative findings, as noted above.  This report was finalized on 10/31/2024 12:18 AM by Dr. Alexandria Dahl M.D on Workstation: [id number]      XR Lost Needle / Instrument    Result Date: 10/30/2024  X-RAY LOST NEEDLE/INSTRUMENT  HISTORY: No count  COMPARISON: October 30, 2024  FINDINGS: The patient has undergone revision of sternotomy. Endotracheal tube terminates in satisfactory position. Right internal jugular vein [place] catheter appears to be stable in position. There are left-sided chest tubes and a mediastinal drain. Distal left-sided pacemaker leads appear to have been removed. Correlation with operative procedure is recommended. There is vascular congestion. No obvious pneumothorax is seen. Left basilar atelectasis is present. There may be a left pleural effusion.      Postoperative findings, as noted above. Distal left-sided pacemaker leads appear to have been removed, and correlation with procedural history is recommended.  This report was finalized on 10/30/2024 10:58 PM by Dr. Alexandria Dahl M.D on Workstation: [id number]      XR Chest Post CVA Port    Result Date: 10/30/2024  Portable chest radiograph  HISTORY: Central line placement  TECHNIQUE: Single AP portable radiograph of the chest  COMPARISON: Chest radiograph 10/20/2024      FINDINGS AND IMPRESSION: Right internal jugular pulmonary artery catheter tip terminates over the left aspect of the mediastinum. There are median sternotomy wires and a presumed atrial  appendage clip. Prosthetic heart valve and a left-sided pacer/AICD.  Bibasilar pulmonary pacification is present, left greater than right, mildly worsened within the right lung since 10/20/2024. Given asymmetry, continued attention follow-up to resolution is recommended to exclude the possibility of neoplasm. No pneumothorax is seen. Cardiac silhouette is mildly enlarged.  This report was finalized on 10/30/2024 12:20 PM by Dr. Danial Rosenbaum M.D on Workstation: [id number]        Pulmonary Artery Catheter    Result Date: 10/30/2024  Lionel Valencia MD     10/30/2024 11:03 AM Pulmonary Artery Catheter Patient reassessed immediately prior to procedure Patient location during procedure: OR Start time: 10/30/2024 10:26 AM Stop Time:10/30/2024 10:46 AM Indications: central pressure monitoring, vascular access and MD/Surgeon request Staff Anesthesiologist: Lionel Valencia MD Preanesthetic Checklist Completed: patient identified and risks and benefits discussed Central Line Prep Sterile Tech:cap, gloves, gown, mask and sterile barriers Prep: chloraprep Patient monitoring: blood pressure monitoring, continuous pulse oximetry and EKG Central Line Procedure Laterality:right Location:internal jugular Catheter Type:[place] Catheter Size:7.5 Fr Guidance:ultrasound guided PROCEDURE NOTE/ULTRASOUND INTERPRETATION.  Using ultrasound guidance the potential vascular sites for insertion of the catheter were visualized to determine the patency of the vessel to be used for vascular access.  After selecting the appropriate site for insertion, the needle was visualized under ultrasound being inserted into the internal jugular vein, followed by ultrasound confirmation of wire and catheter placement. There were no abnormalities seen on ultrasound; an image was taken; and the patient tolerated the procedure with no complications. Assessment Post procedure:biopatch applied, line sutured, occlusive dressing applied and secured with tape  Assessement:blood return through all ports and free fluid flow Complications:no Patient Tolerance:patient tolerated the procedure well with no apparent complications     Central Line    Result Date: 10/30/2024  Lionel Valencia MD     10/30/2024 11:02 AM Central Line Patient reassessed immediately prior to procedure Patient location during procedure: pre-op Start time: 10/30/2024 10:26 AM Stop Time:10/30/2024 10:46 AM Indications: vascular access and central pressure monitoring Staff Anesthesiologist: Lionel Valencia MD Preanesthetic Checklist Completed: patient identified and risks and benefits discussed Central Line Prep Sterile Tech:cap, gloves, gown, mask and sterile barriers Prep: chloraprep Patient monitoring: blood pressure monitoring, continuous pulse oximetry and EKG Central Line Procedure Laterality:right Location:internal jugular Catheter Type:Cordis Catheter Size:9 Fr Guidance:ultrasound guided PROCEDURE NOTE/ULTRASOUND INTERPRETATION.  Using ultrasound guidance the potential vascular sites for insertion of the catheter were visualized to determine the patency of the vessel to be used for vascular access.  After selecting the appropriate site for insertion, the needle was visualized under ultrasound being inserted into the internal jugular vein, followed by ultrasound confirmation of wire and catheter placement. There were no abnormalities seen on ultrasound; an image was taken; and the patient tolerated the procedure with no complications. Images: still images obtained, printed/placed on chart Assessment Post procedure:biopatch applied, line sutured, occlusive dressing applied and secured with tape Assessement:blood return through all ports and chest x-ray ordered Complications:no Patient Tolerance:patient tolerated the procedure well with no apparent complications Additional Notes Ultrasound Interpretation:  Using ultrasound guidance the potential vascular sites for insertion of the catheter were  visualized to determine the patency of the vessel to be used for vascular access.  After selecting the appropriate site for insertion, the needle was visualized under ultrasound being inserted into the vessel, followed by ultrasound confirmation of wire and catheter placement.  There were no abnormalities seen on ultrasound; an image was taken/ and the patient tolerated the procedure with no complications.     Diagnostic IntraOp Dano    Result Date: 10/30/2024  Azar Macias MD     10/30/2024  2:48 PM Diagnostic IntraOp Dano Procedure Performed: Diagnostic IntraOp Dano      Start Time:      End Time:  Preanesthesia Checklist: Patient identified, IV assessed, risks and benefits discussed, monitors and equipment assessed, procedure being performed at surgeon's request and anesthesia consent obtained. General Procedure Information Diagnostic Indications for Echo:  assessment of ascending aorta, assessment of surgical repair and hemodynamic monitoring Physician Requesting Echo: Javon Florian MD CPT Code:  59251, 57996 ICD Code for Medical Necessity:  I34.0, I70.0 Location performed:  OR Intubated Bite block placed Heart visualized Probe Insertion:  Easy Probe Type:  Multiplane Modalities:  Color flow mapping, pulse wave Doppler and continuous wave Doppler Echocardiographic and Doppler Measurements Ventricles Right Ventricle: Cavity size dilated.  Hypertrophy not present.  Thrombus not present.  Global function mildly impaired.  Left Ventricle: Cavity size dilated.  Thrombus not present.  Global Function mildly impaired.  Ejection Fraction 58%.  Other Ventricular Findings:      LVEDV 155 mL Valves Aortic Valve: Annulus bioprosthetic.  Stenosis mild.  Mean Gradient: 9 mmHg.  Regurgitation absent.  Leaflets vegetative.  Leaflet motions restricted.  Mitral Valve: Annulus dilated.  Stenosis not present.  Mean Gradient: 1 mmHg.  Regurgitation moderate.  Leaflets normal.  Leaflet motions normal.  Tricuspid Valve: Annulus  dilated.  Stenosis not present.  Regurgitation mild.  Leaflets normal.  Other Valve Findings:      Anterior mitral leaflet length 3.1 cm Aorta Ascending Aorta: Size normal.  Diameter 3.5 cm.  Dissection not present.  Plaque thickness less than 3 mm.  Mobile plaque not present.  Aortic Arch: Size normal.  Diameter 3 cm.  Dissection not present.  Plaque thickness less than 3 mm.  Mobile plaque not present.  Descending Aorta: Size normal.  Diameter 2.5 cm.  Dissection not present.  Plaque thickness less than 3 mm.  Mobile plaque not present.  Atria Right Atrium: Size dilated.  Spontaneous echo contrast present.  Thrombus not present.  Tumor not present.  Device not present.  Left Atrium: Size dilated.  Spontaneous echo contrast present.  Thrombus not present.  Tumor not present.  Device not present.  Left atrial appendage normal. Diastolic Function Measurements: Diastolic Dysfunction Grade= indeterminate E=  51.7 ms A=  ms E/A Ratio= DT=  108 ms S/D=  .6 IVRT= Other Findings Pericardium:  pericardial effusion Pulmonary Venous Flow:  blunted (decreased) systolic flow Anesthesia Information Performed Personally Anesthesiologist:  Azar Macias MD Echocardiogram Comments:      Postbypass results:    Arterial Line    Result Date: 10/30/2024  Lionel Valencia MD     10/30/2024 11:02 AM Arterial Line Patient reassessed immediately prior to procedure Patient location during procedure: pre-op Start time: 10/30/2024 10:15 AM Stop Time:10/30/2024 10:17 AM  Line placed for hemodynamic monitoring. Performed By Anesthesiologist: Lionel Valencia MD Preanesthetic Checklist Completed: patient identified and risks and benefits discussed Arterial Line Prep  Sterile Tech: gloves Prep: ChloraPrep Patient monitoring: blood pressure monitoring, continuous pulse oximetry and EKG Arterial Line Procedure Laterality:left Location:  radial artery Catheter size: 20 G Guidance: ultrasound guided PROCEDURE NOTE/ULTRASOUND INTERPRETATION.  Using  ultrasound guidance the potential vascular sites for insertion of the catheter were visualized to determine the patency of the vessel to be used for vascular access.  After selecting the appropriate site for insertion, the needle was visualized under ultrasound being inserted into the radial artery, followed by ultrasound confirmation of wire and catheter placement. There were no abnormalities seen on ultrasound; an image was taken; and the patient tolerated the procedure with no complications. Successful placement: yes Images: still images obtained, printed/placed on the chart Post Assessment Dressing Type: occlusive dressing applied and secured with tape. Complications no Patient Tolerance: patient tolerated the procedure well with no apparent complications Additional Notes Using ultrasound guidance the potential vascular sites for insertion of the catheter were visualized to determine the patency of the vessel to be used for vascular access.  After selecting the appropriate site for insertion, the needle was visualized under ultrasound being inserted into the artery, followed by ultrasound confirmation of wire and catheter placement.  There were no abnormalities seen on ultrasound; an image was taken/ and the patient tolerated the procedure with no complications.     Duplex Vein Mapping Lower Extremity - Bilateral CAR    Result Date: 10/28/2024    The right great saphenous vein is patent  and of adequate size in the thigh.   The right great saphenous vein is patent and of adequate size in the calf.   The left great saphenous vein is patent and of adequate size in the thigh.   The left great saphenous vein is patent  and of adequate size in the calf.     Carotid Duplex - Bilateral    Result Date: 10/28/2024    Right internal carotid artery demonstrates a less than 50% stenosis.   Antegrade right vertebral flow.   Left internal carotid artery demonstrates a less than 50% stenosis.   Antegrade left vertebral flow.      XR Chest PA & Lateral    Result Date: 10/28/2024  XR CHEST PA AND LATERAL-  HISTORY: Male who is 73 years-old, preoperative evaluation  TECHNIQUE: Frontal and lateral views of the chest  COMPARISON: 10/14/2024  FINDINGS: The heart is enlarged. Pulmonary vasculature shows mild central prominence. Left-sided pacemaker, cardiac leads, sternotomy wires, cardiac valve marker noted. Minimal right pleural effusion, continued follow-up suggested. No focal pulmonary consolidation. No pneumothorax. Otherwise stable.      As described.  This report was finalized on 10/28/2024 4:27 PM by Dr. Giacomo Burton M.D on Workstation: [id number]      Stress Test With Myocardial Perfusion One Day    Result Date: 10/26/2024    Lexiscan protocol completed without low-level exercise.  Baseline ECG showed A-fib with RBBB, no angina or significant ischemic ECG changes noted.   Left ventricular ejection fraction is normal (Calculated EF = 55%).   Mildly reduced perfusion defect in the basal segments likely due to suboptimal postprocessing/contouring as well as diaphragmatic attenuation artifact.  No reversibility noted.   Myocardial perfusion imaging indicates a grossly normal myocardial perfusion study with no evidence of reversible ischemia. Impressions are consistent with a low risk study.     Adult Transesophageal Echo 3D ([facility]) W/ Cont If Necessary Per Protocol    Result Date: 10/25/2024    Left ventricular ejection fraction appears to be 51 - 55%.   Left ventricular wall thickness is consistent with mild concentric hypertrophy.   The right ventricular cavity is mildly dilated.   The left atrial cavity is severely dilated.   Saline test results are negative.   The right atrial cavity is severely  dilated.   Severe aortic valve stenosis is present.   There is a mobile mass on the aortic valve. Vegetations are present on both the ventricle as well as the aortic side of the bioprosthetic aortic valve.  Largest extends into the ascending  aorta approximately 3.1 cm above the sewing ring (3.3 cm total length). Vegetation becomes more broad as the furthest aspect from attachment, maximum width 1.7 cm. Vegetation on the ventricular side of the aortic valve I think most likely originates from the sewing ring.   There is a bioprosthetic aortic valve present. The prosthetic aortic valve peak and mean gradients are elevated. There is a large, mobile mass present on the prosthetic aortic valve that is consistent with a vegetation. There is severe stenosis or obstruction of the prosthetic aortic valve.   Mild aortic valve regurgitation is present.     CT Angiogram Coronary    Result Date: 10/25/2024  RADIOLOGY INTERPRETATION OF EXTRACARDIAC STRUCTURES WITHIN THE CHEST    FINDINGS: Small right greater than left bilateral pleural effusions and mild pulmonary inter and intralobular septal thickening which may reflect a component of pulmonary edema. Respiratory motion limits evaluation of the lungs.    PLEASE SEE SEPARATE CARDIOLOGY REPORT OF THE CARDIAC FINDINGS.   Radiation dose reduction techniques were utilized, including automated exposure control and exposure modulation based on body size.   This report was finalized on 10/25/2024 3:03 PM by Dr. Donovan Gomez M.D on Workstation: [id number]      CT Angiogram Coronary-Cardiology Interpretation    Result Date: 10/25/2024  Table formatting from the original result was not included. 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 0.5 mm collimated images were obtained through the coronary arteries.  Data were transferred offline for 3D reconstructions using [employer]. CONTRAST: 85 mL iopamidol ([id number]) ACQUISITION: Retrospective ECG triggered acquisition was used.  Heart rate at the time of acquisition was  approximately 85 BPM. MEDICATIONS: Metoprolol tartrate  25 mg oral Nitroglycerin 0.8 mg tablet TECHNICAL QUALITY:  Extremely poor due to obesity, high heart rate in the setting of A-fib and inability to medicate further due to low BP, and adjacent ICD leads with associated metallic artifacts.  Nondiagnostic. . FINDINGS: Coronary Artery Calcification Findings: The total calcium score is 69 indicating mild (CAC 1-100) calcified plaque in the coronary tree. Left main: 40 LAD: 13 LCx: 4 RCA: 2 Angiographic Findings: The coronary arteries are  possibly left dominant . Left Main: Normal origin from the left coronary cusp.  Gives rise to LAD and LCx.  There appears to be a calcified plaque in distal LM extending into LAD.  Unable to accurately assess luminal stenosis due to poor image quality. LAD: Unable to accurately assess luminal stenosis due to poor image quality. LCx: Likely dominant vessel. Unable to accurately assess luminal stenosis due to poor image quality. RCA: Likely nondominant. Unable to accurately assess luminal stenosis due to poor image quality. Noncoronary Cardiac Findings: Cardiac chambers: Normal in size with no obvious filling defects within the ventricles, atria, or atrial appendages. No stigmata or prior infarction. Cardiac valves: A bioprosthetic valve is present in the aortic position and poorly visualized.  There appears to be hypoattenuated linear echodensity consistent with known bioprosthetic aortic valve vegetation. Pulmonary arteries: Normal in caliber. No obvious filling defects in the visualized central pulmonary arteri(es) to suggest large central pulmonary emboli, although the image qualities are extremely poor. Pericardium: The pericardial contour is preserved with no effusion, thickening, or calcifications. Left ventricle: Mild-moderate LV dilatation.  Calculated LVEF 70%.     Extremely poor image quality due to obesity (BMI 42), high heart rate in the setting of A-fib and inability to  medicate further due to low BP, and adjacent ICD leads resulting in photon starvation, cardiac motion, and beam hardening artifacts and excessive noise.  Non-diagnostic study. CAD-RAD N/P1. Overall there is mild amount of coronary plaque.  Coronary calcium score CAC (69). Grossly normal LV systolic function (calculated LVEF 70%). Bioprosthetic aortic valve present, poorly visualized. There appears to be hypoattenuated linear echodensity consistent with known bioprosthetic aortic valve vegetation.  Please reference same-day JOLIE results. Left atrial appendage appears to be ligated surgically with clips noted. Severe biatrial enlargement. Please refer to the radiology report for information on extra-cardiac structures.  RECOMMENDATION: Consider alternative imaging modalities to rule out obstructive CAD if deemed necessary prior to surgery for bioprosthetic endocarditis. Grading Scale for Stenosis Severity: Category Degree Interpretation CAD-RADS 0 0% (No plaque or stenosis) Absence of CAD CAD-RADS 1 1-24% (Minimal stenosis or plaque w/o stenosis) Minimal non-obstructive CAD CAD-RADS 2 25-49% (Mild stenosis) Mild non-obstructive CAD CAD-RADS 3 50-69% (Moderate stenosis) Moderate stenosis CAD-RADS 4 A - 70-99% stenosis or B - Left main >/= 50% or 3-vessel obstructive (>/=70%) disease Severe stenosis CAD-RADS 5 100% (total occlusion) Total coronary occlusion or sub-total occlusion CAD-RADS N Non-diagnostic study Obstructive CAD cannot be excluded Grading Scale for Plaque [place]: P1 (CAC 1-100) Mild amount of plaque P2 (-300) Moderate amount of plaque P3 (-999) Severe amount of plaque P4 (CAC > 1000) Extensive amount of plaque     Duplex Venous Lower Extremity - Bilateral CAR    Result Date: 10/24/2024    Acute left lower extremity deep vein thrombosis noted in the gastrocnemius.   All other veins appeared normal bilaterally.     Adult Transthoracic Echo Complete W/ Cont if Necessary Per Protocol    Result  Date: 10/19/2024    Left ventricular systolic function is low normal. Left ventricular ejection fraction appears to be 51 - 55%.   Left ventricular wall thickness is consistent with mild concentric hypertrophy.   Left ventricular diastolic function was indeterminate.   There is moderate to severe biatrial enlargement.   There is a bioprosthetic aortic valve present. The prosthetic aortic valve peak and mean gradients are elevated.  The peak and mean gradient across aortic valve was 101/68 mmHg.  Findings are consistent with severe stenosis of the bioprosthetic valve.   There is mild to moderate mitral regurgitation.   There is mild tricuspid regurgitation.  Estimated right ventricular systolic pressure from tricuspid regurgitation is markedly elevated (>55 mmHg).     XR Chest 1 View    Result Date: 10/14/2024  XR CHEST 1 VW Date of Exam: 10/14/2024 4:44 PM EDT Indication: fever leukocytosis Comparison: 9/26/2024 Findings: Cardiomediastinal silhouette is enlarged. There is a left-sided pacemaker/AICD. There is pulmonary vascular congestion. No airspace disease, pneumothorax, nor pleural effusion. No acute osseous abnormality identified.     Impression: 1.Chronic findings without acute process identified. Electronically Signed: Primitivo Snyder MD  10/14/2024 4:58 PM EDT  Workstation ID: [id number]    Results for orders placed during the hospital encounter of 10/23/24    Adult Transesophageal Echo 3D ([facility]) W/ Cont If Necessary Per Protocol    Interpretation Summary    Left ventricular ejection fraction appears to be 51 - 55%.    Left ventricular wall thickness is consistent with mild concentric hypertrophy.    The right ventricular cavity is mildly dilated.    The left atrial cavity is severely dilated.    Saline test results are negative.    The right atrial cavity is severely  dilated.    Severe aortic valve stenosis is present.    There is a mobile mass on the aortic valve. Vegetations are present on both the ventricle  as well as the aortic side of the bioprosthetic aortic valve.  Largest extends into the ascending aorta approximately 3.1 cm above the sewing ring (3.3 cm total length). Vegetation becomes more broad as the furthest aspect from attachment, maximum width 1.7 cm. Vegetation on the ventricular side of the aortic valve I think most likely originates from the sewing ring.    There is a bioprosthetic aortic valve present. The prosthetic aortic valve peak and mean gradients are elevated. There is a large, mobile mass present on the prosthetic aortic valve that is consistent with a vegetation. There is severe stenosis or obstruction of the prosthetic aortic valve.    Mild aortic valve regurgitation is present.      Chest x-ray reviewed ET tube looks like it is in okay position it looks like it is only about a centimeter and a half above the main yoni its tip looks like he has a relative to the short area for adequate placement to watch this position closely.  There is bilateral pulmonary vascular congestion and edema and a sort of perihilar distribution there may be small pleural effusions bilaterally I do not see any pneumothorax there is a Bowman-[place] type catheter that may be terminating the RV it is hard to see exactly where it terminates, there are multiple tubes and lines there looks like there may be a tube in the stomach    Assessment & Plan     Respiratory failure postoperatively with what looks like pretty significant pulmonary edema.  Patient's airway pressures both peak and plateau are fairly high.  As a trial I decreased his tidal volume to 8 mL/kg from over 10 increased his rate to 20 his peak pressures dropped to about 34-35 and his plateau pressures dropped to 28 to 29 cm.  The nurse asked me what I did because his blood pressures all of a sudden got much better.  We watched for a couple of minutes his blood pressure stayed better with the ventilator changes.  Course we will have to watch his oxygenation.   He is quite obese and has a lot of pulmonary edema of his oxygenation is worse we may try going up on his PEEP a little bit more.  The last blood gas look like there was room to wean or go to wean his O2 slowly if we cannot wean again think about increasing PEEP when will follow-up with blood gas here in about an hour.  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 homograft with coronary button reimplantation also mitral valve repair  Shock cardiogenic compounded by probably septic shock  CHF with pulmonary edema  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  Acute left lower extremity DVT on 10/24/2024  Esophagitis grade C by EGD on 9/30/2024 PPI ordered is ordered as oral not sure he can to be able to get this recommend consider changing that to IV.  Hyperglycemia mild on postop insulin protocol      Paul Leslie Jr, MD  11/01/24  00:27 EDT    Time: Critical care time 41 minutes

## 2024-11-01 NOTE — PROGRESS NOTES
[facility]  [phone]  Dr. Tawanda Clement     Subjective:  LOS: 9    Chief Complaint: Ventilator management    Patient sedated and intubated on my evaluation.  Still on nitric.    Objective   Vital Signs past 24hrs  Temp range: Temp (24hrs), Av.4 °F (37.4 °C), Min:98.8 °F (37.1 °C), Max:100 °F (37.8 °C)    BP range: BP: ()/(37-94) 118/62  Pulse range: Heart Rate:  [] 118  Resp rate range: Resp:  [12-18] 17  Device (Oxygen Therapy): ventilator   Oxygen range:SpO2:  [99 %-100 %] 100 %   Mechanical Ventilator:Mode: VC+/AC (24 1500)    Physical Exam  Vitals reviewed.   Constitutional:       Interventions: He is sedated and intubated.   HENT:      Head: Normocephalic and atraumatic.   Eyes:      Extraocular Movements: Extraocular movements intact.      Conjunctiva/sclera: Conjunctivae normal.      Pupils: Pupils are equal, round, and reactive to light.   Cardiovascular:      Rate and Rhythm: Regular rhythm. Tachycardia present.   Pulmonary:      Effort: Pulmonary effort is normal. He is intubated.      Breath sounds: Normal breath sounds. No wheezing, rhonchi or rales.   Musculoskeletal:      Cervical back: Normal range of motion. No rigidity or tenderness.   Skin:     General: Skin is warm and dry.      Findings: No rash.       Results Review:    I have reviewed the laboratory and imaging data since the last note by [facility] physician.  My annotations are noted in assessment and plan.      Result Review:  I have personally reviewed the results from last note by [facility] physician to 2024 16:12 EDT and agree with these findings:  [x]  Laboratory list / accordion  [x]  Microbiology  [x]  Radiology  [x]  EKG/Telemetry   [x]  Cardiology/Vascular   [x]  Pathology  [x]  Old records  []  Other:    Medication Review:  I have reviewed the current MAR.  My annotations are noted in assessment and plan.    aspirin, 81 mg, Oral, Daily  atorvastatin, 40 mg, Oral, Nightly  bumetanide, 4 mg,  Intravenous, Q6H  chlorhexidine, 15 mL, Mouth/Throat, Q12H  enoxaparin, 40 mg, Subcutaneous, Q12H  mupirocin, 1 Application, Each Nare, BID  pantoprazole, 40 mg, Intravenous, Once   Followed by  pantoprazole, 40 mg, Oral, QAM  penicillin g (potassium), 4 Million Units, Intravenous, Q4H  senna-docusate sodium, 2 tablet, Oral, Nightly  sodium chloride, 10 mL, Intravenous, Q12H        clevidipine, 2-32 mg/hr  dexmedetomidine, 0.2-1.5 mcg/kg/hr, Last Rate: 0.4 mcg/kg/hr (11/01/24 1357)  DOPamine, 2-20 mcg/kg/min  EPINEPHrine, 0.02-0.1 mcg/kg/min, Last Rate: 0.02 mcg/kg/min (11/01/24 0919)  insulin, 0-100 Units/hr, Last Rate: 3.6 Units/hr (11/01/24 1048)  lidocaine in D5W, 1 mg/min, Last Rate: 2 mg/min (11/01/24 1126)  milrinone, 0.25-0.375 mcg/kg/min, Last Rate: 0.25 mcg/kg/min (11/01/24 1222)  niCARdipine, 5-15 mg/hr  nitroglycerin, 5-200 mcg/min  norepinephrine, 0.02-0.2 mcg/kg/min, Last Rate: 0.04 mcg/kg/min (11/01/24 1546)  Pharmacy Consult,   phenylephrine, 0.2-2 mcg/kg/min, Last Rate: 0.9 mcg/kg/min (11/01/24 0919)  propofol, 5-50 mcg/kg/min, Last Rate: 40 mcg/kg/min (11/01/24 1357)  vasopressin, 0.02-0.1 Units/min, Last Rate: 0.06 Units/min (11/01/24 1206)      Lines, Drains & Airways       Active LDAs       Name Placement date Placement time Site Days    Pulmonary Artery Catheter - Triple Lumen 10/30/24 Right Internal jugular 10/30/24  1026  created via procedure documentation  -- 2    CVC Double Lumen 10/30/24 Right Internal jugular 10/30/24  1026  created via procedure documentation  Internal jugular  2    Peripheral IV 10/29/24 1848 Anterior;Left Forearm 10/29/24  1848  Forearm  2    Peripheral IV 10/30/24 0940 Anterior;Right Forearm 10/30/24  0940  Forearm  2    Urethral Catheter Non-latex;Temperature probe 16 Fr. 10/30/24  1145  -- 2    Y Chest Tube 1 and 2 1 Anterior Mediastinal 28 Fr. 2 Anterior Mediastinal 28 Fr. 10/30/24  2100  -- 1    Y Chest Tube 3 and 4 3 Anterior Mediastinal 28 Fr. 4 Anterior  Pleural 28 Fr. 10/30/24  2000  -- 1    ETT  10/30/24  1139  created via procedure documentation  -- 2    Arterial Line 10/30/24 Left Radial 10/30/24  1015  created via procedure documentation  Radial  2    IABP 8.0 Fr. 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  Cardiogenic and probable septic shock  CHF with pulmonary edema  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  Acute left lower extremity DVT on 10/24/2024  Esophagitis grade C by EGD on 9/30/2024 PPI ordered is ordered as oral not sure he can to be able to get this recommend consider changing that to IV.  Hyperglycemia mild on postop insulin protocol      Plan  -Likely significant pulmonary edema causing respiratory failure.  - Recommend reducing tidal volumes to 6-7 mL/kg and can  increased respiratory rate to assist with ventilation.  Attempt to keep plats less than 30.  - Diuresis per cardiology and surgery  -Would begin weaning nitric  oxide now given that FiO2 is at 70%.  - Will continue to follow and assist with vent        Tawanda Clement DO   11/01/24  16:12 EDT      Part of this note may be an electronic transcription/translation of spoken language to printed text using the Dragon Dictation System.

## 2024-11-01 NOTE — PROGRESS NOTES
"Nutrition Services    Patient Name:  Woodrow Alejandro  YOB: 1950  MRN: [id number]  Admit Date:  10/23/2024Assessment Date:  11/01/24    NUTRITION SCREENING      Reason for Encounter LOS, NPO day 2-3   Diagnosis/Problem Prosthetic aortic valve stenosis   POD #2 reoperative sternotomy AV root replacement 27mm cryopreserved homograft with right nuvia cabrol, AICD removal, IABP placement   Possible endocarditis, likely need JOLIE   Intubated, sedated, on pressors        PO Diet NPO Diet NPO Type: Sips with Meds   Supplements    PO Intake % NPO        Medications MAR reviewed by RD   Labs  Listed below, reviewed   Physical Findings Intubated, sedated, 2+ edema   GI Function BM 10.28, passing flatus    Skin Status Midline sternal incision , right anterior thigh, left anterior clavicle        Height  Weight  BMI  Weight Trend     Height: 182.9 cm (72\")  Weight: (!) 150 kg (330 lb 4 oz) (10/31/24 0615)  Body mass index is 44.79 kg/m².  Stable, Gain       Nutrition Problem (PES) Inadequate oral intake related to aortic valve stenosis as evidence by NPO follow surgery.       Intervention/Plan Follow for nutritional plans     RD to follow up per protocol.     Results from last 7 days   Lab Units 11/01/24  1029 11/01/24  0350 10/31/24  2348   SODIUM mmol/L 145 149* 147*   POTASSIUM mmol/L 4.5 4.6 4.8   CHLORIDE mmol/L 107 111* 110*   CO2 mmol/L 24.9 23.2 25.1   BUN mg/dL 17 15 16   CREATININE mg/dL 1.94* 1.93* 1.89*   CALCIUM mg/dL 8.5* 8.7 8.3*   BILIRUBIN mg/dL 0.4 0.5 0.6   ALK PHOS U/L 71 62 57   ALT (SGPT) U/L 6 6 10   AST (SGOT) U/L 73* 83* 82*   GLUCOSE mg/dL 149* 149* 151*     Results from last 7 days   Lab Units 11/01/24  1029 11/01/24  0350 10/31/24  2348 10/31/24  2333 10/30/24  0344 10/29/24  0952   MAGNESIUM mg/dL 2.4  --  2.5* 2.5*   < > 1.8   PHOSPHORUS mg/dL  --   --   --  6.2*   < >  --    HEMOGLOBIN g/dL 8.5*   < > 8.7*  --    < > 9.7*   HEMOGLOBIN, POC   --   --   --   --    < >  --  "   HEMATOCRIT % 24.9*   < > 25.0*  --    < > 31.4*   HEMATOCRIT POC   --   --   --   --    < >  --    TRIGLYCERIDES mg/dL  --   --   --   --   --  119    < > = values in this interval not displayed.     Lab Results   Component Value Date    HGBA1C 5.30 10/24/2024         Electronically signed by:  Gisela Bartlett RD  11/01/24 18:00 EDT

## 2024-11-01 NOTE — NURSING NOTE
2330 pt went into afib with rvr lido hr max 140 bpm-lidocaine drip restarted at 1 mg/min-stat EKG ordered-BP a little soft-pulses weak but palpable-prop and dex turned down, texted Dr Camp-increased amio to 1 and gave bolus-Qtc <530 (519 per ecg). Amio Bolus given gtt increased to 1-stat abg drawn-no acidosiss. Cbc and cmp drawn-will replace electrolytes per MD order. Hr decreasing to 110's    Pulm came in and adjusted vent settings,  620 TV, 7.5 peep. Will repeat gas in 1 hour. BP improved and peak pressures better

## 2024-11-01 NOTE — CASE MANAGEMENT/SOCIAL WORK
Continued Stay Note  [facility]     Patient Name: Woodrow Alejandro  MRN: [id number]  Today's Date: 11/1/2024    Admit Date: 10/23/2024    Plan: Acute Rehab vs SNF   Discharge Plan       Row Name 11/01/24 1352       Plan    Plan Acute Rehab vs SNF    Plan Comments POD#2  Remains intubated/sedated  Requiring a lot of inotropic and pressor support. Levophed, vaso, epinephrine, vasopressin and milrinone, now in atrial fibrillation with RVR.  Not appropriate for dishcarge at this time.  Jacki/Celeste CONWAY Acute Rehab continues to follow.  Patient actually arrived to [facility] from the skilled rehab unit at [facility].  [facility] SNF is first choice for SNF.  CCP following...........Rosanna CARCAMO/TAMANNA CM                   Discharge Codes    No documentation.                 Expected Discharge Date and Time       Expected Discharge Date Expected Discharge Time    Nov 6, 2024               Rosanna Aldrich RN

## 2024-11-01 NOTE — PROGRESS NOTES
LOS: 9 days     Chief Complaint: Endocarditis    Interval History: Ivonne intubated and sedated.  Requiring multiple pressors.  Intra-aortic balloon pump in place.    Vital Signs  Temp:  [98.1 °F (36.7 °C)-100 °F (37.8 °C)] 99.1 °F (37.3 °C)  Heart Rate:  [] 106  Resp:  [12-16] 16  BP: ()/(37-89) 100/67  Arterial Line BP: ()/(46-68) 80/49  FiO2 (%):  [83 %-100 %] 83 %    Physical Exam:  General: Intubated and sedated  HEENT: ET tube in place  Respiratory: Multiple chest tubes.  : Ugarte catheter  Skin: Operative dressing over the anterior chest.  Access: Right IJ CVC.  Arterial line.  Peripheral line.    Antibiotics:  Anti-Infectives (From admission, onward)      Ordered     Dose/Rate Route Frequency Start Stop    10/30/24 2306  ceFAZolin 2000 mg IVPB in 100 mL NS (MBP)        Ordering Provider: Samantha Salvador APRN    2,000 mg  over 30 Minutes Intravenous Every 8 Hours 10/31/24 0000 11/01/24 1559    10/29/24 1518  ceFAZolin 2000 mg IVPB in 100 mL NS (MBP)        Ordering Provider: Bryant Mcclure PA-C    2,000 mg  over 30 Minutes Intravenous Once 10/30/24 0600 10/30/24 1936    10/28/24 1034  vancomycin IVPB 2000 mg in 0.9% Sodium Chloride 500 mL        Ordering Provider: Samantha Salvador APRN    15 mg/kg × 142 kg Intravenous Once 10/28/24 1045 10/28/24 1356    10/23/24 1709  penicillin G potassium 4 Million Units in sodium chloride 0.9 % 100 mL IVPB        Ordering Provider: Samantha Salvador APRN    4 Million Units  over 30 Minutes Intravenous Every 4 Hours Scheduled 10/23/24 2000 12/05/24 0129             Results Review:     I reviewed the patient's new clinical results.    Lab Results   Component Value Date    WBC 17.24 (H) 11/01/2024    HGB 8.8 (L) 11/01/2024    HCT 25.3 (L) 11/01/2024    MCV 90.7 11/01/2024     (L) 11/01/2024     Lab Results   Component Value Date    GLUCOSE 149 (H) 11/01/2024    BUN 15 11/01/2024    CREATININE 1.93 (H) 11/01/2024    BCR 7.8 11/01/2024    CO2 23.2  11/01/2024    CALCIUM 8.7 11/01/2024    ALBUMIN 2.7 (L) 11/01/2024    LABIL2 1.4 06/27/2019    AST 83 (H) 11/01/2024    ALT 6 11/01/2024       Microbiology:  10/3 COVID-negative  10/10 COVID-negative  10/14 COVID-negative  10/15 blood cultures 2 out of 2 strep mitis  10/17 COVID-negative  10/17 blood cultures 2 out of 2 strep mitis  10/21 COVID-negative  10/23 blood cultures no growth today  10/30 operative cultures from the aortic valve no growth to date    Assessment    #Strep mitis endocarditis  #Status post bioprosthetic aortic valve replacement 2014  #Status post aortic root replacement in the setting of prosthetic aortic valve endocarditis and annular abscess on 10/30  #Status post removal of pacemaker generator and intracardiac portion of the leads with retained venous leads  #Atrial fibrillation  #Achilles tendon rupture  #NATHANIEL    Full operative note reviewed. Continue penicillin G 4,000,000 units every 4 hours for strep mitis endocarditis.  Current renal function does not require any dose adjustment as of yet.     Planning for minimum of 6 weeks of antibiotics but final end date depended on intraoperative cultures.  Long-term suppressive antibiotics to be discussed on her follow-up given the portion of retained pacemaker lead.    ID will plan for chart check over the weekend and see again on 11/4. Please call on-call provider with any questions.    100

## 2024-11-02 ENCOUNTER — ANESTHESIA (OUTPATIENT)
Dept: PERIOP | Facility: HOSPITAL | Age: 74
End: 2024-11-02
Payer: MEDICARE

## 2024-11-02 PROCEDURE — 25010000002 FENTANYL CITRATE (PF) 250 MCG/5ML SOLUTION: Performed by: ANESTHESIOLOGY

## 2024-11-02 PROCEDURE — 63710000001 INSULIN REGULAR HUMAN PER 5 UNITS: Performed by: ANESTHESIOLOGY

## 2024-11-02 PROCEDURE — 25810000003 SODIUM CHLORIDE 0.9 % SOLUTION 250 ML FLEX CONT: Performed by: ANESTHESIOLOGY

## 2024-11-02 PROCEDURE — 25010000002 BUMETANIDE PER 0.5 MG: Performed by: INTERNAL MEDICINE

## 2024-11-02 PROCEDURE — 25010000002 PHENYLEPHRINE 10 MG/ML SOLUTION 5 ML VIAL: Performed by: ANESTHESIOLOGY

## 2024-11-02 PROCEDURE — 25810000003 SODIUM CHLORIDE 0.9 % SOLUTION: Performed by: ANESTHESIOLOGY

## 2024-11-02 RX ORDER — FENTANYL CITRATE 50 UG/ML
INJECTION, SOLUTION INTRAMUSCULAR; INTRAVENOUS AS NEEDED
Status: DISCONTINUED | OUTPATIENT
Start: 2024-11-02 | End: 2024-11-02 | Stop reason: SURG

## 2024-11-02 RX ORDER — ROCURONIUM BROMIDE 10 MG/ML
INJECTION, SOLUTION INTRAVENOUS AS NEEDED
Status: DISCONTINUED | OUTPATIENT
Start: 2024-11-02 | End: 2024-11-02 | Stop reason: SURG

## 2024-11-02 RX ORDER — CALCIUM CHLORIDE 100 MG/ML
INJECTION INTRAVENOUS; INTRAVENTRICULAR AS NEEDED
Status: DISCONTINUED | OUTPATIENT
Start: 2024-11-02 | End: 2024-11-02 | Stop reason: SURG

## 2024-11-02 RX ORDER — SODIUM CHLORIDE 9 MG/ML
INJECTION, SOLUTION INTRAVENOUS CONTINUOUS PRN
Status: DISCONTINUED | OUTPATIENT
Start: 2024-11-02 | End: 2024-11-02 | Stop reason: SURG

## 2024-11-02 RX ADMIN — INSULIN HUMAN 2 UNITS: 100 INJECTION, SOLUTION PARENTERAL at 08:09

## 2024-11-02 RX ADMIN — FENTANYL CITRATE 150 MCG: 50 INJECTION, SOLUTION INTRAMUSCULAR; INTRAVENOUS at 07:20

## 2024-11-02 RX ADMIN — BUMETANIDE 1 MG: 0.25 INJECTION INTRAMUSCULAR; INTRAVENOUS at 08:07

## 2024-11-02 RX ADMIN — ROCURONIUM BROMIDE 100 MG: 10 INJECTION, SOLUTION INTRAVENOUS at 06:51

## 2024-11-02 RX ADMIN — FENTANYL CITRATE 100 MCG: 50 INJECTION, SOLUTION INTRAMUSCULAR; INTRAVENOUS at 07:58

## 2024-11-02 RX ADMIN — CEFAZOLIN 3 G: 1 INJECTION, POWDER, FOR SOLUTION INTRAVENOUS at 06:59

## 2024-11-02 RX ADMIN — CALCIUM CHLORIDE 1 G: 100 INJECTION, SOLUTION INTRAVENOUS at 08:58

## 2024-11-02 RX ADMIN — SODIUM CHLORIDE 4 MILLION UNITS: 9 INJECTION, SOLUTION INTRAVENOUS at 06:52

## 2024-11-02 RX ADMIN — SODIUM CHLORIDE: 9 INJECTION, SOLUTION INTRAVENOUS at 06:49

## 2024-11-02 RX ADMIN — ROCURONIUM BROMIDE 50 MG: 10 INJECTION, SOLUTION INTRAVENOUS at 08:06

## 2024-11-02 RX ADMIN — PHENYLEPHRINE HYDROCHLORIDE 1.1 MCG/KG/MIN: 10 INJECTION, SOLUTION INTRAVENOUS at 08:58

## 2024-11-02 NOTE — PLAN OF CARE
Problem: Enteral Nutrition  Goal: Absence of Aspiration Signs and Symptoms  Outcome: Progressing  Goal: Safe, Effective Therapy Delivery  Outcome: Progressing  Goal: Feeding Tolerance  Outcome: Progressing   Goal Outcome Evaluation:         RD to follow

## 2024-11-02 NOTE — ANESTHESIA PROCEDURE NOTES
Diagnostic IntraOp Dano    Procedure Performed: Diagnostic IntraOp Dano       Start Time:  11/2/2024 8:04 AM       End Time:   11/2/2024 8:04 AM    Preanesthesia Checklist:  Patient identified, IV assessed, risks and benefits discussed, monitors and equipment assessed, procedure being performed at surgeon's request and anesthesia consent obtained.    General Procedure Information  DANO Placed for monitoring purposes only -- This is not a diagnostic DANO  Diagnostic Indications for Echo:  hemodynamic monitoring  Physician Requesting Echo: Javon Florian MD  Location performed:  OR  Intubated  Bite block placed  Heart visualized  Probe Insertion:  Easy  Probe Type:  Multiplane  Modalities:  2D only, color flow mapping, continuous wave Doppler and pulse wave Doppler        Anesthesia Information  Performed Personally  Anesthesiologist:  Leana Jang MD      Echocardiogram Comments:       Limited exam for hemodynamic monitoring while closing chest

## 2024-11-02 NOTE — PLAN OF CARE
Goal Outcome Evaluation:                    Received from OR s/p chest closure at 0900. Dr Clement at bedside for bronchoscopy following desaturation events. Ventilator and nitric oxide box changed out. Dr Florian at bedside and notified of events. Patient remains sedated on ventilator, balloon pump and vasopressor infusions.

## 2024-11-02 NOTE — PROGRESS NOTES
"[facility]    Patient Name: Woodrow Alejandro  :1950  73 y.o.  LOS: 10  Encounter Provider: Azar Mckeon Jr, MD      Patient Care Team:  Juan Perez MD as PCP - General (Internal Medicine)    Chief Complaint: Follow-up of prosthetic aortic valve endocarditis, severe mitral regurgitation status post reoperative repair of mitral valve and repair of aortic region with homograft and bioprosthetic aortic valve, postoperative shock,, ATN, postoperative atrial fibrillation    Interval History: Patient taken back to the OR this morning to close the chest.  IABP repositioned with improved augmentation.  Patient remains on the ventilator on nitric oxide and multiple pressors/inotropes.  Intraoperative JOLIE images reviewed.       Objective   Vital Signs  Temp:  [98.2 °F (36.8 °C)-99.7 °F (37.6 °C)] 98.8 °F (37.1 °C)  Heart Rate:  [] 97  Resp:  [10-20] 10  BP: ()/(43-94) 103/72  Arterial Line BP: ()/(43-69) 110/52  FiO2 (%):  [48 %-71 %] 48 %    Intake/Output Summary (Last 24 hours) at 2024 1657  Last data filed at 2024 1600  Gross per 24 hour   Intake 3908.83 ml   Output 6327 ml   Net -2418.17 ml     Flowsheet Rows      Flowsheet Row First Filed Value   Admission Height 182.9 cm (72\") Documented at 10/25/2024 0434   Admission Weight 142 kg (312 lb 3.2 oz) Documented at 10/23/2024 1711              Vitals reviewed.   Constitutional:       Appearance: Acutely ill-appearing.   Pulmonary:      Breath sounds: No wheezing. No rhonchi. No rales.   Cardiovascular:      PMI at left midclavicular line. Normal rate. Regular rhythm. Normal S1. Normal S2.       Murmurs: There is a systolic murmur.      No gallop.  No click. No rub.   Pulses:     Intact distal pulses.   Edema:     Pretibial: bilateral 1+ edema of the pretibial area.     Ankle: bilateral 1+ edema of the ankle.     Feet: bilateral 1+ edema of the feet.  Abdominal:      General: There is no distension. "      Palpations: Abdomen is soft.   Musculoskeletal:         General: No tenderness or deformity. Skin:     General: Skin is warm and dry.   Neurological:      Comments: Sedated and comfortable on the ventilator           Pertinent Test Results:  Results from last 7 days   Lab Units 11/02/24  1043 11/02/24  0257 11/01/24  1755 11/01/24  1029 11/01/24  0350 10/31/24  2348 10/31/24  2333 10/31/24  1349 10/31/24  0346 10/30/24  2325 10/30/24  0344 10/29/24  0952   SODIUM mmol/L 143 142 145 145 149* 147* 149*  149* 149*   < > 150*   < > 137   POTASSIUM mmol/L 3.9 4.1 4.4 4.5 4.6 4.8 4.8  4.8 4.8   < > 3.3*   < > 4.2   CHLORIDE mmol/L 103 103 106 107 111* 110* 111*  111* 111*   < > 109*   < > 102   CO2 mmol/L 26.0 26.1 26.4 24.9 23.2 25.1 23.9  23.9 23.5   < > 19.2*   < > 23.1   BUN mg/dL 21 21 18 17 15 16 15  15 14   < > 9   < > 7*   CREATININE mg/dL 1.84* 1.81* 1.85* 1.94* 1.93* 1.89* 1.95*  1.95* 1.77*   < > 1.43*   < > 0.83   GLUCOSE mg/dL 173* 138* 108* 149* 149* 151* 131*  131* 137*   < > 149*   < > 148*   CALCIUM mg/dL 8.0* 8.2* 8.2* 8.5* 8.7 8.3* 8.7  8.7 8.3*   < > 8.7   < > 8.5*   AST (SGOT) U/L  --   --   --  73* 83* 82* 89* 101*  --  65*  --  21   ALT (SGPT) U/L  --   --   --  6 6 10 10 18  --  16  --  11    < > = values in this interval not displayed.         Results from last 7 days   Lab Units 11/02/24  1043 11/02/24  0736 11/02/24  0257 11/01/24  1029 11/01/24  0350 10/31/24  2348 10/31/24  1349 10/31/24  0346   WBC 10*3/mm3 19.62*  --  18.43* 18.32* 17.24* 15.03* 16.99* 24.38*   HEMOGLOBIN g/dL 9.0*  --  9.6* 8.5* 8.8* 8.7* 10.0* 8.2*   HEMOGLOBIN, POC g/dL  --  11.2*  --   --   --   --   --   --    HEMATOCRIT % 26.6*  --  27.7* 24.9* 25.3* 25.0* 29.4* 24.1*   HEMATOCRIT POC %  --  33*  --   --   --   --   --   --    PLATELETS 10*3/mm3 108*  --  91* 105* 109* 99* 111* 115*     Results from last 7 days   Lab Units 10/31/24  0346 10/30/24  2325 10/30/24  2148 10/30/24  1916 10/30/24  1907  10/30/24  1708 10/29/24  0952   INR  1.38* 1.21* 1.10 2.3* 1.87* 2.3* 1.16*   APTT seconds  --  46.1* 40.8*  --  33.6  --  32.3     Results from last 7 days   Lab Units 11/01/24  1755 11/01/24  1029 10/31/24  2348 10/31/24  2333 10/31/24  0346 10/30/24  2325 10/29/24  0952   MAGNESIUM mg/dL 3.1* 2.4 2.5* 2.5* 2.8* 3.0* 1.8     Results from last 7 days   Lab Units 10/29/24  0952   CHOLESTEROL mg/dL 131   TRIGLYCERIDES mg/dL 119   HDL CHOL mg/dL 32*     Results from last 7 days   Lab Units 10/29/24  0952   PROBNP pg/mL 7,084.0*               Medication Review:   aspirin, 81 mg, Oral, Daily  atorvastatin, 40 mg, Oral, Nightly  bumetanide, 2 mg, Intravenous, Q8H  chlorhexidine, 15 mL, Mouth/Throat, Q12H  enoxaparin, 40 mg, Subcutaneous, Q12H  milrinone lactate (PRIMACOR) 5 mg in sodium chloride 0.9 % 15 mL nebulization, 5 mg, Nebulization, Q4H - RT  mupirocin, 1 Application, Each Nare, BID  pantoprazole, 40 mg, Intravenous, Once   Followed by  pantoprazole, 40 mg, Oral, QAM  penicillin g (potassium), 4 Million Units, Intravenous, Q4H  senna-docusate sodium, 2 tablet, Oral, Nightly  sildenafil, 20 mg, Nasogastric, TID  sodium chloride, 10 mL, Intravenous, Q12H  vancomycin, 1,000 mg, Intravenous, Q24H         amiodarone, 1 mg/min, Last Rate: 1 mg/min (11/02/24 1649)  dexmedetomidine, 0.2-1.5 mcg/kg/hr, Last Rate: 0.4 mcg/kg/hr (11/02/24 1211)  DOPamine, 2-20 mcg/kg/min  EPINEPHrine, 0.02-0.1 mcg/kg/min, Last Rate: 0.03 mcg/kg/min (11/02/24 1422)  insulin, 0-100 Units/hr, Last Rate: 2 Units/hr (11/02/24 1512)  lidocaine in D5W, 1 mg/min, Last Rate: 1 mg/min (11/02/24 1400)  milrinone, 0.25-0.375 mcg/kg/min, Last Rate: 0.25 mcg/kg/min (11/02/24 1525)  niCARdipine, 5-15 mg/hr  nitroglycerin, 5-200 mcg/min  norepinephrine, 0.02-0.2 mcg/kg/min, Last Rate: 0.02 mcg/kg/min (11/02/24 1437)  Pharmacy to dose vancomycin,   phenylephrine, 0.2-2 mcg/kg/min, Last Rate: 0.7 mcg/kg/min (11/02/24 1618)  propofol, 5-50 mcg/kg/min, Last  Rate: 30 mcg/kg/min (11/02/24 1420)  vasopressin, 0.02-0.1 Units/min, Last Rate: 0.06 Units/min (11/02/24 1312)        Assessment & Plan     Active Hospital Problems    Diagnosis  POA    **Prosthetic aortic valve stenosis [T82.857A]  Yes    Bacteremia [R78.81]  Yes    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  The chest was closed today and the IABP was repositioned by Dr. Florian.  This seems to be augmenting better than previous notes indicate.  He remains on multiple pressors and in atrial fibrillation with what appears to be better rate control.  Currently on inhaled nitric oxide with decreasing FiO2 requirements after bronchoscopy and removal of mucous plugs this morning.  He is making urine since coming back from the OR.  Minimal chest tube output.  Persistent atrial fibrillation -2 separate high-energy shocks were delivered yesterday without return of sinus rhythm.  Rate is better controlled.  Not on anticoagulation for obvious reasons.  Nonoliguric NATHANIEL -secondary to shock and ATN.  Nephrology following  Hypoxic respiratory failure -oxygen requirements decreased with latest ABG indicating possibility of weaning further.  Continue diuretics per nephrology.  Intraoperative JOLIE images reviewed with normal size LV with low normal function and expected postoperative septal wall motion.  RV looks moderately  dilated with mild global hypokinesis and significant biatrial enlargement is noted.  Significantly dilated tricuspid annulus with moderate to severe tricuspid regurgitation.  Stable appearing mitral valve repair with normal gradient and minimal mitral regurgitation.  Thin and pliable prosthetic aortic valve leaflets without gradient or visualization of the ascending aorta.  Pulmonary hypertension -improved on inhaled nitric oxide.  Increased TR noted on intraoperative JOLIE.           Azar Mckeon Jr, MD  [facility]   [facility]  [address] [place] [address] - [address]  [place] [address]  Office: [phone]    11/02/24  16:57 EDT

## 2024-11-02 NOTE — PROGRESS NOTES
" LOS: 10 days   Patient Care Team:  Juan Perez MD as PCP - General (Internal Medicine)    Chief Complaint: post op follow-up    Subjective  Intubated/sedated    Vital Signs  Temp:  [99.1 °F (37.3 °C)-99.7 °F (37.6 °C)] 99.5 °F (37.5 °C)  Heart Rate:  [] 95  Resp:  [16-18] 17  BP: (100-130)/(48-94) 123/55  Arterial Line BP: ()/(48-67) 96/60  FiO2 (%):  [69 %-83 %] 69 %  Body mass index is 44.79 kg/m².    Intake/Output Summary (Last 24 hours) at 11/2/2024 0752  Last data filed at 11/2/2024 0535  Gross per 24 hour   Intake 5754.36 ml   Output 9195 ml   Net -3440.64 ml     No intake/output data recorded.    Chest tube drainage last 8 hours         10/25/24  1211 10/31/24  0615 11/02/24  0400   Weight: (!) 142 kg (313 lb) (!) 150 kg (330 lb 4 oz) (120.4kg per bed scale)         Objective:  Vital signs: (most recent): Blood pressure 104/63, pulse 96, temperature 98.2 °F (36.8 °C), temperature source Core, resp. rate 20, height 182.9 cm (72\"), weight (!) 150 kg (330 lb 4 oz), SpO2 100%.                Physical Exam:   General Appearance: awake and alert, no acute distress   Lungs:  ventilator   Heart:  IABP sounds   Abdomen: hypoactive BS    Skin: clean, dry, intact   Neuro: alert and oriented, no focal deficits.     Results Review:        WBC WBC   Date Value Ref Range Status   11/02/2024 18.43 (H) 3.40 - 10.80 10*3/mm3 Final   11/01/2024 18.32 (H) 3.40 - 10.80 10*3/mm3 Final   11/01/2024 17.24 (H) 3.40 - 10.80 10*3/mm3 Final   10/31/2024 15.03 (H) 3.40 - 10.80 10*3/mm3 Final   10/31/2024 16.99 (H) 3.40 - 10.80 10*3/mm3 Final   10/31/2024 24.38 (H) 3.40 - 10.80 10*3/mm3 Final   10/30/2024 24.95 (H) 3.40 - 10.80 10*3/mm3 Final   10/30/2024 24.28 (H) 3.40 - 10.80 10*3/mm3 Final   10/30/2024 23.14 (H) 3.40 - 10.80 10*3/mm3 Final      HGB Hemoglobin   Date Value Ref Range Status   11/02/2024 9.6 (L) 13.0 - 17.7 g/dL Final   11/01/2024 8.5 (L) 13.0 - 17.7 g/dL Final   11/01/2024 8.8 (L) 13.0 - 17.7 " g/dL Final   10/31/2024 8.7 (L) 13.0 - 17.7 g/dL Final   10/31/2024 10.0 (L) 13.0 - 17.7 g/dL Final   10/31/2024 8.2 (L) 13.0 - 17.7 g/dL Final   10/30/2024 7.7 (L) 13.0 - 17.7 g/dL Final   10/30/2024 7.8 (C) 12.0 - 17.0 g/dL Final   10/30/2024 6.7 (C) 13.0 - 17.7 g/dL Final   10/30/2024 6.8 (C) 12.0 - 17.0 g/dL Final   10/30/2024 6.5 (C) 12.0 - 17.0 g/dL Final   10/30/2024 7.5 (C) 12.0 - 17.0 g/dL Final   10/30/2024 7.0 (L) 13.0 - 17.7 g/dL Final   10/30/2024 7.8 (C) 12.0 - 17.0 g/dL Final   10/30/2024 7.8 (C) 12.0 - 17.0 g/dL Final   10/30/2024 6.5 (C) 12.0 - 17.0 g/dL Final   10/30/2024 7.1 (C) 12.0 - 17.0 g/dL Final   10/30/2024 8.2 (L) 12.0 - 17.0 g/dL Final   10/30/2024 7.8 (C) 12.0 - 17.0 g/dL Final   10/30/2024 8.8 (L) 12.0 - 17.0 g/dL Final   10/30/2024 8.8 (L) 12.0 - 17.0 g/dL Final   10/30/2024 8.8 (L) 12.0 - 17.0 g/dL Final   10/30/2024 8.8 (L) 12.0 - 17.0 g/dL Final   10/30/2024 8.8 (L) 12.0 - 17.0 g/dL Final   10/30/2024 9.2 (L) 12.0 - 17.0 g/dL Final      HCT Hematocrit   Date Value Ref Range Status   11/02/2024 27.7 (L) 37.5 - 51.0 % Final   11/01/2024 24.9 (L) 37.5 - 51.0 % Final   11/01/2024 25.3 (L) 37.5 - 51.0 % Final   10/31/2024 25.0 (L) 37.5 - 51.0 % Final   10/31/2024 29.4 (L) 37.5 - 51.0 % Final   10/31/2024 24.1 (L) 37.5 - 51.0 % Final   10/30/2024 22.5 (L) 37.5 - 51.0 % Final   10/30/2024 23 (L) 38 - 51 % Final   10/30/2024 19.5 (C) 37.5 - 51.0 % Final   10/30/2024 20 (L) 38 - 51 % Final   10/30/2024 19 (L) 38 - 51 % Final   10/30/2024 22 (L) 38 - 51 % Final   10/30/2024 21.3 (L) 37.5 - 51.0 % Final   10/30/2024 23 (L) 38 - 51 % Final   10/30/2024 23 (L) 38 - 51 % Final   10/30/2024 19 (L) 38 - 51 % Final   10/30/2024 21 (L) 38 - 51 % Final   10/30/2024 24 (L) 38 - 51 % Final   10/30/2024 23 (L) 38 - 51 % Final   10/30/2024 26 (L) 38 - 51 % Final   10/30/2024 26 (L) 38 - 51 % Final   10/30/2024 26 (L) 38 - 51 % Final   10/30/2024 26 (L) 38 - 51 % Final   10/30/2024 26 (L) 38 - 51 % Final    10/30/2024 27 (L) 38 - 51 % Final      Platelets Platelets   Date Value Ref Range Status   11/02/2024 91 (L) 140 - 450 10*3/mm3 Final   11/01/2024 105 (L) 140 - 450 10*3/mm3 Final   11/01/2024 109 (L) 140 - 450 10*3/mm3 Final   10/31/2024 99 (L) 140 - 450 10*3/mm3 Final   10/31/2024 111 (L) 140 - 450 10*3/mm3 Final   10/31/2024 115 (L) 140 - 450 10*3/mm3 Final   10/30/2024 98 (L) 140 - 450 10*3/mm3 Final   10/30/2024 93 (L) 140 - 450 10*3/mm3 Final   10/30/2024 130 (L) 140 - 450 10*3/mm3 Final   10/30/2024 167 140 - 450 10*3/mm3 Final   10/30/2024 141 140 - 450 10*3/mm3 Final        PT/INR:    Protime   Date Value Ref Range Status   10/31/2024 17.2 (H) 11.7 - 14.2 Seconds Final   10/30/2024 15.5 (H) 11.7 - 14.2 Seconds Final   10/30/2024 14.5 (H) 11.7 - 14.2 Seconds Final   10/30/2024 23.3 (H) 12.8 - 15.2 seconds Final     Comment:     Serial Number: [id number]:  [id number]   10/30/2024 21.7 (H) 11.7 - 14.2 Seconds Final   10/30/2024 22.7 (H) 12.8 - 15.2 seconds Final     Comment:     Serial Number: [id number]:  [id number]   /  INR   Date Value Ref Range Status   10/31/2024 1.38 (H) 0.90 - 1.10 Final   10/30/2024 1.21 (H) 0.90 - 1.10 Final   10/30/2024 1.10 0.90 - 1.10 Final   10/30/2024 2.3 (H) 0.8 - 1.2 Final   10/30/2024 1.87 (H) 0.90 - 1.10 Final   10/30/2024 2.3 (H) 0.8 - 1.2 Final       Sodium Sodium   Date Value Ref Range Status   11/02/2024 142 136 - 145 mmol/L Final   11/01/2024 145 136 - 145 mmol/L Final   11/01/2024 145 136 - 145 mmol/L Final   11/01/2024 149 (H) 136 - 145 mmol/L Final   10/31/2024 147 (H) 136 - 145 mmol/L Final   10/31/2024 149 (H) 136 - 145 mmol/L Final   10/31/2024 149 (H) 136 - 145 mmol/L Final   10/31/2024 149 (H) 136 - 145 mmol/L Final   10/31/2024 148 (H) 136 - 145 mmol/L Final   10/30/2024 150 (H) 136 - 145 mmol/L Final      Potassium Potassium   Date Value Ref Range Status   11/02/2024 4.1 3.5 - 5.2 mmol/L Final     Comment:     Slight hemolysis detected by analyzer. Result may be  falsely elevated.   11/01/2024 4.4 3.5 - 5.2 mmol/L Final     Comment:     Slight hemolysis detected by analyzer. Result may be falsely elevated.   11/01/2024 4.5 3.5 - 5.2 mmol/L Final     Comment:     Slight hemolysis detected by analyzer. Result may be falsely elevated.   11/01/2024 4.6 3.5 - 5.2 mmol/L Final     Comment:     Slight hemolysis detected by analyzer. Result may be falsely elevated.   10/31/2024 4.8 3.5 - 5.2 mmol/L Final     Comment:     Slight hemolysis detected by analyzer. Result may be falsely elevated.   10/31/2024 4.8 3.5 - 5.2 mmol/L Final     Comment:     Slight hemolysis detected by analyzer. Result may be falsely elevated.   10/31/2024 4.8 3.5 - 5.2 mmol/L Final     Comment:     Slight hemolysis detected by analyzer. Result may be falsely elevated.   10/31/2024 4.8 3.5 - 5.2 mmol/L Final     Comment:     Slight hemolysis detected by analyzer. Result may be falsely elevated.   10/31/2024 4.0 3.5 - 5.2 mmol/L Final     Comment:     Slight hemolysis detected by analyzer. Result may be falsely elevated.   10/30/2024 3.3 (L) 3.5 - 5.2 mmol/L Final     Comment:     Slight hemolysis detected by analyzer. Result may be falsely elevated.      Chloride Chloride   Date Value Ref Range Status   11/02/2024 103 98 - 107 mmol/L Final   11/01/2024 106 98 - 107 mmol/L Final   11/01/2024 107 98 - 107 mmol/L Final   11/01/2024 111 (H) 98 - 107 mmol/L Final   10/31/2024 110 (H) 98 - 107 mmol/L Final   10/31/2024 111 (H) 98 - 107 mmol/L Final   10/31/2024 111 (H) 98 - 107 mmol/L Final   10/31/2024 111 (H) 98 - 107 mmol/L Final   10/31/2024 104 98 - 107 mmol/L Final   10/30/2024 109 (H) 98 - 107 mmol/L Final      Bicarbonate CO2   Date Value Ref Range Status   11/02/2024 26.1 22.0 - 29.0 mmol/L Final   11/01/2024 26.4 22.0 - 29.0 mmol/L Final   11/01/2024 24.9 22.0 - 29.0 mmol/L Final   11/01/2024 23.2 22.0 - 29.0 mmol/L Final   10/31/2024 25.1 22.0 - 29.0 mmol/L Final   10/31/2024 23.9 22.0 - 29.0 mmol/L Final    10/31/2024 23.9 22.0 - 29.0 mmol/L Final   10/31/2024 23.5 22.0 - 29.0 mmol/L Final   10/31/2024 16.6 (L) 22.0 - 29.0 mmol/L Final   10/30/2024 19.2 (L) 22.0 - 29.0 mmol/L Final      BUN BUN   Date Value Ref Range Status   11/02/2024 21 8 - 23 mg/dL Final   11/01/2024 18 8 - 23 mg/dL Final   11/01/2024 17 8 - 23 mg/dL Final   11/01/2024 15 8 - 23 mg/dL Final   10/31/2024 16 8 - 23 mg/dL Final   10/31/2024 15 8 - 23 mg/dL Final   10/31/2024 15 8 - 23 mg/dL Final   10/31/2024 14 8 - 23 mg/dL Final   10/31/2024 11 8 - 23 mg/dL Final   10/30/2024 9 8 - 23 mg/dL Final      Creatinine Creatinine   Date Value Ref Range Status   11/02/2024 1.81 (H) 0.76 - 1.27 mg/dL Final   11/01/2024 1.85 (H) 0.76 - 1.27 mg/dL Final   11/01/2024 1.94 (H) 0.76 - 1.27 mg/dL Final   11/01/2024 1.93 (H) 0.76 - 1.27 mg/dL Final   10/31/2024 1.89 (H) 0.76 - 1.27 mg/dL Final   10/31/2024 1.95 (H) 0.76 - 1.27 mg/dL Final   10/31/2024 1.95 (H) 0.76 - 1.27 mg/dL Final   10/31/2024 1.77 (H) 0.76 - 1.27 mg/dL Final   10/31/2024 1.49 (H) 0.76 - 1.27 mg/dL Final   10/30/2024 1.43 (H) 0.76 - 1.27 mg/dL Final      Calcium Calcium   Date Value Ref Range Status   11/02/2024 8.2 (L) 8.6 - 10.5 mg/dL Final   11/01/2024 8.2 (L) 8.6 - 10.5 mg/dL Final   11/01/2024 8.5 (L) 8.6 - 10.5 mg/dL Final   11/01/2024 8.7 8.6 - 10.5 mg/dL Final   10/31/2024 8.3 (L) 8.6 - 10.5 mg/dL Final   10/31/2024 8.7 8.6 - 10.5 mg/dL Final   10/31/2024 8.7 8.6 - 10.5 mg/dL Final   10/31/2024 8.3 (L) 8.6 - 10.5 mg/dL Final   10/31/2024 9.1 8.6 - 10.5 mg/dL Final   10/30/2024 8.7 8.6 - 10.5 mg/dL Final      Magnesium Magnesium   Date Value Ref Range Status   11/01/2024 3.1 (H) 1.6 - 2.4 mg/dL Final   11/01/2024 2.4 1.6 - 2.4 mg/dL Final   10/31/2024 2.5 (H) 1.6 - 2.4 mg/dL Final   10/31/2024 2.5 (H) 1.6 - 2.4 mg/dL Final   10/31/2024 2.8 (H) 1.6 - 2.4 mg/dL Final   10/30/2024 3.0 (H) 1.6 - 2.4 mg/dL Final          [Transfer Hold] aspirin, 81 mg, Oral, Daily  [Transfer Hold]  atorvastatin, 40 mg, Oral, Nightly  [Transfer Hold] chlorhexidine, 15 mL, Mouth/Throat, Q12H  [Transfer Hold] enoxaparin, 40 mg, Subcutaneous, Q12H  [Transfer Hold] mupirocin, 1 Application, Each Nare, BID  [Transfer Hold] pantoprazole, 40 mg, Intravenous, Once   Followed by  [Transfer Hold] pantoprazole, 40 mg, Oral, QAM  penicillin g (potassium), 4 Million Units, Intravenous, Q4H  [Transfer Hold] senna-docusate sodium, 2 tablet, Oral, Nightly  [Transfer Hold] sodium chloride, 10 mL, Intravenous, Q12H      amiodarone, 1 mg/min, Last Rate: 1 mg/min (11/02/24 0649)  clevidipine, 2-32 mg/hr  dexmedetomidine, 0.2-1.5 mcg/kg/hr, Last Rate: 0.4 mcg/kg/hr (11/02/24 0649)  DOPamine, 2-20 mcg/kg/min  EPINEPHrine, 0.02-0.1 mcg/kg/min, Last Rate: 0.02 mcg/kg/min (11/02/24 0649)  insulin, 0-100 Units/hr, Last Rate: 1.6 Units/hr (11/02/24 0631)  lidocaine in D5W, 1 mg/min, Last Rate: 2 mg/min (11/02/24 0649)  milrinone, 0.25-0.375 mcg/kg/min, Last Rate: 0.25 mcg/kg/min (11/02/24 0649)  niCARdipine, 5-15 mg/hr  nitroglycerin, 5-200 mcg/min  norepinephrine, 0.02-0.2 mcg/kg/min, Last Rate: 0.01 mcg/kg/min (11/02/24 0649)  Pharmacy Consult,   phenylephrine, 0.2-2 mcg/kg/min, Last Rate: 1.1 mcg/kg/min (11/02/24 0649)  propofol, 5-50 mcg/kg/min, Last Rate: 30 mcg/kg/min (11/02/24 0649)  vasopressin, 0.02-0.1 Units/min, Last Rate: 0.06 Units/min (11/02/24 0649)              Prosthetic aortic valve stenosis    Essential hypertension    Permanent atrial fibrillation    S/P AVR    ICD (implantable cardioverter-defibrillator), dual, in situ    Achilles tendon rupture    Bacteremia    Stenosis of prosthetic aortic valve    Anemia      Assessment & Plan    - Prosthetic aortic valve stenosis, h/o AVR (tissue)/maze/DANICA ligation (2014)- s/p reoperative sternotomy AV root replacement 27mm cryopreserved homograft with right nuvia cabrol, AICD removal, [facility]P [facility]  -Sternal closure 11/2  - Possible endocarditis, likely need  JOLIE   - Bacteremia, blood cultures positive strep mitis  - Atrial fibrillation, unable to tolerate anticoagulation  - Hypertension  - NICM status post [employer] AICD  - Anemia, s/p EGD/colonoscopy with esophagitis, polyp removal  - Right achilles tendon tear--- walking boot and PT per ortho at [facility]  - Pre-diabetes -- improved at 5.3     POD#3; POD#0   OR this morning for sternal washout/closure.  Remains intubated/sedated  Still with a lot of inotropic and pressor support. IABP 1:2, Levophed, vaso, epinephrine, vasopressin and milrinone.  Nitric 40.  Remains on lidocaine and amiodarone   Continue IV antibiotics-- ID following  Hgb improved-- transfuse 1unit PRBCs  Creatinine 1.8- renal following.  Remains critical ill.  Continue supportive care.    Samantha Magana, JAVI  11/02/24  07:52 EDT

## 2024-11-02 NOTE — PROGRESS NOTES
LOS: 10 days     Chief Complaint: Endocarditis    Interval History: Remains intubated and sedated.  Requiring multiple pressors.  Intra-aortic balloon pump in place. Went to OR today for chest closure. He was on 70 fio2 before surgery and received bumex in or    Vital Signs  Temp:  [98.2 °F (36.8 °C)-99.7 °F (37.6 °C)] 98.2 °F (36.8 °C)  Heart Rate:  [] 96  Resp:  [16-20] 20  BP: ()/(48-94) 104/63  Arterial Line BP: ()/(48-67) 100/56  FiO2 (%):  [69 %-71 %] 69 %    Physical Exam:  General: Intubated and sedated  HEENT: ET tube in place  Respiratory: Multiple chest tubes.  : Ugarte catheter  Skin: Operative dressing over the anterior chest.  Access: Right IJ CVC.  Arterial line.  Peripheral line.    WBC 19.6, hgb 9, plt 108  Cr 1.8   CXR, independently interpreted: extensive bilateral infiltrates     Microbiology:  10/3 COVID-negative  10/10 COVID-negative  10/14 COVID-negative  10/15 blood cultures 2 out of 2 strep mitis  10/17 COVID-negative  10/17 blood cultures 2 out of 2 strep mitis  10/21 COVID-negative  10/23 blood cultures no growth today  10/30 operative cultures from the aortic valve no growth to date    A/p   #Strep mitis endocarditis  #Status post bioprosthetic aortic valve replacement 2014  #Status post aortic root replacement in the setting of prosthetic aortic valve endocarditis and annular abscess on 10/30  #Status post removal of pacemaker generator and intracardiac portion of the leads with retained venous leads  #Atrial fibrillation  #Achilles tendon rupture  #NATHANIEL    Continue penicillin G 4,000,000 units every 4 hours for strep mitis endocarditis.  Current renal function does not require any dose adjustment as of yet.     Planning for minimum of 6 weeks of antibiotics. So far or cx ngtd.  Long-term suppressive antibiotics to be discussed on her follow-up given the portion of retained pacemaker lead.    Worsening infiltrates maybe related to edema. Diuresed this AM.

## 2024-11-02 NOTE — PROGRESS NOTES
[facility]  [phone]  Dr. Tawanda Clement     Subjective:  LOS: 10    Chief Complaint:  Ventilator management     Patient having high peak pressures today oddly enough on pressure control which would indicate an issue with the ventilator or circuit.  Ventilator was changed out the issue continued.  The nitric circuit was disconnected and the solve the problem.  Patient to go to the OR recently and was closed prior to returning to the ICU.    Objective   Vital Signs past 24hrs  Temp range: Temp (24hrs), Av.1 °F (37.3 °C), Min:98.2 °F (36.8 °C), Max:99.7 °F (37.6 °C)    BP range: BP: ()/(43-94) 118/83  Pulse range: Heart Rate:  [] 101  Resp rate range: Resp:  [16-20] 20  Device (Oxygen Therapy): ventilator   Oxygen range:SpO2:  [93 %-100 %] 96 %   Mechanical Ventilator:Mode: VC+/AC (24 0300)    Physical Exam  Vitals reviewed.   Constitutional:       Interventions: He is sedated and intubated.   HENT:      Head: Normocephalic and atraumatic.   Eyes:      Extraocular Movements: Extraocular movements intact.      Conjunctiva/sclera: Conjunctivae normal.      Pupils: Pupils are equal, round, and reactive to light.   Cardiovascular:      Rate and Rhythm: Regular rhythm. Tachycardia present.   Pulmonary:      Effort: Pulmonary effort is normal. He is intubated.      Breath sounds: Decreased air movement present. Decreased breath sounds present. No wheezing, rhonchi or rales.   Musculoskeletal:      Cervical back: Normal range of motion. No rigidity or tenderness.   Skin:     General: Skin is warm and dry.      Findings: No rash.       Results Review:    I have reviewed the laboratory and imaging data since the last note by [facility] physician.  My annotations are noted in assessment and plan.      Result Review:  I have personally reviewed the results from last note by [facility] physician to 2024 13:26 EDT and agree with these findings:  [x]  Laboratory list / accordion  [x]   Microbiology  [x]  Radiology  [x]  EKG/Telemetry   [x]  Cardiology/Vascular   [x]  Pathology  []  Old records  []  Other:    Medication Review:  I have reviewed the current MAR.  My annotations are noted in assessment and plan.    aspirin, 81 mg, Oral, Daily  atorvastatin, 40 mg, Oral, Nightly  bumetanide, 2 mg, Intravenous, Q8H  calcium gluconate, 2,000 mg, Intravenous, Once  chlorhexidine, 15 mL, Mouth/Throat, Q12H  enoxaparin, 40 mg, Subcutaneous, Q12H  milrinone lactate (PRIMACOR) 5 mg in sodium chloride 0.9 % 15 mL nebulization, 5 mg, Nebulization, Q4H - RT  mupirocin, 1 Application, Each Nare, BID  pantoprazole, 40 mg, Intravenous, Once   Followed by  pantoprazole, 40 mg, Oral, QAM  penicillin g (potassium), 4 Million Units, Intravenous, Q4H  potassium chloride, 20 mEq, Intravenous, Once  senna-docusate sodium, 2 tablet, Oral, Nightly  sildenafil, 20 mg, Nasogastric, TID  sodium chloride, 10 mL, Intravenous, Q12H  vancomycin, 1,000 mg, Intravenous, Q24H        amiodarone, 1 mg/min, Last Rate: 1 mg/min (11/02/24 1016)  dexmedetomidine, 0.2-1.5 mcg/kg/hr, Last Rate: 0.4 mcg/kg/hr (11/02/24 1211)  DOPamine, 2-20 mcg/kg/min  EPINEPHrine, 0.02-0.1 mcg/kg/min, Last Rate: 0.04 mcg/kg/min (11/02/24 0856)  insulin, 0-100 Units/hr, Last Rate: 1.6 Units/hr (11/02/24 0631)  lidocaine in D5W, 1 mg/min, Last Rate: 2 mg/min (11/02/24 0856)  milrinone, 0.25-0.375 mcg/kg/min, Last Rate: 0.375 mcg/kg/min (11/02/24 1258)  niCARdipine, 5-15 mg/hr  nitroglycerin, 5-200 mcg/min  norepinephrine, 0.02-0.2 mcg/kg/min, Last Rate: 0.01 mcg/kg/min (11/02/24 1156)  Pharmacy to dose vancomycin,   phenylephrine, 0.2-2 mcg/kg/min, Last Rate: 0.8 mcg/kg/min (11/02/24 1200)  propofol, 5-50 mcg/kg/min, Last Rate: 30 mcg/kg/min (11/02/24 1016)  vasopressin, 0.02-0.1 Units/min, Last Rate: 0.06 Units/min (11/02/24 1312)      Lines, Drains & Airways       Active LDAs       Name Placement date Placement time Site Days    Pulmonary Artery Catheter -  Triple Lumen 10/30/24 Right Internal jugular 10/30/24  1026  created via procedure documentation  -- 3    CVC Double Lumen 10/30/24 Right Internal jugular 10/30/24  1026  created via procedure documentation  Internal jugular  3    Peripheral IV 10/29/24 1848 Anterior;Left Forearm 10/29/24  1848  Forearm  3    Peripheral IV 10/30/24 0940 Anterior;Right Forearm 10/30/24  0940  Forearm  3    Urethral Catheter Non-latex;Temperature probe 16 Fr. 10/30/24  1145  -- 3    Y Chest Tube 1 and 2 1 Anterior Mediastinal 28 Fr. 2 Anterior Mediastinal 28 Fr. 10/30/24  2100  -- 2    Y Chest Tube 3 and 4 3 Anterior Mediastinal 28 Fr. 4 Anterior Pleural 28 Fr. 10/30/24  2000  -- 2    ETT  10/30/24  1139  created via procedure documentation  -- 3    Arterial Line 10/30/24 Left Radial 10/30/24  1015  created via procedure documentation  Radial  3    Arterial Line 11/02/24 Right Radial 11/02/24  0723  created via procedure documentation  Radial  less than 1    IABP 8.0 Fr. 10/30/24  2103  Left femoral  2    Pacer Wires 10/30/24  1635  Ventricular  2    Arterial Sheath Left Femoral 10/30/24  1915  Femoral  2                  No active isolations  Diet Orders (active) (From admission, onward)       Start     Ordered    11/02/24 1259  Feeding Tube Insertion - [email or web address] System  Once         11/02/24 1259    11/02/24 1259  Tube Feeding: Formula: RD to Initiate & Manage  Diet Effective Now         11/02/24 1259    11/02/24 1259  NPO Diet NPO Type: Tube Feeding  Diet Effective Now         11/02/24 1259    10/30/24 2305  Patient is on Glucommander  Continuous         10/30/24 2306                      Assessment  Postop respiratory failure  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  Cardiogenic and probable septic shock  CHF with pulmonary edema  Strep mitis endocarditis and bacteremia infectious diseases following and managing antibiotics  Acute kidney injury nephrology is following and  managing  Anemia acute expected postop on chronic  History of persistent atrial fibrillation now postoperative junctional rhythm cardiology following  Thrombocytopenia not unexpected postoperatively and looks like stabilized balloon pump may contribute as well to decrease platelets continue following  Hypernatremia mild and improving  Elevated transaminase mostly AST rising probably related to shock.  Acute left lower extremity DVT on 10/24/2024  Esophagitis grade C by EGD on 9/30/2024 PPI ordered is ordered as oral not sure he can to be able to get this recommend consider changing that to IV.  Hyperglycemia mild on postop insulin protocol        Plan  -Likely significant pulmonary edema causing respiratory failure.  - Recommend reducing tidal volumes to 6-7 mL/kg and can  increased respiratory rate to assist with ventilation.  Attempt to keep plats less than 30.  - Diuresis per cardiology and surgery  -Would begin weaning nitric oxide now given that FiO2 is at 70%.  -Bronchoscopy (11/2/2024) see separate procedure note for findings.  Had some mild thick airway secretions that were therapeutically aspirated but otherwise nothing notable.  - Will continue to follow and assist with vent    Critical care time 32 minutes    Tawanda Clement DO   11/02/24  13:26 EDT      Part of this note may be an electronic transcription/translation of spoken language to printed text using the Dragon Dictation System.

## 2024-11-02 NOTE — PROCEDURES
Bronchoscopy Procedure Note    Procedure:  Bronchoscopy, Diagnostic  Bronchoscopy, Therapeutic    Pre-Operative Diagnosis:hypoxia, high vent requirement, elevated peak pressures    Post-Operative Diagnosis: Same    Indication:high vent requirement, elevated peak pressures    Anesthesia: Propofol gtt    Procedure Details: Patient family was consented for the procedure with all risks and benefits of the procedure explained in detail.  Patient family was given the opportunity to ask questions and all concerns were answered.  The bronchocope was inserted into the main airway via the endotracheal tube. An anatomical survey was done of the main airways and the subsegmental bronchus.  The findings are reported below      Findings: As the scope was advanced through the ET tube there was some thick bloody mucus at the end of the ET tube that was therapeutically aspirated.  Some saline was flushed to help with aspiration.  The scope was then advanced into the left mainstem where there was some clear thick mucus that was also therapeutically aspirated.  Rest of the airway showed some thin secretions that were suctioned.  Both left and right tracheobronchial trees were surveyed and no significant abnormalities.  Scope was withdrawn.  Notably the vent parameters including peak and plateau pressures were unchanged before and after bronchoscopy.  Suspect ventilator issues are largely related to problems with the nitric circuit.    Estimated Blood Loss:  Minimal           Specimens:  None                Complications:  None; patient tolerated the procedure well.           Disposition: ICU - intubated and critically ill.      Patient tolerated the procedure well.    While I was in the room and during my examination of the patient I wore gown, gloves, mask, eye protection and washed my hands before and after the encounter.  Proper enhanced droplet precautions and isolation precautions were taken.    Tawanda Clement,  DO  11/2/2024  13:22 EDT

## 2024-11-02 NOTE — CONSULTS
Nutrition Services    Patient Name:  Woodrow Alejandro  YOB: 1950  MRN: [id number]  Admit Date:  10/23/2024    Assessment Date:  11/02/24    Summary: Consult for TF assessment   Pt is a 74 y/o male who is POD 3 reoperative sternotomy AV root replacement 27mm cryopreserved homograft with right nuvia cabrol, AICD removal, IABP placement.  Pt currently intubated and sedated on precedex. On glucommander, on lidocaine drip, on amiodarone in D5W drip at 33.3 ml/hr providing 135 kcals/day. RN placed dobhoff gastric earlier today. Weight appears stable per EMR. On IV bumex. Pt underwent bronchoscopy earlier today. Underwent sternal washout and closure this morning.     RECS:  Initiate Peptamen AF at 10 ml/hr and advance 10 ml q 6 hrs to goal rate of 75 ml/hr. Goal rate to provide 1980 kcals, 125 g/protein. Please flush 50 ml free water q 1 hr or per MD recommendations.     The above end goal rate is for 22 hrs/day to assume interruptions for ADLs. Water flushes adjusted based on clinical picture + Rx flushes/IV fluids     RD to follow    CLINICAL NUTRITION ASSESSMENT      Reason for Assessment Nurse or Nurse Practitioner Consult, TF Assessment     Diagnosis/Problem   POD 3 reoperative sternotomy AV root replacement 27mm cryopreserved homograft with right nuvia cabrol, AICD removal, IABP placement.   Medical/Surgical History Past Medical History:   Diagnosis Date    Achilles tendon injury     right    Aortic valve replaced     Arthritis 2018    Atrial fibrillation     Coronary artery disease     History of transfusion     Hypertension        Past Surgical History:   Procedure Laterality Date    AORTIC VALVE CONDUIT N/A 10/30/2024    Procedure: AORTIC VALVE CONDUIT; possible homograft; ACID removal;  Surgeon: Javon Florian MD;  Location: [facility];  Service: Cardiothoracic;  Laterality: N/A;    AORTIC VALVE REPAIR/REPLACEMENT MITRAL VALVE REPAIR/REPLACEMENT N/A 10/30/2024    Procedure: REOP  "STERNOTOMY; TRANSESOPHOGEAL ECHOCARDIOGRAM;AORTIC ROOT REPLACEMENT WITH HOMOGRAFT; MITRAL VALVE REPAIR; OPEN VEIN HARVEST RIGHT SAPHENOUS VEIN; AICD GENERATOR EXPLANTATION; INTRA-AORTIC BALLOON PUMP PLACEMENT; PRP;  Surgeon: Javon Florian MD;  Location: [facility];  Service: Cardiothoracic;  Laterality: N/A;    CARDIAC SURGERY  2002    COLONOSCOPY      COLONOSCOPY N/A 9/30/2024    Procedure: COLONOSCOPY WITH HOT/COLD SNARE POLYPECTOMIES, ELEVIEW INJECTION,CLIPX1;  Surgeon: Kurt Mensah MD;  Location: [facility] ENDOSCOPY;  Service: Gastroenterology;  Laterality: N/A;  COLON POLYPS    ENDOSCOPY N/A 9/30/2024    Procedure: ESOPHAGOGASTRODUODENOSCOPY WITH BIOPSIES;  Surgeon: Kurt Mensah MD;  Location: [facility] ENDOSCOPY;  Service: Gastroenterology;  Laterality: N/A;  RETAINED FOOD IN STOMACH AND REFLUX ESOPHAGITIS    PACEMAKER IMPLANTATION      TRANSESOPHAGEAL ECHOCARDIOGRAM (JOLIE) N/A 10/30/2024    Procedure: TRANSESOPHAGEAL ECHOCARDIOGRAM WITH ANESTHESIA;  Surgeon: Javon Florian MD;  Location: [facility];  Service: Cardiothoracic;  Laterality: N/A;        Anthropometrics        Current Height  Current Weight  BMI kg/m2 Height: 182.9 cm (72\")  Weight:  (120.4kg per bed scale) (11/02/24 0400)  Body mass index is 44.79 kg/m².   Adjusted BMI (if applicable)    BMI Category Obese, Class III (40 or higher)   Ideal Body Weight (IBW) 171 lbs   Usual Body Weight (UBW) 330 lbs   Weight Trend Stable   Weight History Wt Readings from Last 30 Encounters:   10/31/24 0615 (!) 150 kg (330 lb 4 oz)   10/25/24 1211 (!) 142 kg (313 lb)   10/25/24 0434 (!) 142 kg (313 lb 0.9 oz)   10/23/24 1711 (!) 142 kg (312 lb 3.2 oz)   10/23/24 0500 (!) 142 kg (313 lb 7.9 oz)   10/22/24 0500 (!) 143 kg (314 lb 6 oz)   10/21/24 0500 (!) 141 kg (310 lb 13.6 oz)   10/20/24 0550 (!) 141 kg (311 lb 4.6 oz)   10/19/24 0500 (!) 141 kg (311 lb 4.6 oz)   10/17/24 0455 (!) 139 kg (305 lb 12.5 oz)   10/16/24 0500 (!) 139 kg (306 lb) "   10/15/24 0536 (!) 139 kg (307 lb 1.6 oz)   10/14/24 0526 (!) 139 kg (307 lb 1.6 oz)   10/13/24 0500 (!) 137 kg (302 lb 11.1 oz)   10/12/24 0437 (!) 139 kg (305 lb 8.9 oz)   10/11/24 0500 (!) 136 kg (300 lb 11.3 oz)   10/10/24 0541 (!) 139 kg (307 lb 1.6 oz)   10/09/24 0500 (!) 139 kg (307 lb 1.6 oz)   10/08/24 0629 (!) 140 kg (308 lb 3.3 oz)   10/07/24 0448 (!) 141 kg (310 lb 13.6 oz)   10/03/24 1606 (!) 139 kg (306 lb)   09/26/24 1104 (!) 141 kg (309 lb 15.5 oz)   09/26/24 1103 (!) 145 kg (319 lb 10.7 oz)   09/19/24 1005 (!) 145 kg (319 lb)   09/05/24 1049 (!) 145 kg (319 lb)   07/18/24 1348 (!) 155 kg (341 lb)   07/02/24 1008 (!) 155 kg (341 lb)   06/24/24 1000 (!) 155 kg (341 lb 9.6 oz)   05/06/24 1401 (!) 154 kg (339 lb)   03/04/24 1413 (!) 154 kg (340 lb)   01/19/24 1138 (!) 155 kg (341 lb 9.6 oz)   12/12/23 1225 (!) 153 kg (336 lb 9.6 oz)   10/31/23 0938 (!) 154 kg (339 lb)   01/17/23 1137 (!) 160 kg (352 lb)   08/23/21 1531 (!) 145 kg (320 lb)   08/23/21 1427 (!) 145 kg (320 lb)   10/19/20 1014 (!) 147 kg (324 lb 9.6 oz)        Estimated/Assessed Needs        Energy Requirements    Weight for Calculation 78 kg IBW   Method for Estimation  25-30 kcal/kg   EST Needs (kcal/day) 5766-1598       Protein Requirements    Weight for Calculation 78 kg IBW   EST Protein Needs (g/kg) 1.5 - 2.0 gm/kg   EST Daily Needs (g/day) 117-156       Fluid Requirements     Method for Estimation 1 mL/kcal    Estimated Needs (mL/day)        Fluid Deficit    Current Na Level (mEq/L)    Desired Na Level (mEq/L)    Estimated Fluid Deficit (L)       Labs       Pertinent Labs    Results from last 7 days   Lab Units 11/02/24  1043 11/02/24  0257 11/01/24  1755 11/01/24  1029 11/01/24  0350 10/31/24  2348   SODIUM mmol/L 143 142 145 145 149* 147*   POTASSIUM mmol/L 3.9 4.1 4.4 4.5 4.6 4.8   CHLORIDE mmol/L 103 103 106 107 111* 110*   CO2 mmol/L 26.0 26.1 26.4 24.9 23.2 25.1   BUN mg/dL 21 21 18 17 15 16   CREATININE mg/dL 1.84* 1.81*  1.85* 1.94* 1.93* 1.89*   CALCIUM mg/dL 8.0* 8.2* 8.2* 8.5* 8.7 8.3*   BILIRUBIN mg/dL  --   --   --  0.4 0.5 0.6   ALK PHOS U/L  --   --   --  71 62 57   ALT (SGPT) U/L  --   --   --  6 6 10   AST (SGOT) U/L  --   --   --  73* 83* 82*   GLUCOSE mg/dL 173* 138* 108* 149* 149* 151*     Results from last 7 days   Lab Units 11/02/24  1043 11/02/24  0257 11/01/24  1755 11/01/24  1029 11/01/24  0350 10/31/24  2348 10/30/24  0344 10/29/24  0952   MAGNESIUM mg/dL  --   --  3.1* 2.4  --  2.5*   < > 1.8   PHOSPHORUS mg/dL 6.3*   < >  --   --   --   --    < >  --    HEMOGLOBIN g/dL 9.0*   < >  --  8.5*   < > 8.7*   < > 9.7*   HEMOGLOBIN, POC   --    < >  --   --   --   --    < >  --    HEMATOCRIT % 26.6*   < >  --  24.9*   < > 25.0*   < > 31.4*   HEMATOCRIT POC   --    < >  --   --   --   --    < >  --    WBC 10*3/mm3 19.62*   < >  --  18.32*   < > 15.03*   < > 11.09*   TRIGLYCERIDES mg/dL  --   --   --   --   --   --   --  119   ALBUMIN g/dL 2.3*   < >  --  2.7*   < > 2.4*   < > 3.0*    < > = values in this interval not displayed.     Results from last 7 days   Lab Units 11/02/24  1043 11/02/24  0257 11/01/24  1029 11/01/24  0350 10/31/24  2348 10/31/24  1349 10/31/24  0346 10/30/24  2325 10/30/24  2148 10/30/24  1916 10/30/24  1907 10/30/24  0344 10/29/24  0952   INR   --   --   --   --   --   --  1.38* 1.21* 1.10 2.3* 1.87*   < > 1.16*   APTT seconds  --   --   --   --   --   --   --  46.1* 40.8*  --  33.6  --  32.3   PLATELETS 10*3/mm3 108* 91* 105* 109* 99*   < > 115* 98* 93*  --  130*   < > 197    < > = values in this interval not displayed.     COVID19   Date Value Ref Range Status   10/21/2024 Not Detected Not Detected - Ref. Range Final     Lab Results   Component Value Date    HGBA1C 5.30 10/24/2024          Medications           Scheduled Medications aspirin, 81 mg, Oral, Daily  atorvastatin, 40 mg, Oral, Nightly  bumetanide, 2 mg, Intravenous, Q8H  chlorhexidine, 15 mL, Mouth/Throat, Q12H  enoxaparin, 40 mg,  Subcutaneous, Q12H  milrinone lactate (PRIMACOR) 5 mg in sodium chloride 0.9 % 15 mL nebulization, 5 mg, Nebulization, Q4H - RT  mupirocin, 1 Application, Each Nare, BID  pantoprazole, 40 mg, Intravenous, Once   Followed by  pantoprazole, 40 mg, Oral, QAM  penicillin g (potassium), 4 Million Units, Intravenous, Q4H  senna-docusate sodium, 2 tablet, Oral, Nightly  sildenafil, 20 mg, Nasogastric, TID  sodium chloride, 10 mL, Intravenous, Q12H  vancomycin, 1,000 mg, Intravenous, Q24H       Infusions amiodarone, 1 mg/min, Last Rate: 1 mg/min (11/02/24 1016)  dexmedetomidine, 0.2-1.5 mcg/kg/hr, Last Rate: 0.4 mcg/kg/hr (11/02/24 1211)  DOPamine, 2-20 mcg/kg/min  EPINEPHrine, 0.02-0.1 mcg/kg/min, Last Rate: 0.03 mcg/kg/min (11/02/24 1422)  insulin, 0-100 Units/hr, Last Rate: 1.6 Units/hr (11/02/24 0631)  lidocaine in D5W, 1 mg/min, Last Rate: 1 mg/min (11/02/24 1400)  milrinone, 0.25-0.375 mcg/kg/min, Last Rate: 0.375 mcg/kg/min (11/02/24 1420)  niCARdipine, 5-15 mg/hr  nitroglycerin, 5-200 mcg/min  norepinephrine, 0.02-0.2 mcg/kg/min, Last Rate: 0.02 mcg/kg/min (11/02/24 1437)  Pharmacy to dose vancomycin,   phenylephrine, 0.2-2 mcg/kg/min, Last Rate: 0.8 mcg/kg/min (11/02/24 1421)  propofol, 5-50 mcg/kg/min, Last Rate: 30 mcg/kg/min (11/02/24 1420)  vasopressin, 0.02-0.1 Units/min, Last Rate: 0.06 Units/min (11/02/24 1312)       PRN Medications   acetaminophen    acetaminophen **OR** acetaminophen **OR** acetaminophen    ALPRAZolam    bisacodyl    bisacodyl    cyclobenzaprine    dextrose    dextrose    DOPamine    EPINEPHrine    glucagon (human recombinant)    HYDROcodone-acetaminophen    magnesium hydroxide    midazolam    milrinone    Morphine **AND** naloxone    Morphine    niCARdipine    nitroglycerin    norepinephrine    ondansetron    oxyCODONE    Pharmacy to dose vancomycin    phenylephrine    polyethylene glycol    Potassium Replacement - Follow Nurse / BPA Driven Protocol    propofol    sodium chloride     sodium chloride    sodium chloride    sodium chloride    sodium chloride    sodium chloride    vasopressin     Physical Findings          General Findings ventilator support   Oral/Mouth Cavity tooth or teeth missing   Edema  2+ (mild), 3+ (moderate)   Gastrointestinal last bowel movement: 10/28   Skin  surgical incision: clavi martir, sternal, anterior thigh   Tubes/Drains/Lines chest tube, NG tube   NFPE Not indicated at this time   --  Current Nutrition Orders & Evaluation of Intake       Oral Nutrition     Food Allergies NKFA   Current PO Diet NPO Diet NPO Type: Tube Feeding   Supplement n/a   PO Evaluation     % PO Intake NPO    Factors Affecting Intake: Other: Intubated     PES STATEMENT / NUTRITION DIAGNOSIS      Nutrition Dx Problem  Problem: Needs Alternative Composition  Etiology: Medical Diagnosis - POD 3 reoperative sternotomy AV root replacement 27mm cryopreserved homograft with right nuvia cabrol, AICD removal, IABP placement.    Signs/Symptoms: Report/Observation   --  NUTRITION INTERVENTION / PLAN OF CARE      Intervention Goal(s) Maintain nutrition status, Establish goals of care, Meet estimated needs, Initiate feeding/diet, Initiate TF/PN, and Tolerate TF/PN at goal         RD Intervention/Action Await initiation of EN/PN, Continue to monitor, Care plan reviewed, and Recommend/order: EN         Prescription/Orders:       PO Diet       Supplements       Enteral Nutrition    Enteral Prescription:     Enteral Route NG    TF Delivery Method Continuous    Enteral Product Peptamen AF    Modular None    Propofol Rate/Kcal     TF Start Rate  10 mL/hr    TF Goal Rate  75 mL/hr    Free Water Flush 50 mL Q 1 hr    Provision at Goal:          Calories 1980 kcal, meets 100% needs         Protein  125 gm protein, meets 100% needs         Fluid (mL) 180 ml free water flushes         Parenteral Nutrition    New Prescription Ordered? Yes   --      Monitor/Evaluation Per protocol, Pertinent labs, EN delivery/tolerance,  Weight, Skin status, GI status, Symptoms, POC/GOC, Swallow function   Discharge Plan/Needs No discharge needs identified at this time   --    RD to follow per protocol.      Electronically signed by:  Sunil Padilla RDN, LD  11/02/24 15:18 EDT

## 2024-11-02 NOTE — ANESTHESIA PREPROCEDURE EVALUATION
Anesthesia Evaluation     NPO Solid Status: > 8 hours  NPO Liquid Status: > 2 hours           Airway   Mallampati: II  Dental      Pulmonary    (-) COPD, sleep apnea, not a smoker    ROS comment: Negative patient screen for PO    Cardiovascular     (+) hypertension, valvular problems/murmurs AS and MR, CAD, dysrhythmias Atrial Fib, hyperlipidemia      Neuro/Psych  (+) tremors  GI/Hepatic/Renal/Endo    (+) morbid obesity, GI bleeding     Musculoskeletal     Abdominal    Substance History      OB/GYN          Other   arthritis,     ROS/Med Hx Other: Critically ill s/p Homograft, still on vaso, NE, phenylephrine, Epi, Milrinone, Lidocaine, amiodarone gtts with balloon pump                     Anesthesia Plan    ASA 4     general     (All lines in situ)  Postoperative Plan: Expected vent after surgery  Anesthetic plan, risks, benefits, and alternatives have been provided, discussed and informed consent has been obtained with: child.      CODE STATUS:    Code Status (Patient has no pulse and is not breathing): CPR (Attempt to Resuscitate)  Medical Interventions (Patient has pulse or is breathing): Full Support

## 2024-11-02 NOTE — PROGRESS NOTES
[facility] Progress Note      Patient Name: Woodrow Alejandro  : 1950  MRN: [id number]  Primary Care Physician:  Juan Perez MD  Date of admission: 10/23/2024    Subjective     Interval History:   F/u NATHANIEL    Review of Systems:   I/O 5.9/9.3 (UOP 9L with diuresis, RN states accurate)  CXR today: extensive bilat pulm opacities inc'd ; poss L effusion  Back from OR for washout and chest closure  On multi pressors and 100% FIO2    Objective     Vitals:   Temp:  [98.2 °F (36.8 °C)-99.7 °F (37.6 °C)] 98.2 °F (36.8 °C)  Heart Rate:  [] 96  Resp:  [16-20] 20  BP: ()/(48-94) 104/63  Arterial Line BP: ()/(48-67) 100/56  FiO2 (%):  [69 %-71 %] 69 %    Intake/Output Summary (Last 24 hours) at 2024 1125  Last data filed at 2024 1100  Gross per 24 hour   Intake 6266.36 ml   Output 8232 ml   Net -1965.64 ml       Physical Exam:    General Appearance: ill appearing WM sedated on ventilator   Neck: supple, no JVD  Lungs: bilat rhonchi  Heart: RRR, normal S1 and S2  Abdomen: soft, nontender, nondistended  : mayorga present with purple urine (from B12)  Extremities: 2+ BLE pedal/ankle edema    Scheduled Meds:     aspirin, 81 mg, Oral, Daily  atorvastatin, 40 mg, Oral, Nightly  chlorhexidine, 15 mL, Mouth/Throat, Q12H  enoxaparin, 40 mg, Subcutaneous, Q12H  mupirocin, 1 Application, Each Nare, BID  pantoprazole, 40 mg, Intravenous, Once   Followed by  pantoprazole, 40 mg, Oral, QAM  penicillin g (potassium), 4 Million Units, Intravenous, Q4H  senna-docusate sodium, 2 tablet, Oral, Nightly  sodium chloride, 10 mL, Intravenous, Q12H  vancomycin, 1,000 mg, Intravenous, Q24H      IV Meds:   amiodarone, 1 mg/min, Last Rate: 1 mg/min (24 1016)  clevidipine, 2-32 mg/hr  dexmedetomidine, 0.2-1.5 mcg/kg/hr, Last Rate: 0.4 mcg/kg/hr (24 0856)  DOPamine, 2-20 mcg/kg/min  EPINEPHrine, 0.02-0.1 mcg/kg/min, Last Rate: 0.04 mcg/kg/min (24  0856)  insulin, 0-100 Units/hr, Last Rate: 1.6 Units/hr (11/02/24 0631)  lidocaine in D5W, 1 mg/min, Last Rate: 2 mg/min (11/02/24 0856)  milrinone, 0.25-0.375 mcg/kg/min, Last Rate: 0.25 mcg/kg/min (11/02/24 0856)  niCARdipine, 5-15 mg/hr  nitroglycerin, 5-200 mcg/min  norepinephrine, 0.02-0.2 mcg/kg/min, Last Rate: 0.01 mcg/kg/min (11/02/24 0856)  Pharmacy to dose vancomycin,   phenylephrine, 0.2-2 mcg/kg/min, Last Rate: 0.9 mcg/kg/min (11/02/24 0915)  propofol, 5-50 mcg/kg/min, Last Rate: 30 mcg/kg/min (11/02/24 1016)  vasopressin, 0.02-0.1 Units/min, Last Rate: 0.06 Units/min (11/02/24 0856)        Results Reviewed:   I have personally reviewed the results from the time of this admission to 11/2/2024 11:25 EDT     Results from last 7 days   Lab Units 11/02/24  1043 11/02/24  0257 11/01/24  1755 11/01/24  1029 11/01/24  0350 10/31/24  2348   SODIUM mmol/L 143 142 145 145 149* 147*   POTASSIUM mmol/L 3.9 4.1 4.4 4.5 4.6 4.8   CHLORIDE mmol/L 103 103 106 107 111* 110*   CO2 mmol/L 26.0 26.1 26.4 24.9 23.2 25.1   BUN mg/dL 21 21 18 17 15 16   CREATININE mg/dL 1.84* 1.81* 1.85* 1.94* 1.93* 1.89*   CALCIUM mg/dL 8.0* 8.2* 8.2* 8.5* 8.7 8.3*   BILIRUBIN mg/dL  --   --   --  0.4 0.5 0.6   ALK PHOS U/L  --   --   --  71 62 57   ALT (SGPT) U/L  --   --   --  6 6 10   AST (SGOT) U/L  --   --   --  73* 83* 82*   GLUCOSE mg/dL 173* 138* 108* 149* 149* 151*     Estimated Creatinine Clearance: 54.1 mL/min (A) (by C-G formula based on SCr of 1.84 mg/dL (H)).  Results from last 7 days   Lab Units 11/02/24  1043 11/02/24  0257 11/01/24  1755 11/01/24  1029 10/31/24  2348 10/31/24  2333   MAGNESIUM mg/dL  --   --  3.1* 2.4 2.5* 2.5*   PHOSPHORUS mg/dL 6.3* 5.6*  --   --   --  6.2*     Results from last 7 days   Lab Units 10/31/24  2348 10/31/24  0346   URIC ACID mg/dL 6.3 6.0     Results from last 7 days   Lab Units 11/02/24  1043 11/02/24  0736 11/02/24  0257 11/01/24  1029 11/01/24  0350 10/31/24  2348   WBC 10*3/mm3 19.62*  --   18.43* 18.32* 17.24* 15.03*   HEMOGLOBIN g/dL 9.0*  --  9.6* 8.5* 8.8* 8.7*   HEMOGLOBIN, POC g/dL  --  11.2*  --   --   --   --    PLATELETS 10*3/mm3 108*  --  91* 105* 109* 99*     Results from last 7 days   Lab Units 10/31/24  0346 10/30/24  2325 10/30/24  2148 10/30/24  1916 10/30/24  1907   INR  1.38* 1.21* 1.10 2.3* 1.87*       Assessment / Plan     ASSESSMENT:  NATHANIEL, non-oliguric, unchanged, Cr 1.8, prerenal --> ATN, due to cardiogenic and hemorrhagic shock with expected hemodynamic changes following major cardiac surgery.  Volume excess by exam, with robust (if not excessive) diuresis with bumex 4mg IV q6h (UOP 9L).  Electrolytes and acid-base balance stable  Cardiogenic/hemorrhagic shock, s/p large amount of blood products in the OR.  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  PEN G per ID   History of A-fib, unable to tolerate anticoagulation  Acute hypoxic resp failure, on 100% FIO2   Anemia + TCP, hgb/PLT stable    PLAN:  Resume bumex at lower dose 2mg IV q8h  D/W CTS & RN      Alberto Gar MD  11/02/24  11:25 EDT    [facility]  [phone]

## 2024-11-02 NOTE — OP NOTE
Operative Note    Date of Dictation: 11/02/24    Date of Procedure: Same    Referring Physician: Parker Newman MD    Preoperative diagnosis:   1.  Status post reoperative proximal aortic replacement with a homograft, mitral valve repair and partial ICD lead placement and generator removal with developed RV dysfunction post surgery.  2.  Status post partial closure of the chest due to hemodynamic instability  3.  Slow overall hemodynamic improvement    Postoperative diagnosis:   Same    Procedure:   1.  Sternal wound exploration with washout  2.  Sternal wound requiring and closure    Surgeon: Javon Florian MD     Assistants: Assistant: Tiffany Cooper CSA was responsible for performing the following activities: Retraction, Suction, Irrigation, Suturing, Closing, Placing Dressing, and all aspects of the complex cardiac case  and their skilled assistance was necessary for the success of this case.     Anesthesia: General endotracheal anesthesia and JOLIE    Findings:  No significant thrombus, improved RV dysfunction.    Estimated Blood Loss: Minimal    STS Data:    The patients daugther was explained the risks, benefits and alternatives of surgery and agreed to proceed. The antibiotics  were given in the STS required window.    Description of the procedure:     The patient was placed supine on the operative table. Anesthesia was given and the patient was already intubated and the lines were already placed.. The patient's chest was prepped and draped using the usual sterile technique.  Staples were removed before prep but the sutures were cut and removed and then the soft tissues open.  I painted the wound with Betadine and then irrigated the cavity profusely with cefazolin irrigation solution.  Also used Irrisept, 1 bottle to further irrigate after debridement of the fatty tissues and the wound.  I cleaned the chest tubes and I suctioned the cavity profusely I checked the anastomosis suture lines were all  hemostatic and the graft to the right coronary ostia proximally look viable with good flow.  The right ventricular function looks much improved since the last operation and that correlated with the findings in the JOLIE.  There was moderate to severe TR with coronary with the annulus.  They have, hypertension and seen in the hemodynamic monitoring.  At this point I decided to reinforce the left side of the sternum and then I used single #7 and double #6 wires to close the sternum.  I approximated the sternum after positioning the chest tubes and look for stability.  The patient remained stable therefore I to his the wires and catheter tips in the usual fashion.  I closed the wound in layers using observable materials and staples and nylon sutures for the skin.  I applied a Prevena skin dressing due to the high risk of wound infection.  The patient tolerated the procedure well and he was transferred to the cardiac surgery ICU in critical but stable condition.       Specimen removed: None    Complications:  none           Disposition: Cardiovascular recovery room           Condition: Critical but stable.

## 2024-11-02 NOTE — ANESTHESIA PROCEDURE NOTES
Arterial Line      Patient reassessed immediately prior to procedure    Patient location during procedure: OR   Line placed for hemodynamic monitoring, ABGs/Labs/ISTAT and MD/Surgeon request.  Performed By   Anesthesiologist: Anuj Aguilar MD   Preanesthetic Checklist  Completed: patient identified, IV checked, site marked, risks and benefits discussed, surgical consent, monitors and equipment checked, pre-op evaluation and timeout performed  Arterial Line Prep    Sterile Tech: cap, gloves and sterile barriers  Prep: ChloraPrep  Patient monitoring: EKG, continuous pulse oximetry and blood pressure monitoring  Arterial Line Procedure   Laterality:right  Location:  radial artery  Catheter size: 20 G   Guidance: ultrasound guided  PROCEDURE NOTE/ULTRASOUND INTERPRETATION.  Using ultrasound guidance the potential vascular sites for insertion of the catheter were visualized to determine the patency of the vessel to be used for vascular access.  After selecting the appropriate site for insertion, the needle was visualized under ultrasound being inserted into the radial artery, followed by ultrasound confirmation of wire and catheter placement. There were no abnormalities seen on ultrasound; an image was taken; and the patient tolerated the procedure with no complications.   Number of attempts: 1  Successful placement: yes Images: still images not obtained  Post Assessment   Dressing Type: wrist guard applied, secured with tape and occlusive dressing applied.   Complications no  Circ/Move/Sens Assessment: normal and unchanged.   Patient Tolerance: patient tolerated the procedure well with no apparent complications  Additional Notes  ULTRASOUND INTERPRETATION. Using ultrasound guidance, a catheter was placed within in the appropriate vessel and advanced successfully. There were no abnormalities seen on ultrasound; the patient tolerated the procedure with no complications.

## 2024-11-02 NOTE — NURSING NOTE
TRANSFERRED PT TO OR #16 WITH ANESTHESIA, PERFUSION, RESPIRATORY THERAPY, CVOR RN X2.  SEE ANESTHESIA NOTE FOR IV MEDS RUNNING, IABP MANNED BY PIA MURILLO, PERFUSION. HOVER MAT USED TO TRANSFER TO OR BED. VSS.

## 2024-11-03 NOTE — ANESTHESIA POSTPROCEDURE EVALUATION
"Patient: Woodrow Alejandro    Procedure Summary       Date: 11/02/24 Room / Location: [place] [id number] / [facility] CARDIOVASCULAR OPERATING ROOM    Anesthesia Start: 0632 Anesthesia Stop: 0910    Procedures:       TRANSESOPHAGEAL ECHOCARDIOGRAM WITH ANESTHESIA (Chest)      STERNAL WOUND EXPLORATION, WASHOUT, REWIRE,  AND CLOSURE Diagnosis:       S/P AVR      (S/P AVR [Z95.2])    Surgeons: aJvon Florian MD Provider: Leana Jang MD    Anesthesia Type: general ASA Status: 4            Anesthesia Type: general    Vitals  Vitals Value Taken Time   /55 11/03/24 1107   Temp 37 °C (98.6 °F) 11/03/24 1215   Pulse 95 11/03/24 1215   Resp 10 11/03/24 1130   SpO2 98 % 11/03/24 1215   Vitals shown include unfiled device data.        Post Anesthesia Care and Evaluation    Patient location during evaluation: ICU  Patient participation: complete - patient cannot participate  Pain management: adequate    Airway patency: patent  Respiratory status: ETT and intubated  Hydration status: acceptable    Comments: BP 96/47 (BP Location: Left arm, Patient Position: Lying)   Pulse 83   Temp 36.9 °C (98.4 °F) (Core)   Resp 10   Ht 182.9 cm (72.01\")   Wt (!) 151 kg (332 lb 14.3 oz)   SpO2 96%   BMI 45.14 kg/m²     Critically ill in ICU    "

## 2024-11-03 NOTE — PROGRESS NOTES
[facility] Progress Note      Patient Name: Woodrow Alejandro  : 1950  MRN: [id number]  Primary Care Physician:  Juan Perez MD  Date of admission: 10/23/2024    Subjective     Interval History:   F/u NATHANIEL    Review of Systems:   I/O 6.2/5.5 (UOP 5.2)   Diuresing well but intake fairly massive on multi pressors/inotrope, weaning some  All drips are max concentrated   FIO2 50%   To remove IABP now  CXR today: unchanged vascular alisha and bilat pulm opac    Objective     Vitals:   Temp:  [98.2 °F (36.8 °C)-100.2 °F (37.9 °C)] 99 °F (37.2 °C)  Heart Rate:  [] 84  Resp:  [10-20] 10  BP: ()/() 123/57  Arterial Line BP: ()/(37-69) 99/69  FiO2 (%):  [48 %-69 %] 50 %    Intake/Output Summary (Last 24 hours) at 11/3/2024 1002  Last data filed at 11/3/2024 0945  Gross per 24 hour   Intake 5949.3 ml   Output 5725 ml   Net 224.3 ml       Physical Exam:    General Appearance: ill appearing WM sedated on ventilator   Neck: supple, no JVD  Lungs: bilat rhonchi  Heart: RRR, normal S1 and S2  Abdomen: soft, nontender, nondistended  : mayorga present with purple urine (from B12)  Extremities: 2+ BLE pedal/ankle edema    Scheduled Meds:     aspirin, 81 mg, Oral, Daily  atorvastatin, 40 mg, Oral, Nightly  chlorhexidine, 15 mL, Mouth/Throat, Q12H  [Held by provider] enoxaparin, 40 mg, Subcutaneous, Q12H  mupirocin, 1 Application, Each Nare, BID  pantoprazole, 40 mg, Intravenous, Once   Followed by  pantoprazole, 40 mg, Oral, QAM  penicillin g (potassium), 4 Million Units, Intravenous, Q4H  senna-docusate sodium, 2 tablet, Oral, Nightly  sildenafil, 20 mg, Nasogastric, TID  sodium chloride, 10 mL, Intravenous, Q12H  vancomycin, 1,000 mg, Intravenous, Q24H      IV Meds:   amiodarone in dextrose 5%, 1 mg/min, Last Rate: 1 mg/min (24 0355)  dexmedetomidine, 0.2-1.5 mcg/kg/hr, Last Rate: 0.5 mcg/kg/hr (24 4374)  DOPamine, 2-20 mcg/kg/min  EPINEPHrine,  0.02-0.1 mcg/kg/min, Last Rate: 0.02 mcg/kg/min (11/03/24 0336)  insulin, 0-100 Units/hr, Last Rate: 1.5 Units/hr (11/03/24 0607)  lidocaine in D5W, 1 mg/min, Last Rate: 1 mg/min (11/02/24 2152)  milrinone, 0.25-0.375 mcg/kg/min, Last Rate: 0.25 mcg/kg/min (11/03/24 0757)  niCARdipine, 5-15 mg/hr  nitroglycerin, 5-200 mcg/min  norepinephrine, 0.02-0.2 mcg/kg/min, Last Rate: Stopped (11/03/24 0820)  Pharmacy to dose vancomycin,   phenylephrine, 0.2-2 mcg/kg/min, Last Rate: 0.8 mcg/kg/min (11/03/24 0852)  propofol, 5-50 mcg/kg/min, Last Rate: 20 mcg/kg/min (11/03/24 0858)  vasopressin, 0.02-0.1 Units/min, Last Rate: 0.06 Units/min (11/03/24 0356)        Results Reviewed:   I have personally reviewed the results from the time of this admission to 11/3/2024 10:02 EST     Results from last 7 days   Lab Units 11/03/24  0347 11/02/24  1930 11/02/24  1043 11/02/24  0257 11/01/24  1755 11/01/24  1029 11/01/24  0350 10/31/24  2348   SODIUM mmol/L 142  --  143 142   < > 145 149* 147*   POTASSIUM mmol/L 3.5 3.6 3.9 4.1   < > 4.5 4.6 4.8   CHLORIDE mmol/L 104  --  103 103   < > 107 111* 110*   CO2 mmol/L 25.5  --  26.0 26.1   < > 24.9 23.2 25.1   BUN mg/dL 23  --  21 21   < > 17 15 16   CREATININE mg/dL 1.67*  --  1.84* 1.81*   < > 1.94* 1.93* 1.89*   CALCIUM mg/dL 8.6  --  8.0* 8.2*   < > 8.5* 8.7 8.3*   BILIRUBIN mg/dL  --   --   --   --   --  0.4 0.5 0.6   ALK PHOS U/L  --   --   --   --   --  71 62 57   ALT (SGPT) U/L  --   --   --   --   --  6 6 10   AST (SGOT) U/L  --   --   --   --   --  73* 83* 82*   GLUCOSE mg/dL 127*  --  173* 138*   < > 149* 149* 151*    < > = values in this interval not displayed.     Estimated Creatinine Clearance: 58.7 mL/min (A) (by C-G formula based on SCr of 1.67 mg/dL (H)).  Results from last 7 days   Lab Units 11/03/24  0347 11/02/24  1043 11/02/24  0257 11/01/24  1755 11/01/24  1029   MAGNESIUM mg/dL 2.0  --   --  3.1* 2.4   PHOSPHORUS mg/dL 4.4 6.3* 5.6*  --   --      Results from last 7  days   Lab Units 10/31/24  2348 10/31/24  0346   URIC ACID mg/dL 6.3 6.0     Results from last 7 days   Lab Units 11/03/24  0347 11/02/24  1043 11/02/24  0736 11/02/24  0257 11/01/24  1029 11/01/24  0350   WBC 10*3/mm3 12.81* 19.62*  --  18.43* 18.32* 17.24*   HEMOGLOBIN g/dL 8.1* 9.0*  --  9.6* 8.5* 8.8*   HEMOGLOBIN, POC g/dL  --   --  11.2*  --   --   --    PLATELETS 10*3/mm3 75* 108*  --  91* 105* 109*     Results from last 7 days   Lab Units 10/31/24  0346 10/30/24  2325 10/30/24  2148 10/30/24  1916 10/30/24  1907   INR  1.38* 1.21* 1.10 2.3* 1.87*       Assessment / Plan     ASSESSMENT:  NATHANIEL, non-oliguric - ATN, due to cardiogenic and hemorrhagic shock with expected hemodynamic changes following major cardiac surgery.  Improving, Cr down to 1.67, peak 1.9.  Volume excess present.  Diuresis was excessive on bumex 4mg series, still robust UOP on lower dose 2mg IV q8h but slight pos fluid balance over 24h as intake roughly 6L despite max concentrating drips.  K is 3.5, replaced.  Bicarb is normal  Vol overload - as above   Cardiogenic/hemorrhagic shock, s/p large amount of blood products in the OR.  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  PEN G per ID   History of A-fib, unable to tolerate anticoagulation  Acute hypoxic resp failure, FIO2 weaned to 50%  Anemia + TCP, hgb down 9 to 8,  to 75K    PLAN:  Inc bumex 3mg IV q8h and aim for slight neg fluid balance  Replace/recheck K  Note plan to remove IABP now  Weaning pressor as able  D/W family, RN, CTS       Alberto Gar MD  11/03/24  10:02 [facility]    [facility]  [phone]

## 2024-11-03 NOTE — PROGRESS NOTES
[facility]  [phone]  Dr. Tawanda Clement     Subjective:  LOS: 11    Chief Complaint:  Ventilator management     Patient doing better from a ventilator standpoint.  It is down to 50% FiO2 however still on nitric oxide.  Appears that they did get this working yesterday now not having any issues with the circuit.    Objective   Vital Signs past 24hrs  Temp range: Temp (24hrs), Av.1 °F (37.3 °C), Min:98.4 °F (36.9 °C), Max:100.2 °F (37.9 °C)    BP range: BP: ()/() 126/55  Pulse range: Heart Rate:  [] 79  Resp rate range: Resp:  [10-13] 13  Device (Oxygen Therapy): ventilator   Oxygen range:SpO2:  [92 %-99 %] 98 %   Mechanical Ventilator:Mode: VC/AC (24 0324)    Physical Exam  Vitals reviewed.   Constitutional:       Interventions: He is sedated and intubated.   HENT:      Head: Normocephalic and atraumatic.   Eyes:      Extraocular Movements: Extraocular movements intact.      Conjunctiva/sclera: Conjunctivae normal.      Pupils: Pupils are equal, round, and reactive to light.   Cardiovascular:      Rate and Rhythm: Regular rhythm. Tachycardia present.   Pulmonary:      Effort: Pulmonary effort is normal. He is intubated.      Breath sounds: Decreased air movement present. Decreased breath sounds present. No wheezing, rhonchi or rales.   Musculoskeletal:      Cervical back: Normal range of motion. No rigidity or tenderness.   Skin:     General: Skin is warm and dry.      Findings: No rash.       Results Review:    I have reviewed the laboratory and imaging data since the last note by [facility] physician.  My annotations are noted in assessment and plan.      Result Review:  I have personally reviewed the results from last note by [facility] physician to 11/3/2024 14:07 EST and agree with these findings:  [x]  Laboratory list / accordion  [x]  Microbiology  [x]  Radiology  [x]  EKG/Telemetry   [x]  Cardiology/Vascular   [x]  Pathology  [x]  Old records  []  Other:    Medication  Review:  I have reviewed the current MAR.  My annotations are noted in assessment and plan.    aspirin, 81 mg, Oral, Daily  atorvastatin, 40 mg, Oral, Nightly  bumetanide, 3 mg, Intravenous, Q8H  chlorhexidine, 15 mL, Mouth/Throat, Q12H  [Held by provider] enoxaparin, 40 mg, Subcutaneous, Q12H  mupirocin, 1 Application, Each Nare, BID  pantoprazole, 40 mg, Intravenous, Once   Followed by  pantoprazole, 40 mg, Oral, QAM  penicillin g (potassium), 4 Million Units, Intravenous, Q4H  potassium chloride, 20 mEq, Intravenous, Q1H  senna-docusate sodium, 2 tablet, Oral, Nightly  sildenafil, 20 mg, Nasogastric, TID  sodium chloride, 10 mL, Intravenous, Q12H        amiodarone in dextrose 5%, 1 mg/min, Last Rate: 1 mg/min (11/03/24 1013)  dexmedetomidine, 0.2-1.5 mcg/kg/hr, Last Rate: 0.5 mcg/kg/hr (11/03/24 0858)  DOPamine, 2-20 mcg/kg/min  EPINEPHrine, 0.02-0.1 mcg/kg/min, Last Rate: 0.02 mcg/kg/min (11/03/24 0336)  insulin, 0-100 Units/hr, Last Rate: 1.5 Units/hr (11/03/24 0607)  lidocaine in D5W, 1 mg/min, Last Rate: 1 mg/min (11/02/24 2152)  milrinone, 0.25-0.375 mcg/kg/min, Last Rate: 0.25 mcg/kg/min (11/03/24 0757)  niCARdipine, 5-15 mg/hr  nitroglycerin, 5-200 mcg/min  norepinephrine, 0.02-0.2 mcg/kg/min, Last Rate: Stopped (11/03/24 0820)  Pharmacy to dose vancomycin,   phenylephrine, 0.2-2 mcg/kg/min, Last Rate: 1 mcg/kg/min (11/03/24 1230)  propofol, 5-50 mcg/kg/min, Last Rate: 30 mcg/kg/min (11/03/24 1230)  vasopressin, 0.02-0.1 Units/min, Last Rate: 0.06 Units/min (11/03/24 1018)      Lines, Drains & Airways       Active LDAs       Name Placement date Placement time Site Days    Pulmonary Artery Catheter - Triple Lumen 10/30/24 Right Internal jugular 10/30/24  1026  created via procedure documentation  -- 4    CVC Double Lumen 10/30/24 Right Internal jugular 10/30/24  1026  created via procedure documentation  Internal jugular  4    Peripheral IV 10/29/24 1848 Anterior;Left Forearm 10/29/24  1848  Forearm  4     Peripheral IV 10/30/24 0940 Anterior;Right Forearm 10/30/24  0940  Forearm  4    NG/OG Tube Nasogastric  Left nostril 11/02/24  1330  Left nostril  1    Rectal Tube With balloon 11/03/24  0945  --  less than 1    Urethral Catheter Non-latex;Temperature probe 16 Fr. 10/30/24  1145  -- 4    Y Chest Tube 1 and 2 1 Anterior Mediastinal 28 Fr. 2 Anterior Mediastinal 28 Fr. 10/30/24  2100  -- 3    Y Chest Tube 3 and 4 3 Anterior Mediastinal 28 Fr. 4 Anterior Pleural 28 Fr. 10/30/24  2000  -- 3    ETT  10/30/24  1139  created via procedure documentation  -- 4    Arterial Line 10/30/24 Left Radial 10/30/24  1015  created via procedure documentation  Radial  4    Arterial Line 11/02/24 Right Radial 11/02/24  0723  created via procedure documentation  Radial  1    Pacer Wires 10/30/24  1635  Ventricular  3    Arterial Sheath Left Femoral 10/30/24  1915  Femoral  3                  No active isolations  Diet Orders (active) (From admission, onward)       Start     Ordered    11/02/24 1533  Tube Feeding: Formula: Peptamen AF (Vital AF 1.2); Feeding Type: Continuous; Start at: 10 mL/hr; Then Advance By: 10 mL/hr; Every: 6 hours; To Goal Rate of: 75 mL/hr; Water Flush: 50 mL; Every: 1 hour; Water Bolus: None  Diet Effective Now         11/02/24 1533    11/02/24 1533  NPO Diet NPO Type: Tube Feeding  Diet Effective Now         11/02/24 1533    11/02/24 1259  Feeding Tube Insertion - Cortrak System  Once         11/02/24 1259    10/30/24 2305  Patient is on Glucommander  Continuous         10/30/24 2306                      Assessment  Postop respiratory failure  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  Cardiogenic and probable septic shock  CHF with pulmonary edema  Strep mitis endocarditis and bacteremia infectious diseases following and managing antibiotics  Acute kidney injury nephrology is following and managing  Anemia acute expected postop on chronic  History of  persistent atrial fibrillation now postoperative junctional rhythm cardiology following  Thrombocytopenia not unexpected postoperatively and looks like stabilized balloon pump may contribute as well to decrease platelets continue following  Hypernatremia mild and improving  Elevated transaminase mostly AST rising probably related to shock.  Acute left lower extremity DVT on 10/24/2024  Esophagitis grade C by EGD on 9/30/2024 PPI ordered is ordered as oral not sure he can to be able to get this recommend consider changing that to IV.  Hyperglycemia mild on postop insulin protocol        Plan  -Likely significant pulmonary edema causing respiratory failure.  - Recommend reducing tidal volumes to 6-7 mL/kg and can  increased respiratory rate to assist with ventilation.  Attempt to keep plats less than 30.  - Diuresis per cardiology and surgery  -Bronchoscopy (11/2/2024) see separate procedure note for findings.  Had some mild thick airway secretions that were therapeutically aspirated but otherwise nothing notable.  -Wean nitric oxide per cardiothoracic surgery  - Will continue to follow and assist with vent    Critical care time 32 minutes      Tawanda Clement DO   11/03/24  14:07 EST      Part of this note may be an electronic transcription/translation of spoken language to printed text using the Dragon Dictation System.

## 2024-11-03 NOTE — PROGRESS NOTES
LOS: 11 days     Chief Complaint: Endocarditis    Interval History: He is now off Levophed but also on multiple pressors.  Down to 50% FiO2.  Diuresed well.  Did drop his pressures when turned.  Had bronchoscopy yesterday for mucous plugging.    Vital Signs  Temp:  [98.2 °F (36.8 °C)-100.2 °F (37.9 °C)] 98.8 °F (37.1 °C)  Heart Rate:  [] 83  Resp:  [10-20] 13  BP: ()/() 96/47  Arterial Line BP: ()/(37-69) 95/51  FiO2 (%):  [48 %-69 %] 50 %    Physical Exam:  General: Intubated and sedated  HEENT: ET tube in place  Respiratory: Multiple chest tubes.  : Ugarte catheter  Skin: Operative dressing over the anterior chest.  Access: Right IJ CVC.  Arterial line.  Peripheral line.    White count 12.81  Creatinine 1.67   CXR, independently interpreted: extensive bilateral infiltrates right greater than left    Microbiology:  10/3 COVID-negative  10/10 COVID-negative  10/14 COVID-negative  10/15 blood cultures 2 out of 2 strep mitis  10/17 COVID-negative  10/17 blood cultures 2 out of 2 strep mitis  10/21 COVID-negative  10/23 blood cultures no growth today  10/30 operative cultures from the aortic valve no growth to date    A/p   #Strep mitis endocarditis  #Status post bioprosthetic aortic valve replacement 2014  #Status post aortic root replacement in the setting of prosthetic aortic valve endocarditis and annular abscess on 10/30  #Status post removal of pacemaker generator and intracardiac portion of the leads with retained venous leads  #Atrial fibrillation  #Achilles tendon rupture  #NATHANIEL    Continue penicillin G 4,000,000 units every 4 hours for strep mitis endocarditis.  Current renal function does not require any dose adjustment as of yet.     Planning for minimum of 6 weeks of antibiotics. So far or cx ngtd.  Long-term suppressive antibiotics to be discussed on her follow-up given the portion of retained pacemaker lead.    Expected tenuous course postoperatively but seems to be making some  progress.  Discussed with nursing and off Levophed and may remove balloon pump today.  White count is down today.  Oxygenation is improved but still with extensive infiltrates.  Valve culture remains no growth.

## 2024-11-03 NOTE — PROGRESS NOTES
"[facility]    Patient Name: Woodrow Alejandro  :1950  74 y.o.  LOS: 11  Encounter Provider: Azar Mckeon Jr, MD      Patient Care Team:  Juan Perez MD as PCP - General (Internal Medicine)    Chief Complaint:  Follow-up of prosthetic aortic valve endocarditis, severe mitral regurgitation status post reoperative repair of mitral valve and repair of aortic region with homograft and bioprosthetic aortic valve, postoperative shock,, ATN, postoperative atrial fibrillation     Interval History: IABP removed after platelet transfusion.  Soft hematoma in the left groin.  Good urine output.  Stable pressor requirements.  Nitric oxide remains as the patient remains intubated and sedated.  Trickle tube feeds started today.       Objective   Vital Signs  Temp:  [98.2 °F (36.8 °C)-100.2 °F (37.9 °C)] 98.6 °F (37 °C)  Heart Rate:  [] 80  Resp:  [10-20] 20  BP: ()/() 93/43  Arterial Line BP: ()/(37-69) 102/46  FiO2 (%):  [49 %-50 %] 49 %    Intake/Output Summary (Last 24 hours) at 11/3/2024 1619  Last data filed at 11/3/2024 1500  Gross per 24 hour   Intake 6340.55 ml   Output 5670 ml   Net 670.55 ml     Flowsheet Rows      Flowsheet Row First Filed Value   Admission Height 182.9 cm (72\") Documented at 10/25/2024 0434   Admission Weight 142 kg (312 lb 3.2 oz) Documented at 10/23/2024 1711              Physical Exam  Vitals reviewed.   Constitutional:       Appearance: Acutely ill-appearing.   Pulmonary:      Breath sounds: No wheezing. No rhonchi. No rales.   Cardiovascular:      PMI at left midclavicular line. Normal rate. Regular rhythm. Normal S1. Normal S2.       Murmurs: There is a systolic murmur.      No gallop.  No click. No rub.   Pulses:     Intact distal pulses.   Edema:     Pretibial: bilateral 1+ edema of the pretibial area.     Ankle: bilateral 1+ edema of the ankle.     Feet: bilateral 1+ edema of the feet.  Abdominal:      General: There is no " distension.      Palpations: Abdomen is soft.   Musculoskeletal:         General: No tenderness or deformity. Skin:     General: Skin is warm and dry.   Neurological:      Comments: Sedated and comfortable on the ventilator     Physical exam was reviewed, updated and amended when necessary.    Pertinent Test Results:  Results from last 7 days   Lab Units 11/03/24  1202 11/03/24  0347 11/02/24  1930 11/02/24  1043 11/02/24  0257 11/01/24  1755 11/01/24  1029 11/01/24  0350 10/31/24  2348 10/31/24  2333 10/31/24  1349 10/31/24  0346 10/30/24  2325 10/30/24  0344 10/29/24  0952   SODIUM mmol/L  --  142  --  143 142 145 145 149* 147* 149*  149* 149*   < > 150*   < > 137   POTASSIUM mmol/L 3.2* 3.5 3.6 3.9 4.1 4.4 4.5 4.6 4.8 4.8  4.8 4.8   < > 3.3*   < > 4.2   CHLORIDE mmol/L  --  104  --  103 103 106 107 111* 110* 111*  111* 111*   < > 109*   < > 102   CO2 mmol/L  --  25.5  --  26.0 26.1 26.4 24.9 23.2 25.1 23.9  23.9 23.5   < > 19.2*   < > 23.1   BUN mg/dL  --  23  --  21 21 18 17 15 16 15  15 14   < > 9   < > 7*   CREATININE mg/dL  --  1.67*  --  1.84* 1.81* 1.85* 1.94* 1.93* 1.89* 1.95*  1.95* 1.77*   < > 1.43*   < > 0.83   GLUCOSE mg/dL  --  127*  --  173* 138* 108* 149* 149* 151* 131*  131* 137*   < > 149*   < > 148*   CALCIUM mg/dL  --  8.6  --  8.0* 8.2* 8.2* 8.5* 8.7 8.3* 8.7  8.7 8.3*   < > 8.7   < > 8.5*   AST (SGOT) U/L  --   --   --   --   --   --  73* 83* 82* 89* 101*  --  65*  --  21   ALT (SGPT) U/L  --   --   --   --   --   --  6 6 10 10 18  --  16  --  11    < > = values in this interval not displayed.         Results from last 7 days   Lab Units 11/03/24  1202 11/03/24  0347 11/02/24  1043 11/02/24  0736 11/02/24  0257 11/01/24  1029 11/01/24  0350 10/31/24  2348   WBC 10*3/mm3 14.44* 12.81* 19.62*  --  18.43* 18.32* 17.24* 15.03*   HEMOGLOBIN g/dL 7.9* 8.1* 9.0*  --  9.6* 8.5* 8.8* 8.7*   HEMOGLOBIN, POC g/dL  --   --   --  11.2*  --   --   --   --    HEMATOCRIT % 24.3* 24.4* 26.6*  --  27.7*  24.9* 25.3* 25.0*   HEMATOCRIT POC %  --   --   --  33*  --   --   --   --    PLATELETS 10*3/mm3 92* 75* 108*  --  91* 105* 109* 99*     Results from last 7 days   Lab Units 10/31/24  0346 10/30/24  2325 10/30/24  2148 10/30/24  1916 10/30/24  1907 10/30/24  1708 10/29/24  0952   INR  1.38* 1.21* 1.10 2.3* 1.87* 2.3* 1.16*   APTT seconds  --  46.1* 40.8*  --  33.6  --  32.3     Results from last 7 days   Lab Units 11/03/24  0347 11/01/24  1755 11/01/24  1029 10/31/24  2348 10/31/24  2333 10/31/24  0346 10/30/24  2325   MAGNESIUM mg/dL 2.0 3.1* 2.4 2.5* 2.5* 2.8* 3.0*     Results from last 7 days   Lab Units 10/29/24  0952   CHOLESTEROL mg/dL 131   TRIGLYCERIDES mg/dL 119   HDL CHOL mg/dL 32*     Results from last 7 days   Lab Units 10/29/24  0952   PROBNP pg/mL 7,084.0*               Medication Review:   aspirin, 81 mg, Oral, Daily  atorvastatin, 40 mg, Oral, Nightly  bumetanide, 3 mg, Intravenous, Q8H  chlorhexidine, 15 mL, Mouth/Throat, Q12H  [Held by provider] enoxaparin, 40 mg, Subcutaneous, Q12H  mupirocin, 1 Application, Each Nare, BID  pantoprazole, 40 mg, Intravenous, Once   Followed by  pantoprazole, 40 mg, Oral, QAM  penicillin g (potassium), 4 Million Units, Intravenous, Q4H  senna-docusate sodium, 2 tablet, Oral, Nightly  sildenafil, 20 mg, Nasogastric, TID  sodium chloride, 10 mL, Intravenous, Q12H         amiodarone in dextrose 5%, 1 mg/min, Last Rate: 1 mg/min (11/03/24 1013)  dexmedetomidine, 0.2-1.5 mcg/kg/hr, Last Rate: 0.5 mcg/kg/hr (11/03/24 1450)  DOPamine, 2-20 mcg/kg/min  EPINEPHrine, 0.02-0.1 mcg/kg/min, Last Rate: 0.02 mcg/kg/min (11/03/24 0336)  insulin, 0-100 Units/hr, Last Rate: 1.5 Units/hr (11/03/24 0607)  lidocaine in D5W, 1 mg/min, Last Rate: 1 mg/min (11/02/24 2152)  milrinone, 0.25-0.375 mcg/kg/min, Last Rate: 0.25 mcg/kg/min (11/03/24 1618)  niCARdipine, 5-15 mg/hr  nitroglycerin, 5-200 mcg/min  norepinephrine, 0.02-0.2 mcg/kg/min, Last Rate: Stopped (11/03/24 0820)  Pharmacy to  dose vancomycin,   phenylephrine, 0.2-2 mcg/kg/min, Last Rate: 1 mcg/kg/min (11/03/24 1525)  propofol, 5-50 mcg/kg/min, Last Rate: 20 mcg/kg/min (11/03/24 1524)  vasopressin, 0.02-0.1 Units/min, Last Rate: 0.06 Units/min (11/03/24 1018)        Assessment & Plan     Active Hospital Problems    Diagnosis  POA    **Prosthetic aortic valve stenosis [T82.857A]  Yes    Bacteremia [R78.81]  Yes    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  The chest was closed today and the IABP was repositioned by Dr. Florian.  IABP was removed today with left thigh hematoma soft with vascular ultrasound being performed at time of bedside examination.  He remains on multiple pressors and in atrial fibrillation with what appears to be better rate control.  Currently on inhaled nitric oxide with decreasing FiO2 requirements after bronchoscopy and removal of mucous plugs yesterday.  He is making urine over the last 24 hours.  Minimal chest tube output.  Limited echocardiogram shows normal EF with dilated hypokinetic right ventricle.  Imaging details discussed with Dr. Florian.  Biotics per ID.  Persistent atrial fibrillation -2 separate high-energy shocks were delivered yesterday without return of sinus rhythm.  Rate is better controlled.  Not on anticoagulation for obvious reasons.  Nonoliguric NATHANIEL -secondary to shock and ATN.   Nephrology following  Hypoxic respiratory failure -oxygen requirements decreased with latest ABG indicating possibility of weaning further.  Continue diuretics per nephrology.  Intraoperative JOLIE images reviewed with normal size LV with low normal function and expected postoperative septal wall motion.  RV looks moderately dilated with mild global hypokinesis and significant biatrial enlargement is noted.  Significantly dilated tricuspid annulus with moderate to severe tricuspid regurgitation.  Stable appearing mitral valve repair with normal gradient and minimal mitral regurgitation.  Thin and pliable prosthetic aortic valve leaflets without gradient or visualization of the ascending aorta.  Pulmonary hypertension -improved on inhaled nitric oxide.  Increased TR noted on intraoperative JOLIE.  Ventricular tachycardia -nonsustained runs of VT noted currently on amiodarone at lower dose and lidocaine drips.  Thrombocytopenia -platelets were transfused prior to removal of IABP.  Follow morning labs.  blood loss anemia -transfused 1 unit today           Azar Mckeon Jr, MD  [facility]   [facility]  [address] [place] [address] - [address]  [place] [address]  Office: [phone]    11/03/24  16:19 EST

## 2024-11-03 NOTE — PROGRESS NOTES
" LOS: 11 days   Patient Care Team:  Juan Perez MD as PCP - General (Internal Medicine)    Chief Complaint: post op follow-up    Subjective  Intubated/sedated    Vital Signs  Temp:  [98.2 °F (36.8 °C)-100.2 °F (37.9 °C)] 99.1 °F (37.3 °C)  Heart Rate:  [] 93  Resp:  [10-20] 10  BP: ()/() 95/52  Arterial Line BP: ()/(37-69) 111/55  FiO2 (%):  [48 %-69 %] 50 %  Body mass index is 45.21 kg/m².    Intake/Output Summary (Last 24 hours) at 11/3/2024 0646  Last data filed at 11/3/2024 0603  Gross per 24 hour   Intake 6245.3 ml   Output 5122 ml   Net 1123.3 ml     I/O this shift:  In: 2397.5 [I.V.:1797.5; IV Piggyback:600]  Out: 2690 [Urine:2600; Chest Tube:90]    Chest tube drainage last 8 hours         10/31/24  0615 11/02/24  0400 11/03/24  0409   Weight: (!) 150 kg (330 lb 4 oz) (120.4kg per bed scale) (!) 151 kg (333 lb 5.4 oz)         Objective:  Vital signs: (most recent): Blood pressure 96/47, pulse 83, temperature 98.8 °F (37.1 °C), temperature source Core, resp. rate 13, height 182.9 cm (72.01\"), weight (!) 151 kg (332 lb 14.3 oz), SpO2 96%.                Physical Exam:   General Appearance: awake and alert, no acute distress   Lungs:  ventilator   Heart:  IABP sounds   Abdomen: hypoactive BS    Skin: clean, dry, intact   Neuro: alert and oriented, no focal deficits.     Results Review:        WBC WBC   Date Value Ref Range Status   11/03/2024 12.81 (H) 3.40 - 10.80 10*3/mm3 Final   11/02/2024 19.62 (H) 3.40 - 10.80 10*3/mm3 Final   11/02/2024 18.43 (H) 3.40 - 10.80 10*3/mm3 Final   11/01/2024 18.32 (H) 3.40 - 10.80 10*3/mm3 Final   11/01/2024 17.24 (H) 3.40 - 10.80 10*3/mm3 Final   10/31/2024 15.03 (H) 3.40 - 10.80 10*3/mm3 Final   10/31/2024 16.99 (H) 3.40 - 10.80 10*3/mm3 Final      HGB Hemoglobin   Date Value Ref Range Status   11/03/2024 8.1 (L) 13.0 - 17.7 g/dL Final   11/02/2024 9.0 (L) 13.0 - 17.7 g/dL Final   11/02/2024 11.2 (L) 12.0 - 17.0 g/dL Final   11/02/2024 9.6 " "(L) 13.0 - 17.7 g/dL Final   11/01/2024 8.5 (L) 13.0 - 17.7 g/dL Final   11/01/2024 8.8 (L) 13.0 - 17.7 g/dL Final   10/31/2024 8.7 (L) 13.0 - 17.7 g/dL Final   10/31/2024 10.0 (L) 13.0 - 17.7 g/dL Final      HCT Hematocrit   Date Value Ref Range Status   11/03/2024 24.4 (L) 37.5 - 51.0 % Final   11/02/2024 26.6 (L) 37.5 - 51.0 % Final   11/02/2024 33 (L) 38 - 51 % Final   11/02/2024 27.7 (L) 37.5 - 51.0 % Final   11/01/2024 24.9 (L) 37.5 - 51.0 % Final   11/01/2024 25.3 (L) 37.5 - 51.0 % Final   10/31/2024 25.0 (L) 37.5 - 51.0 % Final   10/31/2024 29.4 (L) 37.5 - 51.0 % Final      Platelets Platelets   Date Value Ref Range Status   11/03/2024 75 (L) 140 - 450 10*3/mm3 Final   11/02/2024 108 (L) 140 - 450 10*3/mm3 Final   11/02/2024 91 (L) 140 - 450 10*3/mm3 Final   11/01/2024 105 (L) 140 - 450 10*3/mm3 Final   11/01/2024 109 (L) 140 - 450 10*3/mm3 Final   10/31/2024 99 (L) 140 - 450 10*3/mm3 Final   10/31/2024 111 (L) 140 - 450 10*3/mm3 Final        PT/INR:    No results found for: \"PROTIME\"  /  No results found for: \"INR\"      Sodium Sodium   Date Value Ref Range Status   11/03/2024 142 136 - 145 mmol/L Final   11/02/2024 143 136 - 145 mmol/L Final   11/02/2024 142 136 - 145 mmol/L Final   11/01/2024 145 136 - 145 mmol/L Final   11/01/2024 145 136 - 145 mmol/L Final   11/01/2024 149 (H) 136 - 145 mmol/L Final   10/31/2024 147 (H) 136 - 145 mmol/L Final   10/31/2024 149 (H) 136 - 145 mmol/L Final   10/31/2024 149 (H) 136 - 145 mmol/L Final   10/31/2024 149 (H) 136 - 145 mmol/L Final      Potassium Potassium   Date Value Ref Range Status   11/03/2024 3.5 3.5 - 5.2 mmol/L Final   11/02/2024 3.6 3.5 - 5.2 mmol/L Final   11/02/2024 3.9 3.5 - 5.2 mmol/L Final     Comment:     Slight hemolysis detected by analyzer. Result may be falsely elevated.   11/02/2024 4.1 3.5 - 5.2 mmol/L Final     Comment:     Slight hemolysis detected by analyzer. Result may be falsely elevated.   11/01/2024 4.4 3.5 - 5.2 mmol/L Final     " Comment:     Slight hemolysis detected by analyzer. Result may be falsely elevated.   11/01/2024 4.5 3.5 - 5.2 mmol/L Final     Comment:     Slight hemolysis detected by analyzer. Result may be falsely elevated.   11/01/2024 4.6 3.5 - 5.2 mmol/L Final     Comment:     Slight hemolysis detected by analyzer. Result may be falsely elevated.   10/31/2024 4.8 3.5 - 5.2 mmol/L Final     Comment:     Slight hemolysis detected by analyzer. Result may be falsely elevated.   10/31/2024 4.8 3.5 - 5.2 mmol/L Final     Comment:     Slight hemolysis detected by analyzer. Result may be falsely elevated.   10/31/2024 4.8 3.5 - 5.2 mmol/L Final     Comment:     Slight hemolysis detected by analyzer. Result may be falsely elevated.   10/31/2024 4.8 3.5 - 5.2 mmol/L Final     Comment:     Slight hemolysis detected by analyzer. Result may be falsely elevated.      Chloride Chloride   Date Value Ref Range Status   11/03/2024 104 98 - 107 mmol/L Final   11/02/2024 103 98 - 107 mmol/L Final   11/02/2024 103 98 - 107 mmol/L Final   11/01/2024 106 98 - 107 mmol/L Final   11/01/2024 107 98 - 107 mmol/L Final   11/01/2024 111 (H) 98 - 107 mmol/L Final   10/31/2024 110 (H) 98 - 107 mmol/L Final   10/31/2024 111 (H) 98 - 107 mmol/L Final   10/31/2024 111 (H) 98 - 107 mmol/L Final   10/31/2024 111 (H) 98 - 107 mmol/L Final      Bicarbonate CO2   Date Value Ref Range Status   11/03/2024 25.5 22.0 - 29.0 mmol/L Final   11/02/2024 26.0 22.0 - 29.0 mmol/L Final   11/02/2024 26.1 22.0 - 29.0 mmol/L Final   11/01/2024 26.4 22.0 - 29.0 mmol/L Final   11/01/2024 24.9 22.0 - 29.0 mmol/L Final   11/01/2024 23.2 22.0 - 29.0 mmol/L Final   10/31/2024 25.1 22.0 - 29.0 mmol/L Final   10/31/2024 23.9 22.0 - 29.0 mmol/L Final   10/31/2024 23.9 22.0 - 29.0 mmol/L Final   10/31/2024 23.5 22.0 - 29.0 mmol/L Final      BUN BUN   Date Value Ref Range Status   11/03/2024 23 8 - 23 mg/dL Final   11/02/2024 21 8 - 23 mg/dL Final   11/02/2024 21 8 - 23 mg/dL Final    11/01/2024 18 8 - 23 mg/dL Final   11/01/2024 17 8 - 23 mg/dL Final   11/01/2024 15 8 - 23 mg/dL Final   10/31/2024 16 8 - 23 mg/dL Final   10/31/2024 15 8 - 23 mg/dL Final   10/31/2024 15 8 - 23 mg/dL Final   10/31/2024 14 8 - 23 mg/dL Final      Creatinine Creatinine   Date Value Ref Range Status   11/03/2024 1.67 (H) 0.76 - 1.27 mg/dL Final   11/02/2024 1.84 (H) 0.76 - 1.27 mg/dL Final   11/02/2024 1.81 (H) 0.76 - 1.27 mg/dL Final   11/01/2024 1.85 (H) 0.76 - 1.27 mg/dL Final   11/01/2024 1.94 (H) 0.76 - 1.27 mg/dL Final   11/01/2024 1.93 (H) 0.76 - 1.27 mg/dL Final   10/31/2024 1.89 (H) 0.76 - 1.27 mg/dL Final   10/31/2024 1.95 (H) 0.76 - 1.27 mg/dL Final   10/31/2024 1.95 (H) 0.76 - 1.27 mg/dL Final   10/31/2024 1.77 (H) 0.76 - 1.27 mg/dL Final      Calcium Calcium   Date Value Ref Range Status   11/03/2024 8.6 8.6 - 10.5 mg/dL Final   11/02/2024 8.0 (L) 8.6 - 10.5 mg/dL Final   11/02/2024 8.2 (L) 8.6 - 10.5 mg/dL Final   11/01/2024 8.2 (L) 8.6 - 10.5 mg/dL Final   11/01/2024 8.5 (L) 8.6 - 10.5 mg/dL Final   11/01/2024 8.7 8.6 - 10.5 mg/dL Final   10/31/2024 8.3 (L) 8.6 - 10.5 mg/dL Final   10/31/2024 8.7 8.6 - 10.5 mg/dL Final   10/31/2024 8.7 8.6 - 10.5 mg/dL Final   10/31/2024 8.3 (L) 8.6 - 10.5 mg/dL Final      Magnesium Magnesium   Date Value Ref Range Status   11/01/2024 3.1 (H) 1.6 - 2.4 mg/dL Final   11/01/2024 2.4 1.6 - 2.4 mg/dL Final   10/31/2024 2.5 (H) 1.6 - 2.4 mg/dL Final   10/31/2024 2.5 (H) 1.6 - 2.4 mg/dL Final          aspirin, 81 mg, Oral, Daily  atorvastatin, 40 mg, Oral, Nightly  chlorhexidine, 15 mL, Mouth/Throat, Q12H  enoxaparin, 40 mg, Subcutaneous, Q12H  mupirocin, 1 Application, Each Nare, BID  pantoprazole, 40 mg, Intravenous, Once   Followed by  pantoprazole, 40 mg, Oral, QAM  penicillin g (potassium), 4 Million Units, Intravenous, Q4H  potassium chloride, 20 mEq, Intravenous, Once  senna-docusate sodium, 2 tablet, Oral, Nightly  sildenafil, 20 mg, Nasogastric, TID  sodium  chloride, 10 mL, Intravenous, Q12H  vancomycin, 1,000 mg, Intravenous, Q24H      amiodarone in dextrose 5%, 1 mg/min, Last Rate: 1 mg/min (11/03/24 0355)  dexmedetomidine, 0.2-1.5 mcg/kg/hr, Last Rate: 0.5 mcg/kg/hr (11/03/24 0408)  DOPamine, 2-20 mcg/kg/min  EPINEPHrine, 0.02-0.1 mcg/kg/min, Last Rate: 0.02 mcg/kg/min (11/03/24 0336)  insulin, 0-100 Units/hr, Last Rate: 1.5 Units/hr (11/03/24 0607)  lidocaine in D5W, 1 mg/min, Last Rate: 1 mg/min (11/02/24 2152)  milrinone, 0.25-0.375 mcg/kg/min, Last Rate: 0.25 mcg/kg/min (11/02/24 2152)  niCARdipine, 5-15 mg/hr  nitroglycerin, 5-200 mcg/min  norepinephrine, 0.02-0.2 mcg/kg/min, Last Rate: 0.02 mcg/kg/min (11/02/24 1437)  Pharmacy to dose vancomycin,   phenylephrine, 0.2-2 mcg/kg/min, Last Rate: 1 mcg/kg/min (11/03/24 0335)  propofol, 5-50 mcg/kg/min, Last Rate: 20 mcg/kg/min (11/03/24 0356)  vasopressin, 0.02-0.1 Units/min, Last Rate: 0.06 Units/min (11/03/24 0356)              Prosthetic aortic valve stenosis    Essential hypertension    Permanent atrial fibrillation    S/P AVR    ICD (implantable cardioverter-defibrillator), dual, in situ    Achilles tendon rupture    Bacteremia    Stenosis of prosthetic aortic valve    Anemia      Assessment & Plan    - Prosthetic aortic valve stenosis, h/o AVR (tissue)/maze/DANICA ligation (2014)- s/p reoperative sternotomy AV root replacement 27mm cryopreserved homograft with right nuvia cabrol, AICD removal, IABP placement- [facility] 10/31/2024  -Sternal closure ---11/2  - Possible endocarditis, likely need JOLIE   - Bacteremia, blood cultures positive strep mitis  - Atrial fibrillation, unable to tolerate anticoagulation  - Hypertension  - NICM status post [employer] AICD  - Anemia, s/p EGD/colonoscopy with esophagitis, polyp removal  - Right achilles tendon tear--- walking boot and PT per ortho at [facility]  - Pre-diabetes -- improved at 5.3  -post op anemia- expected acute blood loss  -     POD#4  Remains intubated/sedated.  Sternum  closed yesterday.  Yesterday some issues with the nitric and ventilator, he was not getting good volume.  He was bronched by pulmonary.   Still with a lot of inotropic and pressor support. Levophed, vaso, epinephrine, vasopressin and milrinone. Nitric 20. IABP placed to 1:3 this morning-- will see how he does with the decreased in mechanical support and hopefully will be able to remove IABP today.  We have been able to make some progress with support which is encouraging. However, becomes unstable with movement.  Plt count 75- will transfuse 1unit of platelets prior to IABP removal  Hgb 8.1- 1unit PRBCs  Remains on lidocaine and amiodarone-- will decrease  Continue IV antibiotics-- ID following  Hgb 8.1-- will transfuse 1unit PRBC  Creatinine improving 1.6-- great response to diuretic yesterday.    Remains critical ill.  Continue supportive care.    JAVI Nichole  11/03/24  06:46 EST

## 2024-11-04 ENCOUNTER — TELEPHONE (OUTPATIENT)
Dept: INTERNAL MEDICINE | Age: 74
End: 2024-11-04
Payer: MEDICARE

## 2024-11-04 NOTE — PROGRESS NOTES
LOS: 12 days   Patient Care Team:  Juan Perez MD as PCP - General (Internal Medicine)    Chief Complaint: Follow-up bioprosthetic AVR endocarditis, mitral regurgitation, persistent atrial fibrillation.    Interval History: His Levophed was weaned off.  He is still on vasopressin, Robinson-Synephrine, and epinephrine.  He is in rate controlled atrial fibrillation.  His IABP was pulled over the weekend.    Vital Signs:  Temp:  [98.2 °F (36.8 °C)-99.9 °F (37.7 °C)] 99.5 °F (37.5 °C)  Heart Rate:  [] 91  Resp:  [10-23] 11  BP: ()/(41-57) 101/48  Arterial Line BP: ()/(40-60) 119/47  FiO2 (%):  [49 %-50 %] 49 %    Intake/Output Summary (Last 24 hours) at 11/4/2024 1050  Last data filed at 11/4/2024 1000  Gross per 24 hour   Intake 6795.24 ml   Output 5345 ml   Net 1450.24 ml       Physical Exam:   General Appearance:    Intubated and sedated.  Appears critically ill.   Lungs:     Rhonchi bilaterally anteriorly.    Heart:    Regular rhythm with a normal rate.  Balloon pump audible.   Abdomen:     Soft, nontender, nondistended.    Extremities:    3+ generalized edema and anasarca.     Results Review:    Results from last 7 days   Lab Units 11/04/24  1015 11/04/24  0313   SODIUM mmol/L  --  140   POTASSIUM mmol/L 3.6 3.7   CHLORIDE mmol/L  --  104   CO2 mmol/L  --  25.0   BUN mg/dL  --  24*   CREATININE mg/dL  --  1.45*   GLUCOSE mg/dL  --  150*   CALCIUM mg/dL  --  7.9*         Results from last 7 days   Lab Units 11/04/24  0313   WBC 10*3/mm3 9.47   HEMOGLOBIN g/dL 8.7*   HEMATOCRIT % 26.5*   PLATELETS 10*3/mm3 102*     Results from last 7 days   Lab Units 10/31/24  0346 10/30/24  2325 10/30/24  2148 10/30/24  1916 10/30/24  1907   INR  1.38* 1.21* 1.10   < > 1.87*   APTT seconds  --  46.1* 40.8*  --  33.6    < > = values in this interval not displayed.     Results from last 7 days   Lab Units 10/29/24  0952   CHOLESTEROL mg/dL 131     Results from last 7 days   Lab Units 11/04/24  031    MAGNESIUM mg/dL 2.1     Results from last 7 days   Lab Units 10/29/24  0952   CHOLESTEROL mg/dL 131   TRIGLYCERIDES mg/dL 119   HDL CHOL mg/dL 32*   LDL CHOL mg/dL 77       I reviewed the patient's new clinical results.        Assessment:  1.  Status post bioprosthetic aortic valve replacement in 2014  2.  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  Moderate to severe mitral regurgitation  4.  Status post reoperative AV root replacement, mitral valve repair, ICD removal, SVG-RCA, and IABP placement on 10/30/2021  5.  Postoperative shock, multifactorial  6.  Acute kidney injury  7.  History of nonischemic cardiomyopathy with recovered ejection fraction  8.  Anemia, acute on chronic  9.  Postoperative thrombocytopenia  10.  Persistent atrial fibrillation  11.  Ventricular tachycardia postoperatively  12.  Grade C esophagitis by EGD on 9/30/2024  13.  Acute left lower extremity DVT on 10/24/2024  14.  Right Achilles tendon tear  15.  Postoperative junctional rhythm  16.  Hypoalbuminemia  17.  Thrombocytopenia    Plan:  -He evidently did have some ventricular tachycardia overnight after trying to turn off the lidocaine.  The lidocaine was stopped at 6 AM this morning.  Continue to watch.  He is currently still on amiodarone at 0.5 mg/min.    -Remains on 3 pressors as noted above.  The Levophed was weaned off over the weekend.    -Diuretics per nephrology.  He is grossly volume overloaded, which is multifactorial.  Some of this is iatrogenic as he received 9 L of blood products in the OR, and some is from hypoalbuminemia and low protein levels.    -Echocardiogram over the weekend showed a hyperdynamic left ventricle and moderately reduced right ventricular function.    -Rate is better controlled, and he remains in atrial fibrillation (he had persistent atrial fibrillation prior to the  surgery).    -Making some progress, but remains critically ill.    Antwan Farmer MD  11/04/24  10:50 EST

## 2024-11-04 NOTE — PLAN OF CARE
Goal Outcome Evaluation:      Continues on epi, vaso, jett, amio, primacor, lido, propofol, dex,  and insulin. Tube feeds and water flushes. Vent 50% FiO2. Will continue with plan of care.

## 2024-11-04 NOTE — PLAN OF CARE
Goal Outcome Evaluation:              Outcome Evaluation: pt remains open chest. In uncontrolled afib with multiple runs of vtach today. IABP 1:3 and on cardiac suportive drips (levo,vaso, jett, amio, epi, milrinone and lido), Sedated with propofol and dex. CI>2.4. 1 unit PRBC given. Daughter at bedside and updated throughout day.Plan for washout tomorrow a.m. with Dr. Florian. Will continue plan of care.

## 2024-11-04 NOTE — PROGRESS NOTES
" LOS: 12 days   Patient Care Team:  Juan Perez MD as PCP - General (Internal Medicine)    Chief Complaint: post op follow-up    Subjective  Intubated/sedated    CI 2.5  CVP 10  Drips: vaso 0.06, amio 0.5, jett 0.5, epi 0.02, milrinone 0.25, propofol, precedex, insulin  Nitric oxide 15    Vital Signs  Temp:  [98.2 °F (36.8 °C)-99.9 °F (37.7 °C)] 99.5 °F (37.5 °C)  Heart Rate:  [] 102  Resp:  [10-23] 11  BP: ()/(41-57) 102/46  Arterial Line BP: ()/(40-60) 115/53  FiO2 (%):  [49 %-50 %] 49 %  Body mass index is 45.17 kg/m².    Intake/Output Summary (Last 24 hours) at 11/4/2024 0947  Last data filed at 11/4/2024 0900  Gross per 24 hour   Intake 6845.24 ml   Output 5265 ml   Net 1580.24 ml     I/O this shift:  In: -   Out: 1050 [Urine:1050]    Chest tube drainage last 8 hours: 25/10         11/03/24  0409 11/03/24  0718 11/04/24  0433   Weight: (!) 151 kg (333 lb 5.4 oz) (!) 151 kg (332 lb 14.3 oz) (!) 151 kg (333 lb 1.8 oz)         Objective:  General Appearance:  Ill-appearing (intubated/sedated).    Vital signs: (most recent): Blood pressure 101/48, pulse 81, temperature 99.1 °F (37.3 °C), resp. rate 11, height 182 cm (71.65\"), weight (!) 151 kg (332 lb 14.3 oz), SpO2 98%.  (Tmax 99.9).    Output: Producing urine and producing stool.    Lungs:  There are rales and rhonchi.  (50% FiO2)  Heart: Tachycardia.  Irregular rhythm.  (Tele: Afib)  Extremities: There is dependent edema.    Skin:  Warm and dry.  (Dressings c/d/I  Hematoma left groin)            Results Review:        WBC WBC   Date Value Ref Range Status   11/04/2024 9.47 3.40 - 10.80 10*3/mm3 Final   11/03/2024 11.58 (H) 3.40 - 10.80 10*3/mm3 Final   11/03/2024 14.44 (H) 3.40 - 10.80 10*3/mm3 Final   11/03/2024 12.81 (H) 3.40 - 10.80 10*3/mm3 Final   11/02/2024 19.62 (H) 3.40 - 10.80 10*3/mm3 Final   11/02/2024 18.43 (H) 3.40 - 10.80 10*3/mm3 Final   11/01/2024 18.32 (H) 3.40 - 10.80 10*3/mm3 Final      HGB Hemoglobin   Date Value " "Ref Range Status   11/04/2024 8.7 (L) 13.0 - 17.7 g/dL Final   11/03/2024 8.4 (L) 13.0 - 17.7 g/dL Final   11/03/2024 7.9 (L) 13.0 - 17.7 g/dL Final   11/03/2024 8.1 (L) 13.0 - 17.7 g/dL Final   11/02/2024 9.0 (L) 13.0 - 17.7 g/dL Final   11/02/2024 11.2 (L) 12.0 - 17.0 g/dL Final   11/02/2024 9.6 (L) 13.0 - 17.7 g/dL Final   11/01/2024 8.5 (L) 13.0 - 17.7 g/dL Final      HCT Hematocrit   Date Value Ref Range Status   11/04/2024 26.5 (L) 37.5 - 51.0 % Final   11/03/2024 24.7 (L) 37.5 - 51.0 % Final   11/03/2024 24.3 (L) 37.5 - 51.0 % Final   11/03/2024 24.4 (L) 37.5 - 51.0 % Final   11/02/2024 26.6 (L) 37.5 - 51.0 % Final   11/02/2024 33 (L) 38 - 51 % Final   11/02/2024 27.7 (L) 37.5 - 51.0 % Final   11/01/2024 24.9 (L) 37.5 - 51.0 % Final      Platelets Platelets   Date Value Ref Range Status   11/04/2024 102 (L) 140 - 450 10*3/mm3 Final   11/03/2024 103 (L) 140 - 450 10*3/mm3 Final   11/03/2024 92 (L) 140 - 450 10*3/mm3 Final   11/03/2024 75 (L) 140 - 450 10*3/mm3 Final   11/02/2024 108 (L) 140 - 450 10*3/mm3 Final   11/02/2024 91 (L) 140 - 450 10*3/mm3 Final   11/01/2024 105 (L) 140 - 450 10*3/mm3 Final        PT/INR:    No results found for: \"PROTIME\"  /  No results found for: \"INR\"      Sodium Sodium   Date Value Ref Range Status   11/04/2024 140 136 - 145 mmol/L Final   11/03/2024 140 136 - 145 mmol/L Final   11/03/2024 142 136 - 145 mmol/L Final   11/02/2024 143 136 - 145 mmol/L Final   11/02/2024 142 136 - 145 mmol/L Final   11/01/2024 145 136 - 145 mmol/L Final   11/01/2024 145 136 - 145 mmol/L Final      Potassium Potassium   Date Value Ref Range Status   11/04/2024 3.7 3.5 - 5.2 mmol/L Final   11/03/2024 4.0 3.5 - 5.2 mmol/L Final   11/03/2024 3.5 3.5 - 5.2 mmol/L Final   11/03/2024 3.5 3.5 - 5.2 mmol/L Final   11/03/2024 3.2 (L) 3.5 - 5.2 mmol/L Final   11/03/2024 3.5 3.5 - 5.2 mmol/L Final   11/02/2024 3.6 3.5 - 5.2 mmol/L Final   11/02/2024 3.9 3.5 - 5.2 mmol/L Final     Comment:     Slight hemolysis " detected by analyzer. Result may be falsely elevated.   11/02/2024 4.1 3.5 - 5.2 mmol/L Final     Comment:     Slight hemolysis detected by analyzer. Result may be falsely elevated.   11/01/2024 4.4 3.5 - 5.2 mmol/L Final     Comment:     Slight hemolysis detected by analyzer. Result may be falsely elevated.   11/01/2024 4.5 3.5 - 5.2 mmol/L Final     Comment:     Slight hemolysis detected by analyzer. Result may be falsely elevated.      Chloride Chloride   Date Value Ref Range Status   11/04/2024 104 98 - 107 mmol/L Final   11/03/2024 103 98 - 107 mmol/L Final   11/03/2024 104 98 - 107 mmol/L Final   11/02/2024 103 98 - 107 mmol/L Final   11/02/2024 103 98 - 107 mmol/L Final   11/01/2024 106 98 - 107 mmol/L Final   11/01/2024 107 98 - 107 mmol/L Final      Bicarbonate CO2   Date Value Ref Range Status   11/04/2024 25.0 22.0 - 29.0 mmol/L Final   11/03/2024 24.6 22.0 - 29.0 mmol/L Final   11/03/2024 25.5 22.0 - 29.0 mmol/L Final   11/02/2024 26.0 22.0 - 29.0 mmol/L Final   11/02/2024 26.1 22.0 - 29.0 mmol/L Final   11/01/2024 26.4 22.0 - 29.0 mmol/L Final   11/01/2024 24.9 22.0 - 29.0 mmol/L Final      BUN BUN   Date Value Ref Range Status   11/04/2024 24 (H) 8 - 23 mg/dL Final   11/03/2024 23 8 - 23 mg/dL Final   11/03/2024 23 8 - 23 mg/dL Final   11/02/2024 21 8 - 23 mg/dL Final   11/02/2024 21 8 - 23 mg/dL Final   11/01/2024 18 8 - 23 mg/dL Final   11/01/2024 17 8 - 23 mg/dL Final      Creatinine Creatinine   Date Value Ref Range Status   11/04/2024 1.45 (H) 0.76 - 1.27 mg/dL Final   11/03/2024 1.53 (H) 0.76 - 1.27 mg/dL Final   11/03/2024 1.67 (H) 0.76 - 1.27 mg/dL Final   11/02/2024 1.84 (H) 0.76 - 1.27 mg/dL Final   11/02/2024 1.81 (H) 0.76 - 1.27 mg/dL Final   11/01/2024 1.85 (H) 0.76 - 1.27 mg/dL Final   11/01/2024 1.94 (H) 0.76 - 1.27 mg/dL Final      Calcium Calcium   Date Value Ref Range Status   11/04/2024 7.9 (L) 8.6 - 10.5 mg/dL Final   11/03/2024 7.7 (L) 8.6 - 10.5 mg/dL Final   11/03/2024 8.6 8.6 -  10.5 mg/dL Final   11/02/2024 8.0 (L) 8.6 - 10.5 mg/dL Final   11/02/2024 8.2 (L) 8.6 - 10.5 mg/dL Final   11/01/2024 8.2 (L) 8.6 - 10.5 mg/dL Final   11/01/2024 8.5 (L) 8.6 - 10.5 mg/dL Final      Magnesium Magnesium   Date Value Ref Range Status   11/04/2024 2.1 1.6 - 2.4 mg/dL Final   11/03/2024 2.3 1.6 - 2.4 mg/dL Final   11/03/2024 2.0 1.6 - 2.4 mg/dL Final   11/01/2024 3.1 (H) 1.6 - 2.4 mg/dL Final   11/01/2024 2.4 1.6 - 2.4 mg/dL Final          aspirin, 81 mg, Oral, Daily  atorvastatin, 40 mg, Oral, Nightly  bumetanide, 3 mg, Intravenous, Q8H  chlorhexidine, 15 mL, Mouth/Throat, Q12H  enoxaparin, 30 mg, Subcutaneous, Q12H  miconazole, 1 Application, Topical, Q12H  pantoprazole, 40 mg, Intravenous, Once   Followed by  pantoprazole, 40 mg, Oral, QAM  penicillin g (potassium), 4 Million Units, Intravenous, Q4H  senna-docusate sodium, 2 tablet, Oral, Nightly  sildenafil, 20 mg, Nasogastric, TID  sodium chloride, 10 mL, Intravenous, Q12H      amiodarone in dextrose 5%, 0.5 mg/min, Last Rate: 0.5 mg/min (11/04/24 0932)  dexmedetomidine, 0.2-1.5 mcg/kg/hr, Last Rate: 0.6 mcg/kg/hr (11/04/24 0654)  DOPamine, 2-20 mcg/kg/min  EPINEPHrine, 0.02-0.1 mcg/kg/min, Last Rate: 0.02 mcg/kg/min (11/03/24 0336)  insulin, 0-100 Units/hr, Last Rate: 1.7 Units/hr (11/04/24 0602)  lidocaine in D5W, 1 mg/min, Last Rate: Stopped (11/04/24 0644)  milrinone, 0.25-0.375 mcg/kg/min, Last Rate: 0.25 mcg/kg/min (11/04/24 0118)  niCARdipine, 5-15 mg/hr  nitroglycerin, 5-200 mcg/min  norepinephrine, 0.02-0.2 mcg/kg/min, Last Rate: Stopped (11/03/24 0820)  phenylephrine, 0.2-2 mcg/kg/min, Last Rate: 0.5 mcg/kg/min (11/04/24 0545)  propofol, 5-50 mcg/kg/min, Last Rate: 5 mcg/kg/min (11/04/24 0600)  vasopressin, 0.02-0.1 Units/min, Last Rate: 0.05 Units/min (11/04/24 0722)              Prosthetic aortic valve stenosis    Essential hypertension    Permanent atrial fibrillation    S/P AVR    ICD (implantable cardioverter-defibrillator), dual, in  situ    Achilles tendon rupture    Bacteremia    Stenosis of prosthetic aortic valve    Anemia      Assessment & Plan    - Prosthetic aortic valve stenosis, h/o AVR (tissue)/maze/DANICA ligation (2014)- s/p reoperative sternotomy AV root replacement 27mm cryopreserved homograft with right nuvia cabrol, AICD removal, IABP placement- Chiqui 10/31/2024  -Sternal closure ---11/2  - Possible endocarditis, likely need JOLIE   - Bacteremia, blood cultures positive strep mitis---on penicillin G  - Atrial fibrillation, unable to tolerate anticoagulation  - Hypertension  - NICM status post [employer] AICD  - Anemia, s/p EGD/colonoscopy with esophagitis, polyp removal  - Right achilles tendon tear--- walking boot and PT per ortho at [facility]  - Pre-diabetes -- improved at 5.3  -post op anemia- expected acute blood loss      POD #5. Remains intubated and sedated. IABP removed yesterday. Still requiring a lot of inotropic and pressor support. Levophed weaned off yesterday and lidocaine drip stopped this morning. Nitric oxide turned down this morning to 15 from 20. ABG this morning looks good -- will decrease FiO2 to 40%. Nephrology following and managing diuretics. Has diuresed over 1 liter so far this morning. Tolerating tube feeds. Transition insulin drip to SSI. Discussed with Dr. Florian -- discontinue all chest tubes, decrease Nitric oxide to 10, restart Lovenox for DVT prophylaxis. Remains critically ill. Continue supportive care.      ADDENDUM: Patient developed mild ST elevation shortly after chest tube removal. EKG obtained. Discussed with Dr. Florian who thinks it is likely pericarditis from chest tube removal.       ADDENDUM: Patient developed frequent runs of VT (up to 30 beats at a time), but no loss of pulse. Lidocaine drip restarted, Amio 150 mg bolus given, Amiodarone drip increased to 1 mg/min, Mag 2 g given, KCl 40 mEq given, electrolytes checked, and 500 mL of albumin administered. Dr. Florian notified and Nitric oxide  increased to 20. STAT echo ordered showing severe RV dysfunction and hyperdynamic LV (per Dr. Farmer). Dr. Florian notified and gave orders for 1 unit PRBC, another albumin, increase nitric oxide to 25, and stop diuretics. Goal to keep CVP 10-12.     ADDENDUM: Spoke with daughter, Abby Melendez, and gave her updates of the events of the day.        CHANEL Garcia  11/04/24  09:47 EST

## 2024-11-04 NOTE — PLAN OF CARE
Goal Outcome Evaluation:         Multiple runs of vtach today and new ST elevation. Attempted to wean nitric but currently on 25 d/t patient decompensation. Lidocain restarted, amio bolus, albumin and 1 prbc given. Insulin drip d/c. All chest tubes removed. Stat echo today . Dr. Florian updated family.

## 2024-11-04 NOTE — PROGRESS NOTES
Nutrition Services    Patient Name:  Woodrow Alejandro  YOB: 1950  MRN: [id number]  Admit Date:  10/23/2024    Assessment Date:  11/04/24    Summary: TF Follow Up    Pt is a 72 y/o male who is POD 5 reoperative sternotomy AV root replacement 27mm cryopreserved homograft with right nuvia cabrol, AICD removal, IABP placement.  Pt currently intubated and sedated on precedex. S/p bronchoscopy over the weekend as well as sternal washout and closure.     Current TF regimen: Peptamen AF @ 30 mL/hr (goal is 75 mL/hr) with 50 mL q hour free water flushes.      Labs reviewed: Na 140, K 3.6, Gluc 145/160/164, BUN 24, Creat 1.45, Platelets 102, Alb 2.6  Meds reviewed: lipitor, bumex, lovenox, protonix, pericolace, amiodarone, precedex    Plans/Recommendations:  Continue advancing TFs of Peptamen AF by 10 mL q 6 hours to goal rate of 75 mL/hr.  40 mL q hour free water flushes.  Monitor for tolerance.    The above end goal rate is for 22 hrs/day to assume interruptions for ADLs. Water flushes adjusted based on clinical picture + Rx flushes/IV fluids     RD to follow    CLINICAL NUTRITION ASSESSMENT      Reason for Assessment Follow-up Protocol     Diagnosis/Problem   POD 5 reoperative sternotomy AV root replacement 27mm cryopreserved homograft with right nuvia cabrol, AICD removal, IABP placement.   Medical/Surgical History Past Medical History:   Diagnosis Date    Achilles tendon injury     right    Aortic valve replaced     Arthritis 2018    Atrial fibrillation     Coronary artery disease     History of transfusion     Hypertension        Past Surgical History:   Procedure Laterality Date    AORTIC VALVE CONDUIT N/A 10/30/2024    Procedure: AORTIC VALVE CONDUIT; possible homograft; ACID removal;  Surgeon: Javon Florian MD;  Location: [facility];  Service: Cardiothoracic;  Laterality: N/A;    AORTIC VALVE REPAIR/REPLACEMENT MITRAL VALVE REPAIR/REPLACEMENT N/A 10/30/2024    Procedure: REOP STERNOTOMY;  "TRANSESOPHOGEAL ECHOCARDIOGRAM;AORTIC ROOT REPLACEMENT WITH HOMOGRAFT; MITRAL VALVE REPAIR; OPEN VEIN HARVEST RIGHT SAPHENOUS VEIN; AICD GENERATOR EXPLANTATION; INTRA-AORTIC BALLOON PUMP PLACEMENT; PRP;  Surgeon: Javon Florian MD;  Location: [facility];  Service: Cardiothoracic;  Laterality: N/A;    CARDIAC SURGERY  2002    COLONOSCOPY      COLONOSCOPY N/A 9/30/2024    Procedure: COLONOSCOPY WITH HOT/COLD SNARE POLYPECTOMIES, ELEVIEW INJECTION,CLIPX1;  Surgeon: Kurt Mensah MD;  Location: [facility] ENDOSCOPY;  Service: Gastroenterology;  Laterality: N/A;  COLON POLYPS    ENDOSCOPY N/A 9/30/2024    Procedure: ESOPHAGOGASTRODUODENOSCOPY WITH BIOPSIES;  Surgeon: Kurt Mensah MD;  Location: [facility] ENDOSCOPY;  Service: Gastroenterology;  Laterality: N/A;  RETAINED FOOD IN STOMACH AND REFLUX ESOPHAGITIS    PACEMAKER IMPLANTATION      TRANSESOPHAGEAL ECHOCARDIOGRAM (JOLIE) N/A 10/30/2024    Procedure: TRANSESOPHAGEAL ECHOCARDIOGRAM WITH ANESTHESIA;  Surgeon: Javon Florian MD;  Location: [facility];  Service: Cardiothoracic;  Laterality: N/A;    TRANSESOPHAGEAL ECHOCARDIOGRAM (JOLIE) N/A 11/2/2024    Procedure: TRANSESOPHAGEAL ECHOCARDIOGRAM WITH ANESTHESIA;  Surgeon: Javon Florian MD;  Location: [facility];  Service: Cardiothoracic;  Laterality: N/A;        Anthropometrics        Current Height  Current Weight  BMI kg/m2 Height: 182.9 cm (72.01\")  Weight: (!) 151 kg (333 lb 1.8 oz) (11/04/24 0433)  Body mass index is 45.17 kg/m².   Adjusted BMI (if applicable)    BMI Category Obese, Class III (40 or higher)   Ideal Body Weight (IBW) 171 lbs   Usual Body Weight (UBW) 330 lbs   Weight Trend Stable   Weight History Wt Readings from Last 30 Encounters:   11/04/24 0433 (!) 151 kg (333 lb 1.8 oz)   11/03/24 0718 (!) 151 kg (332 lb 14.3 oz)   11/03/24 0409 (!) 151 kg (333 lb 5.4 oz)   10/31/24 0615 (!) 150 kg (330 lb 4 oz)   10/25/24 1211 (!) 142 kg (313 lb)   10/25/24 0434 (!) 142 kg (313 lb 0.9 oz) "   10/23/24 1711 (!) 142 kg (312 lb 3.2 oz)   10/23/24 0500 (!) 142 kg (313 lb 7.9 oz)   10/22/24 0500 (!) 143 kg (314 lb 6 oz)   10/21/24 0500 (!) 141 kg (310 lb 13.6 oz)   10/20/24 0550 (!) 141 kg (311 lb 4.6 oz)   10/19/24 0500 (!) 141 kg (311 lb 4.6 oz)   10/17/24 0455 (!) 139 kg (305 lb 12.5 oz)   10/16/24 0500 (!) 139 kg (306 lb)   10/15/24 0536 (!) 139 kg (307 lb 1.6 oz)   10/14/24 0526 (!) 139 kg (307 lb 1.6 oz)   10/13/24 0500 (!) 137 kg (302 lb 11.1 oz)   10/12/24 0437 (!) 139 kg (305 lb 8.9 oz)   10/11/24 0500 (!) 136 kg (300 lb 11.3 oz)   10/10/24 0541 (!) 139 kg (307 lb 1.6 oz)   10/09/24 0500 (!) 139 kg (307 lb 1.6 oz)   10/08/24 0629 (!) 140 kg (308 lb 3.3 oz)   10/07/24 0448 (!) 141 kg (310 lb 13.6 oz)   10/03/24 1606 (!) 139 kg (306 lb)   09/26/24 1104 (!) 141 kg (309 lb 15.5 oz)   09/26/24 1103 (!) 145 kg (319 lb 10.7 oz)   09/19/24 1005 (!) 145 kg (319 lb)   09/05/24 1049 (!) 145 kg (319 lb)   07/18/24 1348 (!) 155 kg (341 lb)   07/02/24 1008 (!) 155 kg (341 lb)   06/24/24 1000 (!) 155 kg (341 lb 9.6 oz)   05/06/24 1401 (!) 154 kg (339 lb)   03/04/24 1413 (!) 154 kg (340 lb)   01/19/24 1138 (!) 155 kg (341 lb 9.6 oz)   12/12/23 1225 (!) 153 kg (336 lb 9.6 oz)   10/31/23 0938 (!) 154 kg (339 lb)   01/17/23 1137 (!) 160 kg (352 lb)   08/23/21 1531 (!) 145 kg (320 lb)   08/23/21 1427 (!) 145 kg (320 lb)   10/19/20 1014 (!) 147 kg (324 lb 9.6 oz)        Estimated/Assessed Needs        Energy Requirements    Weight for Calculation 78 kg IBW   Method for Estimation  25-30 kcal/kg   EST Needs (kcal/day) 1497-5025       Protein Requirements    Weight for Calculation 78 kg IBW   EST Protein Needs (g/kg) 1.5 - 2.0 gm/kg   EST Daily Needs (g/day) 117-156       Fluid Requirements     Method for Estimation 1 mL/kcal    Estimated Needs (mL/day)      Labs       Pertinent Labs    Results from last 7 days   Lab Units 11/04/24  1015 11/04/24  0313 11/03/24  2211 11/03/24  1636 11/03/24  1202 11/03/24  0347  11/01/24  1755 11/01/24  1029 11/01/24  0350 10/31/24  2348   SODIUM mmol/L  --  140  --  140  --  142   < > 145 149* 147*   POTASSIUM mmol/L 3.6 3.7 4.0 3.5  3.5   < > 3.5   < > 4.5 4.6 4.8   CHLORIDE mmol/L  --  104  --  103  --  104   < > 107 111* 110*   CO2 mmol/L  --  25.0  --  24.6  --  25.5   < > 24.9 23.2 25.1   BUN mg/dL  --  24*  --  23  --  23   < > 17 15 16   CREATININE mg/dL  --  1.45*  --  1.53*  --  1.67*   < > 1.94* 1.93* 1.89*   CALCIUM mg/dL  --  7.9*  --  7.7*  --  8.6   < > 8.5* 8.7 8.3*   BILIRUBIN mg/dL  --   --   --   --   --   --   --  0.4 0.5 0.6   ALK PHOS U/L  --   --   --   --   --   --   --  71 62 57   ALT (SGPT) U/L  --   --   --   --   --   --   --  6 6 10   AST (SGOT) U/L  --   --   --   --   --   --   --  73* 83* 82*   GLUCOSE mg/dL  --  150*  --  140*  --  127*   < > 149* 149* 151*    < > = values in this interval not displayed.     Results from last 7 days   Lab Units 11/04/24  0313 11/03/24  1728 11/03/24  1636 11/03/24  1202 11/03/24  0347 10/30/24  0344 10/29/24  0952   MAGNESIUM mg/dL 2.1  --  2.3  --  2.0   < > 1.8   PHOSPHORUS mg/dL 3.4  --  4.3  --  4.4   < >  --    HEMOGLOBIN g/dL 8.7*   < >  --    < > 8.1*   < > 9.7*   HEMOGLOBIN, POC   --   --   --   --   --    < >  --    HEMATOCRIT % 26.5*   < >  --    < > 24.4*   < > 31.4*   HEMATOCRIT POC   --   --   --   --   --    < >  --    WBC 10*3/mm3 9.47   < >  --    < > 12.81*   < > 11.09*   TRIGLYCERIDES mg/dL  --   --   --   --   --   --  119   ALBUMIN g/dL 2.6*  --  2.6*  --  2.7*   < > 3.0*    < > = values in this interval not displayed.     Results from last 7 days   Lab Units 11/04/24  0313 11/03/24  1728 11/03/24  1202 11/03/24  0347 11/02/24  1043 10/31/24  1349 10/31/24  0346 10/30/24  2325 10/30/24  2148 10/30/24  1916 10/30/24  1907 10/30/24  0344 10/29/24  0952   INR   --   --   --   --   --   --  1.38* 1.21* 1.10 2.3* 1.87*   < > 1.16*   APTT seconds  --   --   --   --   --   --   --  46.1* 40.8*  --  33.6  --   32.3   PLATELETS 10*3/mm3 102* 103* 92* 75* 108*   < > 115* 98* 93*  --  130*   < > 197    < > = values in this interval not displayed.     COVID19   Date Value Ref Range Status   10/21/2024 Not Detected Not Detected - Ref. Range Final     Lab Results   Component Value Date    HGBA1C 5.30 10/24/2024          Medications           Scheduled Medications aspirin, 81 mg, Oral, Daily  atorvastatin, 40 mg, Oral, Nightly  bumetanide, 3 mg, Intravenous, Q8H  chlorhexidine, 15 mL, Mouth/Throat, Q12H  enoxaparin, 30 mg, Subcutaneous, Q12H  miconazole, 1 Application, Topical, Q12H  pantoprazole, 40 mg, Intravenous, Once   Followed by  pantoprazole, 40 mg, Oral, QAM  penicillin g (potassium), 4 Million Units, Intravenous, Q4H  senna-docusate sodium, 2 tablet, Oral, Nightly  sildenafil, 20 mg, Nasogastric, TID  sodium chloride, 10 mL, Intravenous, Q12H       Infusions amiodarone in dextrose 5%, 0.5 mg/min, Last Rate: 0.5 mg/min (11/04/24 0932)  dexmedetomidine, 0.2-1.5 mcg/kg/hr, Last Rate: 0.6 mcg/kg/hr (11/04/24 0654)  DOPamine, 2-20 mcg/kg/min  EPINEPHrine, 0.02-0.1 mcg/kg/min, Last Rate: 0.02 mcg/kg/min (11/03/24 0336)  milrinone, 0.25-0.375 mcg/kg/min, Last Rate: 0.25 mcg/kg/min (11/04/24 1042)  niCARdipine, 5-15 mg/hr  norepinephrine, 0.02-0.2 mcg/kg/min, Last Rate: Stopped (11/03/24 0820)  phenylephrine, 0.2-2 mcg/kg/min, Last Rate: 0.5 mcg/kg/min (11/04/24 0545)  propofol, 5-50 mcg/kg/min, Last Rate: 5 mcg/kg/min (11/04/24 0600)  vasopressin, 0.02-0.1 Units/min, Last Rate: 0.05 Units/min (11/04/24 0722)       PRN Medications   acetaminophen    acetaminophen **OR** acetaminophen **OR** acetaminophen    ALPRAZolam    bisacodyl    bisacodyl    cyclobenzaprine    DOPamine    EPINEPHrine    Glycerin-Hypromellose-    HYDROcodone-acetaminophen    magnesium hydroxide    midazolam    milrinone    Morphine **AND** naloxone    Morphine    niCARdipine    nitroglycerin    norepinephrine    ondansetron    oxyCODONE     phenylephrine    polyethylene glycol    Potassium Replacement - Follow Nurse / BPA Driven Protocol    propofol    sodium chloride    sodium chloride    sodium chloride    sodium chloride    sodium chloride    sodium chloride    vasopressin     Physical Findings          General Findings obese, unresponsive, ventilator support   Oral/Mouth Cavity tooth or teeth missing   Edema  generalized, lower extremity , upper extremity, 2+ (mild), 4+ (severe)   Gastrointestinal hypoactive bowel sounds, last bowel movement: 11/4   Skin  pressure injury: sacral spine DTI, surgical incision: L clavicle, sternal, anterior thigh   Tubes/Drains/Lines fecal management system, NG tube   NFPE Not indicated at this time   --  Current Nutrition Orders & Evaluation of Intake       Oral Nutrition     Food Allergies NKFA   Current PO Diet NPO Diet NPO Type: Tube Feeding   Supplement n/a   PO Evaluation     % PO Intake NPO    Factors Affecting Intake: Other: Intubated      Enteral Nutrition     Enteral Route NG    TF Delivery Method Continuous    Propofol Rate/Kcal     Current TF Order/Rate  Peptamen AF @ 30 mL/hr    TF Goal Rate 75 mL/hr    Current Water Flush 50 mL Q 1 hr    Modular None    TF Residual  no or minimal residual    TF Tolerance tolerating    TF Observation Other: discussed with RN     PES STATEMENT / NUTRITION DIAGNOSIS      Nutrition Dx Problem  Problem: Needs Alternative Composition  Etiology: Medical Diagnosis - POD 3 reoperative sternotomy AV root replacement 27mm cryopreserved homograft with right nuvia cabrol, AICD removal, IABP placement.    Signs/Symptoms: Report/Observation   --  NUTRITION INTERVENTION / PLAN OF CARE      Intervention Goal(s) Maintain nutrition status, Establish goals of care, Reduce/improve symptoms, Meet estimated needs, Disease management/therapy, Tolerate TF/PN at goal, and Appropriate weight loss         RD Intervention/Action Adjust EN/PN regimen, Continue to monitor, Care plan reviewed, and  Recommend/order: EN         Prescription/Orders:       PO Diet       Supplements       Enteral Nutrition    Enteral Prescription:     Enteral Route NG    TF Delivery Method Continuous    Enteral Product Peptamen AF    Modular None    Propofol Rate/Kcal     TF Start Rate  10 mL/hr    TF Goal Rate  75 mL/hr    Free Water Flush 40 mL Q 1 hr    Provision at Goal:          Calories 1980 kcal, meets 100% needs         Protein  125 gm protein, meets 100% needs         Fluid (mL) 960 ml free water flushes         Parenteral Nutrition    New Prescription Ordered? Yes   --      Monitor/Evaluation Per protocol, Pertinent labs, EN delivery/tolerance, Weight, Skin status, GI status, Symptoms, POC/GOC, Swallow function   Discharge Plan/Needs No discharge needs identified at this time   --    RD to follow per protocol.      Electronically signed by:  Jenny Reynolds RD  11/04/24 11:12 EST

## 2024-11-04 NOTE — SIGNIFICANT NOTE
11/04/24 0905   OTHER   Discipline physical therapist   Rehab Time/Intention   Session Not Performed other (see comments)  (Pt remains intubated, not appropriate for PT today. Will continue to follow.)   Recommendation   PT - Next Appointment 11/05/24

## 2024-11-04 NOTE — TELEPHONE ENCOUNTER
I called left a voice message for the daughter with an update on the patient's condition, let her know that we were thinking about him and that we got his op report just wanting to know how he was doing

## 2024-11-04 NOTE — PROGRESS NOTES
[facility] Progress Note      Patient Name: Woodrow Alejandro  : 1950  MRN: [id number]  Primary Care Physician:  Juan Perez MD  Date of admission: 10/23/2024    Subjective     Interval History:   F/u NATHANIEL  Patient on the ventilator, diuresed and he is currently on vasopressors  Review of Systems:   Not obtainable    Objective     Vitals:   Temp:  [98.2 °F (36.8 °C)-99.9 °F (37.7 °C)] 99.5 °F (37.5 °C)  Heart Rate:  [] 102  Resp:  [10-23] 11  BP: ()/(41-57) 102/46  Arterial Line BP: ()/(40-69) 115/53  FiO2 (%):  [49 %-50 %] 49 %    Intake/Output Summary (Last 24 hours) at 2024 0858  Last data filed at 2024 0814  Gross per 24 hour   Intake 7061.24 ml   Output 5465 ml   Net 1596.24 ml       Physical Exam:    General Appearance: On the ventilator, obese, chronically ill, no acute distress  Neck: Mild JVD  Lungs: Bilateral rhonchi, breathing effort not labored  Heart: Irregularly irregular, no rub  Abdomen: soft, no guarding, nondistended  : mayorga present with purple urine (from B12)  Extremities: 4+ upper and lower extremity edema    Scheduled Meds:     aspirin, 81 mg, Oral, Daily  atorvastatin, 40 mg, Oral, Nightly  bumetanide, 3 mg, Intravenous, Q8H  chlorhexidine, 15 mL, Mouth/Throat, Q12H  [Held by provider] enoxaparin, 40 mg, Subcutaneous, Q12H  miconazole, 1 Application, Topical, Q12H  pantoprazole, 40 mg, Intravenous, Once   Followed by  pantoprazole, 40 mg, Oral, QAM  penicillin g (potassium), 4 Million Units, Intravenous, Q4H  senna-docusate sodium, 2 tablet, Oral, Nightly  sildenafil, 20 mg, Nasogastric, TID  sodium chloride, 10 mL, Intravenous, Q12H      IV Meds:   amiodarone in dextrose 5%, 0.5 mg/min, Last Rate: 0.5 mg/min (24)  dexmedetomidine, 0.2-1.5 mcg/kg/hr, Last Rate: 0.6 mcg/kg/hr (24)  DOPamine, 2-20 mcg/kg/min  EPINEPHrine, 0.02-0.1 mcg/kg/min, Last Rate: 0.02 mcg/kg/min (24  0336)  insulin, 0-100 Units/hr, Last Rate: 1.7 Units/hr (11/04/24 0602)  lidocaine in D5W, 1 mg/min, Last Rate: Stopped (11/04/24 0644)  milrinone, 0.25-0.375 mcg/kg/min, Last Rate: 0.25 mcg/kg/min (11/04/24 0118)  niCARdipine, 5-15 mg/hr  nitroglycerin, 5-200 mcg/min  norepinephrine, 0.02-0.2 mcg/kg/min, Last Rate: Stopped (11/03/24 0820)  Pharmacy to dose vancomycin,   phenylephrine, 0.2-2 mcg/kg/min, Last Rate: 0.5 mcg/kg/min (11/04/24 0545)  propofol, 5-50 mcg/kg/min, Last Rate: 5 mcg/kg/min (11/04/24 0600)  vasopressin, 0.02-0.1 Units/min, Last Rate: 0.05 Units/min (11/04/24 0722)        Results Reviewed:   I have personally reviewed the results from the time of this admission to 11/4/2024 08:58 EST     Results from last 7 days   Lab Units 11/04/24  0313 11/03/24  2211 11/03/24  1636 11/03/24  1202 11/03/24  0347 11/01/24  1755 11/01/24  1029 11/01/24  0350 10/31/24  2348   SODIUM mmol/L 140  --  140  --  142   < > 145 149* 147*   POTASSIUM mmol/L 3.7 4.0 3.5  3.5   < > 3.5   < > 4.5 4.6 4.8   CHLORIDE mmol/L 104  --  103  --  104   < > 107 111* 110*   CO2 mmol/L 25.0  --  24.6  --  25.5   < > 24.9 23.2 25.1   BUN mg/dL 24*  --  23  --  23   < > 17 15 16   CREATININE mg/dL 1.45*  --  1.53*  --  1.67*   < > 1.94* 1.93* 1.89*   CALCIUM mg/dL 7.9*  --  7.7*  --  8.6   < > 8.5* 8.7 8.3*   BILIRUBIN mg/dL  --   --   --   --   --   --  0.4 0.5 0.6   ALK PHOS U/L  --   --   --   --   --   --  71 62 57   ALT (SGPT) U/L  --   --   --   --   --   --  6 6 10   AST (SGOT) U/L  --   --   --   --   --   --  73* 83* 82*   GLUCOSE mg/dL 150*  --  140*  --  127*   < > 149* 149* 151*    < > = values in this interval not displayed.     Estimated Creatinine Clearance: 67.6 mL/min (A) (by C-G formula based on SCr of 1.45 mg/dL (H)).  Results from last 7 days   Lab Units 11/04/24  0313 11/03/24  1636 11/03/24  0347   MAGNESIUM mg/dL 2.1 2.3 2.0   PHOSPHORUS mg/dL 3.4 4.3 4.4     Results from last 7 days   Lab Units 10/31/24  9077  10/31/24  0346   URIC ACID mg/dL 6.3 6.0     Results from last 7 days   Lab Units 11/04/24  0313 11/03/24  1728 11/03/24  1202 11/03/24  0347 11/02/24  1043   WBC 10*3/mm3 9.47 11.58* 14.44* 12.81* 19.62*   HEMOGLOBIN g/dL 8.7* 8.4* 7.9* 8.1* 9.0*   PLATELETS 10*3/mm3 102* 103* 92* 75* 108*     Results from last 7 days   Lab Units 10/31/24  0346 10/30/24  2325 10/30/24  2148 10/30/24  1916 10/30/24  1907   INR  1.38* 1.21* 1.10 2.3* 1.87*       Assessment / Plan     ASSESSMENT:  NATHANIEL, non-oliguric - ATN, due to cardiogenic and hemorrhagic shock with expected hemodynamic changes following major cardiac surgery.  Improving, has volume excess currently on diuretics, creatinine down to 1.45, electrolyte within acceptable range.  Fluid overload.  Cardiogenic/hemorrhagic shock, s/p large amount of blood products in the OR.  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  Treated with penicillin by ID.    History of A-fib, unable to tolerate anticoagulation  Acute hypoxic resp failure, on the ventilator  Anemia  and thrombocytopenia, hemoglobin 8.7 platelet 102,000.    PLAN:  Continue the same dose of diuretics  Monitor for diuretic toxicity  Surveillance labs    I reviewed the chart and other providers notes, reviewed labs.  I discussed the case with the patient's nurse at the bedside.  Copied text in this note has been reviewed and is accurate as of 11/04/24.       Jass Keller MD  11/04/24  08:58 [facility]    [facility]  [phone]

## 2024-11-04 NOTE — PLAN OF CARE
Goal Outcome Evaluation:         Blood and platelets transfusions given. Electrolytes replaced. Nitric oxide weaned to 20. Remains sedated and on ventilator.

## 2024-11-04 NOTE — PROGRESS NOTES
LOS: 12 days     Chief Complaint: Endocarditis    Interval History: Remains on multiple pressors however no longer requiring intra-aortic balloon pump.  Remains intubated and sedated.  On 50% FiO2.    Vital Signs  Temp:  [98.2 °F (36.8 °C)-99.9 °F (37.7 °C)] 99.5 °F (37.5 °C)  Heart Rate:  [] 96  Resp:  [10-23] 11  BP: ()/(41-57) 98/51  Arterial Line BP: ()/(40-69) 107/51  FiO2 (%):  [49 %-50 %] 49 %    Physical Exam:  General: Intubated and sedated  HEENT: ET tube in place  Respiratory: Multiple chest tubes with decreased bilateral breath sounds.    : Ugarte catheter  Skin: Operative dressing over the anterior chest clean and intact.  Access: Right IJ CVC.  Arterial line.  Peripheral line.    Antibiotics:  Anti-Infectives (From admission, onward)      Ordered     Dose/Rate Route Frequency Start Stop    11/02/24 0918  vancomycin (VANCOCIN) 1,000 mg in sodium chloride 0.9 % 250 mL IVPB-VTB        Ordering Provider: Antwan Farmer MD    1,000 mg  250 mL/hr over 60 Minutes Intravenous Every 24 Hours 11/02/24 1015 11/03/24 1138    11/02/24 0912  Pharmacy to dose vancomycin        Ordering Provider: Samantha Salvador APRN     Does not apply Continuous PRN 11/02/24 0912 11/04/24 0911    10/30/24 2306  ceFAZolin 2000 mg IVPB in 100 mL NS (MBP)        Ordering Provider: Samantha Salvador APRN    2,000 mg  over 30 Minutes Intravenous Every 8 Hours 10/31/24 0000 11/01/24 0922    10/29/24 1518  ceFAZolin 2000 mg IVPB in 100 mL NS (MBP)        Ordering Provider: Bryant Mcclure PA-C    2,000 mg  over 30 Minutes Intravenous Once 10/30/24 0600 10/30/24 1936    10/28/24 1034  vancomycin IVPB 2000 mg in 0.9% Sodium Chloride 500 mL        Ordering Provider: Samantha Salvador APRN    15 mg/kg × 142 kg Intravenous Once 10/28/24 1045 10/28/24 1356    10/23/24 1709  penicillin G potassium 4 Million Units in sodium chloride 0.9 % 100 mL IVPB        Ordering Provider: Samantha Salvador APRN    4 Million  Units  over 30 Minutes Intravenous Every 4 Hours Scheduled 10/23/24 2000 12/05/24 0129             Results Review:     I reviewed the patient's new clinical results.    Lab Results   Component Value Date    WBC 9.47 11/04/2024    HGB 8.7 (L) 11/04/2024    HCT 26.5 (L) 11/04/2024    MCV 91.1 11/04/2024     (L) 11/04/2024     Lab Results   Component Value Date    GLUCOSE 150 (H) 11/04/2024    BUN 24 (H) 11/04/2024    CREATININE 1.45 (H) 11/04/2024    BCR 16.6 11/04/2024    CO2 25.0 11/04/2024    CALCIUM 7.9 (L) 11/04/2024    ALBUMIN 2.6 (L) 11/04/2024    LABIL2 1.4 06/27/2019    AST 73 (H) 11/01/2024    ALT 6 11/01/2024       Microbiology:  10/3 COVID-negative  10/10 COVID-negative  10/14 COVID-negative  10/15 blood cultures 2 out of 2 strep mitis  10/17 COVID-negative  10/17 blood cultures 2 out of 2 strep mitis  10/21 COVID-negative  10/23 blood cultures no growth today  10/30 operative cultures from the aortic valve no growth     Assessment    #Strep mitis endocarditis  #Status post bioprosthetic aortic valve replacement 2014  #Status post aortic root replacement in the setting of prosthetic aortic valve endocarditis and annular abscess on 10/30  #Status post removal of pacemaker generator and intracardiac portion of the leads with retained venous leads  #Atrial fibrillation  #Achilles tendon rupture  #NATHANIEL    Operative cultures have remained negative.  Continue penicillin G 4,000,000 units every 4 hours for strep mitis endocarditis.  Renal function slightly improved not requiring any alteration in dose today.    Planning for minimum of 6 weeks of antibiotics through end date December 4.  He is going to need weekly antibiotic monitoring labs with CBC with differential and BMP while on therapy.    ID will follow.

## 2024-11-04 NOTE — PROGRESS NOTES
[facility]  [phone]  Dr. Tawanda Clement     Subjective:  LOS: 12    Chief Complaint:  Ventilator management     Patient doing better from a ventilator standpoint.  However propofol was paused last night and he did not wake up or follow commands.  Discussed with nursing no other acute concerns or complaints at this time.    Objective   Vital Signs past 24hrs  Temp range: Temp (24hrs), Av.1 °F (37.3 °C), Min:98.2 °F (36.8 °C), Max:99.9 °F (37.7 °C)    BP range: BP: ()/(41-57) 98/51  Pulse range: Heart Rate:  [] 96  Resp rate range: Resp:  [10-23] 11  Device (Oxygen Therapy): ventilator   Oxygen range:SpO2:  [94 %-100 %] 100 %   Mechanical Ventilator:Mode: VC/AC (24 0657)    Physical Exam  Vitals reviewed.   Constitutional:       Interventions: He is sedated and intubated.   HENT:      Head: Normocephalic and atraumatic.   Eyes:      Extraocular Movements: Extraocular movements intact.      Conjunctiva/sclera: Conjunctivae normal.      Pupils: Pupils are equal, round, and reactive to light.   Cardiovascular:      Rate and Rhythm: Regular rhythm. Tachycardia present.   Pulmonary:      Effort: Pulmonary effort is normal. He is intubated.      Breath sounds: Decreased air movement present. Decreased breath sounds present. No wheezing, rhonchi or rales.   Musculoskeletal:      Cervical back: Normal range of motion. No rigidity or tenderness.   Skin:     General: Skin is warm and dry.      Findings: No rash.       Results Review:    I have reviewed the laboratory and imaging data since the last note by [facility] physician.  My annotations are noted in assessment and plan.      Result Review:  I have personally reviewed the results from last note by [facility] physician to 2024 08:21 EST and agree with these findings:  [x]  Laboratory list / accordion  [x]  Microbiology  [x]  Radiology  [x]  EKG/Telemetry   [x]  Cardiology/Vascular   [x]  Pathology  [x]  Old records  []   Other:    Medication Review:  I have reviewed the current MAR.  My annotations are noted in assessment and plan.    aspirin, 81 mg, Oral, Daily  atorvastatin, 40 mg, Oral, Nightly  bumetanide, 3 mg, Intravenous, Q8H  chlorhexidine, 15 mL, Mouth/Throat, Q12H  [Held by provider] enoxaparin, 40 mg, Subcutaneous, Q12H  miconazole, 1 Application, Topical, Q12H  pantoprazole, 40 mg, Intravenous, Once   Followed by  pantoprazole, 40 mg, Oral, QAM  penicillin g (potassium), 4 Million Units, Intravenous, Q4H  senna-docusate sodium, 2 tablet, Oral, Nightly  sildenafil, 20 mg, Nasogastric, TID  sodium chloride, 10 mL, Intravenous, Q12H        amiodarone in dextrose 5%, 0.5 mg/min, Last Rate: 0.5 mg/min (11/03/24 2055)  dexmedetomidine, 0.2-1.5 mcg/kg/hr, Last Rate: 0.6 mcg/kg/hr (11/04/24 0654)  DOPamine, 2-20 mcg/kg/min  EPINEPHrine, 0.02-0.1 mcg/kg/min, Last Rate: 0.02 mcg/kg/min (11/03/24 0336)  insulin, 0-100 Units/hr, Last Rate: 1.7 Units/hr (11/04/24 0602)  lidocaine in D5W, 1 mg/min, Last Rate: Stopped (11/04/24 0644)  milrinone, 0.25-0.375 mcg/kg/min, Last Rate: 0.25 mcg/kg/min (11/04/24 0118)  niCARdipine, 5-15 mg/hr  nitroglycerin, 5-200 mcg/min  norepinephrine, 0.02-0.2 mcg/kg/min, Last Rate: Stopped (11/03/24 0820)  Pharmacy to dose vancomycin,   phenylephrine, 0.2-2 mcg/kg/min, Last Rate: 0.5 mcg/kg/min (11/04/24 0545)  propofol, 5-50 mcg/kg/min, Last Rate: 5 mcg/kg/min (11/04/24 0600)  vasopressin, 0.02-0.1 Units/min, Last Rate: 0.05 Units/min (11/04/24 0722)      Lines, Drains & Airways       Active LDAs       Name Placement date Placement time Site Days    Pulmonary Artery Catheter - Triple Lumen 10/30/24 Right Internal jugular 10/30/24  1026  created via procedure documentation  -- 4    CVC Double Lumen 10/30/24 Right Internal jugular 10/30/24  1026  created via procedure documentation  Internal jugular  4    Peripheral IV 10/29/24 1848 Anterior;Left Forearm 10/29/24  1848  Forearm  5    Peripheral IV 10/30/24  0940 Anterior;Right Forearm 10/30/24  0940  Forearm  4    NG/OG Tube Nasogastric  Left nostril 11/02/24  1330  Left nostril  1    Rectal Tube With balloon 11/03/24  0945  --  less than 1    Urethral Catheter Non-latex;Temperature probe 16 Fr. 10/30/24  1145  -- 4    Y Chest Tube 1 and 2 1 Anterior Mediastinal 28 Fr. 2 Anterior Mediastinal 28 Fr. 10/30/24  2100  -- 4    Y Chest Tube 3 and 4 3 Anterior Mediastinal 28 Fr. 4 Anterior Pleural 28 Fr. 10/30/24  2000  -- 4    ETT  10/30/24  1139  created via procedure documentation  -- 4    Arterial Line 10/30/24 Left Radial 10/30/24  1015  created via procedure documentation  Radial  4    Arterial Line 11/02/24 Right Radial 11/02/24  0723  created via procedure documentation  Radial  2    Pacer Wires 10/30/24  1635  Ventricular  4                  No active isolations  Diet Orders (active) (From admission, onward)       Start     Ordered    11/02/24 1533  Tube Feeding: Formula: Peptamen AF (Vital AF 1.2); Feeding Type: Continuous; Start at: 10 mL/hr; Then Advance By: 10 mL/hr; Every: 6 hours; To Goal Rate of: 75 mL/hr; Water Flush: 50 mL; Every: 1 hour; Water Bolus: None  Diet Effective Now         11/02/24 1533    11/02/24 1533  NPO Diet NPO Type: Tube Feeding  Diet Effective Now         11/02/24 1533    11/02/24 1259  Feeding Tube Insertion - Cortrak System  Once         11/02/24 1259    10/30/24 2305  Patient is on Glucommander  Continuous         10/30/24 2306                      Assessment  Postop respiratory failure  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  Cardiogenic and probable septic shock  CHF with pulmonary edema  Strep mitis endocarditis and bacteremia infectious diseases following and managing antibiotics  Acute kidney injury nephrology is following and managing  Anemia acute expected postop on chronic  History of persistent atrial fibrillation now postoperative junctional rhythm cardiology  following  Thrombocytopenia not unexpected postoperatively and looks like stabilized balloon pump may contribute as well to decrease platelets continue following  Hypernatremia mild and improving  Elevated transaminase mostly AST rising probably related to shock.  Acute left lower extremity DVT on 10/24/2024  Esophagitis grade C by EGD on 9/30/2024 PPI ordered is ordered as oral not sure he can to be able to get this recommend consider changing that to IV.  Hyperglycemia mild on postop insulin protocol        Plan  -Likely significant pulmonary edema causing respiratory failure.  - Recommend reducing tidal volumes to 6-7 mL/kg and can  increased respiratory rate to assist with ventilation.  Attempt to keep plats less than 30.  - Diuresis per cardiology and surgery  -Bronchoscopy (11/2/2024) see separate procedure note for findings.  Had some mild thick airway secretions that were therapeutically aspirated but otherwise nothing notable.  -Wean nitric oxide per cardiothoracic surgery  - Will continue to follow and assist with vent        Tawanda Clement DO   11/04/24  08:21 EST      Part of this note may be an electronic transcription/translation of spoken language to printed text using the Dragon Dictation System.

## 2024-11-05 NOTE — PROGRESS NOTES
" LOS: 13 days   Patient Care Team:  Juan Perez MD as PCP - General (Internal Medicine)    Chief Complaint: post op follow-up    Subjective  Intubated/sedated    CI 2.5  CVP 10  Drips: vaso 0.06, amio 0.5, lidocaine, epi 0.02, milrinone 0.25, propofol, precedex, insulin  Nitric oxide 25    Vital Signs  Temp:  [98.2 °F (36.8 °C)-99.7 °F (37.6 °C)] 98.4 °F (36.9 °C)  Heart Rate:  [] 69  Resp:  [10-11] 10  BP: ()/(39-63) 85/41  Arterial Line BP: ()/(25-69) 121/53  FiO2 (%):  [38 %-40 %] 39 %  Body mass index is 44.59 kg/m².    Intake/Output Summary (Last 24 hours) at 11/5/2024 0723  Last data filed at 11/5/2024 0458  Gross per 24 hour   Intake 6629.72 ml   Output 3100 ml   Net 3529.72 ml     No intake/output data recorded.    Chest tube drainage last 8 hours: 25/10         11/04/24  1537 11/05/24  0400 11/05/24  0458   Weight: (!) 151 kg (332 lb 14.3 oz) (!) 148 kg (325 lb 9.9 oz) (!) 148 kg (325 lb 9.9 oz)         Objective:  General Appearance:  Ill-appearing (intubated/sedated).    Vital signs: (most recent): Blood pressure (!) 84/43, pulse 76, temperature 99.1 °F (37.3 °C), resp. rate 13, height 182 cm (71.65\"), weight (!) 148 kg (325 lb 9.9 oz), SpO2 96%.  (Tmax 99.9).    Output: Producing urine and producing stool.    Lungs:  There are rales and rhonchi.  (50% FiO2)  Heart: Tachycardia.  Irregular rhythm.  (Tele: Afib)  Extremities: There is dependent edema.    Skin:  Warm and dry.  (Dressings c/d/I  Hematoma left groin)            Results Review:        WBC WBC   Date Value Ref Range Status   11/05/2024 10.77 3.40 - 10.80 10*3/mm3 Final   11/04/2024 9.47 3.40 - 10.80 10*3/mm3 Final   11/03/2024 11.58 (H) 3.40 - 10.80 10*3/mm3 Final   11/03/2024 14.44 (H) 3.40 - 10.80 10*3/mm3 Final   11/03/2024 12.81 (H) 3.40 - 10.80 10*3/mm3 Final   11/02/2024 19.62 (H) 3.40 - 10.80 10*3/mm3 Final      HGB Hemoglobin   Date Value Ref Range Status   11/05/2024 8.4 (L) 13.0 - 17.7 g/dL Final " "  11/04/2024 8.7 (L) 13.0 - 17.7 g/dL Final   11/03/2024 8.4 (L) 13.0 - 17.7 g/dL Final   11/03/2024 7.9 (L) 13.0 - 17.7 g/dL Final   11/03/2024 8.1 (L) 13.0 - 17.7 g/dL Final   11/02/2024 9.0 (L) 13.0 - 17.7 g/dL Final   11/02/2024 11.2 (L) 12.0 - 17.0 g/dL Final      HCT Hematocrit   Date Value Ref Range Status   11/05/2024 26.4 (L) 37.5 - 51.0 % Final   11/04/2024 26.5 (L) 37.5 - 51.0 % Final   11/03/2024 24.7 (L) 37.5 - 51.0 % Final   11/03/2024 24.3 (L) 37.5 - 51.0 % Final   11/03/2024 24.4 (L) 37.5 - 51.0 % Final   11/02/2024 26.6 (L) 37.5 - 51.0 % Final   11/02/2024 33 (L) 38 - 51 % Final      Platelets Platelets   Date Value Ref Range Status   11/05/2024 80 (L) 140 - 450 10*3/mm3 Final   11/04/2024 102 (L) 140 - 450 10*3/mm3 Final   11/03/2024 103 (L) 140 - 450 10*3/mm3 Final   11/03/2024 92 (L) 140 - 450 10*3/mm3 Final   11/03/2024 75 (L) 140 - 450 10*3/mm3 Final   11/02/2024 108 (L) 140 - 450 10*3/mm3 Final        PT/INR:    No results found for: \"PROTIME\"  /  No results found for: \"INR\"      Sodium Sodium   Date Value Ref Range Status   11/05/2024 139 136 - 145 mmol/L Final   11/04/2024 140 136 - 145 mmol/L Final   11/04/2024 139 136 - 145 mmol/L Final   11/04/2024 140 136 - 145 mmol/L Final   11/04/2024 140 136 - 145 mmol/L Final   11/03/2024 140 136 - 145 mmol/L Final   11/03/2024 142 136 - 145 mmol/L Final   11/02/2024 143 136 - 145 mmol/L Final      Potassium Potassium   Date Value Ref Range Status   11/05/2024 4.4 3.5 - 5.2 mmol/L Final   11/04/2024 3.8 3.5 - 5.2 mmol/L Final   11/04/2024 3.7 3.5 - 5.2 mmol/L Final   11/04/2024 3.7 3.5 - 5.2 mmol/L Final   11/04/2024 3.6 3.5 - 5.2 mmol/L Final     Comment:     Slight hemolysis detected by analyzer. Result may be falsely elevated.   11/04/2024 3.6 3.5 - 5.2 mmol/L Final   11/04/2024 3.7 3.5 - 5.2 mmol/L Final   11/03/2024 4.0 3.5 - 5.2 mmol/L Final   11/03/2024 3.5 3.5 - 5.2 mmol/L Final   11/03/2024 3.5 3.5 - 5.2 mmol/L Final   11/03/2024 3.2 (L) 3.5 " - 5.2 mmol/L Final   11/03/2024 3.5 3.5 - 5.2 mmol/L Final   11/02/2024 3.6 3.5 - 5.2 mmol/L Final   11/02/2024 3.9 3.5 - 5.2 mmol/L Final     Comment:     Slight hemolysis detected by analyzer. Result may be falsely elevated.      Chloride Chloride   Date Value Ref Range Status   11/05/2024 106 98 - 107 mmol/L Final   11/04/2024 105 98 - 107 mmol/L Final   11/04/2024 103 98 - 107 mmol/L Final   11/04/2024 103 98 - 107 mmol/L Final   11/04/2024 104 98 - 107 mmol/L Final   11/03/2024 103 98 - 107 mmol/L Final   11/03/2024 104 98 - 107 mmol/L Final   11/02/2024 103 98 - 107 mmol/L Final      Bicarbonate CO2   Date Value Ref Range Status   11/05/2024 24.0 22.0 - 29.0 mmol/L Final   11/04/2024 23.0 22.0 - 29.0 mmol/L Final   11/04/2024 24.6 22.0 - 29.0 mmol/L Final   11/04/2024 25.0 22.0 - 29.0 mmol/L Final   11/04/2024 25.0 22.0 - 29.0 mmol/L Final   11/03/2024 24.6 22.0 - 29.0 mmol/L Final   11/03/2024 25.5 22.0 - 29.0 mmol/L Final   11/02/2024 26.0 22.0 - 29.0 mmol/L Final      BUN BUN   Date Value Ref Range Status   11/05/2024 25 (H) 8 - 23 mg/dL Final   11/04/2024 25 (H) 8 - 23 mg/dL Final   11/04/2024 24 (H) 8 - 23 mg/dL Final   11/04/2024 23 8 - 23 mg/dL Final   11/04/2024 24 (H) 8 - 23 mg/dL Final   11/03/2024 23 8 - 23 mg/dL Final   11/03/2024 23 8 - 23 mg/dL Final   11/02/2024 21 8 - 23 mg/dL Final      Creatinine Creatinine   Date Value Ref Range Status   11/05/2024 1.37 (H) 0.76 - 1.27 mg/dL Final   11/04/2024 1.46 (H) 0.76 - 1.27 mg/dL Final   11/04/2024 1.55 (H) 0.76 - 1.27 mg/dL Final   11/04/2024 1.55 (H) 0.76 - 1.27 mg/dL Final   11/04/2024 1.45 (H) 0.76 - 1.27 mg/dL Final   11/03/2024 1.53 (H) 0.76 - 1.27 mg/dL Final   11/03/2024 1.67 (H) 0.76 - 1.27 mg/dL Final   11/02/2024 1.84 (H) 0.76 - 1.27 mg/dL Final      Calcium Calcium   Date Value Ref Range Status   11/05/2024 8.3 (L) 8.6 - 10.5 mg/dL Final   11/04/2024 8.8 8.6 - 10.5 mg/dL Final   11/04/2024 9.1 8.6 - 10.5 mg/dL Final   11/04/2024 8.8 8.6 -  10.5 mg/dL Final   11/04/2024 7.9 (L) 8.6 - 10.5 mg/dL Final   11/03/2024 7.7 (L) 8.6 - 10.5 mg/dL Final   11/03/2024 8.6 8.6 - 10.5 mg/dL Final   11/02/2024 8.0 (L) 8.6 - 10.5 mg/dL Final      Magnesium Magnesium   Date Value Ref Range Status   11/05/2024 2.1 1.6 - 2.4 mg/dL Final   11/04/2024 2.4 1.6 - 2.4 mg/dL Final   11/04/2024 2.1 1.6 - 2.4 mg/dL Final   11/04/2024 2.1 1.6 - 2.4 mg/dL Final   11/04/2024 2.1 1.6 - 2.4 mg/dL Final   11/03/2024 2.3 1.6 - 2.4 mg/dL Final   11/03/2024 2.0 1.6 - 2.4 mg/dL Final          aspirin, 81 mg, Oral, Daily  atorvastatin, 40 mg, Oral, Nightly  chlorhexidine, 15 mL, Mouth/Throat, Q12H  enoxaparin, 30 mg, Subcutaneous, Q12H  insulin regular, 2-7 Units, Subcutaneous, Q6H  miconazole, 1 Application, Topical, Q12H  pantoprazole, 40 mg, Intravenous, Once   Followed by  pantoprazole, 40 mg, Oral, QAM  penicillin g (potassium), 4 Million Units, Intravenous, Q4H  senna-docusate sodium, 2 tablet, Oral, Nightly  sildenafil, 20 mg, Nasogastric, TID  sodium chloride, 10 mL, Intravenous, Q12H      amiodarone in dextrose 5%, 0.5 mg/min, Last Rate: 1 mg/min (11/05/24 0438)  dexmedetomidine, 0.2-1.5 mcg/kg/hr, Last Rate: 0.6 mcg/kg/hr (11/05/24 0609)  DOPamine, 2-20 mcg/kg/min  EPINEPHrine, 0.02-0.1 mcg/kg/min, Last Rate: 0.02 mcg/kg/min (11/04/24 2024)  lidocaine in D5W, 1 mg/min, Last Rate: 1 mg/min (11/04/24 1342)  milrinone, 0.25-0.375 mcg/kg/min, Last Rate: 0.25 mcg/kg/min (11/05/24 0203)  niCARdipine, 5-15 mg/hr  norepinephrine, 0.02-0.2 mcg/kg/min, Last Rate: Stopped (11/03/24 0820)  phenylephrine, 0.2-2 mcg/kg/min, Last Rate: 0.25 mcg/kg/min (11/05/24 0600)  propofol, 5-50 mcg/kg/min, Last Rate: 25 mcg/kg/min (11/05/24 0609)  vasopressin, 0.02-0.1 Units/min, Last Rate: 0.06 Units/min (11/05/24 0439)              Prosthetic aortic valve stenosis    Essential hypertension    Permanent atrial fibrillation    S/P AVR    ICD (implantable cardioverter-defibrillator), dual, in situ     Achilles tendon rupture    Bacteremia    Stenosis of prosthetic aortic valve    Anemia      Assessment & Plan    - Prosthetic aortic valve stenosis, h/o AVR (tissue)/maze/DANICA ligation (2014)- s/p reoperative sternotomy AV root replacement 27mm cryopreserved homograft with right nuvia cabrol, AICD removal, IABP placement- [facility] 10/31/2024  -Sternal closure ---11/2  - Possible endocarditis, likely need JOLIE   - Bacteremia, blood cultures positive strep mitis---on penicillin G  - Atrial fibrillation, unable to tolerate anticoagulation  - Hypertension  - NICM status post [employer] AICD  - Anemia, s/p EGD/colonoscopy with esophagitis, polyp removal  - Right achilles tendon tear--- walking boot and PT per ortho at [facility]  - Pre-diabetes -- improved at 5.3  -post op anemia- expected acute blood loss      POD #6.   Remains intubated and sedated.  Pressor support has been able to be weaned. Still on inotropic support milrinone 0.25 epi 0.02.  Nitric to be weaned to 20 this morning.  Discontinue lidocaine.  Decrease amiodarone to 0.5.  replete calcium.  Vitamin D low-- will add supplementation.  Propofol off. Creatinine stable. Nephrology following and managing diuretics. No further VT today. Remains in atrial fibrillation. Tolerating tube feeds.   Remains critically ill. Continue supportive care.        Samantha Magana, JAVI  11/05/24  07:23 EST

## 2024-11-05 NOTE — PLAN OF CARE
Goal Outcome Evaluation:      Continues on amio,lido,primacor, epi, vaso, jett, dex, and propofol. Does not follow commands when sedation is off, skin is weeping, left toes are starting to turn purplish/blue and cold to touch, still able to doppler pedal pulses, edema has increased, will continue with plan of care.

## 2024-11-05 NOTE — PROGRESS NOTES
[facility] Progress Note      Patient Name: Woodrow Alejandro  : 1950  MRN: [id number]  Primary Care Physician:  Juan Perez MD  Date of admission: 10/23/2024    Subjective     Interval History:   F/u NATHANIEL  Patient on the ventilator, diuretics were stopped by CT surgery.  Remains with significant edema  Review of Systems:   Not obtainable    Objective     Vitals:   Temp:  [98.2 °F (36.8 °C)-99.7 °F (37.6 °C)] 98.4 °F (36.9 °C)  Heart Rate:  [] 69  Resp:  [10-11] 10  BP: ()/(39-63) 85/41  Arterial Line BP: ()/(25-69) 121/53  FiO2 (%):  [38 %-40 %] 39 %    Intake/Output Summary (Last 24 hours) at 2024 0931  Last data filed at 2024 0458  Gross per 24 hour   Intake 6629.72 ml   Output 2050 ml   Net 4579.72 ml       Physical Exam:    General Appearance: On the ventilator, obese, chronically ill, no acute distress  Neck: Mild JVD  Lungs: Bilateral rhonchi, breathing effort not labored  Heart: Irregularly irregular, no rub  Abdomen: soft, no guarding, nondistended  : mayorga present with purple urine (from B12)  Extremities: 4+ upper and lower extremity edema    Scheduled Meds:     [START ON 2024] aspirin, 81 mg, Per G Tube, Daily  atorvastatin, 40 mg, Per G Tube, Nightly  chlorhexidine, 15 mL, Mouth/Throat, Q12H  enoxaparin, 30 mg, Subcutaneous, Q12H  Ergocalciferol, 100 mcg, Per G Tube, Daily  insulin regular, 2-7 Units, Subcutaneous, Q6H  [START ON 2024] lansoprazole, 15 mg, Per G Tube, Q AM  miconazole, 1 Application, Topical, Q12H  penicillin g (potassium), 4 Million Units, Intravenous, Q4H  senna, 2 tablet, Per G Tube, Nightly  sildenafil, 20 mg, Per G Tube, TID  sodium chloride, 10 mL, Intravenous, Q12H      IV Meds:   amiodarone in dextrose 5%, 0.5 mg/min, Last Rate: 1 mg/min (24 4038)  dexmedetomidine, 0.2-1.5 mcg/kg/hr, Last Rate: 0.6 mcg/kg/hr (24 9157)  DOPamine, 2-20 mcg/kg/min  EPINEPHrine, 0.02-0.1  mcg/kg/min, Last Rate: 0.02 mcg/kg/min (11/04/24 2024)  lidocaine in D5W, 1 mg/min, Last Rate: 1 mg/min (11/04/24 1342)  milrinone, 0.25-0.375 mcg/kg/min, Last Rate: 0.25 mcg/kg/min (11/05/24 0203)  niCARdipine, 5-15 mg/hr  norepinephrine, 0.02-0.2 mcg/kg/min, Last Rate: Stopped (11/03/24 0820)  phenylephrine, 0.2-2 mcg/kg/min, Last Rate: 0.25 mcg/kg/min (11/05/24 0600)  propofol, 5-50 mcg/kg/min, Last Rate: 25 mcg/kg/min (11/05/24 0609)  vasopressin, 0.02-0.1 Units/min, Last Rate: 0.06 Units/min (11/05/24 0439)        Results Reviewed:   I have personally reviewed the results from the time of this admission to 11/5/2024 09:31 EST     Results from last 7 days   Lab Units 11/05/24  0314 11/04/24  1648 11/04/24  1301 11/01/24  1755 11/01/24  1029 11/01/24  0350   SODIUM mmol/L 139 140 139   < > 145 149*   POTASSIUM mmol/L 4.4 3.8 3.7  3.7   < > 4.5 4.6   CHLORIDE mmol/L 106 105 103   < > 107 111*   CO2 mmol/L 24.0 23.0 24.6   < > 24.9 23.2   BUN mg/dL 25* 25* 24*   < > 17 15   CREATININE mg/dL 1.37* 1.46* 1.55*   < > 1.94* 1.93*   CALCIUM mg/dL 8.3* 8.8 9.1   < > 8.5* 8.7   BILIRUBIN mg/dL 0.8  --   --   --  0.4 0.5   ALK PHOS U/L 110  --   --   --  71 62   ALT (SGPT) U/L 7  --   --   --  6 6   AST (SGOT) U/L 20  --   --   --  73* 83*   GLUCOSE mg/dL 183* 151* 143*   < > 149* 149*    < > = values in this interval not displayed.     Estimated Creatinine Clearance: 70.3 mL/min (A) (by C-G formula based on SCr of 1.37 mg/dL (H)).  Results from last 7 days   Lab Units 11/05/24  0314 11/04/24  1648 11/04/24  1301 11/04/24  1015 11/04/24  0313 11/03/24  1636   MAGNESIUM mg/dL 2.1 2.4 2.1   < > 2.1 2.3   PHOSPHORUS mg/dL 2.7  --   --   --  3.4 4.3    < > = values in this interval not displayed.     Results from last 7 days   Lab Units 10/31/24  2348 10/31/24  0346   URIC ACID mg/dL 6.3 6.0     Results from last 7 days   Lab Units 11/05/24  0314 11/04/24  0313 11/03/24  1728 11/03/24  1202 11/03/24  0347   WBC 10*3/mm3 10.77  9.47 11.58* 14.44* 12.81*   HEMOGLOBIN g/dL 8.4* 8.7* 8.4* 7.9* 8.1*   PLATELETS 10*3/mm3 80* 102* 103* 92* 75*     Results from last 7 days   Lab Units 10/31/24  0346 10/30/24  2325 10/30/24  2148 10/30/24  1916 10/30/24  1907   INR  1.38* 1.21* 1.10 2.3* 1.87*       Assessment / Plan     ASSESSMENT:  NATHANIEL, non-oliguric - ATN, due to cardiogenic and hemorrhagic shock with expected hemodynamic changes following major cardiac surgery.  Improving, has volume excess currently on diuretics, creatinine down to 1.37, electrolyte within acceptable range.  Fluid overload.  Significant anasarca  Cardiogenic/hemorrhagic shock, s/p large amount of blood products in the OR.  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  Treated with penicillin by ID.    History of A-fib, unable to tolerate anticoagulation  Acute hypoxic resp failure, on the ventilator  Anemia  and thrombocytopenia, hemoglobin 8.4 platelet 80,000.    PLAN:  Continue the same treatment  I would leave the decision to restart the diuretics up to CT surgery.  Surveillance labs    I reviewed the chart and other providers notes, reviewed labs.  I discussed the case with the patient's nurse at the bedside.  Copied text in this note has been reviewed and is accurate as of 11/05/24.       Jass Keller MD  11/05/24  09:31 [facility]    [facility]  [phone]

## 2024-11-05 NOTE — SIGNIFICANT NOTE
11/05/24 0912   OTHER   Discipline physical therapist   Rehab Time/Intention   Session Not Performed other (see comments)  (Pt not medically appropriate for PT at this time; will sign off. Please reconsult when appropriate. Discussed with RN.)

## 2024-11-05 NOTE — PROGRESS NOTES
LOS: 13 days   Patient Care Team:  Juan Perez MD as PCP - General (Internal Medicine)    Chief Complaint: Follow-up bioprosthetic AVR endocarditis, mitral regurgitation, persistent atrial fibrillation.    Interval History: Remains on vasopressin, Robinson-Synephrine, and epinephrine.  He is in rate controlled atrial fibrillation.     Vital Signs:  Temp:  [98.2 °F (36.8 °C)-99.7 °F (37.6 °C)] 98.4 °F (36.9 °C)  Heart Rate:  [] 69  Resp:  [10-11] 10  BP: ()/(39-63) 85/41  Arterial Line BP: ()/(25-69) 121/53  FiO2 (%):  [38 %-40 %] 39 %    Intake/Output Summary (Last 24 hours) at 11/5/2024 0925  Last data filed at 11/5/2024 0458  Gross per 24 hour   Intake 6629.72 ml   Output 2050 ml   Net 4579.72 ml       Physical Exam:   General Appearance:    Intubated and sedated.  Appears critically ill.   Lungs:     Rhonchi bilaterally anteriorly.    Heart:    Regular rhythm with a normal rate.  Balloon pump audible.   Abdomen:     Soft, nontender, nondistended.    Extremities:    2+ generalized edema and anasarca.     Results Review:    Results from last 7 days   Lab Units 11/05/24  0314   SODIUM mmol/L 139   POTASSIUM mmol/L 4.4   CHLORIDE mmol/L 106   CO2 mmol/L 24.0   BUN mg/dL 25*   CREATININE mg/dL 1.37*   GLUCOSE mg/dL 183*   CALCIUM mg/dL 8.3*         Results from last 7 days   Lab Units 11/05/24  0314   WBC 10*3/mm3 10.77   HEMOGLOBIN g/dL 8.4*   HEMATOCRIT % 26.4*   PLATELETS 10*3/mm3 80*     Results from last 7 days   Lab Units 10/31/24  0346 10/30/24  2325 10/30/24  2148 10/30/24  1916 10/30/24  1907   INR  1.38* 1.21* 1.10   < > 1.87*   APTT seconds  --  46.1* 40.8*  --  33.6    < > = values in this interval not displayed.           Results from last 7 days   Lab Units 11/05/24  0314   MAGNESIUM mg/dL 2.1             I reviewed the patient's new clinical results.        Assessment:  1.  Status post bioprosthetic aortic valve replacement in 2014  2.  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  Moderate to severe mitral regurgitation  4.  Status post reoperative AV root replacement, mitral valve repair, ICD removal, SVG-RCA, and IABP placement on 10/30/2021  5.  Postoperative shock, multifactorial  6.  Acute kidney injury  7.  History of nonischemic cardiomyopathy with recovered ejection fraction  8.  Anemia, acute on chronic  9.  Postoperative thrombocytopenia  10.  Persistent atrial fibrillation  11.  Ventricular tachycardia postoperatively  12.  Grade C esophagitis by EGD on 9/30/2024  13.  Acute left lower extremity DVT on 10/24/2024  14.  Right Achilles tendon tear  15.  Postoperative junctional rhythm  16.  Hypoalbuminemia  17.  Thrombocytopenia  18.  Severe right ventricular enlargement and dysfunction post-op    Plan:  -Severe right ventricular enlargement and dysfunction noted postoperatively.  I did not feel that the limited echo from yesterday was much changed from the previous postoperatively.  Holding diuretics for now to hopefully improve filling.  Also receiving nitric oxide, and was transfused packed red blood cells and albumin yesterday to try to keep CVP between 10-12.    -He had quite a bit of ventricular tachycardia after lidocaine was stopped yesterday.  He remains on the lidocaine drip at 1 mg/min.  Also on amiodarone at 0.5 mg/min.  Other than low protein and albumin, his LFTs were normal this morning.    -Still remains on 3 vasopressors and milrinone.    -Nephrology following, although holding on diuresis for now as noted above.  Volume overload is also multifactorial (iatrogenic secondary to large blood product volume and hypoalbuminemia).    -Rate is reasonably controlled, and he remains in atrial fibrillation (he had persistent atrial fibrillation prior to the surgery).    -Remains critically ill.    Antwan Farmer MD  11/05/24  09:25 EST

## 2024-11-05 NOTE — PROGRESS NOTES
LOS: 13 days     Chief Complaint: Endocarditis    Interval History: Remains intubated and sedated.  Still on multiple pressors.  Afebrile with 40% FiO2.    Vital Signs  Temp:  [98.2 °F (36.8 °C)-99.7 °F (37.6 °C)] 98.4 °F (36.9 °C)  Heart Rate:  [] 69  Resp:  [10-11] 10  BP: ()/(39-63) 85/41  Arterial Line BP: ()/(25-69) 121/53  FiO2 (%):  [38 %-40 %] 39 %    Physical Exam:  General: Intubated and sedated  HEENT: ET tube in place  Respiratory: Multiple chest tubes with decreased bilateral breath sounds.    : Ugarte catheter  Skin: Operative dressing over the anterior chest clean and intact.  Access: Right IJ CVC.  Arterial line.  Peripheral line.    Antibiotics:  Anti-Infectives (From admission, onward)      Ordered     Dose/Rate Route Frequency Start Stop    11/02/24 0918  vancomycin (VANCOCIN) 1,000 mg in sodium chloride 0.9 % 250 mL IVPB-VTB        Ordering Provider: Antwan Farmer MD    1,000 mg  250 mL/hr over 60 Minutes Intravenous Every 24 Hours 11/02/24 1015 11/03/24 1138    11/02/24 0912  Pharmacy to dose vancomycin        Ordering Provider: Samantha Salvador APRN     Does not apply Continuous PRN 11/02/24 0912 11/04/24 0911    10/30/24 2306  ceFAZolin 2000 mg IVPB in 100 mL NS (MBP)        Ordering Provider: Samantha Salvador APRN    2,000 mg  over 30 Minutes Intravenous Every 8 Hours 10/31/24 0000 11/01/24 0922    10/29/24 1518  ceFAZolin 2000 mg IVPB in 100 mL NS (MBP)        Ordering Provider: Bryant Mcclure PA-C    2,000 mg  over 30 Minutes Intravenous Once 10/30/24 0600 10/30/24 1936    10/28/24 1034  vancomycin IVPB 2000 mg in 0.9% Sodium Chloride 500 mL        Ordering Provider: Samantha Salvador APRN    15 mg/kg × 142 kg Intravenous Once 10/28/24 1045 10/28/24 1356    10/23/24 1709  penicillin G potassium 4 Million Units in sodium chloride 0.9 % 100 mL IVPB        Ordering Provider: Samantha Salvador APRN    4 Million Units  over 30 Minutes Intravenous Every 4 Hours  Scheduled 10/23/24 2000 12/05/24 0129             Results Review:     I reviewed the patient's new clinical results.    Lab Results   Component Value Date    WBC 10.77 11/05/2024    HGB 8.4 (L) 11/05/2024    HCT 26.4 (L) 11/05/2024    MCV 88.6 11/05/2024    PLT 80 (L) 11/05/2024     Lab Results   Component Value Date    GLUCOSE 183 (H) 11/05/2024    BUN 25 (H) 11/05/2024    CREATININE 1.37 (H) 11/05/2024    BCR 18.2 11/05/2024    CO2 24.0 11/05/2024    CALCIUM 8.3 (L) 11/05/2024    ALBUMIN 2.7 (L) 11/05/2024    LABIL2 1.4 06/27/2019    AST 20 11/05/2024    ALT 7 11/05/2024       Microbiology:  10/3 COVID-negative  10/10 COVID-negative  10/14 COVID-negative  10/15 blood cultures 2 out of 2 strep mitis  10/17 COVID-negative  10/17 blood cultures 2 out of 2 strep mitis  10/21 COVID-negative  10/23 blood cultures no growth today  10/30 operative cultures from the aortic valve no growth     Assessment    #Strep mitis endocarditis  #Status post bioprosthetic aortic valve replacement 2014  #Status post aortic root replacement in the setting of prosthetic aortic valve endocarditis and annular abscess on 10/30  #Status post removal of pacemaker generator and intracardiac portion of the leads with retained venous leads  #Atrial fibrillation  #Achilles tendon rupture  #NATHANIEL    Continue penicillin G 4,000,000 units every 4 hours for strep mitis endocarditis.  Renal function continues to improve.    Planning for minimum of 6 weeks of antibiotics through end date December 4.  He is going to need weekly antibiotic monitoring labs with CBC with differential and BMP while on therapy.    ID will follow.

## 2024-11-05 NOTE — PROGRESS NOTES
[facility]  [phone]  Dr. Tawanda Clement     Subjective:  LOS: 13    Chief Complaint:  Ventilator management        Had bad day yesterday. VT storm, had to go up on NO. Did wake up and follow commands off sedation. Off pressors for the most part. Discussed with nursing     Objective   Vital Signs past 24hrs  Temp range: Temp (24hrs), Av.1 °F (37.3 °C), Min:98.2 °F (36.8 °C), Max:99.7 °F (37.6 °C)    BP range: BP: ()/(39-63) 84/43  Pulse range: Heart Rate:  [] 76  Resp rate range: Resp:  [10-13] 13  Device (Oxygen Therapy): ventilator   Oxygen range:SpO2:  [95 %-100 %] 96 %   Mechanical Ventilator:Mode: VC/AC (24 1039)    Physical Exam  Vitals reviewed.   Constitutional:       Interventions: He is sedated and intubated.   HENT:      Head: Normocephalic and atraumatic.   Eyes:      Extraocular Movements: Extraocular movements intact.      Conjunctiva/sclera: Conjunctivae normal.      Pupils: Pupils are equal, round, and reactive to light.   Cardiovascular:      Rate and Rhythm: Regular rhythm. Tachycardia present.   Pulmonary:      Effort: Pulmonary effort is normal. He is intubated.      Breath sounds: Decreased air movement present. Decreased breath sounds present. No wheezing, rhonchi or rales.   Musculoskeletal:      Cervical back: Normal range of motion. No rigidity or tenderness.   Skin:     General: Skin is warm and dry.      Findings: No rash.       Results Review:    I have reviewed the laboratory and imaging data since the last note by [facility] physician.  My annotations are noted in assessment and plan.      Result Review:  I have personally reviewed the results from last note by [facility] physician to 2024 13:50 EST and agree with these findings:  [x]  Laboratory list / accordion  [x]  Microbiology  [x]  Radiology  [x]  EKG/Telemetry   [x]  Cardiology/Vascular   [x]  Pathology  [x]  Old records  []  Other:    Medication Review:  I have reviewed the current MAR.  My  annotations are noted in assessment and plan.    [START ON 11/6/2024] aspirin, 81 mg, Per G Tube, Daily  atorvastatin, 40 mg, Per G Tube, Nightly  chlorhexidine, 15 mL, Mouth/Throat, Q12H  enoxaparin, 30 mg, Subcutaneous, Q12H  Ergocalciferol, 100 mcg, Per G Tube, Daily  insulin regular, 2-7 Units, Subcutaneous, Q6H  [START ON 11/6/2024] lansoprazole, 15 mg, Per G Tube, Q AM  miconazole, 1 Application, Topical, Q12H  penicillin g (potassium), 4 Million Units, Intravenous, Q4H  senna, 2 tablet, Per G Tube, Nightly  sildenafil, 20 mg, Per G Tube, TID  sodium chloride, 10 mL, Intravenous, Q12H        amiodarone in dextrose 5%, 0.5 mg/min, Last Rate: 0.5 mg/min (11/05/24 1040)  dexmedetomidine, 0.2-1.5 mcg/kg/hr, Last Rate: 0.5 mcg/kg/hr (11/05/24 1222)  DOPamine, 2-20 mcg/kg/min  EPINEPHrine, 0.02-0.1 mcg/kg/min, Last Rate: 0.02 mcg/kg/min (11/04/24 2024)  lidocaine in D5W, 1 mg/min, Last Rate: Stopped (11/05/24 1135)  milrinone, 0.25-0.375 mcg/kg/min, Last Rate: 0.25 mcg/kg/min (11/05/24 1222)  niCARdipine, 5-15 mg/hr  norepinephrine, 0.02-0.2 mcg/kg/min, Last Rate: Stopped (11/03/24 0820)  phenylephrine, 0.2-2 mcg/kg/min, Last Rate: Stopped (11/05/24 1030)  propofol, 5-50 mcg/kg/min, Last Rate: Stopped (11/05/24 0930)  vasopressin, 0.02-0.1 Units/min, Last Rate: 0.05 Units/min (11/05/24 1128)      Lines, Drains & Airways       Active LDAs       Name Placement date Placement time Site Days    Pulmonary Artery Catheter - Triple Lumen 10/30/24 Right Internal jugular 10/30/24  1026  created via procedure documentation  -- 6    CVC Double Lumen 10/30/24 Right Internal jugular 10/30/24  1026  created via procedure documentation  Internal jugular  6    Peripheral IV 10/29/24 1848 Anterior;Left Forearm 10/29/24  1848  Forearm  6    Peripheral IV 10/30/24 0940 Anterior;Right Forearm 10/30/24  0940  Forearm  6    NG/OG Tube Nasogastric  Left nostril 11/02/24  1330  Left nostril  3    Rectal Tube With balloon 11/03/24  0945  --   2    Urethral Catheter Non-latex;Temperature probe 16 Fr. 10/30/24  1145  -- 6    ETT  10/30/24  1139  created via procedure documentation  -- 6    Arterial Line 10/30/24 Left Radial 10/30/24  1015  created via procedure documentation  Radial  6    Arterial Line 11/02/24 Right Radial 11/02/24  0723  created via procedure documentation  Radial  3    Pacer Wires 10/30/24  1635  Ventricular  5                  No active isolations  Diet Orders (active) (From admission, onward)       Start     Ordered    11/04/24 1129  Tube Feeding: Formula: Peptamen AF (Vital AF 1.2); Feeding Type: Continuous; Start at: 30 mL/hr; Then Advance By: 10 mL/hr; Every: 6 hours; To Goal Rate of: 75 mL/hr; Water Flush: Other; Other: 40; Every: 1 hour; Water Bolus: None  Diet Effective Now         11/04/24 1129    11/02/24 1533  NPO Diet NPO Type: Tube Feeding  Diet Effective Now         11/02/24 1533    11/02/24 1259  Feeding Tube Insertion - Cortrak System  Once         11/02/24 1259                      Assessment  Postop respiratory failure  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  Cardiogenic and probable septic shock  CHF with pulmonary edema  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  Acute left lower extremity DVT on 10/24/2024  Esophagitis grade C by EGD on 9/30/2024 PPI ordered is ordered as oral not sure he can to be able to get this recommend consider changing that to  IV.  Hyperglycemia mild on postop insulin protocol        Plan  -Likely significant pulmonary edema causing respiratory failure.  - Recommend reducing tidal volumes to 6-7 mL/kg and can  increased respiratory rate to assist with ventilation.  Attempt to keep plats less than 30. Current Plats are 26  - Diuresis per cardiology and surgery  -Bronchoscopy (11/2/2024) see separate procedure note for findings.  Had some mild thick airway secretions that were therapeutically aspirated but otherwise nothing notable.  -Wean nitric oxide per cardiothoracic surgery  - Will continue to follow and assist with vent            Tawanda Clement DO   11/05/24  13:50 EST      Part of this note may be an electronic transcription/translation of spoken language to printed text using the Dragon Dictation System.

## 2024-11-05 NOTE — PLAN OF CARE
Pt remains critically ill but was able to wean nitric oxide, stop jett, stop lido and decrease amio. Remains on milrinone, epi, vaso, amio, propofol and precedex. Pt L foot was cold and purple and not able to find pulses for first assessment,RN warmed foot up and notified APRN- after an hour of rewarm, patient pulse was dopplarable, warm and less cyanotic. Pt followed commands when sedation was paused. Family updated and will continue plan of care.

## 2024-11-06 NOTE — PLAN OF CARE
Goal Outcome Evaluation:            Vaso off overnight; UOP at  least 40 cc/hour, pt following commands when sedation weaned.

## 2024-11-06 NOTE — PLAN OF CARE
Goal Outcome Evaluation:  Plan of Care Reviewed With: patient        Progress: no change  Outcome Evaluation: Remains on milrinone, epi, amio and sedation. On/off levo throughout the shift to maintain MAP >60. CI >2. A fib on tele, rate controlled. Follows commands in BUE and responds to painful stimuli in BLE with sedation off. Remains on vent, nitric weaned to 15 per CTS. IV bumex given x2 per CTS, monitoring UOP closely, approx. 75-100ml/hr. Very edematous, +3 with weeping BUE. Updated family on POC, care ongoing.

## 2024-11-06 NOTE — PROGRESS NOTES
LOS: 14 days     Chief Complaint: Endocarditis    Interval History: Remains afebrile and on multiple pressors.  Remains intubated and sedated.  Tolerating antibiotics.    Vital Signs  Temp:  [98.6 °F (37 °C)-99.9 °F (37.7 °C)] 98.6 °F (37 °C)  Heart Rate:  [74-90] 84  Resp:  [13] 13  BP: ()/(38-48) 91/43  Arterial Line BP: ()/(44-63) 107/53  FiO2 (%):  [39 %-98 %] 97 %    Physical Exam:  General: Intubated and sedated  HEENT: ET tube in place  Respiratory: Coarse bilateral breath sounds on the ventilator.  : Ugarte catheter  Skin: Operative dressing over the anterior chest clean and intact.  Access: Right IJ CVC.  Arterial line.  Peripheral line.    Antibiotics:  Anti-Infectives (From admission, onward)      Ordered     Dose/Rate Route Frequency Start Stop    11/02/24 0918  vancomycin (VANCOCIN) 1,000 mg in sodium chloride 0.9 % 250 mL IVPB-VTB        Ordering Provider: Antwan Farmer MD    1,000 mg  250 mL/hr over 60 Minutes Intravenous Every 24 Hours 11/02/24 1015 11/03/24 1138    11/02/24 0912  Pharmacy to dose vancomycin        Ordering Provider: Samantha Salvador APRN     Does not apply Continuous PRN 11/02/24 0912 11/04/24 0911    10/30/24 2306  ceFAZolin 2000 mg IVPB in 100 mL NS (MBP)        Ordering Provider: Samantha Salvador APRN    2,000 mg  over 30 Minutes Intravenous Every 8 Hours 10/31/24 0000 11/01/24 0922    10/29/24 1518  ceFAZolin 2000 mg IVPB in 100 mL NS (MBP)        Ordering Provider: Bryant Mcclure PA-C    2,000 mg  over 30 Minutes Intravenous Once 10/30/24 0600 10/30/24 1936    10/28/24 1034  vancomycin IVPB 2000 mg in 0.9% Sodium Chloride 500 mL        Ordering Provider: Samantha Salvador APRN    15 mg/kg × 142 kg Intravenous Once 10/28/24 1045 10/28/24 1356    10/23/24 1709  penicillin G potassium 4 Million Units in sodium chloride 0.9 % 100 mL IVPB        Ordering Provider: Gregg Diaz,     4 Million Units  over 30 Minutes Intravenous Every 4 Hours Scheduled  10/23/24 2000 12/04/24 5883             Results Review:     I reviewed the patient's new clinical results.    Lab Results   Component Value Date    WBC 10.61 11/06/2024    HGB 8.1 (L) 11/06/2024    HCT 25.0 (L) 11/06/2024    MCV 89.3 11/06/2024    PLT 72 (L) 11/06/2024     Lab Results   Component Value Date    GLUCOSE 183 (H) 11/06/2024    BUN 30 (H) 11/06/2024    CREATININE 1.27 11/06/2024    BCR 23.6 11/06/2024    CO2 21.6 (L) 11/06/2024    CALCIUM 8.2 (L) 11/06/2024    ALBUMIN 2.5 (L) 11/06/2024    LABIL2 1.4 06/27/2019    AST 23 11/06/2024    ALT <5 11/06/2024       Microbiology:  10/3 COVID-negative  10/10 COVID-negative  10/14 COVID-negative  10/15 blood cultures 2 out of 2 strep mitis  10/17 COVID-negative  10/17 blood cultures 2 out of 2 strep mitis  10/21 COVID-negative  10/23 blood cultures no growth today  10/30 operative cultures from the aortic valve no growth     Assessment    #Strep mitis endocarditis  #Status post bioprosthetic aortic valve replacement 2014  #Status post aortic root replacement in the setting of prosthetic aortic valve endocarditis and annular abscess on 10/30  #Status post removal of pacemaker generator and intracardiac portion of the leads with retained venous leads  #Atrial fibrillation  #Achilles tendon rupture  #NATHANIEL    Continue penicillin G 4,000,000 units every 4 hours for strep mitis endocarditis.  Plan for 6 weeks of antibiotics through end date December 4.  He is going to need weekly antibiotic monitoring labs with CBC with differential and BMP while on therapy.    ID will follow.

## 2024-11-06 NOTE — PROGRESS NOTES
LOS: 14 days   Patient Care Team:  Juan Perez MD as PCP - General (Internal Medicine)    Chief Complaint: Follow-up bioprosthetic AVR endocarditis, mitral regurgitation, persistent atrial fibrillation.    Interval History: He is currently now only on epinephrine.  He was weaned off vasopressin and Robinson-Synephrine.  He remains on nitric oxide, milrinone, and amiodarone.  He is off of lidocaine, and not having ventricular tachycardia.  Remains intubated and sedated.    Vital Signs:  Temp:  [98.6 °F (37 °C)-99.9 °F (37.7 °C)] 99.5 °F (37.5 °C)  Heart Rate:  [74-96] 96  Resp:  [13-17] 16  BP: ()/(38-57) 100/39  Arterial Line BP: ()/(41-63) 134/55  FiO2 (%):  [39 %-98 %] 40 %    Intake/Output Summary (Last 24 hours) at 11/6/2024 1345  Last data filed at 11/6/2024 1300  Gross per 24 hour   Intake 6058.2 ml   Output 1650 ml   Net 4408.2 ml       Physical Exam:   General Appearance:    Intubated and sedated.  Appears critically ill.   Lungs:     Rhonchi bilaterally anteriorly.    Heart:    Regular rhythm with a normal rate.  Balloon pump audible.   Abdomen:     Soft, nontender, nondistended.    Extremities:    2+ generalized edema and anasarca.     Results Review:    Results from last 7 days   Lab Units 11/06/24  0325   SODIUM mmol/L 139   POTASSIUM mmol/L 4.1   CHLORIDE mmol/L 107   CO2 mmol/L 21.6*   BUN mg/dL 30*   CREATININE mg/dL 1.27   GLUCOSE mg/dL 183*   CALCIUM mg/dL 8.2*         Results from last 7 days   Lab Units 11/06/24  0325   WBC 10*3/mm3 10.61   HEMOGLOBIN g/dL 8.1*   HEMATOCRIT % 25.0*   PLATELETS 10*3/mm3 72*     Results from last 7 days   Lab Units 10/31/24  0346 10/30/24  2325 10/30/24  2148 10/30/24  1916 10/30/24  1907   INR  1.38* 1.21* 1.10   < > 1.87*   APTT seconds  --  46.1* 40.8*  --  33.6    < > = values in this interval not displayed.           Results from last 7 days   Lab Units 11/06/24  0325   MAGNESIUM mg/dL 2.2             I reviewed the patient's new clinical  results.        Assessment:  1.  Status post bioprosthetic aortic valve replacement in 2014  2.  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  Moderate to severe mitral regurgitation  4.  Status post reoperative AV root replacement, mitral valve repair, ICD removal, SVG-RCA, and IABP placement on 10/30/2021  5.  Postoperative shock, multifactorial  6.  Acute kidney injury  7.  History of nonischemic cardiomyopathy with recovered ejection fraction  8.  Anemia, acute on chronic  9.  Postoperative thrombocytopenia  10.  Persistent atrial fibrillation  11.  Ventricular tachycardia postoperatively  12.  Grade C esophagitis by EGD on 9/30/2024  13.  Acute left lower extremity DVT on 10/24/2024  14.  Right Achilles tendon tear  15.  Postoperative junctional rhythm  16.  Hypoalbuminemia  17.  Thrombocytopenia  18.  Severe right ventricular enlargement and dysfunction post-op    Plan:  -Severe right ventricular enlargement and dysfunction noted postoperatively.  Held diuretics for the last several days to improve filling.  Resume Bumex 1 mg IV today.     -Nephrology is following.  Volume overload is also multifactorial, including iatrogenic secondary to large blood product volume and hypoalbuminemia.    -He has had issues with recurrent ventricular tachycardia after stopping lidocaine.  He has been off of this since yesterday morning.  Not having current ventricular tachycardia.  Remains in rate controlled atrial fibrillation.  Continue amiodarone 0.5 mg/min.    -Continuing nitric oxide.    -Slow progress, but remains critically ill.    Antwan Farmer MD  11/06/24  13:45 EST

## 2024-11-06 NOTE — PROGRESS NOTES
" LOS: 14 days   Patient Care Team:  Juan Perez MD as PCP - General (Internal Medicine)    Chief Complaint: post op follow-up    Subjective  Intubated/sedated    CI 2.24  CVP 17  Drips: amio 0.5, epi 0.02, milrinone 0.25, propofol, precedex, insulin  Nitric oxide 20    Vital Signs  Temp:  [98.4 °F (36.9 °C)-99.9 °F (37.7 °C)] 99.3 °F (37.4 °C)  Heart Rate:  [69-88] 84  Resp:  [10-13] 13  BP: ()/(38-48) 100/47  Arterial Line BP: ()/(43-63) 108/51  FiO2 (%):  [39 %-98 %] 39 %  Body mass index is 44.59 kg/m².    Intake/Output Summary (Last 24 hours) at 11/6/2024 0651  Last data filed at 11/6/2024 0607  Gross per 24 hour   Intake 4868 ml   Output 1365 ml   Net 3503 ml     I/O this shift:  In: 1401 [I.V.:1181; IV Piggyback:220]  Out: 465 [Urine:465]    Chest tube drainage last 8 hours: 25/10         11/04/24  1537 11/05/24  0400 11/05/24  0458   Weight: (!) 151 kg (332 lb 14.3 oz) (!) 148 kg (325 lb 9.9 oz) (!) 148 kg (325 lb 9.9 oz)         Objective:  General Appearance:  Ill-appearing (intubated/sedated).    Vital signs: (most recent): Blood pressure 91/43, pulse 84, temperature 98.6 °F (37 °C), resp. rate 13, height 182 cm (71.65\"), weight (!) 162 kg (358 lb 0.4 oz), SpO2 100%.  (Tmax 99.9).    Output: Producing urine and producing stool.    Lungs:  There are rales and rhonchi.  (50% FiO2)  Heart: Tachycardia.  Irregular rhythm.  (Tele: Afib)  Extremities: There is dependent edema.    Skin:  Warm and dry.  (Dressings c/d/I  Hematoma left groin)            Results Review:        WBC WBC   Date Value Ref Range Status   11/06/2024 10.61 3.40 - 10.80 10*3/mm3 Final   11/05/2024 10.77 3.40 - 10.80 10*3/mm3 Final   11/04/2024 9.47 3.40 - 10.80 10*3/mm3 Final   11/03/2024 11.58 (H) 3.40 - 10.80 10*3/mm3 Final   11/03/2024 14.44 (H) 3.40 - 10.80 10*3/mm3 Final      HGB Hemoglobin   Date Value Ref Range Status   11/06/2024 8.1 (L) 13.0 - 17.7 g/dL Final   11/05/2024 8.4 (L) 13.0 - 17.7 g/dL Final " "  11/04/2024 8.7 (L) 13.0 - 17.7 g/dL Final   11/03/2024 8.4 (L) 13.0 - 17.7 g/dL Final   11/03/2024 7.9 (L) 13.0 - 17.7 g/dL Final      HCT Hematocrit   Date Value Ref Range Status   11/06/2024 25.0 (L) 37.5 - 51.0 % Final   11/05/2024 26.4 (L) 37.5 - 51.0 % Final   11/04/2024 26.5 (L) 37.5 - 51.0 % Final   11/03/2024 24.7 (L) 37.5 - 51.0 % Final   11/03/2024 24.3 (L) 37.5 - 51.0 % Final      Platelets Platelets   Date Value Ref Range Status   11/06/2024 72 (L) 140 - 450 10*3/mm3 Final   11/05/2024 80 (L) 140 - 450 10*3/mm3 Final   11/04/2024 102 (L) 140 - 450 10*3/mm3 Final   11/03/2024 103 (L) 140 - 450 10*3/mm3 Final   11/03/2024 92 (L) 140 - 450 10*3/mm3 Final        PT/INR:    No results found for: \"PROTIME\"  /  No results found for: \"INR\"      Sodium Sodium   Date Value Ref Range Status   11/06/2024 139 136 - 145 mmol/L Final   11/05/2024 139 136 - 145 mmol/L Final   11/04/2024 140 136 - 145 mmol/L Final   11/04/2024 139 136 - 145 mmol/L Final   11/04/2024 140 136 - 145 mmol/L Final   11/04/2024 140 136 - 145 mmol/L Final   11/03/2024 140 136 - 145 mmol/L Final      Potassium Potassium   Date Value Ref Range Status   11/06/2024 4.1 3.5 - 5.2 mmol/L Final   11/05/2024 4.4 3.5 - 5.2 mmol/L Final   11/04/2024 3.8 3.5 - 5.2 mmol/L Final   11/04/2024 3.7 3.5 - 5.2 mmol/L Final   11/04/2024 3.7 3.5 - 5.2 mmol/L Final   11/04/2024 3.6 3.5 - 5.2 mmol/L Final     Comment:     Slight hemolysis detected by analyzer. Result may be falsely elevated.   11/04/2024 3.6 3.5 - 5.2 mmol/L Final   11/04/2024 3.7 3.5 - 5.2 mmol/L Final   11/03/2024 4.0 3.5 - 5.2 mmol/L Final   11/03/2024 3.5 3.5 - 5.2 mmol/L Final   11/03/2024 3.5 3.5 - 5.2 mmol/L Final   11/03/2024 3.2 (L) 3.5 - 5.2 mmol/L Final      Chloride Chloride   Date Value Ref Range Status   11/06/2024 107 98 - 107 mmol/L Final   11/05/2024 106 98 - 107 mmol/L Final   11/04/2024 105 98 - 107 mmol/L Final   11/04/2024 103 98 - 107 mmol/L Final   11/04/2024 103 98 - 107 " mmol/L Final   11/04/2024 104 98 - 107 mmol/L Final   11/03/2024 103 98 - 107 mmol/L Final      Bicarbonate CO2   Date Value Ref Range Status   11/06/2024 21.6 (L) 22.0 - 29.0 mmol/L Final   11/05/2024 24.0 22.0 - 29.0 mmol/L Final   11/04/2024 23.0 22.0 - 29.0 mmol/L Final   11/04/2024 24.6 22.0 - 29.0 mmol/L Final   11/04/2024 25.0 22.0 - 29.0 mmol/L Final   11/04/2024 25.0 22.0 - 29.0 mmol/L Final   11/03/2024 24.6 22.0 - 29.0 mmol/L Final      BUN BUN   Date Value Ref Range Status   11/06/2024 30 (H) 8 - 23 mg/dL Final   11/05/2024 25 (H) 8 - 23 mg/dL Final   11/04/2024 25 (H) 8 - 23 mg/dL Final   11/04/2024 24 (H) 8 - 23 mg/dL Final   11/04/2024 23 8 - 23 mg/dL Final   11/04/2024 24 (H) 8 - 23 mg/dL Final   11/03/2024 23 8 - 23 mg/dL Final      Creatinine Creatinine   Date Value Ref Range Status   11/06/2024 1.27 0.76 - 1.27 mg/dL Final   11/05/2024 1.37 (H) 0.76 - 1.27 mg/dL Final   11/04/2024 1.46 (H) 0.76 - 1.27 mg/dL Final   11/04/2024 1.55 (H) 0.76 - 1.27 mg/dL Final   11/04/2024 1.55 (H) 0.76 - 1.27 mg/dL Final   11/04/2024 1.45 (H) 0.76 - 1.27 mg/dL Final   11/03/2024 1.53 (H) 0.76 - 1.27 mg/dL Final      Calcium Calcium   Date Value Ref Range Status   11/06/2024 8.2 (L) 8.6 - 10.5 mg/dL Final   11/05/2024 8.3 (L) 8.6 - 10.5 mg/dL Final   11/04/2024 8.8 8.6 - 10.5 mg/dL Final   11/04/2024 9.1 8.6 - 10.5 mg/dL Final   11/04/2024 8.8 8.6 - 10.5 mg/dL Final   11/04/2024 7.9 (L) 8.6 - 10.5 mg/dL Final   11/03/2024 7.7 (L) 8.6 - 10.5 mg/dL Final      Magnesium Magnesium   Date Value Ref Range Status   11/06/2024 2.2 1.6 - 2.4 mg/dL Final   11/05/2024 2.1 1.6 - 2.4 mg/dL Final   11/04/2024 2.4 1.6 - 2.4 mg/dL Final   11/04/2024 2.1 1.6 - 2.4 mg/dL Final   11/04/2024 2.1 1.6 - 2.4 mg/dL Final   11/04/2024 2.1 1.6 - 2.4 mg/dL Final   11/03/2024 2.3 1.6 - 2.4 mg/dL Final          aspirin, 81 mg, Per G Tube, Daily  atorvastatin, 40 mg, Per G Tube, Nightly  chlorhexidine, 15 mL, Mouth/Throat, Q12H  enoxaparin, 30  mg, Subcutaneous, Q12H  Ergocalciferol, 100 mcg, Per G Tube, Daily  insulin regular, 2-7 Units, Subcutaneous, Q6H  lansoprazole, 15 mg, Per G Tube, Q AM  miconazole, 1 Application, Topical, Q12H  penicillin g (potassium), 4 Million Units, Intravenous, Q4H  senna, 2 tablet, Per G Tube, Nightly  sildenafil, 20 mg, Per G Tube, TID  sodium chloride, 10 mL, Intravenous, Q12H      amiodarone in dextrose 5%, 0.5 mg/min, Last Rate: 0.5 mg/min (11/05/24 2141)  dexmedetomidine, 0.2-1.5 mcg/kg/hr, Last Rate: 0.4 mcg/kg/hr (11/06/24 0531)  DOPamine, 2-20 mcg/kg/min  EPINEPHrine, 0.02-0.1 mcg/kg/min, Last Rate: 0.02 mcg/kg/min (11/04/24 2024)  lidocaine in D5W, 1 mg/min, Last Rate: Stopped (11/05/24 1135)  milrinone, 0.25-0.375 mcg/kg/min, Last Rate: 0.25 mcg/kg/min (11/06/24 0645)  niCARdipine, 5-15 mg/hr  norepinephrine, 0.02-0.2 mcg/kg/min, Last Rate: Stopped (11/03/24 0820)  phenylephrine, 0.2-2 mcg/kg/min, Last Rate: Stopped (11/05/24 1030)  propofol, 5-50 mcg/kg/min, Last Rate: 10 mcg/kg/min (11/05/24 9684)  vasopressin, 0.02-0.1 Units/min, Last Rate: Stopped (11/06/24 0325)              Prosthetic aortic valve stenosis    Essential hypertension    Permanent atrial fibrillation    S/P AVR    ICD (implantable cardioverter-defibrillator), dual, in situ    Achilles tendon rupture    Bacteremia    Stenosis of prosthetic aortic valve    Anemia      Assessment & Plan    - Prosthetic aortic valve stenosis, h/o AVR (tissue)/maze/DANICA ligation (2014)- s/p reoperative sternotomy AV root replacement 27mm cryopreserved homograft with right nuvia cabrol, AICD removal, IABP placement- [facility] 10/31/2024  -Sternal closure ---11/2  - Possible endocarditis, likely need JOLIE   - Bacteremia, blood cultures positive strep mitis---on penicillin G  - Atrial fibrillation, unable to tolerate anticoagulation  - Hypertension  - NICM status post [employer] AICD  - Anemia, s/p EGD/colonoscopy with esophagitis, polyp removal  - Right achilles tendon tear---  walking boot and PT per ortho at [facility]  - Pre-diabetes -- improved at 5.3  -post op anemia- expected acute blood loss  -TCP plt count 72      POD #7  Remains intubated and sedated.  When propofol weaned he follows commands and moves upper extremities.  Making slow but steady progress.    Still on inotropic support milrinone 0.25 epi 0.02 nitric 25.  Anticipate  replete Kphos. Creatinine improved. Bumex ordered this morning by Dr. Florian. Nephrology following and managing diuretics. Remains in atrial fibrillation rate controlled. Tolerating tube feeds. Remains critically ill. Continue supportive care.        Samantha Magana, JAVI  11/06/24  06:51 EST

## 2024-11-06 NOTE — PROGRESS NOTES
"Nutrition Services    Patient Name:  Woodrow Alejandro  YOB: 1950  MRN: [id number]  Admit Date:  10/23/2024    Assessment Date:  11/06/24    Summary: TF Follow Up    Pt is a 72 y/o male who is POD 7 reoperative sternotomy AV root replacement 27mm cryopreserved homograft with right nuvia cabrol, AICD removal, IABP placement.  Pt currently intubated and sedated on precedex. S/p bronchoscopy over the weekend as well as sternal washout and closure.     Current TF regimen: Peptamen AF @ 50 mL/hr (goal is 75 mL/hr) with 50 mL q 2 hour free water flushes (per MD).      Labs reviewed: Na 139, K 4.1, Gluc 183/158/159, BUN 30, Platelets 72, Phos 2.0, Alb 2.5  Meds reviewed: lipitor, bumex, lovenox, insulin, prevacid, phos-nak, senokot, amiodarone, precedex    Plans/Recommendations:  Continue advancing TFs of Peptamen AF by 10 mL q 6 hours to goal rate of 75 mL/hr.  50 mL q 2 hour free water flushes (per MD).  Monitor for tolerance.    The above end goal rate is for 22 hrs/day to assume interruptions for ADLs. Water flushes adjusted based on clinical picture + Rx flushes/IV fluids     RD to follow    CLINICAL NUTRITION ASSESSMENT      Reason for Assessment Follow-up Protocol     Diagnosis/Problem   POD 7 reoperative sternotomy AV root replacement 27mm cryopreserved homograft with right nuvia cabrol, AICD removal, IABP placement.     Anthropometrics        Current Height  Current Weight  BMI kg/m2 Height: 182 cm (71.65\")  Weight: (!) 162 kg (358 lb 0.4 oz) (11/06/24 0700)  Body mass index is 49.03 kg/m².   Adjusted BMI (if applicable)    BMI Category Obese, Class III (40 or higher)   Ideal Body Weight (IBW) 171 lbs   Usual Body Weight (UBW) 330 lbs   Weight Trend Stable     Estimated/Assessed Needs        Energy Requirements    Weight for Calculation 78 kg IBW   Method for Estimation  25-30 kcal/kg   EST Needs (kcal/day) 3045-7486       Protein Requirements    Weight for Calculation 78 kg IBW   EST Protein " Needs (g/kg) 1.5 - 2.0 gm/kg   EST Daily Needs (g/day) 117-156       Fluid Requirements     Method for Estimation 1 mL/kcal    Estimated Needs (mL/day)      Labs       Pertinent Labs    Results from last 7 days   Lab Units 11/06/24 0325 11/05/24 0314 11/04/24  1648 11/01/24  1755 11/01/24  1029   SODIUM mmol/L 139 139 140   < > 145   POTASSIUM mmol/L 4.1 4.4 3.8   < > 4.5   CHLORIDE mmol/L 107 106 105   < > 107   CO2 mmol/L 21.6* 24.0 23.0   < > 24.9   BUN mg/dL 30* 25* 25*   < > 17   CREATININE mg/dL 1.27 1.37* 1.46*   < > 1.94*   CALCIUM mg/dL 8.2* 8.3* 8.8   < > 8.5*   BILIRUBIN mg/dL 0.6 0.8  --   --  0.4   ALK PHOS U/L 112 110  --   --  71   ALT (SGPT) U/L <5 7  --   --  6   AST (SGOT) U/L 23 20  --   --  73*   GLUCOSE mg/dL 183* 183* 151*   < > 149*    < > = values in this interval not displayed.     Results from last 7 days   Lab Units 11/06/24 0325 11/05/24 0314 11/04/24  1648   MAGNESIUM mg/dL 2.2 2.1 2.4   PHOSPHORUS mg/dL 2.0* 2.7  --    HEMOGLOBIN g/dL 8.1* 8.4*  --    HEMATOCRIT % 25.0* 26.4*  --    WBC 10*3/mm3 10.61 10.77  --    ALBUMIN g/dL 2.5* 2.7*  --      Results from last 7 days   Lab Units 11/06/24 0325 11/05/24 0314 11/04/24 0313 11/03/24  1728 11/03/24  1202 10/31/24  1349 10/31/24  0346 10/30/24  2325 10/30/24  2148 10/30/24  1916 10/30/24  1907   INR   --   --   --   --   --   --  1.38* 1.21* 1.10 2.3* 1.87*   APTT seconds  --   --   --   --   --   --   --  46.1* 40.8*  --  33.6   PLATELETS 10*3/mm3 72* 80* 102* 103* 92*   < > 115* 98* 93*  --  130*    < > = values in this interval not displayed.     COVID19   Date Value Ref Range Status   10/21/2024 Not Detected Not Detected - Ref. Range Final     Lab Results   Component Value Date    HGBA1C 5.30 10/24/2024          Medications           Scheduled Medications aspirin, 81 mg, Per G Tube, Daily  atorvastatin, 40 mg, Per G Tube, Nightly  bumetanide, 1 mg, Intravenous, Once  chlorhexidine, 15 mL, Mouth/Throat, Q12H  enoxaparin, 30 mg,  Subcutaneous, Q12H  Ergocalciferol, 100 mcg, Per G Tube, Daily  insulin regular, 2-7 Units, Subcutaneous, Q6H  lansoprazole, 15 mg, Per G Tube, Q AM  miconazole, 1 Application, Topical, Q12H  penicillin g (potassium), 4 Million Units, Intravenous, Q4H  potassium & sodium phosphates, 2 packet, Oral, TID AC  senna, 2 tablet, Per G Tube, Nightly  sildenafil, 20 mg, Per G Tube, TID  sodium chloride, 10 mL, Intravenous, Q12H       Infusions amiodarone in dextrose 5%, 0.5 mg/min, Last Rate: 0.5 mg/min (11/06/24 0926)  dexmedetomidine, 0.2-1.5 mcg/kg/hr, Last Rate: 0.4 mcg/kg/hr (11/06/24 1000)  DOPamine, 2-20 mcg/kg/min  EPINEPHrine, 0.02-0.1 mcg/kg/min, Last Rate: 0.02 mcg/kg/min (11/04/24 2024)  lidocaine in D5W, 1 mg/min, Last Rate: Stopped (11/05/24 1135)  milrinone, 0.25-0.375 mcg/kg/min, Last Rate: 0.25 mcg/kg/min (11/06/24 0645)  niCARdipine, 5-15 mg/hr  norepinephrine, 0.02-0.2 mcg/kg/min, Last Rate: Stopped (11/06/24 1415)  phenylephrine, 0.2-2 mcg/kg/min, Last Rate: Stopped (11/05/24 1030)  propofol, 5-50 mcg/kg/min, Last Rate: 10 mcg/kg/min (11/06/24 1000)  vasopressin, 0.02-0.1 Units/min, Last Rate: Stopped (11/06/24 0325)       PRN Medications   acetaminophen    acetaminophen **OR** acetaminophen **OR** acetaminophen    ALPRAZolam    bisacodyl    bisacodyl    cyclobenzaprine    dextrose    dextrose    DOPamine    EPINEPHrine    glucagon (human recombinant)    Glycerin-Hypromellose-    HYDROcodone-acetaminophen    magnesium hydroxide    midazolam    milrinone    Morphine **AND** naloxone    Morphine    niCARdipine    nitroglycerin    norepinephrine    ondansetron    oxyCODONE    phenylephrine    polyethylene glycol    Potassium Replacement - Follow Nurse / BPA Driven Protocol    propofol    sodium chloride    sodium chloride    sodium chloride    sodium chloride    sodium chloride    sodium chloride    vasopressin     Physical Findings          General Findings obese, responds/arouses to voice,  ventilator support   Oral/Mouth Cavity tooth or teeth missing   Edema  generalized, lower extremity , upper extremity, 2+ (mild), 3+ (moderate)   Gastrointestinal hypoactive bowel sounds, non-distended , last bowel movement: 11/5   Skin  bruising, excoriation, pressure injury: sacral spine DTI, surgical incision: L clavicle, sternal, anterior thigh   Tubes/Drains/Lines fecal management system, NG tube   NFPE Not indicated at this time   --  Current Nutrition Orders & Evaluation of Intake       Oral Nutrition     Food Allergies NKFA   Current PO Diet NPO Diet NPO Type: Tube Feeding   Supplement n/a   PO Evaluation     % PO Intake NPO    Factors Affecting Intake: Other: Intubated      Enteral Nutrition     Enteral Route NG    TF Delivery Method Continuous    Propofol Rate/Kcal     Current TF Order/Rate  Peptamen AF @ 50 mL/hr    TF Goal Rate 75 mL/hr    Current Water Flush 50 mL Q 2 hr (per MD)    Modular None    TF Residual  no or minimal residual    TF Tolerance tolerating    TF Observation Other: discussed with RN     PES STATEMENT / NUTRITION DIAGNOSIS      Nutrition Dx Problem  Problem: Needs Alternative Composition  Etiology: Medical Diagnosis - POD 3 reoperative sternotomy AV root replacement 27mm cryopreserved homograft with right nuvia cabrol, AICD removal, IABP placement.    Signs/Symptoms: Report/Observation   --  NUTRITION INTERVENTION / PLAN OF CARE      Intervention Goal(s) Maintain nutrition status, Establish goals of care, Reduce/improve symptoms, Meet estimated needs, Disease management/therapy, Tolerate TF/PN at goal, and Appropriate weight loss         RD Intervention/Action Adjust EN/PN regimen, Continue to monitor, Care plan reviewed, and Recommend/order: EN         Prescription/Orders:       PO Diet       Supplements       Enteral Nutrition    Enteral Prescription:     Enteral Route NG    TF Delivery Method Continuous    Enteral Product Peptamen AF    Modular None    Propofol Rate/Kcal     TF  Start Rate  10 mL/hr    TF Goal Rate  75 mL/hr    Free Water Flush 50 mL Q 2 hr (per MD)    Provision at Goal:          Calories 1980 kcal, meets 100% needs         Protein  125 gm protein, meets 100% needs         Fluid (mL) 600 ml free water flushes         Parenteral Nutrition    New Prescription Ordered? Continue same per protocol   --      Monitor/Evaluation Per protocol, Pertinent labs, EN delivery/tolerance, Weight, Skin status, GI status, Symptoms, POC/GOC, Swallow function   Discharge Plan/Needs No discharge needs identified at this time   --    RD to follow per protocol.      Electronically signed by:  Jenny Reynolds RD  11/06/24 15:02 EST

## 2024-11-06 NOTE — CASE MANAGEMENT/SOCIAL WORK
"Continued Stay Note  [facility]     Patient Name: Woodrow Alejandro  MRN: [id number]  Today's Date: 11/6/2024    Admit Date: 10/23/2024    Plan: TBD. [facility] vs [facility]F; [facility] ongoing.   Discharge Plan       Row Name 11/06/24 1656       Plan    Plan TBD. [facility] vs SNF; [facility] ongoing.    Plan Comments Patient remains in CVR.  Pt is POD #7 and remains intubated and sedated.  Per APRN note, when propofol weaned he follows commands and moves upper extremities.  Making slow but steady progress. Per ID MD note, \"Continue penicillin G 4,000,000 units every 4 hours for strep mitis endocarditis. Plan for 6 weeks of antibiotics through end date December 4\".                                Discharge planning ongoing.  Jacki with [facility] follows up everyday............Rosanna CARCAMO/AYE                   Discharge Codes    No documentation.                 Expected Discharge Date and Time       Expected Discharge Date Expected Discharge Time    Nov 11, 2024               Rosanna Aldrich RN    "

## 2024-11-06 NOTE — PROGRESS NOTES
[facility] Progress Note      Patient Name: Woodrow Alejandro  : 1950  MRN: [id number]  Primary Care Physician:  Juan Perez MD  Date of admission: 10/23/2024    Subjective     Interval History:   F/u NATHANIEL  The patient currently on the ventilator, he received 1 dose of diuretics, he remains hypotensive on vasopressors.  Review of Systems:   Not obtainable    Objective     Vitals:   Temp:  [98.6 °F (37 °C)-99.9 °F (37.7 °C)] 98.6 °F (37 °C)  Heart Rate:  [74-90] 84  Resp:  [13] 13  BP: ()/(38-48) 91/43  Arterial Line BP: ()/(44-63) 107/53  FiO2 (%):  [39 %-98 %] 97 %    Intake/Output Summary (Last 24 hours) at 2024 0952  Last data filed at 2024 0607  Gross per 24 hour   Intake 4928 ml   Output 1190 ml   Net 3738 ml       Physical Exam:    General Appearance: On the ventilator, obese, chronically ill, no acute distress  Neck: Mild JVD  Lungs: Bilateral rhonchi, breathing effort not labored  Heart: Irregularly irregular, no rub  Abdomen: soft, no guarding, nondistended  : mayorga present with purple urine (from B12)  Extremities: 4+ upper and lower extremity edema    Scheduled Meds:     aspirin, 81 mg, Per G Tube, Daily  atorvastatin, 40 mg, Per G Tube, Nightly  chlorhexidine, 15 mL, Mouth/Throat, Q12H  enoxaparin, 30 mg, Subcutaneous, Q12H  Ergocalciferol, 100 mcg, Per G Tube, Daily  insulin regular, 2-7 Units, Subcutaneous, Q6H  lansoprazole, 15 mg, Per G Tube, Q AM  miconazole, 1 Application, Topical, Q12H  penicillin g (potassium), 4 Million Units, Intravenous, Q4H  potassium & sodium phosphates, 2 packet, Oral, TID AC  senna, 2 tablet, Per G Tube, Nightly  sildenafil, 20 mg, Per G Tube, TID  sodium chloride, 10 mL, Intravenous, Q12H      IV Meds:   amiodarone in dextrose 5%, 0.5 mg/min, Last Rate: 0.5 mg/min (24)  dexmedetomidine, 0.2-1.5 mcg/kg/hr, Last Rate: 0.2 mcg/kg/hr (24)  DOPamine, 2-20  mcg/kg/min  EPINEPHrine, 0.02-0.1 mcg/kg/min, Last Rate: 0.02 mcg/kg/min (11/04/24 2024)  lidocaine in D5W, 1 mg/min, Last Rate: Stopped (11/05/24 1135)  milrinone, 0.25-0.375 mcg/kg/min, Last Rate: 0.25 mcg/kg/min (11/06/24 0645)  niCARdipine, 5-15 mg/hr  norepinephrine, 0.02-0.2 mcg/kg/min, Last Rate: Stopped (11/03/24 0820)  phenylephrine, 0.2-2 mcg/kg/min, Last Rate: Stopped (11/05/24 1030)  propofol, 5-50 mcg/kg/min, Last Rate: 5 mcg/kg/min (11/06/24 0947)  vasopressin, 0.02-0.1 Units/min, Last Rate: Stopped (11/06/24 0325)        Results Reviewed:   I have personally reviewed the results from the time of this admission to 11/6/2024 09:52 EST     Results from last 7 days   Lab Units 11/06/24  0325 11/05/24  0314 11/04/24  1648 11/01/24  1755 11/01/24  1029   SODIUM mmol/L 139 139 140   < > 145   POTASSIUM mmol/L 4.1 4.4 3.8   < > 4.5   CHLORIDE mmol/L 107 106 105   < > 107   CO2 mmol/L 21.6* 24.0 23.0   < > 24.9   BUN mg/dL 30* 25* 25*   < > 17   CREATININE mg/dL 1.27 1.37* 1.46*   < > 1.94*   CALCIUM mg/dL 8.2* 8.3* 8.8   < > 8.5*   BILIRUBIN mg/dL 0.6 0.8  --   --  0.4   ALK PHOS U/L 112 110  --   --  71   ALT (SGPT) U/L <5 7  --   --  6   AST (SGOT) U/L 23 20  --   --  73*   GLUCOSE mg/dL 183* 183* 151*   < > 149*    < > = values in this interval not displayed.     Estimated Creatinine Clearance: 80.1 mL/min (by C-G formula based on SCr of 1.27 mg/dL).  Results from last 7 days   Lab Units 11/06/24  0325 11/05/24  0314 11/04/24  1648 11/04/24  1015 11/04/24  0313   MAGNESIUM mg/dL 2.2 2.1 2.4   < > 2.1   PHOSPHORUS mg/dL 2.0* 2.7  --   --  3.4    < > = values in this interval not displayed.     Results from last 7 days   Lab Units 11/06/24  0325 10/31/24  2348 10/31/24  0346   URIC ACID mg/dL 4.7 6.3 6.0     Results from last 7 days   Lab Units 11/06/24  0325 11/05/24  0314 11/04/24  0313 11/03/24  1728 11/03/24  1202   WBC 10*3/mm3 10.61 10.77 9.47 11.58* 14.44*   HEMOGLOBIN g/dL 8.1* 8.4* 8.7* 8.4* 7.9*    PLATELETS 10*3/mm3 72* 80* 102* 103* 92*     Results from last 7 days   Lab Units 10/31/24  0346 10/30/24  2325 10/30/24  2148 10/30/24  1916 10/30/24  1907   INR  1.38* 1.21* 1.10 2.3* 1.87*       Assessment / Plan     ASSESSMENT:  NATHANIEL, non-oliguric - ATN, due to cardiogenic and hemorrhagic shock with expected hemodynamic changes following major cardiac surgery.  Improving, has volume excess received 1 dose of diuretics with good urine output, creatinine down to 1.77, electrolyte within acceptable range.  Fluid overload.  Significant anasarca  Cardiogenic/hemorrhagic shock, s/p large amount of blood products in the OR.  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  Treated with penicillin by ID.    History of A-fib, unable to tolerate anticoagulation  Acute hypoxic resp failure, on the ventilator  Anemia  and thrombocytopenia, hemoglobin 8.1 platelet 72,000.    PLAN:  Continue the same treatment  I would leave the decision to restart the diuretics up to CT surgery.  Surveillance labs    I reviewed the chart and other providers notes, reviewed labs.  I discussed the case with the patient's nurse at the bedside.  Copied text in this note has been reviewed and is accurate as of 11/06/24.       Jass Keller MD  11/06/24  09:52 [facility]    [facility]  [phone]

## 2024-11-07 NOTE — PLAN OF CARE
Goal Outcome Evaluation:      Remains on epi, milrinone, amio, and sedation. On and off levo today to get MAP > 65 per nephrology, Dr. Florian would not like levo to go above 0.04 d/t previous rhythm issues. Followed commands on 10mcg/kg/min of Propofol. IV Bumex given twice on this shift with approximately 100mL/hr of urine, Nephrology aware and had no new orders at this time. Still weeping and has +3 edema. Flat CVP ranged from 15-20 today. Nitric weaned to 5 per CT surgery. Plan of care updated and discussed with family.

## 2024-11-07 NOTE — PROGRESS NOTES
" LOS: 15 days   Patient Care Team:  Juan Perez MD as PCP - General (Internal Medicine)    Chief Complaint: post op follow-up    Subjective  Intubated/sedated    CI 2.24  CVP 17  Drips: amio 0.5, epi 0.02, milrinone 0.25, propofol, precedex, insulin  Nitric oxide 20    Vital Signs  Temp:  [98.6 °F (37 °C)-99.5 °F (37.5 °C)] 98.6 °F (37 °C)  Heart Rate:  [] 95  Resp:  [13-17] 13  BP: ()/(38-57) 95/43  Arterial Line BP: ()/(41-64) 108/50  FiO2 (%):  [38 %-40 %] 39 %  Body mass index is 49.03 kg/m².    Intake/Output Summary (Last 24 hours) at 11/7/2024 0712  Last data filed at 11/7/2024 0700  Gross per 24 hour   Intake 3145.1 ml   Output 2425 ml   Net 720.1 ml     No intake/output data recorded.    Chest tube drainage last 8 hours: 25/10         11/05/24  0400 11/05/24  0458 11/06/24  0700   Weight: (!) 148 kg (325 lb 9.9 oz) (!) 148 kg (325 lb 9.9 oz) (!) 162 kg (358 lb 0.4 oz)         Objective:  General Appearance:  Ill-appearing (intubated/sedated).    Vital signs: (most recent): Blood pressure 94/48, pulse 92, temperature 99.3 °F (37.4 °C), resp. rate 13, height 182 cm (71.65\"), weight (!) 157 kg (345 lb 0.3 oz), SpO2 97%.  (Tmax 99.9).    Output: Producing urine and producing stool.    Lungs:  There are rales and rhonchi.  (50% FiO2)  Heart: Tachycardia.  Irregular rhythm.  (Tele: Afib)  Extremities: There is dependent edema.    Skin:  Warm and dry.  (Dressings c/d/I  Hematoma left groin)            Results Review:        WBC WBC   Date Value Ref Range Status   11/07/2024 10.53 3.40 - 10.80 10*3/mm3 Final   11/06/2024 10.61 3.40 - 10.80 10*3/mm3 Final   11/05/2024 10.77 3.40 - 10.80 10*3/mm3 Final      HGB Hemoglobin   Date Value Ref Range Status   11/07/2024 7.9 (L) 13.0 - 17.7 g/dL Final   11/06/2024 8.1 (L) 13.0 - 17.7 g/dL Final   11/05/2024 8.4 (L) 13.0 - 17.7 g/dL Final      HCT Hematocrit   Date Value Ref Range Status   11/07/2024 25.1 (L) 37.5 - 51.0 % Final   11/06/2024 25.0 " "(L) 37.5 - 51.0 % Final   11/05/2024 26.4 (L) 37.5 - 51.0 % Final      Platelets Platelets   Date Value Ref Range Status   11/07/2024 104 (L) 140 - 450 10*3/mm3 Final   11/06/2024 72 (L) 140 - 450 10*3/mm3 Final   11/05/2024 80 (L) 140 - 450 10*3/mm3 Final        PT/INR:    No results found for: \"PROTIME\"  /  No results found for: \"INR\"      Sodium Sodium   Date Value Ref Range Status   11/07/2024 137 136 - 145 mmol/L Final   11/06/2024 139 136 - 145 mmol/L Final   11/05/2024 139 136 - 145 mmol/L Final   11/04/2024 140 136 - 145 mmol/L Final   11/04/2024 139 136 - 145 mmol/L Final   11/04/2024 140 136 - 145 mmol/L Final      Potassium Potassium   Date Value Ref Range Status   11/07/2024 3.9 3.5 - 5.2 mmol/L Final   11/06/2024 4.1 3.5 - 5.2 mmol/L Final   11/05/2024 4.4 3.5 - 5.2 mmol/L Final   11/04/2024 3.8 3.5 - 5.2 mmol/L Final   11/04/2024 3.7 3.5 - 5.2 mmol/L Final   11/04/2024 3.7 3.5 - 5.2 mmol/L Final   11/04/2024 3.6 3.5 - 5.2 mmol/L Final     Comment:     Slight hemolysis detected by analyzer. Result may be falsely elevated.   11/04/2024 3.6 3.5 - 5.2 mmol/L Final      Chloride Chloride   Date Value Ref Range Status   11/07/2024 105 98 - 107 mmol/L Final   11/06/2024 107 98 - 107 mmol/L Final   11/05/2024 106 98 - 107 mmol/L Final   11/04/2024 105 98 - 107 mmol/L Final   11/04/2024 103 98 - 107 mmol/L Final   11/04/2024 103 98 - 107 mmol/L Final      Bicarbonate CO2   Date Value Ref Range Status   11/07/2024 20.6 (L) 22.0 - 29.0 mmol/L Final   11/06/2024 21.6 (L) 22.0 - 29.0 mmol/L Final   11/05/2024 24.0 22.0 - 29.0 mmol/L Final   11/04/2024 23.0 22.0 - 29.0 mmol/L Final   11/04/2024 24.6 22.0 - 29.0 mmol/L Final   11/04/2024 25.0 22.0 - 29.0 mmol/L Final      BUN BUN   Date Value Ref Range Status   11/07/2024 31 (H) 8 - 23 mg/dL Final   11/06/2024 30 (H) 8 - 23 mg/dL Final   11/05/2024 25 (H) 8 - 23 mg/dL Final   11/04/2024 25 (H) 8 - 23 mg/dL Final   11/04/2024 24 (H) 8 - 23 mg/dL Final   11/04/2024 23 " 8 - 23 mg/dL Final      Creatinine Creatinine   Date Value Ref Range Status   11/07/2024 1.15 0.76 - 1.27 mg/dL Final   11/06/2024 1.27 0.76 - 1.27 mg/dL Final   11/05/2024 1.37 (H) 0.76 - 1.27 mg/dL Final   11/04/2024 1.46 (H) 0.76 - 1.27 mg/dL Final   11/04/2024 1.55 (H) 0.76 - 1.27 mg/dL Final   11/04/2024 1.55 (H) 0.76 - 1.27 mg/dL Final      Calcium Calcium   Date Value Ref Range Status   11/07/2024 8.0 (L) 8.6 - 10.5 mg/dL Final   11/06/2024 8.2 (L) 8.6 - 10.5 mg/dL Final   11/05/2024 8.3 (L) 8.6 - 10.5 mg/dL Final   11/04/2024 8.8 8.6 - 10.5 mg/dL Final   11/04/2024 9.1 8.6 - 10.5 mg/dL Final   11/04/2024 8.8 8.6 - 10.5 mg/dL Final      Magnesium Magnesium   Date Value Ref Range Status   11/07/2024 2.0 1.6 - 2.4 mg/dL Final   11/06/2024 2.2 1.6 - 2.4 mg/dL Final   11/05/2024 2.1 1.6 - 2.4 mg/dL Final   11/04/2024 2.4 1.6 - 2.4 mg/dL Final   11/04/2024 2.1 1.6 - 2.4 mg/dL Final   11/04/2024 2.1 1.6 - 2.4 mg/dL Final          aspirin, 81 mg, Per G Tube, Daily  atorvastatin, 40 mg, Per G Tube, Nightly  chlorhexidine, 15 mL, Mouth/Throat, Q12H  enoxaparin, 30 mg, Subcutaneous, Q12H  Ergocalciferol, 100 mcg, Per G Tube, Daily  insulin regular, 2-7 Units, Subcutaneous, Q6H  lansoprazole, 15 mg, Per G Tube, Q AM  miconazole, 1 Application, Topical, Q12H  penicillin g (potassium), 4 Million Units, Intravenous, Q4H  potassium & sodium phosphates, 2 packet, Oral, TID AC  senna, 2 tablet, Per G Tube, Nightly  sildenafil, 20 mg, Per G Tube, TID  sodium chloride, 10 mL, Intravenous, Q12H      amiodarone in dextrose 5%, 0.5 mg/min, Last Rate: 0.5 mg/min (11/06/24 2134)  dexmedetomidine, 0.2-1.5 mcg/kg/hr, Last Rate: 0.4 mcg/kg/hr (11/07/24 0643)  DOPamine, 2-20 mcg/kg/min  EPINEPHrine, 0.02-0.1 mcg/kg/min, Last Rate: 0.02 mcg/kg/min (11/04/24 2024)  lidocaine in D5W, 1 mg/min, Last Rate: Stopped (11/05/24 1135)  milrinone, 0.25-0.375 mcg/kg/min, Last Rate: 0.25 mcg/kg/min (11/07/24 0643)  niCARdipine, 5-15  mg/hr  norepinephrine, 0.02-0.2 mcg/kg/min, Last Rate: Stopped (11/06/24 1745)  phenylephrine, 0.2-2 mcg/kg/min, Last Rate: Stopped (11/05/24 1030)  propofol, 5-50 mcg/kg/min, Last Rate: 10 mcg/kg/min (11/07/24 0059)  vasopressin, 0.02-0.1 Units/min, Last Rate: Stopped (11/06/24 0325)              Prosthetic aortic valve stenosis    Essential hypertension    Permanent atrial fibrillation    S/P AVR    ICD (implantable cardioverter-defibrillator), dual, in situ    Achilles tendon rupture    Bacteremia    Stenosis of prosthetic aortic valve    Anemia      Assessment & Plan    - Prosthetic aortic valve stenosis, h/o AVR (tissue)/maze/DANICA ligation (2014)- s/p reoperative sternotomy AV root replacement 27mm cryopreserved homograft with right nuvia cabrol, AICD removal, IABP placement- [facility] 10/31/2024  -Sternal closure ---11/2  - Possible endocarditis, likely need JOLIE   - Bacteremia, blood cultures positive strep mitis---on penicillin G  - Atrial fibrillation, unable to tolerate anticoagulation  - Hypertension  - NICM status post [employer] AICD  - Anemia, s/p EGD/colonoscopy with esophagitis, polyp removal  - Right achilles tendon tear--- walking boot and PT per ortho at [facility]  - Pre-diabetes -- improved at 5.3  -post op anemia- expected acute blood loss  -TCP plt count 72      POD #8  Remains intubated and sedated.  Following commands.  Making slow but steady progress.    Still on inotropic support milrinone 0.25 epi 0.02 nitric 25- will wean the nitric to 10-- hopefully will be able to discontinue today.  Hgb 7.9 this morning-- likely dilutional.  Will hold on transfusion. Creatinine improved. Agree with bumex today. Nephrology following. Will replete magnesium and add scheduled potassium with bumex. Remains in atrial fibrillation rate controlled. Tolerating tube feeds. Remains critically ill. Continue supportive care.        Samantha Magana, JAVI  11/07/24  07:12 EST

## 2024-11-07 NOTE — PROGRESS NOTES
[facility]  [phone]  Dr. Tawanda Clement     Subjective:  LOS: 15    Chief Complaint:  Ventilator management     Per nursing patient still waking up and following commands when pausing sedation.  No other acute concerns or complaints.    Objective   Vital Signs past 24hrs  Temp range: Temp (24hrs), Av °F (37.2 °C), Min:98.6 °F (37 °C), Max:99.5 °F (37.5 °C)    BP range: BP: ()/(38-57) 92/43  Pulse range: Heart Rate:  [] 88  Resp rate range: Resp:  [13-17] 13  Device (Oxygen Therapy): ventilator   Oxygen range:SpO2:  [95 %-100 %] 98 %   Mechanical Ventilator:Mode: VC/AC (24)    Physical Exam  Vitals reviewed.   Constitutional:       Interventions: He is sedated and intubated.   HENT:      Head: Normocephalic and atraumatic.   Eyes:      Extraocular Movements: Extraocular movements intact.      Conjunctiva/sclera: Conjunctivae normal.      Pupils: Pupils are equal, round, and reactive to light.   Cardiovascular:      Rate and Rhythm: Normal rate and regular rhythm.   Pulmonary:      Effort: Pulmonary effort is normal. He is intubated.      Breath sounds: Decreased air movement present. Decreased breath sounds present. No wheezing, rhonchi or rales.   Musculoskeletal:      Cervical back: Normal range of motion. No rigidity or tenderness.   Skin:     General: Skin is warm and dry.      Findings: No rash.       Results Review:    I have reviewed the laboratory and imaging data since the last note by [facility] physician.  My annotations are noted in assessment and plan.      Result Review:  I have personally reviewed the results from last note by [facility] physician to 2024 00:13 EST and agree with these findings:  [x]  Laboratory list / accordion  [x]  Microbiology  [x]  Radiology  [x]  EKG/Telemetry   [x]  Cardiology/Vascular   [x]  Pathology  [x]  Old records  []  Other:    Medication Review:  I have reviewed the current MAR.  My annotations are noted in assessment and  plan.    aspirin, 81 mg, Per G Tube, Daily  atorvastatin, 40 mg, Per G Tube, Nightly  chlorhexidine, 15 mL, Mouth/Throat, Q12H  enoxaparin, 30 mg, Subcutaneous, Q12H  Ergocalciferol, 100 mcg, Per G Tube, Daily  insulin regular, 2-7 Units, Subcutaneous, Q6H  lansoprazole, 15 mg, Per G Tube, Q AM  miconazole, 1 Application, Topical, Q12H  penicillin g (potassium), 4 Million Units, Intravenous, Q4H  potassium & sodium phosphates, 2 packet, Oral, TID AC  senna, 2 tablet, Per G Tube, Nightly  sildenafil, 20 mg, Per G Tube, TID  sodium chloride, 10 mL, Intravenous, Q12H        amiodarone in dextrose 5%, 0.5 mg/min, Last Rate: 0.5 mg/min (11/06/24 2134)  dexmedetomidine, 0.2-1.5 mcg/kg/hr, Last Rate: 0.4 mcg/kg/hr (11/06/24 1641)  DOPamine, 2-20 mcg/kg/min  EPINEPHrine, 0.02-0.1 mcg/kg/min, Last Rate: 0.02 mcg/kg/min (11/04/24 2024)  lidocaine in D5W, 1 mg/min, Last Rate: Stopped (11/05/24 1135)  milrinone, 0.25-0.375 mcg/kg/min, Last Rate: 0.25 mcg/kg/min (11/06/24 2222)  niCARdipine, 5-15 mg/hr  norepinephrine, 0.02-0.2 mcg/kg/min, Last Rate: Stopped (11/06/24 1745)  phenylephrine, 0.2-2 mcg/kg/min, Last Rate: Stopped (11/05/24 1030)  propofol, 5-50 mcg/kg/min, Last Rate: 10 mcg/kg/min (11/06/24 1852)  vasopressin, 0.02-0.1 Units/min, Last Rate: Stopped (11/06/24 0325)      Lines, Drains & Airways       Active LDAs       Name Placement date Placement time Site Days    Pulmonary Artery Catheter - Triple Lumen 10/30/24 Right Internal jugular 10/30/24  1026  created via procedure documentation  -- 7    CVC Double Lumen 10/30/24 Right Internal jugular 10/30/24  1026  created via procedure documentation  Internal jugular  7    Peripheral IV 10/29/24 1848 Anterior;Left Forearm 10/29/24  1848  Forearm  8    Peripheral IV 10/30/24 0940 Anterior;Right Forearm 10/30/24  0940  Forearm  7    NG/OG Tube Nasogastric  Left nostril 11/02/24  1330  Left nostril  4    Rectal Tube With balloon 11/03/24  0945  --  3    Urethral Catheter  Non-latex;Temperature probe 16 Fr. 10/30/24  1145  -- 7    ETT  10/30/24  1139  created via procedure documentation  -- 7    Arterial Line 10/30/24 Left Radial 10/30/24  1015  created via procedure documentation  Radial  7    Arterial Line 11/02/24 Right Radial 11/02/24  0723  created via procedure documentation  Radial  4    Pacer Wires 10/30/24  1635  Ventricular  7                  No active isolations  Diet Orders (active) (From admission, onward)       Start     Ordered    11/06/24 1221  Tube Feeding: Formula: Peptamen AF (Vital AF 1.2); Feeding Type: Continuous; Start at: 30 mL/hr; Then Advance By: 10 mL/hr; Every: 6 hours; To Goal Rate of: 75 mL/hr; Water Flush: 50 mL; Every: 2 hours; Water Bolus: None  Diet Effective Now         11/06/24 1221    11/02/24 1533  NPO Diet NPO Type: Tube Feeding  Diet Effective Now         11/02/24 1533    11/02/24 1259  Feeding Tube Insertion - Cortrak System  Once         11/02/24 1259                      Assessment  Postop respiratory failure  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  Cardiogenic and probable septic shock  CHF with pulmonary edema  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  Acute left lower extremity DVT on 10/24/2024  Esophagitis grade C by EGD on 9/30/2024 PPI ordered is ordered as oral not sure he can to be able to get this recommend consider changing that to IV.  Hyperglycemia mild on postop insulin  protocol        Plan  -Likely significant pulmonary edema causing respiratory failure.  - Recommend reducing tidal volumes to 6-7 mL/kg and can  increased respiratory rate to assist with ventilation.  Attempt to keep plats less than 30.   - Diuresis per cardiology and surgery  -Bronchoscopy (11/2/2024) see separate procedure note for findings.  Had some mild thick airway secretions that were therapeutically aspirated but otherwise nothing notable.  -Wean nitric oxide per cardiothoracic surgery  - Will continue to follow and assist with vent      Tawanda Clement DO   11/07/24  00:13 EST      Part of this note may be an electronic transcription/translation of spoken language to printed text using the Dragon Dictation System.

## 2024-11-07 NOTE — PLAN OF CARE
Goal Outcome Evaluation:         Stable overnight, CVP's 21-25, CI 2.7-3.1;Q2 turn, continuing care.

## 2024-11-07 NOTE — PROGRESS NOTES
LOS: 15 days   Patient Care Team:  Juan Perez MD as PCP - General (Internal Medicine)    Chief Complaint: Follow-up bioprosthetic AVR endocarditis, mitral regurgitation, persistent atrial fibrillation.    Interval History: He remains on epinephrine.  He is still on nitric oxide, milrinone, and amiodarone.  Off lidocaine, and no ventricular tachycardia.  Remains intubated and sedated.    Vital Signs:  Temp:  [98.6 °F (37 °C)-99.5 °F (37.5 °C)] 99.3 °F (37.4 °C)  Heart Rate:  [] 92  Resp:  [13-16] 13  BP: ()/(38-56) 94/48  Arterial Line BP: ()/(41-64) 102/45  FiO2 (%):  [38 %-40 %] 39 %    Intake/Output Summary (Last 24 hours) at 11/7/2024 1029  Last data filed at 11/7/2024 0800  Gross per 24 hour   Intake 3881.7 ml   Output 1930 ml   Net 1951.7 ml       Physical Exam:   General Appearance:    Intubated and sedated.  Appears critically ill.   Lungs:     Rhonchi bilaterally anteriorly.    Heart:    Regular rhythm with a normal rate.  Balloon pump audible.   Abdomen:     Soft, nontender, nondistended.    Extremities:    3+ generalized edema and anasarca.     Results Review:    Results from last 7 days   Lab Units 11/07/24  0352   SODIUM mmol/L 137   POTASSIUM mmol/L 3.9   CHLORIDE mmol/L 105   CO2 mmol/L 20.6*   BUN mg/dL 31*   CREATININE mg/dL 1.15   GLUCOSE mg/dL 159*   CALCIUM mg/dL 8.0*         Results from last 7 days   Lab Units 11/07/24  0352   WBC 10*3/mm3 10.53   HEMOGLOBIN g/dL 7.9*   HEMATOCRIT % 25.1*   PLATELETS 10*3/mm3 104*                 Results from last 7 days   Lab Units 11/07/24  0352   MAGNESIUM mg/dL 2.0             I reviewed the patient's new clinical results.        Assessment:  1.  Status post bioprosthetic aortic valve replacement in 2014  2.  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  Moderate to severe mitral regurgitation  4.  Status post  reoperative AV root replacement, mitral valve repair, ICD removal, SVG-RCA, and IABP placement on 10/30/2021  5.  Postoperative shock, multifactorial  6.  Acute kidney injury  7.  History of nonischemic cardiomyopathy with recovered ejection fraction  8.  Anemia, acute on chronic  9.  Postoperative thrombocytopenia  10.  Persistent atrial fibrillation  11.  Ventricular tachycardia postoperatively  12.  Grade C esophagitis by EGD on 9/30/2024  13.  Acute left lower extremity DVT on 10/24/2024  14.  Right Achilles tendon tear  15.  Postoperative junctional rhythm  16.  Hypoalbuminemia  17.  Thrombocytopenia  18.  Severe right ventricular enlargement and dysfunction post-op    Plan:  -Severe right ventricular enlargement and dysfunction noted postoperatively.  Held diuretics for the last several days to improve filling - back on Bumex 2 mg IV q8h.  Nephrology following.    -Volume overload is also multifactorial, including iatrogenic secondary to large blood product volume and hypoalbuminemia.    -He has had issues with recurrent ventricular tachycardia after stopping lidocaine.  Currently off lidocaine, and no recent ventricular tachycardia.  Continue amiodarone 0.5 mg/min.  Remains in rate controlled atrial fibrillation.    -Attempting to wean nitric oxide this morning.    -Slow progress, but remains critically ill.  Multiple consultants following.    Antwan Farmer MD  11/07/24  10:29 EST

## 2024-11-07 NOTE — PROGRESS NOTES
[facility] Progress Note      Patient Name: Woodrow Alejandro  : 1950  MRN: [id number]  Primary Care Physician:  Juan Perez MD  Date of admission: 10/23/2024    Subjective     Interval History:   F/u NATHANIEL  The patient currently on the ventilator, remains on dopamine only his CVP is elevated  Review of Systems:   Not obtainable    Objective     Vitals:   Temp:  [98.6 °F (37 °C)-99.5 °F (37.5 °C)] 98.8 °F (37.1 °C)  Heart Rate:  [] 84  Resp:  [13-16] 13  BP: ()/(38-56) 95/43  Arterial Line BP: ()/(41-64) 108/50  FiO2 (%):  [38 %-40 %] 39 %    Intake/Output Summary (Last 24 hours) at 2024 0844  Last data filed at 2024 0700  Gross per 24 hour   Intake 4119.1 ml   Output 2125 ml   Net 1994.1 ml       Physical Exam:    General Appearance: On the ventilator, obese, chronically ill, no acute distress  Neck: Mild JVD he has [place] catheter in the right IJ  Lungs: Bilateral rhonchi, breathing effort not labored  Heart: Irregularly irregular, no rub  Abdomen: soft, no guarding, nondistended  : mayorga present   Extremities: 4+ upper and lower extremity edema    Scheduled Meds:     aspirin, 81 mg, Per G Tube, Daily  atorvastatin, 40 mg, Per G Tube, Nightly  chlorhexidine, 15 mL, Mouth/Throat, Q12H  enoxaparin, 30 mg, Subcutaneous, Q12H  Ergocalciferol, 100 mcg, Per G Tube, Daily  insulin regular, 2-7 Units, Subcutaneous, Q6H  lansoprazole, 15 mg, Per G Tube, Q AM  miconazole, 1 Application, Topical, Q12H  penicillin g (potassium), 4 Million Units, Intravenous, Q4H  potassium & sodium phosphates, 2 packet, Oral, TID AC  senna, 2 tablet, Per G Tube, Nightly  sildenafil, 20 mg, Per G Tube, TID  sodium chloride, 10 mL, Intravenous, Q12H      IV Meds:   amiodarone in dextrose 5%, 0.5 mg/min, Last Rate: 0.5 mg/min (24 4477)  dexmedetomidine, 0.2-1.5 mcg/kg/hr, Last Rate: 0.4 mcg/kg/hr (24)  DOPamine, 2-20 mcg/kg/min  EPINEPHrine,  0.02-0.1 mcg/kg/min, Last Rate: 0.02 mcg/kg/min (11/07/24 0839)  lidocaine in D5W, 1 mg/min, Last Rate: Stopped (11/05/24 1135)  milrinone, 0.25-0.375 mcg/kg/min, Last Rate: 0.25 mcg/kg/min (11/07/24 0643)  niCARdipine, 5-15 mg/hr  norepinephrine, 0.02-0.2 mcg/kg/min, Last Rate: Stopped (11/06/24 1745)  phenylephrine, 0.2-2 mcg/kg/min, Last Rate: Stopped (11/05/24 1030)  propofol, 5-50 mcg/kg/min, Last Rate: 10 mcg/kg/min (11/07/24 0059)  vasopressin, 0.02-0.1 Units/min, Last Rate: Stopped (11/06/24 0325)        Results Reviewed:   I have personally reviewed the results from the time of this admission to 11/7/2024 08:44 EST     Results from last 7 days   Lab Units 11/07/24  0352 11/06/24  0325 11/05/24  0314 11/01/24  1755 11/01/24  1029   SODIUM mmol/L 137 139 139   < > 145   POTASSIUM mmol/L 3.9 4.1 4.4   < > 4.5   CHLORIDE mmol/L 105 107 106   < > 107   CO2 mmol/L 20.6* 21.6* 24.0   < > 24.9   BUN mg/dL 31* 30* 25*   < > 17   CREATININE mg/dL 1.15 1.27 1.37*   < > 1.94*   CALCIUM mg/dL 8.0* 8.2* 8.3*   < > 8.5*   BILIRUBIN mg/dL  --  0.6 0.8  --  0.4   ALK PHOS U/L  --  112 110  --  71   ALT (SGPT) U/L  --  <5 7  --  6   AST (SGOT) U/L  --  23 20  --  73*   GLUCOSE mg/dL 159* 183* 183*   < > 149*    < > = values in this interval not displayed.     Estimated Creatinine Clearance: 86.9 mL/min (by C-G formula based on SCr of 1.15 mg/dL).  Results from last 7 days   Lab Units 11/07/24  0352 11/06/24  0325 11/05/24  0314   MAGNESIUM mg/dL 2.0 2.2 2.1   PHOSPHORUS mg/dL 2.9 2.0* 2.7     Results from last 7 days   Lab Units 11/07/24  0352 11/06/24  0325 10/31/24  2348   URIC ACID mg/dL 5.1 4.7 6.3     Results from last 7 days   Lab Units 11/07/24  0352 11/06/24  0325 11/05/24  0314 11/04/24  0313 11/03/24  1728   WBC 10*3/mm3 10.53 10.61 10.77 9.47 11.58*   HEMOGLOBIN g/dL 7.9* 8.1* 8.4* 8.7* 8.4*   PLATELETS 10*3/mm3 104* 72* 80* 102* 103*             Assessment / Plan     ASSESSMENT:  NATHANIEL, non-oliguric - ATN, due to  cardiogenic and hemorrhagic shock with expected hemodynamic changes following major cardiac surgery.  Improving, has volume excess, creatinine 1.15, electrolyte within acceptable  Fluid overload.  Significant anasarca  Cardiogenic/hemorrhagic shock, s/p large amount of blood products in the OR.  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  Treated with penicillin by ID.    History of A-fib, unable to tolerate anticoagulation  Acute hypoxic resp failure, on the ventilator  Anemia  and thrombocytopenia, hemoglobin 7 point platelet 104,000.    PLAN:  Continue the same treatment  Diurese with IV bumetanide  Surveillance labs    I reviewed the chart and other providers notes, reviewed labs.  I discussed the case with the patient's nurse at the bedside.  Copied text in this note has been reviewed and is accurate as of 11/07/24.       Jass Keller MD  11/07/24  08:44 [facility]    [facility]  [phone]

## 2024-11-07 NOTE — PROGRESS NOTES
LOS: 15 days     Chief Complaint: Endocarditis    Interval History: Pressor requirements continue to slowly decrease.  Remains intubated and sedated.  Tolerating antibiotics.    Vital Signs  Temp:  [98.6 °F (37 °C)-99.5 °F (37.5 °C)] 98.6 °F (37 °C)  Heart Rate:  [] 95  Resp:  [13-16] 13  BP: ()/(38-56) 95/43  Arterial Line BP: ()/(41-64) 108/50  FiO2 (%):  [38 %-40 %] 39 %    Physical Exam:  General: Intubated and sedated  HEENT: ET tube in place  Respiratory: Coarse bilateral breath sounds on the ventilator.  : Ugarte catheter  Skin: Operative dressing over the anterior chest clean and intact.  Access: Right IJ CVC.  Arterial line.  Peripheral line.    Antibiotics:  Anti-Infectives (From admission, onward)      Ordered     Dose/Rate Route Frequency Start Stop    11/02/24 0918  vancomycin (VANCOCIN) 1,000 mg in sodium chloride 0.9 % 250 mL IVPB-VTB        Ordering Provider: Antwan Farmer MD    1,000 mg  250 mL/hr over 60 Minutes Intravenous Every 24 Hours 11/02/24 1015 11/03/24 1138    11/02/24 0912  Pharmacy to dose vancomycin        Ordering Provider: Samantha Salvador APRN     Does not apply Continuous PRN 11/02/24 0912 11/04/24 0911    10/30/24 2306  ceFAZolin 2000 mg IVPB in 100 mL NS (MBP)        Ordering Provider: Samantha Salvador APRN    2,000 mg  over 30 Minutes Intravenous Every 8 Hours 10/31/24 0000 11/01/24 0922    10/29/24 1518  ceFAZolin 2000 mg IVPB in 100 mL NS (MBP)        Ordering Provider: Bryant Mcclure PA-C    2,000 mg  over 30 Minutes Intravenous Once 10/30/24 0600 10/30/24 1936    10/28/24 1034  vancomycin IVPB 2000 mg in 0.9% Sodium Chloride 500 mL        Ordering Provider: Samantha Salvador APRN    15 mg/kg × 142 kg Intravenous Once 10/28/24 1045 10/28/24 1356    10/23/24 1709  penicillin G potassium 4 Million Units in sodium chloride 0.9 % 100 mL IVPB        Ordering Provider: Gregg Diaz,     4 Million Units  over 30 Minutes Intravenous Every 4  Hours Scheduled 10/23/24 2000 12/04/24 0727             Results Review:     I reviewed the patient's new clinical results.    Lab Results   Component Value Date    WBC 10.53 11/07/2024    HGB 7.9 (L) 11/07/2024    HCT 25.1 (L) 11/07/2024    MCV 90.3 11/07/2024     (L) 11/07/2024     Lab Results   Component Value Date    GLUCOSE 159 (H) 11/07/2024    BUN 31 (H) 11/07/2024    CREATININE 1.15 11/07/2024    BCR 27.0 (H) 11/07/2024    CO2 20.6 (L) 11/07/2024    CALCIUM 8.0 (L) 11/07/2024    ALBUMIN 2.4 (L) 11/07/2024    LABIL2 1.4 06/27/2019    AST 23 11/06/2024    ALT <5 11/06/2024       Microbiology:  10/3 COVID-negative  10/10 COVID-negative  10/14 COVID-negative  10/15 blood cultures 2 out of 2 strep mitis  10/17 COVID-negative  10/17 blood cultures 2 out of 2 strep mitis  10/21 COVID-negative  10/23 blood cultures no growth today  10/30 operative cultures from the aortic valve no growth     Assessment    #Strep mitis endocarditis  #Status post bioprosthetic aortic valve replacement 2014  #Status post aortic root replacement in the setting of prosthetic aortic valve endocarditis and annular abscess on 10/30  #Status post removal of pacemaker generator and intracardiac portion of the leads with retained venous leads  #Atrial fibrillation  #Achilles tendon rupture  #NATHANIEL    Continue penicillin G 4,000,000 units every 4 hours for strep mitis endocarditis.  Antibiotic duration will be 6 weeks through December 4.  Ultimately will need antibiotic monitoring with weekly CMP and CBC once more stable.    ID will follow.

## 2024-11-08 NOTE — PLAN OF CARE
Goal Outcome Evaluation:               Follows commands when sedation paused. Remains in afib rate controlled in 80s. 40% Fio2. TF continued. BS treated with sliding scale. Wound dressings changed. Turned Q2 hours. On epi, milrinone, levo, amio, and sedation. Care on going.

## 2024-11-08 NOTE — PROGRESS NOTES
[facility]  [phone]  Dr. Tawanda Clement     Subjective:  LOS: 16    Chief Complaint:  Ventilator management     Patient was able to be awakened and followed commands.  Weaned off nitric oxide overnight.  On very low-dose pressors.  Vent has been weaned to low settings.  Discussed with nursing at the bedside.    Objective   Vital Signs past 24hrs  Temp range: Temp (24hrs), Av.1 °F (37.8 °C), Min:99.1 °F (37.3 °C), Max:100.6 °F (38.1 °C)    BP range: BP: ()/(41-68) 111/45  Pulse range: Heart Rate:  [] 95  Resp rate range: Resp:  [16-25] 17  Device (Oxygen Therapy): ventilator   Oxygen range:SpO2:  [97 %-98 %] 97 %   Mechanical Ventilator:Mode: VC/AC (24 1236)    Physical Exam  Vitals reviewed.   Constitutional:       Interventions: He is sedated and intubated.   HENT:      Head: Normocephalic and atraumatic.   Eyes:      Extraocular Movements: Extraocular movements intact.      Conjunctiva/sclera: Conjunctivae normal.      Pupils: Pupils are equal, round, and reactive to light.   Cardiovascular:      Rate and Rhythm: Normal rate and regular rhythm.   Pulmonary:      Effort: Pulmonary effort is normal. He is intubated.      Breath sounds: Decreased air movement present. Decreased breath sounds present. No wheezing, rhonchi or rales.   Musculoskeletal:      Cervical back: Normal range of motion. No rigidity or tenderness.   Skin:     General: Skin is warm and dry.      Findings: No rash.       Results Review:    I have reviewed the laboratory and imaging data since the last note by [facility] physician.  My annotations are noted in assessment and plan.      Result Review:  I have personally reviewed the results from last note by [facility] physician to 2024 12:58 EST and agree with these findings:  [x]  Laboratory list / accordion  [x]  Microbiology  [x]  Radiology  [x]  EKG/Telemetry   [x]  Cardiology/Vascular   [x]  Pathology  [x]  Old records  []  Other:    Medication Review:  I  have reviewed the current MAR.  My annotations are noted in assessment and plan.    aspirin, 81 mg, Per G Tube, Daily  atorvastatin, 40 mg, Per G Tube, Nightly  bumetanide, 2 mg, Intravenous, Q8H  chlorhexidine, 15 mL, Mouth/Throat, Q12H  enoxaparin, 40 mg, Subcutaneous, Q12H  Ergocalciferol, 100 mcg, Per G Tube, Daily  insulin regular, 2-7 Units, Subcutaneous, Q6H  lansoprazole, 15 mg, Per G Tube, Q AM  miconazole, 1 Application, Topical, Q12H  penicillin g (potassium), 4 Million Units, Intravenous, Q4H  phosphorus, 500 mg, Nasogastric, 4x Daily  potassium chloride, 40 mEq, Oral, TID  senna, 2 tablet, Per G Tube, Nightly  sildenafil, 20 mg, Per G Tube, TID  sodium chloride, 10 mL, Intravenous, Q12H        amiodarone in dextrose 5%, 0.5 mg/min, Last Rate: 0.5 mg/min (11/08/24 0814)  dexmedetomidine, 0.2-1.5 mcg/kg/hr, Last Rate: 0.4 mcg/kg/hr (11/08/24 1033)  DOPamine, 2-20 mcg/kg/min  EPINEPHrine, 0.02-0.1 mcg/kg/min, Last Rate: 0.02 mcg/kg/min (11/07/24 0839)  lidocaine in D5W, 1 mg/min, Last Rate: Stopped (11/05/24 1135)  milrinone, 0.25-0.375 mcg/kg/min, Last Rate: 0.25 mcg/kg/min (11/08/24 0814)  niCARdipine, 5-15 mg/hr  norepinephrine, 0.02-0.2 mcg/kg/min, Last Rate: 0.04 mcg/kg/min (11/07/24 1745)  phenylephrine, 0.2-2 mcg/kg/min, Last Rate: Stopped (11/05/24 1030)  propofol, 5-50 mcg/kg/min, Last Rate: 10 mcg/kg/min (11/08/24 1232)  vasopressin, 0.02-0.1 Units/min, Last Rate: 0.02 Units/min (11/08/24 1232)      Lines, Drains & Airways       Active LDAs       Name Placement date Placement time Site Days    Pulmonary Artery Catheter - Triple Lumen 10/30/24 Right Internal jugular 10/30/24  1026  created via procedure documentation  -- 9    CVC Double Lumen 10/30/24 Right Internal jugular 10/30/24  1026  created via procedure documentation  Internal jugular  9    Peripheral IV 10/29/24 1848 Anterior;Left Forearm 10/29/24  1848  Forearm  9    Peripheral IV 10/30/24 0940 Anterior;Right Forearm 10/30/24  0940   Forearm  9    NG/OG Tube Nasogastric  Left nostril 11/02/24  1330  Left nostril  6    Urethral Catheter Temperature probe 16 Fr. 11/08/24  0838  -- less than 1    ETT  10/30/24  1139  created via procedure documentation  -- 9    Arterial Line 11/02/24 Right Radial 11/02/24  0723  created via procedure documentation  Radial  6    Pacer Wires 10/30/24  1635  Ventricular  8                  No active isolations  Diet Orders (active) (From admission, onward)       Start     Ordered    11/08/24 0946  Tube Feeding: Formula: Peptamen AF (Vital AF 1.2); Feeding Type: Continuous; Start at: 30 mL/hr; Then Advance By: 10 mL/hr; Every: 6 hours; To Goal Rate of: 75 mL/hr; Water Flush: 50 mL; Every: 4 hours; Water Bolus: None  Diet Effective Now         11/08/24 0947    11/02/24 1533  NPO Diet NPO Type: Tube Feeding  Diet Effective Now         11/02/24 1533    11/02/24 1259  Feeding Tube Insertion - Cortrak System  Once         11/02/24 1259                      Assessment  Postop respiratory failure  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  Cardiogenic and probable septic shock  CHF with pulmonary edema  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  Acute left lower extremity DVT on 10/24/2024  Esophagitis grade C by EGD on 9/30/2024 PPI ordered is ordered as oral not sure he can to be able to get this recommend consider changing that to IV.  Hyperglycemia mild on postop insulin  protocol        Plan  -Likely significant pulmonary edema causing respiratory failure.  - Recommend reducing tidal volumes to 6-7 mL/kg and can  increased respiratory rate to assist with ventilation.  Attempt to keep plats less than 30.   - Diuresis per cardiology and surgery  -Bronchoscopy (11/2/2024) see separate procedure note for findings.  Had some mild thick airway secretions that were therapeutically aspirated but otherwise nothing notable.  - Nitric oxide weaned off  - Will continue to follow and assist with vent, hopeful for attempt at SBT and extubation in the next 24-48 hours.       Tawanda Clement,    11/08/24  12:58 EST      Part of this note may be an electronic transcription/translation of spoken language to printed text using the Dragon Dictation System.

## 2024-11-08 NOTE — PROGRESS NOTES
LOS: 16 days     Chief Complaint: Endocarditis    Interval History: Continues to tolerate antibiotics.  Remains afebrile.    Vital Signs  Temp:  [99.1 °F (37.3 °C)-100.6 °F (38.1 °C)] 99.1 °F (37.3 °C)  Heart Rate:  [] 88  Resp:  [16-25] 17  BP: ()/(41-68) 120/62  Arterial Line BP: ()/(41-56) 136/56  FiO2 (%):  [39 %-40 %] 40 %    Physical Exam:  General: Intubated and sedated  HEENT: ET tube in place  Respiratory: Coarse bilateral breath sounds on the ventilator.  : Ugarte catheter  Skin: Operative dressing over the anterior chest clean and intact.  Access: Right IJ CVC.  Arterial line.  Peripheral line.    Antibiotics:  Anti-Infectives (From admission, onward)      Ordered     Dose/Rate Route Frequency Start Stop    11/02/24 0918  vancomycin (VANCOCIN) 1,000 mg in sodium chloride 0.9 % 250 mL IVPB-VTB        Ordering Provider: Antwan Farmer MD    1,000 mg  250 mL/hr over 60 Minutes Intravenous Every 24 Hours 11/02/24 1015 11/03/24 1138    11/02/24 0912  Pharmacy to dose vancomycin        Ordering Provider: Samantha Salvador APRN     Does not apply Continuous PRN 11/02/24 0912 11/04/24 0911    10/30/24 2306  ceFAZolin 2000 mg IVPB in 100 mL NS (MBP)        Ordering Provider: Samantha Salvador APRN    2,000 mg  over 30 Minutes Intravenous Every 8 Hours 10/31/24 0000 11/01/24 0922    10/29/24 1518  ceFAZolin 2000 mg IVPB in 100 mL NS (MBP)        Ordering Provider: Bryant Mcclure PA-C    2,000 mg  over 30 Minutes Intravenous Once 10/30/24 0600 10/30/24 1936    10/28/24 1034  vancomycin IVPB 2000 mg in 0.9% Sodium Chloride 500 mL        Ordering Provider: Samantha Salvador APRN    15 mg/kg × 142 kg Intravenous Once 10/28/24 1045 10/28/24 1356    10/23/24 1709  penicillin G potassium 4 Million Units in sodium chloride 0.9 % 100 mL IVPB        Ordering Provider: Gregg Diaz DO    4 Million Units  over 30 Minutes Intravenous Every 4 Hours Scheduled 10/23/24 2000 12/04/24 7800              Results Review:     I reviewed the patient's new clinical results.    Lab Results   Component Value Date    WBC 11.59 (H) 11/08/2024    HGB 8.6 (L) 11/08/2024    HCT 26.8 (L) 11/08/2024    MCV 89.9 11/08/2024     11/08/2024     Lab Results   Component Value Date    GLUCOSE 175 (H) 11/08/2024    BUN 31 (H) 11/08/2024    CREATININE 1.19 11/08/2024    BCR 26.1 (H) 11/08/2024    CO2 20.3 (L) 11/08/2024    CALCIUM 7.8 (L) 11/08/2024    ALBUMIN 2.2 (L) 11/08/2024    LABIL2 1.4 06/27/2019    AST 30 11/08/2024    ALT <5 11/08/2024       Microbiology:  10/3 COVID-negative  10/10 COVID-negative  10/14 COVID-negative  10/15 blood cultures 2 out of 2 strep mitis  10/17 COVID-negative  10/17 blood cultures 2 out of 2 strep mitis  10/21 COVID-negative  10/23 blood cultures no growth today  10/30 operative cultures from the aortic valve no growth     Assessment    #Strep mitis endocarditis  #Status post bioprosthetic aortic valve replacement 2014  #Status post aortic root replacement in the setting of prosthetic aortic valve endocarditis and annular abscess on 10/30  #Status post removal of pacemaker generator and intracardiac portion of the leads with retained venous leads  #Atrial fibrillation  #Achilles tendon rupture  #NATHANIEL    Continue penicillin G 4,000,000 units every 4 hours for strep mitis endocarditis.  Antibiotic duration will be 6 weeks with end date of December 4.    ID will plan to see again on 11/11.  Please call with any questions over the weekend.

## 2024-11-08 NOTE — PROGRESS NOTES
" LOS: 16 days   Patient Care Team:  Juan Perez MD as PCP - General (Internal Medicine)    Chief Complaint: post op follow-up    Subjective  Intubated/sedated    CI 2.24  CVP 17  Drips: amio 0.5, epi 0.02, milrinone 0.25, propofol, precedex, insulin  Nitric oxide 5    Vital Signs  Temp:  [98.6 °F (37 °C)-100.6 °F (38.1 °C)] 100.2 °F (37.9 °C)  Heart Rate:  [] 93  Resp:  [16-25] 17  BP: ()/(41-56) 99/41  Arterial Line BP: ()/(41-52) 117/47  FiO2 (%):  [39 %-40 %] 40 %  Body mass index is 48.27 kg/m².    Intake/Output Summary (Last 24 hours) at 11/8/2024 0638  Last data filed at 11/8/2024 0528  Gross per 24 hour   Intake 7026 ml   Output 2135 ml   Net 4891 ml     I/O this shift:  In: 2288 [I.V.:1161; Other:401; NG/GT:726]  Out: 725 [Urine:725]    Chest tube drainage last 8 hours: 25/10         11/07/24  0600 11/07/24  1102 11/08/24  0500   Weight: (!) 157 kg (345 lb 0.3 oz) (!) 156 kg (345 lb) (!) 157 kg (346 lb 2 oz)         Objective:  General Appearance:  Ill-appearing (intubated/sedated).    Vital signs: (most recent): Blood pressure 143/57, pulse 92, temperature 99.1 °F (37.3 °C), resp. rate 17, height 180.3 cm (71\"), weight (!) 157 kg (346 lb 2 oz), SpO2 97%.  (Tmax 99.9).    Output: Producing urine and producing stool.    Lungs:  There are rales and rhonchi.  (50% FiO2)  Heart: Tachycardia.  Irregular rhythm.  (Tele: Afib)  Extremities: There is dependent edema.    Skin:  Warm and dry.  (Dressings c/d/I  Hematoma left groin)            Results Review:        WBC WBC   Date Value Ref Range Status   11/08/2024 11.59 (H) 3.40 - 10.80 10*3/mm3 Final   11/07/2024 11.06 (H) 3.40 - 10.80 10*3/mm3 Final   11/07/2024 10.53 3.40 - 10.80 10*3/mm3 Final   11/06/2024 10.61 3.40 - 10.80 10*3/mm3 Final      HGB Hemoglobin   Date Value Ref Range Status   11/08/2024 8.6 (L) 13.0 - 17.7 g/dL Final   11/07/2024 8.0 (L) 13.0 - 17.7 g/dL Final   11/07/2024 7.9 (L) 13.0 - 17.7 g/dL Final   11/06/2024 " "8.1 (L) 13.0 - 17.7 g/dL Final      HCT Hematocrit   Date Value Ref Range Status   11/08/2024 26.8 (L) 37.5 - 51.0 % Final   11/07/2024 25.0 (L) 37.5 - 51.0 % Final   11/07/2024 25.1 (L) 37.5 - 51.0 % Final   11/06/2024 25.0 (L) 37.5 - 51.0 % Final      Platelets Platelets   Date Value Ref Range Status   11/08/2024 161 140 - 450 10*3/mm3 Final   11/07/2024 122 (L) 140 - 450 10*3/mm3 Final   11/07/2024 104 (L) 140 - 450 10*3/mm3 Final   11/06/2024 72 (L) 140 - 450 10*3/mm3 Final        PT/INR:    No results found for: \"PROTIME\"  /  No results found for: \"INR\"      Sodium Sodium   Date Value Ref Range Status   11/08/2024 140 136 - 145 mmol/L Final   11/07/2024 137 136 - 145 mmol/L Final   11/06/2024 139 136 - 145 mmol/L Final      Potassium Potassium   Date Value Ref Range Status   11/08/2024 4.3 3.5 - 5.2 mmol/L Final     Comment:     Slight hemolysis detected by analyzer. Result may be falsely elevated.   11/07/2024 3.9 3.5 - 5.2 mmol/L Final   11/06/2024 4.1 3.5 - 5.2 mmol/L Final      Chloride Chloride   Date Value Ref Range Status   11/08/2024 108 (H) 98 - 107 mmol/L Final   11/07/2024 105 98 - 107 mmol/L Final   11/06/2024 107 98 - 107 mmol/L Final      Bicarbonate CO2   Date Value Ref Range Status   11/08/2024 20.3 (L) 22.0 - 29.0 mmol/L Final   11/07/2024 20.6 (L) 22.0 - 29.0 mmol/L Final   11/06/2024 21.6 (L) 22.0 - 29.0 mmol/L Final      BUN BUN   Date Value Ref Range Status   11/08/2024 31 (H) 8 - 23 mg/dL Final   11/07/2024 31 (H) 8 - 23 mg/dL Final   11/06/2024 30 (H) 8 - 23 mg/dL Final      Creatinine Creatinine   Date Value Ref Range Status   11/08/2024 1.19 0.76 - 1.27 mg/dL Final   11/07/2024 1.15 0.76 - 1.27 mg/dL Final   11/06/2024 1.27 0.76 - 1.27 mg/dL Final      Calcium Calcium   Date Value Ref Range Status   11/08/2024 7.8 (L) 8.6 - 10.5 mg/dL Final   11/07/2024 8.0 (L) 8.6 - 10.5 mg/dL Final   11/06/2024 8.2 (L) 8.6 - 10.5 mg/dL Final      Magnesium Magnesium   Date Value Ref Range Status "   11/08/2024 2.3 1.6 - 2.4 mg/dL Final   11/07/2024 2.0 1.6 - 2.4 mg/dL Final   11/06/2024 2.2 1.6 - 2.4 mg/dL Final          aspirin, 81 mg, Per G Tube, Daily  atorvastatin, 40 mg, Per G Tube, Nightly  chlorhexidine, 15 mL, Mouth/Throat, Q12H  enoxaparin, 30 mg, Subcutaneous, Q12H  Ergocalciferol, 100 mcg, Per G Tube, Daily  insulin regular, 2-7 Units, Subcutaneous, Q6H  lansoprazole, 15 mg, Per G Tube, Q AM  miconazole, 1 Application, Topical, Q12H  penicillin g (potassium), 4 Million Units, Intravenous, Q4H  potassium & sodium phosphates, 2 packet, Oral, Once  potassium chloride, 40 mEq, Oral, TID  senna, 2 tablet, Per G Tube, Nightly  sildenafil, 20 mg, Per G Tube, TID  sodium chloride, 10 mL, Intravenous, Q12H      amiodarone in dextrose 5%, 0.5 mg/min, Last Rate: 0.5 mg/min (11/07/24 2128)  dexmedetomidine, 0.2-1.5 mcg/kg/hr, Last Rate: 0.5 mcg/kg/hr (11/08/24 0408)  DOPamine, 2-20 mcg/kg/min  EPINEPHrine, 0.02-0.1 mcg/kg/min, Last Rate: 0.02 mcg/kg/min (11/07/24 0839)  lidocaine in D5W, 1 mg/min, Last Rate: Stopped (11/05/24 1135)  milrinone, 0.25-0.375 mcg/kg/min, Last Rate: 0.25 mcg/kg/min (11/08/24 0049)  niCARdipine, 5-15 mg/hr  norepinephrine, 0.02-0.2 mcg/kg/min, Last Rate: 0.04 mcg/kg/min (11/07/24 1745)  phenylephrine, 0.2-2 mcg/kg/min, Last Rate: Stopped (11/05/24 1030)  propofol, 5-50 mcg/kg/min, Last Rate: 10 mcg/kg/min (11/08/24 0049)  vasopressin, 0.02-0.1 Units/min, Last Rate: Stopped (11/06/24 4695)              Prosthetic aortic valve stenosis    Essential hypertension    Permanent atrial fibrillation    S/P AVR    ICD (implantable cardioverter-defibrillator), dual, in situ    Achilles tendon rupture    Bacteremia    Stenosis of prosthetic aortic valve    Anemia      Assessment & Plan    - Prosthetic aortic valve stenosis, h/o AVR (tissue)/maze/DANICA ligation (2014)- s/p reoperative sternotomy AV root replacement 27mm cryopreserved homograft with right nuvia cabrol, AICD removal, IABP placement-  Chiqui 10/31/2024  -Sternal closure ---11/2  - Possible endocarditis, likely need JOLIE   - Bacteremia, blood cultures positive strep mitis---on penicillin G  - Atrial fibrillation, unable to tolerate anticoagulation  - Hypertension  - NICM status post [employer] AICD  - Anemia, s/p EGD/colonoscopy with esophagitis, polyp removal  - Right achilles tendon tear--- walking boot and PT per ortho at [facility]  - Pre-diabetes -- improved at 5.3  -post op anemia- expected acute blood loss  -TCP plt count 72      POD #9  Remains intubated and sedated.  Following commands.  Making slow but steady progress.    Still on inotropic support milrinone 0.25 epi 0.02, back on levophed for pressure 0.04, nitric 5, will discontinue nitric, repeat TTE 2 hours after.  Hgb stable. Creatinine stable. plans for 2mg Q8 of bumex. Nephrology following.  All drips have been double concentrated.  Will reach our to nutrition to see if his tube feeding can be switched to something with less volume. His prealbumin 6.9. Remains in atrial fibrillation rate controlled. Central line and swan have been in for 8 days, unfortunately will need swan to monitor hemodynamics.  Will exchange mayorga catheter today.  Tolerating tube feeds. Remains critically ill. Continue supportive care.        JAVI Nichole  11/08/24  06:38 EST

## 2024-11-08 NOTE — PROGRESS NOTES
LOS: 16 days   Patient Care Team:  Juan Perez MD as PCP - General (Internal Medicine)    Chief Complaint: Follow-up bioprosthetic AVR endocarditis, mitral regurgitation, persistent atrial fibrillation.    Interval History: He was back on Levophed this morning in addition to epinephrine.  He is off nitrous oxide.  Remains on amiodarone.  He is off lidocaine, not having any ventricular tachycardia.  Intubated and sedated.  Atrial fibrillation with a controlled rate.    Vital Signs:  Temp:  [99.1 °F (37.3 °C)-100.6 °F (38.1 °C)] 99.1 °F (37.3 °C)  Heart Rate:  [] 95  Resp:  [16-25] 17  BP: ()/(41-68) 111/45  Arterial Line BP: (101-156)/(42-59) 156/59  FiO2 (%):  [30 %-40 %] 30 %    Intake/Output Summary (Last 24 hours) at 11/8/2024 1232  Last data filed at 11/8/2024 0700  Gross per 24 hour   Intake 4550 ml   Output 2225 ml   Net 2325 ml       Physical Exam:   General Appearance:    Intubated and sedated.  Appears critically ill.   Lungs:     Rhonchi bilaterally anteriorly.    Heart:  Irregularly irregular rhythm with a normal rate. II/VI SM throughout.   Abdomen:     Soft, nontender, nondistended.    Extremities:    3+ generalized edema and anasarca.     Results Review:    Results from last 7 days   Lab Units 11/08/24  0343   SODIUM mmol/L 140   POTASSIUM mmol/L 4.3   CHLORIDE mmol/L 108*   CO2 mmol/L 20.3*   BUN mg/dL 31*   CREATININE mg/dL 1.19   GLUCOSE mg/dL 175*   CALCIUM mg/dL 7.8*         Results from last 7 days   Lab Units 11/08/24  0343   WBC 10*3/mm3 11.59*   HEMOGLOBIN g/dL 8.6*   HEMATOCRIT % 26.8*   PLATELETS 10*3/mm3 161                 Results from last 7 days   Lab Units 11/08/24  0343   MAGNESIUM mg/dL 2.3             I reviewed the patient's new clinical results.        Assessment:  1.  Status post bioprosthetic aortic valve replacement in 2014  2.  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  Moderate to severe mitral regurgitation  4.  Status post reoperative AV root replacement, mitral valve repair, ICD removal, SVG-RCA, and IABP placement on 10/30/2021  5.  Postoperative shock, multifactorial  6.  Acute kidney injury  7.  History of nonischemic cardiomyopathy with recovered ejection fraction  8.  Anemia, acute on chronic  9.  Postoperative thrombocytopenia  10.  Persistent atrial fibrillation  11.  Ventricular tachycardia postoperatively  12.  Grade C esophagitis by EGD on 9/30/2024  13.  Acute left lower extremity DVT on 10/24/2024  14.  Right Achilles tendon tear  15.  Postoperative junctional rhythm  16.  Hypoalbuminemia  17.  Thrombocytopenia  18.  Severe right ventricular enlargement and dysfunction post-op    Plan:  -Off nitrous oxide.  Limited echo this morning unchanged with severe right ventricular enlargement and moderately reduced function.    -Continuing IV Bumex today.  His right ventricle did not tolerate diuretics earlier in his hospital course.  Continue to watch from a hemodynamic perspective.  Nephrology is following.    -Volume overload is also multifactorial, including iatrogenic secondary to large blood product volume and hypoalbuminemia.    -He has had issues with recurrent ventricular tachycardia after stopping lidocaine.  Currently off lidocaine, and no recent ventricular tachycardia.  Continue amiodarone 0.5 mg/min.  Remains in rate controlled atrial fibrillation.    -Back on Levophed, as well as continuing epinephrine.    -He has not been anticoagulated because of thrombocytopenia, although his platelets are now better.  I am going to ask cardiovascular surgery about potentially starting heparin (especially given the acute left lower extremity DVT in the atrial fibrillation).    -Slow progress, but remains critically ill.  Multiple consultants following.    Antwan Farmer MD  11/08/24  12:32 EST

## 2024-11-08 NOTE — PROGRESS NOTES
[facility] Progress Note      Patient Name: Woodrow Alejandro  : 1950  MRN: [id number]  Primary Care Physician:  Juan Perez MD  Date of admission: 10/23/2024    Subjective     Interval History:   F/u NATHANIEL  The patient currently on the ventilator, continue to have significant edema and positive fluid balance despite being diuresed adequately  Review of Systems:   Not obtainable    Objective     Vitals:   Temp:  [99.1 °F (37.3 °C)-100.6 °F (38.1 °C)] 99.1 °F (37.3 °C)  Heart Rate:  [] 92  Resp:  [16-25] 17  BP: ()/(41-68) 143/57  Arterial Line BP: ()/(42-59) 156/59  FiO2 (%):  [39 %-40 %] 40 %    Intake/Output Summary (Last 24 hours) at 2024 0948  Last data filed at 2024 0700  Gross per 24 hour   Intake 4730 ml   Output 2625 ml   Net 2105 ml       Physical Exam:    General Appearance: On the ventilator, obese, chronically ill, no acute distress  Neck: Mild JVD he has [place] catheter in the right IJ  Lungs: Bilateral rhonchi, breathing effort not labored  Heart: Irregularly irregular, no rub  Abdomen: soft, no guarding, nondistended  : mayorga present   Extremities: 4+ upper and lower extremity edema    Scheduled Meds:     aspirin, 81 mg, Per G Tube, Daily  atorvastatin, 40 mg, Per G Tube, Nightly  bumetanide, 2 mg, Intravenous, Q8H  chlorhexidine, 15 mL, Mouth/Throat, Q12H  enoxaparin, 40 mg, Subcutaneous, Q12H  Ergocalciferol, 100 mcg, Per G Tube, Daily  insulin regular, 2-7 Units, Subcutaneous, Q6H  lansoprazole, 15 mg, Per G Tube, Q AM  miconazole, 1 Application, Topical, Q12H  penicillin g (potassium), 4 Million Units, Intravenous, Q4H  phosphorus, 500 mg, Nasogastric, 4x Daily  potassium chloride, 40 mEq, Oral, TID  senna, 2 tablet, Per G Tube, Nightly  sildenafil, 20 mg, Per G Tube, TID  sodium chloride, 10 mL, Intravenous, Q12H      IV Meds:   amiodarone in dextrose 5%, 0.5 mg/min, Last Rate: 0.5 mg/min (24  0814)  dexmedetomidine, 0.2-1.5 mcg/kg/hr, Last Rate: 0.5 mcg/kg/hr (11/08/24 0408)  DOPamine, 2-20 mcg/kg/min  EPINEPHrine, 0.02-0.1 mcg/kg/min, Last Rate: 0.02 mcg/kg/min (11/07/24 0839)  lidocaine in D5W, 1 mg/min, Last Rate: Stopped (11/05/24 1135)  milrinone, 0.25-0.375 mcg/kg/min, Last Rate: 0.25 mcg/kg/min (11/08/24 0814)  niCARdipine, 5-15 mg/hr  norepinephrine, 0.02-0.2 mcg/kg/min, Last Rate: 0.04 mcg/kg/min (11/07/24 1745)  phenylephrine, 0.2-2 mcg/kg/min, Last Rate: Stopped (11/05/24 1030)  propofol, 5-50 mcg/kg/min, Last Rate: 10 mcg/kg/min (11/08/24 0049)  vasopressin, 0.02-0.1 Units/min, Last Rate: Stopped (11/06/24 0325)        Results Reviewed:   I have personally reviewed the results from the time of this admission to 11/8/2024 09:48 EST     Results from last 7 days   Lab Units 11/08/24  0343 11/07/24  0352 11/06/24  0325 11/05/24  0314   SODIUM mmol/L 140 137 139 139   POTASSIUM mmol/L 4.3 3.9 4.1 4.4   CHLORIDE mmol/L 108* 105 107 106   CO2 mmol/L 20.3* 20.6* 21.6* 24.0   BUN mg/dL 31* 31* 30* 25*   CREATININE mg/dL 1.19 1.15 1.27 1.37*   CALCIUM mg/dL 7.8* 8.0* 8.2* 8.3*   BILIRUBIN mg/dL 0.7  --  0.6 0.8   ALK PHOS U/L 112  --  112 110   ALT (SGPT) U/L <5  --  <5 7   AST (SGOT) U/L 30  --  23 20   GLUCOSE mg/dL 175* 159* 183* 183*     Estimated Creatinine Clearance: 83.2 mL/min (by C-G formula based on SCr of 1.19 mg/dL).  Results from last 7 days   Lab Units 11/08/24  0343 11/07/24  0352 11/06/24  0325   MAGNESIUM mg/dL 2.3 2.0 2.2   PHOSPHORUS mg/dL 1.9* 2.9 2.0*     Results from last 7 days   Lab Units 11/08/24  0343 11/07/24  0352 11/06/24  0325   URIC ACID mg/dL 5.0 5.1 4.7     Results from last 7 days   Lab Units 11/08/24  0343 11/07/24  1158 11/07/24  0352 11/06/24  0325 11/05/24  0314   WBC 10*3/mm3 11.59* 11.06* 10.53 10.61 10.77   HEMOGLOBIN g/dL 8.6* 8.0* 7.9* 8.1* 8.4*   PLATELETS 10*3/mm3 161 122* 104* 72* 80*             Assessment / Plan     ASSESSMENT:  NATHANIEL, non-oliguric - ATN,  due to cardiogenic and hemorrhagic shock with expected hemodynamic changes following major cardiac surgery.  Improving, has volume excess, creatinine 1.19, electrolyte within acceptable  Fluid overload.  Significant anasarca but the albumin is 2.2 adding to the edema but definitely has significant volume excess  Cardiogenic/hemorrhagic shock, s/p large amount of blood products in the OR.  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  Treated with penicillin by ID.    History of A-fib, unable to tolerate anticoagulation  Acute hypoxic resp failure, on the ventilator  Anemia hemoglobin today is 8.6.  Hypophosphatemia associated with nutritional issues, phosphorus 1 point    PLAN:  Continue the same treatment  Diurese with IV bumetanide  Replete phosphorus  Decrease the free water, and hopefully the dietitian will find a different nutritional formula to give less volume with more calories  Surveillance labs    I reviewed the chart and other providers notes, reviewed labs.  I discussed the case with the patient's nurse at the bedside.  I discussed the case with Dr. Florian  Copied text in this note has been reviewed and is accurate as of 11/08/24.       Jass Keller MD  11/08/24  09:48 [facility]    [facility]  [phone]

## 2024-11-08 NOTE — CONSULTS
"Nutrition Services    Patient Name:  Woodrow Alejandro  YOB: 1950  MRN: [id number]  Admit Date:  10/23/2024    Assessment Date:  11/08/24    Summary: TF reassessment     Pt is a 74 y/o male who is POD 9 reoperative sternotomy AV root replacement 27mm cryopreserved homograft with right nuvia cabrol, AICD removal, IABP placement.  Pt currently intubated and sedated on precedex. Surgery asking for more concentrated tube feeding formula.     Current TF regimen: Peptamen AF @ 75 mL/hr with 50 mL q 4 hour free water flushes (per MD).      Labs reviewed: Na 140, K 4.0, Gluc 181, BUN 31, Phos 1.9, prealb 6.8  Meds reviewed: vit D, lipitor, bumex, lovenox, insulin, prevacid, phos-nak, KCl, senokot, amiodarone, precedex    Plans/Recommendations:  Start novasource renal @45ml/hr   Prosource BID   50 mL q 4 hour free water flushes (per MD).  Monitor for tolerance.    Initial Goal:  *initial goal conservative d/t risk of RFS     Novasource Renal at 45mL/hr + 50mL q4hr water flush      End Goal:    Novasource Renal at 45 mL/hr + 50mL q4hr water flush      Calories  1980kcal + 120kcal prosource= 2100kcals (100%)    Protein  90g + 30g prosource= 120 g (100%)    Free water  703 mL   Flushes  300     The above end goal rate is for 22 hrs/day to assume interruptions for ADLs. Water flushes adjusted based on clinical picture + Rx flushes/IV fluids     Specialized formula chosen r/t need for concentrated tube feeding formula with high protein       RD to follow    CLINICAL NUTRITION ASSESSMENT      Reason for Assessment Follow-up Protocol     Diagnosis/Problem   POD 7 reoperative sternotomy AV root replacement 27mm cryopreserved homograft with right nuvia cabrol, AICD removal, IABP placement.     Anthropometrics        Current Height  Current Weight  BMI kg/m2 Height: 180 cm (70.87\")  Weight: (!) 157 kg (346 lb 2 oz) (11/08/24 1006)  Body mass index is 48.46 kg/m².   Adjusted BMI (if applicable)    BMI Category Obese, " Class III (40 or higher)   Ideal Body Weight (IBW) 171 lbs   Usual Body Weight (UBW) 330 lbs   Weight Trend Stable     Estimated/Assessed Needs        Energy Requirements    Weight for Calculation 78 kg IBW   Method for Estimation  25-30 kcal/kg   EST Needs (kcal/day) 0790-6798       Protein Requirements    Weight for Calculation 78 kg IBW   EST Protein Needs (g/kg) 1.5 - 2.0 gm/kg   EST Daily Needs (g/day) 117-156       Fluid Requirements     Method for Estimation 1 mL/kcal    Estimated Needs (mL/day)      Labs       Pertinent Labs    Results from last 7 days   Lab Units 11/08/24  0343 11/07/24  0352 11/06/24  0325 11/05/24  0314   SODIUM mmol/L 140 137 139 139   POTASSIUM mmol/L 4.3 3.9 4.1 4.4   CHLORIDE mmol/L 108* 105 107 106   CO2 mmol/L 20.3* 20.6* 21.6* 24.0   BUN mg/dL 31* 31* 30* 25*   CREATININE mg/dL 1.19 1.15 1.27 1.37*   CALCIUM mg/dL 7.8* 8.0* 8.2* 8.3*   BILIRUBIN mg/dL 0.7  --  0.6 0.8   ALK PHOS U/L 112  --  112 110   ALT (SGPT) U/L <5  --  <5 7   AST (SGOT) U/L 30  --  23 20   GLUCOSE mg/dL 175* 159* 183* 183*     Results from last 7 days   Lab Units 11/08/24  0740 11/08/24  0343 11/07/24  1158 11/07/24  0352 11/06/24  0325   MAGNESIUM mg/dL  --  2.3  --  2.0 2.2   PHOSPHORUS mg/dL  --  1.9*  --  2.9 2.0*   HEMOGLOBIN g/dL  --  8.6*   < > 7.9* 8.1*   HEMATOCRIT %  --  26.8*   < > 25.1* 25.0*   WBC 10*3/mm3  --  11.59*   < > 10.53 10.61   ALBUMIN g/dL  --  2.2*  --  2.4* 2.5*   PREALBUMIN mg/dL 6.8*  --   --   --   --     < > = values in this interval not displayed.     Results from last 7 days   Lab Units 11/08/24  0343 11/07/24  1158 11/07/24  0352 11/06/24  0325 11/05/24  0314   PLATELETS 10*3/mm3 161 122* 104* 72* 80*     COVID19   Date Value Ref Range Status   10/21/2024 Not Detected Not Detected - Ref. Range Final     Lab Results   Component Value Date    HGBA1C 5.30 10/24/2024          Medications           Scheduled Medications aspirin, 81 mg, Per G Tube, Daily  atorvastatin, 40 mg, Per G  Tube, Nightly  bumetanide, 2 mg, Intravenous, Q8H  chlorhexidine, 15 mL, Mouth/Throat, Q12H  enoxaparin, 40 mg, Subcutaneous, Q12H  Ergocalciferol, 100 mcg, Per G Tube, Daily  insulin regular, 2-7 Units, Subcutaneous, Q6H  lansoprazole, 15 mg, Per G Tube, Q AM  miconazole, 1 Application, Topical, Q12H  penicillin g (potassium), 4 Million Units, Intravenous, Q4H  phosphorus, 500 mg, Nasogastric, 4x Daily  potassium chloride, 40 mEq, Oral, TID  senna, 2 tablet, Per G Tube, Nightly  sildenafil, 20 mg, Per G Tube, TID  sodium chloride, 10 mL, Intravenous, Q12H       Infusions amiodarone in dextrose 5%, 0.5 mg/min, Last Rate: 0.5 mg/min (24)  dexmedetomidine, 0.2-1.5 mcg/kg/hr, Last Rate: 0.4 mcg/kg/hr (24 1033)  DOPamine, 2-20 mcg/kg/min  EPINEPHrine, 0.02-0.1 mcg/kg/min, Last Rate: 0.02 mcg/kg/min (24 0839)  lidocaine in D5W, 1 mg/min, Last Rate: Stopped (24 1135)  milrinone, 0.25-0.375 mcg/kg/min, Last Rate: 0.25 mcg/kg/min (24 0814)  niCARdipine, 5-15 mg/hr  norepinephrine, 0.02-0.2 mcg/kg/min, Last Rate: 0.02 mcg/kg/min (24 1320)  phenylephrine, 0.2-2 mcg/kg/min, Last Rate: Stopped (24 1030)  propofol, 5-50 mcg/kg/min, Last Rate: 10 mcg/kg/min (24 1232)  vasopressin, 0.02-0.1 Units/min, Last Rate: 0.03 Units/min (24 1340)       PRN Medications   acetaminophen    acetaminophen **OR** acetaminophen **OR** acetaminophen    ALPRAZolam    bisacodyl    bisacodyl    cyclobenzaprine    dextrose    dextrose    DOPamine    EPINEPHrine    glucagon (human recombinant)    Glycerin-Hypromellose-    HYDROcodone-acetaminophen    magnesium hydroxide    midazolam    milrinone    [] Morphine **AND** naloxone    niCARdipine    nitroglycerin    norepinephrine    ondansetron    oxyCODONE    phenylephrine    polyethylene glycol    Potassium Replacement - Follow Nurse / BPA Driven Protocol    propofol    sodium chloride    sodium chloride    sodium chloride    sodium  chloride    sodium chloride    sodium chloride    vasopressin     Physical Findings          General Findings obese, responds/arouses to voice, ventilator support   Oral/Mouth Cavity tooth or teeth missing   Edema  generalized, lower extremity , upper extremity, 2+ (mild), 3+ (moderate)   Gastrointestinal hypoactive bowel sounds, non-distended , last bowel movement: 11/5   Skin  bruising, excoriation, pressure injury: sacral spine DTI, surgical incision: L clavicle, sternal, anterior thigh   Tubes/Drains/Lines fecal management system, NG tube   NFPE Not indicated at this time   --  Current Nutrition Orders & Evaluation of Intake       Oral Nutrition     Food Allergies NKFA   Current PO Diet NPO Diet NPO Type: Tube Feeding   Supplement n/a   PO Evaluation     % PO Intake NPO    Factors Affecting Intake: Other: Intubated      Enteral Nutrition     Enteral Route NG    TF Delivery Method Continuous    Propofol Rate/Kcal     Current TF Order/Rate  Peptamen AF @ 50 mL/hr    TF Goal Rate 75 mL/hr    Current Water Flush 50 mL Q 2 hr (per MD)    Modular None    TF Residual  no or minimal residual    TF Tolerance tolerating    TF Observation Other: discussed with RN     PES STATEMENT / NUTRITION DIAGNOSIS      Nutrition Dx Problem  Problem: Needs Alternative Composition  Etiology: Medical Diagnosis - POD 3 reoperative sternotomy AV root replacement 27mm cryopreserved homograft with right nuvia cabrol, AICD removal, IABP placement.    Signs/Symptoms: Report/Observation   --  NUTRITION INTERVENTION / PLAN OF CARE      Intervention Goal(s) Maintain nutrition status, Establish goals of care, Reduce/improve symptoms, Meet estimated needs, Disease management/therapy, Tolerate TF/PN at goal, and Appropriate weight loss         RD Intervention/Action Adjust EN/PN regimen, Continue to monitor, Care plan reviewed, and Recommend/order: EN         Prescription/Orders:       PO Diet       Supplements       Enteral Nutrition    Enteral  Prescription:     Enteral Route NG    TF Delivery Method Continuous    Enteral Product Novasource Renal    Modular Liquid Protein, BID    Propofol Rate/Kcal     TF Start Rate  45 mL/hr    TF Goal Rate  45 mL/hr    Free Water Flush 50 mL Q 4 hr (per MD)    Provision at Goal:          Calories 1980kcal +120kcal prosource= 2100kcal, meets 100% needs         Protein  90g +30g prosource= 120gm protein, meets 100% needs         Fluid (mL) 713ml+ 300 ml free water flushes         Parenteral Nutrition    New Prescription Ordered? Yes   --      Monitor/Evaluation Per protocol, Pertinent labs, EN delivery/tolerance, Weight, Skin status, GI status, Symptoms, POC/GOC, Swallow function   Discharge Plan/Needs No discharge needs identified at this time   --    RD to follow per protocol.      Electronically signed by:  Gisela Bartlett RD  11/08/24 13:44 EST

## 2024-11-08 NOTE — PROGRESS NOTES
[facility]  [phone]  Dr. Tawanda Clement     Subjective:  LOS: 15    Chief Complaint:  Ventilator management     Patient doing same.  Nitric is weaning down slowly.  Then compliance is good he is on minimal FiO2.  Is following commands when sedation paused.    Objective   Vital Signs past 24hrs  Temp range: Temp (24hrs), Av.6 °F (37.6 °C), Min:98.6 °F (37 °C), Max:100.6 °F (38.1 °C)    BP range: BP: ()/(39-56) 102/45  Pulse range: Heart Rate:  [] 104  Resp rate range: Resp:  [16-25] 16  Device (Oxygen Therapy): ventilator   Oxygen range:SpO2:  [96 %-99 %] 97 %   Mechanical Ventilator:Mode: VC/AC (24 2306)    Physical Exam  Results Review:    I have reviewed the laboratory and imaging data since the last note by [facility] physician.  My annotations are noted in assessment and plan.      Result Review:  I have personally reviewed the results from last note by [facility] physician to 2024 23:23 EST and agree with these findings:  []  Laboratory list / accordion  []  Microbiology  []  Radiology  []  EKG/Telemetry   []  Cardiology/Vascular   []  Pathology  []  Old records  []  Other:    Medication Review:  I have reviewed the current MAR.  My annotations are noted in assessment and plan.    aspirin, 81 mg, Per G Tube, Daily  atorvastatin, 40 mg, Per G Tube, Nightly  bumetanide, 2 mg, Intravenous, Q8H  chlorhexidine, 15 mL, Mouth/Throat, Q12H  enoxaparin, 30 mg, Subcutaneous, Q12H  Ergocalciferol, 100 mcg, Per G Tube, Daily  insulin regular, 2-7 Units, Subcutaneous, Q6H  lansoprazole, 15 mg, Per G Tube, Q AM  miconazole, 1 Application, Topical, Q12H  penicillin g (potassium), 4 Million Units, Intravenous, Q4H  potassium chloride, 40 mEq, Oral, TID  senna, 2 tablet, Per G Tube, Nightly  sildenafil, 20 mg, Per G Tube, TID  sodium chloride, 10 mL, Intravenous, Q12H        amiodarone in dextrose 5%, 0.5 mg/min, Last Rate: 0.5 mg/min (24)  dexmedetomidine, 0.2-1.5 mcg/kg/hr, Last  Rate: 0.4 mcg/kg/hr (11/07/24 2129)  DOPamine, 2-20 mcg/kg/min  EPINEPHrine, 0.02-0.1 mcg/kg/min, Last Rate: 0.02 mcg/kg/min (11/07/24 0839)  lidocaine in D5W, 1 mg/min, Last Rate: Stopped (11/05/24 1135)  milrinone, 0.25-0.375 mcg/kg/min, Last Rate: 0.25 mcg/kg/min (11/07/24 1520)  niCARdipine, 5-15 mg/hr  norepinephrine, 0.02-0.2 mcg/kg/min, Last Rate: 0.04 mcg/kg/min (11/07/24 1745)  phenylephrine, 0.2-2 mcg/kg/min, Last Rate: Stopped (11/05/24 1030)  propofol, 5-50 mcg/kg/min, Last Rate: 10 mcg/kg/min (11/07/24 1812)  vasopressin, 0.02-0.1 Units/min, Last Rate: Stopped (11/06/24 0325)      Lines, Drains & Airways       Active LDAs       Name Placement date Placement time Site Days    Pulmonary Artery Catheter - Triple Lumen 10/30/24 Right Internal jugular 10/30/24  1026  created via procedure documentation  -- 8    CVC Double Lumen 10/30/24 Right Internal jugular 10/30/24  1026  created via procedure documentation  Internal jugular  8    Peripheral IV 10/29/24 1848 Anterior;Left Forearm 10/29/24  1848  Forearm  9    Peripheral IV 10/30/24 0940 Anterior;Right Forearm 10/30/24  0940  Forearm  8    NG/OG Tube Nasogastric  Left nostril 11/02/24  1330  Left nostril  5    Urethral Catheter Non-latex;Temperature probe 16 Fr. 10/30/24  1145  -- 8    ETT  10/30/24  1139  created via procedure documentation  -- 8    Arterial Line 11/02/24 Right Radial 11/02/24  0723  created via procedure documentation  Radial  5    Pacer Wires 10/30/24  1635  Ventricular  8                  No active isolations  Diet Orders (active) (From admission, onward)       Start     Ordered    11/06/24 1221  Tube Feeding: Formula: Peptamen AF (Vital AF 1.2); Feeding Type: Continuous; Start at: 30 mL/hr; Then Advance By: 10 mL/hr; Every: 6 hours; To Goal Rate of: 75 mL/hr; Water Flush: 50 mL; Every: 2 hours; Water Bolus: None  Diet Effective Now         11/06/24 1221    11/02/24 1533  NPO Diet NPO Type: Tube Feeding  Diet Effective Now          11/02/24 1533    11/02/24 1259  Feeding Tube Insertion - Cortrak System  Once         11/02/24 1259                      Assessment  Postop respiratory failure  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  Cardiogenic and probable septic shock  CHF with pulmonary edema  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  Acute left lower extremity DVT on 10/24/2024  Esophagitis grade C by EGD on 9/30/2024 PPI ordered is ordered as oral not sure he can to be able to get this recommend consider changing that to IV.  Hyperglycemia mild on postop insulin protocol        Plan  -Likely significant pulmonary edema causing respiratory failure.  - Recommend reducing tidal volumes to 6-7 mL/kg and can  increased respiratory rate to assist with ventilation.  Attempt to keep plats less than 30.   - Diuresis per cardiology and surgery  -Bronchoscopy (11/2/2024) see separate procedure note for findings.  Had some mild thick airway secretions that were therapeutically aspirated but otherwise nothing notable.  -Wean nitric oxide per cardiothoracic surgery  - Will continue to follow and assist with vent, hopeful for attempt at SBT and extubation in the next 24-48 hours.       Tawanda Clement DO   11/07/24  23:23 EST      Part of this note may be an electronic transcription/translation of spoken language to printed text using the Dragon Dictation System.

## 2024-11-09 NOTE — PROGRESS NOTES
[facility]  [phone]  Dr. Colton Allison    Subjective:  LOS: 17    No acute events overnight.  Seems to be doing okay at this time.  Currently on the ventilator and is volume overloaded.  He was net +1.2 L yesterday.  He is awake and alert while on the ventilator, did not do well when we switched him over to CPAP due to having tachypnea with respiratory rate in the low to mid 40s.    Objective:  Vital Signs past 24hrs    Temp range: Temp (24hrs), Av.4 °F (38 °C), Min:99.3 °F (37.4 °C), Max:101.1 °F (38.4 °C)    BP range: BP: (101-147)/(45-80) 119/54  Pulse range: Heart Rate:  [] 106  Resp rate range: Resp:  [18-27] 27  (!) 165 kg (363 lb 1.6 oz); Body mass index is 50.83 kg/m².    Device (Oxygen Therapy): ventilator     Oxygen range:SpO2:  [95 %-98 %] 97 %     Mode: VC/AC  FiO2 (%):  [40 %] 40 %  S RR:  [10] 10  S VT:  [600 mL] 600 mL  PEEP/CPAP (cm H2O):  [5 cm H20] 5 cm H20  MAP (cm H2O):  [13-16] 15      Intake/Output Summary (Last 24 hours) at 2024 1246  Last data filed at 2024 1200  Gross per 24 hour   Intake 4536.7 ml   Output 2975 ml   Net 1561.7 ml       Physical Exam  Constitutional:       Appearance: Normal appearance.   HENT:      Head: Normocephalic and atraumatic.      Nose: Nose normal.      Mouth/Throat:      Mouth: Mucous membranes are moist.      Pharynx: Oropharynx is clear.   Eyes:      Extraocular Movements: Extraocular movements intact.      Conjunctiva/sclera: Conjunctivae normal.   Cardiovascular:      Rate and Rhythm: Normal rate and regular rhythm.      Pulses: Normal pulses.      Heart sounds: Normal heart sounds. No murmur heard.  Pulmonary:      Effort: No respiratory distress.      Breath sounds: No stridor. No wheezing or rales.      Comments: Coarse breath sounds on the ventilator  Abdominal:      General: Abdomen is flat. Bowel sounds are normal.      Palpations: Abdomen is soft.      Tenderness: There is no abdominal tenderness.   Skin:      General: Skin is warm and dry.   Neurological:      General: No focal deficit present.      Mental Status: He is alert.            Result Review:  I have reviewed the results from last note by LPC physician and agree with these findings:  [x]  Laboratory accordion  [x]  Microbiology  [x]  Radiology  [x]  EKG/Telemetry   [x]  Cardiology/Vascular   [x]  Pathology  []  Old records  []  Other:    Medication Review:  I have reviewed the current MAR.  Antibiotics  penicillin G IVPB in 100 mL     Scheduled Medications  aspirin, 81 mg, Per G Tube, Daily  atorvastatin, 40 mg, Per G Tube, Nightly  chlorhexidine, 15 mL, Mouth/Throat, Q12H  enoxaparin, 40 mg, Subcutaneous, Q12H  Ergocalciferol, 100 mcg, Per G Tube, Daily  insulin regular, 2-7 Units, Subcutaneous, Q6H  ipratropium-albuterol, 3 mL, Nebulization, Q4H - RT  lansoprazole, 15 mg, Per G Tube, Q AM  miconazole, 1 Application, Topical, Q12H  penicillin g (potassium), 4 Million Units, Intravenous, Q4H  senna, 2 tablet, Per G Tube, Nightly  sildenafil, 20 mg, Per G Tube, TID  sodium chloride, 10 mL, Intravenous, Q12H  sodium chloride, 4 mL, Nebulization, BID - RT      ICU Drips  amiodarone in dextrose 5%, 0.5 mg/min, Last Rate: 0.5 mg/min (11/09/24 0822)  bumetanide, 1 mg/hr, Last Rate: 1 mg/hr (11/09/24 1210)  dexmedetomidine, 0.2-1.5 mcg/kg/hr, Last Rate: 0.5 mcg/kg/hr (11/09/24 1004)  DOPamine, 2-20 mcg/kg/min  EPINEPHrine, 0.02-0.1 mcg/kg/min, Last Rate: 0.02 mcg/kg/min (11/09/24 1012)  lidocaine in D5W, 1 mg/min, Last Rate: Stopped (11/05/24 1135)  milrinone, 0.25-0.375 mcg/kg/min, Last Rate: 0.25 mcg/kg/min (11/08/24 4147)  niCARdipine, 5-15 mg/hr  norepinephrine, 0.02-0.2 mcg/kg/min, Last Rate: 0.02 mcg/kg/min (11/08/24 1320)  phenylephrine, 0.2-2 mcg/kg/min, Last Rate: Stopped (11/05/24 1030)  propofol, 5-50 mcg/kg/min, Last Rate: 10 mcg/kg/min (11/09/24 1004)  vasopressin, 0.02-0.1 Units/min, Last Rate: 0.04 Units/min (11/09/24 8527)      PRN Medications     acetaminophen **OR** acetaminophen **OR** acetaminophen    ALPRAZolam    bisacodyl    bisacodyl    cyclobenzaprine    dextrose    dextrose    DOPamine    EPINEPHrine    glucagon (human recombinant)    Glycerin-Hypromellose-    HYDROcodone-acetaminophen    magnesium hydroxide    midazolam    milrinone    [] Morphine **AND** naloxone    niCARdipine    nitroglycerin    norepinephrine    ondansetron    phenylephrine    polyethylene glycol    Potassium Replacement - Follow Nurse / BPA Driven Protocol    propofol    sodium chloride    sodium chloride    sodium chloride    sodium chloride    sodium chloride    sodium chloride    vasopressin    Lines, Drains & Airways       Active LDAs       Name Placement date Placement time Site Days    Pulmonary Artery Catheter - Triple Lumen 10/30/24 Right Internal jugular 10/30/24  1026  created via procedure documentation  -- 10    CVC Double Lumen 10/30/24 Right Internal jugular 10/30/24  1026  created via procedure documentation  Internal jugular  10    Peripheral IV 10/29/24 1848 Anterior;Left Forearm 10/29/24  1848  Forearm  10    Peripheral IV 10/30/24 0940 Anterior;Right Forearm 10/30/24  0940  Forearm  10    NG/OG Tube Nasogastric  Left nostril 24  1330  Left nostril  7    Urethral Catheter Temperature probe 16 Fr. 24  0838  -- 1    ETT  10/30/24  1139  created via procedure documentation  -- 10    Arterial Line 24 Right Radial 24  0723  created via procedure documentation  Radial  7    Pacer Wires 10/30/24  1635  Ventricular  9                    Diet Orders (active) (From admission, onward)       Start     Ordered    24 1800  Dietary Nutrition Supplements ProSource No Carb  2 Times Daily      Comments: Flush through feeding tube BID    24 1358    24 1358  Tube Feeding: Formula: Novasource Renal (Nepro); Feeding Type: Continuous; Start at: 45 mL/hr; Then Advance By: Do Not Advance; Water Flush: 50 mL; Every: 4 hours;  Water Bolus: None  Diet Effective Now        Comments: Prosource BID per feeding tube    11/08/24 1358    11/02/24 1533  NPO Diet NPO Type: Tube Feeding  Diet Effective Now         11/02/24 1533    11/02/24 1259  Feeding Tube Insertion - Cortrak System  Once         11/02/24 1259                      Assessment:  Postop respiratory failure  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    Plan of Treatment  -Will maintain on ventilator over the weekend  -He has significant volume overload, agree with further diuresis per nephrology  -I tried to place him on a CPAP trial today and his respiratory rate went into the low to mid 40s, so not ready.  -Bronchoscopy (11/2/2024)- had some mild thick airway secretions that were therapeutically aspirated but otherwise nothing notable.   -Previously on nitric oxide, now off    Will continue to wean and try to get him off the ventilator soon.  He is severely volume overloaded at this time, I suspect you will take 2-3 more days before we will be able to get him off the vent due to his tachypnea on CPAP, likely secondary to volume overload.    Critical care time 35 minutes    Colton Allison MD  [facility], [facility]  Pulmonary and Critical Care Medicine    Electronically signed by Colton Allison MD, 11/09/24, 12:46 PM EST.  Part of this note may be an electronic transcription/translation of spoken language to printed text using the Dragon Dictation System.

## 2024-11-09 NOTE — PROGRESS NOTES
[facility] Progress Note      Patient Name: Woodrow Alejandro  : 1950  MRN: [id number]  Primary Care Physician:  Juan Perez MD  Date of admission: 10/23/2024    Subjective     Interval History:   Follow-up acute kidney  The patient currently on the ventilator, continue to have significant edema and positive fluid balance despite being diuresed adequately since the patient is getting significant amount of volume  Review of Systems:   Not obtainable    Objective     Vitals:   Temp:  [99.1 °F (37.3 °C)-101.1 °F (38.4 °C)] 99.3 °F (37.4 °C)  Heart Rate:  [] 97  Resp:  [18-27] 20  BP: (101-147)/(45-80) 120/53  Arterial Line BP: ()/(43-66) 112/51  FiO2 (%):  [30 %-40 %] 40 %    Intake/Output Summary (Last 24 hours) at 2024 0939  Last data filed at 2024 0645  Gross per 24 hour   Intake 4576.7 ml   Output 2950 ml   Net 1626.7 ml       Physical Exam:    General Appearance: On the ventilator, obese, chronically ill, no acute distress  Neck: Mild JVD he has [place] catheter in the right IJ  Lungs: Bilateral rhonchi, breathing effort not labored  Heart: Irregularly irregular, no rub  Abdomen: soft, no guarding, nondistended  : mayorga present   Extremities: 4+ upper and lower extremity edema    Scheduled Meds:     aspirin, 81 mg, Per G Tube, Daily  atorvastatin, 40 mg, Per G Tube, Nightly  chlorhexidine, 15 mL, Mouth/Throat, Q12H  enoxaparin, 40 mg, Subcutaneous, Q12H  Ergocalciferol, 100 mcg, Per G Tube, Daily  insulin regular, 2-7 Units, Subcutaneous, Q6H  ipratropium-albuterol, 3 mL, Nebulization, Q4H - RT  lansoprazole, 15 mg, Per G Tube, Q AM  miconazole, 1 Application, Topical, Q12H  penicillin g (potassium), 4 Million Units, Intravenous, Q4H  phosphorus, 500 mg, Nasogastric, 4x Daily  senna, 2 tablet, Per G Tube, Nightly  sildenafil, 20 mg, Per G Tube, TID  sodium chloride, 10 mL, Intravenous, Q12H  sodium chloride, 4 mL, Nebulization, BID -  RT      IV Meds:   amiodarone in dextrose 5%, 0.5 mg/min, Last Rate: 0.5 mg/min (11/09/24 0822)  dexmedetomidine, 0.2-1.5 mcg/kg/hr, Last Rate: 0.4 mcg/kg/hr (11/09/24 0823)  DOPamine, 2-20 mcg/kg/min  EPINEPHrine, 0.02-0.1 mcg/kg/min, Last Rate: 0.02 mcg/kg/min (11/07/24 0839)  lidocaine in D5W, 1 mg/min, Last Rate: Stopped (11/05/24 1135)  milrinone, 0.25-0.375 mcg/kg/min, Last Rate: 0.25 mcg/kg/min (11/08/24 1857)  niCARdipine, 5-15 mg/hr  norepinephrine, 0.02-0.2 mcg/kg/min, Last Rate: 0.02 mcg/kg/min (11/08/24 1320)  phenylephrine, 0.2-2 mcg/kg/min, Last Rate: Stopped (11/05/24 1030)  propofol, 5-50 mcg/kg/min, Last Rate: 10 mcg/kg/min (11/08/24 1232)  vasopressin, 0.02-0.1 Units/min, Last Rate: 0.04 Units/min (11/09/24 0527)        Results Reviewed:   I have personally reviewed the results from the time of this admission to 11/9/2024 09:39 EST     Results from last 7 days   Lab Units 11/09/24  0505 11/08/24  1430 11/08/24  0343 11/07/24  0352 11/06/24  0325   SODIUM mmol/L 140 143 140   < > 139   POTASSIUM mmol/L 3.3* 4.2 4.3   < > 4.1   CHLORIDE mmol/L 108* 111* 108*   < > 107   CO2 mmol/L 19.2* 19.2* 20.3*   < > 21.6*   BUN mg/dL 40* 33* 31*   < > 30*   CREATININE mg/dL 1.14 1.05 1.19   < > 1.27   CALCIUM mg/dL 7.7* 8.0* 7.8*   < > 8.2*   BILIRUBIN mg/dL 0.8  --  0.7  --  0.6   ALK PHOS U/L 114  --  112  --  112   ALT (SGPT) U/L 10  --  <5  --  <5   AST (SGOT) U/L 31  --  30  --  23   GLUCOSE mg/dL 204* 172* 175*   < > 183*    < > = values in this interval not displayed.     Estimated Creatinine Clearance: 89.3 mL/min (by C-G formula based on SCr of 1.14 mg/dL).  Results from last 7 days   Lab Units 11/09/24  0320 11/08/24  1430 11/08/24  0343 11/07/24  0352   MAGNESIUM mg/dL 2.2  --  2.3 2.0   PHOSPHORUS mg/dL 2.9 2.4* 1.9* 2.9     Results from last 7 days   Lab Units 11/09/24  0320 11/08/24  0343 11/07/24  0352 11/06/24  0325   URIC ACID mg/dL 5.2 5.0 5.1 4.7     Results from last 7 days   Lab Units  11/09/24  0320 11/08/24  1430 11/08/24  0343 11/07/24  1158 11/07/24  0352   WBC 10*3/mm3 13.31* 12.80* 11.59* 11.06* 10.53   HEMOGLOBIN g/dL 8.6* 8.8* 8.6* 8.0* 7.9*   PLATELETS 10*3/mm3 215 186 161 122* 104*             Assessment / Plan     ASSESSMENT:  NATHANIEL, non-oliguric - ATN, due to cardiogenic and hemorrhagic shock with expected hemodynamic changes following major cardiac surgery.  Improving, has volume excess, creatinine 1.14, electrolyte within acceptable potassium 3.3 and total CO2 19.2  Fluid overload.  Significant anasarca but the albumin is 2.1 adding to the edema but definitely has significant volume excess  Cardiogenic/hemorrhagic shock, s/p large amount of blood products in the OR.  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  Treated with penicillin by ID.    History of A-fib, unable to tolerate anticoagulation  Acute hypoxic resp failure, on the ventilator  Anemia hemoglobin today is 8.6.  Hypophosphatemia associated with nutritional issues, phosphorus 2.9      PLAN:  Continue the same treatment  Replete potassium per report  Will place the patient on Bumex drip hoping to increase his urine output to offset the volume excess hopefully to have more urine output than his intake  Monitor for diuretic toxicity  Surveillance labs    I reviewed the chart and other providers notes, reviewed labs.  I discussed the case with the patient's nurse at the bedside.    Copied text in this note has been reviewed and is accurate as of 11/09/24.       Jass Keller MD  11/09/24  09:39 [facility]    [facility]  [phone]

## 2024-11-09 NOTE — PROGRESS NOTES
" LOS: 17 days   Patient Care Team:  Juan Perez MD as PCP - General (Internal Medicine)    Chief Complaint: post op follow-up    Subjective  Intubated/sedated    Drips: amio 0.5, epi 0.02, levo 0.02, vaso 0.04, milrinone 0.25, propofol, precedex, insulin    Vital Signs  Temp:  [99.1 °F (37.3 °C)-101.1 °F (38.4 °C)] 99.3 °F (37.4 °C)  Heart Rate:  [] 97  Resp:  [18-27] 20  BP: (101-147)/(45-80) 120/53  Arterial Line BP: ()/(43-66) 112/51  FiO2 (%):  [30 %-40 %] 40 %  Body mass index is 50.83 kg/m².    Intake/Output Summary (Last 24 hours) at 11/9/2024 0920  Last data filed at 11/9/2024 0645  Gross per 24 hour   Intake 4576.7 ml   Output 2950 ml   Net 1626.7 ml     No intake/output data recorded.          11/08/24  0500 11/08/24  1006 11/09/24  0600   Weight: (!) 157 kg (346 lb 2 oz) (!) 157 kg (346 lb 2 oz) (!) 165 kg (363 lb 1.6 oz)         Objective:  General Appearance:  Ill-appearing (intubated/sedated).    Vital signs: (most recent): Blood pressure 119/54, pulse 106, temperature 100.2 °F (37.9 °C), temperature source Core, resp. rate 27, height 180 cm (70.87\"), weight (!) 165 kg (363 lb 1.6 oz), SpO2 97%.  Fever.  (Tmax 101.1).    Output: Producing urine (+mayorga) and producing stool.    Lungs:  There are rales and rhonchi.  (40% FiO2)  Heart: Tachycardia.  Irregular rhythm.  (Tele: Afib)  Extremities: There is dependent edema.    Skin:  Warm and dry.  (Dressings c/d/I  Hematoma left groin)            Results Review:        WBC WBC   Date Value Ref Range Status   11/09/2024 13.31 (H) 3.40 - 10.80 10*3/mm3 Final   11/08/2024 12.80 (H) 3.40 - 10.80 10*3/mm3 Final   11/08/2024 11.59 (H) 3.40 - 10.80 10*3/mm3 Final   11/07/2024 11.06 (H) 3.40 - 10.80 10*3/mm3 Final   11/07/2024 10.53 3.40 - 10.80 10*3/mm3 Final      HGB Hemoglobin   Date Value Ref Range Status   11/09/2024 8.6 (L) 13.0 - 17.7 g/dL Final   11/08/2024 8.8 (L) 13.0 - 17.7 g/dL Final   11/08/2024 8.6 (L) 13.0 - 17.7 g/dL Final " "  11/07/2024 8.0 (L) 13.0 - 17.7 g/dL Final   11/07/2024 7.9 (L) 13.0 - 17.7 g/dL Final      HCT Hematocrit   Date Value Ref Range Status   11/09/2024 26.8 (L) 37.5 - 51.0 % Final   11/08/2024 27.3 (L) 37.5 - 51.0 % Final   11/08/2024 26.8 (L) 37.5 - 51.0 % Final   11/07/2024 25.0 (L) 37.5 - 51.0 % Final   11/07/2024 25.1 (L) 37.5 - 51.0 % Final      Platelets Platelets   Date Value Ref Range Status   11/09/2024 215 140 - 450 10*3/mm3 Final   11/08/2024 186 140 - 450 10*3/mm3 Final   11/08/2024 161 140 - 450 10*3/mm3 Final   11/07/2024 122 (L) 140 - 450 10*3/mm3 Final   11/07/2024 104 (L) 140 - 450 10*3/mm3 Final        PT/INR:    No results found for: \"PROTIME\"  /  No results found for: \"INR\"      Sodium Sodium   Date Value Ref Range Status   11/09/2024 140 136 - 145 mmol/L Final   11/08/2024 143 136 - 145 mmol/L Final   11/08/2024 140 136 - 145 mmol/L Final   11/07/2024 137 136 - 145 mmol/L Final      Potassium Potassium   Date Value Ref Range Status   11/09/2024 3.3 (L) 3.5 - 5.2 mmol/L Final   11/08/2024 4.2 3.5 - 5.2 mmol/L Final     Comment:     Slight hemolysis detected by analyzer. Result may be falsely elevated.   11/08/2024 4.3 3.5 - 5.2 mmol/L Final     Comment:     Slight hemolysis detected by analyzer. Result may be falsely elevated.   11/07/2024 3.9 3.5 - 5.2 mmol/L Final      Chloride Chloride   Date Value Ref Range Status   11/09/2024 108 (H) 98 - 107 mmol/L Final   11/08/2024 111 (H) 98 - 107 mmol/L Final   11/08/2024 108 (H) 98 - 107 mmol/L Final   11/07/2024 105 98 - 107 mmol/L Final      Bicarbonate CO2   Date Value Ref Range Status   11/09/2024 19.2 (L) 22.0 - 29.0 mmol/L Final   11/08/2024 19.2 (L) 22.0 - 29.0 mmol/L Final   11/08/2024 20.3 (L) 22.0 - 29.0 mmol/L Final   11/07/2024 20.6 (L) 22.0 - 29.0 mmol/L Final      BUN BUN   Date Value Ref Range Status   11/09/2024 40 (H) 8 - 23 mg/dL Final   11/08/2024 33 (H) 8 - 23 mg/dL Final   11/08/2024 31 (H) 8 - 23 mg/dL Final   11/07/2024 31 (H) 8 " - 23 mg/dL Final      Creatinine Creatinine   Date Value Ref Range Status   11/09/2024 1.14 0.76 - 1.27 mg/dL Final   11/08/2024 1.05 0.76 - 1.27 mg/dL Final   11/08/2024 1.19 0.76 - 1.27 mg/dL Final   11/07/2024 1.15 0.76 - 1.27 mg/dL Final      Calcium Calcium   Date Value Ref Range Status   11/09/2024 7.7 (L) 8.6 - 10.5 mg/dL Final   11/08/2024 8.0 (L) 8.6 - 10.5 mg/dL Final   11/08/2024 7.8 (L) 8.6 - 10.5 mg/dL Final   11/07/2024 8.0 (L) 8.6 - 10.5 mg/dL Final      Magnesium Magnesium   Date Value Ref Range Status   11/09/2024 2.2 1.6 - 2.4 mg/dL Final   11/08/2024 2.3 1.6 - 2.4 mg/dL Final   11/07/2024 2.0 1.6 - 2.4 mg/dL Final          aspirin, 81 mg, Per G Tube, Daily  atorvastatin, 40 mg, Per G Tube, Nightly  chlorhexidine, 15 mL, Mouth/Throat, Q12H  enoxaparin, 40 mg, Subcutaneous, Q12H  Ergocalciferol, 100 mcg, Per G Tube, Daily  insulin regular, 2-7 Units, Subcutaneous, Q6H  ipratropium-albuterol, 3 mL, Nebulization, Q4H - RT  lansoprazole, 15 mg, Per G Tube, Q AM  miconazole, 1 Application, Topical, Q12H  penicillin g (potassium), 4 Million Units, Intravenous, Q4H  phosphorus, 500 mg, Nasogastric, 4x Daily  senna, 2 tablet, Per G Tube, Nightly  sildenafil, 20 mg, Per G Tube, TID  sodium chloride, 10 mL, Intravenous, Q12H  sodium chloride, 4 mL, Nebulization, BID - RT      amiodarone in dextrose 5%, 0.5 mg/min, Last Rate: 0.5 mg/min (11/09/24 0822)  dexmedetomidine, 0.2-1.5 mcg/kg/hr, Last Rate: 0.4 mcg/kg/hr (11/09/24 0823)  DOPamine, 2-20 mcg/kg/min  EPINEPHrine, 0.02-0.1 mcg/kg/min, Last Rate: 0.02 mcg/kg/min (11/07/24 0839)  lidocaine in D5W, 1 mg/min, Last Rate: Stopped (11/05/24 1135)  milrinone, 0.25-0.375 mcg/kg/min, Last Rate: 0.25 mcg/kg/min (11/08/24 1857)  niCARdipine, 5-15 mg/hr  norepinephrine, 0.02-0.2 mcg/kg/min, Last Rate: 0.02 mcg/kg/min (11/08/24 1320)  phenylephrine, 0.2-2 mcg/kg/min, Last Rate: Stopped (11/05/24 1030)  propofol, 5-50 mcg/kg/min, Last Rate: 10 mcg/kg/min (11/08/24  5052)  vasopressin, 0.02-0.1 Units/min, Last Rate: 0.04 Units/min (11/09/24 0527)              Prosthetic aortic valve stenosis    Essential hypertension    Permanent atrial fibrillation    S/P AVR    ICD (implantable cardioverter-defibrillator), dual, in situ    Achilles tendon rupture    Bacteremia    Stenosis of prosthetic aortic valve    Anemia      Assessment & Plan    - Prosthetic aortic valve stenosis, h/o AVR (tissue)/maze/DANICA ligation (2014)- s/p reoperative sternotomy AV root replacement 27mm cryopreserved homograft with right nuvia cabrol, AICD removal, IABP placement- [facility] 10/31/2024  -Sternal closure ---11/2  - Possible endocarditis, likely need JOLIE   - Bacteremia, blood cultures positive strep mitis---on penicillin G  - Atrial fibrillation, unable to tolerate anticoagulation  - Hypertension  - NICM status post [employer] AICD  - Anemia, s/p EGD/colonoscopy with esophagitis, polyp removal  - Right achilles tendon tear--- walking boot and PT per ortho at [facility]  - Pre-diabetes -- improved at 5.3  -post op anemia- expected acute blood loss  -TCP post-op      POD #10. Intubated & sedated. Grossly volume overloaded. Nephrology starting Bumex drip. Discussed with Pulmonary -- will not attempt to wean from vent until volume status improved. Temp 101.1 overnight. Check sputum culture and upper extremity venous duplex. Lower extremity venous duplex yesterday negative. Discussed with Dr. Doan -- request PICC line and discontinue central line. Will culture catheter tip. Discontinue Parker and use Acumen. Continue supportive care.       CHANEL Garcia  11/09/24  09:20 EST

## 2024-11-09 NOTE — PROGRESS NOTES
LOS: 17 days   Patient Care Team:  Juan Perez MD as PCP - General (Internal Medicine)    Chief Complaint: Follow-up bioprosthetic AVR endocarditis, mitral regurgitation, persistent atrial fibrillation.    Interval History: He was febrile overnight.  He is back on a set dose of vasopressin, as well as epinephrine.  Remains intubated and volume overloaded.  On amiodarone without ventricular tachycardia.  Atrial fibrillation slightly higher.    Vital Signs:  Temp:  [99.3 °F (37.4 °C)-101.1 °F (38.4 °C)] 99.7 °F (37.6 °C)  Heart Rate:  [] 100  Resp:  [18-29] 29  BP: (101-147)/(45-80) 119/54  Arterial Line BP: ()/(43-66) 121/53  FiO2 (%):  [40 %] 40 %    Intake/Output Summary (Last 24 hours) at 11/9/2024 1523  Last data filed at 11/9/2024 1400  Gross per 24 hour   Intake 6079.7 ml   Output 2825 ml   Net 3254.7 ml       Physical Exam:   General Appearance:    Intubated and sedated.  Appears critically ill.   Lungs:     Rhonchi bilaterally anteriorly.    Heart:    Irregularly irregular rhythm with a normal rate. II/VI SM throughout.   Abdomen:     Soft, nontender, nondistended.    Extremities:    3+ generalized edema and anasarca.     Results Review:    Results from last 7 days   Lab Units 11/09/24  0505   SODIUM mmol/L 140   POTASSIUM mmol/L 3.3*   CHLORIDE mmol/L 108*   CO2 mmol/L 19.2*   BUN mg/dL 40*   CREATININE mg/dL 1.14   GLUCOSE mg/dL 204*   CALCIUM mg/dL 7.7*         Results from last 7 days   Lab Units 11/09/24  0320   WBC 10*3/mm3 13.31*   HEMOGLOBIN g/dL 8.6*   HEMATOCRIT % 26.8*   PLATELETS 10*3/mm3 215                 Results from last 7 days   Lab Units 11/09/24  0320   MAGNESIUM mg/dL 2.2     Results from last 7 days   Lab Units 11/08/24  1430   TRIGLYCERIDES mg/dL 160*         I reviewed the patient's new clinical results.        Assessment:  1.  Status post bioprosthetic aortic valve replacement in 2014  2.  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  Moderate to severe mitral regurgitation  4.  Status post reoperative AV root replacement, mitral valve repair, ICD removal, SVG-RCA, and IABP placement on 10/30/2021  5.  Postoperative shock, multifactorial  6.  Acute kidney injury  7.  History of nonischemic cardiomyopathy with recovered ejection fraction  8.  Anemia, acute on chronic  9.  Postoperative thrombocytopenia  10.  Persistent atrial fibrillation  11.  Ventricular tachycardia postoperatively  12.  Grade C esophagitis by EGD on 9/30/2024  13.  Acute left lower extremity DVT on 10/24/2024  14.  Right Achilles tendon tear  15.  Postoperative junctional rhythm  16.  Hypoalbuminemia  17.  Thrombocytopenia  18.  Severe right ventricular enlargement and dysfunction post-op  19.  Fever noted on 11/8/2024    Plan:  -Remains grossly volume overloaded.  Switching to a Bumex drip per nephrology today.  His right ventricle did not tolerate diuretics earlier in his hospital course.  Continue to watch from a hemodynamic perspective.     -Off nitrous oxide.  Limited echo yesterday morning unchanged with severe right ventricular enlargement and moderately reduced function.  Currently on Revatio 20 mg every 8 hours.    -Volume overload is also multifactorial, including iatrogenic secondary to large blood product volume and hypoalbuminemia.    -He has had issues with recurrent ventricular tachycardia after stopping lidocaine.  He has been off lidocaine for several days, and no recent ventricular tachycardia.  Continue amiodarone 0.5 mg/min.  Atrial fibrillation rate is slightly higher, possibly secondary to the fever.  Can use digoxin if needed in the future.    -Holding off on heparin for now per cardiovascular surgery.  He does have an acute left lower extremity DVT, and remains in atrial fibrillation.  He had not been on anticoagulation because of thrombocytopenia  previously.    -Receiving most of his pressor support through epinephrine.  He is on a set dose of vasopressin as well.    -He remains critically ill.  Discussed with the patient's sister at bedside.    Antwan Farmer MD  11/09/24  15:23 EST

## 2024-11-09 NOTE — PLAN OF CARE
Goal Outcome Evaluation:      Nitric weaned off today, echo done two hours after discontinuing Nitric, mayorga replaced today, MV 62, tube feeding changed to Novasource renal and prosure BID added, Vaso added to try to wean off levo, follows commands still.

## 2024-11-09 NOTE — CONSULTS
Unsuccessful PICC line insertion using left upper arm. 3 lumen PICC was used, resistance was met during insertion with 10cm left to insert with a trim length of 50cm. Primary nurse at bedside and made aware.

## 2024-11-10 ENCOUNTER — ANESTHESIA (OUTPATIENT)
Dept: CARDIOVASCULAR ICU | Facility: HOSPITAL | Age: 74
End: 2024-11-10
Payer: MEDICARE

## 2024-11-10 ENCOUNTER — ANESTHESIA EVENT (OUTPATIENT)
Dept: CARDIOVASCULAR ICU | Facility: HOSPITAL | Age: 74
End: 2024-11-10
Payer: MEDICARE

## 2024-11-10 PROCEDURE — C1751 CATH, INF, PER/CENT/MIDLINE: HCPCS | Performed by: ANESTHESIOLOGY

## 2024-11-10 NOTE — ANESTHESIA PROCEDURE NOTES
[place] catheter      Patient reassessed immediately prior to procedure    Patient location during procedure: ICU  Indications: MD/Surgeon request  Staff  Anesthesiologist: Matthieu Scott MD  Preanesthetic Checklist  Completed: patient identified, IV checked, site marked, risks and benefits discussed, surgical consent, monitors and equipment checked, pre-op evaluation and timeout performed  Central Line Prep  Sterile Tech:gloves, cap, gown, mask and sterile barriers  Prep: chloraprep  no medical exclusion documented for following all elements of maximal sterile barrier technique  Patient monitoring: blood pressure monitoring, continuous pulse oximetry and EKG  Central Line Procedure  Laterality:right  Location:internal jugular  Catheter Type:[place]  Catheter Size:7.5 Fr  Guidance:ultrasound guided  PROCEDURE NOTE/ULTRASOUND INTERPRETATION.  Using ultrasound guidance the potential vascular sites for insertion of the catheter were visualized to determine the patency of the vessel to be used for vascular access.  After selecting the appropriate site for insertion, the needle was visualized under ultrasound being inserted into the internal jugular vein, followed by ultrasound confirmation of wire and catheter placement. There were no abnormalities seen on ultrasound; an image was taken; and the patient tolerated the procedure with no complications. Images: still images obtained, printed/placed on chart  Assessment  Post procedure:biopatch applied, line sutured and occlusive dressing applied  Assessement:blood return through all ports and free fluid flow  Complications:no  Patient Tolerance:patient tolerated the procedure well with no apparent complications  Additional Notes  SGC @

## 2024-11-10 NOTE — PROGRESS NOTES
" LOS: 18 days   Patient Care Team:  Juan Perez MD as PCP - General (Internal Medicine)    Chief Complaint: Intubated        Subjective new [place] placed today.    Objective [place] placed today.  Good urine output.  Still on quite a bit of drips.  Vital Signs  Temp:  [99.7 °F (37.6 °C)-102.6 °F (39.2 °C)] 102.6 °F (39.2 °C)  Heart Rate:  [] 105  Resp:  [22-30] 25  BP: (119-168)/(54-74) 160/68  Arterial Line BP: ()/(36-65) 98/36  FiO2 (%):  [40 %] 40 %  Body mass index is 50.31 kg/m².    Intake/Output Summary (Last 24 hours) at 11/10/2024 1033  Last data filed at 11/10/2024 0600  Gross per 24 hour   Intake 4974 ml   Output 6375 ml   Net -1401 ml     No intake/output data recorded.    Chest tube drainage last 8 hours none    Wt Readings from Last 3 Encounters:   11/10/24 (!) 163 kg (359 lb 5.6 oz)   10/23/24 (!) 142 kg (313 lb 7.9 oz)   09/26/24 (!) 141 kg (309 lb 15.5 oz)       Flowsheet Rows      Flowsheet Row First Filed Value   Admission Height 182.9 cm (72\") Documented at 10/25/2024 0434   Admission Weight 142 kg (312 lb 3.2 oz) Documented at 10/23/2024 1711            Objective:  Vital signs: (most recent): Blood pressure 160/68, pulse 105, temperature (!) 102.6 °F (39.2 °C), temperature source Bladder, resp. rate 25, height 180 cm (70.87\"), weight (!) 163 kg (359 lb 5.6 oz), SpO2 99%.                Results Review:        WBC WBC   Date Value Ref Range Status   11/10/2024 13.41 (H) 3.40 - 10.80 10*3/mm3 Final   11/09/2024 13.31 (H) 3.40 - 10.80 10*3/mm3 Final   11/08/2024 12.80 (H) 3.40 - 10.80 10*3/mm3 Final   11/08/2024 11.59 (H) 3.40 - 10.80 10*3/mm3 Final   11/07/2024 11.06 (H) 3.40 - 10.80 10*3/mm3 Final      HGB Hemoglobin   Date Value Ref Range Status   11/10/2024 8.5 (L) 13.0 - 17.7 g/dL Final   11/09/2024 8.6 (L) 13.0 - 17.7 g/dL Final   11/08/2024 8.8 (L) 13.0 - 17.7 g/dL Final   11/08/2024 8.6 (L) 13.0 - 17.7 g/dL Final   11/07/2024 8.0 (L) 13.0 - 17.7 g/dL Final      HCT " "Hematocrit   Date Value Ref Range Status   11/10/2024 26.4 (L) 37.5 - 51.0 % Final   11/09/2024 26.8 (L) 37.5 - 51.0 % Final   11/08/2024 27.3 (L) 37.5 - 51.0 % Final   11/08/2024 26.8 (L) 37.5 - 51.0 % Final   11/07/2024 25.0 (L) 37.5 - 51.0 % Final      Platelets Platelets   Date Value Ref Range Status   11/10/2024 226 140 - 450 10*3/mm3 Final   11/09/2024 215 140 - 450 10*3/mm3 Final   11/08/2024 186 140 - 450 10*3/mm3 Final   11/08/2024 161 140 - 450 10*3/mm3 Final   11/07/2024 122 (L) 140 - 450 10*3/mm3 Final        PT/INR:  No results found for: \"PROTIME\"/No results found for: \"INR\"    Sodium Sodium   Date Value Ref Range Status   11/10/2024 143 136 - 145 mmol/L Final   11/09/2024 140 136 - 145 mmol/L Final   11/08/2024 143 136 - 145 mmol/L Final   11/08/2024 140 136 - 145 mmol/L Final      Potassium Potassium   Date Value Ref Range Status   11/10/2024 3.4 (L) 3.5 - 5.2 mmol/L Final   11/10/2024 3.4 (L) 3.5 - 5.2 mmol/L Final   11/09/2024 3.4 (L) 3.5 - 5.2 mmol/L Final     Comment:     Slight hemolysis detected by analyzer. Result may be falsely elevated.   11/09/2024 3.3 (L) 3.5 - 5.2 mmol/L Final   11/09/2024 3.3 (L) 3.5 - 5.2 mmol/L Final   11/08/2024 4.2 3.5 - 5.2 mmol/L Final     Comment:     Slight hemolysis detected by analyzer. Result may be falsely elevated.   11/08/2024 4.3 3.5 - 5.2 mmol/L Final     Comment:     Slight hemolysis detected by analyzer. Result may be falsely elevated.      Chloride Chloride   Date Value Ref Range Status   11/10/2024 109 (H) 98 - 107 mmol/L Final   11/09/2024 108 (H) 98 - 107 mmol/L Final   11/08/2024 111 (H) 98 - 107 mmol/L Final   11/08/2024 108 (H) 98 - 107 mmol/L Final      Bicarbonate CO2   Date Value Ref Range Status   11/10/2024 20.1 (L) 22.0 - 29.0 mmol/L Final   11/09/2024 19.2 (L) 22.0 - 29.0 mmol/L Final   11/08/2024 19.2 (L) 22.0 - 29.0 mmol/L Final   11/08/2024 20.3 (L) 22.0 - 29.0 mmol/L Final      BUN BUN   Date Value Ref Range Status   11/10/2024 41 (H) " 8 - 23 mg/dL Final   11/09/2024 40 (H) 8 - 23 mg/dL Final   11/08/2024 33 (H) 8 - 23 mg/dL Final   11/08/2024 31 (H) 8 - 23 mg/dL Final      Creatinine Creatinine   Date Value Ref Range Status   11/10/2024 1.08 0.76 - 1.27 mg/dL Final   11/09/2024 1.14 0.76 - 1.27 mg/dL Final   11/08/2024 1.05 0.76 - 1.27 mg/dL Final   11/08/2024 1.19 0.76 - 1.27 mg/dL Final      Calcium Calcium   Date Value Ref Range Status   11/10/2024 7.7 (L) 8.6 - 10.5 mg/dL Final   11/09/2024 7.7 (L) 8.6 - 10.5 mg/dL Final   11/08/2024 8.0 (L) 8.6 - 10.5 mg/dL Final   11/08/2024 7.8 (L) 8.6 - 10.5 mg/dL Final      Magnesium Magnesium   Date Value Ref Range Status   11/09/2024 2.2 1.6 - 2.4 mg/dL Final   11/08/2024 2.3 1.6 - 2.4 mg/dL Final          aspirin, 81 mg, Per G Tube, Daily  atorvastatin, 40 mg, Per G Tube, Nightly  chlorhexidine, 15 mL, Mouth/Throat, Q12H  enoxaparin, 40 mg, Subcutaneous, Q12H  Ergocalciferol, 100 mcg, Per G Tube, Daily  insulin regular, 2-7 Units, Subcutaneous, Q6H  ipratropium-albuterol, 3 mL, Nebulization, Q4H - RT  lansoprazole, 15 mg, Per G Tube, Q AM  miconazole, 1 Application, Topical, Q12H  penicillin g (potassium), 4 Million Units, Intravenous, Q4H  potassium chloride, 40 mEq, Oral, Q4H  senna, 2 tablet, Per G Tube, Nightly  sildenafil, 20 mg, Per G Tube, TID  sodium chloride, 10 mL, Intravenous, Q12H  sodium chloride, 4 mL, Nebulization, BID - RT      amiodarone in dextrose 5%, 0.5 mg/min, Last Rate: 0.5 mg/min (11/10/24 0059)  bumetanide, 2 mg/hr, Last Rate: 2 mg/hr (11/09/24 1421)  dexmedetomidine, 0.2-1.5 mcg/kg/hr, Last Rate: 0.5 mcg/kg/hr (11/10/24 0443)  DOPamine, 2-20 mcg/kg/min  EPINEPHrine, 0.02-0.1 mcg/kg/min, Last Rate: 0.02 mcg/kg/min (11/09/24 1012)  lidocaine in D5W, 1 mg/min, Last Rate: Stopped (11/05/24 1135)  milrinone, 0.25-0.375 mcg/kg/min, Last Rate: 0.25 mcg/kg/min (11/10/24 0443)  niCARdipine, 5-15 mg/hr  norepinephrine, 0.02-0.2 mcg/kg/min, Last Rate: 0.01 mcg/kg/min (11/09/24  8104)  phenylephrine, 0.2-2 mcg/kg/min, Last Rate: Stopped (11/05/24 1030)  propofol, 5-50 mcg/kg/min, Last Rate: 15 mcg/kg/min (11/10/24 7763)  vasopressin, 0.02-0.1 Units/min, Last Rate: 0.01 Units/min (11/10/24 5543)        Medication Review: Reviewed    Assessment & Plan     POD #8      Prosthetic aortic valve stenosis    Essential hypertension    Permanent atrial fibrillation    S/P AVR    ICD (implantable cardioverter-defibrillator), dual, in situ    Achilles tendon rupture    Bacteremia    Stenosis of prosthetic aortic valve    Anemia      Assessment & Plan continue same.  Bumex drip.  We were able to place a new [place] today with the help of anesthesia.  I think with diuresis we may be able to make some ground up over the next few days.    Marty Doan MD  11/10/24  10:33 EST

## 2024-11-10 NOTE — PROGRESS NOTES
[facility] Progress Note      Patient Name: Woodrow Alejandro  : 1950  MRN: [id number]  Primary Care Physician:  Juan Perez MD  Date of admission: 10/23/2024    Subjective     Interval History:   Follow-up acute kidney  The patient remains on the ventilator, sedated, had significant anasarca, he was started on Bumex drip and the dose was increased to 2 mg/h with significant improvement in urine output he put out approximately 6.3 L  Review of Systems:   Not obtainable    Objective     Vitals:   Temp:  [99.7 °F (37.6 °C)-102.6 °F (39.2 °C)] 102.6 °F (39.2 °C)  Heart Rate:  [] 105  Resp:  [22-30] 25  BP: (119-168)/(54-74) 160/68  Arterial Line BP: ()/(36-65) 98/36  FiO2 (%):  [40 %] 40 %    Intake/Output Summary (Last 24 hours) at 11/10/2024 0943  Last data filed at 11/10/2024 0600  Gross per 24 hour   Intake 4974 ml   Output 6375 ml   Net -1401 ml       Physical Exam:    General Appearance: On the ventilator, obese, chronically ill, no acute distress  Neck: Mild JVD he has [place] catheter in the right IJ  Lungs: Bilateral rhonchi, breathing effort not labored  Heart: Irregularly irregular, no rub  Abdomen: soft, no guarding, nondistended  : mayorga present   Extremities: 4+ upper and lower extremity edema    Scheduled Meds:     aspirin, 81 mg, Per G Tube, Daily  atorvastatin, 40 mg, Per G Tube, Nightly  chlorhexidine, 15 mL, Mouth/Throat, Q12H  enoxaparin, 40 mg, Subcutaneous, Q12H  Ergocalciferol, 100 mcg, Per G Tube, Daily  insulin regular, 2-7 Units, Subcutaneous, Q6H  ipratropium-albuterol, 3 mL, Nebulization, Q4H - RT  lansoprazole, 15 mg, Per G Tube, Q AM  miconazole, 1 Application, Topical, Q12H  penicillin g (potassium), 4 Million Units, Intravenous, Q4H  potassium chloride, 40 mEq, Oral, Q4H  senna, 2 tablet, Per G Tube, Nightly  sildenafil, 20 mg, Per G Tube, TID  sodium chloride, 10 mL, Intravenous, Q12H  sodium chloride, 4 mL,  Nebulization, BID - RT      IV Meds:   amiodarone in dextrose 5%, 0.5 mg/min, Last Rate: 0.5 mg/min (11/10/24 0059)  bumetanide, 2 mg/hr, Last Rate: 2 mg/hr (11/09/24 1421)  dexmedetomidine, 0.2-1.5 mcg/kg/hr, Last Rate: 0.5 mcg/kg/hr (11/10/24 0443)  DOPamine, 2-20 mcg/kg/min  EPINEPHrine, 0.02-0.1 mcg/kg/min, Last Rate: 0.02 mcg/kg/min (11/09/24 1012)  lidocaine in D5W, 1 mg/min, Last Rate: Stopped (11/05/24 1135)  milrinone, 0.25-0.375 mcg/kg/min, Last Rate: 0.25 mcg/kg/min (11/10/24 0443)  niCARdipine, 5-15 mg/hr  norepinephrine, 0.02-0.2 mcg/kg/min, Last Rate: 0.01 mcg/kg/min (11/09/24 2145)  phenylephrine, 0.2-2 mcg/kg/min, Last Rate: Stopped (11/05/24 1030)  propofol, 5-50 mcg/kg/min, Last Rate: 15 mcg/kg/min (11/10/24 0443)  vasopressin, 0.02-0.1 Units/min, Last Rate: 0.01 Units/min (11/10/24 0457)        Results Reviewed:   I have personally reviewed the results from the time of this admission to 11/10/2024 09:43 EST     Results from last 7 days   Lab Units 11/10/24  0719 11/10/24  0346 11/09/24  2113 11/09/24  1553 11/09/24  0505 11/08/24  1430 11/08/24  0343   SODIUM mmol/L  --  143  --   --  140 143 140   POTASSIUM mmol/L 3.4* 3.4* 3.4*   < > 3.3* 4.2 4.3   CHLORIDE mmol/L  --  109*  --   --  108* 111* 108*   CO2 mmol/L  --  20.1*  --   --  19.2* 19.2* 20.3*   BUN mg/dL  --  41*  --   --  40* 33* 31*   CREATININE mg/dL  --  1.08  --   --  1.14 1.05 1.19   CALCIUM mg/dL  --  7.7*  --   --  7.7* 8.0* 7.8*   BILIRUBIN mg/dL  --  0.9  --   --  0.8  --  0.7   ALK PHOS U/L  --  109  --   --  114  --  112   ALT (SGPT) U/L  --  10  --   --  10  --  <5   AST (SGOT) U/L  --  26  --   --  31  --  30   GLUCOSE mg/dL  --  180*  --   --  204* 172* 175*    < > = values in this interval not displayed.     Estimated Creatinine Clearance: 93.4 mL/min (by C-G formula based on SCr of 1.08 mg/dL).  Results from last 7 days   Lab Units 11/10/24  0346 11/09/24  0320 11/08/24  1430 11/08/24  0343 11/07/24  0352   MAGNESIUM  mg/dL  --  2.2  --  2.3 2.0   PHOSPHORUS mg/dL 3.0 2.9 2.4* 1.9* 2.9     Results from last 7 days   Lab Units 11/10/24  0346 11/09/24  0320 11/08/24  0343 11/07/24  0352 11/06/24  0325   URIC ACID mg/dL 5.9 5.2 5.0 5.1 4.7     Results from last 7 days   Lab Units 11/10/24  0346 11/09/24  0320 11/08/24  1430 11/08/24  0343 11/07/24  1158   WBC 10*3/mm3 13.41* 13.31* 12.80* 11.59* 11.06*   HEMOGLOBIN g/dL 8.5* 8.6* 8.8* 8.6* 8.0*   PLATELETS 10*3/mm3 226 215 186 161 122*             Assessment / Plan     ASSESSMENT:  NATHANIEL, non-oliguric - ATN, due to cardiogenic and hemorrhagic shock with expected hemodynamic changes following major cardiac surgery.  Improving, has volume excess, creatinine 1.08, electrolyte within acceptable potassium 3.4 and total CO2 20.1  Fluid overload.  Significant anasarca but the albumin is 2.5 adding to the edema but definitely has significant volume excess  Cardiogenic/hemorrhagic shock, s/p large amount of blood products in the OR.  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  Treated with penicillin by ID.    History of A-fib, unable to tolerate anticoagulation  Acute hypoxic resp failure, on the ventilator  Anemia hemoglobin today is 8.5.  Hypophosphatemia associated with nutritional issues, phosphorus 3      PLAN:  Continue the same treatment  Replete potassium per report  Continue current dose of bumetanide drip 2 mg/h  Monitor for diuretic toxicity  Surveillance labs    I reviewed the chart and other providers notes, reviewed labs.  I discussed the case with the patient's nurse at the bedside.    Copied text in this note has been reviewed and is accurate as of 11/10/24.       Jass Keller MD  11/10/24  09:43 [facility]    [facility]  [phone]

## 2024-11-10 NOTE — CONSULTS
Request for a new central line due to catheter associated infection.    Consent was obtained from the daughter.    Patient was positioned appropriately, left IJV area was prepped and draped and 9 F MAC was placed in left IJV under US guidance.  7.5 F PAC was placed and left at 58 cm.     Patient tolerated the procedure well.

## 2024-11-10 NOTE — ANESTHESIA PROCEDURE NOTES
Central Line      Patient reassessed immediately prior to procedure    Patient location during procedure: ICU  Indications: MD/Surgeon request  Staff  Anesthesiologist: Matthieu Scott MD  Preanesthetic Checklist  Completed: patient identified, IV checked, site marked, risks and benefits discussed, surgical consent, monitors and equipment checked, pre-op evaluation and timeout performed  Central Line Prep  Sterile Tech:gloves, cap, gown, mask and sterile barriers  Prep: chloraprep  no medical exclusion documented for following all elements of maximal sterile barrier technique  Patient monitoring: blood pressure monitoring, continuous pulse oximetry and EKG  Central Line Procedure  Laterality:left  Location:internal jugular  Catheter Type:MAC  Catheter Size:9 Fr  Guidance:ultrasound guided  PROCEDURE NOTE/ULTRASOUND INTERPRETATION.  Using ultrasound guidance the potential vascular sites for insertion of the catheter were visualized to determine the patency of the vessel to be used for vascular access.  After selecting the appropriate site for insertion, the needle was visualized under ultrasound being inserted into the internal jugular vein, followed by ultrasound confirmation of wire and catheter placement. There were no abnormalities seen on ultrasound; an image was taken; and the patient tolerated the procedure with no complications. Images: still images obtained, printed/placed on chart  Assessment  Post procedure:biopatch applied, line sutured and occlusive dressing applied  Assessement:blood return through all ports and free fluid flow  Complications:no  Patient Tolerance:patient tolerated the procedure well with no apparent complications

## 2024-11-10 NOTE — PROGRESS NOTES
[facility]  [phone]  Dr. Colton Allison    Subjective:  LOS: 18    No acute events overnight.  Still maintaining on the ventilator.  Today, someone was placed to help with diuresis due to his tenuous volume status and right heart failure.      Objective:  Vital Signs past 24hrs    Temp range: Temp (24hrs), Av.5 °F (38.1 °C), Min:99.5 °F (37.5 °C), Max:102.6 °F (39.2 °C)    BP range: BP: (118-173)/(58-75) 118/58  Pulse range: Heart Rate:  [] 107  Resp rate range: Resp:  [22-32] 32  (!) 163 kg (359 lb 5.6 oz); Body mass index is 50.31 kg/m².    Device (Oxygen Therapy): ventilator     Oxygen range:SpO2:  [93 %-99 %] 94 %     Mode: VC/AC  FiO2 (%):  [40 %-49 %] 49 %  S RR:  [10] 10  S VT:  [460 mL-500 mL] 460 mL  PEEP/CPAP (cm H2O):  [5 cm H20] 5 cm H20  MAP (cm H2O):  [14] 14      Intake/Output Summary (Last 24 hours) at 11/10/2024 1503  Last data filed at 11/10/2024 1434  Gross per 24 hour   Intake 4430 ml   Output 8900 ml   Net -4470 ml       Physical Exam  Constitutional:       Appearance: Normal appearance.   HENT:      Head: Normocephalic and atraumatic.      Nose: Nose normal.      Mouth/Throat:      Mouth: Mucous membranes are moist.      Pharynx: Oropharynx is clear.   Eyes:      Extraocular Movements: Extraocular movements intact.      Conjunctiva/sclera: Conjunctivae normal.   Cardiovascular:      Rate and Rhythm: Normal rate and regular rhythm.      Pulses: Normal pulses.      Heart sounds: Normal heart sounds. No murmur heard.  Pulmonary:      Effort: No respiratory distress.      Breath sounds: No stridor. No wheezing or rales.      Comments: Coarse breath sounds on the ventilator  Abdominal:      General: Abdomen is flat. Bowel sounds are normal.      Palpations: Abdomen is soft.      Tenderness: There is no abdominal tenderness.   Skin:     General: Skin is warm and dry.   Neurological:      General: No focal deficit present.      Mental Status: He is alert.             Result Review:  I have reviewed the results from last note by LPC physician and agree with these findings:  [x]  Laboratory accordion  [x]  Microbiology  [x]  Radiology  [x]  EKG/Telemetry   [x]  Cardiology/Vascular   [x]  Pathology  []  Old records  []  Other:    Medication Review:  I have reviewed the current MAR.  Antibiotics  penicillin G IVPB in 100 mL     Scheduled Medications  aspirin, 81 mg, Per G Tube, Daily  atorvastatin, 40 mg, Per G Tube, Nightly  chlorhexidine, 15 mL, Mouth/Throat, Q12H  enoxaparin, 40 mg, Subcutaneous, Q12H  Ergocalciferol, 100 mcg, Per G Tube, Daily  insulin regular, 2-7 Units, Subcutaneous, Q6H  ipratropium-albuterol, 3 mL, Nebulization, Q4H - RT  lansoprazole, 15 mg, Per G Tube, Q AM  miconazole, 1 Application, Topical, Q12H  penicillin g (potassium), 4 Million Units, Intravenous, Q4H  senna, 2 tablet, Per G Tube, Nightly  sildenafil, 20 mg, Per G Tube, TID  sodium chloride, 10 mL, Intravenous, Q12H  sodium chloride, 4 mL, Nebulization, BID - RT      ICU Drips  amiodarone in dextrose 5%, 0.5 mg/min, Last Rate: 0.5 mg/min (11/10/24 1151)  bumetanide, 2 mg/hr, Last Rate: 2 mg/hr (11/10/24 1148)  dexmedetomidine, 0.2-1.5 mcg/kg/hr, Last Rate: 0.5 mcg/kg/hr (11/10/24 1434)  DOPamine, 2-20 mcg/kg/min  EPINEPHrine, 0.02-0.1 mcg/kg/min, Last Rate: 0.02 mcg/kg/min (11/10/24 1150)  lidocaine in D5W, 1 mg/min, Last Rate: Stopped (11/05/24 1135)  milrinone, 0.25-0.375 mcg/kg/min, Last Rate: 0.25 mcg/kg/min (11/10/24 1150)  niCARdipine, 5-15 mg/hr  norepinephrine, 0.02-0.2 mcg/kg/min, Last Rate: 0.02 mcg/kg/min (11/10/24 1149)  phenylephrine, 0.2-2 mcg/kg/min, Last Rate: Stopped (11/05/24 1030)  propofol, 5-50 mcg/kg/min, Last Rate: 15 mcg/kg/min (11/10/24 1433)  vasopressin, 0.02-0.1 Units/min, Last Rate: 0.04 Units/min (11/10/24 1150)      PRN Medications    acetaminophen **OR** acetaminophen **OR** acetaminophen    bisacodyl    bisacodyl    cyclobenzaprine    dextrose    dextrose     DOPamine    EPINEPHrine    glucagon (human recombinant)    Glycerin-Hypromellose-    HYDROcodone-acetaminophen    magnesium hydroxide    midazolam    milrinone    [] Morphine **AND** naloxone    niCARdipine    nitroglycerin    norepinephrine    ondansetron    phenylephrine    polyethylene glycol    Potassium Replacement - Follow Nurse / BPA Driven Protocol    propofol    sodium chloride    sodium chloride    sodium chloride    sodium chloride    sodium chloride    sodium chloride    vasopressin    Lines, Drains & Airways       Active LDAs       Name Placement date Placement time Site Days    Pulmonary Artery Catheter - Triple Lumen 10/30/24 Right Internal jugular 10/30/24  1026  created via procedure documentation  -- 10    CVC Double Lumen 10/30/24 Right Internal jugular 10/30/24  1026  created via procedure documentation  Internal jugular  10    Peripheral IV 10/29/24 1848 Anterior;Left Forearm 10/29/24  1848  Forearm  10    Peripheral IV 10/30/24 0940 Anterior;Right Forearm 10/30/24  0940  Forearm  10    NG/OG Tube Nasogastric  Left nostril 24  1330  Left nostril  7    Urethral Catheter Temperature probe 16 Fr. 24  0838  -- 1    ETT  10/30/24  1139  created via procedure documentation  -- 10    Arterial Line 24 Right Radial 24  0723  created via procedure documentation  Radial  7    Pacer Wires 10/30/24  1635  Ventricular  9                    Diet Orders (active) (From admission, onward)       Start     Ordered    24 1800  Dietary Nutrition Supplements ProSource No Carb  2 Times Daily      Comments: Flush through feeding tube BID    24 1358    24 1358  Tube Feeding: Formula: Novasource Renal (Nepro); Feeding Type: Continuous; Start at: 45 mL/hr; Then Advance By: Do Not Advance; Water Flush: 50 mL; Every: 4 hours; Water Bolus: None  Diet Effective Now        Comments: Prosource BID per feeding tube    24 1358    24 1533  NPO Diet NPO Type: Tube  Feeding  Diet Effective Now         11/02/24 1533    11/02/24 1259  Feeding Tube Insertion - Cortrak System  Once         11/02/24 1259                      Assessment:  Postop respiratory failure  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  Mixed cardiogenic and septic shock  CHF with pulmonary edema  Strep mitis endocarditis and bacteremia  Acute kidney injury  Acute on chronic anemia  History of persistent atrial fibrillation now postoperative junctional rhythm  Thrombocytopenia  Mild hypernatremia  Transaminitis  Acute left lower extremity DVT on 10/24/2024  Esophagitis grade C by EGD on 9/30/2024  Mild hyperglycemia      Plan of Treatment  -Continue ventilator support  - He has significant volume overload, agree with further diuresis per nephrology  -Continue daily SBT and SAT  - Bronchoscopy (11/2/2024)- had some mild thick airway secretions that were therapeutically aspirated but otherwise nothing notable.   - Previously on nitric oxide, now off    He remains volume overloaded and on inotrope therapy with diuresis.  He is only net -1.5 L in the past 24 hours, but remains net positive 20+ liters since admission.  Recommend to check BMPs and electrolytes twice daily to monitor for electrolyte disturbances given the ongoing diuresis.      Critical care time 35 minutes    Colton Allison MD  [facility], [facility]  Pulmonary and Critical Care Medicine

## 2024-11-11 NOTE — PROGRESS NOTES
" LOS: 19 days   Patient Care Team:  Juan Perez MD as PCP - General (Internal Medicine)    Chief Complaint: post op follow-up    Subjective  Intubated/sedated    Drips: amio 0.5, epi 0.02, levo 0.02, vaso 0.04, milrinone 0.25, propofol, precedex, insulin    Vital Signs  Temp:  [98.2 °F (36.8 °C)-100.2 °F (37.9 °C)] 98.2 °F (36.8 °C)  Heart Rate:  [] 101  Resp:  [25-32] 30  BP: (109-173)/(46-78) 126/63  Arterial Line BP: ()/(42-67) 113/52  FiO2 (%):  [40 %-49 %] 49 %  Body mass index is 47.75 kg/m².    Intake/Output Summary (Last 24 hours) at 11/11/2024 0707  Last data filed at 11/11/2024 0650  Gross per 24 hour   Intake 4159.5 ml   Output 85472 ml   Net -6440.5 ml     No intake/output data recorded.          11/09/24  0600 11/10/24  0600 11/11/24  0546   Weight: (!) 165 kg (363 lb 1.6 oz) (!) 163 kg (359 lb 5.6 oz) (!) 155 kg (341 lb 0.8 oz)         Objective:  General Appearance:  Ill-appearing (intubated/sedated).    Vital signs: (most recent): Blood pressure 126/64, pulse 97, temperature 98.2 °F (36.8 °C), resp. rate (!) 31, height 180 cm (70.87\"), weight (!) 155 kg (341 lb 0.8 oz), SpO2 95%.  Fever.  (Tmax 101.1).    Output: Producing urine (+mayorga) and producing stool.    Lungs:  There are rales and rhonchi.  (40% FiO2)  Heart: Tachycardia.  Irregular rhythm.  (Tele: Afib)  Extremities: There is dependent edema.    Skin:  Warm and dry.  (Dressings c/d/I  Hematoma left groin)            Results Review:        WBC WBC   Date Value Ref Range Status   11/11/2024 15.44 (H) 3.40 - 10.80 10*3/mm3 Final   11/10/2024 13.41 (H) 3.40 - 10.80 10*3/mm3 Final   11/09/2024 13.31 (H) 3.40 - 10.80 10*3/mm3 Final   11/08/2024 12.80 (H) 3.40 - 10.80 10*3/mm3 Final      HGB Hemoglobin   Date Value Ref Range Status   11/11/2024 9.1 (L) 13.0 - 17.7 g/dL Final   11/10/2024 8.5 (L) 13.0 - 17.7 g/dL Final   11/09/2024 8.6 (L) 13.0 - 17.7 g/dL Final   11/08/2024 8.8 (L) 13.0 - 17.7 g/dL Final      HCT Hematocrit " "  Date Value Ref Range Status   11/11/2024 27.7 (L) 37.5 - 51.0 % Final   11/10/2024 26.4 (L) 37.5 - 51.0 % Final   11/09/2024 26.8 (L) 37.5 - 51.0 % Final   11/08/2024 27.3 (L) 37.5 - 51.0 % Final      Platelets Platelets   Date Value Ref Range Status   11/11/2024 260 140 - 450 10*3/mm3 Final   11/10/2024 226 140 - 450 10*3/mm3 Final   11/09/2024 215 140 - 450 10*3/mm3 Final   11/08/2024 186 140 - 450 10*3/mm3 Final        PT/INR:    No results found for: \"PROTIME\"  /  No results found for: \"INR\"      Sodium Sodium   Date Value Ref Range Status   11/11/2024 147 (H) 136 - 145 mmol/L Final   11/10/2024 146 (H) 136 - 145 mmol/L Final   11/10/2024 145 136 - 145 mmol/L Final   11/10/2024 143 136 - 145 mmol/L Final   11/09/2024 140 136 - 145 mmol/L Final   11/08/2024 143 136 - 145 mmol/L Final      Potassium Potassium   Date Value Ref Range Status   11/11/2024 3.7 3.5 - 5.2 mmol/L Final   11/11/2024 3.7 3.5 - 5.2 mmol/L Final   11/11/2024 4.0 3.5 - 5.2 mmol/L Final   11/10/2024 3.9 3.5 - 5.2 mmol/L Final   11/10/2024 4.1 3.5 - 5.2 mmol/L Final   11/10/2024 4.0 3.5 - 5.2 mmol/L Final   11/10/2024 4.0 3.5 - 5.2 mmol/L Final   11/10/2024 3.4 (L) 3.5 - 5.2 mmol/L Final   11/10/2024 3.4 (L) 3.5 - 5.2 mmol/L Final   11/09/2024 3.4 (L) 3.5 - 5.2 mmol/L Final     Comment:     Slight hemolysis detected by analyzer. Result may be falsely elevated.   11/09/2024 3.3 (L) 3.5 - 5.2 mmol/L Final   11/09/2024 3.3 (L) 3.5 - 5.2 mmol/L Final   11/08/2024 4.2 3.5 - 5.2 mmol/L Final     Comment:     Slight hemolysis detected by analyzer. Result may be falsely elevated.      Chloride Chloride   Date Value Ref Range Status   11/11/2024 111 (H) 98 - 107 mmol/L Final   11/10/2024 111 (H) 98 - 107 mmol/L Final   11/10/2024 110 (H) 98 - 107 mmol/L Final   11/10/2024 109 (H) 98 - 107 mmol/L Final   11/09/2024 108 (H) 98 - 107 mmol/L Final   11/08/2024 111 (H) 98 - 107 mmol/L Final      Bicarbonate CO2   Date Value Ref Range Status   11/11/2024 " 23.0 22.0 - 29.0 mmol/L Final   11/10/2024 21.0 (L) 22.0 - 29.0 mmol/L Final   11/10/2024 19.9 (L) 22.0 - 29.0 mmol/L Final   11/10/2024 20.1 (L) 22.0 - 29.0 mmol/L Final   11/09/2024 19.2 (L) 22.0 - 29.0 mmol/L Final   11/08/2024 19.2 (L) 22.0 - 29.0 mmol/L Final      BUN BUN   Date Value Ref Range Status   11/11/2024 41 (H) 8 - 23 mg/dL Final   11/10/2024 41 (H) 8 - 23 mg/dL Final   11/10/2024 42 (H) 8 - 23 mg/dL Final   11/10/2024 41 (H) 8 - 23 mg/dL Final   11/09/2024 40 (H) 8 - 23 mg/dL Final   11/08/2024 33 (H) 8 - 23 mg/dL Final      Creatinine Creatinine   Date Value Ref Range Status   11/11/2024 1.14 0.76 - 1.27 mg/dL Final   11/10/2024 1.21 0.76 - 1.27 mg/dL Final   11/10/2024 1.19 0.76 - 1.27 mg/dL Final   11/10/2024 1.08 0.76 - 1.27 mg/dL Final   11/09/2024 1.14 0.76 - 1.27 mg/dL Final   11/08/2024 1.05 0.76 - 1.27 mg/dL Final      Calcium Calcium   Date Value Ref Range Status   11/11/2024 8.0 (L) 8.6 - 10.5 mg/dL Final   11/10/2024 7.9 (L) 8.6 - 10.5 mg/dL Final   11/10/2024 7.8 (L) 8.6 - 10.5 mg/dL Final   11/10/2024 7.7 (L) 8.6 - 10.5 mg/dL Final   11/09/2024 7.7 (L) 8.6 - 10.5 mg/dL Final   11/08/2024 8.0 (L) 8.6 - 10.5 mg/dL Final      Magnesium Magnesium   Date Value Ref Range Status   11/11/2024 2.0 1.6 - 2.4 mg/dL Final   11/10/2024 1.9 1.6 - 2.4 mg/dL Final   11/10/2024 1.9 1.6 - 2.4 mg/dL Final   11/09/2024 2.2 1.6 - 2.4 mg/dL Final          aspirin, 81 mg, Per G Tube, Daily  atorvastatin, 40 mg, Per G Tube, Nightly  chlorhexidine, 15 mL, Mouth/Throat, Q12H  enoxaparin, 40 mg, Subcutaneous, Q12H  Ergocalciferol, 100 mcg, Per G Tube, Daily  insulin regular, 2-7 Units, Subcutaneous, Q6H  ipratropium-albuterol, 3 mL, Nebulization, Q4H - RT  lansoprazole, 15 mg, Per G Tube, Q AM  miconazole, 1 Application, Topical, Q12H  penicillin g (potassium), 4 Million Units, Intravenous, Q4H  senna, 2 tablet, Per G Tube, Nightly  sildenafil, 20 mg, Per G Tube, TID  sodium chloride, 10 mL, Intravenous,  Q12H  sodium chloride, 4 mL, Nebulization, BID - RT      amiodarone in dextrose 5%, 0.5 mg/min, Last Rate: 0.5 mg/min (11/10/24 1754)  bumetanide, 2 mg/hr, Last Rate: 2 mg/hr (11/10/24 2119)  dexmedetomidine, 0.2-1.5 mcg/kg/hr, Last Rate: 0.7 mcg/kg/hr (11/11/24 0436)  DOPamine, 2-20 mcg/kg/min  EPINEPHrine, 0.02-0.1 mcg/kg/min, Last Rate: 0.02 mcg/kg/min (11/10/24 1150)  lidocaine in D5W, 1 mg/min, Last Rate: Stopped (11/05/24 1135)  milrinone, 0.25-0.375 mcg/kg/min, Last Rate: 0.25 mcg/kg/min (11/11/24 0221)  niCARdipine, 5-15 mg/hr  norepinephrine, 0.02-0.2 mcg/kg/min, Last Rate: 0.02 mcg/kg/min (11/10/24 1149)  phenylephrine, 0.2-2 mcg/kg/min, Last Rate: Stopped (11/05/24 1030)  propofol, 5-50 mcg/kg/min, Last Rate: 25 mcg/kg/min (11/11/24 0638)  vasopressin, 0.02-0.1 Units/min, Last Rate: 0.04 Units/min (11/11/24 4778)              Prosthetic aortic valve stenosis    Essential hypertension    Permanent atrial fibrillation    S/P AVR    ICD (implantable cardioverter-defibrillator), dual, in situ    Achilles tendon rupture    Bacteremia    Stenosis of prosthetic aortic valve    Anemia      Assessment & Plan    - Prosthetic aortic valve stenosis, h/o AVR (tissue)/maze/DANICA ligation (2014)- s/p reoperative sternotomy AV root replacement 27mm cryopreserved homograft with right nuvia cabrol, AICD removal, IABP placement- [facility] 10/31/2024  -Sternal closure ---11/2  - Possible endocarditis, likely need JOLIE   - Bacteremia, blood cultures positive strep mitis---on penicillin G  - Atrial fibrillation, unable to tolerate anticoagulation  - Hypertension  - NICM status post [employer] AICD  - Anemia, s/p EGD/colonoscopy with esophagitis, polyp removal  - Right achilles tendon tear--- walking boot and PT per ortho at [facility]  - Pre-diabetes -- improved at 5.3  -post op anemia- expected acute blood loss  -TCP post-op      POD #12. Intubated & sedated. Great response to bumex drip overnight. yesterday negative. On vaso 0.04, epi  0.02, milrinone 0.25.  Midodrine added.  His PO2 only 69 this morning, repeat CXR with effusions-- will see if we can drain some of this fluid with a bedside thoracentesis.    Creatinine stable, discussed with Dr. Keller plan to continue bumex gtt today. Continue supportive care.       JAVI Nichole  11/11/24  07:07 EST

## 2024-11-11 NOTE — PROGRESS NOTES
[facility]  [phone]  Dr. Colton Allison    Subjective:  LOS: 19    No acute events overnight.  Still maintaining on the ventilator.        Objective:  Vital Signs past 24hrs    Temp range: Temp (24hrs), Av.7 °F (37.1 °C), Min:98.2 °F (36.8 °C), Max:99.5 °F (37.5 °C)    BP range: BP: (100-150)/(40-84) 100/40  Pulse range: Heart Rate:  [] 107  Resp rate range: Resp:  [30-31] 31  (!) 155 kg (341 lb); Body mass index is 48.93 kg/m².    Device (Oxygen Therapy): ventilator     Oxygen range:SpO2:  [93 %-100 %] 94 %     Mode: VC/AC  FiO2 (%):  [49 %] 49 %  S RR:  [10] 10  S VT:  [460 mL] 460 mL  PEEP/CPAP (cm H2O):  [5 cm H20] 5 cm H20  MAP (cm H2O):  [13-14] 13      Intake/Output Summary (Last 24 hours) at 2024 1419  Last data filed at 2024 1300  Gross per 24 hour   Intake 4209.5 ml   Output 84349 ml   Net -5905.5 ml       Physical Exam  Constitutional:       Appearance: Normal appearance.   HENT:      Head: Normocephalic and atraumatic.      Nose: Nose normal.      Mouth/Throat:      Mouth: Mucous membranes are moist.      Pharynx: Oropharynx is clear.   Eyes:      Extraocular Movements: Extraocular movements intact.      Conjunctiva/sclera: Conjunctivae normal.   Cardiovascular:      Rate and Rhythm: Normal rate and regular rhythm.      Pulses: Normal pulses.      Heart sounds: Normal heart sounds. No murmur heard.  Pulmonary:      Effort: No respiratory distress.      Breath sounds: No stridor. No wheezing or rales.      Comments: Coarse breath sounds on the ventilator  Abdominal:      General: Abdomen is flat. Bowel sounds are normal.      Palpations: Abdomen is soft.      Tenderness: There is no abdominal tenderness.   Skin:     General: Skin is warm and dry.   Neurological:      General: No focal deficit present.      Mental Status: He is alert.            Result Review:  I have reviewed the results from last note by LPC physician and agree with these findings:  [x]   Laboratory accordion  [x]  Microbiology  [x]  Radiology  [x]  EKG/Telemetry   [x]  Cardiology/Vascular   [x]  Pathology  []  Old records  []  Other:    Medication Review:  I have reviewed the current MAR.  Antibiotics  penicillin G IVPB in 100 mL     Scheduled Medications  amiodarone, 200 mg, Oral, Q12H  aspirin, 81 mg, Per G Tube, Daily  atorvastatin, 40 mg, Per G Tube, Nightly  chlorhexidine, 15 mL, Mouth/Throat, Q12H  enoxaparin, 40 mg, Subcutaneous, Q12H  Ergocalciferol, 100 mcg, Per G Tube, Daily  hydrocortisone-bacitracin-zinc oxide-nystatin, 1 Application, Topical, Q12H  insulin regular, 2-7 Units, Subcutaneous, Q6H  ipratropium-albuterol, 3 mL, Nebulization, Q4H - RT  lansoprazole, 15 mg, Per G Tube, Q AM  miconazole, 1 Application, Topical, Q12H  midodrine, 10 mg, Oral, TID AC  penicillin g (potassium), 4 Million Units, Intravenous, Q4H  potassium chloride, 40 mEq, Oral, TID  senna, 2 tablet, Per G Tube, Nightly  sildenafil, 20 mg, Per G Tube, TID  sodium chloride, 10 mL, Intravenous, Q12H  sodium chloride, 4 mL, Nebulization, BID - RT      ICU Drips  bumetanide, 2 mg/hr, Last Rate: 2 mg/hr (11/11/24 0923)  dexmedetomidine, 0.2-1.5 mcg/kg/hr, Last Rate: 0.7 mcg/kg/hr (11/11/24 1202)  DOPamine, 2-20 mcg/kg/min  EPINEPHrine, 0.02-0.1 mcg/kg/min, Last Rate: 0.02 mcg/kg/min (11/10/24 1150)  lidocaine in D5W, 1 mg/min, Last Rate: Stopped (11/05/24 1135)  milrinone, 0.25-0.375 mcg/kg/min, Last Rate: 0.25 mcg/kg/min (11/11/24 1030)  niCARdipine, 5-15 mg/hr  norepinephrine, 0.02-0.2 mcg/kg/min, Last Rate: 0.02 mcg/kg/min (11/11/24 1408)  phenylephrine, 0.2-2 mcg/kg/min, Last Rate: Stopped (11/05/24 1030)  propofol, 5-50 mcg/kg/min, Last Rate: 25 mcg/kg/min (11/11/24 1201)  vasopressin, 0.02-0.1 Units/min, Last Rate: 0.04 Units/min (11/11/24 1201)      PRN Medications    acetaminophen **OR** acetaminophen **OR** acetaminophen    bisacodyl    bisacodyl    cyclobenzaprine    dextrose    dextrose    DOPamine     EPINEPHrine    glucagon (human recombinant)    Glycerin-Hypromellose-    HYDROcodone-acetaminophen    magnesium hydroxide    midazolam    milrinone    [] Morphine **AND** naloxone    niCARdipine    nitroglycerin    norepinephrine    ondansetron    phenylephrine    polyethylene glycol    Potassium Replacement - Follow Nurse / BPA Driven Protocol    propofol    sodium chloride    sodium chloride    sodium chloride    sodium chloride    sodium chloride    sodium chloride    vasopressin    Lines, Drains & Airways       Active LDAs       Name Placement date Placement time Site Days    Pulmonary Artery Catheter - Triple Lumen 10/30/24 Right Internal jugular 10/30/24  1026  created via procedure documentation  -- 10    CVC Double Lumen 10/30/24 Right Internal jugular 10/30/24  1026  created via procedure documentation  Internal jugular  10    Peripheral IV 10/29/24 1848 Anterior;Left Forearm 10/29/24  1848  Forearm  10    Peripheral IV 10/30/24 0940 Anterior;Right Forearm 10/30/24  0940  Forearm  10    NG/OG Tube Nasogastric  Left nostril 24  1330  Left nostril  7    Urethral Catheter Temperature probe 16 Fr. 24  0838  -- 1    ETT  10/30/24  1139  created via procedure documentation  -- 10    Arterial Line 24 Right Radial 24  0723  created via procedure documentation  Radial  7    Pacer Wires 10/30/24  1635  Ventricular  9                    Diet Orders (active) (From admission, onward)       Start     Ordered    24 1800  Dietary Nutrition Supplements ProSource No Carb  2 Times Daily      Comments: Flush through feeding tube BID    24 1358    24 1358  Tube Feeding: Formula: Novasource Renal (Nepro); Feeding Type: Continuous; Start at: 45 mL/hr; Then Advance By: Do Not Advance; Water Flush: 50 mL; Every: 4 hours; Water Bolus: None  Diet Effective Now        Comments: Prosource BID per feeding tube    24 1358    24 1533  NPO Diet NPO Type: Tube Feeding  Diet  Effective Now         11/02/24 1533    11/02/24 1259  Feeding Tube Insertion - Cortrak System  Once         11/02/24 1259                      Assessment:  Postop respiratory failure  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  Mixed cardiogenic and septic shock  CHF with pulmonary edema  Strep mitis endocarditis and bacteremia  Acute kidney injury  Acute on chronic anemia  History of persistent atrial fibrillation now postoperative junctional rhythm  Thrombocytopenia  Mild hypernatremia  Transaminitis  Acute left lower extremity DVT on 10/24/2024  Esophagitis grade C by EGD on 9/30/2024  Mild hyperglycemia      Plan of Treatment  - Continue ventilator support  - He has significant volume overload, agree with further diuresis per nephrology  - Continue daily SBT and SAT  - Bronchoscopy (11/2/2024)- had some mild thick airway secretions that were therapeutically aspirated but otherwise nothing notable.   - Previously on nitric oxide, now off    He remains volume overloaded and on inotrope therapy with diuresis.  He is diuresing well now, was net -6.7 L in the past 24 hours.  He has a large amount of fluid on him though.  He has been intubated for a prolonged period of time.  At this juncture, I would recommend consideration of tracheostomy if family willing to proceed.  Even though he is on minimal vent settings, he has not been doing well with his spontaneous breathing trials and he becomes very tachypneic which is likely secondary to volume overload and obesity.  Given how much fluid he has on him, I estimated that it will be another 1-1.5 weeks before a large enough volume of fluid is removed from him to potentially be successful with extubation.      Critical care time 35 minutes    Colton Allison MD  [facility], [facility]  Pulmonary and Critical Care Medicine

## 2024-11-11 NOTE — NURSING NOTE
CWCN: We see patient per request of the floor nurse regarding skin issue at Sacrum and bilateral Groin. Patient on ventilation, upon assessment we could see dark red discoloration, blanchable, towards sacrococcygeal area, with some excoriations on bilateral buttocks, and satellite lesions that like bilateral groin and abdominal fold appear to be related to MASD/Fungal infection, Magic barrier ointment was ordered. Rn at bedside.  He is at risk for further skin problems, is completely bedridden, is contracted, impaired mobility and is incontinent, repositioning him  every two hours and minimizing any skin contact with urine or stool would be beneficial.  Bilateral Heesl are with intact skin and normal coloration, protective padding should be used to facilitate cushioning, they must remain off-loaded at all the time.  Wound care order and pressure ulcer preventives  measures have been implemented into Epic.   He is Progressa bed for adequate pressure redistribution and pressure relief, but once he is transferred from this unit, will need LALM.  Please re-consult for any additional needs.

## 2024-11-11 NOTE — NURSING NOTE
Signed         Spoke to  about upper extremity doppler results:positive for deep vein thrombosis in the left axillary vein (appears sub-acute) superficial venous thrombosis in the right basilic vein upper arm and the left cephalic vein forearm.      Dr. Doan had no new orders at this time

## 2024-11-11 NOTE — PROGRESS NOTES
[facility] Progress Note      Patient Name: Woodrow Alejandro  : 1950  MRN: [id number]  Primary Care Physician:  Juan Perez MD  Date of admission: 10/23/2024    Subjective     Interval History:   Follow-up acute kidney  The patient remains on the ventilator, sedated, had significant anasarca, he was started on Bumex drip and the dose was increased to 2 mg/h with significant improvement in urine output he put out approximately 10.9 L  Review of Systems:   Not obtainable    Objective     Vitals:   Temp:  [98.2 °F (36.8 °C)-99.9 °F (37.7 °C)] 98.2 °F (36.8 °C)  Heart Rate:  [] 97  Resp:  [30-31] 31  BP: (109-150)/(46-84) 126/64  Arterial Line BP: (100-147)/(42-67) 102/50  FiO2 (%):  [49 %] 49 %    Intake/Output Summary (Last 24 hours) at 2024 1109  Last data filed at 2024 0923  Gross per 24 hour   Intake 4209.5 ml   Output 67000 ml   Net -6030.5 ml       Physical Exam:    General Appearance: On the ventilator, obese, chronically ill, no acute distress  Neck: Mild JVD he has [place] catheter in the right IJ  Lungs: Bilateral rhonchi, breathing effort not labored  Heart: Irregularly irregular, no rub  Abdomen: soft, no guarding, nondistended  : mayorga present   Extremities: 4+ upper and lower extremity edema    Scheduled Meds:     aspirin, 81 mg, Per G Tube, Daily  atorvastatin, 40 mg, Per G Tube, Nightly  chlorhexidine, 15 mL, Mouth/Throat, Q12H  enoxaparin, 40 mg, Subcutaneous, Q12H  Ergocalciferol, 100 mcg, Per G Tube, Daily  insulin regular, 2-7 Units, Subcutaneous, Q6H  ipratropium-albuterol, 3 mL, Nebulization, Q4H - RT  lansoprazole, 15 mg, Per G Tube, Q AM  miconazole, 1 Application, Topical, Q12H  midodrine, 5 mg, Oral, TID AC  penicillin g (potassium), 4 Million Units, Intravenous, Q4H  senna, 2 tablet, Per G Tube, Nightly  sildenafil, 20 mg, Per G Tube, TID  sodium chloride, 10 mL, Intravenous, Q12H  sodium chloride, 4 mL,  Nebulization, BID - RT      IV Meds:   amiodarone in dextrose 5%, 0.5 mg/min, Last Rate: 0.5 mg/min (11/11/24 0913)  bumetanide, 2 mg/hr, Last Rate: 2 mg/hr (11/11/24 0923)  dexmedetomidine, 0.2-1.5 mcg/kg/hr, Last Rate: 0.6 mcg/kg/hr (11/11/24 0805)  DOPamine, 2-20 mcg/kg/min  EPINEPHrine, 0.02-0.1 mcg/kg/min, Last Rate: 0.02 mcg/kg/min (11/10/24 1150)  lidocaine in D5W, 1 mg/min, Last Rate: Stopped (11/05/24 1135)  milrinone, 0.25-0.375 mcg/kg/min, Last Rate: 0.25 mcg/kg/min (11/11/24 1030)  niCARdipine, 5-15 mg/hr  norepinephrine, 0.02-0.2 mcg/kg/min, Last Rate: 0.02 mcg/kg/min (11/10/24 1149)  phenylephrine, 0.2-2 mcg/kg/min, Last Rate: Stopped (11/05/24 1030)  propofol, 5-50 mcg/kg/min, Last Rate: 15 mcg/kg/min (11/11/24 0800)  vasopressin, 0.02-0.1 Units/min, Last Rate: 0.04 Units/min (11/11/24 0348)        Results Reviewed:   I have personally reviewed the results from the time of this admission to 11/11/2024 11:09 EST     Results from last 7 days   Lab Units 11/11/24  0940 11/11/24  0345 11/11/24  0010 11/10/24  2021 11/10/24  1603 11/10/24  1208 11/10/24  0719 11/10/24  0346 11/09/24  1553 11/09/24  0505 11/08/24  1430 11/08/24  0343   SODIUM mmol/L  --  147*  --   --  146* 145  --  143  --  140   < > 140   POTASSIUM mmol/L 3.7 3.7  3.7 4.0   < > 4.1 4.0  4.0   < > 3.4*   < > 3.3*   < > 4.3   CHLORIDE mmol/L  --  111*  --   --  111* 110*  --  109*  --  108*   < > 108*   CO2 mmol/L  --  23.0  --   --  21.0* 19.9*  --  20.1*  --  19.2*   < > 20.3*   BUN mg/dL  --  41*  --   --  41* 42*  --  41*  --  40*   < > 31*   CREATININE mg/dL  --  1.14  --   --  1.21 1.19  --  1.08  --  1.14   < > 1.19   CALCIUM mg/dL  --  8.0*  --   --  7.9* 7.8*  --  7.7*  --  7.7*   < > 7.8*   BILIRUBIN mg/dL  --   --   --   --   --   --   --  0.9  --  0.8  --  0.7   ALK PHOS U/L  --   --   --   --   --   --   --  109  --  114  --  112   ALT (SGPT) U/L  --   --   --   --   --   --   --  10  --  10  --  <5   AST (SGOT) U/L  --   --    --   --   --   --   --  26  --  31  --  30   GLUCOSE mg/dL  --  171*  --   --  208* 219*  --  180*  --  204*   < > 175*    < > = values in this interval not displayed.     Estimated Creatinine Clearance: 86 mL/min (by C-G formula based on SCr of 1.14 mg/dL).  Results from last 7 days   Lab Units 11/11/24  0345 11/10/24  1603 11/10/24  1208 11/10/24  0346 11/09/24  0320   MAGNESIUM mg/dL 2.0 1.9 1.9  --  2.2   PHOSPHORUS mg/dL 2.8  --   --  3.0 2.9     Results from last 7 days   Lab Units 11/11/24  0345 11/10/24  0346 11/09/24  0320 11/08/24  0343 11/07/24  0352 11/06/24  0325   URIC ACID mg/dL 7.1* 5.9 5.2 5.0 5.1 4.7     Results from last 7 days   Lab Units 11/11/24  0345 11/10/24  0346 11/09/24  0320 11/08/24  1430 11/08/24  0343   WBC 10*3/mm3 15.44* 13.41* 13.31* 12.80* 11.59*   HEMOGLOBIN g/dL 9.1* 8.5* 8.6* 8.8* 8.6*   PLATELETS 10*3/mm3 260 226 215 186 161             Assessment / Plan     ASSESSMENT:  NATHANIEL, non-oliguric - ATN, due to cardiogenic and hemorrhagic shock with expected hemodynamic changes following major cardiac surgery.  Improving, has volume excess, creatinine 1.14, electrolyte within acceptable potassium 3.7 and total CO2 3 and the sodium is 140s  Fluid overload.  Significant anasarca but the albumin is 2.7, adding to the edema but definitely has significant volume excess  Cardiogenic/hemorrhagic shock, s/p large amount of blood products in the OR.  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  Treated with penicillin by ID.    History of A-fib, unable to tolerate anticoagulation  Acute hypoxic resp failure, on the ventilator  Anemia hemoglobin today is 9.1.  Hypophosphatemia associated with nutritional issues, phosphorus 2.8      PLAN:  Continue the same treatment  Replete potassium per report  Continue current dose of  bumetanide drip 2 mg/h  Monitor for diuretic toxicity  Surveillance labs    I reviewed the chart and other providers notes, reviewed labs.  I discussed the case with the patient's nurse at the bedside.    Copied text in this note has been reviewed and is accurate as of 11/11/24.       Jass Keller MD  11/11/24  11:09 [facility]    [facility]  [phone]

## 2024-11-11 NOTE — PLAN OF CARE
Goal Outcome Evaluation:              Outcome Evaluation: Remains on milrinone, epi, vaso.  Levo restarted for hypotension.  Amio transitioned to po- remains in A-fib with HR 's.  Cont on Bumex gtt with good urine output.  Cont with weeping edema.  Mult skin tears noted.  Follows some commands when sedation lowered but RR increases.

## 2024-11-11 NOTE — PROGRESS NOTES
LOS: 19 days   Patient Care Team:  Juan Perez MD as PCP - General (Internal Medicine)    Chief Complaint: Follow-up bioprosthetic AVR endocarditis, mitral regurgitation, persistent atrial fibrillation.    Interval History: Intubated and sedated, remains critically ill.  On multiple inotropes/pressors including milrinone, vasopressin, norepinephrine, and phenylephrine..  Repeat echo showed severe TR and moderate severe pulmonary hypertension, severe RV dilatation/dysfunction.  Diuresing on IV Bumex drip, remains in A-fib with reasonable rate control, switching to oral amiodarone.  Plan for thoracentesis later today    Vital Signs:  Temp:  [98.2 °F (36.8 °C)-99.5 °F (37.5 °C)] 98.2 °F (36.8 °C)  Heart Rate:  [] 97  Resp:  [30-31] 31  BP: (109-150)/(46-84) 126/64  Arterial Line BP: (100-147)/(42-67) 102/50  FiO2 (%):  [49 %] 49 %    Intake/Output Summary (Last 24 hours) at 11/11/2024 1326  Last data filed at 11/11/2024 0923  Gross per 24 hour   Intake 4209.5 ml   Output 9140 ml   Net -4930.5 ml       Physical Exam:   General Appearance:    Intubated and sedated.  Appears critically ill.   Lungs:     Rhonchi bilaterally anteriorly.    Heart:  Tachycardic, irregularly irregular rhythm. II/VI SM throughout.   Abdomen:     Soft, nontender, nondistended.    Extremities:    3+ generalized edema and anasarca.     Results Review:    Results from last 7 days   Lab Units 11/11/24  0940 11/11/24  0345   SODIUM mmol/L  --  147*   POTASSIUM mmol/L 3.7 3.7  3.7   CHLORIDE mmol/L  --  111*   CO2 mmol/L  --  23.0   BUN mg/dL  --  41*   CREATININE mg/dL  --  1.14   GLUCOSE mg/dL  --  171*   CALCIUM mg/dL  --  8.0*         Results from last 7 days   Lab Units 11/11/24  0345   WBC 10*3/mm3 15.44*   HEMOGLOBIN g/dL 9.1*   HEMATOCRIT % 27.7*   PLATELETS 10*3/mm3 260                 Results from last 7 days   Lab Units 11/11/24  0345   MAGNESIUM mg/dL 2.0     Results from last 7 days   Lab Units 11/08/24  1430    TRIGLYCERIDES mg/dL 160*         I reviewed the patient's new clinical results.        Assessment:  1.  Status post bioprosthetic aortic valve replacement in 2014  2.  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  Moderate to severe mitral regurgitation  4.  Status post reoperative AV root replacement, mitral valve repair, ICD removal, SVG-RCA, and IABP placement on 10/30/2021  5.  Postoperative shock, multifactorial  6.  Acute kidney injury  7.  History of nonischemic cardiomyopathy with recovered ejection fraction  8.  Anemia, acute on chronic  9.  Postoperative thrombocytopenia  10.  Persistent atrial fibrillation  11.  Ventricular tachycardia postoperatively  12.  Grade C esophagitis by EGD on 9/30/2024  13.  Acute left lower extremity DVT on 10/24/2024.  Resolved on Dopplers on 11/8/2024  14.  Right Achilles tendon tear  15.  Postoperative junctional rhythm  16.  Hypoalbuminemia  17.  Thrombocytopenia  18.  Severe right ventricular enlargement and dysfunction post-op  19.  Fever noted on 11/8/2024  20.  Subacute DVT in the left axillary vein by Doppler on 11/10/2024.    Plan:    -Patient unfortunately remains critically ill requiring 3-4 pressors and inotropes as well as midodrine for hemodynamic support.  Repeat limited echo today showed preserved LVEF but severe TR with moderate severe pulmonary hypertension and severe RV dilatation/dysfunction.  Aortic homograft not evaluated in depth, repaired mitral valve appears grossly competent.  Volume overload is also multifactorial, including iatrogenic secondary to large blood product volume and hypoalbuminemia.  He is net +20 L since admission.  Continue Bumex drip.  Plan for thoracentesis per surgery.    -Off nitrous oxide.  Limited echo yesterday morning unchanged with severe right ventricular enlargement and moderately reduced function.   Currently on sildenafil (Revatio) 20 mg every 8 hours.    -  Atrial fibrillation with RVR but overall reasonable rate control, switching IV to oral amiodarone.    - The DVT in his left lower extremity was not present on the Dopplers on 11/8/2024.  Also likely subacute DVT in the left axillary vein by Dopplers.  On Lovenox 40 q12 per surgery, monitor for bleeding especially given intermittent thrombocytopenia postoperatively.    -Infectious workup per primary service and ID.  Intermittent fever postop without positive cultures.    High risk due to age, frailty, critical illness with aortic valve endocarditis requiring urgent surgical intervention complicated by postop RV dysfunction and cardiogenic shock as well as A-fib in the setting of multiple comorbodities predisposing to major adverse events including myocardial infarction, stroke, hospitalization, and multi-agent drug therapy requiring intensive monitoring for toxicity (multiple pressure and on O2 for support, Bumex drip, amiodarone for A-fib control, need for invasive intervention such as thoracentesis).    Plan of care discussed with RN and surgery JAVI Salvador.      Noah Barnett MD  11/11/24  13:26 EST

## 2024-11-11 NOTE — PROGRESS NOTES
LOS: 18 days   Patient Care Team:  Juan Perez MD as PCP - General (Internal Medicine)    Chief Complaint: Follow-up bioprosthetic AVR endocarditis, mitral regurgitation, persistent atrial fibrillation.    Interval History: Continues to be intermittently febrile.  He is still grossly volume overloaded.  Remains on multiple pressors, IV amiodarone, and a Bumex drip.    Vital Signs:  Temp:  [98.8 °F (37.1 °C)-102.6 °F (39.2 °C)] 98.8 °F (37.1 °C)  Heart Rate:  [] 94  Resp:  [22-32] 31  BP: (109-173)/(55-75) 134/60  Arterial Line BP: ()/(36-59) 121/51  FiO2 (%):  [40 %-49 %] 49 %    Intake/Output Summary (Last 24 hours) at 11/10/2024 2049  Last data filed at 11/10/2024 1854  Gross per 24 hour   Intake 3795 ml   Output 9225 ml   Net -5430 ml       Physical Exam:   General Appearance:    Intubated and sedated.  Appears critically ill.   Lungs:     Rhonchi bilaterally anteriorly.    Heart:    Irregularly irregular rhythm with a normal rate. II/VI SM throughout.   Abdomen:     Soft, nontender, nondistended.    Extremities:    3-4+ generalized edema and anasarca.     Results Review:    Results from last 7 days   Lab Units 11/10/24  1603   SODIUM mmol/L 146*   POTASSIUM mmol/L 4.1   CHLORIDE mmol/L 111*   CO2 mmol/L 21.0*   BUN mg/dL 41*   CREATININE mg/dL 1.21   GLUCOSE mg/dL 208*   CALCIUM mg/dL 7.9*         Results from last 7 days   Lab Units 11/10/24  0346   WBC 10*3/mm3 13.41*   HEMOGLOBIN g/dL 8.5*   HEMATOCRIT % 26.4*   PLATELETS 10*3/mm3 226                 Results from last 7 days   Lab Units 11/10/24  1603   MAGNESIUM mg/dL 1.9     Results from last 7 days   Lab Units 11/08/24  1430   TRIGLYCERIDES mg/dL 160*         I reviewed the patient's new clinical results.        Assessment:  1.  Status post bioprosthetic aortic valve replacement in 2014  2.  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  Moderate to severe mitral regurgitation  4.  Status post reoperative AV root replacement, mitral valve repair, ICD removal, SVG-RCA, and IABP placement on 10/30/2021  5.  Postoperative shock, multifactorial  6.  Acute kidney injury  7.  History of nonischemic cardiomyopathy with recovered ejection fraction  8.  Anemia, acute on chronic  9.  Postoperative thrombocytopenia  10.  Persistent atrial fibrillation  11.  Ventricular tachycardia postoperatively  12.  Grade C esophagitis by EGD on 9/30/2024  13.  Acute left lower extremity DVT on 10/24/2024.  Resolved on Dopplers on 11/8/2024  14.  Right Achilles tendon tear  15.  Postoperative junctional rhythm  16.  Hypoalbuminemia  17.  Thrombocytopenia  18.  Severe right ventricular enlargement and dysfunction post-op  19.  Fever noted on 11/8/2024  20.  Subacute DVT in the left axillary vein by Doppler on 11/10/2024.    Plan:  -Remains grossly volume overloaded.  Continuing Bumex drip for now.  He has generalized anasarca.  Volume overload is also multifactorial, including iatrogenic secondary to large blood product volume and hypoalbuminemia.  He is net +20 L since admission.    -He continues to have fever, and previous [place] catheter tip was sent for culture.  No growth thus far.    -Off nitrous oxide.  Limited echo yesterday morning unchanged with severe right ventricular enlargement and moderately reduced function.  Currently on Revatio 20 mg every 8 hours.    -He has had issues with recurrent ventricular tachycardia after stopping lidocaine.  He has been off lidocaine for several days, and no recent ventricular tachycardia.  Continue amiodarone 0.5 mg/min.  Atrial fibrillation rate is slightly higher, possibly secondary to the fever.  Can use digoxin if needed in the future.    -Holding off on heparin for now per cardiovascular surgery.  The DVT in his left lower extremity was not present on the Dopplers on 11/8/2024.  He does have a likely  subacute DVT in the left axillary vein by Dopplers today.  He has had intermittent thrombocytopenia postoperatively as well.    -Receiving most of his pressor support through epinephrine.  He is on a set dose of vasopressin as well.  New [place] catheter was placed today.    -He remains critically ill.      Antwan Farmer MD  11/10/24  20:49 EST

## 2024-11-11 NOTE — PROGRESS NOTES
LOS: 19 days     Chief Complaint: Endocarditis    Interval History: Febrile over the weekend however fever curve now improved.  WBC is elevated at 15.  Continues to tolerate penicillin.  Remains intubated and sedated.    Vital Signs  Temp:  [98.2 °F (36.8 °C)-100 °F (37.8 °C)] 98.2 °F (36.8 °C)  Heart Rate:  [] 108  Resp:  [30-32] 30  BP: (109-173)/(46-84) 126/64  Arterial Line BP: ()/(42-67) 119/55  FiO2 (%):  [49 %] 49 %    Physical Exam:  General: Intubated and sedated  HEENT: ET tube in place  Respiratory: Coarse bilateral breath sounds on the ventilator.  : Ugarte catheter  Skin: Operative dressing over the anterior chest clean and intact.  Access: Left IJ [place].  Arterial line.  Peripheral line.    Antibiotics:  Anti-Infectives (From admission, onward)      Ordered     Dose/Rate Route Frequency Start Stop    11/02/24 0918  vancomycin (VANCOCIN) 1,000 mg in sodium chloride 0.9 % 250 mL IVPB-VTB        Ordering Provider: Antwan Farmer MD    1,000 mg  250 mL/hr over 60 Minutes Intravenous Every 24 Hours 11/02/24 1015 11/03/24 1138    11/02/24 0912  Pharmacy to dose vancomycin        Ordering Provider: Samantha Salvador APRN     Does not apply Continuous PRN 11/02/24 0912 11/04/24 0911    10/30/24 2306  ceFAZolin 2000 mg IVPB in 100 mL NS (MBP)        Ordering Provider: Samantha Salvador APRN    2,000 mg  over 30 Minutes Intravenous Every 8 Hours 10/31/24 0000 11/01/24 0922    10/29/24 1518  ceFAZolin 2000 mg IVPB in 100 mL NS (MBP)        Ordering Provider: Bryant Mcclure PA-C    2,000 mg  over 30 Minutes Intravenous Once 10/30/24 0600 10/30/24 1936    10/28/24 1034  vancomycin IVPB 2000 mg in 0.9% Sodium Chloride 500 mL        Ordering Provider: Samantha Salvador APRN    15 mg/kg × 142 kg Intravenous Once 10/28/24 1045 10/28/24 1356    10/23/24 1709  penicillin G potassium 4 Million Units in sodium chloride 0.9 % 100 mL IVPB        Ordering Provider: Gregg Diaz DO    4 Million  Units  over 30 Minutes Intravenous Every 4 Hours Scheduled 10/23/24 2000 12/04/24 1242             Results Review:     I reviewed the patient's new clinical results.    Lab Results   Component Value Date    WBC 15.44 (H) 11/11/2024    HGB 9.1 (L) 11/11/2024    HCT 27.7 (L) 11/11/2024    MCV 91.7 11/11/2024     11/11/2024     Lab Results   Component Value Date    GLUCOSE 171 (H) 11/11/2024    BUN 41 (H) 11/11/2024    CREATININE 1.14 11/11/2024    BCR 36.0 (H) 11/11/2024    CO2 23.0 11/11/2024    CALCIUM 8.0 (L) 11/11/2024    ALBUMIN 2.7 (L) 11/11/2024    LABIL2 1.4 06/27/2019    AST 26 11/10/2024    ALT 10 11/10/2024       Microbiology:  10/3 COVID-negative  10/10 COVID-negative  10/14 COVID-negative  10/15 blood cultures 2 out of 2 strep mitis  10/17 COVID-negative  10/17 blood cultures 2 out of 2 strep mitis  10/21 COVID-negative  10/23 blood cultures no growth today  10/30 operative cultures from the aortic valve no growth     Imaging:  Chest x-ray today reviewed by me with very similar appearance to prior.  Bilateral lower lobe atelectasis versus infiltrate.  Possible pleural effusions bilaterally.    Assessment    #Strep mitis endocarditis  #Status post bioprosthetic aortic valve replacement 2014  #Status post aortic root replacement in the setting of prosthetic aortic valve endocarditis and annular abscess on 10/30  #Status post removal of pacemaker generator and intracardiac portion of the leads with retained venous leads  #Atrial fibrillation  #Achilles tendon rupture  #NATHANIEL    Fever curve has improved today compared to prior.  Status post removal of prior central line.  Will repeat blood cultures today given continued leukocytosis.  Continue penicillin G 4,000,000 units every 4 hours for strep mitis endocarditis.  Antibiotic duration will be 6 weeks with end date of December 4.

## 2024-11-12 NOTE — PROGRESS NOTES
[facility]  [phone]  Dr. Colton Allison    Subjective:  LOS: 20    No acute events overnight.  Maintaining on the ventilator.  His respiratory drive is high and he is breathing in the mid 30s.      Objective:  Vital Signs past 24hrs    Temp range: Temp (24hrs), Av.7 °F (38.2 °C), Min:99.7 °F (37.6 °C), Max:101.8 °F (38.8 °C)    BP range: BP: ()/(40-66) 126/54  Pulse range: Heart Rate:  [] 103  Resp rate range: Resp:  [24-31] 26  (!) 151 kg (332 lb 10.8 oz); Body mass index is 47.73 kg/m².    Device (Oxygen Therapy): ventilator     Oxygen range:SpO2:  [88 %-97 %] 95 %     Mode: VC/AC  FiO2 (%):  [49 %-50 %] 50 %  S RR:  [10-24] 24  S VT:  [460 mL-550 mL] 550 mL  PEEP/CPAP (cm H2O):  [5 cm H20] 5 cm H20  MAP (cm H2O):  [11-15] 13      Intake/Output Summary (Last 24 hours) at 2024 1555  Last data filed at 2024 1454  Gross per 24 hour   Intake 5097 ml   Output 79633 ml   Net -8728 ml       Physical Exam  Constitutional:       Appearance: Normal appearance.   HENT:      Head: Normocephalic and atraumatic.      Nose: Nose normal.      Mouth/Throat:      Mouth: Mucous membranes are moist.      Pharynx: Oropharynx is clear.   Eyes:      Extraocular Movements: Extraocular movements intact.      Conjunctiva/sclera: Conjunctivae normal.   Cardiovascular:      Rate and Rhythm: Normal rate and regular rhythm.      Pulses: Normal pulses.      Heart sounds: Normal heart sounds. No murmur heard.  Pulmonary:      Effort: No respiratory distress.      Breath sounds: No stridor. No wheezing or rales.      Comments: Coarse breath sounds on the ventilator  Abdominal:      General: Abdomen is flat. Bowel sounds are normal.      Palpations: Abdomen is soft.      Tenderness: There is no abdominal tenderness.   Skin:     General: Skin is warm and dry.   Neurological:      General: No focal deficit present.      Mental Status: He is alert.            Result Review:  I have reviewed the  results from last note by [facility] physician and agree with these findings:  [x]  Laboratory accordion  [x]  Microbiology  [x]  Radiology  [x]  EKG/Telemetry   [x]  Cardiology/Vascular   [x]  Pathology  []  Old records  []  Other:    Medication Review:  I have reviewed the current MAR.  Antibiotics  cefTRIAXone (ROCEPHIN) 2000 mg IVPB in 100 mL NS (MBP)  Pharmacy to dose vancomycin  vancomycin  vancomycin (VANCOCIN) 750mg IVPB in  mL (CD)     Scheduled Medications  aspirin, 81 mg, Per G Tube, Daily  atorvastatin, 40 mg, Per G Tube, Nightly  cefTRIAXone, 2,000 mg, Intravenous, Q24H  chlorhexidine, 15 mL, Mouth/Throat, Q12H  enoxaparin, 40 mg, Subcutaneous, Q12H  Ergocalciferol, 100 mcg, Per G Tube, Daily  hydrocortisone-bacitracin-zinc oxide-nystatin, 1 Application, Topical, Q12H  insulin glargine, 15 Units, Subcutaneous, Daily  insulin regular, 2-7 Units, Subcutaneous, Q6H  ipratropium-albuterol, 3 mL, Nebulization, Q4H - RT  lansoprazole, 15 mg, Per G Tube, Q AM  miconazole, 1 Application, Topical, Q12H  midodrine, 15 mg, Oral, TID AC  potassium chloride, 40 mEq, Oral, TID  senna, 2 tablet, Per G Tube, Nightly  sildenafil, 20 mg, Per G Tube, TID  sodium chloride, 10 mL, Intravenous, Q12H  sodium chloride, 4 mL, Nebulization, BID - RT  vancomycin, 20 mg/kg, Intravenous, Once  [START ON 11/13/2024] vancomycin, 750 mg, Intravenous, Q12H      ICU Drips  amiodarone, 1 mg/min, Last Rate: 1 mg/min (11/12/24 1449)  amiodarone, 1 mg/min, Last Rate: 1 mg/min (11/12/24 0930)  bumetanide, 1 mg/hr, Last Rate: 1 mg/hr (11/12/24 0903)  dexmedetomidine, 0.2-1.5 mcg/kg/hr, Last Rate: 0.5 mcg/kg/hr (11/12/24 1446)  DOPamine, 2-20 mcg/kg/min  EPINEPHrine, 0.01 mcg/kg/min, Last Rate: 0.01 mcg/kg/min (11/12/24 4290)  lidocaine in D5W, 1 mg/min, Last Rate: Stopped (11/05/24 1135)  milrinone, 0.125 mcg/kg/min, Last Rate: 0.125 mcg/kg/min (11/12/24 1050)  niCARdipine, 5-15 mg/hr  norepinephrine, 0.02-0.2 mcg/kg/min, Last Rate: 0.02  mcg/kg/min (24 0700)  Pharmacy to dose vancomycin,   phenylephrine, 0.2-2 mcg/kg/min, Last Rate: Stopped (24 1030)  propofol, 5-50 mcg/kg/min, Last Rate: 25 mcg/kg/min (24 1311)  vasopressin, 0.02-0.1 Units/min, Last Rate: 0.05 Units/min (24 1449)      PRN Medications    acetaminophen **OR** acetaminophen **OR** acetaminophen    bisacodyl    bisacodyl    cyclobenzaprine    dextrose    dextrose    DOPamine    EPINEPHrine    glucagon (human recombinant)    Glycerin-Hypromellose-    HYDROcodone-acetaminophen    magnesium hydroxide    midazolam    milrinone    [] Morphine **AND** naloxone    niCARdipine    nitroglycerin    norepinephrine    ondansetron    Pharmacy to dose vancomycin    phenylephrine    polyethylene glycol    Potassium Replacement - Follow Nurse / BPA Driven Protocol    propofol    sodium chloride    sodium chloride    sodium chloride    sodium chloride    sodium chloride    sodium chloride    vasopressin    Lines, Drains & Airways       Active LDAs       Name Placement date Placement time Site Days    Pulmonary Artery Catheter - Triple Lumen 10/30/24 Right Internal jugular 10/30/24  1026  created via procedure documentation  -- 10    CVC Double Lumen 10/30/24 Right Internal jugular 10/30/24  1026  created via procedure documentation  Internal jugular  10    Peripheral IV 10/29/24 1848 Anterior;Left Forearm 10/29/24  1848  Forearm  10    Peripheral IV 10/30/24 0940 Anterior;Right Forearm 10/30/24  0940  Forearm  10    NG/OG Tube Nasogastric  Left nostril 24  1330  Left nostril  7    Urethral Catheter Temperature probe 16 Fr. 24  0838  -- 1    ETT  10/30/24  1139  created via procedure documentation  -- 10    Arterial Line 24 Right Radial 24  0723  created via procedure documentation  Radial  7    Pacer Wires 10/30/24  1635  Ventricular  9                    Diet Orders (active) (From admission, onward)       Start     Ordered    24 1800   Dietary Nutrition Supplements ProSource No Carb  2 Times Daily      Comments: Flush through feeding tube BID    11/08/24 1358    11/08/24 1358  Tube Feeding: Formula: Novasource Renal (Nepro); Feeding Type: Continuous; Start at: 45 mL/hr; Then Advance By: Do Not Advance; Water Flush: 50 mL; Every: 4 hours; Water Bolus: None  Diet Effective Now        Comments: Prosource BID per feeding tube    11/08/24 1358    11/02/24 1533  NPO Diet NPO Type: Tube Feeding  Diet Effective Now         11/02/24 1533    11/02/24 1259  Feeding Tube Insertion - Cortrak System  Once         11/02/24 1259                      Assessment:  Postop respiratory failure  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  Mixed cardiogenic and septic shock  CHF with pulmonary edema  Strep mitis endocarditis and bacteremia  Acute kidney injury  Acute on chronic anemia  History of persistent atrial fibrillation now postoperative junctional rhythm  Thrombocytopenia  Mild hypernatremia  Transaminitis  Acute left lower extremity DVT on 10/24/2024  Esophagitis grade C by EGD on 9/30/2024  Mild hyperglycemia      Plan of Treatment  - Continue ventilator support  - He has significant volume overload, agree with further diuresis per nephrology  - Continue daily SBT and SAT  - Bronchoscopy (11/2/2024)- had some mild thick airway secretions that were therapeutically aspirated but otherwise nothing notable.   - Previously on nitric oxide, now off    He remains volume overloaded and on inotrope therapy with diuresis.  He is diuresing well now, was net -6 L in the past 24 hours.  He has a large amount of fluid on him though.  He has been intubated for a prolonged period of time.  I still recommend consideration of tracheostomy as I think it will be a while before we are able to get his fluid status down.  I adjusted his ventilator settings today, switched him over to pressure control initially, and he seemed to  be doing okay, but then became tachypneic and seemed more distress.  Switched him back to volume control and increase the respiratory rate to 24 and adjusted the tidal volume to 550 to give him more volume, and he seemed to be more synchronous with the ventilator.  ABG obtained approximately 1 hour after ventilator settings showed the same pH, slightly improved PaO2.      Critical care time 35 minutes    Colton Allison MD  [facility], [facility]  Pulmonary and Critical Care Medicine

## 2024-11-12 NOTE — PROGRESS NOTES
LOS: 20 days   Patient Care Team:  Juan Perez MD as PCP - General (Internal Medicine)    Chief Complaint: Follow-up bioprosthetic AVR endocarditis, mitral regurgitation, persistent atrial fibrillation.    Interval History: Continues to be intermittently febrile.  His volume status slightly better.  Remains on multiple pressors.  His heart rate was slightly higher today as well, and he did get IV digoxin.    Vital Signs:  Temp:  [99 °F (37.2 °C)-101.8 °F (38.8 °C)] 99.9 °F (37.7 °C)  Heart Rate:  [] 103  Resp:  [24-32] 24  BP: ()/(40-66) 129/52  Arterial Line BP: ()/(33-63) 122/52  FiO2 (%):  [49 %-50 %] 50 %    Intake/Output Summary (Last 24 hours) at 11/12/2024 1230  Last data filed at 11/12/2024 1100  Gross per 24 hour   Intake 5337 ml   Output 39398 ml   Net -7913 ml       Physical Exam:   General Appearance:    Intubated and sedated.  Appears critically ill.   Lungs:     Rhonchi bilaterally anteriorly.    Heart:    Irregularly irregular rhythm with a tachycardic rate. II/VI SM throughout.   Abdomen:     Soft, nontender, nondistended.    Extremities:    3+ generalized edema and anasarca.     Results Review:    Results from last 7 days   Lab Units 11/12/24  1128 11/12/24  0815   SODIUM mmol/L  --  144   POTASSIUM mmol/L 4.0 4.0  4.0   CHLORIDE mmol/L  --  108*   CO2 mmol/L  --  23.0   BUN mg/dL  --  47*   CREATININE mg/dL  --  1.10   GLUCOSE mg/dL  --  186*   CALCIUM mg/dL  --  8.7         Results from last 7 days   Lab Units 11/12/24  0342   WBC 10*3/mm3 16.14*   HEMOGLOBIN g/dL 9.0*   HEMATOCRIT % 28.6*   PLATELETS 10*3/mm3 270                 Results from last 7 days   Lab Units 11/12/24  0509   MAGNESIUM mg/dL 2.0     Results from last 7 days   Lab Units 11/08/24  1430   TRIGLYCERIDES mg/dL 160*         I reviewed the patient's new clinical results.        Assessment:  1.  Status post bioprosthetic aortic valve replacement in 2014  2.  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  Moderate to severe mitral regurgitation  4.  Status post reoperative AV root replacement, mitral valve repair, ICD removal, SVG-RCA, and IABP placement on 10/30/2021  5.  Postoperative shock, multifactorial  6.  Acute kidney injury  7.  History of nonischemic cardiomyopathy with recovered ejection fraction  8.  Anemia, acute on chronic  9.  Postoperative thrombocytopenia  10.  Persistent atrial fibrillation  11.  Ventricular tachycardia postoperatively  12.  Grade C esophagitis by EGD on 9/30/2024  13.  Acute left lower extremity DVT on 10/24/2024.  Resolved on Dopplers on 11/8/2024  14.  Right Achilles tendon tear  15.  Postoperative junctional rhythm  16.  Hypoalbuminemia  17.  Thrombocytopenia  18.  Severe right ventricular enlargement and dysfunction post-op  19.  Fever noted on 11/8/2024  20.  Subacute DVT in the left axillary vein by Doppler on 11/10/2024.    Plan:  -Remains grossly volume overloaded, but some improvement.  Per Dr. Keller, decrease Bumex drip to 1 mg/h.    -Infectious diseases following.  Antibiotics at their discretion.    -Off nitrous oxide. Currently on Revatio 20 mg every 8 hours.    -No recent ventricular tachycardia, and he has been off of lidocaine for days.  He was off of IV amiodarone briefly, although the drip has been resumed at this point.  He also got 500 mcg of IV digoxin this morning for rate.    -Holding off on heparin for now per cardiovascular surgery.  The DVT in his left lower extremity was not present on the Dopplers on 11/8/2024.  He does have a likely subacute DVT in the left axillary vein by Dopplers today.  He has had intermittent thrombocytopenia postoperatively as well.    -Remains on pressor support with Levophed, vasopressin, epinephrine, and milrinone.    -He is likely going to need a trach and PEG.    -He remains critically ill.      Antwan HICKS  MD [facility]  11/12/24  12:30 EST

## 2024-11-12 NOTE — CONSULTS
"Nutrition Services    Patient Name:  Woodrow Alejandro  YOB: 1950  MRN: [id number]  Admit Date:  10/23/2024    Assessment Date:  11/12/24    Summary: TF reassessment     Pt is a 74 y/o male who is POD #13 reoperative sternotomy AV root replacement 27mm cryopreserved homograft with right nuvia cabrol, AICD removal, IABP placement.  Pt currently intubated and sedated on precedex. Renal managing diuretics. Yesterday left thoracentesis without enough fluid to drain.  Dr. Florian to discuss trach and peg with daughter.      Current TF regimen: Nepro @ 45 mL/hr with 50 mL q 4 hour free water flushes (per MD).  Prosource BID    Labs reviewed: Na 144, K 4.0, Gluc 168, BUN 47, Phos 2.6  Meds reviewed: lipitor, bumex, lovenox, insulin, prevacid, KCl, senokot, amiodarone, precedex    Plans/Recommendations:  Continue novasource renal @45ml/hr   Prosource BID   50 mL q 4 hour free water flushes (per MD).  Monitor for tolerance.    Initial Goal:  *initial goal conservative d/t risk of RFS     Novasource Renal at 45mL/hr + 50mL q4hr water flush      End Goal:    Novasource Renal at 45 mL/hr + 50mL q4hr water flush      Calories  1980kcal + 120kcal prosource= 2100kcals (100%)    Protein  90g + 30g prosource= 120 g (100%)    Free water  703 mL   Flushes  300     The above end goal rate is for 22 hrs/day to assume interruptions for ADLs. Water flushes adjusted based on clinical picture + Rx flushes/IV fluids     Specialized formula chosen r/t need for concentrated tube feeding formula with high protein       RD to follow    CLINICAL NUTRITION ASSESSMENT      Reason for Assessment Follow-up Protocol     Diagnosis/Problem   POD 13 reoperative sternotomy AV root replacement 27mm cryopreserved homograft with right nuvia cabrol, AICD removal, IABP placement.     Anthropometrics        Current Height  Current Weight  BMI kg/m2 Height: 177.8 cm (70\")  Weight: (!) 151 kg (332 lb 10.8 oz) (11/12/24 0513)  Body mass index is " 47.73 kg/m².   Adjusted BMI (if applicable)    BMI Category Obese, Class III (40 or higher)   Ideal Body Weight (IBW) 171 lbs   Usual Body Weight (UBW) 330 lbs   Weight Trend Stable     Estimated/Assessed Needs        Energy Requirements    Weight for Calculation 78 kg IBW   Method for Estimation  25-30 kcal/kg   EST Needs (kcal/day) 8428-5479       Protein Requirements    Weight for Calculation 78 kg IBW   EST Protein Needs (g/kg) 1.5 - 2.0 gm/kg   EST Daily Needs (g/day) 117-156       Fluid Requirements     Method for Estimation 1 mL/kcal    Estimated Needs (mL/day)      Labs       Pertinent Labs    Results from last 7 days   Lab Units 11/12/24  1128 11/12/24  0815 11/12/24  0509 11/12/24  0342 11/10/24  0719 11/10/24  0346 11/09/24  1553 11/09/24  0505   SODIUM mmol/L  --  144 144 146*   < > 143  --  140   POTASSIUM mmol/L 4.0 4.0  4.0 4.3 3.3*   < > 3.4*   < > 3.3*   CHLORIDE mmol/L  --  108* 110* 117*   < > 109*  --  108*   CO2 mmol/L  --  23.0 22.5 17.6*   < > 20.1*  --  19.2*   BUN mg/dL  --  47* 44* 37*   < > 41*  --  40*   CREATININE mg/dL  --  1.10 1.14 0.90   < > 1.08  --  1.14   CALCIUM mg/dL  --  8.7 8.2* 6.4*   < > 7.7*  --  7.7*   BILIRUBIN mg/dL  --   --   --  0.7  --  0.9  --  0.8   ALK PHOS U/L  --   --   --  92  --  109  --  114   ALT (SGPT) U/L  --   --   --  7  --  10  --  10   AST (SGOT) U/L  --   --   --  18  --  26  --  31   GLUCOSE mg/dL  --  186* 178* 142*   < > 180*  --  204*    < > = values in this interval not displayed.     Results from last 7 days   Lab Units 11/12/24  0509 11/12/24 0342 11/11/24  0345 11/09/24  0320 11/08/24  1430 11/08/24  0740   MAGNESIUM mg/dL 2.0 1.6 2.0   < >  --   --    PHOSPHORUS mg/dL 2.6 1.9* 2.8   < > 2.4*  --    HEMOGLOBIN g/dL  --  9.0* 9.1*   < > 8.8*  --    HEMATOCRIT %  --  28.6* 27.7*   < > 27.3*  --    WBC 10*3/mm3  --  16.14* 15.44*   < > 12.80*  --    TRIGLYCERIDES mg/dL  --   --   --   --  160*  --    ALBUMIN g/dL 2.5* 2.0* 2.7*   < > 2.5*  --     PREALBUMIN mg/dL  --   --   --   --   --  6.8*    < > = values in this interval not displayed.     Results from last 7 days   Lab Units 11/12/24  0342 11/11/24  0345 11/10/24  0346 11/09/24  0320 11/08/24  1430   PLATELETS 10*3/mm3 270 260 226 215 186     COVID19   Date Value Ref Range Status   10/21/2024 Not Detected Not Detected - Ref. Range Final     Lab Results   Component Value Date    HGBA1C 5.30 10/24/2024          Medications           Scheduled Medications aspirin, 81 mg, Per G Tube, Daily  atorvastatin, 40 mg, Per G Tube, Nightly  cefTRIAXone, 2,000 mg, Intravenous, Q24H  chlorhexidine, 15 mL, Mouth/Throat, Q12H  enoxaparin, 40 mg, Subcutaneous, Q12H  Ergocalciferol, 100 mcg, Per G Tube, Daily  hydrocortisone-bacitracin-zinc oxide-nystatin, 1 Application, Topical, Q12H  insulin glargine, 15 Units, Subcutaneous, Daily  insulin regular, 2-7 Units, Subcutaneous, Q6H  ipratropium-albuterol, 3 mL, Nebulization, Q4H - RT  lansoprazole, 15 mg, Per G Tube, Q AM  miconazole, 1 Application, Topical, Q12H  midodrine, 15 mg, Oral, TID AC  potassium chloride, 40 mEq, Oral, TID  potassium chloride, 40 mEq, Oral, Q4H  senna, 2 tablet, Per G Tube, Nightly  sildenafil, 20 mg, Per G Tube, TID  sodium chloride, 10 mL, Intravenous, Q12H  sodium chloride, 4 mL, Nebulization, BID - RT  vancomycin, 20 mg/kg, Intravenous, Once       Infusions amiodarone, 1 mg/min  amiodarone, 1 mg/min, Last Rate: 1 mg/min (11/12/24 0930)  bumetanide, 1 mg/hr, Last Rate: 1 mg/hr (11/12/24 0903)  dexmedetomidine, 0.2-1.5 mcg/kg/hr, Last Rate: 0.5 mcg/kg/hr (11/12/24 0835)  DOPamine, 2-20 mcg/kg/min  EPINEPHrine, 0.01 mcg/kg/min, Last Rate: 0.01 mcg/kg/min (11/12/24 0925)  lidocaine in D5W, 1 mg/min, Last Rate: Stopped (11/05/24 1135)  milrinone, 0.125 mcg/kg/min, Last Rate: 0.125 mcg/kg/min (11/12/24 0925)  niCARdipine, 5-15 mg/hr  norepinephrine, 0.02-0.2 mcg/kg/min, Last Rate: 0.02 mcg/kg/min (11/12/24 0700)  Pharmacy to dose vancomycin,    phenylephrine, 0.2-2 mcg/kg/min, Last Rate: Stopped (24 1030)  propofol, 5-50 mcg/kg/min, Last Rate: 25 mcg/kg/min (24 1311)  vasopressin, 0.02-0.1 Units/min, Last Rate: 0.04 Units/min (24 0610)       PRN Medications   acetaminophen **OR** acetaminophen **OR** acetaminophen    bisacodyl    bisacodyl    cyclobenzaprine    dextrose    dextrose    DOPamine    EPINEPHrine    glucagon (human recombinant)    Glycerin-Hypromellose-    HYDROcodone-acetaminophen    magnesium hydroxide    midazolam    milrinone    [] Morphine **AND** naloxone    niCARdipine    nitroglycerin    norepinephrine    ondansetron    Pharmacy to dose vancomycin    phenylephrine    polyethylene glycol    Potassium Replacement - Follow Nurse / BPA Driven Protocol    propofol    sodium chloride    sodium chloride    sodium chloride    sodium chloride    sodium chloride    sodium chloride    vasopressin     Physical Findings          General Findings obese, responds/arouses to voice, ventilator support   Oral/Mouth Cavity tooth or teeth missing   Edema  generalized, lower extremity , upper extremity, 2+ (mild), 3+ (moderate)   Gastrointestinal hypoactive bowel sounds, non-distended , last bowel movement:    Skin  bruising, excoriation, pressure injury: sacral spine DTI, surgical incision: L clavicle, sternal, anterior thigh   Tubes/Drains/Lines fecal management system, NG tube   NFPE Not indicated at this time   --  Current Nutrition Orders & Evaluation of Intake       Oral Nutrition     Food Allergies NKFA   Current PO Diet NPO Diet NPO Type: Tube Feeding   Supplement n/a   PO Evaluation     % PO Intake NPO    Factors Affecting Intake: Other: Intubated      Enteral Nutrition     Enteral Route NG    TF Delivery Method Continuous    Propofol Rate/Kcal     Current TF Order/Rate  Novasource Renal @ 45 mL/hr    TF Goal Rate 45 mL/hr    Current Water Flush 50 mL Q 4 hr (per MD)    Modular None    TF Residual  no or  minimal residual    TF Tolerance tolerating    TF Observation Other: discussed with RN     PES STATEMENT / NUTRITION DIAGNOSIS      Nutrition Dx Problem  Problem: Needs Alternative Composition  Etiology: Medical Diagnosis - s/p reoperative sternotomy AV root replacement 27mm cryopreserved homograft with right nuvia cabrol, AICD removal, IABP placement.    Signs/Symptoms: Report/Observation   --  NUTRITION INTERVENTION / PLAN OF CARE      Intervention Goal(s) Maintain nutrition status, Establish goals of care, Reduce/improve symptoms, Meet estimated needs, Disease management/therapy, Tolerate TF/PN at goal, and Appropriate weight loss         RD Intervention/Action Adjust EN/PN regimen, Continue to monitor, Care plan reviewed, and Recommend/order: EN         Prescription/Orders:       PO Diet       Supplements       Enteral Nutrition    Enteral Prescription:     Enteral Route NG    TF Delivery Method Continuous    Enteral Product Novasource Renal    Modular Liquid Protein, BID    Propofol Rate/Kcal     TF Start Rate  45 mL/hr    TF Goal Rate  45 mL/hr    Free Water Flush 50 mL Q 4 hr (per MD)    Provision at Goal:          Calories 1980kcal +120kcal prosource= 2100kcal, meets 100% needs         Protein  90g +30g prosource= 120gm protein, meets 100% needs         Fluid (mL) 713ml+ 300 ml free water flushes         Parenteral Nutrition    New Prescription Ordered? Continue same per protocol, No changes at this time   --      Monitor/Evaluation Per protocol, Pertinent labs, EN delivery/tolerance, Weight, Skin status, GI status, Symptoms, POC/GOC, Swallow function   Discharge Plan/Needs No discharge needs identified at this time   --    RD to follow per protocol.      Electronically signed by:  Gisela Bartlett RD  11/12/24 13:16 EST

## 2024-11-12 NOTE — PROGRESS NOTES
[facility] Progress Note      Patient Name: Woodrow Alejandro  : 1950  MRN: [id number]  Primary Care Physician:  Juan Perez MD  Date of admission: 10/23/2024    Subjective     Interval History:   Follow-up acute kidney  The patient remains on the ventilator, sedated, had significant anasarca, he was started on Bumex drip and the dose was increased to 2 mg/h with significant improvement in urine output he put out approximately 12.6 L  There is concern about right heart failure and right ventricular failure requiring more filling pressure so we will decrease the diuretic  Review of Systems:   Not obtainable    Objective     Vitals:   Temp:  [98.4 °F (36.9 °C)-101.8 °F (38.8 °C)] 100 °F (37.8 °C)  Heart Rate:  [] 100  Resp:  [31-32] 31  BP: ()/(40-66) 103/48  Arterial Line BP: ()/(33-64) 89/33  FiO2 (%):  [49 %-50 %] 49 %    Intake/Output Summary (Last 24 hours) at 2024 1053  Last data filed at 2024 1000  Gross per 24 hour   Intake 5337 ml   Output 03376 ml   Net -7313 ml       Physical Exam:    General Appearance: On the ventilator, obese, chronically ill, no acute distress  Neck: Mild JVD he has [place] catheter in the right IJ  Lungs: Bilateral rhonchi, breathing effort not labored  Heart: Irregularly irregular, no rub  Abdomen: soft, no guarding, nondistended  : mayorga present   Extremities: 4+ upper and lower extremity edema    Scheduled Meds:     amiodarone, 200 mg, Oral, Q12H  aspirin, 81 mg, Per G Tube, Daily  atorvastatin, 40 mg, Per G Tube, Nightly  cefTRIAXone, 2,000 mg, Intravenous, Q24H  chlorhexidine, 15 mL, Mouth/Throat, Q12H  enoxaparin, 40 mg, Subcutaneous, Q12H  Ergocalciferol, 100 mcg, Per G Tube, Daily  hydrocortisone-bacitracin-zinc oxide-nystatin, 1 Application, Topical, Q12H  insulin glargine, 15 Units, Subcutaneous, Daily  insulin regular, 2-7 Units, Subcutaneous, Q6H  ipratropium-albuterol, 3 mL,  Nebulization, Q4H - RT  lansoprazole, 15 mg, Per G Tube, Q AM  miconazole, 1 Application, Topical, Q12H  midodrine, 15 mg, Oral, TID AC  potassium chloride, 40 mEq, Oral, TID  potassium chloride, 40 mEq, Oral, Q4H  senna, 2 tablet, Per G Tube, Nightly  sildenafil, 20 mg, Per G Tube, TID  sodium chloride, 10 mL, Intravenous, Q12H  sodium chloride, 4 mL, Nebulization, BID - RT      IV Meds:   amiodarone, 1 mg/min  amiodarone, 1 mg/min, Last Rate: 1 mg/min (11/12/24 0930)  bumetanide, 1 mg/hr, Last Rate: 1 mg/hr (11/12/24 0903)  dexmedetomidine, 0.2-1.5 mcg/kg/hr, Last Rate: 0.5 mcg/kg/hr (11/12/24 0835)  DOPamine, 2-20 mcg/kg/min  EPINEPHrine, 0.01 mcg/kg/min, Last Rate: 0.01 mcg/kg/min (11/12/24 0925)  lidocaine in D5W, 1 mg/min, Last Rate: Stopped (11/05/24 1135)  milrinone, 0.125 mcg/kg/min, Last Rate: 0.125 mcg/kg/min (11/12/24 0925)  niCARdipine, 5-15 mg/hr  norepinephrine, 0.02-0.2 mcg/kg/min, Last Rate: 0.02 mcg/kg/min (11/12/24 0700)  Pharmacy to dose vancomycin,   phenylephrine, 0.2-2 mcg/kg/min, Last Rate: Stopped (11/05/24 1030)  propofol, 5-50 mcg/kg/min, Last Rate: 15 mcg/kg/min (11/12/24 0836)  vasopressin, 0.02-0.1 Units/min, Last Rate: 0.04 Units/min (11/12/24 0610)        Results Reviewed:   I have personally reviewed the results from the time of this admission to 11/12/2024 10:53 EST     Results from last 7 days   Lab Units 11/12/24  0815 11/12/24  0509 11/12/24  0342 11/10/24  0719 11/10/24  0346 11/09/24  1553 11/09/24  0505   SODIUM mmol/L 144 144 146*   < > 143  --  140   POTASSIUM mmol/L 4.0  4.0 4.3 3.3*   < > 3.4*   < > 3.3*   CHLORIDE mmol/L 108* 110* 117*   < > 109*  --  108*   CO2 mmol/L 23.0 22.5 17.6*   < > 20.1*  --  19.2*   BUN mg/dL 47* 44* 37*   < > 41*  --  40*   CREATININE mg/dL 1.10 1.14 0.90   < > 1.08  --  1.14   CALCIUM mg/dL 8.7 8.2* 6.4*   < > 7.7*  --  7.7*   BILIRUBIN mg/dL  --   --  0.7  --  0.9  --  0.8   ALK PHOS U/L  --   --  92  --  109  --  114   ALT (SGPT) U/L  --    --  7  --  10  --  10   AST (SGOT) U/L  --   --  18  --  26  --  31   GLUCOSE mg/dL 186* 178* 142*   < > 180*  --  204*    < > = values in this interval not displayed.     Estimated Creatinine Clearance: 86.7 mL/min (by C-G formula based on SCr of 1.1 mg/dL).  Results from last 7 days   Lab Units 11/12/24  0509 11/12/24  0342 11/11/24  0345   MAGNESIUM mg/dL 2.0 1.6 2.0   PHOSPHORUS mg/dL 2.6 1.9* 2.8     Results from last 7 days   Lab Units 11/12/24  0342 11/11/24  0345 11/10/24  0346 11/09/24  0320 11/08/24  0343 11/07/24  0352 11/06/24  0325   URIC ACID mg/dL 5.5 7.1* 5.9 5.2 5.0 5.1 4.7     Results from last 7 days   Lab Units 11/12/24  0342 11/11/24  0345 11/10/24  0346 11/09/24  0320 11/08/24  1430   WBC 10*3/mm3 16.14* 15.44* 13.41* 13.31* 12.80*   HEMOGLOBIN g/dL 9.0* 9.1* 8.5* 8.6* 8.8*   PLATELETS 10*3/mm3 270 260 226 215 186             Assessment / Plan     ASSESSMENT:  NATHANIEL, non-oliguric - ATN, due to cardiogenic and hemorrhagic shock with expected hemodynamic changes following major cardiac surgery.  Improving, has volume excess, creatinine 1.1, electrolyte within acceptable potassium 4, and total CO2 23 and the sodium is 144, uric acid 5 point  Fluid overload.  Significant anasarca but the albumin is 2.7, adding to the edema but definitely has significant volume excess  Cardiogenic/hemorrhagic shock, s/p large amount of blood products in the OR.  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  Treated with penicillin by ID.    History of A-fib, unable to tolerate anticoagulation  Acute hypoxic resp failure, on the ventilator  Anemia hemoglobin today is 9.  Hypophosphatemia associated with nutritional issues, phosphorus 2.8      PLAN:  Continue the same treatment  Replete potassium per report  Decrease the Bumex drip to 1 mg/h since there  is concern about the patient needing higher filling pressures to maintain his cardiac output.  Monitor for diuretic toxicity  Surveillance labs    I reviewed the chart and other providers notes, reviewed labs.  I discussed the case with the patient's nurse at the bedside.  I discussed the case with Dr. Florian  Copied text in this note has been reviewed and is accurate as of 11/12/24.       Jass Keller MD  11/12/24  10:53 EST    [facility]  [phone]

## 2024-11-12 NOTE — PROGRESS NOTES
LOS: 20 days     Chief Complaint: Endocarditis    Interval History: Febrile again to 101.8.  Intermittently tachycardic.  WBC at 16 today.    Vital Signs  Temp:  [98.2 °F (36.8 °C)-101.8 °F (38.8 °C)] 100.4 °F (38 °C)  Heart Rate:  [] 108  Resp:  [31-32] 31  BP: ()/(40-66) 138/59  Arterial Line BP: ()/(40-64) 112/50  FiO2 (%):  [49 %-50 %] 49 %    Physical Exam:  General: Intubated and sedated  HEENT: ET tube in place  Respiratory: Coarse bilateral breath sounds on the ventilator.  : Ugarte catheter  Skin: Operative dressing over the anterior chest clean and intact.  Access: Left IJ Greene.  Arterial line.  Peripheral line.    Antibiotics:  Anti-Infectives (From admission, onward)      Ordered     Dose/Rate Route Frequency Start Stop    11/02/24 0918  vancomycin (VANCOCIN) 1,000 mg in sodium chloride 0.9 % 250 mL IVPB-VTB        Ordering Provider: Antwan Farmer MD    1,000 mg  250 mL/hr over 60 Minutes Intravenous Every 24 Hours 11/02/24 1015 11/03/24 1138    11/02/24 0912  Pharmacy to dose vancomycin        Ordering Provider: Samnatha Salvador APRN     Not Applicable Continuous PRN 11/02/24 0912 11/04/24 0911    10/30/24 2306  ceFAZolin 2000 mg IVPB in 100 mL NS (MBP)        Ordering Provider: Samantha Salvador APRN    2,000 mg  over 30 Minutes Intravenous Every 8 Hours 10/31/24 0000 11/01/24 0922    10/29/24 1518  ceFAZolin 2000 mg IVPB in 100 mL NS (MBP)        Ordering Provider: Bryant Mcclure PA-C    2,000 mg  over 30 Minutes Intravenous Once 10/30/24 0600 10/30/24 1936    10/28/24 1034  vancomycin IVPB 2000 mg in 0.9% Sodium Chloride 500 mL        Ordering Provider: Samantha Salvador APRN    15 mg/kg × 142 kg Intravenous Once 10/28/24 1045 10/28/24 1356    10/23/24 1709  penicillin G potassium 4 Million Units in sodium chloride 0.9 % 100 mL IVPB        Ordering Provider: Gregg Diaz,     4 Million Units  over 30 Minutes Intravenous Every 4 Hours Scheduled 10/23/24 2000  12/04/24 9380             Results Review:     I reviewed the patient's new clinical results.    Lab Results   Component Value Date    WBC 16.14 (H) 11/12/2024    HGB 9.0 (L) 11/12/2024    HCT 28.6 (L) 11/12/2024    MCV 92.6 11/12/2024     11/12/2024     Lab Results   Component Value Date    GLUCOSE 186 (H) 11/12/2024    BUN 47 (H) 11/12/2024    CREATININE 1.10 11/12/2024    BCR 42.7 (H) 11/12/2024    CO2 23.0 11/12/2024    CALCIUM 8.7 11/12/2024    ALBUMIN 2.5 (L) 11/12/2024    LABIL2 1.4 06/27/2019    AST 18 11/12/2024    ALT 7 11/12/2024       Microbiology:  10/3 COVID-negative  10/10 COVID-negative  10/14 COVID-negative  10/15 blood cultures 2 out of 2 strep mitis  10/17 COVID-negative  10/17 blood cultures 2 out of 2 strep mitis  10/21 COVID-negative  10/23 blood cultures no growth today  10/30 operative cultures from the aortic valve no growth   11/9 respiratory culture no growth  11/9 catheter tip culture no growth to date  11/10 catheter tip culture no growth to date  11/12 blood cultures in process  11/12 respiratory culture in process    Imaging:  Chest x-ray today reviewed and similar to yesterday.    Assessment    #Strep mitis endocarditis  #Status post bioprosthetic aortic valve replacement 2014  #Status post aortic root replacement in the setting of prosthetic aortic valve endocarditis and annular abscess on 10/30  #Status post removal of pacemaker generator and intracardiac portion of the leads with retained venous leads  #Atrial fibrillation  #Achilles tendon rupture  #NATHANIEL    Catheter tip cultures have been negative.  Repeat blood cultures and respiratory culture pending today.  Given repeat fever today will broaden to ceftriaxone 2 g daily and obtain MRSA nares.  May repeat CT imaging if cultures are unrevealing.    For endocarditis he will still need ultimate antibiotic 6 weeks with end date of December 4.  Above should cover in the meantime but may be able to de-escalate back to penicillin  in the future.

## 2024-11-12 NOTE — PROGRESS NOTES
"Progress Note    CC: Evaluation for tracheostomy    S: I was asked to see this patient for evaluation of possible tracheostomy.  The patient has respiratory failure secondary to complications from heart surgery.  The patient has not been able to be extubated.  He continues to be hemodynamically unstable and is on 3 different pressors.    O:/54   Pulse 103   Temp 100.4 °F (38 °C)   Resp 26   Ht 177.8 cm (70\")   Wt (!) 151 kg (332 lb 10.8 oz)   SpO2 95%   BMI 47.73 kg/m²   Body mass index is 47.73 kg/m².    I/O last 3 completed shifts:  In: 7879.5 [I.V.:4667.1; Other:475; NG/GT:1850; IV Piggyback:887.4]  Out: 69709 [Urine:14394]  I/O this shift:  In: -   Out: 4400 [Urine:4400]      GENERAL: Follow commands, sedated right now  HEENT: normochephalic, atraumatic, ET tube in place, cricoid bone palpated at the level of the sternal notch  CHEST: Breathing on the vent    Results from last 7 days   Lab Units 11/12/24  0342   WBC 10*3/mm3 16.14*   HEMOGLOBIN g/dL 9.0*   HEMATOCRIT % 28.6*   PLATELETS 10*3/mm3 270     Results from last 7 days   Lab Units 11/12/24  1536 11/12/24  1128 11/12/24  0815 11/12/24  0509 11/12/24  0342 11/10/24  0719 11/10/24  0346 11/09/24  1553 11/09/24  0505   SODIUM mmol/L  --   --  144 144 146*   < > 143  --  140   POTASSIUM mmol/L 3.9 4.0 4.0  4.0 4.3 3.3*   < > 3.4*   < > 3.3*   CHLORIDE mmol/L  --   --  108* 110* 117*   < > 109*  --  108*   CO2 mmol/L  --   --  23.0 22.5 17.6*   < > 20.1*  --  19.2*   BUN mg/dL  --   --  47* 44* 37*   < > 41*  --  40*   CREATININE mg/dL  --   --  1.10 1.14 0.90   < > 1.08  --  1.14   CALCIUM mg/dL  --   --  8.7 8.2* 6.4*   < > 7.7*  --  7.7*   BILIRUBIN mg/dL  --   --   --   --  0.7  --  0.9  --  0.8   ALK PHOS U/L  --   --   --   --  92  --  109  --  114   ALT (SGPT) U/L  --   --   --   --  7  --  10  --  10   AST (SGOT) U/L  --   --   --   --  18  --  26  --  31   GLUCOSE mg/dL  --   --  186* 178* 142*   < > 180*  --  204*    < > = values in " this interval not displayed.       ROS:   Unable to obtain    DIET: Tube feeds    A/P: 74 y.o. male with respiratory failure, hemodynamically instability, prolonged intubation.  Patient with very unfavorable anatomy with cricoid at the level of the sternal notch.  I think he would benefit from percutaneous access tracheostomy and evaluation by thoracic surgery.  Discussed with Dr. Florian  I'll be happy to attempt open tracheostomy if unable to be done by thoracic surgery      Gonzalo Kingston MD  General, Minimally Invasive and Endoscopic Surgery  [facility]    [address] [place] [address], [address]  [place], [place], [facility]  P: [phone]  F: [phone]

## 2024-11-12 NOTE — PROGRESS NOTES
"[facility] Clinical Pharmacy Services: Vancomycin Pharmacokinetic Initial Consult Note    Woodrow Alejandro is a 74 y.o. male who is on day 1 of pharmacy to dose vancomycin.    Indication: Empiric  Consulting Provider: Gregg Diaz DO   Planned Duration of Therapy: 7 days   Loading Dose Ordered  MRSA PCR performed: yes; Result: negative  Culture/Source:   11/10 Catheter tip Cx NGTD   11/12 BCx x2 sets NGTD   11/12 ET suction Cx NGTD   11/12 MRSA PCR negative   Target: -600 mg/L.hr   Pertinent Vanc Dosing History: N/A  Other Antimicrobials: ceftriaxone     Vitals/Labs  Ht: 177.8 cm (70\"); Wt: (!) 151 kg (332 lb 10.8 oz)  Temp Readings from Last 1 Encounters:   11/12/24 100 °F (37.8 °C)    Estimated Creatinine Clearance: 86.7 mL/min (by C-G formula based on SCr of 1.1 mg/dL).   Results from last 7 days   Lab Units 11/12/24  0815 11/12/24  0509 11/12/24  0342 11/11/24  1605 11/11/24  0345 11/10/24  1208 11/10/24  0346   CREATININE mg/dL 1.10 1.14 0.90   < > 1.14   < > 1.08   WBC 10*3/mm3  --   --  16.14*  --  15.44*  --  13.41*    < > = values in this interval not displayed.     Assessment/Plan:    Vancomycin Dose:   750 mg IV every 12  hours  Predictive AUC level for the dose ordered is 458 mg/L.hr, which is within the target of 400-600 mg/L.hr  Vanc Trough has been ordered for 11/14 at 0730   SCr ordered daily with AM labs     Thank you for involving pharmacy in this patient's care. Please contact pharmacy with any questions or concerns.                           Alberto Bradley, PharmD  Clinical Pharmacist    "

## 2024-11-12 NOTE — PLAN OF CARE
Goal Outcome Evaluation:              Outcome Evaluation: continues on milrinone, epi, vaso, levo, amio gtts.  A-fib with HR .  Small reduction in propofol and precedex gtts.  Hemodynamics stable.  Cultures per order.  Continues with weeping edema.

## 2024-11-12 NOTE — PROGRESS NOTES
" LOS: 20 days   Patient Care Team:  Juan Perez MD as PCP - General (Internal Medicine)    Chief Complaint: post op follow-up    Subjective  Intubated/sedated    Drips:  epi 0.01, levo 0.02, vaso 0.04, milrinone 0.25, propofol, precedex, insulin    Vital Signs  Temp:  [98.2 °F (36.8 °C)-101.8 °F (38.8 °C)] 100.2 °F (37.9 °C)  Heart Rate:  [] 93  Resp:  [31-32] 32  BP: ()/(40-66) 138/59  Arterial Line BP: ()/(40-64) 112/50  FiO2 (%):  [49 %-50 %] 50 %  Body mass index is 47.73 kg/m².    Intake/Output Summary (Last 24 hours) at 11/12/2024 0710  Last data filed at 11/12/2024 0652  Gross per 24 hour   Intake 5387 ml   Output 05098 ml   Net -6493 ml     No intake/output data recorded.          11/11/24  0546 11/11/24  1248 11/12/24  0513   Weight: (!) 155 kg (341 lb 0.8 oz) (!) 155 kg (341 lb) (!) 151 kg (332 lb 10.8 oz)         Objective:  General Appearance:  Ill-appearing (intubated/sedated).    Vital signs: (most recent): Blood pressure 138/59, pulse 108, temperature 100.4 °F (38 °C), resp. rate (!) 31, height 177.8 cm (70\"), weight (!) 151 kg (332 lb 10.8 oz), SpO2 95%.  Fever.  (Tmax 101.1).    Output: Producing urine (+mayorga) and producing stool.    Lungs:  There are rales and rhonchi.  (40% FiO2)  Heart: Tachycardia.  Irregular rhythm.  (Tele: Afib)  Extremities: There is dependent edema.    Skin:  Warm and dry.  (Dressings c/d/I  Hematoma left groin)            Results Review:        WBC WBC   Date Value Ref Range Status   11/12/2024 16.14 (H) 3.40 - 10.80 10*3/mm3 Final   11/11/2024 15.44 (H) 3.40 - 10.80 10*3/mm3 Final   11/10/2024 13.41 (H) 3.40 - 10.80 10*3/mm3 Final      HGB Hemoglobin   Date Value Ref Range Status   11/12/2024 9.0 (L) 13.0 - 17.7 g/dL Final   11/11/2024 9.1 (L) 13.0 - 17.7 g/dL Final   11/10/2024 8.5 (L) 13.0 - 17.7 g/dL Final      HCT Hematocrit   Date Value Ref Range Status   11/12/2024 28.6 (L) 37.5 - 51.0 % Final   11/11/2024 27.7 (L) 37.5 - 51.0 % Final " "  11/10/2024 26.4 (L) 37.5 - 51.0 % Final      Platelets Platelets   Date Value Ref Range Status   11/12/2024 270 140 - 450 10*3/mm3 Final   11/11/2024 260 140 - 450 10*3/mm3 Final   11/10/2024 226 140 - 450 10*3/mm3 Final        PT/INR:    No results found for: \"PROTIME\"  /  No results found for: \"INR\"      Sodium Sodium   Date Value Ref Range Status   11/12/2024 144 136 - 145 mmol/L Final   11/12/2024 146 (H) 136 - 145 mmol/L Final   11/11/2024 144 136 - 145 mmol/L Final   11/11/2024 147 (H) 136 - 145 mmol/L Final   11/10/2024 146 (H) 136 - 145 mmol/L Final   11/10/2024 145 136 - 145 mmol/L Final   11/10/2024 143 136 - 145 mmol/L Final      Potassium Potassium   Date Value Ref Range Status   11/12/2024 4.3 3.5 - 5.2 mmol/L Final   11/12/2024 3.3 (L) 3.5 - 5.2 mmol/L Final   11/11/2024 3.8 3.5 - 5.2 mmol/L Final   11/11/2024 3.7 3.5 - 5.2 mmol/L Final   11/11/2024 3.4 (L) 3.5 - 5.2 mmol/L Final   11/11/2024 3.4 (L) 3.5 - 5.2 mmol/L Final   11/11/2024 3.7 3.5 - 5.2 mmol/L Final   11/11/2024 3.7 3.5 - 5.2 mmol/L Final   11/11/2024 3.7 3.5 - 5.2 mmol/L Final   11/11/2024 4.0 3.5 - 5.2 mmol/L Final   11/10/2024 3.9 3.5 - 5.2 mmol/L Final   11/10/2024 4.1 3.5 - 5.2 mmol/L Final   11/10/2024 4.0 3.5 - 5.2 mmol/L Final   11/10/2024 4.0 3.5 - 5.2 mmol/L Final   11/10/2024 3.4 (L) 3.5 - 5.2 mmol/L Final   11/10/2024 3.4 (L) 3.5 - 5.2 mmol/L Final   11/09/2024 3.4 (L) 3.5 - 5.2 mmol/L Final     Comment:     Slight hemolysis detected by analyzer. Result may be falsely elevated.   11/09/2024 3.3 (L) 3.5 - 5.2 mmol/L Final      Chloride Chloride   Date Value Ref Range Status   11/12/2024 110 (H) 98 - 107 mmol/L Final   11/12/2024 117 (H) 98 - 107 mmol/L Final   11/11/2024 108 (H) 98 - 107 mmol/L Final   11/11/2024 111 (H) 98 - 107 mmol/L Final   11/10/2024 111 (H) 98 - 107 mmol/L Final   11/10/2024 110 (H) 98 - 107 mmol/L Final   11/10/2024 109 (H) 98 - 107 mmol/L Final      Bicarbonate CO2   Date Value Ref Range Status "   11/12/2024 22.5 22.0 - 29.0 mmol/L Final   11/12/2024 17.6 (L) 22.0 - 29.0 mmol/L Final   11/11/2024 22.5 22.0 - 29.0 mmol/L Final   11/11/2024 23.0 22.0 - 29.0 mmol/L Final   11/10/2024 21.0 (L) 22.0 - 29.0 mmol/L Final   11/10/2024 19.9 (L) 22.0 - 29.0 mmol/L Final   11/10/2024 20.1 (L) 22.0 - 29.0 mmol/L Final      BUN BUN   Date Value Ref Range Status   11/12/2024 44 (H) 8 - 23 mg/dL Final   11/12/2024 37 (H) 8 - 23 mg/dL Final   11/11/2024 45 (H) 8 - 23 mg/dL Final   11/11/2024 41 (H) 8 - 23 mg/dL Final   11/10/2024 41 (H) 8 - 23 mg/dL Final   11/10/2024 42 (H) 8 - 23 mg/dL Final   11/10/2024 41 (H) 8 - 23 mg/dL Final      Creatinine Creatinine   Date Value Ref Range Status   11/12/2024 1.14 0.76 - 1.27 mg/dL Final   11/12/2024 0.90 0.76 - 1.27 mg/dL Final   11/11/2024 1.23 0.76 - 1.27 mg/dL Final   11/11/2024 1.14 0.76 - 1.27 mg/dL Final   11/10/2024 1.21 0.76 - 1.27 mg/dL Final   11/10/2024 1.19 0.76 - 1.27 mg/dL Final   11/10/2024 1.08 0.76 - 1.27 mg/dL Final      Calcium Calcium   Date Value Ref Range Status   11/12/2024 8.2 (L) 8.6 - 10.5 mg/dL Final   11/12/2024 6.4 (L) 8.6 - 10.5 mg/dL Final   11/11/2024 8.6 8.6 - 10.5 mg/dL Final   11/11/2024 8.0 (L) 8.6 - 10.5 mg/dL Final   11/10/2024 7.9 (L) 8.6 - 10.5 mg/dL Final   11/10/2024 7.8 (L) 8.6 - 10.5 mg/dL Final   11/10/2024 7.7 (L) 8.6 - 10.5 mg/dL Final      Magnesium Magnesium   Date Value Ref Range Status   11/12/2024 2.0 1.6 - 2.4 mg/dL Final   11/12/2024 1.6 1.6 - 2.4 mg/dL Final   11/11/2024 2.0 1.6 - 2.4 mg/dL Final   11/10/2024 1.9 1.6 - 2.4 mg/dL Final   11/10/2024 1.9 1.6 - 2.4 mg/dL Final          amiodarone, 200 mg, Oral, Q12H  aspirin, 81 mg, Per G Tube, Daily  atorvastatin, 40 mg, Per G Tube, Nightly  chlorhexidine, 15 mL, Mouth/Throat, Q12H  enoxaparin, 40 mg, Subcutaneous, Q12H  Ergocalciferol, 100 mcg, Per G Tube, Daily  hydrocortisone-bacitracin-zinc oxide-nystatin, 1 Application, Topical, Q12H  insulin regular, 2-7 Units,  Subcutaneous, Q6H  ipratropium-albuterol, 3 mL, Nebulization, Q4H - RT  lansoprazole, 15 mg, Per G Tube, Q AM  miconazole, 1 Application, Topical, Q12H  midodrine, 10 mg, Oral, TID AC  penicillin g (potassium), 4 Million Units, Intravenous, Q4H  potassium chloride, 40 mEq, Oral, TID  potassium chloride, 40 mEq, Oral, Q4H  senna, 2 tablet, Per G Tube, Nightly  sildenafil, 20 mg, Per G Tube, TID  sodium chloride, 10 mL, Intravenous, Q12H  sodium chloride, 4 mL, Nebulization, BID - RT      bumetanide, 2 mg/hr, Last Rate: 2 mg/hr (11/11/24 2204)  dexmedetomidine, 0.2-1.5 mcg/kg/hr, Last Rate: 0.8 mcg/kg/hr (11/12/24 0610)  DOPamine, 2-20 mcg/kg/min  EPINEPHrine, 0.02-0.1 mcg/kg/min, Last Rate: Stopped (11/11/24 2300)  lidocaine in D5W, 1 mg/min, Last Rate: Stopped (11/05/24 1135)  milrinone, 0.25-0.375 mcg/kg/min, Last Rate: 0.25 mcg/kg/min (11/12/24 0439)  niCARdipine, 5-15 mg/hr  norepinephrine, 0.02-0.2 mcg/kg/min, Last Rate: 0.03 mcg/kg/min (11/12/24 0400)  phenylephrine, 0.2-2 mcg/kg/min, Last Rate: Stopped (11/05/24 1030)  propofol, 5-50 mcg/kg/min, Last Rate: 25 mcg/kg/min (11/12/24 0421)  vasopressin, 0.02-0.1 Units/min, Last Rate: 0.04 Units/min (11/12/24 0610)              Prosthetic aortic valve stenosis    Essential hypertension    Permanent atrial fibrillation    S/P AVR    ICD (implantable cardioverter-defibrillator), dual, in situ    Achilles tendon rupture    Bacteremia    Stenosis of prosthetic aortic valve    Anemia      Assessment & Plan    - Prosthetic aortic valve stenosis, h/o AVR (tissue)/maze/DANICA ligation (2014)- s/p reoperative sternotomy AV root replacement 27mm cryopreserved homograft with right nuvia cabrol, AICD removal, IABP placement- Pagni 10/31/2024  -Sternal closure ---11/2  - Possible endocarditis, likely need JOLIE   - Bacteremia, blood cultures positive strep mitis---on penicillin G  - Atrial fibrillation, unable to tolerate anticoagulation  - Hypertension  - NICM status post [employer]  AICD  - Anemia, s/p EGD/colonoscopy with esophagitis, polyp removal  - Right achilles tendon tear--- walking boot and PT per ortho at [facility]  - Pre-diabetes -- improved at 5.3  -post op anemia- expected acute blood loss  -TCP post-op      POD #13. Intubated & sedated. Great response to bumex drip overnight. TMAX 100.4- cultures sent, WBC increased 16.  On levophed 0.04 vaso 0.05, epi 0.01, milrinone 0.125.  Midodrine added.  Remains in atrial fibrillation. Rate in the 130s-- Resume amiodarone gtt 1 mg. Creatinine stable. Renal managing diuretics. Yesterday left thoracentesis without enough fluid to drain.  Dr. Florian to discuss trach and peg with daughter.  Continue supportive care.      Samantha Magana, JAVI  11/12/24  07:10 EST

## 2024-11-13 NOTE — PROGRESS NOTES
LOS: 21 days     Chief Complaint: Endocarditis    Interval History: Febrile again today.  WBC up to 18.  Cultures have remained negative.  Remains on multiple pressors and intubated.  FiO2 at 65%.    Vital Signs  Temp:  [99.9 °F (37.7 °C)-102.2 °F (39 °C)] 102 °F (38.9 °C)  Heart Rate:  [] 107  Resp:  [24-29] 29  BP: (103-137)/(46-56) 115/50  Arterial Line BP: ()/(33-63) 78/41  FiO2 (%):  [50 %-98 %] 64 %    Physical Exam:  General: Intubated and sedated  HEENT: ET tube in place  Respiratory: Coarse bilateral breath sounds on the ventilator.  : Ugarte catheter  Skin: Operative dressing over the anterior chest clean and intact.  Skin breakdown in the inguinal area.  Access: Left IJ [place].  Arterial line.  Peripheral line.    Antibiotics:  Anti-Infectives (From admission, onward)      Ordered     Dose/Rate Route Frequency Start Stop    11/12/24 1512  vancomycin 750 mg in sodium chloride 0.9 % 250 mL IVPB-VTB        Ordering Provider: Gregg Diaz DO    750 mg  333.3 mL/hr over 45 Minutes Intravenous Every 12 Hours 11/13/24 0800 11/20/24 0759    11/12/24 1133  vancomycin 3000 mg/500 mL 0.9% NS IVPB ([facility])        Ordering Provider: Gregg Diaz DO    20 mg/kg × 151 kg  over 180 Minutes Intravenous Once 11/12/24 1230 11/12/24 1614    11/12/24 1051  Pharmacy to dose vancomycin        Ordering Provider: Gregg Diaz DO     Not Applicable Continuous PRN 11/12/24 1051 11/19/24 1050    11/12/24 0950  cefTRIAXone (ROCEPHIN) 2,000 mg in sodium chloride 0.9 % 100 mL MBP        Ordering Provider: Gregg Diaz DO    2,000 mg  200 mL/hr over 30 Minutes Intravenous Every 24 Hours 11/12/24 1045 11/19/24 1044    11/02/24 0918  vancomycin (VANCOCIN) 1,000 mg in sodium chloride 0.9 % 250 mL IVPB-VTB        Ordering Provider: Antwan Farmer MD    1,000 mg  250 mL/hr over 60 Minutes Intravenous Every 24 Hours 11/02/24 1015 11/03/24 1138    11/02/24 0912  Pharmacy to dose vancomycin         Ordering Provider: Samantha Salvador APRN     Not Applicable Continuous PRN 11/02/24 0912 11/04/24 0911    10/30/24 2306  ceFAZolin 2000 mg IVPB in 100 mL NS (MBP)        Ordering Provider: Samantha Salvador APRN    2,000 mg  over 30 Minutes Intravenous Every 8 Hours 10/31/24 0000 11/01/24 0922    10/29/24 1518  ceFAZolin 2000 mg IVPB in 100 mL NS (MBP)        Ordering Provider: Bryant Mcclure PA-C    2,000 mg  over 30 Minutes Intravenous Once 10/30/24 0600 10/30/24 1936    10/28/24 1034  vancomycin IVPB 2000 mg in 0.9% Sodium Chloride 500 mL        Ordering Provider: Samantha Salvador APRN    15 mg/kg × 142 kg Intravenous Once 10/28/24 1045 10/28/24 1356             Results Review:     I reviewed the patient's new clinical results.    Lab Results   Component Value Date    WBC 18.78 (H) 11/13/2024    HGB 9.1 (L) 11/13/2024    HCT 28.8 (L) 11/13/2024    MCV 92.3 11/13/2024     11/13/2024     Lab Results   Component Value Date    GLUCOSE 180 (H) 11/13/2024    BUN 47 (H) 11/13/2024    CREATININE 1.32 (H) 11/13/2024    BCR 35.6 (H) 11/13/2024    CO2 23.5 11/13/2024    CALCIUM 8.1 (L) 11/13/2024    ALBUMIN 2.5 (L) 11/13/2024    LABIL2 1.4 06/27/2019    AST 18 11/12/2024    ALT 7 11/12/2024       Microbiology:  10/3 COVID-negative  10/10 COVID-negative  10/14 COVID-negative  10/15 blood cultures 2 out of 2 strep mitis  10/17 COVID-negative  10/17 blood cultures 2 out of 2 strep mitis  10/21 COVID-negative  10/23 blood cultures no growth today  10/30 operative cultures from the aortic valve no growth   11/9 respiratory culture no growth  11/9 catheter tip culture no growth to date  11/10 catheter tip culture no growth to date  11/12 blood cultures in process  11/12 respiratory culture in process    Imaging:  Chest x-ray today reviewed with no significant change.    Assessment    #Strep mitis endocarditis  #Status post bioprosthetic aortic valve replacement 2014  #Status post aortic root replacement in the  setting of prosthetic aortic valve endocarditis and annular abscess on 10/30  #Status post removal of pacemaker generator and intracardiac portion of the leads with retained venous leads  #Atrial fibrillation  #Achilles tendon rupture  #NATHANIEL    Repeat cultures have all remained negative to date.  Continue vancomycin goal -600 and add cefepime 2 g every 8 in place of ceftriaxone empirically while waiting on cultures.  Will also order CT chest abdomen and pelvis to evaluate for other sources of fever.    For endocarditis he will still need ultimate antibiotic 6 weeks with end date of December 4.  Above should cover in the meantime but may be able to de-escalate back to penicillin in the future.

## 2024-11-13 NOTE — CASE MANAGEMENT/SOCIAL WORK
Continued Stay Note  [facility]     Patient Name: Woodrow Alejandro  MRN: [id number]  Today's Date: 11/13/2024    Admit Date: 10/23/2024    Plan: Undetermined, remains intubated & sedated   Discharge Plan       Row Name 11/13/24 1125       Plan    Plan Undetermined, remains intubated & sedated    Plan Comments Patient remains in CVR intubated and sedated.  Pt is POD 14 status post redo sternotomy AV root replacement, mitral valve repair,, AICD removal intra-aortic balloon placement 10/31/2024.  Sternal closure 11/ 2.  Discussions are being had about a Trach and PEG placement.  Jacki with [facility] is still following.  Might have to explore LTACH at later date.  CCP following........Rosanna CARCAMO/AYE                   Discharge Codes    No documentation.                       Rosanna Aldrich RN

## 2024-11-13 NOTE — PROGRESS NOTES
[facility]  [phone]  Dr. Colton Allison    Subjective:  LOS: 21    No acute events overnight.  Maintaining on the ventilator.  Get repeat CT chest today to further evaluate his lungs.  He has been having a lot of secretions that have coming up.  Attempted to get thoracentesis yesterday, but not enough fluid.      Objective:  Vital Signs past 24hrs    Temp range: Temp (24hrs), Av.2 °F (38.4 °C), Min:100 °F (37.8 °C), Max:102.2 °F (39 °C)    BP range: BP: (105-137)/(46-56) 115/50  Pulse range: Heart Rate:  [] 107  Resp rate range: Resp:  [24-29] 29  (!) 151 kg (332 lb 10.8 oz); Body mass index is 47.73 kg/m².    Device (Oxygen Therapy): ventilator     Oxygen range:SpO2:  [89 %-97 %] 95 %     Mode: VC/AC  FiO2 (%):  [50 %-98 %] 64 %  S RR:  [24] 24  S VT:  [550 mL] 550 mL  PEEP/CPAP (cm H2O):  [5 cm H20-7.5 cm H20] 7.5 cm H20  MAP (cm H2O):  [13-17] 17      Intake/Output Summary (Last 24 hours) at 2024 1227  Last data filed at 2024 0948  Gross per 24 hour   Intake 5611.54 ml   Output 9650 ml   Net -4038.46 ml       Physical Exam  Constitutional:       Appearance: Normal appearance.   HENT:      Head: Normocephalic and atraumatic.      Nose: Nose normal.      Mouth/Throat:      Mouth: Mucous membranes are moist.      Pharynx: Oropharynx is clear.   Eyes:      Extraocular Movements: Extraocular movements intact.      Conjunctiva/sclera: Conjunctivae normal.   Cardiovascular:      Rate and Rhythm: Normal rate and regular rhythm.      Pulses: Normal pulses.      Heart sounds: Normal heart sounds. No murmur heard.  Pulmonary:      Effort: No respiratory distress.      Breath sounds: No stridor. No wheezing or rales.      Comments: Coarse breath sounds on the ventilator  Abdominal:      General: Abdomen is flat. Bowel sounds are normal.      Palpations: Abdomen is soft.      Tenderness: There is no abdominal tenderness.   Skin:     General: Skin is warm and dry.   Neurological:       General: No focal deficit present.      Mental Status: He is alert.            Result Review:  I have reviewed the results from last note by LPC physician and agree with these findings:  [x]  Laboratory accordion  [x]  Microbiology  [x]  Radiology  [x]  EKG/Telemetry   [x]  Cardiology/Vascular   [x]  Pathology  []  Old records  []  Other:    Medication Review:  I have reviewed the current MAR.  Antibiotics  cefepime 2000 mg IVPB in 100 mL NS (MBP)  Pharmacy to dose vancomycin  vancomycin (VANCOCIN) 750mg IVPB in  mL (CD)     Scheduled Medications  acetylcysteine, 3 mL, Nebulization, TID - RT  aspirin, 81 mg, Per G Tube, Daily  atorvastatin, 40 mg, Per G Tube, Nightly  cefepime, 2,000 mg, Intravenous, Once  cefepime, 2,000 mg, Intravenous, Q8H  chlorhexidine, 15 mL, Mouth/Throat, Q12H  enoxaparin, 40 mg, Subcutaneous, Q12H  Ergocalciferol, 100 mcg, Per G Tube, Daily  hydrocortisone-bacitracin-zinc oxide-nystatin, 1 Application, Topical, Q12H  insulin glargine, 15 Units, Subcutaneous, Daily  insulin regular, 2-7 Units, Subcutaneous, Q6H  ipratropium-albuterol, 3 mL, Nebulization, Q4H - RT  lansoprazole, 15 mg, Per G Tube, Q AM  miconazole, 1 Application, Topical, Q12H  midodrine, 15 mg, Oral, TID AC  potassium chloride, 40 mEq, Oral, TID  senna, 2 tablet, Per G Tube, Nightly  sildenafil, 20 mg, Per G Tube, TID  sodium chloride, 10 mL, Intravenous, Q12H  vancomycin, 750 mg, Intravenous, Q12H      ICU Drips  amiodarone, 1 mg/min, Last Rate: 1 mg/min (11/13/24 0909)  bumetanide, 0.5 mg/hr, Last Rate: 0.5 mg/hr (11/13/24 1154)  dexmedetomidine, 0.2-1.5 mcg/kg/hr, Last Rate: 0.4 mcg/kg/hr (11/13/24 0900)  DOPamine, 2-20 mcg/kg/min  EPINEPHrine, 0.01 mcg/kg/min, Last Rate: 0.01 mcg/kg/min (11/13/24 0900)  lidocaine in D5W, 1 mg/min, Last Rate: Stopped (11/05/24 1135)  milrinone, 0.125 mcg/kg/min, Last Rate: 0.125 mcg/kg/min (11/13/24 0900)  niCARdipine, 5-15 mg/hr  norepinephrine, 0.02-0.2 mcg/kg/min, Last Rate: 0.04  mcg/kg/min (24 0900)  Pharmacy to dose vancomycin,   phenylephrine, 0.2-2 mcg/kg/min  propofol, 5-50 mcg/kg/min, Last Rate: 5 mcg/kg/min (24 1200)  vasopressin, 0.02-0.1 Units/min, Last Rate: 0.05 Units/min (24 0704)      PRN Medications    acetaminophen **OR** acetaminophen **OR** acetaminophen    bisacodyl    bisacodyl    cyclobenzaprine    dextrose    dextrose    DOPamine    EPINEPHrine    glucagon (human recombinant)    Glycerin-Hypromellose-    HYDROcodone-acetaminophen    magnesium hydroxide    midazolam    milrinone    [] Morphine **AND** naloxone    niCARdipine    nitroglycerin    norepinephrine    ondansetron    Pharmacy to dose vancomycin    phenylephrine    polyethylene glycol    propofol    sodium chloride    sodium chloride    sodium chloride    sodium chloride    sodium chloride    sodium chloride    vasopressin    Lines, Drains & Airways       Active LDAs       Name Placement date Placement time Site Days    Pulmonary Artery Catheter - Triple Lumen 10/30/24 Right Internal jugular 10/30/24  1026  created via procedure documentation  -- 10    CVC Double Lumen 10/30/24 Right Internal jugular 10/30/24  1026  created via procedure documentation  Internal jugular  10    Peripheral IV 10/29/24 1848 Anterior;Left Forearm 10/29/24  1848  Forearm  10    Peripheral IV 10/30/24 0940 Anterior;Right Forearm 10/30/24  0940  Forearm  10    NG/OG Tube Nasogastric  Left nostril 24  1330  Left nostril  7    Urethral Catheter Temperature probe 16 Fr. 24  0838  -- 1    ETT  10/30/24  1139  created via procedure documentation  -- 10    Arterial Line 24 Right Radial 24  0723  created via procedure documentation  Radial  7    Pacer Wires 10/30/24  1635  Ventricular  9                    Diet Orders (active) (From admission, onward)       Start     Ordered    24 1800  Dietary Nutrition Supplements ProSource No Carb  2 Times Daily      Comments: Flush through  feeding tube BID    11/08/24 1358    11/08/24 1358  Tube Feeding: Formula: Novasource Renal (Nepro); Feeding Type: Continuous; Start at: 45 mL/hr; Then Advance By: Do Not Advance; Water Flush: 50 mL; Every: 4 hours; Water Bolus: None  Diet Effective Now        Comments: Prosource BID per feeding tube    11/08/24 1358    11/02/24 1533  NPO Diet NPO Type: Tube Feeding  Diet Effective Now         11/02/24 1533    11/02/24 1259  Feeding Tube Insertion - Cortrak System  Once         11/02/24 1259                      Assessment:  Postop respiratory failure  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  Mixed cardiogenic and septic shock  CHF with pulmonary edema  Strep mitis endocarditis and bacteremia  Acute kidney injury  Acute on chronic anemia  History of persistent atrial fibrillation now postoperative junctional rhythm  Thrombocytopenia  Mild hypernatremia  Transaminitis  Acute left lower extremity DVT on 10/24/2024  Esophagitis grade C by EGD on 9/30/2024  Mild hyperglycemia      Plan of Treatment  - Continue ventilator support  - He has significant volume overload, agree with further diuresis per nephrology  - Continue daily SBT and SAT  - Bronchoscopy (11/2/2024)- had some mild thick airway secretions that were therapeutically aspirated but otherwise nothing notable.   - Previously on nitric oxide, now off    He remains volume overloaded and on inotrope therapy with diuresis.  He continues to diurese well.  Reviewed CT chest performed today, will plan for bronchoscopy for airway clearance and to remove secretions this afternoon, probably around 2 PM.  Will obtain BAL cultures and sent to lab.  Spoke with Dr. Devlin with thoracic surgery, he would like to monitor for a couple days to see how he does on the ventilator as he is currently on higher vent settings after coming back from the CT scan and he is on several pressors and inotropes at low doses.      Critical  care time 35 minutes    Colton Allison MD  [facility], [facility]  Pulmonary and Critical Care Medicine

## 2024-11-13 NOTE — PROGRESS NOTES
LOS: 21 days   Patient Care Team:  Juan Perez MD as PCP - General (Internal Medicine)    Chief Complaint: Follow-up bioprosthetic AVR endocarditis, mitral regurgitation, persistent atrial fibrillation.    Interval History: Continues to be intermittently febrile.  Remains on multiple pressors.  Back on IV amiodarone.  Heart rate is still slightly high at times.    Vital Signs:  Temp:  [100.2 °F (37.9 °C)-102.2 °F (39 °C)] 100.8 °F (38.2 °C)  Heart Rate:  [] 108  Resp:  [24-29] 25  BP: (105-137)/(46-56) 115/50  Arterial Line BP: ()/(41-63) 78/41  FiO2 (%):  [50 %-98 %] 98 %    Intake/Output Summary (Last 24 hours) at 11/13/2024 1314  Last data filed at 11/13/2024 1247  Gross per 24 hour   Intake 6427.94 ml   Output 9900 ml   Net -3472.06 ml       Physical Exam:   General Appearance:    Intubated and sedated.  Appears critically ill.   Lungs:     Rhonchi bilaterally anteriorly.    Heart:    Irregularly irregular rhythm with a tachycardic rate. II/VI SM throughout.   Abdomen:     Soft, nontender, nondistended.    Extremities:    3+ generalized edema and anasarca.     Results Review:    Results from last 7 days   Lab Units 11/13/24  1228 11/13/24  0827 11/13/24 0414   SODIUM mmol/L  --   --  143   POTASSIUM mmol/L 3.8   < > 3.9   CHLORIDE mmol/L  --   --  107   CO2 mmol/L  --   --  23.5   BUN mg/dL  --   --  47*   CREATININE mg/dL  --   --  1.32*   GLUCOSE mg/dL  --   --  180*   CALCIUM mg/dL  --   --  8.1*    < > = values in this interval not displayed.         Results from last 7 days   Lab Units 11/13/24  0827   WBC 10*3/mm3 18.78*   HEMOGLOBIN g/dL 9.1*   HEMATOCRIT % 28.8*   PLATELETS 10*3/mm3 269                 Results from last 7 days   Lab Units 11/13/24  0414   MAGNESIUM mg/dL 2.0     Results from last 7 days   Lab Units 11/12/24  1128   TRIGLYCERIDES mg/dL 261*         I reviewed the patient's new clinical results.        Assessment:  1.  Status post bioprosthetic aortic valve  replacement in 2014  2.  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  Moderate to severe mitral regurgitation  4.  Status post reoperative AV root replacement, mitral valve repair, ICD removal, SVG-RCA, and IABP placement on 10/30/2021  5.  Postoperative shock, multifactorial  6.  Acute kidney injury  7.  History of nonischemic cardiomyopathy with recovered ejection fraction  8.  Anemia, acute on chronic  9.  Postoperative thrombocytopenia  10.  Persistent atrial fibrillation  11.  Ventricular tachycardia postoperatively  12.  Grade C esophagitis by EGD on 9/30/2024  13.  Acute left lower extremity DVT on 10/24/2024.  Resolved on Dopplers on 11/8/2024  14.  Right Achilles tendon tear  15.  Postoperative junctional rhythm  16.  Hypoalbuminemia  17.  Thrombocytopenia  18.  Severe right ventricular enlargement and dysfunction post-op  19.  Fever noted on 11/8/2024  20.  Subacute DVT in the left axillary vein by Doppler on 11/10/2024.    Plan:  -Infectious disease following.  CT of the chest and abdomen to evaluate for any potential sources of fever.  Antibiotics per infectious disease.    -Remains grossly volume overloaded, but some improvement over the last several days.  Per nephrology, continue Bumex drip.  Drip has been decreased to 0.5 mg/h..    -Off nitrous oxide. Currently on Revatio 20 mg every 8 hours.    -No recent ventricular tachycardia, and he has been off of lidocaine for days.  He was off of IV amiodarone briefly, although the drip has been resumed at this point.  His rate has been slightly high still.  He got 500 mcg of IV digoxin yesterday.  I will give him another 250 mcg today.  Check digoxin level tomorrow.    -Holding off on heparin for now per cardiovascular surgery.  The DVT in his left lower extremity was not present on the Dopplers on 11/8/2024.  He does have a likely subacute  DVT in the left axillary vein by Doppler.  He has had intermittent thrombocytopenia postoperatively as well.    -Remains on pressor support with Levophed, vasopressin, epinephrine, and milrinone.    -He is likely going to need a trach and PEG.    -He remains critically ill.      Antwan Farmer MD  11/13/24  13:14 EST

## 2024-11-13 NOTE — NURSING NOTE
Pt continues on vaso,levo,amiogtts, epi,prop and dex. Able to wean Dex to 0.04. .FiO2 weaned to 65%

## 2024-11-13 NOTE — PROCEDURES
Bronchoscopy Procedure Note    Procedure:  Bronchoscopy, Diagnostic  Bronchoscopy, Therapeutic  Bronchoalveolar lavage, BAL    Pre-Operative Diagnosis: Acute hypoxic respiratory failure    Post-Operative Diagnosis: Same    Indication: Acute hypoxic respiratory failure    Anesthesia: See MAR summary    Procedure Details: Patient was consented for the procedure with all risk and benefit of the procedure explained in detail.  Patient was given the opportunity to ask questions and all concerns were answered.  The bronchocope was inserted into the main airway via the endotracheal tube. An anatomical survey was done of the main airways and the subsegmental bronchus to at least the first subsegmental level of all five lobes of both lungs.  The findings are reported below.  A bronchoalveolar lavage was performed using aliquots of normal saline instilled into the airways then aspirated back.    Findings:  Bronchoscope passed through ET tube. Bronchoscope was passed into the trachea.  All airways were visualized to at least the first subsegment level of all 5 lobes of both lungs.  Airways were of normal size and caliber.  No endobronchial lesions seen.  There were diffuse thin secretions throughout bilateral lower lobes, worse in the left lower lobe.  BAL was obtained from the left lower lobe and sent for culture.    Estimated Blood Loss:  Minimal           Specimens:  Sent serosanguinous fluid, BAL from left lower lobe                Complications:  None; patient tolerated the procedure well.           Disposition: ICU - intubated and critically ill.      Patient tolerated the procedure well.      Colton Allison MD  [facility], [facility]  Pulmonary and Critical Care Medicine

## 2024-11-13 NOTE — PROGRESS NOTES
[facility] Progress Note      Patient Name: Woodrow Alejandro  : 1950  MRN: [id number]  Primary Care Physician:  Juan Perez MD  Date of admission: 10/23/2024    Subjective     Interval History:   Acute kidney injury.  Urine output 11.5 L on Bumex drip 1 mg an hour.  5 L in.  Squeezes my hand on command despite being on Precedex and propofol.  Remains on multiple pressors.  Review of Systems:   As noted above    Objective     Vitals:   Temp:  [99.9 °F (37.7 °C)-101.8 °F (38.8 °C)] 101.8 °F (38.8 °C)  Heart Rate:  [] 107  Resp:  [24-29] 29  BP: (103-145)/(46-59) 117/47  Arterial Line BP: ()/(33-58) 126/58  FiO2 (%):  [50 %-98 %] 98 %    Intake/Output Summary (Last 24 hours) at 2024 0741  Last data filed at 2024 0600  Gross per 24 hour   Intake 4982.62 ml   Output 59842 ml   Net -6542.38 ml       Physical Exam:    General Appearance: Eyes open.  On the ventilator.  Does not really track but does closes eyes on command and does squeeze my hands.  Skin: warm and dry  HEENT: oral ET tube.  Nonicteric sclera  Neck: supple, no JVD  Lungs: Upper airway rhonchi.  On the ventilator.  Heart: Irregularly irregular.  Abdomen: soft, nontender, distended.  Body wall edema. +bs  : Ugarte catheter  Extremities: 3+ upper and lower extremity edema  Neuro: Sedated on the ventilator but does follow some commands.    Scheduled Meds:     aspirin, 81 mg, Per G Tube, Daily  atorvastatin, 40 mg, Per G Tube, Nightly  cefTRIAXone, 2,000 mg, Intravenous, Q24H  chlorhexidine, 15 mL, Mouth/Throat, Q12H  enoxaparin, 40 mg, Subcutaneous, Q12H  Ergocalciferol, 100 mcg, Per G Tube, Daily  hydrocortisone-bacitracin-zinc oxide-nystatin, 1 Application, Topical, Q12H  insulin glargine, 15 Units, Subcutaneous, Daily  insulin regular, 2-7 Units, Subcutaneous, Q6H  ipratropium-albuterol, 3 mL, Nebulization, Q4H - RT  lansoprazole, 15 mg, Per G Tube, Q AM  miconazole, 1  Application, Topical, Q12H  midodrine, 15 mg, Oral, TID AC  potassium chloride, 40 mEq, Oral, TID  senna, 2 tablet, Per G Tube, Nightly  sildenafil, 20 mg, Per G Tube, TID  sodium chloride, 10 mL, Intravenous, Q12H  sodium chloride, 4 mL, Nebulization, BID - RT  vancomycin, 750 mg, Intravenous, Q12H      IV Meds:   amiodarone, 1 mg/min, Last Rate: 1 mg/min (11/13/24 0329)  bumetanide, 1 mg/hr, Last Rate: 1 mg/hr (11/13/24 0428)  dexmedetomidine, 0.2-1.5 mcg/kg/hr, Last Rate: 0.05 mcg/kg/hr (11/13/24 0343)  DOPamine, 2-20 mcg/kg/min  EPINEPHrine, 0.01 mcg/kg/min, Last Rate: 0.01 mcg/kg/min (11/12/24 2346)  lidocaine in D5W, 1 mg/min, Last Rate: Stopped (11/05/24 1135)  milrinone, 0.125 mcg/kg/min, Last Rate: 0.125 mcg/kg/min (11/13/24 0428)  niCARdipine, 5-15 mg/hr  norepinephrine, 0.02-0.2 mcg/kg/min, Last Rate: 0.02 mcg/kg/min (11/12/24 0700)  Pharmacy to dose vancomycin,   phenylephrine, 0.2-2 mcg/kg/min, Last Rate: Stopped (11/05/24 1030)  propofol, 5-50 mcg/kg/min, Last Rate: 20 mcg/kg/min (11/13/24 0241)  vasopressin, 0.02-0.1 Units/min, Last Rate: 0.05 Units/min (11/13/24 0704)        Results Reviewed:   I have personally reviewed the results from the time of this admission to 11/13/2024 07:41 EST     Results from last 7 days   Lab Units 11/13/24  0414 11/13/24  0130 11/12/24  2138 11/12/24  1128 11/12/24  0815 11/12/24  0509 11/12/24  0342 11/10/24  0719 11/10/24  0346 11/09/24  1553 11/09/24  0505   SODIUM mmol/L 143  --   --   --  144 144 146*   < > 143  --  140   POTASSIUM mmol/L 3.9 3.7 3.6   < > 4.0  4.0 4.3 3.3*   < > 3.4*   < > 3.3*   CHLORIDE mmol/L 107  --   --   --  108* 110* 117*   < > 109*  --  108*   CO2 mmol/L 23.5  --   --   --  23.0 22.5 17.6*   < > 20.1*  --  19.2*   BUN mg/dL 47*  --   --   --  47* 44* 37*   < > 41*  --  40*   CREATININE mg/dL 1.32*  --   --   --  1.10 1.14 0.90   < > 1.08  --  1.14   CALCIUM mg/dL 8.1*  --   --   --  8.7 8.2* 6.4*   < > 7.7*  --  7.7*   BILIRUBIN mg/dL  --    --   --   --   --   --  0.7  --  0.9  --  0.8   ALK PHOS U/L  --   --   --   --   --   --  92  --  109  --  114   ALT (SGPT) U/L  --   --   --   --   --   --  7  --  10  --  10   AST (SGOT) U/L  --   --   --   --   --   --  18  --  26  --  31   GLUCOSE mg/dL 180*  --   --   --  186* 178* 142*   < > 180*  --  204*    < > = values in this interval not displayed.       Estimated Creatinine Clearance: 72.2 mL/min (A) (by C-G formula based on SCr of 1.32 mg/dL (H)).    Results from last 7 days   Lab Units 11/13/24  0414 11/12/24  0509 11/12/24  0342   MAGNESIUM mg/dL 2.0 2.0 1.6   PHOSPHORUS mg/dL 2.9 2.6 1.9*       Results from last 7 days   Lab Units 11/13/24  0414 11/12/24  0342 11/11/24  0345 11/10/24  0346 11/09/24  0320 11/08/24  0343 11/07/24  0352   URIC ACID mg/dL 7.3* 5.5 7.1* 5.9 5.2 5.0 5.1       Results from last 7 days   Lab Units 11/12/24  0342 11/11/24  0345 11/10/24  0346 11/09/24  0320 11/08/24  1430   WBC 10*3/mm3 16.14* 15.44* 13.41* 13.31* 12.80*   HEMOGLOBIN g/dL 9.0* 9.1* 8.5* 8.6* 8.8*   PLATELETS 10*3/mm3 270 260 226 215 186             Assessment / Plan     ASSESSMENT:  Acute kidney injury, nonoliguric.  Remains on Bumex drip.  Volume status improving.  Still with anasarca.  Continue Bumex drip.  Replace potassium off protocol.  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  Status post redo sternotomy AV root replacement, mitral valve repair,, AICD removal intra-aortic balloon placement 10/31/2024.  Sternal closure 11/ 2.  3.  Fever with bioprosthetic aortic valve endocarditis, bacteremia with strep mitis on penicillin G.  Remains febrile.    4.  Shock still pressor dependent.  5.  Anemia status post EGD 9/30/2024.  And colonoscopy with esophagitis and polyp removal.  6.  Acute respiratory failure postoperatively on the ventilator.  Will need tracheostomy.  Dr. Kingston's note reviewed.  7.  Atrial fibrillation.  8.  Moderate to severe mitral regurgitation.   Severe RV enlargement and dysfunction postoperatively.  PLAN:  Continue Bumex drip.  Place potassium off protocol.    Thank you for involving us in the care of Woodrow Alejandro.  Please feel free to call with any questions.    Gilma Balbuena MD  11/13/24  07:41 [facility]    [facility]  [phone]    Please note that portions of this note were completed with a voice recognition program.

## 2024-11-13 NOTE — PROGRESS NOTES
" LOS: 21 days   Patient Care Team:  Juan Perez MD as PCP - General (Internal Medicine)    Chief Complaint: post op follow-up    Subjective  Intubated/sedated    Drips:  epi 0.01, levo 0.04, vaso 0.05, milrinone 0.125, propofol, precedex, insulin    Vital Signs  Temp:  [99.9 °F (37.7 °C)-101.8 °F (38.8 °C)] 101.8 °F (38.8 °C)  Heart Rate:  [] 107  Resp:  [24-29] 29  BP: (103-145)/(46-59) 117/47  Arterial Line BP: ()/(33-58) 126/58  FiO2 (%):  [50 %-78 %] 78 %  Body mass index is 47.73 kg/m².    Intake/Output Summary (Last 24 hours) at 11/13/2024 0709  Last data filed at 11/13/2024 0600  Gross per 24 hour   Intake 4982.62 ml   Output 05528 ml   Net -6542.38 ml     No intake/output data recorded.          11/11/24  0546 11/11/24  1248 11/12/24  0513   Weight: (!) 155 kg (341 lb 0.8 oz) (!) 155 kg (341 lb) (!) 151 kg (332 lb 10.8 oz)         Objective:  General Appearance:  Ill-appearing (intubated/sedated).    Vital signs: (most recent): Blood pressure 115/50, pulse 107, temperature (!) 102 °F (38.9 °C), resp. rate (!) 29, height 177.8 cm (70\"), weight (!) 151 kg (332 lb 10.8 oz), SpO2 95%.  Fever.  (Tmax 101.1).    Output: Producing urine (+mayorga) and producing stool.    Lungs:  There are rales and rhonchi.  (40% FiO2)  Heart: Tachycardia.  Irregular rhythm.  (Tele: Afib)  Extremities: There is dependent edema.    Skin:  Warm and dry.  (Dressings c/d/I  Hematoma left groin)            Results Review:        WBC WBC   Date Value Ref Range Status   11/12/2024 16.14 (H) 3.40 - 10.80 10*3/mm3 Final   11/11/2024 15.44 (H) 3.40 - 10.80 10*3/mm3 Final      HGB Hemoglobin   Date Value Ref Range Status   11/12/2024 9.0 (L) 13.0 - 17.7 g/dL Final   11/11/2024 9.1 (L) 13.0 - 17.7 g/dL Final      HCT Hematocrit   Date Value Ref Range Status   11/12/2024 28.6 (L) 37.5 - 51.0 % Final   11/11/2024 27.7 (L) 37.5 - 51.0 % Final      Platelets Platelets   Date Value Ref Range Status   11/12/2024 270 140 - 450 " "10*3/mm3 Final   11/11/2024 260 140 - 450 10*3/mm3 Final        PT/INR:    No results found for: \"PROTIME\"  /  No results found for: \"INR\"      Sodium Sodium   Date Value Ref Range Status   11/13/2024 143 136 - 145 mmol/L Final   11/12/2024 144 136 - 145 mmol/L Final   11/12/2024 144 136 - 145 mmol/L Final   11/12/2024 146 (H) 136 - 145 mmol/L Final   11/11/2024 144 136 - 145 mmol/L Final   11/11/2024 147 (H) 136 - 145 mmol/L Final   11/10/2024 146 (H) 136 - 145 mmol/L Final   11/10/2024 145 136 - 145 mmol/L Final      Potassium Potassium   Date Value Ref Range Status   11/13/2024 3.9 3.5 - 5.2 mmol/L Final   11/13/2024 3.7 3.5 - 5.2 mmol/L Final   11/12/2024 3.6 3.5 - 5.2 mmol/L Final   11/12/2024 3.9 3.5 - 5.2 mmol/L Final   11/12/2024 4.0 3.5 - 5.2 mmol/L Final   11/12/2024 4.0 3.5 - 5.2 mmol/L Final   11/12/2024 4.0 3.5 - 5.2 mmol/L Final   11/12/2024 4.3 3.5 - 5.2 mmol/L Final   11/12/2024 3.3 (L) 3.5 - 5.2 mmol/L Final   11/11/2024 3.8 3.5 - 5.2 mmol/L Final   11/11/2024 3.7 3.5 - 5.2 mmol/L Final   11/11/2024 3.4 (L) 3.5 - 5.2 mmol/L Final   11/11/2024 3.4 (L) 3.5 - 5.2 mmol/L Final   11/11/2024 3.7 3.5 - 5.2 mmol/L Final   11/11/2024 3.7 3.5 - 5.2 mmol/L Final   11/11/2024 3.7 3.5 - 5.2 mmol/L Final   11/11/2024 4.0 3.5 - 5.2 mmol/L Final   11/10/2024 3.9 3.5 - 5.2 mmol/L Final   11/10/2024 4.1 3.5 - 5.2 mmol/L Final   11/10/2024 4.0 3.5 - 5.2 mmol/L Final   11/10/2024 4.0 3.5 - 5.2 mmol/L Final   11/10/2024 3.4 (L) 3.5 - 5.2 mmol/L Final      Chloride Chloride   Date Value Ref Range Status   11/13/2024 107 98 - 107 mmol/L Final   11/12/2024 108 (H) 98 - 107 mmol/L Final   11/12/2024 110 (H) 98 - 107 mmol/L Final   11/12/2024 117 (H) 98 - 107 mmol/L Final   11/11/2024 108 (H) 98 - 107 mmol/L Final   11/11/2024 111 (H) 98 - 107 mmol/L Final   11/10/2024 111 (H) 98 - 107 mmol/L Final   11/10/2024 110 (H) 98 - 107 mmol/L Final      Bicarbonate CO2   Date Value Ref Range Status   11/13/2024 23.5 22.0 - 29.0 " mmol/L Final   11/12/2024 23.0 22.0 - 29.0 mmol/L Final   11/12/2024 22.5 22.0 - 29.0 mmol/L Final   11/12/2024 17.6 (L) 22.0 - 29.0 mmol/L Final   11/11/2024 22.5 22.0 - 29.0 mmol/L Final   11/11/2024 23.0 22.0 - 29.0 mmol/L Final   11/10/2024 21.0 (L) 22.0 - 29.0 mmol/L Final   11/10/2024 19.9 (L) 22.0 - 29.0 mmol/L Final      BUN BUN   Date Value Ref Range Status   11/13/2024 47 (H) 8 - 23 mg/dL Final   11/12/2024 47 (H) 8 - 23 mg/dL Final   11/12/2024 44 (H) 8 - 23 mg/dL Final   11/12/2024 37 (H) 8 - 23 mg/dL Final   11/11/2024 45 (H) 8 - 23 mg/dL Final   11/11/2024 41 (H) 8 - 23 mg/dL Final   11/10/2024 41 (H) 8 - 23 mg/dL Final   11/10/2024 42 (H) 8 - 23 mg/dL Final      Creatinine Creatinine   Date Value Ref Range Status   11/13/2024 1.32 (H) 0.76 - 1.27 mg/dL Final   11/12/2024 1.10 0.76 - 1.27 mg/dL Final   11/12/2024 1.14 0.76 - 1.27 mg/dL Final   11/12/2024 0.90 0.76 - 1.27 mg/dL Final   11/11/2024 1.23 0.76 - 1.27 mg/dL Final   11/11/2024 1.14 0.76 - 1.27 mg/dL Final   11/10/2024 1.21 0.76 - 1.27 mg/dL Final   11/10/2024 1.19 0.76 - 1.27 mg/dL Final      Calcium Calcium   Date Value Ref Range Status   11/13/2024 8.1 (L) 8.6 - 10.5 mg/dL Final   11/12/2024 8.7 8.6 - 10.5 mg/dL Final   11/12/2024 8.2 (L) 8.6 - 10.5 mg/dL Final   11/12/2024 6.4 (L) 8.6 - 10.5 mg/dL Final   11/11/2024 8.6 8.6 - 10.5 mg/dL Final   11/11/2024 8.0 (L) 8.6 - 10.5 mg/dL Final   11/10/2024 7.9 (L) 8.6 - 10.5 mg/dL Final   11/10/2024 7.8 (L) 8.6 - 10.5 mg/dL Final      Magnesium Magnesium   Date Value Ref Range Status   11/13/2024 2.0 1.6 - 2.4 mg/dL Final   11/12/2024 2.0 1.6 - 2.4 mg/dL Final   11/12/2024 1.6 1.6 - 2.4 mg/dL Final   11/11/2024 2.0 1.6 - 2.4 mg/dL Final   11/10/2024 1.9 1.6 - 2.4 mg/dL Final   11/10/2024 1.9 1.6 - 2.4 mg/dL Final          aspirin, 81 mg, Per G Tube, Daily  atorvastatin, 40 mg, Per G Tube, Nightly  cefTRIAXone, 2,000 mg, Intravenous, Q24H  chlorhexidine, 15 mL, Mouth/Throat, Q12H  enoxaparin, 40  mg, Subcutaneous, Q12H  Ergocalciferol, 100 mcg, Per G Tube, Daily  hydrocortisone-bacitracin-zinc oxide-nystatin, 1 Application, Topical, Q12H  insulin glargine, 15 Units, Subcutaneous, Daily  insulin regular, 2-7 Units, Subcutaneous, Q6H  ipratropium-albuterol, 3 mL, Nebulization, Q4H - RT  lansoprazole, 15 mg, Per G Tube, Q AM  miconazole, 1 Application, Topical, Q12H  midodrine, 15 mg, Oral, TID AC  potassium chloride, 40 mEq, Oral, TID  senna, 2 tablet, Per G Tube, Nightly  sildenafil, 20 mg, Per G Tube, TID  sodium chloride, 10 mL, Intravenous, Q12H  sodium chloride, 4 mL, Nebulization, BID - RT  vancomycin, 750 mg, Intravenous, Q12H      amiodarone, 1 mg/min, Last Rate: 1 mg/min (11/13/24 0329)  bumetanide, 1 mg/hr, Last Rate: 1 mg/hr (11/13/24 0428)  dexmedetomidine, 0.2-1.5 mcg/kg/hr, Last Rate: 0.05 mcg/kg/hr (11/13/24 0343)  DOPamine, 2-20 mcg/kg/min  EPINEPHrine, 0.01 mcg/kg/min, Last Rate: 0.01 mcg/kg/min (11/12/24 2346)  lidocaine in D5W, 1 mg/min, Last Rate: Stopped (11/05/24 1135)  milrinone, 0.125 mcg/kg/min, Last Rate: 0.125 mcg/kg/min (11/13/24 0428)  niCARdipine, 5-15 mg/hr  norepinephrine, 0.02-0.2 mcg/kg/min, Last Rate: 0.02 mcg/kg/min (11/12/24 0700)  Pharmacy to dose vancomycin,   phenylephrine, 0.2-2 mcg/kg/min, Last Rate: Stopped (11/05/24 1030)  propofol, 5-50 mcg/kg/min, Last Rate: 20 mcg/kg/min (11/13/24 0241)  vasopressin, 0.02-0.1 Units/min, Last Rate: 0.05 Units/min (11/13/24 0704)              Prosthetic aortic valve stenosis    Essential hypertension    Permanent atrial fibrillation    S/P AVR    ICD (implantable cardioverter-defibrillator), dual, in situ    Achilles tendon rupture    Bacteremia    Stenosis of prosthetic aortic valve    Anemia      Assessment & Plan    - Prosthetic aortic valve stenosis, h/o AVR (tissue)/maze/DANICA ligation (2014)- s/p reoperative sternotomy AV root replacement 27mm cryopreserved homograft with right nuvia cabrol, AICD removal, IABP placement- Chiqui  10/31/2024  -Sternal closure ---11/2  - Possible endocarditis, likely need JOLIE   - Bacteremia, blood cultures positive strep mitis---on penicillin G  - Atrial fibrillation, unable to tolerate anticoagulation  - Hypertension  - NICM status post [employer] AICD  - Anemia, s/p EGD/colonoscopy with esophagitis, polyp removal  - Right achilles tendon tear--- walking boot and PT per ortho at [facility]  - Pre-diabetes -- improved at 5.3  -post op anemia- expected acute blood loss  -TCP post-op      POD #14. Intubated & sedated. Edema greatly improved. TMAX 102- repeat cultures NGTD. Right left incision with erythema, right abdomen flank with erythema---vancomycin added for staph coverage for possible soft tissue cellulitis. WBC increased 18.    Agree with ID on getting CT chest and abdomen. CXR with worsening left lung infiltrate   On levophed 0.04 vaso 0.05, epi 0.01, milrinone 0.125.  will wean from propofol to give a little more tone. Midodrine increased yesterday.  Remains in atrial fibrillation. Rate improved-- on amiodarone gtt Creatinine improved. Continue bumex gtt. Renal managing diuretics. Yesterday left thoracentesis without enough fluid to drain. General surgery consulted for trach and peg. Difficult anatomy-- Dr. Florian discussed with Dr. Leija with thoracic surgery, will place consult for thoracic surgery to evaluate him.  Continue supportive care.      Samantha Magana, APRN  11/13/24  07:09 EST

## 2024-11-14 NOTE — PROGRESS NOTES
[facility]  [phone]  Dr. Tawanda Clement     Subjective:  LOS: 22    Chief Complaint:  Ventilator management     Patient seen today intubated on ventilator.  He was lightly sedated but awake and able to follow commands.  He does appear very weak.  I briefly placed him on pressure support and he was pulling okay tidal volumes but was a little bit tachypneic.    Objective   Vital Signs past 24hrs  Temp range: Temp (24hrs), Av.2 °F (37.9 °C), Min:99.9 °F (37.7 °C), Max:100.6 °F (38.1 °C)    BP range: BP: ()/(36-59) 104/41  Pulse range: Heart Rate:  [] 100  Resp rate range: Resp:  [17-24] 17  Device (Oxygen Therapy): ventilator   Oxygen range:SpO2:  [94 %-98 %] 96 %   Mechanical Ventilator:Mode: VC/AC (24 1037)    Physical Exam  Constitutional:       Appearance: Normal appearance.      Interventions: He is sedated and intubated.   HENT:      Head: Normocephalic and atraumatic.      Nose: Nose normal.      Mouth/Throat:      Mouth: Mucous membranes are moist.      Pharynx: Oropharynx is clear.   Eyes:      Extraocular Movements: Extraocular movements intact.      Conjunctiva/sclera: Conjunctivae normal.   Cardiovascular:      Rate and Rhythm: Normal rate and regular rhythm.      Pulses: Normal pulses.      Heart sounds: Normal heart sounds. No murmur heard.  Pulmonary:      Effort: No respiratory distress. He is intubated.      Breath sounds: Decreased air movement present. No stridor. Decreased breath sounds present. No wheezing or rales.      Comments: Coarse breath sounds on the ventilator  Abdominal:      General: Abdomen is flat. Bowel sounds are normal.      Palpations: Abdomen is soft.      Tenderness: There is no abdominal tenderness.   Skin:     General: Skin is warm and dry.   Neurological:      General: No focal deficit present.      Mental Status: He is alert.       Results Review:    I have reviewed the laboratory and imaging data since the last note by ELIZABETH  physician.  My annotations are noted in assessment and plan.      Result Review:  I have personally reviewed the results from last note by LPC physician to 11/14/2024 13:19 EST and agree with these findings:  [x]  Laboratory list / accordion  [x]  Microbiology  [x]  Radiology  [x]  EKG/Telemetry   [x]  Cardiology/Vascular   [x]  Pathology  [x]  Old records  []  Other:    Medication Review:  I have reviewed the current MAR.  My annotations are noted in assessment and plan.    acetylcysteine, 3 mL, Nebulization, TID - RT  aspirin, 81 mg, Per G Tube, Daily  atorvastatin, 40 mg, Per G Tube, Nightly  cefepime, 2,000 mg, Intravenous, Q8H  chlorhexidine, 15 mL, Mouth/Throat, Q12H  enoxaparin, 40 mg, Subcutaneous, Q12H  Ergocalciferol, 100 mcg, Per G Tube, Daily  hydrocortisone-bacitracin-zinc oxide-nystatin, 1 Application, Topical, Q12H  insulin glargine, 20 Units, Subcutaneous, Daily  insulin regular, 2-7 Units, Subcutaneous, Q6H  ipratropium-albuterol, 3 mL, Nebulization, Q4H - RT  lansoprazole, 15 mg, Per G Tube, Q AM  miconazole, 1 Application, Topical, Q12H  midodrine, 15 mg, Oral, TID AC  sildenafil, 20 mg, Per G Tube, TID  sodium chloride, 10 mL, Intravenous, Q12H  [START ON 11/15/2024] vancomycin, 750 mg, Intravenous, Q24H        amiodarone, 1 mg/min, Last Rate: 1 mg/min (11/14/24 0939)  dexmedetomidine, 0.2-1.5 mcg/kg/hr, Last Rate: 0.5 mcg/kg/hr (11/14/24 1259)  dextrose, 50 mL/hr, Last Rate: 50 mL/hr (11/14/24 1014)  DOPamine, 2-20 mcg/kg/min  EPINEPHrine, 0.01 mcg/kg/min, Last Rate: 0.01 mcg/kg/min (11/13/24 0900)  lidocaine in D5W, 1 mg/min, Last Rate: Stopped (11/05/24 1135)  milrinone, 0.125 mcg/kg/min, Last Rate: 0.125 mcg/kg/min (11/13/24 1945)  niCARdipine, 5-15 mg/hr  norepinephrine, 0.02-0.2 mcg/kg/min, Last Rate: 0.04 mcg/kg/min (11/14/24 0800)  Pharmacy to dose vancomycin,   phenylephrine, 0.2-2 mcg/kg/min  propofol, 5-50 mcg/kg/min, Last Rate: 5 mcg/kg/min (11/14/24 1259)  vasopressin, 0.02-0.1  Units/min, Last Rate: 0.05 Units/min (11/14/24 1301)      Lines, Drains & Airways       Active LDAs       Name Placement date Placement time Site Days    Pulmonary Artery Catheter - Triple Lumen 11/10/24 Left Internal jugular 11/10/24  1030  -- 4    Peripheral IV 10/29/24 1848 Anterior;Left Forearm 10/29/24  1848  Forearm  15    NG/OG Tube Nasogastric  Left nostril 11/02/24  1330  Left nostril  12    NG/OG Tube Orogastric Center mouth 11/13/24  1900  Center mouth  less than 1    Urethral Catheter Temperature probe 16 Fr. 11/08/24  0838  -- 6    ETT  10/30/24  1139  created via procedure documentation  -- 15    Arterial Line 11/09/24 Left Radial 11/09/24  1800  Radial  4    Introducer- Double Lumen 11/10/24 Internal jugular Left 11/10/24  1015  created via procedure documentation  Internal jugular  4    Pacer Wires 10/30/24  1635  Ventricular  14                  No active isolations  Diet Orders (active) (From admission, onward)       Start     Ordered    11/14/24 0649  Tube Feeding: Formula: Novasource Renal (Nepro); Feeding Type: Continuous; Start at: 45 mL/hr; Then Advance By: Do Not Advance; Water Flush: 50 mL; Every: 1 hour; Water Bolus: None  Diet Effective Now        Comments: Prosource BID per feeding tube    11/14/24 0648    11/08/24 1800  Dietary Nutrition Supplements ProSource No Carb  2 Times Daily      Comments: Flush through feeding tube BID    11/08/24 1358    11/02/24 1533  NPO Diet NPO Type: Tube Feeding  Diet Effective Now         11/02/24 1533    11/02/24 1259  Feeding Tube Insertion - Cortrak System  Once         11/02/24 1259                      Assessment  Postop respiratory failure  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  Continue weaning FiO2 and PEEP as able  - Continue daily SBT and SAT  - Bronchoscopy (11/2/2024)- had some mild thick airway secretions that were therapeutically aspirated but otherwise nothing notable.  - Repeat bronchoscopy (11/13/2024) showing diffuse thin secretions throughout the bilateral lower lobes worse in the left lower lobe.  BAL was performed in the left lower lobe.  Infectious workup pending  - Pulmonary to continue following    THESE ARE NEW MEDICAL PROBLEMS TO ME.      Tawanda Clement DO   11/14/24  13:19 EST      Part of this note may be an electronic transcription/translation of spoken language to printed text using the Dragon Dictation System.

## 2024-11-14 NOTE — PROGRESS NOTES
LOS: 22 days     Chief Complaint: Endocarditis    Interval History: Fever curve slightly improved this morning.  WBC remains at 19.  Still on 65% FiO2.  Multiple pressors.  More awake this morning.  Status post bronchoscopy yesterday.    Vital Signs  Temp:  [99.9 °F (37.7 °C)-101.8 °F (38.8 °C)] 100 °F (37.8 °C)  Heart Rate:  [] 91  Resp:  [24-25] 24  BP: ()/(38-59) 110/43  Arterial Line BP: ()/(42-65) 126/53  FiO2 (%):  [64 %-98 %] 64 %    Physical Exam:  General: Intubated  HEENT: ET tube in place.  NG tube in place.  Respiratory: Coarse bilateral breath sounds on the ventilator.  : Ugarte catheter  Skin: Operative dressing over the anterior chest clean and intact.  Skin breakdown in the inguinal area.  Access: Left IJ [place].  Arterial line.  Peripheral line.    Antibiotics:  Anti-Infectives (From admission, onward)      Ordered     Dose/Rate Route Frequency Start Stop    11/13/24 0914  cefepime 2000 mg IVPB in 100 mL NS (MBP)        Ordering Provider: Gregg Diaz DO    2,000 mg  over 4 Hours Intravenous Every 8 Hours 11/13/24 1800 11/20/24 1759    11/13/24 0914  cefepime 2000 mg IVPB in 100 mL NS (MBP)        Ordering Provider: Gregg Diaz DO    2,000 mg  over 30 Minutes Intravenous Once 11/13/24 1000 11/13/24 1235    11/12/24 1512  vancomycin 750 mg in sodium chloride 0.9 % 250 mL IVPB-VTB        Ordering Provider: Gregg Diaz DO    750 mg  333.3 mL/hr over 45 Minutes Intravenous Every 12 Hours 11/13/24 0800 11/20/24 0759    11/12/24 1133  vancomycin 3000 mg/500 mL 0.9% NS IVPB ([facility])        Ordering Provider: Gregg Diaz DO    20 mg/kg × 151 kg  over 180 Minutes Intravenous Once 11/12/24 1230 11/12/24 1614    11/12/24 1051  Pharmacy to dose vancomycin        Ordering Provider: Gregg Diaz DO     Not Applicable Continuous PRN 11/12/24 1051 11/19/24 1050    11/02/24 0918  vancomycin (VANCOCIN) 1,000 mg in sodium chloride 0.9 % 250 mL  IVPB-VTB        Ordering Provider: Antwan Farmer MD    1,000 mg  250 mL/hr over 60 Minutes Intravenous Every 24 Hours 11/02/24 1015 11/03/24 1138    11/02/24 0912  Pharmacy to dose vancomycin        Ordering Provider: Samantha Salvador APRN     Not Applicable Continuous PRN 11/02/24 0912 11/04/24 0911    10/30/24 2306  ceFAZolin 2000 mg IVPB in 100 mL NS (MBP)        Ordering Provider: Samantha Salvador APRN    2,000 mg  over 30 Minutes Intravenous Every 8 Hours 10/31/24 0000 11/01/24 0922    10/29/24 1518  ceFAZolin 2000 mg IVPB in 100 mL NS (MBP)        Ordering Provider: Bryant Mcclure PA-C    2,000 mg  over 30 Minutes Intravenous Once 10/30/24 0600 10/30/24 1936    10/28/24 1034  vancomycin IVPB 2000 mg in 0.9% Sodium Chloride 500 mL        Ordering Provider: Samantha Salvador APRN    15 mg/kg × 142 kg Intravenous Once 10/28/24 1045 10/28/24 1356             Results Review:     I reviewed the patient's new clinical results.    Lab Results   Component Value Date    WBC 19.03 (H) 11/14/2024    HGB 8.8 (L) 11/14/2024    HCT 27.9 (L) 11/14/2024    MCV 92.4 11/14/2024     11/14/2024     Lab Results   Component Value Date    GLUCOSE 146 (H) 11/14/2024    BUN 56 (H) 11/14/2024    CREATININE 1.37 (H) 11/14/2024    BCR 40.9 (H) 11/14/2024    CO2 22.0 11/14/2024    CALCIUM 8.0 (L) 11/14/2024    ALBUMIN 2.7 (L) 11/14/2024    LABIL2 1.4 06/27/2019    AST 31 11/14/2024    ALT 20 11/14/2024       Microbiology:  10/3 COVID-negative  10/10 COVID-negative  10/14 COVID-negative  10/15 blood cultures 2 out of 2 strep mitis  10/17 COVID-negative  10/17 blood cultures 2 out of 2 strep mitis  10/21 COVID-negative  10/23 blood cultures no growth today  10/30 operative cultures from the aortic valve no growth   11/9 respiratory culture no growth  11/9 catheter tip culture no growth to date  11/10 catheter tip culture no growth to date  11/12 blood cultures in process  11/12 respiratory culture in process    Imaging:  CT  chest abdomen pelvis from yesterday report reviewed with lower lobe infiltrates.    Assessment    #Strep mitis endocarditis  #Status post bioprosthetic aortic valve replacement 2014  #Status post aortic root replacement in the setting of prosthetic aortic valve endocarditis and annular abscess on 10/30  #Status post removal of pacemaker generator and intracardiac portion of the leads with retained venous leads  #Atrial fibrillation  #Achilles tendon rupture  #NATHANIEL    Status post bronchoscopy yesterday in the setting of lower lobe infiltrate seen on CT.  No other obvious source of infection on CT chest abdomen and pelvis.  Repeat cultures have all remained negative to date.    Continue vancomycin goal -600 and cefepime 2 g every 8 empirically while waiting on cultures.  Should be adequate coverage for pneumonia.    For endocarditis he will still need ultimate antibiotic 6 weeks with end date of December 4.  Above should cover in the meantime but may be able to de-escalate back to penicillin in the future.

## 2024-11-14 NOTE — PROGRESS NOTES
"[facility] Clinical Pharmacy Services: Vancomycin Monitoring Note    Woodrow Alejandro is a 74 y.o. male who is on day 3/7 of pharmacy to dose vancomycin for Empiric.    Previous Vancomycin Dose:    750 mg IV every  12  hours  Updated Cultures and Sensitivities:   11/10 Catheter tip Cx NGTD   11/12 BC 1/2 aerobic gram pos cocci in clusters  11/12 ET suction Cx candida albicans  11/12 MRSA PCR negative   11/13 genital swab in progress  11/13 bronch prelim no growth     Results from last 7 days   Lab Units 11/14/24  0847   VANCOMYCIN RM mcg/mL 28.30     Vitals/Labs  Ht: 177.8 cm (70\"); Wt: (!) 154 kg (338 lb 13.6 oz)   Temp Readings from Last 1 Encounters:   11/14/24 100 °F (37.8 °C)     Estimated Creatinine Clearance: 70.3 mL/min (A) (by C-G formula based on SCr of 1.37 mg/dL (H)).        Results from last 7 days   Lab Units 11/14/24  0351 11/13/24  0827 11/13/24  0414 11/12/24  0815 11/12/24  0509 11/12/24  0342   CREATININE mg/dL 1.37*  --  1.32* 1.10   < > 0.90   WBC 10*3/mm3 19.03* 18.78*  --   --   --  16.14*    < > = values in this interval not displayed.     Assessment/Plan  Vanc level was supratherapeutic at 28.3 ~ 11h after dose was given, . Plan to decrease dose to 750mg q24h. Kidney function is slightly worse today and patient is obese (BMI 48, expect accumulation)  Current Vancomycin Dose: 750 mg IV every  24  hours; provides a predicted  mg/L.hr   Next Level Date and Time: No level ordered for now, will see what kidney function does tomorrow and probably schedule a level for Saturday.   We will continue to monitor patient changes and renal function     Thank you for involving pharmacy in this patient's care. Please contact pharmacy with any questions or concerns.       Melinda Jones, PharmD  Clinical Pharmacist                "

## 2024-11-14 NOTE — PROGRESS NOTES
" LOS: 22 days   Patient Care Team:  Juan Perez MD as PCP - General (Internal Medicine)    Chief Complaint: post op follow-up    Subjective  Intubated/sedated    Drips:  epi 0.01, levo 0.04, vaso 0.05, milrinone 0.125, propofol, precedex, insulin    Vital Signs  Temp:  [99.9 °F (37.7 °C)-102 °F (38.9 °C)] 99.9 °F (37.7 °C)  Heart Rate:  [] 93  Resp:  [24-25] 24  BP: ()/(38-59) 118/59  Arterial Line BP: ()/(42-65) 109/48  FiO2 (%):  [64 %-98 %] 64 %  Body mass index is 48.62 kg/m².    Intake/Output Summary (Last 24 hours) at 11/14/2024 0718  Last data filed at 11/14/2024 0646  Gross per 24 hour   Intake 4197.32 ml   Output 5450 ml   Net -1252.68 ml     No intake/output data recorded.          11/11/24  1248 11/12/24  0513 11/14/24  0600   Weight: (!) 155 kg (341 lb) (!) 151 kg (332 lb 10.8 oz) (!) 154 kg (338 lb 13.6 oz)         Objective:  General Appearance:  Ill-appearing (intubated/sedated).    Vital signs: (most recent): Blood pressure 104/41, pulse 100, temperature 100 °F (37.8 °C), resp. rate 17, height 177.8 cm (70\"), weight (!) 154 kg (338 lb 13.6 oz), SpO2 96%.  Fever.  (Tmax 101.1).    Output: Producing urine (+mayorga) and producing stool.    Lungs:  There are rales and rhonchi.  (40% FiO2)  Heart: Tachycardia.  Irregular rhythm.  (Tele: Afib)  Extremities: There is dependent edema.    Skin:  Warm and dry.  (Dressings c/d/I  Hematoma left groin)            Results Review:        WBC WBC   Date Value Ref Range Status   11/14/2024 19.03 (H) 3.40 - 10.80 10*3/mm3 Final   11/13/2024 18.78 (H) 3.40 - 10.80 10*3/mm3 Final   11/12/2024 16.14 (H) 3.40 - 10.80 10*3/mm3 Final      HGB Hemoglobin   Date Value Ref Range Status   11/14/2024 8.8 (L) 13.0 - 17.7 g/dL Final   11/13/2024 9.1 (L) 13.0 - 17.7 g/dL Final   11/12/2024 9.0 (L) 13.0 - 17.7 g/dL Final      HCT Hematocrit   Date Value Ref Range Status   11/14/2024 27.9 (L) 37.5 - 51.0 % Final   11/13/2024 28.8 (L) 37.5 - 51.0 % Final " "  11/12/2024 28.6 (L) 37.5 - 51.0 % Final      Platelets Platelets   Date Value Ref Range Status   11/14/2024 248 140 - 450 10*3/mm3 Final   11/13/2024 269 140 - 450 10*3/mm3 Final   11/12/2024 270 140 - 450 10*3/mm3 Final        PT/INR:    No results found for: \"PROTIME\"  /  No results found for: \"INR\"      Sodium Sodium   Date Value Ref Range Status   11/14/2024 148 (H) 136 - 145 mmol/L Final   11/13/2024 143 136 - 145 mmol/L Final   11/12/2024 144 136 - 145 mmol/L Final   11/12/2024 144 136 - 145 mmol/L Final   11/12/2024 146 (H) 136 - 145 mmol/L Final   11/11/2024 144 136 - 145 mmol/L Final      Potassium Potassium   Date Value Ref Range Status   11/14/2024 3.2 (L) 3.5 - 5.2 mmol/L Final     Comment:     Slight hemolysis detected by analyzer. Result may be falsely elevated.   11/13/2024 3.2 (L) 3.5 - 5.2 mmol/L Final   11/13/2024 3.6 3.5 - 5.2 mmol/L Final   11/13/2024 3.8 3.5 - 5.2 mmol/L Final   11/13/2024 3.9 3.5 - 5.2 mmol/L Final   11/13/2024 3.9 3.5 - 5.2 mmol/L Final   11/13/2024 3.7 3.5 - 5.2 mmol/L Final   11/12/2024 3.6 3.5 - 5.2 mmol/L Final   11/12/2024 3.9 3.5 - 5.2 mmol/L Final   11/12/2024 4.0 3.5 - 5.2 mmol/L Final   11/12/2024 4.0 3.5 - 5.2 mmol/L Final   11/12/2024 4.0 3.5 - 5.2 mmol/L Final   11/12/2024 4.3 3.5 - 5.2 mmol/L Final   11/12/2024 3.3 (L) 3.5 - 5.2 mmol/L Final   11/11/2024 3.8 3.5 - 5.2 mmol/L Final   11/11/2024 3.7 3.5 - 5.2 mmol/L Final   11/11/2024 3.4 (L) 3.5 - 5.2 mmol/L Final   11/11/2024 3.4 (L) 3.5 - 5.2 mmol/L Final   11/11/2024 3.7 3.5 - 5.2 mmol/L Final      Chloride Chloride   Date Value Ref Range Status   11/14/2024 109 (H) 98 - 107 mmol/L Final   11/13/2024 107 98 - 107 mmol/L Final   11/12/2024 108 (H) 98 - 107 mmol/L Final   11/12/2024 110 (H) 98 - 107 mmol/L Final   11/12/2024 117 (H) 98 - 107 mmol/L Final   11/11/2024 108 (H) 98 - 107 mmol/L Final      Bicarbonate CO2   Date Value Ref Range Status   11/14/2024 22.0 22.0 - 29.0 mmol/L Final   11/13/2024 23.5 22.0 " - 29.0 mmol/L Final   11/12/2024 23.0 22.0 - 29.0 mmol/L Final   11/12/2024 22.5 22.0 - 29.0 mmol/L Final   11/12/2024 17.6 (L) 22.0 - 29.0 mmol/L Final   11/11/2024 22.5 22.0 - 29.0 mmol/L Final      BUN BUN   Date Value Ref Range Status   11/14/2024 56 (H) 8 - 23 mg/dL Final   11/13/2024 47 (H) 8 - 23 mg/dL Final   11/12/2024 47 (H) 8 - 23 mg/dL Final   11/12/2024 44 (H) 8 - 23 mg/dL Final   11/12/2024 37 (H) 8 - 23 mg/dL Final   11/11/2024 45 (H) 8 - 23 mg/dL Final      Creatinine Creatinine   Date Value Ref Range Status   11/14/2024 1.37 (H) 0.76 - 1.27 mg/dL Final   11/13/2024 1.32 (H) 0.76 - 1.27 mg/dL Final   11/12/2024 1.10 0.76 - 1.27 mg/dL Final   11/12/2024 1.14 0.76 - 1.27 mg/dL Final   11/12/2024 0.90 0.76 - 1.27 mg/dL Final   11/11/2024 1.23 0.76 - 1.27 mg/dL Final      Calcium Calcium   Date Value Ref Range Status   11/14/2024 8.0 (L) 8.6 - 10.5 mg/dL Final   11/13/2024 8.1 (L) 8.6 - 10.5 mg/dL Final   11/12/2024 8.7 8.6 - 10.5 mg/dL Final   11/12/2024 8.2 (L) 8.6 - 10.5 mg/dL Final   11/12/2024 6.4 (L) 8.6 - 10.5 mg/dL Final   11/11/2024 8.6 8.6 - 10.5 mg/dL Final      Magnesium Magnesium   Date Value Ref Range Status   11/13/2024 2.3 1.6 - 2.4 mg/dL Final   11/13/2024 2.0 1.6 - 2.4 mg/dL Final   11/12/2024 2.0 1.6 - 2.4 mg/dL Final   11/12/2024 1.6 1.6 - 2.4 mg/dL Final          acetylcysteine, 3 mL, Nebulization, TID - RT  aspirin, 81 mg, Per G Tube, Daily  atorvastatin, 40 mg, Per G Tube, Nightly  cefepime, 2,000 mg, Intravenous, Q8H  chlorhexidine, 15 mL, Mouth/Throat, Q12H  enoxaparin, 40 mg, Subcutaneous, Q12H  Ergocalciferol, 100 mcg, Per G Tube, Daily  hydrocortisone-bacitracin-zinc oxide-nystatin, 1 Application, Topical, Q12H  insulin glargine, 20 Units, Subcutaneous, Daily  insulin regular, 2-7 Units, Subcutaneous, Q6H  ipratropium-albuterol, 3 mL, Nebulization, Q4H - RT  lansoprazole, 15 mg, Per G Tube, Q AM  miconazole, 1 Application, Topical, Q12H  midodrine, 15 mg, Oral, TID  AC  potassium chloride, 40 mEq, Nasogastric, BID  potassium chloride, 20 mEq, Intravenous, TID  potassium chloride, 20 mEq, Intravenous, TID  senna, 2 tablet, Per G Tube, Nightly  sildenafil, 20 mg, Per G Tube, TID  sodium chloride, 10 mL, Intravenous, Q12H  vancomycin, 750 mg, Intravenous, Q12H      amiodarone, 1 mg/min, Last Rate: 1 mg/min (11/14/24 0259)  dexmedetomidine, 0.2-1.5 mcg/kg/hr, Last Rate: 0.5 mcg/kg/hr (11/14/24 0258)  DOPamine, 2-20 mcg/kg/min  EPINEPHrine, 0.01 mcg/kg/min, Last Rate: 0.01 mcg/kg/min (11/13/24 0900)  lidocaine in D5W, 1 mg/min, Last Rate: Stopped (11/05/24 1135)  milrinone, 0.125 mcg/kg/min, Last Rate: 0.125 mcg/kg/min (11/13/24 1945)  niCARdipine, 5-15 mg/hr  norepinephrine, 0.02-0.2 mcg/kg/min, Last Rate: 0.04 mcg/kg/min (11/13/24 1800)  Pharmacy to dose vancomycin,   phenylephrine, 0.2-2 mcg/kg/min  propofol, 5-50 mcg/kg/min, Last Rate: 5 mcg/kg/min (11/14/24 0128)  vasopressin, 0.02-0.1 Units/min, Last Rate: 0.05 Units/min (11/14/24 0653)              Prosthetic aortic valve stenosis    Essential hypertension    Permanent atrial fibrillation    S/P AVR    ICD (implantable cardioverter-defibrillator), dual, in situ    Achilles tendon rupture    Bacteremia    Stenosis of prosthetic aortic valve    Anemia      Assessment & Plan    - Prosthetic aortic valve stenosis, h/o AVR (tissue)/maze/DANICA ligation (2014)- s/p reoperative sternotomy AV root replacement 27mm cryopreserved homograft with right nuvia cabrol, AICD removal, IABP placement- [facility] 10/31/2024  -Sternal closure ---11/2  - Possible endocarditis, likely need JOLIE   - Bacteremia, blood cultures positive strep mitis---on penicillin G  - Atrial fibrillation, unable to tolerate anticoagulation  - Hypertension  - NICM status post [employer] AICD  - Anemia, s/p EGD/colonoscopy with esophagitis, polyp removal  - Right achilles tendon tear--- walking boot and PT per ortho at [facility]  - Pre-diabetes -- improved at 5.3  -post op anemia-  expected acute blood loss  -TCP post-op      POD #15. Intubated. Sedation weaned, he is alert and calm.  Edema greatly improved. TMAX 102- repeat cultures positive for staph-- on vancomycin. WBC increased 19. --ID following  On levophed 0.04 vaso 0.05, epi 0.01, milrinone 0.125.  will wean from propofol to give a little more tone. Midodrine increased yesterday.  Remains in atrial fibrillation. Rate improved-- on amiodarone gtt. Creatinine improved. Bumex gtt stopped. Renal managing diuretics.   Thoracic following for tach.    Continue supportive care.      Samantha Magana, APRN  11/14/24  07:18 EST

## 2024-11-14 NOTE — CONSULTS
Inpatient Thoracic Surgery Consult  Consult performed by: Slim Lance APRN  Consult ordered by: Samantha Salvador APRN          Patient Care Team:  Juan Perez MD as PCP - General (Internal Medicine)    No chief complaint on file.      Subjective     History of Present Illness  Woodrow Alejandro is a 74-year-old male with an extensive past medical history significant for atrial fibrillation, bioprosthetic aortic valve replacement (2014), left atrial appendage ligation, nonischemic cardiomyopathy s/p [employer] AICD placement. Of note he was previously hospitalized last month for Achilles tendon tear and esophagitis.  He was subsequently discharged to rehab.  He presented to [facility] on 10/23/2024 for leukocytosis and bacteremia.  Workup remarkable for endocarditis.  Underwent AV root replacement, AICD removal on 10/31/2024 subsequent closure of his sternal wound on 11/2/2024.  Patient has had complicated postoperative course with prolonged intubation.  We have been consulted for tracheostomy placement given his difficult anatomy.  He underwent bronchoscopy yesterday with noted diffuse secretions worst in left lower lobe.    Patient remains critically ill on exam.  He is on several vasoactive drips conjunction with sedation.  He is alert and nods appropriately.  He remains febrile and on antibiotics per infectious disease recommendations.    Review of Systems   Able to participate in substantive review of systems    Past Medical History:   Diagnosis Date    Achilles tendon injury     right    Aortic valve replaced     Arthritis 2018    Atrial fibrillation     Coronary artery disease     History of transfusion     Hypertension      Past Surgical History:   Procedure Laterality Date    AORTIC VALVE CONDUIT N/A 10/30/2024    Procedure: AORTIC VALVE CONDUIT; possible homograft; ACID removal;  Surgeon: Javon Florian MD;  Location: [facility];  Service: Cardiothoracic;   Laterality: N/A;    AORTIC VALVE REPAIR/REPLACEMENT MITRAL VALVE REPAIR/REPLACEMENT N/A 10/30/2024    Procedure: REOP STERNOTOMY; TRANSESOPHOGEAL ECHOCARDIOGRAM;AORTIC ROOT REPLACEMENT WITH HOMOGRAFT; MITRAL VALVE REPAIR; OPEN VEIN HARVEST RIGHT SAPHENOUS VEIN; AICD GENERATOR EXPLANTATION; INTRA-AORTIC BALLOON PUMP PLACEMENT; PRP;  Surgeon: Javon Florian MD;  Location: [facility];  Service: Cardiothoracic;  Laterality: N/A;    CARDIAC SURGERY      COLONOSCOPY      COLONOSCOPY N/A 2024    Procedure: COLONOSCOPY WITH HOT/COLD SNARE POLYPECTOMIES, ELEVIEW INJECTION,CLIPX1;  Surgeon: Kurt Mensah MD;  Location: [facility] ENDOSCOPY;  Service: Gastroenterology;  Laterality: N/A;  COLON POLYPS    ENDOSCOPY N/A 2024    Procedure: ESOPHAGOGASTRODUODENOSCOPY WITH BIOPSIES;  Surgeon: Kurt Mensah MD;  Location: [facility] ENDOSCOPY;  Service: Gastroenterology;  Laterality: N/A;  RETAINED FOOD IN STOMACH AND REFLUX ESOPHAGITIS    PACEMAKER IMPLANTATION      TRANSESOPHAGEAL ECHOCARDIOGRAM (JOLIE) N/A 10/30/2024    Procedure: TRANSESOPHAGEAL ECHOCARDIOGRAM WITH ANESTHESIA;  Surgeon: Javon Florian MD;  Location: [facility];  Service: Cardiothoracic;  Laterality: N/A;    TRANSESOPHAGEAL ECHOCARDIOGRAM (JOLIE) N/A 2024    Procedure: TRANSESOPHAGEAL ECHOCARDIOGRAM WITH ANESTHESIA;  Surgeon: Javon Florian MD;  Location: [facility];  Service: Cardiothoracic;  Laterality: N/A;     Family History   Problem Relation Age of Onset    COPD Mother         Still living 93    Heart disease Mother     Arthritis Father     COPD Father          at 91.     Social History     Socioeconomic History    Marital status:    Tobacco Use    Smoking status: Never     Passive exposure: Never    Smokeless tobacco: Never   Vaping Use    Vaping status: Never Used   Substance and Sexual Activity    Alcohol use: Yes     Alcohol/week: 4.0 standard drinks of alcohol     Types: 4 Cans of beer per week    Drug  use: Never    Sexual activity: Not Currently     Medications Prior to Admission   Medication Sig Dispense Refill Last Dose/Taking    aspirin 325 MG tablet Take 1 tablet by mouth 2 (two) times a day.   10/23/2024 at  9:00 AM    dilTIAZem (CARDIZEM) 120 MG tablet Take 1 tablet by mouth 2 (Two) Times a Day. 180 tablet 3 10/23/2024 at  9:00 AM    furosemide (LASIX) 40 MG tablet Take 1 tablet by mouth Daily.   10/23/2024 at  9:00 AM    lisinopril (PRINIVIL,ZESTRIL) 10 MG tablet Take 1 tablet by mouth Daily.   10/23/2024 at  9:00 AM    metoprolol tartrate 75 MG tablet Take 75 mg by mouth 2 (Two) Times a Day for 30 days.   10/23/2024 at  9:00 AM    pantoprazole (PROTONIX) 40 MG EC tablet Take 1 tablet by mouth 2 (Two) Times a Day Before Meals for 30 days. 30 tablet 0 10/23/2024 at  6:00 AM    [] penicillin g 4 MU/100 mL 0.9% sodium chloride IVPB Infuse 100 mL into a venous catheter Every 4 (Four) Hours for 43 doses. Indications: Bacteria in the Blood 4300 mL 0 Noon     Allergies   Allergen Reactions    Diclofenac Sodium Dizziness       Objective      Vital Signs  Temp:  [99.9 °F (37.7 °C)-100.6 °F (38.1 °C)] 100.2 °F (37.9 °C)  Heart Rate:  [] 99  Resp:  [17-24] 20  BP: (102-129)/(36-59) 104/41  Arterial Line BP: ()/(42-59) 121/55  FiO2 (%):  [64 %-74 %] 64 %    Intake & Output (last day)          0701   0700  0701  11/15 0700    I.V. (mL/kg) 2897.1 (18.8)     Other 110     NG/ 160    IV Piggyback 607.2     Total Intake(mL/kg) 4197.3 (27.3) 160 (1)    Urine (mL/kg/hr) 5250 (1.4) 775 (0.6)    Emesis/NG output 200 50    Stool 0     Total Output 5450 825    Net -1252.7 -665          Stool Unmeasured Occurrence 5 x     Emesis Unmeasured Occurrence 1 x             Physical Exam  Constitutional:       General: He is not in acute distress.     Appearance: He is ill-appearing.   HENT:      Mouth/Throat:      Mouth: Mucous membranes are moist.      Comments: Orally intubated  Eyes:       Pupils: Pupils are equal, round, and reactive to light.   Cardiovascular:      Rate and Rhythm: Normal rate.   Pulmonary:      Effort: Pulmonary effort is normal. No respiratory distress.      Comments: ETT, 65% FiO2 PEEP of 7.5  Abdominal:      Palpations: Abdomen is soft.   Musculoskeletal:         General: Swelling present.      Cervical back: Neck supple.      Right lower leg: Edema present.      Left lower leg: Edema present.   Skin:     General: Skin is warm and dry.      Capillary Refill: Capillary refill takes less than 2 seconds.   Neurological:      Mental Status: He is alert.      Comments: Sedated, nods appropriately   Psychiatric:         Mood and Affect: Mood normal.         Results Review:    I reviewed the patient's new clinical results.  I reviewed the patient's new imaging results and agree with the interpretation.  Discussed with Patient, nurse, surgeon    Imaging Results (Last 24 Hours)       Procedure Component Value Units Date/Time    XR Chest 1 View [[id number]] Collected: 11/14/24 0633     Updated: 11/14/24 0642    Narrative:      XR CHEST 1 VW-     Clinical: Postop     COMPARISON examination 11/13/2024     FINDINGS: Patient is rotated towards the left as before. Endotracheal  tube and feeding tube in position as before. Transvenous pacemaker in  place. Bibasilar consolidation as before. Bilateral diffuse interstitial  infiltrate also seen, unchanged. There is cardiac enlargement. The  mediastinum is stable.     CONCLUSION: Stable imaging of the chest     This report was finalized on 11/14/2024 6:39 AM by Dr. Adal Kimball M.D on Workstation: [id number]               Lab Results:  Lab Results (last 24 hours)       Procedure Component Value Units Date/Time    Magnesium [[id number]] Collected: 11/14/24 1340    Specimen: Blood Updated: 11/14/24 1459    Basic Metabolic Panel [[id number]]  (Abnormal) Collected: 11/14/24 1340    Specimen: Blood Updated: 11/14/24 1419     Glucose 168 mg/dL      BUN  58 mg/dL      Creatinine 1.56 mg/dL      Sodium 148 mmol/L      Potassium 3.3 mmol/L      Chloride 109 mmol/L      CO2 22.6 mmol/L      Calcium 7.9 mg/dL      BUN/Creatinine Ratio 37.2     Anion Gap 16.4 mmol/L      eGFR 46.3 mL/min/1.73     Narrative:      GFR Normal >60  Chronic Kidney Disease <60  Kidney Failure <15    The GFR formula is only valid for adults with stable renal function between ages 18 and 70.    Blood Gas, Arterial - [[id number]]  (Abnormal) Collected: 11/14/24 1244    Specimen: Arterial Blood Updated: 11/14/24 1247     Site Arterial Line     Kirk's Test N/A     pH, Arterial 7.456 pH units      pCO2, Arterial 37.1 mm Hg      pO2, Arterial 82.1 mm Hg      HCO3, Arterial 26.1 mmol/L      Base Excess, Arterial 2.2 mmol/L      Comment: Serial Number: [id number]:  [id number]        O2 Saturation, Arterial 96.6 %      A-a DO2 0.3 mmHg      CO2 Content 27.3 mmol/L      Barometric Pressure for Blood Gas 745.6000 mmHg      Modality Adult Vent     FIO2 50 %      Ventilator Mode AC     Set Tidal Volume 550     Set Mech Resp Rate 20     Rate 20 Breaths/minute      PEEP 8     Hemodilution No     PO2/FIO2 164    POC Glucose Once [[id number]]  (Abnormal) Collected: 11/14/24 1140    Specimen: Blood Updated: 11/14/24 1141     Glucose 168 mg/dL     Blood Culture ID, PCR - Blood, Arm, Left [[id number]]  (Abnormal) Collected: 11/12/24 0815    Specimen: Blood from Arm, Left Updated: 11/14/24 1130     BCID, PCR Staph spp, not aureus or lugdunensis. Identification by BCID2 PCR.     BOTTLE TYPE Aerobic Bottle    Procalcitonin [[id number]]  (Abnormal) Collected: 11/14/24 0351    Specimen: Blood Updated: 11/14/24 1036     Procalcitonin 0.68 ng/mL     Narrative:      As a Marker for Sepsis (Non-Neonates):    1. <0.5 ng/mL represents a low risk of severe sepsis and/or septic shock.  2. >2 ng/mL represents a high risk of severe sepsis and/or septic shock.    As a Marker for Lower Respiratory Tract Infections that require  "antibiotic therapy:    PCT on Admission    Antibiotic Therapy       6-12 Hrs later    >0.5                Strongly Recommended  >0.25 - <0.5        Recommended   0.1 - 0.25          Discouraged              Remeasure/reassess PCT  <0.1                Strongly Discouraged     Remeasure/reassess PCT    As 28 day mortality risk marker: \"Change in Procalcitonin Result\" (>80% or <=80%) if Day 0 (or Day 1) and Day 4 values are available. Refer to http://www.[facility]-pct-calculator.com    Change in PCT <=80%  A decrease of PCT levels below or equal to 80% defines a positive change in PCT test result representing a higher risk for 28-day all-cause mortality of patients diagnosed with severe sepsis for septic shock.    Change in PCT >80%  A decrease of PCT levels of more than 80% defines a negative change in PCT result representing a lower risk for 28-day all-cause mortality of patients diagnosed with severe sepsis or septic shock.       Respiratory Culture - Aspirate, Bronchus [[id number]] Collected: 11/13/24 1539    Specimen: Aspirate from Bronchus Updated: 11/14/24 1014     Respiratory Culture No growth    Respiratory Culture - Aspirate, ET Suction [[id number]]  (Abnormal) Collected: 11/12/24 0924    Specimen: Aspirate from ET Suction Updated: 11/14/24 0952     Respiratory Culture Light growth (2+) Candida albicans      Scant growth (1+) Normal respiratory titi. No S. aureus or Pseudomonas aeruginosa detected. Final report.     Gram Stain Moderate (3+) WBCs per low power field      No epithelial cells seen      Rare (1+) Yeast    Potassium [[id number]]  (Abnormal) Collected: 11/14/24 0847    Specimen: Blood Updated: 11/14/24 0923     Potassium 2.7 mmol/L     Blood Culture - Blood, Arm, Left [[id number]]  (Abnormal) Collected: 11/12/24 0815    Specimen: Blood from Arm, Left Updated: 11/14/24 0920     Blood Culture Abnormal Stain     Gram Stain Aerobic Bottle Gram positive cocci in clusters      Anaerobic Bottle Gram positive " cocci in clusters    Vancomycin, Random [[id number]]  (Normal) Collected: 11/14/24 0847    Specimen: Blood Updated: 11/14/24 0920     Vancomycin Random 28.30 mcg/mL     Narrative:      Therapeutic Ranges for Vancomycin    Vancomycin Random   5.0-40.0 mcg/mL  Vancomycin Trough   5.0-20.0 mcg/mL  Vancomycin Peak     20.0-40.0 mcg/mL    Potassium [[id number]]  (Abnormal) Collected: 11/14/24 0850    Specimen: Blood Updated: 11/14/24 0916     Potassium 2.7 mmol/L     Blood Culture - Blood, Arm, Right [[id number]]  (Normal) Collected: 11/12/24 0815    Specimen: Blood from Arm, Right Updated: 11/14/24 0830     Blood Culture No growth at 2 days    Genital Culture - Swab, Penis [[id number]] Collected: 11/13/24 1158    Specimen: Swab from Penis Updated: 11/14/24 0818     Genital Culture Culture in progress     Gram Stain Many (4+) WBCs seen      Occasional Yeast    Comprehensive Metabolic Panel [[id number]]  (Abnormal) Collected: 11/14/24 0351    Specimen: Blood Updated: 11/14/24 0438     Glucose 146 mg/dL      BUN 56 mg/dL      Creatinine 1.37 mg/dL      Sodium 148 mmol/L      Potassium 3.2 mmol/L      Comment: Slight hemolysis detected by analyzer. Result may be falsely elevated.        Chloride 109 mmol/L      CO2 22.0 mmol/L      Calcium 8.0 mg/dL      Total Protein 6.2 g/dL      Albumin 2.7 g/dL      ALT (SGPT) 20 U/L      AST (SGOT) 31 U/L      Alkaline Phosphatase 110 U/L      Total Bilirubin 0.8 mg/dL      Globulin 3.5 gm/dL      A/G Ratio 0.8 g/dL      BUN/Creatinine Ratio 40.9     Anion Gap 17.0 mmol/L      eGFR 54.1 mL/min/1.73     Narrative:      GFR Normal >60  Chronic Kidney Disease <60  Kidney Failure <15    The GFR formula is only valid for adults with stable renal function between ages 18 and 70.    Digoxin Level [[id number]]  (Abnormal) Collected: 11/14/24 0351    Specimen: Blood Updated: 11/14/24 0430     Digoxin 1.50 ng/mL     Phosphorus [[id number]]  (Normal) Collected: 11/14/24 0351    Specimen: Blood Updated:  11/14/24 0430     Phosphorus 3.6 mg/dL     CBC (No Diff) [[id number]]  (Abnormal) Collected: 11/14/24 0351    Specimen: Blood Updated: 11/14/24 0407     WBC 19.03 10*3/mm3      RBC 3.02 10*6/mm3      Hemoglobin 8.8 g/dL      Hematocrit 27.9 %      MCV 92.4 fL      MCH 29.1 pg      MCHC 31.5 g/dL      RDW 17.0 %      RDW-SD 57.3 fl      MPV 9.7 fL      Platelets 248 10*3/mm3     POC Glucose Once [[id number]]  (Abnormal) Collected: 11/14/24 0113    Specimen: Blood Updated: 11/14/24 0114     Glucose 158 mg/dL     Magnesium [[id number]]  (Normal) Collected: 11/13/24 2307    Specimen: Blood Updated: 11/13/24 2345     Magnesium 2.3 mg/dL     Potassium [[id number]]  (Abnormal) Collected: 11/13/24 2307    Specimen: Blood Updated: 11/13/24 2345     Potassium 3.2 mmol/L     Phosphorus [[id number]]  (Normal) Collected: 11/13/24 2307    Specimen: Blood Updated: 11/13/24 2345     Phosphorus 3.4 mg/dL     POC Glucose Once [[id number]]  (Abnormal) Collected: 11/13/24 1748    Specimen: Blood Updated: 11/13/24 1749     Glucose 150 mg/dL     Potassium [[id number]]  (Normal) Collected: 11/13/24 1542    Specimen: Blood Updated: 11/13/24 1608     Potassium 3.6 mmol/L                 Assessment & Plan       Prosthetic aortic valve stenosis    Essential hypertension    Permanent atrial fibrillation    S/P AVR    ICD (implantable cardioverter-defibrillator), dual, in situ    Achilles tendon rupture    Bacteremia    Stenosis of prosthetic aortic valve    Anemia      Assessment & Plan  CT chest, abdomen pelvis performed yesterday reviewed which demonstrates dense bilateral lower lobe consolidation.  Small pleural effusions bilaterally.  Mild to moderate stool throughout the colon with no evidence for acute intra-abdominal process.  S/p bronchoscopy yesterday with noted diffuse secretions throughout the bilateral lower worse in the left lower lobe.  BAL sent for culture.     He remains critically ill requiring multiple vasoactive drips.  Still  requiring significant ventilator support. Ideally will require weaning vent settings prior to pursuing tracheostomy placement. Will defer vent weaning to pulmonology. Antibiotics per ID. Will continue to follow. Discussed with Razia Devlin & Liss.  Also discussed extensively with his daughter Magaly via phone.    I discussed the patients findings and our recommendations with patient and nursing staff    Thank you for this consult and allowing us to participate in the care of your patient.  We will follow along with you during this hospitalization.     JAVI Reynoso  [facility]  11/14/24  15:00 EST    I have spent greater than 75 minutes reviewing the patient's chart including medical history, notes, radiographic images, labs, and performing assessment and development of a plan and discussion with the patient/family at bedside.

## 2024-11-14 NOTE — PLAN OF CARE
Goal Outcome Evaluation:  Plan of Care Reviewed With: patient, family        Progress: no change  Outcome Evaluation: Still continues on multiple inotropes and pressors for hemodynamic support. Following commands and nods appropriately. FiO2 on vent weaned to 40%; PEEP remains at 7.5. TF resumed at low rate this afternoon per CTS - monitor patient's tolerance. Q2H turns. Replacing K+ - frequent labs. Will continue with current plan of care & adjust as needed. Family updated at bedside.

## 2024-11-14 NOTE — PROGRESS NOTES
[facility] Progress Note      Patient Name: Woodrow Alejandro  : 1950  MRN: [id number]  Primary Care Physician:  Juan Perez MD  Date of admission: 10/23/2024    Subjective     Interval History:   Acute kidney injury.  Urine output 5.2 L.  4 L in.  Large residual on tube feeds so now currently off.  Sodium up to 148.  3 large bowel movements yesterday.  Off sedation.  Eyes open and does nod to some questions.  Squeezes my hand. ON Epi, Levophed, Amiodarone, vasopressin, milrinone.   Review of Systems:   As noted above    Objective     Vitals:   Temp:  [99.9 °F (37.7 °C)-101.8 °F (38.8 °C)] 100 °F (37.8 °C)  Heart Rate:  [] 91  Resp:  [24-25] 24  BP: ()/(38-59) 110/43  Arterial Line BP: ()/(42-57) 126/53  FiO2 (%):  [64 %-98 %] 64 %    Intake/Output Summary (Last 24 hours) at 2024 0856  Last data filed at 2024 0830  Gross per 24 hour   Intake 4197.32 ml   Output 5300 ml   Net -1102.68 ml       Physical Exam:    General Appearance: Eyes open.  On the ventilator.  Tracks and nods to   Skin: warm and dry  HEENT: oral ET tube.  Nonicteric sclera  Neck: supple, no JVD  Lungs: Upper airway rhonchi.  On the ventilator.  Heart: Irregularly irregular.  Abdomen: soft, nontender, distended.  Body wall edema. +bs  : Ugarte catheter  Extremities: 2+ upper and lower extremity edema but wrinkling of skin  Neuro: Sedated on the ventilator but does follow some commands.    Scheduled Meds:     acetylcysteine, 3 mL, Nebulization, TID - RT  aspirin, 81 mg, Per G Tube, Daily  atorvastatin, 40 mg, Per G Tube, Nightly  cefepime, 2,000 mg, Intravenous, Q8H  chlorhexidine, 15 mL, Mouth/Throat, Q12H  enoxaparin, 40 mg, Subcutaneous, Q12H  Ergocalciferol, 100 mcg, Per G Tube, Daily  hydrocortisone-bacitracin-zinc oxide-nystatin, 1 Application, Topical, Q12H  insulin glargine, 20 Units, Subcutaneous, Daily  insulin regular, 2-7 Units, Subcutaneous,  Q6H  ipratropium-albuterol, 3 mL, Nebulization, Q4H - RT  lansoprazole, 15 mg, Per G Tube, Q AM  miconazole, 1 Application, Topical, Q12H  midodrine, 15 mg, Oral, TID AC  sildenafil, 20 mg, Per G Tube, TID  sodium chloride, 10 mL, Intravenous, Q12H  vancomycin, 750 mg, Intravenous, Q12H      IV Meds:   amiodarone, 1 mg/min, Last Rate: 1 mg/min (11/14/24 0259)  dexmedetomidine, 0.2-1.5 mcg/kg/hr, Last Rate: 0.5 mcg/kg/hr (11/14/24 0809)  dextrose, 50 mL/hr  DOPamine, 2-20 mcg/kg/min  EPINEPHrine, 0.01 mcg/kg/min, Last Rate: 0.01 mcg/kg/min (11/13/24 0900)  lidocaine in D5W, 1 mg/min, Last Rate: Stopped (11/05/24 1135)  milrinone, 0.125 mcg/kg/min, Last Rate: 0.125 mcg/kg/min (11/13/24 1945)  niCARdipine, 5-15 mg/hr  norepinephrine, 0.02-0.2 mcg/kg/min, Last Rate: 0.04 mcg/kg/min (11/13/24 1800)  Pharmacy to dose vancomycin,   phenylephrine, 0.2-2 mcg/kg/min  propofol, 5-50 mcg/kg/min, Last Rate: 5 mcg/kg/min (11/14/24 0128)  vasopressin, 0.02-0.1 Units/min, Last Rate: 0.05 Units/min (11/14/24 0653)        Results Reviewed:   I have personally reviewed the results from the time of this admission to 11/14/2024 08:56 EST     Results from last 7 days   Lab Units 11/14/24  0351 11/13/24  2307 11/13/24  1542 11/13/24  0827 11/13/24  0414 11/12/24  1128 11/12/24  0815 11/12/24  0509 11/12/24  0342 11/10/24  0719 11/10/24  0346   SODIUM mmol/L 148*  --   --   --  143  --  144   < > 146*   < > 143   POTASSIUM mmol/L 3.2* 3.2* 3.6   < > 3.9   < > 4.0  4.0   < > 3.3*   < > 3.4*   CHLORIDE mmol/L 109*  --   --   --  107  --  108*   < > 117*   < > 109*   CO2 mmol/L 22.0  --   --   --  23.5  --  23.0   < > 17.6*   < > 20.1*   BUN mg/dL 56*  --   --   --  47*  --  47*   < > 37*   < > 41*   CREATININE mg/dL 1.37*  --   --   --  1.32*  --  1.10   < > 0.90   < > 1.08   CALCIUM mg/dL 8.0*  --   --   --  8.1*  --  8.7   < > 6.4*   < > 7.7*   BILIRUBIN mg/dL 0.8  --   --   --   --   --   --   --  0.7  --  0.9   ALK PHOS U/L 110  --   --    --   --   --   --   --  92  --  109   ALT (SGPT) U/L 20  --   --   --   --   --   --   --  7  --  10   AST (SGOT) U/L 31  --   --   --   --   --   --   --  18  --  26   GLUCOSE mg/dL 146*  --   --   --  180*  --  186*   < > 142*   < > 180*    < > = values in this interval not displayed.       Estimated Creatinine Clearance: 70.3 mL/min (A) (by C-G formula based on SCr of 1.37 mg/dL (H)).    Results from last 7 days   Lab Units 11/14/24  0351 11/13/24  2307 11/13/24 0414 11/12/24  0509   MAGNESIUM mg/dL  --  2.3 2.0 2.0   PHOSPHORUS mg/dL 3.6 3.4 2.9 2.6       Results from last 7 days   Lab Units 11/13/24  0414 11/12/24  0342 11/11/24  0345 11/10/24  0346 11/09/24  0320 11/08/24  0343   URIC ACID mg/dL 7.3* 5.5 7.1* 5.9 5.2 5.0       Results from last 7 days   Lab Units 11/14/24  0351 11/13/24  0827 11/12/24  0342 11/11/24  0345 11/10/24  0346   WBC 10*3/mm3 19.03* 18.78* 16.14* 15.44* 13.41*   HEMOGLOBIN g/dL 8.8* 9.1* 9.0* 9.1* 8.5*   PLATELETS 10*3/mm3 248 269 270 260 226             Assessment / Plan     ASSESSMENT:  Acute kidney injury, nonoliguric.  Remains on Bumex drip.  Volume status improving.  Stop the Bumex drip for now.  Not getting free water because the core track tube feeds are off.  Start some D5W.  Replace potassium IV as he is not absorbing his tube feeds.  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  Status post redo sternotomy AV root replacement, mitral valve repair, AICD removal intra-aortic balloon placement 10/31/2024.  Sternal closure 11/ 2.  3.  Fever with bioprosthetic aortic valve endocarditis, bacteremia with strep mitis on vancomycin and cefepime.  Remains febrile.  Dr. Diaz following.  Status post bronchoscopy yesterday.  4.  Shock , remains pressor dependent.  5.  Anemia status post EGD 9/30/2024  And colonoscopy with esophagitis and polyp removal.  6.  Acute respiratory failure postoperatively on the ventilator.  Will need tracheostomy.    Thee's note reviewed.  7.  Atrial fibrillation.  8.  Moderate to severe mitral regurgitation.  Severe RV enlargement and dysfunction postoperatively.  PLAN:  Stop the Bumex drip for now.  IV D5W for free water  Replace potassium IV per protocol.  4.  Recheck basic metabolic panel this afternoon.  Thank you for involving us in the care of Woodrow Alejandro.  Please feel free to call with any questions.    Gilma Balbuena MD  11/14/24  08:56 [facility]    [facility]  [phone]    Please note that portions of this note were completed with a voice recognition program.

## 2024-11-14 NOTE — PLAN OF CARE
Goal Outcome Evaluation:  Plan of Care Reviewed With: patient, family        Progress: no change  Outcome Evaluation: Continues on multiple inotropes and pressors for support, sedation weaned off per CTS order. Follows commands, pain treated per MAR. Remains in afib 90-110s, dig IV given x1 per cards order. Maintaining MAP >65. CI 3.1 this AM. Continues on vent, PEEP increased from 5 to 7.5 today, on 65% FiO2. Bronch at bedside, specimen sent. Bumex gtt decreased to 0.5, 2.9L UOP. Thus far blood cultures have shown no growth, CT abd/chest today. Febrile this AM, tylenol given per MAR. Thoracic surg evaluated for trach, TBD. Vomited this evening, treated per MAR. Large residual from TF, OG placed to LWS, TF on hold until AM per CTS order. 3 large loose BM today, active BS. Daughter updated on POC at bedside, care ongoing.

## 2024-11-14 NOTE — PROGRESS NOTES
LOS: 22 days   Patient Care Team:  Juan Perez MD as PCP - General (Internal Medicine)    Chief Complaint: Follow-up bioprosthetic AVR endocarditis, mitral regurgitation, persistent atrial fibrillation.    Interval History: He is off sedation and does wake up with stimulation.  He evidently had 3 bowel movements yesterday.  He underwent a bronchoscopy yesterday as well.  Remains on multiple pressors and critically ill.    Vital Signs:  Temp:  [99.9 °F (37.7 °C)-100.6 °F (38.1 °C)] 100 °F (37.8 °C)  Heart Rate:  [] 100  Resp:  [17-24] 17  BP: ()/(36-59) 104/41  Arterial Line BP: ()/(42-59) 121/55  FiO2 (%):  [64 %-74 %] 64 %    Intake/Output Summary (Last 24 hours) at 11/14/2024 1409  Last data filed at 11/14/2024 1216  Gross per 24 hour   Intake 2832 ml   Output 4150 ml   Net -1318 ml       Physical Exam:   General Appearance:    Intubated.  Appears critically ill.   Lungs:     Rhonchi bilaterally anteriorly.    Heart:    Irregularly irregular rhythm with a tachycardic rate. II/VI SM throughout.   Abdomen:     Soft, nontender, nondistended.    Extremities:    3+ generalized edema and anasarca.     Results Review:    Results from last 7 days   Lab Units 11/14/24  0850 11/14/24  0847 11/14/24  0351   SODIUM mmol/L  --   --  148*   POTASSIUM mmol/L 2.7*   < > 3.2*   CHLORIDE mmol/L  --   --  109*   CO2 mmol/L  --   --  22.0   BUN mg/dL  --   --  56*   CREATININE mg/dL  --   --  1.37*   GLUCOSE mg/dL  --   --  146*   CALCIUM mg/dL  --   --  8.0*    < > = values in this interval not displayed.         Results from last 7 days   Lab Units 11/14/24  0351   WBC 10*3/mm3 19.03*   HEMOGLOBIN g/dL 8.8*   HEMATOCRIT % 27.9*   PLATELETS 10*3/mm3 248                 Results from last 7 days   Lab Units 11/13/24  2307   MAGNESIUM mg/dL 2.3     Results from last 7 days   Lab Units 11/12/24  1128   TRIGLYCERIDES mg/dL 261*         I reviewed the patient's new clinical results.        Assessment:  1.   Status post bioprosthetic aortic valve replacement in 2014  2.  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  Moderate to severe mitral regurgitation  4.  Status post reoperative AV root replacement, mitral valve repair, ICD removal, SVG-RCA, and IABP placement on 10/30/2021  5.  Postoperative shock, multifactorial  6.  Acute kidney injury  7.  History of nonischemic cardiomyopathy with recovered ejection fraction  8.  Anemia, acute on chronic  9.  Postoperative thrombocytopenia  10.  Persistent atrial fibrillation  11.  Ventricular tachycardia postoperatively  12.  Grade C esophagitis by EGD on 9/30/2024  13.  Acute left lower extremity DVT on 10/24/2024.  Resolved on Dopplers on 11/8/2024  14.  Right Achilles tendon tear  15.  Postoperative junctional rhythm  16.  Hypoalbuminemia  17.  Thrombocytopenia  18.  Severe right ventricular enlargement and dysfunction post-op  19.  Fever noted on 11/8/2024  20.  Subacute DVT in the left axillary vein by Doppler on 11/10/2024.    Plan:  -Infectious disease following.  Bronchoscopy with left lower lobe BAL performed yesterday.  He had dense mucus secretions.    -Remains grossly volume overloaded, but some improvement over the last several days.  Bumex drip stopped per nephrology because of hyponatremia.  He is going to get IV D5W.    -Currently on Revatio 20 mg every 8 hours.    -Continue IV amiodarone at 1 mg/min for rate control.  Remains in atrial fibrillation.  Hold on further digoxin for now.  Digoxin level 1.5.    -Holding off on heparin for now per cardiovascular surgery.  The DVT in his left lower extremity was not present on the Dopplers on 11/8/2024.  He does have a likely subacute DVT in the left axillary vein by Doppler.  He has had intermittent thrombocytopenia postoperatively as well.    -Remains on pressor support with Levophed, vasopressin,  epinephrine, and milrinone.    -He is likely going to need a trach and PEG.  Thoracic surgery consulted for tracheostomy.    -He remains critically ill.      Antwan Farmer MD  11/14/24  14:09 EST

## 2024-11-15 ENCOUNTER — ANESTHESIA (OUTPATIENT)
Dept: PERIOP | Facility: HOSPITAL | Age: 74
End: 2024-11-15
Payer: MEDICARE

## 2024-11-15 ENCOUNTER — ANESTHESIA EVENT (OUTPATIENT)
Dept: PERIOP | Facility: HOSPITAL | Age: 74
End: 2024-11-15
Payer: MEDICARE

## 2024-11-15 NOTE — PROGRESS NOTES
Vent requirements improved, 40% FiO2.  PEEP of 7.  Patient evaluated with Dr. Haylie Leija.  Patient will benefit from tracheostomy placement and vent requirements improved to facilitate tracheostomy placement. The procedure was explained to the patient's daughter who is his primary decision maker via the phone.  The procedure was discussed in detail including indication, benefits, risks, and alternatives. All questions were answered to her satisfaction and she is agreeable to proceed.  Procedure tentatively scheduled for this evening.    JAVI Reynoso  Thoracic Surgery

## 2024-11-15 NOTE — CASE MANAGEMENT/SOCIAL WORK
Continued Stay Note  [facility]     Patient Name: Woodrow Alejandro  MRN: [id number]  Today's Date: 11/15/2024    Admit Date: 10/23/2024    Plan: Undetermined; Discussions being had regarding track & PEG placement.   Discharge Plan       Row Name 11/15/24 8329       Plan    Plan Undetermined; Discussions being had regarding track & PEG placement.    Plan Comments Patient is now POD 16.  Thoracic surgery following for potential tracheostomy.  He also will likely need a PEG tube placement.  Per MD note, he remains critically ill.  [facility] continues to follow patient progress.  CCP will continue to follow..........Rosanna CARCAMO/AYE                   Discharge Codes    No documentation.                       Rosanna Aldrich RN

## 2024-11-15 NOTE — PROGRESS NOTES
" LOS: 23 days   Patient Care Team:  Juan Perez MD as PCP - General (Internal Medicine)    Chief Complaint: post op follow-up    Subjective  Intubated/sedated    Drips:  epi 0.01, vaso 0.03, milrinone 0.125, propofol, precedex, insulin    Vital Signs  Temp:  [98.2 °F (36.8 °C)-100.2 °F (37.9 °C)] 98.2 °F (36.8 °C)  Heart Rate:  [] 80  Resp:  [17-24] 20  BP: ()/(36-75) 111/57  Arterial Line BP: ()/(45-64) 117/53  FiO2 (%):  [40 %-98 %] 40 %  Body mass index is 45.3 kg/m².    Intake/Output Summary (Last 24 hours) at 11/15/2024 0733  Last data filed at 11/15/2024 0648  Gross per 24 hour   Intake 4871 ml   Output 2560 ml   Net 2311 ml     No intake/output data recorded.          11/12/24  0513 11/14/24  0600 11/15/24  0400   Weight: (!) 151 kg (332 lb 10.8 oz) (!) 154 kg (338 lb 13.6 oz) (!) 143 kg (315 lb 11.2 oz)         Objective:  General Appearance:  Ill-appearing (intubated/sedated).    Vital signs: (most recent): Blood pressure 127/59, pulse 85, temperature 98.2 °F (36.8 °C), resp. rate 20, height 177.8 cm (70\"), weight (!) 143 kg (315 lb 11.2 oz), SpO2 95%.  Fever.  (Tmax 101.1).    Output: Producing urine (+mayorga) and producing stool.    Lungs:  There are rales and rhonchi.  (40% FiO2)  Heart: Tachycardia.  Irregular rhythm.  (Tele: Afib)  Extremities: There is dependent edema.    Skin:  Warm and dry.  (Dressings c/d/I  Hematoma left groin)            Results Review:        WBC WBC   Date Value Ref Range Status   11/15/2024 15.95 (H) 3.40 - 10.80 10*3/mm3 Final   11/14/2024 19.03 (H) 3.40 - 10.80 10*3/mm3 Final   11/13/2024 18.78 (H) 3.40 - 10.80 10*3/mm3 Final      HGB Hemoglobin   Date Value Ref Range Status   11/15/2024 8.4 (L) 13.0 - 17.7 g/dL Final   11/14/2024 8.8 (L) 13.0 - 17.7 g/dL Final   11/13/2024 9.1 (L) 13.0 - 17.7 g/dL Final      HCT Hematocrit   Date Value Ref Range Status   11/15/2024 26.7 (L) 37.5 - 51.0 % Final   11/14/2024 27.9 (L) 37.5 - 51.0 % Final " "  11/13/2024 28.8 (L) 37.5 - 51.0 % Final      Platelets Platelets   Date Value Ref Range Status   11/15/2024 203 140 - 450 10*3/mm3 Final   11/14/2024 248 140 - 450 10*3/mm3 Final   11/13/2024 269 140 - 450 10*3/mm3 Final        PT/INR:    No results found for: \"PROTIME\"  /  No results found for: \"INR\"      Sodium Sodium   Date Value Ref Range Status   11/15/2024 147 (H) 136 - 145 mmol/L Final   11/14/2024 148 (H) 136 - 145 mmol/L Final   11/14/2024 148 (H) 136 - 145 mmol/L Final   11/13/2024 143 136 - 145 mmol/L Final   11/12/2024 144 136 - 145 mmol/L Final      Potassium Potassium   Date Value Ref Range Status   11/15/2024 3.6 3.5 - 5.2 mmol/L Final   11/15/2024 3.6 3.5 - 5.2 mmol/L Final   11/15/2024 3.3 (L) 3.5 - 5.2 mmol/L Final   11/14/2024 3.5 3.5 - 5.2 mmol/L Final   11/14/2024 3.3 (L) 3.5 - 5.2 mmol/L Final   11/14/2024 2.7 (L) 3.5 - 5.2 mmol/L Final   11/14/2024 2.7 (L) 3.5 - 5.2 mmol/L Final   11/14/2024 3.2 (L) 3.5 - 5.2 mmol/L Final     Comment:     Slight hemolysis detected by analyzer. Result may be falsely elevated.   11/13/2024 3.2 (L) 3.5 - 5.2 mmol/L Final   11/13/2024 3.6 3.5 - 5.2 mmol/L Final   11/13/2024 3.8 3.5 - 5.2 mmol/L Final   11/13/2024 3.9 3.5 - 5.2 mmol/L Final   11/13/2024 3.9 3.5 - 5.2 mmol/L Final   11/13/2024 3.7 3.5 - 5.2 mmol/L Final   11/12/2024 3.6 3.5 - 5.2 mmol/L Final   11/12/2024 3.9 3.5 - 5.2 mmol/L Final   11/12/2024 4.0 3.5 - 5.2 mmol/L Final   11/12/2024 4.0 3.5 - 5.2 mmol/L Final   11/12/2024 4.0 3.5 - 5.2 mmol/L Final      Chloride Chloride   Date Value Ref Range Status   11/15/2024 109 (H) 98 - 107 mmol/L Final   11/14/2024 109 (H) 98 - 107 mmol/L Final   11/14/2024 109 (H) 98 - 107 mmol/L Final   11/13/2024 107 98 - 107 mmol/L Final   11/12/2024 108 (H) 98 - 107 mmol/L Final      Bicarbonate CO2   Date Value Ref Range Status   11/15/2024 21.0 (L) 22.0 - 29.0 mmol/L Final   11/14/2024 22.6 22.0 - 29.0 mmol/L Final   11/14/2024 22.0 22.0 - 29.0 mmol/L Final "   11/13/2024 23.5 22.0 - 29.0 mmol/L Final   11/12/2024 23.0 22.0 - 29.0 mmol/L Final      BUN BUN   Date Value Ref Range Status   11/15/2024 55 (H) 8 - 23 mg/dL Final   11/14/2024 58 (H) 8 - 23 mg/dL Final   11/14/2024 56 (H) 8 - 23 mg/dL Final   11/13/2024 47 (H) 8 - 23 mg/dL Final   11/12/2024 47 (H) 8 - 23 mg/dL Final      Creatinine Creatinine   Date Value Ref Range Status   11/15/2024 1.55 (H) 0.76 - 1.27 mg/dL Final   11/14/2024 1.56 (H) 0.76 - 1.27 mg/dL Final   11/14/2024 1.37 (H) 0.76 - 1.27 mg/dL Final   11/13/2024 1.32 (H) 0.76 - 1.27 mg/dL Final   11/12/2024 1.10 0.76 - 1.27 mg/dL Final      Calcium Calcium   Date Value Ref Range Status   11/15/2024 7.8 (L) 8.6 - 10.5 mg/dL Final   11/14/2024 7.9 (L) 8.6 - 10.5 mg/dL Final   11/14/2024 8.0 (L) 8.6 - 10.5 mg/dL Final   11/13/2024 8.1 (L) 8.6 - 10.5 mg/dL Final   11/12/2024 8.7 8.6 - 10.5 mg/dL Final      Magnesium Magnesium   Date Value Ref Range Status   11/15/2024 2.0 1.6 - 2.4 mg/dL Final   11/14/2024 2.0 1.6 - 2.4 mg/dL Final   11/13/2024 2.3 1.6 - 2.4 mg/dL Final   11/13/2024 2.0 1.6 - 2.4 mg/dL Final          acetylcysteine, 3 mL, Nebulization, TID - RT  aspirin, 81 mg, Per G Tube, Daily  atorvastatin, 40 mg, Per G Tube, Nightly  bumetanide, 2 mg, Intravenous, Q8H  cefepime, 2,000 mg, Intravenous, Q8H  chlorhexidine, 15 mL, Mouth/Throat, Q12H  enoxaparin, 40 mg, Subcutaneous, Q12H  Ergocalciferol, 100 mcg, Per G Tube, Daily  hydrocortisone-bacitracin-zinc oxide-nystatin, 1 Application, Topical, Q12H  insulin glargine, 20 Units, Subcutaneous, Daily  insulin regular, 2-7 Units, Subcutaneous, Q6H  ipratropium-albuterol, 3 mL, Nebulization, Q4H - RT  lansoprazole, 15 mg, Per G Tube, Q AM  miconazole, 1 Application, Topical, Q12H  midodrine, 15 mg, Oral, TID AC  sildenafil, 20 mg, Per G Tube, TID  sodium chloride, 10 mL, Intravenous, Q12H  vancomycin, 750 mg, Intravenous, Q24H      amiodarone, 1 mg/min, Last Rate: 1 mg/min (11/15/24  2660)  dexmedetomidine, 0.2-1.5 mcg/kg/hr, Last Rate: 0.5 mcg/kg/hr (11/15/24 0248)  DOPamine, 2-20 mcg/kg/min  EPINEPHrine, 0.01 mcg/kg/min, Last Rate: 0.01 mcg/kg/min (11/14/24 3506)  lidocaine in D5W, 1 mg/min, Last Rate: Stopped (11/05/24 1135)  milrinone, 0.125 mcg/kg/min, Last Rate: 0.125 mcg/kg/min (11/14/24 9012)  niCARdipine, 5-15 mg/hr  norepinephrine, 0.02-0.2 mcg/kg/min, Last Rate: Stopped (11/15/24 0130)  Pharmacy to dose vancomycin,   phenylephrine, 0.2-2 mcg/kg/min  propofol, 5-50 mcg/kg/min, Last Rate: 5 mcg/kg/min (11/15/24 9447)  vasopressin, 0.02-0.1 Units/min, Last Rate: 0.05 Units/min (11/15/24 9755)              Prosthetic aortic valve stenosis    Essential hypertension    Permanent atrial fibrillation    S/P AVR    ICD (implantable cardioverter-defibrillator), dual, in situ    Achilles tendon rupture    Bacteremia    Stenosis of prosthetic aortic valve    Anemia      Assessment & Plan    - Prosthetic aortic valve stenosis, h/o AVR (tissue)/maze/DANICA ligation (2014)- s/p reoperative sternotomy AV root replacement 27mm cryopreserved homograft with right nuvia cabrol, AICD removal, IABP placement- [facility] 10/31/2024  -Sternal closure ---11/2  - Possible endocarditis, likely need JOLIE   - Bacteremia, blood cultures positive strep mitis---on penicillin G  - Atrial fibrillation, unable to tolerate anticoagulation  - Hypertension  - NICM status post [employer] AICD  - Anemia, s/p EGD/colonoscopy with esophagitis, polyp removal  - Right achilles tendon tear--- walking boot and PT per ortho at [facility]  - Pre-diabetes -- improved at 5.3  -post op anemia- expected acute blood loss  -TCP post-op      POD #16. Intubated. Sedation weaned, he is alert and calm.  Edema greatly improved. Fever curve and WBC improved this morning --ID following  FiO2 support has been able to be decreased on 40% this morning.  Pressor support has been able to be weaned some, off levophed and weaning vasopressin-- continue midodrine.  remains on inotropic support milrinone 0.125 and epi 0.01.  repeat echo pending.  Remains in atrial fibrillation. Rate controlled--  will discuss with Dr. Florian about anticoagulation.  Creatinine stable. Water flushes increased for hypernatremia. Renal managing diuretics.  Thoracic following for tach.    Continue supportive care.      Samantha Magana, JAVI  11/15/24  07:33 EST

## 2024-11-15 NOTE — PROGRESS NOTES
Nutrition Services    Patient Name:  Woodrow Alejandro  YOB: 1950  MRN: [id number]  Admit Date:  10/23/2024Assessment Date:  11/15/24    CLINICAL NUTRITION - PROGRESS NOTE       Reason for Encounter Follow-up         Nutrition Order NPO Diet NPO Type: Tube Feeding    Supplements/Snacks -    EN/PN Order Novasource renal @45ml/hr with FWF 100ml/hr and prosource BID          Pertinent Information Pt currently NPO for trach this evening. Had been tolerating TF at goal this morning. Remains intubated and sedated on precedex, off pressors    Labs and meds reviewed, BM 11/14    Intervention/Plan Resume TF at goal rate NR @45ml/hr post tracheostomy     RD to follow up per protocol.    Electronically signed by:  Gisela Bartlett RD  11/15/24 13:49 EST

## 2024-11-15 NOTE — PROGRESS NOTES
LOS: 23 days   Patient Care Team:  Juan Perez MD as PCP - General (Internal Medicine)    Chief Complaint: Follow-up bioprosthetic AVR endocarditis, mitral regurgitation, persistent atrial fibrillation.    Interval History: Remains intubated and sedated.  He is still on epinephrine, vasopressin, milrinone, and propofol.  Atrial fibrillation rate is controlled.    Vital Signs:  Temp:  [98.1 °F (36.7 °C)-100.2 °F (37.9 °C)] 98.2 °F (36.8 °C)  Heart Rate:  [79-97] 89  Resp:  [20-24] 20  BP: ()/(45-75) 114/59  Arterial Line BP: ()/(47-64) 107/52  FiO2 (%):  [40 %-98 %] 40 %    Intake/Output Summary (Last 24 hours) at 11/15/2024 1600  Last data filed at 11/15/2024 1529  Gross per 24 hour   Intake 4821 ml   Output 3015 ml   Net 1806 ml       Physical Exam:   General Appearance:    Intubated.  Appears critically ill.   Lungs:     Rhonchi bilaterally anteriorly.    Heart:    Irregularly irregular rhythm with a tachycardic rate. II/VI SM throughout.   Abdomen:     Soft, nontender, nondistended.    Extremities:    3+ generalized edema and anasarca.     Results Review:    Results from last 7 days   Lab Units 11/15/24  1244   SODIUM mmol/L 141   POTASSIUM mmol/L 3.7   CHLORIDE mmol/L 109*   CO2 mmol/L 19.9*   BUN mg/dL 63*   CREATININE mg/dL 1.63*   GLUCOSE mg/dL 174*   CALCIUM mg/dL 7.8*         Results from last 7 days   Lab Units 11/15/24  0306   WBC 10*3/mm3 15.95*   HEMOGLOBIN g/dL 8.4*   HEMATOCRIT % 26.7*   PLATELETS 10*3/mm3 203                 Results from last 7 days   Lab Units 11/15/24  0306   MAGNESIUM mg/dL 2.0     Results from last 7 days   Lab Units 11/12/24  1128   TRIGLYCERIDES mg/dL 261*         I reviewed the patient's new clinical results.        Assessment:  1.  Status post bioprosthetic aortic valve replacement in 2014  2.  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  Moderate to severe mitral regurgitation  4.  Status post reoperative AV root replacement, mitral valve repair, ICD removal, SVG-RCA, and IABP placement on 10/30/2021  5.  Postoperative shock, multifactorial  6.  Acute kidney injury  7.  History of nonischemic cardiomyopathy with recovered ejection fraction  8.  Anemia, acute on chronic  9.  Postoperative thrombocytopenia  10.  Persistent atrial fibrillation  11.  Ventricular tachycardia postoperatively  12.  Grade C esophagitis by EGD on 9/30/2024  13.  Acute left lower extremity DVT on 10/24/2024.  Resolved on Dopplers on 11/8/2024  14.  Right Achilles tendon tear  15.  Postoperative junctional rhythm  16.  Hypoalbuminemia  17.  Thrombocytopenia  18.  Severe right ventricular enlargement and dysfunction post-op  19.  Fever noted on 11/8/2024  20.  Subacute DVT in the left axillary vein by Doppler on 11/10/2024.    Plan:  -Infectious disease following.  Coag negative staph on culture, although suspected contaminant.    -Remains grossly volume overloaded.  He was on a Bumex drip for several days, although this is currently being held.  Free water pushes for recent hypernatremia.    -Currently on Revatio 20 mg every 8 hours.    -Continue IV amiodarone at 1 mg/min for rate control.  Remains in atrial fibrillation.  Hold on further digoxin for now as rate is controlled.  Digoxin level was 1 today.  We can likely give him more tomorrow if needed.    -Holding off on heparin for now per cardiovascular surgery.  The DVT in his left lower extremity was not present on the Dopplers on 11/8/2024.  He does have a likely subacute DVT in the left axillary vein by Doppler.  He has had intermittent thrombocytopenia postoperatively as well.    -Remains on pressor support with vasopressin, epinephrine, and milrinone.    -Thoracic surgery following for potential tracheostomy.  He also will likely need a PEG tube placement.    -He remains critically ill.      Antwan HICKS  MD Marlen[place]  11/15/24  16:00 EST

## 2024-11-15 NOTE — PROGRESS NOTES
LOS: 23 days     Chief Complaint: Endocarditis    Interval History: Improvement today with decreasing fever curve and leukocytosis.  Tolerating antibiotics.  Off one of his pressors.  Decrease in his FiO2 requirement.    Vital Signs  Temp:  [98.2 °F (36.8 °C)-100.2 °F (37.9 °C)] 98.2 °F (36.8 °C)  Heart Rate:  [] 80  Resp:  [17-24] 20  BP: ()/(36-75) 111/57  Arterial Line BP: ()/(45-64) 117/53  FiO2 (%):  [40 %-98 %] 40 %    Physical Exam:  General: Intubated  HEENT: ET tube in place.  NG tube in place.  Respiratory: Coarse bilateral breath sounds on the ventilator.  : Ugarte catheter  Skin: Operative dressing over the anterior chest clean and intact.  Skin breakdown in the inguinal area.  Access: Left IJ.  Arterial line.  Peripheral line.    Antibiotics:  Anti-Infectives (From admission, onward)      Ordered     Dose/Rate Route Frequency Start Stop    11/14/24 1045  vancomycin 750 mg in sodium chloride 0.9 % 250 mL IVPB-VTB        Ordering Provider: Gregg Diaz DO    750 mg  333.3 mL/hr over 45 Minutes Intravenous Every 24 Hours 11/15/24 1200 11/19/24 1159    11/13/24 0914  cefepime 2000 mg IVPB in 100 mL NS (MBP)        Ordering Provider: Gregg Diaz DO    2,000 mg  over 4 Hours Intravenous Every 8 Hours 11/13/24 1800 11/20/24 1759    11/13/24 0914  cefepime 2000 mg IVPB in 100 mL NS (MBP)        Ordering Provider: Gregg Diaz DO    2,000 mg  over 30 Minutes Intravenous Once 11/13/24 1000 11/13/24 1235    11/12/24 1133  vancomycin 3000 mg/500 mL 0.9% NS IVPB ([facility])        Ordering Provider: Gregg Diaz DO    20 mg/kg × 151 kg  over 180 Minutes Intravenous Once 11/12/24 1230 11/12/24 1614    11/12/24 1051  Pharmacy to dose vancomycin        Ordering Provider: Gregg Diaz DO     Not Applicable Continuous PRN 11/12/24 1051 11/19/24 1050    11/02/24 0918  vancomycin (VANCOCIN) 1,000 mg in sodium chloride 0.9 % 250 mL IVPB-VTB        Ordering  Provider: Antwan Farmer MD    1,000 mg  250 mL/hr over 60 Minutes Intravenous Every 24 Hours 11/02/24 1015 11/03/24 1138    11/02/24 0912  Pharmacy to dose vancomycin        Ordering Provider: Samantha Salvador APRN     Not Applicable Continuous PRN 11/02/24 0912 11/04/24 0911    10/30/24 2306  ceFAZolin 2000 mg IVPB in 100 mL NS (MBP)        Ordering Provider: Samantha Salvador APRN    2,000 mg  over 30 Minutes Intravenous Every 8 Hours 10/31/24 0000 11/01/24 0922    10/29/24 1518  ceFAZolin 2000 mg IVPB in 100 mL NS (MBP)        Ordering Provider: Bryant Mcclure PA-C    2,000 mg  over 30 Minutes Intravenous Once 10/30/24 0600 10/30/24 1936    10/28/24 1034  vancomycin IVPB 2000 mg in 0.9% Sodium Chloride 500 mL        Ordering Provider: Samantha Salvador APRN    15 mg/kg × 142 kg Intravenous Once 10/28/24 1045 10/28/24 1356             Results Review:     I reviewed the patient's new clinical results.    Lab Results   Component Value Date    WBC 15.95 (H) 11/15/2024    HGB 8.4 (L) 11/15/2024    HCT 26.7 (L) 11/15/2024    MCV 90.5 11/15/2024     11/15/2024     Lab Results   Component Value Date    GLUCOSE 172 (H) 11/15/2024    BUN 55 (H) 11/15/2024    CREATININE 1.55 (H) 11/15/2024    BCR 35.5 (H) 11/15/2024    CO2 21.0 (L) 11/15/2024    CALCIUM 7.8 (L) 11/15/2024    ALBUMIN 2.7 (L) 11/15/2024    LABIL2 1.4 06/27/2019    AST 19 11/15/2024    ALT 6 11/15/2024       Microbiology:  10/3 COVID-negative  10/10 COVID-negative  10/14 COVID-negative  10/15 blood cultures 2 out of 2 strep mitis  10/17 COVID-negative  10/17 blood cultures 2 out of 2 strep mitis  10/21 COVID-negative  10/23 blood cultures no growth today  10/30 operative cultures from the aortic valve no growth   11/9 respiratory culture no growth  11/9 catheter tip culture no growth to date  11/10 catheter tip culture no growth to date  11/12 blood cultures in process  11/12 respiratory culture in process    Imaging:  CT chest abdomen pelvis  from yesterday report reviewed with lower lobe infiltrates.    Assessment    #Strep mitis endocarditis  #Status post bioprosthetic aortic valve replacement 2014  #Status post aortic root replacement in the setting of prosthetic aortic valve endocarditis and annular abscess on 10/30  #Status post removal of pacemaker generator and intracardiac portion of the leads with retained venous leads  #Atrial fibrillation  #Achilles tendon rupture  #NATHANIEL  #Positive blood culture, suspect contaminant    Good improvement today with decreasing fever curve and leukocytosis.  Suspect pneumonia to be the primary driving because with improvement on antibiotics and therapeutic bronchoscopy.  Blood culture 1 out of 2 positive for coag negative staph which likely represents contamination given the protracted time to growth.    Respiratory culture and peripheral swab with only yeast isolated.  No bacterial growth to date.  Continue vancomycin goal -600 and cefepime 2 g every 8 empirically while waiting on cultures.  May be able to de-escalate vancomycin in the coming days.  Follow vancomycin levels for therapeutic drug monitoring.    For endocarditis he will still need ultimate antibiotic 6 weeks with end date of December 4.  Above should cover in the meantime but may be able to de-escalate back to penicillin in the future.

## 2024-11-15 NOTE — PROGRESS NOTES
[facility]  [phone]  Dr. Colton Allison    Subjective:  LOS: 23    No major events overnight.  CT surgery is planning for trach today.  Agree with that course of action.  Discussed with sister at bedside and updated her.      Objective:  Vital Signs past 24hrs    Temp range: Temp (24hrs), Av.2 °F (37.3 °C), Min:98.1 °F (36.7 °C), Max:100.2 °F (37.9 °C)    BP range: BP: ()/(45-75) 118/51  Pulse range: Heart Rate:  [79-99] 86  Resp rate range: Resp:  [20-24] 20  (!) 143 kg (315 lb 11.2 oz); Body mass index is 45.3 kg/m².    Device (Oxygen Therapy): ventilator     Oxygen range:SpO2:  [92 %-98 %] 95 %     Mode: VC/AC  FiO2 (%):  [40 %-98 %] 40 %  S RR:  [20] 20  S VT:  [550 mL] 550 mL  PEEP/CPAP (cm H2O):  [7.5 cm H20] 7.5 cm H20  MAP (cm H2O):  [14-15] 14      Intake/Output Summary (Last 24 hours) at 11/15/2024 1402  Last data filed at 11/15/2024 1216  Gross per 24 hour   Intake 4821 ml   Output 2735 ml   Net 2086 ml       Physical Exam  Constitutional:       Appearance: Normal appearance.   HENT:      Head: Normocephalic and atraumatic.      Nose: Nose normal.      Mouth/Throat:      Mouth: Mucous membranes are moist.      Pharynx: Oropharynx is clear.   Eyes:      Extraocular Movements: Extraocular movements intact.      Conjunctiva/sclera: Conjunctivae normal.   Cardiovascular:      Rate and Rhythm: Normal rate and regular rhythm.      Pulses: Normal pulses.      Heart sounds: Normal heart sounds. No murmur heard.  Pulmonary:      Effort: No respiratory distress.      Breath sounds: No stridor. No wheezing or rales.      Comments: Coarse breath sounds on the ventilator  Abdominal:      General: Abdomen is flat. Bowel sounds are normal.      Palpations: Abdomen is soft.      Tenderness: There is no abdominal tenderness.   Skin:     General: Skin is warm and dry.   Neurological:      General: No focal deficit present.      Mental Status: He is alert.            Result Review:  I have  reviewed the results from last note by LPC physician and agree with these findings:  [x]  Laboratory accordion  [x]  Microbiology  [x]  Radiology  [x]  EKG/Telemetry   [x]  Cardiology/Vascular   [x]  Pathology  []  Old records  []  Other:    Medication Review:  I have reviewed the current MAR.  Antibiotics  cefepime 2000 mg IVPB in 100 mL NS (MBP)  Pharmacy to dose vancomycin  vancomycin (VANCOCIN) 750mg IVPB in  mL (CD)     Scheduled Medications  acetylcysteine, 3 mL, Nebulization, TID - RT  aspirin, 81 mg, Per G Tube, Daily  atorvastatin, 40 mg, Per G Tube, Nightly  cefepime, 2,000 mg, Intravenous, Q8H  chlorhexidine, 15 mL, Mouth/Throat, Q12H  enoxaparin, 40 mg, Subcutaneous, Q12H  Ergocalciferol, 100 mcg, Per G Tube, Daily  hydrocortisone-bacitracin-zinc oxide-nystatin, 1 Application, Topical, Q12H  insulin glargine, 20 Units, Subcutaneous, Daily  insulin regular, 2-7 Units, Subcutaneous, Q6H  ipratropium-albuterol, 3 mL, Nebulization, Q4H - RT  lansoprazole, 15 mg, Per G Tube, Q AM  miconazole, 1 Application, Topical, Q12H  midodrine, 15 mg, Oral, TID AC  saccharomyces boulardii, 250 mg, Per G Tube, BID  sildenafil, 20 mg, Per G Tube, TID  sodium chloride, 10 mL, Intravenous, Q12H  vancomycin, 750 mg, Intravenous, Q24H      ICU Drips  amiodarone, 1 mg/min, Last Rate: 1 mg/min (11/15/24 1122)  dexmedetomidine, 0.2-1.5 mcg/kg/hr, Last Rate: 0.4 mcg/kg/hr (11/15/24 1008)  dextrose, 75 mL/hr, Last Rate: 75 mL/hr (11/15/24 1320)  DOPamine, 2-20 mcg/kg/min  EPINEPHrine, 0.01 mcg/kg/min, Last Rate: 0.01 mcg/kg/min (11/14/24 2226)  lidocaine in D5W, 1 mg/min, Last Rate: Stopped (11/05/24 1135)  milrinone, 0.125 mcg/kg/min, Last Rate: 0.125 mcg/kg/min (11/14/24 1267)  niCARdipine, 5-15 mg/hr  norepinephrine, 0.02-0.2 mcg/kg/min, Last Rate: Stopped (11/15/24 0130)  Pharmacy to dose vancomycin,   phenylephrine, 0.2-2 mcg/kg/min  propofol, 5-50 mcg/kg/min, Last Rate: 15 mcg/kg/min (11/15/24 1214)  vasopressin,  0.02-0.1 Units/min, Last Rate: 0.05 Units/min (11/15/24 1341)      PRN Medications    acetaminophen **OR** acetaminophen **OR** acetaminophen    bisacodyl    bisacodyl    cyclobenzaprine    dextrose    dextrose    DOPamine    EPINEPHrine    glucagon (human recombinant)    Glycerin-Hypromellose-    HYDROcodone-acetaminophen    magnesium hydroxide    midazolam    milrinone    Morphine    [] Morphine **AND** naloxone    niCARdipine    nitroglycerin    norepinephrine    ondansetron    Pharmacy to dose vancomycin    phenylephrine    polyethylene glycol    Potassium Replacement - Follow Nurse / BPA Driven Protocol    propofol    senna    vasopressin    Lines, Drains & Airways       Active LDAs       Name Placement date Placement time Site Days    Pulmonary Artery Catheter - Triple Lumen 10/30/24 Right Internal jugular 10/30/24  1026  created via procedure documentation  -- 10    CVC Double Lumen 10/30/24 Right Internal jugular 10/30/24  1026  created via procedure documentation  Internal jugular  10    Peripheral IV 10/29/24 1848 Anterior;Left Forearm 10/29/24  1848  Forearm  10    Peripheral IV 10/30/24 0940 Anterior;Right Forearm 10/30/24  0940  Forearm  10    NG/OG Tube Nasogastric  Left nostril 24  1330  Left nostril  7    Urethral Catheter Temperature probe 16 Fr. 24  0838  -- 1    ETT  10/30/24  1139  created via procedure documentation  -- 10    Arterial Line 24 Right Radial 24  0723  created via procedure documentation  Radial  7    Pacer Wires 10/30/24  1635  Ventricular  9                    Diet Orders (active) (From admission, onward)       Start     Ordered    24 1800  Dietary Nutrition Supplements ProSource No Carb  2 Times Daily      Comments: Flush through feeding tube BID    24 1358    24 1358  Tube Feeding: Formula: Novasource Renal (Nepro); Feeding Type: Continuous; Start at: 45 mL/hr; Then Advance By: Do Not Advance; Water Flush: 50 mL; Every: 4  hours; Water Bolus: None  Diet Effective Now        Comments: Prosource BID per feeding tube    11/08/24 1358    11/02/24 1533  NPO Diet NPO Type: Tube Feeding  Diet Effective Now         11/02/24 1533    11/02/24 1259  Feeding Tube Insertion - Cortrak System  Once         11/02/24 1259                      Assessment:  Postop respiratory failure  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  Mixed cardiogenic and septic shock  CHF with pulmonary edema  Strep mitis endocarditis and bacteremia  Acute kidney injury  Acute on chronic anemia  History of persistent atrial fibrillation now postoperative junctional rhythm  Thrombocytopenia  Mild hypernatremia  Transaminitis  Acute left lower extremity DVT on 10/24/2024  Esophagitis grade C by EGD on 9/30/2024  Mild hyperglycemia      Plan of Treatment  - Continue ventilator support  - He has significant volume overload, agree with further diuresis per nephrology  - Continue daily SBT and SAT  - Bronchoscopy (11/2/2024)- had some mild thick airway secretions that were therapeutically aspirated but otherwise nothing notable.       He remains volume overloaded.  Nephrology on board to help with diuretics; currently on hold as they feel his volume status is acceptable.  Bronchoscopy performed 11/13/2024, cultures with no growth.  After he received tracheostomy this evening, would recommend to wean down on sedation as tolerated so that patient can be more awake and alert and participating in his care.        Critical care time 35 minutes    Colton Allison MD  [facility], [facility]  Pulmonary and Critical Care Medicine

## 2024-11-15 NOTE — PROGRESS NOTES
"[facility] Clinical Pharmacy Services: Vancomycin Monitoring Note    Woodrow Alejandro is a 74 y.o. male who is on day 4/7 of pharmacy to dose vancomycin for Empiric.    Previous Vancomycin Dose:    750 mg IV every  12  hours  Updated Cultures and Sensitivities:   11/10 Catheter tip Cx NGTD   11/12 BC 1/2 coag neg staph  11/12 ET suction Cx candida albicans  11/12 MRSA PCR negative   11/13 genital swab yeast  11/13 bronch no growth     Results from last 7 days   Lab Units 11/14/24  0847   VANCOMYCIN RM mcg/mL 28.30     Vitals/Labs  Ht: 177.8 cm (70\"); Wt: (!) 143 kg (315 lb 11.2 oz)   Temp Readings from Last 1 Encounters:   11/15/24 98.2 °F (36.8 °C)     Estimated Creatinine Clearance: 59.7 mL/min (A) (by C-G formula based on SCr of 1.55 mg/dL (H)).        Results from last 7 days   Lab Units 11/15/24  0306 11/14/24  1340 11/14/24  0351 11/13/24  0827   CREATININE mg/dL 1.55* 1.56* 1.37*  --    WBC 10*3/mm3 15.95*  --  19.03* 18.78*     Assessment/Plan  Kidney function is similar to yesterday and stable for now. Patient is obese (BMI 48, expect accumulation).   Current Vancomycin Dose: 750 mg IV every  24  hours; provides a predicted  mg/L.hr   Next Level Date and Time: Vancomycin trough ordered for 11/16 at 1100  We will continue to monitor patient changes and renal function     Thank you for involving pharmacy in this patient's care. Please contact pharmacy with any questions or concerns.       Melinda Jones, PharmD  Clinical Pharmacist                  "

## 2024-11-16 PROCEDURE — 25010000002 VASOPRESSIN 20 UNIT/ML SOLUTION: Performed by: NURSE ANESTHETIST, CERTIFIED REGISTERED

## 2024-11-16 PROCEDURE — 25010000002 FENTANYL CITRATE (PF) 50 MCG/ML SOLUTION: Performed by: NURSE ANESTHETIST, CERTIFIED REGISTERED

## 2024-11-16 PROCEDURE — 25010000002 PROPOFOL 10 MG/ML EMULSION: Performed by: NURSE ANESTHETIST, CERTIFIED REGISTERED

## 2024-11-16 PROCEDURE — 25010000002 ESMOLOL 100 MG/10ML SOLUTION: Performed by: NURSE ANESTHETIST, CERTIFIED REGISTERED

## 2024-11-16 RX ORDER — ESMOLOL HYDROCHLORIDE 10 MG/ML
INJECTION INTRAVENOUS AS NEEDED
Status: DISCONTINUED | OUTPATIENT
Start: 2024-11-16 | End: 2024-11-16 | Stop reason: SURG

## 2024-11-16 RX ORDER — ROCURONIUM BROMIDE 10 MG/ML
INJECTION, SOLUTION INTRAVENOUS AS NEEDED
Status: DISCONTINUED | OUTPATIENT
Start: 2024-11-16 | End: 2024-11-16 | Stop reason: SURG

## 2024-11-16 RX ORDER — VASOPRESSIN 20 U/ML
INJECTION PARENTERAL AS NEEDED
Status: DISCONTINUED | OUTPATIENT
Start: 2024-11-16 | End: 2024-11-16 | Stop reason: SURG

## 2024-11-16 RX ORDER — FENTANYL CITRATE 50 UG/ML
INJECTION, SOLUTION INTRAMUSCULAR; INTRAVENOUS AS NEEDED
Status: DISCONTINUED | OUTPATIENT
Start: 2024-11-16 | End: 2024-11-16 | Stop reason: SURG

## 2024-11-16 RX ORDER — PROPOFOL 10 MG/ML
VIAL (ML) INTRAVENOUS AS NEEDED
Status: DISCONTINUED | OUTPATIENT
Start: 2024-11-16 | End: 2024-11-16 | Stop reason: SURG

## 2024-11-16 RX ADMIN — FENTANYL CITRATE 50 MCG: 50 INJECTION, SOLUTION INTRAMUSCULAR; INTRAVENOUS at 12:53

## 2024-11-16 RX ADMIN — VASOPRESSIN 1 UNITS: 20 INJECTION, SOLUTION INTRAVENOUS at 13:26

## 2024-11-16 RX ADMIN — PROPOFOL 50 MG: 10 INJECTION, EMULSION INTRAVENOUS at 12:46

## 2024-11-16 RX ADMIN — PROPOFOL 50 MG: 10 INJECTION, EMULSION INTRAVENOUS at 12:43

## 2024-11-16 RX ADMIN — VASOPRESSIN 2 UNITS: 20 INJECTION, SOLUTION INTRAVENOUS at 13:16

## 2024-11-16 RX ADMIN — ROCURONIUM BROMIDE 50 MG: 10 INJECTION, SOLUTION INTRAVENOUS at 12:31

## 2024-11-16 RX ADMIN — ROCURONIUM BROMIDE 15 MG: 10 INJECTION, SOLUTION INTRAVENOUS at 13:05

## 2024-11-16 RX ADMIN — VASOPRESSIN 1 UNITS: 20 INJECTION, SOLUTION INTRAVENOUS at 13:07

## 2024-11-16 RX ADMIN — ESMOLOL HYDROCHLORIDE 30 MG: 10 INJECTION, SOLUTION INTRAVENOUS at 12:54

## 2024-11-16 NOTE — ANESTHESIA PREPROCEDURE EVALUATION
Anesthesia Evaluation     NPO Solid Status: > 8 hours             Airway   Comment: intubated  Dental      Pulmonary    (-) sleep apnea, not a smoker    ROS comment: Negative patient screen for OP    Cardiovascular     (+) valvular problems/murmurs (s/p MV repair, AVR, Aortic root reconstruction,), dysrhythmias Atrial Fib      Neuro/Psych  GI/Hepatic/Renal/Endo    (+) morbid obesity    Musculoskeletal     Abdominal    Substance History      OB/GYN          Other                          Anesthesia Plan    ASA 4     general       Anesthetic plan, risks, benefits, and alternatives have been provided, discussed and informed consent has been obtained with: patient.        CODE STATUS:    Code Status (Patient has no pulse and is not breathing): CPR (Attempt to Resuscitate)  Medical Interventions (Patient has pulse or is breathing): Full Support

## 2024-11-16 NOTE — PROGRESS NOTES
" LOS: 24 days   Patient Care Team:  Juan Perez MD as PCP - General (Internal Medicine)    Chief Complaint:   Post-op follow-up, s/p, homograft    Subjective  Intubated/sedated.  Responds to some commands.    Vital Signs  Temp:  [98.1 °F (36.7 °C)-98.6 °F (37 °C)] 98.4 °F (36.9 °C)  Heart Rate:  [] 103  Resp:  [20-28] 26  BP: ()/(44-91) 110/59  Arterial Line BP: ()/(45-71) 123/58  FiO2 (%):  [40 %-100 %] 100 %      11/15/24  0400 11/15/24  1416 11/16/24  0501   Weight: (!) 143 kg (315 lb 11.2 oz) (!) 143 kg (315 lb) (!) 147 kg (324 lb 11.8 oz)     Body mass index is 46.59 kg/m².    Intake/Output Summary (Last 24 hours) at 11/16/2024 1241  Last data filed at 11/16/2024 1125  Gross per 24 hour   Intake 4589.55 ml   Output 2060 ml   Net 2529.55 ml     I/O this shift:  In: -   Out: 275 [Urine:275]        Objective:  Vital signs: (most recent): Blood pressure 110/59, pulse 103, temperature 98.4 °F (36.9 °C), resp. rate 26, height 177.8 cm (70\"), weight (!) 147 kg (324 lb 11.8 oz), SpO2 97%.                Physical Exam:   General Appearance: Intubated/sedated.  Ill-appearing.  No acute distress   Lungs:  Rhonchi throughout   Heart:  Regular rate, irregular irregular rhythm.   Abdomen: soft or nondistended, + bowel sounds    Skin: sternal incision clean, dry, intact, right groin firm   Neuro: alert and oriented, no focal deficits.     Results Review:      WBC WBC   Date Value Ref Range Status   11/16/2024 13.92 (H) 3.40 - 10.80 10*3/mm3 Final   11/15/2024 15.95 (H) 3.40 - 10.80 10*3/mm3 Final   11/14/2024 19.03 (H) 3.40 - 10.80 10*3/mm3 Final      HGB Hemoglobin   Date Value Ref Range Status   11/16/2024 8.6 (L) 13.0 - 17.7 g/dL Final   11/15/2024 8.4 (L) 13.0 - 17.7 g/dL Final   11/14/2024 8.8 (L) 13.0 - 17.7 g/dL Final      HCT Hematocrit   Date Value Ref Range Status   11/16/2024 27.1 (L) 37.5 - 51.0 % Final   11/15/2024 26.7 (L) 37.5 - 51.0 % Final   11/14/2024 27.9 (L) 37.5 - 51.0 % " "Final      Platelets Platelets   Date Value Ref Range Status   11/16/2024 193 140 - 450 10*3/mm3 Final   11/15/2024 203 140 - 450 10*3/mm3 Final   11/14/2024 248 140 - 450 10*3/mm3 Final        PT/INR:  No results found for: \"PROTIME\"/No results found for: \"INR\"    Sodium Sodium   Date Value Ref Range Status   11/16/2024 142 136 - 145 mmol/L Final   11/15/2024 141 136 - 145 mmol/L Final   11/15/2024 147 (H) 136 - 145 mmol/L Final   11/14/2024 148 (H) 136 - 145 mmol/L Final   11/14/2024 148 (H) 136 - 145 mmol/L Final      Potassium Potassium   Date Value Ref Range Status   11/16/2024 3.7 3.5 - 5.2 mmol/L Final     Comment:     Slight hemolysis detected by analyzer. Result may be falsely elevated.   11/16/2024 3.8 3.5 - 5.2 mmol/L Final   11/16/2024 3.8 3.5 - 5.2 mmol/L Final   11/15/2024 3.4 (L) 3.5 - 5.2 mmol/L Final   11/15/2024 3.7 3.5 - 5.2 mmol/L Final   11/15/2024 3.4 (L) 3.5 - 5.2 mmol/L Final   11/15/2024 3.6 3.5 - 5.2 mmol/L Final   11/15/2024 3.6 3.5 - 5.2 mmol/L Final   11/15/2024 3.3 (L) 3.5 - 5.2 mmol/L Final   11/14/2024 3.5 3.5 - 5.2 mmol/L Final   11/14/2024 3.3 (L) 3.5 - 5.2 mmol/L Final   11/14/2024 2.7 (L) 3.5 - 5.2 mmol/L Final   11/14/2024 2.7 (L) 3.5 - 5.2 mmol/L Final   11/14/2024 3.2 (L) 3.5 - 5.2 mmol/L Final     Comment:     Slight hemolysis detected by analyzer. Result may be falsely elevated.   11/13/2024 3.2 (L) 3.5 - 5.2 mmol/L Final   11/13/2024 3.6 3.5 - 5.2 mmol/L Final      Chloride Chloride   Date Value Ref Range Status   11/16/2024 108 (H) 98 - 107 mmol/L Final   11/15/2024 109 (H) 98 - 107 mmol/L Final   11/15/2024 109 (H) 98 - 107 mmol/L Final   11/14/2024 109 (H) 98 - 107 mmol/L Final   11/14/2024 109 (H) 98 - 107 mmol/L Final      Bicarbonate CO2   Date Value Ref Range Status   11/16/2024 17.4 (L) 22.0 - 29.0 mmol/L Final   11/15/2024 19.9 (L) 22.0 - 29.0 mmol/L Final   11/15/2024 21.0 (L) 22.0 - 29.0 mmol/L Final   11/14/2024 22.6 22.0 - 29.0 mmol/L Final   11/14/2024 22.0 " 22.0 - 29.0 mmol/L Final      BUN BUN   Date Value Ref Range Status   11/16/2024 64 (H) 8 - 23 mg/dL Final   11/15/2024 63 (H) 8 - 23 mg/dL Final   11/15/2024 55 (H) 8 - 23 mg/dL Final   11/14/2024 58 (H) 8 - 23 mg/dL Final   11/14/2024 56 (H) 8 - 23 mg/dL Final      Creatinine Creatinine   Date Value Ref Range Status   11/16/2024 1.59 (H) 0.76 - 1.27 mg/dL Final   11/15/2024 1.63 (H) 0.76 - 1.27 mg/dL Final   11/15/2024 1.55 (H) 0.76 - 1.27 mg/dL Final   11/14/2024 1.56 (H) 0.76 - 1.27 mg/dL Final   11/14/2024 1.37 (H) 0.76 - 1.27 mg/dL Final      Calcium Calcium   Date Value Ref Range Status   11/16/2024 8.2 (L) 8.6 - 10.5 mg/dL Final   11/15/2024 7.8 (L) 8.6 - 10.5 mg/dL Final   11/15/2024 7.8 (L) 8.6 - 10.5 mg/dL Final   11/14/2024 7.9 (L) 8.6 - 10.5 mg/dL Final   11/14/2024 8.0 (L) 8.6 - 10.5 mg/dL Final      Magnesium Magnesium   Date Value Ref Range Status   11/15/2024 2.0 1.6 - 2.4 mg/dL Final   11/14/2024 2.0 1.6 - 2.4 mg/dL Final   11/13/2024 2.3 1.6 - 2.4 mg/dL Final        acetylcysteine, 3 mL, Nebulization, TID - RT  aspirin, 81 mg, Per G Tube, Daily  atorvastatin, 40 mg, Per G Tube, Nightly  cefepime, 2,000 mg, Intravenous, Q8H  chlorhexidine, 15 mL, Mouth/Throat, Q12H  [Held by provider] enoxaparin, 40 mg, Subcutaneous, Q12H  Ergocalciferol, 100 mcg, Per G Tube, Daily  hydrocortisone-bacitracin-zinc oxide-nystatin, 1 Application, Topical, Q12H  insulin glargine, 20 Units, Subcutaneous, Daily  insulin regular, 2-7 Units, Subcutaneous, Q6H  ipratropium-albuterol, 3 mL, Nebulization, Q4H - RT  lansoprazole, 15 mg, Per G Tube, Q AM  miconazole, 1 Application, Topical, Q12H  midodrine, 15 mg, Oral, TID AC  potassium chloride, 20 mEq, Oral, Once  saccharomyces boulardii, 250 mg, Per G Tube, BID  sildenafil, 20 mg, Per G Tube, TID  sodium chloride, 10 mL, Intravenous, Q12H  [START ON 11/17/2024] vancomycin, 750 mg, Intravenous, Q24H      amiodarone, 1 mg/min, Last Rate: 1 mg/min (11/16/24  1106)  dexmedetomidine, 0.2-1.5 mcg/kg/hr, Last Rate: 0.4 mcg/kg/hr (11/16/24 1121)  DOPamine, 2-20 mcg/kg/min  EPINEPHrine, 0.01 mcg/kg/min, Last Rate: 0.01 mcg/kg/min (11/14/24 2226)  lidocaine in D5W, 1 mg/min, Last Rate: Stopped (11/05/24 1135)  milrinone, 0.125 mcg/kg/min, Last Rate: 0.125 mcg/kg/min (11/16/24 1106)  niCARdipine, 5-15 mg/hr  norepinephrine, 0.02-0.2 mcg/kg/min, Last Rate: 0.05 mcg/kg/min (11/16/24 1046)  Pharmacy to dose vancomycin,   phenylephrine, 0.2-2 mcg/kg/min  propofol, 5-50 mcg/kg/min, Last Rate: 5 mcg/kg/min (11/16/24 0515)  vasopressin, 0.02-0.1 Units/min, Last Rate: Stopped (11/16/24 2808)          Prosthetic aortic valve stenosis    Essential hypertension    Permanent atrial fibrillation    S/P AVR    ICD (implantable cardioverter-defibrillator), dual, in situ    Achilles tendon rupture    Bacteremia    Stenosis of prosthetic aortic valve    Anemia      Assessment & Plan    - Prosthetic aortic valve stenosis, h/o AVR (tissue)/maze/DANICA ligation (2014)- s/p reoperative sternotomy AV root replacement 27mm cryopreserved homograft with right nuvia cabrol, AICD removal, IABP placement- [facility] 10/31/2024  -Sternal closure ---11/2  - Possible endocarditis, likely need JOLIE   - Bacteremia, blood cultures positive strep mitis---on penicillin G  - Atrial fibrillation, unable to tolerate anticoagulation  - Hypertension  - NICM status post [employer] AICD  - Anemia, s/p EGD/colonoscopy with esophagitis, polyp removal  - Right achilles tendon tear--- walking boot and PT per ortho at [facility]  - Pre-diabetes -- improved at 5.3  -post op anemia- expected acute blood loss  -TCP post-op        POD #16.  Continue routine care.  On amio 1.0, Precedex, propofol, epi 0.01, primacor 0.125, levo 0.02, vaso stopped this a.m.  Vent via ETT.  Trach today per thoracic.  Responds to questions when sedation weaned.  WBC down to 13.9 from 15.9 yesterday.  Afebrile.  Groin ultrasound did show fluid collection.  Abscess  versus hematoma from vein harvest.  Monitor.  Antibiotics for endocarditis, possible pneumonia per ID.  Will keep pressor support the same and hold any anticoagulation until after trach.  Remains in rate controlled atrial fibrillation.  Echo showed normal LV function, moderate to severely reduced RV function.  Hypernatremia, increase free water flushes per nephrology.  Holding diuretics today.    Dheeraj Pathak PA-C  11/16/24  12:41 EST

## 2024-11-16 NOTE — PROGRESS NOTES
LOS: 24 days   Patient Care Team:  Juan Perez MD as PCP - General (Internal Medicine)    Chief Complaint: Follow-up bioprosthetic AVR endocarditis, mitral regurgitation, persistent atrial fibrillation.    Interval History: Remains intubated but is awake this morning following simple commands.  Plan for trach today.  Still on multi pressor shock with grossly rate controlled atrial fibrillation    Vital Signs:  Temp:  [98.1 °F (36.7 °C)-98.6 °F (37 °C)] 98.4 °F (36.9 °C)  Heart Rate:  [] 103  Resp:  [20-28] 26  BP: ()/(44-91) 110/59  Arterial Line BP: ()/(45-71) 123/58  FiO2 (%):  [40 %-98 %] 40 %    Intake/Output Summary (Last 24 hours) at 11/16/2024 1129  Last data filed at 11/16/2024 1125  Gross per 24 hour   Intake 4589.55 ml   Output 2360 ml   Net 2229.55 ml       Physical Exam:   General Appearance:    Intubated.  Appears critically ill.   Lungs:     Rhonchi bilaterally anteriorly.    Heart:    Irregularly irregular rhythm with a borderline rate. II/VI SM throughout.   Abdomen:     Soft, nontender, nondistended.    Extremities:    2+ generalized edema and anasarca.     Results Review:    Results from last 7 days   Lab Units 11/16/24  0830 11/16/24  0408   SODIUM mmol/L  --  142   POTASSIUM mmol/L 3.7 3.8  3.8   CHLORIDE mmol/L  --  108*   CO2 mmol/L  --  17.4*   BUN mg/dL  --  64*   CREATININE mg/dL  --  1.59*   GLUCOSE mg/dL  --  174*   CALCIUM mg/dL  --  8.2*         Results from last 7 days   Lab Units 11/16/24  0408   WBC 10*3/mm3 13.92*   HEMOGLOBIN g/dL 8.6*   HEMATOCRIT % 27.1*   PLATELETS 10*3/mm3 193                 Results from last 7 days   Lab Units 11/15/24  0306   MAGNESIUM mg/dL 2.0     Results from last 7 days   Lab Units 11/12/24  1128   TRIGLYCERIDES mg/dL 261*         I reviewed the patient's new clinical results.        Assessment:  1.  Status post bioprosthetic aortic valve replacement in 2014  2.  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  Moderate to severe mitral regurgitation  4.  Status post reoperative AV root replacement, mitral valve repair, ICD removal, SVG-RCA, and IABP placement on 10/30/2024  5.  Postoperative shock, multifactorial  6.  Acute kidney injury  7.  History of nonischemic cardiomyopathy with recovered ejection fraction  8.  Anemia, acute on chronic  9.  Postoperative thrombocytopenia  10.  Persistent atrial fibrillation  11.  Ventricular tachycardia postoperatively  12.  Grade C esophagitis by EGD on 9/30/2024  13.  Acute left lower extremity DVT on 10/24/2024.  Resolved on Dopplers on 11/8/2024  14.  Right Achilles tendon tear  15.  Postoperative junctional rhythm  16.  Hypoalbuminemia  17.  Thrombocytopenia  18.  Severe right ventricular enlargement and dysfunction post-op  19.  Fever noted on 11/8/2024  20.  Subacute DVT in the left axillary vein by Doppler on 11/10/2024.  21. Hypernatremia.     Plan:  -Currently on Revatio 20 mg every 8 hours.  -Renal following for hypernatremia and diuretic management.  CVP is elevated but in the setting of hypernatremia diuretics are being held currently.  Will continue to monitor  -Continue IV amiodarone for now pending trach.  Likely transition to oral dose tomorrow  -TTE grossly similar to prior.    -Nursing staff was attempting to wean vasopressin and Levophed as they were on minimal doses.  If he is unable to wean pressors once he is off sedation and trached then I will consider increasing milrinone given his RV dysfunction.    He remains critically ill    We will continue to follow    López Rosenbaum MD  11/16/24  11:29 EST

## 2024-11-16 NOTE — PROGRESS NOTES
[facility] Progress Note      Patient Name: Woodrow Alejandro  : 1950  MRN: [id number]  Primary Care Physician:  Juan Perez MD  Date of admission: 10/23/2024    Subjective     Interval History:   Follow-up acute kidney injury and volume  Remains on the ventilator he is on vasopressors he is finally have negative fluid balance since admission he remains having high CVP.  Review of Systems:   As noted above    Objective     Vitals:   Temp:  [98.1 °F (36.7 °C)-98.6 °F (37 °C)] 98.4 °F (36.9 °C)  Heart Rate:  [] 105  Resp:  [20-28] 26  BP: ()/(44-91) 95/50  Arterial Line BP: ()/(45-71) 111/56  FiO2 (%):  [40 %-98 %] 98 %    Intake/Output Summary (Last 24 hours) at 2024 1048  Last data filed at 2024 0600  Gross per 24 hour   Intake 4589.55 ml   Output 2085 ml   Net 2504.55 ml       Physical Exam:    General Appearance: Eyes open.  On the ventilator.  Tracks and nods to commands  Skin: warm and dry  HEENT: Orally intubated  Neck: Mild JVD  Lungs: Bilateral rhonchi, breathing effort not labored  Heart: Irregularly irregular.  No rub  Abdomen: soft, nontender, distended.  Body wall edema.  Normoactive bowel  : Ugarte catheter  Extremities: 1+ upper and lower extremity edema but wrinkling of skin      Scheduled Meds:     acetylcysteine, 3 mL, Nebulization, TID - RT  aspirin, 81 mg, Per G Tube, Daily  atorvastatin, 40 mg, Per G Tube, Nightly  cefepime, 2,000 mg, Intravenous, Q8H  chlorhexidine, 15 mL, Mouth/Throat, Q12H  [Held by provider] enoxaparin, 40 mg, Subcutaneous, Q12H  Ergocalciferol, 100 mcg, Per G Tube, Daily  hydrocortisone-bacitracin-zinc oxide-nystatin, 1 Application, Topical, Q12H  insulin glargine, 20 Units, Subcutaneous, Daily  insulin regular, 2-7 Units, Subcutaneous, Q6H  ipratropium-albuterol, 3 mL, Nebulization, Q4H - RT  lansoprazole, 15 mg, Per G Tube, Q AM  miconazole, 1 Application, Topical, Q12H  midodrine, 15 mg,  Oral, TID AC  saccharomyces boulardii, 250 mg, Per G Tube, BID  sildenafil, 20 mg, Per G Tube, TID  sodium chloride, 10 mL, Intravenous, Q12H  vancomycin, 750 mg, Intravenous, Q24H      IV Meds:   amiodarone, 1 mg/min, Last Rate: 1 mg/min (11/16/24 0840)  dexmedetomidine, 0.2-1.5 mcg/kg/hr, Last Rate: 0.4 mcg/kg/hr (11/16/24 0910)  DOPamine, 2-20 mcg/kg/min  EPINEPHrine, 0.01 mcg/kg/min, Last Rate: 0.01 mcg/kg/min (11/14/24 2226)  lidocaine in D5W, 1 mg/min, Last Rate: Stopped (11/05/24 1135)  milrinone, 0.125 mcg/kg/min, Last Rate: 0.125 mcg/kg/min (11/15/24 2024)  niCARdipine, 5-15 mg/hr  norepinephrine, 0.02-0.2 mcg/kg/min, Last Rate: 0.05 mcg/kg/min (11/16/24 1046)  Pharmacy to dose vancomycin,   phenylephrine, 0.2-2 mcg/kg/min  propofol, 5-50 mcg/kg/min, Last Rate: 5 mcg/kg/min (11/16/24 0515)  vasopressin, 0.02-0.1 Units/min, Last Rate: Stopped (11/16/24 0953)        Results Reviewed:   I have personally reviewed the results from the time of this admission to 11/16/2024 10:48 EST     Results from last 7 days   Lab Units 11/16/24  0830 11/16/24  0408 11/15/24  2026 11/15/24  1244 11/15/24  0807 11/15/24  0306 11/14/24  0847 11/14/24  0351 11/12/24  0509 11/12/24  0342   SODIUM mmol/L  --  142  --  141  --  147*   < > 148*   < > 146*   POTASSIUM mmol/L 3.7 3.8  3.8 3.4* 3.7   < > 3.6  3.6   < > 3.2*   < > 3.3*   CHLORIDE mmol/L  --  108*  --  109*  --  109*   < > 109*   < > 117*   CO2 mmol/L  --  17.4*  --  19.9*  --  21.0*   < > 22.0   < > 17.6*   BUN mg/dL  --  64*  --  63*  --  55*   < > 56*   < > 37*   CREATININE mg/dL  --  1.59*  --  1.63*  --  1.55*   < > 1.37*   < > 0.90   CALCIUM mg/dL  --  8.2*  --  7.8*  --  7.8*   < > 8.0*   < > 6.4*   BILIRUBIN mg/dL  --   --   --   --   --  0.7  --  0.8  --  0.7   ALK PHOS U/L  --   --   --   --   --  97  --  110  --  92   ALT (SGPT) U/L  --   --   --   --   --  6  --  20  --  7   AST (SGOT) U/L  --   --   --   --   --  19  --  31  --  18   GLUCOSE mg/dL  --  174*   --  174*  --  172*   < > 146*   < > 142*    < > = values in this interval not displayed.       Estimated Creatinine Clearance: 59.4 mL/min (A) (by C-G formula based on SCr of 1.59 mg/dL (H)).    Results from last 7 days   Lab Units 11/16/24  0408 11/15/24  1244 11/15/24  0306 11/14/24  1340 11/14/24  0351 11/13/24  2307   MAGNESIUM mg/dL  --   --  2.0 2.0  --  2.3   PHOSPHORUS mg/dL 3.6 3.7 3.7  --    < > 3.4    < > = values in this interval not displayed.       Results from last 7 days   Lab Units 11/13/24  0414 11/12/24  0342 11/11/24  0345 11/10/24  0346   URIC ACID mg/dL 7.3* 5.5 7.1* 5.9       Results from last 7 days   Lab Units 11/16/24  0408 11/15/24  0306 11/14/24  0351 11/13/24  0827 11/12/24  0342   WBC 10*3/mm3 13.92* 15.95* 19.03* 18.78* 16.14*   HEMOGLOBIN g/dL 8.6* 8.4* 8.8* 9.1* 9.0*   PLATELETS 10*3/mm3 193 203 248 269 270             Assessment / Plan     ASSESSMENT:  Acute kidney injury, nonoliguric.  Charge likely prerenal/ATN due to hemodynamic instability and diuresis.  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  Status post redo sternotomy AV root replacement, mitral valve repair, AICD removal intra-aortic balloon placement 10/31/2024.  Sternal closure 11/ 2.  3.  Fever with bioprosthetic aortic valve endocarditis, bacteremia with strep mitis on vancomycin and cefepime.    Dr. Diaz following.  Currently A-fib  4.  Shock , remains pressor dependent.  5.  Anemia status post EGD 9/30/2024  And colonoscopy with esophagitis and polyp removal.  Hemoglobin today 8 point  6.  Acute respiratory failure postoperatively on the ventilator.    7.  Atrial fibrillation.  With controlled rate  8.  Moderate to severe mitral regurgitation.  Severe RV enlargement and dysfunction postoperatively.  PLAN:  Sodium continues to be elevated.  Increase free water flushes per NG from 30 cc an hour to 100 cc/h.  No diuretics  today  Surveillance lab      I reviewed the chart and other providers notes, reviewed labs.  I discussed the case with the patient's nurse  Copied text in this note has been reviewed and is accurate as of 11/16/24.     Thank you for involving us in the care of Woodrow Alejandro.  Please feel free to call with any questions.    Jass Keller MD  11/16/24  10:48 [facility]    [facility]  [phone]    Please note that portions of this note were completed with a voice recognition program.

## 2024-11-16 NOTE — OP NOTE
TRACHEOSTOMY  Procedure Report    Patient Name:  Woodrow Alejandro  YOB: 1950    Date of Surgery:  11/16/2024     Indications: Respiratory failure status post AVR    Pre-op Diagnosis:   S/P AVR [Z95.2]  Respiratory failure       Post-Op Diagnosis Codes:     * S/P AVR [Z95.2]  Respiratory failure    Procedure/CPT® Codes:      Procedure(s):  TRACHEOSTOMY, 8 Shiley XLT    Staff:  Surgeon(s):  Haylie Leija MD    Assistant: Vidhya Sanz CSA was responsible for performing the following activities: Retraction and Suction and their skilled assistance was necessary for the success of this case.     Anesthesia: General    Estimated Blood Loss: minimal    Implants:    Nothing was implanted during the procedure    Specimen:          None      Findings: Copious secretions, short neck with tracheal rings below the sternal notch.    Complications: None apparent      Description of Procedure: Mr. Alejandro was brought to the operating room and placed on the operating table in supine position.  A general anesthetic was administered.  He was prepped and draped in standard sterile fashion.  A timeout was performed.  A vertical incision was performed in the midline of the neck just above the sternal notch, with care taken to avoid the prior sternotomy incision.  Dissection was carried down through the skin and soft tissue using Bovie electrocautery.  The muscles of the neck were divided along the median raphae.  The trachea was encountered.  The patient's tracheal rings were low despite the extension of the neck with a shoulder roll.  I was able to place the tracheostomy and the second tracheal ring.  We used a blue Rhino percutaneous tracheostomy kit to perform the tracheostomy.  The bronchoscope was placed in the patient's ET tube and the ET tube was removed to facilitate placement of the tracheostomy.  The bronchoscope was left in place and was used to visualize the following steps.  An 18-gauge needle was  placed between the second and third tracheal rings.  A wire was used via Seldinger technique.  The hole in the trachea was dilated and an 8 Shiley extra long trach was placed without difficulty.  A bronchoscopy was performed to verify that the tracheostomy was in good position.  The ET tube was removed.  The patient ventilated well.  The tracheostomy was secured in place with  Prolene suture as well as a trach tie.  The patient tolerated the procedure well and was transported back to the intensive care unit.  Counts were correct at the end of the case.      Haylie Leija MD     Date: 11/16/2024  Time: 13:42 EST

## 2024-11-16 NOTE — PLAN OF CARE
Goal Outcome Evaluation:         Patient remains on ventilator following commands. Scheduled for tracheostomy today. 635 urine output. 1 small BM. Pressors include: .01 epinephrine, .01 norepinephrine, and .04 vasopressin. Remains on amiodarone, precedex, and propofol as well. Potassium being replaced. TF turned off at 0500 for sx, 625 residual noted.

## 2024-11-16 NOTE — ANESTHESIA POSTPROCEDURE EVALUATION
"Patient: Woodrow Alejandro    Procedure Summary       Date: 11/16/24 Room / Location: [facility] OR  / [facility] MAIN OR    Anesthesia Start: 1225 Anesthesia Stop: 1348    Procedure: TRACHEOSTOMY (Neck) Diagnosis:       S/P AVR      (S/P AVR [Z95.2])    Surgeons: Haylie Leija MD Provider: Lionel Valencia MD    Anesthesia Type: general ASA Status: 4            Anesthesia Type: general    Vitals  Vitals Value Taken Time   /66 11/16/24 1430   Temp 36.8 °C (98.24 °F) 11/16/24 1438   Pulse 109 11/16/24 1438   Resp 27 11/16/24 1429   SpO2 93 % 11/16/24 1438   Vitals shown include unfiled device data.        Post Anesthesia Care and Evaluation    Pain management: adequate    Airway patency: patent  Anesthetic complications: No anesthetic complications    Cardiovascular status: acceptable  Respiratory status: acceptable  Hydration status: acceptable    Comments: /50 (BP Location: Right arm, Patient Position: Lying)   Pulse 102   Temp 36.8 °C (98.2 °F)   Resp 27   Ht 177.8 cm (70\")   Wt (!) 147 kg (324 lb 11.8 oz)   SpO2 92%   BMI 46.59 kg/m²       "

## 2024-11-16 NOTE — PROGRESS NOTES
LOS: 24 days   Patient Care Team:  Juan Perez MD as PCP - General (Internal Medicine)    Subjective     Patient is new to me today picking up pulmonary coverage from Dr. Allison have reviewed his and multiple other records.  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  He went and had sternal closure on 11/2 evidence of possible endocarditis and he had blood cultures positive for strep mitis and is on pen G send atrial fibrillation unable to tolerate anticoagulation he has a nonischemic cardiomyopathy history of anemia some esophagitis and colon polyps have been removed history of right Achilles tendon tear uses a walking boot he is a prediabetic postop anemia and thrombocytopenia.  Patient had failed weaning and went for tracheostomy today  Patient is awake he is weakly mouthing some words that have real trouble understanding he is moving just a little bit but not much.      review of Systems:          Objective     Vital Signs  Vital Sign Min/Max for last 24 hours  Temp  Min: 98.1 °F (36.7 °C)  Max: 98.6 °F (37 °C)   BP  Min: 91/58  Max: 143/55   Pulse  Min: 76  Max: 111   Resp  Min: 24  Max: 29   SpO2  Min: 92 %  Max: 100 %   No data recorded   Weight  Min: 147 kg (324 lb 11.8 oz)  Max: 147 kg (324 lb 11.8 oz)        Ventilator/Non-Invasive Ventilation Settings (From admission, onward)       Start     Ordered    11/12/24 1124  Ventilator - Vent Mode: AC/VC; Rate: Other; Rate: 24; FiO2: Titrate Per SpO2; Titrate Oxygen for SpO2: 90 - 95%; PEEP: 5; Tidal Volume: mL; TV: 550  Continuous        Question Answer Comment   Vent Mode AC/VC    Rate Other    Rate 24    FiO2 Titrate Per SpO2    Titrate Oxygen for SpO2 90 - 95%     PEEP 5    Tidal Volume mL            11/12/24 1124    11/01/24 0026  Ventilator - Vent Mode: AC/VC+; Rate: 20; FiO2: Titrate Per SpO2; Titrate Oxygen for SpO2: 90 - 95%; PEEP: 7.5; Tidal Volume: mL; TV: 620  Continuous,   Status:  Canceled        Question Answer Comment   Vent Mode AC/VC+    Rate 20    FiO2 Titrate Per SpO2    Titrate Oxygen for SpO2 90 - 95%    PEEP 7.5    Tidal Volume mL            11/01/24 0027                        Arterial Line BP: 110/51  Arterial Line MAP (mmHg): 69 mmHg  PAP: (38-49)/(7-24) 43/16  CVP:  [12 mmHg-30 mmHg] 30 mmHg  Body mass index is 46.59 kg/m².  I/O last 3 completed shifts:  In: 7051.6 [I.V.:4261.6; Other:1341; NG/GT:1049; IV Piggyback:400]  Out: 4050 [Urine:3425; Emesis/NG output:625]  I/O this shift:  In: -   Out: 275 [Urine:275]        Physical Exam:  General Appearance: Trached on a ventilator is on assist-control of 20 tidal volume 550 PEEP of 5 FiO2 40%, he is breathing about 31 his peak airway pressures are about 23 cm.  He is on dexmedetomidine 0.04 mics per kilogram per minute epinephrine at 0.01 mics per minute norepinephrine at 0.1 mics per kilogram per minute he is on a milrinone drip morbidly obese  Eyes: Conjunctiva are clear and anicteric pupils are equal  ENT: Mucous membranes are dry he has a nasoenteric tube in place some limits the left nare  Neck: He has a fresh tracheostomy site looks okay he has a left IJ introducer and Aylett-Paradise type catheter  Lungs: Coarse breath sounds bilaterally  Cardiac: Tachycardic low 100s no murmur  Abdomen: Obese soft nontender no palpable hepatosplenomegaly or masses  : Not examined  Musculoskeletal: He has mild thoracic kyphosis very large abdomen  Skin: Warm and dry no jaundice no petechiae  Neuro: He is awake he is very weakly mouthing words I cannot understand what he is trying to say he moves his extremities a little bit very weakly to command  Extremities/P Vascular: No clubbing or cyanosis he has  edema all 4 extremities palpable radial pulses and I think I feel dorsalis pedis pulses bilaterally as well there are not a strong but he has more lower extremity edema  MSE: Hard to really tell       Labs:  Results from last 7 days   Lab Units 11/16/24  0830 11/16/24  0408 11/15/24  2026 11/15/24  1244 11/15/24  0807 11/15/24  0306 11/15/24  0021 11/14/24  1946 11/14/24  1340 11/14/24  0847 11/14/24  0351 11/13/24  2307 11/13/24  0827 11/13/24  0414 11/12/24  1128 11/12/24  0815 11/12/24  0509 11/12/24  0342 11/11/24  0940 11/11/24  0345 11/10/24  0719 11/10/24  0346   GLUCOSE mg/dL  --  174*  --  174*  --  172*  --   --  168*  --  146*  --   --  180*  --  186* 178* 142*   < > 171*   < > 180*   SODIUM mmol/L  --  142  --  141  --  147*  --   --  148*  --  148*  --   --  143  --  144 144 146*   < > 147*   < > 143   POTASSIUM mmol/L 3.7 3.8  3.8 3.4* 3.7 3.4* 3.6  3.6 3.3*   < > 3.3*   < > 3.2* 3.2*   < > 3.9   < > 4.0  4.0 4.3 3.3*   < > 3.7  3.7   < > 3.4*   MAGNESIUM mg/dL  --   --   --   --   --  2.0  --   --  2.0  --   --  2.3  --  2.0  --   --  2.0 1.6  --  2.0   < >  --    CO2 mmol/L  --  17.4*  --  19.9*  --  21.0*  --   --  22.6  --  22.0  --   --  23.5  --  23.0 22.5 17.6*   < > 23.0   < > 20.1*   CHLORIDE mmol/L  --  108*  --  109*  --  109*  --   --  109*  --  109*  --   --  107  --  108* 110* 117*   < > 111*   < > 109*   ANION GAP mmol/L  --  16.6*  --  12.1  --  17.0*  --   --  16.4*  --  17.0*  --   --  12.5  --  13.0 11.5 11.4   < > 13.0   < > 13.9   CREATININE mg/dL  --  1.59*  --  1.63*  --  1.55*  --   --  1.56*  --  1.37*  --   --  1.32*  --  1.10 1.14 0.90   < > 1.14   < > 1.08   BUN mg/dL  --  64*  --  63*  --  55*  --   --  58*  --  56*  --   --  47*  --  47* 44* 37*   < > 41*   < > 41*   BUN / CREAT RATIO   --  40.3*  --  38.7*  --  35.5*  --   --  37.2*  --  40.9*  --   --  35.6*  --  42.7* 38.6* 41.1*   < > 36.0*   < > 38.0*   CALCIUM mg/dL  --  8.2*  --  7.8*  --  7.8*  --   --  7.9*   --  8.0*  --   --  8.1*  --  8.7 8.2* 6.4*   < > 8.0*   < > 7.7*   ALK PHOS U/L  --   --   --   --   --  97  --   --   --   --  110  --   --   --   --   --   --  92  --   --   --  109   TOTAL PROTEIN g/dL  --   --   --   --   --  6.3  --   --   --   --  6.2  --   --   --   --   --   --  4.8*  --   --   --  5.6*   ALT (SGPT) U/L  --   --   --   --   --  6  --   --   --   --  20  --   --   --   --   --   --  7  --   --   --  10   AST (SGOT) U/L  --   --   --   --   --  19  --   --   --   --  31  --   --   --   --   --   --  18  --   --   --  26   BILIRUBIN mg/dL  --   --   --   --   --  0.7  --   --   --   --  0.8  --   --   --   --   --   --  0.7  --   --   --  0.9   ALBUMIN g/dL  --  2.7*  --  2.5*  --  2.7*  --   --   --   --  2.7*  --   --  2.5*  --   --  2.5* 2.0*  --  2.7*  --  2.5*   GLOBULIN gm/dL  --   --   --   --   --  3.6  --   --   --   --  3.5  --   --   --   --   --   --  2.8  --   --   --  3.1    < > = values in this interval not displayed.     Estimated Creatinine Clearance: 59.4 mL/min (A) (by C-G formula based on SCr of 1.59 mg/dL (H)).      Results from last 7 days   Lab Units 11/16/24  0408 11/15/24  0306 11/14/24  0351 11/13/24  0827 11/12/24  0342 11/11/24  0345 11/10/24  0346   WBC 10*3/mm3 13.92* 15.95* 19.03* 18.78* 16.14* 15.44* 13.41*   RBC 10*6/mm3 2.99* 2.95* 3.02* 3.12* 3.09* 3.02* 2.90*   HEMOGLOBIN g/dL 8.6* 8.4* 8.8* 9.1* 9.0* 9.1* 8.5*   HEMATOCRIT % 27.1* 26.7* 27.9* 28.8* 28.6* 27.7* 26.4*   MCV fL 90.6 90.5 92.4 92.3 92.6 91.7 91.0   MCH pg 28.8 28.5 29.1 29.2 29.1 30.1 29.3   MCHC g/dL 31.7 31.5 31.5 31.6 31.5 32.9 32.2   RDW % 17.0* 16.9* 17.0* 17.3* 17.3* 17.2* 17.3*   RDW-SD fl 55.3* 55.3* 57.3* 57.4* 58.1* 57.8* 57.5*   MPV fL 9.7 9.4 9.7 9.2 9.2 9.5 9.3   PLATELETS 10*3/mm3 193 203 248 269 270 260 226   NEUTROPHIL % %  --   --   --   --  86.4* 85.3* 85.2*   LYMPHOCYTE % %  --   --   --   --  3.5* 4.3* 3.8*   MONOCYTES % %  --   --   --   --  5.5 5.9 7.0   EOSINOPHIL % %  --    --   --   --  3.0 3.0 1.7   BASOPHIL % %  --   --   --   --  0.4 0.3 0.3   IMM GRAN % %  --   --   --   --  1.2* 1.2* 2.0*   NEUTROS ABS 10*3/mm3  --   --   --   --  13.94* 13.16* 11.42*   LYMPHS ABS 10*3/mm3  --   --   --   --  0.56* 0.67* 0.51*   MONOS ABS 10*3/mm3  --   --   --   --  0.89 0.91* 0.94*   EOS ABS 10*3/mm3  --   --   --   --  0.49* 0.46* 0.23   BASOS ABS 10*3/mm3  --   --   --   --  0.06 0.05 0.04   IMMATURE GRANS (ABS) 10*3/mm3  --   --   --   --  0.20* 0.19* 0.27*   NRBC /100 WBC  --   --   --   --  0.0 0.0 0.0     Results from last 7 days   Lab Units 11/14/24  1244   PH, ARTERIAL pH units 7.456*   PO2 ART mm Hg 82.1   PCO2, ARTERIAL mm Hg 37.1   HCO3 ART mmol/L 26.1             Results from last 7 days   Lab Units 11/13/24  0414   TSH uIU/mL 4.140   FREE T4 ng/dL 0.92     Results from last 7 days   Lab Units 11/14/24  0351   PROCALCITONIN ng/mL 0.68*         Microbiology Results (last 10 days)       Procedure Component Value - Date/Time    Respiratory Culture - Aspirate, Bronchus [[id number]] Collected: 11/13/24 1539    Lab Status: Final result Specimen: Aspirate from Bronchus Updated: 11/15/24 1043     Respiratory Culture No growth    Genital Culture - Swab, Penis [[id number]]  (Abnormal) Collected: 11/13/24 1158    Lab Status: Final result Specimen: Swab from Penis Updated: 11/16/24 1042     Genital Culture Scant growth (1+) Candida albicans      Rare growth Normal Skin Clare     Gram Stain Many (4+) WBCs seen      Occasional Yeast    MRSA Screen, PCR (Inpatient) - Swab, Nares [[id number]]  (Normal) Collected: 11/12/24 1027    Lab Status: Final result Specimen: Swab from Nares Updated: 11/12/24 1152     MRSA PCR No MRSA Detected    Narrative:      The negative predictive value of this diagnostic test is high and should only be used to consider de-escalating anti-MRSA therapy. A positive result may indicate colonization with MRSA and must be correlated clinically.    Respiratory Culture - Aspirate,  ET Suction [[id number]]  (Abnormal) Collected: 11/12/24 0924    Lab Status: Final result Specimen: Aspirate from ET Suction Updated: 11/14/24 0952     Respiratory Culture Light growth (2+) Candida albicans      Scant growth (1+) Normal respiratory titi. No S. aureus or Pseudomonas aeruginosa detected. Final report.     Gram Stain Moderate (3+) WBCs per low power field      No epithelial cells seen      Rare (1+) Yeast    Blood Culture - Blood, Arm, Left [[id number]]  (Abnormal) Collected: 11/12/24 0815    Lab Status: Final result Specimen: Blood from Arm, Left Updated: 11/15/24 0728     Blood Culture Staphylococcus, coagulase negative     Isolated from Aerobic and Anaerobic Bottles     Gram Stain Aerobic Bottle Gram positive cocci in clusters      Anaerobic Bottle Gram positive cocci in clusters    Narrative:      Probable contaminant requires clinical correlation, susceptibility not performed unless requested by physician.      Blood Culture - Blood, Arm, Right [[id number]]  (Normal) Collected: 11/12/24 0815    Lab Status: Preliminary result Specimen: Blood from Arm, Right Updated: 11/16/24 0830     Blood Culture No growth at 4 days    Blood Culture ID, PCR - Blood, Arm, Left [[id number]]  (Abnormal) Collected: 11/12/24 0815    Lab Status: Final result Specimen: Blood from Arm, Left Updated: 11/14/24 1130     BCID, PCR Staph spp, not aureus or lugdunensis. Identification by BCID2 PCR.     BOTTLE TYPE Aerobic Bottle    Catheter Culture - Cath Tip, Neck [[id number]] Collected: 11/10/24 1208    Lab Status: Final result Specimen: Cath Tip from Neck Updated: 11/13/24 0715     CATHETER CULTURE No growth at 3 days    Catheter Culture - Cath Tip, Neck [[id number]] Collected: 11/09/24 1639    Lab Status: Final result Specimen: Cath Tip from Neck Updated: 11/11/24 0828     CATHETER CULTURE No growth    Respiratory Culture - Sputum, Trachea [[id number]] Collected: 11/09/24 1021    Lab Status: Final result Specimen: Sputum from  Trachea Updated: 11/11/24 1017     Respiratory Culture Rare growth Normal respiratory titi. No S. aureus or Pseudomonas aeruginosa detected. Final report.     Gram Stain Many (4+) WBCs seen      Few (2+) Epithelial cells seen      No organisms seen                acetylcysteine, 3 mL, Nebulization, TID - RT  aspirin, 81 mg, Per G Tube, Daily  atorvastatin, 40 mg, Per G Tube, Nightly  cefepime, 2,000 mg, Intravenous, Q8H  chlorhexidine, 15 mL, Mouth/Throat, Q12H  [Held by provider] enoxaparin, 40 mg, Subcutaneous, Q12H  Ergocalciferol, 100 mcg, Per G Tube, Daily  hydrocortisone-bacitracin-zinc oxide-nystatin, 1 Application, Topical, Q12H  insulin glargine, 20 Units, Subcutaneous, Daily  insulin regular, 2-7 Units, Subcutaneous, Q6H  ipratropium-albuterol, 3 mL, Nebulization, Q4H - RT  lansoprazole, 15 mg, Per G Tube, Q AM  miconazole, 1 Application, Topical, Q12H  midodrine, 15 mg, Oral, TID AC  saccharomyces boulardii, 250 mg, Per G Tube, BID  sildenafil, 20 mg, Per G Tube, TID  sodium chloride, 10 mL, Intravenous, Q12H  [START ON 11/17/2024] vancomycin, 750 mg, Intravenous, Q24H      amiodarone, 1 mg/min, Last Rate: 1 mg/min (11/16/24 1106)  dexmedetomidine, 0.2-1.5 mcg/kg/hr, Last Rate: 0.4 mcg/kg/hr (11/16/24 1225)  DOPamine, 2-20 mcg/kg/min  EPINEPHrine, 0.01 mcg/kg/min, Last Rate: 0.01 mcg/kg/min (11/16/24 1225)  lidocaine in D5W, 1 mg/min, Last Rate: Stopped (11/05/24 1135)  milrinone, 0.125 mcg/kg/min, Last Rate: 0.125 mcg/kg/min (11/16/24 1225)  niCARdipine, 5-15 mg/hr  norepinephrine, 0.02-0.2 mcg/kg/min, Last Rate: 0.08 mcg/kg/min (11/16/24 1414)  Pharmacy to dose vancomycin,   phenylephrine, 0.2-2 mcg/kg/min  propofol, 5-50 mcg/kg/min, Last Rate: Stopped (11/16/24 4561)  vasopressin, 0.02-0.1 Units/min, Last Rate: Stopped (11/16/24 4294)        Diagnostics:  Adult Transthoracic Echo Limited W/ Cont if Necessary Per Protocol    Result Date: 11/15/2024    Limited study for LV/RV function   Left ventricular  systolic function is normal. Calculated left ventricular EF = 67.9%   Moderately to severely reduced right ventricular systolic function noted.   The right ventricular cavity is severely dilated.   The left atrial cavity is dilated.   The right atrial cavity is dilated.   Moderate tricuspid valve regurgitation is present.   Estimated right ventricular systolic pressure from tricuspid regurgitation is moderately elevated (45-55 mmHg).   S/p aortic valve replacement.  Not well-visualized/assessed.   S/p mitral valve repair with 28 mm [employer] flexible band annuloplasty.     US Nonvascular Extremity Limited    Result Date: 11/15/2024  US NONVASCULAR EXTREMITY LIMITED-11/15/2024  HISTORY: Right groin wound.  The subcutaneous tissues of the right groin superficial to the skin wound is an approximately 11.5 cm x 3.7 cm mixed echotexture hypoechoic area which may contain small amount of cystic change. There is somewhat heterogeneous appearance of the surrounding soft tissues likely related to edema.      1. Ill-defined somewhat loculated hypoechoic collection measuring 11.5 cm x 3.7 cm x 6.4 cm. This could represent hematoma or abscess and contains a small hypoechoic cystic area.   This report was finalized on 11/15/2024 4:35 PM by Dr. Deven Garcia M.D on Workstation: [id number]      XR Chest 1 View    Result Date: 11/15/2024  XR CHEST 1 VW-  HISTORY: 74-year-old male postop AVR, MVR, AICD generator explantation, intra-aortic balloon pump placement on 10/30/2024.   FINDINGS: CHF may be more prominent than on yesterday's chest radiograph. Moderately large left effusion and small-moderate right pleural effusion are likely unchanged. No pneumothorax is seen.  ET tube is approximately 2 cm proximal to the yoni. Left IJ Sumner-Paradise catheter tip is within the proximal main pulmonary artery. Leads of the explanted AICD generator remain in place. There is an NG tube within the esophagus and stomach, distal tip is not  included.  This report was finalized on 11/15/2024 6:31 AM by Dr. Bernadette Gaming M.D on Workstation: [id number]      XR Chest 1 View    Result Date: 11/14/2024  XR CHEST 1 VW-  Clinical: Postop  COMPARISON examination 11/13/2024  FINDINGS: Patient is rotated towards the left as before. Endotracheal tube and feeding tube in position as before. Transvenous pacemaker in place. Bibasilar consolidation as before. Bilateral diffuse interstitial infiltrate also seen, unchanged. There is cardiac enlargement. The mediastinum is stable.  CONCLUSION: Stable imaging of the chest  This report was finalized on 11/14/2024 6:39 AM by Dr. Adal Kimball M.D on Workstation: [id number]      CT Chest Without Contrast Diagnostic    Result Date: 11/13/2024  CT CHEST WO CONTRAST DIAGNOSTIC-, CT ABDOMEN PELVIS WO CONTRAST-  HISTORY: Pulmonary infiltrates; T82.857A-Stenosis of other cardiac prosthetic devices, implants and grafts, initial encounter; Z95.2-Presence of prosthetic heart valve; Z95.2-Presence of prosthetic heart valve  TECHNIQUE: Radiation dose reduction techniques were utilized, including automated exposure control and exposure modulation based on body size. CT of the chest, abdomen, and pelvis includes axial imaging from the thoracic inlet through the trochanters without intravenous contrast and without oral contrast.  COMPARISON: AP chest same date.  FINDINGS: CHEST: There are multifocal coronary artery calcifications. Median sternotomy wires and overlying midline surgical skin staples are present. Cardiomegaly. Left subclavian cardiac pacer/defibrillator leads are discontinuous proximally with pacemaker segment within the left brachiocephalic vein.  There is dense lower lobe consolidation with air bronchograms in both lower lobes which are mostly collapsed. Pulmonary vascular engorgement. Small bilateral pleural effusions layer dependently. 5 mm noncalcified nodule anterior inferior right upper lobe on axial image 37.   Endotracheal tube tip is 3.2 cm above the yoni. Left IJ tip in the left brachiocephalic vein. Feeding tube is looped in the stomach and tip is in the region of the gastric fundus.  Surgical skin staples overlying the left upper anterior chest wall where there has been removal of pacemaker.  ABDOMEN/PELVIS: Within the posterior-inferior central spleen there is a focal area of low density measuring approximately 4.7 x 4.1 cm. This is without change compared to exam 09/18/2024 and may represent splenic infarction. Liver, adrenal glands, pancreas appear within normal limits. There is lobular appearance of the kidneys with areas of cortical thinning and this appears chronic. No hydronephrosis.  Urinary bladder is partially decompressed and exhibits wall thickening. Ugarte catheter is present in the urinary bladder. There is mild to moderate stool throughout the colon. No evidence for bowel obstruction. No ascites or intra-abdominal abscess formation. Atherosclerotic calcifications are present involving the abdominal aorta and iliac vasculature. There is mild edema within the left lateral lower chest and upper abdomen subcutaneous fat extending off the field-of-view laterally. Multilevel bridging plate osteophyte formation within the thoracic spine.      1. Dense bilateral lower lobe consolidation with partial collapse of the lower lobes and air bronchograms likely associated with infiltrate. Pulmonary vascular engorgement. Small pleural effusions layer dependently. 2. 5 mm noncalcified nodule anterior inferior right upper lobe. 3. Recent median sternotomy with overlying midline surgical skin staples. Cardiomegaly. Prosthetic tricuspid valve. Recent removal of left subclavian cardiac pacer with retraction of pacing leads. 4. Mild to moderate stool throughout the colon. No evidence for acute intra-abdominal process. No hydronephrosis. 5. Endotracheal tube tip 3.2 cm above the yoni. Left IJ tip in the left  brachiocephalic vein. Feeding tube tip in the gastric fundus. Ugarte catheter within a decompressed urinary bladder which exhibits generalized wall thickening.    Radiation dose reduction techniques were utilized, including automated exposure control and exposure modulation based on body size.   This report was finalized on 11/13/2024 1:22 PM by Salas Ingram M.D on Workstation: [id number]      CT Abdomen Pelvis Without Contrast    Result Date: 11/13/2024  CT CHEST WO CONTRAST DIAGNOSTIC-, CT ABDOMEN PELVIS WO CONTRAST-  HISTORY: Pulmonary infiltrates; T82.857A-Stenosis of other cardiac prosthetic devices, implants and grafts, initial encounter; Z95.2-Presence of prosthetic heart valve; Z95.2-Presence of prosthetic heart valve  TECHNIQUE: Radiation dose reduction techniques were utilized, including automated exposure control and exposure modulation based on body size. CT of the chest, abdomen, and pelvis includes axial imaging from the thoracic inlet through the trochanters without intravenous contrast and without oral contrast.  COMPARISON: AP chest same date.  FINDINGS: CHEST: There are multifocal coronary artery calcifications. Median sternotomy wires and overlying midline surgical skin staples are present. Cardiomegaly. Left subclavian cardiac pacer/defibrillator leads are discontinuous proximally with pacemaker segment within the left brachiocephalic vein.  There is dense lower lobe consolidation with air bronchograms in both lower lobes which are mostly collapsed. Pulmonary vascular engorgement. Small bilateral pleural effusions layer dependently. 5 mm noncalcified nodule anterior inferior right upper lobe on axial image 37.  Endotracheal tube tip is 3.2 cm above the yoni. Left IJ tip in the left brachiocephalic vein. Feeding tube is looped in the stomach and tip is in the region of the gastric fundus.  Surgical skin staples overlying the left upper anterior chest wall where there has been removal of  pacemaker.  ABDOMEN/PELVIS: Within the posterior-inferior central spleen there is a focal area of low density measuring approximately 4.7 x 4.1 cm. This is without change compared to exam 09/18/2024 and may represent splenic infarction. Liver, adrenal glands, pancreas appear within normal limits. There is lobular appearance of the kidneys with areas of cortical thinning and this appears chronic. No hydronephrosis.  Urinary bladder is partially decompressed and exhibits wall thickening. Ugarte catheter is present in the urinary bladder. There is mild to moderate stool throughout the colon. No evidence for bowel obstruction. No ascites or intra-abdominal abscess formation. Atherosclerotic calcifications are present involving the abdominal aorta and iliac vasculature. There is mild edema within the left lateral lower chest and upper abdomen subcutaneous fat extending off the field-of-view laterally. Multilevel bridging plate osteophyte formation within the thoracic spine.      1. Dense bilateral lower lobe consolidation with partial collapse of the lower lobes and air bronchograms likely associated with infiltrate. Pulmonary vascular engorgement. Small pleural effusions layer dependently. 2. 5 mm noncalcified nodule anterior inferior right upper lobe. 3. Recent median sternotomy with overlying midline surgical skin staples. Cardiomegaly. Prosthetic tricuspid valve. Recent removal of left subclavian cardiac pacer with retraction of pacing leads. 4. Mild to moderate stool throughout the colon. No evidence for acute intra-abdominal process. No hydronephrosis. 5. Endotracheal tube tip 3.2 cm above the yoni. Left IJ tip in the left brachiocephalic vein. Feeding tube tip in the gastric fundus. Ugarte catheter within a decompressed urinary bladder which exhibits generalized wall thickening.    Radiation dose reduction techniques were utilized, including automated exposure control and exposure modulation based on body size.    This report was finalized on 11/13/2024 1:22 PM by Salas Ingram M.D on Workstation: [id number]      XR Chest 1 View    Result Date: 11/13/2024  SINGLE VIEW OF THE CHEST  HISTORY: Postop open heart surgery  COMPARISON: November 12, 2024  FINDINGS: Tubes and lines appear stable. There is vascular congestion. Bibasilar consolidation and bilateral effusions are noted. No pneumothorax is seen.      No significant interval change.  This report was finalized on 11/13/2024 5:37 AM by Dr. Alexandria Dahl M.D on Workstation: [id number]      XR Chest 1 View    Result Date: 11/12/2024  CHEST SINGLE VIEW  HISTORY: 74 years of age, Male. Postoperative exam.  COMPARISON: AP chest 11/11/2024, 11/10/2024, 11/09/2024.  FINDINGS: Endotracheal tube, feeding tube, left subclavian discontinued. Pacing/defibrillator leads, left atrial appendage clip are evident. Left IJ central venous catheter extends to the region of the right atrium. There are postoperative changes on the left where there are surgical skin staples. There is interstitial disease and there are left greater than right basilar opacities due to atelectasis or infiltrates. No evidence for pneumothorax      No evidence for significant change when compared to yesterday's exam.   This report was finalized on 11/12/2024 8:02 AM by Salas Ingram M.D on Workstation: [id number]      US Thoracentesis    Result Date: 11/11/2024  ULTRASOUND-GUIDED LEFT THORACENTESIS  HISTORY: Pleural effusion  COMPARISON: Chest x-ray 11/11/2024  The risks, benefits and alternatives of the procedure were discussed with the patient and informed consent was obtained. Prior to the procedure ultrasound of the left chest was performed to evaluate the pleural effusion. However, there is only very minimal pleural fluid present, insufficient for thoracentesis.       Only very minimal pleural fluid present. Thoracentesis not performed.   This report was finalized on 11/11/2024 4:55 PM by Dr. Moran  ABBY Knapp M.D on Workstation: [id number]      Adult Transthoracic Echo Limited W/ Cont if Necessary Per Protocol    Result Date: 11/11/2024    Left ventricular systolic function is normal. Calculated left ventricular EF = 67.8%   RV severely dilated with moderate-severe dysfunction.  Mild-moderate RVH.   S/p aortic valve replacement with homograft.  Not well-visualized/assessed.   S/p mitral valve repair with 28 mm Medtronic flexible band annuloplasty.   Severe tricuspid valve regurgitation is present.   Estimated right ventricular systolic pressure from tricuspid regurgitation is markedly elevated (>55 mmHg).   Severe biatrial enlargement, dilated IVC.     XR Chest 1 View    Result Date: 11/11/2024  XR CHEST 1 VW-  DATE OF EXAM: 11/11/2024 8:03 AM  INDICATION: Postop. Endotracheal tube advancement.  COMPARISON: Radiographs 11/10/2024, 11/9/2024, 11/8/2024, and 11/7/2024. Coronary CTA 10/25/2024. CT chest 9/18/2024.  TECHNIQUE: A single portable AP view of the chest was obtained.  FINDINGS: Lordotic positioning. Multiple overlying artifacts. Similar-appearing sternotomy wires, retained cardiac pacer/defibrillator leads, mitral annuloplasty, endotracheal tube, left IJ pulmonary arterial catheter, and partially imaged enteric tube. Similar-appearing basilar predominant opacification of both lungs. No pneumothorax. Unchanged cardiac and mediastinal contours. No acute osseous abnormality is identified.       1. No significant herbal change. Stable support tubes and line. 2. Stable basilar prominent lung opacities.  This report was finalized on 11/11/2024 9:01 AM by Neel Wan MD on Workstation: [id number]      Duplex Venous Upper Extremity - Bilateral CAR    Result Date: 11/11/2024    Sub-acute right upper extremity superficial thrombophlebitis noted in the basilic (upper arm).   Sub-acute left upper extremity deep vein thrombosis noted in the axillary.   Acute left upper extremity superficial thrombophlebitis noted  in the cephalic (forearm).   All other vessels appear normal.     XR Chest 1 View    Result Date: 11/10/2024  XR CHEST 1 VW-   INDICATION: Central line placement  COMPARISON: Chest radiograph November 9, 2024  TECHNIQUE: 1 view chest  FINDINGS:  Vascular congestion. Indistinctness of vessels. Ill-defined basilar opacities. Blunting costophrenic angles. Stable mediastinum. Enlarged cardiac silhouette. Left atrial appendage clip. Median sternotomy. Right IJ sheath with catheter tip near the SVC bifurcation. Endotracheal tube tip is in the distal trachea. Left IJ catheter containing a pulmonary artery catheter, with the tip near the right ventricular outflow tract. Feeding tube tip is in the gastric fundus.       1. Interval placement of left IJ sheath containing a pulmonary artery catheter with the tip near the right ventricular outflow tract. No pneumothorax. 2. Cardiomegaly with vascular congestion and suspected interstitial edema. 3. Ill-defined basilar opacities are similar. 4. Layering pleural effusions  This report was finalized on 11/10/2024 10:21 AM by Dr. Alberto Dominique M.D on Workstation: [id number]      [place] catheter    Result Date: 11/10/2024  Matthieu Scott MD     11/10/2024 10:16 AM [place] catheter Patient reassessed immediately prior to procedure Patient location during procedure: ICU Indications: MD/Surgeon request Staff Anesthesiologist: Matthieu Scott MD Preanesthetic Checklist Completed: patient identified, IV checked, site marked, risks and benefits discussed, surgical consent, monitors and equipment checked, pre-op evaluation and timeout performed Central Line Prep Sterile Tech:gloves, cap, gown, mask and sterile barriers Prep: chloraprep no medical exclusion documented for following all elements of maximal sterile barrier technique Patient monitoring: blood pressure monitoring, continuous pulse oximetry and EKG Central Line Procedure Laterality:right Location:internal  jugular Catheter Type:[place] Catheter Size:7.5 Fr Guidance:ultrasound guided PROCEDURE NOTE/ULTRASOUND INTERPRETATION.  Using ultrasound guidance the potential vascular sites for insertion of the catheter were visualized to determine the patency of the vessel to be used for vascular access.  After selecting the appropriate site for insertion, the needle was visualized under ultrasound being inserted into the internal jugular vein, followed by ultrasound confirmation of wire and catheter placement. There were no abnormalities seen on ultrasound; an image was taken; and the patient tolerated the procedure with no complications. Images: still images obtained, printed/placed on chart Assessment Post procedure:biopatch applied, line sutured and occlusive dressing applied Assessement:blood return through all ports and free fluid flow Complications:no Patient Tolerance:patient tolerated the procedure well with no apparent complications Additional Notes SGC @     Central Line    Result Date: 11/10/2024  Matthieu Scott MD     11/10/2024 10:16 AM Central Line Patient reassessed immediately prior to procedure Patient location during procedure: ICU Indications: MD/Surgeon request Staff Anesthesiologist: Matthieu Scott MD Preanesthetic Checklist Completed: patient identified, IV checked, site marked, risks and benefits discussed, surgical consent, monitors and equipment checked, pre-op evaluation and timeout performed Central Line Prep Sterile Tech:gloves, cap, gown, mask and sterile barriers Prep: chloraprep no medical exclusion documented for following all elements of maximal sterile barrier technique Patient monitoring: blood pressure monitoring, continuous pulse oximetry and EKG Central Line Procedure Laterality:left Location:internal jugular Catheter Type:MAC Catheter Size:9 Fr Guidance:ultrasound guided PROCEDURE NOTE/ULTRASOUND INTERPRETATION.  Using ultrasound guidance the potential vascular sites for  insertion of the catheter were visualized to determine the patency of the vessel to be used for vascular access.  After selecting the appropriate site for insertion, the needle was visualized under ultrasound being inserted into the internal jugular vein, followed by ultrasound confirmation of wire and catheter placement. There were no abnormalities seen on ultrasound; an image was taken; and the patient tolerated the procedure with no complications. Images: still images obtained, printed/placed on chart Assessment Post procedure:biopatch applied, line sutured and occlusive dressing applied Assessement:blood return through all ports and free fluid flow Complications:no Patient Tolerance:patient tolerated the procedure well with no apparent complications     XR Chest 1 View    Result Date: 11/9/2024  SINGLE VIEW OF THE CHEST  HISTORY: Central line placement  COMPARISON: November 8, 2024  FINDINGS: Tubes and lines appear stable compared to prior study. No pneumothorax is seen. Cardiomegaly and vascular congestion are noted. Bibasilar consolidation and left pleural effusion are again noted. Aeration of the left lung base They improved.      Slight interval improvement in aeration of the left lung base.  This report was finalized on 11/9/2024 3:33 AM by Dr. Alexandria Dahl M.D on Workstation: [id number]      Duplex Venous Lower Extremity - Bilateral CAR    Result Date: 11/8/2024    Normal bilateral lower extremity venous duplex scan.   Previous left gastrocnemius vein thrombus not visualized on today's study     Adult Transthoracic Echo Limited W/ Cont if Necessary Per Protocol    Result Date: 11/8/2024    Left ventricular systolic function is normal. Calculated left ventricular EF = 61.1%   Left ventricular diastolic function was indeterminate.   The right ventricular cavity is moderately dilated. Normal right ventricular systolic function noted. RV free wall strain = -6.0%.   The left atrial cavity is mildly  dilated.     XR Chest 1 View    Result Date: 11/8/2024  XR CHEST 1 VW-  Clinical: Postop  COMPARISON examination 11/7/2024  FINDINGS: Patient is rotated, projection is apical lordotic. Life support tubes and lines in position as before. There are surgical skin staples demonstrated along the chest on the left. The cardiomediastinal silhouette is unchanged, there is cardiac enlargement. There is worsening bibasilar infiltrate/atelectasis and/or consolidation, greatest at the left base. Possible left-sided pleural effusion. No pneumothorax seen. The remainder is unremarkable.  This report was finalized on 11/8/2024 7:01 AM by Dr. Adal Kimball M.D on Workstation: [id number]      Adult Transthoracic Echo Limited W/ Cont if Necessary Per Protocol    Result Date: 11/7/2024    Limited echocardiogram for left and right ventricular function   Left ventricular systolic function is hyperdynamic (EF > 70%). Left ventricular ejection fraction appears to be greater than 70%.   Left ventricular wall thickness is consistent with mild concentric hypertrophy.   The right ventricular cavity is severely dilated.   Moderately reduced right ventricular systolic function noted.   Calculated right ventricular systolic pressure from tricuspid regurgitation is 41.0 mmHg.     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 Right IJ pulmonary artery catheter with tip overlying the RVOT/main pulmonary artery. Enteric tube with tip overlying the stomach.  Stable normal size cardiomediastinal silhouette with postsurgical changes of mitral valve repair and left atrial appendage clipping. Low lung volumes. Persistent confluent dense bilateral lobe opacities which are suspicious for infiltrates. Central pulmonary vasculature remains somewhat  dilated and indistinct with prominent pulmonary interstitial markings which may reflect a component of edema. No measurable pleural effusion or pneumothorax. No focal osseous abnormality.  This report was finalized on 11/7/2024 6:54 AM by Dr. Donovan Gomez M.D on Workstation: [id number]      XR Chest 1 View    Result Date: 11/6/2024  XR CHEST 1 VW-  HISTORY: Male who is 74 years-old, postoperative evaluation  TECHNIQUE: Frontal view of the chest  COMPARISON: 11/5/2024  FINDINGS: Stable appearing endotracheal tube, NG tube, right IJ catheter. Cardiac lead fragments, sternotomy wires, skin staples noted. The heart is enlarged. Pulmonary vasculature is congested with similar-appearing bilateral lower lung opacities. There may be minimal pleural effusions. No pneumothorax. Otherwise stable.      No significant change.  This report was finalized on 11/6/2024 12:48 PM by Dr. Giacomo Burton M.D on Workstation: [id number]      XR Chest 1 View    Result Date: 11/5/2024  XR CHEST 1 VW-11/5/2024  HISTORY: Postop. Follow-up.  The heart size is moderately enlarged. Mild to moderate bibasilar atelectasis or infiltrates are seen. The right base has cleared somewhat since yesterday's study.  No significant pneumothorax is seen. Skin staples, pacer leads, ET tube, NG tube, [place] catheter, sternotomy wires and radiopaque closure device are again seen. These appear relatively unchanged from yesterday's study.      1. No significant pneumothorax is seen. 2. Bibasilar atelectasis and/or infiltrates. The right base may have cleared slightly since yesterday's study. 3. Life support devices appear relatively unchanged.   This report was finalized on 11/5/2024 6:55 AM by Dr. Deven Garcia M.D on Workstation: [id number]      Adult Transthoracic Echo Limited W/ Cont if Necessary Per Protocol    Result Date: 11/4/2024    Left ventricular systolic function is hyperdynamic (EF > 70%).   Left ventricular wall thickness is consistent  with mild concentric hypertrophy.   The right ventricular cavity is severely dilated. Severely reduced right ventricular systolic function noted.   There is no evidence of pericardial effusion     XR Chest 1 View    Result Date: 11/4/2024  XR CHEST 1 VW-  HISTORY: Male who is 74 years-old, chest tube removal  TECHNIQUE: Frontal view of the chest  COMPARISON: 11/4/2024 at 1025 hours  FINDINGS: Interval removal of chest tubes. Stable appearing NG tube, endotracheal tube, right IJ [place] catheter. Cardiac lead fragments are evident. Sternotomy wires, cardiac valve marker are noted. The heart is enlarged. Pulmonary vasculature is congested. Atelectasis/infiltrate at the lower lungs, similar to prior exam, with likely mild pleural effusions. No pneumothorax. No acute osseous process.      Chest tubes removed. No pneumothorax.  This report was finalized on 11/4/2024 11:51 AM by Dr. Giacomo Burton M.D on Workstation: [id number]      XR Chest 1 View    Result Date: 11/4/2024  XR CHEST 1 VW-  HISTORY: Male who is 74 years-old, chest tube removal  TECHNIQUE: Frontal view of the chest  COMPARISON: 11/4/2024  FINDINGS: A right chest tube has been removed. Other chest tubes appear stable. Stable appearing NG tube, endotracheal tube, right IJ [place] catheter. Cardiac lead fragments are evident. Sternotomy wires, cardiac valve marker are noted. The heart is enlarged. Pulmonary vasculature is congested. Atelectasis/infiltrate at the lower lungs, similar to prior exam, with likely mild pleural effusions. No pneumothorax. No acute osseous process.      Chest tube removed. No pneumothorax.  This report was finalized on 11/4/2024 10:39 AM by Dr. Giacomo Burton M.D on Workstation: [id number]      XR Chest 1 View    Result Date: 11/4/2024  XR CHEST 1 VW-   INDICATION: Postoperative  COMPARISON: Chest radiograph November 3, 2024  TECHNIQUE: 1 view chest  FINDINGS:  Heterogeneous multifocal bilateral lung opacities. Questionable  tiny left pneumothorax. Stable mediastinum. Suspect CABG. Left atrial appendage clip. Endotracheal tube tip is located in the distal trachea 4.3 cm above the yoni. Right IJ sheath with pulmonary artery catheter tip over the main pulmonary artery. Feeding tube with the tip in the gastric fundus. Abandoned cardiac leads. Left-sided chest tube. Mediastinal drains.       1. Heterogeneous multifocal bilateral lung opacities with improved aeration of the right upper lung. 2. Possible tiny left pneumothorax. 3. Stable support apparatus.  This report was finalized on 11/4/2024 8:31 AM by Dr. Alberto Dominique M.D on Workstation: [id number]      Duplex Pseudoaneurysm CAR    Result Date: 11/3/2024    Study today is negative for pseudoaneurysm of the left groin.  Hematoma measuring 2.3 x 1.3 cm.     Adult Transthoracic Echo Limited W/ Cont if Necessary Per Protocol    Result Date: 11/3/2024    Left ventricular systolic function is hyperdynamic (EF > 70%). Left ventricular ejection fraction appears to be greater than 70%.   The left ventricular cavity is borderline dilated.   Moderately reduced right ventricular systolic function noted.   The right ventricular cavity is moderate to severely dilated.   The left atrial cavity is severely dilated.   The right atrial cavity is mild to moderately  dilated.   Color doppler jet is not well visualized but triangular shap and density indicate severe tricuspid valve regurgitation is present.   Estimated right ventricular systolic pressure from tricuspid regurgitation is mildly elevated (35-45 mmHg).   Mitral ring in place with normal gradients.   Prosthetic AV not well visualized but gradients are normal.   No pericardial effusion appreciated.     XR Chest 1 View    Result Date: 11/3/2024  XR CHEST 1 VW-  HISTORY: Male who is 74 years-old, postoperative evaluation  TECHNIQUE: Supine view of the chest  COMPARISON: 11/2/2024  FINDINGS: Stable appearing endotracheal tube, NG tube, right  IJ catheter, mediastinal drain, balloon pump marker. Cardiac leads, sternotomy wires, cardiac valve marker noted. The heart is enlarged. Pulmonary vasculature is congested. Extensive bilateral pulmonary opacities appear similar to prior exam. No large pleural effusion or pneumothorax is seen, limited by supine positioning, left lateral costophrenic angle is excluded from the image. Otherwise stable.      No significant change.    This report was finalized on 11/3/2024 7:10 AM by Dr. Giacomo Burton M.D on Workstation: [email or web address]      XR Chest 1 View    Result Date: 11/2/2024  XR CHEST 1 VW-  HISTORY: Postop.  COMPARISON: Chest radiograph 11/2/2024 9:14 a.m.  FINDINGS:  A single view of the chest was obtained. There is an endotracheal tube with the tip terminating approximately 2.6 cm above the yoni. There is a linear metallic density projecting over the aortic knob, which was previously located over the lower mediastinum. This may reflect a balloon pump with repositioning. Additional support devices are not significantly changed. The cardiac silhouette is enlarged. There is a left atrial appendage clip. Status post CABG. There is calcific aortic atherosclerosis. Bilateral pulmonary opacities are similar to prior. Sternal wires and surgical clips are present.  This report was finalized on 11/2/2024 2:24 PM by Dr. Janessa Bower M.D on Workstation: [id number]      XR Abdomen KUB    Result Date: 11/2/2024  XR ABDOMEN KUB-  INDICATIONS: NG tube placement  TECHNIQUE: SUPINE VIEW OF THE ABDOMEN  COMPARISON: 10/31/2024  FINDINGS:  In the partly included thorax, the NG tube projects over the spine, that extends to left upper quadrant at the expected location of the proximal stomach. The bowel gas pattern is nonobstructive. Follow-up as clinically indicated.       As described.   This report was finalized on 11/2/2024 2:09 PM by Dr. Giacomo Burton M.D on Workstation: [email or web address]      XR Chest 1 View    Result Date:  11/2/2024  XR CHEST 1 VW-  HISTORY: Male who is 73 years-old, postoperative evaluation  TECHNIQUE: Frontal view of the chest  COMPARISON: 11/2/2024 at 0134 hours  FINDINGS: Previous NG tube is no longer seen. Stable appearing endotracheal tube. Right IJ [place] catheter appears slightly advanced in comparison with the prior exam, tip in the region of the pulmonary arterial trunk. Normal chest tube removal. A 7-8 mm metallic radiodensity projecting over the central aspect of the cardiac shadow, if representing balloon pump marker would be low in position, correlate clinically. Sternotomy wires, cardiac valve marker noted. The heart is enlarged. Extensive bilateral pulmonary opacities show further interval increase bilaterally. There may be left pleural effusion. No pneumothorax. Otherwise stable.       As described.  Discussed by telephone with patient's nurse, [place], at time of interpretation, 1012, 11/2/2024  This report was finalized on 11/2/2024 10:14 AM by Dr. Giacomo Burton M.D on Workstation: [id number]      Arterial Line    Result Date: 11/2/2024  Anuj Aguilar MD     11/2/2024  7:24 AM Arterial Line Patient reassessed immediately prior to procedure Patient location during procedure: OR Line placed for hemodynamic monitoring, ABGs/Labs/ISTAT and MD/Surgeon request. Performed By Anesthesiologist: Anuj Aguilar MD Preanesthetic Checklist Completed: patient identified, IV checked, site marked, risks and benefits discussed, surgical consent, monitors and equipment checked, pre-op evaluation and timeout performed Arterial Line Prep  Sterile Tech: cap, gloves and sterile barriers Prep: ChloraPrep Patient monitoring: EKG, continuous pulse oximetry and blood pressure monitoring Arterial Line Procedure Laterality:right Location:  radial artery Catheter size: 20 G Guidance: ultrasound guided PROCEDURE NOTE/ULTRASOUND INTERPRETATION.  Using ultrasound guidance the potential vascular sites for insertion of the  catheter were visualized to determine the patency of the vessel to be used for vascular access.  After selecting the appropriate site for insertion, the needle was visualized under ultrasound being inserted into the radial artery, followed by ultrasound confirmation of wire and catheter placement. There were no abnormalities seen on ultrasound; an image was taken; and the patient tolerated the procedure with no complications. Number of attempts: 1 Successful placement: yes Images: still images not obtained Post Assessment Dressing Type: wrist guard applied, secured with tape and occlusive dressing applied. Complications no Circ/Move/Sens Assessment: normal and unchanged. Patient Tolerance: patient tolerated the procedure well with no apparent complications Additional Notes ULTRASOUND INTERPRETATION. Using ultrasound guidance, a catheter was placed within in the appropriate vessel and advanced successfully. There were no abnormalities seen on ultrasound; the patient tolerated the procedure with no complications.     Diagnostic IntraOp Dano    Result Date: 11/2/2024  Leana Jang MD     11/2/2024  8:06 AM Diagnostic IntraOp Dano Procedure Performed: Diagnostic IntraOp Dano      Start Time:  11/2/2024 8:04 AM      End Time:   11/2/2024 8:04 AM Preanesthesia Checklist: Patient identified, IV assessed, risks and benefits discussed, monitors and equipment assessed, procedure being performed at surgeon's request and anesthesia consent obtained. General Procedure Information DANO Placed for monitoring purposes only -- This is not a diagnostic DANO Diagnostic Indications for Echo:  hemodynamic monitoring Physician Requesting Echo: Javon Florian MD Location performed:  OR Intubated Bite block placed Heart visualized Probe Insertion:  Easy Probe Type:  Multiplane Modalities:  2D only, color flow mapping, continuous wave Doppler and pulse wave Doppler Anesthesia Information Performed Personally Anesthesiologist:  Suhail  Leana MILLER MD Echocardiogram Comments:      Limited exam for hemodynamic monitoring while closing chest    XR Chest 1 View    Result Date: 11/2/2024  XR CHEST 1 VW-  HISTORY: Male who is 73 years-old, balloon pump  TECHNIQUE: Frontal view of the chest  COMPARISON: 11/1/2024  FINDINGS: Stable-appearing tubes and line. The heart is enlarged. Extensive bilateral pulmonary opacities show interval increase, especially in the right upper lung. There may be left pleural effusion. No pneumothorax. Otherwise stable.      As described.  This report was finalized on 11/2/2024 5:50 AM by Dr. Giacomo Burton M.D on Workstation: [id number]      XR Chest 1 View    Result Date: 11/1/2024  SINGLE VIEW OF THE CHEST  HISTORY: Postop open heart surgery  COMPARISON: October 31, 2024  FINDINGS: Tubes and lines appear stable, including possible positioning of the [place] catheter within the right ventricle. Vascular congestion is again noted. No pneumothorax is seen. Bibasilar atelectasis and small bilateral effusions are suspected.       No significant interval change.  This report was finalized on 11/1/2024 5:18 AM by Dr. Alexandria Dahl M.D on Workstation: [id number]      XR Abdomen KUB    Result Date: 11/1/2024  KUB  HISTORY: Orogastric tube placement  COMPARISON: None available.  FINDINGS: Orogastric tube appears to terminate within the proximal stomach. Bowel gas pattern is nonobstructive. Bibasilar consolidation and bilateral effusions are noted.  This report was finalized on 11/1/2024 4:08 AM by Dr. Alexandria Dahl M.D on Workstation: [id number]      XR Chest 1 View    Result Date: 10/31/2024  SINGLE VIEW OF THE CHEST  HISTORY: Orogastric tube placement  COMPARISON: None available.  FINDINGS: Orogastric tube appears to extend into the upper abdomen. The patient's [place] catheter has retracted, and is likely positioned within the right ventricle. Tubes and lines are otherwise stable. Cardiomegaly and vascular  congestion are noted. Bibasilar consolidation is noted. Bilateral effusions are suspected. No pneumothorax is seen.       1. Orogastric tube appears to extend into the upper abdomen. 2. [place] catheter has retracted, and may be positioned within the right ventricle.  This report was finalized on 10/31/2024 9:51 PM by Dr. Alexandria Dahl M.D on Workstation: [id number]      XR Abdomen KUB    Addendum Date: 10/31/2024    ADDENDUM: Discussed by telephone with patient's nurse, Letty, 2044, 10/31/2024.  This report was finalized on 10/31/2024 8:47 PM by Dr. Giacomo Burton M.D on Workstation: [id number]      Result Date: 10/31/2024  XR ABDOMEN KUB-  INDICATIONS: Orogastric tube placement  TECHNIQUE: FRONTAL VIEW OF THE ABDOMEN  COMPARISON: None available  FINDINGS:  As several tubes are present over the abdomen, it is difficult to distinguish with certainty which tube is the orogastric tube, or what the location of the orogastric tube is; as such, additional imaging at the level of the chest and upper abdomen is recommended for further evaluation. The bowel gas pattern is nonobstructive.       As described.   This report was finalized on 10/31/2024 8:40 PM by Dr. Giacomo Burton M.D on Workstation: [id number]      XR Chest 1 View    Result Date: 10/31/2024  XR CHEST 1 VW-  HISTORY: 73-year-old male status post aortic root replacement, removal of intracardiac leads as well as pacemaker ICD generator and part of the leads. Intra-aortic balloon pump. Significant coagulopathy.  FINDINGS: Distal tip of ET tube is 1.5 cm proximal to the yoni. 2 cm withdrawal is recommended. Right IJ [place] catheter tip is within the main pulmonary artery outflow tract. Intra-aortic balloon pump marker is approximately 7 cm distal to the yoni. No pneumothorax.  There is improved aeration at the right lung, but the remaining perihilar airspace consolidations need to be followed. There is no definite interstitial edema. No significant  change in the enlarged cardiac silhouette. No pneumothorax.  This report was finalized on 10/31/2024 4:31 PM by Dr. Bernadette Gaming M.D on Workstation: [id number]      Diagnostic IntraOp Dano    Result Date: 10/31/2024  Anuj Aguilar MD     10/31/2024  3:58 PM Diagnostic IntraOp Dano Procedure Performed: Diagnostic IntraOp Dano      Start Time:      End Time:  Preanesthesia Checklist: Patient identified, IV assessed, risks and benefits discussed, monitors and equipment assessed, procedure being performed at surgeon's request and anesthesia consent obtained. General Procedure Information DANO Placed for monitoring purposes only -- This is not a diagnostic DANO Physician Requesting Echo: Javon Florian MD Location performed:  ICU Intubated Bite block placed Heart visualized Probe Insertion:  Easy Probe Type:  Multiplane Modalities:  Color flow mapping, continuous wave Doppler and pulse wave Doppler Echocardiographic and Doppler Measurements Aorta Other Aortic Findings:      Anesthesia Information Performed Personally Anesthesiologist:  Anuj Aguilar MD Echocardiogram Comments:      Limited exam performed in ICU on POD1, inotropes epi 0.02 & milrinone 0.375 RV - severely impaired systolic function LV - severe impaired systolic function, EF 20-25%, underfilled, practically kissing papillary muscles, global hypokinesis Diastolic function - grade 2 dysfunction RA: dilated LA: dilated AV - s/p bioprosthetic homograft without paravalvular leak MV - s/p annuloplasty, well seated without leak, mean gradient 2mmHg, no regurgitation seen TV - moderate to severe regurgitation    XR Chest 1 View    Result Date: 10/31/2024  SINGLE VIEW OF THE CHEST  HISTORY: Postop line check  COMPARISON: October 30, 2024  FINDINGS: Tubes and lines appear to be stable when compared to the earlier study. No pneumothorax is seen. There is vascular congestion. There is bibasilar atelectasis. There is a left pleural effusion. As previously noted,  intracardiac pacemaker leads have been removed, and the generator also appears to have been removed.      Postoperative findings, as noted above.  This report was finalized on 10/31/2024 12:18 AM by Dr. Alexandria Dahl M.D on Workstation: [id number]      XR Lost Needle / Instrument    Result Date: 10/30/2024  X-RAY LOST NEEDLE/INSTRUMENT  HISTORY: No count  COMPARISON: October 30, 2024  FINDINGS: The patient has undergone revision of sternotomy. Endotracheal tube terminates in satisfactory position. Right internal jugular vein [place] catheter appears to be stable in position. There are left-sided chest tubes and a mediastinal drain. Distal left-sided pacemaker leads appear to have been removed. Correlation with operative procedure is recommended. There is vascular congestion. No obvious pneumothorax is seen. Left basilar atelectasis is present. There may be a left pleural effusion.      Postoperative findings, as noted above. Distal left-sided pacemaker leads appear to have been removed, and correlation with procedural history is recommended.  This report was finalized on 10/30/2024 10:58 PM by Dr. Alexandria Dahl M.D on Workstation: [id number]      XR Chest Post CVA Port    Result Date: 10/30/2024  Portable chest radiograph  HISTORY: Central line placement  TECHNIQUE: Single AP portable radiograph of the chest  COMPARISON: Chest radiograph 10/20/2024      FINDINGS AND IMPRESSION: Right internal jugular pulmonary artery catheter tip terminates over the left aspect of the mediastinum. There are median sternotomy wires and a presumed atrial appendage clip. Prosthetic heart valve and a left-sided pacer/AICD.  Bibasilar pulmonary pacification is present, left greater than right, mildly worsened within the right lung since 10/20/2024. Given asymmetry, continued attention follow-up to resolution is recommended to exclude the possibility of neoplasm. No pneumothorax is seen. Cardiac silhouette is mildly  enlarged.  This report was finalized on 10/30/2024 12:20 PM by Dr. Danial Rosenbaum M.D on Workstation: [id number]        Pulmonary Artery Catheter    Result Date: 10/30/2024  Lionel Valencia MD     10/30/2024 11:03 AM Pulmonary Artery Catheter Patient reassessed immediately prior to procedure Patient location during procedure: OR Start time: 10/30/2024 10:26 AM Stop Time:10/30/2024 10:46 AM Indications: central pressure monitoring, vascular access and MD/Surgeon request Staff Anesthesiologist: Lionel Valencia MD Preanesthetic Checklist Completed: patient identified and risks and benefits discussed Central Line Prep Sterile Tech:cap, gloves, gown, mask and sterile barriers Prep: chloraprep Patient monitoring: blood pressure monitoring, continuous pulse oximetry and EKG Central Line Procedure Laterality:right Location:internal jugular Catheter Type:[place] Catheter Size:7.5 Fr Guidance:ultrasound guided PROCEDURE NOTE/ULTRASOUND INTERPRETATION.  Using ultrasound guidance the potential vascular sites for insertion of the catheter were visualized to determine the patency of the vessel to be used for vascular access.  After selecting the appropriate site for insertion, the needle was visualized under ultrasound being inserted into the internal jugular vein, followed by ultrasound confirmation of wire and catheter placement. There were no abnormalities seen on ultrasound; an image was taken; and the patient tolerated the procedure with no complications. Assessment Post procedure:biopatch applied, line sutured, occlusive dressing applied and secured with tape Assessement:blood return through all ports and free fluid flow Complications:no Patient Tolerance:patient tolerated the procedure well with no apparent complications     Central Line    Result Date: 10/30/2024  Lionel Valencia MD     10/30/2024 11:02 AM Central Line Patient reassessed immediately prior to procedure Patient location during procedure: pre-op Start  time: 10/30/2024 10:26 AM Stop Time:10/30/2024 10:46 AM Indications: vascular access and central pressure monitoring Staff Anesthesiologist: Lionel Valencia MD Preanesthetic Checklist Completed: patient identified and risks and benefits discussed Central Line Prep Sterile Tech:cap, gloves, gown, mask and sterile barriers Prep: chloraprep Patient monitoring: blood pressure monitoring, continuous pulse oximetry and EKG Central Line Procedure Laterality:right Location:internal jugular Catheter Type:Cordis Catheter Size:9 Fr Guidance:ultrasound guided PROCEDURE NOTE/ULTRASOUND INTERPRETATION.  Using ultrasound guidance the potential vascular sites for insertion of the catheter were visualized to determine the patency of the vessel to be used for vascular access.  After selecting the appropriate site for insertion, the needle was visualized under ultrasound being inserted into the internal jugular vein, followed by ultrasound confirmation of wire and catheter placement. There were no abnormalities seen on ultrasound; an image was taken; and the patient tolerated the procedure with no complications. Images: still images obtained, printed/placed on chart Assessment Post procedure:biopatch applied, line sutured, occlusive dressing applied and secured with tape Assessement:blood return through all ports and chest x-ray ordered Complications:no Patient Tolerance:patient tolerated the procedure well with no apparent complications Additional Notes Ultrasound Interpretation:  Using ultrasound guidance the potential vascular sites for insertion of the catheter were visualized to determine the patency of the vessel to be used for vascular access.  After selecting the appropriate site for insertion, the needle was visualized under ultrasound being inserted into the vessel, followed by ultrasound confirmation of wire and catheter placement.  There were no abnormalities seen on ultrasound; an image was taken/ and the patient  tolerated the procedure with no complications.     Diagnostic IntraOp Dano    Result Date: 10/30/2024  Azar Macias MD     10/30/2024  2:48 PM Diagnostic IntraOp Dano Procedure Performed: Diagnostic IntraOp Dano      Start Time:      End Time:  Preanesthesia Checklist: Patient identified, IV assessed, risks and benefits discussed, monitors and equipment assessed, procedure being performed at surgeon's request and anesthesia consent obtained. General Procedure Information Diagnostic Indications for Echo:  assessment of ascending aorta, assessment of surgical repair and hemodynamic monitoring Physician Requesting Echo: Javon Florian MD CPT Code:  09111, 21840 ICD Code for Medical Necessity:  I34.0, I70.0 Location performed:  OR Intubated Bite block placed Heart visualized Probe Insertion:  Easy Probe Type:  Multiplane Modalities:  Color flow mapping, pulse wave Doppler and continuous wave Doppler Echocardiographic and Doppler Measurements Ventricles Right Ventricle: Cavity size dilated.  Hypertrophy not present.  Thrombus not present.  Global function mildly impaired.  Left Ventricle: Cavity size dilated.  Thrombus not present.  Global Function mildly impaired.  Ejection Fraction 58%.  Other Ventricular Findings:      LVEDV 155 mL Valves Aortic Valve: Annulus bioprosthetic.  Stenosis mild.  Mean Gradient: 9 mmHg.  Regurgitation absent.  Leaflets vegetative.  Leaflet motions restricted.  Mitral Valve: Annulus dilated.  Stenosis not present.  Mean Gradient: 1 mmHg.  Regurgitation moderate.  Leaflets normal.  Leaflet motions normal.  Tricuspid Valve: Annulus dilated.  Stenosis not present.  Regurgitation mild.  Leaflets normal.  Other Valve Findings:      Anterior mitral leaflet length 3.1 cm Aorta Ascending Aorta: Size normal.  Diameter 3.5 cm.  Dissection not present.  Plaque thickness less than 3 mm.  Mobile plaque not present.  Aortic Arch: Size normal.  Diameter 3 cm.  Dissection not present.  Plaque thickness  less than 3 mm.  Mobile plaque not present.  Descending Aorta: Size normal.  Diameter 2.5 cm.  Dissection not present.  Plaque thickness less than 3 mm.  Mobile plaque not present.  Atria Right Atrium: Size dilated.  Spontaneous echo contrast present.  Thrombus not present.  Tumor not present.  Device not present.  Left Atrium: Size dilated.  Spontaneous echo contrast present.  Thrombus not present.  Tumor not present.  Device not present.  Left atrial appendage normal. Diastolic Function Measurements: Diastolic Dysfunction Grade= indeterminate E=  51.7 ms A=  ms E/A Ratio= DT=  108 ms S/D=  .6 IVRT= Other Findings Pericardium:  pericardial effusion Pulmonary Venous Flow:  blunted (decreased) systolic flow Anesthesia Information Performed Personally Anesthesiologist:  Azar Macias MD Echocardiogram Comments:      Postbypass results:    Arterial Line    Result Date: 10/30/2024  Lionel Valencia MD     10/30/2024 11:02 AM Arterial Line Patient reassessed immediately prior to procedure Patient location during procedure: pre-op Start time: 10/30/2024 10:15 AM Stop Time:10/30/2024 10:17 AM  Line placed for hemodynamic monitoring. Performed By Anesthesiologist: Lionel Valencia MD Preanesthetic Checklist Completed: patient identified and risks and benefits discussed Arterial Line Prep  Sterile Tech: gloves Prep: ChloraPrep Patient monitoring: blood pressure monitoring, continuous pulse oximetry and EKG Arterial Line Procedure Laterality:left Location:  radial artery Catheter size: 20 G Guidance: ultrasound guided PROCEDURE NOTE/ULTRASOUND INTERPRETATION.  Using ultrasound guidance the potential vascular sites for insertion of the catheter were visualized to determine the patency of the vessel to be used for vascular access.  After selecting the appropriate site for insertion, the needle was visualized under ultrasound being inserted into the radial artery, followed by ultrasound confirmation of wire and catheter  placement. There were no abnormalities seen on ultrasound; an image was taken; and the patient tolerated the procedure with no complications. Successful placement: yes Images: still images obtained, printed/placed on the chart Post Assessment Dressing Type: occlusive dressing applied and secured with tape. Complications no Patient Tolerance: patient tolerated the procedure well with no apparent complications Additional Notes Using ultrasound guidance the potential vascular sites for insertion of the catheter were visualized to determine the patency of the vessel to be used for vascular access.  After selecting the appropriate site for insertion, the needle was visualized under ultrasound being inserted into the artery, followed by ultrasound confirmation of wire and catheter placement.  There were no abnormalities seen on ultrasound; an image was taken/ and the patient tolerated the procedure with no complications.     Duplex Vein Mapping Lower Extremity - Bilateral CAR    Result Date: 10/28/2024    The right great saphenous vein is patent  and of adequate size in the thigh.   The right great saphenous vein is patent and of adequate size in the calf.   The left great saphenous vein is patent and of adequate size in the thigh.   The left great saphenous vein is patent  and of adequate size in the calf.     Carotid Duplex - Bilateral    Result Date: 10/28/2024    Right internal carotid artery demonstrates a less than 50% stenosis.   Antegrade right vertebral flow.   Left internal carotid artery demonstrates a less than 50% stenosis.   Antegrade left vertebral flow.     XR Chest PA & Lateral    Result Date: 10/28/2024  XR CHEST PA AND LATERAL-  HISTORY: Male who is 73 years-old, preoperative evaluation  TECHNIQUE: Frontal and lateral views of the chest  COMPARISON: 10/14/2024  FINDINGS: The heart is enlarged. Pulmonary vasculature shows mild central prominence. Left-sided pacemaker, cardiac leads, sternotomy wires,  cardiac valve marker noted. Minimal right pleural effusion, continued follow-up suggested. No focal pulmonary consolidation. No pneumothorax. Otherwise stable.      As described.  This report was finalized on 10/28/2024 4:27 PM by Dr. Giacomo Burton M.D on Workstation: [id number]      Stress Test With Myocardial Perfusion One Day    Result Date: 10/26/2024    Lexiscan protocol completed without low-level exercise.  Baseline ECG showed A-fib with RBBB, no angina or significant ischemic ECG changes noted.   Left ventricular ejection fraction is normal (Calculated EF = 55%).   Mildly reduced perfusion defect in the basal segments likely due to suboptimal postprocessing/contouring as well as diaphragmatic attenuation artifact.  No reversibility noted.   Myocardial perfusion imaging indicates a grossly normal myocardial perfusion study with no evidence of reversible ischemia. Impressions are consistent with a low risk study.     Adult Transesophageal Echo 3D (JOLIE) W/ Cont If Necessary Per Protocol    Result Date: 10/25/2024    Left ventricular ejection fraction appears to be 51 - 55%.   Left ventricular wall thickness is consistent with mild concentric hypertrophy.   The right ventricular cavity is mildly dilated.   The left atrial cavity is severely dilated.   Saline test results are negative.   The right atrial cavity is severely  dilated.   Severe aortic valve stenosis is present.   There is a mobile mass on the aortic valve. Vegetations are present on both the ventricle as well as the aortic side of the bioprosthetic aortic valve.  Largest extends into the ascending aorta approximately 3.1 cm above the sewing ring (3.3 cm total length). Vegetation becomes more broad as the furthest aspect from attachment, maximum width 1.7 cm. Vegetation on the ventricular side of the aortic valve I think most likely originates from the sewing ring.   There is a bioprosthetic aortic valve present. The prosthetic aortic valve peak  and mean gradients are elevated. There is a large, mobile mass present on the prosthetic aortic valve that is consistent with a vegetation. There is severe stenosis or obstruction of the prosthetic aortic valve.   Mild aortic valve regurgitation is present.     CT Angiogram Coronary    Result Date: 10/25/2024  RADIOLOGY INTERPRETATION OF EXTRACARDIAC STRUCTURES WITHIN THE CHEST    FINDINGS: Small right greater than left bilateral pleural effusions and mild pulmonary inter and intralobular septal thickening which may reflect a component of pulmonary edema. Respiratory motion limits evaluation of the lungs.    PLEASE SEE SEPARATE CARDIOLOGY REPORT OF THE CARDIAC FINDINGS.   Radiation dose reduction techniques were utilized, including automated exposure control and exposure modulation based on body size.   This report was finalized on 10/25/2024 3:03 PM by Dr. Donovan Gomez M.D on Workstation: [id number]      CT Angiogram Coronary-Cardiology Interpretation    Result Date: 10/25/2024  Table formatting from the original result was not included. 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 0.5 mm collimated images were obtained through the coronary arteries.  Data were transferred offline for 3D reconstructions using [employer]. CONTRAST: 85 mL iopamidol ([id number]) ACQUISITION: Retrospective ECG triggered acquisition was used.  Heart rate at the time of acquisition was approximately 85 BPM. MEDICATIONS: Metoprolol tartrate  25 mg oral Nitroglycerin 0.8 mg tablet TECHNICAL QUALITY:  Extremely poor due to obesity, high heart rate in the setting of A-fib and inability to medicate further due to low BP, and adjacent ICD leads with associated metallic artifacts.  Nondiagnostic. . FINDINGS: Coronary Artery Calcification  Findings: The total calcium score is 69 indicating mild (CAC 1-100) calcified plaque in the coronary tree. Left main: 40 LAD: 13 LCx: 4 RCA: 2 Angiographic Findings: The coronary arteries are  possibly left dominant . Left Main: Normal origin from the left coronary cusp.  Gives rise to LAD and LCx.  There appears to be a calcified plaque in distal LM extending into LAD.  Unable to accurately assess luminal stenosis due to poor image quality. LAD: Unable to accurately assess luminal stenosis due to poor image quality. LCx: Likely dominant vessel. Unable to accurately assess luminal stenosis due to poor image quality. RCA: Likely nondominant. Unable to accurately assess luminal stenosis due to poor image quality. Noncoronary Cardiac Findings: Cardiac chambers: Normal in size with no obvious filling defects within the ventricles, atria, or atrial appendages. No stigmata or prior infarction. Cardiac valves: A bioprosthetic valve is present in the aortic position and poorly visualized.  There appears to be hypoattenuated linear echodensity consistent with known bioprosthetic aortic valve vegetation. Pulmonary arteries: Normal in caliber. No obvious filling defects in the visualized central pulmonary arteri(es) to suggest large central pulmonary emboli, although the image qualities are extremely poor. Pericardium: The pericardial contour is preserved with no effusion, thickening, or calcifications. Left ventricle: Mild-moderate LV dilatation.  Calculated LVEF 70%.     Extremely poor image quality due to obesity (BMI 42), high heart rate in the setting of A-fib and inability to medicate further due to low BP, and adjacent ICD leads resulting in photon starvation, cardiac motion, and beam hardening artifacts and excessive noise.  Non-diagnostic study. CAD-RAD N/P1. Overall there is mild amount of coronary plaque.  Coronary calcium score CAC (69). Grossly normal LV systolic function (calculated LVEF 70%). Bioprosthetic  aortic valve present, poorly visualized. There appears to be hypoattenuated linear echodensity consistent with known bioprosthetic aortic valve vegetation.  Please reference same-day JOLIE results. Left atrial appendage appears to be ligated surgically with clips noted. Severe biatrial enlargement. Please refer to the radiology report for information on extra-cardiac structures.  RECOMMENDATION: Consider alternative imaging modalities to rule out obstructive CAD if deemed necessary prior to surgery for bioprosthetic endocarditis. Grading Scale for Stenosis Severity: Category Degree Interpretation CAD-RADS 0 0% (No plaque or stenosis) Absence of CAD CAD-RADS 1 1-24% (Minimal stenosis or plaque w/o stenosis) Minimal non-obstructive CAD CAD-RADS 2 25-49% (Mild stenosis) Mild non-obstructive CAD CAD-RADS 3 50-69% (Moderate stenosis) Moderate stenosis CAD-RADS 4 A - 70-99% stenosis or B - Left main >/= 50% or 3-vessel obstructive (>/=70%) disease Severe stenosis CAD-RADS 5 100% (total occlusion) Total coronary occlusion or sub-total occlusion CAD-RADS N Non-diagnostic study Obstructive CAD cannot be excluded Grading Scale for Plaque [place]: P1 (CAC 1-100) Mild amount of plaque P2 (-300) Moderate amount of plaque P3 (-999) Severe amount of plaque P4 (CAC > 1000) Extensive amount of plaque     Duplex Venous Lower Extremity - Bilateral CAR    Result Date: 10/24/2024    Acute left lower extremity deep vein thrombosis noted in the gastrocnemius.   All other veins appeared normal bilaterally.     Adult Transthoracic Echo Complete W/ Cont if Necessary Per Protocol    Result Date: 10/19/2024    Left ventricular systolic function is low normal. Left ventricular ejection fraction appears to be 51 - 55%.   Left ventricular wall thickness is consistent with mild concentric hypertrophy.   Left ventricular diastolic function was indeterminate.   There is moderate to severe biatrial enlargement.   There is a bioprosthetic  aortic valve present. The prosthetic aortic valve peak and mean gradients are elevated.  The peak and mean gradient across aortic valve was 101/68 mmHg.  Findings are consistent with severe stenosis of the bioprosthetic valve.   There is mild to moderate mitral regurgitation.   There is mild tricuspid regurgitation.  Estimated right ventricular systolic pressure from tricuspid regurgitation is markedly elevated (>55 mmHg).     Results for orders placed during the hospital encounter of 10/23/24    Adult Transthoracic Echo Limited W/ Cont if Necessary Per Protocol    Interpretation Summary    Limited study for LV/RV function    Left ventricular systolic function is normal. Calculated left ventricular EF = 67.9%    Moderately to severely reduced right ventricular systolic function noted.    The right ventricular cavity is severely dilated.    The left atrial cavity is dilated.    The right atrial cavity is dilated.    Moderate tricuspid valve regurgitation is present.    Estimated right ventricular systolic pressure from tricuspid regurgitation is moderately elevated (45-55 mmHg).    S/p aortic valve replacement.  Not well-visualized/assessed.    S/p mitral valve repair with 28 mm Medtronic flexible band annuloplasty.      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         Assessment & Plan     status post 10/31/2024 tissue aortic valve replacement for prosthetic aortic valve  stenosis, maze, left atrial appendage ligation done in 2014 and status post reoperative sternotomy with AV root replacement 0.7 mm cryopreserved homograft with right Gilbert Carbaugh, AICD removal intra-aortic balloon pump placement.  Strep mitis bacteremic sepsis and possible endocarditis on vancomycin and cefepime for this and possible pneumonia plan is at least 6 weeks of antibiotics tentative end date of 12/4/2024  RV dysfunction severe by preop echocardiogram  Shock cardiogenic and probably element of septic shock as well remains pressor dependent  Possible pneumonia respiratory cultures ordered some light growth of Candida which is likely oral contaminant is unremarkable  Respiratory failure patient failed weaning tracheostomy today hopefully will be able to wean more aggressively now.  Thank treatment of CHF though is still probably paramount to successful weaning  CHF history of nonischemic cardiomyopathy but recent echocardiogram LVEF 67% so probably diastolic dysfunction cardiology following along with nephrology patient on Bumex drip  Pulmonary hypertension severe by recent  echocardiogram on sildenafil per cardiothoracic surgery  Acute kidney injury  DVT history and left lower extremity on Doppler on 10/24/2024 not present on 11/8/2024 Doppler and subacute DVT in the left axillary vein Doppler holding anticoagulation now per cardiovascular surgery  Atrial fibrillation on amiodarone for rate control  Anemia acute on chronic expected postop acute complement.  Stable  Thrombocytopenia resolved  Esophagitis ease EGD on 9/30/2024  Fluids/lites/nutrition continue tube feeds now   Hyperglycemia not too bad on scheduled and sliding scale insulin.  Morbid obesity with a BMI greater than 46    Plan for disposition:    Paul Leslie Jr, MD  11/16/24  14:59 EST    Time: Critical care time 34 minutes

## 2024-11-16 NOTE — PROGRESS NOTES
"[facility] Clinical Pharmacy Services: Vancomycin Monitoring Note    Woodrow Alejandro is a 74 y.o. male who is on day 5/7 of pharmacy to dose vancomycin for Empir[employer] coverage for suspected PNA. Pt currently receiving treatment for Endocarditis. Plans to transition back to penicillin to complete 6 weeks IV antibiotics (stop date Dec 4).     Current Vancomycin Dose:    750 mg IV every  24  hours  Updated Cultures and Sensitivities:   11/10 Catheter tip Cx NGTD   11/12 BC 1/2 coag neg staph  11/12 ET suction Cx light growth candida albicans  11/12 MRSA PCR negative   11/13 genital swab yeast  11/13 bronch no growth     Results from last 7 days   Lab Units 11/16/24  1115 11/14/24  0847   VANCOMYCIN RM mcg/mL  --  28.30   VANCOMYCIN TR mcg/mL 21.70*  --      Vitals/Labs  Ht: 177.8 cm (70\"); Wt: (!) 147 kg (324 lb 11.8 oz)   Temp Readings from Last 1 Encounters:   11/16/24 98.4 °F (36.9 °C)     Estimated Creatinine Clearance: 59.4 mL/min (A) (by C-G formula based on SCr of 1.59 mg/dL (H)).     Results from last 7 days   Lab Units 11/16/24  0408 11/15/24  1244 11/15/24  0306 11/14/24  1340 11/14/24  0351   CREATININE mg/dL 1.59* 1.63* 1.55*   < > 1.37*   WBC 10*3/mm3 13.92*  --  15.95*  --  19.03*    < > = values in this interval not displayed.     Assessment/Plan  Scr remains stable today. Trough level collected prior to dose today = 21.7 mcg/ml. Slightly higher than goal trough level 10-20 mcg/ml, but suspect some residual clearance from previous 750mg q12h regimen will occur. [employer] predicts steady  state trough level 17.3 mcg/ml .  Vancomycin Dose: continue 750 mg IV every  24  hours; provides a predicted  mg/L.hr . Timed tomorrow's dose to be given at 1800 to allow a little extra time to clear.   Next Level Date and Time: no further levels ordered for now.   We will continue to monitor patient changes and renal function     Thank you for involving pharmacy in this patient's care. Please contact " pharmacy with any questions or concerns.       Key Melara, PharmD  Clinical Pharmacist

## 2024-11-17 NOTE — PROGRESS NOTES
"[facility] Clinical Pharmacy Services: Vancomycin Monitoring Note    Woodrow Alejandro is a 74 y.o. male who is on day 6/7 of pharmacy to dose vancomycin for Empiric coverage for suspected PNA. Pt currently receiving treatment for Endocarditis. Plans to transition back to penicillin to complete 6 weeks IV antibiotics (stop date Dec 4).     Current Vancomycin Dose:    750 mg IV every  24  hours  Updated Cultures and Sensitivities:   11/10 Catheter tip Cx NGTD   11/12 BC 1/2 coag neg staph  11/12 ET suction Cx light growth candida albicans  11/12 MRSA PCR negative   11/13 genital swab yeast  11/13 bronch no growth     Results from last 7 days   Lab Units 11/16/24  1115 11/14/24  0847   VANCOMYCIN RM mcg/mL  --  28.30   VANCOMYCIN TR mcg/mL 21.70*  --      Vitals/Labs  Ht: 177.8 cm (70\"); Wt: (!) 146 kg (320 lb 12.3 oz)   Temp Readings from Last 1 Encounters:   11/17/24 99 °F (37.2 °C)     Estimated Creatinine Clearance: 49 mL/min (A) (by C-G formula based on SCr of 1.91 mg/dL (H)).     Results from last 7 days   Lab Units 11/17/24  0318 11/16/24  0408 11/15/24  1244 11/15/24  0306   CREATININE mg/dL 1.91* 1.59* 1.63* 1.55*   WBC 10*3/mm3 17.25* 13.92*  --  15.95*     Assessment/Plan  Significant increase in Scr today.   Vancomycin Dose: Discontinued scheduled regimen. Changing to intermittent dosing to complete 7 day course.   Next Level Date and Time: Check random level at 1400 today to evaluate. Plan re-dose when level <20 mcg/ml   We will continue to monitor patient changes and renal function     Thank you for involving pharmacy in this patient's care. Please contact pharmacy with any questions or concerns.       Key Melara, PharmD  Clinical Pharmacist      11/17 1400 addendum: Random level at 1300 today = 20.6 mcg/ml, above goal 15-20 mcg/ml. Based on PK parameters and past clearance, estimate vancomycin level will remain above 15 mcg/ml through tomorrow AM. Will schedule one vancomycin " 750mg IV x1 dose for tomorrow AM at 0600. No further levels or doses needed after that unless duration of therapy is extended.

## 2024-11-17 NOTE — PROGRESS NOTES
[facility] Progress Note      Patient Name: Woodrow Alejandro  : 1950  MRN: [id number]  Primary Care Physician:  Juan Perez MD  Date of admission: 10/23/2024    Subjective     Interval History:   Follow-up acute kidney injury and volume  The patient remains on the ventilator via tracheostomy he is more awake and alert continue to have significant edema he is on vasopressors has increased CVP.  Review of Systems:   As noted above    Objective     Vitals:   Temp:  [97.7 °F (36.5 °C)-98.8 °F (37.1 °C)] 98.4 °F (36.9 °C)  Heart Rate:  [] 105  Resp:  [25-33] 25  BP: ()/(49-69) 128/69  Arterial Line BP: ()/(47-69) 115/56  FiO2 (%):  [40 %-100 %] 97 %    Intake/Output Summary (Last 24 hours) at 2024 1017  Last data filed at 2024 0700  Gross per 24 hour   Intake 4489.58 ml   Output 1155 ml   Net 3334.58 ml       Physical Exam:    General Appearance: More awake and alert, chronically ill morbidly obese commands  Skin: warm and dry  HEENT: Oral mucosa is dry  Neck: Mild JVD, he has a tracheostomy  Lungs: Bilateral rhonchi, breathing effort not labored  Heart: Irregularly irregular.  No rub  Abdomen: soft, nontender, distended.  Body wall edema.  Normoactive bowel  : Ugarte catheter  Extremities: 3+ lower extremity edema with hip and thigh edema and 1+ upper extremity edema      Scheduled Meds:     acetylcysteine, 3 mL, Nebulization, TID - RT  aspirin, 81 mg, Per G Tube, Daily  atorvastatin, 40 mg, Per G Tube, Nightly  cefepime, 2,000 mg, Intravenous, Q8H  chlorhexidine, 15 mL, Mouth/Throat, Q12H  [Held by provider] enoxaparin, 40 mg, Subcutaneous, Q12H  Ergocalciferol, 100 mcg, Per G Tube, Daily  hydrocortisone-bacitracin-zinc oxide-nystatin, 1 Application, Topical, Q12H  insulin glargine, 20 Units, Subcutaneous, Daily  insulin regular, 2-7 Units, Subcutaneous, Q6H  ipratropium-albuterol, 3 mL, Nebulization, Q4H - RT  lansoprazole, 15 mg,  Per G Tube, Q AM  lidocaine, , ,   miconazole, 1 Application, Topical, Q12H  midodrine, 15 mg, Oral, TID AC  saccharomyces boulardii, 250 mg, Per G Tube, BID  sildenafil, 20 mg, Per G Tube, TID  sodium chloride, 10 mL, Intravenous, Q12H  vancomycin, 750 mg, Intravenous, Q24H      IV Meds:   amiodarone, 1 mg/min, Last Rate: 1 mg/min (11/17/24 0448)  dexmedetomidine, 0.2-1.5 mcg/kg/hr, Last Rate: 0.3 mcg/kg/hr (11/16/24 0595)  DOPamine, 2-20 mcg/kg/min  EPINEPHrine, 0.01 mcg/kg/min, Last Rate: 0.01 mcg/kg/min (11/16/24 1225)  lidocaine in D5W, 1 mg/min, Last Rate: Stopped (11/05/24 1135)  milrinone, 0.125 mcg/kg/min, Last Rate: 0.125 mcg/kg/min (11/17/24 2384)  niCARdipine, 5-15 mg/hr  norepinephrine, 0.02-0.2 mcg/kg/min, Last Rate: 0.09 mcg/kg/min (11/17/24 8648)  Pharmacy to dose vancomycin,   phenylephrine, 0.2-2 mcg/kg/min  propofol, 5-50 mcg/kg/min, Last Rate: Stopped (11/16/24 1402)  vasopressin, 0.02-0.1 Units/min, Last Rate: Stopped (11/16/24 9509)        Results Reviewed:   I have personally reviewed the results from the time of this admission to 11/17/2024 10:17 EST     Results from last 7 days   Lab Units 11/17/24  0318 11/17/24  0000 11/16/24  1724 11/16/24  0830 11/16/24  0408 11/15/24  2026 11/15/24  1244 11/15/24  0807 11/15/24  0306 11/14/24  0847 11/14/24  0351 11/12/24  0509 11/12/24  0342   SODIUM mmol/L 146*  --   --   --  142  --  141  --  147*   < > 148*   < > 146*   POTASSIUM mmol/L 4.2 3.9 3.6   < > 3.8  3.8   < > 3.7   < > 3.6  3.6   < > 3.2*   < > 3.3*   CHLORIDE mmol/L 112*  --   --   --  108*  --  109*  --  109*   < > 109*   < > 117*   CO2 mmol/L 17.0*  --   --   --  17.4*  --  19.9*  --  21.0*   < > 22.0   < > 17.6*   BUN mg/dL 69*  --   --   --  64*  --  63*  --  55*   < > 56*   < > 37*   CREATININE mg/dL 1.91*  --   --   --  1.59*  --  1.63*  --  1.55*   < > 1.37*   < > 0.90   CALCIUM mg/dL 8.2*  --   --   --  8.2*  --  7.8*  --  7.8*   < > 8.0*   < > 6.4*   BILIRUBIN mg/dL  --   --    --   --   --   --   --   --  0.7  --  0.8  --  0.7   ALK PHOS U/L  --   --   --   --   --   --   --   --  97  --  110  --  92   ALT (SGPT) U/L  --   --   --   --   --   --   --   --  6  --  20  --  7   AST (SGOT) U/L  --   --   --   --   --   --   --   --  19  --  31  --  18   GLUCOSE mg/dL 131*  --   --   --  174*  --  174*  --  172*   < > 146*   < > 142*    < > = values in this interval not displayed.       Estimated Creatinine Clearance: 49 mL/min (A) (by C-G formula based on SCr of 1.91 mg/dL (H)).    Results from last 7 days   Lab Units 11/17/24 0318 11/16/24  0408 11/15/24  1244 11/15/24  0306 11/14/24  1340 11/14/24  0351 11/13/24  2307   MAGNESIUM mg/dL  --   --   --  2.0 2.0  --  2.3   PHOSPHORUS mg/dL 3.0 3.6 3.7 3.7  --    < > 3.4    < > = values in this interval not displayed.       Results from last 7 days   Lab Units 11/13/24  0414 11/12/24  0342 11/11/24  0345   URIC ACID mg/dL 7.3* 5.5 7.1*       Results from last 7 days   Lab Units 11/17/24 0318 11/16/24  0408 11/15/24  0306 11/14/24  0351 11/13/24  0827   WBC 10*3/mm3 17.25* 13.92* 15.95* 19.03* 18.78*   HEMOGLOBIN g/dL 8.9* 8.6* 8.4* 8.8* 9.1*   PLATELETS 10*3/mm3 207 193 203 248 269             Assessment / Plan     ASSESSMENT:  Acute kidney injury, nonoliguric.  Charge likely prerenal/ATN due to hemodynamic instability and diuresis.  Creatinine today worsening up to 1.91, sodium 146, total CO2 17 and anion gap is 17, uric acid 7.3.  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  Status post redo sternotomy AV root replacement, mitral valve repair, AICD removal intra-aortic balloon placement 10/31/2024.  Sternal closure 11/ 2.  3.  Bioprosthetic aortic valve endocarditis, bacteremia with strep mitis on vancomycin and cefepime.    Dr. Diaz following.  Currently A-fib  4.  Shock , remains pressor dependent.  5.  Anemia status post EGD 9/30/2024  And colonoscopy with esophagitis and polyp removal.  Hemoglobin  today 8.9.  6.  Acute respiratory failure postoperatively on the ventilator.    7.  Atrial fibrillation.  With controlled rate  8.  Moderate to severe mitral regurgitation.  Severe RV enlargement and dysfunction postoperatively.  PLAN:  Sodium continues to be elevated.  Continue free water via feeding tube will restart diuretics  Surveillance lab      I reviewed the chart and other providers notes, reviewed labs.  I discussed the case with the patient's nurse, also I discussed the case with the patient's family at the bedside  Copied text in this note has been reviewed and is accurate as of 11/17/24.     Thank you for involving us in the care of Woodrow Alejandro.  Please feel free to call with any questions.    Jass Keller MD  11/17/24  10:17 [facility]    [facility]  [phone]    Please note that portions of this note were completed with a voice recognition program.

## 2024-11-17 NOTE — PLAN OF CARE
Goal Outcome Evaluation:      Epi discontinued, pressors weaned off currently, dex turned off, decreased UOP (Nephrology aware of UOP and ABG results), trach care completed with inner cannula changed, more alert today, following commands and nodding appropriately to questions, plan of care updated and discussed with patient and family

## 2024-11-17 NOTE — PROGRESS NOTES
"  POST-OPERATIVE NOTE     Chief Complaint: Respiratory failure  S/P: Tracheostomy  POD # 1    Subjective  More awake this morning.  Objective:  Vital signs: (most recent): Blood pressure 111/55, pulse 110, temperature 99 °F (37.2 °C), resp. rate 24, height 177.8 cm (70\"), weight (!) 146 kg (320 lb 12.3 oz), SpO2 96%.            Tracheostomy in good position, no significant bleeding.      Results Review:     I reviewed the patient's new clinical results.  I reviewed the patient's new imaging results and agree with the interpretation.  I reviewed the patient's other test results and agree with the interpretation    Assessment & Plan   Mr. Alejandro is a pleasant 74-year-old gentleman who underwent tracheostomy yesterday.  His site is clean and dry.  The tracheostomy is in good position on chest x-ray.  He is ventilating well.  He is more awake today.    Please plan to remove tracheostomy sutures at postoperative day #5.  Please call with questions.    Haylie Leija MD  [facility]  11/17/24  14:50 EST    Patient was seen and assessed while wearing personal protective equipment including facemask, protective eyewear and gloves.  Hand hygiene performed prior to entering the room and upon exiting with doffing of gloves.                "

## 2024-11-17 NOTE — PROGRESS NOTES
" LOS: 25 days   Patient Care Team:  Juan Perez MD as PCP - General (Internal Medicine)    Chief Complaint:   Post-op follow-up, s/p homograft    Subjective  Vent via trach. Nods head with questions.     Vital Signs  Temp:  [97.7 °F (36.5 °C)-99 °F (37.2 °C)] 99 °F (37.2 °C)  Heart Rate:  [] 118  Resp:  [24-33] 24  BP: ()/(50-78) 111/55  Arterial Line BP: ()/(47-69) 131/50  FiO2 (%):  [40 %-97 %] 40 %      11/15/24  1416 11/16/24  0501 11/17/24  0547   Weight: (!) 143 kg (315 lb) (!) 147 kg (324 lb 11.8 oz) (!) 146 kg (320 lb 12.3 oz)     Body mass index is 46.03 kg/m².    Intake/Output Summary (Last 24 hours) at 11/17/2024 1332  Last data filed at 11/17/2024 0800  Gross per 24 hour   Intake 4489.58 ml   Output 900 ml   Net 3589.58 ml     I/O this shift:  In: -   Out: 20 [Urine:20]        Objective:  Vital signs: (most recent): Blood pressure 111/55, pulse 118, temperature 99 °F (37.2 °C), resp. rate 24, height 177.8 cm (70\"), weight (!) 146 kg (320 lb 12.3 oz), SpO2 93%.                Physical Exam:   General Appearance: sedated but arousable, ill appearing, NAD   Lungs:  vent, ronchi throuhgout   Heart:  irreg irreg rhythm, tachy during exam   Abdomen: soft or nondistended, + bowel sounds    Skin: sternal incision clean, dry, intact   Neuro: alert and oriented, no focal deficits.     Results Review:      WBC WBC   Date Value Ref Range Status   11/17/2024 17.25 (H) 3.40 - 10.80 10*3/mm3 Final   11/16/2024 13.92 (H) 3.40 - 10.80 10*3/mm3 Final   11/15/2024 15.95 (H) 3.40 - 10.80 10*3/mm3 Final      HGB Hemoglobin   Date Value Ref Range Status   11/17/2024 8.9 (L) 13.0 - 17.7 g/dL Final   11/16/2024 8.6 (L) 13.0 - 17.7 g/dL Final   11/15/2024 8.4 (L) 13.0 - 17.7 g/dL Final      HCT Hematocrit   Date Value Ref Range Status   11/17/2024 28.0 (L) 37.5 - 51.0 % Final   11/16/2024 27.1 (L) 37.5 - 51.0 % Final   11/15/2024 26.7 (L) 37.5 - 51.0 % Final      Platelets Platelets   Date Value Ref " "Range Status   11/17/2024 207 140 - 450 10*3/mm3 Final   11/16/2024 193 140 - 450 10*3/mm3 Final   11/15/2024 203 140 - 450 10*3/mm3 Final        PT/INR:  No results found for: \"PROTIME\"/No results found for: \"INR\"    Sodium Sodium   Date Value Ref Range Status   11/17/2024 146 (H) 136 - 145 mmol/L Final   11/16/2024 142 136 - 145 mmol/L Final   11/15/2024 141 136 - 145 mmol/L Final   11/15/2024 147 (H) 136 - 145 mmol/L Final   11/14/2024 148 (H) 136 - 145 mmol/L Final      Potassium Potassium   Date Value Ref Range Status   11/17/2024 4.4 3.5 - 5.2 mmol/L Final   11/17/2024 4.2 3.5 - 5.2 mmol/L Final   11/17/2024 3.9 3.5 - 5.2 mmol/L Final   11/16/2024 3.6 3.5 - 5.2 mmol/L Final   11/16/2024 3.7 3.5 - 5.2 mmol/L Final     Comment:     Slight hemolysis detected by analyzer. Result may be falsely elevated.   11/16/2024 3.8 3.5 - 5.2 mmol/L Final   11/16/2024 3.8 3.5 - 5.2 mmol/L Final   11/15/2024 3.4 (L) 3.5 - 5.2 mmol/L Final   11/15/2024 3.7 3.5 - 5.2 mmol/L Final   11/15/2024 3.4 (L) 3.5 - 5.2 mmol/L Final   11/15/2024 3.6 3.5 - 5.2 mmol/L Final   11/15/2024 3.6 3.5 - 5.2 mmol/L Final   11/15/2024 3.3 (L) 3.5 - 5.2 mmol/L Final   11/14/2024 3.5 3.5 - 5.2 mmol/L Final   11/14/2024 3.3 (L) 3.5 - 5.2 mmol/L Final      Chloride Chloride   Date Value Ref Range Status   11/17/2024 112 (H) 98 - 107 mmol/L Final   11/16/2024 108 (H) 98 - 107 mmol/L Final   11/15/2024 109 (H) 98 - 107 mmol/L Final   11/15/2024 109 (H) 98 - 107 mmol/L Final   11/14/2024 109 (H) 98 - 107 mmol/L Final      Bicarbonate CO2   Date Value Ref Range Status   11/17/2024 17.0 (L) 22.0 - 29.0 mmol/L Final   11/16/2024 17.4 (L) 22.0 - 29.0 mmol/L Final   11/15/2024 19.9 (L) 22.0 - 29.0 mmol/L Final   11/15/2024 21.0 (L) 22.0 - 29.0 mmol/L Final   11/14/2024 22.6 22.0 - 29.0 mmol/L Final      BUN BUN   Date Value Ref Range Status   11/17/2024 69 (H) 8 - 23 mg/dL Final   11/16/2024 64 (H) 8 - 23 mg/dL Final   11/15/2024 63 (H) 8 - 23 mg/dL Final "   11/15/2024 55 (H) 8 - 23 mg/dL Final   11/14/2024 58 (H) 8 - 23 mg/dL Final      Creatinine Creatinine   Date Value Ref Range Status   11/17/2024 1.91 (H) 0.76 - 1.27 mg/dL Final   11/16/2024 1.59 (H) 0.76 - 1.27 mg/dL Final   11/15/2024 1.63 (H) 0.76 - 1.27 mg/dL Final   11/15/2024 1.55 (H) 0.76 - 1.27 mg/dL Final   11/14/2024 1.56 (H) 0.76 - 1.27 mg/dL Final      Calcium Calcium   Date Value Ref Range Status   11/17/2024 8.2 (L) 8.6 - 10.5 mg/dL Final   11/16/2024 8.2 (L) 8.6 - 10.5 mg/dL Final   11/15/2024 7.8 (L) 8.6 - 10.5 mg/dL Final   11/15/2024 7.8 (L) 8.6 - 10.5 mg/dL Final   11/14/2024 7.9 (L) 8.6 - 10.5 mg/dL Final      Magnesium Magnesium   Date Value Ref Range Status   11/15/2024 2.0 1.6 - 2.4 mg/dL Final   11/14/2024 2.0 1.6 - 2.4 mg/dL Final        acetylcysteine, 3 mL, Nebulization, TID - RT  amiodarone, 400 mg, Per G Tube, Q24H  aspirin, 81 mg, Per G Tube, Daily  atorvastatin, 40 mg, Per G Tube, Nightly  bumetanide, 4 mg, Intravenous, Q8H  cefepime, 2,000 mg, Intravenous, Q8H  chlorhexidine, 15 mL, Mouth/Throat, Q12H  [Held by provider] enoxaparin, 40 mg, Subcutaneous, Q12H  Ergocalciferol, 100 mcg, Per G Tube, Daily  hydrocortisone-bacitracin-zinc oxide-nystatin, 1 Application, Topical, Q12H  insulin glargine, 20 Units, Subcutaneous, Daily  insulin regular, 2-7 Units, Subcutaneous, Q6H  ipratropium-albuterol, 3 mL, Nebulization, Q4H - RT  lansoprazole, 15 mg, Per G Tube, Q AM  miconazole, 1 Application, Topical, Q12H  midodrine, 15 mg, Oral, TID AC  saccharomyces boulardii, 250 mg, Per G Tube, BID  sildenafil, 20 mg, Per G Tube, TID  sodium chloride, 10 mL, Intravenous, Q12H  Vancomycin Pharmacy Intermittent/Pulse Dosing, , Not Applicable, Daily      dexmedetomidine, 0.2-1.5 mcg/kg/hr, Last Rate: 0.1 mcg/kg/hr (11/17/24 1321)  DOPamine, 2-20 mcg/kg/min  EPINEPHrine, 0.01 mcg/kg/min, Last Rate: Stopped (11/17/24 1051)  lidocaine in D5W, 1 mg/min, Last Rate: Stopped (11/05/24 1135)  milrinone,  0.125 mcg/kg/min, Last Rate: 0.125 mcg/kg/min (11/17/24 4454)  niCARdipine, 5-15 mg/hr  norepinephrine, 0.02-0.2 mcg/kg/min, Last Rate: Stopped (11/17/24 1155)  Pharmacy to dose vancomycin,   phenylephrine, 0.2-2 mcg/kg/min  propofol, 5-50 mcg/kg/min, Last Rate: Stopped (11/16/24 1402)  vasopressin, 0.02-0.1 Units/min, Last Rate: Stopped (11/16/24 0900)          Prosthetic aortic valve stenosis    Essential hypertension    Permanent atrial fibrillation    S/P AVR    ICD (implantable cardioverter-defibrillator), dual, in situ    Achilles tendon rupture    Bacteremia    Stenosis of prosthetic aortic valve    Anemia      Assessment & Plan    - Prosthetic aortic valve stenosis, h/o AVR (tissue)/maze/DANICA ligation (2014)- s/p reoperative sternotomy AV root replacement 27mm cryopreserved homograft with right nuvia cabrol, AICD removal, IABP placement- [facility] 10/31/2024  - s/p Sternal closure ---11/2  - Possible endocarditis, likely need JOLIE   - Bacteremia, blood cultures positive strep mitis---on penicillin G  - Atrial fibrillation, unable to tolerate anticoagulation  - Hypertension  - NICM status post [employer] AICD  - Anemia, s/p EGD/colonoscopy with esophagitis, polyp removal  - Right achilles tendon tear--- walking boot and PT per ortho at [facility]  - morbid obesity  - Pre-diabetes -- improved at 5.3  - post op anemia- expected acute blood loss, stable  - TCP post-op, resolved  - respiratory failure--- s/p trach (Dr. Leija 11/16/2024), pulm following   - NATHANIEL--- nephrology following         POD #18.  Continue supportive care.  Now s/p trach by thoracic yesterday. Vent per pulm.  On amio 1.0, Precedex, propofol, epi 0.01, primacor 0.125, levo 0.08--> per Dr. Florian, stop epi and increase primacor if needed, start low dose vaso and wean levo; repeat limited echo in AM to eval LV/RV function.  WBC 17 from 14, Afebrile. Monitor  Groin ultrasound did show fluid collection.  Abscess versus hematoma from vein harvest.  May need  drained if becomes febrile or increasing leukocytosis.   Antibiotics for endocarditis, possible pneumonia per ID.  Remains in atrial fibrillation, intermittent rate controlled, hopefully improve with stopping epi.  Restart lovenox if ok with thoracic.  Hypernatremia, increase free water flushes, scheduled bumex per nephrology.    Patient seen and discussed with Dr. Florian.    Dheeraj Pathak PA-C  11/17/24  13:32 EST

## 2024-11-17 NOTE — PROGRESS NOTES
LOS: 25 days   Patient Care Team:  Juan Perez MD as PCP - General (Internal Medicine)    Subjective     s.  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  He went and had sternal closure on 11/2 evidence of possible endocarditis and he had blood cultures positive for strep mitis and is on pen G send atrial fibrillation unable to tolerate anticoagulation he has a nonischemic cardiomyopathy history of anemia some esophagitis and colon polyps have been removed history of right Achilles tendon tear uses a walking boot he is a prediabetic postop anemia and thrombocytopenia.  Patient had failed weaning and went for tracheostomy today  Patient is awake he seems to deny significant pain or shortness of breath current vent settings.  I changed him over to pressure support of pressure control both with the basically the same inspiratory pressures he was receiving on assist-control his respiratory rate remained fast his tidal volumes increased      review of Systems:          Objective     Vital Signs  Vital Sign Min/Max for last 24 hours  Temp  Min: 97.7 °F (36.5 °C)  Max: 99 °F (37.2 °C)   BP  Min: 85/69  Max: 149/62   Pulse  Min: 83  Max: 127   Resp  Min: 24  Max: 33   SpO2  Min: 92 %  Max: 99 %   No data recorded   Weight  Min: 146 kg (320 lb 12.3 oz)  Max: 146 kg (320 lb 12.3 oz)        Ventilator/Non-Invasive Ventilation Settings (From admission, onward)       Start     Ordered    11/12/24 1124  Ventilator - Vent Mode: AC/VC; Rate: Other; Rate: 24; FiO2: Titrate Per SpO2; Titrate Oxygen for SpO2: 90 - 95%; PEEP: 5; Tidal Volume: mL; TV: 550  Continuous        Question Answer Comment   Vent Mode AC/VC    Rate Other    Rate 24     FiO2 Titrate Per SpO2    Titrate Oxygen for SpO2 90 - 95%    PEEP 5    Tidal Volume mL            11/12/24 1124    11/01/24 0026  Ventilator - Vent Mode: AC/VC+; Rate: 20; FiO2: Titrate Per SpO2; Titrate Oxygen for SpO2: 90 - 95%; PEEP: 7.5; Tidal Volume: mL; TV: 620  Continuous,   Status:  Canceled        Question Answer Comment   Vent Mode AC/VC+    Rate 20    FiO2 Titrate Per SpO2    Titrate Oxygen for SpO2 90 - 95%    PEEP 7.5    Tidal Volume mL            11/01/24 0027                        Arterial Line BP: 131/50  Arterial Line MAP (mmHg): 70 mmHg  PAP: (20-49)/(7-39) 34/28  CVP:  [16 mmHg-35 mmHg] 16 mmHg  PCWP:  [23 mmHg] 23 mmHg  CO:  [8.4 L/min-8.7 L/min] 8.7 L/min  Body mass index is 46.03 kg/m².  I/O last 3 completed shifts:  In: 7504.6 [I.V.:3216.6; Other:2363; NG/GT:1355; IV Piggyback:570]  Out: 2415 [Urine:1790; Emesis/NG output:625]  No intake/output data recorded.        Physical Exam:  General Appearance: Trached on a ventilator is on assist-control of 20 tidal volume 550 PEEP of 5 FiO2 40%, he is breathing about 31 his peak airway pressures are about 23 cm.  He is on dexmedetomidine 0.02 mics per kilogram per minute  he is on a milrinone drip morbidly obese  Eyes: Conjunctiva are clear and anicteric pupils are equal  ENT: Mucous membranes are dry he has a nasoenteric tube in place  the left nare  Neck:  tracheostomy site looks okay he has a left IJ introducer and Woodbine-Paradise type catheter  Lungs: Coarse breath sounds bilaterally, tachypneic  Cardiac: Tachycardic low 1010s no murmur  Abdomen: Obese soft nontender no palpable hepatosplenomegaly or masses  : Not examined  Musculoskeletal: He has mild thoracic kyphosis very large abdomen really good waveform  Skin: Warm and dry no jaundice no petechiae  Neuro: He is more awake today he mouth a few words and nods his head not vigorous, he is using his hands and arms freely   Extremities: No clubbing or cyanosis he has edema all 4  extremities ,palpable radial pulses and I think I feel dorsalis pedis pulses bilaterally as well there are not a strong but he has more lower extremity edema  MSE: Does not appear anxious he does not indicate he is anxious or having pain       Labs:  Results from last 7 days   Lab Units 11/17/24  1147 11/17/24  0318 11/17/24  0000 11/16/24  1724 11/16/24  0830 11/16/24  0408 11/15/24  2026 11/15/24  1244 11/15/24  0807 11/15/24  0306 11/14/24  1946 11/14/24  1340 11/14/24  0847 11/14/24  0351 11/13/24  2307 11/13/24  0827 11/13/24  0414 11/12/24  0815 11/12/24  0509 11/12/24  0342 11/11/24  0940 11/11/24  0345   GLUCOSE mg/dL  --  131*  --   --   --  174*  --  174*  --  172*  --  168*  --  146*  --   --  180*   < > 178* 142*   < > 171*   SODIUM mmol/L  --  146*  --   --   --  142  --  141  --  147*  --  148*  --  148*  --   --  143   < > 144 146*   < > 147*   POTASSIUM mmol/L 4.4 4.2 3.9 3.6 3.7 3.8  3.8 3.4* 3.7   < > 3.6  3.6   < > 3.3*   < > 3.2* 3.2*   < > 3.9   < > 4.3 3.3*   < > 3.7  3.7   MAGNESIUM mg/dL  --   --   --   --   --   --   --   --   --  2.0  --  2.0  --   --  2.3  --  2.0  --  2.0 1.6  --  2.0   CO2 mmol/L  --  17.0*  --   --   --  17.4*  --  19.9*  --  21.0*  --  22.6  --  22.0  --   --  23.5   < > 22.5 17.6*   < > 23.0   CHLORIDE mmol/L  --  112*  --   --   --  108*  --  109*  --  109*  --  109*  --  109*  --   --  107   < > 110* 117*   < > 111*   ANION GAP mmol/L  --  17.0*  --   --   --  16.6*  --  12.1  --  17.0*  --  16.4*  --  17.0*  --   --  12.5   < > 11.5 11.4   < > 13.0   CREATININE mg/dL  --  1.91*  --   --   --  1.59*  --  1.63*  --  1.55*  --  1.56*  --  1.37*  --   --  1.32*   < > 1.14 0.90   < > 1.14   BUN mg/dL  --  69*  --   --   --  64*  --  63*  --  55*  --  58*  --  56*  --   --  47*   < > 44* 37*   < > 41*   BUN / CREAT RATIO   --  36.1*  --   --   --  40.3*  --  38.7*  --  35.5*  --  37.2*  --  40.9*  --   --  35.6*   < > 38.6* 41.1*   < > 36.0*   CALCIUM mg/dL  --  8.2*   --   --   --  8.2*  --  7.8*  --  7.8*  --  7.9*  --  8.0*  --   --  8.1*   < > 8.2* 6.4*   < > 8.0*   ALK PHOS U/L  --   --   --   --   --   --   --   --   --  97  --   --   --  110  --   --   --   --   --  92  --   --    TOTAL PROTEIN g/dL  --   --   --   --   --   --   --   --   --  6.3  --   --   --  6.2  --   --   --   --   --  4.8*  --   --    ALT (SGPT) U/L  --   --   --   --   --   --   --   --   --  6  --   --   --  20  --   --   --   --   --  7  --   --    AST (SGOT) U/L  --   --   --   --   --   --   --   --   --  19  --   --   --  31  --   --   --   --   --  18  --   --    BILIRUBIN mg/dL  --   --   --   --   --   --   --   --   --  0.7  --   --   --  0.8  --   --   --   --   --  0.7  --   --    ALBUMIN g/dL  --  2.8*  --   --   --  2.7*  --  2.5*  --  2.7*  --   --   --  2.7*  --   --  2.5*  --  2.5* 2.0*  --  2.7*   GLOBULIN gm/dL  --   --   --   --   --   --   --   --   --  3.6  --   --   --  3.5  --   --   --   --   --  2.8  --   --     < > = values in this interval not displayed.     Estimated Creatinine Clearance: 49 mL/min (A) (by C-G formula based on SCr of 1.91 mg/dL (H)).      Results from last 7 days   Lab Units 11/17/24 0318 11/16/24 0408 11/15/24  0306 11/14/24  0351 11/13/24  0827 11/12/24  0342 11/11/24  0345   WBC 10*3/mm3 17.25* 13.92* 15.95* 19.03* 18.78* 16.14* 15.44*   RBC 10*6/mm3 3.04* 2.99* 2.95* 3.02* 3.12* 3.09* 3.02*   HEMOGLOBIN g/dL 8.9* 8.6* 8.4* 8.8* 9.1* 9.0* 9.1*   HEMATOCRIT % 28.0* 27.1* 26.7* 27.9* 28.8* 28.6* 27.7*   MCV fL 92.1 90.6 90.5 92.4 92.3 92.6 91.7   MCH pg 29.3 28.8 28.5 29.1 29.2 29.1 30.1   MCHC g/dL 31.8 31.7 31.5 31.5 31.6 31.5 32.9   RDW % 17.1* 17.0* 16.9* 17.0* 17.3* 17.3* 17.2*   RDW-SD fl 58.4* 55.3* 55.3* 57.3* 57.4* 58.1* 57.8*   MPV fL 10.0 9.7 9.4 9.7 9.2 9.2 9.5   PLATELETS 10*3/mm3 207 193 203 248 269 270 260   NEUTROPHIL % %  --   --   --   --   --  86.4* 85.3*   LYMPHOCYTE % %  --   --   --   --   --  3.5* 4.3*   MONOCYTES % %  --   --    --   --   --  5.5 5.9   EOSINOPHIL % %  --   --   --   --   --  3.0 3.0   BASOPHIL % %  --   --   --   --   --  0.4 0.3   IMM GRAN % %  --   --   --   --   --  1.2* 1.2*   NEUTROS ABS 10*3/mm3  --   --   --   --   --  13.94* 13.16*   LYMPHS ABS 10*3/mm3  --   --   --   --   --  0.56* 0.67*   MONOS ABS 10*3/mm3  --   --   --   --   --  0.89 0.91*   EOS ABS 10*3/mm3  --   --   --   --   --  0.49* 0.46*   BASOS ABS 10*3/mm3  --   --   --   --   --  0.06 0.05   IMMATURE GRANS (ABS) 10*3/mm3  --   --   --   --   --  0.20* 0.19*   NRBC /100 WBC  --   --   --   --   --  0.0 0.0     Results from last 7 days   Lab Units 11/14/24  1244   PH, ARTERIAL pH units 7.456*   PO2 ART mm Hg 82.1   PCO2, ARTERIAL mm Hg 37.1   HCO3 ART mmol/L 26.1             Results from last 7 days   Lab Units 11/13/24  0414   TSH uIU/mL 4.140   FREE T4 ng/dL 0.92     Results from last 7 days   Lab Units 11/14/24  0351   PROCALCITONIN ng/mL 0.68*         Microbiology Results (last 10 days)       Procedure Component Value - Date/Time    Respiratory Culture - Aspirate, Bronchus [[id number]] Collected: 11/13/24 1539    Lab Status: Final result Specimen: Aspirate from Bronchus Updated: 11/15/24 1043     Respiratory Culture No growth    Genital Culture - Swab, Penis [[id number]]  (Abnormal) Collected: 11/13/24 1158    Lab Status: Final result Specimen: Swab from Penis Updated: 11/16/24 1042     Genital Culture Scant growth (1+) Candida albicans      Rare growth Normal Skin Clare     Gram Stain Many (4+) WBCs seen      Occasional Yeast    MRSA Screen, PCR (Inpatient) - Swab, Nares [[id number]]  (Normal) Collected: 11/12/24 1027    Lab Status: Final result Specimen: Swab from Nares Updated: 11/12/24 1152     MRSA PCR No MRSA Detected    Narrative:      The negative predictive value of this diagnostic test is high and should only be used to consider de-escalating anti-MRSA therapy. A positive result may indicate colonization with MRSA and must be correlated  clinically.    Respiratory Culture - Aspirate, ET Suction [[id number]]  (Abnormal) Collected: 11/12/24 0924    Lab Status: Final result Specimen: Aspirate from ET Suction Updated: 11/14/24 0952     Respiratory Culture Light growth (2+) Candida albicans      Scant growth (1+) Normal respiratory titi. No S. aureus or Pseudomonas aeruginosa detected. Final report.     Gram Stain Moderate (3+) WBCs per low power field      No epithelial cells seen      Rare (1+) Yeast    Blood Culture - Blood, Arm, Left [[id number]]  (Abnormal) Collected: 11/12/24 0815    Lab Status: Final result Specimen: Blood from Arm, Left Updated: 11/15/24 0728     Blood Culture Staphylococcus, coagulase negative     Isolated from Aerobic and Anaerobic Bottles     Gram Stain Aerobic Bottle Gram positive cocci in clusters      Anaerobic Bottle Gram positive cocci in clusters    Narrative:      Probable contaminant requires clinical correlation, susceptibility not performed unless requested by physician.      Blood Culture - Blood, Arm, Right [[id number]]  (Normal) Collected: 11/12/24 0815    Lab Status: Final result Specimen: Blood from Arm, Right Updated: 11/17/24 0830     Blood Culture No growth at 5 days    Blood Culture ID, PCR - Blood, Arm, Left [[id number]]  (Abnormal) Collected: 11/12/24 0815    Lab Status: Final result Specimen: Blood from Arm, Left Updated: 11/14/24 1130     BCID, PCR Staph spp, not aureus or lugdunensis. Identification by BCID2 PCR.     BOTTLE TYPE Aerobic Bottle    Catheter Culture - Cath Tip, Neck [[id number]] Collected: 11/10/24 1208    Lab Status: Final result Specimen: Cath Tip from Neck Updated: 11/13/24 0715     CATHETER CULTURE No growth at 3 days    Catheter Culture - Cath Tip, Neck [[id number]] Collected: 11/09/24 1639    Lab Status: Final result Specimen: Cath Tip from Neck Updated: 11/11/24 0828     CATHETER CULTURE No growth    Respiratory Culture - Sputum, Trachea [[id number]] Collected: 11/09/24 1021    Lab  Status: Final result Specimen: Sputum from Trachea Updated: 11/11/24 1017     Respiratory Culture Rare growth Normal respiratory titi. No S. aureus or Pseudomonas aeruginosa detected. Final report.     Gram Stain Many (4+) WBCs seen      Few (2+) Epithelial cells seen      No organisms seen                acetylcysteine, 3 mL, Nebulization, TID - RT  amiodarone, 400 mg, Per G Tube, Q24H  aspirin, 81 mg, Per G Tube, Daily  atorvastatin, 40 mg, Per G Tube, Nightly  bumetanide, 4 mg, Intravenous, Q8H  cefepime, 2,000 mg, Intravenous, Q8H  chlorhexidine, 15 mL, Mouth/Throat, Q12H  [Held by provider] enoxaparin, 40 mg, Subcutaneous, Q12H  Ergocalciferol, 100 mcg, Per G Tube, Daily  hydrocortisone-bacitracin-zinc oxide-nystatin, 1 Application, Topical, Q12H  insulin glargine, 20 Units, Subcutaneous, Daily  insulin regular, 2-7 Units, Subcutaneous, Q6H  ipratropium-albuterol, 3 mL, Nebulization, Q4H - RT  lansoprazole, 15 mg, Per G Tube, Q AM  miconazole, 1 Application, Topical, Q12H  midodrine, 15 mg, Oral, TID AC  saccharomyces boulardii, 250 mg, Per G Tube, BID  sildenafil, 20 mg, Per G Tube, TID  sodium chloride, 10 mL, Intravenous, Q12H  Vancomycin Pharmacy Intermittent/Pulse Dosing, , Not Applicable, Daily      dexmedetomidine, 0.2-1.5 mcg/kg/hr, Last Rate: 0.3 mcg/kg/hr (11/16/24 6485)  DOPamine, 2-20 mcg/kg/min  EPINEPHrine, 0.01 mcg/kg/min, Last Rate: Stopped (11/17/24 1051)  lidocaine in D5W, 1 mg/min, Last Rate: Stopped (11/05/24 1135)  milrinone, 0.125 mcg/kg/min, Last Rate: 0.125 mcg/kg/min (11/17/24 6754)  niCARdipine, 5-15 mg/hr  norepinephrine, 0.02-0.2 mcg/kg/min, Last Rate: Stopped (11/17/24 1155)  Pharmacy to dose vancomycin,   phenylephrine, 0.2-2 mcg/kg/min  propofol, 5-50 mcg/kg/min, Last Rate: Stopped (11/16/24 1465)  vasopressin, 0.02-0.1 Units/min, Last Rate: Stopped (11/16/24 2389)        Diagnostics:  Adult Transthoracic Echo Limited W/ Cont if Necessary Per Protocol    Result Date: 11/15/2024     Limited study for LV/RV function   Left ventricular systolic function is normal. Calculated left ventricular EF = 67.9%   Moderately to severely reduced right ventricular systolic function noted.   The right ventricular cavity is severely dilated.   The left atrial cavity is dilated.   The right atrial cavity is dilated.   Moderate tricuspid valve regurgitation is present.   Estimated right ventricular systolic pressure from tricuspid regurgitation is moderately elevated (45-55 mmHg).   S/p aortic valve replacement.  Not well-visualized/assessed.   S/p mitral valve repair with 28 mm Med[employer]onic flexible band annuloplasty.     US Nonvascular Extremity Limited    Result Date: 11/15/2024  US NONVASCULAR EXTREMITY LIMITED-11/15/2024  HISTORY: Right groin wound.  The subcutaneous tissues of the right groin superficial to the skin wound is an approximately 11.5 cm x 3.7 cm mixed echotexture hypoechoic area which may contain small amount of cystic change. There is somewhat heterogeneous appearance of the surrounding soft tissues likely related to edema.      1. Ill-defined somewhat loculated hypoechoic collection measuring 11.5 cm x 3.7 cm x 6.4 cm. This could represent hematoma or abscess and contains a small hypoechoic cystic area.   This report was finalized on 11/15/2024 4:35 PM by Dr. Deven Garcia M.D on Workstation: [id number]      XR Chest 1 View    Result Date: 11/15/2024  XR CHEST 1 VW-  HISTORY: 74-year-old male postop AVR, MVR, AICD generator explantation, intra-aortic balloon pump placement on 10/30/2024.   FINDINGS: CHF may be more prominent than on yesterday's chest radiograph. Moderately large left effusion and small-moderate right pleural effusion are likely unchanged. No pneumothorax is seen.  ET tube is approximately 2 cm proximal to the yoni. Left IJ Lamar-Paradise catheter tip is within the proximal main pulmonary artery. Leads of the explanted AICD generator remain in place. There is an NG tube  within the esophagus and stomach, distal tip is not included.  This report was finalized on 11/15/2024 6:31 AM by Dr. Bernadette Gaming M.D on Workstation: [id number]      XR Chest 1 View    Result Date: 11/14/2024  XR CHEST 1 VW-  Clinical: Postop  COMPARISON examination 11/13/2024  FINDINGS: Patient is rotated towards the left as before. Endotracheal tube and feeding tube in position as before. Transvenous pacemaker in place. Bibasilar consolidation as before. Bilateral diffuse interstitial infiltrate also seen, unchanged. There is cardiac enlargement. The mediastinum is stable.  CONCLUSION: Stable imaging of the chest  This report was finalized on 11/14/2024 6:39 AM by Dr. Adal Kimball M.D on Workstation: [id number]      CT Chest Without Contrast Diagnostic    Result Date: 11/13/2024  CT CHEST WO CONTRAST DIAGNOSTIC-, CT ABDOMEN PELVIS WO CONTRAST-  HISTORY: Pulmonary infiltrates; T82.857A-Stenosis of other cardiac prosthetic devices, implants and grafts, initial encounter; Z95.2-Presence of prosthetic heart valve; Z95.2-Presence of prosthetic heart valve  TECHNIQUE: Radiation dose reduction techniques were utilized, including automated exposure control and exposure modulation based on body size. CT of the chest, abdomen, and pelvis includes axial imaging from the thoracic inlet through the trochanters without intravenous contrast and without oral contrast.  COMPARISON: AP chest same date.  FINDINGS: CHEST: There are multifocal coronary artery calcifications. Median sternotomy wires and overlying midline surgical skin staples are present. Cardiomegaly. Left subclavian cardiac pacer/defibrillator leads are discontinuous proximally with pacemaker segment within the left brachiocephalic vein.  There is dense lower lobe consolidation with air bronchograms in both lower lobes which are mostly collapsed. Pulmonary vascular engorgement. Small bilateral pleural effusions layer dependently. 5 mm noncalcified nodule  anterior inferior right upper lobe on axial image 37.  Endotracheal tube tip is 3.2 cm above the yoni. Left IJ tip in the left brachiocephalic vein. Feeding tube is looped in the stomach and tip is in the region of the gastric fundus.  Surgical skin staples overlying the left upper anterior chest wall where there has been removal of pacemaker.  ABDOMEN/PELVIS: Within the posterior-inferior central spleen there is a focal area of low density measuring approximately 4.7 x 4.1 cm. This is without change compared to exam 09/18/2024 and may represent splenic infarction. Liver, adrenal glands, pancreas appear within normal limits. There is lobular appearance of the kidneys with areas of cortical thinning and this appears chronic. No hydronephrosis.  Urinary bladder is partially decompressed and exhibits wall thickening. Ugarte catheter is present in the urinary bladder. There is mild to moderate stool throughout the colon. No evidence for bowel obstruction. No ascites or intra-abdominal abscess formation. Atherosclerotic calcifications are present involving the abdominal aorta and iliac vasculature. There is mild edema within the left lateral lower chest and upper abdomen subcutaneous fat extending off the field-of-view laterally. Multilevel bridging plate osteophyte formation within the thoracic spine.      1. Dense bilateral lower lobe consolidation with partial collapse of the lower lobes and air bronchograms likely associated with infiltrate. Pulmonary vascular engorgement. Small pleural effusions layer dependently. 2. 5 mm noncalcified nodule anterior inferior right upper lobe. 3. Recent median sternotomy with overlying midline surgical skin staples. Cardiomegaly. Prosthetic tricuspid valve. Recent removal of left subclavian cardiac pacer with retraction of pacing leads. 4. Mild to moderate stool throughout the colon. No evidence for acute intra-abdominal process. No hydronephrosis. 5. Endotracheal tube tip 3.2 cm  above the yoni. Left IJ tip in the left brachiocephalic vein. Feeding tube tip in the gastric fundus. Ugarte catheter within a decompressed urinary bladder which exhibits generalized wall thickening.    Radiation dose reduction techniques were utilized, including automated exposure control and exposure modulation based on body size.   This report was finalized on 11/13/2024 1:22 PM by Salas Ingram M.D on Workstation: [id number]      CT Abdomen Pelvis Without Contrast    Result Date: 11/13/2024  CT CHEST WO CONTRAST DIAGNOSTIC-, CT ABDOMEN PELVIS WO CONTRAST-  HISTORY: Pulmonary infiltrates; T82.857A-Stenosis of other cardiac prosthetic devices, implants and grafts, initial encounter; Z95.2-Presence of prosthetic heart valve; Z95.2-Presence of prosthetic heart valve  TECHNIQUE: Radiation dose reduction techniques were utilized, including automated exposure control and exposure modulation based on body size. CT of the chest, abdomen, and pelvis includes axial imaging from the thoracic inlet through the trochanters without intravenous contrast and without oral contrast.  COMPARISON: AP chest same date.  FINDINGS: CHEST: There are multifocal coronary artery calcifications. Median sternotomy wires and overlying midline surgical skin staples are present. Cardiomegaly. Left subclavian cardiac pacer/defibrillator leads are discontinuous proximally with pacemaker segment within the left brachiocephalic vein.  There is dense lower lobe consolidation with air bronchograms in both lower lobes which are mostly collapsed. Pulmonary vascular engorgement. Small bilateral pleural effusions layer dependently. 5 mm noncalcified nodule anterior inferior right upper lobe on axial image 37.  Endotracheal tube tip is 3.2 cm above the yoni. Left IJ tip in the left brachiocephalic vein. Feeding tube is looped in the stomach and tip is in the region of the gastric fundus.  Surgical skin staples overlying the left upper anterior chest  wall where there has been removal of pacemaker.  ABDOMEN/PELVIS: Within the posterior-inferior central spleen there is a focal area of low density measuring approximately 4.7 x 4.1 cm. This is without change compared to exam 09/18/2024 and may represent splenic infarction. Liver, adrenal glands, pancreas appear within normal limits. There is lobular appearance of the kidneys with areas of cortical thinning and this appears chronic. No hydronephrosis.  Urinary bladder is partially decompressed and exhibits wall thickening. Ugarte catheter is present in the urinary bladder. There is mild to moderate stool throughout the colon. No evidence for bowel obstruction. No ascites or intra-abdominal abscess formation. Atherosclerotic calcifications are present involving the abdominal aorta and iliac vasculature. There is mild edema within the left lateral lower chest and upper abdomen subcutaneous fat extending off the field-of-view laterally. Multilevel bridging plate osteophyte formation within the thoracic spine.      1. Dense bilateral lower lobe consolidation with partial collapse of the lower lobes and air bronchograms likely associated with infiltrate. Pulmonary vascular engorgement. Small pleural effusions layer dependently. 2. 5 mm noncalcified nodule anterior inferior right upper lobe. 3. Recent median sternotomy with overlying midline surgical skin staples. Cardiomegaly. Prosthetic tricuspid valve. Recent removal of left subclavian cardiac pacer with retraction of pacing leads. 4. Mild to moderate stool throughout the colon. No evidence for acute intra-abdominal process. No hydronephrosis. 5. Endotracheal tube tip 3.2 cm above the yoni. Left IJ tip in the left brachiocephalic vein. Feeding tube tip in the gastric fundus. Ugarte catheter within a decompressed urinary bladder which exhibits generalized wall thickening.    Radiation dose reduction techniques were utilized, including automated exposure control and  exposure modulation based on body size.   This report was finalized on 11/13/2024 1:22 PM by Salas Ingram M.D on Workstation: [id number]      XR Chest 1 View    Result Date: 11/13/2024  SINGLE VIEW OF THE CHEST  HISTORY: Postop open heart surgery  COMPARISON: November 12, 2024  FINDINGS: Tubes and lines appear stable. There is vascular congestion. Bibasilar consolidation and bilateral effusions are noted. No pneumothorax is seen.      No significant interval change.  This report was finalized on 11/13/2024 5:37 AM by Dr. Alexandria Dahl M.D on Workstation: [id number]      XR Chest 1 View    Result Date: 11/12/2024  CHEST SINGLE VIEW  HISTORY: 74 years of age, Male. Postoperative exam.  COMPARISON: AP chest 11/11/2024, 11/10/2024, 11/09/2024.  FINDINGS: Endotracheal tube, feeding tube, left subclavian discontinued. Pacing/defibrillator leads, left atrial appendage clip are evident. Left IJ central venous catheter extends to the region of the right atrium. There are postoperative changes on the left where there are surgical skin staples. There is interstitial disease and there are left greater than right basilar opacities due to atelectasis or infiltrates. No evidence for pneumothorax      No evidence for significant change when compared to yesterday's exam.   This report was finalized on 11/12/2024 8:02 AM by Salas Ingram M.D on Workstation: [id number]      US Thoracentesis    Result Date: 11/11/2024  ULTRASOUND-GUIDED LEFT THORACENTESIS  HISTORY: Pleural effusion  COMPARISON: Chest x-ray 11/11/2024  The risks, benefits and alternatives of the procedure were discussed with the patient and informed consent was obtained. Prior to the procedure ultrasound of the left chest was performed to evaluate the pleural effusion. However, there is only very minimal pleural fluid present, insufficient for thoracentesis.       Only very minimal pleural fluid present. Thoracentesis not performed.   This report was  finalized on 11/11/2024 4:55 PM by Dr. Dean Knapp M.D on Workstation: [id number]      Adult Transthoracic Echo Limited W/ Cont if Necessary Per Protocol    Result Date: 11/11/2024    Left ventricular systolic function is normal. Calculated left ventricular EF = 67.8%   RV severely dilated with moderate-severe dysfunction.  Mild-moderate RVH.   S/p aortic valve replacement with homograft.  Not well-visualized/assessed.   S/p mitral valve repair with 28 mm [employer] flexible band annuloplasty.   Severe tricuspid valve regurgitation is present.   Estimated right ventricular systolic pressure from tricuspid regurgitation is markedly elevated (>55 mmHg).   Severe biatrial enlargement, dilated IVC.     XR Chest 1 View    Result Date: 11/11/2024  XR CHEST 1 VW-  DATE OF EXAM: 11/11/2024 8:03 AM  INDICATION: Postop. Endotracheal tube advancement.  COMPARISON: Radiographs 11/10/2024, 11/9/2024, 11/8/2024, and 11/7/2024. Coronary CTA 10/25/2024. CT chest 9/18/2024.  TECHNIQUE: A single portable AP view of the chest was obtained.  FINDINGS: Lordotic positioning. Multiple overlying artifacts. Similar-appearing sternotomy wires, retained cardiac pacer/defibrillator leads, mitral annuloplasty, endotracheal tube, left IJ pulmonary arterial catheter, and partially imaged enteric tube. Similar-appearing basilar predominant opacification of both lungs. No pneumothorax. Unchanged cardiac and mediastinal contours. No acute osseous abnormality is identified.       1. No significant herbal change. Stable support tubes and line. 2. Stable basilar prominent lung opacities.  This report was finalized on 11/11/2024 9:01 AM by Neel Wan MD on Workstation: [id number]      Duplex Venous Upper Extremity - Bilateral CAR    Result Date: 11/11/2024    Sub-acute right upper extremity superficial thrombophlebitis noted in the basilic (upper arm).   Sub-acute left upper extremity deep vein thrombosis noted in the axillary.   Acute left  upper extremity superficial thrombophlebitis noted in the cephalic (forearm).   All other vessels appear normal.     XR Chest 1 View    Result Date: 11/10/2024  XR CHEST 1 VW-   INDICATION: Central line placement  COMPARISON: Chest radiograph November 9, 2024  TECHNIQUE: 1 view chest  FINDINGS:  Vascular congestion. Indistinctness of vessels. Ill-defined basilar opacities. Blunting costophrenic angles. Stable mediastinum. Enlarged cardiac silhouette. Left atrial appendage clip. Median sternotomy. Right IJ sheath with catheter tip near the SVC bifurcation. Endotracheal tube tip is in the distal trachea. Left IJ catheter containing a pulmonary artery catheter, with the tip near the right ventricular outflow tract. Feeding tube tip is in the gastric fundus.       1. Interval placement of left IJ sheath containing a pulmonary artery catheter with the tip near the right ventricular outflow tract. No pneumothorax. 2. Cardiomegaly with vascular congestion and suspected interstitial edema. 3. Ill-defined basilar opacities are similar. 4. Layering pleural effusions  This report was finalized on 11/10/2024 10:21 AM by Dr. Alberto Dominique M.D on Workstation: [id number]      [place] catheter    Result Date: 11/10/2024  Matthieu Scott MD     11/10/2024 10:16 AM [place] catheter Patient reassessed immediately prior to procedure Patient location during procedure: ICU Indications: MD/Surgeon request Staff Anesthesiologist: Matthieu Scott MD Preanesthetic Checklist Completed: patient identified, IV checked, site marked, risks and benefits discussed, surgical consent, monitors and equipment checked, pre-op evaluation and timeout performed Central Line Prep Sterile Tech:gloves, cap, gown, mask and sterile barriers Prep: chloraprep no medical exclusion documented for following all elements of maximal sterile barrier technique Patient monitoring: blood pressure monitoring, continuous pulse oximetry and EKG Central  Line Procedure Laterality:right Location:internal jugular Catheter Type:[place] Catheter Size:7.5 Fr Guidance:ultrasound guided PROCEDURE NOTE/ULTRASOUND INTERPRETATION.  Using ultrasound guidance the potential vascular sites for insertion of the catheter were visualized to determine the patency of the vessel to be used for vascular access.  After selecting the appropriate site for insertion, the needle was visualized under ultrasound being inserted into the internal jugular vein, followed by ultrasound confirmation of wire and catheter placement. There were no abnormalities seen on ultrasound; an image was taken; and the patient tolerated the procedure with no complications. Images: still images obtained, printed/placed on chart Assessment Post procedure:biopatch applied, line sutured and occlusive dressing applied Assessement:blood return through all ports and free fluid flow Complications:no Patient Tolerance:patient tolerated the procedure well with no apparent complications Additional Notes SGC @     Central Line    Result Date: 11/10/2024  Matthieu Scott MD     11/10/2024 10:16 AM Central Line Patient reassessed immediately prior to procedure Patient location during procedure: ICU Indications: MD/Surgeon request Staff Anesthesiologist: Matthieu Scott MD Preanesthetic Checklist Completed: patient identified, IV checked, site marked, risks and benefits discussed, surgical consent, monitors and equipment checked, pre-op evaluation and timeout performed Central Line Prep Sterile Tech:gloves, cap, gown, mask and sterile barriers Prep: chloraprep no medical exclusion documented for following all elements of maximal sterile barrier technique Patient monitoring: blood pressure monitoring, continuous pulse oximetry and EKG Central Line Procedure Laterality:left Location:internal jugular Catheter Type:MAC Catheter Size:9 Fr Guidance:ultrasound guided PROCEDURE NOTE/ULTRASOUND INTERPRETATION.  Using  ultrasound guidance the potential vascular sites for insertion of the catheter were visualized to determine the patency of the vessel to be used for vascular access.  After selecting the appropriate site for insertion, the needle was visualized under ultrasound being inserted into the internal jugular vein, followed by ultrasound confirmation of wire and catheter placement. There were no abnormalities seen on ultrasound; an image was taken; and the patient tolerated the procedure with no complications. Images: still images obtained, printed/placed on chart Assessment Post procedure:biopatch applied, line sutured and occlusive dressing applied Assessement:blood return through all ports and free fluid flow Complications:no Patient Tolerance:patient tolerated the procedure well with no apparent complications     XR Chest 1 View    Result Date: 11/9/2024  SINGLE VIEW OF THE CHEST  HISTORY: Central line placement  COMPARISON: November 8, 2024  FINDINGS: Tubes and lines appear stable compared to prior study. No pneumothorax is seen. Cardiomegaly and vascular congestion are noted. Bibasilar consolidation and left pleural effusion are again noted. Aeration of the left lung base They improved.      Slight interval improvement in aeration of the left lung base.  This report was finalized on 11/9/2024 3:33 AM by Dr. Alexandria Dahl M.D on Workstation: [id number]      Duplex Venous Lower Extremity - Bilateral CAR    Result Date: 11/8/2024    Normal bilateral lower extremity venous duplex scan.   Previous left gastrocnemius vein thrombus not visualized on today's study     Adult Transthoracic Echo Limited W/ Cont if Necessary Per Protocol    Result Date: 11/8/2024    Left ventricular systolic function is normal. Calculated left ventricular EF = 61.1%   Left ventricular diastolic function was indeterminate.   The right ventricular cavity is moderately dilated. Normal right ventricular systolic function noted. RV free wall  strain = -6.0%.   The left atrial cavity is mildly dilated.     XR Chest 1 View    Result Date: 11/8/2024  XR CHEST 1 VW-  Clinical: Postop  COMPARISON examination 11/7/2024  FINDINGS: Patient is rotated, projection is apical lordotic. Life support tubes and lines in position as before. There are surgical skin staples demonstrated along the chest on the left. The cardiomediastinal silhouette is unchanged, there is cardiac enlargement. There is worsening bibasilar infiltrate/atelectasis and/or consolidation, greatest at the left base. Possible left-sided pleural effusion. No pneumothorax seen. The remainder is unremarkable.  This report was finalized on 11/8/2024 7:01 AM by Dr. Adal Kimball M.D on Workstation: [email or web address]      Adult Transthoracic Echo Limited W/ Cont if Necessary Per Protocol    Result Date: 11/7/2024    Limited echocardiogram for left and right ventricular function   Left ventricular systolic function is hyperdynamic (EF > 70%). Left ventricular ejection fraction appears to be greater than 70%.   Left ventricular wall thickness is consistent with mild concentric hypertrophy.   The right ventricular cavity is severely dilated.   Moderately reduced right ventricular systolic function noted.   Calculated right ventricular systolic pressure from tricuspid regurgitation is 41.0 mmHg.     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 Right IJ pulmonary artery catheter with tip overlying the RVOT/main pulmonary artery. Enteric tube with tip overlying the stomach.  Stable normal size cardiomediastinal silhouette with postsurgical changes of mitral valve repair and left atrial appendage clipping. Low lung volumes. Persistent confluent dense bilateral lobe opacities which are suspicious for  infiltrates. Central pulmonary vasculature remains somewhat dilated and indistinct with prominent pulmonary interstitial markings which may reflect a component of edema. No measurable pleural effusion or pneumothorax. No focal osseous abnormality.  This report was finalized on 11/7/2024 6:54 AM by Dr. Donovan Gomez M.D on Workstation: [id number]      XR Chest 1 View    Result Date: 11/6/2024  XR CHEST 1 VW-  HISTORY: Male who is 74 years-old, postoperative evaluation  TECHNIQUE: Frontal view of the chest  COMPARISON: 11/5/2024  FINDINGS: Stable appearing endotracheal tube, NG tube, right IJ catheter. Cardiac lead fragments, sternotomy wires, skin staples noted. The heart is enlarged. Pulmonary vasculature is congested with similar-appearing bilateral lower lung opacities. There may be minimal pleural effusions. No pneumothorax. Otherwise stable.      No significant change.  This report was finalized on 11/6/2024 12:48 PM by Dr. Giacomo Burton M.D on Workstation: [id number]      XR Chest 1 View    Result Date: 11/5/2024  XR CHEST 1 VW-11/5/2024  HISTORY: Postop. Follow-up.  The heart size is moderately enlarged. Mild to moderate bibasilar atelectasis or infiltrates are seen. The right base has cleared somewhat since yesterday's study.  No significant pneumothorax is seen. Skin staples, pacer leads, ET tube, NG tube, [place] catheter, sternotomy wires and radiopaque closure device are again seen. These appear relatively unchanged from yesterday's study.      1. No significant pneumothorax is seen. 2. Bibasilar atelectasis and/or infiltrates. The right base may have cleared slightly since yesterday's study. 3. Life support devices appear relatively unchanged.   This report was finalized on 11/5/2024 6:55 AM by Dr. Deven Garcia M.D on Workstation: [id number]      Adult Transthoracic Echo Limited W/ Cont if Necessary Per Protocol    Result Date: 11/4/2024    Left ventricular systolic function is hyperdynamic  (EF > 70%).   Left ventricular wall thickness is consistent with mild concentric hypertrophy.   The right ventricular cavity is severely dilated. Severely reduced right ventricular systolic function noted.   There is no evidence of pericardial effusion     XR Chest 1 View    Result Date: 11/4/2024  XR CHEST 1 VW-  HISTORY: Male who is 74 years-old, chest tube removal  TECHNIQUE: Frontal view of the chest  COMPARISON: 11/4/2024 at 1025 hours  FINDINGS: Interval removal of chest tubes. Stable appearing NG tube, endotracheal tube, right IJ [place] catheter. Cardiac lead fragments are evident. Sternotomy wires, cardiac valve marker are noted. The heart is enlarged. Pulmonary vasculature is congested. Atelectasis/infiltrate at the lower lungs, similar to prior exam, with likely mild pleural effusions. No pneumothorax. No acute osseous process.      Chest tubes removed. No pneumothorax.  This report was finalized on 11/4/2024 11:51 AM by Dr. Giacomo Burton M.D on Workstation: [email or web address]      XR Chest 1 View    Result Date: 11/4/2024  XR CHEST 1 VW-  HISTORY: Male who is 74 years-old, chest tube removal  TECHNIQUE: Frontal view of the chest  COMPARISON: 11/4/2024  FINDINGS: A right chest tube has been removed. Other chest tubes appear stable. Stable appearing NG tube, endotracheal tube, right IJ [place] catheter. Cardiac lead fragments are evident. Sternotomy wires, cardiac valve marker are noted. The heart is enlarged. Pulmonary vasculature is congested. Atelectasis/infiltrate at the lower lungs, similar to prior exam, with likely mild pleural effusions. No pneumothorax. No acute osseous process.      Chest tube removed. No pneumothorax.  This report was finalized on 11/4/2024 10:39 AM by Dr. Giacomo Burton M.D on Workstation: [email or web address]      XR Chest 1 View    Result Date: 11/4/2024  XR CHEST 1 VW-   INDICATION: Postoperative  COMPARISON: Chest radiograph November 3, 2024  TECHNIQUE: 1 view chest  FINDINGS:   Heterogeneous multifocal bilateral lung opacities. Questionable tiny left pneumothorax. Stable mediastinum. Suspect CABG. Left atrial appendage clip. Endotracheal tube tip is located in the distal trachea 4.3 cm above the yoni. Right IJ sheath with pulmonary artery catheter tip over the main pulmonary artery. Feeding tube with the tip in the gastric fundus. Abandoned cardiac leads. Left-sided chest tube. Mediastinal drains.       1. Heterogeneous multifocal bilateral lung opacities with improved aeration of the right upper lung. 2. Possible tiny left pneumothorax. 3. Stable support apparatus.  This report was finalized on 11/4/2024 8:31 AM by Dr. Alberto Dominique M.D on Workstation: [id number]      Duplex Pseudoaneurysm CAR    Result Date: 11/3/2024    Study today is negative for pseudoaneurysm of the left groin.  Hematoma measuring 2.3 x 1.3 cm.     Adult Transthoracic Echo Limited W/ Cont if Necessary Per Protocol    Result Date: 11/3/2024    Left ventricular systolic function is hyperdynamic (EF > 70%). Left ventricular ejection fraction appears to be greater than 70%.   The left ventricular cavity is borderline dilated.   Moderately reduced right ventricular systolic function noted.   The right ventricular cavity is moderate to severely dilated.   The left atrial cavity is severely dilated.   The right atrial cavity is mild to moderately  dilated.   Color doppler jet is not well visualized but triangular shap and density indicate severe tricuspid valve regurgitation is present.   Estimated right ventricular systolic pressure from tricuspid regurgitation is mildly elevated (35-45 mmHg).   Mitral ring in place with normal gradients.   Prosthetic AV not well visualized but gradients are normal.   No pericardial effusion appreciated.     XR Chest 1 View    Result Date: 11/3/2024  XR CHEST 1 VW-  HISTORY: Male who is 74 years-old, postoperative evaluation  TECHNIQUE: Supine view of the chest  COMPARISON:  11/2/2024  FINDINGS: Stable appearing endotracheal tube, NG tube, right IJ catheter, mediastinal drain, balloon pump marker. Cardiac leads, sternotomy wires, cardiac valve marker noted. The heart is enlarged. Pulmonary vasculature is congested. Extensive bilateral pulmonary opacities appear similar to prior exam. No large pleural effusion or pneumothorax is seen, limited by supine positioning, left lateral costophrenic angle is excluded from the image. Otherwise stable.      No significant change.    This report was finalized on 11/3/2024 7:10 AM by Dr. Giacomo Burton M.D on Workstation: [email or web address]      XR Chest 1 View    Result Date: 11/2/2024  XR CHEST 1 VW-  HISTORY: Postop.  COMPARISON: Chest radiograph 11/2/2024 9:14 a.m.  FINDINGS:  A single view of the chest was obtained. There is an endotracheal tube with the tip terminating approximately 2.6 cm above the yoni. There is a linear metallic density projecting over the aortic knob, which was previously located over the lower mediastinum. This may reflect a balloon pump with repositioning. Additional support devices are not significantly changed. The cardiac silhouette is enlarged. There is a left atrial appendage clip. Status post CABG. There is calcific aortic atherosclerosis. Bilateral pulmonary opacities are similar to prior. Sternal wires and surgical clips are present.  This report was finalized on 11/2/2024 2:24 PM by Dr. Janessa Bower M.D on Workstation: [id number]      XR Abdomen KUB    Result Date: 11/2/2024  XR ABDOMEN KUB-  INDICATIONS: NG tube placement  TECHNIQUE: SUPINE VIEW OF THE ABDOMEN  COMPARISON: 10/31/2024  FINDINGS:  In the partly included thorax, the NG tube projects over the spine, that extends to left upper quadrant at the expected location of the proximal stomach. The bowel gas pattern is nonobstructive. Follow-up as clinically indicated.       As described.   This report was finalized on 11/2/2024 2:09 PM by Dr. Giacomo WILL  MAXIMILIANO Burton on Workstation: [email or web address]      XR Chest 1 View    Result Date: 11/2/2024  XR CHEST 1 VW-  HISTORY: Male who is 73 years-old, postoperative evaluation  TECHNIQUE: Frontal view of the chest  COMPARISON: 11/2/2024 at 0134 hours  FINDINGS: Previous NG tube is no longer seen. Stable appearing endotracheal tube. Right IJ [place] catheter appears slightly advanced in comparison with the prior exam, tip in the region of the pulmonary arterial trunk. Normal chest tube removal. A 7-8 mm metallic radiodensity projecting over the central aspect of the cardiac shadow, if representing balloon pump marker would be low in position, correlate clinically. Sternotomy wires, cardiac valve marker noted. The heart is enlarged. Extensive bilateral pulmonary opacities show further interval increase bilaterally. There may be left pleural effusion. No pneumothorax. Otherwise stable.       As described.  Discussed by telephone with patient's nurse, [place], at time of interpretation, 1012, 11/2/2024  This report was finalized on 11/2/2024 10:14 AM by Dr. Giacomo Burton M.D on Workstation: [email or web address]      Arterial Line    Result Date: 11/2/2024  Anuj Aguilar MD     11/2/2024  7:24 AM Arterial Line Patient reassessed immediately prior to procedure Patient location during procedure: OR Line placed for hemodynamic monitoring, ABGs/Labs/ISTAT and MD/Surgeon request. Performed By Anesthesiologist: Anuj Aguilar MD Preanesthetic Checklist Completed: patient identified, IV checked, site marked, risks and benefits discussed, surgical consent, monitors and equipment checked, pre-op evaluation and timeout performed Arterial Line Prep  Sterile Tech: cap, gloves and sterile barriers Prep: ChloraPrep Patient monitoring: EKG, continuous pulse oximetry and blood pressure monitoring Arterial Line Procedure Laterality:right Location:  radial artery Catheter size: 20 G Guidance: ultrasound guided PROCEDURE NOTE/ULTRASOUND INTERPRETATION.   Using ultrasound guidance the potential vascular sites for insertion of the catheter were visualized to determine the patency of the vessel to be used for vascular access.  After selecting the appropriate site for insertion, the needle was visualized under ultrasound being inserted into the radial artery, followed by ultrasound confirmation of wire and catheter placement. There were no abnormalities seen on ultrasound; an image was taken; and the patient tolerated the procedure with no complications. Number of attempts: 1 Successful placement: yes Images: still images not obtained Post Assessment Dressing Type: wrist guard applied, secured with tape and occlusive dressing applied. Complications no Circ/Move/Sens Assessment: normal and unchanged. Patient Tolerance: patient tolerated the procedure well with no apparent complications Additional Notes ULTRASOUND INTERPRETATION. Using ultrasound guidance, a catheter was placed within in the appropriate vessel and advanced successfully. There were no abnormalities seen on ultrasound; the patient tolerated the procedure with no complications.     Diagnostic IntraOp Dano    Result Date: 11/2/2024  Leana Jang MD     11/2/2024  8:06 AM Diagnostic IntraOp Dano Procedure Performed: Diagnostic IntraOp Dano      Start Time:  11/2/2024 8:04 AM      End Time:   11/2/2024 8:04 AM Preanesthesia Checklist: Patient identified, IV assessed, risks and benefits discussed, monitors and equipment assessed, procedure being performed at surgeon's request and anesthesia consent obtained. General Procedure Information DANO Placed for monitoring purposes only -- This is not a diagnostic DANO Diagnostic Indications for Echo:  hemodynamic monitoring Physician Requesting Echo: Javon Florian MD Location performed:  OR Intubated Bite block placed Heart visualized Probe Insertion:  Easy Probe Type:  Multiplane Modalities:  2D only, color flow mapping, continuous wave Doppler and pulse wave  Doppler Anesthesia Information Performed Personally Anesthesiologist:  Leana Jang MD Echocardiogram Comments:      Limited exam for hemodynamic monitoring while closing chest    XR Chest 1 View    Result Date: 11/2/2024  XR CHEST 1 VW-  HISTORY: Male who is 73 years-old, balloon pump  TECHNIQUE: Frontal view of the chest  COMPARISON: 11/1/2024  FINDINGS: Stable-appearing tubes and line. The heart is enlarged. Extensive bilateral pulmonary opacities show interval increase, especially in the right upper lung. There may be left pleural effusion. No pneumothorax. Otherwise stable.      As described.  This report was finalized on 11/2/2024 5:50 AM by Dr. Giacomo Burton M.D on Workstation: [email or web address]      XR Chest 1 View    Result Date: 11/1/2024  SINGLE VIEW OF THE CHEST  HISTORY: Postop open heart surgery  COMPARISON: October 31, 2024  FINDINGS: Tubes and lines appear stable, including possible positioning of the [place] catheter within the right ventricle. Vascular congestion is again noted. No pneumothorax is seen. Bibasilar atelectasis and small bilateral effusions are suspected.       No significant interval change.  This report was finalized on 11/1/2024 5:18 AM by Dr. Alexandria Dahl M.D on Workstation: [email or web address]      XR Abdomen KUB    Result Date: 11/1/2024  KUB  HISTORY: Orogastric tube placement  COMPARISON: None available.  FINDINGS: Orogastric tube appears to terminate within the proximal stomach. Bowel gas pattern is nonobstructive. Bibasilar consolidation and bilateral effusions are noted.  This report was finalized on 11/1/2024 4:08 AM by Dr. Alexandria Dahl M.D on Workstation: [id number]      XR Chest 1 View    Result Date: 10/31/2024  SINGLE VIEW OF THE CHEST  HISTORY: Orogastric tube placement  COMPARISON: None available.  FINDINGS: Orogastric tube appears to extend into the upper abdomen. The patient's [place] catheter has retracted, and is likely positioned within the right  ventricle. Tubes and lines are otherwise stable. Cardiomegaly and vascular congestion are noted. Bibasilar consolidation is noted. Bilateral effusions are suspected. No pneumothorax is seen.       1. Orogastric tube appears to extend into the upper abdomen. 2. [place] catheter has retracted, and may be positioned within the right ventricle.  This report was finalized on 10/31/2024 9:51 PM by Dr. Alexandria Dahl M.D on Workstation: [id number]      XR Abdomen KUB    Addendum Date: 10/31/2024    ADDENDUM: Discussed by telephone with patient's nurse, Letty, 2044, 10/31/2024.  This report was finalized on 10/31/2024 8:47 PM by Dr. Giacomo Burton M.D on Workstation: [id number]      Result Date: 10/31/2024  XR ABDOMEN KUB-  INDICATIONS: Orogastric tube placement  TECHNIQUE: FRONTAL VIEW OF THE ABDOMEN  COMPARISON: None available  FINDINGS:  As several tubes are present over the abdomen, it is difficult to distinguish with certainty which tube is the orogastric tube, or what the location of the orogastric tube is; as such, additional imaging at the level of the chest and upper abdomen is recommended for further evaluation. The bowel gas pattern is nonobstructive.       As described.   This report was finalized on 10/31/2024 8:40 PM by Dr. Giacomo Burton M.D on Workstation: [id number]      XR Chest 1 View    Result Date: 10/31/2024  XR CHEST 1 VW-  HISTORY: 73-year-old male status post aortic root replacement, removal of intracardiac leads as well as pacemaker ICD generator and part of the leads. Intra-aortic balloon pump. Significant coagulopathy.  FINDINGS: Distal tip of ET tube is 1.5 cm proximal to the yoni. 2 cm withdrawal is recommended. Right IJ [place] catheter tip is within the main pulmonary artery outflow tract. Intra-aortic balloon pump marker is approximately 7 cm distal to the yoni. No pneumothorax.  There is improved aeration at the right lung, but the remaining perihilar airspace consolidations  need to be followed. There is no definite interstitial edema. No significant change in the enlarged cardiac silhouette. No pneumothorax.  This report was finalized on 10/31/2024 4:31 PM by Dr. Bernadette Gaming M.D on Workstation: [id number]      Diagnostic IntraOp Dano    Result Date: 10/31/2024  Anuj Aguilar MD     10/31/2024  3:58 PM Diagnostic IntraOp Dano Procedure Performed: Diagnostic IntraOp Dano      Start Time:      End Time:  Preanesthesia Checklist: Patient identified, IV assessed, risks and benefits discussed, monitors and equipment assessed, procedure being performed at surgeon's request and anesthesia consent obtained. General Procedure Information DANO Placed for monitoring purposes only -- This is not a diagnostic ADNO Physician Requesting Echo: Javon Florian MD Location performed:  ICU Intubated Bite block placed Heart visualized Probe Insertion:  Easy Probe Type:  Multiplane Modalities:  Color flow mapping, continuous wave Doppler and pulse wave Doppler Echocardiographic and Doppler Measurements Aorta Other Aortic Findings:      Anesthesia Information Performed Personally Anesthesiologist:  Anuj Aguilar MD Echocardiogram Comments:      Limited exam performed in ICU on POD1, inotropes epi 0.02 & milrinone 0.375 RV - severely impaired systolic function LV - severe impaired systolic function, EF 20-25%, underfilled, practically kissing papillary muscles, global hypokinesis Diastolic function - grade 2 dysfunction RA: dilated LA: dilated AV - s/p bioprosthetic homograft without paravalvular leak MV - s/p annuloplasty, well seated without leak, mean gradient 2mmHg, no regurgitation seen TV - moderate to severe regurgitation    XR Chest 1 View    Result Date: 10/31/2024  SINGLE VIEW OF THE CHEST  HISTORY: Postop line check  COMPARISON: October 30, 2024  FINDINGS: Tubes and lines appear to be stable when compared to the earlier study. No pneumothorax is seen. There is vascular congestion. There is  bibasilar atelectasis. There is a left pleural effusion. As previously noted, intracardiac pacemaker leads have been removed, and the generator also appears to have been removed.      Postoperative findings, as noted above.  This report was finalized on 10/31/2024 12:18 AM by Dr. Alexandria Dahl M.D on Workstation: [id number]      XR Lost Needle / Instrument    Result Date: 10/30/2024  X-RAY LOST NEEDLE/INSTRUMENT  HISTORY: No count  COMPARISON: October 30, 2024  FINDINGS: The patient has undergone revision of sternotomy. Endotracheal tube terminates in satisfactory position. Right internal jugular vein [place] catheter appears to be stable in position. There are left-sided chest tubes and a mediastinal drain. Distal left-sided pacemaker leads appear to have been removed. Correlation with operative procedure is recommended. There is vascular congestion. No obvious pneumothorax is seen. Left basilar atelectasis is present. There may be a left pleural effusion.      Postoperative findings, as noted above. Distal left-sided pacemaker leads appear to have been removed, and correlation with procedural history is recommended.  This report was finalized on 10/30/2024 10:58 PM by Dr. Alexandria Dahl M.D on Workstation: [id number]      XR Chest Post CVA Port    Result Date: 10/30/2024  Portable chest radiograph  HISTORY: Central line placement  TECHNIQUE: Single AP portable radiograph of the chest  COMPARISON: Chest radiograph 10/20/2024      FINDINGS AND IMPRESSION: Right internal jugular pulmonary artery catheter tip terminates over the left aspect of the mediastinum. There are median sternotomy wires and a presumed atrial appendage clip. Prosthetic heart valve and a left-sided pacer/AICD.  Bibasilar pulmonary pacification is present, left greater than right, mildly worsened within the right lung since 10/20/2024. Given asymmetry, continued attention follow-up to resolution is recommended to exclude the  possibility of neoplasm. No pneumothorax is seen. Cardiac silhouette is mildly enlarged.  This report was finalized on 10/30/2024 12:20 PM by Dr. Danial Rosenbaum M.D on Workstation: [id number]        Pulmonary Artery Catheter    Result Date: 10/30/2024  Lionel Valencia MD     10/30/2024 11:03 AM Pulmonary Artery Catheter Patient reassessed immediately prior to procedure Patient location during procedure: OR Start time: 10/30/2024 10:26 AM Stop Time:10/30/2024 10:46 AM Indications: central pressure monitoring, vascular access and MD/Surgeon request Staff Anesthesiologist: Lionel Valencia MD Preanesthetic Checklist Completed: patient identified and risks and benefits discussed Central Line Prep Sterile Tech:cap, gloves, gown, mask and sterile barriers Prep: chloraprep Patient monitoring: blood pressure monitoring, continuous pulse oximetry and EKG Central Line Procedure Laterality:right Location:internal jugular Catheter Type:[place] Catheter Size:7.5 Fr Guidance:ultrasound guided PROCEDURE NOTE/ULTRASOUND INTERPRETATION.  Using ultrasound guidance the potential vascular sites for insertion of the catheter were visualized to determine the patency of the vessel to be used for vascular access.  After selecting the appropriate site for insertion, the needle was visualized under ultrasound being inserted into the internal jugular vein, followed by ultrasound confirmation of wire and catheter placement. There were no abnormalities seen on ultrasound; an image was taken; and the patient tolerated the procedure with no complications. Assessment Post procedure:biopatch applied, line sutured, occlusive dressing applied and secured with tape Assessement:blood return through all ports and free fluid flow Complications:no Patient Tolerance:patient tolerated the procedure well with no apparent complications     Central Line    Result Date: 10/30/2024  Lionel Valencia MD     10/30/2024 11:02 AM Central Line Patient reassessed  immediately prior to procedure Patient location during procedure: pre-op Start time: 10/30/2024 10:26 AM Stop Time:10/30/2024 10:46 AM Indications: vascular access and central pressure monitoring Staff Anesthesiologist: Lionel Valencia MD Preanesthetic Checklist Completed: patient identified and risks and benefits discussed Central Line Prep Sterile Tech:cap, gloves, gown, mask and sterile barriers Prep: chloraprep Patient monitoring: blood pressure monitoring, continuous pulse oximetry and EKG Central Line Procedure Laterality:right Location:internal jugular Catheter Type:[place] Catheter Size:9 Fr Guidance:ultrasound guided PROCEDURE NOTE/ULTRASOUND INTERPRETATION.  Using ultrasound guidance the potential vascular sites for insertion of the catheter were visualized to determine the patency of the vessel to be used for vascular access.  After selecting the appropriate site for insertion, the needle was visualized under ultrasound being inserted into the internal jugular vein, followed by ultrasound confirmation of wire and catheter placement. There were no abnormalities seen on ultrasound; an image was taken; and the patient tolerated the procedure with no complications. Images: still images obtained, printed/placed on chart Assessment Post procedure:biopatch applied, line sutured, occlusive dressing applied and secured with tape Assessement:blood return through all ports and chest x-ray ordered Complications:no Patient Tolerance:patient tolerated the procedure well with no apparent complications Additional Notes Ultrasound Interpretation:  Using ultrasound guidance the potential vascular sites for insertion of the catheter were visualized to determine the patency of the vessel to be used for vascular access.  After selecting the appropriate site for insertion, the needle was visualized under ultrasound being inserted into the vessel, followed by ultrasound confirmation of wire and catheter placement.  There were no  abnormalities seen on ultrasound; an image was taken/ and the patient tolerated the procedure with no complications.     Diagnostic IntraOp Dano    Result Date: 10/30/2024  Azar Macias MD     10/30/2024  2:48 PM Diagnostic IntraOp Dano Procedure Performed: Diagnostic IntraOp Dano      Start Time:      End Time:  Preanesthesia Checklist: Patient identified, IV assessed, risks and benefits discussed, monitors and equipment assessed, procedure being performed at surgeon's request and anesthesia consent obtained. General Procedure Information Diagnostic Indications for Echo:  assessment of ascending aorta, assessment of surgical repair and hemodynamic monitoring Physician Requesting Echo: Javon Florian MD CPT Code:  63562, 06216 ICD Code for Medical Necessity:  I34.0, I70.0 Location performed:  OR Intubated Bite block placed Heart visualized Probe Insertion:  Easy Probe Type:  Multiplane Modalities:  Color flow mapping, pulse wave Doppler and continuous wave Doppler Echocardiographic and Doppler Measurements Ventricles Right Ventricle: Cavity size dilated.  Hypertrophy not present.  Thrombus not present.  Global function mildly impaired.  Left Ventricle: Cavity size dilated.  Thrombus not present.  Global Function mildly impaired.  Ejection Fraction 58%.  Other Ventricular Findings:      LVEDV 155 mL Valves Aortic Valve: Annulus bioprosthetic.  Stenosis mild.  Mean Gradient: 9 mmHg.  Regurgitation absent.  Leaflets vegetative.  Leaflet motions restricted.  Mitral Valve: Annulus dilated.  Stenosis not present.  Mean Gradient: 1 mmHg.  Regurgitation moderate.  Leaflets normal.  Leaflet motions normal.  Tricuspid Valve: Annulus dilated.  Stenosis not present.  Regurgitation mild.  Leaflets normal.  Other Valve Findings:      Anterior mitral leaflet length 3.1 cm Aorta Ascending Aorta: Size normal.  Diameter 3.5 cm.  Dissection not present.  Plaque thickness less than 3 mm.  Mobile plaque not present.  Aortic Arch:  Size normal.  Diameter 3 cm.  Dissection not present.  Plaque thickness less than 3 mm.  Mobile plaque not present.  Descending Aorta: Size normal.  Diameter 2.5 cm.  Dissection not present.  Plaque thickness less than 3 mm.  Mobile plaque not present.  Atria Right Atrium: Size dilated.  Spontaneous echo contrast present.  Thrombus not present.  Tumor not present.  Device not present.  Left Atrium: Size dilated.  Spontaneous echo contrast present.  Thrombus not present.  Tumor not present.  Device not present.  Left atrial appendage normal. Diastolic Function Measurements: Diastolic Dysfunction Grade= indeterminate E=  51.7 ms A=  ms E/A Ratio= DT=  108 ms S/D=  .6 IVRT= Other Findings Pericardium:  pericardial effusion Pulmonary Venous Flow:  blunted (decreased) systolic flow Anesthesia Information Performed Personally Anesthesiologist:  Azar Macias MD Echocardiogram Comments:      Postbypass results:    Arterial Line    Result Date: 10/30/2024  Lionel Valencia MD     10/30/2024 11:02 AM Arterial Line Patient reassessed immediately prior to procedure Patient location during procedure: pre-op Start time: 10/30/2024 10:15 AM Stop Time:10/30/2024 10:17 AM  Line placed for hemodynamic monitoring. Performed By Anesthesiologist: Lionel Valencia MD Preanesthetic Checklist Completed: patient identified and risks and benefits discussed Arterial Line Prep  Sterile Tech: gloves Prep: ChloraPrep Patient monitoring: blood pressure monitoring, continuous pulse oximetry and EKG Arterial Line Procedure Laterality:left Location:  radial artery Catheter size: 20 G Guidance: ultrasound guided PROCEDURE NOTE/ULTRASOUND INTERPRETATION.  Using ultrasound guidance the potential vascular sites for insertion of the catheter were visualized to determine the patency of the vessel to be used for vascular access.  After selecting the appropriate site for insertion, the needle was visualized under ultrasound being inserted into the radial  artery, followed by ultrasound confirmation of wire and catheter placement. There were no abnormalities seen on ultrasound; an image was taken; and the patient tolerated the procedure with no complications. Successful placement: yes Images: still images obtained, printed/placed on the chart Post Assessment Dressing Type: occlusive dressing applied and secured with tape. Complications no Patient Tolerance: patient tolerated the procedure well with no apparent complications Additional Notes Using ultrasound guidance the potential vascular sites for insertion of the catheter were visualized to determine the patency of the vessel to be used for vascular access.  After selecting the appropriate site for insertion, the needle was visualized under ultrasound being inserted into the artery, followed by ultrasound confirmation of wire and catheter placement.  There were no abnormalities seen on ultrasound; an image was taken/ and the patient tolerated the procedure with no complications.     Duplex Vein Mapping Lower Extremity - Bilateral CAR    Result Date: 10/28/2024    The right great saphenous vein is patent  and of adequate size in the thigh.   The right great saphenous vein is patent and of adequate size in the calf.   The left great saphenous vein is patent and of adequate size in the thigh.   The left great saphenous vein is patent  and of adequate size in the calf.     Carotid Duplex - Bilateral    Result Date: 10/28/2024    Right internal carotid artery demonstrates a less than 50% stenosis.   Antegrade right vertebral flow.   Left internal carotid artery demonstrates a less than 50% stenosis.   Antegrade left vertebral flow.     XR Chest PA & Lateral    Result Date: 10/28/2024  XR CHEST PA AND LATERAL-  HISTORY: Male who is 73 years-old, preoperative evaluation  TECHNIQUE: Frontal and lateral views of the chest  COMPARISON: 10/14/2024  FINDINGS: The heart is enlarged. Pulmonary vasculature shows mild central  prominence. Left-sided pacemaker, cardiac leads, sternotomy wires, cardiac valve marker noted. Minimal right pleural effusion, continued follow-up suggested. No focal pulmonary consolidation. No pneumothorax. Otherwise stable.      As described.  This report was finalized on 10/28/2024 4:27 PM by Dr. Giacomo Burton M.D on Workstation: [id number]      Stress Test With Myocardial Perfusion One Day    Result Date: 10/26/2024    Lexiscan protocol completed without low-level exercise.  Baseline ECG showed A-fib with RBBB, no angina or significant ischemic ECG changes noted.   Left ventricular ejection fraction is normal (Calculated EF = 55%).   Mildly reduced perfusion defect in the basal segments likely due to suboptimal postprocessing/contouring as well as diaphragmatic attenuation artifact.  No reversibility noted.   Myocardial perfusion imaging indicates a grossly normal myocardial perfusion study with no evidence of reversible ischemia. Impressions are consistent with a low risk study.     Adult Transesophageal Echo 3D (JOLIE) W/ Cont If Necessary Per Protocol    Result Date: 10/25/2024    Left ventricular ejection fraction appears to be 51 - 55%.   Left ventricular wall thickness is consistent with mild concentric hypertrophy.   The right ventricular cavity is mildly dilated.   The left atrial cavity is severely dilated.   Saline test results are negative.   The right atrial cavity is severely  dilated.   Severe aortic valve stenosis is present.   There is a mobile mass on the aortic valve. Vegetations are present on both the ventricle as well as the aortic side of the bioprosthetic aortic valve.  Largest extends into the ascending aorta approximately 3.1 cm above the sewing ring (3.3 cm total length). Vegetation becomes more broad as the furthest aspect from attachment, maximum width 1.7 cm. Vegetation on the ventricular side of the aortic valve I think most likely originates from the sewing ring.   There is a  bioprosthetic aortic valve present. The prosthetic aortic valve peak and mean gradients are elevated. There is a large, mobile mass present on the prosthetic aortic valve that is consistent with a vegetation. There is severe stenosis or obstruction of the prosthetic aortic valve.   Mild aortic valve regurgitation is present.     CT Angiogram Coronary    Result Date: 10/25/2024  RADIOLOGY INTERPRETATION OF EXTRACARDIAC STRUCTURES WITHIN THE CHEST    FINDINGS: Small right greater than left bilateral pleural effusions and mild pulmonary inter and intralobular septal thickening which may reflect a component of pulmonary edema. Respiratory motion limits evaluation of the lungs.    PLEASE SEE SEPARATE CARDIOLOGY REPORT OF THE CARDIAC FINDINGS.   Radiation dose reduction techniques were utilized, including automated exposure control and exposure modulation based on body size.   This report was finalized on 10/25/2024 3:03 PM by Dr. Donovan Gomez M.D on Workstation: [id number]      CT Angiogram Coronary-Cardiology Interpretation    Result Date: 10/25/2024  Table formatting from the original result was not included. 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 0.5 mm collimated images were obtained through the coronary arteries.  Data were transferred offline for 3D reconstructions using [employer]. CONTRAST: 85 mL iopamidol ([id number]) ACQUISITION: Retrospective ECG triggered acquisition was used.  Heart rate at the time of acquisition was approximately 85 BPM. MEDICATIONS: Metoprolol tartrate  25 mg oral Nitroglycerin 0.8 mg tablet TECHNICAL QUALITY:  Extremely poor due to obesity, high heart rate in the setting of A-fib and inability to medicate further due to low BP, and adjacent ICD leads with associated metallic  artifacts.  Nondiagnostic. . FINDINGS: Coronary Artery Calcification Findings: The total calcium score is 69 indicating mild (CAC 1-100) calcified plaque in the coronary tree. Left main: 40 LAD: 13 LCx: 4 RCA: 2 Angiographic Findings: The coronary arteries are  possibly left dominant . Left Main: Normal origin from the left coronary cusp.  Gives rise to LAD and LCx.  There appears to be a calcified plaque in distal LM extending into LAD.  Unable to accurately assess luminal stenosis due to poor image quality. LAD: Unable to accurately assess luminal stenosis due to poor image quality. LCx: Likely dominant vessel. Unable to accurately assess luminal stenosis due to poor image quality. RCA: Likely nondominant. Unable to accurately assess luminal stenosis due to poor image quality. Noncoronary Cardiac Findings: Cardiac chambers: Normal in size with no obvious filling defects within the ventricles, atria, or atrial appendages. No stigmata or prior infarction. Cardiac valves: A bioprosthetic valve is present in the aortic position and poorly visualized.  There appears to be hypoattenuated linear echodensity consistent with known bioprosthetic aortic valve vegetation. Pulmonary arteries: Normal in caliber. No obvious filling defects in the visualized central pulmonary arteri(es) to suggest large central pulmonary emboli, although the image qualities are extremely poor. Pericardium: The pericardial contour is preserved with no effusion, thickening, or calcifications. Left ventricle: Mild-moderate LV dilatation.  Calculated LVEF 70%.     Extremely poor image quality due to obesity (BMI 42), high heart rate in the setting of A-fib and inability to medicate further due to low BP, and adjacent ICD leads resulting in photon starvation, cardiac motion, and beam hardening artifacts and excessive noise.  Non-diagnostic study. CAD-RAD N/P1. Overall there is mild amount of coronary plaque.  Coronary calcium score CAC (69). Grossly  normal LV systolic function (calculated LVEF 70%). Bioprosthetic aortic valve present, poorly visualized. There appears to be hypoattenuated linear echodensity consistent with known bioprosthetic aortic valve vegetation.  Please reference same-day JOLIE results. Left atrial appendage appears to be ligated surgically with clips noted. Severe biatrial enlargement. Please refer to the radiology report for information on extra-cardiac structures.  RECOMMENDATION: Consider alternative imaging modalities to rule out obstructive CAD if deemed necessary prior to surgery for bioprosthetic endocarditis. Grading Scale for Stenosis Severity: Category Degree Interpretation CAD-RADS 0 0% (No plaque or stenosis) Absence of CAD CAD-RADS 1 1-24% (Minimal stenosis or plaque w/o stenosis) Minimal non-obstructive CAD CAD-RADS 2 25-49% (Mild stenosis) Mild non-obstructive CAD CAD-RADS 3 50-69% (Moderate stenosis) Moderate stenosis CAD-RADS 4 A - 70-99% stenosis or B - Left main >/= 50% or 3-vessel obstructive (>/=70%) disease Severe stenosis CAD-RADS 5 100% (total occlusion) Total coronary occlusion or sub-total occlusion CAD-RADS N Non-diagnostic study Obstructive CAD cannot be excluded Grading Scale for Plaque [place]: P1 (CAC 1-100) Mild amount of plaque P2 (-300) Moderate amount of plaque P3 (-999) Severe amount of plaque P4 (CAC > 1000) Extensive amount of plaque     Duplex Venous Lower Extremity - Bilateral CAR    Result Date: 10/24/2024    Acute left lower extremity deep vein thrombosis noted in the gastrocnemius.   All other veins appeared normal bilaterally.     Adult Transthoracic Echo Complete W/ Cont if Necessary Per Protocol    Result Date: 10/19/2024    Left ventricular systolic function is low normal. Left ventricular ejection fraction appears to be 51 - 55%.   Left ventricular wall thickness is consistent with mild concentric hypertrophy.   Left ventricular diastolic function was indeterminate.   There is  moderate to severe biatrial enlargement.   There is a bioprosthetic aortic valve present. The prosthetic aortic valve peak and mean gradients are elevated.  The peak and mean gradient across aortic valve was 101/68 mmHg.  Findings are consistent with severe stenosis of the bioprosthetic valve.   There is mild to moderate mitral regurgitation.   There is mild tricuspid regurgitation.  Estimated right ventricular systolic pressure from tricuspid regurgitation is markedly elevated (>55 mmHg).     Results for orders placed during the hospital encounter of 10/23/24    Adult Transthoracic Echo Limited W/ Cont if Necessary Per Protocol    Interpretation Summary    Limited study for LV/RV function    Left ventricular systolic function is normal. Calculated left ventricular EF = 67.9%    Moderately to severely reduced right ventricular systolic function noted.    The right ventricular cavity is severely dilated.    The left atrial cavity is dilated.    The right atrial cavity is dilated.    Moderate tricuspid valve regurgitation is present.    Estimated right ventricular systolic pressure from tricuspid regurgitation is moderately elevated (45-55 mmHg).    S/p aortic valve replacement.  Not well-visualized/assessed.    S/p mitral valve repair with 28 mm Medtronic flexible band annuloplasty.      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         Assessment & Plan     status post  10/31/2024 tissue aortic valve replacement for prosthetic aortic valve stenosis, maze, left atrial appendage ligation done in 2014 and status post reoperative sternotomy with AV root replacement 0.7 mm cryopreserved homograft with right Gilbert Carbaugh, AICD removal intra-aortic balloon pump placement.  Strep mitis bacteremic sepsis and possible endocarditis on vancomycin and cefepime for this and possible pneumonia plan is at least 6 weeks of antibiotics tentative end date of 12/4/2024  RV dysfunction severe by preop echocardiogram  Shock cardiogenic and probably element of septic shock as well remains pressor dependent  Possible pneumonia respiratory cultures ordered some light growth of Candida which is likely oral contaminant is unremarkable  Respiratory failure patient failed weaning tracheostomy today hopefully will be able to wean more aggressively now.  I think treatment of CHF though is still probably paramount to successful weaning.  He is demanding a significant minute ventilation he does not look anxious and does not look like he has had significantly worsening acidosis on his chemistries.  I am going to get a blood gas to assess  CHF history of nonischemic cardiomyopathy but recent echocardiogram LVEF 67% so probably diastolic dysfunction cardiology following along with nephrology patient on Bumex drip  Pulmonary hypertension severe by recent  echocardiogram on sildenafil per cardiothoracic surgery  Acute kidney injury  DVT history and left lower extremity on Doppler on 10/24/2024 not present on 11/8/2024 Doppler and subacute DVT in the left axillary vein Doppler holding anticoagulation now per cardiovascular surgery  Atrial fibrillation on amiodarone for rate control  Anemia acute on chronic expected postop acute complement.  Stable  Thrombocytopenia resolved  Esophagitis ease EGD on 9/30/2024  Fluids/lites/nutrition continue tube feeds now   Hyperglycemia not too bad on scheduled and sliding scale  insulin.  Morbid obesity with a BMI greater than 46      Addendum: Blood gas looks like a metabolic acidosis and on the blood gases bicarbonate is lower than on his chemistries this morning is down to 15.  This may help the renal to check a lactic acid  Plan for disposition:    Paul Leslie Jr, MD  11/17/24  12:17 EST    Time: Critical care time 36 minutes

## 2024-11-17 NOTE — PROGRESS NOTES
LOS: 25 days   Patient Care Team:  Juan Perez MD as PCP - General (Internal Medicine)    Chief Complaint: Follow-up bioprosthetic AVR endocarditis, mitral regurgitation, persistent atrial fibrillation.    Interval History: Trached yesterday.  More awake today.  Since trach he has had mild increase in rate of atrial fibrillation.  This morning has been between 101-120.  CVP is around 20.  Nephrology following and restarting diuretics today.  Creatinine up to 1 point    Vital Signs:  Temp:  [97.7 °F (36.5 °C)-99 °F (37.2 °C)] 99 °F (37.2 °C)  Heart Rate:  [] 122  Resp:  [24-33] 24  BP: ()/(49-69) 128/69  Arterial Line BP: ()/(47-69) 115/56  FiO2 (%):  [40 %-100 %] 40 %    Intake/Output Summary (Last 24 hours) at 11/17/2024 1031  Last data filed at 11/17/2024 0700  Gross per 24 hour   Intake 4489.58 ml   Output 1155 ml   Net 3334.58 ml       Physical Exam:   General Appearance:  Trach.  Appears critically ill.  Following simple command   Lungs:     Rhonchi bilaterally anteriorly.    Heart:    Irregularly irregular rhythm with a PAC rate. II/VI SM throughout.   Abdomen:     Soft, nontender, nondistended.    Extremities:    2+ generalized edema and anasarca.     Results Review:    Results from last 7 days   Lab Units 11/17/24  0318   SODIUM mmol/L 146*   POTASSIUM mmol/L 4.2   CHLORIDE mmol/L 112*   CO2 mmol/L 17.0*   BUN mg/dL 69*   CREATININE mg/dL 1.91*   GLUCOSE mg/dL 131*   CALCIUM mg/dL 8.2*         Results from last 7 days   Lab Units 11/17/24  0318   WBC 10*3/mm3 17.25*   HEMOGLOBIN g/dL 8.9*   HEMATOCRIT % 28.0*   PLATELETS 10*3/mm3 207                 Results from last 7 days   Lab Units 11/15/24  0306   MAGNESIUM mg/dL 2.0     Results from last 7 days   Lab Units 11/12/24  1128   TRIGLYCERIDES mg/dL 261*         I reviewed the patient's new clinical results.        Assessment:  1.  Status post bioprosthetic aortic valve replacement in 2014  2.  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  Moderate to severe mitral regurgitation  4.  Status post reoperative AV root replacement, mitral valve repair, ICD removal, SVG-RCA, and IABP placement on 10/30/2024  5.  Postoperative shock, multifactorial  6.  Acute kidney injury  7.  History of nonischemic cardiomyopathy with recovered ejection fraction  8.  Anemia, acute on chronic  9.  Postoperative thrombocytopenia  10.  Persistent atrial fibrillation  11.  Ventricular tachycardia postoperatively  12.  Grade C esophagitis by EGD on 9/30/2024  13.  Acute left lower extremity DVT on 10/24/2024.  Resolved on Dopplers on 11/8/2024  14.  Right Achilles tendon tear  15.  Postoperative junctional rhythm  16.  Hypoalbuminemia  17.  Thrombocytopenia  18.  Severe right ventricular enlargement and dysfunction post-op  19.  Fever noted on 11/8/2024  20.  Subacute DVT in the left axillary vein by Doppler on 11/10/2024.  21. Hypernatremia.     Plan:  -Currently on Revatio 20 mg every 8 hours.  -Renal following for hypernatremia and diuretic management.  CVP elevated.  Increase free water flushes for hypernatremia.  Nephrology is starting diuretics back today.  -Discontinue IV amiodarone and transition to 40 mg daily dosing.  His heart rates are higher over the last 24 hours.  I am hoping with some decongestion from diuretics that will improve.  If not we can consider digoxin  -Mild improvement in pressure requirement.  Vasopressin is now off.  Will monitor response to diuretics and pressor requirement.  May need to be increased to milrinone 0.25 resupported not responding to diuresis    He remains critically ill    We will continue to follow    López Rosenbaum MD  11/17/24  10:31 EST

## 2024-11-18 NOTE — PROGRESS NOTES
[facility] Progress Note      Patient Name: Woodrow Alejandro  : 1950  MRN: [id number]  Primary Care Physician:  Juan Perez MD  Date of admission: 10/23/2024    Subjective     Interval History:   Follow-up acute kidney injury and volume  The patient remains on the ventilator via tracheostomy he is more awake and alert continue to have significant edema he is on vasopressors has increased CVP.  He has worsening creatinine  Review of Systems:   Not obtainable    Objective     Vitals:   Temp:  [99 °F (37.2 °C)-101.1 °F (38.4 °C)] 99.3 °F (37.4 °C)  Heart Rate:  [100-135] 108  Resp:  [24-31] 30  BP: ()/(36-96) 105/74  Arterial Line BP: ()/(40-70) 117/51  FiO2 (%):  [39 %-98 %] 98 %    Intake/Output Summary (Last 24 hours) at 2024 1034  Last data filed at 2024 0700  Gross per 24 hour   Intake 4488 ml   Output 660 ml   Net 3828 ml       Physical Exam:    General Appearance: More awake and alert, chronically ill morbidly obese commands  Skin: warm and dry  HEENT: Oral mucosa is dry  Neck: Mild JVD, he has a tracheostomy  Lungs: Bilateral rhonchi, breathing effort not labored  Heart: Irregularly irregular.  No rub  Abdomen: soft, nontender, distended.  Body wall edema.  Normoactive bowel  : Ugarte catheter  Extremities: 4+ lower extremity edema with hip and thigh edema and 1+ upper extremity edema      Scheduled Meds:     acetylcysteine, 3 mL, Nebulization, TID - RT  amiodarone, 400 mg, Per G Tube, Q24H  aspirin, 81 mg, Per G Tube, Daily  atorvastatin, 40 mg, Per G Tube, Nightly  cefepime, 2,000 mg, Intravenous, Q12H  chlorhexidine, 15 mL, Mouth/Throat, Q12H  [Held by provider] enoxaparin, 40 mg, Subcutaneous, Q12H  Ergocalciferol, 100 mcg, Per G Tube, Daily  hydrocortisone-bacitracin-zinc oxide-nystatin, 1 Application, Topical, Q12H  insulin glargine, 20 Units, Subcutaneous, Daily  insulin regular, 2-7 Units, Subcutaneous,  Q6H  ipratropium-albuterol, 3 mL, Nebulization, Q4H - RT  lansoprazole, 15 mg, Per G Tube, Q AM  miconazole, 1 Application, Topical, Q12H  midodrine, 15 mg, Oral, TID AC  saccharomyces boulardii, 250 mg, Per G Tube, BID  sildenafil, 20 mg, Per G Tube, TID  sodium chloride, 10 mL, Intravenous, Q12H  Vancomycin Pharmacy Intermittent/Pulse Dosing, , Not Applicable, Daily      IV Meds:   bumetanide, 1 mg/hr  dexmedetomidine, 0.2-1.5 mcg/kg/hr, Last Rate: Stopped (11/18/24 0400)  DOPamine, 2-20 mcg/kg/min  EPINEPHrine, 0.01 mcg/kg/min, Last Rate: Stopped (11/17/24 1051)  lidocaine in D5W, 1 mg/min, Last Rate: Stopped (11/05/24 1135)  milrinone, 0.125 mcg/kg/min, Last Rate: 0.25 mcg/kg/min (11/18/24 0700)  niCARdipine, 5-15 mg/hr  norepinephrine, 0.02-0.2 mcg/kg/min, Last Rate: Stopped (11/18/24 0130)  Pharmacy to dose vancomycin,   phenylephrine, 0.2-2 mcg/kg/min  propofol, 5-50 mcg/kg/min, Last Rate: Stopped (11/16/24 1402)  vasopressin, 0.02-0.1 Units/min, Last Rate: 0.02 Units/min (11/18/24 0815)        Results Reviewed:   I have personally reviewed the results from the time of this admission to 11/18/2024 10:34 EST     Results from last 7 days   Lab Units 11/18/24  0809 11/18/24  0335 11/18/24  0013 11/17/24  1147 11/17/24  0318 11/15/24  0807 11/15/24  0306   SODIUM mmol/L 137 139  --   --  146*   < > 147*   POTASSIUM mmol/L 4.1  4.1 4.4 4.3   < > 4.2   < > 3.6  3.6   CHLORIDE mmol/L 107 109*  --   --  112*   < > 109*   CO2 mmol/L 13.8* 12.8*  --   --  17.0*   < > 21.0*   BUN mg/dL 82* 80*  --   --  69*   < > 55*   CREATININE mg/dL 2.52* 2.40*  --   --  1.91*   < > 1.55*   CALCIUM mg/dL 8.8 8.1*  --   --  8.2*   < > 7.8*   BILIRUBIN mg/dL 0.7 0.7  --   --   --   --  0.7   ALK PHOS U/L 108 107  --   --   --   --  97   ALT (SGPT) U/L 16 17  --   --   --   --  6   AST (SGOT) U/L 27 36  --   --   --   --  19   GLUCOSE mg/dL 153* 147*  --   --  131*   < > 172*    < > = values in this interval not displayed.        Estimated Creatinine Clearance: 37.8 mL/min (A) (by C-G formula based on SCr of 2.52 mg/dL (H)).    Results from last 7 days   Lab Units 11/18/24  0335 11/17/24  0318 11/16/24  0408 11/15/24  1244 11/15/24  0306 11/14/24  1340   MAGNESIUM mg/dL 2.0  --   --   --  2.0 2.0   PHOSPHORUS mg/dL 2.3* 3.0 3.6   < > 3.7  --     < > = values in this interval not displayed.       Results from last 7 days   Lab Units 11/18/24  0335 11/13/24  0414 11/12/24  0342   URIC ACID mg/dL 9.5* 7.3* 5.5       Results from last 7 days   Lab Units 11/18/24  0528 11/18/24  0335 11/17/24 0318 11/16/24  0408 11/15/24  0306 11/14/24  0351   WBC 10*3/mm3  --  18.69* 17.25* 13.92* 15.95* 19.03*   HEMOGLOBIN g/dL 7.8* 7.6* 8.9* 8.6* 8.4* 8.8*   PLATELETS 10*3/mm3  --  165 207 193 203 248             Assessment / Plan     ASSESSMENT:  Acute kidney injury, nonoliguric.  Charge likely prerenal/ATN due to hemodynamic instability and diuresis.  Creatinine up to 2.52, BUN 82, electrolyte within acceptable range, total CO2 13.8 and anion gap 16.2.  Mixed metabolic acidosis and respiratory alkalosis.  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  Status post redo sternotomy AV root replacement, mitral valve repair, AICD removal intra-aortic balloon placement 10/31/2024.  Sternal closure 11/ 2.  3.  Bioprosthetic aortic valve endocarditis, bacteremia with strep mitis on vancomycin and cefepime.    Dr. Diaz following.  Currently A-fib  4.  Shock , remains pressor dependent.  5.  Anemia status post EGD 9/30/2024  And colonoscopy with esophagitis and polyp removal.  Hemoglobin today 7 point.  6.  Acute respiratory failure postoperatively on the ventilator.    7.  Atrial fibrillation.  With controlled rate  8.  Moderate to severe mitral regurgitation.  Severe RV enlargement and dysfunction postoperatively.  PLAN:  Diurese with Bumex drip hoping to improve his intra-abdominal venous pressure and hopefully that will  improve his GFR  Surveillance lab      I reviewed the chart and other providers notes, reviewed labs.  I discussed the case with the patient's nurse, also I discussed the case with JAVI Corona  Copied text in this note has been reviewed and is accurate as of 11/18/24.     Thank you for involving us in the care of Woodrow Alejandro.  Please feel free to call with any questions.    Jass Keller MD  11/18/24  10:34 [facility]    [facility]  [phone]    Please note that portions of this note were completed with a voice recognition program.

## 2024-11-18 NOTE — PROGRESS NOTES
"[facility] Clinical Pharmacy Services: Vancomycin Monitoring Note    Woodrow Alejandro is a 74 y.o. male who is on day 7/7 of pharmacy to dose vancomycin for [employer] coverage for suspected PNA. Pt currently receiving treatment for Endocarditis. Plans to transition back to penicillin to complete 6 weeks IV antibiotics (stop date Dec 4).     Current Vancomycin Dose:   intermittent dosing   Updated Cultures and Sensitivities:   11/10 Catheter tip Cx NGF   11/12 BC 1/2 coag neg staph  11/12 ET suction Cx light growth candida albicans  11/12 MRSA PCR negative   11/13 genital swab yeast  11/13 bronch NGF    Results from last 7 days   Lab Units 11/17/24  1300 11/16/24  1115 11/14/24  0847   VANCOMYCIN RM mcg/mL 20.60  --  28.30   VANCOMYCIN TR mcg/mL  --  21.70*  --      Vitals/Labs  Ht: 177.8 cm (70\"); Wt: (!) 151 kg (333 lb 12.4 oz)   Temp Readings from Last 1 Encounters:   11/18/24 99.7 °F (37.6 °C)     Estimated Creatinine Clearance: 39.7 mL/min (A) (by C-G formula based on SCr of 2.4 mg/dL (H)).     Results from last 7 days   Lab Units 11/18/24  0335 11/17/24  0318 11/16/24  0408   CREATININE mg/dL 2.40* 1.91* 1.59*   WBC 10*3/mm3 18.69* 17.25* 13.92*     Assessment/Plan    Vancomycin Dose: Continue intermittent dosing. Today is last day of scheduled duration of therapy. Based on level yesterday, dose given this AM, and further rise in SCr, anticipate vancomycin serum concentrations to be >20 mg/L for at least the next 48 hours.   Next Level Date and Time: No further levels needed.   We will continue to monitor patient changes and renal function     Thank you for involving pharmacy in this patient's care. Please contact pharmacy with any questions or concerns.       Alberto Bradley, PharmD  Clinical Pharmacist                          "

## 2024-11-18 NOTE — PROGRESS NOTES
LOS: 26 days   Patient Care Team:  Juan Perez MD as PCP - General (Internal Medicine)    Chief Complaint: Follow-up bioprosthetic AVR endocarditis, mitral regurgitation, persistent atrial fibrillation.    Interval History: Remains on the ventilator via trach.  He can nod his head.  Remains in atrial fibrillation with occasional rapid rates.  Renal function is worse.  Remains on milrinone, vasopressin added back.  He also has a groin abscess versus hematoma from his previous vein harvest.    Vital Signs:  Temp:  [99.1 °F (37.3 °C)-101.1 °F (38.4 °C)] 99.3 °F (37.4 °C)  Heart Rate:  [100-135] 104  Resp:  [24-31] 24  BP: ()/(36-96) 113/52  Arterial Line BP: ()/(40-70) 147/63  FiO2 (%):  [40 %-98 %] 98 %    Intake/Output Summary (Last 24 hours) at 11/18/2024 1505  Last data filed at 11/18/2024 1500  Gross per 24 hour   Intake 4728 ml   Output 690 ml   Net 4038 ml       Physical Exam:   General Appearance:    Status post tracheostomy and on ventilator.  Nods head.   Lungs:     Rhonchi bilaterally anteriorly.    Heart:    Irregularly irregular rhythm with a tachycardic rate. II/VI SM throughout.   Abdomen:     Soft, nontender, nondistended.    Extremities:    3+ generalized edema and anasarca.     Results Review:    Results from last 7 days   Lab Units 11/18/24  0809   SODIUM mmol/L 137   POTASSIUM mmol/L 4.1  4.1   CHLORIDE mmol/L 107   CO2 mmol/L 13.8*   BUN mg/dL 82*   CREATININE mg/dL 2.52*   GLUCOSE mg/dL 153*   CALCIUM mg/dL 8.8         Results from last 7 days   Lab Units 11/18/24  0528 11/18/24  0335   WBC 10*3/mm3  --  18.69*   HEMOGLOBIN g/dL 7.8* 7.6*   HEMATOCRIT % 24.4* 24.4*   PLATELETS 10*3/mm3  --  165                 Results from last 7 days   Lab Units 11/18/24  0335   MAGNESIUM mg/dL 2.0     Results from last 7 days   Lab Units 11/12/24  1128   TRIGLYCERIDES mg/dL 261*         I reviewed the patient's new clinical results.        Assessment:  1.  Status post bioprosthetic  aortic valve replacement in 2014  2.  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  Moderate to severe mitral regurgitation  4.  Status post reoperative AV root replacement, mitral valve repair, ICD removal, SVG-RCA, and IABP placement on 10/30/2021  5.  Postoperative shock, multifactorial  6.  Acute kidney injury  7.  History of nonischemic cardiomyopathy with recovered ejection fraction  8.  Anemia, acute on chronic  9.  Postoperative thrombocytopenia  10.  Persistent atrial fibrillation  11.  Ventricular tachycardia postoperatively  12.  Grade C esophagitis by EGD on 9/30/2024  13.  Acute left lower extremity DVT on 10/24/2024.  Resolved on Dopplers on 11/8/2024  14.  Right Achilles tendon tear  15.  Postoperative junctional rhythm  16.  Hypoalbuminemia  17.  Thrombocytopenia  18.  Severe right ventricular enlargement and dysfunction post-op  19.  Fever noted on 11/8/2024  20.  Subacute DVT in the left axillary vein by Doppler on 11/10/2024  21.  Right groin hematoma versus abscess  22.  Status post tracheostomy on 11/16/2024    Plan:  -Fluid collection in the groin.  Defer management to Dr. Florian.    -Renal function worse today.  Diuresing with Bumex drip 1 mg/h per Dr. Keller.    -Currently on Revatio 20 mg every 8 hours.    -Atrial fibrillation rate is high at times.  Really, the only option he has currently is amiodarone for rate control.  I would not use digoxin with his worsening renal function.  Restart amiodarone drip at 0.5 mg/min.    -No heparin for now per cardiovascular surgery.  The DVT in his left lower extremity was not present on the Dopplers on 11/8/2024.  He does have a likely subacute DVT in the left axillary vein by Doppler.  He has had intermittent thrombocytopenia postoperatively as well.    -Remains on pressor support with milrinone and vasopressin.    -Tracheostomy without  complications per vascular surgery.    -He remains very critically ill.      Antwan Farmer MD  11/18/24  15:05 EST

## 2024-11-18 NOTE — PROGRESS NOTES
LOS: 26 days   Patient Care Team:  Juan Perez MD as PCP - General (Internal Medicine)    Subjective     s.  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  He went and had sternal closure on 11/2 evidence of possible endocarditis and he had blood cultures positive for strep mitis and is on pen G send atrial fibrillation unable to tolerate anticoagulation he has a nonischemic cardiomyopathy history of anemia some esophagitis and colon polyps have been removed history of right Achilles tendon tear uses a walking boot he is a prediabetic postop anemia and thrombocytopenia.  Patient had failed weaning and went for tracheostomy today  Patient does not appear happy today he is uncooperative he resist he would not open his eyes for me would let me open him he shook his head pulled away from me when I tried to open his eyes when attempt to communicate.  Nursing feels he is just upset with everything.      review of Systems:          Objective     Vital Signs  Vital Sign Min/Max for last 24 hours  Temp  Min: 98.6 °F (37 °C)  Max: 101.1 °F (38.4 °C)   BP  Min: 91/66  Max: 147/55   Pulse  Min: 100  Max: 135   Resp  Min: 24  Max: 31   SpO2  Min: 90 %  Max: 99 %   No data recorded   Weight  Min: 151 kg (333 lb 12.4 oz)  Max: 151 kg (333 lb 12.4 oz)        Ventilator/Non-Invasive Ventilation Settings (From admission, onward)       Start     Ordered    11/12/24 1124  Ventilator - Vent Mode: AC/VC; Rate: Other; Rate: 24; FiO2: Titrate Per SpO2; Titrate Oxygen for SpO2: 90 - 95%; PEEP: 5; Tidal Volume: mL; TV: 550  Continuous        Question Answer Comment   Vent Mode AC/VC    Rate Other    Rate 24    FiO2 Titrate Per SpO2    Titrate  Oxygen for SpO2 90 - 95%    PEEP 5    Tidal Volume mL            11/12/24 1124    11/01/24 0026  Ventilator - Vent Mode: AC/VC+; Rate: 20; FiO2: Titrate Per SpO2; Titrate Oxygen for SpO2: 90 - 95%; PEEP: 7.5; Tidal Volume: mL; TV: 620  Continuous,   Status:  Canceled        Question Answer Comment   Vent Mode AC/VC+    Rate 20    FiO2 Titrate Per SpO2    Titrate Oxygen for SpO2 90 - 95%    PEEP 7.5    Tidal Volume mL            11/01/24 0027                        Arterial Line BP: 124/48  Arterial Line MAP (mmHg): 67 mmHg  PAP: (31-53)/(11-37) 47/12  CVP:  [16 mmHg-27 mmHg] 19 mmHg  PCWP:  [23 mmHg-32 mmHg] 32 mmHg  CO:  [7.62 L/min-10.7 L/min] 10.7 L/min  Body mass index is 47.89 kg/m².  I/O last 3 completed shifts:  In: 6917 [I.V.:1799; Other:3431; NG/GT:1487; IV Piggyback:200]  Out: 1210 [Urine:1210]  No intake/output data recorded.        Physical Exam:  General Appearance: Trached on a ventilator is on pressure control rate of 24 breathing 27 inspiratory pressure of 21 PEEP of 7.5 tidal volumes are in the 700 cc range his minute ventilation is about 18 to 20 L FiO2 and still 40% SpO2 is 95% patient is still on norepinephrine drip, milrinone drip he is off Precedex  Eyes: Really cannot get a good look he is strongly resist  ENT: Mucous membranes are dry he has a nasoenteric tube in place  the left nare  Neck:  tracheostomy site looks okay he has a left IJ introducer and [place] type catheter  Lungs: Coarse breath sounds bilaterally, tachypneic  Cardiac: Tachycardic low 110s no murmur  Abdomen: Obese soft nontender no palpable hepatosplenomegaly or masses  : Not examined  Musculoskeletal: He has mild thoracic kyphosis very large abdomen   Skin: Warm and dry no jaundice no petechiae  Neuro: He is awake not at all cooperative today  Extremities: No clubbing or cyanosis he has edema all 4 extremities he has true anasarca marked,,palpable radial pulses and I think I feel dorsalis pedis pulses  bilaterally as well there are not a strong but he has more lower extremity edema  MSE: Does not appear to be in a very good mood today at all.       Labs:  Results from last 7 days   Lab Units 11/18/24  0335 11/18/24  0013 11/17/24  1646 11/17/24  1147 11/17/24  0318 11/17/24  0000 11/16/24  1724 11/16/24  0830 11/16/24  0408 11/15/24  2026 11/15/24  1244 11/15/24  0807 11/15/24  0306 11/14/24  1946 11/14/24  1340 11/14/24  0847 11/14/24  0351 11/13/24  2307 11/13/24  0827 11/13/24  0414 11/12/24  0815 11/12/24  0509 11/12/24  0342   GLUCOSE mg/dL 147*  --   --   --  131*  --   --   --  174*  --  174*  --  172*  --  168*  --  146*  --   --  180*   < > 178* 142*   SODIUM mmol/L 139  --   --   --  146*  --   --   --  142  --  141  --  147*  --  148*  --  148*  --   --  143   < > 144 146*   POTASSIUM mmol/L 4.4 4.3 4.1 4.4 4.2 3.9 3.6   < > 3.8  3.8   < > 3.7   < > 3.6  3.6   < > 3.3*   < > 3.2* 3.2*   < > 3.9   < > 4.3 3.3*   MAGNESIUM mg/dL 2.0  --   --   --   --   --   --   --   --   --   --   --  2.0  --  2.0  --   --  2.3  --  2.0  --  2.0 1.6   CO2 mmol/L 12.8*  --   --   --  17.0*  --   --   --  17.4*  --  19.9*  --  21.0*  --  22.6  --  22.0  --   --  23.5   < > 22.5 17.6*   CHLORIDE mmol/L 109*  --   --   --  112*  --   --   --  108*  --  109*  --  109*  --  109*  --  109*  --   --  107   < > 110* 117*   ANION GAP mmol/L 17.2*  --   --   --  17.0*  --   --   --  16.6*  --  12.1  --  17.0*  --  16.4*  --  17.0*  --   --  12.5   < > 11.5 11.4   CREATININE mg/dL 2.40*  --   --   --  1.91*  --   --   --  1.59*  --  1.63*  --  1.55*  --  1.56*  --  1.37*  --   --  1.32*   < > 1.14 0.90   BUN mg/dL 80*  --   --   --  69*  --   --   --  64*  --  63*  --  55*  --  58*  --  56*  --   --  47*   < > 44* 37*   BUN / CREAT RATIO  33.3*  --   --   --  36.1*  --   --   --  40.3*  --  38.7*  --  35.5*  --  37.2*  --  40.9*  --   --  35.6*   < > 38.6* 41.1*   CALCIUM mg/dL 8.1*  --   --   --  8.2*  --   --   --  8.2*  --   7.8*  --  7.8*  --  7.9*  --  8.0*  --   --  8.1*   < > 8.2* 6.4*   ALK PHOS U/L 107  --   --   --   --   --   --   --   --   --   --   --  97  --   --   --  110  --   --   --   --   --  92   TOTAL PROTEIN g/dL 5.6*  --   --   --   --   --   --   --   --   --   --   --  6.3  --   --   --  6.2  --   --   --   --   --  4.8*   ALT (SGPT) U/L 17  --   --   --   --   --   --   --   --   --   --   --  6  --   --   --  20  --   --   --   --   --  7   AST (SGOT) U/L 36  --   --   --   --   --   --   --   --   --   --   --  19  --   --   --  31  --   --   --   --   --  18   BILIRUBIN mg/dL 0.7  --   --   --   --   --   --   --   --   --   --   --  0.7  --   --   --  0.8  --   --   --   --   --  0.7   ALBUMIN g/dL 2.6*  --   --   --  2.8*  --   --   --  2.7*  --  2.5*  --  2.7*  --   --   --  2.7*  --   --  2.5*  --  2.5* 2.0*   GLOBULIN gm/dL 3.0  --   --   --   --   --   --   --   --   --   --   --  3.6  --   --   --  3.5  --   --   --   --   --  2.8    < > = values in this interval not displayed.     Estimated Creatinine Clearance: 39.7 mL/min (A) (by C-G formula based on SCr of 2.4 mg/dL (H)).      Results from last 7 days   Lab Units 11/18/24  0528 11/18/24  0335 11/17/24  0318 11/16/24  0408 11/15/24  0306 11/14/24  0351 11/13/24  0827 11/12/24  0342   WBC 10*3/mm3  --  18.69* 17.25* 13.92* 15.95* 19.03* 18.78* 16.14*   RBC 10*6/mm3  --  2.64* 3.04* 2.99* 2.95* 3.02* 3.12* 3.09*   HEMOGLOBIN g/dL 7.8* 7.6* 8.9* 8.6* 8.4* 8.8* 9.1* 9.0*   HEMATOCRIT % 24.4* 24.4* 28.0* 27.1* 26.7* 27.9* 28.8* 28.6*   MCV fL  --  92.4 92.1 90.6 90.5 92.4 92.3 92.6   MCH pg  --  28.8 29.3 28.8 28.5 29.1 29.2 29.1   MCHC g/dL  --  31.1* 31.8 31.7 31.5 31.5 31.6 31.5   RDW %  --  17.4* 17.1* 17.0* 16.9* 17.0* 17.3* 17.3*   RDW-SD fl  --  58.1* 58.4* 55.3* 55.3* 57.3* 57.4* 58.1*   MPV fL  --  10.3 10.0 9.7 9.4 9.7 9.2 9.2   PLATELETS 10*3/mm3  --  165 207 193 203 248 269 270   NEUTROPHIL % %  --  87.5*  --   --   --   --   --  86.4*    LYMPHOCYTE % %  --  4.2*  --   --   --   --   --  3.5*   MONOCYTES % %  --  4.1*  --   --   --   --   --  5.5   EOSINOPHIL % %  --  1.7  --   --   --   --   --  3.0   BASOPHIL % %  --  0.3  --   --   --   --   --  0.4   IMM GRAN % %  --  2.2*  --   --   --   --   --  1.2*   NEUTROS ABS 10*3/mm3  --  16.35*  --   --   --   --   --  13.94*   LYMPHS ABS 10*3/mm3  --  0.79  --   --   --   --   --  0.56*   MONOS ABS 10*3/mm3  --  0.76  --   --   --   --   --  0.89   EOS ABS 10*3/mm3  --  0.32  --   --   --   --   --  0.49*   BASOS ABS 10*3/mm3  --  0.05  --   --   --   --   --  0.06   IMMATURE GRANS (ABS) 10*3/mm3  --  0.42*  --   --   --   --   --  0.20*   NRBC /100 WBC  --  0.2  --   --   --   --   --  0.0     Results from last 7 days   Lab Units 11/17/24  1249   PH, ARTERIAL pH units 7.386   PO2 ART mm Hg 95.9   PCO2, ARTERIAL mm Hg 25.6*   HCO3 ART mmol/L 15.4*             Results from last 7 days   Lab Units 11/13/24  0414   TSH uIU/mL 4.140   FREE T4 ng/dL 0.92     Results from last 7 days   Lab Units 11/17/24  1300 11/14/24  0351   LACTATE mmol/L 1.3  --    PROCALCITONIN ng/mL  --  0.68*         Microbiology Results (last 10 days)       Procedure Component Value - Date/Time    Respiratory Culture - Aspirate, Bronchus [[id number]] Collected: 11/13/24 1539    Lab Status: Final result Specimen: Aspirate from Bronchus Updated: 11/15/24 1043     Respiratory Culture No growth    Genital Culture - Swab, Penis [[id number]]  (Abnormal) Collected: 11/13/24 1158    Lab Status: Final result Specimen: Swab from Penis Updated: 11/16/24 1042     Genital Culture Scant growth (1+) Candida albicans      Rare growth Normal Skin Clare     Gram Stain Many (4+) WBCs seen      Occasional Yeast    MRSA Screen, PCR (Inpatient) - Swab, Nares [[id number]]  (Normal) Collected: 11/12/24 1027    Lab Status: Final result Specimen: Swab from Nares Updated: 11/12/24 1152     MRSA PCR No MRSA Detected    Narrative:      The negative predictive  value of this diagnostic test is high and should only be used to consider de-escalating anti-MRSA therapy. A positive result may indicate colonization with MRSA and must be correlated clinically.    Respiratory Culture - Aspirate, ET Suction [[id number]]  (Abnormal) Collected: 11/12/24 0924    Lab Status: Final result Specimen: Aspirate from ET Suction Updated: 11/14/24 0952     Respiratory Culture Light growth (2+) Candida albicans      Scant growth (1+) Normal respiratory titi. No S. aureus or Pseudomonas aeruginosa detected. Final report.     Gram Stain Moderate (3+) WBCs per low power field      No epithelial cells seen      Rare (1+) Yeast    Blood Culture - Blood, Arm, Left [[id number]]  (Abnormal) Collected: 11/12/24 0815    Lab Status: Final result Specimen: Blood from Arm, Left Updated: 11/15/24 0728     Blood Culture Staphylococcus, coagulase negative     Isolated from Aerobic and Anaerobic Bottles     Gram Stain Aerobic Bottle Gram positive cocci in clusters      Anaerobic Bottle Gram positive cocci in clusters    Narrative:      Probable contaminant requires clinical correlation, susceptibility not performed unless requested by physician.      Blood Culture - Blood, Arm, Right [[id number]]  (Normal) Collected: 11/12/24 0815    Lab Status: Final result Specimen: Blood from Arm, Right Updated: 11/17/24 0830     Blood Culture No growth at 5 days    Blood Culture ID, PCR - Blood, Arm, Left [[id number]]  (Abnormal) Collected: 11/12/24 0815    Lab Status: Final result Specimen: Blood from Arm, Left Updated: 11/14/24 1130     BCID, PCR Staph spp, not aureus or lugdunensis. Identification by BCID2 PCR.     BOTTLE TYPE Aerobic Bottle    Catheter Culture - Cath Tip, Neck [[id number]] Collected: 11/10/24 1208    Lab Status: Final result Specimen: Cath Tip from Neck Updated: 11/13/24 0715     CATHETER CULTURE No growth at 3 days    Catheter Culture - Cath Tip, Neck [[id number]] Collected: 11/09/24 1639    Lab Status:  Final result Specimen: Cath Tip from Neck Updated: 11/11/24 0828     CATHETER CULTURE No growth    Respiratory Culture - Sputum, Trachea [[id number]] Collected: 11/09/24 1021    Lab Status: Final result Specimen: Sputum from Trachea Updated: 11/11/24 1017     Respiratory Culture Rare growth Normal respiratory titi. No S. aureus or Pseudomonas aeruginosa detected. Final report.     Gram Stain Many (4+) WBCs seen      Few (2+) Epithelial cells seen      No organisms seen                acetylcysteine, 3 mL, Nebulization, TID - RT  amiodarone, 400 mg, Per G Tube, Q24H  aspirin, 81 mg, Per G Tube, Daily  atorvastatin, 40 mg, Per G Tube, Nightly  cefepime, 2,000 mg, Intravenous, Q12H  chlorhexidine, 15 mL, Mouth/Throat, Q12H  [Held by provider] enoxaparin, 40 mg, Subcutaneous, Q12H  Ergocalciferol, 100 mcg, Per G Tube, Daily  hydrocortisone-bacitracin-zinc oxide-nystatin, 1 Application, Topical, Q12H  insulin glargine, 20 Units, Subcutaneous, Daily  insulin regular, 2-7 Units, Subcutaneous, Q6H  ipratropium-albuterol, 3 mL, Nebulization, Q4H - RT  lansoprazole, 15 mg, Per G Tube, Q AM  miconazole, 1 Application, Topical, Q12H  midodrine, 15 mg, Oral, TID AC  saccharomyces boulardii, 250 mg, Per G Tube, BID  sildenafil, 20 mg, Per G Tube, TID  sodium chloride, 10 mL, Intravenous, Q12H  Vancomycin Pharmacy Intermittent/Pulse Dosing, , Not Applicable, Daily      dexmedetomidine, 0.2-1.5 mcg/kg/hr, Last Rate: Stopped (11/17/24 1720)  DOPamine, 2-20 mcg/kg/min  EPINEPHrine, 0.01 mcg/kg/min, Last Rate: Stopped (11/17/24 1051)  lidocaine in D5W, 1 mg/min, Last Rate: Stopped (11/05/24 1135)  milrinone, 0.125 mcg/kg/min, Last Rate: 0.125 mcg/kg/min (11/18/24 0053)  niCARdipine, 5-15 mg/hr  norepinephrine, 0.02-0.2 mcg/kg/min, Last Rate: Stopped (11/17/24 1700)  Pharmacy to dose vancomycin,   phenylephrine, 0.2-2 mcg/kg/min  propofol, 5-50 mcg/kg/min, Last Rate: Stopped (11/16/24 1402)  vasopressin, 0.02-0.1 Units/min, Last Rate:  0.04 Units/min (11/18/24 0215)        Diagnostics:  Adult Transthoracic Echo Limited W/ Cont if Necessary Per Protocol    Result Date: 11/15/2024    Limited study for LV/RV function   Left ventricular systolic function is normal. Calculated left ventricular EF = 67.9%   Moderately to severely reduced right ventricular systolic function noted.   The right ventricular cavity is severely dilated.   The left atrial cavity is dilated.   The right atrial cavity is dilated.   Moderate tricuspid valve regurgitation is present.   Estimated right ventricular systolic pressure from tricuspid regurgitation is moderately elevated (45-55 mmHg).   S/p aortic valve replacement.  Not well-visualized/assessed.   S/p mitral valve repair with 28 mm [employer] flexible band annuloplasty.     US Nonvascular Extremity Limited    Result Date: 11/15/2024  US NONVASCULAR EXTREMITY LIMITED-11/15/2024  HISTORY: Right groin wound.  The subcutaneous tissues of the right groin superficial to the skin wound is an approximately 11.5 cm x 3.7 cm mixed echotexture hypoechoic area which may contain small amount of cystic change. There is somewhat heterogeneous appearance of the surrounding soft tissues likely related to edema.      1. Ill-defined somewhat loculated hypoechoic collection measuring 11.5 cm x 3.7 cm x 6.4 cm. This could represent hematoma or abscess and contains a small hypoechoic cystic area.   This report was finalized on 11/15/2024 4:35 PM by Dr. Deven Garcia M.D on Workstation: [id number]      XR Chest 1 View    Result Date: 11/15/2024  XR CHEST 1 VW-  HISTORY: 74-year-old male postop AVR, MVR, AICD generator explantation, intra-aortic balloon pump placement on 10/30/2024.   FINDINGS: CHF may be more prominent than on yesterday's chest radiograph. Moderately large left effusion and small-moderate right pleural effusion are likely unchanged. No pneumothorax is seen.  ET tube is approximately 2 cm proximal to the yoni. Left IJ  [place] catheter tip is within the proximal main pulmonary artery. Leads of the explanted AICD generator remain in place. There is an NG tube within the esophagus and stomach, distal tip is not included.  This report was finalized on 11/15/2024 6:31 AM by Dr. Bernadette Gaming M.D on Workstation: [id number]      XR Chest 1 View    Result Date: 11/14/2024  XR CHEST 1 VW-  Clinical: Postop  COMPARISON examination 11/13/2024  FINDINGS: Patient is rotated towards the left as before. Endotracheal tube and feeding tube in position as before. Transvenous pacemaker in place. Bibasilar consolidation as before. Bilateral diffuse interstitial infiltrate also seen, unchanged. There is cardiac enlargement. The mediastinum is stable.  CONCLUSION: Stable imaging of the chest  This report was finalized on 11/14/2024 6:39 AM by Dr. Adal Kimball M.D on Workstation: [id number]      CT Chest Without Contrast Diagnostic    Result Date: 11/13/2024  CT CHEST WO CONTRAST DIAGNOSTIC-, CT ABDOMEN PELVIS WO CONTRAST-  HISTORY: Pulmonary infiltrates; T82.857A-Stenosis of other cardiac prosthetic devices, implants and grafts, initial encounter; Z95.2-Presence of prosthetic heart valve; Z95.2-Presence of prosthetic heart valve  TECHNIQUE: Radiation dose reduction techniques were utilized, including automated exposure control and exposure modulation based on body size. CT of the chest, abdomen, and pelvis includes axial imaging from the thoracic inlet through the trochanters without intravenous contrast and without oral contrast.  COMPARISON: AP chest same date.  FINDINGS: CHEST: There are multifocal coronary artery calcifications. Median sternotomy wires and overlying midline surgical skin staples are present. Cardiomegaly. Left subclavian cardiac pacer/defibrillator leads are discontinuous proximally with pacemaker segment within the left brachiocephalic vein.  There is dense lower lobe consolidation with air bronchograms in both lower lobes  which are mostly collapsed. Pulmonary vascular engorgement. Small bilateral pleural effusions layer dependently. 5 mm noncalcified nodule anterior inferior right upper lobe on axial image 37.  Endotracheal tube tip is 3.2 cm above the yoni. Left IJ tip in the left brachiocephalic vein. Feeding tube is looped in the stomach and tip is in the region of the gastric fundus.  Surgical skin staples overlying the left upper anterior chest wall where there has been removal of pacemaker.  ABDOMEN/PELVIS: Within the posterior-inferior central spleen there is a focal area of low density measuring approximately 4.7 x 4.1 cm. This is without change compared to exam 09/18/2024 and may represent splenic infarction. Liver, adrenal glands, pancreas appear within normal limits. There is lobular appearance of the kidneys with areas of cortical thinning and this appears chronic. No hydronephrosis.  Urinary bladder is partially decompressed and exhibits wall thickening. Ugarte catheter is present in the urinary bladder. There is mild to moderate stool throughout the colon. No evidence for bowel obstruction. No ascites or intra-abdominal abscess formation. Atherosclerotic calcifications are present involving the abdominal aorta and iliac vasculature. There is mild edema within the left lateral lower chest and upper abdomen subcutaneous fat extending off the field-of-view laterally. Multilevel bridging plate osteophyte formation within the thoracic spine.      1. Dense bilateral lower lobe consolidation with partial collapse of the lower lobes and air bronchograms likely associated with infiltrate. Pulmonary vascular engorgement. Small pleural effusions layer dependently. 2. 5 mm noncalcified nodule anterior inferior right upper lobe. 3. Recent median sternotomy with overlying midline surgical skin staples. Cardiomegaly. Prosthetic tricuspid valve. Recent removal of left subclavian cardiac pacer with retraction of pacing leads. 4. Mild  to moderate stool throughout the colon. No evidence for acute intra-abdominal process. No hydronephrosis. 5. Endotracheal tube tip 3.2 cm above the yoni. Left IJ tip in the left brachiocephalic vein. Feeding tube tip in the gastric fundus. Ugarte catheter within a decompressed urinary bladder which exhibits generalized wall thickening.    Radiation dose reduction techniques were utilized, including automated exposure control and exposure modulation based on body size.   This report was finalized on 11/13/2024 1:22 PM by Salas Ingram M.D on Workstation: [id number]      CT Abdomen Pelvis Without Contrast    Result Date: 11/13/2024  CT CHEST WO CONTRAST DIAGNOSTIC-, CT ABDOMEN PELVIS WO CONTRAST-  HISTORY: Pulmonary infiltrates; T82.857A-Stenosis of other cardiac prosthetic devices, implants and grafts, initial encounter; Z95.2-Presence of prosthetic heart valve; Z95.2-Presence of prosthetic heart valve  TECHNIQUE: Radiation dose reduction techniques were utilized, including automated exposure control and exposure modulation based on body size. CT of the chest, abdomen, and pelvis includes axial imaging from the thoracic inlet through the trochanters without intravenous contrast and without oral contrast.  COMPARISON: AP chest same date.  FINDINGS: CHEST: There are multifocal coronary artery calcifications. Median sternotomy wires and overlying midline surgical skin staples are present. Cardiomegaly. Left subclavian cardiac pacer/defibrillator leads are discontinuous proximally with pacemaker segment within the left brachiocephalic vein.  There is dense lower lobe consolidation with air bronchograms in both lower lobes which are mostly collapsed. Pulmonary vascular engorgement. Small bilateral pleural effusions layer dependently. 5 mm noncalcified nodule anterior inferior right upper lobe on axial image 37.  Endotracheal tube tip is 3.2 cm above the yoni. Left IJ tip in the left brachiocephalic vein. Feeding  tube is looped in the stomach and tip is in the region of the gastric fundus.  Surgical skin staples overlying the left upper anterior chest wall where there has been removal of pacemaker.  ABDOMEN/PELVIS: Within the posterior-inferior central spleen there is a focal area of low density measuring approximately 4.7 x 4.1 cm. This is without change compared to exam 09/18/2024 and may represent splenic infarction. Liver, adrenal glands, pancreas appear within normal limits. There is lobular appearance of the kidneys with areas of cortical thinning and this appears chronic. No hydronephrosis.  Urinary bladder is partially decompressed and exhibits wall thickening. Ugarte catheter is present in the urinary bladder. There is mild to moderate stool throughout the colon. No evidence for bowel obstruction. No ascites or intra-abdominal abscess formation. Atherosclerotic calcifications are present involving the abdominal aorta and iliac vasculature. There is mild edema within the left lateral lower chest and upper abdomen subcutaneous fat extending off the field-of-view laterally. Multilevel bridging plate osteophyte formation within the thoracic spine.      1. Dense bilateral lower lobe consolidation with partial collapse of the lower lobes and air bronchograms likely associated with infiltrate. Pulmonary vascular engorgement. Small pleural effusions layer dependently. 2. 5 mm noncalcified nodule anterior inferior right upper lobe. 3. Recent median sternotomy with overlying midline surgical skin staples. Cardiomegaly. Prosthetic tricuspid valve. Recent removal of left subclavian cardiac pacer with retraction of pacing leads. 4. Mild to moderate stool throughout the colon. No evidence for acute intra-abdominal process. No hydronephrosis. 5. Endotracheal tube tip 3.2 cm above the yoni. Left IJ tip in the left brachiocephalic vein. Feeding tube tip in the gastric fundus. Ugarte catheter within a decompressed urinary bladder  which exhibits generalized wall thickening.    Radiation dose reduction techniques were utilized, including automated exposure control and exposure modulation based on body size.   This report was finalized on 11/13/2024 1:22 PM by Salas Ingram M.D on Workstation: [id number]      XR Chest 1 View    Result Date: 11/13/2024  SINGLE VIEW OF THE CHEST  HISTORY: Postop open heart surgery  COMPARISON: November 12, 2024  FINDINGS: Tubes and lines appear stable. There is vascular congestion. Bibasilar consolidation and bilateral effusions are noted. No pneumothorax is seen.      No significant interval change.  This report was finalized on 11/13/2024 5:37 AM by Dr. Alexandria Dahl M.D on Workstation: [id number]      XR Chest 1 View    Result Date: 11/12/2024  CHEST SINGLE VIEW  HISTORY: 74 years of age, Male. Postoperative exam.  COMPARISON: AP chest 11/11/2024, 11/10/2024, 11/09/2024.  FINDINGS: Endotracheal tube, feeding tube, left subclavian discontinued. Pacing/defibrillator leads, left atrial appendage clip are evident. Left IJ central venous catheter extends to the region of the right atrium. There are postoperative changes on the left where there are surgical skin staples. There is interstitial disease and there are left greater than right basilar opacities due to atelectasis or infiltrates. No evidence for pneumothorax      No evidence for significant change when compared to yesterday's exam.   This report was finalized on 11/12/2024 8:02 AM by Salas Ingram M.D on Workstation: [id number]      US Thoracentesis    Result Date: 11/11/2024  ULTRASOUND-GUIDED LEFT THORACENTESIS  HISTORY: Pleural effusion  COMPARISON: Chest x-ray 11/11/2024  The risks, benefits and alternatives of the procedure were discussed with the patient and informed consent was obtained. Prior to the procedure ultrasound of the left chest was performed to evaluate the pleural effusion. However, there is only very minimal pleural fluid  present, insufficient for thoracentesis.       Only very minimal pleural fluid present. Thoracentesis not performed.   This report was finalized on 11/11/2024 4:55 PM by Dr. Dean Knapp M.D on Workstation: [id number]      Adult Transthoracic Echo Limited W/ Cont if Necessary Per Protocol    Result Date: 11/11/2024    Left ventricular systolic function is normal. Calculated left ventricular EF = 67.8%   RV severely dilated with moderate-severe dysfunction.  Mild-moderate RVH.   S/p aortic valve replacement with homograft.  Not well-visualized/assessed.   S/p mitral valve repair with 28 mm Medtronic flexible band annuloplasty.   Severe tricuspid valve regurgitation is present.   Estimated right ventricular systolic pressure from tricuspid regurgitation is markedly elevated (>55 mmHg).   Severe biatrial enlargement, dilated IVC.     XR Chest 1 View    Result Date: 11/11/2024  XR CHEST 1 VW-  DATE OF EXAM: 11/11/2024 8:03 AM  INDICATION: Postop. Endotracheal tube advancement.  COMPARISON: Radiographs 11/10/2024, 11/9/2024, 11/8/2024, and 11/7/2024. Coronary CTA 10/25/2024. CT chest 9/18/2024.  TECHNIQUE: A single portable AP view of the chest was obtained.  FINDINGS: Lordotic positioning. Multiple overlying artifacts. Similar-appearing sternotomy wires, retained cardiac pacer/defibrillator leads, mitral annuloplasty, endotracheal tube, left IJ pulmonary arterial catheter, and partially imaged enteric tube. Similar-appearing basilar predominant opacification of both lungs. No pneumothorax. Unchanged cardiac and mediastinal contours. No acute osseous abnormality is identified.       1. No significant herbal change. Stable support tubes and line. 2. Stable basilar prominent lung opacities.  This report was finalized on 11/11/2024 9:01 AM by Neel Wan MD on Workstation: [id number]      Duplex Venous Upper Extremity - Bilateral CAR    Result Date: 11/11/2024    Sub-acute right upper extremity superficial  thrombophlebitis noted in the basilic (upper arm).   Sub-acute left upper extremity deep vein thrombosis noted in the axillary.   Acute left upper extremity superficial thrombophlebitis noted in the cephalic (forearm).   All other vessels appear normal.     XR Chest 1 View    Result Date: 11/10/2024  XR CHEST 1 VW-   INDICATION: Central line placement  COMPARISON: Chest radiograph November 9, 2024  TECHNIQUE: 1 view chest  FINDINGS:  Vascular congestion. Indistinctness of vessels. Ill-defined basilar opacities. Blunting costophrenic angles. Stable mediastinum. Enlarged cardiac silhouette. Left atrial appendage clip. Median sternotomy. Right IJ sheath with catheter tip near the SVC bifurcation. Endotracheal tube tip is in the distal trachea. Left IJ catheter containing a pulmonary artery catheter, with the tip near the right ventricular outflow tract. Feeding tube tip is in the gastric fundus.       1. Interval placement of left IJ sheath containing a pulmonary artery catheter with the tip near the right ventricular outflow tract. No pneumothorax. 2. Cardiomegaly with vascular congestion and suspected interstitial edema. 3. Ill-defined basilar opacities are similar. 4. Layering pleural effusions  This report was finalized on 11/10/2024 10:21 AM by Dr. Alberto Dominique M.D on Workstation: [id number]      [place] catheter    Result Date: 11/10/2024  Matthieu Scott MD     11/10/2024 10:16 AM Men[place] catheter Patient reassessed immediately prior to procedure Patient location during procedure: ICU Indications: MD/Surgeon request Staff Anesthesiologist: Matthieu Scott MD Preanesthetic Checklist Completed: patient identified, IV checked, site marked, risks and benefits discussed, surgical consent, monitors and equipment checked, pre-op evaluation and timeout performed Central Line Prep Sterile Tech:gloves, cap, gown, mask and sterile barriers Prep: chloraprep no medical exclusion documented for following  all elements of maximal sterile barrier technique Patient monitoring: blood pressure monitoring, continuous pulse oximetry and EKG Central Line Procedure Laterality:right Location:internal jugular Catheter Type:[place] Catheter Size:7.5 Fr Guidance:ultrasound guided PROCEDURE NOTE/ULTRASOUND INTERPRETATION.  Using ultrasound guidance the potential vascular sites for insertion of the catheter were visualized to determine the patency of the vessel to be used for vascular access.  After selecting the appropriate site for insertion, the needle was visualized under ultrasound being inserted into the internal jugular vein, followed by ultrasound confirmation of wire and catheter placement. There were no abnormalities seen on ultrasound; an image was taken; and the patient tolerated the procedure with no complications. Images: still images obtained, printed/placed on chart Assessment Post procedure:biopatch applied, line sutured and occlusive dressing applied Assessement:blood return through all ports and free fluid flow Complications:no Patient Tolerance:patient tolerated the procedure well with no apparent complications Additional Notes SGC @     Central Line    Result Date: 11/10/2024  Matthieu Scott MD     11/10/2024 10:16 AM Central Line Patient reassessed immediately prior to procedure Patient location during procedure: ICU Indications: MD/Surgeon request Staff Anesthesiologist: Matthieu Scott MD Preanesthetic Checklist Completed: patient identified, IV checked, site marked, risks and benefits discussed, surgical consent, monitors and equipment checked, pre-op evaluation and timeout performed Central Line Prep Sterile Tech:gloves, cap, gown, mask and sterile barriers Prep: chloraprep no medical exclusion documented for following all elements of maximal sterile barrier technique Patient monitoring: blood pressure monitoring, continuous pulse oximetry and EKG Central Line Procedure Laterality:left  Location:internal jugular Catheter Type:MAC Catheter Size:9 Fr Guidance:ultrasound guided PROCEDURE NOTE/ULTRASOUND INTERPRETATION.  Using ultrasound guidance the potential vascular sites for insertion of the catheter were visualized to determine the patency of the vessel to be used for vascular access.  After selecting the appropriate site for insertion, the needle was visualized under ultrasound being inserted into the internal jugular vein, followed by ultrasound confirmation of wire and catheter placement. There were no abnormalities seen on ultrasound; an image was taken; and the patient tolerated the procedure with no complications. Images: still images obtained, printed/placed on chart Assessment Post procedure:biopatch applied, line sutured and occlusive dressing applied Assessement:blood return through all ports and free fluid flow Complications:no Patient Tolerance:patient tolerated the procedure well with no apparent complications     XR Chest 1 View    Result Date: 11/9/2024  SINGLE VIEW OF THE CHEST  HISTORY: Central line placement  COMPARISON: November 8, 2024  FINDINGS: Tubes and lines appear stable compared to prior study. No pneumothorax is seen. Cardiomegaly and vascular congestion are noted. Bibasilar consolidation and left pleural effusion are again noted. Aeration of the left lung base They improved.      Slight interval improvement in aeration of the left lung base.  This report was finalized on 11/9/2024 3:33 AM by Dr. Alexandria Dahl M.D on Workstation: [id number]      Duplex Venous Lower Extremity - Bilateral CAR    Result Date: 11/8/2024    Normal bilateral lower extremity venous duplex scan.   Previous left gastrocnemius vein thrombus not visualized on today's study     Adult Transthoracic Echo Limited W/ Cont if Necessary Per Protocol    Result Date: 11/8/2024    Left ventricular systolic function is normal. Calculated left ventricular EF = 61.1%   Left ventricular diastolic function  was indeterminate.   The right ventricular cavity is moderately dilated. Normal right ventricular systolic function noted. RV free wall strain = -6.0%.   The left atrial cavity is mildly dilated.     XR Chest 1 View    Result Date: 11/8/2024  XR CHEST 1 VW-  Clinical: Postop  COMPARISON examination 11/7/2024  FINDINGS: Patient is rotated, projection is apical lordotic. Life support tubes and lines in position as before. There are surgical skin staples demonstrated along the chest on the left. The cardiomediastinal silhouette is unchanged, there is cardiac enlargement. There is worsening bibasilar infiltrate/atelectasis and/or consolidation, greatest at the left base. Possible left-sided pleural effusion. No pneumothorax seen. The remainder is unremarkable.  This report was finalized on 11/8/2024 7:01 AM by Dr. Adal Kimball M.D on Workstation: [id number]      Adult Transthoracic Echo Limited W/ Cont if Necessary Per Protocol    Result Date: 11/7/2024    Limited echocardiogram for left and right ventricular function   Left ventricular systolic function is hyperdynamic (EF > 70%). Left ventricular ejection fraction appears to be greater than 70%.   Left ventricular wall thickness is consistent with mild concentric hypertrophy.   The right ventricular cavity is severely dilated.   Moderately reduced right ventricular systolic function noted.   Calculated right ventricular systolic pressure from tricuspid regurgitation is 41.0 mmHg.     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 Right IJ pulmonary artery catheter with tip overlying the RVOT/main pulmonary artery. Enteric tube with tip overlying the stomach.  Stable normal size cardiomediastinal silhouette with postsurgical changes of mitral valve repair and  left atrial appendage clipping. Low lung volumes. Persistent confluent dense bilateral lobe opacities which are suspicious for infiltrates. Central pulmonary vasculature remains somewhat dilated and indistinct with prominent pulmonary interstitial markings which may reflect a component of edema. No measurable pleural effusion or pneumothorax. No focal osseous abnormality.  This report was finalized on 11/7/2024 6:54 AM by Dr. Donovan Gomez M.D on Workstation: [id number]      XR Chest 1 View    Result Date: 11/6/2024  XR CHEST 1 VW-  HISTORY: Male who is 74 years-old, postoperative evaluation  TECHNIQUE: Frontal view of the chest  COMPARISON: 11/5/2024  FINDINGS: Stable appearing endotracheal tube, NG tube, right IJ catheter. Cardiac lead fragments, sternotomy wires, skin staples noted. The heart is enlarged. Pulmonary vasculature is congested with similar-appearing bilateral lower lung opacities. There may be minimal pleural effusions. No pneumothorax. Otherwise stable.      No significant change.  This report was finalized on 11/6/2024 12:48 PM by Dr. Giacomo Burton M.D on Workstation: [id number]      XR Chest 1 View    Result Date: 11/5/2024  XR CHEST 1 VW-11/5/2024  HISTORY: Postop. Follow-up.  The heart size is moderately enlarged. Mild to moderate bibasilar atelectasis or infiltrates are seen. The right base has cleared somewhat since yesterday's study.  No significant pneumothorax is seen. Skin staples, pacer leads, ET tube, NG tube, [place] catheter, sternotomy wires and radiopaque closure device are again seen. These appear relatively unchanged from yesterday's study.      1. No significant pneumothorax is seen. 2. Bibasilar atelectasis and/or infiltrates. The right base may have cleared slightly since yesterday's study. 3. Life support devices appear relatively unchanged.   This report was finalized on 11/5/2024 6:55 AM by Dr. Deven Garcia M.D on Workstation: [id number]      Adult Transthoracic  Echo Limited W/ Cont if Necessary Per Protocol    Result Date: 11/4/2024    Left ventricular systolic function is hyperdynamic (EF > 70%).   Left ventricular wall thickness is consistent with mild concentric hypertrophy.   The right ventricular cavity is severely dilated. Severely reduced right ventricular systolic function noted.   There is no evidence of pericardial effusion     XR Chest 1 View    Result Date: 11/4/2024  XR CHEST 1 VW-  HISTORY: Male who is 74 years-old, chest tube removal  TECHNIQUE: Frontal view of the chest  COMPARISON: 11/4/2024 at 1025 hours  FINDINGS: Interval removal of chest tubes. Stable appearing NG tube, endotracheal tube, right IJ [place] catheter. Cardiac lead fragments are evident. Sternotomy wires, cardiac valve marker are noted. The heart is enlarged. Pulmonary vasculature is congested. Atelectasis/infiltrate at the lower lungs, similar to prior exam, with likely mild pleural effusions. No pneumothorax. No acute osseous process.      Chest tubes removed. No pneumothorax.  This report was finalized on 11/4/2024 11:51 AM by Dr. Giacomo Burton M.D on Workstation: [id number]      XR Chest 1 View    Result Date: 11/4/2024  XR CHEST 1 VW-  HISTORY: Male who is 74 years-old, chest tube removal  TECHNIQUE: Frontal view of the chest  COMPARISON: 11/4/2024  FINDINGS: A right chest tube has been removed. Other chest tubes appear stable. Stable appearing NG tube, endotracheal tube, right IJ [place] catheter. Cardiac lead fragments are evident. Sternotomy wires, cardiac valve marker are noted. The heart is enlarged. Pulmonary vasculature is congested. Atelectasis/infiltrate at the lower lungs, similar to prior exam, with likely mild pleural effusions. No pneumothorax. No acute osseous process.      Chest tube removed. No pneumothorax.  This report was finalized on 11/4/2024 10:39 AM by Dr. Giacomo Burton M.D on Workstation: [id number]      XR Chest 1 View    Result Date: 11/4/2024  XR  CHEST 1 VW-   INDICATION: Postoperative  COMPARISON: Chest radiograph November 3, 2024  TECHNIQUE: 1 view chest  FINDINGS:  Heterogeneous multifocal bilateral lung opacities. Questionable tiny left pneumothorax. Stable mediastinum. Suspect CABG. Left atrial appendage clip. Endotracheal tube tip is located in the distal trachea 4.3 cm above the yoni. Right IJ sheath with pulmonary artery catheter tip over the main pulmonary artery. Feeding tube with the tip in the gastric fundus. Abandoned cardiac leads. Left-sided chest tube. Mediastinal drains.       1. Heterogeneous multifocal bilateral lung opacities with improved aeration of the right upper lung. 2. Possible tiny left pneumothorax. 3. Stable support apparatus.  This report was finalized on 11/4/2024 8:31 AM by Dr. Alberto Dominique M.D on Workstation: [id number]      Duplex Pseudoaneurysm CAR    Result Date: 11/3/2024    Study today is negative for pseudoaneurysm of the left groin.  Hematoma measuring 2.3 x 1.3 cm.     Adult Transthoracic Echo Limited W/ Cont if Necessary Per Protocol    Result Date: 11/3/2024    Left ventricular systolic function is hyperdynamic (EF > 70%). Left ventricular ejection fraction appears to be greater than 70%.   The left ventricular cavity is borderline dilated.   Moderately reduced right ventricular systolic function noted.   The right ventricular cavity is moderate to severely dilated.   The left atrial cavity is severely dilated.   The right atrial cavity is mild to moderately  dilated.   Color doppler jet is not well visualized but triangular shap and density indicate severe tricuspid valve regurgitation is present.   Estimated right ventricular systolic pressure from tricuspid regurgitation is mildly elevated (35-45 mmHg).   Mitral ring in place with normal gradients.   Prosthetic AV not well visualized but gradients are normal.   No pericardial effusion appreciated.     XR Chest 1 View    Result Date: 11/3/2024  XR CHEST 1  VW-  HISTORY: Male who is 74 years-old, postoperative evaluation  TECHNIQUE: Supine view of the chest  COMPARISON: 11/2/2024  FINDINGS: Stable appearing endotracheal tube, NG tube, right IJ catheter, mediastinal drain, balloon pump marker. Cardiac leads, sternotomy wires, cardiac valve marker noted. The heart is enlarged. Pulmonary vasculature is congested. Extensive bilateral pulmonary opacities appear similar to prior exam. No large pleural effusion or pneumothorax is seen, limited by supine positioning, left lateral costophrenic angle is excluded from the image. Otherwise stable.      No significant change.    This report was finalized on 11/3/2024 7:10 AM by Dr. Giacomo Burton M.D on Workstation: [email or web address]      XR Chest 1 View    Result Date: 11/2/2024  XR CHEST 1 VW-  HISTORY: Postop.  COMPARISON: Chest radiograph 11/2/2024 9:14 a.m.  FINDINGS:  A single view of the chest was obtained. There is an endotracheal tube with the tip terminating approximately 2.6 cm above the yoni. There is a linear metallic density projecting over the aortic knob, which was previously located over the lower mediastinum. This may reflect a balloon pump with repositioning. Additional support devices are not significantly changed. The cardiac silhouette is enlarged. There is a left atrial appendage clip. Status post CABG. There is calcific aortic atherosclerosis. Bilateral pulmonary opacities are similar to prior. Sternal wires and surgical clips are present.  This report was finalized on 11/2/2024 2:24 PM by Dr. Janessa Bower M.D on Workstation: [id number]      XR Abdomen KUB    Result Date: 11/2/2024  XR ABDOMEN KUB-  INDICATIONS: NG tube placement  TECHNIQUE: SUPINE VIEW OF THE ABDOMEN  COMPARISON: 10/31/2024  FINDINGS:  In the partly included thorax, the NG tube projects over the spine, that extends to left upper quadrant at the expected location of the proximal stomach. The bowel gas pattern is nonobstructive. Follow-up as  clinically indicated.       As described.   This report was finalized on 11/2/2024 2:09 PM by Dr. Giacomo Burton M.D on Workstation: [email or web address]      XR Chest 1 View    Result Date: 11/2/2024  XR CHEST 1 VW-  HISTORY: Male who is 73 years-old, postoperative evaluation  TECHNIQUE: Frontal view of the chest  COMPARISON: 11/2/2024 at 0134 hours  FINDINGS: Previous NG tube is no longer seen. Stable appearing endotracheal tube. Right IJ [place] catheter appears slightly advanced in comparison with the prior exam, tip in the region of the pulmonary arterial trunk. Normal chest tube removal. A 7-8 mm metallic radiodensity projecting over the central aspect of the cardiac shadow, if representing balloon pump marker would be low in position, correlate clinically. Sternotomy wires, cardiac valve marker noted. The heart is enlarged. Extensive bilateral pulmonary opacities show further interval increase bilaterally. There may be left pleural effusion. No pneumothorax. Otherwise stable.       As described.  Discussed by telephone with patient's nurse, [place], at time of interpretation, 1012, 11/2/2024  This report was finalized on 11/2/2024 10:14 AM by Dr. Giacomo Burton M.D on Workstation: [email or web address]      Arterial Line    Result Date: 11/2/2024  Anuj Aguilar MD     11/2/2024  7:24 AM Arterial Line Patient reassessed immediately prior to procedure Patient location during procedure: OR Line placed for hemodynamic monitoring, ABGs/Labs/ISTAT and MD/Surgeon request. Performed By Anesthesiologist: Anuj Aguilar MD Preanesthetic Checklist Completed: patient identified, IV checked, site marked, risks and benefits discussed, surgical consent, monitors and equipment checked, pre-op evaluation and timeout performed Arterial Line Prep  Sterile Tech: cap, gloves and sterile barriers Prep: ChloraPrep Patient monitoring: EKG, continuous pulse oximetry and blood pressure monitoring Arterial Line Procedure Laterality:right Location:   radial artery Catheter size: 20 G Guidance: ultrasound guided PROCEDURE NOTE/ULTRASOUND INTERPRETATION.  Using ultrasound guidance the potential vascular sites for insertion of the catheter were visualized to determine the patency of the vessel to be used for vascular access.  After selecting the appropriate site for insertion, the needle was visualized under ultrasound being inserted into the radial artery, followed by ultrasound confirmation of wire and catheter placement. There were no abnormalities seen on ultrasound; an image was taken; and the patient tolerated the procedure with no complications. Number of attempts: 1 Successful placement: yes Images: still images not obtained Post Assessment Dressing Type: wrist guard applied, secured with tape and occlusive dressing applied. Complications no Circ/Move/Sens Assessment: normal and unchanged. Patient Tolerance: patient tolerated the procedure well with no apparent complications Additional Notes ULTRASOUND INTERPRETATION. Using ultrasound guidance, a catheter was placed within in the appropriate vessel and advanced successfully. There were no abnormalities seen on ultrasound; the patient tolerated the procedure with no complications.     Diagnostic IntraOp Dano    Result Date: 11/2/2024  Leana Jang MD     11/2/2024  8:06 AM Diagnostic IntraOp Dano Procedure Performed: Diagnostic IntraOp Dano      Start Time:  11/2/2024 8:04 AM      End Time:   11/2/2024 8:04 AM Preanesthesia Checklist: Patient identified, IV assessed, risks and benefits discussed, monitors and equipment assessed, procedure being performed at surgeon's request and anesthesia consent obtained. General Procedure Information DANO Placed for monitoring purposes only -- This is not a diagnostic DANO Diagnostic Indications for Echo:  hemodynamic monitoring Physician Requesting Echo: Javon Florian MD Location performed:  OR Intubated Bite block placed Heart visualized Probe Insertion:  Easy  Probe Type:  Multiplane Modalities:  2D only, color flow mapping, continuous wave Doppler and pulse wave Doppler Anesthesia Information Performed Personally Anesthesiologist:  Leana Jang MD Echocardiogram Comments:      Limited exam for hemodynamic monitoring while closing chest    XR Chest 1 View    Result Date: 11/2/2024  XR CHEST 1 VW-  HISTORY: Male who is 73 years-old, balloon pump  TECHNIQUE: Frontal view of the chest  COMPARISON: 11/1/2024  FINDINGS: Stable-appearing tubes and line. The heart is enlarged. Extensive bilateral pulmonary opacities show interval increase, especially in the right upper lung. There may be left pleural effusion. No pneumothorax. Otherwise stable.      As described.  This report was finalized on 11/2/2024 5:50 AM by Dr. Giacomo Burton M.D on Workstation: [email or web address]      XR Chest 1 View    Result Date: 11/1/2024  SINGLE VIEW OF THE CHEST  HISTORY: Postop open heart surgery  COMPARISON: October 31, 2024  FINDINGS: Tubes and lines appear stable, including possible positioning of the [place] catheter within the right ventricle. Vascular congestion is again noted. No pneumothorax is seen. Bibasilar atelectasis and small bilateral effusions are suspected.       No significant interval change.  This report was finalized on 11/1/2024 5:18 AM by Dr. Alexandria Dahl M.D on Workstation: [id number]      XR Abdomen KUB    Result Date: 11/1/2024  KUB  HISTORY: Orogastric tube placement  COMPARISON: None available.  FINDINGS: Orogastric tube appears to terminate within the proximal stomach. Bowel gas pattern is nonobstructive. Bibasilar consolidation and bilateral effusions are noted.  This report was finalized on 11/1/2024 4:08 AM by Dr. Alexandria Dahl M.D on Workstation: [id number] [email or web address]      XR Chest 1 View    Result Date: 10/31/2024  SINGLE VIEW OF THE CHEST  HISTORY: Orogastric tube placement  COMPARISON: None available.  FINDINGS: Orogastric tube appears to extend into the  upper abdomen. The patient's [place] catheter has retracted, and is likely positioned within the right ventricle. Tubes and lines are otherwise stable. Cardiomegaly and vascular congestion are noted. Bibasilar consolidation is noted. Bilateral effusions are suspected. No pneumothorax is seen.       1. Orogastric tube appears to extend into the upper abdomen. 2. [place] catheter has retracted, and may be positioned within the right ventricle.  This report was finalized on 10/31/2024 9:51 PM by Dr. Alexandria Dahl M.D on Workstation: [id number]      XR Abdomen KUB    Addendum Date: 10/31/2024    ADDENDUM: Discussed by telephone with patient's nurse, Letty, 2044, 10/31/2024.  This report was finalized on 10/31/2024 8:47 PM by Dr. Giacomo Burton M.D on Workstation: [id number]      Result Date: 10/31/2024  XR ABDOMEN KUB-  INDICATIONS: Orogastric tube placement  TECHNIQUE: FRONTAL VIEW OF THE ABDOMEN  COMPARISON: None available  FINDINGS:  As several tubes are present over the abdomen, it is difficult to distinguish with certainty which tube is the orogastric tube, or what the location of the orogastric tube is; as such, additional imaging at the level of the chest and upper abdomen is recommended for further evaluation. The bowel gas pattern is nonobstructive.       As described.   This report was finalized on 10/31/2024 8:40 PM by Dr. Giacomo Burton M.D on Workstation: [id number]      XR Chest 1 View    Result Date: 10/31/2024  XR CHEST 1 VW-  HISTORY: 73-year-old male status post aortic root replacement, removal of intracardiac leads as well as pacemaker ICD generator and part of the leads. Intra-aortic balloon pump. Significant coagulopathy.  FINDINGS: Distal tip of ET tube is 1.5 cm proximal to the yoni. 2 cm withdrawal is recommended. Right IJ [place] catheter tip is within the main pulmonary artery outflow tract. Intra-aortic balloon pump marker is approximately 7 cm distal to the yoni. No  pneumothorax.  There is improved aeration at the right lung, but the remaining perihilar airspace consolidations need to be followed. There is no definite interstitial edema. No significant change in the enlarged cardiac silhouette. No pneumothorax.  This report was finalized on 10/31/2024 4:31 PM by Dr. Bernadette Gaming M.D on Workstation: [id number]      Diagnostic IntraOp Dano    Result Date: 10/31/2024  Anuj Aguilar MD     10/31/2024  3:58 PM Diagnostic IntraOp Dano Procedure Performed: Diagnostic IntraOp Dano      Start Time:      End Time:  Preanesthesia Checklist: Patient identified, IV assessed, risks and benefits discussed, monitors and equipment assessed, procedure being performed at surgeon's request and anesthesia consent obtained. General Procedure Information DANO Placed for monitoring purposes only -- This is not a diagnostic DANO Physician Requesting Echo: Javon Florian MD Location performed:  ICU Intubated Bite block placed Heart visualized Probe Insertion:  Easy Probe Type:  Multiplane Modalities:  Color flow mapping, continuous wave Doppler and pulse wave Doppler Echocardiographic and Doppler Measurements Aorta Other Aortic Findings:      Anesthesia Information Performed Personally Anesthesiologist:  Anuj Aguilar MD Echocardiogram Comments:      Limited exam performed in ICU on POD1, inotropes epi 0.02 & milrinone 0.375 RV - severely impaired systolic function LV - severe impaired systolic function, EF 20-25%, underfilled, practically kissing papillary muscles, global hypokinesis Diastolic function - grade 2 dysfunction RA: dilated LA: dilated AV - s/p bioprosthetic homograft without paravalvular leak MV - s/p annuloplasty, well seated without leak, mean gradient 2mmHg, no regurgitation seen TV - moderate to severe regurgitation    XR Chest 1 View    Result Date: 10/31/2024  SINGLE VIEW OF THE CHEST  HISTORY: Postop line check  COMPARISON: October 30, 2024  FINDINGS: Tubes and lines appear to  be stable when compared to the earlier study. No pneumothorax is seen. There is vascular congestion. There is bibasilar atelectasis. There is a left pleural effusion. As previously noted, intracardiac pacemaker leads have been removed, and the generator also appears to have been removed.      Postoperative findings, as noted above.  This report was finalized on 10/31/2024 12:18 AM by Dr. Alexandria Dahl M.D on Workstation: [id number]      XR Lost Needle / Instrument    Result Date: 10/30/2024  X-RAY LOST NEEDLE/INSTRUMENT  HISTORY: No count  COMPARISON: October 30, 2024  FINDINGS: The patient has undergone revision of sternotomy. Endotracheal tube terminates in satisfactory position. Right internal jugular vein [place] catheter appears to be stable in position. There are left-sided chest tubes and a mediastinal drain. Distal left-sided pacemaker leads appear to have been removed. Correlation with operative procedure is recommended. There is vascular congestion. No obvious pneumothorax is seen. Left basilar atelectasis is present. There may be a left pleural effusion.      Postoperative findings, as noted above. Distal left-sided pacemaker leads appear to have been removed, and correlation with procedural history is recommended.  This report was finalized on 10/30/2024 10:58 PM by Dr. Alexandria Dahl M.D on Workstation: [id number]      XR Chest Post CVA Port    Result Date: 10/30/2024  Portable chest radiograph  HISTORY: Central line placement  TECHNIQUE: Single AP portable radiograph of the chest  COMPARISON: Chest radiograph 10/20/2024      FINDINGS AND IMPRESSION: Right internal jugular pulmonary artery catheter tip terminates over the left aspect of the mediastinum. There are median sternotomy wires and a presumed atrial appendage clip. Prosthetic heart valve and a left-sided pacer/AICD.  Bibasilar pulmonary pacification is present, left greater than right, mildly worsened within the right lung since  10/20/2024. Given asymmetry, continued attention follow-up to resolution is recommended to exclude the possibility of neoplasm. No pneumothorax is seen. Cardiac silhouette is mildly enlarged.  This report was finalized on 10/30/2024 12:20 PM by Dr. Danial Rosenbaum M.D on Workstation: [id number]        Pulmonary Artery Catheter    Result Date: 10/30/2024  Lionel Valencia MD     10/30/2024 11:03 AM Pulmonary Artery Catheter Patient reassessed immediately prior to procedure Patient location during procedure: OR Start time: 10/30/2024 10:26 AM Stop Time:10/30/2024 10:46 AM Indications: central pressure monitoring, vascular access and MD/Surgeon request Staff Anesthesiologist: Lionel Valencia MD Preanesthetic Checklist Completed: patient identified and risks and benefits discussed Central Line Prep Sterile Tech:cap, gloves, gown, mask and sterile barriers Prep: chloraprep Patient monitoring: blood pressure monitoring, continuous pulse oximetry and EKG Central Line Procedure Laterality:right Location:internal jugular Catheter Type:[place] Catheter Size:7.5 Fr Guidance:ultrasound guided PROCEDURE NOTE/ULTRASOUND INTERPRETATION.  Using ultrasound guidance the potential vascular sites for insertion of the catheter were visualized to determine the patency of the vessel to be used for vascular access.  After selecting the appropriate site for insertion, the needle was visualized under ultrasound being inserted into the internal jugular vein, followed by ultrasound confirmation of wire and catheter placement. There were no abnormalities seen on ultrasound; an image was taken; and the patient tolerated the procedure with no complications. Assessment Post procedure:biopatch applied, line sutured, occlusive dressing applied and secured with tape Assessement:blood return through all ports and free fluid flow Complications:no Patient Tolerance:patient tolerated the procedure well with no apparent complications     Central  Line    Result Date: 10/30/2024  Lionel Valencia MD     10/30/2024 11:02 AM Central Line Patient reassessed immediately prior to procedure Patient location during procedure: pre-op Start time: 10/30/2024 10:26 AM Stop Time:10/30/2024 10:46 AM Indications: vascular access and central pressure monitoring Staff Anesthesiologist: Lionel Valencia MD Preanesthetic Checklist Completed: patient identified and risks and benefits discussed Central Line Prep Sterile Tech:cap, gloves, gown, mask and sterile barriers Prep: chloraprep Patient monitoring: blood pressure monitoring, continuous pulse oximetry and EKG Central Line Procedure Laterality:right Location:internal jugular Catheter Type:Cordis Catheter Size:9 Fr Guidance:ultrasound guided PROCEDURE NOTE/ULTRASOUND INTERPRETATION.  Using ultrasound guidance the potential vascular sites for insertion of the catheter were visualized to determine the patency of the vessel to be used for vascular access.  After selecting the appropriate site for insertion, the needle was visualized under ultrasound being inserted into the internal jugular vein, followed by ultrasound confirmation of wire and catheter placement. There were no abnormalities seen on ultrasound; an image was taken; and the patient tolerated the procedure with no complications. Images: still images obtained, printed/placed on chart Assessment Post procedure:biopatch applied, line sutured, occlusive dressing applied and secured with tape Assessement:blood return through all ports and chest x-ray ordered Complications:no Patient Tolerance:patient tolerated the procedure well with no apparent complications Additional Notes Ultrasound Interpretation:  Using ultrasound guidance the potential vascular sites for insertion of the catheter were visualized to determine the patency of the vessel to be used for vascular access.  After selecting the appropriate site for insertion, the needle was visualized under ultrasound being  inserted into the vessel, followed by ultrasound confirmation of wire and catheter placement.  There were no abnormalities seen on ultrasound; an image was taken/ and the patient tolerated the procedure with no complications.     Diagnostic IntraOp Dano    Result Date: 10/30/2024  Azar Macias MD     10/30/2024  2:48 PM Diagnostic IntraOp Dano Procedure Performed: Diagnostic IntraOp Dano      Start Time:      End Time:  Preanesthesia Checklist: Patient identified, IV assessed, risks and benefits discussed, monitors and equipment assessed, procedure being performed at surgeon's request and anesthesia consent obtained. General Procedure Information Diagnostic Indications for Echo:  assessment of ascending aorta, assessment of surgical repair and hemodynamic monitoring Physician Requesting Echo: Javon Florian MD CPT Code:  17870, 62349 ICD Code for Medical Necessity:  I34.0, I70.0 Location performed:  OR Intubated Bite block placed Heart visualized Probe Insertion:  Easy Probe Type:  Multiplane Modalities:  Color flow mapping, pulse wave Doppler and continuous wave Doppler Echocardiographic and Doppler Measurements Ventricles Right Ventricle: Cavity size dilated.  Hypertrophy not present.  Thrombus not present.  Global function mildly impaired.  Left Ventricle: Cavity size dilated.  Thrombus not present.  Global Function mildly impaired.  Ejection Fraction 58%.  Other Ventricular Findings:      LVEDV 155 mL Valves Aortic Valve: Annulus bioprosthetic.  Stenosis mild.  Mean Gradient: 9 mmHg.  Regurgitation absent.  Leaflets vegetative.  Leaflet motions restricted.  Mitral Valve: Annulus dilated.  Stenosis not present.  Mean Gradient: 1 mmHg.  Regurgitation moderate.  Leaflets normal.  Leaflet motions normal.  Tricuspid Valve: Annulus dilated.  Stenosis not present.  Regurgitation mild.  Leaflets normal.  Other Valve Findings:      Anterior mitral leaflet length 3.1 cm Aorta Ascending Aorta: Size normal.  Diameter  3.5 cm.  Dissection not present.  Plaque thickness less than 3 mm.  Mobile plaque not present.  Aortic Arch: Size normal.  Diameter 3 cm.  Dissection not present.  Plaque thickness less than 3 mm.  Mobile plaque not present.  Descending Aorta: Size normal.  Diameter 2.5 cm.  Dissection not present.  Plaque thickness less than 3 mm.  Mobile plaque not present.  Atria Right Atrium: Size dilated.  Spontaneous echo contrast present.  Thrombus not present.  Tumor not present.  Device not present.  Left Atrium: Size dilated.  Spontaneous echo contrast present.  Thrombus not present.  Tumor not present.  Device not present.  Left atrial appendage normal. Diastolic Function Measurements: Diastolic Dysfunction Grade= indeterminate E=  51.7 ms A=  ms E/A Ratio= DT=  108 ms S/D=  .6 IVRT= Other Findings Pericardium:  pericardial effusion Pulmonary Venous Flow:  blunted (decreased) systolic flow Anesthesia Information Performed Personally Anesthesiologist:  Azar Macias MD Echocardiogram Comments:      Postbypass results:    Arterial Line    Result Date: 10/30/2024  Lionel Valencia MD     10/30/2024 11:02 AM Arterial Line Patient reassessed immediately prior to procedure Patient location during procedure: pre-op Start time: 10/30/2024 10:15 AM Stop Time:10/30/2024 10:17 AM  Line placed for hemodynamic monitoring. Performed By Anesthesiologist: Lionel Valencia MD Preanesthetic Checklist Completed: patient identified and risks and benefits discussed Arterial Line Prep  Sterile Tech: gloves Prep: ChloraPrep Patient monitoring: blood pressure monitoring, continuous pulse oximetry and EKG Arterial Line Procedure Laterality:left Location:  radial artery Catheter size: 20 G Guidance: ultrasound guided PROCEDURE NOTE/ULTRASOUND INTERPRETATION.  Using ultrasound guidance the potential vascular sites for insertion of the catheter were visualized to determine the patency of the vessel to be used for vascular access.  After selecting  the appropriate site for insertion, the needle was visualized under ultrasound being inserted into the radial artery, followed by ultrasound confirmation of wire and catheter placement. There were no abnormalities seen on ultrasound; an image was taken; and the patient tolerated the procedure with no complications. Successful placement: yes Images: still images obtained, printed/placed on the chart Post Assessment Dressing Type: occlusive dressing applied and secured with tape. Complications no Patient Tolerance: patient tolerated the procedure well with no apparent complications Additional Notes Using ultrasound guidance the potential vascular sites for insertion of the catheter were visualized to determine the patency of the vessel to be used for vascular access.  After selecting the appropriate site for insertion, the needle was visualized under ultrasound being inserted into the artery, followed by ultrasound confirmation of wire and catheter placement.  There were no abnormalities seen on ultrasound; an image was taken/ and the patient tolerated the procedure with no complications.     Duplex Vein Mapping Lower Extremity - Bilateral CAR    Result Date: 10/28/2024    The right great saphenous vein is patent  and of adequate size in the thigh.   The right great saphenous vein is patent and of adequate size in the calf.   The left great saphenous vein is patent and of adequate size in the thigh.   The left great saphenous vein is patent  and of adequate size in the calf.     Carotid Duplex - Bilateral    Result Date: 10/28/2024    Right internal carotid artery demonstrates a less than 50% stenosis.   Antegrade right vertebral flow.   Left internal carotid artery demonstrates a less than 50% stenosis.   Antegrade left vertebral flow.     XR Chest PA & Lateral    Result Date: 10/28/2024  XR CHEST PA AND LATERAL-  HISTORY: Male who is 73 years-old, preoperative evaluation  TECHNIQUE: Frontal and lateral views of the  chest  COMPARISON: 10/14/2024  FINDINGS: The heart is enlarged. Pulmonary vasculature shows mild central prominence. Left-sided pacemaker, cardiac leads, sternotomy wires, cardiac valve marker noted. Minimal right pleural effusion, continued follow-up suggested. No focal pulmonary consolidation. No pneumothorax. Otherwise stable.      As described.  This report was finalized on 10/28/2024 4:27 PM by Dr. Giacomo Burton M.D on Workstation: [id number]      Stress Test With Myocardial Perfusion One Day    Result Date: 10/26/2024    Lexiscan protocol completed without low-level exercise.  Baseline ECG showed A-fib with RBBB, no angina or significant ischemic ECG changes noted.   Left ventricular ejection fraction is normal (Calculated EF = 55%).   Mildly reduced perfusion defect in the basal segments likely due to suboptimal postprocessing/contouring as well as diaphragmatic attenuation artifact.  No reversibility noted.   Myocardial perfusion imaging indicates a grossly normal myocardial perfusion study with no evidence of reversible ischemia. Impressions are consistent with a low risk study.     Adult Transesophageal Echo 3D (JOLIE) W/ Cont If Necessary Per Protocol    Result Date: 10/25/2024    Left ventricular ejection fraction appears to be 51 - 55%.   Left ventricular wall thickness is consistent with mild concentric hypertrophy.   The right ventricular cavity is mildly dilated.   The left atrial cavity is severely dilated.   Saline test results are negative.   The right atrial cavity is severely  dilated.   Severe aortic valve stenosis is present.   There is a mobile mass on the aortic valve. Vegetations are present on both the ventricle as well as the aortic side of the bioprosthetic aortic valve.  Largest extends into the ascending aorta approximately 3.1 cm above the sewing ring (3.3 cm total length). Vegetation becomes more broad as the furthest aspect from attachment, maximum width 1.7 cm. Vegetation on the  ventricular side of the aortic valve I think most likely originates from the sewing ring.   There is a bioprosthetic aortic valve present. The prosthetic aortic valve peak and mean gradients are elevated. There is a large, mobile mass present on the prosthetic aortic valve that is consistent with a vegetation. There is severe stenosis or obstruction of the prosthetic aortic valve.   Mild aortic valve regurgitation is present.     CT Angiogram Coronary    Result Date: 10/25/2024  RADIOLOGY INTERPRETATION OF EXTRACARDIAC STRUCTURES WITHIN THE CHEST    FINDINGS: Small right greater than left bilateral pleural effusions and mild pulmonary inter and intralobular septal thickening which may reflect a component of pulmonary edema. Respiratory motion limits evaluation of the lungs.    PLEASE SEE SEPARATE CARDIOLOGY REPORT OF THE CARDIAC FINDINGS.   Radiation dose reduction techniques were utilized, including automated exposure control and exposure modulation based on body size.   This report was finalized on 10/25/2024 3:03 PM by Dr. Donovan Gomez M.D on Workstation: [id number]      CT Angiogram Coronary-Cardiology Interpretation    Result Date: 10/25/2024  Table formatting from the original result was not included. 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 0.5 mm collimated images were obtained through the coronary arteries.  Data were transferred offline for 3D reconstructions using [employer]. CONTRAST: 85 mL iopamidol ([id number]) ACQUISITION: Retrospective ECG triggered acquisition was used.  Heart rate at the time of acquisition was approximately 85 BPM. MEDICATIONS: Metoprolol tartrate  25 mg oral Nitroglycerin 0.8 mg tablet TECHNICAL QUALITY:  Extremely poor due to obesity, high heart rate in the setting of  A-fib and inability to medicate further due to low BP, and adjacent ICD leads with associated metallic artifacts.  Nondiagnostic. . FINDINGS: Coronary Artery Calcification Findings: The total calcium score is 69 indicating mild (CAC 1-100) calcified plaque in the coronary tree. Left main: 40 LAD: 13 LCx: 4 RCA: 2 Angiographic Findings: The coronary arteries are  possibly left dominant . Left Main: Normal origin from the left coronary cusp.  Gives rise to LAD and LCx.  There appears to be a calcified plaque in distal LM extending into LAD.  Unable to accurately assess luminal stenosis due to poor image quality. LAD: Unable to accurately assess luminal stenosis due to poor image quality. LCx: Likely dominant vessel. Unable to accurately assess luminal stenosis due to poor image quality. RCA: Likely nondominant. Unable to accurately assess luminal stenosis due to poor image quality. Noncoronary Cardiac Findings: Cardiac chambers: Normal in size with no obvious filling defects within the ventricles, atria, or atrial appendages. No stigmata or prior infarction. Cardiac valves: A bioprosthetic valve is present in the aortic position and poorly visualized.  There appears to be hypoattenuated linear echodensity consistent with known bioprosthetic aortic valve vegetation. Pulmonary arteries: Normal in caliber. No obvious filling defects in the visualized central pulmonary arteri(es) to suggest large central pulmonary emboli, although the image qualities are extremely poor. Pericardium: The pericardial contour is preserved with no effusion, thickening, or calcifications. Left ventricle: Mild-moderate LV dilatation.  Calculated LVEF 70%.     Extremely poor image quality due to obesity (BMI 42), high heart rate in the setting of A-fib and inability to medicate further due to low BP, and adjacent ICD leads resulting in photon starvation, cardiac motion, and beam hardening artifacts and excessive noise.  Non-diagnostic study.  CAD-RAD N/P1. Overall there is mild amount of coronary plaque.  Coronary calcium score CAC (69). Grossly normal LV systolic function (calculated LVEF 70%). Bioprosthetic aortic valve present, poorly visualized. There appears to be hypoattenuated linear echodensity consistent with known bioprosthetic aortic valve vegetation.  Please reference same-day JOLIE results. Left atrial appendage appears to be ligated surgically with clips noted. Severe biatrial enlargement. Please refer to the radiology report for information on extra-cardiac structures.  RECOMMENDATION: Consider alternative imaging modalities to rule out obstructive CAD if deemed necessary prior to surgery for bioprosthetic endocarditis. Grading Scale for Stenosis Severity: Category Degree Interpretation CAD-RADS 0 0% (No plaque or stenosis) Absence of CAD CAD-RADS 1 1-24% (Minimal stenosis or plaque w/o stenosis) Minimal non-obstructive CAD CAD-RADS 2 25-49% (Mild stenosis) Mild non-obstructive CAD CAD-RADS 3 50-69% (Moderate stenosis) Moderate stenosis CAD-RADS 4 A - 70-99% stenosis or B - Left main >/= 50% or 3-vessel obstructive (>/=70%) disease Severe stenosis CAD-RADS 5 100% (total occlusion) Total coronary occlusion or sub-total occlusion CAD-RADS N Non-diagnostic study Obstructive CAD cannot be excluded Grading Scale for Plaque [place]: P1 (CAC 1-100) Mild amount of plaque P2 (-300) Moderate amount of plaque P3 (-999) Severe amount of plaque P4 (CAC > 1000) Extensive amount of plaque     Duplex Venous Lower Extremity - Bilateral CAR    Result Date: 10/24/2024    Acute left lower extremity deep vein thrombosis noted in the gastrocnemius.   All other veins appeared normal bilaterally.     Adult Transthoracic Echo Complete W/ Cont if Necessary Per Protocol    Result Date: 10/19/2024    Left ventricular systolic function is low normal. Left ventricular ejection fraction appears to be 51 - 55%.   Left ventricular wall thickness is consistent  with mild concentric hypertrophy.   Left ventricular diastolic function was indeterminate.   There is moderate to severe biatrial enlargement.   There is a bioprosthetic aortic valve present. The prosthetic aortic valve peak and mean gradients are elevated.  The peak and mean gradient across aortic valve was 101/68 mmHg.  Findings are consistent with severe stenosis of the bioprosthetic valve.   There is mild to moderate mitral regurgitation.   There is mild tricuspid regurgitation.  Estimated right ventricular systolic pressure from tricuspid regurgitation is markedly elevated (>55 mmHg).     Results for orders placed during the hospital encounter of 10/23/24    Adult Transthoracic Echo Limited W/ Cont if Necessary Per Protocol    Interpretation Summary    Limited study for LV/RV function    Left ventricular systolic function is normal. Calculated left ventricular EF = 67.9%    Moderately to severely reduced right ventricular systolic function noted.    The right ventricular cavity is severely dilated.    The left atrial cavity is dilated.    The right atrial cavity is dilated.    Moderate tricuspid valve regurgitation is present.    Estimated right ventricular systolic pressure from tricuspid regurgitation is moderately elevated (45-55 mmHg).    S/p aortic valve replacement.  Not well-visualized/assessed.    S/p mitral valve repair with 28 mm Medtronic flexible band annuloplasty.      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         Assessment & Plan     status post 10/31/2024 tissue aortic valve replacement for prosthetic aortic valve stenosis, maze, left atrial appendage ligation done in 2014 and status post reoperative sternotomy with AV root replacement 0.7 mm cryopreserved homograft with right Gilbert Carbaugh, AICD removal intra-aortic balloon pump placement.  Strep mitis bacteremic sepsis and possible endocarditis on vancomycin and cefepime for this and possible pneumonia plan is at least 6 weeks of antibiotics tentative end date of 12/4/2024  Fever, multiple possible sources he does have increasing atelectasis and I guess I cannot rule out infiltrates at the bases.  I am going to defer to infectious disease  RV dysfunction severe on milrinone  Shock cardiogenic and possible element of septic shock as well remains pressor dependent  Possible pneumonia respiratory cultures ordered some light growth of Candida which is likely oral contaminant is unremarkable  Respiratory failure patient failed weaning tracheostomy the other day but there is no way he is can is successfully weaned when he has this high minute ventilation demand.  Based on yesterday's blood gas he has a metabolic acidosis his lactate was normal this is probably a uremic acidosis.  His fever this morning probably is not helping and is probably driving his minute ventilation as well.  He is having increasing basilar atelectasis I am going to increase his PEEP slightly hopefully this will not affect his blood pressure may even help  CHF history of nonischemic cardiomyopathy but recent echocardiogram LVEF 67% so probably diastolic dysfunction cardiology following along with nephrology patient on Bumex drip has marked total body fluid overload  Pulmonary hypertension severe by recent  echocardiogram on sildenafil per cardiothoracic surgery  Acute kidney injury  DVT history and left lower extremity on Doppler on 10/24/2024  not present on 11/8/2024 Doppler and subacute DVT in the left axillary vein Doppler holding anticoagulation now per cardiovascular surgery  Atrial fibrillation on amiodarone for rate control  Anemia acute on chronic expected postop acute complement.  Stable  Thrombocytopenia resolved  Esophagitis ease EGD on 9/30/2024  Fluids/lites/nutrition continue tube feeds now   Hyperglycemia not too bad on scheduled and sliding scale insulin.  Morbid obesity with a BMI greater than 46    Discussed case with Regina Salvador    Plan for disposition:    Paul Leslie Jr, MD  11/18/24  07:06 EST    Time: Critical care time 38 minutes

## 2024-11-18 NOTE — PROGRESS NOTES
"Nutrition Services    Patient Name:  Woodrow Alejandro  YOB: 1950  MRN: [id number]  Admit Date:  10/23/2024    Assessment Date:  11/18/24    Summary: TF Follow Up    S/p trach 11/16.  Continues with TFs via NG.  Diuresis.  Plans for 6 weeks antibiotics for endocarditis.  Follows commands.    Current TF regimen: Novasource Renal @ 45 mL/hr with 100 mL q 1 hour free water flushes (per MD).  Prosource BID.    Labs reviewed: Na 137, K 4.1, Gluc 150/153/142, BUN 82, Creat 2.52, Alb 2.7  Meds reviewed: lipitor, insulin, prevacid, florastor, vanc, amiodarone, bumex, vit D    Plans/Recommendations:  Continue TFs of Novasource Renal @ 45ml/hr (goal).  Prosource BID.   100 mL q 1 hour free water flushes (per MD).  Monitor for tolerance.    Initial Goal:  *initial goal conservative d/t risk of RFS     Novasource Renal at 45mL/hr + 50mL q 4 hr water flush      End Goal:    Novasource Renal at 45 mL/hr + 100 mL/hr water flush      Calories  1980kcal + 120kcal prosource= 2100kcals (100%)    Protein  90g + 30g prosource= 120 g (100%)    Free water  703 mL   Flushes  2400 mL     The above end goal rate is for 22 hrs/day to assume interruptions for ADLs. Water flushes adjusted based on clinical picture + Rx flushes/IV fluids     Specialized formula chosen r/t need for concentrated tube feeding formula with high protein     RD to follow    CLINICAL NUTRITION ASSESSMENT      Reason for Assessment Follow-up Protocol     Diagnosis/Problem   POD 19 reoperative sternotomy AV root replacement 27mm cryopreserved homograft with right nuvia cabrol, AICD removal, IABP placement.  S/p trach 11/16     Anthropometrics        Current Height  Current Weight  BMI kg/m2 Height: 177.8 cm (70\")  Weight: (!) 151 kg (333 lb) (11/18/24 1433)  Body mass index is 47.78 kg/m².   Adjusted BMI (if applicable)    BMI Category Obese, Class III (40 or higher)   Ideal Body Weight (IBW) 171 lbs   Usual Body Weight (UBW) 330 lbs   Weight Trend " Stable     Estimated/Assessed Needs        Energy Requirements    Weight for Calculation 78 kg IBW   Method for Estimation  25-30 kcal/kg   EST Needs (kcal/day) 6257-4174       Protein Requirements    Weight for Calculation 78 kg IBW   EST Protein Needs (g/kg) 1.5 - 2.0 gm/kg   EST Daily Needs (g/day) 117-156       Fluid Requirements     Method for Estimation 1 mL/kcal    Estimated Needs (mL/day)      Labs       Pertinent Labs    Results from last 7 days   Lab Units 11/18/24  0809 11/18/24  0335 11/18/24  0013 11/17/24  1147 11/17/24  0318 11/15/24  0807 11/15/24  0306   SODIUM mmol/L 137 139  --   --  146*   < > 147*   POTASSIUM mmol/L 4.1  4.1 4.4 4.3   < > 4.2   < > 3.6  3.6   CHLORIDE mmol/L 107 109*  --   --  112*   < > 109*   CO2 mmol/L 13.8* 12.8*  --   --  17.0*   < > 21.0*   BUN mg/dL 82* 80*  --   --  69*   < > 55*   CREATININE mg/dL 2.52* 2.40*  --   --  1.91*   < > 1.55*   CALCIUM mg/dL 8.8 8.1*  --   --  8.2*   < > 7.8*   BILIRUBIN mg/dL 0.7 0.7  --   --   --   --  0.7   ALK PHOS U/L 108 107  --   --   --   --  97   ALT (SGPT) U/L 16 17  --   --   --   --  6   AST (SGOT) U/L 27 36  --   --   --   --  19   GLUCOSE mg/dL 153* 147*  --   --  131*   < > 172*    < > = values in this interval not displayed.     Results from last 7 days   Lab Units 11/18/24  0809 11/18/24  0528 11/18/24  0335 11/15/24  1244 11/15/24  0306 11/14/24  1340 11/13/24  0414 11/12/24  1128   MAGNESIUM mg/dL  --   --  2.0  --  2.0 2.0   < >  --    PHOSPHORUS mg/dL  --   --  2.3*   < > 3.7  --    < >  --    HEMOGLOBIN g/dL  --  7.8* 7.6*   < > 8.4*  --    < >  --    HEMATOCRIT %  --  24.4* 24.4*   < > 26.7*  --    < >  --    WBC 10*3/mm3  --   --  18.69*   < > 15.95*  --    < >  --    TRIGLYCERIDES mg/dL  --   --   --   --   --   --   --  261*   ALBUMIN g/dL 2.7*  --  2.6*   < > 2.7*  --    < >  --     < > = values in this interval not displayed.     Results from last 7 days   Lab Units 11/18/24  0335 11/17/24  0318 11/16/24  0408  11/15/24  0306 11/14/24  0351   PLATELETS 10*3/mm3 165 207 193 203 248     COVID19   Date Value Ref Range Status   10/21/2024 Not Detected Not Detected - Ref. Range Final     Lab Results   Component Value Date    HGBA1C 5.30 10/24/2024          Medications           Scheduled Medications acetylcysteine, 3 mL, Nebulization, TID - RT  aspirin, 81 mg, Per G Tube, Daily  atorvastatin, 40 mg, Per G Tube, Nightly  busPIRone, 5 mg, Oral, TID  cefepime, 2,000 mg, Intravenous, Q12H  chlorhexidine, 15 mL, Mouth/Throat, Q12H  [Held by provider] enoxaparin, 40 mg, Subcutaneous, Q12H  Ergocalciferol, 100 mcg, Per G Tube, Daily  hydrocortisone-bacitracin-zinc oxide-nystatin, 1 Application, Topical, Q12H  insulin glargine, 20 Units, Subcutaneous, Daily  insulin regular, 2-7 Units, Subcutaneous, Q6H  ipratropium-albuterol, 3 mL, Nebulization, Q4H - RT  lansoprazole, 15 mg, Per G Tube, Q AM  miconazole, 1 Application, Topical, Q12H  midodrine, 15 mg, Oral, TID AC  saccharomyces boulardii, 250 mg, Per G Tube, BID  sildenafil, 20 mg, Per G Tube, TID  sodium chloride, 10 mL, Intravenous, Q12H  Vancomycin Pharmacy Intermittent/Pulse Dosing, , Not Applicable, Daily       Infusions amiodarone, 0.5 mg/min, Last Rate: 0.5 mg/min (11/18/24 1216)  bumetanide, 1 mg/hr, Last Rate: 1 mg/hr (11/18/24 1236)  dexmedetomidine, 0.2-1.5 mcg/kg/hr, Last Rate: Stopped (11/18/24 0400)  DOPamine, 2-20 mcg/kg/min  EPINEPHrine, 0.01 mcg/kg/min, Last Rate: Stopped (11/17/24 1051)  lidocaine in D5W, 1 mg/min, Last Rate: Stopped (11/05/24 1135)  milrinone, 0.25 mcg/kg/min, Last Rate: 0.25 mcg/kg/min (11/18/24 1410)  niCARdipine, 5-15 mg/hr  norepinephrine, 0.02-0.2 mcg/kg/min, Last Rate: Stopped (11/18/24 0130)  Pharmacy to dose vancomycin,   phenylephrine, 0.2-2 mcg/kg/min  vasopressin, 0.02-0.1 Units/min, Last Rate: 0.04 Units/min (11/18/24 1411)       PRN Medications   acetaminophen **OR** acetaminophen **OR** acetaminophen    ALPRAZolam    bisacodyl     bisacodyl    cyclobenzaprine    dextrose    dextrose    DOPamine    EPINEPHrine    glucagon (human recombinant)    Glycerin-Hypromellose-    magnesium hydroxide    milrinone    Morphine    [] Morphine **AND** naloxone    niCARdipine    nitroglycerin    norepinephrine    ondansetron    Pharmacy to dose vancomycin    phenylephrine    polyethylene glycol    Potassium Replacement - Follow Nurse / BPA Driven Protocol    senna    vasopressin     Physical Findings          General Findings obese, responds/arouses to voice, ventilator support, other: trach   Oral/Mouth Cavity tooth or teeth missing   Edema  generalized, lower extremity , upper extremity, 1+ (trace), 2+ (mild), 3+ (moderate)   Gastrointestinal fecal incontinence, non-distended , normoactive, last bowel movement:    Skin  bruising, excoriation, pressure injury: sacral spine DTI, L groin st III, surgical incision: L clavicle, sternal, anterior thigh, throat, location of wound: L heel   Tubes/Drains/Lines NG tube   NFPE Not indicated at this time   --  Current Nutrition Orders & Evaluation of Intake       Oral Nutrition     Food Allergies NKFA   Current PO Diet NPO Diet NPO Type: Tube Feeding   Supplement n/a   PO Evaluation     % PO Intake NPO    Factors Affecting Intake: Other: Intubated      Enteral Nutrition     Enteral Route NG    TF Delivery Method Continuous    Propofol Rate/Kcal     Current TF Order/Rate  Novasource Renal @ 45 mL/hr    TF Goal Rate 45 mL/hr    Current Water Flush 100 mL Q hr (per MD)    Modular None    TF Residual  no or minimal residual    TF Tolerance tolerating    TF Observation Other: discussed with RN     PES STATEMENT / NUTRITION DIAGNOSIS      Nutrition Dx Problem  Problem: Needs Alternative Composition  Etiology: Medical Diagnosis - s/p reoperative sternotomy AV root replacement 27mm cryopreserved homograft with right nuvia cabrol, AICD removal, IABP placement.    Signs/Symptoms: Report/Observation    --  NUTRITION INTERVENTION / PLAN OF CARE      Intervention Goal(s) Maintain nutrition status, Establish goals of care, Reduce/improve symptoms, Meet estimated needs, Disease management/therapy, Tolerate TF/PN at goal, and Appropriate weight loss         RD Intervention/Action Continue to monitor and Care plan reviewed         Prescription/Orders:       PO Diet       Supplements       Enteral Nutrition    Enteral Prescription:     Enteral Route NG    TF Delivery Method Continuous    Enteral Product Novasource Renal    Modular Liquid Protein, BID    Propofol Rate/Kcal     TF Start Rate  45 mL/hr    TF Goal Rate  45 mL/hr    Free Water Flush 100 mL Q hr (per MD)    Provision at Goal:          Calories 1980 kcal +120 kcal prosource= 2100 kcal, meets 100% needs         Protein  90 g + 30 g prosource = 120 gm protein, [id number]% needs         Fluid (mL) 713 mL + 2400 mL free water flushes         Parenteral Nutrition    New Prescription Ordered? Continue same per protocol, No changes at this time   --      Monitor/Evaluation Per protocol, Pertinent labs, EN delivery/tolerance, Weight, Skin status, GI status, Symptoms, POC/GOC, Swallow function   Discharge Plan/Needs No discharge needs identified at this time   --    RD to follow per protocol.      Electronically signed by:  Jenny Reynolds RD  11/18/24 15:04 EST

## 2024-11-18 NOTE — PROGRESS NOTES
" LOS: 26 days   Patient Care Team:  Juan Perez MD as PCP - General (Internal Medicine)    Chief Complaint:   Post-op follow-up, s/p homograft    Subjective  Vent via trach. Nods head with questions.     Vital Signs  Temp:  [98.6 °F (37 °C)-101.1 °F (38.4 °C)] 99.7 °F (37.6 °C)  Heart Rate:  [100-135] 117  Resp:  [24-31] 31  BP: ()/(36-96) 113/73  Arterial Line BP: ()/(40-70) 124/48  FiO2 (%):  [39 %-93 %] 93 %      11/16/24  0501 11/17/24  0547 11/18/24  0557   Weight: (!) 147 kg (324 lb 11.8 oz) (!) 146 kg (320 lb 12.3 oz) (!) 151 kg (333 lb 12.4 oz)     Body mass index is 47.89 kg/m².    Intake/Output Summary (Last 24 hours) at 11/18/2024 0800  Last data filed at 11/18/2024 0700  Gross per 24 hour   Intake 4488 ml   Output 660 ml   Net 3828 ml     No intake/output data recorded.        Objective:  Vital signs: (most recent): Blood pressure 113/73, pulse 117, temperature 99.7 °F (37.6 °C), resp. rate (!) 31, height 177.8 cm (70\"), weight (!) 151 kg (333 lb 12.4 oz), SpO2 90%.                Physical Exam:   General Appearance: sedated but arousable, ill appearing, NAD   Lungs:  vent, ronchi throuhgout   Heart:  irreg irreg rhythm, tachy during exam   Abdomen: soft or nondistended, + bowel sounds    Skin: sternal incision clean, dry, intact   Neuro: alert and oriented, no focal deficits.     Results Review:      WBC WBC   Date Value Ref Range Status   11/18/2024 18.69 (H) 3.40 - 10.80 10*3/mm3 Final   11/17/2024 17.25 (H) 3.40 - 10.80 10*3/mm3 Final   11/16/2024 13.92 (H) 3.40 - 10.80 10*3/mm3 Final      HGB Hemoglobin   Date Value Ref Range Status   11/18/2024 7.8 (L) 13.0 - 17.7 g/dL Final   11/18/2024 7.6 (L) 13.0 - 17.7 g/dL Final   11/17/2024 8.9 (L) 13.0 - 17.7 g/dL Final   11/16/2024 8.6 (L) 13.0 - 17.7 g/dL Final      HCT Hematocrit   Date Value Ref Range Status   11/18/2024 24.4 (L) 37.5 - 51.0 % Final   11/18/2024 24.4 (L) 37.5 - 51.0 % Final   11/17/2024 28.0 (L) 37.5 - 51.0 % " "Final   11/16/2024 27.1 (L) 37.5 - 51.0 % Final      Platelets Platelets   Date Value Ref Range Status   11/18/2024 165 140 - 450 10*3/mm3 Final   11/17/2024 207 140 - 450 10*3/mm3 Final   11/16/2024 193 140 - 450 10*3/mm3 Final        PT/INR:  No results found for: \"PROTIME\"/No results found for: \"INR\"    Sodium Sodium   Date Value Ref Range Status   11/18/2024 139 136 - 145 mmol/L Final   11/17/2024 146 (H) 136 - 145 mmol/L Final   11/16/2024 142 136 - 145 mmol/L Final   11/15/2024 141 136 - 145 mmol/L Final      Potassium Potassium   Date Value Ref Range Status   11/18/2024 4.4 3.5 - 5.2 mmol/L Final   11/18/2024 4.3 3.5 - 5.2 mmol/L Final   11/17/2024 4.1 3.5 - 5.2 mmol/L Final   11/17/2024 4.4 3.5 - 5.2 mmol/L Final   11/17/2024 4.2 3.5 - 5.2 mmol/L Final   11/17/2024 3.9 3.5 - 5.2 mmol/L Final   11/16/2024 3.6 3.5 - 5.2 mmol/L Final   11/16/2024 3.7 3.5 - 5.2 mmol/L Final     Comment:     Slight hemolysis detected by analyzer. Result may be falsely elevated.   11/16/2024 3.8 3.5 - 5.2 mmol/L Final   11/16/2024 3.8 3.5 - 5.2 mmol/L Final   11/15/2024 3.4 (L) 3.5 - 5.2 mmol/L Final   11/15/2024 3.7 3.5 - 5.2 mmol/L Final   11/15/2024 3.4 (L) 3.5 - 5.2 mmol/L Final      Chloride Chloride   Date Value Ref Range Status   11/18/2024 109 (H) 98 - 107 mmol/L Final   11/17/2024 112 (H) 98 - 107 mmol/L Final   11/16/2024 108 (H) 98 - 107 mmol/L Final   11/15/2024 109 (H) 98 - 107 mmol/L Final      Bicarbonate CO2   Date Value Ref Range Status   11/18/2024 12.8 (L) 22.0 - 29.0 mmol/L Final   11/17/2024 17.0 (L) 22.0 - 29.0 mmol/L Final   11/16/2024 17.4 (L) 22.0 - 29.0 mmol/L Final   11/15/2024 19.9 (L) 22.0 - 29.0 mmol/L Final      BUN BUN   Date Value Ref Range Status   11/18/2024 80 (H) 8 - 23 mg/dL Final   11/17/2024 69 (H) 8 - 23 mg/dL Final   11/16/2024 64 (H) 8 - 23 mg/dL Final   11/15/2024 63 (H) 8 - 23 mg/dL Final      Creatinine Creatinine   Date Value Ref Range Status   11/18/2024 2.40 (H) 0.76 - 1.27 mg/dL " Final   11/17/2024 1.91 (H) 0.76 - 1.27 mg/dL Final   11/16/2024 1.59 (H) 0.76 - 1.27 mg/dL Final   11/15/2024 1.63 (H) 0.76 - 1.27 mg/dL Final      Calcium Calcium   Date Value Ref Range Status   11/18/2024 8.1 (L) 8.6 - 10.5 mg/dL Final   11/17/2024 8.2 (L) 8.6 - 10.5 mg/dL Final   11/16/2024 8.2 (L) 8.6 - 10.5 mg/dL Final   11/15/2024 7.8 (L) 8.6 - 10.5 mg/dL Final      Magnesium Magnesium   Date Value Ref Range Status   11/18/2024 2.0 1.6 - 2.4 mg/dL Final        acetylcysteine, 3 mL, Nebulization, TID - RT  amiodarone, 400 mg, Per G Tube, Q24H  aspirin, 81 mg, Per G Tube, Daily  atorvastatin, 40 mg, Per G Tube, Nightly  cefepime, 2,000 mg, Intravenous, Q12H  chlorhexidine, 15 mL, Mouth/Throat, Q12H  [Held by provider] enoxaparin, 40 mg, Subcutaneous, Q12H  Ergocalciferol, 100 mcg, Per G Tube, Daily  hydrocortisone-bacitracin-zinc oxide-nystatin, 1 Application, Topical, Q12H  insulin glargine, 20 Units, Subcutaneous, Daily  insulin regular, 2-7 Units, Subcutaneous, Q6H  ipratropium-albuterol, 3 mL, Nebulization, Q4H - RT  lansoprazole, 15 mg, Per G Tube, Q AM  miconazole, 1 Application, Topical, Q12H  midodrine, 15 mg, Oral, TID AC  saccharomyces boulardii, 250 mg, Per G Tube, BID  sildenafil, 20 mg, Per G Tube, TID  sodium chloride, 10 mL, Intravenous, Q12H  Vancomycin Pharmacy Intermittent/Pulse Dosing, , Not Applicable, Daily      dexmedetomidine, 0.2-1.5 mcg/kg/hr, Last Rate: Stopped (11/18/24 0400)  DOPamine, 2-20 mcg/kg/min  EPINEPHrine, 0.01 mcg/kg/min, Last Rate: Stopped (11/17/24 1051)  lidocaine in D5W, 1 mg/min, Last Rate: Stopped (11/05/24 1135)  milrinone, 0.125 mcg/kg/min, Last Rate: 0.125 mcg/kg/min (11/18/24 0053)  niCARdipine, 5-15 mg/hr  norepinephrine, 0.02-0.2 mcg/kg/min, Last Rate: Stopped (11/18/24 0130)  Pharmacy to dose vancomycin,   phenylephrine, 0.2-2 mcg/kg/min  propofol, 5-50 mcg/kg/min, Last Rate: Stopped (11/16/24 1402)  vasopressin, 0.02-0.1 Units/min, Last Rate: Stopped (11/18/24  0650)          Prosthetic aortic valve stenosis    Essential hypertension    Permanent atrial fibrillation    S/P AVR    ICD (implantable cardioverter-defibrillator), dual, in situ    Achilles tendon rupture    Bacteremia    Stenosis of prosthetic aortic valve    Anemia      Assessment & Plan    - Prosthetic aortic valve stenosis, h/o AVR (tissue)/maze/DANICA ligation (2014)- s/p reoperative sternotomy AV root replacement 27mm cryopreserved homograft with right nuvia cabrol, AICD removal, IABP placement- Chiqui 10/31/2024  - s/p Sternal closure ---11/2  - Possible endocarditis, likely need JOLIE   - Bacteremia, blood cultures positive strep mitis---on penicillin G  - Atrial fibrillation, unable to tolerate anticoagulation  - Hypertension  - NICM status post [employer]tr[employer] AICD  - Anemia, s/p EGD/colonoscopy with esophagitis, polyp removal  - Right achilles tendon tear--- walking boot and PT per ortho at [facility]  - morbid obesity  - Pre-diabetes -- improved at 5.3  - post op anemia- expected acute blood loss, stable  - TCP post-op, resolved  - respiratory failure--- s/p trach (Dr. Leija 11/16/2024), pulm following   - NATHANIEL--- nephrology following         POD #19.  Continue supportive care.  S/p trach. Vent per pulm.  Milrinone increased per Dr. Florian.  He was off pressors this morning. His SVR is 500 and now requiring vasopressin.  Creatinine worsening.  Serum CO2 is 12. Edema worse. Discussed with nephrology plan for bumex gtt today  WBC 18 from 17, Afebrile. Monitor  Groin ultrasound did show fluid collection.  Abscess versus hematoma from vein harvest.  May need drained. Will discuss with Dr. Florian  Antibiotics for endocarditis, possible pneumonia per ID.  Remains in atrial fibrillation, intermittent rate controlled, hopefully improve with stopping epi.  Restart lovenox if ok with thoracic.      Samantha Magana, APRN  11/18/24  08:00 EST

## 2024-11-18 NOTE — PROGRESS NOTES
LOS: 26 days     Chief Complaint: Endocarditis    Interval History: Remains on multiple pressors.  FiO2 stable at 40%.  Status post tracheostomy.  Tmax of 101.1 yesterday.    Vital Signs  Temp:  [98.8 °F (37.1 °C)-101.1 °F (38.4 °C)] 99.7 °F (37.6 °C)  Heart Rate:  [100-135] 117  Resp:  [24-31] 31  BP: ()/(36-96) 113/73  Arterial Line BP: ()/(40-70) 124/48  FiO2 (%):  [39 %-93 %] 93 %    Physical Exam:  General: Intubated  HEENT: Tracheostomy present.  NG tube in place.  Respiratory: Coarse bilateral breath sounds on the ventilator.  : Ugarte catheter  Skin: Tracheostomy site clean and intact.  Access: Left IJ.  Arterial line.  Peripheral line.    Antibiotics:  Anti-Infectives (From admission, onward)      Ordered     Dose/Rate Route Frequency Start Stop    11/17/24 1407  vancomycin 750 mg in sodium chloride 0.9 % 250 mL IVPB-VTB        Ordering Provider: Gregg Diaz DO    750 mg  333.3 mL/hr over 45 Minutes Intravenous Once 11/18/24 0600 11/18/24 0702    11/17/24 1412  cefepime 2000 mg IVPB in 100 mL NS (MBP)        Ordering Provider: Gregg Diaz DO    2,000 mg  over 4 Hours Intravenous Every 12 Hours 11/17/24 2200 11/20/24 2159    11/17/24 1111  Vancomycin Pharmacy Intermittent/Pulse Dosing        Ordering Provider: Gregg Diaz DO     Not Applicable Daily 11/17/24 1200 11/19/24 0859    11/13/24 0914  cefepime 2000 mg IVPB in 100 mL NS (MBP)        Ordering Provider: Gregg Diaz DO    2,000 mg  over 30 Minutes Intravenous Once 11/13/24 1000 11/13/24 1235    11/12/24 1133  vancomycin 3000 mg/500 mL 0.9% NS IVPB ([facility])        Ordering Provider: Gregg Diaz DO    20 mg/kg × 151 kg  over 180 Minutes Intravenous Once 11/12/24 1230 11/12/24 1614    11/12/24 1051  Pharmacy to dose vancomycin        Ordering Provider: Haylie Leija MD     Not Applicable Continuous PRN 11/12/24 1051 11/19/24 1050    11/02/24 0918  vancomycin (VANCOCIN) 1,000 mg in sodium  chloride 0.9 % 250 mL IVPB-VTB        Ordering Provider: Antwan Farmer MD    1,000 mg  250 mL/hr over 60 Minutes Intravenous Every 24 Hours 11/02/24 1015 11/03/24 1138    11/02/24 0912  Pharmacy to dose vancomycin        Ordering Provider: Samantha Salvador APRN     Not Applicable Continuous PRN 11/02/24 0912 11/04/24 0911    10/30/24 2306  ceFAZolin 2000 mg IVPB in 100 mL NS (MBP)        Ordering Provider: Samantha Salvador APRN    2,000 mg  over 30 Minutes Intravenous Every 8 Hours 10/31/24 0000 11/01/24 0922    10/29/24 1518  ceFAZolin 2000 mg IVPB in 100 mL NS (MBP)        Ordering Provider: Bryant Mcclure PA-C    2,000 mg  over 30 Minutes Intravenous Once 10/30/24 0600 10/30/24 1936    10/28/24 1034  vancomycin IVPB 2000 mg in 0.9% Sodium Chloride 500 mL        Ordering Provider: Samantha Salvador APRN    15 mg/kg × 142 kg Intravenous Once 10/28/24 1045 10/28/24 1356             Results Review:     I reviewed the patient's new clinical results.    Lab Results   Component Value Date    WBC 18.69 (H) 11/18/2024    HGB 7.8 (L) 11/18/2024    HCT 24.4 (L) 11/18/2024    MCV 92.4 11/18/2024     11/18/2024     Lab Results   Component Value Date    GLUCOSE 147 (H) 11/18/2024    BUN 80 (H) 11/18/2024    CREATININE 2.40 (H) 11/18/2024    BCR 33.3 (H) 11/18/2024    CO2 12.8 (L) 11/18/2024    CALCIUM 8.1 (L) 11/18/2024    ALBUMIN 2.6 (L) 11/18/2024    LABIL2 1.4 06/27/2019    AST 36 11/18/2024    ALT 17 11/18/2024       Microbiology:  10/3 COVID-negative  10/10 COVID-negative  10/14 COVID-negative  10/15 blood cultures 2 out of 2 strep mitis  10/17 COVID-negative  10/17 blood cultures 2 out of 2 strep mitis  10/21 COVID-negative  10/23 blood cultures no growth today  10/30 operative cultures from the aortic valve no growth   11/9 respiratory culture no growth  11/9 catheter tip culture no growth to date  11/10 catheter tip culture no growth to date  11/12 blood cultures no growth to date  11/12 respiratory  culture no growth to date  11/13 respiratory culture no growth  11/13 genital culture of the penis Candida albicans    Imaging:  CT chest abdomen pelvis from yesterday report reviewed with lower lobe infiltrates.    Assessment    #Strep mitis endocarditis  #Status post bioprosthetic aortic valve replacement 2014  #Status post aortic root replacement in the setting of prosthetic aortic valve endocarditis and annular abscess on 10/30  #Status post removal of pacemaker generator and intracardiac portion of the leads with retained venous leads  #Atrial fibrillation  #Achilles tendon rupture  #NATHANIEL  #Positive blood culture, suspect contaminant  #Status post tracheostomy 11/16    Temperature curve increasing yesterday after tracheostomy placement.  Cultures have remained negative to date.  For now we will continue to monitor and continue vancomycin goal -600 and cefepime 2 g every 8 empirically.  If fever curve improves would plan to stop vancomycin tomorrow.      For endocarditis he will still need ultimate antibiotic 6 weeks with end date of December 4.  Above should cover in the meantime but may be able to de-escalate back to penicillin in the future.

## 2024-11-19 NOTE — PROGRESS NOTES
LOS: 27 days   Patient Care Team:  Juan Perez MD as PCP - General (Internal Medicine)    Chief Complaint: Follow-up bioprosthetic AVR endocarditis, mitral regurgitation, persistent atrial fibrillation.    Interval History: Remains on the ventilator via trach.  He can nod his head.  Remains in atrial fibrillation with occasional rapid rates.  Renal function is worse, and he is likely starting on CRRT.    Vital Signs:  Temp:  [96.6 °F (35.9 °C)-99.3 °F (37.4 °C)] 97.5 °F (36.4 °C)  Heart Rate:  [] 113  Resp:  [23-29] 23  BP: (109-143)/(46-97) 141/60  Arterial Line BP: ()/(38-80) 135/57  FiO2 (%):  [39 %-98 %] 39 %    Intake/Output Summary (Last 24 hours) at 11/19/2024 1327  Last data filed at 11/19/2024 1300  Gross per 24 hour   Intake 5076.3 ml   Output 1489.1 ml   Net 3587.2 ml       Physical Exam:   General Appearance:    Status post tracheostomy and on ventilator.  Nods head.   Lungs:     Rhonchi bilaterally anteriorly.    Heart:    Irregularly irregular rhythm with a tachycardic rate. II/VI SM throughout.   Abdomen:     Soft, nontender, nondistended.    Extremities:    3+ generalized edema and anasarca.     Results Review:    Results from last 7 days   Lab Units 11/19/24  0801 11/19/24  0429   SODIUM mmol/L  --  134*   POTASSIUM mmol/L 4.0 3.7   CHLORIDE mmol/L  --  105   CO2 mmol/L  --  13.0*   BUN mg/dL  --  92*   CREATININE mg/dL  --  2.81*   GLUCOSE mg/dL  --  143*   CALCIUM mg/dL  --  8.7         Results from last 7 days   Lab Units 11/19/24  0429   WBC 10*3/mm3 16.38*   HEMOGLOBIN g/dL 8.4*   HEMATOCRIT % 26.0*   PLATELETS 10*3/mm3 156                 Results from last 7 days   Lab Units 11/19/24  0429   MAGNESIUM mg/dL 2.2               I reviewed the patient's new clinical results.        Assessment:  1.  Status post bioprosthetic aortic valve replacement in 2014  2.  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  Moderate to severe mitral regurgitation  4.  Status post reoperative AV root replacement, mitral valve repair, ICD removal, SVG-RCA, and IABP placement on 10/30/2021  5.  Postoperative shock, multifactorial  6.  Acute kidney injury  7.  History of nonischemic cardiomyopathy with recovered ejection fraction  8.  Anemia, acute on chronic  9.  Postoperative thrombocytopenia  10.  Persistent atrial fibrillation  11.  Ventricular tachycardia postoperatively  12.  Grade C esophagitis by EGD on 9/30/2024  13.  Acute left lower extremity DVT on 10/24/2024.  Resolved on Dopplers on 11/8/2024  14.  Right Achilles tendon tear  15.  Postoperative junctional rhythm  16.  Hypoalbuminemia  17.  Thrombocytopenia  18.  Severe right ventricular enlargement and dysfunction post-op  19.  Fever noted on 11/8/2024  20.  Subacute DVT in the left axillary vein by Doppler on 11/10/2024  21.  Right groin hematoma versus abscess  22.  Status post tracheostomy on 11/16/2024    Plan:  -Renal function is worsening.  Starting CRRT to attempt to remove more fluid.    -Remains on vasopressin and milrinone.  On midodrine 15 mg 3 times a day.    -Currently on Revatio 20 mg every 8 hours.    -Atrial fibrillation rate is high at times.  Really, the only option he has currently is amiodarone for rate control.  I would not use digoxin with his worsening renal function.  Continue amiodarone drip at 0.5 mg/min.    -No heparin for now per cardiovascular surgery.  The DVT in his left lower extremity was not present on the Dopplers on 11/8/2024.  He does have a likely subacute DVT in the left axillary vein by Doppler.  He has had intermittent thrombocytopenia postoperatively as well.    -Tracheostomy without complications per vascular surgery.    -He remains very critically ill.  Prognosis seems to be worsening    Antwan Farmer MD  11/19/24  13:27 EST

## 2024-11-19 NOTE — PROGRESS NOTES
LOS: 27 days   Patient Care Team:  Juan Perez MD as PCP - General (Internal Medicine)    Subjective     s.  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  He went and had sternal closure on 11/2 evidence of possible endocarditis and he had blood cultures positive for strep mitis and is on pen G send atrial fibrillation unable to tolerate anticoagulation he has a nonischemic cardiomyopathy history of anemia some esophagitis and colon polyps have been removed history of right Achilles tendon tear uses a walking boot he is a prediabetic postop anemia and thrombocytopenia.  Patient had failed weaning and went for tracheostomy today  Patient is pretty sedated currently despite attempts at diuresis yesterday oliguric positive over 3 L again patient started on CRRT.  Daughter at bedside discussed with her      review of Systems:          Objective     Vital Signs  Vital Sign Min/Max for last 24 hours  Temp  Min: 96.6 °F (35.9 °C)  Max: 99.7 °F (37.6 °C)   BP  Min: 95/52  Max: 143/62   Pulse  Min: 89  Max: 131   Resp  Min: 24  Max: 30   SpO2  Min: 90 %  Max: 99 %   No data recorded   Weight  Min: 151 kg (333 lb)  Max: 153 kg (336 lb 6.8 oz)        Ventilator/Non-Invasive Ventilation Settings (From admission, onward)       Start     Ordered    11/12/24 1124  Ventilator - Vent Mode: AC/VC; Rate: Other; Rate: 24; FiO2: Titrate Per SpO2; Titrate Oxygen for SpO2: 90 - 95%; PEEP: 5; Tidal Volume: mL; TV: 550  Continuous        Question Answer Comment   Vent Mode AC/VC    Rate Other    Rate 24    FiO2 Titrate Per SpO2    Titrate Oxygen for SpO2 90 - 95%    PEEP 5    Tidal Volume mL            11/12/24 1124    11/01/24 0026   Ventilator - Vent Mode: AC/VC+; Rate: 20; FiO2: Titrate Per SpO2; Titrate Oxygen for SpO2: 90 - 95%; PEEP: 7.5; Tidal Volume: mL; TV: 620  Continuous,   Status:  Canceled        Question Answer Comment   Vent Mode AC/VC+    Rate 20    FiO2 Titrate Per SpO2    Titrate Oxygen for SpO2 90 - 95%    PEEP 7.5    Tidal Volume mL            11/01/24 0027                        Arterial Line BP: 137/52  Arterial Line MAP (mmHg): 73 mmHg  PAP: (35-49)/(8-31) 39/23  CVP:  [15 mmHg-25 mmHg] 18 mmHg  PCWP:  [7 mmHg-16 mmHg] 7 mmHg  CO:  [7.2 L/min-8.7 L/min] 8.7 L/min  Body mass index is 48.27 kg/m².  I/O last 3 completed shifts:  In: 7246 [I.V.:1755; Blood:300; Other:3510; NG/GT:1560; IV Piggyback:121]  Out: 1065 [Urine:1065]  I/O this shift:  In: 2227.3 [I.V.:576.7; Other:1044; NG/GT:410; IV Piggyback:196.6]  Out: 715 [Urine:715]        Physical Exam:  General Appearance: Trached on a ventilator is on pressure control rate of 24 breathing 24 inspiratory pressure of 20 PEEP of 10 tidal volumes are in the 700 cc range his minute ventilation is about 20 L FiO2 a still 40% SpO2 is 94% patient is vasopressin and milrinone and amiodarone drips   Eyes: Resists eye opening  ENT: Mucous membranes are dry he has a nasoenteric tube in place  the left nare  Neck:  tracheostomy site looks okay he has a left IJ introducer   Lungs: Coarse breath sounds bilaterally, tachypneic  Cardiac: Tachycardic low 110s no murmur  Abdomen: Obese soft nontender no palpable hepatosplenomegaly or masses  : Not examined  Musculoskeletal: He has mild thoracic kyphosis very large abdomen   Skin: Warm and dry no jaundice no petechiae  Neuro: He is awake not at all cooperative today  Extremities: No clubbing or cyanosis he has edema all 4 extremities he has true anasarca marked,,palpable radial pulses and I think I feel dorsalis pedis pulses bilaterally as well there are not a strong but he has more lower extremity edema  MSE: Seems to be more sedated  today.       Labs:  Results from last 7 days   Lab Units 11/19/24  0429 11/19/24  0011 11/18/24  2109 11/18/24  1714 11/18/24  0809 11/18/24  0335 11/18/24  0013 11/17/24  1147 11/17/24  0318 11/16/24  0830 11/16/24  0408 11/15/24  2026 11/15/24  1244 11/15/24  0807 11/15/24  0306 11/14/24  1946 11/14/24  1340 11/14/24  0847 11/14/24  0351 11/13/24  2307 11/13/24  0827 11/13/24  0414   GLUCOSE mg/dL 143*  --   --   --  153* 147*  --   --  131*  --  174*  --  174*  --  172*  --  168*  --  146*  --   --  180*   SODIUM mmol/L 134*  --   --   --  137 139  --   --  146*  --  142  --  141  --  147*  --  148*  --  148*  --   --  143   POTASSIUM mmol/L 3.7 3.8 3.9 3.9 4.1  4.1 4.4 4.3   < > 4.2   < > 3.8  3.8   < > 3.7   < > 3.6  3.6   < > 3.3*   < > 3.2* 3.2*   < > 3.9   MAGNESIUM mg/dL 2.2  --  2.5*  --   --  2.0  --   --   --   --   --   --   --   --  2.0  --  2.0  --   --  2.3  --  2.0   CO2 mmol/L 13.0*  --   --   --  13.8* 12.8*  --   --  17.0*  --  17.4*  --  19.9*  --  21.0*  --  22.6  --  22.0  --   --  23.5   CHLORIDE mmol/L 105  --   --   --  107 109*  --   --  112*  --  108*  --  109*  --  109*  --  109*  --  109*  --   --  107   ANION GAP mmol/L 16.0*  --   --   --  16.2* 17.2*  --   --  17.0*  --  16.6*  --  12.1  --  17.0*  --  16.4*  --  17.0*  --   --  12.5   CREATININE mg/dL 2.81*  --   --   --  2.52* 2.40*  --   --  1.91*  --  1.59*  --  1.63*  --  1.55*  --  1.56*  --  1.37*  --   --  1.32*   BUN mg/dL 92*  --   --   --  82* 80*  --   --  69*  --  64*  --  63*  --  55*  --  58*  --  56*  --   --  47*   BUN / CREAT RATIO  32.7*  --   --   --  32.5* 33.3*  --   --  36.1*  --  40.3*  --  38.7*  --  35.5*  --  37.2*  --  40.9*  --   --  35.6*   CALCIUM mg/dL 8.7  --   --   --  8.8 8.1*  --   --  8.2*  --  8.2*  --  7.8*  --  7.8*  --  7.9*  --  8.0*  --   --  8.1*   ALK PHOS U/L 108  --   --   --  108 107  --   --   --   --   --   --   --   --  97  --   --   --  110  --   --   --    TOTAL PROTEIN g/dL  5.7*  --   --   --  6.0 5.6*  --   --   --   --   --   --   --   --  6.3  --   --   --  6.2  --   --   --    ALT (SGPT) U/L 14  --   --   --  16 17  --   --   --   --   --   --   --   --  6  --   --   --  20  --   --   --    AST (SGOT) U/L 21  --   --   --  27 36  --   --   --   --   --   --   --   --  19  --   --   --  31  --   --   --    BILIRUBIN mg/dL 0.6  --   --   --  0.7 0.7  --   --   --   --   --   --   --   --  0.7  --   --   --  0.8  --   --   --    ALBUMIN g/dL 2.5*  --   --   --  2.7* 2.6*  --   --  2.8*  --  2.7*  --  2.5*  --  2.7*  --   --   --  2.7*  --   --  2.5*   GLOBULIN gm/dL 3.2  --   --   --  3.3 3.0  --   --   --   --   --   --   --   --  3.6  --   --   --  3.5  --   --   --     < > = values in this interval not displayed.     Estimated Creatinine Clearance: 34.3 mL/min (A) (by C-G formula based on SCr of 2.81 mg/dL (H)).      Results from last 7 days   Lab Units 11/19/24  0429 11/18/24  0528 11/18/24  0335 11/17/24  0318 11/16/24  0408 11/15/24  0306 11/14/24  0351 11/13/24  0827   WBC 10*3/mm3 16.38*  --  18.69* 17.25* 13.92* 15.95* 19.03* 18.78*   RBC 10*6/mm3 2.87*  --  2.64* 3.04* 2.99* 2.95* 3.02* 3.12*   HEMOGLOBIN g/dL 8.4* 7.8* 7.6* 8.9* 8.6* 8.4* 8.8* 9.1*   HEMATOCRIT % 26.0* 24.4* 24.4* 28.0* 27.1* 26.7* 27.9* 28.8*   MCV fL 90.6  --  92.4 92.1 90.6 90.5 92.4 92.3   MCH pg 29.3  --  28.8 29.3 28.8 28.5 29.1 29.2   MCHC g/dL 32.3  --  31.1* 31.8 31.7 31.5 31.5 31.6   RDW % 17.0*  --  17.4* 17.1* 17.0* 16.9* 17.0* 17.3*   RDW-SD fl 54.8*  --  58.1* 58.4* 55.3* 55.3* 57.3* 57.4*   MPV fL 10.1  --  10.3 10.0 9.7 9.4 9.7 9.2   PLATELETS 10*3/mm3 156  --  165 207 193 203 248 269   NEUTROPHIL % % 85.9*  --  87.5*  --   --   --   --   --    LYMPHOCYTE % % 4.7*  --  4.2*  --   --   --   --   --    MONOCYTES % % 5.1  --  4.1*  --   --   --   --   --    EOSINOPHIL % % 2.4  --  1.7  --   --   --   --   --    BASOPHIL % % 0.3  --  0.3  --   --   --   --   --    IMM GRAN % % 1.6*  --  2.2*  --    --   --   --   --    NEUTROS ABS 10*3/mm3 14.05*  --  16.35*  --   --   --   --   --    LYMPHS ABS 10*3/mm3 0.77  --  0.79  --   --   --   --   --    MONOS ABS 10*3/mm3 0.84  --  0.76  --   --   --   --   --    EOS ABS 10*3/mm3 0.40  --  0.32  --   --   --   --   --    BASOS ABS 10*3/mm3 0.05  --  0.05  --   --   --   --   --    IMMATURE GRANS (ABS) 10*3/mm3 0.27*  --  0.42*  --   --   --   --   --    NRBC /100 WBC 0.2  --  0.2  --   --   --   --   --      Results from last 7 days   Lab Units 11/18/24  0745   PH, ARTERIAL pH units 7.372   PO2 ART mm Hg 83.6   PCO2, ARTERIAL mm Hg 23.7*   HCO3 ART mmol/L 13.8*             Results from last 7 days   Lab Units 11/13/24  0414   TSH uIU/mL 4.140   FREE T4 ng/dL 0.92     Results from last 7 days   Lab Units 11/17/24  1300 11/14/24  0351   LACTATE mmol/L 1.3  --    PROCALCITONIN ng/mL  --  0.68*         Microbiology Results (last 10 days)       Procedure Component Value - Date/Time    Respiratory Culture - Aspirate, Bronchus [[id number]] Collected: 11/13/24 1539    Lab Status: Final result Specimen: Aspirate from Bronchus Updated: 11/15/24 1043     Respiratory Culture No growth    Genital Culture - Swab, Penis [[id number]]  (Abnormal) Collected: 11/13/24 1158    Lab Status: Final result Specimen: Swab from Penis Updated: 11/16/24 1042     Genital Culture Scant growth (1+) Candida albicans      Rare growth Normal Skin Clare     Gram Stain Many (4+) WBCs seen      Occasional Yeast    MRSA Screen, PCR (Inpatient) - Swab, Nares [[id number]]  (Normal) Collected: 11/12/24 1027    Lab Status: Final result Specimen: Swab from Nares Updated: 11/12/24 1152     MRSA PCR No MRSA Detected    Narrative:      The negative predictive value of this diagnostic test is high and should only be used to consider de-escalating anti-MRSA therapy. A positive result may indicate colonization with MRSA and must be correlated clinically.    Respiratory Culture - Aspirate, ET Suction [[id number]]   (Abnormal) Collected: 11/12/24 0924    Lab Status: Final result Specimen: Aspirate from ET Suction Updated: 11/14/24 0952     Respiratory Culture Light growth (2+) Candida albicans      Scant growth (1+) Normal respiratory titi. No S. aureus or Pseudomonas aeruginosa detected. Final report.     Gram Stain Moderate (3+) WBCs per low power field      No epithelial cells seen      Rare (1+) Yeast    Blood Culture - Blood, Arm, Left [[id number]]  (Abnormal) Collected: 11/12/24 0815    Lab Status: Final result Specimen: Blood from Arm, Left Updated: 11/15/24 0728     Blood Culture Staphylococcus, coagulase negative     Isolated from Aerobic and Anaerobic Bottles     Gram Stain Aerobic Bottle Gram positive cocci in clusters      Anaerobic Bottle Gram positive cocci in clusters    Narrative:      Probable contaminant requires clinical correlation, susceptibility not performed unless requested by physician.      Blood Culture - Blood, Arm, Right [[id number]]  (Normal) Collected: 11/12/24 0815    Lab Status: Final result Specimen: Blood from Arm, Right Updated: 11/17/24 0830     Blood Culture No growth at 5 days    Blood Culture ID, PCR - Blood, Arm, Left [[id number]]  (Abnormal) Collected: 11/12/24 0815    Lab Status: Final result Specimen: Blood from Arm, Left Updated: 11/14/24 1130     BCID, PCR Staph spp, not aureus or lugdunensis. Identification by BCID2 PCR.     BOTTLE TYPE Aerobic Bottle    Catheter Culture - Cath Tip, Neck [[id number]] Collected: 11/10/24 1208    Lab Status: Final result Specimen: Cath Tip from Neck Updated: 11/13/24 0715     CATHETER CULTURE No growth at 3 days    Catheter Culture - Cath Tip, Neck [[id number]] Collected: 11/09/24 1639    Lab Status: Final result Specimen: Cath Tip from Neck Updated: 11/11/24 0828     CATHETER CULTURE No growth    Respiratory Culture - Sputum, Trachea [[id number]] Collected: 11/09/24 1021    Lab Status: Final result Specimen: Sputum from Trachea Updated: 11/11/24 1017      Respiratory Culture Rare growth Normal respiratory titi. No S. aureus or Pseudomonas aeruginosa detected. Final report.     Gram Stain Many (4+) WBCs seen      Few (2+) Epithelial cells seen      No organisms seen                acetylcysteine, 3 mL, Nebulization, TID - RT  aspirin, 81 mg, Per G Tube, Daily  atorvastatin, 40 mg, Per G Tube, Nightly  busPIRone, 5 mg, Oral, TID  cefepime, 2,000 mg, Intravenous, Q12H  chlorhexidine, 15 mL, Mouth/Throat, Q12H  [Held by provider] enoxaparin, 40 mg, Subcutaneous, Q12H  Ergocalciferol, 100 mcg, Per G Tube, Daily  hydrocortisone-bacitracin-zinc oxide-nystatin, 1 Application, Topical, Q12H  insulin glargine, 20 Units, Subcutaneous, Daily  insulin regular, 2-7 Units, Subcutaneous, Q6H  ipratropium-albuterol, 3 mL, Nebulization, Q4H - RT  lansoprazole, 15 mg, Per G Tube, Q AM  miconazole, 1 Application, Topical, Q12H  midodrine, 15 mg, Oral, TID AC  saccharomyces boulardii, 250 mg, Per G Tube, BID  sildenafil, 20 mg, Per G Tube, TID  sodium chloride, 10 mL, Intravenous, Q12H  Vancomycin Pharmacy Intermittent/Pulse Dosing, , Not Applicable, Daily      amiodarone, 0.5 mg/min, Last Rate: 0.5 mg/min (11/18/24 2314)  bumetanide, 2 mg/hr, Last Rate: 2 mg/hr (11/18/24 2314)  dexmedetomidine, 0.2-1.5 mcg/kg/hr, Last Rate: Stopped (11/18/24 0400)  DOPamine, 2-20 mcg/kg/min  EPINEPHrine, 0.01 mcg/kg/min, Last Rate: Stopped (11/17/24 1051)  lidocaine in D5W, 1 mg/min, Last Rate: Stopped (11/05/24 1135)  milrinone, 0.25 mcg/kg/min, Last Rate: 0.25 mcg/kg/min (11/19/24 0435)  niCARdipine, 5-15 mg/hr  norepinephrine, 0.02-0.2 mcg/kg/min, Last Rate: Stopped (11/18/24 0130)  Pharmacy to dose vancomycin,   phenylephrine, 0.2-2 mcg/kg/min  propofol, 5-50 mcg/kg/min, Last Rate: 10 mcg/kg/min (11/19/24 0116)  vasopressin, 0.02-0.1 Units/min, Last Rate: 0.06 Units/min (11/19/24 0105)        Diagnostics:  Adult Transthoracic Echo Limited W/ Cont if Necessary Per Protocol    Result Date:  11/15/2024    Limited study for LV/RV function   Left ventricular systolic function is normal. Calculated left ventricular EF = 67.9%   Moderately to severely reduced right ventricular systolic function noted.   The right ventricular cavity is severely dilated.   The left atrial cavity is dilated.   The right atrial cavity is dilated.   Moderate tricuspid valve regurgitation is present.   Estimated right ventricular systolic pressure from tricuspid regurgitation is moderately elevated (45-55 mmHg).   S/p aortic valve replacement.  Not well-visualized/assessed.   S/p mitral valve repair with 28 mm [employer] flexible band annuloplasty.     US Nonvascular Extremity Limited    Result Date: 11/15/2024  US NONVASCULAR EXTREMITY LIMITED-11/15/2024  HISTORY: Right groin wound.  The subcutaneous tissues of the right groin superficial to the skin wound is an approximately 11.5 cm x 3.7 cm mixed echotexture hypoechoic area which may contain small amount of cystic change. There is somewhat heterogeneous appearance of the surrounding soft tissues likely related to edema.      1. Ill-defined somewhat loculated hypoechoic collection measuring 11.5 cm x 3.7 cm x 6.4 cm. This could represent hematoma or abscess and contains a small hypoechoic cystic area.   This report was finalized on 11/15/2024 4:35 PM by Dr. Deven Garcia M.D on Workstation: [id number]      XR Chest 1 View    Result Date: 11/15/2024  XR CHEST 1 VW-  HISTORY: 74-year-old male postop AVR, MVR, AICD generator explantation, intra-aortic balloon pump placement on 10/30/2024.   FINDINGS: CHF may be more prominent than on yesterday's chest radiograph. Moderately large left effusion and small-moderate right pleural effusion are likely unchanged. No pneumothorax is seen.  ET tube is approximately 2 cm proximal to the yoni. Left IJ [place] catheter tip is within the proximal main pulmonary artery. Leads of the explanted AICD generator remain in place. There is  an NG tube within the esophagus and stomach, distal tip is not included.  This report was finalized on 11/15/2024 6:31 AM by Dr. Bernadette Gaming M.D on Workstation: [id number]      XR Chest 1 View    Result Date: 11/14/2024  XR CHEST 1 VW-  Clinical: Postop  COMPARISON examination 11/13/2024  FINDINGS: Patient is rotated towards the left as before. Endotracheal tube and feeding tube in position as before. Transvenous pacemaker in place. Bibasilar consolidation as before. Bilateral diffuse interstitial infiltrate also seen, unchanged. There is cardiac enlargement. The mediastinum is stable.  CONCLUSION: Stable imaging of the chest  This report was finalized on 11/14/2024 6:39 AM by Dr. Adal Kimball M.D on Workstation: [id number]      CT Chest Without Contrast Diagnostic    Result Date: 11/13/2024  CT CHEST WO CONTRAST DIAGNOSTIC-, CT ABDOMEN PELVIS WO CONTRAST-  HISTORY: Pulmonary infiltrates; T82.857A-Stenosis of other cardiac prosthetic devices, implants and grafts, initial encounter; Z95.2-Presence of prosthetic heart valve; Z95.2-Presence of prosthetic heart valve  TECHNIQUE: Radiation dose reduction techniques were utilized, including automated exposure control and exposure modulation based on body size. CT of the chest, abdomen, and pelvis includes axial imaging from the thoracic inlet through the trochanters without intravenous contrast and without oral contrast.  COMPARISON: AP chest same date.  FINDINGS: CHEST: There are multifocal coronary artery calcifications. Median sternotomy wires and overlying midline surgical skin staples are present. Cardiomegaly. Left subclavian cardiac pacer/defibrillator leads are discontinuous proximally with pacemaker segment within the left brachiocephalic vein.  There is dense lower lobe consolidation with air bronchograms in both lower lobes which are mostly collapsed. Pulmonary vascular engorgement. Small bilateral pleural effusions layer dependently. 5 mm noncalcified  nodule anterior inferior right upper lobe on axial image 37.  Endotracheal tube tip is 3.2 cm above the yoni. Left IJ tip in the left brachiocephalic vein. Feeding tube is looped in the stomach and tip is in the region of the gastric fundus.  Surgical skin staples overlying the left upper anterior chest wall where there has been removal of pacemaker.  ABDOMEN/PELVIS: Within the posterior-inferior central spleen there is a focal area of low density measuring approximately 4.7 x 4.1 cm. This is without change compared to exam 09/18/2024 and may represent splenic infarction. Liver, adrenal glands, pancreas appear within normal limits. There is lobular appearance of the kidneys with areas of cortical thinning and this appears chronic. No hydronephrosis.  Urinary bladder is partially decompressed and exhibits wall thickening. Ugarte catheter is present in the urinary bladder. There is mild to moderate stool throughout the colon. No evidence for bowel obstruction. No ascites or intra-abdominal abscess formation. Atherosclerotic calcifications are present involving the abdominal aorta and iliac vasculature. There is mild edema within the left lateral lower chest and upper abdomen subcutaneous fat extending off the field-of-view laterally. Multilevel bridging plate osteophyte formation within the thoracic spine.      1. Dense bilateral lower lobe consolidation with partial collapse of the lower lobes and air bronchograms likely associated with infiltrate. Pulmonary vascular engorgement. Small pleural effusions layer dependently. 2. 5 mm noncalcified nodule anterior inferior right upper lobe. 3. Recent median sternotomy with overlying midline surgical skin staples. Cardiomegaly. Prosthetic tricuspid valve. Recent removal of left subclavian cardiac pacer with retraction of pacing leads. 4. Mild to moderate stool throughout the colon. No evidence for acute intra-abdominal process. No hydronephrosis. 5. Endotracheal tube tip  3.2 cm above the yoni. Left IJ tip in the left brachiocephalic vein. Feeding tube tip in the gastric fundus. Ugarte catheter within a decompressed urinary bladder which exhibits generalized wall thickening.    Radiation dose reduction techniques were utilized, including automated exposure control and exposure modulation based on body size.   This report was finalized on 11/13/2024 1:22 PM by Salas Ingram M.D on Workstation: [id number]      CT Abdomen Pelvis Without Contrast    Result Date: 11/13/2024  CT CHEST WO CONTRAST DIAGNOSTIC-, CT ABDOMEN PELVIS WO CONTRAST-  HISTORY: Pulmonary infiltrates; T82.857A-Stenosis of other cardiac prosthetic devices, implants and grafts, initial encounter; Z95.2-Presence of prosthetic heart valve; Z95.2-Presence of prosthetic heart valve  TECHNIQUE: Radiation dose reduction techniques were utilized, including automated exposure control and exposure modulation based on body size. CT of the chest, abdomen, and pelvis includes axial imaging from the thoracic inlet through the trochanters without intravenous contrast and without oral contrast.  COMPARISON: AP chest same date.  FINDINGS: CHEST: There are multifocal coronary artery calcifications. Median sternotomy wires and overlying midline surgical skin staples are present. Cardiomegaly. Left subclavian cardiac pacer/defibrillator leads are discontinuous proximally with pacemaker segment within the left brachiocephalic vein.  There is dense lower lobe consolidation with air bronchograms in both lower lobes which are mostly collapsed. Pulmonary vascular engorgement. Small bilateral pleural effusions layer dependently. 5 mm noncalcified nodule anterior inferior right upper lobe on axial image 37.  Endotracheal tube tip is 3.2 cm above the yoni. Left IJ tip in the left brachiocephalic vein. Feeding tube is looped in the stomach and tip is in the region of the gastric fundus.  Surgical skin staples overlying the left upper  anterior chest wall where there has been removal of pacemaker.  ABDOMEN/PELVIS: Within the posterior-inferior central spleen there is a focal area of low density measuring approximately 4.7 x 4.1 cm. This is without change compared to exam 09/18/2024 and may represent splenic infarction. Liver, adrenal glands, pancreas appear within normal limits. There is lobular appearance of the kidneys with areas of cortical thinning and this appears chronic. No hydronephrosis.  Urinary bladder is partially decompressed and exhibits wall thickening. Ugarte catheter is present in the urinary bladder. There is mild to moderate stool throughout the colon. No evidence for bowel obstruction. No ascites or intra-abdominal abscess formation. Atherosclerotic calcifications are present involving the abdominal aorta and iliac vasculature. There is mild edema within the left lateral lower chest and upper abdomen subcutaneous fat extending off the field-of-view laterally. Multilevel bridging plate osteophyte formation within the thoracic spine.      1. Dense bilateral lower lobe consolidation with partial collapse of the lower lobes and air bronchograms likely associated with infiltrate. Pulmonary vascular engorgement. Small pleural effusions layer dependently. 2. 5 mm noncalcified nodule anterior inferior right upper lobe. 3. Recent median sternotomy with overlying midline surgical skin staples. Cardiomegaly. Prosthetic tricuspid valve. Recent removal of left subclavian cardiac pacer with retraction of pacing leads. 4. Mild to moderate stool throughout the colon. No evidence for acute intra-abdominal process. No hydronephrosis. 5. Endotracheal tube tip 3.2 cm above the yoni. Left IJ tip in the left brachiocephalic vein. Feeding tube tip in the gastric fundus. Ugarte catheter within a decompressed urinary bladder which exhibits generalized wall thickening.    Radiation dose reduction techniques were utilized, including automated exposure  control and exposure modulation based on body size.   This report was finalized on 11/13/2024 1:22 PM by Salas Ingram M.D on Workstation: [id number]      XR Chest 1 View    Result Date: 11/13/2024  SINGLE VIEW OF THE CHEST  HISTORY: Postop open heart surgery  COMPARISON: November 12, 2024  FINDINGS: Tubes and lines appear stable. There is vascular congestion. Bibasilar consolidation and bilateral effusions are noted. No pneumothorax is seen.      No significant interval change.  This report was finalized on 11/13/2024 5:37 AM by Dr. Alexandria Dahl M.D on Workstation: [id number]      XR Chest 1 View    Result Date: 11/12/2024  CHEST SINGLE VIEW  HISTORY: 74 years of age, Male. Postoperative exam.  COMPARISON: AP chest 11/11/2024, 11/10/2024, 11/09/2024.  FINDINGS: Endotracheal tube, feeding tube, left subclavian discontinued. Pacing/defibrillator leads, left atrial appendage clip are evident. Left IJ central venous catheter extends to the region of the right atrium. There are postoperative changes on the left where there are surgical skin staples. There is interstitial disease and there are left greater than right basilar opacities due to atelectasis or infiltrates. No evidence for pneumothorax      No evidence for significant change when compared to yesterday's exam.   This report was finalized on 11/12/2024 8:02 AM by Salas Ingram M.D on Workstation: [id number]      US Thoracentesis    Result Date: 11/11/2024  ULTRASOUND-GUIDED LEFT THORACENTESIS  HISTORY: Pleural effusion  COMPARISON: Chest x-ray 11/11/2024  The risks, benefits and alternatives of the procedure were discussed with the patient and informed consent was obtained. Prior to the procedure ultrasound of the left chest was performed to evaluate the pleural effusion. However, there is only very minimal pleural fluid present, insufficient for thoracentesis.       Only very minimal pleural fluid present. Thoracentesis not performed.   This  report was finalized on 11/11/2024 4:55 PM by Dr. Dean Knapp M.D on Workstation: [id number]      Adult Transthoracic Echo Limited W/ Cont if Necessary Per Protocol    Result Date: 11/11/2024    Left ventricular systolic function is normal. Calculated left ventricular EF = 67.8%   RV severely dilated with moderate-severe dysfunction.  Mild-moderate RVH.   S/p aortic valve replacement with homograft.  Not well-visualized/assessed.   S/p mitral valve repair with 28 mm Medtronic flexible band annuloplasty.   Severe tricuspid valve regurgitation is present.   Estimated right ventricular systolic pressure from tricuspid regurgitation is markedly elevated (>55 mmHg).   Severe biatrial enlargement, dilated IVC.     XR Chest 1 View    Result Date: 11/11/2024  XR CHEST 1 VW-  DATE OF EXAM: 11/11/2024 8:03 AM  INDICATION: Postop. Endotracheal tube advancement.  COMPARISON: Radiographs 11/10/2024, 11/9/2024, 11/8/2024, and 11/7/2024. Coronary CTA 10/25/2024. CT chest 9/18/2024.  TECHNIQUE: A single portable AP view of the chest was obtained.  FINDINGS: Lordotic positioning. Multiple overlying artifacts. Similar-appearing sternotomy wires, retained cardiac pacer/defibrillator leads, mitral annuloplasty, endotracheal tube, left IJ pulmonary arterial catheter, and partially imaged enteric tube. Similar-appearing basilar predominant opacification of both lungs. No pneumothorax. Unchanged cardiac and mediastinal contours. No acute osseous abnormality is identified.       1. No significant herbal change. Stable support tubes and line. 2. Stable basilar prominent lung opacities.  This report was finalized on 11/11/2024 9:01 AM by Neel Wan MD on Workstation: [id number]      Duplex Venous Upper Extremity - Bilateral CAR    Result Date: 11/11/2024    Sub-acute right upper extremity superficial thrombophlebitis noted in the basilic (upper arm).   Sub-acute left upper extremity deep vein thrombosis noted in the axillary.    Acute left upper extremity superficial thrombophlebitis noted in the cephalic (forearm).   All other vessels appear normal.     XR Chest 1 View    Result Date: 11/10/2024  XR CHEST 1 VW-   INDICATION: Central line placement  COMPARISON: Chest radiograph November 9, 2024  TECHNIQUE: 1 view chest  FINDINGS:  Vascular congestion. Indistinctness of vessels. Ill-defined basilar opacities. Blunting costophrenic angles. Stable mediastinum. Enlarged cardiac silhouette. Left atrial appendage clip. Median sternotomy. Right IJ sheath with catheter tip near the SVC bifurcation. Endotracheal tube tip is in the distal trachea. Left IJ catheter containing a pulmonary artery catheter, with the tip near the right ventricular outflow tract. Feeding tube tip is in the gastric fundus.       1. Interval placement of left IJ sheath containing a pulmonary artery catheter with the tip near the right ventricular outflow tract. No pneumothorax. 2. Cardiomegaly with vascular congestion and suspected interstitial edema. 3. Ill-defined basilar opacities are similar. 4. Layering pleural effusions  This report was finalized on 11/10/2024 10:21 AM by Dr. Alberto Dominique M.D on Workstation: [id number]      [place] catheter    Result Date: 11/10/2024  Matthieu Scott MD     11/10/2024 10:16 AM [place] catheter Patient reassessed immediately prior to procedure Patient location during procedure: ICU Indications: MD/Surgeon request Staff Anesthesiologist: Matthieu Scott MD Preanesthetic Checklist Completed: patient identified, IV checked, site marked, risks and benefits discussed, surgical consent, monitors and equipment checked, pre-op evaluation and timeout performed Central Line Prep Sterile Tech:gloves, cap, gown, mask and sterile barriers Prep: chloraprep no medical exclusion documented for following all elements of maximal sterile barrier technique Patient monitoring: blood pressure monitoring, continuous pulse oximetry and  EKG Central Line Procedure Laterality:right Location:internal jugular Catheter Type:[place] Catheter Size:7.5 Fr Guidance:ultrasound guided PROCEDURE NOTE/ULTRASOUND INTERPRETATION.  Using ultrasound guidance the potential vascular sites for insertion of the catheter were visualized to determine the patency of the vessel to be used for vascular access.  After selecting the appropriate site for insertion, the needle was visualized under ultrasound being inserted into the internal jugular vein, followed by ultrasound confirmation of wire and catheter placement. There were no abnormalities seen on ultrasound; an image was taken; and the patient tolerated the procedure with no complications. Images: still images obtained, printed/placed on chart Assessment Post procedure:biopatch applied, line sutured and occlusive dressing applied Assessement:blood return through all ports and free fluid flow Complications:no Patient Tolerance:patient tolerated the procedure well with no apparent complications Additional Notes SGC @     Central Line    Result Date: 11/10/2024  Matthieu Scott MD     11/10/2024 10:16 AM Central Line Patient reassessed immediately prior to procedure Patient location during procedure: ICU Indications: MD/Surgeon request Staff Anesthesiologist: Matthieu Scott MD Preanesthetic Checklist Completed: patient identified, IV checked, site marked, risks and benefits discussed, surgical consent, monitors and equipment checked, pre-op evaluation and timeout performed Central Line Prep Sterile Tech:gloves, cap, gown, mask and sterile barriers Prep: chloraprep no medical exclusion documented for following all elements of maximal sterile barrier technique Patient monitoring: blood pressure monitoring, continuous pulse oximetry and EKG Central Line Procedure Laterality:left Location:internal jugular Catheter Type:MAC Catheter Size:9 Fr Guidance:ultrasound guided PROCEDURE NOTE/ULTRASOUND INTERPRETATION.   Using ultrasound guidance the potential vascular sites for insertion of the catheter were visualized to determine the patency of the vessel to be used for vascular access.  After selecting the appropriate site for insertion, the needle was visualized under ultrasound being inserted into the internal jugular vein, followed by ultrasound confirmation of wire and catheter placement. There were no abnormalities seen on ultrasound; an image was taken; and the patient tolerated the procedure with no complications. Images: still images obtained, printed/placed on chart Assessment Post procedure:biopatch applied, line sutured and occlusive dressing applied Assessement:blood return through all ports and free fluid flow Complications:no Patient Tolerance:patient tolerated the procedure well with no apparent complications     XR Chest 1 View    Result Date: 11/9/2024  SINGLE VIEW OF THE CHEST  HISTORY: Central line placement  COMPARISON: November 8, 2024  FINDINGS: Tubes and lines appear stable compared to prior study. No pneumothorax is seen. Cardiomegaly and vascular congestion are noted. Bibasilar consolidation and left pleural effusion are again noted. Aeration of the left lung base They improved.      Slight interval improvement in aeration of the left lung base.  This report was finalized on 11/9/2024 3:33 AM by Dr. Alexandria Dahl M.D on Workstation: [id number]      Duplex Venous Lower Extremity - Bilateral CAR    Result Date: 11/8/2024    Normal bilateral lower extremity venous duplex scan.   Previous left gastrocnemius vein thrombus not visualized on today's study     Adult Transthoracic Echo Limited W/ Cont if Necessary Per Protocol    Result Date: 11/8/2024    Left ventricular systolic function is normal. Calculated left ventricular EF = 61.1%   Left ventricular diastolic function was indeterminate.   The right ventricular cavity is moderately dilated. Normal right ventricular systolic function noted. RV free  wall strain = -6.0%.   The left atrial cavity is mildly dilated.     XR Chest 1 View    Result Date: 11/8/2024  XR CHEST 1 VW-  Clinical: Postop  COMPARISON examination 11/7/2024  FINDINGS: Patient is rotated, projection is apical lordotic. Life support tubes and lines in position as before. There are surgical skin staples demonstrated along the chest on the left. The cardiomediastinal silhouette is unchanged, there is cardiac enlargement. There is worsening bibasilar infiltrate/atelectasis and/or consolidation, greatest at the left base. Possible left-sided pleural effusion. No pneumothorax seen. The remainder is unremarkable.  This report was finalized on 11/8/2024 7:01 AM by Dr. Adal Kimball M.D on Workstation: [email or web address]      Adult Transthoracic Echo Limited W/ Cont if Necessary Per Protocol    Result Date: 11/7/2024    Limited echocardiogram for left and right ventricular function   Left ventricular systolic function is hyperdynamic (EF > 70%). Left ventricular ejection fraction appears to be greater than 70%.   Left ventricular wall thickness is consistent with mild concentric hypertrophy.   The right ventricular cavity is severely dilated.   Moderately reduced right ventricular systolic function noted.   Calculated right ventricular systolic pressure from tricuspid regurgitation is 41.0 mmHg.     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 Right IJ pulmonary artery catheter with tip overlying the RVOT/main pulmonary artery. Enteric tube with tip overlying the stomach.  Stable normal size cardiomediastinal silhouette with postsurgical changes of mitral valve repair and left atrial appendage clipping. Low lung volumes. Persistent confluent dense bilateral lobe opacities which are suspicious for  infiltrates. Central pulmonary vasculature remains somewhat dilated and indistinct with prominent pulmonary interstitial markings which may reflect a component of edema. No measurable pleural effusion or pneumothorax. No focal osseous abnormality.  This report was finalized on 11/7/2024 6:54 AM by Dr. Donovan Gomez M.D on Workstation: [id number]      XR Chest 1 View    Result Date: 11/6/2024  XR CHEST 1 VW-  HISTORY: Male who is 74 years-old, postoperative evaluation  TECHNIQUE: Frontal view of the chest  COMPARISON: 11/5/2024  FINDINGS: Stable appearing endotracheal tube, NG tube, right IJ catheter. Cardiac lead fragments, sternotomy wires, skin staples noted. The heart is enlarged. Pulmonary vasculature is congested with similar-appearing bilateral lower lung opacities. There may be minimal pleural effusions. No pneumothorax. Otherwise stable.      No significant change.  This report was finalized on 11/6/2024 12:48 PM by Dr. Giacomo Burton M.D on Workstation: [id number]      XR Chest 1 View    Result Date: 11/5/2024  XR CHEST 1 VW-11/5/2024  HISTORY: Postop. Follow-up.  The heart size is moderately enlarged. Mild to moderate bibasilar atelectasis or infiltrates are seen. The right base has cleared somewhat since yesterday's study.  No significant pneumothorax is seen. Skin staples, pacer leads, ET tube, NG tube, [place] catheter, sternotomy wires and radiopaque closure device are again seen. These appear relatively unchanged from yesterday's study.      1. No significant pneumothorax is seen. 2. Bibasilar atelectasis and/or infiltrates. The right base may have cleared slightly since yesterday's study. 3. Life support devices appear relatively unchanged.   This report was finalized on 11/5/2024 6:55 AM by Dr. Deven Garcia M.D on Workstation: [id number]      Adult Transthoracic Echo Limited W/ Cont if Necessary Per Protocol    Result Date: 11/4/2024    Left ventricular systolic function is hyperdynamic  (EF > 70%).   Left ventricular wall thickness is consistent with mild concentric hypertrophy.   The right ventricular cavity is severely dilated. Severely reduced right ventricular systolic function noted.   There is no evidence of pericardial effusion     XR Chest 1 View    Result Date: 11/4/2024  XR CHEST 1 VW-  HISTORY: Male who is 74 years-old, chest tube removal  TECHNIQUE: Frontal view of the chest  COMPARISON: 11/4/2024 at 1025 hours  FINDINGS: Interval removal of chest tubes. Stable appearing NG tube, endotracheal tube, right IJ [place] catheter. Cardiac lead fragments are evident. Sternotomy wires, cardiac valve marker are noted. The heart is enlarged. Pulmonary vasculature is congested. Atelectasis/infiltrate at the lower lungs, similar to prior exam, with likely mild pleural effusions. No pneumothorax. No acute osseous process.      Chest tubes removed. No pneumothorax.  This report was finalized on 11/4/2024 11:51 AM by Dr. Giacomo Burton M.D on Workstation: [email or web address]      XR Chest 1 View    Result Date: 11/4/2024  XR CHEST 1 VW-  HISTORY: Male who is 74 years-old, chest tube removal  TECHNIQUE: Frontal view of the chest  COMPARISON: 11/4/2024  FINDINGS: A right chest tube has been removed. Other chest tubes appear stable. Stable appearing NG tube, endotracheal tube, right IJ [place] catheter. Cardiac lead fragments are evident. Sternotomy wires, cardiac valve marker are noted. The heart is enlarged. Pulmonary vasculature is congested. Atelectasis/infiltrate at the lower lungs, similar to prior exam, with likely mild pleural effusions. No pneumothorax. No acute osseous process.      Chest tube removed. No pneumothorax.  This report was finalized on 11/4/2024 10:39 AM by Dr. Giacomo Burton M.D on Workstation: [email or web address]      XR Chest 1 View    Result Date: 11/4/2024  XR CHEST 1 VW-   INDICATION: Postoperative  COMPARISON: Chest radiograph November 3, 2024  TECHNIQUE: 1 view chest  FINDINGS:   Heterogeneous multifocal bilateral lung opacities. Questionable tiny left pneumothorax. Stable mediastinum. Suspect CABG. Left atrial appendage clip. Endotracheal tube tip is located in the distal trachea 4.3 cm above the yoni. Right IJ sheath with pulmonary artery catheter tip over the main pulmonary artery. Feeding tube with the tip in the gastric fundus. Abandoned cardiac leads. Left-sided chest tube. Mediastinal drains.       1. Heterogeneous multifocal bilateral lung opacities with improved aeration of the right upper lung. 2. Possible tiny left pneumothorax. 3. Stable support apparatus.  This report was finalized on 11/4/2024 8:31 AM by Dr. Alberto Dominique M.D on Workstation: [id number]      Duplex Pseudoaneurysm CAR    Result Date: 11/3/2024    Study today is negative for pseudoaneurysm of the left groin.  Hematoma measuring 2.3 x 1.3 cm.     Adult Transthoracic Echo Limited W/ Cont if Necessary Per Protocol    Result Date: 11/3/2024    Left ventricular systolic function is hyperdynamic (EF > 70%). Left ventricular ejection fraction appears to be greater than 70%.   The left ventricular cavity is borderline dilated.   Moderately reduced right ventricular systolic function noted.   The right ventricular cavity is moderate to severely dilated.   The left atrial cavity is severely dilated.   The right atrial cavity is mild to moderately  dilated.   Color doppler jet is not well visualized but triangular shap and density indicate severe tricuspid valve regurgitation is present.   Estimated right ventricular systolic pressure from tricuspid regurgitation is mildly elevated (35-45 mmHg).   Mitral ring in place with normal gradients.   Prosthetic AV not well visualized but gradients are normal.   No pericardial effusion appreciated.     XR Chest 1 View    Result Date: 11/3/2024  XR CHEST 1 VW-  HISTORY: Male who is 74 years-old, postoperative evaluation  TECHNIQUE: Supine view of the chest  COMPARISON:  11/2/2024  FINDINGS: Stable appearing endotracheal tube, NG tube, right IJ catheter, mediastinal drain, balloon pump marker. Cardiac leads, sternotomy wires, cardiac valve marker noted. The heart is enlarged. Pulmonary vasculature is congested. Extensive bilateral pulmonary opacities appear similar to prior exam. No large pleural effusion or pneumothorax is seen, limited by supine positioning, left lateral costophrenic angle is excluded from the image. Otherwise stable.      No significant change.    This report was finalized on 11/3/2024 7:10 AM by Dr. Giacomo Burton M.D on Workstation: [email or web address]      XR Chest 1 View    Result Date: 11/2/2024  XR CHEST 1 VW-  HISTORY: Postop.  COMPARISON: Chest radiograph 11/2/2024 9:14 a.m.  FINDINGS:  A single view of the chest was obtained. There is an endotracheal tube with the tip terminating approximately 2.6 cm above the yoni. There is a linear metallic density projecting over the aortic knob, which was previously located over the lower mediastinum. This may reflect a balloon pump with repositioning. Additional support devices are not significantly changed. The cardiac silhouette is enlarged. There is a left atrial appendage clip. Status post CABG. There is calcific aortic atherosclerosis. Bilateral pulmonary opacities are similar to prior. Sternal wires and surgical clips are present.  This report was finalized on 11/2/2024 2:24 PM by Dr. Janessa Bower M.D on Workstation: [id number]      XR Abdomen KUB    Result Date: 11/2/2024  XR ABDOMEN KUB-  INDICATIONS: NG tube placement  TECHNIQUE: SUPINE VIEW OF THE ABDOMEN  COMPARISON: 10/31/2024  FINDINGS:  In the partly included thorax, the NG tube projects over the spine, that extends to left upper quadrant at the expected location of the proximal stomach. The bowel gas pattern is nonobstructive. Follow-up as clinically indicated.       As described.   This report was finalized on 11/2/2024 2:09 PM by Dr. Giacomo WILL  MAXIMILIANO Burton on Workstation: [employer]      XR Chest 1 View    Result Date: 11/2/2024  XR CHEST 1 VW-  HISTORY: Male who is 73 years-old, postoperative evaluation  TECHNIQUE: Frontal view of the chest  COMPARISON: 11/2/2024 at 0134 hours  FINDINGS: Previous NG tube is no longer seen. Stable appearing endotracheal tube. Right IJ [place] catheter appears slightly advanced in comparison with the prior exam, tip in the region of the pulmonary arterial trunk. Normal chest tube removal. A 7-8 mm metallic radiodensity projecting over the central aspect of the cardiac shadow, if representing balloon pump marker would be low in position, correlate clinically. Sternotomy wires, cardiac valve marker noted. The heart is enlarged. Extensive bilateral pulmonary opacities show further interval increase bilaterally. There may be left pleural effusion. No pneumothorax. Otherwise stable.       As described.  Discussed by telephone with patient's nurse, [place], at time of interpretation, 1012, 11/2/2024  This report was finalized on 11/2/2024 10:14 AM by Dr. Giacomo Burton M.D on Workstation: [employer]      Arterial Line    Result Date: 11/2/2024  Anuj Aguilar MD     11/2/2024  7:24 AM Arterial Line Patient reassessed immediately prior to procedure Patient location during procedure: OR Line placed for hemodynamic monitoring, ABGs/Labs/ISTAT and MD/Surgeon request. Performed By Anesthesiologist: Anuj Aguilar MD Preanesthetic Checklist Completed: patient identified, IV checked, site marked, risks and benefits discussed, surgical consent, monitors and equipment checked, pre-op evaluation and timeout performed Arterial Line Prep  Sterile Tech: cap, gloves and sterile barriers Prep: ChloraPrep Patient monitoring: EKG, continuous pulse oximetry and blood pressure monitoring Arterial Line Procedure Laterality:right Location:  radial artery Catheter size: 20 G Guidance: ultrasound guided PROCEDURE NOTE/ULTRASOUND INTERPRETATION.   Using ultrasound guidance the potential vascular sites for insertion of the catheter were visualized to determine the patency of the vessel to be used for vascular access.  After selecting the appropriate site for insertion, the needle was visualized under ultrasound being inserted into the radial artery, followed by ultrasound confirmation of wire and catheter placement. There were no abnormalities seen on ultrasound; an image was taken; and the patient tolerated the procedure with no complications. Number of attempts: 1 Successful placement: yes Images: still images not obtained Post Assessment Dressing Type: wrist guard applied, secured with tape and occlusive dressing applied. Complications no Circ/Move/Sens Assessment: normal and unchanged. Patient Tolerance: patient tolerated the procedure well with no apparent complications Additional Notes ULTRASOUND INTERPRETATION. Using ultrasound guidance, a catheter was placed within in the appropriate vessel and advanced successfully. There were no abnormalities seen on ultrasound; the patient tolerated the procedure with no complications.     Diagnostic IntraOp Dano    Result Date: 11/2/2024  Leana Jang MD     11/2/2024  8:06 AM Diagnostic IntraOp Dano Procedure Performed: Diagnostic IntraOp Dano      Start Time:  11/2/2024 8:04 AM      End Time:   11/2/2024 8:04 AM Preanesthesia Checklist: Patient identified, IV assessed, risks and benefits discussed, monitors and equipment assessed, procedure being performed at surgeon's request and anesthesia consent obtained. General Procedure Information DANO Placed for monitoring purposes only -- This is not a diagnostic DANO Diagnostic Indications for Echo:  hemodynamic monitoring Physician Requesting Echo: Javon Florian MD Location performed:  OR Intubated Bite block placed Heart visualized Probe Insertion:  Easy Probe Type:  Multiplane Modalities:  2D only, color flow mapping, continuous wave Doppler and pulse wave  Doppler Anesthesia Information Performed Personally Anesthesiologist:  Leana Jang MD Echocardiogram Comments:      Limited exam for hemodynamic monitoring while closing chest    XR Chest 1 View    Result Date: 11/2/2024  XR CHEST 1 VW-  HISTORY: Male who is 73 years-old, balloon pump  TECHNIQUE: Frontal view of the chest  COMPARISON: 11/1/2024  FINDINGS: Stable-appearing tubes and line. The heart is enlarged. Extensive bilateral pulmonary opacities show interval increase, especially in the right upper lung. There may be left pleural effusion. No pneumothorax. Otherwise stable.      As described.  This report was finalized on 11/2/2024 5:50 AM by Dr. Giacomo Burton M.D on Workstation: [email or web address]      XR Chest 1 View    Result Date: 11/1/2024  SINGLE VIEW OF THE CHEST  HISTORY: Postop open heart surgery  COMPARISON: October 31, 2024  FINDINGS: Tubes and lines appear stable, including possible positioning of the [place] catheter within the right ventricle. Vascular congestion is again noted. No pneumothorax is seen. Bibasilar atelectasis and small bilateral effusions are suspected.       No significant interval change.  This report was finalized on 11/1/2024 5:18 AM by Dr. Alexandria Dahl M.D on Workstation: [id number]      XR Abdomen KUB    Result Date: 11/1/2024  KUB  HISTORY: Orogastric tube placement  COMPARISON: None available.  FINDINGS: Orogastric tube appears to terminate within the proximal stomach. Bowel gas pattern is nonobstructive. Bibasilar consolidation and bilateral effusions are noted.  This report was finalized on 11/1/2024 4:08 AM by Dr. Alexandria Dahl M.D on Workstation: [id number]      XR Chest 1 View    Result Date: 10/31/2024  SINGLE VIEW OF THE CHEST  HISTORY: Orogastric tube placement  COMPARISON: None available.  FINDINGS: Orogastric tube appears to extend into the upper abdomen. The patient's [place] catheter has retracted, and is likely positioned within the right  ventricle. Tubes and lines are otherwise stable. Cardiomegaly and vascular congestion are noted. Bibasilar consolidation is noted. Bilateral effusions are suspected. No pneumothorax is seen.       1. Orogastric tube appears to extend into the upper abdomen. 2. [place] catheter has retracted, and may be positioned within the right ventricle.  This report was finalized on 10/31/2024 9:51 PM by Dr. Alexandria Dahl M.D on Workstation: [id number]      XR Abdomen KUB    Addendum Date: 10/31/2024    ADDENDUM: Discussed by telephone with patient's nurse, Letty, 2044, 10/31/2024.  This report was finalized on 10/31/2024 8:47 PM by Dr. Giacomo Burton M.D on Workstation: [id number]      Result Date: 10/31/2024  XR ABDOMEN KUB-  INDICATIONS: Orogastric tube placement  TECHNIQUE: FRONTAL VIEW OF THE ABDOMEN  COMPARISON: None available  FINDINGS:  As several tubes are present over the abdomen, it is difficult to distinguish with certainty which tube is the orogastric tube, or what the location of the orogastric tube is; as such, additional imaging at the level of the chest and upper abdomen is recommended for further evaluation. The bowel gas pattern is nonobstructive.       As described.   This report was finalized on 10/31/2024 8:40 PM by Dr. Giacomo Burton M.D on Workstation: [id number]      XR Chest 1 View    Result Date: 10/31/2024  XR CHEST 1 VW-  HISTORY: 73-year-old male status post aortic root replacement, removal of intracardiac leads as well as pacemaker ICD generator and part of the leads. Intra-aortic balloon pump. Significant coagulopathy.  FINDINGS: Distal tip of ET tube is 1.5 cm proximal to the yoni. 2 cm withdrawal is recommended. Right IJ [place] catheter tip is within the main pulmonary artery outflow tract. Intra-aortic balloon pump marker is approximately 7 cm distal to the yoni. No pneumothorax.  There is improved aeration at the right lung, but the remaining perihilar airspace consolidations  need to be followed. There is no definite interstitial edema. No significant change in the enlarged cardiac silhouette. No pneumothorax.  This report was finalized on 10/31/2024 4:31 PM by Dr. Bernadette Gaming M.D on Workstation: [id number]      Diagnostic IntraOp Dano    Result Date: 10/31/2024  Anuj Aguilar MD     10/31/2024  3:58 PM Diagnostic IntraOp Dano Procedure Performed: Diagnostic IntraOp Dano      Start Time:      End Time:  Preanesthesia Checklist: Patient identified, IV assessed, risks and benefits discussed, monitors and equipment assessed, procedure being performed at surgeon's request and anesthesia consent obtained. General Procedure Information DANO Placed for monitoring purposes only -- This is not a diagnostic DANO Physician Requesting Echo: Javon Florian MD Location performed:  ICU Intubated Bite block placed Heart visualized Probe Insertion:  Easy Probe Type:  Multiplane Modalities:  Color flow mapping, continuous wave Doppler and pulse wave Doppler Echocardiographic and Doppler Measurements Aorta Other Aortic Findings:      Anesthesia Information Performed Personally Anesthesiologist:  Anuj Aguilar MD Echocardiogram Comments:      Limited exam performed in ICU on POD1, inotropes epi 0.02 & milrinone 0.375 RV - severely impaired systolic function LV - severe impaired systolic function, EF 20-25%, underfilled, practically kissing papillary muscles, global hypokinesis Diastolic function - grade 2 dysfunction RA: dilated LA: dilated AV - s/p bioprosthetic homograft without paravalvular leak MV - s/p annuloplasty, well seated without leak, mean gradient 2mmHg, no regurgitation seen TV - moderate to severe regurgitation    XR Chest 1 View    Result Date: 10/31/2024  SINGLE VIEW OF THE CHEST  HISTORY: Postop line check  COMPARISON: October 30, 2024  FINDINGS: Tubes and lines appear to be stable when compared to the earlier study. No pneumothorax is seen. There is vascular congestion. There is  bibasilar atelectasis. There is a left pleural effusion. As previously noted, intracardiac pacemaker leads have been removed, and the generator also appears to have been removed.      Postoperative findings, as noted above.  This report was finalized on 10/31/2024 12:18 AM by Dr. Alexandria Dahl M.D on Workstation: [id number]      XR Lost Needle / Instrument    Result Date: 10/30/2024  X-RAY LOST NEEDLE/INSTRUMENT  HISTORY: No count  COMPARISON: October 30, 2024  FINDINGS: The patient has undergone revision of sternotomy. Endotracheal tube terminates in satisfactory position. Right internal jugular vein [place] catheter appears to be stable in position. There are left-sided chest tubes and a mediastinal drain. Distal left-sided pacemaker leads appear to have been removed. Correlation with operative procedure is recommended. There is vascular congestion. No obvious pneumothorax is seen. Left basilar atelectasis is present. There may be a left pleural effusion.      Postoperative findings, as noted above. Distal left-sided pacemaker leads appear to have been removed, and correlation with procedural history is recommended.  This report was finalized on 10/30/2024 10:58 PM by Dr. Alexandria Dahl M.D on Workstation: [id number]      XR Chest Post CVA Port    Result Date: 10/30/2024  Portable chest radiograph  HISTORY: Central line placement  TECHNIQUE: Single AP portable radiograph of the chest  COMPARISON: Chest radiograph 10/20/2024      FINDINGS AND IMPRESSION: Right internal jugular pulmonary artery catheter tip terminates over the left aspect of the mediastinum. There are median sternotomy wires and a presumed atrial appendage clip. Prosthetic heart valve and a left-sided pacer/AICD.  Bibasilar pulmonary pacification is present, left greater than right, mildly worsened within the right lung since 10/20/2024. Given asymmetry, continued attention follow-up to resolution is recommended to exclude the  possibility of neoplasm. No pneumothorax is seen. Cardiac silhouette is mildly enlarged.  This report was finalized on 10/30/2024 12:20 PM by Dr. Danial Rosenbaum M.D on Workstation: [id number]        Pulmonary Artery Catheter    Result Date: 10/30/2024  Lionel Valencia MD     10/30/2024 11:03 AM Pulmonary Artery Catheter Patient reassessed immediately prior to procedure Patient location during procedure: OR Start time: 10/30/2024 10:26 AM Stop Time:10/30/2024 10:46 AM Indications: central pressure monitoring, vascular access and MD/Surgeon request Staff Anesthesiologist: Lionel Valencia MD Preanesthetic Checklist Completed: patient identified and risks and benefits discussed Central Line Prep Sterile Tech:cap, gloves, gown, mask and sterile barriers Prep: chloraprep Patient monitoring: blood pressure monitoring, continuous pulse oximetry and EKG Central Line Procedure Laterality:right Location:internal jugular Catheter Type:[place] Catheter Size:7.5 Fr Guidance:ultrasound guided PROCEDURE NOTE/ULTRASOUND INTERPRETATION.  Using ultrasound guidance the potential vascular sites for insertion of the catheter were visualized to determine the patency of the vessel to be used for vascular access.  After selecting the appropriate site for insertion, the needle was visualized under ultrasound being inserted into the internal jugular vein, followed by ultrasound confirmation of wire and catheter placement. There were no abnormalities seen on ultrasound; an image was taken; and the patient tolerated the procedure with no complications. Assessment Post procedure:biopatch applied, line sutured, occlusive dressing applied and secured with tape Assessement:blood return through all ports and free fluid flow Complications:no Patient Tolerance:patient tolerated the procedure well with no apparent complications     Central Line    Result Date: 10/30/2024  Lionel Valencia MD     10/30/2024 11:02 AM Central Line Patient reassessed  immediately prior to procedure Patient location during procedure: pre-op Start time: 10/30/2024 10:26 AM Stop Time:10/30/2024 10:46 AM Indications: vascular access and central pressure monitoring Staff Anesthesiologist: Lionel Valencia MD Preanesthetic Checklist Completed: patient identified and risks and benefits discussed Central Line Prep Sterile Tech:cap, gloves, gown, mask and sterile barriers Prep: chloraprep Patient monitoring: blood pressure monitoring, continuous pulse oximetry and EKG Central Line Procedure Laterality:right Location:internal jugular Catheter Type:Cordis Catheter Size:9 Fr Guidance:ultrasound guided PROCEDURE NOTE/ULTRASOUND INTERPRETATION.  Using ultrasound guidance the potential vascular sites for insertion of the catheter were visualized to determine the patency of the vessel to be used for vascular access.  After selecting the appropriate site for insertion, the needle was visualized under ultrasound being inserted into the internal jugular vein, followed by ultrasound confirmation of wire and catheter placement. There were no abnormalities seen on ultrasound; an image was taken; and the patient tolerated the procedure with no complications. Images: still images obtained, printed/placed on chart Assessment Post procedure:biopatch applied, line sutured, occlusive dressing applied and secured with tape Assessement:blood return through all ports and chest x-ray ordered Complications:no Patient Tolerance:patient tolerated the procedure well with no apparent complications Additional Notes Ultrasound Interpretation:  Using ultrasound guidance the potential vascular sites for insertion of the catheter were visualized to determine the patency of the vessel to be used for vascular access.  After selecting the appropriate site for insertion, the needle was visualized under ultrasound being inserted into the vessel, followed by ultrasound confirmation of wire and catheter placement.  There were no  abnormalities seen on ultrasound; an image was taken/ and the patient tolerated the procedure with no complications.     Diagnostic IntraOp Dano    Result Date: 10/30/2024  Azar Macias MD     10/30/2024  2:48 PM Diagnostic IntraOp Dano Procedure Performed: Diagnostic IntraOp Dano      Start Time:      End Time:  Preanesthesia Checklist: Patient identified, IV assessed, risks and benefits discussed, monitors and equipment assessed, procedure being performed at surgeon's request and anesthesia consent obtained. General Procedure Information Diagnostic Indications for Echo:  assessment of ascending aorta, assessment of surgical repair and hemodynamic monitoring Physician Requesting Echo: Javno Florian MD CPT Code:  53308, 09201 ICD Code for Medical Necessity:  I34.0, I70.0 Location performed:  OR Intubated Bite block placed Heart visualized Probe Insertion:  Easy Probe Type:  Multiplane Modalities:  Color flow mapping, pulse wave Doppler and continuous wave Doppler Echocardiographic and Doppler Measurements Ventricles Right Ventricle: Cavity size dilated.  Hypertrophy not present.  Thrombus not present.  Global function mildly impaired.  Left Ventricle: Cavity size dilated.  Thrombus not present.  Global Function mildly impaired.  Ejection Fraction 58%.  Other Ventricular Findings:      LVEDV 155 mL Valves Aortic Valve: Annulus bioprosthetic.  Stenosis mild.  Mean Gradient: 9 mmHg.  Regurgitation absent.  Leaflets vegetative.  Leaflet motions restricted.  Mitral Valve: Annulus dilated.  Stenosis not present.  Mean Gradient: 1 mmHg.  Regurgitation moderate.  Leaflets normal.  Leaflet motions normal.  Tricuspid Valve: Annulus dilated.  Stenosis not present.  Regurgitation mild.  Leaflets normal.  Other Valve Findings:      Anterior mitral leaflet length 3.1 cm Aorta Ascending Aorta: Size normal.  Diameter 3.5 cm.  Dissection not present.  Plaque thickness less than 3 mm.  Mobile plaque not present.  Aortic Arch:  Size normal.  Diameter 3 cm.  Dissection not present.  Plaque thickness less than 3 mm.  Mobile plaque not present.  Descending Aorta: Size normal.  Diameter 2.5 cm.  Dissection not present.  Plaque thickness less than 3 mm.  Mobile plaque not present.  Atria Right Atrium: Size dilated.  Spontaneous echo contrast present.  Thrombus not present.  Tumor not present.  Device not present.  Left Atrium: Size dilated.  Spontaneous echo contrast present.  Thrombus not present.  Tumor not present.  Device not present.  Left atrial appendage normal. Diastolic Function Measurements: Diastolic Dysfunction Grade= indeterminate E=  51.7 ms A=  ms E/A Ratio= DT=  108 ms S/D=  .6 IVRT= Other Findings Pericardium:  pericardial effusion Pulmonary Venous Flow:  blunted (decreased) systolic flow Anesthesia Information Performed Personally Anesthesiologist:  Azar Macias MD Echocardiogram Comments:      Postbypass results:    Arterial Line    Result Date: 10/30/2024  Lionel Valencia MD     10/30/2024 11:02 AM Arterial Line Patient reassessed immediately prior to procedure Patient location during procedure: pre-op Start time: 10/30/2024 10:15 AM Stop Time:10/30/2024 10:17 AM  Line placed for hemodynamic monitoring. Performed By Anesthesiologist: Lionel Valencia MD Preanesthetic Checklist Completed: patient identified and risks and benefits discussed Arterial Line Prep  Sterile Tech: gloves Prep: ChloraPrep Patient monitoring: blood pressure monitoring, continuous pulse oximetry and EKG Arterial Line Procedure Laterality:left Location:  radial artery Catheter size: 20 G Guidance: ultrasound guided PROCEDURE NOTE/ULTRASOUND INTERPRETATION.  Using ultrasound guidance the potential vascular sites for insertion of the catheter were visualized to determine the patency of the vessel to be used for vascular access.  After selecting the appropriate site for insertion, the needle was visualized under ultrasound being inserted into the radial  artery, followed by ultrasound confirmation of wire and catheter placement. There were no abnormalities seen on ultrasound; an image was taken; and the patient tolerated the procedure with no complications. Successful placement: yes Images: still images obtained, printed/placed on the chart Post Assessment Dressing Type: occlusive dressing applied and secured with tape. Complications no Patient Tolerance: patient tolerated the procedure well with no apparent complications Additional Notes Using ultrasound guidance the potential vascular sites for insertion of the catheter were visualized to determine the patency of the vessel to be used for vascular access.  After selecting the appropriate site for insertion, the needle was visualized under ultrasound being inserted into the artery, followed by ultrasound confirmation of wire and catheter placement.  There were no abnormalities seen on ultrasound; an image was taken/ and the patient tolerated the procedure with no complications.     Duplex Vein Mapping Lower Extremity - Bilateral CAR    Result Date: 10/28/2024    The right great saphenous vein is patent  and of adequate size in the thigh.   The right great saphenous vein is patent and of adequate size in the calf.   The left great saphenous vein is patent and of adequate size in the thigh.   The left great saphenous vein is patent  and of adequate size in the calf.     Carotid Duplex - Bilateral    Result Date: 10/28/2024    Right internal carotid artery demonstrates a less than 50% stenosis.   Antegrade right vertebral flow.   Left internal carotid artery demonstrates a less than 50% stenosis.   Antegrade left vertebral flow.     XR Chest PA & Lateral    Result Date: 10/28/2024  XR CHEST PA AND LATERAL-  HISTORY: Male who is 73 years-old, preoperative evaluation  TECHNIQUE: Frontal and lateral views of the chest  COMPARISON: 10/14/2024  FINDINGS: The heart is enlarged. Pulmonary vasculature shows mild central  prominence. Left-sided pacemaker, cardiac leads, sternotomy wires, cardiac valve marker noted. Minimal right pleural effusion, continued follow-up suggested. No focal pulmonary consolidation. No pneumothorax. Otherwise stable.      As described.  This report was finalized on 10/28/2024 4:27 PM by Dr. Giacomo Burton M.D on Workstation: [id number]      Stress Test With Myocardial Perfusion One Day    Result Date: 10/26/2024    Lexiscan protocol completed without low-level exercise.  Baseline ECG showed A-fib with RBBB, no angina or significant ischemic ECG changes noted.   Left ventricular ejection fraction is normal (Calculated EF = 55%).   Mildly reduced perfusion defect in the basal segments likely due to suboptimal postprocessing/contouring as well as diaphragmatic attenuation artifact.  No reversibility noted.   Myocardial perfusion imaging indicates a grossly normal myocardial perfusion study with no evidence of reversible ischemia. Impressions are consistent with a low risk study.     Adult Transesophageal Echo 3D (JOLIE) W/ Cont If Necessary Per Protocol    Result Date: 10/25/2024    Left ventricular ejection fraction appears to be 51 - 55%.   Left ventricular wall thickness is consistent with mild concentric hypertrophy.   The right ventricular cavity is mildly dilated.   The left atrial cavity is severely dilated.   Saline test results are negative.   The right atrial cavity is severely  dilated.   Severe aortic valve stenosis is present.   There is a mobile mass on the aortic valve. Vegetations are present on both the ventricle as well as the aortic side of the bioprosthetic aortic valve.  Largest extends into the ascending aorta approximately 3.1 cm above the sewing ring (3.3 cm total length). Vegetation becomes more broad as the furthest aspect from attachment, maximum width 1.7 cm. Vegetation on the ventricular side of the aortic valve I think most likely originates from the sewing ring.   There is a  bioprosthetic aortic valve present. The prosthetic aortic valve peak and mean gradients are elevated. There is a large, mobile mass present on the prosthetic aortic valve that is consistent with a vegetation. There is severe stenosis or obstruction of the prosthetic aortic valve.   Mild aortic valve regurgitation is present.     CT Angiogram Coronary    Result Date: 10/25/2024  RADIOLOGY INTERPRETATION OF EXTRACARDIAC STRUCTURES WITHIN THE CHEST    FINDINGS: Small right greater than left bilateral pleural effusions and mild pulmonary inter and intralobular septal thickening which may reflect a component of pulmonary edema. Respiratory motion limits evaluation of the lungs.    PLEASE SEE SEPARATE CARDIOLOGY REPORT OF THE CARDIAC FINDINGS.   Radiation dose reduction techniques were utilized, including automated exposure control and exposure modulation based on body size.   This report was finalized on 10/25/2024 3:03 PM by Dr. Donovan Gomez M.D on Workstation: [id number]      CT Angiogram Coronary-Cardiology Interpretation    Result Date: 10/25/2024  Table formatting from the original result was not included. 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 0.5 mm collimated images were obtained through the coronary arteries.  Data were transferred offline for 3D reconstructions using [employer]. CONTRAST: 85 mL iopamidol ([id number]) ACQUISITION: Retrospective ECG triggered acquisition was used.  Heart rate at the time of acquisition was approximately 85 BPM. MEDICATIONS: Metoprolol tartrate  25 mg oral Nitroglycerin 0.8 mg tablet TECHNICAL QUALITY:  Extremely poor due to obesity, high heart rate in the setting of A-fib and inability to medicate further due to low BP, and adjacent ICD leads with associated metallic  artifacts.  Nondiagnostic. . FINDINGS: Coronary Artery Calcification Findings: The total calcium score is 69 indicating mild (CAC 1-100) calcified plaque in the coronary tree. Left main: 40 LAD: 13 LCx: 4 RCA: 2 Angiographic Findings: The coronary arteries are  possibly left dominant . Left Main: Normal origin from the left coronary cusp.  Gives rise to LAD and LCx.  There appears to be a calcified plaque in distal LM extending into LAD.  Unable to accurately assess luminal stenosis due to poor image quality. LAD: Unable to accurately assess luminal stenosis due to poor image quality. LCx: Likely dominant vessel. Unable to accurately assess luminal stenosis due to poor image quality. RCA: Likely nondominant. Unable to accurately assess luminal stenosis due to poor image quality. Noncoronary Cardiac Findings: Cardiac chambers: Normal in size with no obvious filling defects within the ventricles, atria, or atrial appendages. No stigmata or prior infarction. Cardiac valves: A bioprosthetic valve is present in the aortic position and poorly visualized.  There appears to be hypoattenuated linear echodensity consistent with known bioprosthetic aortic valve vegetation. Pulmonary arteries: Normal in caliber. No obvious filling defects in the visualized central pulmonary arteri(es) to suggest large central pulmonary emboli, although the image qualities are extremely poor. Pericardium: The pericardial contour is preserved with no effusion, thickening, or calcifications. Left ventricle: Mild-moderate LV dilatation.  Calculated LVEF 70%.     Extremely poor image quality due to obesity (BMI 42), high heart rate in the setting of A-fib and inability to medicate further due to low BP, and adjacent ICD leads resulting in photon starvation, cardiac motion, and beam hardening artifacts and excessive noise.  Non-diagnostic study. CAD-RAD N/P1. Overall there is mild amount of coronary plaque.  Coronary calcium score CAC (69). Grossly  normal LV systolic function (calculated LVEF 70%). Bioprosthetic aortic valve present, poorly visualized. There appears to be hypoattenuated linear echodensity consistent with known bioprosthetic aortic valve vegetation.  Please reference same-day JOLIE results. Left atrial appendage appears to be ligated surgically with clips noted. Severe biatrial enlargement. Please refer to the radiology report for information on extra-cardiac structures.  RECOMMENDATION: Consider alternative imaging modalities to rule out obstructive CAD if deemed necessary prior to surgery for bioprosthetic endocarditis. Grading Scale for Stenosis Severity: Category Degree Interpretation CAD-RADS 0 0% (No plaque or stenosis) Absence of CAD CAD-RADS 1 1-24% (Minimal stenosis or plaque w/o stenosis) Minimal non-obstructive CAD CAD-RADS 2 25-49% (Mild stenosis) Mild non-obstructive CAD CAD-RADS 3 50-69% (Moderate stenosis) Moderate stenosis CAD-RADS 4 A - 70-99% stenosis or B - Left main >/= 50% or 3-vessel obstructive (>/=70%) disease Severe stenosis CAD-RADS 5 100% (total occlusion) Total coronary occlusion or sub-total occlusion CAD-RADS N Non-diagnostic study Obstructive CAD cannot be excluded Grading Scale for Plaque Walker: P1 (CAC 1-100) Mild amount of plaque P2 (-300) Moderate amount of plaque P3 (-999) Severe amount of plaque P4 (CAC > 1000) Extensive amount of plaque     Duplex Venous Lower Extremity - Bilateral CAR    Result Date: 10/24/2024    Acute left lower extremity deep vein thrombosis noted in the gastrocnemius.   All other veins appeared normal bilaterally.     Adult Transthoracic Echo Complete W/ Cont if Necessary Per Protocol    Result Date: 10/19/2024    Left ventricular systolic function is low normal. Left ventricular ejection fraction appears to be 51 - 55%.   Left ventricular wall thickness is consistent with mild concentric hypertrophy.   Left ventricular diastolic function was indeterminate.   There is  moderate to severe biatrial enlargement.   There is a bioprosthetic aortic valve present. The prosthetic aortic valve peak and mean gradients are elevated.  The peak and mean gradient across aortic valve was 101/68 mmHg.  Findings are consistent with severe stenosis of the bioprosthetic valve.   There is mild to moderate mitral regurgitation.   There is mild tricuspid regurgitation.  Estimated right ventricular systolic pressure from tricuspid regurgitation is markedly elevated (>55 mmHg).     Results for orders placed during the hospital encounter of 10/23/24    Adult Transthoracic Echo Limited W/ Cont if Necessary Per Protocol    Interpretation Summary    Left ventricular systolic function is normal. Calculated left ventricular EF = 57.8%    Left ventricular diastolic function was not assessed.    Moderately reduced right ventricular systolic function noted.    The right ventricular cavity is moderately dilated.    : There is no evidence of pericardial effusion.      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         Assessment & Plan     status post 10/31/2024 tissue aortic valve replacement for prosthetic aortic valve stenosis, maze, left atrial appendage ligation done in 2014 and status post reoperative sternotomy with AV root replacement 0.7 mm cryopreserved homograft with right Gilbert Josefinaaugh, AICD  removal intra-aortic balloon pump placement.  Strep mitis bacteremic sepsis and possible endocarditis on vancomycin and cefepime for this and possible pneumonia plan is at least 6 weeks of antibiotics tentative end date of 12/4/2024  Fever seems to have resolved  RV dysfunction severe on milrinone  Shock cardiogenic and possible element of septic shock as well remains pressor dependent  Possible pneumonia respiratory cultures ordered some light growth of Candida which is likely oral contaminant is unremarkable  Respiratory failure patient failed weaning has tracheostomy there is no way he can wean with this high minute ventilation demand hopefully with dialysis his metabolic acidosis will correct in his fluid excess will correct and then we will be able to wean.  CHF history of nonischemic cardiomyopathy but recent echocardiogram LVEF 67% so probably diastolic dysfunction cardiology following   Pulmonary hypertension severe by recent  echocardiogram on sildenafil per cardiothoracic surgery  Acute kidney injury oliguric despite Bumex drip yesterday now on CRRT  Metabolic acidosis probably secondary to renal disease hopefully this will correct with dialysis  DVT history and left lower extremity on Doppler on 10/24/2024 not present on 11/8/2024 Doppler and subacute DVT in the left axillary vein Doppler holding anticoagulation now per cardiovascular surgery  Atrial fibrillation on amiodarone for rate control  Anemia acute on chronic expected postop acute complement.  Stable  Thrombocytopenia resolved  Esophagitis ease EGD on 9/30/2024  Fluids/lites/nutrition continue tube feeds now   Hyperglycemia not too bad on scheduled and sliding scale insulin.  Morbid obesity with a BMI greater than 46        Plan for disposition:    Paul Leslie Jr, MD  11/19/24  06:53 EST    Time: Critical care time 33 minutes

## 2024-11-19 NOTE — PROGRESS NOTES
" LOS: 27 days   Patient Care Team:  Juan Perez MD as PCP - General (Internal Medicine)    Chief Complaint:   Post-op follow-up, s/p homograft    Subjective  Vent via trach. Nods head with questions.     Vital Signs  Temp:  [96.6 °F (35.9 °C)-99.7 °F (37.6 °C)] 96.8 °F (36 °C)  Heart Rate:  [] 99  Resp:  [23-30] 23  BP: ()/(38-97) 142/70  Arterial Line BP: ()/(38-80) 144/56  FiO2 (%):  [39 %-98 %] 40 %      11/18/24  0557 11/18/24  1433 11/19/24  0430   Weight: (!) 151 kg (333 lb 12.4 oz) (!) 151 kg (333 lb) (!) 153 kg (336 lb 6.8 oz)     Body mass index is 48.27 kg/m².    Intake/Output Summary (Last 24 hours) at 11/19/2024 0711  Last data filed at 11/19/2024 0700  Gross per 24 hour   Intake 4985.3 ml   Output 1160 ml   Net 3825.3 ml     No intake/output data recorded.        Objective:  Vital signs: (most recent): Blood pressure 129/51, pulse 108, temperature 97 °F (36.1 °C), temperature source Core, resp. rate 23, height 177.8 cm (70\"), weight (!) 153 kg (336 lb 6.8 oz), SpO2 97%.                Physical Exam:   General Appearance: sedated but arousable, ill appearing, NAD   Lungs:  vent, ronchi throuhgout   Heart:  irreg irreg rhythm, tachy during exam   Abdomen: soft or nondistended, + bowel sounds    Skin: sternal incision clean, dry, intact   Neuro: alert and oriented, no focal deficits.     Results Review:      WBC WBC   Date Value Ref Range Status   11/19/2024 16.38 (H) 3.40 - 10.80 10*3/mm3 Final   11/18/2024 18.69 (H) 3.40 - 10.80 10*3/mm3 Final   11/17/2024 17.25 (H) 3.40 - 10.80 10*3/mm3 Final      HGB Hemoglobin   Date Value Ref Range Status   11/19/2024 8.4 (L) 13.0 - 17.7 g/dL Final   11/18/2024 7.8 (L) 13.0 - 17.7 g/dL Final   11/18/2024 7.6 (L) 13.0 - 17.7 g/dL Final   11/17/2024 8.9 (L) 13.0 - 17.7 g/dL Final      HCT Hematocrit   Date Value Ref Range Status   11/19/2024 26.0 (L) 37.5 - 51.0 % Final   11/18/2024 24.4 (L) 37.5 - 51.0 % Final   11/18/2024 24.4 (L) 37.5 - " "51.0 % Final   11/17/2024 28.0 (L) 37.5 - 51.0 % Final      Platelets Platelets   Date Value Ref Range Status   11/19/2024 156 140 - 450 10*3/mm3 Final   11/18/2024 165 140 - 450 10*3/mm3 Final   11/17/2024 207 140 - 450 10*3/mm3 Final        PT/INR:  No results found for: \"PROTIME\"/No results found for: \"INR\"    Sodium Sodium   Date Value Ref Range Status   11/19/2024 134 (L) 136 - 145 mmol/L Final   11/18/2024 137 136 - 145 mmol/L Final   11/18/2024 139 136 - 145 mmol/L Final   11/17/2024 146 (H) 136 - 145 mmol/L Final      Potassium Potassium   Date Value Ref Range Status   11/19/2024 3.7 3.5 - 5.2 mmol/L Final   11/19/2024 3.8 3.5 - 5.2 mmol/L Final   11/18/2024 3.9 3.5 - 5.2 mmol/L Final   11/18/2024 3.9 3.5 - 5.2 mmol/L Final   11/18/2024 4.1 3.5 - 5.2 mmol/L Final   11/18/2024 4.1 3.5 - 5.2 mmol/L Final   11/18/2024 4.4 3.5 - 5.2 mmol/L Final   11/18/2024 4.3 3.5 - 5.2 mmol/L Final   11/17/2024 4.1 3.5 - 5.2 mmol/L Final   11/17/2024 4.4 3.5 - 5.2 mmol/L Final   11/17/2024 4.2 3.5 - 5.2 mmol/L Final   11/17/2024 3.9 3.5 - 5.2 mmol/L Final   11/16/2024 3.6 3.5 - 5.2 mmol/L Final   11/16/2024 3.7 3.5 - 5.2 mmol/L Final     Comment:     Slight hemolysis detected by analyzer. Result may be falsely elevated.      Chloride Chloride   Date Value Ref Range Status   11/19/2024 105 98 - 107 mmol/L Final   11/18/2024 107 98 - 107 mmol/L Final   11/18/2024 109 (H) 98 - 107 mmol/L Final   11/17/2024 112 (H) 98 - 107 mmol/L Final      Bicarbonate CO2   Date Value Ref Range Status   11/19/2024 13.0 (L) 22.0 - 29.0 mmol/L Final   11/18/2024 13.8 (L) 22.0 - 29.0 mmol/L Final   11/18/2024 12.8 (L) 22.0 - 29.0 mmol/L Final   11/17/2024 17.0 (L) 22.0 - 29.0 mmol/L Final      BUN BUN   Date Value Ref Range Status   11/19/2024 92 (H) 8 - 23 mg/dL Final   11/18/2024 82 (H) 8 - 23 mg/dL Final   11/18/2024 80 (H) 8 - 23 mg/dL Final   11/17/2024 69 (H) 8 - 23 mg/dL Final      Creatinine Creatinine   Date Value Ref Range Status "   11/19/2024 2.81 (H) 0.76 - 1.27 mg/dL Final   11/18/2024 2.52 (H) 0.76 - 1.27 mg/dL Final   11/18/2024 2.40 (H) 0.76 - 1.27 mg/dL Final   11/17/2024 1.91 (H) 0.76 - 1.27 mg/dL Final      Calcium Calcium   Date Value Ref Range Status   11/19/2024 8.7 8.6 - 10.5 mg/dL Final   11/18/2024 8.8 8.6 - 10.5 mg/dL Final   11/18/2024 8.1 (L) 8.6 - 10.5 mg/dL Final   11/17/2024 8.2 (L) 8.6 - 10.5 mg/dL Final      Magnesium Magnesium   Date Value Ref Range Status   11/19/2024 2.2 1.6 - 2.4 mg/dL Final   11/18/2024 2.5 (H) 1.6 - 2.4 mg/dL Final   11/18/2024 2.0 1.6 - 2.4 mg/dL Final        acetylcysteine, 3 mL, Nebulization, TID - RT  aspirin, 81 mg, Per G Tube, Daily  atorvastatin, 40 mg, Per G Tube, Nightly  busPIRone, 5 mg, Oral, TID  cefepime, 2,000 mg, Intravenous, Q12H  chlorhexidine, 15 mL, Mouth/Throat, Q12H  [Held by provider] enoxaparin, 40 mg, Subcutaneous, Q12H  Ergocalciferol, 100 mcg, Per G Tube, Daily  hydrocortisone-bacitracin-zinc oxide-nystatin, 1 Application, Topical, Q12H  insulin glargine, 20 Units, Subcutaneous, Daily  insulin regular, 2-7 Units, Subcutaneous, Q6H  ipratropium-albuterol, 3 mL, Nebulization, Q4H - RT  lansoprazole, 15 mg, Per G Tube, Q AM  miconazole, 1 Application, Topical, Q12H  midodrine, 15 mg, Oral, TID AC  saccharomyces boulardii, 250 mg, Per G Tube, BID  sildenafil, 20 mg, Per G Tube, TID  sodium chloride, 10 mL, Intravenous, Q12H  Vancomycin Pharmacy Intermittent/Pulse Dosing, , Not Applicable, Daily      amiodarone, 0.5 mg/min, Last Rate: 0.5 mg/min (11/18/24 2314)  bumetanide, 2 mg/hr, Last Rate: 2 mg/hr (11/18/24 2314)  dexmedetomidine, 0.2-1.5 mcg/kg/hr, Last Rate: Stopped (11/18/24 0950)  DOPamine, 2-20 mcg/kg/min  EPINEPHrine, 0.01 mcg/kg/min, Last Rate: Stopped (11/17/24 1051)  lidocaine in D5W, 1 mg/min, Last Rate: Stopped (11/05/24 1604)  milrinone, 0.25 mcg/kg/min, Last Rate: 0.25 mcg/kg/min (11/19/24 6725)  niCARdipine, 5-15 mg/hr  norepinephrine, 0.02-0.2 mcg/kg/min,  Last Rate: Stopped (11/18/24 0130)  Pharmacy to dose vancomycin,   phenylephrine, 0.2-2 mcg/kg/min  propofol, 5-50 mcg/kg/min, Last Rate: 10 mcg/kg/min (11/19/24 0116)  vasopressin, 0.02-0.1 Units/min, Last Rate: 0.06 Units/min (11/19/24 6745)          Prosthetic aortic valve stenosis    Essential hypertension    Permanent atrial fibrillation    S/P AVR    ICD (implantable cardioverter-defibrillator), dual, in situ    Achilles tendon rupture    Bacteremia    Stenosis of prosthetic aortic valve    Anemia      Assessment & Plan    - Prosthetic aortic valve stenosis, h/o AVR (tissue)/maze/DANICA ligation (2014)- s/p reoperative sternotomy AV root replacement 27mm cryopreserved homograft with right nuvia cabrol, AICD removal, IABP placement- Chiqui 10/31/2024  - s/p Sternal closure ---11/2  - Possible endocarditis, likely need JOLIE   - Bacteremia, blood cultures positive strep mitis---on penicillin G  - Atrial fibrillation, unable to tolerate anticoagulation  - Hypertension  - NICM status post [employer] AICD  - Anemia, s/p EGD/colonoscopy with esophagitis, polyp removal  - Right achilles tendon tear--- walking boot and PT per ortho at [facility]  - morbid obesity  - Pre-diabetes -- improved at 5.3  - post op anemia- expected acute blood loss, stable  - TCP post-op, resolved  - respiratory failure--- s/p trach (Dr. Leija 11/16/2024), pulm following   - NATHANIEL--- nephrology following         POD #20  Continue supportive care.  S/p trach. Vent per pulm.  Remains on vasopressin, milrinone 0.125, propofol 10  Creatinine 2.8, not responding to bumex gtt.  Dr. Florian and Dr. Keller Had a long discussion with the daughter,  about starting CRRT  WBC 16 from 18, Afebrile. Monitor  Antibiotics for endocarditis, possible pneumonia per ID.  Remains in atrial fibrillation, intermittent rate controlled.   Transition VTE to heparin.       Samantha Magana, APRN  11/19/24  07:11 EST

## 2024-11-19 NOTE — PROGRESS NOTES
LOS: 27 days     Chief Complaint: Endocarditis    Interval History: Nursing team at bedside prepping for Shiley placement.  O2 requirements remain stable.  Fever curve improving today.    Vital Signs  Temp:  [96.6 °F (35.9 °C)-99.3 °F (37.4 °C)] 97 °F (36.1 °C)  Heart Rate:  [] 101  Resp:  [23-29] 23  BP: (109-143)/(46-97) 129/51  Arterial Line BP: ()/(38-80) 135/57  FiO2 (%):  [39 %-98 %] 40 %    Physical Exam:  General: Ill-appearing  HEENT: Tracheostomy present.  NG tube in place.  Respiratory: Coarse bilateral breath sounds on the ventilator.  : Ugarte catheter  Skin: Tracheostomy site clean and intact.  Access: Left IJ.  Arterial line.  Peripheral line.    Antibiotics:  Anti-Infectives (From admission, onward)      Ordered     Dose/Rate Route Frequency Start Stop    11/17/24 1407  vancomycin 750 mg in sodium chloride 0.9 % 250 mL IVPB-VTB        Ordering Provider: Gregg Diaz DO    750 mg  333.3 mL/hr over 45 Minutes Intravenous Once 11/18/24 0600 11/18/24 0702    11/17/24 1412  cefepime 2000 mg IVPB in 100 mL NS (MBP)        Ordering Provider: Gregg Diaz DO    2,000 mg  over 4 Hours Intravenous Every 12 Hours 11/17/24 2200 11/22/24 0849    11/13/24 0914  cefepime 2000 mg IVPB in 100 mL NS (MBP)        Ordering Provider: Gregg Diaz DO    2,000 mg  over 30 Minutes Intravenous Once 11/13/24 1000 11/13/24 1235    11/12/24 1133  vancomycin 3000 mg/500 mL 0.9% NS IVPB ([facility])        Ordering Provider: Gregg Diaz DO    20 mg/kg × 151 kg  over 180 Minutes Intravenous Once 11/12/24 1230 11/12/24 1614    11/02/24 0918  vancomycin (VANCOCIN) 1,000 mg in sodium chloride 0.9 % 250 mL IVPB-VTB        Ordering Provider: Antwan Farmer MD    1,000 mg  250 mL/hr over 60 Minutes Intravenous Every 24 Hours 11/02/24 1015 11/03/24 1138    11/02/24 0912  Pharmacy to dose vancomycin        Ordering Provider: Samantha Salvador APRN     Not Applicable Continuous PRN  11/02/24 0912 11/04/24 0911    10/30/24 2306  ceFAZolin 2000 mg IVPB in 100 mL NS (MBP)        Ordering Provider: Samantha Salvador APRN    2,000 mg  over 30 Minutes Intravenous Every 8 Hours 10/31/24 0000 11/01/24 0922    10/29/24 1518  ceFAZolin 2000 mg IVPB in 100 mL NS (MBP)        Ordering Provider: Bryant Mcclure PA-C    2,000 mg  over 30 Minutes Intravenous Once 10/30/24 0600 10/30/24 1936    10/28/24 1034  vancomycin IVPB 2000 mg in 0.9% Sodium Chloride 500 mL        Ordering Provider: Samantha Salvador APRN    15 mg/kg × 142 kg Intravenous Once 10/28/24 1045 10/28/24 1356             Results Review:     I reviewed the patient's new clinical results.    Lab Results   Component Value Date    WBC 16.38 (H) 11/19/2024    HGB 8.4 (L) 11/19/2024    HCT 26.0 (L) 11/19/2024    MCV 90.6 11/19/2024     11/19/2024     Lab Results   Component Value Date    GLUCOSE 143 (H) 11/19/2024    BUN 92 (H) 11/19/2024    CREATININE 2.81 (H) 11/19/2024    BCR 32.7 (H) 11/19/2024    CO2 13.0 (L) 11/19/2024    CALCIUM 8.7 11/19/2024    ALBUMIN 2.5 (L) 11/19/2024    LABIL2 1.4 06/27/2019    AST 21 11/19/2024    ALT 14 11/19/2024       Microbiology:  10/3 COVID-negative  10/10 COVID-negative  10/14 COVID-negative  10/15 blood cultures 2 out of 2 strep mitis  10/17 COVID-negative  10/17 blood cultures 2 out of 2 strep mitis  10/21 COVID-negative  10/23 blood cultures no growth today  10/30 operative cultures from the aortic valve no growth   11/9 respiratory culture no growth  11/9 catheter tip culture no growth to date  11/10 catheter tip culture no growth to date  11/12 blood cultures no growth to date  11/12 respiratory culture no growth to date  11/13 respiratory culture no growth  11/13 genital culture of the penis Candida albicans      Assessment    #Strep mitis endocarditis  #Status post bioprosthetic aortic valve replacement 2014  #Status post aortic root replacement in the setting of prosthetic aortic valve endocarditis  and annular abscess on 10/30  #Status post removal of pacemaker generator and intracardiac portion of the leads with retained venous leads  #Atrial fibrillation  #Achilles tendon rupture  #NATHANIEL  #Positive blood culture, suspect contaminant  #Status post tracheostomy 11/16    Temperature curve improving today and cultures have been negative.  Plan to stop vancomycin today and continue cefepime 2 g every 12 hours.  May be able to de-escalate further in the near future.  Continue to monitor renal function for antibiotic adjustment.    For endocarditis he will still need ultimate antibiotic 6 weeks with end date of December 4.  Above should cover in the meantime but may be able to de-escalate back to penicillin in the future.

## 2024-11-19 NOTE — PLAN OF CARE
Goal Outcome Evaluation:              Outcome Evaluation: Alert.  Follows some commands but not all. Amio gtt per order- cont in A-fib, rate 90's.  Vasopressin for BP as charted.  Bumex per order with minimal urine output. Bumex gtt increased per order.  Continues on tube feeds.  Propofol gtt restarted per order for pt comfort.

## 2024-11-19 NOTE — PROGRESS NOTES
[facility] Progress Note      Patient Name: Woodrow Alejandro  : 1950  MRN: [id number]  Primary Care Physician:  Juan Perez MD  Date of admission: 10/23/2024    Subjective     Interval History:   Follow-up acute kidney injury and volume  The patient remains on the ventilator and he is sedated, his urine output is about 1100 cc in the past 24 hours despite being on bumetanide drip 2 mg/h.  He has worsening azotemia.  And worsening edema and his CVP remains quite elevated  Review of Systems:   Not obtainable    Objective     Vitals:   Temp:  [96.6 °F (35.9 °C)-99.3 °F (37.4 °C)] 97 °F (36.1 °C)  Heart Rate:  [] 101  Resp:  [23-30] 23  BP: (105-143)/(46-97) 129/51  Arterial Line BP: ()/(38-80) 135/57  FiO2 (%):  [39 %-98 %] 40 %    Intake/Output Summary (Last 24 hours) at 2024 0908  Last data filed at 2024 0800  Gross per 24 hour   Intake 4985.3 ml   Output 1235 ml   Net 3750.3 ml       Physical Exam:    General Appearance: On the ventilator, sedated chronically ill morbidly obese commands  Skin: warm and dry  HEENT: Oral mucosa is dry  Neck: Mild JVD, he has a tracheostomy  Lungs: Bilateral rhonchi, breathing effort not labored  Heart: Irregularly irregular.  No rub  Abdomen: soft, no guarding, distended.  Body wall edema.  Normoactive bowel  : Ugarte catheter  Extremities: 4+ lower extremity edema with hip and thigh edema and 1+ upper extremity edema      Scheduled Meds:     acetylcysteine, 3 mL, Nebulization, TID - RT  aspirin, 81 mg, Per G Tube, Daily  atorvastatin, 40 mg, Per G Tube, Nightly  busPIRone, 5 mg, Oral, TID  cefepime, 2,000 mg, Intravenous, Q12H  chlorhexidine, 15 mL, Mouth/Throat, Q12H  [Held by provider] enoxaparin, 40 mg, Subcutaneous, Q12H  Ergocalciferol, 100 mcg, Per G Tube, Daily  hydrocortisone-bacitracin-zinc oxide-nystatin, 1 Application, Topical, Q12H  insulin glargine, 20 Units, Subcutaneous, Daily  insulin  regular, 2-7 Units, Subcutaneous, Q6H  ipratropium-albuterol, 3 mL, Nebulization, Q4H - RT  lansoprazole, 15 mg, Per G Tube, Q AM  miconazole, 1 Application, Topical, Q12H  midodrine, 15 mg, Oral, TID AC  saccharomyces boulardii, 250 mg, Per G Tube, BID  sildenafil, 20 mg, Per G Tube, TID  sodium chloride, 10 mL, Intravenous, Q12H      IV Meds:   amiodarone, 0.5 mg/min, Last Rate: 0.5 mg/min (11/18/24 2314)  dexmedetomidine, 0.2-1.5 mcg/kg/hr, Last Rate: Stopped (11/18/24 0400)  DOPamine, 2-20 mcg/kg/min  EPINEPHrine, 0.01 mcg/kg/min, Last Rate: Stopped (11/17/24 1051)  lidocaine in D5W, 1 mg/min, Last Rate: Stopped (11/05/24 1135)  milrinone, 0.25 mcg/kg/min, Last Rate: 0.25 mcg/kg/min (11/19/24 0435)  niCARdipine, 5-15 mg/hr  norepinephrine, 0.02-0.2 mcg/kg/min, Last Rate: Stopped (11/18/24 0130)  phenylephrine, 0.2-2 mcg/kg/min  Phoxillum BK4/2.5, 1,500 mL/hr  Phoxillum BK4/2.5, 1,500 mL/hr  Phoxillum BK4/2.5, 1,500 mL/hr  propofol, 5-50 mcg/kg/min, Last Rate: 10 mcg/kg/min (11/19/24 0116)  vasopressin, 0.02-0.1 Units/min, Last Rate: 0.05 Units/min (11/19/24 0700)        Results Reviewed:   I have personally reviewed the results from the time of this admission to 11/19/2024 09:08 EST     Results from last 7 days   Lab Units 11/19/24  0801 11/19/24  0429 11/19/24  0011 11/18/24  1714 11/18/24  0809 11/18/24  0335   SODIUM mmol/L  --  134*  --   --  137 139   POTASSIUM mmol/L 4.0 3.7 3.8   < > 4.1  4.1 4.4   CHLORIDE mmol/L  --  105  --   --  107 109*   CO2 mmol/L  --  13.0*  --   --  13.8* 12.8*   BUN mg/dL  --  92*  --   --  82* 80*   CREATININE mg/dL  --  2.81*  --   --  2.52* 2.40*   CALCIUM mg/dL  --  8.7  --   --  8.8 8.1*   BILIRUBIN mg/dL  --  0.6  --   --  0.7 0.7   ALK PHOS U/L  --  108  --   --  108 107   ALT (SGPT) U/L  --  14  --   --  16 17   AST (SGOT) U/L  --  21  --   --  27 36   GLUCOSE mg/dL  --  143*  --   --  153* 147*    < > = values in this interval not displayed.       Estimated Creatinine  Clearance: 34.3 mL/min (A) (by C-G formula based on SCr of 2.81 mg/dL (H)).    Results from last 7 days   Lab Units 11/19/24 0429 11/18/24 2109 11/18/24 0335 11/17/24 0318   MAGNESIUM mg/dL 2.2 2.5* 2.0  --    PHOSPHORUS mg/dL 2.6  --  2.3* 3.0       Results from last 7 days   Lab Units 11/19/24 0429 11/18/24 0335 11/13/24  0414   URIC ACID mg/dL 10.3* 9.5* 7.3*       Results from last 7 days   Lab Units 11/19/24 0429 11/18/24  0528 11/18/24 0335 11/17/24 0318 11/16/24  0408 11/15/24  0306   WBC 10*3/mm3 16.38*  --  18.69* 17.25* 13.92* 15.95*   HEMOGLOBIN g/dL 8.4* 7.8* 7.6* 8.9* 8.6* 8.4*   PLATELETS 10*3/mm3 156  --  165 207 193 203             Assessment / Plan     ASSESSMENT:  Acute kidney injury, nonoliguric.  Charge likely prerenal/ATN due to hemodynamic instability and diuresis.  Creatinine up to 2.81, BUN 92, electrolyte within acceptable range, except sodium 134 and total CO2 13 with an anion gap of 16  The patient had severe right-sided heart failure and probably increase enteral abdominal venous pressure leading to decreased GFR  Mixed metabolic acidosis and respiratory  Prosthetic valve aortic stenosis history of tissue AVR, DANICA ligation in 2014.  Status post redo sternotomy AV root replacement, mitral valve repair, AICD removal intra-aortic balloon placement 10/31/2024.  Sternal closure 11/ 2.  3.  Bioprosthetic aortic valve endocarditis, bacteremia with strep mitis on vancomycin and cefepime.    Dr. Diaz following.  Currently A-fib  4.  Shock , remains pressor dependent.  5.  Anemia status post EGD 9/30/2024  And colonoscopy with esophagitis and polyp removal.  Hemoglobin today 7 point.  6.  Acute respiratory failure postoperatively on the ventilator.    7.  Atrial fibrillation.  With controlled rate  8.  Moderate to severe mitral regurgitation.  Severe RV enlargement and dysfunction postoperatively.  PLAN:  Will attempt CRRT and hoping to have more fluid removal to decrease venous pressure  intra-abdominal he and hopefully that would lead to improved renal function.  I had long discussion with the patient's daughter alongside Dr. Florian, she is agreeable to proceed, and I pointed out to her that if he does not tolerate the CRRT which is a last ditch effort trying to reverse this process  Surveillance lab      I reviewed the chart and other providers notes, reviewed labs.  Copied text in this note has been reviewed and is accurate as of 11/19/24.     Thank you for involving us in the care of Woodrow Alejandro.  Please feel free to call with any questions.    Jass Keller MD  11/19/24  09:08 [facility]    [facility]  [phone]    Please note that portions of this note were completed with a voice recognition program.

## 2024-11-19 NOTE — CONSULTS
Secondary to a consult with pt's RN,  visited pt's daughter in his room.   provided emotional and spiritual support via presence, dialogue and prayer.        Pastoral care remains available.

## 2024-11-20 NOTE — PLAN OF CARE
Goal Outcome Evaluation:           Progress: no change  Outcome Evaluation: Pt on CRRT overnight. Remains on vaso, milrinone, amiodarone, and propofol. Sedation vacation performed and patient follows some commands. Pt remains in afib in the 100's. Pottasium replaced per CRRT protocol. Care ongoing.

## 2024-11-20 NOTE — PROGRESS NOTES
LOS: 28 days   Patient Care Team:  Juan Perez MD as PCP - General (Internal Medicine)    Subjective     s.  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  He went and had sternal closure on 11/2 evidence of possible endocarditis and he had blood cultures positive for strep mitis and is on pen G send atrial fibrillation unable to tolerate anticoagulation he has a nonischemic cardiomyopathy history of anemia some esophagitis and colon polyps have been removed history of right Achilles tendon tear uses a walking boot he is a prediabetic postop anemia and thrombocytopenia.  Patient had failed weaning and went for tracheostomy today  Patient is pretty sedated currently despite attempts at diuresis yesterday oliguric positive over 3 L again patient started on CRRT.  Daughter at bedside discussed with her      review of Systems:          Objective     Vital Signs  Vital Sign Min/Max for last 24 hours  Temp  Min: 99.1 °F (37.3 °C)  Max: 99.9 °F (37.7 °C)   BP  Min: 98/48  Max: 146/60   Pulse  Min: 104  Max: 117   Resp  Min: 28  Max: 31   SpO2  Min: 95 %  Max: 98 %   No data recorded   Weight  Min: 156 kg (343 lb 14.7 oz)  Max: 156 kg (343 lb 14.7 oz)        Ventilator/Non-Invasive Ventilation Settings (From admission, onward)       Start     Ordered    11/12/24 1124  Ventilator - Vent Mode: AC/VC; Rate: Other; Rate: 24; FiO2: Titrate Per SpO2; Titrate Oxygen for SpO2: 90 - 95%; PEEP: 5; Tidal Volume: mL; TV: 550  Continuous        Question Answer Comment   Vent Mode AC/VC    Rate Other    Rate 24    FiO2 Titrate Per SpO2    Titrate Oxygen for SpO2 90 - 95%    PEEP 5    Tidal Volume mL            11/12/24 1124    11/01/24  0026  Ventilator - Vent Mode: AC/VC+; Rate: 20; FiO2: Titrate Per SpO2; Titrate Oxygen for SpO2: 90 - 95%; PEEP: 7.5; Tidal Volume: mL; TV: 620  Continuous,   Status:  Canceled        Question Answer Comment   Vent Mode AC/VC+    Rate 20    FiO2 Titrate Per SpO2    Titrate Oxygen for SpO2 90 - 95%    PEEP 7.5    Tidal Volume mL            11/01/24 0027                        Arterial Line BP: 91/44  Arterial Line MAP (mmHg): 54 mmHg  PAP: (30-49)/(1-27) 35/3  CVP:  [9 mmHg-27 mmHg] 11 mmHg  PCWP:  [16 mmHg] 16 mmHg  CO:  [8 L/min] 8 L/min  Body mass index is 49.35 kg/m².  I/O last 3 completed shifts:  In: 5885.7 [P.O.:240; I.V.:2279.1; Other:1702; NG/GT:1468; IV Piggyback:196.6]  Out: 4824.6 [Urine:1211]  I/O this shift:  In: 952.6 [I.V.:640.6; NG/GT:312]  Out: 1718.7 [Urine:210]        Physical Exam:  General Appearance: Trached on a ventilator is on pressure control rate of 24 breathing 24 inspiratory pressure of 20 PEEP of 10 tidal volumes are in the 700 cc range his minute ventilation is about 20 L FiO2 a still 40% SpO2 is 94% patient is vasopressin and milrinone and amiodarone drips   Eyes: Resists eye opening  ENT: Mucous membranes are dry he has a nasoenteric tube in place  the left nare  Neck:  tracheostomy site looks okay he has a left IJ introducer   Lungs: Coarse breath sounds bilaterally, tachypneic  Cardiac: Tachycardic low 110s no murmur  Abdomen: Obese soft nontender no palpable hepatosplenomegaly or masses  : Not examined  Musculoskeletal: He has mild thoracic kyphosis very large abdomen   Skin: Warm and dry no jaundice no petechiae  Neuro: He is awake not at all cooperative today  Extremities: No clubbing or cyanosis he has edema all 4 extremities he has true anasarca marked,,palpable radial pulses and I think I feel dorsalis pedis pulses bilaterally as well there are not a strong but he has more lower extremity edema  MSE: Seems to be more sedated today.       Labs:  Results from last 7  days   Lab Units 11/20/24  0856 11/20/24  0349 11/20/24  0019 11/19/24  1522 11/19/24  0801 11/19/24  0429 11/19/24  0011 11/18/24  2109 11/18/24  1714 11/18/24  0809 11/18/24  0335 11/15/24  0807 11/15/24  0306 11/14/24  0847 11/14/24  0351   GLUCOSE mg/dL 121* 110* 116* 115*  --  143*  --   --   --  153* 147*   < > 172*   < > 146*   SODIUM mmol/L 136 137 136 136  --  134*  --   --   --  137 139   < > 147*   < > 148*   POTASSIUM mmol/L 4.2 3.9 4.0 4.0  4.0 4.0 3.7 3.8 3.9   < > 4.1  4.1 4.4   < > 3.6  3.6   < > 3.2*   MAGNESIUM mg/dL 2.2  --  2.2 2.3  --  2.2  --  2.5*  --   --  2.0  --  2.0   < >  --    CO2 mmol/L 17.0* 16.3* 16.1* 14.3*  --  13.0*  --   --   --  13.8* 12.8*   < > 21.0*   < > 22.0   CHLORIDE mmol/L 106 105 105 105  --  105  --   --   --  107 109*   < > 109*   < > 109*   ANION GAP mmol/L 13.0 15.7* 14.9 16.7*  --  16.0*  --   --   --  16.2* 17.2*   < > 17.0*   < > 17.0*   CREATININE mg/dL 1.37* 1.66* 1.72* 2.41*  --  2.81*  --   --   --  2.52* 2.40*   < > 1.55*   < > 1.37*   BUN mg/dL 42* 48* 55* 70*  --  92*  --   --   --  82* 80*   < > 55*   < > 56*   BUN / CREAT RATIO  30.7* 28.9* 32.0* 29.0*  --  32.7*  --   --   --  32.5* 33.3*   < > 35.5*   < > 40.9*   CALCIUM mg/dL 7.8* 7.7* 8.0* 8.5*  --  8.7  --   --   --  8.8 8.1*   < > 7.8*   < > 8.0*   ALK PHOS U/L  --  110  --   --   --  108  --   --   --  108 107  --  97  --  110   TOTAL PROTEIN g/dL  --  5.8*  --   --   --  5.7*  --   --   --  6.0 5.6*  --  6.3  --  6.2   ALT (SGPT) U/L  --  13  --   --   --  14  --   --   --  16 17  --  6  --  20   AST (SGOT) U/L  --  24  --   --   --  21  --   --   --  27 36  --  19  --  31   BILIRUBIN mg/dL  --  0.7  --   --   --  0.6  --   --   --  0.7 0.7  --  0.7  --  0.8   ALBUMIN g/dL 2.7* 2.1* 2.7* 2.2*  --  2.5*  --   --   --  2.7* 2.6*   < > 2.7*  --  2.7*   GLOBULIN gm/dL  --  3.7  --   --   --  3.2  --   --   --  3.3 3.0  --  3.6  --  3.5    < > = values in this interval not displayed.     Estimated  Creatinine Clearance: 70.9 mL/min (A) (by C-G formula based on SCr of 1.37 mg/dL (H)).      Results from last 7 days   Lab Units 11/20/24 0349 11/19/24 2204 11/19/24  0429 11/18/24  0528 11/18/24  0335 11/17/24  0318 11/16/24  0408 11/15/24  0306 11/14/24  0351   WBC 10*3/mm3 13.65*  --  16.38*  --  18.69* 17.25* 13.92* 15.95* 19.03*   RBC 10*6/mm3 2.79*  --  2.87*  --  2.64* 3.04* 2.99* 2.95* 3.02*   HEMOGLOBIN g/dL 8.2* 8.0* 8.4* 7.8* 7.6* 8.9* 8.6* 8.4* 8.8*   HEMATOCRIT % 25.4* 24.9* 26.0* 24.4* 24.4* 28.0* 27.1* 26.7* 27.9*   MCV fL 91.0  --  90.6  --  92.4 92.1 90.6 90.5 92.4   MCH pg 29.4  --  29.3  --  28.8 29.3 28.8 28.5 29.1   MCHC g/dL 32.3  --  32.3  --  31.1* 31.8 31.7 31.5 31.5   RDW % 17.4*  --  17.0*  --  17.4* 17.1* 17.0* 16.9* 17.0*   RDW-SD fl 57.2*  --  54.8*  --  58.1* 58.4* 55.3* 55.3* 57.3*   MPV fL 10.5  --  10.1  --  10.3 10.0 9.7 9.4 9.7   PLATELETS 10*3/mm3 159  --  156  --  165 207 193 203 248   NEUTROPHIL % % 82.9*  --  85.9*  --  87.5*  --   --   --   --    LYMPHOCYTE % % 5.9*  --  4.7*  --  4.2*  --   --   --   --    MONOCYTES % % 6.2  --  5.1  --  4.1*  --   --   --   --    EOSINOPHIL % % 2.3  --  2.4  --  1.7  --   --   --   --    BASOPHIL % % 0.2  --  0.3  --  0.3  --   --   --   --    IMM GRAN % % 2.5*  --  1.6*  --  2.2*  --   --   --   --    NEUTROS ABS 10*3/mm3 11.32*  --  14.05*  --  16.35*  --   --   --   --    LYMPHS ABS 10*3/mm3 0.81  --  0.77  --  0.79  --   --   --   --    MONOS ABS 10*3/mm3 0.84  --  0.84  --  0.76  --   --   --   --    EOS ABS 10*3/mm3 0.31  --  0.40  --  0.32  --   --   --   --    BASOS ABS 10*3/mm3 0.03  --  0.05  --  0.05  --   --   --   --    IMMATURE GRANS (ABS) 10*3/mm3 0.34*  --  0.27*  --  0.42*  --   --   --   --    NRBC /100 WBC 0.4*  --  0.2  --  0.2  --   --   --   --      Results from last 7 days   Lab Units 11/20/24  0702   PH, ARTERIAL pH units 7.461*   PO2 ART mm Hg 110.1*   PCO2, ARTERIAL mm Hg 22.9*   HCO3 ART mmol/L 16.3*                    Results from last 7 days   Lab Units 11/17/24  1300 11/14/24  0351   LACTATE mmol/L 1.3  --    PROCALCITONIN ng/mL  --  0.68*         Microbiology Results (last 10 days)       Procedure Component Value - Date/Time    Respiratory Culture - Aspirate, Bronchus [[id number]] Collected: 11/13/24 1539    Lab Status: Final result Specimen: Aspirate from Bronchus Updated: 11/15/24 1043     Respiratory Culture No growth    Genital Culture - Swab, Penis [[id number]]  (Abnormal) Collected: 11/13/24 1158    Lab Status: Final result Specimen: Swab from Penis Updated: 11/16/24 1042     Genital Culture Scant growth (1+) Candida albicans      Rare growth Normal Skin Titi     Gram Stain Many (4+) WBCs seen      Occasional Yeast    MRSA Screen, PCR (Inpatient) - Swab, Nares [[id number]]  (Normal) Collected: 11/12/24 1027    Lab Status: Final result Specimen: Swab from Nares Updated: 11/12/24 1152     MRSA PCR No MRSA Detected    Narrative:      The negative predictive value of this diagnostic test is high and should only be used to consider de-escalating anti-MRSA therapy. A positive result may indicate colonization with MRSA and must be correlated clinically.    Respiratory Culture - Aspirate, ET Suction [[id number]]  (Abnormal) Collected: 11/12/24 0924    Lab Status: Final result Specimen: Aspirate from ET Suction Updated: 11/14/24 0952     Respiratory Culture Light growth (2+) Candida albicans      Scant growth (1+) Normal respiratory titi. No S. aureus or Pseudomonas aeruginosa detected. Final report.     Gram Stain Moderate (3+) WBCs per low power field      No epithelial cells seen      Rare (1+) Yeast    Blood Culture - Blood, Arm, Left [[id number]]  (Abnormal) Collected: 11/12/24 0815    Lab Status: Final result Specimen: Blood from Arm, Left Updated: 11/15/24 0728     Blood Culture Staphylococcus, coagulase negative     Isolated from Aerobic and Anaerobic Bottles     Gram Stain Aerobic Bottle Gram positive cocci in  clusters      Anaerobic Bottle Gram positive cocci in clusters    Narrative:      Probable contaminant requires clinical correlation, susceptibility not performed unless requested by physician.      Blood Culture - Blood, Arm, Right [[id number]]  (Normal) Collected: 11/12/24 0815    Lab Status: Final result Specimen: Blood from Arm, Right Updated: 11/17/24 0830     Blood Culture No growth at 5 days    Blood Culture ID, PCR - Blood, Arm, Left [[id number]]  (Abnormal) Collected: 11/12/24 0815    Lab Status: Final result Specimen: Blood from Arm, Left Updated: 11/14/24 1130     BCID, PCR Staph spp, not aureus or lugdunensis. Identification by BCID2 PCR.     BOTTLE TYPE Aerobic Bottle                acetylcysteine, 3 mL, Nebulization, TID - RT  aspirin, 81 mg, Per G Tube, Daily  atorvastatin, 40 mg, Per G Tube, Nightly  busPIRone, 5 mg, Oral, TID  cefepime, 2,000 mg, Intravenous, Q8H  chlorhexidine, 15 mL, Mouth/Throat, Q12H  digoxin, 250 mcg, Intravenous, Once  Ergocalciferol, 100 mcg, Per G Tube, Daily  heparin (porcine), 5,000 Units, Subcutaneous, Q8H  hydrocortisone-bacitracin-zinc oxide-nystatin, 1 Application, Topical, Q12H  insulin glargine, 20 Units, Subcutaneous, Daily  insulin regular, 2-7 Units, Subcutaneous, Q6H  ipratropium-albuterol, 3 mL, Nebulization, Q4H - RT  lansoprazole, 15 mg, Per G Tube, Q AM  miconazole, 1 Application, Topical, Q12H  midodrine, 15 mg, Oral, TID AC  saccharomyces boulardii, 250 mg, Per G Tube, BID  sildenafil, 20 mg, Per G Tube, TID  sodium chloride, 10 mL, Intravenous, Q12H      amiodarone, 0.5 mg/min, Last Rate: 0.5 mg/min (11/20/24 1317)  dexmedetomidine, 0.2-1.5 mcg/kg/hr, Last Rate: Stopped (11/18/24 0400)  DOPamine, 2-20 mcg/kg/min  EPINEPHrine, 0.01 mcg/kg/min, Last Rate: Stopped (11/17/24 1051)  lidocaine in D5W, 1 mg/min, Last Rate: Stopped (11/05/24 113)  milrinone, 0.25 mcg/kg/min, Last Rate: 0.125 mcg/kg/min (11/20/24 1317)  niCARdipine, 5-15 mg/hr  norepinephrine,  0.02-0.2 mcg/kg/min, Last Rate: Stopped (11/19/24 2245)  phenylephrine, 0.2-2 mcg/kg/min  Phoxillum BK4/2.5, 1,500 mL/hr, Last Rate: 1,500 mL/hr (11/19/24 1121)  Phoxillum BK4/2.5, 1,500 mL/hr, Last Rate: 1,500 mL/hr (11/19/24 1121)  Phoxillum BK4/2.5, 1,500 mL/hr, Last Rate: 1,500 mL/hr (11/19/24 1121)  propofol, 5-50 mcg/kg/min, Last Rate: 11.038 mcg/kg/min (11/20/24 0609)  vasopressin, 0.02-0.1 Units/min, Last Rate: 0.05 Units/min (11/20/24 1316)        Diagnostics:  Adult Transthoracic Echo Limited W/ Cont if Necessary Per Protocol    Result Date: 11/15/2024    Limited study for LV/RV function   Left ventricular systolic function is normal. Calculated left ventricular EF = 67.9%   Moderately to severely reduced right ventricular systolic function noted.   The right ventricular cavity is severely dilated.   The left atrial cavity is dilated.   The right atrial cavity is dilated.   Moderate tricuspid valve regurgitation is present.   Estimated right ventricular systolic pressure from tricuspid regurgitation is moderately elevated (45-55 mmHg).   S/p aortic valve replacement.  Not well-visualized/assessed.   S/p mitral valve repair with 28 mm [employer] flexible band annuloplasty.     US Nonvascular Extremity Limited    Result Date: 11/15/2024  US NONVASCULAR EXTREMITY LIMITED-11/15/2024  HISTORY: Right groin wound.  The subcutaneous tissues of the right groin superficial to the skin wound is an approximately 11.5 cm x 3.7 cm mixed echotexture hypoechoic area which may contain small amount of cystic change. There is somewhat heterogeneous appearance of the surrounding soft tissues likely related to edema.      1. Ill-defined somewhat loculated hypoechoic collection measuring 11.5 cm x 3.7 cm x 6.4 cm. This could represent hematoma or abscess and contains a small hypoechoic cystic area.   This report was finalized on 11/15/2024 4:35 PM by Dr. Deven Garcia M.D on Workstation: [id number]      XR Chest 1  View    Result Date: 11/15/2024  XR CHEST 1 VW-  HISTORY: 74-year-old male postop AVR, MVR, AICD generator explantation, intra-aortic balloon pump placement on 10/30/2024.   FINDINGS: CHF may be more prominent than on yesterday's chest radiograph. Moderately large left effusion and small-moderate right pleural effusion are likely unchanged. No pneumothorax is seen.  ET tube is approximately 2 cm proximal to the yoni. Left IJ [place] catheter tip is within the proximal main pulmonary artery. Leads of the explanted AICD generator remain in place. There is an NG tube within the esophagus and stomach, distal tip is not included.  This report was finalized on 11/15/2024 6:31 AM by Dr. Bernadette Gaming M.D on Workstation: [id number]      XR Chest 1 View    Result Date: 11/14/2024  XR CHEST 1 VW-  Clinical: Postop  COMPARISON examination 11/13/2024  FINDINGS: Patient is rotated towards the left as before. Endotracheal tube and feeding tube in position as before. Transvenous pacemaker in place. Bibasilar consolidation as before. Bilateral diffuse interstitial infiltrate also seen, unchanged. There is cardiac enlargement. The mediastinum is stable.  CONCLUSION: Stable imaging of the chest  This report was finalized on 11/14/2024 6:39 AM by Dr. Adal Kimball M.D on Workstation: [id number]      CT Chest Without Contrast Diagnostic    Result Date: 11/13/2024  CT CHEST WO CONTRAST DIAGNOSTIC-, CT ABDOMEN PELVIS WO CONTRAST-  HISTORY: Pulmonary infiltrates; T82.857A-Stenosis of other cardiac prosthetic devices, implants and grafts, initial encounter; Z95.2-Presence of prosthetic heart valve; Z95.2-Presence of prosthetic heart valve  TECHNIQUE: Radiation dose reduction techniques were utilized, including automated exposure control and exposure modulation based on body size. CT of the chest, abdomen, and pelvis includes axial imaging from the thoracic inlet through the trochanters without intravenous contrast and without oral  contrast.  COMPARISON: AP chest same date.  FINDINGS: CHEST: There are multifocal coronary artery calcifications. Median sternotomy wires and overlying midline surgical skin staples are present. Cardiomegaly. Left subclavian cardiac pacer/defibrillator leads are discontinuous proximally with pacemaker segment within the left brachiocephalic vein.  There is dense lower lobe consolidation with air bronchograms in both lower lobes which are mostly collapsed. Pulmonary vascular engorgement. Small bilateral pleural effusions layer dependently. 5 mm noncalcified nodule anterior inferior right upper lobe on axial image 37.  Endotracheal tube tip is 3.2 cm above the yoni. Left IJ tip in the left brachiocephalic vein. Feeding tube is looped in the stomach and tip is in the region of the gastric fundus.  Surgical skin staples overlying the left upper anterior chest wall where there has been removal of pacemaker.  ABDOMEN/PELVIS: Within the posterior-inferior central spleen there is a focal area of low density measuring approximately 4.7 x 4.1 cm. This is without change compared to exam 09/18/2024 and may represent splenic infarction. Liver, adrenal glands, pancreas appear within normal limits. There is lobular appearance of the kidneys with areas of cortical thinning and this appears chronic. No hydronephrosis.  Urinary bladder is partially decompressed and exhibits wall thickening. Ugarte catheter is present in the urinary bladder. There is mild to moderate stool throughout the colon. No evidence for bowel obstruction. No ascites or intra-abdominal abscess formation. Atherosclerotic calcifications are present involving the abdominal aorta and iliac vasculature. There is mild edema within the left lateral lower chest and upper abdomen subcutaneous fat extending off the field-of-view laterally. Multilevel bridging plate osteophyte formation within the thoracic spine.      1. Dense bilateral lower lobe consolidation with  partial collapse of the lower lobes and air bronchograms likely associated with infiltrate. Pulmonary vascular engorgement. Small pleural effusions layer dependently. 2. 5 mm noncalcified nodule anterior inferior right upper lobe. 3. Recent median sternotomy with overlying midline surgical skin staples. Cardiomegaly. Prosthetic tricuspid valve. Recent removal of left subclavian cardiac pacer with retraction of pacing leads. 4. Mild to moderate stool throughout the colon. No evidence for acute intra-abdominal process. No hydronephrosis. 5. Endotracheal tube tip 3.2 cm above the yoni. Left IJ tip in the left brachiocephalic vein. Feeding tube tip in the gastric fundus. Ugarte catheter within a decompressed urinary bladder which exhibits generalized wall thickening.    Radiation dose reduction techniques were utilized, including automated exposure control and exposure modulation based on body size.   This report was finalized on 11/13/2024 1:22 PM by Salas Ingram M.D on Workstation: [id number]      CT Abdomen Pelvis Without Contrast    Result Date: 11/13/2024  CT CHEST WO CONTRAST DIAGNOSTIC-, CT ABDOMEN PELVIS WO CONTRAST-  HISTORY: Pulmonary infiltrates; T82.857A-Stenosis of other cardiac prosthetic devices, implants and grafts, initial encounter; Z95.2-Presence of prosthetic heart valve; Z95.2-Presence of prosthetic heart valve  TECHNIQUE: Radiation dose reduction techniques were utilized, including automated exposure control and exposure modulation based on body size. CT of the chest, abdomen, and pelvis includes axial imaging from the thoracic inlet through the trochanters without intravenous contrast and without oral contrast.  COMPARISON: AP chest same date.  FINDINGS: CHEST: There are multifocal coronary artery calcifications. Median sternotomy wires and overlying midline surgical skin staples are present. Cardiomegaly. Left subclavian cardiac pacer/defibrillator leads are discontinuous proximally with  pacemaker segment within the left brachiocephalic vein.  There is dense lower lobe consolidation with air bronchograms in both lower lobes which are mostly collapsed. Pulmonary vascular engorgement. Small bilateral pleural effusions layer dependently. 5 mm noncalcified nodule anterior inferior right upper lobe on axial image 37.  Endotracheal tube tip is 3.2 cm above the yoni. Left IJ tip in the left brachiocephalic vein. Feeding tube is looped in the stomach and tip is in the region of the gastric fundus.  Surgical skin staples overlying the left upper anterior chest wall where there has been removal of pacemaker.  ABDOMEN/PELVIS: Within the posterior-inferior central spleen there is a focal area of low density measuring approximately 4.7 x 4.1 cm. This is without change compared to exam 09/18/2024 and may represent splenic infarction. Liver, adrenal glands, pancreas appear within normal limits. There is lobular appearance of the kidneys with areas of cortical thinning and this appears chronic. No hydronephrosis.  Urinary bladder is partially decompressed and exhibits wall thickening. Ugarte catheter is present in the urinary bladder. There is mild to moderate stool throughout the colon. No evidence for bowel obstruction. No ascites or intra-abdominal abscess formation. Atherosclerotic calcifications are present involving the abdominal aorta and iliac vasculature. There is mild edema within the left lateral lower chest and upper abdomen subcutaneous fat extending off the field-of-view laterally. Multilevel bridging plate osteophyte formation within the thoracic spine.      1. Dense bilateral lower lobe consolidation with partial collapse of the lower lobes and air bronchograms likely associated with infiltrate. Pulmonary vascular engorgement. Small pleural effusions layer dependently. 2. 5 mm noncalcified nodule anterior inferior right upper lobe. 3. Recent median sternotomy with overlying midline surgical skin  staples. Cardiomegaly. Prosthetic tricuspid valve. Recent removal of left subclavian cardiac pacer with retraction of pacing leads. 4. Mild to moderate stool throughout the colon. No evidence for acute intra-abdominal process. No hydronephrosis. 5. Endotracheal tube tip 3.2 cm above the yoni. Left IJ tip in the left brachiocephalic vein. Feeding tube tip in the gastric fundus. Ugarte catheter within a decompressed urinary bladder which exhibits generalized wall thickening.    Radiation dose reduction techniques were utilized, including automated exposure control and exposure modulation based on body size.   This report was finalized on 11/13/2024 1:22 PM by Salas Ingram M.D on Workstation: [id number]      XR Chest 1 View    Result Date: 11/13/2024  SINGLE VIEW OF THE CHEST  HISTORY: Postop open heart surgery  COMPARISON: November 12, 2024  FINDINGS: Tubes and lines appear stable. There is vascular congestion. Bibasilar consolidation and bilateral effusions are noted. No pneumothorax is seen.      No significant interval change.  This report was finalized on 11/13/2024 5:37 AM by Dr. Alexandria Dahl M.D on Workstation: [id number]      XR Chest 1 View    Result Date: 11/12/2024  CHEST SINGLE VIEW  HISTORY: 74 years of age, Male. Postoperative exam.  COMPARISON: AP chest 11/11/2024, 11/10/2024, 11/09/2024.  FINDINGS: Endotracheal tube, feeding tube, left subclavian discontinued. Pacing/defibrillator leads, left atrial appendage clip are evident. Left IJ central venous catheter extends to the region of the right atrium. There are postoperative changes on the left where there are surgical skin staples. There is interstitial disease and there are left greater than right basilar opacities due to atelectasis or infiltrates. No evidence for pneumothorax      No evidence for significant change when compared to yesterday's exam.   This report was finalized on 11/12/2024 8:02 AM by Salas Ingram M.D on Workstation:  [id number]      US Thoracentesis    Result Date: 11/11/2024  ULTRASOUND-GUIDED LEFT THORACENTESIS  HISTORY: Pleural effusion  COMPARISON: Chest x-ray 11/11/2024  The risks, benefits and alternatives of the procedure were discussed with the patient and informed consent was obtained. Prior to the procedure ultrasound of the left chest was performed to evaluate the pleural effusion. However, there is only very minimal pleural fluid present, insufficient for thoracentesis.       Only very minimal pleural fluid present. Thoracentesis not performed.   This report was finalized on 11/11/2024 4:55 PM by Dr. eDan Knapp M.D on Workstation: [id number]      Adult Transthoracic Echo Limited W/ Cont if Necessary Per Protocol    Result Date: 11/11/2024    Left ventricular systolic function is normal. Calculated left ventricular EF = 67.8%   RV severely dilated with moderate-severe dysfunction.  Mild-moderate RVH.   S/p aortic valve replacement with homograft.  Not well-visualized/assessed.   S/p mitral valve repair with 28 mm Medtronic flexible band annuloplasty.   Severe tricuspid valve regurgitation is present.   Estimated right ventricular systolic pressure from tricuspid regurgitation is markedly elevated (>55 mmHg).   Severe biatrial enlargement, dilated IVC.     XR Chest 1 View    Result Date: 11/11/2024  XR CHEST 1 VW-  DATE OF EXAM: 11/11/2024 8:03 AM  INDICATION: Postop. Endotracheal tube advancement.  COMPARISON: Radiographs 11/10/2024, 11/9/2024, 11/8/2024, and 11/7/2024. Coronary CTA 10/25/2024. CT chest 9/18/2024.  TECHNIQUE: A single portable AP view of the chest was obtained.  FINDINGS: Lordotic positioning. Multiple overlying artifacts. Similar-appearing sternotomy wires, retained cardiac pacer/defibrillator leads, mitral annuloplasty, endotracheal tube, left IJ pulmonary arterial catheter, and partially imaged enteric tube. Similar-appearing basilar predominant opacification of both lungs. No pneumothorax.  Unchanged cardiac and mediastinal contours. No acute osseous abnormality is identified.       1. No significant herbal change. Stable support tubes and line. 2. Stable basilar prominent lung opacities.  This report was finalized on 11/11/2024 9:01 AM by Neel Wan MD on Workstation: [id number]      Duplex Venous Upper Extremity - Bilateral CAR    Result Date: 11/11/2024    Sub-acute right upper extremity superficial thrombophlebitis noted in the basilic (upper arm).   Sub-acute left upper extremity deep vein thrombosis noted in the axillary.   Acute left upper extremity superficial thrombophlebitis noted in the cephalic (forearm).   All other vessels appear normal.     XR Chest 1 View    Result Date: 11/10/2024  XR CHEST 1 VW-   INDICATION: Central line placement  COMPARISON: Chest radiograph November 9, 2024  TECHNIQUE: 1 view chest  FINDINGS:  Vascular congestion. Indistinctness of vessels. Ill-defined basilar opacities. Blunting costophrenic angles. Stable mediastinum. Enlarged cardiac silhouette. Left atrial appendage clip. Median sternotomy. Right IJ sheath with catheter tip near the SVC bifurcation. Endotracheal tube tip is in the distal trachea. Left IJ catheter containing a pulmonary artery catheter, with the tip near the right ventricular outflow tract. Feeding tube tip is in the gastric fundus.       1. Interval placement of left IJ sheath containing a pulmonary artery catheter with the tip near the right ventricular outflow tract. No pneumothorax. 2. Cardiomegaly with vascular congestion and suspected interstitial edema. 3. Ill-defined basilar opacities are similar. 4. Layering pleural effusions  This report was finalized on 11/10/2024 10:21 AM by Dr. Alberto Dominique M.D on Workstation: [id number]      [place] catheter    Result Date: 11/10/2024  Matthieu Scott MD     11/10/2024 10:16 AM [place] catheter Patient reassessed immediately prior to procedure Patient location during  procedure: ICU Indications: MD/Surgeon request Staff Anesthesiologist: Matthieu Scott MD Preanesthetic Checklist Completed: patient identified, IV checked, site marked, risks and benefits discussed, surgical consent, monitors and equipment checked, pre-op evaluation and timeout performed Central Line Prep Sterile Tech:gloves, cap, gown, mask and sterile barriers Prep: chloraprep no medical exclusion documented for following all elements of maximal sterile barrier technique Patient monitoring: blood pressure monitoring, continuous pulse oximetry and EKG Central Line Procedure Laterality:right Location:internal jugular Catheter Type:[place] Catheter Size:7.5 Fr Guidance:ultrasound guided PROCEDURE NOTE/ULTRASOUND INTERPRETATION.  Using ultrasound guidance the potential vascular sites for insertion of the catheter were visualized to determine the patency of the vessel to be used for vascular access.  After selecting the appropriate site for insertion, the needle was visualized under ultrasound being inserted into the internal jugular vein, followed by ultrasound confirmation of wire and catheter placement. There were no abnormalities seen on ultrasound; an image was taken; and the patient tolerated the procedure with no complications. Images: still images obtained, printed/placed on chart Assessment Post procedure:biopatch applied, line sutured and occlusive dressing applied Assessement:blood return through all ports and free fluid flow Complications:no Patient Tolerance:patient tolerated the procedure well with no apparent complications Additional Notes SGC @     Central Line    Result Date: 11/10/2024  Matthieu Scott MD     11/10/2024 10:16 AM Central Line Patient reassessed immediately prior to procedure Patient location during procedure: ICU Indications: MD/Surgeon request Staff Anesthesiologist: Mattheiu Scott MD Preanesthetic Checklist Completed: patient identified, IV checked, site marked,  risks and benefits discussed, surgical consent, monitors and equipment checked, pre-op evaluation and timeout performed Central Line Prep Sterile Tech:gloves, cap, gown, mask and sterile barriers Prep: chloraprep no medical exclusion documented for following all elements of maximal sterile barrier technique Patient monitoring: blood pressure monitoring, continuous pulse oximetry and EKG Central Line Procedure Laterality:left Location:internal jugular Catheter Type:MAC Catheter Size:9 Fr Guidance:ultrasound guided PROCEDURE NOTE/ULTRASOUND INTERPRETATION.  Using ultrasound guidance the potential vascular sites for insertion of the catheter were visualized to determine the patency of the vessel to be used for vascular access.  After selecting the appropriate site for insertion, the needle was visualized under ultrasound being inserted into the internal jugular vein, followed by ultrasound confirmation of wire and catheter placement. There were no abnormalities seen on ultrasound; an image was taken; and the patient tolerated the procedure with no complications. Images: still images obtained, printed/placed on chart Assessment Post procedure:biopatch applied, line sutured and occlusive dressing applied Assessement:blood return through all ports and free fluid flow Complications:no Patient Tolerance:patient tolerated the procedure well with no apparent complications     XR Chest 1 View    Result Date: 11/9/2024  SINGLE VIEW OF THE CHEST  HISTORY: Central line placement  COMPARISON: November 8, 2024  FINDINGS: Tubes and lines appear stable compared to prior study. No pneumothorax is seen. Cardiomegaly and vascular congestion are noted. Bibasilar consolidation and left pleural effusion are again noted. Aeration of the left lung base They improved.      Slight interval improvement in aeration of the left lung base.  This report was finalized on 11/9/2024 3:33 AM by Dr. Alexandria Dahl M.D on Workstation: [id number]       Duplex Venous Lower Extremity - Bilateral CAR    Result Date: 11/8/2024    Normal bilateral lower extremity venous duplex scan.   Previous left gastrocnemius vein thrombus not visualized on today's study     Adult Transthoracic Echo Limited W/ Cont if Necessary Per Protocol    Result Date: 11/8/2024    Left ventricular systolic function is normal. Calculated left ventricular EF = 61.1%   Left ventricular diastolic function was indeterminate.   The right ventricular cavity is moderately dilated. Normal right ventricular systolic function noted. RV free wall strain = -6.0%.   The left atrial cavity is mildly dilated.     XR Chest 1 View    Result Date: 11/8/2024  XR CHEST 1 VW-  Clinical: Postop  COMPARISON examination 11/7/2024  FINDINGS: Patient is rotated, projection is apical lordotic. Life support tubes and lines in position as before. There are surgical skin staples demonstrated along the chest on the left. The cardiomediastinal silhouette is unchanged, there is cardiac enlargement. There is worsening bibasilar infiltrate/atelectasis and/or consolidation, greatest at the left base. Possible left-sided pleural effusion. No pneumothorax seen. The remainder is unremarkable.  This report was finalized on 11/8/2024 7:01 AM by Dr. Adal Kimball M.D on Workstation: [id number]      Adult Transthoracic Echo Limited W/ Cont if Necessary Per Protocol    Result Date: 11/7/2024    Limited echocardiogram for left and right ventricular function   Left ventricular systolic function is hyperdynamic (EF > 70%). Left ventricular ejection fraction appears to be greater than 70%.   Left ventricular wall thickness is consistent with mild concentric hypertrophy.   The right ventricular cavity is severely dilated.   Moderately reduced right ventricular systolic function noted.   Calculated right ventricular systolic pressure from tricuspid regurgitation is 41.0 mmHg.     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 Right IJ pulmonary artery catheter with tip overlying the RVOT/main pulmonary artery. Enteric tube with tip overlying the stomach.  Stable normal size cardiomediastinal silhouette with postsurgical changes of mitral valve repair and left atrial appendage clipping. Low lung volumes. Persistent confluent dense bilateral lobe opacities which are suspicious for infiltrates. Central pulmonary vasculature remains somewhat dilated and indistinct with prominent pulmonary interstitial markings which may reflect a component of edema. No measurable pleural effusion or pneumothorax. No focal osseous abnormality.  This report was finalized on 11/7/2024 6:54 AM by Dr. Donovan Gomez M.D on Workstation: [id number]      XR Chest 1 View    Result Date: 11/6/2024  XR CHEST 1 VW-  HISTORY: Male who is 74 years-old, postoperative evaluation  TECHNIQUE: Frontal view of the chest  COMPARISON: 11/5/2024  FINDINGS: Stable appearing endotracheal tube, NG tube, right IJ catheter. Cardiac lead fragments, sternotomy wires, skin staples noted. The heart is enlarged. Pulmonary vasculature is congested with similar-appearing bilateral lower lung opacities. There may be minimal pleural effusions. No pneumothorax. Otherwise stable.      No significant change.  This report was finalized on 11/6/2024 12:48 PM by Dr. Giacomo Burton M.D on Workstation: [id number]      XR Chest 1 View    Result Date: 11/5/2024  XR CHEST 1 VW-11/5/2024  HISTORY: Postop. Follow-up.  The heart size is moderately enlarged. Mild to moderate bibasilar atelectasis or infiltrates are seen. The right base has cleared somewhat since yesterday's study.  No significant pneumothorax is seen. Skin staples, pacer leads, ET tube, NG tube, [place] catheter, sternotomy wires and  radiopaque closure device are again seen. These appear relatively unchanged from yesterday's study.      1. No significant pneumothorax is seen. 2. Bibasilar atelectasis and/or infiltrates. The right base may have cleared slightly since yesterday's study. 3. Life support devices appear relatively unchanged.   This report was finalized on 11/5/2024 6:55 AM by Dr. Deven Garcia M.D on Workstation: [id number]      Adult Transthoracic Echo Limited W/ Cont if Necessary Per Protocol    Result Date: 11/4/2024    Left ventricular systolic function is hyperdynamic (EF > 70%).   Left ventricular wall thickness is consistent with mild concentric hypertrophy.   The right ventricular cavity is severely dilated. Severely reduced right ventricular systolic function noted.   There is no evidence of pericardial effusion     XR Chest 1 View    Result Date: 11/4/2024  XR CHEST 1 VW-  HISTORY: Male who is 74 years-old, chest tube removal  TECHNIQUE: Frontal view of the chest  COMPARISON: 11/4/2024 at 1025 hours  FINDINGS: Interval removal of chest tubes. Stable appearing NG tube, endotracheal tube, right IJ [place] catheter. Cardiac lead fragments are evident. Sternotomy wires, cardiac valve marker are noted. The heart is enlarged. Pulmonary vasculature is congested. Atelectasis/infiltrate at the lower lungs, similar to prior exam, with likely mild pleural effusions. No pneumothorax. No acute osseous process.      Chest tubes removed. No pneumothorax.  This report was finalized on 11/4/2024 11:51 AM by Dr. Giacomo Burton M.D on Workstation: [id number]      XR Chest 1 View    Result Date: 11/4/2024  XR CHEST 1 VW-  HISTORY: Male who is 74 years-old, chest tube removal  TECHNIQUE: Frontal view of the chest  COMPARISON: 11/4/2024  FINDINGS: A right chest tube has been removed. Other chest tubes appear stable. Stable appearing NG tube, endotracheal tube, right IJ [place] catheter. Cardiac lead fragments are evident. Sternotomy  wires, cardiac valve marker are noted. The heart is enlarged. Pulmonary vasculature is congested. Atelectasis/infiltrate at the lower lungs, similar to prior exam, with likely mild pleural effusions. No pneumothorax. No acute osseous process.      Chest tube removed. No pneumothorax.  This report was finalized on 11/4/2024 10:39 AM by Dr. Giacomo Burton M.D on Workstation: [id number]      XR Chest 1 View    Result Date: 11/4/2024  XR CHEST 1 VW-   INDICATION: Postoperative  COMPARISON: Chest radiograph November 3, 2024  TECHNIQUE: 1 view chest  FINDINGS:  Heterogeneous multifocal bilateral lung opacities. Questionable tiny left pneumothorax. Stable mediastinum. Suspect CABG. Left atrial appendage clip. Endotracheal tube tip is located in the distal trachea 4.3 cm above the yoni. Right IJ sheath with pulmonary artery catheter tip over the main pulmonary artery. Feeding tube with the tip in the gastric fundus. Abandoned cardiac leads. Left-sided chest tube. Mediastinal drains.       1. Heterogeneous multifocal bilateral lung opacities with improved aeration of the right upper lung. 2. Possible tiny left pneumothorax. 3. Stable support apparatus.  This report was finalized on 11/4/2024 8:31 AM by Dr. Alberto Dominique M.D on Workstation: [id number]      Duplex Pseudoaneurysm CAR    Result Date: 11/3/2024    Study today is negative for pseudoaneurysm of the left groin.  Hematoma measuring 2.3 x 1.3 cm.     Adult Transthoracic Echo Limited W/ Cont if Necessary Per Protocol    Result Date: 11/3/2024    Left ventricular systolic function is hyperdynamic (EF > 70%). Left ventricular ejection fraction appears to be greater than 70%.   The left ventricular cavity is borderline dilated.   Moderately reduced right ventricular systolic function noted.   The right ventricular cavity is moderate to severely dilated.   The left atrial cavity is severely dilated.   The right atrial cavity is mild to moderately  dilated.   Color  doppler jet is not well visualized but triangular shap and density indicate severe tricuspid valve regurgitation is present.   Estimated right ventricular systolic pressure from tricuspid regurgitation is mildly elevated (35-45 mmHg).   Mitral ring in place with normal gradients.   Prosthetic AV not well visualized but gradients are normal.   No pericardial effusion appreciated.     XR Chest 1 View    Result Date: 11/3/2024  XR CHEST 1 VW-  HISTORY: Male who is 74 years-old, postoperative evaluation  TECHNIQUE: Supine view of the chest  COMPARISON: 11/2/2024  FINDINGS: Stable appearing endotracheal tube, NG tube, right IJ catheter, mediastinal drain, balloon pump marker. Cardiac leads, sternotomy wires, cardiac valve marker noted. The heart is enlarged. Pulmonary vasculature is congested. Extensive bilateral pulmonary opacities appear similar to prior exam. No large pleural effusion or pneumothorax is seen, limited by supine positioning, left lateral costophrenic angle is excluded from the image. Otherwise stable.      No significant change.    This report was finalized on 11/3/2024 7:10 AM by Dr. Giacomo Burton M.D on Workstation: [id number]      XR Chest 1 View    Result Date: 11/2/2024  XR CHEST 1 VW-  HISTORY: Postop.  COMPARISON: Chest radiograph 11/2/2024 9:14 a.m.  FINDINGS:  A single view of the chest was obtained. There is an endotracheal tube with the tip terminating approximately 2.6 cm above the yoni. There is a linear metallic density projecting over the aortic knob, which was previously located over the lower mediastinum. This may reflect a balloon pump with repositioning. Additional support devices are not significantly changed. The cardiac silhouette is enlarged. There is a left atrial appendage clip. Status post CABG. There is calcific aortic atherosclerosis. Bilateral pulmonary opacities are similar to prior. Sternal wires and surgical clips are present.  This report was finalized on 11/2/2024  2:24 PM by Dr. Janessa Bower M.D on Workstation: [id number]      XR Abdomen KUB    Result Date: 11/2/2024  XR ABDOMEN KUB-  INDICATIONS: NG tube placement  TECHNIQUE: SUPINE VIEW OF THE ABDOMEN  COMPARISON: 10/31/2024  FINDINGS:  In the partly included thorax, the NG tube projects over the spine, that extends to left upper quadrant at the expected location of the proximal stomach. The bowel gas pattern is nonobstructive. Follow-up as clinically indicated.       As described.   This report was finalized on 11/2/2024 2:09 PM by Dr. Giacomo Burton M.D on Workstation: [id number]      XR Chest 1 View    Result Date: 11/2/2024  XR CHEST 1 VW-  HISTORY: Male who is 73 years-old, postoperative evaluation  TECHNIQUE: Frontal view of the chest  COMPARISON: 11/2/2024 at 0134 hours  FINDINGS: Previous NG tube is no longer seen. Stable appearing endotracheal tube. Right IJ [place] catheter appears slightly advanced in comparison with the prior exam, tip in the region of the pulmonary arterial trunk. Normal chest tube removal. A 7-8 mm metallic radiodensity projecting over the central aspect of the cardiac shadow, if representing balloon pump marker would be low in position, correlate clinically. Sternotomy wires, cardiac valve marker noted. The heart is enlarged. Extensive bilateral pulmonary opacities show further interval increase bilaterally. There may be left pleural effusion. No pneumothorax. Otherwise stable.       As described.  Discussed by telephone with patient's nurse, [place], at time of interpretation, 1012, 11/2/2024  This report was finalized on 11/2/2024 10:14 AM by Dr. Giacomo Burton M.D on Workstation: [id number]      Arterial Line    Result Date: 11/2/2024  Anuj Aguilar MD     11/2/2024  7:24 AM Arterial Line Patient reassessed immediately prior to procedure Patient location during procedure: OR Line placed for hemodynamic monitoring, ABGs/Labs/ISTAT and MD/Surgeon request. Performed By  Anesthesiologist: Anuj Aguilar MD Preanesthetic Checklist Completed: patient identified, IV checked, site marked, risks and benefits discussed, surgical consent, monitors and equipment checked, pre-op evaluation and timeout performed Arterial Line Prep  Sterile Tech: cap, gloves and sterile barriers Prep: ChloraPrep Patient monitoring: EKG, continuous pulse oximetry and blood pressure monitoring Arterial Line Procedure Laterality:right Location:  radial artery Catheter size: 20 G Guidance: ultrasound guided PROCEDURE NOTE/ULTRASOUND INTERPRETATION.  Using ultrasound guidance the potential vascular sites for insertion of the catheter were visualized to determine the patency of the vessel to be used for vascular access.  After selecting the appropriate site for insertion, the needle was visualized under ultrasound being inserted into the radial artery, followed by ultrasound confirmation of wire and catheter placement. There were no abnormalities seen on ultrasound; an image was taken; and the patient tolerated the procedure with no complications. Number of attempts: 1 Successful placement: yes Images: still images not obtained Post Assessment Dressing Type: wrist guard applied, secured with tape and occlusive dressing applied. Complications no Circ/Move/Sens Assessment: normal and unchanged. Patient Tolerance: patient tolerated the procedure well with no apparent complications Additional Notes ULTRASOUND INTERPRETATION. Using ultrasound guidance, a catheter was placed within in the appropriate vessel and advanced successfully. There were no abnormalities seen on ultrasound; the patient tolerated the procedure with no complications.     Diagnostic IntraOp Dano    Result Date: 11/2/2024  Leana Jang MD     11/2/2024  8:06 AM Diagnostic IntraOp Dano Procedure Performed: Diagnostic IntraOp Dano      Start Time:  11/2/2024 8:04 AM      End Time:   11/2/2024 8:04 AM Preanesthesia Checklist: Patient identified, IV  assessed, risks and benefits discussed, monitors and equipment assessed, procedure being performed at surgeon's request and anesthesia consent obtained. General Procedure Information JOLIE Placed for monitoring purposes only -- This is not a diagnostic JOLIE Diagnostic Indications for Echo:  hemodynamic monitoring Physician Requesting Echo: Javon Florian MD Location performed:  OR Intubated Bite block placed Heart visualized Probe Insertion:  Easy Probe Type:  Multiplane Modalities:  2D only, color flow mapping, continuous wave Doppler and pulse wave Doppler Anesthesia Information Performed Personally Anesthesiologist:  Leana Jang MD Echocardiogram Comments:      Limited exam for hemodynamic monitoring while closing chest    XR Chest 1 View    Result Date: 11/2/2024  XR CHEST 1 VW-  HISTORY: Male who is 73 years-old, balloon pump  TECHNIQUE: Frontal view of the chest  COMPARISON: 11/1/2024  FINDINGS: Stable-appearing tubes and line. The heart is enlarged. Extensive bilateral pulmonary opacities show interval increase, especially in the right upper lung. There may be left pleural effusion. No pneumothorax. Otherwise stable.      As described.  This report was finalized on 11/2/2024 5:50 AM by Dr. Giacomo Burton M.D on Workstation: [id number]      XR Chest 1 View    Result Date: 11/1/2024  SINGLE VIEW OF THE CHEST  HISTORY: Postop open heart surgery  COMPARISON: October 31, 2024  FINDINGS: Tubes and lines appear stable, including possible positioning of the [place] catheter within the right ventricle. Vascular congestion is again noted. No pneumothorax is seen. Bibasilar atelectasis and small bilateral effusions are suspected.       No significant interval change.  This report was finalized on 11/1/2024 5:18 AM by Dr. Alexandria Dahl M.D on Workstation: [id number]      XR Abdomen KUB    Result Date: 11/1/2024  KUB  HISTORY: Orogastric tube placement  COMPARISON: None available.  FINDINGS: Orogastric  tube appears to terminate within the proximal stomach. Bowel gas pattern is nonobstructive. Bibasilar consolidation and bilateral effusions are noted.  This report was finalized on 11/1/2024 4:08 AM by Dr. Alexandria Dahl M.D on Workstation: [id number]      XR Chest 1 View    Result Date: 10/31/2024  SINGLE VIEW OF THE CHEST  HISTORY: Orogastric tube placement  COMPARISON: None available.  FINDINGS: Orogastric tube appears to extend into the upper abdomen. The patient's [place] catheter has retracted, and is likely positioned within the right ventricle. Tubes and lines are otherwise stable. Cardiomegaly and vascular congestion are noted. Bibasilar consolidation is noted. Bilateral effusions are suspected. No pneumothorax is seen.       1. Orogastric tube appears to extend into the upper abdomen. 2. [place] catheter has retracted, and may be positioned within the right ventricle.  This report was finalized on 10/31/2024 9:51 PM by Dr. Alexandria Dahl M.D on Workstation: [id number]      XR Abdomen KUB    Addendum Date: 10/31/2024    ADDENDUM: Discussed by telephone with patient's nurse, Letty, 2044, 10/31/2024.  This report was finalized on 10/31/2024 8:47 PM by Dr. Giacomo Burton M.D on Workstation: [id number]      Result Date: 10/31/2024  XR ABDOMEN KUB-  INDICATIONS: Orogastric tube placement  TECHNIQUE: FRONTAL VIEW OF THE ABDOMEN  COMPARISON: None available  FINDINGS:  As several tubes are present over the abdomen, it is difficult to distinguish with certainty which tube is the orogastric tube, or what the location of the orogastric tube is; as such, additional imaging at the level of the chest and upper abdomen is recommended for further evaluation. The bowel gas pattern is nonobstructive.       As described.   This report was finalized on 10/31/2024 8:40 PM by Dr. Giacomo Burton M.D on Workstation: [id number]      XR Chest 1 View    Result Date: 10/31/2024  XR CHEST 1 VW-  HISTORY: 73-year-old male  status post aortic root replacement, removal of intracardiac leads as well as pacemaker ICD generator and part of the leads. Intra-aortic balloon pump. Significant coagulopathy.  FINDINGS: Distal tip of ET tube is 1.5 cm proximal to the yoni. 2 cm withdrawal is recommended. Right IJ [place] Font[place]-Paradise catheter tip is within the main pulmonary artery outflow tract. Intra-aortic balloon pump marker is approximately 7 cm distal to the yoni. No pneumothorax.  There is improved aeration at the right lung, but the remaining perihilar airspace consolidations need to be followed. There is no definite interstitial edema. No significant change in the enlarged cardiac silhouette. No pneumothorax.  This report was finalized on 10/31/2024 4:31 PM by Dr. Bernadette Gaming M.D on Workstation: [id number]      Diagnostic IntraOp Dano    Result Date: 10/31/2024  Anuj Aguilar MD     10/31/2024  3:58 PM Diagnostic IntraOp Dano Procedure Performed: Diagnostic IntraOp Dano      Start Time:      End Time:  Preanesthesia Checklist: Patient identified, IV assessed, risks and benefits discussed, monitors and equipment assessed, procedure being performed at surgeon's request and anesthesia consent obtained. General Procedure Information DANO Placed for monitoring purposes only -- This is not a diagnostic DANO Physician Requesting Echo: Javon Florian MD Location performed:  ICU Intubated Bite block placed Heart visualized Probe Insertion:  Easy Probe Type:  Multiplane Modalities:  Color flow mapping, continuous wave Doppler and pulse wave Doppler Echocardiographic and Doppler Measurements Aorta Other Aortic Findings:      Anesthesia Information Performed Personally Anesthesiologist:  Anuj Aguilar MD Echocardiogram Comments:      Limited exam performed in ICU on POD1, inotropes epi 0.02 & milrinone 0.375 RV - severely impaired systolic function LV - severe impaired systolic function, EF 20-25%, underfilled, practically kissing papillary muscles,  global hypokinesis Diastolic function - grade 2 dysfunction RA: dilated LA: dilated AV - s/p bioprosthetic homograft without paravalvular leak MV - s/p annuloplasty, well seated without leak, mean gradient 2mmHg, no regurgitation seen TV - moderate to severe regurgitation    XR Chest 1 View    Result Date: 10/31/2024  SINGLE VIEW OF THE CHEST  HISTORY: Postop line check  COMPARISON: October 30, 2024  FINDINGS: Tubes and lines appear to be stable when compared to the earlier study. No pneumothorax is seen. There is vascular congestion. There is bibasilar atelectasis. There is a left pleural effusion. As previously noted, intracardiac pacemaker leads have been removed, and the generator also appears to have been removed.      Postoperative findings, as noted above.  This report was finalized on 10/31/2024 12:18 AM by Dr. Alexandria Dahl M.D on Workstation: [id number]      XR Lost Needle / Instrument    Result Date: 10/30/2024  X-RAY LOST NEEDLE/INSTRUMENT  HISTORY: No count  COMPARISON: October 30, 2024  FINDINGS: The patient has undergone revision of sternotomy. Endotracheal tube terminates in satisfactory position. Right internal jugular vein [place]-Paradise catheter appears to be stable in position. There are left-sided chest tubes and a mediastinal drain. Distal left-sided pacemaker leads appear to have been removed. Correlation with operative procedure is recommended. There is vascular congestion. No obvious pneumothorax is seen. Left basilar atelectasis is present. There may be a left pleural effusion.      Postoperative findings, as noted above. Distal left-sided pacemaker leads appear to have been removed, and correlation with procedural history is recommended.  This report was finalized on 10/30/2024 10:58 PM by Dr. Alexandria Dahl M.D on Workstation: [id number]      XR Chest Post CVA Port    Result Date: 10/30/2024  Portable chest radiograph  HISTORY: Central line placement  TECHNIQUE: Single AP portable  radiograph of the chest  COMPARISON: Chest radiograph 10/20/2024      FINDINGS AND IMPRESSION: Right internal jugular pulmonary artery catheter tip terminates over the left aspect of the mediastinum. There are median sternotomy wires and a presumed atrial appendage clip. Prosthetic heart valve and a left-sided pacer/AICD.  Bibasilar pulmonary pacification is present, left greater than right, mildly worsened within the right lung since 10/20/2024. Given asymmetry, continued attention follow-up to resolution is recommended to exclude the possibility of neoplasm. No pneumothorax is seen. Cardiac silhouette is mildly enlarged.  This report was finalized on 10/30/2024 12:20 PM by Dr. Danial Rosenbaum M.D on Workstation: [id number]        Pulmonary Artery Catheter    Result Date: 10/30/2024  Lionel Valencia MD     10/30/2024 11:03 AM Pulmonary Artery Catheter Patient reassessed immediately prior to procedure Patient location during procedure: OR Start time: 10/30/2024 10:26 AM Stop Time:10/30/2024 10:46 AM Indications: central pressure monitoring, vascular access and MD/Surgeon request Staff Anesthesiologist: Lionel Valencia MD Preanesthetic Checklist Completed: patient identified and risks and benefits discussed Central Line Prep Sterile Tech:cap, gloves, gown, mask and sterile barriers Prep: chloraprep Patient monitoring: blood pressure monitoring, continuous pulse oximetry and EKG Central Line Procedure Laterality:right Location:internal jugular Catheter Type:O'Neals-Paradise Catheter Size:7.5 Fr Guidance:ultrasound guided PROCEDURE NOTE/ULTRASOUND INTERPRETATION.  Using ultrasound guidance the potential vascular sites for insertion of the catheter were visualized to determine the patency of the vessel to be used for vascular access.  After selecting the appropriate site for insertion, the needle was visualized under ultrasound being inserted into the internal jugular vein, followed by ultrasound confirmation of wire and  catheter placement. There were no abnormalities seen on ultrasound; an image was taken; and the patient tolerated the procedure with no complications. Assessment Post procedure:biopatch applied, line sutured, occlusive dressing applied and secured with tape Assessement:blood return through all ports and free fluid flow Complications:no Patient Tolerance:patient tolerated the procedure well with no apparent complications     Central Line    Result Date: 10/30/2024  Lionel Valencia MD     10/30/2024 11:02 AM Central Line Patient reassessed immediately prior to procedure Patient location during procedure: pre-op Start time: 10/30/2024 10:26 AM Stop Time:10/30/2024 10:46 AM Indications: vascular access and central pressure monitoring Staff Anesthesiologist: Lionel Valencia MD Preanesthetic Checklist Completed: patient identified and risks and benefits discussed Central Line Prep Sterile Tech:cap, gloves, gown, mask and sterile barriers Prep: chloraprep Patient monitoring: blood pressure monitoring, continuous pulse oximetry and EKG Central Line Procedure Laterality:right Location:internal jugular Catheter Type:Cordis Catheter Size:9 Fr Guidance:ultrasound guided PROCEDURE NOTE/ULTRASOUND INTERPRETATION.  Using ultrasound guidance the potential vascular sites for insertion of the catheter were visualized to determine the patency of the vessel to be used for vascular access.  After selecting the appropriate site for insertion, the needle was visualized under ultrasound being inserted into the internal jugular vein, followed by ultrasound confirmation of wire and catheter placement. There were no abnormalities seen on ultrasound; an image was taken; and the patient tolerated the procedure with no complications. Images: still images obtained, printed/placed on chart Assessment Post procedure:biopatch applied, line sutured, occlusive dressing applied and secured with tape Assessement:blood return through all ports and chest  x-ray ordered Complications:no Patient Tolerance:patient tolerated the procedure well with no apparent complications Additional Notes Ultrasound Interpretation:  Using ultrasound guidance the potential vascular sites for insertion of the catheter were visualized to determine the patency of the vessel to be used for vascular access.  After selecting the appropriate site for insertion, the needle was visualized under ultrasound being inserted into the vessel, followed by ultrasound confirmation of wire and catheter placement.  There were no abnormalities seen on ultrasound; an image was taken/ and the patient tolerated the procedure with no complications.     Diagnostic IntraOp Dano    Result Date: 10/30/2024  Azar Macias MD     10/30/2024  2:48 PM Diagnostic IntraOp Dano Procedure Performed: Diagnostic IntraOp Dano      Start Time:      End Time:  Preanesthesia Checklist: Patient identified, IV assessed, risks and benefits discussed, monitors and equipment assessed, procedure being performed at surgeon's request and anesthesia consent obtained. General Procedure Information Diagnostic Indications for Echo:  assessment of ascending aorta, assessment of surgical repair and hemodynamic monitoring Physician Requesting Echo: Javon Florian MD CPT Code:  91351, 99616 ICD Code for Medical Necessity:  I34.0, I70.0 Location performed:  OR Intubated Bite block placed Heart visualized Probe Insertion:  Easy Probe Type:  Multiplane Modalities:  Color flow mapping, pulse wave Doppler and continuous wave Doppler Echocardiographic and Doppler Measurements Ventricles Right Ventricle: Cavity size dilated.  Hypertrophy not present.  Thrombus not present.  Global function mildly impaired.  Left Ventricle: Cavity size dilated.  Thrombus not present.  Global Function mildly impaired.  Ejection Fraction 58%.  Other Ventricular Findings:      LVEDV 155 mL Valves Aortic Valve: Annulus bioprosthetic.  Stenosis mild.  Mean Gradient: 9  mmHg.  Regurgitation absent.  Leaflets vegetative.  Leaflet motions restricted.  Mitral Valve: Annulus dilated.  Stenosis not present.  Mean Gradient: 1 mmHg.  Regurgitation moderate.  Leaflets normal.  Leaflet motions normal.  Tricuspid Valve: Annulus dilated.  Stenosis not present.  Regurgitation mild.  Leaflets normal.  Other Valve Findings:      Anterior mitral leaflet length 3.1 cm Aorta Ascending Aorta: Size normal.  Diameter 3.5 cm.  Dissection not present.  Plaque thickness less than 3 mm.  Mobile plaque not present.  Aortic Arch: Size normal.  Diameter 3 cm.  Dissection not present.  Plaque thickness less than 3 mm.  Mobile plaque not present.  Descending Aorta: Size normal.  Diameter 2.5 cm.  Dissection not present.  Plaque thickness less than 3 mm.  Mobile plaque not present.  Atria Right Atrium: Size dilated.  Spontaneous echo contrast present.  Thrombus not present.  Tumor not present.  Device not present.  Left Atrium: Size dilated.  Spontaneous echo contrast present.  Thrombus not present.  Tumor not present.  Device not present.  Left atrial appendage normal. Diastolic Function Measurements: Diastolic Dysfunction Grade= indeterminate E=  51.7 ms A=  ms E/A Ratio= DT=  108 ms S/D=  .6 IVRT= Other Findings Pericardium:  pericardial effusion Pulmonary Venous Flow:  blunted (decreased) systolic flow Anesthesia Information Performed Personally Anesthesiologist:  Azar Macias MD Echocardiogram Comments:      Postbypass results:    Arterial Line    Result Date: 10/30/2024  Lionel Valencia MD     10/30/2024 11:02 AM Arterial Line Patient reassessed immediately prior to procedure Patient location during procedure: pre-op Start time: 10/30/2024 10:15 AM Stop Time:10/30/2024 10:17 AM  Line placed for hemodynamic monitoring. Performed By Anesthesiologist: Lionel Valencia MD Preanesthetic Checklist Completed: patient identified and risks and benefits discussed Arterial Line Prep  Sterile Tech: gloves Prep:  ChloraPrep Patient monitoring: blood pressure monitoring, continuous pulse oximetry and EKG Arterial Line Procedure Laterality:left Location:  radial artery Catheter size: 20 G Guidance: ultrasound guided PROCEDURE NOTE/ULTRASOUND INTERPRETATION.  Using ultrasound guidance the potential vascular sites for insertion of the catheter were visualized to determine the patency of the vessel to be used for vascular access.  After selecting the appropriate site for insertion, the needle was visualized under ultrasound being inserted into the radial artery, followed by ultrasound confirmation of wire and catheter placement. There were no abnormalities seen on ultrasound; an image was taken; and the patient tolerated the procedure with no complications. Successful placement: yes Images: still images obtained, printed/placed on the chart Post Assessment Dressing Type: occlusive dressing applied and secured with tape. Complications no Patient Tolerance: patient tolerated the procedure well with no apparent complications Additional Notes Using ultrasound guidance the potential vascular sites for insertion of the catheter were visualized to determine the patency of the vessel to be used for vascular access.  After selecting the appropriate site for insertion, the needle was visualized under ultrasound being inserted into the artery, followed by ultrasound confirmation of wire and catheter placement.  There were no abnormalities seen on ultrasound; an image was taken/ and the patient tolerated the procedure with no complications.     Duplex Vein Mapping Lower Extremity - Bilateral CAR    Result Date: 10/28/2024    The right great saphenous vein is patent  and of adequate size in the thigh.   The right great saphenous vein is patent and of adequate size in the calf.   The left great saphenous vein is patent and of adequate size in the thigh.   The left great saphenous vein is patent  and of adequate size in the calf.     Carotid  Duplex - Bilateral    Result Date: 10/28/2024    Right internal carotid artery demonstrates a less than 50% stenosis.   Antegrade right vertebral flow.   Left internal carotid artery demonstrates a less than 50% stenosis.   Antegrade left vertebral flow.     XR Chest PA & Lateral    Result Date: 10/28/2024  XR CHEST PA AND LATERAL-  HISTORY: Male who is 73 years-old, preoperative evaluation  TECHNIQUE: Frontal and lateral views of the chest  COMPARISON: 10/14/2024  FINDINGS: The heart is enlarged. Pulmonary vasculature shows mild central prominence. Left-sided pacemaker, cardiac leads, sternotomy wires, cardiac valve marker noted. Minimal right pleural effusion, continued follow-up suggested. No focal pulmonary consolidation. No pneumothorax. Otherwise stable.      As described.  This report was finalized on 10/28/2024 4:27 PM by Dr. Giacomo Burton M.D on Workstation: [id number]      Stress Test With Myocardial Perfusion One Day    Result Date: 10/26/2024    Lexiscan protocol completed without low-level exercise.  Baseline ECG showed A-fib with RBBB, no angina or significant ischemic ECG changes noted.   Left ventricular ejection fraction is normal (Calculated EF = 55%).   Mildly reduced perfusion defect in the basal segments likely due to suboptimal postprocessing/contouring as well as diaphragmatic attenuation artifact.  No reversibility noted.   Myocardial perfusion imaging indicates a grossly normal myocardial perfusion study with no evidence of reversible ischemia. Impressions are consistent with a low risk study.     Adult Transesophageal Echo 3D (JOLIE) W/ Cont If Necessary Per Protocol    Result Date: 10/25/2024    Left ventricular ejection fraction appears to be 51 - 55%.   Left ventricular wall thickness is consistent with mild concentric hypertrophy.   The right ventricular cavity is mildly dilated.   The left atrial cavity is severely dilated.   Saline test results are negative.   The right atrial cavity  is severely  dilated.   Severe aortic valve stenosis is present.   There is a mobile mass on the aortic valve. Vegetations are present on both the ventricle as well as the aortic side of the bioprosthetic aortic valve.  Largest extends into the ascending aorta approximately 3.1 cm above the sewing ring (3.3 cm total length). Vegetation becomes more broad as the furthest aspect from attachment, maximum width 1.7 cm. Vegetation on the ventricular side of the aortic valve I think most likely originates from the sewing ring.   There is a bioprosthetic aortic valve present. The prosthetic aortic valve peak and mean gradients are elevated. There is a large, mobile mass present on the prosthetic aortic valve that is consistent with a vegetation. There is severe stenosis or obstruction of the prosthetic aortic valve.   Mild aortic valve regurgitation is present.     CT Angiogram Coronary    Result Date: 10/25/2024  RADIOLOGY INTERPRETATION OF EXTRACARDIAC STRUCTURES WITHIN THE CHEST    FINDINGS: Small right greater than left bilateral pleural effusions and mild pulmonary inter and intralobular septal thickening which may reflect a component of pulmonary edema. Respiratory motion limits evaluation of the lungs.    PLEASE SEE SEPARATE CARDIOLOGY REPORT OF THE CARDIAC FINDINGS.   Radiation dose reduction techniques were utilized, including automated exposure control and exposure modulation based on body size.   This report was finalized on 10/25/2024 3:03 PM by Dr. Donovan Gomez M.D on Workstation: [id number]      CT Angiogram Coronary-Cardiology Interpretation    Result Date: 10/25/2024  Table formatting from the original result was not included. 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 0.5 mm collimated images were  obtained through the coronary arteries.  Data were transferred offline for 3D reconstructions using [employer]. CONTRAST: 85 mL iopamidol ([id number]) ACQUISITION: Retrospective ECG triggered acquisition was used.  Heart rate at the time of acquisition was approximately 85 BPM. MEDICATIONS: Metoprolol tartrate  25 mg oral Nitroglycerin 0.8 mg tablet TECHNICAL QUALITY:  Extremely poor due to obesity, high heart rate in the setting of A-fib and inability to medicate further due to low BP, and adjacent ICD leads with associated metallic artifacts.  Nondiagnostic. . FINDINGS: Coronary Artery Calcification Findings: The total calcium score is 69 indicating mild (CAC 1-100) calcified plaque in the coronary tree. Left main: 40 LAD: 13 LCx: 4 RCA: 2 Angiographic Findings: The coronary arteries are  possibly left dominant . Left Main: Normal origin from the left coronary cusp.  Gives rise to LAD and LCx.  There appears to be a calcified plaque in distal LM extending into LAD.  Unable to accurately assess luminal stenosis due to poor image quality. LAD: Unable to accurately assess luminal stenosis due to poor image quality. LCx: Likely dominant vessel. Unable to accurately assess luminal stenosis due to poor image quality. RCA: Likely nondominant. Unable to accurately assess luminal stenosis due to poor image quality. Noncoronary Cardiac Findings: Cardiac chambers: Normal in size with no obvious filling defects within the ventricles, atria, or atrial appendages. No stigmata or prior infarction. Cardiac valves: A bioprosthetic valve is present in the aortic position and poorly visualized.  There appears to be hypoattenuated linear echodensity consistent with known bioprosthetic aortic valve vegetation. Pulmonary arteries: Normal in caliber. No obvious filling defects in the visualized central pulmonary arteri(es) to suggest large central pulmonary emboli, although the image qualities are extremely poor. Pericardium: The  pericardial contour is preserved with no effusion, thickening, or calcifications. Left ventricle: Mild-moderate LV dilatation.  Calculated LVEF 70%.     Extremely poor image quality due to obesity (BMI 42), high heart rate in the setting of A-fib and inability to medicate further due to low BP, and adjacent ICD leads resulting in photon starvation, cardiac motion, and beam hardening artifacts and excessive noise.  Non-diagnostic study. CAD-RAD N/P1. Overall there is mild amount of coronary plaque.  Coronary calcium score CAC (69). Grossly normal LV systolic function (calculated LVEF 70%). Bioprosthetic aortic valve present, poorly visualized. There appears to be hypoattenuated linear echodensity consistent with known bioprosthetic aortic valve vegetation.  Please reference same-day JOLIE results. Left atrial appendage appears to be ligated surgically with clips noted. Severe biatrial enlargement. Please refer to the radiology report for information on extra-cardiac structures.  RECOMMENDATION: Consider alternative imaging modalities to rule out obstructive CAD if deemed necessary prior to surgery for bioprosthetic endocarditis. Grading Scale for Stenosis Severity: Category Degree Interpretation CAD-RADS 0 0% (No plaque or stenosis) Absence of CAD CAD-RADS 1 1-24% (Minimal stenosis or plaque w/o stenosis) Minimal non-obstructive CAD CAD-RADS 2 25-49% (Mild stenosis) Mild non-obstructive CAD CAD-RADS 3 50-69% (Moderate stenosis) Moderate stenosis CAD-RADS 4 A - 70-99% stenosis or B - Left main >/= 50% or 3-vessel obstructive (>/=70%) disease Severe stenosis CAD-RADS 5 100% (total occlusion) Total coronary occlusion or sub-total occlusion CAD-RADS N Non-diagnostic study Obstructive CAD cannot be excluded Grading Scale for Plaque [place]: P1 (CAC 1-100) Mild amount of plaque P2 (-300) Moderate amount of plaque P3 (-999) Severe amount of plaque P4 (CAC > 1000) Extensive amount of plaque     Duplex Venous Lower  Extremity - Bilateral CAR    Result Date: 10/24/2024    Acute left lower extremity deep vein thrombosis noted in the gastrocnemius.   All other veins appeared normal bilaterally.     Adult Transthoracic Echo Complete W/ Cont if Necessary Per Protocol    Result Date: 10/19/2024    Left ventricular systolic function is low normal. Left ventricular ejection fraction appears to be 51 - 55%.   Left ventricular wall thickness is consistent with mild concentric hypertrophy.   Left ventricular diastolic function was indeterminate.   There is moderate to severe biatrial enlargement.   There is a bioprosthetic aortic valve present. The prosthetic aortic valve peak and mean gradients are elevated.  The peak and mean gradient across aortic valve was 101/68 mmHg.  Findings are consistent with severe stenosis of the bioprosthetic valve.   There is mild to moderate mitral regurgitation.   There is mild tricuspid regurgitation.  Estimated right ventricular systolic pressure from tricuspid regurgitation is markedly elevated (>55 mmHg).     Results for orders placed during the hospital encounter of 10/23/24    Adult Transthoracic Echo Limited W/ Cont if Necessary Per Protocol    Interpretation Summary    Left ventricular systolic function is normal. Calculated left ventricular EF = 57.8%    Left ventricular diastolic function was not assessed.    Moderately reduced right ventricular systolic function noted.    The right ventricular cavity is moderately dilated.    : There is no evidence of pericardial effusion.      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  Strep mitis bacteremic sepsis and possible endocarditis on vancomycin and cefepime for this and possible pneumonia plan is at least 6 weeks of antibiotics tentative end date of 12/4/2024  Fever seems to have resolved  RV dysfunction severe on milrinone  Shock cardiogenic and possible element of septic shock as well remains pressor dependent  Possible pneumonia respiratory cultures ordered some light growth of Candida which is likely oral contaminant is unremarkable  Respiratory failure patient failed weaning has tracheostomy there is no way he can wean with this high minute ventilation demand hopefully with dialysis his metabolic acidosis will correct in his fluid excess will correct and then we will be able to wean.  CHF history of nonischemic cardiomyopathy but recent echocardiogram LVEF 67% so probably diastolic dysfunction cardiology following   Pulmonary hypertension severe by recent  echocardiogram on sildenafil per cardiothoracic surgery  Acute kidney and acute renal failure now on CRRT hopefully will be able to pull significant volume he has many liters to go and correct acidosis.  Metabolic acidosis probably secondary to renal disease hopefully this will correct with dialysis  DVT history and left lower extremity on Doppler on 10/24/2024 not present on 11/8/2024 Doppler and subacute DVT in the left axillary vein Doppler holding anticoagulation now per  cardiovascular surgery  Atrial fibrillation on amiodarone for rate control  Anemia acute on chronic expected postop acute complement.  Stable  Thrombocytopenia resolved  Esophagitis ease EGD on 9/30/2024  Fluids/lites/nutrition continue tube feeds now   Hyperglycemia not too bad on scheduled and sliding scale insulin.  Morbid obesity with a BMI greater than 46        Plan for disposition:    Paul Leslie Jr, MD  11/20/24  15:42 EST    Time: Critical care time 31 minutes

## 2024-11-20 NOTE — PLAN OF CARE
Goal Outcome Evaluation:         Continues on CRRT. [place] pulled and 1 unit PRBC per Samantha CALDWELL. Ca replaced. New cortrak inserted by dietician. On vaso, amio, prop, milrinone. Care on going.

## 2024-11-20 NOTE — CONSULTS
visited pt's daughter in the waiting room this morning.   provided spiritual and emotional support via presence and dialogue.

## 2024-11-20 NOTE — PLAN OF CARE
Goal Outcome Evaluation:         Dialysis catheter placed and CRRT started. Patient continued on propofol, amiodarone, vasopressin and milrinone. Labs and vital signs monitored.

## 2024-11-20 NOTE — PROGRESS NOTES
[facility] Progress Note      Patient Name: Woodrow Alejandro  : 1950  MRN: [id number]  Primary Care Physician:  Juan Perez MD  Date of admission: 10/23/2024    Subjective     Interval History:   Follow-up acute kidney injury and volume  The patient is currently on the ventilator.  He is on CRRT remains on vasopressors net fluid removal now is 100 cc/h he had fluid removal with CRRT but 1800 cc so far.  His CVP is dropping  Review of Systems:   Not obtainable    Objective     Vitals:   Temp:  [97 °F (36.1 °C)-99.7 °F (37.6 °C)] 99.5 °F (37.5 °C)  Heart Rate:  [] 105  Resp:  [28-31] 31  BP: ()/(42-62) 108/42  Arterial Line BP: ()/(41-78) 85/41  FiO2 (%):  [39 %-98 %] 40 %    Intake/Output Summary (Last 24 hours) at 2024 0808  Last data filed at 2024 0700  Gross per 24 hour   Intake 2858.39 ml   Output 3974.6 ml   Net -1116.21 ml       Physical Exam:    General Appearance: On the ventilator, sedated chronically ill morbidly obese commands  Skin: warm and dry  HEENT: Oral mucosa is dry  Neck: Mild JVD, he has a tracheostomy  Lungs: Bilateral rhonchi, breathing effort not labored  Heart: Irregularly irregular.  No rub  Abdomen: soft, no guarding, distended.  Body wall edema.  Normoactive bowel  : Ugarte catheter  Extremities: 4+ upper and lower extremity with hip and thigh edema, he had femoral temporary dialysis catheter.  Upper extremity edema      Scheduled Meds:     acetylcysteine, 3 mL, Nebulization, TID - RT  aspirin, 81 mg, Per G Tube, Daily  atorvastatin, 40 mg, Per G Tube, Nightly  busPIRone, 5 mg, Oral, TID  cefepime, 2,000 mg, Intravenous, Q8H  chlorhexidine, 15 mL, Mouth/Throat, Q12H  Ergocalciferol, 100 mcg, Per G Tube, Daily  heparin (porcine), 5,000 Units, Subcutaneous, Q8H  heparin, 1,000 Units, Intracatheter, Once  hydrocortisone-bacitracin-zinc oxide-nystatin, 1 Application, Topical, Q12H  insulin glargine, 20 Units,  Subcutaneous, Daily  insulin regular, 2-7 Units, Subcutaneous, Q6H  ipratropium-albuterol, 3 mL, Nebulization, Q4H - RT  lansoprazole, 15 mg, Per G Tube, Q AM  miconazole, 1 Application, Topical, Q12H  midodrine, 15 mg, Oral, TID AC  saccharomyces boulardii, 250 mg, Per G Tube, BID  sildenafil, 20 mg, Per G Tube, TID  sodium chloride, 10 mL, Intravenous, Q12H      IV Meds:   amiodarone, 0.5 mg/min, Last Rate: 0.5 mg/min (11/19/24 2037)  dexmedetomidine, 0.2-1.5 mcg/kg/hr, Last Rate: Stopped (11/18/24 0400)  DOPamine, 2-20 mcg/kg/min  EPINEPHrine, 0.01 mcg/kg/min, Last Rate: Stopped (11/17/24 1051)  lidocaine in D5W, 1 mg/min, Last Rate: Stopped (11/05/24 1135)  milrinone, 0.25 mcg/kg/min, Last Rate: 0.125 mcg/kg/min (11/19/24 2036)  niCARdipine, 5-15 mg/hr  norepinephrine, 0.02-0.2 mcg/kg/min, Last Rate: Stopped (11/19/24 2245)  phenylephrine, 0.2-2 mcg/kg/min  Phoxillum BK4/2.5, 1,500 mL/hr, Last Rate: 1,500 mL/hr (11/19/24 1121)  Phoxillum BK4/2.5, 1,500 mL/hr, Last Rate: 1,500 mL/hr (11/19/24 1121)  Phoxillum BK4/2.5, 1,500 mL/hr, Last Rate: 1,500 mL/hr (11/19/24 1121)  propofol, 5-50 mcg/kg/min, Last Rate: 11.038 mcg/kg/min (11/20/24 0609)  vasopressin, 0.02-0.1 Units/min, Last Rate: 0.05 Units/min (11/20/24 0630)        Results Reviewed:   I have personally reviewed the results from the time of this admission to 11/20/2024 08:08 EST     Results from last 7 days   Lab Units 11/20/24  0349 11/20/24  0019 11/19/24  1522 11/19/24  0801 11/19/24  0429 11/18/24  1714 11/18/24  0809   SODIUM mmol/L 137 136 136  --  134*  --  137   POTASSIUM mmol/L 3.9 4.0 4.0  4.0   < > 3.7   < > 4.1  4.1   CHLORIDE mmol/L 105 105 105  --  105  --  107   CO2 mmol/L 16.3* 16.1* 14.3*  --  13.0*  --  13.8*   BUN mg/dL 48* 55* 70*  --  92*  --  82*   CREATININE mg/dL 1.66* 1.72* 2.41*  --  2.81*  --  2.52*   CALCIUM mg/dL 7.7* 8.0* 8.5*  --  8.7  --  8.8   BILIRUBIN mg/dL 0.7  --   --   --  0.6  --  0.7   ALK PHOS U/L 110  --   --   --   108  --  108   ALT (SGPT) U/L 13  --   --   --  14  --  16   AST (SGOT) U/L 24  --   --   --  21  --  27   GLUCOSE mg/dL 110* 116* 115*  --  143*  --  153*    < > = values in this interval not displayed.       Estimated Creatinine Clearance: 58.5 mL/min (A) (by C-G formula based on SCr of 1.66 mg/dL (H)).    Results from last 7 days   Lab Units 11/20/24  0019 11/19/24  1522 11/19/24  0429   MAGNESIUM mg/dL 2.2 2.3 2.2   PHOSPHORUS mg/dL 3.0 2.6 2.6       Results from last 7 days   Lab Units 11/19/24  0429 11/18/24  0335   URIC ACID mg/dL 10.3* 9.5*       Results from last 7 days   Lab Units 11/20/24  0349 11/19/24  2204 11/19/24  0429 11/18/24  0528 11/18/24  0335 11/17/24  0318 11/16/24  0408   WBC 10*3/mm3 13.65*  --  16.38*  --  18.69* 17.25* 13.92*   HEMOGLOBIN g/dL 8.2* 8.0* 8.4* 7.8* 7.6* 8.9* 8.6*   PLATELETS 10*3/mm3 159  --  156  --  165 207 193             Assessment / Plan     ASSESSMENT:  Acute kidney injury, nonoliguric.  Charge likely prerenal/ATN due to hemodynamic, continue to have significant volume excess, he is currently on CRRT CVP is improving and volume is removed slowly and he is tolerating the treatment.,  Electrolyte within acceptable range other than total CO2 16.3 and anion gap 15.7.  Creatinine 1.66 down with CRRT   The patient had severe right-sided heart failure and probably increase enteral abdominal venous pressure leading to decreased GFR  Mixed metabolic acidosis and respiratory  Prosthetic valve aortic stenosis history of tissue AVR, DANICA ligation in 2014.  Status post redo sternotomy AV root replacement, mitral valve repair, AICD removal intra-aortic balloon placement 10/31/2024.  Sternal closure 11/ 2.  3.  Bioprosthetic aortic valve endocarditis, bacteremia with strep mitis on vancomycin and cefepime.    Dr. Diaz following.  Currently A-fib  4.  Shock , remains pressor dependent.  5.  Anemia status post EGD 9/30/2024  And colonoscopy with esophagitis and polyp removal.   Hemoglobin today 8.2.  6.  Acute respiratory failure postoperatively on the ventilator.    7.  Atrial fibrillation.  With controlled rate  8.  Moderate to severe mitral regurgitation.  Severe RV enlargement and dysfunction postoperatively.  PLAN:  Continue CRRT and current fluid removal  Surveillance lab      I reviewed the chart and other providers notes, reviewed labs.  I discussed the case with the patient's nurse at the bedside.  Copied text in this note has been reviewed and is accurate as of 11/20/24.     Thank you for involving us in the care of Woodrow Alejandro.  Please feel free to call with any questions.    Jass Keller MD  11/20/24  08:08 RUST    [facility]  [phone]    Please note that portions of this note were completed with a voice recognition program.

## 2024-11-20 NOTE — PROGRESS NOTES
LOS: 28 days     Chief Complaint: Endocarditis    Interval History: Patient resting comfortably this morning does arouse to voice.  Following simple commands.  Receiving CRRT.  FiO2 remains stable.  Remains afebrile.  WBC down to 13.    Vital Signs  Temp:  [97 °F (36.1 °C)-99.7 °F (37.6 °C)] 99.5 °F (37.5 °C)  Heart Rate:  [] 105  Resp:  [28-31] 31  BP: ()/(42-62) 108/42  Arterial Line BP: ()/(41-78) 85/41  FiO2 (%):  [39 %-98 %] 40 %    Physical Exam:  General: Ill-appearing  HEENT: Tracheostomy present.  NG tube in place.  Respiratory: Coarse bilateral breath sounds on the ventilator.  : Ugarte catheter  Skin: Tracheostomy site clean and intact.  Access: Left IJ.  Arterial line.  Peripheral line.    Antibiotics:  Anti-Infectives (From admission, onward)      Ordered     Dose/Rate Route Frequency Start Stop    11/19/24 1213  cefepime 2000 mg IVPB in 100 mL NS (MBP)        Ordering Provider: Gregg Diaz DO    2,000 mg  over 4 Hours Intravenous Every 8 Hours 11/19/24 1800 11/22/24 0159    11/17/24 1407  vancomycin 750 mg in sodium chloride 0.9 % 250 mL IVPB-VTB        Ordering Provider: Gregg Diaz DO    750 mg  333.3 mL/hr over 45 Minutes Intravenous Once 11/18/24 0600 11/18/24 0702    11/13/24 0914  cefepime 2000 mg IVPB in 100 mL NS (MBP)        Ordering Provider: Gregg Diaz DO    2,000 mg  over 30 Minutes Intravenous Once 11/13/24 1000 11/13/24 1235    11/12/24 1133  vancomycin 3000 mg/500 mL 0.9% NS IVPB ([facility])        Ordering Provider: Gregg Diaz DO    20 mg/kg × 151 kg  over 180 Minutes Intravenous Once 11/12/24 1230 11/12/24 1614    11/02/24 0918  vancomycin (VANCOCIN) 1,000 mg in sodium chloride 0.9 % 250 mL IVPB-VTB        Ordering Provider: Antwan Farmer MD    1,000 mg  250 mL/hr over 60 Minutes Intravenous Every 24 Hours 11/02/24 1015 11/03/24 1138    11/02/24 0912  Pharmacy to dose vancomycin        Ordering Provider: Madeleine  JAVI Singh     Not Applicable Continuous PRN 11/02/24 0912 11/04/24 0911    10/30/24 2306  ceFAZolin 2000 mg IVPB in 100 mL NS (MBP)        Ordering Provider: Samantha Salvador APRN    2,000 mg  over 30 Minutes Intravenous Every 8 Hours 10/31/24 0000 11/01/24 0922    10/29/24 1518  ceFAZolin 2000 mg IVPB in 100 mL NS (MBP)        Ordering Provider: Bryant Mcclure PA-C    2,000 mg  over 30 Minutes Intravenous Once 10/30/24 0600 10/30/24 1936    10/28/24 1034  vancomycin IVPB 2000 mg in 0.9% Sodium Chloride 500 mL        Ordering Provider: Samantha Salvador APRN    15 mg/kg × 142 kg Intravenous Once 10/28/24 1045 10/28/24 1356             Results Review:     I reviewed the patient's new clinical results.    Lab Results   Component Value Date    WBC 13.65 (H) 11/20/2024    HGB 8.2 (L) 11/20/2024    HCT 25.4 (L) 11/20/2024    MCV 91.0 11/20/2024     11/20/2024     Lab Results   Component Value Date    GLUCOSE 110 (H) 11/20/2024    BUN 48 (H) 11/20/2024    CREATININE 1.66 (H) 11/20/2024    BCR 28.9 (H) 11/20/2024    CO2 16.3 (L) 11/20/2024    CALCIUM 7.7 (L) 11/20/2024    ALBUMIN 2.1 (L) 11/20/2024    LABIL2 1.4 06/27/2019    AST 24 11/20/2024    ALT 13 11/20/2024       Microbiology:  10/3 COVID-negative  10/10 COVID-negative  10/14 COVID-negative  10/15 blood cultures 2 out of 2 strep mitis  10/17 COVID-negative  10/17 blood cultures 2 out of 2 strep mitis  10/21 COVID-negative  10/23 blood cultures no growth today  10/30 operative cultures from the aortic valve no growth   11/9 respiratory culture no growth  11/9 catheter tip culture no growth to date  11/10 catheter tip culture no growth to date  11/12 blood cultures no growth to date  11/12 respiratory culture no growth to date  11/13 respiratory culture no growth  11/13 genital culture of the penis Candida albicans      Assessment    #Strep mitis endocarditis  #Status post bioprosthetic aortic valve replacement 2014  #Status post aortic root replacement in  the setting of prosthetic aortic valve endocarditis and annular abscess on 10/30  #Status post removal of pacemaker generator and intracardiac portion of the leads with retained venous leads  #Atrial fibrillation  #Achilles tendon rupture  #NATHANIEL  #Positive blood culture, suspect contaminant  #Status post tracheostomy 11/16    Temperature curve and leukocytosis remain improved.  Plan to continue cefepime 2 g every 8 hours adjusted for CRRT today.  If everything continues to trend positively may be able to de-escalate back to penicillin soon.    For endocarditis he will still need ultimate antibiotic 6 weeks with end date of December 4.  Above should cover in the meantime.

## 2024-11-20 NOTE — CASE MANAGEMENT/SOCIAL WORK
Continued Stay Note  [facility]     Patient Name: Woodrow Alejandro  MRN: [id number]  Today's Date: 11/20/2024    Admit Date: 10/23/2024    Plan: Undertermined;   Discharge Plan       Row Name 11/20/24 1554       Plan    Plan Undertermined;    Plan Comments Pt is POD #20 and remains in CVR on the ventilator via tracheostomy.  CRRT ongoing.  Antibiotics for endocarditis per ID (stop date 12/4).  New cortrak placed today.  Per JAVI Singh - patient is not near medically ready for discharge.  CCP following......Rosanna CARCAMO/AYE                   Discharge Codes    No documentation.                       Rosanna Aldrich RN

## 2024-11-20 NOTE — CONSULTS
"Nutrition Services    Patient Name:  Woodrow Alejandro  YOB: 1950  MRN: [id number]  Admit Date:  10/23/2024    Assessment Date:  11/20/24    Summary: Consult for cortrak placement / TF Follow Up    Following simple commands.  Now on CRRT.  K replaced.  Propofol @ 9.06 mL/hr (239 kcal/day).      DHT was clogged.  Cortrak replaced at bedside - NG placement, bridled in place.    Current TF regimen (to be resumed now that cortrak placed): Novasource Renal @ 45 mL/hr with 100 mL q 3 hour free water flushes (per MD).  Prosource BID.    Labs reviewed: Na 136, K 4.2, Gluc 90/121/120, BUN 42, Creat 1.37, Alb 2.7  Meds reviewed: lipitor, insulin, prevacid, florastor, amiodarone, bumex, vit D, heparin, propofol (239 kcal/day)    Plans/Recommendations:  Continue TFs of Novasource Renal @ 45ml/hr (goal).  Prosource BID.   100 mL q 3 hour free water flushes (per MD).  Monitor for tolerance.    Initial Goal:  *initial goal conservative d/t risk of RFS     Novasource Renal at 45mL/hr + 50mL q 4 hr water flush      End Goal:    Novasource Renal at 45 mL/hr + 100 mL q 3 hr water flush (per MD)     Calories  1980 kcal + 120 kcal prosource + 239 kcal propofol = 2339 kcals (100%)    Protein  90 g + 30 g prosource= 120 g (100%)    Free water  703 mL   Flushes  800 mL     The above end goal rate is for 22 hrs/day to assume interruptions for ADLs. Water flushes adjusted based on clinical picture + Rx flushes/IV fluids     Specialized formula chosen r/t need for concentrated tube feeding formula with high protein     RD to follow    CLINICAL NUTRITION ASSESSMENT      Reason for Assessment Cortrak Placement, Follow-up Protocol     Diagnosis/Problem   POD 20 reoperative sternotomy AV root replacement 27mm cryopreserved homograft with right nuvia cabrol, AICD removal, IABP placement.  S/p trach 11/16  Now on CRRT     Anthropometrics        Current Height  Current Weight  BMI kg/m2 Height: 177.8 cm (70\")  Weight: (!) 156 kg " (343 lb 14.7 oz) (11/20/24 0600)  Body mass index is 49.35 kg/m².   Adjusted BMI (if applicable)    BMI Category Obese, Class III (40 or higher)   Ideal Body Weight (IBW) 171 lbs   Usual Body Weight (UBW) 330 lbs   Weight Trend Stable     Estimated/Assessed Needs        Energy Requirements    Weight for Calculation 78 kg IBW   Method for Estimation  25-30 kcal/kg   EST Needs (kcal/day) 2334-2662       Protein Requirements    Weight for Calculation 78 kg IBW   EST Protein Needs (g/kg) 1.5 - 2.0 gm/kg   EST Daily Needs (g/day) 117-156       Fluid Requirements     Method for Estimation 1 mL/kcal    Estimated Needs (mL/day)      Labs       Pertinent Labs    Results from last 7 days   Lab Units 11/20/24  0856 11/20/24  0349 11/20/24  0019 11/19/24  0801 11/19/24  0429 11/18/24  1714 11/18/24  0809   SODIUM mmol/L 136 137 136   < > 134*  --  137   POTASSIUM mmol/L 4.2 3.9 4.0   < > 3.7   < > 4.1  4.1   CHLORIDE mmol/L 106 105 105   < > 105  --  107   CO2 mmol/L 17.0* 16.3* 16.1*   < > 13.0*  --  13.8*   BUN mg/dL 42* 48* 55*   < > 92*  --  82*   CREATININE mg/dL 1.37* 1.66* 1.72*   < > 2.81*  --  2.52*   CALCIUM mg/dL 7.8* 7.7* 8.0*   < > 8.7  --  8.8   BILIRUBIN mg/dL  --  0.7  --   --  0.6  --  0.7   ALK PHOS U/L  --  110  --   --  108  --  108   ALT (SGPT) U/L  --  13  --   --  14  --  16   AST (SGOT) U/L  --  24  --   --  21  --  27   GLUCOSE mg/dL 121* 110* 116*   < > 143*  --  153*    < > = values in this interval not displayed.     Results from last 7 days   Lab Units 11/20/24  0856 11/20/24  0349 11/20/24  0019 11/19/24  2204 11/19/24  1522   MAGNESIUM mg/dL 2.2  --  2.2  --  2.3   PHOSPHORUS mg/dL 3.0  --  3.0  --  2.6   HEMOGLOBIN g/dL  --  8.2*  --    < >  --    HEMATOCRIT %  --  25.4*  --    < >  --    WBC 10*3/mm3  --  13.65*  --   --   --    ALBUMIN g/dL 2.7* 2.1* 2.7*  --  2.2*    < > = values in this interval not displayed.     Results from last 7 days   Lab Units 11/20/24  0349 11/19/24  0429  11/18/24  0335 11/17/24  0318 11/16/24  0408   PLATELETS 10*3/mm3 159 156 165 207 193     COVID19   Date Value Ref Range Status   10/21/2024 Not Detected Not Detected - Ref. Range Final     Lab Results   Component Value Date    HGBA1C 5.30 10/24/2024          Medications           Scheduled Medications acetylcysteine, 3 mL, Nebulization, TID - RT  aspirin, 81 mg, Per G Tube, Daily  atorvastatin, 40 mg, Per G Tube, Nightly  busPIRone, 5 mg, Oral, TID  cefepime, 2,000 mg, Intravenous, Q8H  chlorhexidine, 15 mL, Mouth/Throat, Q12H  Ergocalciferol, 100 mcg, Per G Tube, Daily  heparin (porcine), 5,000 Units, Subcutaneous, Q8H  hydrocortisone-bacitracin-zinc oxide-nystatin, 1 Application, Topical, Q12H  insulin glargine, 20 Units, Subcutaneous, Daily  insulin regular, 2-7 Units, Subcutaneous, Q6H  ipratropium-albuterol, 3 mL, Nebulization, Q4H - RT  lansoprazole, 15 mg, Per G Tube, Q AM  miconazole, 1 Application, Topical, Q12H  midodrine, 15 mg, Oral, TID AC  saccharomyces boulardii, 250 mg, Per G Tube, BID  sildenafil, 20 mg, Per G Tube, TID  sodium chloride, 10 mL, Intravenous, Q12H       Infusions amiodarone, 0.5 mg/min, Last Rate: 0.5 mg/min (11/19/24 2037)  dexmedetomidine, 0.2-1.5 mcg/kg/hr, Last Rate: Stopped (11/18/24 0400)  DOPamine, 2-20 mcg/kg/min  EPINEPHrine, 0.01 mcg/kg/min, Last Rate: Stopped (11/17/24 1051)  lidocaine in D5W, 1 mg/min, Last Rate: Stopped (11/05/24 1135)  milrinone, 0.25 mcg/kg/min, Last Rate: 0.125 mcg/kg/min (11/19/24 2036)  niCARdipine, 5-15 mg/hr  norepinephrine, 0.02-0.2 mcg/kg/min, Last Rate: Stopped (11/19/24 2245)  phenylephrine, 0.2-2 mcg/kg/min  Phoxillum BK4/2.5, 1,500 mL/hr, Last Rate: 1,500 mL/hr (11/19/24 1121)  Phoxillum BK4/2.5, 1,500 mL/hr, Last Rate: 1,500 mL/hr (11/19/24 1121)  Phoxillum BK4/2.5, 1,500 mL/hr, Last Rate: 1,500 mL/hr (11/19/24 1121)  propofol, 5-50 mcg/kg/min, Last Rate: 11.038 mcg/kg/min (11/20/24 0609)  vasopressin, 0.02-0.1 Units/min, Last Rate: 0.05  Units/min (24 0630)       PRN Medications   acetaminophen **OR** acetaminophen **OR** acetaminophen    ALPRAZolam    bisacodyl    bisacodyl    Calcium Replacement - Follow Nurse / BPA Driven Protocol    cyclobenzaprine    dextrose    dextrose    DOPamine    EPINEPHrine    glucagon (human recombinant)    Glycerin-Hypromellose-    heparin (porcine)    HYDROcodone-acetaminophen    magnesium hydroxide    Magnesium Standard Dose Replacement - Follow Nurse / BPA Driven Protocol    milrinone    [] Morphine **AND** naloxone    niCARdipine    nitroglycerin    norepinephrine    ondansetron    phenylephrine    Phosphorus Replacement - Follow Nurse / BPA Driven Protocol    polyethylene glycol    Potassium Replacement - Follow Nurse / BPA Driven Protocol    senna    vasopressin     Physical Findings          General Findings obese, responds/arouses to voice, ventilator support, other: trach   Oral/Mouth Cavity tooth or teeth missing   Edema  generalized, lower extremity , upper extremity, 1+ (trace), 2+ (mild), 3+ (moderate), 4+ (severe)   Gastrointestinal fecal incontinence, hypoactive bowel sounds, last bowel movement:    Skin  bruising, excoriation, pressure injury: sacral spine DTI, L groin st III, surgical incision: L clavicle, sternal, R anterior thigh, throat, location of wound: L heel   Tubes/Drains/Lines Cortrak, dialysis catheter, NG tube, bridle in place   NFPE Not indicated at this time   --  Current Nutrition Orders & Evaluation of Intake       Oral Nutrition     Food Allergies NKFA   Current PO Diet NPO Diet NPO Type: Tube Feeding   Supplement n/a   PO Evaluation     % PO Intake NPO    Factors Affecting Intake: Other: Intubated      Enteral Nutrition     Enteral Route NG    TF Delivery Method Continuous    Propofol Rate/Kcal     Current TF Order/Rate  Novasource Renal @ 45 mL/hr    TF Goal Rate 45 mL/hr    Current Water Flush 100 mL Q 3 hr (per MD)    Modular None    TF Residual  no or  minimal residual    TF Tolerance tolerating    TF Observation Verified TF held at this time     PES STATEMENT / NUTRITION DIAGNOSIS      Nutrition Dx Problem  Problem: Needs Alternative Composition  Etiology: Medical Diagnosis - s/p reoperative sternotomy AV root replacement 27mm cryopreserved homograft with right nuvia cabrol, AICD removal, IABP placement.    Signs/Symptoms: Report/Observation   --  NUTRITION INTERVENTION / PLAN OF CARE      Intervention Goal(s) Maintain nutrition status, Establish goals of care, Reduce/improve symptoms, Meet estimated needs, Disease management/therapy, Tolerate TF/PN at goal, and Appropriate weight loss         RD Intervention/Action Continue to monitor and Care plan reviewed         Prescription/Orders:       PO Diet       Supplements       Enteral Nutrition    Enteral Prescription:     Enteral Route NG    TF Delivery Method Continuous    Enteral Product Novasource Renal    Modular Liquid Protein, BID    Propofol Rate/Kcal     TF Start Rate  45 mL/hr    TF Goal Rate  45 mL/hr    Free Water Flush 100 mL Q 3 hr (per MD)    Provision at Goal:          Calories 1980 kcal + 120 kcal prosource + 239 kcal from propofol = 2339 kcal, meets 100% needs         Protein  90 g + 30 g prosource = 120 gm protein, meets 100% needs         Fluid (mL) 713 mL + 800 mL free water flushes         Parenteral Nutrition    New Prescription Ordered? Continue same per protocol, No changes at this time   --      Monitor/Evaluation Per protocol, Pertinent labs, EN delivery/tolerance, Weight, Skin status, GI status, Symptoms, POC/GOC, Swallow function   Discharge Plan/Needs No discharge needs identified at this time   --    RD to follow per protocol.      Electronically signed by:  Jenny Reynolds RD  11/20/24 12:49 EST

## 2024-11-20 NOTE — PROGRESS NOTES
" LOS: 28 days   Patient Care Team:  Juan Perez MD as PCP - General (Internal Medicine)    Chief Complaint:   Post-op follow-up, s/p homograft    Subjective        Vital Signs  Temp:  [97 °F (36.1 °C)-99.7 °F (37.6 °C)] 99.5 °F (37.5 °C)  Heart Rate:  [] 105  Resp:  [28-31] 31  BP: ()/(42-62) 108/42  Arterial Line BP: ()/(41-78) 85/41  FiO2 (%):  [39 %-98 %] 40 %      11/18/24  1433 11/19/24  0430 11/20/24  0600   Weight: (!) 151 kg (333 lb) (!) 153 kg (336 lb 6.8 oz) (!) 156 kg (343 lb 14.7 oz)     Body mass index is 49.35 kg/m².    Intake/Output Summary (Last 24 hours) at 11/20/2024 0744  Last data filed at 11/20/2024 0700  Gross per 24 hour   Intake 3453.39 ml   Output 4049.6 ml   Net -596.21 ml     No intake/output data recorded.        Objective:  Vital signs: (most recent): Blood pressure 102/52, pulse 108, temperature 99.9 °F (37.7 °C), resp. rate (!) 31, height 177.8 cm (70\"), weight (!) 156 kg (343 lb 14.7 oz), SpO2 97%.                Physical Exam:   General Appearance: sedated but arousable, ill appearing, NAD   Lungs:  vent, ronchi throuhgout   Heart:  irreg irreg rhythm, tachy during exam   Abdomen: soft or nondistended, + bowel sounds    Skin: sternal incision clean, dry, intact   Neuro: alert and oriented, no focal deficits.     Results Review:      WBC WBC   Date Value Ref Range Status   11/20/2024 13.65 (H) 3.40 - 10.80 10*3/mm3 Final   11/19/2024 16.38 (H) 3.40 - 10.80 10*3/mm3 Final   11/18/2024 18.69 (H) 3.40 - 10.80 10*3/mm3 Final      HGB Hemoglobin   Date Value Ref Range Status   11/20/2024 8.2 (L) 13.0 - 17.7 g/dL Final   11/19/2024 8.0 (L) 13.0 - 17.7 g/dL Final   11/19/2024 8.4 (L) 13.0 - 17.7 g/dL Final   11/18/2024 7.8 (L) 13.0 - 17.7 g/dL Final   11/18/2024 7.6 (L) 13.0 - 17.7 g/dL Final      HCT Hematocrit   Date Value Ref Range Status   11/20/2024 25.4 (L) 37.5 - 51.0 % Final   11/19/2024 24.9 (L) 37.5 - 51.0 % Final   11/19/2024 26.0 (L) 37.5 - 51.0 % " "Final   11/18/2024 24.4 (L) 37.5 - 51.0 % Final   11/18/2024 24.4 (L) 37.5 - 51.0 % Final      Platelets Platelets   Date Value Ref Range Status   11/20/2024 159 140 - 450 10*3/mm3 Final   11/19/2024 156 140 - 450 10*3/mm3 Final   11/18/2024 165 140 - 450 10*3/mm3 Final        PT/INR:  No results found for: \"PROTIME\"/No results found for: \"INR\"    Sodium Sodium   Date Value Ref Range Status   11/20/2024 137 136 - 145 mmol/L Final   11/20/2024 136 136 - 145 mmol/L Final   11/19/2024 136 136 - 145 mmol/L Final   11/19/2024 134 (L) 136 - 145 mmol/L Final   11/18/2024 137 136 - 145 mmol/L Final   11/18/2024 139 136 - 145 mmol/L Final      Potassium Potassium   Date Value Ref Range Status   11/20/2024 3.9 3.5 - 5.2 mmol/L Final   11/20/2024 4.0 3.5 - 5.2 mmol/L Final   11/19/2024 4.0 3.5 - 5.2 mmol/L Final   11/19/2024 4.0 3.5 - 5.2 mmol/L Final   11/19/2024 4.0 3.5 - 5.2 mmol/L Final     Comment:     Slight hemolysis detected by analyzer. Result may be falsely elevated.   11/19/2024 3.7 3.5 - 5.2 mmol/L Final   11/19/2024 3.8 3.5 - 5.2 mmol/L Final   11/18/2024 3.9 3.5 - 5.2 mmol/L Final   11/18/2024 3.9 3.5 - 5.2 mmol/L Final   11/18/2024 4.1 3.5 - 5.2 mmol/L Final   11/18/2024 4.1 3.5 - 5.2 mmol/L Final   11/18/2024 4.4 3.5 - 5.2 mmol/L Final   11/18/2024 4.3 3.5 - 5.2 mmol/L Final   11/17/2024 4.1 3.5 - 5.2 mmol/L Final   11/17/2024 4.4 3.5 - 5.2 mmol/L Final      Chloride Chloride   Date Value Ref Range Status   11/20/2024 105 98 - 107 mmol/L Final   11/20/2024 105 98 - 107 mmol/L Final   11/19/2024 105 98 - 107 mmol/L Final   11/19/2024 105 98 - 107 mmol/L Final   11/18/2024 107 98 - 107 mmol/L Final   11/18/2024 109 (H) 98 - 107 mmol/L Final      Bicarbonate CO2   Date Value Ref Range Status   11/20/2024 16.3 (L) 22.0 - 29.0 mmol/L Final   11/20/2024 16.1 (L) 22.0 - 29.0 mmol/L Final   11/19/2024 14.3 (L) 22.0 - 29.0 mmol/L Final   11/19/2024 13.0 (L) 22.0 - 29.0 mmol/L Final   11/18/2024 13.8 (L) 22.0 - 29.0 " mmol/L Final   11/18/2024 12.8 (L) 22.0 - 29.0 mmol/L Final      BUN BUN   Date Value Ref Range Status   11/20/2024 48 (H) 8 - 23 mg/dL Final   11/20/2024 55 (H) 8 - 23 mg/dL Final   11/19/2024 70 (H) 8 - 23 mg/dL Final   11/19/2024 92 (H) 8 - 23 mg/dL Final   11/18/2024 82 (H) 8 - 23 mg/dL Final   11/18/2024 80 (H) 8 - 23 mg/dL Final      Creatinine Creatinine   Date Value Ref Range Status   11/20/2024 1.66 (H) 0.76 - 1.27 mg/dL Final   11/20/2024 1.72 (H) 0.76 - 1.27 mg/dL Final   11/19/2024 2.41 (H) 0.76 - 1.27 mg/dL Final   11/19/2024 2.81 (H) 0.76 - 1.27 mg/dL Final   11/18/2024 2.52 (H) 0.76 - 1.27 mg/dL Final   11/18/2024 2.40 (H) 0.76 - 1.27 mg/dL Final      Calcium Calcium   Date Value Ref Range Status   11/20/2024 7.7 (L) 8.6 - 10.5 mg/dL Final   11/20/2024 8.0 (L) 8.6 - 10.5 mg/dL Final   11/19/2024 8.5 (L) 8.6 - 10.5 mg/dL Final   11/19/2024 8.7 8.6 - 10.5 mg/dL Final   11/18/2024 8.8 8.6 - 10.5 mg/dL Final   11/18/2024 8.1 (L) 8.6 - 10.5 mg/dL Final      Magnesium Magnesium   Date Value Ref Range Status   11/20/2024 2.2 1.6 - 2.4 mg/dL Final   11/19/2024 2.3 1.6 - 2.4 mg/dL Final   11/19/2024 2.2 1.6 - 2.4 mg/dL Final   11/18/2024 2.5 (H) 1.6 - 2.4 mg/dL Final   11/18/2024 2.0 1.6 - 2.4 mg/dL Final        acetylcysteine, 3 mL, Nebulization, TID - RT  aspirin, 81 mg, Per G Tube, Daily  atorvastatin, 40 mg, Per G Tube, Nightly  busPIRone, 5 mg, Oral, TID  cefepime, 2,000 mg, Intravenous, Q8H  chlorhexidine, 15 mL, Mouth/Throat, Q12H  Ergocalciferol, 100 mcg, Per G Tube, Daily  heparin (porcine), 5,000 Units, Subcutaneous, Q8H  heparin, 1,000 Units, Intracatheter, Once  hydrocortisone-bacitracin-zinc oxide-nystatin, 1 Application, Topical, Q12H  insulin glargine, 20 Units, Subcutaneous, Daily  insulin regular, 2-7 Units, Subcutaneous, Q6H  ipratropium-albuterol, 3 mL, Nebulization, Q4H - RT  lansoprazole, 15 mg, Per G Tube, Q AM  miconazole, 1 Application, Topical, Q12H  midodrine, 15 mg, Oral, TID  AC  saccharomyces boulardii, 250 mg, Per G Tube, BID  sildenafil, 20 mg, Per G Tube, TID  sodium chloride, 10 mL, Intravenous, Q12H      amiodarone, 0.5 mg/min, Last Rate: 0.5 mg/min (11/19/24 2037)  dexmedetomidine, 0.2-1.5 mcg/kg/hr, Last Rate: Stopped (11/18/24 0400)  DOPamine, 2-20 mcg/kg/min  EPINEPHrine, 0.01 mcg/kg/min, Last Rate: Stopped (11/17/24 1051)  lidocaine in D5W, 1 mg/min, Last Rate: Stopped (11/05/24 1135)  milrinone, 0.25 mcg/kg/min, Last Rate: 0.125 mcg/kg/min (11/19/24 2036)  niCARdipine, 5-15 mg/hr  norepinephrine, 0.02-0.2 mcg/kg/min, Last Rate: Stopped (11/19/24 2245)  phenylephrine, 0.2-2 mcg/kg/min  Phoxillum BK4/2.5, 1,500 mL/hr, Last Rate: 1,500 mL/hr (11/19/24 1121)  Phoxillum BK4/2.5, 1,500 mL/hr, Last Rate: 1,500 mL/hr (11/19/24 1121)  Phoxillum BK4/2.5, 1,500 mL/hr, Last Rate: 1,500 mL/hr (11/19/24 1121)  propofol, 5-50 mcg/kg/min, Last Rate: 11.038 mcg/kg/min (11/20/24 0609)  vasopressin, 0.02-0.1 Units/min, Last Rate: 0.05 Units/min (11/20/24 0630)          Prosthetic aortic valve stenosis    Essential hypertension    Permanent atrial fibrillation    S/P AVR    ICD (implantable cardioverter-defibrillator), dual, in situ    Achilles tendon rupture    Bacteremia    Stenosis of prosthetic aortic valve    Anemia      Assessment & Plan    - Prosthetic aortic valve stenosis, h/o AVR (tissue)/maze/DANICA ligation (2014)- s/p reoperative sternotomy AV root replacement 27mm cryopreserved homograft with right nuvia cabrol, AICD removal, IABP placement- [facility] 10/31/2024  - s/p Sternal closure ---11/2  - Possible endocarditis, likely need JOLIE   - Bacteremia, blood cultures positive strep mitis---on penicillin G  - Atrial fibrillation, unable to tolerate anticoagulation  - Hypertension  - NICM status post [employer] AICD  - Anemia, s/p EGD/colonoscopy with esophagitis, polyp removal  - Right achilles tendon tear--- walking boot and PT per ortho at [facility]  - morbid obesity  - Pre-diabetes -- improved  at 5.3  - post op anemia- expected acute blood loss, stable  - TCP post-op, resolved  - respiratory failure--- s/p trach (Dr. Leija 11/16/2024), pulm following   - NATHANIEL--- nephrology following         POD #20  Continue supportive care.  S/p trach. Vent per pulm.  Remains on vasopressin, milrinone 0.125, propofol 10  CRRT on going.  Tolerating fluid removal.  WBC 16 from 18, Afebrile. Monitor  Antibiotics for endocarditis- ID following.  Remains in atrial fibrillation, intermittent rate controlled.   Continue supportive care.      Samantha Magana, APRN  11/20/24  07:44 EST

## 2024-11-21 NOTE — POST-PROCEDURE NOTE
Procedure: Flexible bronchoscopy  Indication patient with fever some bilateral infiltrates on CT scan bronchoscopy to collect cultures and see if there is a significant secretions that require airway clearance.  Consent: Informed consent obtained from patient's family wife.  Procedure patient with a tracheostomy Portex adapter used to connect the tracheostomy tube the ventilator and allowed reduction of the flexible bronchoscope patient was increased from 40 to 100% O2 for the procedure.  Flexible scope passed easily through the tracheostomy tube there are minimal lower respiratory secretions most of them are clear to pale gray airways actually looked pretty good a little bit of streaking probably suction catheter trauma down at the yoni and and lobar yoni's.  Mostly on the right.  Bronchoscope was wedged in the right lower lobe basically the whole right lower lobe and a bronchoalveolar lavage was performed 50 cc aliquots x 2 return was over 80 cc no significant plugs.  Tolerated well he never had any desaturation with the procedure.  Bronc lavage fluids second lavage sent for quantitative cultures

## 2024-11-21 NOTE — PROGRESS NOTES
LOS: 29 days   Patient Care Team:  Juan Perez MD as PCP - General (Internal Medicine)    Chief Complaint: Follow-up bioprosthetic AVR endocarditis, mitral regurgitation, persistent atrial fibrillation.    Interval History: Febrile overnight with a max temperature 101.8.  Atrial fibrillation rate is slightly higher today.  Chest x-ray with possible lower lobe infiltrates.  CT has been ordered.    Vital Signs:  Temp:  [99.7 °F (37.6 °C)-102.6 °F (39.2 °C)] 102.6 °F (39.2 °C)  Heart Rate:  [] 110  Resp:  [23-32] 23  BP: (102-157)/(43-92) 119/92  Arterial Line BP: ()/(26-52) 122/37  FiO2 (%):  [39 %-41 %] 41 %    Intake/Output Summary (Last 24 hours) at 11/21/2024 1001  Last data filed at 11/21/2024 0900  Gross per 24 hour   Intake 3049.71 ml   Output 4846.4 ml   Net -1796.69 ml       Physical Exam:   General Appearance:    Status post tracheostomy and on ventilator.  Nods head.   Lungs:     Rhonchi bilaterally anteriorly.    Heart:    Irregularly irregular rhythm with a tachycardic rate. II/VI SM throughout.   Abdomen:     Soft, nontender, nondistended.    Extremities:    3+ generalized edema and anasarca.     Results Review:    Results from last 7 days   Lab Units 11/21/24  0815   SODIUM mmol/L 135*   POTASSIUM mmol/L 4.3   CHLORIDE mmol/L 103   CO2 mmol/L 20.0*   BUN mg/dL 23   CREATININE mg/dL 0.95   GLUCOSE mg/dL 134*   CALCIUM mg/dL 8.1*         Results from last 7 days   Lab Units 11/21/24  0416   WBC 10*3/mm3 15.94*   HEMOGLOBIN g/dL 8.5*   HEMATOCRIT % 27.5*   PLATELETS 10*3/mm3 147                 Results from last 7 days   Lab Units 11/21/24  0815   MAGNESIUM mg/dL 2.8*               I reviewed the patient's new clinical results.        Assessment:  1.  Status post bioprosthetic aortic valve replacement in 2014  2.  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  Moderate  to severe mitral regurgitation  4.  Status post reoperative AV root replacement, mitral valve repair, ICD removal, SVG-RCA, and IABP placement on 10/30/2021  5.  Postoperative shock, multifactorial  6.  Acute kidney injury  7.  History of nonischemic cardiomyopathy with recovered ejection fraction  8.  Anemia, acute on chronic  9.  Postoperative thrombocytopenia  10.  Persistent atrial fibrillation  11.  Ventricular tachycardia postoperatively  12.  Grade C esophagitis by EGD on 9/30/2024  13.  Acute left lower extremity DVT on 10/24/2024.  Resolved on Dopplers on 11/8/2024  14.  Right Achilles tendon tear  15.  Postoperative junctional rhythm  16.  Hypoalbuminemia  17.  Thrombocytopenia  18.  Severe right ventricular enlargement and dysfunction post-op  19.  Fever noted on 11/8/2024  20.  Subacute DVT in the left axillary vein by Doppler on 11/10/2024  21.  Right groin hematoma versus abscess  22.  Status post tracheostomy on 11/16/2024    Plan:  -Worsening fever.  Possible pneumonia on chest x-ray.  CT has been ordered by infectious disease.  There are notes of asking pulmonology echo bronchoscopy is indicated.    -Continue CRRT and volume removal per nephrology.    -Remains on vasopressin and milrinone.  On midodrine 15 mg 3 times a day.    -Currently on Revatio 20 mg every 8 hours.    -Atrial fibrillation rate is mainly in the lower 100s.  Really, the only option he has currently is amiodarone for rate control. Continue amiodarone drip at 0.5 mg/min.  I will give him 1 more dose of digoxin 250 mcg.      -No heparin for now per cardiovascular surgery.  The DVT in his left lower extremity was not present on the Dopplers on 11/8/2024.  He does have a likely subacute DVT in the left axillary vein by Doppler.  He has had intermittent thrombocytopenia postoperatively as well.    -Tracheostomy without complications per vascular surgery.    -He remains critically ill.    Antwan Farmer MD  11/21/24  10:01 EST

## 2024-11-21 NOTE — PLAN OF CARE
Goal Outcome Evaluation:   Continues on CRRT. amio, vaso, prop, milrinone. Levo on and off. Less responsive. CT of chest and head done today. Echo done d/t low diastolic pressures. Bronch done- cultures pending. Blood cultures pending. New CRRT machine d/t filter not priming correctly on original machine. Calcium replaced. Wound RN treated wound on R groin. Thorasic surgery APRN shown stoma around trach-purulent drainage leaking around stoma. TF flush decreased d/t increased sodium per nephrology. Did not run CRRT from  d/t CT trip and malfunctioning CRRT machine.

## 2024-11-21 NOTE — PROGRESS NOTES
LOS: 29 days   Patient Care Team:  Juan Perez MD as PCP - General (Internal Medicine)    Subjective     s.  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  He went and had sternal closure on 11/2 evidence of possible endocarditis and he had blood cultures positive for strep mitis and is on pen G send atrial fibrillation unable to tolerate anticoagulation he has a nonischemic cardiomyopathy history of anemia some esophagitis and colon polyps have been removed history of right Achilles tendon tear uses a walking boot he is a prediabetic postop anemia and thrombocytopenia.  Patient had failed weaning and went for tracheostomy today  Patient is pretty sedated he had CRRT yesterday removed about 2-1/2 L.  Patient with high fevers today CT chest done nursing called me that ID would like bronch for cultures      review of Systems:          Objective     Vital Signs  Vital Sign Min/Max for last 24 hours  Temp  Min: 99.7 °F (37.6 °C)  Max: 101.8 °F (38.8 °C)   BP  Min: 102/43  Max: 157/52   Pulse  Min: 93  Max: 118   Resp  Min: 26  Max: 32   SpO2  Min: 94 %  Max: 99 %   No data recorded   Weight  Min: 151 kg (334 lb)  Max: 151 kg (334 lb)        Ventilator/Non-Invasive Ventilation Settings (From admission, onward)       Start     Ordered    11/12/24 1124  Ventilator - Vent Mode: AC/VC; Rate: Other; Rate: 24; FiO2: Titrate Per SpO2; Titrate Oxygen for SpO2: 90 - 95%; PEEP: 5; Tidal Volume: mL; TV: 550  Continuous        Question Answer Comment   Vent Mode AC/VC    Rate Other    Rate 24    FiO2 Titrate Per SpO2    Titrate Oxygen for SpO2 90 - 95%    PEEP 5    Tidal Volume mL            11/12/24 1124    11/01/24 0026   Ventilator - Vent Mode: AC/VC+; Rate: 20; FiO2: Titrate Per SpO2; Titrate Oxygen for SpO2: 90 - 95%; PEEP: 7.5; Tidal Volume: mL; TV: 620  Continuous,   Status:  Canceled        Question Answer Comment   Vent Mode AC/VC+    Rate 20    FiO2 Titrate Per SpO2    Titrate Oxygen for SpO2 90 - 95%    PEEP 7.5    Tidal Volume mL            11/01/24 0027                        Arterial Line BP: 122/37  Arterial Line MAP (mmHg): 57 mmHg  PAP: (30-37)/(1-6) 35/3  CVP:  [7 mmHg-160 mmHg] 19 mmHg  Body mass index is 47.92 kg/m².  I/O last 3 completed shifts:  In: 4672.2 [I.V.:2631.2; Blood:300; Other:228; NG/GT:1513]  Out: 7795.2 [Urine:275]  No intake/output data recorded.        Physical Exam:  General Appearance: Trached on a ventilator is on pressure control rate of 24 breathing 24 inspiratory pressure of 20 PEEP of 10 tidal volumes are in the 700 cc range his minute ventilation is about 19 L FiO2 a still 40% SpO2 is 94% patient is still on vasopressin and milrinone and amiodarone drips   Eyes: Resists eye opening  ENT: Mucous membranes are dry he has a nasoenteric tube in place  the left nare  Neck:  tracheostomy site looks okay he has a left IJ introducer   Lungs: Coarse breath sounds bilaterally, tachypneic  Cardiac: Tachycardic low 110s no murmur  Abdomen: Obese soft nontender no palpable hepatosplenomegaly or masses  : Not examined  Musculoskeletal: He has mild thoracic kyphosis very large abdomen   Skin: Warm and dry no jaundice no petechiae  Neuro: He is pretty sedated currently  Extremities: No clubbing or cyanosis he has edema all 4 extremities he has true anasarca marked,,palpable radial pulses and I think I feel dorsalis pedis pulses bilaterally as well there are not a strong but he has more lower extremity edema  MSE: Pretty heavily sedated today       Labs:  Results from last 7 days   Lab Units 11/21/24  0416 11/20/24  2339 11/20/24  1551 11/20/24  0856 11/20/24  0349 11/20/24  0019 11/19/24  1522  11/19/24  0801 11/19/24  0429 11/19/24  0011 11/18/24  2109 11/18/24  1714 11/18/24  0809 11/18/24  0335 11/15/24  0807 11/15/24  0306   GLUCOSE mg/dL 139* 136* 130* 121* 110* 116* 115*  --  143*  --   --   --  153* 147*   < > 172*   SODIUM mmol/L 137 136 137 136 137 136 136  --  134*  --   --   --  137 139   < > 147*   POTASSIUM mmol/L 3.8 4.0 4.3 4.2 3.9 4.0 4.0  4.0   < > 3.7   < > 3.9   < > 4.1  4.1 4.4   < > 3.6  3.6   MAGNESIUM mg/dL  --  2.3 2.3 2.2  --  2.2 2.3  --  2.2  --  2.5*  --   --  2.0  --  2.0   CO2 mmol/L 18.6* 19.0* 18.0* 17.0* 16.3* 16.1* 14.3*  --  13.0*  --   --   --  13.8* 12.8*   < > 21.0*   CHLORIDE mmol/L 107 103 104 106 105 105 105  --  105  --   --   --  107 109*   < > 109*   ANION GAP mmol/L 11.4 14.0 15.0 13.0 15.7* 14.9 16.7*  --  16.0*  --   --   --  16.2* 17.2*   < > 17.0*   CREATININE mg/dL 0.86 1.06 1.22 1.37* 1.66* 1.72* 2.41*  --  2.81*  --   --   --  2.52* 2.40*   < > 1.55*   BUN mg/dL 26* 28* 35* 42* 48* 55* 70*  --  92*  --   --   --  82* 80*   < > 55*   BUN / CREAT RATIO  30.2* 26.4* 28.7* 30.7* 28.9* 32.0* 29.0*  --  32.7*  --   --   --  32.5* 33.3*   < > 35.5*   CALCIUM mg/dL 7.4* 8.0* 8.1* 7.8* 7.7* 8.0* 8.5*  --  8.7  --   --   --  8.8 8.1*   < > 7.8*   ALK PHOS U/L 115  --   --   --  110  --   --   --  108  --   --   --  108 107  --  97   TOTAL PROTEIN g/dL 5.5*  --   --   --  5.8*  --   --   --  5.7*  --   --   --  6.0 5.6*  --  6.3   ALT (SGPT) U/L 15  --   --   --  13  --   --   --  14  --   --   --  16 17  --  6   AST (SGOT) U/L 24  --   --   --  24  --   --   --  21  --   --   --  27 36  --  19   BILIRUBIN mg/dL 0.9  --   --   --  0.7  --   --   --  0.6  --   --   --  0.7 0.7  --  0.7   ALBUMIN g/dL 2.4* 2.6* 2.6* 2.7* 2.1* 2.7* 2.2*  --  2.5*  --   --   --  2.7* 2.6*   < > 2.7*   GLOBULIN gm/dL 3.1  --   --   --  3.7  --   --   --  3.2  --   --   --  3.3 3.0  --  3.6    < > = values in this interval not displayed.     Estimated Creatinine Clearance: 111.9 mL/min  (by C-G formula based on SCr of 0.86 mg/dL).      Results from last 7 days   Lab Units 11/21/24  0416 11/20/24  0349 11/19/24 2204 11/19/24  0429 11/18/24  0528 11/18/24  0335 11/17/24  0318 11/16/24  0408 11/15/24  0306   WBC 10*3/mm3 15.94* 13.65*  --  16.38*  --  18.69* 17.25* 13.92* 15.95*   RBC 10*6/mm3 2.98* 2.79*  --  2.87*  --  2.64* 3.04* 2.99* 2.95*   HEMOGLOBIN g/dL 8.5* 8.2* 8.0* 8.4* 7.8* 7.6* 8.9* 8.6* 8.4*   HEMATOCRIT % 27.5* 25.4* 24.9* 26.0* 24.4* 24.4* 28.0* 27.1* 26.7*   MCV fL 92.3 91.0  --  90.6  --  92.4 92.1 90.6 90.5   MCH pg 28.5 29.4  --  29.3  --  28.8 29.3 28.8 28.5   MCHC g/dL 30.9* 32.3  --  32.3  --  31.1* 31.8 31.7 31.5   RDW % 17.5* 17.4*  --  17.0*  --  17.4* 17.1* 17.0* 16.9*   RDW-SD fl 57.9* 57.2*  --  54.8*  --  58.1* 58.4* 55.3* 55.3*   MPV fL 10.0 10.5  --  10.1  --  10.3 10.0 9.7 9.4   PLATELETS 10*3/mm3 147 159  --  156  --  165 207 193 203   NEUTROPHIL % %  --  82.9*  --  85.9*  --  87.5*  --   --   --    LYMPHOCYTE % %  --  5.9*  --  4.7*  --  4.2*  --   --   --    MONOCYTES % %  --  6.2  --  5.1  --  4.1*  --   --   --    EOSINOPHIL % %  --  2.3  --  2.4  --  1.7  --   --   --    BASOPHIL % %  --  0.2  --  0.3  --  0.3  --   --   --    IMM GRAN % %  --  2.5*  --  1.6*  --  2.2*  --   --   --    NEUTROS ABS 10*3/mm3 13.52* 11.32*  --  14.05*  --  16.35*  --   --   --    LYMPHS ABS 10*3/mm3  --  0.81  --  0.77  --  0.79  --   --   --    MONOS ABS 10*3/mm3  --  0.84  --  0.84  --  0.76  --   --   --    EOS ABS 10*3/mm3 0.64* 0.31  --  0.40  --  0.32  --   --   --    BASOS ABS 10*3/mm3 0.00 0.03  --  0.05  --  0.05  --   --   --    IMMATURE GRANS (ABS) 10*3/mm3  --  0.34*  --  0.27*  --  0.42*  --   --   --    NRBC /100 WBC 1.0*  0.8* 0.4*  --  0.2  --  0.2  --   --   --      Results from last 7 days   Lab Units 11/20/24  0702   PH, ARTERIAL pH units 7.461*   PO2 ART mm Hg 110.1*   PCO2, ARTERIAL mm Hg 22.9*   HCO3 ART mmol/L 16.3*                   Results from last 7  days   Lab Units 11/17/24  1300   LACTATE mmol/L 1.3         Microbiology Results (last 10 days)       Procedure Component Value - Date/Time    Respiratory Culture - Aspirate, Bronchus [[id number]] Collected: 11/13/24 1539    Lab Status: Final result Specimen: Aspirate from Bronchus Updated: 11/15/24 1043     Respiratory Culture No growth    Genital Culture - Swab, Penis [[id number]]  (Abnormal) Collected: 11/13/24 1158    Lab Status: Final result Specimen: Swab from Penis Updated: 11/16/24 1042     Genital Culture Scant growth (1+) Candida albicans      Rare growth Normal Skin Titi     Gram Stain Many (4+) WBCs seen      Occasional Yeast    MRSA Screen, PCR (Inpatient) - Swab, Nares [[id number]]  (Normal) Collected: 11/12/24 1027    Lab Status: Final result Specimen: Swab from Nares Updated: 11/12/24 1152     MRSA PCR No MRSA Detected    Narrative:      The negative predictive value of this diagnostic test is high and should only be used to consider de-escalating anti-MRSA therapy. A positive result may indicate colonization with MRSA and must be correlated clinically.    Respiratory Culture - Aspirate, ET Suction [[id number]]  (Abnormal) Collected: 11/12/24 0924    Lab Status: Final result Specimen: Aspirate from ET Suction Updated: 11/14/24 0952     Respiratory Culture Light growth (2+) Candida albicans      Scant growth (1+) Normal respiratory titi. No S. aureus or Pseudomonas aeruginosa detected. Final report.     Gram Stain Moderate (3+) WBCs per low power field      No epithelial cells seen      Rare (1+) Yeast    Blood Culture - Blood, Arm, Left [[id number]]  (Abnormal) Collected: 11/12/24 0815    Lab Status: Final result Specimen: Blood from Arm, Left Updated: 11/15/24 0728     Blood Culture Staphylococcus, coagulase negative     Isolated from Aerobic and Anaerobic Bottles     Gram Stain Aerobic Bottle Gram positive cocci in clusters      Anaerobic Bottle Gram positive cocci in clusters    Narrative:       Probable contaminant requires clinical correlation, susceptibility not performed unless requested by physician.      Blood Culture - Blood, Arm, Right [[id number]]  (Normal) Collected: 11/12/24 0815    Lab Status: Final result Specimen: Blood from Arm, Right Updated: 11/17/24 0830     Blood Culture No growth at 5 days    Blood Culture ID, PCR - Blood, Arm, Left [[id number]]  (Abnormal) Collected: 11/12/24 0815    Lab Status: Final result Specimen: Blood from Arm, Left Updated: 11/14/24 1130     BCID, PCR Staph spp, not aureus or lugdunensis. Identification by BCID2 PCR.     BOTTLE TYPE Aerobic Bottle                acetylcysteine, 3 mL, Nebulization, TID - RT  aspirin, 81 mg, Per G Tube, Daily  atorvastatin, 40 mg, Per G Tube, Nightly  busPIRone, 5 mg, Oral, TID  cefepime, 2,000 mg, Intravenous, Q8H  chlorhexidine, 15 mL, Mouth/Throat, Q12H  Ergocalciferol, 100 mcg, Per G Tube, Daily  heparin (porcine), 5,000 Units, Subcutaneous, Q8H  hydrocortisone-bacitracin-zinc oxide-nystatin, 1 Application, Topical, Q12H  insulin glargine, 20 Units, Subcutaneous, Daily  insulin regular, 2-7 Units, Subcutaneous, Q6H  ipratropium-albuterol, 3 mL, Nebulization, Q4H - RT  lansoprazole, 15 mg, Per G Tube, Q AM  miconazole, 1 Application, Topical, Q12H  midodrine, 15 mg, Oral, TID AC  saccharomyces boulardii, 250 mg, Per G Tube, BID  sildenafil, 20 mg, Per G Tube, TID  sodium chloride, 10 mL, Intravenous, Q12H      amiodarone, 0.5 mg/min, Last Rate: 0.5 mg/min (11/20/24 2345)  dexmedetomidine, 0.2-1.5 mcg/kg/hr, Last Rate: Stopped (11/18/24 0400)  DOPamine, 2-20 mcg/kg/min  EPINEPHrine, 0.01 mcg/kg/min, Last Rate: Stopped (11/17/24 1051)  lidocaine in D5W, 1 mg/min, Last Rate: Stopped (11/05/24 1135)  milrinone, 0.25 mcg/kg/min, Last Rate: 0.125 mcg/kg/min (11/21/24 2192)  niCARdipine, 5-15 mg/hr  norepinephrine, 0.02-0.2 mcg/kg/min, Last Rate: Stopped (11/19/24 8547)  phenylephrine, 0.2-2 mcg/kg/min  Phoxillum BK4/2.5, 1,500 mL/hr,  Last Rate: 1,500 mL/hr (11/19/24 1121)  Phoxillum BK4/2.5, 1,500 mL/hr, Last Rate: 1,500 mL/hr (11/19/24 1121)  Phoxillum BK4/2.5, 1,500 mL/hr, Last Rate: 1,500 mL/hr (11/19/24 1121)  propofol, 5-50 mcg/kg/min, Last Rate: 10 mcg/kg/min (11/21/24 0114)  vasopressin, 0.02-0.1 Units/min, Last Rate: 0.05 Units/min (11/21/24 0114)        Diagnostics:  Adult Transthoracic Echo Limited W/ Cont if Necessary Per Protocol    Result Date: 11/15/2024    Limited study for LV/RV function   Left ventricular systolic function is normal. Calculated left ventricular EF = 67.9%   Moderately to severely reduced right ventricular systolic function noted.   The right ventricular cavity is severely dilated.   The left atrial cavity is dilated.   The right atrial cavity is dilated.   Moderate tricuspid valve regurgitation is present.   Estimated right ventricular systolic pressure from tricuspid regurgitation is moderately elevated (45-55 mmHg).   S/p aortic valve replacement.  Not well-visualized/assessed.   S/p mitral valve repair with 28 mm [employer] flexible band annuloplasty.     US Nonvascular Extremity Limited    Result Date: 11/15/2024  US NONVASCULAR EXTREMITY LIMITED-11/15/2024  HISTORY: Right groin wound.  The subcutaneous tissues of the right groin superficial to the skin wound is an approximately 11.5 cm x 3.7 cm mixed echotexture hypoechoic area which may contain small amount of cystic change. There is somewhat heterogeneous appearance of the surrounding soft tissues likely related to edema.      1. Ill-defined somewhat loculated hypoechoic collection measuring 11.5 cm x 3.7 cm x 6.4 cm. This could represent hematoma or abscess and contains a small hypoechoic cystic area.   This report was finalized on 11/15/2024 4:35 PM by Dr. Deven Garcia M.D on Workstation: [id number]      XR Chest 1 View    Result Date: 11/15/2024  XR CHEST 1 VW-  HISTORY: 74-year-old male postop AVR, MVR, AICD generator explantation, intra-aortic  balloon pump placement on 10/30/2024.   FINDINGS: CHF may be more prominent than on yesterday's chest radiograph. Moderately large left effusion and small-moderate right pleural effusion are likely unchanged. No pneumothorax is seen.  ET tube is approximately 2 cm proximal to the yoni. Left IJ [place] catheter tip is within the proximal main pulmonary artery. Leads of the explanted AICD generator remain in place. There is an NG tube within the esophagus and stomach, distal tip is not included.  This report was finalized on 11/15/2024 6:31 AM by Dr. Bernadette Gaming M.D on Workstation: [id number]      XR Chest 1 View    Result Date: 11/14/2024  XR CHEST 1 VW-  Clinical: Postop  COMPARISON examination 11/13/2024  FINDINGS: Patient is rotated towards the left as before. Endotracheal tube and feeding tube in position as before. Transvenous pacemaker in place. Bibasilar consolidation as before. Bilateral diffuse interstitial infiltrate also seen, unchanged. There is cardiac enlargement. The mediastinum is stable.  CONCLUSION: Stable imaging of the chest  This report was finalized on 11/14/2024 6:39 AM by Dr. Adal Kimball M.D on Workstation: [id number]      CT Chest Without Contrast Diagnostic    Result Date: 11/13/2024  CT CHEST WO CONTRAST DIAGNOSTIC-, CT ABDOMEN PELVIS WO CONTRAST-  HISTORY: Pulmonary infiltrates; T82.857A-Stenosis of other cardiac prosthetic devices, implants and grafts, initial encounter; Z95.2-Presence of prosthetic heart valve; Z95.2-Presence of prosthetic heart valve  TECHNIQUE: Radiation dose reduction techniques were utilized, including automated exposure control and exposure modulation based on body size. CT of the chest, abdomen, and pelvis includes axial imaging from the thoracic inlet through the trochanters without intravenous contrast and without oral contrast.  COMPARISON: AP chest same date.  FINDINGS: CHEST: There are multifocal coronary artery calcifications. Median sternotomy  wires and overlying midline surgical skin staples are present. Cardiomegaly. Left subclavian cardiac pacer/defibrillator leads are discontinuous proximally with pacemaker segment within the left brachiocephalic vein.  There is dense lower lobe consolidation with air bronchograms in both lower lobes which are mostly collapsed. Pulmonary vascular engorgement. Small bilateral pleural effusions layer dependently. 5 mm noncalcified nodule anterior inferior right upper lobe on axial image 37.  Endotracheal tube tip is 3.2 cm above the yoni. Left IJ tip in the left brachiocephalic vein. Feeding tube is looped in the stomach and tip is in the region of the gastric fundus.  Surgical skin staples overlying the left upper anterior chest wall where there has been removal of pacemaker.  ABDOMEN/PELVIS: Within the posterior-inferior central spleen there is a focal area of low density measuring approximately 4.7 x 4.1 cm. This is without change compared to exam 09/18/2024 and may represent splenic infarction. Liver, adrenal glands, pancreas appear within normal limits. There is lobular appearance of the kidneys with areas of cortical thinning and this appears chronic. No hydronephrosis.  Urinary bladder is partially decompressed and exhibits wall thickening. Ugarte catheter is present in the urinary bladder. There is mild to moderate stool throughout the colon. No evidence for bowel obstruction. No ascites or intra-abdominal abscess formation. Atherosclerotic calcifications are present involving the abdominal aorta and iliac vasculature. There is mild edema within the left lateral lower chest and upper abdomen subcutaneous fat extending off the field-of-view laterally. Multilevel bridging plate osteophyte formation within the thoracic spine.      1. Dense bilateral lower lobe consolidation with partial collapse of the lower lobes and air bronchograms likely associated with infiltrate. Pulmonary vascular engorgement. Small pleural  effusions layer dependently. 2. 5 mm noncalcified nodule anterior inferior right upper lobe. 3. Recent median sternotomy with overlying midline surgical skin staples. Cardiomegaly. Prosthetic tricuspid valve. Recent removal of left subclavian cardiac pacer with retraction of pacing leads. 4. Mild to moderate stool throughout the colon. No evidence for acute intra-abdominal process. No hydronephrosis. 5. Endotracheal tube tip 3.2 cm above the yoni. Left IJ tip in the left brachiocephalic vein. Feeding tube tip in the gastric fundus. Ugarte catheter within a decompressed urinary bladder which exhibits generalized wall thickening.    Radiation dose reduction techniques were utilized, including automated exposure control and exposure modulation based on body size.   This report was finalized on 11/13/2024 1:22 PM by Salas Ingram M.D on Workstation: [id number]      CT Abdomen Pelvis Without Contrast    Result Date: 11/13/2024  CT CHEST WO CONTRAST DIAGNOSTIC-, CT ABDOMEN PELVIS WO CONTRAST-  HISTORY: Pulmonary infiltrates; T82.857A-Stenosis of other cardiac prosthetic devices, implants and grafts, initial encounter; Z95.2-Presence of prosthetic heart valve; Z95.2-Presence of prosthetic heart valve  TECHNIQUE: Radiation dose reduction techniques were utilized, including automated exposure control and exposure modulation based on body size. CT of the chest, abdomen, and pelvis includes axial imaging from the thoracic inlet through the trochanters without intravenous contrast and without oral contrast.  COMPARISON: AP chest same date.  FINDINGS: CHEST: There are multifocal coronary artery calcifications. Median sternotomy wires and overlying midline surgical skin staples are present. Cardiomegaly. Left subclavian cardiac pacer/defibrillator leads are discontinuous proximally with pacemaker segment within the left brachiocephalic vein.  There is dense lower lobe consolidation with air bronchograms in both lower lobes  which are mostly collapsed. Pulmonary vascular engorgement. Small bilateral pleural effusions layer dependently. 5 mm noncalcified nodule anterior inferior right upper lobe on axial image 37.  Endotracheal tube tip is 3.2 cm above the yoni. Left IJ tip in the left brachiocephalic vein. Feeding tube is looped in the stomach and tip is in the region of the gastric fundus.  Surgical skin staples overlying the left upper anterior chest wall where there has been removal of pacemaker.  ABDOMEN/PELVIS: Within the posterior-inferior central spleen there is a focal area of low density measuring approximately 4.7 x 4.1 cm. This is without change compared to exam 09/18/2024 and may represent splenic infarction. Liver, adrenal glands, pancreas appear within normal limits. There is lobular appearance of the kidneys with areas of cortical thinning and this appears chronic. No hydronephrosis.  Urinary bladder is partially decompressed and exhibits wall thickening. Ugarte catheter is present in the urinary bladder. There is mild to moderate stool throughout the colon. No evidence for bowel obstruction. No ascites or intra-abdominal abscess formation. Atherosclerotic calcifications are present involving the abdominal aorta and iliac vasculature. There is mild edema within the left lateral lower chest and upper abdomen subcutaneous fat extending off the field-of-view laterally. Multilevel bridging plate osteophyte formation within the thoracic spine.      1. Dense bilateral lower lobe consolidation with partial collapse of the lower lobes and air bronchograms likely associated with infiltrate. Pulmonary vascular engorgement. Small pleural effusions layer dependently. 2. 5 mm noncalcified nodule anterior inferior right upper lobe. 3. Recent median sternotomy with overlying midline surgical skin staples. Cardiomegaly. Prosthetic tricuspid valve. Recent removal of left subclavian cardiac pacer with retraction of pacing leads. 4. Mild  to moderate stool throughout the colon. No evidence for acute intra-abdominal process. No hydronephrosis. 5. Endotracheal tube tip 3.2 cm above the yoni. Left IJ tip in the left brachiocephalic vein. Feeding tube tip in the gastric fundus. Ugarte catheter within a decompressed urinary bladder which exhibits generalized wall thickening.    Radiation dose reduction techniques were utilized, including automated exposure control and exposure modulation based on body size.   This report was finalized on 11/13/2024 1:22 PM by Salas Ingram M.D on Workstation: [id number]      XR Chest 1 View    Result Date: 11/13/2024  SINGLE VIEW OF THE CHEST  HISTORY: Postop open heart surgery  COMPARISON: November 12, 2024  FINDINGS: Tubes and lines appear stable. There is vascular congestion. Bibasilar consolidation and bilateral effusions are noted. No pneumothorax is seen.      No significant interval change.  This report was finalized on 11/13/2024 5:37 AM by Dr. Alexandria Dahl M.D on Workstation: [id number]      XR Chest 1 View    Result Date: 11/12/2024  CHEST SINGLE VIEW  HISTORY: 74 years of age, Male. Postoperative exam.  COMPARISON: AP chest 11/11/2024, 11/10/2024, 11/09/2024.  FINDINGS: Endotracheal tube, feeding tube, left subclavian discontinued. Pacing/defibrillator leads, left atrial appendage clip are evident. Left IJ central venous catheter extends to the region of the right atrium. There are postoperative changes on the left where there are surgical skin staples. There is interstitial disease and there are left greater than right basilar opacities due to atelectasis or infiltrates. No evidence for pneumothorax      No evidence for significant change when compared to yesterday's exam.   This report was finalized on 11/12/2024 8:02 AM by Salas Ingram M.D on Workstation: [id number]      US Thoracentesis    Result Date: 11/11/2024  ULTRASOUND-GUIDED LEFT THORACENTESIS  HISTORY: Pleural effusion  COMPARISON:  Chest x-ray 11/11/2024  The risks, benefits and alternatives of the procedure were discussed with the patient and informed consent was obtained. Prior to the procedure ultrasound of the left chest was performed to evaluate the pleural effusion. However, there is only very minimal pleural fluid present, insufficient for thoracentesis.       Only very minimal pleural fluid present. Thoracentesis not performed.   This report was finalized on 11/11/2024 4:55 PM by Dr. Dean Knapp M.D on Workstation: [id number]      Adult Transthoracic Echo Limited W/ Cont if Necessary Per Protocol    Result Date: 11/11/2024    Left ventricular systolic function is normal. Calculated left ventricular EF = 67.8%   RV severely dilated with moderate-severe dysfunction.  Mild-moderate RVH.   S/p aortic valve replacement with homograft.  Not well-visualized/assessed.   S/p mitral valve repair with 28 mm [employer] flexible band annuloplasty.   Severe tricuspid valve regurgitation is present.   Estimated right ventricular systolic pressure from tricuspid regurgitation is markedly elevated (>55 mmHg).   Severe biatrial enlargement, dilated IVC.     XR Chest 1 View    Result Date: 11/11/2024  XR CHEST 1 VW-  DATE OF EXAM: 11/11/2024 8:03 AM  INDICATION: Postop. Endotracheal tube advancement.  COMPARISON: Radiographs 11/10/2024, 11/9/2024, 11/8/2024, and 11/7/2024. Coronary CTA 10/25/2024. CT chest 9/18/2024.  TECHNIQUE: A single portable AP view of the chest was obtained.  FINDINGS: Lordotic positioning. Multiple overlying artifacts. Similar-appearing sternotomy wires, retained cardiac pacer/defibrillator leads, mitral annuloplasty, endotracheal tube, left IJ pulmonary arterial catheter, and partially imaged enteric tube. Similar-appearing basilar predominant opacification of both lungs. No pneumothorax. Unchanged cardiac and mediastinal contours. No acute osseous abnormality is identified.       1. No significant herbal change. Stable  support tubes and line. 2. Stable basilar prominent lung opacities.  This report was finalized on 11/11/2024 9:01 AM by Neel Wan MD on Workstation: [id number]      Duplex Venous Upper Extremity - Bilateral CAR    Result Date: 11/11/2024    Sub-acute right upper extremity superficial thrombophlebitis noted in the basilic (upper arm).   Sub-acute left upper extremity deep vein thrombosis noted in the axillary.   Acute left upper extremity superficial thrombophlebitis noted in the cephalic (forearm).   All other vessels appear normal.     XR Chest 1 View    Result Date: 11/10/2024  XR CHEST 1 VW-   INDICATION: Central line placement  COMPARISON: Chest radiograph November 9, 2024  TECHNIQUE: 1 view chest  FINDINGS:  Vascular congestion. Indistinctness of vessels. Ill-defined basilar opacities. Blunting costophrenic angles. Stable mediastinum. Enlarged cardiac silhouette. Left atrial appendage clip. Median sternotomy. Right IJ sheath with catheter tip near the SVC bifurcation. Endotracheal tube tip is in the distal trachea. Left IJ catheter containing a pulmonary artery catheter, with the tip near the right ventricular outflow tract. Feeding tube tip is in the gastric fundus.       1. Interval placement of left IJ sheath containing a pulmonary artery catheter with the tip near the right ventricular outflow tract. No pneumothorax. 2. Cardiomegaly with vascular congestion and suspected interstitial edema. 3. Ill-defined basilar opacities are similar. 4. Layering pleural effusions  This report was finalized on 11/10/2024 10:21 AM by Dr. Alberto Dominique M.D on Workstation: [id number]      [place] catheter    Result Date: 11/10/2024  Matthieu Scott MD     11/10/2024 10:16 AM [place] catheter Patient reassessed immediately prior to procedure Patient location during procedure: ICU Indications: MD/Surgeon request Staff Anesthesiologist: Matthieu Scott MD Preanesthetic Checklist Completed: patient  identified, IV checked, site marked, risks and benefits discussed, surgical consent, monitors and equipment checked, pre-op evaluation and timeout performed Central Line Prep Sterile Tech:gloves, cap, gown, mask and sterile barriers Prep: chloraprep no medical exclusion documented for following all elements of maximal sterile barrier technique Patient monitoring: blood pressure monitoring, continuous pulse oximetry and EKG Central Line Procedure Laterality:right Location:internal jugular Catheter Type:[place] Catheter Size:7.5 Fr Guidance:ultrasound guided PROCEDURE NOTE/ULTRASOUND INTERPRETATION.  Using ultrasound guidance the potential vascular sites for insertion of the catheter were visualized to determine the patency of the vessel to be used for vascular access.  After selecting the appropriate site for insertion, the needle was visualized under ultrasound being inserted into the internal jugular vein, followed by ultrasound confirmation of wire and catheter placement. There were no abnormalities seen on ultrasound; an image was taken; and the patient tolerated the procedure with no complications. Images: still images obtained, printed/placed on chart Assessment Post procedure:biopatch applied, line sutured and occlusive dressing applied Assessement:blood return through all ports and free fluid flow Complications:no Patient Tolerance:patient tolerated the procedure well with no apparent complications Additional Notes SGC @     Central Line    Result Date: 11/10/2024  Matthieu Scott MD     11/10/2024 10:16 AM Central Line Patient reassessed immediately prior to procedure Patient location during procedure: ICU Indications: MD/Surgeon request Staff Anesthesiologist: Matthieu Scott MD Preanesthetic Checklist Completed: patient identified, IV checked, site marked, risks and benefits discussed, surgical consent, monitors and equipment checked, pre-op evaluation and timeout performed Central Line Prep  Sterile Tech:gloves, cap, gown, mask and sterile barriers Prep: chloraprep no medical exclusion documented for following all elements of maximal sterile barrier technique Patient monitoring: blood pressure monitoring, continuous pulse oximetry and EKG Central Line Procedure Laterality:left Location:internal jugular Catheter Type:MAC Catheter Size:9 Fr Guidance:ultrasound guided PROCEDURE NOTE/ULTRASOUND INTERPRETATION.  Using ultrasound guidance the potential vascular sites for insertion of the catheter were visualized to determine the patency of the vessel to be used for vascular access.  After selecting the appropriate site for insertion, the needle was visualized under ultrasound being inserted into the internal jugular vein, followed by ultrasound confirmation of wire and catheter placement. There were no abnormalities seen on ultrasound; an image was taken; and the patient tolerated the procedure with no complications. Images: still images obtained, printed/placed on chart Assessment Post procedure:biopatch applied, line sutured and occlusive dressing applied Assessement:blood return through all ports and free fluid flow Complications:no Patient Tolerance:patient tolerated the procedure well with no apparent complications     XR Chest 1 View    Result Date: 11/9/2024  SINGLE VIEW OF THE CHEST  HISTORY: Central line placement  COMPARISON: November 8, 2024  FINDINGS: Tubes and lines appear stable compared to prior study. No pneumothorax is seen. Cardiomegaly and vascular congestion are noted. Bibasilar consolidation and left pleural effusion are again noted. Aeration of the left lung base They improved.      Slight interval improvement in aeration of the left lung base.  This report was finalized on 11/9/2024 3:33 AM by Dr. Alexandria Dahl M.D on Workstation: [id number]      Duplex Venous Lower Extremity - Bilateral CAR    Result Date: 11/8/2024    Normal bilateral lower extremity venous duplex scan.    Previous left gastrocnemius vein thrombus not visualized on today's study     Adult Transthoracic Echo Limited W/ Cont if Necessary Per Protocol    Result Date: 11/8/2024    Left ventricular systolic function is normal. Calculated left ventricular EF = 61.1%   Left ventricular diastolic function was indeterminate.   The right ventricular cavity is moderately dilated. Normal right ventricular systolic function noted. RV free wall strain = -6.0%.   The left atrial cavity is mildly dilated.     XR Chest 1 View    Result Date: 11/8/2024  XR CHEST 1 VW-  Clinical: Postop  COMPARISON examination 11/7/2024  FINDINGS: Patient is rotated, projection is apical lordotic. Life support tubes and lines in position as before. There are surgical skin staples demonstrated along the chest on the left. The cardiomediastinal silhouette is unchanged, there is cardiac enlargement. There is worsening bibasilar infiltrate/atelectasis and/or consolidation, greatest at the left base. Possible left-sided pleural effusion. No pneumothorax seen. The remainder is unremarkable.  This report was finalized on 11/8/2024 7:01 AM by Dr. Adal Kimball M.D on Workstation: [id number]      Adult Transthoracic Echo Limited W/ Cont if Necessary Per Protocol    Result Date: 11/7/2024    Limited echocardiogram for left and right ventricular function   Left ventricular systolic function is hyperdynamic (EF > 70%). Left ventricular ejection fraction appears to be greater than 70%.   Left ventricular wall thickness is consistent with mild concentric hypertrophy.   The right ventricular cavity is severely dilated.   Moderately reduced right ventricular systolic function noted.   Calculated right ventricular systolic pressure from tricuspid regurgitation is 41.0 mmHg.     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 Right IJ pulmonary artery catheter with tip overlying the RVOT/main pulmonary artery. Enteric tube with tip overlying the stomach.  Stable normal size cardiomediastinal silhouette with postsurgical changes of mitral valve repair and left atrial appendage clipping. Low lung volumes. Persistent confluent dense bilateral lobe opacities which are suspicious for infiltrates. Central pulmonary vasculature remains somewhat dilated and indistinct with prominent pulmonary interstitial markings which may reflect a component of edema. No measurable pleural effusion or pneumothorax. No focal osseous abnormality.  This report was finalized on 11/7/2024 6:54 AM by Dr. Donovan Gomez M.D on Workstation: [id number]      XR Chest 1 View    Result Date: 11/6/2024  XR CHEST 1 VW-  HISTORY: Male who is 74 years-old, postoperative evaluation  TECHNIQUE: Frontal view of the chest  COMPARISON: 11/5/2024  FINDINGS: Stable appearing endotracheal tube, NG tube, right IJ catheter. Cardiac lead fragments, sternotomy wires, skin staples noted. The heart is enlarged. Pulmonary vasculature is congested with similar-appearing bilateral lower lung opacities. There may be minimal pleural effusions. No pneumothorax. Otherwise stable.      No significant change.  This report was finalized on 11/6/2024 12:48 PM by Dr. Giacomo Burton M.D on Workstation: [id number]      XR Chest 1 View    Result Date: 11/5/2024  XR CHEST 1 VW-11/5/2024  HISTORY: Postop. Follow-up.  The heart size is moderately enlarged. Mild to moderate bibasilar atelectasis or infiltrates are seen. The right base has cleared somewhat since yesterday's study.  No significant pneumothorax is seen. Skin staples, pacer leads, ET tube, NG tube, [place] catheter, sternotomy wires and radiopaque closure device are again seen. These appear relatively unchanged from yesterday's study.      1. No significant pneumothorax is  seen. 2. Bibasilar atelectasis and/or infiltrates. The right base may have cleared slightly since yesterday's study. 3. Life support devices appear relatively unchanged.   This report was finalized on 11/5/2024 6:55 AM by Dr. Deven Garcia M.D on Workstation: [id number]      Adult Transthoracic Echo Limited W/ Cont if Necessary Per Protocol    Result Date: 11/4/2024    Left ventricular systolic function is hyperdynamic (EF > 70%).   Left ventricular wall thickness is consistent with mild concentric hypertrophy.   The right ventricular cavity is severely dilated. Severely reduced right ventricular systolic function noted.   There is no evidence of pericardial effusion     XR Chest 1 View    Result Date: 11/4/2024  XR CHEST 1 VW-  HISTORY: Male who is 74 years-old, chest tube removal  TECHNIQUE: Frontal view of the chest  COMPARISON: 11/4/2024 at 1025 hours  FINDINGS: Interval removal of chest tubes. Stable appearing NG tube, endotracheal tube, right IJ [place] catheter. Cardiac lead fragments are evident. Sternotomy wires, cardiac valve marker are noted. The heart is enlarged. Pulmonary vasculature is congested. Atelectasis/infiltrate at the lower lungs, similar to prior exam, with likely mild pleural effusions. No pneumothorax. No acute osseous process.      Chest tubes removed. No pneumothorax.  This report was finalized on 11/4/2024 11:51 AM by Dr. Giacomo Burton M.D on Workstation: [id number]      XR Chest 1 View    Result Date: 11/4/2024  XR CHEST 1 VW-  HISTORY: Male who is 74 years-old, chest tube removal  TECHNIQUE: Frontal view of the chest  COMPARISON: 11/4/2024  FINDINGS: A right chest tube has been removed. Other chest tubes appear stable. Stable appearing NG tube, endotracheal tube, right IJ [place] catheter. Cardiac lead fragments are evident. Sternotomy wires, cardiac valve marker are noted. The heart is enlarged. Pulmonary vasculature is congested. Atelectasis/infiltrate at the lower  lungs, similar to prior exam, with likely mild pleural effusions. No pneumothorax. No acute osseous process.      Chest tube removed. No pneumothorax.  This report was finalized on 11/4/2024 10:39 AM by Dr. Giacomo Burton M.D on Workstation: [id number]      XR Chest 1 View    Result Date: 11/4/2024  XR CHEST 1 VW-   INDICATION: Postoperative  COMPARISON: Chest radiograph November 3, 2024  TECHNIQUE: 1 view chest  FINDINGS:  Heterogeneous multifocal bilateral lung opacities. Questionable tiny left pneumothorax. Stable mediastinum. Suspect CABG. Left atrial appendage clip. Endotracheal tube tip is located in the distal trachea 4.3 cm above the yoni. Right IJ sheath with pulmonary artery catheter tip over the main pulmonary artery. Feeding tube with the tip in the gastric fundus. Abandoned cardiac leads. Left-sided chest tube. Mediastinal drains.       1. Heterogeneous multifocal bilateral lung opacities with improved aeration of the right upper lung. 2. Possible tiny left pneumothorax. 3. Stable support apparatus.  This report was finalized on 11/4/2024 8:31 AM by Dr. Alberto Dominique M.D on Workstation: [id number]      Duplex Pseudoaneurysm CAR    Result Date: 11/3/2024    Study today is negative for pseudoaneurysm of the left groin.  Hematoma measuring 2.3 x 1.3 cm.     Adult Transthoracic Echo Limited W/ Cont if Necessary Per Protocol    Result Date: 11/3/2024    Left ventricular systolic function is hyperdynamic (EF > 70%). Left ventricular ejection fraction appears to be greater than 70%.   The left ventricular cavity is borderline dilated.   Moderately reduced right ventricular systolic function noted.   The right ventricular cavity is moderate to severely dilated.   The left atrial cavity is severely dilated.   The right atrial cavity is mild to moderately  dilated.   Color doppler jet is not well visualized but triangular shap and density indicate severe tricuspid valve regurgitation is present.    Estimated right ventricular systolic pressure from tricuspid regurgitation is mildly elevated (35-45 mmHg).   Mitral ring in place with normal gradients.   Prosthetic AV not well visualized but gradients are normal.   No pericardial effusion appreciated.     XR Chest 1 View    Result Date: 11/3/2024  XR CHEST 1 VW-  HISTORY: Male who is 74 years-old, postoperative evaluation  TECHNIQUE: Supine view of the chest  COMPARISON: 11/2/2024  FINDINGS: Stable appearing endotracheal tube, NG tube, right IJ catheter, mediastinal drain, balloon pump marker. Cardiac leads, sternotomy wires, cardiac valve marker noted. The heart is enlarged. Pulmonary vasculature is congested. Extensive bilateral pulmonary opacities appear similar to prior exam. No large pleural effusion or pneumothorax is seen, limited by supine positioning, left lateral costophrenic angle is excluded from the image. Otherwise stable.      No significant change.    This report was finalized on 11/3/2024 7:10 AM by Dr. Giacomo Burton M.D on Workstation: [id number]      XR Chest 1 View    Result Date: 11/2/2024  XR CHEST 1 VW-  HISTORY: Postop.  COMPARISON: Chest radiograph 11/2/2024 9:14 a.m.  FINDINGS:  A single view of the chest was obtained. There is an endotracheal tube with the tip terminating approximately 2.6 cm above the yoni. There is a linear metallic density projecting over the aortic knob, which was previously located over the lower mediastinum. This may reflect a balloon pump with repositioning. Additional support devices are not significantly changed. The cardiac silhouette is enlarged. There is a left atrial appendage clip. Status post CABG. There is calcific aortic atherosclerosis. Bilateral pulmonary opacities are similar to prior. Sternal wires and surgical clips are present.  This report was finalized on 11/2/2024 2:24 PM by Dr. Janessa Bower M.D on Workstation: [id number]      XR Abdomen KUB    Result Date: 11/2/2024  XR ABDOMEN KUB-   INDICATIONS: NG tube placement  TECHNIQUE: SUPINE VIEW OF THE ABDOMEN  COMPARISON: 10/31/2024  FINDINGS:  In the partly included thorax, the NG tube projects over the spine, that extends to left upper quadrant at the expected location of the proximal stomach. The bowel gas pattern is nonobstructive. Follow-up as clinically indicated.       As described.   This report was finalized on 11/2/2024 2:09 PM by Dr. Giacomo Burton M.D on Workstation: [id number]      XR Chest 1 View    Result Date: 11/2/2024  XR CHEST 1 VW-  HISTORY: Male who is 73 years-old, postoperative evaluation  TECHNIQUE: Frontal view of the chest  COMPARISON: 11/2/2024 at 0134 hours  FINDINGS: Previous NG tube is no longer seen. Stable appearing endotracheal tube. Right IJ [place] catheter appears slightly advanced in comparison with the prior exam, tip in the region of the pulmonary arterial trunk. Normal chest tube removal. A 7-8 mm metallic radiodensity projecting over the central aspect of the cardiac shadow, if representing balloon pump marker would be low in position, correlate clinically. Sternotomy wires, cardiac valve marker noted. The heart is enlarged. Extensive bilateral pulmonary opacities show further interval increase bilaterally. There may be left pleural effusion. No pneumothorax. Otherwise stable.       As described.  Discussed by telephone with patient's nurse, [place], at time of interpretation, 1012, 11/2/2024  This report was finalized on 11/2/2024 10:14 AM by Dr. Giacomo Burton M.D on Workstation: [email or web address]      Arterial Line    Result Date: 11/2/2024  Anuj Aguilar MD     11/2/2024  7:24 AM Arterial Line Patient reassessed immediately prior to procedure Patient location during procedure: OR Line placed for hemodynamic monitoring, ABGs/Labs/ISTAT and MD/Surgeon request. Performed By Anesthesiologist: Anuj Aguilar MD Preanesthetic Checklist Completed: patient identified, IV checked, site marked, risks and benefits  discussed, surgical consent, monitors and equipment checked, pre-op evaluation and timeout performed Arterial Line Prep  Sterile Tech: cap, gloves and sterile barriers Prep: ChloraPrep Patient monitoring: EKG, continuous pulse oximetry and blood pressure monitoring Arterial Line Procedure Laterality:right Location:  radial artery Catheter size: 20 G Guidance: ultrasound guided PROCEDURE NOTE/ULTRASOUND INTERPRETATION.  Using ultrasound guidance the potential vascular sites for insertion of the catheter were visualized to determine the patency of the vessel to be used for vascular access.  After selecting the appropriate site for insertion, the needle was visualized under ultrasound being inserted into the radial artery, followed by ultrasound confirmation of wire and catheter placement. There were no abnormalities seen on ultrasound; an image was taken; and the patient tolerated the procedure with no complications. Number of attempts: 1 Successful placement: yes Images: still images not obtained Post Assessment Dressing Type: wrist guard applied, secured with tape and occlusive dressing applied. Complications no Circ/Move/Sens Assessment: normal and unchanged. Patient Tolerance: patient tolerated the procedure well with no apparent complications Additional Notes ULTRASOUND INTERPRETATION. Using ultrasound guidance, a catheter was placed within in the appropriate vessel and advanced successfully. There were no abnormalities seen on ultrasound; the patient tolerated the procedure with no complications.     Diagnostic IntraOp Dano    Result Date: 11/2/2024  Leana Jang MD     11/2/2024  8:06 AM Diagnostic IntraOp Dano Procedure Performed: Diagnostic IntraOp Dano      Start Time:  11/2/2024 8:04 AM      End Time:   11/2/2024 8:04 AM Preanesthesia Checklist: Patient identified, IV assessed, risks and benefits discussed, monitors and equipment assessed, procedure being performed at surgeon's request and anesthesia  consent obtained. General Procedure Information JOLIE Placed for monitoring purposes only -- This is not a diagnostic JOLIE Diagnostic Indications for Echo:  hemodynamic monitoring Physician Requesting Echo: Javon Florian MD Location performed:  OR Intubated Bite block placed Heart visualized Probe Insertion:  Easy Probe Type:  Multiplane Modalities:  2D only, color flow mapping, continuous wave Doppler and pulse wave Doppler Anesthesia Information Performed Personally Anesthesiologist:  Leana Jang MD Echocardiogram Comments:      Limited exam for hemodynamic monitoring while closing chest    XR Chest 1 View    Result Date: 11/2/2024  XR CHEST 1 VW-  HISTORY: Male who is 73 years-old, balloon pump  TECHNIQUE: Frontal view of the chest  COMPARISON: 11/1/2024  FINDINGS: Stable-appearing tubes and line. The heart is enlarged. Extensive bilateral pulmonary opacities show interval increase, especially in the right upper lung. There may be left pleural effusion. No pneumothorax. Otherwise stable.      As described.  This report was finalized on 11/2/2024 5:50 AM by Dr. Giacomo Burton M.D on Workstation: [email or web address]      XR Chest 1 View    Result Date: 11/1/2024  SINGLE VIEW OF THE CHEST  HISTORY: Postop open heart surgery  COMPARISON: October 31, 2024  FINDINGS: Tubes and lines appear stable, including possible positioning of the [place] catheter within the right ventricle. Vascular congestion is again noted. No pneumothorax is seen. Bibasilar atelectasis and small bilateral effusions are suspected.       No significant interval change.  This report was finalized on 11/1/2024 5:18 AM by Dr. Alexandria Dahl M.D on Workstation: [id number]      XR Abdomen KUB    Result Date: 11/1/2024  KUB  HISTORY: Orogastric tube placement  COMPARISON: None available.  FINDINGS: Orogastric tube appears to terminate within the proximal stomach. Bowel gas pattern is nonobstructive. Bibasilar consolidation and bilateral  effusions are noted.  This report was finalized on 11/1/2024 4:08 AM by Dr. Alexandria Dahl M.D on Workstation: [id number]      XR Chest 1 View    Result Date: 10/31/2024  SINGLE VIEW OF THE CHEST  HISTORY: Orogastric tube placement  COMPARISON: None available.  FINDINGS: Orogastric tube appears to extend into the upper abdomen. The patient's [place] catheter has retracted, and is likely positioned within the right ventricle. Tubes and lines are otherwise stable. Cardiomegaly and vascular congestion are noted. Bibasilar consolidation is noted. Bilateral effusions are suspected. No pneumothorax is seen.       1. Orogastric tube appears to extend into the upper abdomen. 2. [place] catheter has retracted, and may be positioned within the right ventricle.  This report was finalized on 10/31/2024 9:51 PM by Dr. Alexandria Dahl M.D on Workstation: [id number]      XR Abdomen KUB    Addendum Date: 10/31/2024    ADDENDUM: Discussed by telephone with patient's nurse, Letty, 2044, 10/31/2024.  This report was finalized on 10/31/2024 8:47 PM by Dr. Giacomo Burton M.D on Workstation: [id number]      Result Date: 10/31/2024  XR ABDOMEN KUB-  INDICATIONS: Orogastric tube placement  TECHNIQUE: FRONTAL VIEW OF THE ABDOMEN  COMPARISON: None available  FINDINGS:  As several tubes are present over the abdomen, it is difficult to distinguish with certainty which tube is the orogastric tube, or what the location of the orogastric tube is; as such, additional imaging at the level of the chest and upper abdomen is recommended for further evaluation. The bowel gas pattern is nonobstructive.       As described.   This report was finalized on 10/31/2024 8:40 PM by Dr. Giacomo Burton M.D on Workstation: [id number]      XR Chest 1 View    Result Date: 10/31/2024  XR CHEST 1 VW-  HISTORY: 73-year-old male status post aortic root replacement, removal of intracardiac leads as well as pacemaker ICD generator and part of the leads.  Intra-aortic balloon pump. Significant coagulopathy.  FINDINGS: Distal tip of ET tube is 1.5 cm proximal to the yoni. 2 cm withdrawal is recommended. Right IJ [place] catheter tip is within the main pulmonary artery outflow tract. Intra-aortic balloon pump marker is approximately 7 cm distal to the yoni. No pneumothorax.  There is improved aeration at the right lung, but the remaining perihilar airspace consolidations need to be followed. There is no definite interstitial edema. No significant change in the enlarged cardiac silhouette. No pneumothorax.  This report was finalized on 10/31/2024 4:31 PM by Dr. Bernadette Gaming M.D on Workstation: [id number]      Diagnostic IntraOp Dano    Result Date: 10/31/2024  Anuj Aguilar MD     10/31/2024  3:58 PM Diagnostic IntraOp Dano Procedure Performed: Diagnostic IntraOp Dano      Start Time:      End Time:  Preanesthesia Checklist: Patient identified, IV assessed, risks and benefits discussed, monitors and equipment assessed, procedure being performed at surgeon's request and anesthesia consent obtained. General Procedure Information DANO Placed for monitoring purposes only -- This is not a diagnostic DANO Physician Requesting Echo: Javon Florian MD Location performed:  ICU Intubated Bite block placed Heart visualized Probe Insertion:  Easy Probe Type:  Multiplane Modalities:  Color flow mapping, continuous wave Doppler and pulse wave Doppler Echocardiographic and Doppler Measurements Aorta Other Aortic Findings:      Anesthesia Information Performed Personally Anesthesiologist:  Anuj Aguilar MD Echocardiogram Comments:      Limited exam performed in ICU on POD1, inotropes epi 0.02 & milrinone 0.375 RV - severely impaired systolic function LV - severe impaired systolic function, EF 20-25%, underfilled, practically kissing papillary muscles, global hypokinesis Diastolic function - grade 2 dysfunction RA: dilated LA: dilated AV - s/p bioprosthetic homograft without  paravalvular leak MV - s/p annuloplasty, well seated without leak, mean gradient 2mmHg, no regurgitation seen TV - moderate to severe regurgitation    XR Chest 1 View    Result Date: 10/31/2024  SINGLE VIEW OF THE CHEST  HISTORY: Postop line check  COMPARISON: October 30, 2024  FINDINGS: Tubes and lines appear to be stable when compared to the earlier study. No pneumothorax is seen. There is vascular congestion. There is bibasilar atelectasis. There is a left pleural effusion. As previously noted, intracardiac pacemaker leads have been removed, and the generator also appears to have been removed.      Postoperative findings, as noted above.  This report was finalized on 10/31/2024 12:18 AM by Dr. Alexandria Dahl M.D on Workstation: [id number]      XR Lost Needle / Instrument    Result Date: 10/30/2024  X-RAY LOST NEEDLE/INSTRUMENT  HISTORY: No count  COMPARISON: October 30, 2024  FINDINGS: The patient has undergone revision of sternotomy. Endotracheal tube terminates in satisfactory position. Right internal jugular vein [place] catheter appears to be stable in position. There are left-sided chest tubes and a mediastinal drain. Distal left-sided pacemaker leads appear to have been removed. Correlation with operative procedure is recommended. There is vascular congestion. No obvious pneumothorax is seen. Left basilar atelectasis is present. There may be a left pleural effusion.      Postoperative findings, as noted above. Distal left-sided pacemaker leads appear to have been removed, and correlation with procedural history is recommended.  This report was finalized on 10/30/2024 10:58 PM by Dr. Alexandria Dahl M.D on Workstation: [id number]      XR Chest Post CVA Port    Result Date: 10/30/2024  Portable chest radiograph  HISTORY: Central line placement  TECHNIQUE: Single AP portable radiograph of the chest  COMPARISON: Chest radiograph 10/20/2024      FINDINGS AND IMPRESSION: Right internal jugular  pulmonary artery catheter tip terminates over the left aspect of the mediastinum. There are median sternotomy wires and a presumed atrial appendage clip. Prosthetic heart valve and a left-sided pacer/AICD.  Bibasilar pulmonary pacification is present, left greater than right, mildly worsened within the right lung since 10/20/2024. Given asymmetry, continued attention follow-up to resolution is recommended to exclude the possibility of neoplasm. No pneumothorax is seen. Cardiac silhouette is mildly enlarged.  This report was finalized on 10/30/2024 12:20 PM by Dr. Danial Rosenbaum M.D on Workstation: [id number]        Pulmonary Artery Catheter    Result Date: 10/30/2024  Lionel Valencia MD     10/30/2024 11:03 AM Pulmonary Artery Catheter Patient reassessed immediately prior to procedure Patient location during procedure: OR Start time: 10/30/2024 10:26 AM Stop Time:10/30/2024 10:46 AM Indications: central pressure monitoring, vascular access and MD/Surgeon request Staff Anesthesiologist: Lionel Valencia MD Preanesthetic Checklist Completed: patient identified and risks and benefits discussed Central Line Prep Sterile Tech:cap, gloves, gown, mask and sterile barriers Prep: chloraprep Patient monitoring: blood pressure monitoring, continuous pulse oximetry and EKG Central Line Procedure Laterality:right Location:internal jugular Catheter Type:[place] Catheter Size:7.5 Fr Guidance:ultrasound guided PROCEDURE NOTE/ULTRASOUND INTERPRETATION.  Using ultrasound guidance the potential vascular sites for insertion of the catheter were visualized to determine the patency of the vessel to be used for vascular access.  After selecting the appropriate site for insertion, the needle was visualized under ultrasound being inserted into the internal jugular vein, followed by ultrasound confirmation of wire and catheter placement. There were no abnormalities seen on ultrasound; an image was taken; and the patient tolerated the  procedure with no complications. Assessment Post procedure:biopatch applied, line sutured, occlusive dressing applied and secured with tape Assessement:blood return through all ports and free fluid flow Complications:no Patient Tolerance:patient tolerated the procedure well with no apparent complications     Central Line    Result Date: 10/30/2024  Lionel Valencia MD     10/30/2024 11:02 AM Central Line Patient reassessed immediately prior to procedure Patient location during procedure: pre-op Start time: 10/30/2024 10:26 AM Stop Time:10/30/2024 10:46 AM Indications: vascular access and central pressure monitoring Staff Anesthesiologist: Lionel Valencia MD Preanesthetic Checklist Completed: patient identified and risks and benefits discussed Central Line Prep Sterile Tech:cap, gloves, gown, mask and sterile barriers Prep: chloraprep Patient monitoring: blood pressure monitoring, continuous pulse oximetry and EKG Central Line Procedure Laterality:right Location:internal jugular Catheter Type:Cordis Catheter Size:9 Fr Guidance:ultrasound guided PROCEDURE NOTE/ULTRASOUND INTERPRETATION.  Using ultrasound guidance the potential vascular sites for insertion of the catheter were visualized to determine the patency of the vessel to be used for vascular access.  After selecting the appropriate site for insertion, the needle was visualized under ultrasound being inserted into the internal jugular vein, followed by ultrasound confirmation of wire and catheter placement. There were no abnormalities seen on ultrasound; an image was taken; and the patient tolerated the procedure with no complications. Images: still images obtained, printed/placed on chart Assessment Post procedure:biopatch applied, line sutured, occlusive dressing applied and secured with tape Assessement:blood return through all ports and chest x-ray ordered Complications:no Patient Tolerance:patient tolerated the procedure well with no apparent complications  Additional Notes Ultrasound Interpretation:  Using ultrasound guidance the potential vascular sites for insertion of the catheter were visualized to determine the patency of the vessel to be used for vascular access.  After selecting the appropriate site for insertion, the needle was visualized under ultrasound being inserted into the vessel, followed by ultrasound confirmation of wire and catheter placement.  There were no abnormalities seen on ultrasound; an image was taken/ and the patient tolerated the procedure with no complications.     Diagnostic IntraOp Dano    Result Date: 10/30/2024  Azar Macias MD     10/30/2024  2:48 PM Diagnostic IntraOp Dano Procedure Performed: Diagnostic IntraOp Dano      Start Time:      End Time:  Preanesthesia Checklist: Patient identified, IV assessed, risks and benefits discussed, monitors and equipment assessed, procedure being performed at surgeon's request and anesthesia consent obtained. General Procedure Information Diagnostic Indications for Echo:  assessment of ascending aorta, assessment of surgical repair and hemodynamic monitoring Physician Requesting Echo: Javon Florian MD CPT Code:  52063, 30132 ICD Code for Medical Necessity:  I34.0, I70.0 Location performed:  OR Intubated Bite block placed Heart visualized Probe Insertion:  Easy Probe Type:  Multiplane Modalities:  Color flow mapping, pulse wave Doppler and continuous wave Doppler Echocardiographic and Doppler Measurements Ventricles Right Ventricle: Cavity size dilated.  Hypertrophy not present.  Thrombus not present.  Global function mildly impaired.  Left Ventricle: Cavity size dilated.  Thrombus not present.  Global Function mildly impaired.  Ejection Fraction 58%.  Other Ventricular Findings:      LVEDV 155 mL Valves Aortic Valve: Annulus bioprosthetic.  Stenosis mild.  Mean Gradient: 9 mmHg.  Regurgitation absent.  Leaflets vegetative.  Leaflet motions restricted.  Mitral Valve: Annulus dilated.   Stenosis not present.  Mean Gradient: 1 mmHg.  Regurgitation moderate.  Leaflets normal.  Leaflet motions normal.  Tricuspid Valve: Annulus dilated.  Stenosis not present.  Regurgitation mild.  Leaflets normal.  Other Valve Findings:      Anterior mitral leaflet length 3.1 cm Aorta Ascending Aorta: Size normal.  Diameter 3.5 cm.  Dissection not present.  Plaque thickness less than 3 mm.  Mobile plaque not present.  Aortic Arch: Size normal.  Diameter 3 cm.  Dissection not present.  Plaque thickness less than 3 mm.  Mobile plaque not present.  Descending Aorta: Size normal.  Diameter 2.5 cm.  Dissection not present.  Plaque thickness less than 3 mm.  Mobile plaque not present.  Atria Right Atrium: Size dilated.  Spontaneous echo contrast present.  Thrombus not present.  Tumor not present.  Device not present.  Left Atrium: Size dilated.  Spontaneous echo contrast present.  Thrombus not present.  Tumor not present.  Device not present.  Left atrial appendage normal. Diastolic Function Measurements: Diastolic Dysfunction Grade= indeterminate E=  51.7 ms A=  ms E/A Ratio= DT=  108 ms S/D=  .6 IVRT= Other Findings Pericardium:  pericardial effusion Pulmonary Venous Flow:  blunted (decreased) systolic flow Anesthesia Information Performed Personally Anesthesiologist:  Azar Macias MD Echocardiogram Comments:      Postbypass results:    Arterial Line    Result Date: 10/30/2024  Lionel Valencia MD     10/30/2024 11:02 AM Arterial Line Patient reassessed immediately prior to procedure Patient location during procedure: pre-op Start time: 10/30/2024 10:15 AM Stop Time:10/30/2024 10:17 AM  Line placed for hemodynamic monitoring. Performed By Anesthesiologist: Lionel Valencia MD Preanesthetic Checklist Completed: patient identified and risks and benefits discussed Arterial Line Prep  Sterile Tech: gloves Prep: ChloraPrep Patient monitoring: blood pressure monitoring, continuous pulse oximetry and EKG Arterial Line  Procedure Laterality:left Location:  radial artery Catheter size: 20 G Guidance: ultrasound guided PROCEDURE NOTE/ULTRASOUND INTERPRETATION.  Using ultrasound guidance the potential vascular sites for insertion of the catheter were visualized to determine the patency of the vessel to be used for vascular access.  After selecting the appropriate site for insertion, the needle was visualized under ultrasound being inserted into the radial artery, followed by ultrasound confirmation of wire and catheter placement. There were no abnormalities seen on ultrasound; an image was taken; and the patient tolerated the procedure with no complications. Successful placement: yes Images: still images obtained, printed/placed on the chart Post Assessment Dressing Type: occlusive dressing applied and secured with tape. Complications no Patient Tolerance: patient tolerated the procedure well with no apparent complications Additional Notes Using ultrasound guidance the potential vascular sites for insertion of the catheter were visualized to determine the patency of the vessel to be used for vascular access.  After selecting the appropriate site for insertion, the needle was visualized under ultrasound being inserted into the artery, followed by ultrasound confirmation of wire and catheter placement.  There were no abnormalities seen on ultrasound; an image was taken/ and the patient tolerated the procedure with no complications.     Duplex Vein Mapping Lower Extremity - Bilateral CAR    Result Date: 10/28/2024    The right great saphenous vein is patent  and of adequate size in the thigh.   The right great saphenous vein is patent and of adequate size in the calf.   The left great saphenous vein is patent and of adequate size in the thigh.   The left great saphenous vein is patent  and of adequate size in the calf.     Carotid Duplex - Bilateral    Result Date: 10/28/2024    Right internal carotid artery demonstrates a less than 50%  stenosis.   Antegrade right vertebral flow.   Left internal carotid artery demonstrates a less than 50% stenosis.   Antegrade left vertebral flow.     XR Chest PA & Lateral    Result Date: 10/28/2024  XR CHEST PA AND LATERAL-  HISTORY: Male who is 73 years-old, preoperative evaluation  TECHNIQUE: Frontal and lateral views of the chest  COMPARISON: 10/14/2024  FINDINGS: The heart is enlarged. Pulmonary vasculature shows mild central prominence. Left-sided pacemaker, cardiac leads, sternotomy wires, cardiac valve marker noted. Minimal right pleural effusion, continued follow-up suggested. No focal pulmonary consolidation. No pneumothorax. Otherwise stable.      As described.  This report was finalized on 10/28/2024 4:27 PM by Dr. Giacomo Burton M.D on Workstation: [id number]      Stress Test With Myocardial Perfusion One Day    Result Date: 10/26/2024    Lexiscan protocol completed without low-level exercise.  Baseline ECG showed A-fib with RBBB, no angina or significant ischemic ECG changes noted.   Left ventricular ejection fraction is normal (Calculated EF = 55%).   Mildly reduced perfusion defect in the basal segments likely due to suboptimal postprocessing/contouring as well as diaphragmatic attenuation artifact.  No reversibility noted.   Myocardial perfusion imaging indicates a grossly normal myocardial perfusion study with no evidence of reversible ischemia. Impressions are consistent with a low risk study.     Adult Transesophageal Echo 3D (JOLIE) W/ Cont If Necessary Per Protocol    Result Date: 10/25/2024    Left ventricular ejection fraction appears to be 51 - 55%.   Left ventricular wall thickness is consistent with mild concentric hypertrophy.   The right ventricular cavity is mildly dilated.   The left atrial cavity is severely dilated.   Saline test results are negative.   The right atrial cavity is severely  dilated.   Severe aortic valve stenosis is present.   There is a mobile mass on the aortic  valve. Vegetations are present on both the ventricle as well as the aortic side of the bioprosthetic aortic valve.  Largest extends into the ascending aorta approximately 3.1 cm above the sewing ring (3.3 cm total length). Vegetation becomes more broad as the furthest aspect from attachment, maximum width 1.7 cm. Vegetation on the ventricular side of the aortic valve I think most likely originates from the sewing ring.   There is a bioprosthetic aortic valve present. The prosthetic aortic valve peak and mean gradients are elevated. There is a large, mobile mass present on the prosthetic aortic valve that is consistent with a vegetation. There is severe stenosis or obstruction of the prosthetic aortic valve.   Mild aortic valve regurgitation is present.     CT Angiogram Coronary    Result Date: 10/25/2024  RADIOLOGY INTERPRETATION OF EXTRACARDIAC STRUCTURES WITHIN THE CHEST    FINDINGS: Small right greater than left bilateral pleural effusions and mild pulmonary inter and intralobular septal thickening which may reflect a component of pulmonary edema. Respiratory motion limits evaluation of the lungs.    PLEASE SEE SEPARATE CARDIOLOGY REPORT OF THE CARDIAC FINDINGS.   Radiation dose reduction techniques were utilized, including automated exposure control and exposure modulation based on body size.   This report was finalized on 10/25/2024 3:03 PM by Dr. Donovan Gomez M.D on Workstation: [id number]      CT Angiogram Coronary-Cardiology Interpretation    Result Date: 10/25/2024  Table formatting from the original result was not included. 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 0.5 mm collimated images were obtained through the coronary arteries.  Data were transferred offline for 3D reconstructions using  [employer]. CONTRAST: 85 mL iopamidol ([id number]) ACQUISITION: Retrospective ECG triggered acquisition was used.  Heart rate at the time of acquisition was approximately 85 BPM. MEDICATIONS: Metoprolol tartrate  25 mg oral Nitroglycerin 0.8 mg tablet TECHNICAL QUALITY:  Extremely poor due to obesity, high heart rate in the setting of A-fib and inability to medicate further due to low BP, and adjacent ICD leads with associated metallic artifacts.  Nondiagnostic. . FINDINGS: Coronary Artery Calcification Findings: The total calcium score is 69 indicating mild (CAC 1-100) calcified plaque in the coronary tree. Left main: 40 LAD: 13 LCx: 4 RCA: 2 Angiographic Findings: The coronary arteries are  possibly left dominant . Left Main: Normal origin from the left coronary cusp.  Gives rise to LAD and LCx.  There appears to be a calcified plaque in distal LM extending into LAD.  Unable to accurately assess luminal stenosis due to poor image quality. LAD: Unable to accurately assess luminal stenosis due to poor image quality. LCx: Likely dominant vessel. Unable to accurately assess luminal stenosis due to poor image quality. RCA: Likely nondominant. Unable to accurately assess luminal stenosis due to poor image quality. Noncoronary Cardiac Findings: Cardiac chambers: Normal in size with no obvious filling defects within the ventricles, atria, or atrial appendages. No stigmata or prior infarction. Cardiac valves: A bioprosthetic valve is present in the aortic position and poorly visualized.  There appears to be hypoattenuated linear echodensity consistent with known bioprosthetic aortic valve vegetation. Pulmonary arteries: Normal in caliber. No obvious filling defects in the visualized central pulmonary arteri(es) to suggest large central pulmonary emboli, although the image qualities are extremely poor. Pericardium: The pericardial contour is preserved with no effusion, thickening, or calcifications. Left ventricle:  Mild-moderate LV dilatation.  Calculated LVEF 70%.     Extremely poor image quality due to obesity (BMI 42), high heart rate in the setting of A-fib and inability to medicate further due to low BP, and adjacent ICD leads resulting in photon starvation, cardiac motion, and beam hardening artifacts and excessive noise.  Non-diagnostic study. CAD-RAD N/P1. Overall there is mild amount of coronary plaque.  Coronary calcium score CAC (69). Grossly normal LV systolic function (calculated LVEF 70%). Bioprosthetic aortic valve present, poorly visualized. There appears to be hypoattenuated linear echodensity consistent with known bioprosthetic aortic valve vegetation.  Please reference same-day JOLIE results. Left atrial appendage appears to be ligated surgically with clips noted. Severe biatrial enlargement. Please refer to the radiology report for information on extra-cardiac structures.  RECOMMENDATION: Consider alternative imaging modalities to rule out obstructive CAD if deemed necessary prior to surgery for bioprosthetic endocarditis. Grading Scale for Stenosis Severity: Category Degree Interpretation CAD-RADS 0 0% (No plaque or stenosis) Absence of CAD CAD-RADS 1 1-24% (Minimal stenosis or plaque w/o stenosis) Minimal non-obstructive CAD CAD-RADS 2 25-49% (Mild stenosis) Mild non-obstructive CAD CAD-RADS 3 50-69% (Moderate stenosis) Moderate stenosis CAD-RADS 4 A - 70-99% stenosis or B - Left main >/= 50% or 3-vessel obstructive (>/=70%) disease Severe stenosis CAD-RADS 5 100% (total occlusion) Total coronary occlusion or sub-total occlusion CAD-RADS N Non-diagnostic study Obstructive CAD cannot be excluded Grading Scale for Plaque [place]: P1 (CAC 1-100) Mild amount of plaque P2 (-300) Moderate amount of plaque P3 (-999) Severe amount of plaque P4 (CAC > 1000) Extensive amount of plaque     Duplex Venous Lower Extremity - Bilateral CAR    Result Date: 10/24/2024    Acute left lower extremity deep vein  thrombosis noted in the gastrocnemius.   All other veins appeared normal bilaterally.     Adult Transthoracic Echo Complete W/ Cont if Necessary Per Protocol    Result Date: 10/19/2024    Left ventricular systolic function is low normal. Left ventricular ejection fraction appears to be 51 - 55%.   Left ventricular wall thickness is consistent with mild concentric hypertrophy.   Left ventricular diastolic function was indeterminate.   There is moderate to severe biatrial enlargement.   There is a bioprosthetic aortic valve present. The prosthetic aortic valve peak and mean gradients are elevated.  The peak and mean gradient across aortic valve was 101/68 mmHg.  Findings are consistent with severe stenosis of the bioprosthetic valve.   There is mild to moderate mitral regurgitation.   There is mild tricuspid regurgitation.  Estimated right ventricular systolic pressure from tricuspid regurgitation is markedly elevated (>55 mmHg).     Results for orders placed during the hospital encounter of 10/23/24    Adult Transthoracic Echo Limited W/ Cont if Necessary Per Protocol    Interpretation Summary    Left ventricular systolic function is normal. Calculated left ventricular EF = 57.8%    Left ventricular diastolic function was not assessed.    Moderately reduced right ventricular systolic function noted.    The right ventricular cavity is moderately dilated.    : There is no evidence of pericardial effusion.      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  Strep mitis bacteremic sepsis and possible endocarditis on vancomycin and cefepime for this and possible pneumonia plan is at least 6 weeks of antibiotics tentative end date of 12/4/2024  Fever seems recurrent he is 102 6 now question etiology again ID is requesting bronch for cultures and airway clearance evaluation.  RV dysfunction severe on milrinone  Shock cardiogenic and possible element of septic shock as well remains pressor dependent  Possible pneumonia respiratory cultures sent at bronchoscopy  Respiratory failure patient failed weaning has tracheostomy there is no way he can wean with this high minute ventilation demand hopefully with dialysis his metabolic acidosis will correct in his fluid excess will correct and then we will be able to wean.  CHF history of nonischemic cardiomyopathy but recent echocardiogram LVEF 67% so probably diastolic dysfunction cardiology following   Pulmonary hypertension severe by recent  echocardiogram on sildenafil per cardiothoracic surgery  Acute kidney and acute renal failure now on CRRT hopefully will be able to pull significant volume he has many liters to go and correct acidosis.  Metabolic acidosis probably secondary to renal disease hopefully this will correct with dialysis  DVT history and left lower extremity on Doppler on 10/24/2024 not present on 11/8/2024 Doppler and subacute DVT in the left axillary vein Doppler holding anticoagulation now per cardiovascular surgery  Atrial fibrillation on amiodarone  for rate control  Anemia acute on chronic expected postop acute complement.  Stable  Thrombocytopenia resolved  Esophagitis ease EGD on 9/30/2024  Fluids/lites/nutrition continue tube feeds now   Hyperglycemia not too bad on scheduled and sliding scale insulin.  Morbid obesity with a BMI greater than 46        Plan for disposition:    Paul Leslie Jr, MD  11/21/24  07:19 EST    Time: Critical care time 34 minutes excluding any future procedures

## 2024-11-21 NOTE — PROGRESS NOTES
[facility] Progress Note      Patient Name: Woodrow Aeljandro  : 1950  MRN: [id number]  Primary Care Physician:  Juan Perez MD  Date of admission: 10/23/2024    Subjective     Interval History:   Follow-up acute kidney injury and volume  The patient remains on CRRT, remains on the ventilator, the net fluid removal yesterday was 4958 via CRRT he had 275 cc urine output.  Review of Systems:   Not obtainable    Objective     Vitals:   Temp:  [99.7 °F (37.6 °C)-101.8 °F (38.8 °C)] 101.8 °F (38.8 °C)  Heart Rate:  [] 110  Resp:  [26-32] 28  BP: (102-157)/(43-92) 119/92  Arterial Line BP: ()/(26-52) 122/37  FiO2 (%):  [39 %-41 %] 41 %    Intake/Output Summary (Last 24 hours) at 2024 0823  Last data filed at 2024 0800  Gross per 24 hour   Intake 3257.07 ml   Output 5251.9 ml   Net -1994.83 ml       Physical Exam:    General Appearance: On the ventilator, sedated chronically ill morbidly obese commands  Skin: warm and dry  HEENT: Oral mucosa is dry  Neck: Mild JVD, he has a tracheostomy  Lungs: Bilateral rhonchi, breathing effort not labored  Heart: Irregularly irregular.  No rub  Abdomen: soft, no guarding, distended.  Body wall edema.  Normoactive bowel  : Ugarte catheter  Extremities: 4+ upper and lower extremity with hip and thigh edema, he had femoral temporary dialysis catheter.  Upper extremity edema      Scheduled Meds:     acetylcysteine, 3 mL, Nebulization, TID - RT  aspirin, 81 mg, Per G Tube, Daily  atorvastatin, 40 mg, Per G Tube, Nightly  busPIRone, 5 mg, Oral, TID  cefepime, 2,000 mg, Intravenous, Q8H  chlorhexidine, 15 mL, Mouth/Throat, Q12H  Ergocalciferol, 100 mcg, Per G Tube, Daily  heparin (porcine), 5,000 Units, Subcutaneous, Q8H  hydrocortisone-bacitracin-zinc oxide-nystatin, 1 Application, Topical, Q12H  insulin glargine, 20 Units, Subcutaneous, Daily  insulin regular, 2-7 Units, Subcutaneous, Q6H  ipratropium-albuterol,  3 mL, Nebulization, Q4H - RT  lansoprazole, 15 mg, Per G Tube, Q AM  miconazole, 1 Application, Topical, Q12H  midodrine, 15 mg, Oral, TID AC  saccharomyces boulardii, 250 mg, Per G Tube, BID  sildenafil, 20 mg, Per G Tube, TID  sodium chloride, 10 mL, Intravenous, Q12H      IV Meds:   amiodarone, 0.5 mg/min, Last Rate: 0.5 mg/min (11/20/24 2345)  dexmedetomidine, 0.2-1.5 mcg/kg/hr, Last Rate: Stopped (11/18/24 0400)  DOPamine, 2-20 mcg/kg/min  EPINEPHrine, 0.01 mcg/kg/min, Last Rate: Stopped (11/17/24 1051)  lidocaine in D5W, 1 mg/min, Last Rate: Stopped (11/05/24 1135)  milrinone, 0.25 mcg/kg/min, Last Rate: 0.125 mcg/kg/min (11/21/24 0408)  niCARdipine, 5-15 mg/hr  norepinephrine, 0.02-0.2 mcg/kg/min, Last Rate: Stopped (11/19/24 2245)  phenylephrine, 0.2-2 mcg/kg/min  Phoxillum BK4/2.5, 1,500 mL/hr, Last Rate: 1,500 mL/hr (11/19/24 1121)  Phoxillum BK4/2.5, 1,500 mL/hr, Last Rate: 1,500 mL/hr (11/19/24 1121)  Phoxillum BK4/2.5, 1,500 mL/hr, Last Rate: 1,500 mL/hr (11/19/24 1121)  propofol, 5-50 mcg/kg/min, Last Rate: 10 mcg/kg/min (11/21/24 0114)  vasopressin, 0.02-0.1 Units/min, Last Rate: 0.05 Units/min (11/21/24 0114)        Results Reviewed:   I have personally reviewed the results from the time of this admission to 11/21/2024 08:23 EST     Results from last 7 days   Lab Units 11/21/24  0416 11/20/24  2519 11/20/24  1551 11/20/24  0856 11/20/24  0349 11/19/24  0801 11/19/24  0429   SODIUM mmol/L 137 136 137   < > 137   < > 134*   POTASSIUM mmol/L 3.8 4.0 4.3   < > 3.9   < > 3.7   CHLORIDE mmol/L 107 103 104   < > 105   < > 105   CO2 mmol/L 18.6* 19.0* 18.0*   < > 16.3*   < > 13.0*   BUN mg/dL 26* 28* 35*   < > 48*   < > 92*   CREATININE mg/dL 0.86 1.06 1.22   < > 1.66*   < > 2.81*   CALCIUM mg/dL 7.4* 8.0* 8.1*   < > 7.7*   < > 8.7   BILIRUBIN mg/dL 0.9  --   --   --  0.7  --  0.6   ALK PHOS U/L 115  --   --   --  110  --  108   ALT (SGPT) U/L 15  --   --   --  13  --  14   AST (SGOT) U/L 24  --   --   --   24  --  21   GLUCOSE mg/dL 139* 136* 130*   < > 110*   < > 143*    < > = values in this interval not displayed.       Estimated Creatinine Clearance: 111.9 mL/min (by C-G formula based on SCr of 0.86 mg/dL).    Results from last 7 days   Lab Units 11/20/24  2339 11/20/24  1551 11/20/24  0856   MAGNESIUM mg/dL 2.3 2.3 2.2   PHOSPHORUS mg/dL 2.9 3.2 3.0       Results from last 7 days   Lab Units 11/19/24  0429 11/18/24  0335   URIC ACID mg/dL 10.3* 9.5*       Results from last 7 days   Lab Units 11/21/24  0416 11/20/24  0349 11/19/24  2204 11/19/24  0429 11/18/24  0528 11/18/24  0335 11/17/24  0318   WBC 10*3/mm3 15.94* 13.65*  --  16.38*  --  18.69* 17.25*   HEMOGLOBIN g/dL 8.5* 8.2* 8.0* 8.4* 7.8* 7.6* 8.9*   PLATELETS 10*3/mm3 147 159  --  156  --  165 207             Assessment / Plan     ASSESSMENT:  Acute kidney injury, oliguric, currently on CRRT, his chemistry improved significantly with the CRRT other than total CO2 18.6.  Remains hypervolemic   The patient had severe right-sided heart failure and probably increase enteral abdominal venous pressure leading to decreased GFR  Mixed metabolic acidosis and respiratory  Prosthetic valve aortic stenosis history of tissue AVR, DANICA ligation in 2014.  Status post redo sternotomy AV root replacement, mitral valve repair, AICD removal intra-aortic balloon placement 10/31/2024.  Sternal closure 11/ 2.  3.  Bioprosthetic aortic valve endocarditis, bacteremia with strep mitis on vancomycin and cefepime.    Dr. Diaz following.  Currently A-fib  4.  Shock , remains pressor dependent.  5.  Anemia status post EGD 9/30/2024  And colonoscopy with esophagitis and polyp removal.  Hemoglobin today 8.5.  6.  Acute respiratory failure postoperatively on the ventilator.    7.  Atrial fibrillation.  With controlled rate  8.  Moderate to severe mitral regurgitation.  Severe RV enlargement and dysfunction postoperatively.  PLAN:  Continue CRRT and current fluid removal  Surveillance  lab      I reviewed the chart and other providers notes, reviewed labs.  I discussed the case with the patient's nurse at the bedside.  Copied text in this note has been reviewed and is accurate as of 11/21/24.     Thank you for involving us in the care of Woodrow Alejandro.  Please feel free to call with any questions.    Jass Keller MD  11/21/24  08:23 [facility]    [facility]  [phone]    Please note that portions of this note were completed with a voice recognition program.

## 2024-11-21 NOTE — PROGRESS NOTES
" LOS: 29 days   Patient Care Team:  Juan Perez MD as PCP - General (Internal Medicine)    Chief Complaint:   Post-op follow-up, s/p homograft    Subjective        Vital Signs  Temp:  [99.7 °F (37.6 °C)-101.8 °F (38.8 °C)] 101.8 °F (38.8 °C)  Heart Rate:  [] 110  Resp:  [26-32] 28  BP: (102-157)/(43-92) 119/92  Arterial Line BP: ()/(26-52) 122/37  FiO2 (%):  [39 %-41 %] 41 %      11/19/24  0430 11/20/24  0600 11/21/24  0600   Weight: (!) 153 kg (336 lb 6.8 oz) (!) 156 kg (343 lb 14.7 oz) (!) 151 kg (334 lb)     Body mass index is 47.92 kg/m².    Intake/Output Summary (Last 24 hours) at 11/21/2024 0725  Last data filed at 11/21/2024 0700  Gross per 24 hour   Intake 3319.71 ml   Output 5233.4 ml   Net -1913.69 ml     No intake/output data recorded.        Objective:  Vital signs: (most recent): Blood pressure 119/92, pulse 109, temperature (!) 102.6 °F (39.2 °C), temperature source Bladder, resp. rate 25, height 177.8 cm (70\"), weight (!) 151 kg (334 lb), SpO2 96%.                Physical Exam:   General Appearance: sedated but arousable, ill appearing, NAD   Lungs:  vent, ronchi throuhgout   Heart:  irreg irreg rhythm, tachy during exam   Abdomen: soft or nondistended, + bowel sounds    Skin: sternal incision clean, dry, intact   Neuro: alert and oriented, no focal deficits.     Results Review:      WBC WBC   Date Value Ref Range Status   11/21/2024 15.94 (H) 3.40 - 10.80 10*3/mm3 Final   11/20/2024 13.65 (H) 3.40 - 10.80 10*3/mm3 Final   11/19/2024 16.38 (H) 3.40 - 10.80 10*3/mm3 Final      HGB Hemoglobin   Date Value Ref Range Status   11/21/2024 8.5 (L) 13.0 - 17.7 g/dL Final   11/20/2024 8.2 (L) 13.0 - 17.7 g/dL Final   11/19/2024 8.0 (L) 13.0 - 17.7 g/dL Final   11/19/2024 8.4 (L) 13.0 - 17.7 g/dL Final      HCT Hematocrit   Date Value Ref Range Status   11/21/2024 27.5 (L) 37.5 - 51.0 % Final   11/20/2024 25.4 (L) 37.5 - 51.0 % Final   11/19/2024 24.9 (L) 37.5 - 51.0 % Final   11/19/2024 " "26.0 (L) 37.5 - 51.0 % Final      Platelets Platelets   Date Value Ref Range Status   11/21/2024 147 140 - 450 10*3/mm3 Final   11/20/2024 159 140 - 450 10*3/mm3 Final   11/19/2024 156 140 - 450 10*3/mm3 Final        PT/INR:  No results found for: \"PROTIME\"/No results found for: \"INR\"    Sodium Sodium   Date Value Ref Range Status   11/21/2024 137 136 - 145 mmol/L Final   11/20/2024 136 136 - 145 mmol/L Final   11/20/2024 137 136 - 145 mmol/L Final   11/20/2024 136 136 - 145 mmol/L Final   11/20/2024 137 136 - 145 mmol/L Final   11/20/2024 136 136 - 145 mmol/L Final   11/19/2024 136 136 - 145 mmol/L Final   11/19/2024 134 (L) 136 - 145 mmol/L Final   11/18/2024 137 136 - 145 mmol/L Final      Potassium Potassium   Date Value Ref Range Status   11/21/2024 3.8 3.5 - 5.2 mmol/L Final   11/20/2024 4.0 3.5 - 5.2 mmol/L Final   11/20/2024 4.3 3.5 - 5.2 mmol/L Final   11/20/2024 4.2 3.5 - 5.2 mmol/L Final   11/20/2024 3.9 3.5 - 5.2 mmol/L Final   11/20/2024 4.0 3.5 - 5.2 mmol/L Final   11/19/2024 4.0 3.5 - 5.2 mmol/L Final   11/19/2024 4.0 3.5 - 5.2 mmol/L Final   11/19/2024 4.0 3.5 - 5.2 mmol/L Final     Comment:     Slight hemolysis detected by analyzer. Result may be falsely elevated.   11/19/2024 3.7 3.5 - 5.2 mmol/L Final   11/19/2024 3.8 3.5 - 5.2 mmol/L Final   11/18/2024 3.9 3.5 - 5.2 mmol/L Final   11/18/2024 3.9 3.5 - 5.2 mmol/L Final   11/18/2024 4.1 3.5 - 5.2 mmol/L Final   11/18/2024 4.1 3.5 - 5.2 mmol/L Final      Chloride Chloride   Date Value Ref Range Status   11/21/2024 107 98 - 107 mmol/L Final   11/20/2024 103 98 - 107 mmol/L Final   11/20/2024 104 98 - 107 mmol/L Final   11/20/2024 106 98 - 107 mmol/L Final   11/20/2024 105 98 - 107 mmol/L Final   11/20/2024 105 98 - 107 mmol/L Final   11/19/2024 105 98 - 107 mmol/L Final   11/19/2024 105 98 - 107 mmol/L Final   11/18/2024 107 98 - 107 mmol/L Final      Bicarbonate CO2   Date Value Ref Range Status   11/21/2024 18.6 (L) 22.0 - 29.0 mmol/L Final "   11/20/2024 19.0 (L) 22.0 - 29.0 mmol/L Final   11/20/2024 18.0 (L) 22.0 - 29.0 mmol/L Final   11/20/2024 17.0 (L) 22.0 - 29.0 mmol/L Final   11/20/2024 16.3 (L) 22.0 - 29.0 mmol/L Final   11/20/2024 16.1 (L) 22.0 - 29.0 mmol/L Final   11/19/2024 14.3 (L) 22.0 - 29.0 mmol/L Final   11/19/2024 13.0 (L) 22.0 - 29.0 mmol/L Final   11/18/2024 13.8 (L) 22.0 - 29.0 mmol/L Final      BUN BUN   Date Value Ref Range Status   11/21/2024 26 (H) 8 - 23 mg/dL Final   11/20/2024 28 (H) 8 - 23 mg/dL Final   11/20/2024 35 (H) 8 - 23 mg/dL Final   11/20/2024 42 (H) 8 - 23 mg/dL Final   11/20/2024 48 (H) 8 - 23 mg/dL Final   11/20/2024 55 (H) 8 - 23 mg/dL Final   11/19/2024 70 (H) 8 - 23 mg/dL Final   11/19/2024 92 (H) 8 - 23 mg/dL Final   11/18/2024 82 (H) 8 - 23 mg/dL Final      Creatinine Creatinine   Date Value Ref Range Status   11/21/2024 0.86 0.76 - 1.27 mg/dL Final   11/20/2024 1.06 0.76 - 1.27 mg/dL Final   11/20/2024 1.22 0.76 - 1.27 mg/dL Final   11/20/2024 1.37 (H) 0.76 - 1.27 mg/dL Final   11/20/2024 1.66 (H) 0.76 - 1.27 mg/dL Final   11/20/2024 1.72 (H) 0.76 - 1.27 mg/dL Final   11/19/2024 2.41 (H) 0.76 - 1.27 mg/dL Final   11/19/2024 2.81 (H) 0.76 - 1.27 mg/dL Final   11/18/2024 2.52 (H) 0.76 - 1.27 mg/dL Final      Calcium Calcium   Date Value Ref Range Status   11/21/2024 7.4 (L) 8.6 - 10.5 mg/dL Final   11/20/2024 8.0 (L) 8.6 - 10.5 mg/dL Final   11/20/2024 8.1 (L) 8.6 - 10.5 mg/dL Final   11/20/2024 7.8 (L) 8.6 - 10.5 mg/dL Final   11/20/2024 7.7 (L) 8.6 - 10.5 mg/dL Final   11/20/2024 8.0 (L) 8.6 - 10.5 mg/dL Final   11/19/2024 8.5 (L) 8.6 - 10.5 mg/dL Final   11/19/2024 8.7 8.6 - 10.5 mg/dL Final   11/18/2024 8.8 8.6 - 10.5 mg/dL Final      Magnesium Magnesium   Date Value Ref Range Status   11/20/2024 2.3 1.6 - 2.4 mg/dL Final   11/20/2024 2.3 1.6 - 2.4 mg/dL Final   11/20/2024 2.2 1.6 - 2.4 mg/dL Final   11/20/2024 2.2 1.6 - 2.4 mg/dL Final   11/19/2024 2.3 1.6 - 2.4 mg/dL Final   11/19/2024 2.2 1.6 - 2.4  mg/dL Final   11/18/2024 2.5 (H) 1.6 - 2.4 mg/dL Final        acetylcysteine, 3 mL, Nebulization, TID - RT  aspirin, 81 mg, Per G Tube, Daily  atorvastatin, 40 mg, Per G Tube, Nightly  busPIRone, 5 mg, Oral, TID  cefepime, 2,000 mg, Intravenous, Q8H  chlorhexidine, 15 mL, Mouth/Throat, Q12H  Ergocalciferol, 100 mcg, Per G Tube, Daily  heparin (porcine), 5,000 Units, Subcutaneous, Q8H  hydrocortisone-bacitracin-zinc oxide-nystatin, 1 Application, Topical, Q12H  insulin glargine, 20 Units, Subcutaneous, Daily  insulin regular, 2-7 Units, Subcutaneous, Q6H  ipratropium-albuterol, 3 mL, Nebulization, Q4H - RT  lansoprazole, 15 mg, Per G Tube, Q AM  miconazole, 1 Application, Topical, Q12H  midodrine, 15 mg, Oral, TID AC  saccharomyces boulardii, 250 mg, Per G Tube, BID  sildenafil, 20 mg, Per G Tube, TID  sodium chloride, 10 mL, Intravenous, Q12H      amiodarone, 0.5 mg/min, Last Rate: 0.5 mg/min (11/20/24 2345)  dexmedetomidine, 0.2-1.5 mcg/kg/hr, Last Rate: Stopped (11/18/24 0400)  DOPamine, 2-20 mcg/kg/min  EPINEPHrine, 0.01 mcg/kg/min, Last Rate: Stopped (11/17/24 1051)  lidocaine in D5W, 1 mg/min, Last Rate: Stopped (11/05/24 1135)  milrinone, 0.25 mcg/kg/min, Last Rate: 0.125 mcg/kg/min (11/21/24 0408)  niCARdipine, 5-15 mg/hr  norepinephrine, 0.02-0.2 mcg/kg/min, Last Rate: Stopped (11/19/24 2245)  phenylephrine, 0.2-2 mcg/kg/min  Phoxillum BK4/2.5, 1,500 mL/hr, Last Rate: 1,500 mL/hr (11/19/24 1121)  Phoxillum BK4/2.5, 1,500 mL/hr, Last Rate: 1,500 mL/hr (11/19/24 1121)  Phoxillum BK4/2.5, 1,500 mL/hr, Last Rate: 1,500 mL/hr (11/19/24 1121)  propofol, 5-50 mcg/kg/min, Last Rate: 10 mcg/kg/min (11/21/24 0114)  vasopressin, 0.02-0.1 Units/min, Last Rate: 0.05 Units/min (11/21/24 0114)          Prosthetic aortic valve stenosis    Essential hypertension    Permanent atrial fibrillation    S/P AVR    ICD (implantable cardioverter-defibrillator), dual, in situ    Achilles tendon rupture    Bacteremia    Stenosis of  prosthetic aortic valve    Anemia      Assessment & Plan    - Prosthetic aortic valve stenosis, h/o AVR (tissue)/maze/DANICA ligation (2014)- s/p reoperative sternotomy AV root replacement 27mm cryopreserved homograft with right nuvia cabrol, AICD removal, IABP placement- Pagjadiel 10/31/2024  - s/p Sternal closure ---11/2  - Possible endocarditis, likely need JOLIE   - Bacteremia, blood cultures positive strep mitis---on penicillin G  - Atrial fibrillation, unable to tolerate anticoagulation  - Hypertension  - NICM status post [employer] AICD  - Anemia, s/p EGD/colonoscopy with esophagitis, polyp removal  - Right achilles tendon tear--- walking boot and PT per ortho at [facility]  - morbid obesity  - Pre-diabetes -- improved at 5.3  - post op anemia- expected acute blood loss, stable  - TCP post-op, resolved  - respiratory failure--- s/p trach (Dr. Leija 11/16/2024), pulm following   - NATHANIEL--- nephrology following         POD #21  Continue supportive care.  Febrile this morning TMAX 102.6. repeat blood cultures pending. plans for CT of chest.  S/p trach. Vent per pulm.  Remains on vasopressin, milrinone 0.125, propofol 10  CRRT on going.  Tolerating fluid removal.  Antibiotics for endocarditis- ID following.  Remains in atrial fibrillation, intermittent rate controlled.   Continue supportive care.      Samantha Magana, APRN  11/21/24  07:25 EST

## 2024-11-21 NOTE — PLAN OF CARE
Goal Outcome Evaluation:           Progress: no change  Pt has a new onset fever, ID was paged and made aware. Pottasium and calcium replaced per protocol. Remains on CRRT and drips are unchanged. Care ongoing.

## 2024-11-21 NOTE — PROGRESS NOTES
"  POST-OPERATIVE NOTE     Chief Complaint: Respiratory failure  S/P: Tracheostomy  POD # 5    Subjective  Sedated s/p bronchoscopy.   Appears comfortable, No distress.     Objective:  General Appearance:  Comfortable and in no acute distress.    Vital signs: (most recent): Blood pressure (!) 117/30, pulse 111, temperature 100.4 °F (38 °C), temperature source Bladder, resp. rate 25, height 177 cm (69.69\"), weight (!) 151 kg (332 lb 14.3 oz), SpO2 95%.    Lungs:  Normal effort.  He is not in respiratory distress.    Heart: Normal rate.    Chest: Symmetric chest wall expansion.   Skin:  Warm and dry.  (Small amount of thick clear/yellow secretions around trach.   Minimal superficial skin breakdown inferior aspect of stoma.  )          Tracheostomy in good position, no significant bleeding.      Results Review:     I reviewed the patient's new clinical results.  I reviewed the patient's new imaging results and agree with the interpretation.  I reviewed the patient's other test results and agree with the interpretation    Assessment & Plan   Mr. Alejandro is a pleasant 74-year-old gentleman who underwent tracheostomy on 11/16/2024 By Dr. Haylie Leija. Trach with some thick secretions requiring frequent trach care per nursing.  CT chest performed today reviewed which demonstrates tracheostomy appliance and expected position.  Improved dense lower lobe consolidations.  Unchanged opacities that upper lobe and right middle lobe.  S/p bronchoscopy today by pulmonology with BAL of secretions. Await cultures, antibiotics per ID recs.     Small amount of skin breakdown inferior aspect of stoma. Trach appears in good position and sutures have been removed. Continue routine trach care and may paint peristomal area with betadine Q shift. Discussed with Dr. Leija.     JAVI Reynoso  [facility]  11/21/24  17:06 EST    Patient was seen and assessed while wearing personal protective equipment including " facemask, protective eyewear and gloves.  Hand hygiene performed prior to entering the room and upon exiting with doffing of gloves.

## 2024-11-21 NOTE — NURSING NOTE
CWOCN- consult for right groin/thigh site. Spoke with Regina CALDWELL about the incision and soft tissue necrosis.   Discussed drying with betadine vs using a debriding agent. If the slough crusts over top may end up with fluid pocket underneath. Will start Vashe to cleanse the wound and treat topical bacteria and debride the wound with therahoney. Secure with opticel ag to manage moisture/drainage and then ABD with light tape. Change daily.     Assessd patient's buttocks/coccyx- skin looks really good considering patient's hospital course. Buttocks are pink, red, blanching. No DTI. No Stage 1 pressure injury noted. There are a few scattered areas of moist pink open skin lower on the buttocks towards the anus- possibly from moisture or friction prior. Staff using barrier ointment and also protecting the coccyx with a sacral dressing.     Noted that left great toe is dusky, left 2nd toe nail starting to come off, other toes also dusky. Patient on CRRT, has been on pressors through hospital course, required a tracheostomy, is POD#21, febrile.     WOC team will see weekly to reeval the right thigh.        11/21/24 1626   Wound 10/30/24 1425 Right anterior thigh surgical   Placement Date/Time: 10/30/24 1425   Side: Right  Orientation: anterior  Location: thigh  Type: surgical  Present on Original Admission: No  Additional Comments: tegaderm   Dressing Appearance open to air   Base yellow;slough   Periwound redness   Periwound Temperature warm   Periwound Skin Turgor soft   Edges irregular   Drainage Characteristics/Odor tan   Drainage Amount small   Care, Wound cleansed with;other (see comments);honey applied  (cleansed with Vashe, applied therahoney, opticel ag, ABD, tape)   Dressing Care dressing applied;abdominal pad;hydrofiber;silver impregnated   Wound 11/03/24 0726 sacral spine   Placement Date/Time: 11/03/24 0726   Location: sacral spine  Primary Wound Type: Pressure Injury   Pressure Injury Stage O  (blanching)    Base pink;red   Dressing Care silicone border foam

## 2024-11-21 NOTE — PROGRESS NOTES
LOS: 29 days     Chief Complaint: Endocarditis    Interval History: Case discussed at bedside with patient's nurse.  Febrile overnight with thick discharge from his tracheostomy site.  Chest x-ray with lower lobe infiltrates.  Tmax 101.8.    Vital Signs  Temp:  [99.7 °F (37.6 °C)-101.8 °F (38.8 °C)] 101.8 °F (38.8 °C)  Heart Rate:  [] 110  Resp:  [26-32] 28  BP: (102-157)/(43-92) 119/92  Arterial Line BP: ()/(26-52) 122/37  FiO2 (%):  [39 %-41 %] 41 %    Physical Exam:  General: Ill-appearing  HEENT: Tracheostomy present.  NG tube in place.  Respiratory: Coarse bilateral breath sounds on the ventilator.  : Ugarte catheter  Skin: Tracheostomy site clean and intact.  Access: Left IJ.  Arterial line.  Peripheral line.    Antibiotics:  Anti-Infectives (From admission, onward)      Ordered     Dose/Rate Route Frequency Start Stop    11/19/24 1213  cefepime 2000 mg IVPB in 100 mL NS (MBP)        Ordering Provider: Gregg Diaz DO    2,000 mg  over 4 Hours Intravenous Every 8 Hours 11/19/24 1800 11/22/24 0159    11/17/24 1407  vancomycin 750 mg in sodium chloride 0.9 % 250 mL IVPB-VTB        Ordering Provider: Gregg Diaz DO    750 mg  333.3 mL/hr over 45 Minutes Intravenous Once 11/18/24 0600 11/18/24 0702    11/13/24 0914  cefepime 2000 mg IVPB in 100 mL NS (MBP)        Ordering Provider: Gregg Diaz DO    2,000 mg  over 30 Minutes Intravenous Once 11/13/24 1000 11/13/24 1235    11/12/24 1133  vancomycin 3000 mg/500 mL 0.9% NS IVPB ([facility])        Ordering Provider: Gregg Diaz DO    20 mg/kg × 151 kg  over 180 Minutes Intravenous Once 11/12/24 1230 11/12/24 1614    11/02/24 0918  vancomycin (VANCOCIN) 1,000 mg in sodium chloride 0.9 % 250 mL IVPB-VTB        Ordering Provider: Antwan Farmer MD    1,000 mg  250 mL/hr over 60 Minutes Intravenous Every 24 Hours 11/02/24 1015 11/03/24 1138    11/02/24 0912  Pharmacy to dose vancomycin        Ordering Provider:  Samantha Salvador APRN     Not Applicable Continuous PRN 11/02/24 0912 11/04/24 0911    10/30/24 2306  ceFAZolin 2000 mg IVPB in 100 mL NS (MBP)        Ordering Provider: Samantha Salvador APRN    2,000 mg  over 30 Minutes Intravenous Every 8 Hours 10/31/24 0000 11/01/24 0922    10/29/24 1518  ceFAZolin 2000 mg IVPB in 100 mL NS (MBP)        Ordering Provider: Bryant Mcclure PA-C    2,000 mg  over 30 Minutes Intravenous Once 10/30/24 0600 10/30/24 1936    10/28/24 1034  vancomycin IVPB 2000 mg in 0.9% Sodium Chloride 500 mL        Ordering Provider: Samantha Salvador APRN    15 mg/kg × 142 kg Intravenous Once 10/28/24 1045 10/28/24 1356             Results Review:     I reviewed the patient's new clinical results.    Lab Results   Component Value Date    WBC 15.94 (H) 11/21/2024    HGB 8.5 (L) 11/21/2024    HCT 27.5 (L) 11/21/2024    MCV 92.3 11/21/2024     11/21/2024     Lab Results   Component Value Date    GLUCOSE 139 (H) 11/21/2024    BUN 26 (H) 11/21/2024    CREATININE 0.86 11/21/2024    BCR 30.2 (H) 11/21/2024    CO2 18.6 (L) 11/21/2024    CALCIUM 7.4 (L) 11/21/2024    ALBUMIN 2.4 (L) 11/21/2024    LABIL2 1.4 06/27/2019    AST 24 11/21/2024    ALT 15 11/21/2024       Microbiology:  10/3 COVID-negative  10/10 COVID-negative  10/14 COVID-negative  10/15 blood cultures 2 out of 2 strep mitis  10/17 COVID-negative  10/17 blood cultures 2 out of 2 strep mitis  10/21 COVID-negative  10/23 blood cultures no growth today  10/30 operative cultures from the aortic valve no growth   11/9 respiratory culture no growth  11/9 catheter tip culture no growth to date  11/10 catheter tip culture no growth to date  11/12 blood cultures no growth to date  11/12 respiratory culture no growth to date  11/13 respiratory culture no growth  11/13 genital culture of the penis Candida albicans      Assessment    #Strep mitis endocarditis  #Status post bioprosthetic aortic valve replacement 2014  #Status post aortic root  replacement in the setting of prosthetic aortic valve endocarditis and annular abscess on 10/30  #Status post removal of pacemaker generator and intracardiac portion of the leads with retained venous leads  #Atrial fibrillation  #Achilles tendon rupture  #NATHANIEL  #Positive blood culture, suspect contaminant  #Status post tracheostomy 11/16    Worsening fever today.  Chest x-ray with pulmonary infiltrates and patient's nurse reporting thick secretions.  Suspect this is the most likely source and will obtain respiratory culture.  Obtain CT chest to further evaluate.  Will follow pulmonary recommendations as well to see if therapeutic bronchoscopy may be indicated as he responded well to this in the past.  Repeat blood cultures.    Plan to continue cefepime 2 g every 8 hours adjusted for CRRT today.    For endocarditis he will still need ultimate antibiotic 6 weeks with end date of December 4.  Above should cover in the meantime.

## 2024-11-22 NOTE — PROGRESS NOTES
"Nutrition Services    Patient Name:  Woodrow Alejandro  YOB: 1950  MRN: [id number]  Admit Date:  10/23/2024    Assessment Date:  11/22/24    Summary: Consult for cortrak placement / TF Follow Up    Unable to follow commands. Continues on CRRT. Propofol @ 44.3 mL/hr (1170 kcal/day).      Current TF regimen via NG: Novasource Renal @ 45 mL/hr with 100ml q3 free water flushes.  Prosource BID.    Labs reviewed: Na 135, K 3.9, Gluc 130, BUN 20, Creat 0.94, Alb 2.2  Meds reviewed: lipitor,IV abx, insulin, prevacid, florastor, amiodarone, vit D, heparin, levo, propofol (1170 kcal/day)    Plans/Recommendations:  Change TF to peptamen intense VHP @45ml/hr   Prosource BID.   Hyponatremia- decrease FWF 30ml q4 hour free water flushes  Monitor for tolerance.    Initial Goal:  *initial goal conservative d/t risk of RFS     Peptamen Intense VHP at 45mL/hr + 30mL q4hr water flush      End Goal:    Peptamen Intense VHP at 45 mL/hr + 30mL q4hr water flush      Calories  990kcal +120kcal prosource +1170kcal prop= 2280 kcals (100%)    Protein  91g +30g  prosource= 121 g (100%)    Free water  832 mL   Flushes  180ml     The above end goal rate is for 22 hrs/day to assume interruptions for ADLs. Water flushes adjusted based on clinical picture + Rx flushes/IV fluids.     Specialized formula chosen r/t on high dose propofol and need for high protein        RD to follow    CLINICAL NUTRITION ASSESSMENT      Reason for Assessment Cortrak Placement, Follow-up Protocol     Diagnosis/Problem   POD 22 reoperative sternotomy AV root replacement 27mm cryopreserved homograft with right nuvia cabrol, AICD removal, IABP placement.  S/p trach 11/16  Now on CRRT     Anthropometrics        Current Height  Current Weight  BMI kg/m2 Height: 177 cm (69.69\")  Weight: (!) 153 kg (337 lb 4.9 oz) (11/22/24 0600)  Body mass index is 48.84 kg/m².   Adjusted BMI (if applicable)    BMI Category Obese, Class III (40 or higher)   Ideal Body " Weight (IBW) 171 lbs   Usual Body Weight (UBW) 330 lbs   Weight Trend Stable     Estimated/Assessed Needs        Energy Requirements    Weight for Calculation 78 kg IBW   Method for Estimation  25-30 kcal/kg   EST Needs (kcal/day) 2230-8588       Protein Requirements    Weight for Calculation 78 kg IBW   EST Protein Needs (g/kg) 1.5 - 2.0 gm/kg   EST Daily Needs (g/day) 117-156       Fluid Requirements     Method for Estimation 1 mL/kcal    Estimated Needs (mL/day)      Labs       Pertinent Labs    Results from last 7 days   Lab Units 11/22/24 0815 11/22/24 0526 11/21/24 2320 11/21/24  0815 11/21/24  0416 11/20/24  0856 11/20/24  0349   SODIUM mmol/L 135* 135* 134*   < > 137   < > 137   POTASSIUM mmol/L 3.9 3.8 3.8   < > 3.8   < > 3.9   CHLORIDE mmol/L 104 103 103   < > 107   < > 105   CO2 mmol/L 20.2* 19.6* 20.1*   < > 18.6*   < > 16.3*   BUN mg/dL 20 21 23   < > 26*   < > 48*   CREATININE mg/dL 0.94 0.84 1.01   < > 0.86   < > 1.66*   CALCIUM mg/dL 8.0* 7.9* 8.1*   < > 7.4*   < > 7.7*   BILIRUBIN mg/dL  --  1.1  --   --  0.9  --  0.7   ALK PHOS U/L  --  136*  --   --  115  --  110   ALT (SGPT) U/L  --  29  --   --  15  --  13   AST (SGOT) U/L  --  71*  --   --  24  --  24   GLUCOSE mg/dL 130* 140* 142*   < > 139*   < > 110*    < > = values in this interval not displayed.     Results from last 7 days   Lab Units 11/22/24 0815 11/22/24 0526 11/21/24 2320 11/21/24  1507   MAGNESIUM mg/dL 2.5*  --  2.5* 2.4   PHOSPHORUS mg/dL 3.4  --  3.4 3.6   HEMOGLOBIN g/dL  --  9.8*  --   --    HEMATOCRIT %  --  29.8*  --   --    WBC 10*3/mm3  --  17.30*  --   --    ALBUMIN g/dL 2.2* 2.6* 2.4* 2.5*     Results from last 7 days   Lab Units 11/22/24  0526 11/21/24  0416 11/20/24  0349 11/19/24  0429 11/18/24  0335   PLATELETS 10*3/mm3 136* 147 159 156 165     COVID19   Date Value Ref Range Status   10/21/2024 Not Detected Not Detected - Ref. Range Final     Lab Results   Component Value Date    HGBA1C 5.30 10/24/2024           Medications           Scheduled Medications acetylcysteine, 3 mL, Nebulization, TID - RT  aspirin, 81 mg, Per G Tube, Daily  atorvastatin, 40 mg, Per G Tube, Nightly  busPIRone, 5 mg, Oral, TID  cefepime, 2,000 mg, Intravenous, Q8H  chlorhexidine, 15 mL, Mouth/Throat, Q12H  Ergocalciferol, 100 mcg, Per G Tube, Daily  heparin (porcine), 5,000 Units, Subcutaneous, Q8H  hydrocortisone-bacitracin-zinc oxide-nystatin, 1 Application, Topical, Q12H  insulin glargine, 20 Units, Subcutaneous, Daily  insulin regular, 2-7 Units, Subcutaneous, Q6H  ipratropium-albuterol, 3 mL, Nebulization, Q4H - RT  lansoprazole, 15 mg, Per G Tube, Q AM  [START ON 11/23/2024] micafungin (MYCAMINE) IV, 200 mg, Intravenous, Q24H  miconazole, 1 Application, Topical, Q12H  midodrine, 15 mg, Oral, TID AC  potassium chloride, 20 mEq, Intravenous, Once  saccharomyces boulardii, 250 mg, Per G Tube, BID  sildenafil, 20 mg, Per G Tube, TID  sodium chloride, 10 mL, Intravenous, Q12H       Infusions amiodarone, 0.5 mg/min, Last Rate: 0.5 mg/min (11/22/24 0700)  dexmedetomidine, 0.2-1.5 mcg/kg/hr, Last Rate: Stopped (11/18/24 0400)  DOPamine, 2-20 mcg/kg/min  EPINEPHrine, 0.01 mcg/kg/min, Last Rate: Stopped (11/17/24 1051)  lidocaine in D5W, 1 mg/min, Last Rate: Stopped (11/05/24 1135)  milrinone, 0.25 mcg/kg/min, Last Rate: 0.125 mcg/kg/min (11/22/24 0700)  norepinephrine, 0.02-0.3 mcg/kg/min, Last Rate: 0.04 mcg/kg/min (11/22/24 0830)  phenylephrine, 0.2-2 mcg/kg/min  Phoxillum BK4/2.5, 1,500 mL/hr, Last Rate: 1,500 mL/hr (11/19/24 1121)  Phoxillum BK4/2.5, 1,500 mL/hr, Last Rate: 1,500 mL/hr (11/19/24 1121)  Phoxillum BK4/2.5, 1,500 mL/hr, Last Rate: 1,500 mL/hr (11/19/24 1121)  propofol, 5-50 mcg/kg/min, Last Rate: 50 mcg/kg/min (11/22/24 1336)  vasopressin, 0.02-0.1 Units/min, Last Rate: 0.06 Units/min (11/22/24 1336)       PRN Medications   acetaminophen **OR** acetaminophen **OR** acetaminophen    ALPRAZolam    bisacodyl    bisacodyl    Calcium  Replacement - Follow Nurse / BPA Driven Protocol    cyclobenzaprine    dextrose    dextrose    DOPamine    EPINEPHrine    glucagon (human recombinant)    Glycerin-Hypromellose-    heparin (porcine)    HYDROcodone-acetaminophen    magnesium hydroxide    Magnesium Standard Dose Replacement - Follow Nurse / BPA Driven Protocol    milrinone    Morphine    [] Morphine **AND** naloxone    nitroglycerin    ondansetron    phenylephrine    Phosphorus Replacement - Follow Nurse / BPA Driven Protocol    polyethylene glycol    Potassium Replacement - Follow Nurse / BPA Driven Protocol    senna    vasopressin     Physical Findings          General Findings obese, responds/arouses to voice, ventilator support, other: trach   Oral/Mouth Cavity tooth or teeth missing   Edema  generalized, lower extremity , upper extremity, 1+ (trace), 2+ (mild), 3+ (moderate), 4+ (severe)   Gastrointestinal fecal incontinence, hypoactive bowel sounds, last bowel movement:    Skin  bruising, excoriation, pressure injury: sacral spine DTI, L groin st III, surgical incision: L clavicle, sternal, R anterior thigh, throat, location of wound: L heel   Tubes/Drains/Lines Cortrak, dialysis catheter, NG tube, bridle in place   NFPE Not indicated at this time   --  Current Nutrition Orders & Evaluation of Intake       Oral Nutrition     Food Allergies NKFA   Current PO Diet NPO Diet NPO Type: Tube Feeding   Supplement n/a   PO Evaluation     % PO Intake NPO    Factors Affecting Intake: Other: Intubated      Enteral Nutrition     Enteral Route NG    TF Delivery Method Continuous    Propofol Rate/Kcal     Current TF Order/Rate  Novasource Renal @ 50 mL/hr    TF Goal Rate 45 mL/hr    Current Water Flush 100 mL Q 3 hr (per MD)    Modular None    TF Residual  no or minimal residual    TF Tolerance tolerating    TF Observation Verified TF held at this time     PES STATEMENT / NUTRITION DIAGNOSIS      Nutrition Dx Problem  Problem: Needs  Alternative Composition  Etiology: Medical Diagnosis - s/p reoperative sternotomy AV root replacement 27mm cryopreserved homograft with right nuvia cabrol, AICD removal, IABP placement.    Signs/Symptoms: Report/Observation   --  NUTRITION INTERVENTION / PLAN OF CARE      Intervention Goal(s) Maintain nutrition status, Establish goals of care, Reduce/improve symptoms, Meet estimated needs, Disease management/therapy, Tolerate TF/PN at goal, and Appropriate weight loss         RD Intervention/Action Continue to monitor and Care plan reviewed         Prescription/Orders:       PO Diet       Supplements       Enteral Nutrition    Enteral Prescription:     Enteral Route NG    TF Delivery Method Continuous    Enteral Product Peptamen Intense VHP    Modular Liquid Protein, BID    Propofol Rate/Kcal     TF Start Rate  45 mL/hr    TF Goal Rate  45 mL/hr    Free Water Flush 30ml q4    Provision at Goal:          Calories 990 kcal + 120 kcal prosource + 1170 kcal from propofol = 2280 kcal, meets 100% needs         Protein  91 g + 30 g prosource = 121 gm protein, meets 100% needs         Fluid (mL) 832 mL + 180 mL free water flushes         Parenteral Nutrition    New Prescription Ordered? Yes   --      Monitor/Evaluation Per protocol, Pertinent labs, EN delivery/tolerance, Weight, Skin status, GI status, Symptoms, POC/GOC, Swallow function   Discharge Plan/Needs No discharge needs identified at this time   --    RD to follow per protocol.      Electronically signed by:  Gisela Bartlett RD  11/22/24 13:55 EST

## 2024-11-22 NOTE — PROGRESS NOTES
[facility] Progress Note      Patient Name: Woodrow Alejandro  : 1950  MRN: [id number]  Primary Care Physician:  Juan Perez MD  Date of admission: 10/23/2024    Subjective     Interval History:   Follow-up acute kidney injury and volume  The patient remains on CRRT, remains on the ventilator, currently running even because when trying to remove fluid he had arrhythmias and instability is central line removed and waiting for a new central line to monitor his CVP.  Review of Systems:   Not obtainable    Objective     Vitals:   Temp:  [100.2 °F (37.9 °C)-102.2 °F (39 °C)] 101.5 °F (38.6 °C)  Heart Rate:  [] 110  Resp:  [22-28] 24  BP: ()/(19-84) 132/54  Arterial Line BP: (102-155)/(16-48) 123/44  FiO2 (%):  [39 %-40 %] 40 %    Intake/Output Summary (Last 24 hours) at 2024 1006  Last data filed at 2024 0900  Gross per 24 hour   Intake 3865.3 ml   Output 3407.1 ml   Net 458.2 ml       Physical Exam:    General Appearance: On the ventilator, sedated chronically ill morbidly obese commands  Skin: warm and dry  HEENT: Oral mucosa is dry  Neck: Mild JVD, he has a tracheostomy  Lungs: Bilateral rhonchi, breathing effort not labored  Heart: Irregularly irregular.  No rub  Abdomen: soft, no guarding, distended.  Body wall edema.  Normoactive bowel  : Ugarte catheter  Extremities: 4+ upper and lower extremity with hip and thigh edema, he had femoral temporary dialysis catheter.  Upper extremity edema      Scheduled Meds:     acetylcysteine, 3 mL, Nebulization, TID - RT  aspirin, 81 mg, Per G Tube, Daily  atorvastatin, 40 mg, Per G Tube, Nightly  busPIRone, 5 mg, Oral, TID  calcium gluconate, 2,000 mg, Intravenous, Once  cefepime, 2,000 mg, Intravenous, Q8H  chlorhexidine, 15 mL, Mouth/Throat, Q12H  Ergocalciferol, 100 mcg, Per G Tube, Daily  heparin (porcine), 5,000 Units, Subcutaneous, Q8H  hydrocortisone-bacitracin-zinc oxide-nystatin, 1  Application, Topical, Q12H  insulin glargine, 20 Units, Subcutaneous, Daily  insulin regular, 2-7 Units, Subcutaneous, Q6H  ipratropium-albuterol, 3 mL, Nebulization, Q4H - RT  lansoprazole, 15 mg, Per G Tube, Q AM  [START ON 11/23/2024] micafungin (MYCAMINE) IV, 200 mg, Intravenous, Q24H  miconazole, 1 Application, Topical, Q12H  midodrine, 15 mg, Oral, TID AC  saccharomyces boulardii, 250 mg, Per G Tube, BID  sildenafil, 20 mg, Per G Tube, TID  sodium chloride, 10 mL, Intravenous, Q12H      IV Meds:   amiodarone, 0.5 mg/min, Last Rate: 0.5 mg/min (11/21/24 2017)  dexmedetomidine, 0.2-1.5 mcg/kg/hr, Last Rate: Stopped (11/18/24 0400)  DOPamine, 2-20 mcg/kg/min  EPINEPHrine, 0.01 mcg/kg/min, Last Rate: Stopped (11/17/24 1051)  lidocaine in D5W, 1 mg/min, Last Rate: Stopped (11/05/24 1135)  milrinone, 0.25 mcg/kg/min, Last Rate: 0.125 mcg/kg/min (11/21/24 2017)  norepinephrine, 0.02-0.3 mcg/kg/min, Last Rate: Stopped (11/21/24 1825)  phenylephrine, 0.2-2 mcg/kg/min  Phoxillum BK4/2.5, 1,500 mL/hr, Last Rate: 1,500 mL/hr (11/19/24 1121)  Phoxillum BK4/2.5, 1,500 mL/hr, Last Rate: 1,500 mL/hr (11/19/24 1121)  Phoxillum BK4/2.5, 1,500 mL/hr, Last Rate: 1,500 mL/hr (11/19/24 1121)  propofol, 5-50 mcg/kg/min, Last Rate: 15 mcg/kg/min (11/22/24 0030)  vasopressin, 0.02-0.1 Units/min, Last Rate: 0.05 Units/min (11/22/24 0846)        Results Reviewed:   I have personally reviewed the results from the time of this admission to 11/22/2024 10:06 EST     Results from last 7 days   Lab Units 11/22/24  0815 11/22/24  0526 11/21/24  2320 11/21/24  0815 11/21/24  0416 11/20/24  0856 11/20/24  0349   SODIUM mmol/L 135* 135* 134*   < > 137   < > 137   POTASSIUM mmol/L 3.9 3.8 3.8   < > 3.8   < > 3.9   CHLORIDE mmol/L 104 103 103   < > 107   < > 105   CO2 mmol/L 20.2* 19.6* 20.1*   < > 18.6*   < > 16.3*   BUN mg/dL 20 21 23   < > 26*   < > 48*   CREATININE mg/dL 0.94 0.84 1.01   < > 0.86   < > 1.66*   CALCIUM mg/dL 8.0* 7.9* 8.1*   < >  7.4*   < > 7.7*   BILIRUBIN mg/dL  --  1.1  --   --  0.9  --  0.7   ALK PHOS U/L  --  136*  --   --  115  --  110   ALT (SGPT) U/L  --  29  --   --  15  --  13   AST (SGOT) U/L  --  71*  --   --  24  --  24   GLUCOSE mg/dL 130* 140* 142*   < > 139*   < > 110*    < > = values in this interval not displayed.       Estimated Creatinine Clearance: 102.4 mL/min (by C-G formula based on SCr of 0.94 mg/dL).    Results from last 7 days   Lab Units 11/22/24  0815 11/21/24  2320 11/21/24  1507   MAGNESIUM mg/dL 2.5* 2.5* 2.4   PHOSPHORUS mg/dL 3.4 3.4 3.6       Results from last 7 days   Lab Units 11/19/24  0429 11/18/24  0335   URIC ACID mg/dL 10.3* 9.5*       Results from last 7 days   Lab Units 11/22/24  0526 11/21/24  0416 11/20/24  0349 11/19/24  2204 11/19/24  0429 11/18/24  0528 11/18/24  0335   WBC 10*3/mm3 17.30* 15.94* 13.65*  --  16.38*  --  18.69*   HEMOGLOBIN g/dL 9.8* 8.5* 8.2* 8.0* 8.4*   < > 7.6*   PLATELETS 10*3/mm3 136* 147 159  --  156  --  165    < > = values in this interval not displayed.             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  Status post redo sternotomy AV root replacement, mitral valve repair, AICD removal intra-aortic balloon placement 10/31/2024.  Sternal closure 11/ 2.  3.  Bioprosthetic aortic valve endocarditis, bacteremia with strep mitis on vancomycin and cefepime.    Dr. Diaz following.  Currently A-fib  4.  Shock , remains pressor dependent.  5.  Anemia status post EGD 9/30/2024  And colonoscopy with esophagitis and polyp removal.  Hemoglobin today 9.8.  6.  Acute respiratory failure postoperatively on the ventilator.     7.  Atrial fibrillation.  With controlled rate  8.  Moderate to severe mitral regurgitation.  Severe RV enlargement and dysfunction postoperatively.  PLAN:  Continue CRRT and monitor CVP and that will guide us for fluid removal if he is stable.  Surveillance lab      I reviewed the chart and other providers notes, reviewed labs.  I discussed the case with the patient's nurse at the bedside.  Also I discussed the case with the patient's daughter  Copied text in this note has been reviewed and is accurate as of 11/22/24.     Thank you for involving us in the care of Woodrow Alejandro.  Please feel free to call with any questions.    Jass Keller MD  11/22/24  10:06 [facility]    [facility]  [phone]    Please note that portions of this note were completed with a voice recognition program.

## 2024-11-22 NOTE — PROGRESS NOTES
LOS: 30 days   Patient Care Team:  Juan Perez MD as PCP - General (Internal Medicine)    Subjective     s.  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  He went and had sternal closure on 11/2 evidence of possible endocarditis and he had blood cultures positive for strep mitis and is on pen G send atrial fibrillation unable to tolerate anticoagulation he has a nonischemic cardiomyopathy history of anemia some esophagitis and colon polyps have been removed history of right Achilles tendon tear uses a walking boot he is a prediabetic postop anemia and thrombocytopenia.  Patient had failed weaning and went for tracheostomy today  Patient is pretty sedated on CRRT they have had to cut back volume removal he has not been tolerating it he had high fevers again throughout the day and night they have had to increase his vasopressors.  Blood cultures x 2 with Candida albicans  Blood cultures growing Candida albicans    review of Systems:          Objective     Vital Signs  Vital Sign Min/Max for last 24 hours  Temp  Min: 100.2 °F (37.9 °C)  Max: 102.6 °F (39.2 °C)   BP  Min: 98/84  Max: 145/70   Pulse  Min: 89  Max: 114   Resp  Min: 22  Max: 28   SpO2  Min: 95 %  Max: 98 %   No data recorded   Weight  Min: 151 kg (332 lb 14.3 oz)  Max: 153 kg (337 lb 4.9 oz)        Ventilator/Non-Invasive Ventilation Settings (From admission, onward)       Start     Ordered    11/12/24 1124  Ventilator - Vent Mode: AC/VC; Rate: Other; Rate: 24; FiO2: Titrate Per SpO2; Titrate Oxygen for SpO2: 90 - 95%; PEEP: 5; Tidal Volume: mL; TV: 550  Continuous        Question Answer Comment   Vent Mode AC/VC    Rate Other    Rate 24    FiO2 Titrate Per  SpO2    Titrate Oxygen for SpO2 90 - 95%    PEEP 5    Tidal Volume mL            11/12/24 1124    11/01/24 0026  Ventilator - Vent Mode: AC/VC+; Rate: 20; FiO2: Titrate Per SpO2; Titrate Oxygen for SpO2: 90 - 95%; PEEP: 7.5; Tidal Volume: mL; TV: 620  Continuous,   Status:  Canceled        Question Answer Comment   Vent Mode AC/VC+    Rate 20    FiO2 Titrate Per SpO2    Titrate Oxygen for SpO2 90 - 95%    PEEP 7.5    Tidal Volume mL            11/01/24 0027                        Arterial Line BP: 110/40  Arterial Line MAP (mmHg): 59 mmHg  CVP:  [0 mmHg-311 mmHg] 10 mmHg  Body mass index is 48.84 kg/m².  I/O last 3 completed shifts:  In: 4821.2 [I.V.:2998.2; Blood:600; Other:68; NG/GT:1155]  Out: 7017.1 [Urine:305; Emesis/NG output:435]  I/O this shift:  In: 2279.6 [I.V.:1269.6; Blood:500; NG/GT:510]  Out: 1845.1         Physical Exam:  General Appearance: Trached on a ventilator is on pressure control rate of 16breathing 23 inspiratory pressure of 20 PEEP of 10 tidal volumes are in the 580cc range his minute ventilation is about 13.5 L FiO2 a still 40% SpO2 is 94% patient is on Levophed, vasopressin and milrinone and amiodarone drips   Eyes: Resists eye opening  ENT: Mucous membranes are dry he has a nasoenteric tube in place  the left nare  Neck:  tracheostomy site looks has a lot of purulent drainage from the lower margins, central venous catheter has been removed  Lungs: Coarse breath sounds bilaterally, tachypneic  Cardiac: Tachycardic low 110s no murmur  Abdomen: Obese soft nontender no palpable hepatosplenomegaly or masses  : Not examined  Musculoskeletal: He has mild thoracic kyphosis very large abdomen.  His sternotomy incision is okay it still firm I do not palpate any fluctuance or instability  Skin: Warm and dry no jaundice no petechiae  Neuro: He is pretty sedated currently  Extremities: No clubbing or cyanosis he has edema all 4 extremities he has true anasarca marked,,palpable radial  pulses and I think I feel dorsalis pedis pulses bilaterally as well there are not a strong but he has more lower extremity edema this is not changed significantly with CRRT  MSE: Pretty heavily sedated today       Labs:  Results from last 7 days   Lab Units 11/22/24  0526 11/21/24  2320 11/21/24  1507 11/21/24  0815 11/21/24  0416 11/20/24  2339 11/20/24  1551 11/20/24  0856 11/20/24  0349 11/20/24  0019 11/19/24  0801 11/19/24  0429 11/18/24  1714 11/18/24  0809 11/18/24  0335   GLUCOSE mg/dL 140* 142* 160* 134* 139* 136* 130* 121* 110* 116*   < > 143*  --  153* 147*   SODIUM mmol/L 135* 134* 132* 135* 137 136 137 136 137 136   < > 134*  --  137 139   POTASSIUM mmol/L 3.8 3.8 4.0 4.3 3.8 4.0 4.3 4.2 3.9 4.0   < > 3.7   < > 4.1  4.1 4.4   MAGNESIUM mg/dL  --  2.5* 2.4 2.8*  --  2.3 2.3 2.2  --  2.2   < > 2.2   < >  --  2.0   CO2 mmol/L 19.6* 20.1* 19.4* 20.0* 18.6* 19.0* 18.0* 17.0* 16.3* 16.1*   < > 13.0*  --  13.8* 12.8*   CHLORIDE mmol/L 103 103 102 103 107 103 104 106 105 105   < > 105  --  107 109*   ANION GAP mmol/L 12.4 10.9 10.6 12.0 11.4 14.0 15.0 13.0 15.7* 14.9   < > 16.0*  --  16.2* 17.2*   CREATININE mg/dL 0.84 1.01 1.16 0.95 0.86 1.06 1.22 1.37* 1.66* 1.72*   < > 2.81*  --  2.52* 2.40*   BUN mg/dL 21 23 26* 23 26* 28* 35* 42* 48* 55*   < > 92*  --  82* 80*   BUN / CREAT RATIO  25.0 22.8 22.4 24.2 30.2* 26.4* 28.7* 30.7* 28.9* 32.0*   < > 32.7*  --  32.5* 33.3*   CALCIUM mg/dL 7.9* 8.1* 7.8* 8.1* 7.4* 8.0* 8.1* 7.8* 7.7* 8.0*   < > 8.7  --  8.8 8.1*   ALK PHOS U/L 136*  --   --   --  115  --   --   --  110  --   --  108  --  108 107   TOTAL PROTEIN g/dL 5.7*  --   --   --  5.5*  --   --   --  5.8*  --   --  5.7*  --  6.0 5.6*   ALT (SGPT) U/L 29  --   --   --  15  --   --   --  13  --   --  14  --  16 17   AST (SGOT) U/L 71*  --   --   --  24  --   --   --  24  --   --  21  --  27 36   BILIRUBIN mg/dL 1.1  --   --   --  0.9  --   --   --  0.7  --   --  0.6  --  0.7 0.7   ALBUMIN g/dL 2.6* 2.4* 2.5*  2.7* 2.4* 2.6* 2.6* 2.7* 2.1* 2.7*   < > 2.5*  --  2.7* 2.6*   GLOBULIN gm/dL 3.1  --   --   --  3.1  --   --   --  3.7  --   --  3.2  --  3.3 3.0    < > = values in this interval not displayed.     Estimated Creatinine Clearance: 114.6 mL/min (by C-G formula based on SCr of 0.84 mg/dL).      Results from last 7 days   Lab Units 11/22/24  0526 11/21/24  0416 11/20/24  0349 11/19/24  2204 11/19/24  0429 11/18/24  0528 11/18/24  0335 11/17/24  0318 11/16/24  0408   WBC 10*3/mm3 17.30* 15.94* 13.65*  --  16.38*  --  18.69* 17.25* 13.92*   RBC 10*6/mm3 3.23* 2.98* 2.79*  --  2.87*  --  2.64* 3.04* 2.99*   HEMOGLOBIN g/dL 9.8* 8.5* 8.2* 8.0* 8.4* 7.8* 7.6* 8.9* 8.6*   HEMATOCRIT % 29.8* 27.5* 25.4* 24.9* 26.0* 24.4* 24.4* 28.0* 27.1*   MCV fL 92.3 92.3 91.0  --  90.6  --  92.4 92.1 90.6   MCH pg 30.3 28.5 29.4  --  29.3  --  28.8 29.3 28.8   MCHC g/dL 32.9 30.9* 32.3  --  32.3  --  31.1* 31.8 31.7   RDW % 17.1* 17.5* 17.4*  --  17.0*  --  17.4* 17.1* 17.0*   RDW-SD fl 56.6* 57.9* 57.2*  --  54.8*  --  58.1* 58.4* 55.3*   MPV fL 10.5 10.0 10.5  --  10.1  --  10.3 10.0 9.7   PLATELETS 10*3/mm3 136* 147 159  --  156  --  165 207 193   NEUTROPHIL % % 85.5*  --  82.9*  --  85.9*  --  87.5*  --   --    LYMPHOCYTE % % 4.6*  --  5.9*  --  4.7*  --  4.2*  --   --    MONOCYTES % % 4.3*  --  6.2  --  5.1  --  4.1*  --   --    EOSINOPHIL % % 1.1  --  2.3  --  2.4  --  1.7  --   --    BASOPHIL % % 0.5  --  0.2  --  0.3  --  0.3  --   --    IMM GRAN % % 4.0*  --  2.5*  --  1.6*  --  2.2*  --   --    NEUTROS ABS 10*3/mm3 14.79* 13.52* 11.32*  --  14.05*  --  16.35*  --   --    LYMPHS ABS 10*3/mm3 0.80  --  0.81  --  0.77  --  0.79  --   --    MONOS ABS 10*3/mm3 0.75  --  0.84  --  0.84  --  0.76  --   --    EOS ABS 10*3/mm3 0.19 0.64* 0.31  --  0.40  --  0.32  --   --    BASOS ABS 10*3/mm3 0.08 0.00 0.03  --  0.05  --  0.05  --   --    IMMATURE GRANS (ABS) 10*3/mm3 0.69*  --  0.34*  --  0.27*  --  0.42*  --   --    NRBC /100 WBC  --   1.0*  0.8* 0.4*  --  0.2  --  0.2  --   --      Results from last 7 days   Lab Units 11/20/24  0702   PH, ARTERIAL pH units 7.461*   PO2 ART mm Hg 110.1*   PCO2, ARTERIAL mm Hg 22.9*   HCO3 ART mmol/L 16.3*                   Results from last 7 days   Lab Units 11/21/24  0416 11/17/24  1300   LACTATE mmol/L  --  1.3   PROCALCITONIN ng/mL 1.17*  --          Microbiology Results (last 10 days)       Procedure Component Value - Date/Time    Respiratory Culture - Aspirate, ET Suction [[id number]] Collected: 11/21/24 1427    Lab Status: Preliminary result Specimen: Aspirate from ET Suction Updated: 11/21/24 2327     Gram Stain Rare (1+) WBCs seen      No organisms seen    Blood Culture - Blood, Blood, Central Line [[id number]]  (Abnormal) Collected: 11/21/24 1019    Lab Status: Preliminary result Specimen: Blood, Central Line Updated: 11/21/24 2342     Blood Culture Abnormal Stain     Gram Stain Anaerobic Bottle Yeast with hyphae seen      Aerobic Bottle Yeast with hyphae seen    Blood Culture ID, PCR - Blood, Blood, Central Line [[id number]]  (Abnormal) Collected: 11/21/24 1019    Lab Status: Final result Specimen: Blood, Central Line Updated: 11/22/24 0031     BCID, PCR Candida albicans. Identification by BCID2 PCR     BOTTLE TYPE Anaerobic Bottle    Narrative:      Infectious disease consultation is highly recommended to rule out distant foci of infection.    Respiratory Culture - Aspirate, Bronchus [[id number]] Collected: 11/13/24 1539    Lab Status: Final result Specimen: Aspirate from Bronchus Updated: 11/15/24 1043     Respiratory Culture No growth    Genital Culture - Swab, Penis [[id number]]  (Abnormal) Collected: 11/13/24 1158    Lab Status: Final result Specimen: Swab from Penis Updated: 11/16/24 1042     Genital Culture Scant growth (1+) Candida albicans      Rare growth Normal Skin Clare     Gram Stain Many (4+) WBCs seen      Occasional Yeast    MRSA Screen, PCR (Inpatient) - Swab, Nares [[id number]]  (Normal)  Collected: 11/12/24 1027    Lab Status: Final result Specimen: Swab from Nares Updated: 11/12/24 1152     MRSA PCR No MRSA Detected    Narrative:      The negative predictive value of this diagnostic test is high and should only be used to consider de-escalating anti-MRSA therapy. A positive result may indicate colonization with MRSA and must be correlated clinically.    Respiratory Culture - Aspirate, ET Suction [[id number]]  (Abnormal) Collected: 11/12/24 0924    Lab Status: Final result Specimen: Aspirate from ET Suction Updated: 11/14/24 0952     Respiratory Culture Light growth (2+) Candida albicans      Scant growth (1+) Normal respiratory titi. No S. aureus or Pseudomonas aeruginosa detected. Final report.     Gram Stain Moderate (3+) WBCs per low power field      No epithelial cells seen      Rare (1+) Yeast    Blood Culture - Blood, Arm, Left [[id number]]  (Abnormal) Collected: 11/12/24 0815    Lab Status: Final result Specimen: Blood from Arm, Left Updated: 11/15/24 0728     Blood Culture Staphylococcus, coagulase negative     Isolated from Aerobic and Anaerobic Bottles     Gram Stain Aerobic Bottle Gram positive cocci in clusters      Anaerobic Bottle Gram positive cocci in clusters    Narrative:      Probable contaminant requires clinical correlation, susceptibility not performed unless requested by physician.      Blood Culture - Blood, Arm, Right [[id number]]  (Normal) Collected: 11/12/24 0815    Lab Status: Final result Specimen: Blood from Arm, Right Updated: 11/17/24 0830     Blood Culture No growth at 5 days    Blood Culture ID, PCR - Blood, Arm, Left [[id number]]  (Abnormal) Collected: 11/12/24 0815    Lab Status: Final result Specimen: Blood from Arm, Left Updated: 11/14/24 1130     BCID, PCR Staph spp, not aureus or lugdunensis. Identification by BCID2 PCR.     BOTTLE TYPE Aerobic Bottle                acetylcysteine, 3 mL, Nebulization, TID - RT  aspirin, 81 mg, Per G Tube, Daily  atorvastatin, 40  mg, Per G Tube, Nightly  busPIRone, 5 mg, Oral, TID  cefepime, 2,000 mg, Intravenous, Q8H  chlorhexidine, 15 mL, Mouth/Throat, Q12H  Ergocalciferol, 100 mcg, Per G Tube, Daily  heparin (porcine), 5,000 Units, Subcutaneous, Q8H  hydrocortisone-bacitracin-zinc oxide-nystatin, 1 Application, Topical, Q12H  insulin glargine, 20 Units, Subcutaneous, Daily  insulin regular, 2-7 Units, Subcutaneous, Q6H  ipratropium-albuterol, 3 mL, Nebulization, Q4H - RT  lansoprazole, 15 mg, Per G Tube, Q AM  [START ON 11/23/2024] micafungin (MYCAMINE) IV, 100 mg, Intravenous, Q24H  miconazole, 1 Application, Topical, Q12H  midodrine, 15 mg, Oral, TID AC  potassium chloride, 20 mEq, Intravenous, Once  saccharomyces boulardii, 250 mg, Per G Tube, BID  sildenafil, 20 mg, Per G Tube, TID  sodium chloride, 10 mL, Intravenous, Q12H      amiodarone, 0.5 mg/min, Last Rate: 0.5 mg/min (11/21/24 2017)  dexmedetomidine, 0.2-1.5 mcg/kg/hr, Last Rate: Stopped (11/18/24 0400)  DOPamine, 2-20 mcg/kg/min  EPINEPHrine, 0.01 mcg/kg/min, Last Rate: Stopped (11/17/24 1051)  lidocaine in D5W, 1 mg/min, Last Rate: Stopped (11/05/24 1135)  milrinone, 0.25 mcg/kg/min, Last Rate: 0.125 mcg/kg/min (11/21/24 2017)  norepinephrine, 0.02-0.3 mcg/kg/min, Last Rate: Stopped (11/21/24 1825)  phenylephrine, 0.2-2 mcg/kg/min  Phoxillum BK4/2.5, 1,500 mL/hr, Last Rate: 1,500 mL/hr (11/19/24 1121)  Phoxillum BK4/2.5, 1,500 mL/hr, Last Rate: 1,500 mL/hr (11/19/24 1121)  Phoxillum BK4/2.5, 1,500 mL/hr, Last Rate: 1,500 mL/hr (11/19/24 1121)  propofol, 5-50 mcg/kg/min, Last Rate: 15 mcg/kg/min (11/22/24 0030)  vasopressin, 0.02-0.1 Units/min, Last Rate: 0.05 Units/min (11/22/24 0030)        Diagnostics:  Adult Transthoracic Echo Limited W/ Cont if Necessary Per Protocol    Result Date: 11/15/2024    Limited study for LV/RV function   Left ventricular systolic function is normal. Calculated left ventricular EF = 67.9%   Moderately to severely reduced right ventricular  systolic function noted.   The right ventricular cavity is severely dilated.   The left atrial cavity is dilated.   The right atrial cavity is dilated.   Moderate tricuspid valve regurgitation is present.   Estimated right ventricular systolic pressure from tricuspid regurgitation is moderately elevated (45-55 mmHg).   S/p aortic valve replacement.  Not well-visualized/assessed.   S/p mitral valve repair with 28 mm [employer] flexible band annuloplasty.     US Nonvascular Extremity Limited    Result Date: 11/15/2024  US NONVASCULAR EXTREMITY LIMITED-11/15/2024  HISTORY: Right groin wound.  The subcutaneous tissues of the right groin superficial to the skin wound is an approximately 11.5 cm x 3.7 cm mixed echotexture hypoechoic area which may contain small amount of cystic change. There is somewhat heterogeneous appearance of the surrounding soft tissues likely related to edema.      1. Ill-defined somewhat loculated hypoechoic collection measuring 11.5 cm x 3.7 cm x 6.4 cm. This could represent hematoma or abscess and contains a small hypoechoic cystic area.   This report was finalized on 11/15/2024 4:35 PM by Dr. Deven Garcia M.D on Workstation: [id number]      XR Chest 1 View    Result Date: 11/15/2024  XR CHEST 1 VW-  HISTORY: 74-year-old male postop AVR, MVR, AICD generator explantation, intra-aortic balloon pump placement on 10/30/2024.   FINDINGS: CHF may be more prominent than on yesterday's chest radiograph. Moderately large left effusion and small-moderate right pleural effusion are likely unchanged. No pneumothorax is seen.  ET tube is approximately 2 cm proximal to the yoni. Left IJ Hobson-[place]ad[place] catheter tip is within the proximal main pulmonary artery. Leads of the explanted AICD generator remain in place. There is an NG tube within the esophagus and stomach, distal tip is not included.  This report was finalized on 11/15/2024 6:31 AM by Dr. Bernadette Gaming M.D on Workstation: [id number]      XR  Chest 1 View    Result Date: 11/14/2024  XR CHEST 1 VW-  Clinical: Postop  COMPARISON examination 11/13/2024  FINDINGS: Patient is rotated towards the left as before. Endotracheal tube and feeding tube in position as before. Transvenous pacemaker in place. Bibasilar consolidation as before. Bilateral diffuse interstitial infiltrate also seen, unchanged. There is cardiac enlargement. The mediastinum is stable.  CONCLUSION: Stable imaging of the chest  This report was finalized on 11/14/2024 6:39 AM by Dr. Adal Kimball M.D on Workstation: [id number]      CT Chest Without Contrast Diagnostic    Result Date: 11/13/2024  CT CHEST WO CONTRAST DIAGNOSTIC-, CT ABDOMEN PELVIS WO CONTRAST-  HISTORY: Pulmonary infiltrates; T82.857A-Stenosis of other cardiac prosthetic devices, implants and grafts, initial encounter; Z95.2-Presence of prosthetic heart valve; Z95.2-Presence of prosthetic heart valve  TECHNIQUE: Radiation dose reduction techniques were utilized, including automated exposure control and exposure modulation based on body size. CT of the chest, abdomen, and pelvis includes axial imaging from the thoracic inlet through the trochanters without intravenous contrast and without oral contrast.  COMPARISON: AP chest same date.  FINDINGS: CHEST: There are multifocal coronary artery calcifications. Median sternotomy wires and overlying midline surgical skin staples are present. Cardiomegaly. Left subclavian cardiac pacer/defibrillator leads are discontinuous proximally with pacemaker segment within the left brachiocephalic vein.  There is dense lower lobe consolidation with air bronchograms in both lower lobes which are mostly collapsed. Pulmonary vascular engorgement. Small bilateral pleural effusions layer dependently. 5 mm noncalcified nodule anterior inferior right upper lobe on axial image 37.  Endotracheal tube tip is 3.2 cm above the yoni. Left IJ tip in the left brachiocephalic vein. Feeding tube is looped in  the stomach and tip is in the region of the gastric fundus.  Surgical skin staples overlying the left upper anterior chest wall where there has been removal of pacemaker.  ABDOMEN/PELVIS: Within the posterior-inferior central spleen there is a focal area of low density measuring approximately 4.7 x 4.1 cm. This is without change compared to exam 09/18/2024 and may represent splenic infarction. Liver, adrenal glands, pancreas appear within normal limits. There is lobular appearance of the kidneys with areas of cortical thinning and this appears chronic. No hydronephrosis.  Urinary bladder is partially decompressed and exhibits wall thickening. Ugarte catheter is present in the urinary bladder. There is mild to moderate stool throughout the colon. No evidence for bowel obstruction. No ascites or intra-abdominal abscess formation. Atherosclerotic calcifications are present involving the abdominal aorta and iliac vasculature. There is mild edema within the left lateral lower chest and upper abdomen subcutaneous fat extending off the field-of-view laterally. Multilevel bridging plate osteophyte formation within the thoracic spine.      1. Dense bilateral lower lobe consolidation with partial collapse of the lower lobes and air bronchograms likely associated with infiltrate. Pulmonary vascular engorgement. Small pleural effusions layer dependently. 2. 5 mm noncalcified nodule anterior inferior right upper lobe. 3. Recent median sternotomy with overlying midline surgical skin staples. Cardiomegaly. Prosthetic tricuspid valve. Recent removal of left subclavian cardiac pacer with retraction of pacing leads. 4. Mild to moderate stool throughout the colon. No evidence for acute intra-abdominal process. No hydronephrosis. 5. Endotracheal tube tip 3.2 cm above the yoni. Left IJ tip in the left brachiocephalic vein. Feeding tube tip in the gastric fundus. Ugarte catheter within a decompressed urinary bladder which exhibits  generalized wall thickening.    Radiation dose reduction techniques were utilized, including automated exposure control and exposure modulation based on body size.   This report was finalized on 11/13/2024 1:22 PM by Salas Ingram M.D on Workstation: [id number]      CT Abdomen Pelvis Without Contrast    Result Date: 11/13/2024  CT CHEST WO CONTRAST DIAGNOSTIC-, CT ABDOMEN PELVIS WO CONTRAST-  HISTORY: Pulmonary infiltrates; T82.857A-Stenosis of other cardiac prosthetic devices, implants and grafts, initial encounter; Z95.2-Presence of prosthetic heart valve; Z95.2-Presence of prosthetic heart valve  TECHNIQUE: Radiation dose reduction techniques were utilized, including automated exposure control and exposure modulation based on body size. CT of the chest, abdomen, and pelvis includes axial imaging from the thoracic inlet through the trochanters without intravenous contrast and without oral contrast.  COMPARISON: AP chest same date.  FINDINGS: CHEST: There are multifocal coronary artery calcifications. Median sternotomy wires and overlying midline surgical skin staples are present. Cardiomegaly. Left subclavian cardiac pacer/defibrillator leads are discontinuous proximally with pacemaker segment within the left brachiocephalic vein.  There is dense lower lobe consolidation with air bronchograms in both lower lobes which are mostly collapsed. Pulmonary vascular engorgement. Small bilateral pleural effusions layer dependently. 5 mm noncalcified nodule anterior inferior right upper lobe on axial image 37.  Endotracheal tube tip is 3.2 cm above the yoni. Left IJ tip in the left brachiocephalic vein. Feeding tube is looped in the stomach and tip is in the region of the gastric fundus.  Surgical skin staples overlying the left upper anterior chest wall where there has been removal of pacemaker.  ABDOMEN/PELVIS: Within the posterior-inferior central spleen there is a focal area of low density measuring approximately  4.7 x 4.1 cm. This is without change compared to exam 09/18/2024 and may represent splenic infarction. Liver, adrenal glands, pancreas appear within normal limits. There is lobular appearance of the kidneys with areas of cortical thinning and this appears chronic. No hydronephrosis.  Urinary bladder is partially decompressed and exhibits wall thickening. Ugarte catheter is present in the urinary bladder. There is mild to moderate stool throughout the colon. No evidence for bowel obstruction. No ascites or intra-abdominal abscess formation. Atherosclerotic calcifications are present involving the abdominal aorta and iliac vasculature. There is mild edema within the left lateral lower chest and upper abdomen subcutaneous fat extending off the field-of-view laterally. Multilevel bridging plate osteophyte formation within the thoracic spine.      1. Dense bilateral lower lobe consolidation with partial collapse of the lower lobes and air bronchograms likely associated with infiltrate. Pulmonary vascular engorgement. Small pleural effusions layer dependently. 2. 5 mm noncalcified nodule anterior inferior right upper lobe. 3. Recent median sternotomy with overlying midline surgical skin staples. Cardiomegaly. Prosthetic tricuspid valve. Recent removal of left subclavian cardiac pacer with retraction of pacing leads. 4. Mild to moderate stool throughout the colon. No evidence for acute intra-abdominal process. No hydronephrosis. 5. Endotracheal tube tip 3.2 cm above the yoni. Left IJ tip in the left brachiocephalic vein. Feeding tube tip in the gastric fundus. Ugarte catheter within a decompressed urinary bladder which exhibits generalized wall thickening.    Radiation dose reduction techniques were utilized, including automated exposure control and exposure modulation based on body size.   This report was finalized on 11/13/2024 1:22 PM by Salas Ingram M.D on Workstation: [id number]      XR Chest 1 View    Result  Date: 11/13/2024  SINGLE VIEW OF THE CHEST  HISTORY: Postop open heart surgery  COMPARISON: November 12, 2024  FINDINGS: Tubes and lines appear stable. There is vascular congestion. Bibasilar consolidation and bilateral effusions are noted. No pneumothorax is seen.      No significant interval change.  This report was finalized on 11/13/2024 5:37 AM by Dr. Alexandria Dahl M.D on Workstation: [id number]      XR Chest 1 View    Result Date: 11/12/2024  CHEST SINGLE VIEW  HISTORY: 74 years of age, Male. Postoperative exam.  COMPARISON: AP chest 11/11/2024, 11/10/2024, 11/09/2024.  FINDINGS: Endotracheal tube, feeding tube, left subclavian discontinued. Pacing/defibrillator leads, left atrial appendage clip are evident. Left IJ central venous catheter extends to the region of the right atrium. There are postoperative changes on the left where there are surgical skin staples. There is interstitial disease and there are left greater than right basilar opacities due to atelectasis or infiltrates. No evidence for pneumothorax      No evidence for significant change when compared to yesterday's exam.   This report was finalized on 11/12/2024 8:02 AM by Salas Ingram M.D on Workstation: [id number]      US Thoracentesis    Result Date: 11/11/2024  ULTRASOUND-GUIDED LEFT THORACENTESIS  HISTORY: Pleural effusion  COMPARISON: Chest x-ray 11/11/2024  The risks, benefits and alternatives of the procedure were discussed with the patient and informed consent was obtained. Prior to the procedure ultrasound of the left chest was performed to evaluate the pleural effusion. However, there is only very minimal pleural fluid present, insufficient for thoracentesis.       Only very minimal pleural fluid present. Thoracentesis not performed.   This report was finalized on 11/11/2024 4:55 PM by Dr. Dean Knapp M.D on Workstation: [id number]      Adult Transthoracic Echo Limited W/ Cont if Necessary Per Protocol    Result Date:  11/11/2024    Left ventricular systolic function is normal. Calculated left ventricular EF = 67.8%   RV severely dilated with moderate-severe dysfunction.  Mild-moderate RVH.   S/p aortic valve replacement with homograft.  Not well-visualized/assessed.   S/p mitral valve repair with 28 mm Medtronic flexible band annuloplasty.   Severe tricuspid valve regurgitation is present.   Estimated right ventricular systolic pressure from tricuspid regurgitation is markedly elevated (>55 mmHg).   Severe biatrial enlargement, dilated IVC.     XR Chest 1 View    Result Date: 11/11/2024  XR CHEST 1 VW-  DATE OF EXAM: 11/11/2024 8:03 AM  INDICATION: Postop. Endotracheal tube advancement.  COMPARISON: Radiographs 11/10/2024, 11/9/2024, 11/8/2024, and 11/7/2024. Coronary CTA 10/25/2024. CT chest 9/18/2024.  TECHNIQUE: A single portable AP view of the chest was obtained.  FINDINGS: Lordotic positioning. Multiple overlying artifacts. Similar-appearing sternotomy wires, retained cardiac pacer/defibrillator leads, mitral annuloplasty, endotracheal tube, left IJ pulmonary arterial catheter, and partially imaged enteric tube. Similar-appearing basilar predominant opacification of both lungs. No pneumothorax. Unchanged cardiac and mediastinal contours. No acute osseous abnormality is identified.       1. No significant herbal change. Stable support tubes and line. 2. Stable basilar prominent lung opacities.  This report was finalized on 11/11/2024 9:01 AM by Neel Wan MD on Workstation: [id number]      Duplex Venous Upper Extremity - Bilateral CAR    Result Date: 11/11/2024    Sub-acute right upper extremity superficial thrombophlebitis noted in the basilic (upper arm).   Sub-acute left upper extremity deep vein thrombosis noted in the axillary.   Acute left upper extremity superficial thrombophlebitis noted in the cephalic (forearm).   All other vessels appear normal.     XR Chest 1 View    Result Date: 11/10/2024  XR CHEST 1 VW-    INDICATION: Central line placement  COMPARISON: Chest radiograph November 9, 2024  TECHNIQUE: 1 view chest  FINDINGS:  Vascular congestion. Indistinctness of vessels. Ill-defined basilar opacities. Blunting costophrenic angles. Stable mediastinum. Enlarged cardiac silhouette. Left atrial appendage clip. Median sternotomy. Right IJ sheath with catheter tip near the SVC bifurcation. Endotracheal tube tip is in the distal trachea. Left IJ catheter containing a pulmonary artery catheter, with the tip near the right ventricular outflow tract. Feeding tube tip is in the gastric fundus.       1. Interval placement of left IJ sheath containing a pulmonary artery catheter with the tip near the right ventricular outflow tract. No pneumothorax. 2. Cardiomegaly with vascular congestion and suspected interstitial edema. 3. Ill-defined basilar opacities are similar. 4. Layering pleural effusions  This report was finalized on 11/10/2024 10:21 AM by Dr. Alberto Dominique M.D on Workstation: [id number]      [place] catheter    Result Date: 11/10/2024  Matthieu Scott MD     11/10/2024 10:16 AM [place] catheter Patient reassessed immediately prior to procedure Patient location during procedure: ICU Indications: MD/Surgeon request Staff Anesthesiologist: Matthieu Scott MD Preanesthetic Checklist Completed: patient identified, IV checked, site marked, risks and benefits discussed, surgical consent, monitors and equipment checked, pre-op evaluation and timeout performed Central Line Prep Sterile Tech:gloves, cap, gown, mask and sterile barriers Prep: chloraprep no medical exclusion documented for following all elements of maximal sterile barrier technique Patient monitoring: blood pressure monitoring, continuous pulse oximetry and EKG Central Line Procedure Laterality:right Location:internal jugular Catheter Type:[place] Catheter Size:7.5 Fr Guidance:ultrasound guided PROCEDURE NOTE/ULTRASOUND INTERPRETATION.  Using  ultrasound guidance the potential vascular sites for insertion of the catheter were visualized to determine the patency of the vessel to be used for vascular access.  After selecting the appropriate site for insertion, the needle was visualized under ultrasound being inserted into the internal jugular vein, followed by ultrasound confirmation of wire and catheter placement. There were no abnormalities seen on ultrasound; an image was taken; and the patient tolerated the procedure with no complications. Images: still images obtained, printed/placed on chart Assessment Post procedure:biopatch applied, line sutured and occlusive dressing applied Assessement:blood return through all ports and free fluid flow Complications:no Patient Tolerance:patient tolerated the procedure well with no apparent complications Additional Notes SGC @     Central Line    Result Date: 11/10/2024  Matthieu Scott MD     11/10/2024 10:16 AM Central Line Patient reassessed immediately prior to procedure Patient location during procedure: ICU Indications: MD/Surgeon request Staff Anesthesiologist: Matthieu Scott MD Preanesthetic Checklist Completed: patient identified, IV checked, site marked, risks and benefits discussed, surgical consent, monitors and equipment checked, pre-op evaluation and timeout performed Central Line Prep Sterile Tech:gloves, cap, gown, mask and sterile barriers Prep: chloraprep no medical exclusion documented for following all elements of maximal sterile barrier technique Patient monitoring: blood pressure monitoring, continuous pulse oximetry and EKG Central Line Procedure Laterality:left Location:internal jugular Catheter Type:MAC Catheter Size:9 Fr Guidance:ultrasound guided PROCEDURE NOTE/ULTRASOUND INTERPRETATION.  Using ultrasound guidance the potential vascular sites for insertion of the catheter were visualized to determine the patency of the vessel to be used for vascular access.  After selecting  the appropriate site for insertion, the needle was visualized under ultrasound being inserted into the internal jugular vein, followed by ultrasound confirmation of wire and catheter placement. There were no abnormalities seen on ultrasound; an image was taken; and the patient tolerated the procedure with no complications. Images: still images obtained, printed/placed on chart Assessment Post procedure:biopatch applied, line sutured and occlusive dressing applied Assessement:blood return through all ports and free fluid flow Complications:no Patient Tolerance:patient tolerated the procedure well with no apparent complications     XR Chest 1 View    Result Date: 11/9/2024  SINGLE VIEW OF THE CHEST  HISTORY: Central line placement  COMPARISON: November 8, 2024  FINDINGS: Tubes and lines appear stable compared to prior study. No pneumothorax is seen. Cardiomegaly and vascular congestion are noted. Bibasilar consolidation and left pleural effusion are again noted. Aeration of the left lung base They improved.      Slight interval improvement in aeration of the left lung base.  This report was finalized on 11/9/2024 3:33 AM by Dr. Alexandria Dahl M.D on Workstation: [id number]      Duplex Venous Lower Extremity - Bilateral CAR    Result Date: 11/8/2024    Normal bilateral lower extremity venous duplex scan.   Previous left gastrocnemius vein thrombus not visualized on today's study     Adult Transthoracic Echo Limited W/ Cont if Necessary Per Protocol    Result Date: 11/8/2024    Left ventricular systolic function is normal. Calculated left ventricular EF = 61.1%   Left ventricular diastolic function was indeterminate.   The right ventricular cavity is moderately dilated. Normal right ventricular systolic function noted. RV free wall strain = -6.0%.   The left atrial cavity is mildly dilated.     XR Chest 1 View    Result Date: 11/8/2024  XR CHEST 1 VW-  Clinical: Postop  COMPARISON examination 11/7/2024  FINDINGS:  Patient is rotated, projection is apical lordotic. Life support tubes and lines in position as before. There are surgical skin staples demonstrated along the chest on the left. The cardiomediastinal silhouette is unchanged, there is cardiac enlargement. There is worsening bibasilar infiltrate/atelectasis and/or consolidation, greatest at the left base. Possible left-sided pleural effusion. No pneumothorax seen. The remainder is unremarkable.  This report was finalized on 11/8/2024 7:01 AM by Dr. Adal Kimball M.D on Workstation: [id number]      Adult Transthoracic Echo Limited W/ Cont if Necessary Per Protocol    Result Date: 11/7/2024    Limited echocardiogram for left and right ventricular function   Left ventricular systolic function is hyperdynamic (EF > 70%). Left ventricular ejection fraction appears to be greater than 70%.   Left ventricular wall thickness is consistent with mild concentric hypertrophy.   The right ventricular cavity is severely dilated.   Moderately reduced right ventricular systolic function noted.   Calculated right ventricular systolic pressure from tricuspid regurgitation is 41.0 mmHg.     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 Right IJ pulmonary artery catheter with tip overlying the RVOT/main pulmonary artery. Enteric tube with tip overlying the stomach.  Stable normal size cardiomediastinal silhouette with postsurgical changes of mitral valve repair and left atrial appendage clipping. Low lung volumes. Persistent confluent dense bilateral lobe opacities which are suspicious for infiltrates. Central pulmonary vasculature remains somewhat dilated and indistinct with prominent pulmonary interstitial markings which may reflect a component of edema. No measurable pleural  effusion or pneumothorax. No focal osseous abnormality.  This report was finalized on 11/7/2024 6:54 AM by Dr. Donovan Gomez M.D on Workstation: [id number]      XR Chest 1 View    Result Date: 11/6/2024  XR CHEST 1 VW-  HISTORY: Male who is 74 years-old, postoperative evaluation  TECHNIQUE: Frontal view of the chest  COMPARISON: 11/5/2024  FINDINGS: Stable appearing endotracheal tube, NG tube, right IJ catheter. Cardiac lead fragments, sternotomy wires, skin staples noted. The heart is enlarged. Pulmonary vasculature is congested with similar-appearing bilateral lower lung opacities. There may be minimal pleural effusions. No pneumothorax. Otherwise stable.      No significant change.  This report was finalized on 11/6/2024 12:48 PM by Dr. Giacomo Burton M.D on Workstation: [id number]      XR Chest 1 View    Result Date: 11/5/2024  XR CHEST 1 VW-11/5/2024  HISTORY: Postop. Follow-up.  The heart size is moderately enlarged. Mild to moderate bibasilar atelectasis or infiltrates are seen. The right base has cleared somewhat since yesterday's study.  No significant pneumothorax is seen. Skin staples, pacer leads, ET tube, NG tube, [place] catheter, sternotomy wires and radiopaque closure device are again seen. These appear relatively unchanged from yesterday's study.      1. No significant pneumothorax is seen. 2. Bibasilar atelectasis and/or infiltrates. The right base may have cleared slightly since yesterday's study. 3. Life support devices appear relatively unchanged.   This report was finalized on 11/5/2024 6:55 AM by Dr. Deven Garcia M.D on Workstation: [id number]      Adult Transthoracic Echo Limited W/ Cont if Necessary Per Protocol    Result Date: 11/4/2024    Left ventricular systolic function is hyperdynamic (EF > 70%).   Left ventricular wall thickness is consistent with mild concentric hypertrophy.   The right ventricular cavity is severely dilated. Severely reduced right ventricular systolic  function noted.   There is no evidence of pericardial effusion     XR Chest 1 View    Result Date: 11/4/2024  XR CHEST 1 VW-  HISTORY: Male who is 74 years-old, chest tube removal  TECHNIQUE: Frontal view of the chest  COMPARISON: 11/4/2024 at 1025 hours  FINDINGS: Interval removal of chest tubes. Stable appearing NG tube, endotracheal tube, right IJ [place] catheter. Cardiac lead fragments are evident. Sternotomy wires, cardiac valve marker are noted. The heart is enlarged. Pulmonary vasculature is congested. Atelectasis/infiltrate at the lower lungs, similar to prior exam, with likely mild pleural effusions. No pneumothorax. No acute osseous process.      Chest tubes removed. No pneumothorax.  This report was finalized on 11/4/2024 11:51 AM by Dr. Giacomo Burton M.D on Workstation: [email or web address]      XR Chest 1 View    Result Date: 11/4/2024  XR CHEST 1 VW-  HISTORY: Male who is 74 years-old, chest tube removal  TECHNIQUE: Frontal view of the chest  COMPARISON: 11/4/2024  FINDINGS: A right chest tube has been removed. Other chest tubes appear stable. Stable appearing NG tube, endotracheal tube, right IJ [place] catheter. Cardiac lead fragments are evident. Sternotomy wires, cardiac valve marker are noted. The heart is enlarged. Pulmonary vasculature is congested. Atelectasis/infiltrate at the lower lungs, similar to prior exam, with likely mild pleural effusions. No pneumothorax. No acute osseous process.      Chest tube removed. No pneumothorax.  This report was finalized on 11/4/2024 10:39 AM by Dr. Giacomo Burton M.D on Workstation: [email or web address]      XR Chest 1 View    Result Date: 11/4/2024  XR CHEST 1 VW-   INDICATION: Postoperative  COMPARISON: Chest radiograph November 3, 2024  TECHNIQUE: 1 view chest  FINDINGS:  Heterogeneous multifocal bilateral lung opacities. Questionable tiny left pneumothorax. Stable mediastinum. Suspect CABG. Left atrial appendage clip. Endotracheal tube tip is located in the distal  trachea 4.3 cm above the yoni. Right IJ sheath with pulmonary artery catheter tip over the main pulmonary artery. Feeding tube with the tip in the gastric fundus. Abandoned cardiac leads. Left-sided chest tube. Mediastinal drains.       1. Heterogeneous multifocal bilateral lung opacities with improved aeration of the right upper lung. 2. Possible tiny left pneumothorax. 3. Stable support apparatus.  This report was finalized on 11/4/2024 8:31 AM by Dr. Alberto Dominique M.D on Workstation: [id number]      Duplex Pseudoaneurysm CAR    Result Date: 11/3/2024    Study today is negative for pseudoaneurysm of the left groin.  Hematoma measuring 2.3 x 1.3 cm.     Adult Transthoracic Echo Limited W/ Cont if Necessary Per Protocol    Result Date: 11/3/2024    Left ventricular systolic function is hyperdynamic (EF > 70%). Left ventricular ejection fraction appears to be greater than 70%.   The left ventricular cavity is borderline dilated.   Moderately reduced right ventricular systolic function noted.   The right ventricular cavity is moderate to severely dilated.   The left atrial cavity is severely dilated.   The right atrial cavity is mild to moderately  dilated.   Color doppler jet is not well visualized but triangular shap and density indicate severe tricuspid valve regurgitation is present.   Estimated right ventricular systolic pressure from tricuspid regurgitation is mildly elevated (35-45 mmHg).   Mitral ring in place with normal gradients.   Prosthetic AV not well visualized but gradients are normal.   No pericardial effusion appreciated.     XR Chest 1 View    Result Date: 11/3/2024  XR CHEST 1 VW-  HISTORY: Male who is 74 years-old, postoperative evaluation  TECHNIQUE: Supine view of the chest  COMPARISON: 11/2/2024  FINDINGS: Stable appearing endotracheal tube, NG tube, right IJ catheter, mediastinal drain, balloon pump marker. Cardiac leads, sternotomy wires, cardiac valve marker noted. The heart is  enlarged. Pulmonary vasculature is congested. Extensive bilateral pulmonary opacities appear similar to prior exam. No large pleural effusion or pneumothorax is seen, limited by supine positioning, left lateral costophrenic angle is excluded from the image. Otherwise stable.      No significant change.    This report was finalized on 11/3/2024 7:10 AM by Dr. Giacomo Burton M.D on Workstation: [email or web address]      XR Chest 1 View    Result Date: 11/2/2024  XR CHEST 1 VW-  HISTORY: Postop.  COMPARISON: Chest radiograph 11/2/2024 9:14 a.m.  FINDINGS:  A single view of the chest was obtained. There is an endotracheal tube with the tip terminating approximately 2.6 cm above the yoni. There is a linear metallic density projecting over the aortic knob, which was previously located over the lower mediastinum. This may reflect a balloon pump with repositioning. Additional support devices are not significantly changed. The cardiac silhouette is enlarged. There is a left atrial appendage clip. Status post CABG. There is calcific aortic atherosclerosis. Bilateral pulmonary opacities are similar to prior. Sternal wires and surgical clips are present.  This report was finalized on 11/2/2024 2:24 PM by Dr. Janessa Bower M.D on Workstation: [id number]      XR Abdomen KUB    Result Date: 11/2/2024  XR ABDOMEN KUB-  INDICATIONS: NG tube placement  TECHNIQUE: SUPINE VIEW OF THE ABDOMEN  COMPARISON: 10/31/2024  FINDINGS:  In the partly included thorax, the NG tube projects over the spine, that extends to left upper quadrant at the expected location of the proximal stomach. The bowel gas pattern is nonobstructive. Follow-up as clinically indicated.       As described.   This report was finalized on 11/2/2024 2:09 PM by Dr. Giacomo Burton M.D on Workstation: [id number]      XR Chest 1 View    Result Date: 11/2/2024  XR CHEST 1 VW-  HISTORY: Male who is 73 years-old, postoperative evaluation  TECHNIQUE: Frontal view of the chest   COMPARISON: 11/2/2024 at 0134 hours  FINDINGS: Previous NG tube is no longer seen. Stable appearing endotracheal tube. Right IJ [place] catheter appears slightly advanced in comparison with the prior exam, tip in the region of the pulmonary arterial trunk. Normal chest tube removal. A 7-8 mm metallic radiodensity projecting over the central aspect of the cardiac shadow, if representing balloon pump marker would be low in position, correlate clinically. Sternotomy wires, cardiac valve marker noted. The heart is enlarged. Extensive bilateral pulmonary opacities show further interval increase bilaterally. There may be left pleural effusion. No pneumothorax. Otherwise stable.       As described.  Discussed by telephone with patient's nurse, [place], at time of interpretation, 1012, 11/2/2024  This report was finalized on 11/2/2024 10:14 AM by Dr. Giacomo Burton M.D on Workstation: [id number]      Arterial Line    Result Date: 11/2/2024  Anuj Aguilar MD     11/2/2024  7:24 AM Arterial Line Patient reassessed immediately prior to procedure Patient location during procedure: OR Line placed for hemodynamic monitoring, ABGs/Labs/ISTAT and MD/Surgeon request. Performed By Anesthesiologist: Anuj Aguilar MD Preanesthetic Checklist Completed: patient identified, IV checked, site marked, risks and benefits discussed, surgical consent, monitors and equipment checked, pre-op evaluation and timeout performed Arterial Line Prep  Sterile Tech: cap, gloves and sterile barriers Prep: ChloraPrep Patient monitoring: EKG, continuous pulse oximetry and blood pressure monitoring Arterial Line Procedure Laterality:right Location:  radial artery Catheter size: 20 G Guidance: ultrasound guided PROCEDURE NOTE/ULTRASOUND INTERPRETATION.  Using ultrasound guidance the potential vascular sites for insertion of the catheter were visualized to determine the patency of the vessel to be used for vascular access.  After selecting the  appropriate site for insertion, the needle was visualized under ultrasound being inserted into the radial artery, followed by ultrasound confirmation of wire and catheter placement. There were no abnormalities seen on ultrasound; an image was taken; and the patient tolerated the procedure with no complications. Number of attempts: 1 Successful placement: yes Images: still images not obtained Post Assessment Dressing Type: wrist guard applied, secured with tape and occlusive dressing applied. Complications no Circ/Move/Sens Assessment: normal and unchanged. Patient Tolerance: patient tolerated the procedure well with no apparent complications Additional Notes ULTRASOUND INTERPRETATION. Using ultrasound guidance, a catheter was placed within in the appropriate vessel and advanced successfully. There were no abnormalities seen on ultrasound; the patient tolerated the procedure with no complications.     Diagnostic IntraOp Dano    Result Date: 11/2/2024  Leana Jang MD     11/2/2024  8:06 AM Diagnostic IntraOp Dano Procedure Performed: Diagnostic IntraOp Dano      Start Time:  11/2/2024 8:04 AM      End Time:   11/2/2024 8:04 AM Preanesthesia Checklist: Patient identified, IV assessed, risks and benefits discussed, monitors and equipment assessed, procedure being performed at surgeon's request and anesthesia consent obtained. General Procedure Information DANO Placed for monitoring purposes only -- This is not a diagnostic DANO Diagnostic Indications for Echo:  hemodynamic monitoring Physician Requesting Echo: Javon Florian MD Location performed:  OR Intubated Bite block placed Heart visualized Probe Insertion:  Easy Probe Type:  Multiplane Modalities:  2D only, color flow mapping, continuous wave Doppler and pulse wave Doppler Anesthesia Information Performed Personally Anesthesiologist:  Leana Jang MD Echocardiogram Comments:      Limited exam for hemodynamic monitoring while closing chest    XR Chest 1  View    Result Date: 11/2/2024  XR CHEST 1 VW-  HISTORY: Male who is 73 years-old, balloon pump  TECHNIQUE: Frontal view of the chest  COMPARISON: 11/1/2024  FINDINGS: Stable-appearing tubes and line. The heart is enlarged. Extensive bilateral pulmonary opacities show interval increase, especially in the right upper lung. There may be left pleural effusion. No pneumothorax. Otherwise stable.      As described.  This report was finalized on 11/2/2024 5:50 AM by Dr. Giacomo Burton M.D on Workstation: [email or web address]      XR Chest 1 View    Result Date: 11/1/2024  SINGLE VIEW OF THE CHEST  HISTORY: Postop open heart surgery  COMPARISON: October 31, 2024  FINDINGS: Tubes and lines appear stable, including possible positioning of the [place] catheter within the right ventricle. Vascular congestion is again noted. No pneumothorax is seen. Bibasilar atelectasis and small bilateral effusions are suspected.       No significant interval change.  This report was finalized on 11/1/2024 5:18 AM by Dr. Alexandria Dahl M.D on Workstation: [email or web address]      XR Abdomen KUB    Result Date: 11/1/2024  KUB  HISTORY: Orogastric tube placement  COMPARISON: None available.  FINDINGS: Orogastric tube appears to terminate within the proximal stomach. Bowel gas pattern is nonobstructive. Bibasilar consolidation and bilateral effusions are noted.  This report was finalized on 11/1/2024 4:08 AM by Dr. Alexandria Dahl M.D on Workstation: [id number]      XR Chest 1 View    Result Date: 10/31/2024  SINGLE VIEW OF THE CHEST  HISTORY: Orogastric tube placement  COMPARISON: None available.  FINDINGS: Orogastric tube appears to extend into the upper abdomen. The patient's [place] catheter has retracted, and is likely positioned within the right ventricle. Tubes and lines are otherwise stable. Cardiomegaly and vascular congestion are noted. Bibasilar consolidation is noted. Bilateral effusions are suspected. No pneumothorax is seen.       1.  Orogastric tube appears to extend into the upper abdomen. 2. [place] catheter has retracted, and may be positioned within the right ventricle.  This report was finalized on 10/31/2024 9:51 PM by Dr. Alexandria Dahl M.D on Workstation: [id number]      XR Abdomen KUB    Addendum Date: 10/31/2024    ADDENDUM: Discussed by telephone with patient's nurse, Letty, 2044, 10/31/2024.  This report was finalized on 10/31/2024 8:47 PM by Dr. Giacomo Burton M.D on Workstation: [id number]      Result Date: 10/31/2024  XR ABDOMEN KUB-  INDICATIONS: Orogastric tube placement  TECHNIQUE: FRONTAL VIEW OF THE ABDOMEN  COMPARISON: None available  FINDINGS:  As several tubes are present over the abdomen, it is difficult to distinguish with certainty which tube is the orogastric tube, or what the location of the orogastric tube is; as such, additional imaging at the level of the chest and upper abdomen is recommended for further evaluation. The bowel gas pattern is nonobstructive.       As described.   This report was finalized on 10/31/2024 8:40 PM by Dr. Giacomo Burton M.D on Workstation: [id number]      XR Chest 1 View    Result Date: 10/31/2024  XR CHEST 1 VW-  HISTORY: 73-year-old male status post aortic root replacement, removal of intracardiac leads as well as pacemaker ICD generator and part of the leads. Intra-aortic balloon pump. Significant coagulopathy.  FINDINGS: Distal tip of ET tube is 1.5 cm proximal to the yoni. 2 cm withdrawal is recommended. Right IJ [place] catheter tip is within the main pulmonary artery outflow tract. Intra-aortic balloon pump marker is approximately 7 cm distal to the yoni. No pneumothorax.  There is improved aeration at the right lung, but the remaining perihilar airspace consolidations need to be followed. There is no definite interstitial edema. No significant change in the enlarged cardiac silhouette. No pneumothorax.  This report was finalized on 10/31/2024 4:31 PM by Dr. Fleming  [employer] on Workstation: [id number]      Diagnostic IntraOp Dano    Result Date: 10/31/2024  Anuj Aguilar MD     10/31/2024  3:58 PM Diagnostic IntraOp Dano Procedure Performed: Diagnostic IntraOp Dano      Start Time:      End Time:  Preanesthesia Checklist: Patient identified, IV assessed, risks and benefits discussed, monitors and equipment assessed, procedure being performed at surgeon's request and anesthesia consent obtained. General Procedure Information DANO Placed for monitoring purposes only -- This is not a diagnostic DANO Physician Requesting Echo: Javon Florian MD Location performed:  ICU Intubated Bite block placed Heart visualized Probe Insertion:  Easy Probe Type:  Multiplane Modalities:  Color flow mapping, continuous wave Doppler and pulse wave Doppler Echocardiographic and Doppler Measurements Aorta Other Aortic Findings:      Anesthesia Information Performed Personally Anesthesiologist:  Anuj Aguilar MD Echocardiogram Comments:      Limited exam performed in ICU on POD1, inotropes epi 0.02 & milrinone 0.375 RV - severely impaired systolic function LV - severe impaired systolic function, EF 20-25%, underfilled, practically kissing papillary muscles, global hypokinesis Diastolic function - grade 2 dysfunction RA: dilated LA: dilated AV - s/p bioprosthetic homograft without paravalvular leak MV - s/p annuloplasty, well seated without leak, mean gradient 2mmHg, no regurgitation seen TV - moderate to severe regurgitation    XR Chest 1 View    Result Date: 10/31/2024  SINGLE VIEW OF THE CHEST  HISTORY: Postop line check  COMPARISON: October 30, 2024  FINDINGS: Tubes and lines appear to be stable when compared to the earlier study. No pneumothorax is seen. There is vascular congestion. There is bibasilar atelectasis. There is a left pleural effusion. As previously noted, intracardiac pacemaker leads have been removed, and the generator also appears to have been removed.      Postoperative  findings, as noted above.  This report was finalized on 10/31/2024 12:18 AM by Dr. Alexandria Dahl M.D on Workstation: [id number]      XR Lost Needle / Instrument    Result Date: 10/30/2024  X-RAY LOST NEEDLE/INSTRUMENT  HISTORY: No count  COMPARISON: October 30, 2024  FINDINGS: The patient has undergone revision of sternotomy. Endotracheal tube terminates in satisfactory position. Right internal jugular vein [place] catheter appears to be stable in position. There are left-sided chest tubes and a mediastinal drain. Distal left-sided pacemaker leads appear to have been removed. Correlation with operative procedure is recommended. There is vascular congestion. No obvious pneumothorax is seen. Left basilar atelectasis is present. There may be a left pleural effusion.      Postoperative findings, as noted above. Distal left-sided pacemaker leads appear to have been removed, and correlation with procedural history is recommended.  This report was finalized on 10/30/2024 10:58 PM by Dr. Alexandria Dahl M.D on Workstation: [id number]      XR Chest Post CVA Port    Result Date: 10/30/2024  Portable chest radiograph  HISTORY: Central line placement  TECHNIQUE: Single AP portable radiograph of the chest  COMPARISON: Chest radiograph 10/20/2024      FINDINGS AND IMPRESSION: Right internal jugular pulmonary artery catheter tip terminates over the left aspect of the mediastinum. There are median sternotomy wires and a presumed atrial appendage clip. Prosthetic heart valve and a left-sided pacer/AICD.  Bibasilar pulmonary pacification is present, left greater than right, mildly worsened within the right lung since 10/20/2024. Given asymmetry, continued attention follow-up to resolution is recommended to exclude the possibility of neoplasm. No pneumothorax is seen. Cardiac silhouette is mildly enlarged.  This report was finalized on 10/30/2024 12:20 PM by Dr. Danial Rosenbaum M.D on Workstation: [id number]         Pulmonary Artery Catheter    Result Date: 10/30/2024  Lionel Valencai MD     10/30/2024 11:03 AM Pulmonary Artery Catheter Patient reassessed immediately prior to procedure Patient location during procedure: OR Start time: 10/30/2024 10:26 AM Stop Time:10/30/2024 10:46 AM Indications: central pressure monitoring, vascular access and MD/Surgeon request Staff Anesthesiologist: Lionel Valencia MD Preanesthetic Checklist Completed: patient identified and risks and benefits discussed Central Line Prep Sterile Tech:cap, gloves, gown, mask and sterile barriers Prep: chloraprep Patient monitoring: blood pressure monitoring, continuous pulse oximetry and EKG Central Line Procedure Laterality:right Location:internal jugular Catheter Type:[place] Catheter Size:7.5 Fr Guidance:ultrasound guided PROCEDURE NOTE/ULTRASOUND INTERPRETATION.  Using ultrasound guidance the potential vascular sites for insertion of the catheter were visualized to determine the patency of the vessel to be used for vascular access.  After selecting the appropriate site for insertion, the needle was visualized under ultrasound being inserted into the internal jugular vein, followed by ultrasound confirmation of wire and catheter placement. There were no abnormalities seen on ultrasound; an image was taken; and the patient tolerated the procedure with no complications. Assessment Post procedure:biopatch applied, line sutured, occlusive dressing applied and secured with tape Assessement:blood return through all ports and free fluid flow Complications:no Patient Tolerance:patient tolerated the procedure well with no apparent complications     Central Line    Result Date: 10/30/2024  Lionel Valencia MD     10/30/2024 11:02 AM Central Line Patient reassessed immediately prior to procedure Patient location during procedure: pre-op Start time: 10/30/2024 10:26 AM Stop Time:10/30/2024 10:46 AM Indications: vascular access and central pressure monitoring  Staff Anesthesiologist: Lionel Valencia MD Preanesthetic Checklist Completed: patient identified and risks and benefits discussed Central Line Prep Sterile Tech:cap, gloves, gown, mask and sterile barriers Prep: chloraprep Patient monitoring: blood pressure monitoring, continuous pulse oximetry and EKG Central Line Procedure Laterality:right Location:internal jugular Catheter Type:Cordis Catheter Size:9 Fr Guidance:ultrasound guided PROCEDURE NOTE/ULTRASOUND INTERPRETATION.  Using ultrasound guidance the potential vascular sites for insertion of the catheter were visualized to determine the patency of the vessel to be used for vascular access.  After selecting the appropriate site for insertion, the needle was visualized under ultrasound being inserted into the internal jugular vein, followed by ultrasound confirmation of wire and catheter placement. There were no abnormalities seen on ultrasound; an image was taken; and the patient tolerated the procedure with no complications. Images: still images obtained, printed/placed on chart Assessment Post procedure:biopatch applied, line sutured, occlusive dressing applied and secured with tape Assessement:blood return through all ports and chest x-ray ordered Complications:no Patient Tolerance:patient tolerated the procedure well with no apparent complications Additional Notes Ultrasound Interpretation:  Using ultrasound guidance the potential vascular sites for insertion of the catheter were visualized to determine the patency of the vessel to be used for vascular access.  After selecting the appropriate site for insertion, the needle was visualized under ultrasound being inserted into the vessel, followed by ultrasound confirmation of wire and catheter placement.  There were no abnormalities seen on ultrasound; an image was taken/ and the patient tolerated the procedure with no complications.     Diagnostic IntraOp Dano    Result Date: 10/30/2024  Azar Macias MD      10/30/2024  2:48 PM Diagnostic IntraOp Dano Procedure Performed: Diagnostic IntraOp Dano      Start Time:      End Time:  Preanesthesia Checklist: Patient identified, IV assessed, risks and benefits discussed, monitors and equipment assessed, procedure being performed at surgeon's request and anesthesia consent obtained. General Procedure Information Diagnostic Indications for Echo:  assessment of ascending aorta, assessment of surgical repair and hemodynamic monitoring Physician Requesting Echo: Javon Florian MD CPT Code:  15808, 06092 ICD Code for Medical Necessity:  I34.0, I70.0 Location performed:  OR Intubated Bite block placed Heart visualized Probe Insertion:  Easy Probe Type:  Multiplane Modalities:  Color flow mapping, pulse wave Doppler and continuous wave Doppler Echocardiographic and Doppler Measurements Ventricles Right Ventricle: Cavity size dilated.  Hypertrophy not present.  Thrombus not present.  Global function mildly impaired.  Left Ventricle: Cavity size dilated.  Thrombus not present.  Global Function mildly impaired.  Ejection Fraction 58%.  Other Ventricular Findings:      LVEDV 155 mL Valves Aortic Valve: Annulus bioprosthetic.  Stenosis mild.  Mean Gradient: 9 mmHg.  Regurgitation absent.  Leaflets vegetative.  Leaflet motions restricted.  Mitral Valve: Annulus dilated.  Stenosis not present.  Mean Gradient: 1 mmHg.  Regurgitation moderate.  Leaflets normal.  Leaflet motions normal.  Tricuspid Valve: Annulus dilated.  Stenosis not present.  Regurgitation mild.  Leaflets normal.  Other Valve Findings:      Anterior mitral leaflet length 3.1 cm Aorta Ascending Aorta: Size normal.  Diameter 3.5 cm.  Dissection not present.  Plaque thickness less than 3 mm.  Mobile plaque not present.  Aortic Arch: Size normal.  Diameter 3 cm.  Dissection not present.  Plaque thickness less than 3 mm.  Mobile plaque not present.  Descending Aorta: Size normal.  Diameter 2.5 cm.  Dissection not present.   Plaque thickness less than 3 mm.  Mobile plaque not present.  Atria Right Atrium: Size dilated.  Spontaneous echo contrast present.  Thrombus not present.  Tumor not present.  Device not present.  Left Atrium: Size dilated.  Spontaneous echo contrast present.  Thrombus not present.  Tumor not present.  Device not present.  Left atrial appendage normal. Diastolic Function Measurements: Diastolic Dysfunction Grade= indeterminate E=  51.7 ms A=  ms E/A Ratio= DT=  108 ms S/D=  .6 IVRT= Other Findings Pericardium:  pericardial effusion Pulmonary Venous Flow:  blunted (decreased) systolic flow Anesthesia Information Performed Personally Anesthesiologist:  Azar Macias MD Echocardiogram Comments:      Postbypass results:    Arterial Line    Result Date: 10/30/2024  Lionel Valencia MD     10/30/2024 11:02 AM Arterial Line Patient reassessed immediately prior to procedure Patient location during procedure: pre-op Start time: 10/30/2024 10:15 AM Stop Time:10/30/2024 10:17 AM  Line placed for hemodynamic monitoring. Performed By Anesthesiologist: Lionel Valencia MD Preanesthetic Checklist Completed: patient identified and risks and benefits discussed Arterial Line Prep  Sterile Tech: gloves Prep: ChloraPrep Patient monitoring: blood pressure monitoring, continuous pulse oximetry and EKG Arterial Line Procedure Laterality:left Location:  radial artery Catheter size: 20 G Guidance: ultrasound guided PROCEDURE NOTE/ULTRASOUND INTERPRETATION.  Using ultrasound guidance the potential vascular sites for insertion of the catheter were visualized to determine the patency of the vessel to be used for vascular access.  After selecting the appropriate site for insertion, the needle was visualized under ultrasound being inserted into the radial artery, followed by ultrasound confirmation of wire and catheter placement. There were no abnormalities seen on ultrasound; an image was taken; and the patient tolerated the procedure with no  complications. Successful placement: yes Images: still images obtained, printed/placed on the chart Post Assessment Dressing Type: occlusive dressing applied and secured with tape. Complications no Patient Tolerance: patient tolerated the procedure well with no apparent complications Additional Notes Using ultrasound guidance the potential vascular sites for insertion of the catheter were visualized to determine the patency of the vessel to be used for vascular access.  After selecting the appropriate site for insertion, the needle was visualized under ultrasound being inserted into the artery, followed by ultrasound confirmation of wire and catheter placement.  There were no abnormalities seen on ultrasound; an image was taken/ and the patient tolerated the procedure with no complications.     Duplex Vein Mapping Lower Extremity - Bilateral CAR    Result Date: 10/28/2024    The right great saphenous vein is patent  and of adequate size in the thigh.   The right great saphenous vein is patent and of adequate size in the calf.   The left great saphenous vein is patent and of adequate size in the thigh.   The left great saphenous vein is patent  and of adequate size in the calf.     Carotid Duplex - Bilateral    Result Date: 10/28/2024    Right internal carotid artery demonstrates a less than 50% stenosis.   Antegrade right vertebral flow.   Left internal carotid artery demonstrates a less than 50% stenosis.   Antegrade left vertebral flow.     XR Chest PA & Lateral    Result Date: 10/28/2024  XR CHEST PA AND LATERAL-  HISTORY: Male who is 73 years-old, preoperative evaluation  TECHNIQUE: Frontal and lateral views of the chest  COMPARISON: 10/14/2024  FINDINGS: The heart is enlarged. Pulmonary vasculature shows mild central prominence. Left-sided pacemaker, cardiac leads, sternotomy wires, cardiac valve marker noted. Minimal right pleural effusion, continued follow-up suggested. No focal pulmonary consolidation. No  pneumothorax. Otherwise stable.      As described.  This report was finalized on 10/28/2024 4:27 PM by Dr. Giacomo Burton M.D on Workstation: [id number]      Stress Test With Myocardial Perfusion One Day    Result Date: 10/26/2024    Lexiscan protocol completed without low-level exercise.  Baseline ECG showed A-fib with RBBB, no angina or significant ischemic ECG changes noted.   Left ventricular ejection fraction is normal (Calculated EF = 55%).   Mildly reduced perfusion defect in the basal segments likely due to suboptimal postprocessing/contouring as well as diaphragmatic attenuation artifact.  No reversibility noted.   Myocardial perfusion imaging indicates a grossly normal myocardial perfusion study with no evidence of reversible ischemia. Impressions are consistent with a low risk study.     Adult Transesophageal Echo 3D (JOLIE) W/ Cont If Necessary Per Protocol    Result Date: 10/25/2024    Left ventricular ejection fraction appears to be 51 - 55%.   Left ventricular wall thickness is consistent with mild concentric hypertrophy.   The right ventricular cavity is mildly dilated.   The left atrial cavity is severely dilated.   Saline test results are negative.   The right atrial cavity is severely  dilated.   Severe aortic valve stenosis is present.   There is a mobile mass on the aortic valve. Vegetations are present on both the ventricle as well as the aortic side of the bioprosthetic aortic valve.  Largest extends into the ascending aorta approximately 3.1 cm above the sewing ring (3.3 cm total length). Vegetation becomes more broad as the furthest aspect from attachment, maximum width 1.7 cm. Vegetation on the ventricular side of the aortic valve I think most likely originates from the sewing ring.   There is a bioprosthetic aortic valve present. The prosthetic aortic valve peak and mean gradients are elevated. There is a large, mobile mass present on the prosthetic aortic valve that is consistent with a  vegetation. There is severe stenosis or obstruction of the prosthetic aortic valve.   Mild aortic valve regurgitation is present.     CT Angiogram Coronary    Result Date: 10/25/2024  RADIOLOGY INTERPRETATION OF EXTRACARDIAC STRUCTURES WITHIN THE CHEST    FINDINGS: Small right greater than left bilateral pleural effusions and mild pulmonary inter and intralobular septal thickening which may reflect a component of pulmonary edema. Respiratory motion limits evaluation of the lungs.    PLEASE SEE SEPARATE CARDIOLOGY REPORT OF THE CARDIAC FINDINGS.   Radiation dose reduction techniques were utilized, including automated exposure control and exposure modulation based on body size.   This report was finalized on 10/25/2024 3:03 PM by Dr. Donovan Gomez M.D on Workstation: [id number]      CT Angiogram Coronary-Cardiology Interpretation    Result Date: 10/25/2024  Table formatting from the original result was not included. 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 0.5 mm collimated images were obtained through the coronary arteries.  Data were transferred offline for 3D reconstructions using [employer]. CONTRAST: 85 mL iopamidol ([id number]-[id number]) ACQUISITION: Retrospective ECG triggered acquisition was used.  Heart rate at the time of acquisition was approximately 85 BPM. MEDICATIONS: Metoprolol tartrate  25 mg oral Nitroglycerin 0.8 mg tablet TECHNICAL QUALITY:  Extremely poor due to obesity, high heart rate in the setting of A-fib and inability to medicate further due to low BP, and adjacent ICD leads with associated metallic artifacts.  Nondiagnostic. . FINDINGS: Coronary Artery Calcification Findings: The total calcium score is 69 indicating mild (CAC 1-100) calcified plaque in the coronary tree. Left main: 40 LAD: 13  LCx: 4 RCA: 2 Angiographic Findings: The coronary arteries are  possibly left dominant . Left Main: Normal origin from the left coronary cusp.  Gives rise to LAD and LCx.  There appears to be a calcified plaque in distal LM extending into LAD.  Unable to accurately assess luminal stenosis due to poor image quality. LAD: Unable to accurately assess luminal stenosis due to poor image quality. LCx: Likely dominant vessel. Unable to accurately assess luminal stenosis due to poor image quality. RCA: Likely nondominant. Unable to accurately assess luminal stenosis due to poor image quality. Noncoronary Cardiac Findings: Cardiac chambers: Normal in size with no obvious filling defects within the ventricles, atria, or atrial appendages. No stigmata or prior infarction. Cardiac valves: A bioprosthetic valve is present in the aortic position and poorly visualized.  There appears to be hypoattenuated linear echodensity consistent with known bioprosthetic aortic valve vegetation. Pulmonary arteries: Normal in caliber. No obvious filling defects in the visualized central pulmonary arteri(es) to suggest large central pulmonary emboli, although the image qualities are extremely poor. Pericardium: The pericardial contour is preserved with no effusion, thickening, or calcifications. Left ventricle: Mild-moderate LV dilatation.  Calculated LVEF 70%.     Extremely poor image quality due to obesity (BMI 42), high heart rate in the setting of A-fib and inability to medicate further due to low BP, and adjacent ICD leads resulting in photon starvation, cardiac motion, and beam hardening artifacts and excessive noise.  Non-diagnostic study. CAD-RAD N/P1. Overall there is mild amount of coronary plaque.  Coronary calcium score CAC (69). Grossly normal LV systolic function (calculated LVEF 70%). Bioprosthetic aortic valve present, poorly visualized. There appears to be hypoattenuated linear echodensity consistent with known bioprosthetic  aortic valve vegetation.  Please reference same-day JOLIE results. Left atrial appendage appears to be ligated surgically with clips noted. Severe biatrial enlargement. Please refer to the radiology report for information on extra-cardiac structures.  RECOMMENDATION: Consider alternative imaging modalities to rule out obstructive CAD if deemed necessary prior to surgery for bioprosthetic endocarditis. Grading Scale for Stenosis Severity: Category Degree Interpretation CAD-RADS 0 0% (No plaque or stenosis) Absence of CAD CAD-RADS 1 1-24% (Minimal stenosis or plaque w/o stenosis) Minimal non-obstructive CAD CAD-RADS 2 25-49% (Mild stenosis) Mild non-obstructive CAD CAD-RADS 3 50-69% (Moderate stenosis) Moderate stenosis CAD-RADS 4 A - 70-99% stenosis or B - Left main >/= 50% or 3-vessel obstructive (>/=70%) disease Severe stenosis CAD-RADS 5 100% (total occlusion) Total coronary occlusion or sub-total occlusion CAD-RADS N Non-diagnostic study Obstructive CAD cannot be excluded Grading Scale for Plaque [place]: P1 (CAC 1-100) Mild amount of plaque P2 (-300) Moderate amount of plaque P3 (-999) Severe amount of plaque P4 (CAC > 1000) Extensive amount of plaque     Duplex Venous Lower Extremity - Bilateral CAR    Result Date: 10/24/2024    Acute left lower extremity deep vein thrombosis noted in the gastrocnemius.   All other veins appeared normal bilaterally.     Adult Transthoracic Echo Complete W/ Cont if Necessary Per Protocol    Result Date: 10/19/2024    Left ventricular systolic function is low normal. Left ventricular ejection fraction appears to be 51 - 55%.   Left ventricular wall thickness is consistent with mild concentric hypertrophy.   Left ventricular diastolic function was indeterminate.   There is moderate to severe biatrial enlargement.   There is a bioprosthetic aortic valve present. The prosthetic aortic valve peak and mean gradients are elevated.  The peak and mean gradient across aortic  valve was 101/68 mmHg.  Findings are consistent with severe stenosis of the bioprosthetic valve.   There is mild to moderate mitral regurgitation.   There is mild tricuspid regurgitation.  Estimated right ventricular systolic pressure from tricuspid regurgitation is markedly elevated (>55 mmHg).     Results for orders placed during the hospital encounter of 10/23/24    Adult Transthoracic Echo Limited W/ Cont if Necessary Per Protocol    Interpretation Summary    Left ventricular systolic function is normal. Calculated left ventricular EF = 68.7% Septal wall motion is abnormal. Diastolic flattening of the interventricular septum consistent with right ventricle volume overload. Normal left ventricular cavity size noted. Left ventricular wall thickness is consistent with mild to moderate concentric hypertrophy. Left ventricular diastolic function was indeterminate.    Ventricle: The right ventricular cavity is severely dilated. Moderately reduced right ventricular systolic function noted.    Mitral annular calcification is present. Mild mitral valve stenosis is present. The mitral valve mean gradient is 5.20 mmHg.    Severe tricuspid valve regurgitation is present. Estimated right ventricular systolic pressure from tricuspid regurgitation is moderately elevated (45-55 mmHg). Calculated right ventricular systolic pressure from tricuspid regurgitation is 47 mmHg.      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  Respiratory failure patient minute ventilation is improving his metabolic acidosis has been treated with CRRT I do not think with his current hemodynamic and mental status we may be able to decrease support some in the near future  CHF history of nonischemic cardiomyopathy but recent echocardiogram LVEF 67% so probably diastolic dysfunction cardiology following   Pulmonary hypertension severe by recent  echocardiogram on sildenafil per cardiothoracic surgery  Acute kidney and acute renal failure now on CRRT hopefully will be able to pull significant volume he has many liters to go and correct acidosis.  Metabolic acidosis probably secondary to renal disease hopefully this will correct with dialysis  DVT history and left lower extremity on Doppler on 10/24/2024 not present on 11/8/2024 Doppler and subacute DVT in the left axillary vein Doppler holding anticoagulation now per cardiovascular surgery  Atrial fibrillation on amiodarone for rate control  Anemia acute  on chronic expected postop acute complement.  Stable  Thrombocytopenia resolved  Esophagitis ease EGD on 9/30/2024  Fluids/lites/nutrition continue tube feeds now   Hyperglycemia not too bad on scheduled and sliding scale insulin.  Morbid obesity with a BMI greater than 46        Plan for disposition:    Paul Leslie Jr, MD  11/22/24  06:51 EST    Time: Critical care time 36 minutes

## 2024-11-22 NOTE — PROGRESS NOTES
LOS: 30 days     Chief Complaint: Endocarditis    Interval History: Remains febrile overnight.  Blood cultures now growing Candida albicans.  WBC 17.    Vital Signs  Temp:  [100.2 °F (37.9 °C)-102.2 °F (39 °C)] 101.1 °F (38.4 °C)  Heart Rate:  [] 110  Resp:  [22-28] 28  BP: ()/(19-84) 132/54  Arterial Line BP: (102-155)/(16-48) 123/44  FiO2 (%):  [39 %-40 %] 40 %    Physical Exam:  General: Ill-appearing  HEENT: Tracheostomy present.  NG tube in place.  Respiratory: Coarse bilateral breath sounds on the ventilator.  : Ugarte catheter  Skin: Tracheostomy site clean and intact.  Access: Left groin HD line.  Arterial line.  Peripheral line.    Antibiotics:  Anti-Infectives (From admission, onward)      Ordered     Dose/Rate Route Frequency Start Stop    11/22/24 0638  micafungin sodium (MYCAMINE) 100 mg in sodium chloride 0.9 % 100 mL MBP        Ordering Provider: Gregg Diaz DO    100 mg  over 60 Minutes Intravenous Every 24 Hours 11/23/24 0300 11/30/24 0259    11/22/24 0328  micafungin sodium (MYCAMINE) 100 mg in sodium chloride 0.9 % 100 mL MBP        Ordering Provider: Jr Marty Doan MD    100 mg  over 60 Minutes Intravenous Every 24 Hours 11/22/24 0415 11/22/24 0446    11/19/24 1213  cefepime 2000 mg IVPB in 100 mL NS (MBP)        Ordering Provider: Gregg Diaz DO    2,000 mg  over 4 Hours Intravenous Every 8 Hours 11/19/24 1800 11/25/24 2359    11/17/24 1407  vancomycin 750 mg in sodium chloride 0.9 % 250 mL IVPB-VTB        Ordering Provider: Gregg Diaz DO    750 mg  333.3 mL/hr over 45 Minutes Intravenous Once 11/18/24 0600 11/18/24 0702    11/13/24 0914  cefepime 2000 mg IVPB in 100 mL NS (MBP)        Ordering Provider: Gregg Diaz DO    2,000 mg  over 30 Minutes Intravenous Once 11/13/24 1000 11/13/24 1235    11/12/24 1133  vancomycin 3000 mg/500 mL 0.9% NS IVPB ([facility])        Ordering Provider: Gregg Diaz DO    20 mg/kg × 151 kg  over  180 Minutes Intravenous Once 11/12/24 1230 11/12/24 1614    11/02/24 0918  vancomycin (VANCOCIN) 1,000 mg in sodium chloride 0.9 % 250 mL IVPB-VTB        Ordering Provider: Antwan Farmer MD    1,000 mg  250 mL/hr over 60 Minutes Intravenous Every 24 Hours 11/02/24 1015 11/03/24 1138    11/02/24 0912  Pharmacy to dose vancomycin        Ordering Provider: Samantha Salvador APRN     Not Applicable Continuous PRN 11/02/24 0912 11/04/24 0911    10/30/24 2306  ceFAZolin 2000 mg IVPB in 100 mL NS (MBP)        Ordering Provider: Samantha Salvador APRN    2,000 mg  over 30 Minutes Intravenous Every 8 Hours 10/31/24 0000 11/01/24 0922    10/29/24 1518  ceFAZolin 2000 mg IVPB in 100 mL NS (MBP)        Ordering Provider: Bryant Mcclure PA-C    2,000 mg  over 30 Minutes Intravenous Once 10/30/24 0600 10/30/24 1936    10/28/24 1034  vancomycin IVPB 2000 mg in 0.9% Sodium Chloride 500 mL        Ordering Provider: Samantha Salvador APRN    15 mg/kg × 142 kg Intravenous Once 10/28/24 1045 10/28/24 1356             Results Review:     I reviewed the patient's new clinical results.    Lab Results   Component Value Date    WBC 17.30 (H) 11/22/2024    HGB 9.8 (L) 11/22/2024    HCT 29.8 (L) 11/22/2024    MCV 92.3 11/22/2024     (L) 11/22/2024     Lab Results   Component Value Date    GLUCOSE 140 (H) 11/22/2024    BUN 21 11/22/2024    CREATININE 0.84 11/22/2024    BCR 25.0 11/22/2024    CO2 19.6 (L) 11/22/2024    CALCIUM 7.9 (L) 11/22/2024    ALBUMIN 2.6 (L) 11/22/2024    LABIL2 1.4 06/27/2019    AST 71 (H) 11/22/2024    ALT 29 11/22/2024       Microbiology:  10/3 COVID-negative  10/10 COVID-negative  10/14 COVID-negative  10/15 blood cultures 2 out of 2 strep mitis  10/17 COVID-negative  10/17 blood cultures 2 out of 2 strep mitis  10/21 COVID-negative  10/23 blood cultures no growth today  10/30 operative cultures from the aortic valve no growth   11/9 respiratory culture no growth  11/9 catheter tip culture no growth to  date  11/10 catheter tip culture no growth to date  11/12 blood cultures no growth to date  11/12 respiratory culture no growth to date  11/13 respiratory culture no growth  11/13 genital culture of the penis Candida albicans      Assessment    #Candidemia  #Strep mitis endocarditis  #Status post bioprosthetic aortic valve replacement 2014  #Status post aortic root replacement in the setting of prosthetic aortic valve endocarditis and annular abscess on 10/30  #Status post removal of pacemaker generator and intracardiac portion of the leads with retained venous leads  #Atrial fibrillation  #Achilles tendon rupture   #NATHANIEL now on CRRT  #Status post tracheostomy 11/16    Suspect Candida source to be left IJ central line which has not been removed.  Repeat blood cultures today to document clearance.  Arterial line was exchanged and continues to have femoral dialysis line.    Started on micafungin 100 mg daily for candidal coverage.    Plan to continue cefepime 2 g every 8 hours adjusted for CRRT today.  Plan to continue through 11/25.    For endocarditis he will still need ultimate antibiotic 6 weeks with end date of December 4.  Above should cover in the meantime.

## 2024-11-22 NOTE — PROGRESS NOTES
LOS: 30 days   Patient Care Team:  Juan Perez MD as PCP - General (Internal Medicine)    Chief Complaint: Follow-up bioprosthetic AVR endocarditis, mitral regurgitation, persistent atrial fibrillation.    Interval History: Febrile, and still on multiple pressors.  Heart rate is mainly around 100.  He is growing Candida albicans out of his blood.    Vital Signs:  Temp:  [100.2 °F (37.9 °C)-101.5 °F (38.6 °C)] 100.8 °F (38.2 °C)  Heart Rate:  [] 100  Resp:  [21-28] 22  BP: ()/(40-84) 99/40  Arterial Line BP: ()/(34-48) 91/37  FiO2 (%):  [39 %-40 %] 40 %    Intake/Output Summary (Last 24 hours) at 11/22/2024 1512  Last data filed at 11/22/2024 1200  Gross per 24 hour   Intake 4164.31 ml   Output 3848.6 ml   Net 315.71 ml       Physical Exam:   General Appearance:    Status post tracheostomy and on ventilator.  Nods head.   Lungs:     Rhonchi bilaterally anteriorly.    Heart:    Irregularly irregular rhythm with a tachycardic rate. II/VI SM throughout.   Abdomen:     Soft, nontender, nondistended.    Extremities:    3+ generalized edema and anasarca.     Results Review:    Results from last 7 days   Lab Units 11/22/24  0815   SODIUM mmol/L 135*   POTASSIUM mmol/L 3.9   CHLORIDE mmol/L 104   CO2 mmol/L 20.2*   BUN mg/dL 20   CREATININE mg/dL 0.94   GLUCOSE mg/dL 130*   CALCIUM mg/dL 8.0*         Results from last 7 days   Lab Units 11/22/24  0526   WBC 10*3/mm3 17.30*   HEMOGLOBIN g/dL 9.8*   HEMATOCRIT % 29.8*   PLATELETS 10*3/mm3 136*                 Results from last 7 days   Lab Units 11/22/24  0815   MAGNESIUM mg/dL 2.5*               I reviewed the patient's new clinical results.        Assessment:  1.  Status post bioprosthetic aortic valve replacement in 2014  2.  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  Moderate to severe mitral regurgitation  4.  Status post  reoperative AV root replacement, mitral valve repair, ICD removal, SVG-RCA, and IABP placement on 10/30/2021  5.  Postoperative shock, multifactorial  6.  Acute kidney injury  7.  History of nonischemic cardiomyopathy with recovered ejection fraction  8.  Anemia, acute on chronic  9.  Postoperative thrombocytopenia  10.  Persistent atrial fibrillation  11.  Ventricular tachycardia postoperatively  12.  Grade C esophagitis by EGD on 9/30/2024  13.  Acute left lower extremity DVT on 10/24/2024.  Resolved on Dopplers on 11/8/2024  14.  Right Achilles tendon tear  15.  Postoperative junctional rhythm  16.  Hypoalbuminemia  17.  Thrombocytopenia  18.  Severe right ventricular enlargement and dysfunction post-op  19.  Fever noted on 11/8/2024  20.  Subacute DVT in the left axillary vein by Doppler on 11/10/2024  21.  Right groin hematoma versus abscess  22.  Status post tracheostomy on 11/16/2024  23.  Candida albicans fungemia    Plan:  -Remains febrile.  Dr. Diaz suspects source of Candida albicans fungemia is the left IJ central line.  Micafungin started.  Also on cefepime.    -Continue CRRT and volume removal per nephrology (ongoing for several days).    -Remains on Levophed, vasopressin, and milrinone.  On midodrine 15 mg 3 times a day.    -Currently on Revatio 20 mg every 8 hours.  Right ventricular dysfunction remains a major issue.  The limited echocardiogram from yesterday actually looked worse to me in terms of right ventricular function.  Left ventricular function is normal.    -Atrial fibrillation rate is mainly in the lower 100s.  Really, the only option he has currently is amiodarone for rate control. Continue amiodarone drip at 0.5 mg/min.  He did get IV digoxin the last 2 days as well.    -No heparin for now per cardiovascular surgery.  The DVT in his left lower extremity was not present on the Dopplers on 11/8/2024.  He does have a likely subacute DVT in the left axillary vein by Doppler.  He has had  intermittent thrombocytopenia postoperatively as well.    -Tracheostomy without complications per vascular surgery.    -He remains critically ill.  Prognosis is very guarded at best.  He has been made DNR, although full support up until that point.    -I spoke with his daughter at bedside at length today, mainly about the impact of the right ventricular failure on his overall picture.    Antwan Farmer MD  11/22/24  15:12 EST

## 2024-11-22 NOTE — PROGRESS NOTES
"  POST-OPERATIVE NOTE     Chief Complaint: Respiratory failure  S/P: Tracheostomy  POD # 6    Subjective:  Symptoms:  Stable.    Diet:  NPO.      Sedated s/p bronchoscopy.   Appears comfortable, No distress.   Secretions from trache much improved per nursing.       Objective:  General Appearance:  Comfortable and in no acute distress.    Vital signs: (most recent): Blood pressure 99/40, pulse 107, temperature (!) 100.9 °F (38.3 °C), resp. rate 23, height 177 cm (69.69\"), weight (!) 153 kg (337 lb 4.9 oz), SpO2 97%.    Lungs:  Normal effort.  He is not in respiratory distress.  (Vent. )  Heart: Normal rate.    Chest: Symmetric chest wall expansion.   Skin:  Warm and dry.  (Small amount of thick clear/yellow secretions around trach.   Minimal superficial skin breakdown inferior aspect of stoma.  )          Tracheostomy in good position, no significant bleeding.      Results Review:     I reviewed the patient's new clinical results.  I reviewed the patient's new imaging results and agree with the interpretation.  I reviewed the patient's other test results and agree with the interpretation    Assessment & Plan   Mr. Alejandro is a pleasant 74-year-old gentleman who underwent tracheostomy on 11/16/2024 By Dr. Haylie Leija.     Secretions much improved per nursing. No issues with vent and trach seen in position on AM CXR. Blood cultures with candida. He has been started on Micafungin per ID.     Small amount of skin breakdown inferior aspect of stoma. Trach appears in good position. Secretions much improved. Continue routine trach care and change dressing frequently to keep surrounding skin dry as possible. paint peristomal area with betadine Q shift & PRN.     JAVI Reynoso  [facility]  11/22/24  12:52 EST    Patient was seen and assessed while wearing personal protective equipment including facemask, protective eyewear and gloves.  Hand hygiene performed prior to entering the room and upon " exiting with doffing of gloves.

## 2024-11-22 NOTE — ANESTHESIA PROCEDURE NOTES
Central Line      Patient reassessed immediately prior to procedure    Patient location during procedure: ICU  Indications: central pressure monitoring, vascular access and MD/Surgeon request  Staff  Anesthesiologist: Anuj Aguilar MD  Preanesthetic Checklist  Completed: patient identified, IV checked, site marked, risks and benefits discussed, surgical consent, monitors and equipment checked, pre-op evaluation and timeout performed  Central Line Prep  Sterile Tech:gloves, cap, gown, mask and sterile barriers  Patient monitoring: blood pressure monitoring, continuous pulse oximetry and EKG  Central Line Procedure  Laterality:right  Location:internal jugular  Catheter Type:quad lumen  Catheter Size:8.5 Fr  Guidance:ultrasound guided  PROCEDURE NOTE/ULTRASOUND INTERPRETATION.  Using ultrasound guidance the potential vascular sites for insertion of the catheter were visualized to determine the patency of the vessel to be used for vascular access.  After selecting the appropriate site for insertion, the needle was visualized under ultrasound being inserted into the internal jugular vein, followed by ultrasound confirmation of wire and catheter placement. There were no abnormalities seen on ultrasound; an image was taken; and the patient tolerated the procedure with no complications. Images: still images not obtained  Assessment  Post procedure:biopatch applied, line sutured and occlusive dressing applied  Assessement:blood return through all ports, free fluid flow and chest x-ray ordered  Complications:no  Patient Tolerance:patient tolerated the procedure well with no apparent complications  Additional Notes  ULTRASOUND INTERPRETATION. Using ultrasound guidance, a catheter was placed within in the appropriate vessel and advanced successfully. There were no abnormalities seen on ultrasound; the patient tolerated the procedure with no complications.

## 2024-11-22 NOTE — PROGRESS NOTES
" LOS: 30 days   Patient Care Team:  Juan Perez MD as PCP - General (Internal Medicine)    Chief Complaint:   Post-op follow-up, s/p homograft    Subjective      Vital Signs  Temp:  [100.2 °F (37.9 °C)-102.6 °F (39.2 °C)] 101.1 °F (38.4 °C)  Heart Rate:  [] 108  Resp:  [22-28] 26  BP: ()/(19-84) 127/62  Arterial Line BP: (102-155)/(16-48) 123/44  FiO2 (%):  [39 %-40 %] 40 %      11/21/24  0600 11/21/24  1407 11/22/24  0600   Weight: (!) 151 kg (334 lb) (!) 151 kg (332 lb 14.3 oz) (!) 153 kg (337 lb 4.9 oz)     Body mass index is 48.84 kg/m².    Intake/Output Summary (Last 24 hours) at 11/22/2024 0756  Last data filed at 11/22/2024 0700  Gross per 24 hour   Intake 3886.11 ml   Output 3753.8 ml   Net 132.31 ml     No intake/output data recorded.        Objective:  Vital signs: (most recent): Blood pressure 99/40, pulse 105, temperature (!) 101.1 °F (38.4 °C), resp. rate 24, height 177 cm (69.69\"), weight (!) 153 kg (337 lb 4.9 oz), SpO2 97%.                Physical Exam:   General Appearance: sedated but arousable, ill appearing, NAD   Lungs:  vent, ronchi throuhgout   Heart:  irreg irreg rhythm, tachy during exam   Abdomen: soft or nondistended, + bowel sounds    Skin: sternal incision clean, dry, intact   Neuro: alert and oriented, no focal deficits.     Results Review:      WBC WBC   Date Value Ref Range Status   11/22/2024 17.30 (H) 3.40 - 10.80 10*3/mm3 Final   11/21/2024 15.94 (H) 3.40 - 10.80 10*3/mm3 Final   11/20/2024 13.65 (H) 3.40 - 10.80 10*3/mm3 Final      HGB Hemoglobin   Date Value Ref Range Status   11/22/2024 9.8 (L) 13.0 - 17.7 g/dL Final   11/21/2024 8.5 (L) 13.0 - 17.7 g/dL Final   11/20/2024 8.2 (L) 13.0 - 17.7 g/dL Final   11/19/2024 8.0 (L) 13.0 - 17.7 g/dL Final      HCT Hematocrit   Date Value Ref Range Status   11/22/2024 29.8 (L) 37.5 - 51.0 % Final   11/21/2024 27.5 (L) 37.5 - 51.0 % Final   11/20/2024 25.4 (L) 37.5 - 51.0 % Final   11/19/2024 24.9 (L) 37.5 - 51.0 % " "Final      Platelets Platelets   Date Value Ref Range Status   11/22/2024 136 (L) 140 - 450 10*3/mm3 Final   11/21/2024 147 140 - 450 10*3/mm3 Final   11/20/2024 159 140 - 450 10*3/mm3 Final        PT/INR:  No results found for: \"PROTIME\"/No results found for: \"INR\"    Sodium Sodium   Date Value Ref Range Status   11/22/2024 135 (L) 136 - 145 mmol/L Final   11/21/2024 134 (L) 136 - 145 mmol/L Final   11/21/2024 132 (L) 136 - 145 mmol/L Final   11/21/2024 135 (L) 136 - 145 mmol/L Final   11/21/2024 137 136 - 145 mmol/L Final   11/20/2024 136 136 - 145 mmol/L Final   11/20/2024 137 136 - 145 mmol/L Final   11/20/2024 136 136 - 145 mmol/L Final   11/20/2024 137 136 - 145 mmol/L Final   11/20/2024 136 136 - 145 mmol/L Final   11/19/2024 136 136 - 145 mmol/L Final      Potassium Potassium   Date Value Ref Range Status   11/22/2024 3.8 3.5 - 5.2 mmol/L Final   11/21/2024 3.8 3.5 - 5.2 mmol/L Final   11/21/2024 4.0 3.5 - 5.2 mmol/L Final   11/21/2024 4.3 3.5 - 5.2 mmol/L Final   11/21/2024 3.8 3.5 - 5.2 mmol/L Final   11/20/2024 4.0 3.5 - 5.2 mmol/L Final   11/20/2024 4.3 3.5 - 5.2 mmol/L Final   11/20/2024 4.2 3.5 - 5.2 mmol/L Final   11/20/2024 3.9 3.5 - 5.2 mmol/L Final   11/20/2024 4.0 3.5 - 5.2 mmol/L Final   11/19/2024 4.0 3.5 - 5.2 mmol/L Final   11/19/2024 4.0 3.5 - 5.2 mmol/L Final   11/19/2024 4.0 3.5 - 5.2 mmol/L Final     Comment:     Slight hemolysis detected by analyzer. Result may be falsely elevated.      Chloride Chloride   Date Value Ref Range Status   11/22/2024 103 98 - 107 mmol/L Final   11/21/2024 103 98 - 107 mmol/L Final   11/21/2024 102 98 - 107 mmol/L Final   11/21/2024 103 98 - 107 mmol/L Final   11/21/2024 107 98 - 107 mmol/L Final   11/20/2024 103 98 - 107 mmol/L Final   11/20/2024 104 98 - 107 mmol/L Final   11/20/2024 106 98 - 107 mmol/L Final   11/20/2024 105 98 - 107 mmol/L Final   11/20/2024 105 98 - 107 mmol/L Final   11/19/2024 105 98 - 107 mmol/L Final      Bicarbonate CO2   Date Value " Ref Range Status   11/22/2024 19.6 (L) 22.0 - 29.0 mmol/L Final   11/21/2024 20.1 (L) 22.0 - 29.0 mmol/L Final   11/21/2024 19.4 (L) 22.0 - 29.0 mmol/L Final   11/21/2024 20.0 (L) 22.0 - 29.0 mmol/L Final   11/21/2024 18.6 (L) 22.0 - 29.0 mmol/L Final   11/20/2024 19.0 (L) 22.0 - 29.0 mmol/L Final   11/20/2024 18.0 (L) 22.0 - 29.0 mmol/L Final   11/20/2024 17.0 (L) 22.0 - 29.0 mmol/L Final   11/20/2024 16.3 (L) 22.0 - 29.0 mmol/L Final   11/20/2024 16.1 (L) 22.0 - 29.0 mmol/L Final   11/19/2024 14.3 (L) 22.0 - 29.0 mmol/L Final      BUN BUN   Date Value Ref Range Status   11/22/2024 21 8 - 23 mg/dL Final   11/21/2024 23 8 - 23 mg/dL Final   11/21/2024 26 (H) 8 - 23 mg/dL Final   11/21/2024 23 8 - 23 mg/dL Final   11/21/2024 26 (H) 8 - 23 mg/dL Final   11/20/2024 28 (H) 8 - 23 mg/dL Final   11/20/2024 35 (H) 8 - 23 mg/dL Final   11/20/2024 42 (H) 8 - 23 mg/dL Final   11/20/2024 48 (H) 8 - 23 mg/dL Final   11/20/2024 55 (H) 8 - 23 mg/dL Final   11/19/2024 70 (H) 8 - 23 mg/dL Final      Creatinine Creatinine   Date Value Ref Range Status   11/22/2024 0.84 0.76 - 1.27 mg/dL Final   11/21/2024 1.01 0.76 - 1.27 mg/dL Final   11/21/2024 1.16 0.76 - 1.27 mg/dL Final   11/21/2024 0.95 0.76 - 1.27 mg/dL Final   11/21/2024 0.86 0.76 - 1.27 mg/dL Final   11/20/2024 1.06 0.76 - 1.27 mg/dL Final   11/20/2024 1.22 0.76 - 1.27 mg/dL Final   11/20/2024 1.37 (H) 0.76 - 1.27 mg/dL Final   11/20/2024 1.66 (H) 0.76 - 1.27 mg/dL Final   11/20/2024 1.72 (H) 0.76 - 1.27 mg/dL Final   11/19/2024 2.41 (H) 0.76 - 1.27 mg/dL Final      Calcium Calcium   Date Value Ref Range Status   11/22/2024 7.9 (L) 8.6 - 10.5 mg/dL Final   11/21/2024 8.1 (L) 8.6 - 10.5 mg/dL Final   11/21/2024 7.8 (L) 8.6 - 10.5 mg/dL Final   11/21/2024 8.1 (L) 8.6 - 10.5 mg/dL Final   11/21/2024 7.4 (L) 8.6 - 10.5 mg/dL Final   11/20/2024 8.0 (L) 8.6 - 10.5 mg/dL Final   11/20/2024 8.1 (L) 8.6 - 10.5 mg/dL Final   11/20/2024 7.8 (L) 8.6 - 10.5 mg/dL Final   11/20/2024 7.7  (L) 8.6 - 10.5 mg/dL Final   11/20/2024 8.0 (L) 8.6 - 10.5 mg/dL Final   11/19/2024 8.5 (L) 8.6 - 10.5 mg/dL Final      Magnesium Magnesium   Date Value Ref Range Status   11/21/2024 2.5 (H) 1.6 - 2.4 mg/dL Final   11/21/2024 2.4 1.6 - 2.4 mg/dL Final   11/21/2024 2.8 (H) 1.6 - 2.4 mg/dL Final   11/20/2024 2.3 1.6 - 2.4 mg/dL Final   11/20/2024 2.3 1.6 - 2.4 mg/dL Final   11/20/2024 2.2 1.6 - 2.4 mg/dL Final   11/20/2024 2.2 1.6 - 2.4 mg/dL Final   11/19/2024 2.3 1.6 - 2.4 mg/dL Final        acetylcysteine, 3 mL, Nebulization, TID - RT  aspirin, 81 mg, Per G Tube, Daily  atorvastatin, 40 mg, Per G Tube, Nightly  busPIRone, 5 mg, Oral, TID  cefepime, 2,000 mg, Intravenous, Q8H  chlorhexidine, 15 mL, Mouth/Throat, Q12H  Ergocalciferol, 100 mcg, Per G Tube, Daily  heparin (porcine), 5,000 Units, Subcutaneous, Q8H  hydrocortisone-bacitracin-zinc oxide-nystatin, 1 Application, Topical, Q12H  insulin glargine, 20 Units, Subcutaneous, Daily  insulin regular, 2-7 Units, Subcutaneous, Q6H  ipratropium-albuterol, 3 mL, Nebulization, Q4H - RT  lansoprazole, 15 mg, Per G Tube, Q AM  [START ON 11/23/2024] micafungin (MYCAMINE) IV, 100 mg, Intravenous, Q24H  miconazole, 1 Application, Topical, Q12H  midodrine, 15 mg, Oral, TID AC  saccharomyces boulardii, 250 mg, Per G Tube, BID  sildenafil, 20 mg, Per G Tube, TID  sodium chloride, 10 mL, Intravenous, Q12H      amiodarone, 0.5 mg/min, Last Rate: 0.5 mg/min (11/21/24 2017)  dexmedetomidine, 0.2-1.5 mcg/kg/hr, Last Rate: Stopped (11/18/24 0400)  DOPamine, 2-20 mcg/kg/min  EPINEPHrine, 0.01 mcg/kg/min, Last Rate: Stopped (11/17/24 1051)  lidocaine in D5W, 1 mg/min, Last Rate: Stopped (11/05/24 1135)  milrinone, 0.25 mcg/kg/min, Last Rate: 0.125 mcg/kg/min (11/21/24 2017)  norepinephrine, 0.02-0.3 mcg/kg/min, Last Rate: Stopped (11/21/24 1825)  phenylephrine, 0.2-2 mcg/kg/min  Phoxillum BK4/2.5, 1,500 mL/hr, Last Rate: 1,500 mL/hr (11/19/24 1121)  Phoxillum BK4/2.5, 1,500 mL/hr, Last  Rate: 1,500 mL/hr (11/19/24 1121)  Phoxillum BK4/2.5, 1,500 mL/hr, Last Rate: 1,500 mL/hr (11/19/24 1121)  propofol, 5-50 mcg/kg/min, Last Rate: 15 mcg/kg/min (11/22/24 0030)  vasopressin, 0.02-0.1 Units/min, Last Rate: 0.05 Units/min (11/22/24 0030)          Prosthetic aortic valve stenosis    Essential hypertension    Permanent atrial fibrillation    S/P AVR    ICD (implantable cardioverter-defibrillator), dual, in situ    Achilles tendon rupture    Bacteremia    Stenosis of prosthetic aortic valve    Anemia      Assessment & Plan    - Prosthetic aortic valve stenosis, h/o AVR (tissue)/maze/DANICA ligation (2014)- s/p reoperative sternotomy AV root replacement 27mm cryopreserved homograft with right nuvia cabrol, AICD removal, IABP placement- [facility] 10/31/2024  - s/p Sternal closure ---11/2  - Possible endocarditis, likely need JOLIE   - Bacteremia, blood cultures positive strep mitis---on penicillin G  - Atrial fibrillation, unable to tolerate anticoagulation  - Hypertension  - NICM status post Medtronic AICD  - Anemia, s/p EGD/colonoscopy with esophagitis, polyp removal  - Right achilles tendon tear--- walking boot and PT per ortho at [place]  - morbid obesity  - Pre-diabetes -- improved at 5.3  - post op anemia- expected acute blood loss, stable  - TCP post-op, resolved  - respiratory failure--- s/p trach (Dr. Leija 11/16/2024), pulm following   - NATHANIEL--- nephrology following         POD #22  Continue supportive care.  Repeat blood cultures with candida; micafungin added.  Right IJ was dislodged this morning.  Will see if we can exchange this and his shiley with recent fungemia.   S/p trach. Vent per pulm.  CRRT on going.  Increased pressor support.  Echo yesterday still with RV dysfunction, looked worse.  Discussed with the daughter yesterday and this morning about prognosis and my concern if he was to code that CPR would be futile and I recommended for her to make him a DNR.  Plan to continue treatment but code status  has been updated to no CPR.  Will get a repeat echo tomorrow morning.  Continue supportive care.      Samantha Magana, JAVI  11/22/24  07:56 EST

## 2024-11-22 NOTE — DISCHARGE SUMMARY
[facility] Cardiac Surgery Discharge Summary    Date of Admission: 10/23/2024  Date of Discharge:  11/27/2024    Discharge Diagnosis:   - Prosthetic aortic valve stenosis, h/o AVR (tissue)/maze/DANICA ligation (2014)- s/p reoperative sternotomy AV root replacement 27mm cryopreserved homograft with right nuvia cabrol, AICD removal, IABP placement- [facility] 10/31/2024  - s/p Sternal closure ---11/2  - Bacteremia, blood cultures positive strep mitis---abx per ID  - Atrial fibrillation, unable to tolerate anticoagulation  - Hypertension  - NICM status post Medtronic AICD  - Anemia, s/p EGD/colonoscopy with esophagitis, polyp removal  - Right achilles tendon tear--- walking boot and PT per ortho at [place]  - morbid obesity  - Pre-diabetes -- improved at 5.3  - post op anemia- expected acute blood loss, stable  - TCP post-op, resolved  - respiratory failure--- s/p trach (Dr. Leija 11/16/2024), pulm following   - NATHANIEL--- nephrology following, CRRT started 11/19    Presenting Problem/History of Present Illness  Prosthetic aortic valve stenosis [T82.857A]   Patient is a 73 y.o. male with a past medical history including tissue aortic valve replacement/maze/left atrial appendage ligation (2014), NICM status post AICD placement, hypertension, and atrial fibrillation unable to tolerate anticoagulation. Presented to the [place] ED 1 month ago after having a pop in his ankle and severe pain. He was seen by orthopedics and diagnosed with a partial right Achilles tear. Unable to get MRI due to AICD incompatibility. Plan was for walking boot and outpatient follow-up. During this admission he was also found to have anemia and positive fecal occult stool. GI was consulted he underwent EGD and colonoscopy. The EGD showed esophagitis with bleeding in the lower third of esophagus and 2 polyps (tubular adenomas) were removed on colonoscopy. Due to his Achilles tear and limited mobility he was transferred to rehab on 10/4 for continued therapy.  During this time developed leukocytosis and cultures were drawn. Blood cultures came back with strep mitis. Started on IV antibiotics echocardiogram ordered. Echo showed elevated gradients on prosthetic aortic valve with concerns for severe stenosis and cardiology consulted. JOLIE not completed. He was transferred to [facility] for surgical evaluation. Of note, he reports prior to all this he had dental work and developed fever/chills 2 days later. He was treated with Levaquin and subsequently had the achilles tendon tear.     Hospital Course  Patient is a 74 y.o. male presented with above medical history and prosthetic valve endocarditis. He was transferred to [facility] for surgical evaluation. On 10/24/24 he had a lower extremity ultrasound that showed DVT and he was started on heparin. A JOLIE was performed on 10/26/24 that confirmed aortic valve vegetation and severe prosthetic valve stenosis. Cardiology was consulted to evaluate coronary anatomy. Coronary CT and stress test completed that showed normal perfusion with no evidence of reversible ischemia. After completing appropriate pre-op workup he was scheduled for re-op surgery. On 10/30/2024 he underwent redo sternotomy with extensive lysis of adhesions, Infected aortic valve prosthetic explant and debridement of periannular abscess, Aortic root replacement with a 27 mm cryopreserved homograft with coronary button reimplantation, Mitral valve repair with a 28 mm Medtronic flexible band annuloplasty, Removal of intracardiac pacing and defibrillator leads to the level of the mid superior vena cava, Explant of ICD pacer generator and proximal part of the leads and capping of leads, Percutaneous placement of left common femoral artery intra-aortic   balloon pump, Conversion of right coronary ostia direct implant to a vein bypass to the proximal RCA and pericardial patch angioplasty of coronary button opennng by Dr. Florian. Full details  can be found in operative report. The procedure was long and complicated due to disease process requiring multiple bypass runs. In the OR patient had severe RV dysfunction, moderate LV dysfunction, and severe coagulopathy requiring multiple blood products including factor VII. He was unable to tolerate chest closure and the sternum was left open. He was transferred to CVR in critical condition. For the next couple of days he continued to require a lot of support including IABP, inhaled primacor, epi, levo, vaso, primacor, jett, lidocaine, amio, and nitric. He had quite a bit of arrhythmias during the immediate post-op course including runs of vtach.  On POD3 he was taken back to the OR by Dr. Florian for sternal wound/washout and sternal closure. He required intermittent transfusions for anemia. He was also transfused platelets prior to IABP removal on POD4. He was in atrial fibrillation pre-op and this persisted post-operatively. On POD5 chest tubes were removed. Over the next several days pressor/inotropic were slowly weaned. Pulm followed for ventilator management. He underwent bronch on 11/2 which found thick secretions.  He was significantly volume over loaded and was eventually placed on Bumex drip by nephrology.  Infectious disease continue to follow for treatment of endocarditis, possible PNA, intermittent fevers, and other infectious concerns due to critical illness. He had expected post-op perioperative anemia as well as TCP and persistent leukocytosis. Heme/onc consulted to rule out possible myeloproliferative/lymphoproliferative disorder. He had multiple transthoracic echos that showed some improvement in LV function but persistent moderate to severe RV dysfunction. He was unable to be weaned from th ventilator and tracheostomy was performed by thoracic surgery on 118/16/2024. He remained volume overloaded and was no longer responding to diuretics. CRRT was started per nephrology on 11/19/2024. On POD21 he  spiked a fever to 102.6 repeat blood cultures and chest CT were completed.  Repeat blood cultures were positive with candida and micafungin added per ID.  Unfortunately despite all the support and care his prognosis was poor.  On post operative day 28 the family made the difficult decision to make him comfort care.  Palliative care team was consulted and helped facilitate his care.  He passed at 14:09 11/27/2024.    Procedures Performed  Procedure(s):  Operative Note - Dr. Florian  Date of Dictation: 10/31/24  Date of Procedure: 10/30/2024  Referring Physician: Parker Newman MD     Preoperative diagnosis:   1.  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  Streptococcus mitis bacteremia  3.  Moderate to severe mitral regurgitation  4.  Status post pacemakers-ICD placement 11 years ago  5.  LV dysfunction  6.  Morbid obesity with a BMI of 44.7 kg/m2  7.  Persistent atrial fibrillation  8.  Chronic anticoagulation   Postoperative diagnosis:   Same  Procedure:   1.  Redo sternotomy with extensive lysis of adhesions  2.  Infected aortic valve prosthetic explant and debridement of periannular abscess  3.  Aortic root replacement with a 27 mm cryopreserved homograft with coronary button reimplantation  4.  Mitral valve repair with a 28 mm Medtronic flexible band annuloplasty  5.  Removal of intracardiac pacing and defibrillator leads to the level of the mid superior vena cava  6.  Explant of ICD pacer generator and proximal part of the leads and capping of leads  7.  Percutaneous placement of left common femoral artery intra-aortic   balloon pump  8.  Conversion of right coronary ostia direct implant to a vein bypass to the proximal RCA and pericardial patch angioplasty of coronary button opening    Operative Note - Dr. Florian   Date of Dictation: 11/02/24   Date of Procedure: Same   Referring Physician: Parker Newman MD   Preoperative diagnosis:   1.  Status post reoperative proximal aortic  replacement with a homograft, mitral valve repair and partial ICD lead placement and generator removal with developed RV dysfunction post surgery.  2.  Status post partial closure of the chest due to hemodynamic instability  3.  Slow overall hemodynamic improvement   Postoperative diagnosis:   Same   Procedure:   1.  Sternal wound exploration with washout  2.  Sternal wound requiring and closure    Bronchoscopy Procedure Note - Dr. Clement     Procedure:  Bronchoscopy, Diagnostic  Bronchoscopy, Therapeutic     Pre-Operative Diagnosis:hypoxia, high vent requirement, elevated peak pressures     Post-Operative Diagnosis: Same     Indication:high vent requirement, elevated peak pressures       TRACHEOSTOMY  Procedure Report- Dr. Leija     Patient Name:  Woodrow Alejandro  YOB: 1950     Date of Surgery:  11/16/2024     Indications: Respiratory failure status post AVR     Pre-op Diagnosis:   S/P AVR [Z95.2]  Respiratory failure       Post-Op Diagnosis Codes:     * S/P AVR [Z95.2]  Respiratory failure     Procedure/CPT® Codes:        Procedure(s):  TRACHEOSTOMY, 8 Shiley XLT    Consults:   Consults       Date and Time Order Name Status Description    11/13/2024  8:24 AM Inpatient Thoracic Surgery Consult Completed     10/31/2024  5:49 PM Inpatient Pulmonology Consult      10/31/2024  7:56 AM Inpatient Nephrology Consult Completed     10/24/2024  9:41 AM Inpatient Cardiology Consult Completed     10/23/2024  2:47 PM Inpatient Infectious Diseases Consult Completed     9/26/2024 11:37 AM Inpatient Orthopedic Surgery Consult Completed         Thoracic - Dr. Leija  Pulmonology - Dr. Leslie  Nephrology - Dr. Verde  Cardiology - Dr. Garcia  ID - Dr. Diaz      Pertinent Test Results:    Lab Results   Component Value Date    WBC 17.30 (H) 11/22/2024    HGB 9.8 (L) 11/22/2024    HCT 29.8 (L) 11/22/2024    MCV 92.3 11/22/2024     (L) 11/22/2024      Lab Results   Component Value Date    GLUCOSE 130 (H)  2024    CALCIUM 8.0 (L) 2024     (L) 2024    K 3.9 2024    CO2 20.2 (L) 2024     2024    BUN 20 2024    CREATININE 0.94 2024    BCR 21.3 2024    ANIONGAP 10.8 2024     Lab Results   Component Value Date    INR 1.38 (H) 10/31/2024    PROTIME 17.2 (H) 10/31/2024         Condition on Discharge:       Vital Signs  Temp:  [100.2 °F (37.9 °C)-101.5 °F (38.6 °C)] 100.9 °F (38.3 °C)  Heart Rate:  [] 107  Resp:  [21-28] 23  BP: ()/(19-84) 99/40  Arterial Line BP: ()/(16-48) 91/37  FiO2 (%):  [39 %-40 %] 40 %      Discharge Disposition      Discharge Medications     Discharge Medications        ASK your doctor about these medications        Instructions Start Date   aspirin 325 MG tablet   325 mg, 2 times daily      dilTIAZem 120 MG tablet  Commonly known as: CARDIZEM   120 mg, Oral, 2 Times Daily      furosemide 40 MG tablet  Commonly known as: LASIX   1 tablet, Daily      lisinopril 10 MG tablet  Commonly known as: PRINIVIL,ZESTRIL   10 mg, Oral, Daily      Metoprolol Tartrate 75 MG tablet   75 mg, Oral, 2 Times Daily      pantoprazole 40 MG EC tablet  Commonly known as: PROTONIX   40 mg, Oral, 2 Times Daily Before Meals      penicillin g  Ask about: Should I take this medication?   4 Million Units, Intravenous, Every 4 Hours Scheduled               Discharge Diet:     Activity at Discharge:         Follow-up Appointments:  Future Appointments   Date Time Provider Department Center   2024 10:15 AM [place] Perez [place] [facility] [place] [facility]   2025 11:30 AM Parker Newman [place] [facility] [place]   2025 10:00 [place] [facility] [facility] [place] [facility]     Additional Instructions for the Follow-ups that You Need to Schedule       Ambulatory Referral to Cardiac Rehab   As directed                Test Results Pending at Discharge:    Pending Labs       Order Current Status    AFB Culture - Tissue, Aortic  valve Preliminary result    Blood Culture - Blood, Blood, Central Line Preliminary result    Blood Culture - Blood, Hand, Right Preliminary result    Fungus Culture - Tissue, Aortic valve Preliminary result    Respiratory Culture - Aspirate, ET Suction Preliminary result                   Electronically signed by JAVI Nichole, 11/27/24, 2:33 PM EST.

## 2024-11-22 NOTE — PLAN OF CARE
Goal Outcome Evaluation:           Progress: no change  Pt's code status has been changed to DNR per pt's daughter. Pt remains on CRRT over night; fluid removal rate decreased due to increased ectopy and runs of vfib, discussed with nephrology. Pottasium and calcium replaced per nephrology. Sedation vacation performed and patient unable to follow commands. Micafungin started for positive blood cultures. Trach care done per wound care orders. Care ongoing.

## 2024-11-23 NOTE — PROGRESS NOTES
Consult Daily Progress Note  [facility]   11/23/24      Patient Name:  Woodrow Alejandro  MRN:  [id number]   YOB: 1950  Age: 74 y.o.  Sex: male  LOS: 31    Reason for Consult:  Respiratory failure    Hospital Course:   74-year-old male with history of aortic valve replacement with prosthetic aortic valve, maze, left atrial appendage ligation in 2014 now status post reoperative sternotomy with aortic valve replacement, AICD removal, intra-aortic balloon pump placement (10/31/2024) and underwent sternal closure (11/2).  Hospital course has been complicated by respiratory failure with inability to liberate from the ventilator and tracheostomy (11/16/2024).  Also found to have Candida fungemia and on CRRT for renal failure.    Interval History:  Fever curve is improved  Net -1 L over the past 24 hours  CRRT yesterday with 4 L removed  Leukocytosis stable  Trached on ventilator  Sedated, unresponsive on evaluation    Physical Exam:  Vitals:    11/23/24 0700   BP: 127/47   Pulse: 94   Resp: 20   Temp: 99.5 °F (37.5 °C)   SpO2: 97%       Intake/Output    Intake/Output Summary (Last 24 hours) at 11/23/2024 0838  Last data filed at 11/23/2024 0800  Gross per 24 hour   Intake 3168.07 ml   Output 4214.1 ml   Net -1046.03 ml       General: Sedated, unresponsive  HEENT: NC/AT, EOMI, MMM  Neck: Supple, trached  Cardiac: RRR, no murmur, gallops, rubs  Pulmonary: Coarse bilaterally  GI: Soft, non-tender, non-distended, normal bowel sounds  Extremities: Warm, well perfused, no LE edema  Skin: no visible rash  Neuro: CN II - XII grossly intact    Data Review:  Results from last 7 days   Lab Units 11/23/24  0524 11/22/24  0526 11/21/24  0416 11/20/24  0349 11/19/24  2204 11/19/24  0429 11/18/24  0528 11/18/24  0335 11/17/24  0318   WBC 10*3/mm3 16.42* 17.30* 15.94* 13.65*  --  16.38*  --  18.69* 17.25*   HEMOGLOBIN g/dL 9.2* 9.8* 8.5* 8.2* 8.0* 8.4* 7.8* 7.6* 8.9*   PLATELETS 10*3/mm3 111* 136* 147  159  --  156  --  165 207     Results from last 7 days   Lab Units 11/23/24  0524 11/23/24  0047 11/22/24  1638 11/22/24  0815 11/22/24  0526 11/21/24  2320 11/21/24  1507 11/21/24  0815 11/21/24  0416 11/20/24  2339   SODIUM mmol/L 138 138 137 135* 135* 134* 132* 135*   < > 136   POTASSIUM mmol/L 4.0 4.1 4.1 3.9 3.8 3.8 4.0 4.3   < > 4.0   CHLORIDE mmol/L 103 104 103 104 103 103 102 103   < > 103   CO2 mmol/L 21.8* 21.0* 19.0* 20.2* 19.6* 20.1* 19.4* 20.0*   < > 19.0*   BUN mg/dL 17 18 18 20 21 23 26* 23   < > 28*   CREATININE mg/dL 0.76 0.78 0.84 0.94 0.84 1.01 1.16 0.95   < > 1.06   GLUCOSE mg/dL 127* 127* 122* 130* 140* 142* 160* 134*   < > 136*   CALCIUM mg/dL 7.9* 7.7* 8.1* 8.0* 7.9* 8.1* 7.8* 8.1*   < > 8.0*   MAGNESIUM mg/dL  --  2.4 2.6* 2.5*  --  2.5* 2.4 2.8*  --  2.3   PHOSPHORUS mg/dL  --  3.6 3.8 3.4  --  3.4 3.6 3.1  --  2.9    < > = values in this interval not displayed.   Estimated Creatinine Clearance: 125.4 mL/min (by C-G formula based on SCr of 0.76 mg/dL).    Results from last 7 days   Lab Units 11/23/24 0524 11/22/24  0526 11/21/24 0416 11/18/24  0335 11/17/24  1300   AST (SGOT) U/L 131* 71* 24   < >  --    ALT (SGPT) U/L 52* 29 15   < >  --    PROCALCITONIN ng/mL  --   --  1.17*  --   --    LACTATE mmol/L  --   --   --   --  1.3   PLATELETS 10*3/mm3 111* 136* 147   < >  --     < > = values in this interval not displayed.       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 (10/31/2024), AICD removal, intra-aortic balloon pump placement  Status post prosthetic aortic valve, left atrial appendage  ligation in 2014  Strep mitis bacteremia, 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  -Remains on mechanical ventilation, pressure support of 20 was weaned to 16.  Tidal volumes were previously 700-800, will aim for tidal volumes closer to 500.  Continue PEEP of 10 and FiO2 of 40%.  ABG stable post changes.  -Patient's compliance on ventilator is surprisingly decent at approximately 40, will monitor  -Remains on multiple infusions and with poor mental status precluding attempts to wean from the ventilator at this time.  Additionally due to his prolonged course, will likely need slow weaning.  -Candida fungemia suspected secondary to left internal jugular central line.  On micafungin for Candida and cefepime for strep mitis endocarditis (EOT 12/4)  -Vasopressor and inotropic support for cardiogenic and suspected septic shock  -Respiratory cultures from bronchoscopy negative  -On sildenafil for any hypertension as per cardiothoracic surgery  -Nephrology following for renal failure, on CRRT  -No anticoagulation for DVT at this time as per cardiothoracic surgery  -Prognosis is guarded for this patient due to multiorgan failure.  Is now DNR however continues on full support as per family    All issues new to me today.  Prior hospital course, labs and imaging reviewed.    Thank you for allowing us to participate in this patients care. Pulmonary will continue to follow.     Justus  MD Raeann  [facility]  Pulmonary and Critical Care Medicine, Interventional Pulmonology    Parts of this note may be an electronic transcription/translation of spoken language to printed text using the Dragon dictation system.

## 2024-11-23 NOTE — PLAN OF CARE
Goal Outcome Evaluation:           Progress: no change  No significant changes over night. Remains on CRRT. Tube feeds changed to Peptamen Intense VHP. Drips are unchanged. Wound care completed per wound care orders. Care ongoing.

## 2024-11-23 NOTE — PROGRESS NOTES
LOS: 31 days     Chief Complaint: Endocarditis    Interval History: Febrile but fever curve seems to be trending down.  Patient remains on CRRT.  Remains on multiple pressors.  Stable ventilatory setting with an FiO2 of 40% and PEEP of 10.    ROS: unable to be obtained     Vital Signs  Temp:  [99.1 °F (37.3 °C)-101.1 °F (38.4 °C)] 99.5 °F (37.5 °C)  Heart Rate:  [] 102  Resp:  [20-26] 20  BP: (108-138)/(40-63) 119/52  Arterial Line BP: ()/(37-59) 102/48  FiO2 (%):  [40 %] 40 %    Physical Exam:  General: Ill-appearing  HEENT: Tracheostomy present.  NG tube in place.  Respiratory: Coarse bilateral breath sounds   : Ugarte catheter  Skin: Tracheostomy site clean and intact.  Access: Left groin HD line.  Arterial line.  Peripheral line.    Antibiotics:  Cefepime 2 g IV every 8 hours  Micafungin 200 mg IV every 24-hour     Results Review:    Lab Results   Component Value Date    WBC 16.42 (H) 11/23/2024    HGB 9.2 (L) 11/23/2024    HCT 28.4 (L) 11/23/2024    MCV 91.9 11/23/2024     (L) 11/23/2024     Lab Results   Component Value Date    GLUCOSE 123 (H) 11/23/2024    BUN 18 11/23/2024    CREATININE 0.69 (L) 11/23/2024    BCR 26.1 (H) 11/23/2024    CO2 20.0 (L) 11/23/2024    CALCIUM 8.0 (L) 11/23/2024    ALBUMIN 2.4 (L) 11/23/2024    LABIL2 1.4 06/27/2019     (H) 11/23/2024    ALT 52 (H) 11/23/2024       Microbiology:  10/3 COVID-negative  10/10 COVID-negative  10/14 COVID-negative  10/15 blood cultures 2 out of 2 strep mitis  10/17 COVID-negative  10/17 blood cultures 2 out of 2 strep mitis  10/21 COVID-negative  10/23 blood cultures no growth today  10/30 operative cultures from the aortic valve no growth   11/9 respiratory culture no growth  11/9 catheter tip culture no growth to date  11/10 catheter tip culture no growth to date  11/12 blood cultures no growth to date  11/12 respiratory culture no growth to date  11/13 respiratory culture no growth  11/13 genital culture of the penis  Candida albicans    11/22 BCx P x 2  11/21 SCx NGTD  11/21 BCx Candida albicans x2    Assessment    #Candidemia  #Strep mitis endocarditis  #Status post bioprosthetic aortic valve replacement 2014  #Status post aortic root replacement in the setting of prosthetic aortic valve endocarditis and annular abscess on 10/30  #Status post removal of pacemaker generator and intracardiac portion of the leads with retained venous leads  #Atrial fibrillation  #Achilles tendon rupture   #NATHANIEL now on CRRT  #Status post tracheostomy 11/16    Hopefully fever is trending down.  White blood cell count trending down.  Repeat blood cultures have been obtained and are currently pending.  Continue micafungin but decrease the dose to 100 mg IV every 24 hours.  Continue cefepime 2 g IV every 8 hours.  Given new development of candidemia prognosis is extremely poor.

## 2024-11-23 NOTE — PROGRESS NOTES
" LOS: 31 days   Patient Care Team:  Juan Perez MD as PCP - General (Internal Medicine)    Chief Complaint:   Post-op follow-up, s/p homograft    Subjective  Patient resting in bed this morning, family present    Vital Signs  Temp:  [99.1 °F (37.3 °C)-101.5 °F (38.6 °C)] 99.5 °F (37.5 °C)  Heart Rate:  [] 94  Resp:  [20-28] 20  BP: ()/(40-67) 127/47  Arterial Line BP: ()/(34-59) 112/49  FiO2 (%):  [40 %] 40 %      11/21/24  1407 11/22/24  0600 11/23/24  0600   Weight: (!) 151 kg (332 lb 14.3 oz) (!) 153 kg (337 lb 4.9 oz) (!) 151 kg (333 lb 5.4 oz)     Body mass index is 48.26 kg/m².    Intake/Output Summary (Last 24 hours) at 11/23/2024 0743  Last data filed at 11/23/2024 0700  Gross per 24 hour   Intake 3166.26 ml   Output 4230.8 ml   Net -1064.54 ml     No intake/output data recorded.        Objective:  General Appearance:  Ill-appearing.    Vital signs: (most recent): Blood pressure 114/50, pulse 111, temperature 99.5 °F (37.5 °C), temperature source Bladder, resp. rate 28, height 175.3 cm (69\"), weight (!) 151 kg (333 lb), SpO2 97%.  No fever.    Lungs:  Normal effort and normal respiratory rate.  There are rales and wheezes.    Heart: Tachycardia.  Regular rhythm.    Abdomen: Bowel sounds are normal.     Extremities: There is dependent edema.    Skin:  Warm and dry.  There is ulceration.                  Results Review:      WBC WBC   Date Value Ref Range Status   11/23/2024 16.42 (H) 3.40 - 10.80 10*3/mm3 Final   11/22/2024 17.30 (H) 3.40 - 10.80 10*3/mm3 Final   11/21/2024 15.94 (H) 3.40 - 10.80 10*3/mm3 Final      HGB Hemoglobin   Date Value Ref Range Status   11/23/2024 9.2 (L) 13.0 - 17.7 g/dL Final   11/22/2024 9.8 (L) 13.0 - 17.7 g/dL Final   11/21/2024 8.5 (L) 13.0 - 17.7 g/dL Final      HCT Hematocrit   Date Value Ref Range Status   11/23/2024 28.4 (L) 37.5 - 51.0 % Final   11/22/2024 29.8 (L) 37.5 - 51.0 % Final   11/21/2024 27.5 (L) 37.5 - 51.0 % Final      Platelets " "Platelets   Date Value Ref Range Status   11/23/2024 111 (L) 140 - 450 10*3/mm3 Final   11/22/2024 136 (L) 140 - 450 10*3/mm3 Final   11/21/2024 147 140 - 450 10*3/mm3 Final        PT/INR:  No results found for: \"PROTIME\"/No results found for: \"INR\"    Sodium Sodium   Date Value Ref Range Status   11/23/2024 138 136 - 145 mmol/L Final   11/23/2024 138 136 - 145 mmol/L Final   11/22/2024 137 136 - 145 mmol/L Final   11/22/2024 135 (L) 136 - 145 mmol/L Final   11/22/2024 135 (L) 136 - 145 mmol/L Final   11/21/2024 134 (L) 136 - 145 mmol/L Final   11/21/2024 132 (L) 136 - 145 mmol/L Final   11/21/2024 135 (L) 136 - 145 mmol/L Final   11/21/2024 137 136 - 145 mmol/L Final   11/20/2024 136 136 - 145 mmol/L Final   11/20/2024 137 136 - 145 mmol/L Final   11/20/2024 136 136 - 145 mmol/L Final      Potassium Potassium   Date Value Ref Range Status   11/23/2024 4.0 3.5 - 5.2 mmol/L Final   11/23/2024 4.1 3.5 - 5.2 mmol/L Final   11/22/2024 4.1 3.5 - 5.2 mmol/L Final     Comment:     Slight hemolysis detected by analyzer. Result may be falsely elevated.   11/22/2024 3.9 3.5 - 5.2 mmol/L Final   11/22/2024 3.8 3.5 - 5.2 mmol/L Final   11/21/2024 3.8 3.5 - 5.2 mmol/L Final   11/21/2024 4.0 3.5 - 5.2 mmol/L Final   11/21/2024 4.3 3.5 - 5.2 mmol/L Final   11/21/2024 3.8 3.5 - 5.2 mmol/L Final   11/20/2024 4.0 3.5 - 5.2 mmol/L Final   11/20/2024 4.3 3.5 - 5.2 mmol/L Final   11/20/2024 4.2 3.5 - 5.2 mmol/L Final      Chloride Chloride   Date Value Ref Range Status   11/23/2024 103 98 - 107 mmol/L Final   11/23/2024 104 98 - 107 mmol/L Final   11/22/2024 103 98 - 107 mmol/L Final   11/22/2024 104 98 - 107 mmol/L Final   11/22/2024 103 98 - 107 mmol/L Final   11/21/2024 103 98 - 107 mmol/L Final   11/21/2024 102 98 - 107 mmol/L Final   11/21/2024 103 98 - 107 mmol/L Final   11/21/2024 107 98 - 107 mmol/L Final   11/20/2024 103 98 - 107 mmol/L Final   11/20/2024 104 98 - 107 mmol/L Final   11/20/2024 106 98 - 107 mmol/L Final    "   Bicarbonate CO2   Date Value Ref Range Status   11/23/2024 21.8 (L) 22.0 - 29.0 mmol/L Final   11/23/2024 21.0 (L) 22.0 - 29.0 mmol/L Final   11/22/2024 19.0 (L) 22.0 - 29.0 mmol/L Final   11/22/2024 20.2 (L) 22.0 - 29.0 mmol/L Final   11/22/2024 19.6 (L) 22.0 - 29.0 mmol/L Final   11/21/2024 20.1 (L) 22.0 - 29.0 mmol/L Final   11/21/2024 19.4 (L) 22.0 - 29.0 mmol/L Final   11/21/2024 20.0 (L) 22.0 - 29.0 mmol/L Final   11/21/2024 18.6 (L) 22.0 - 29.0 mmol/L Final   11/20/2024 19.0 (L) 22.0 - 29.0 mmol/L Final   11/20/2024 18.0 (L) 22.0 - 29.0 mmol/L Final   11/20/2024 17.0 (L) 22.0 - 29.0 mmol/L Final      BUN BUN   Date Value Ref Range Status   11/23/2024 17 8 - 23 mg/dL Final   11/23/2024 18 8 - 23 mg/dL Final   11/22/2024 18 8 - 23 mg/dL Final   11/22/2024 20 8 - 23 mg/dL Final   11/22/2024 21 8 - 23 mg/dL Final   11/21/2024 23 8 - 23 mg/dL Final   11/21/2024 26 (H) 8 - 23 mg/dL Final   11/21/2024 23 8 - 23 mg/dL Final   11/21/2024 26 (H) 8 - 23 mg/dL Final   11/20/2024 28 (H) 8 - 23 mg/dL Final   11/20/2024 35 (H) 8 - 23 mg/dL Final   11/20/2024 42 (H) 8 - 23 mg/dL Final      Creatinine Creatinine   Date Value Ref Range Status   11/23/2024 0.76 0.76 - 1.27 mg/dL Final   11/23/2024 0.78 0.76 - 1.27 mg/dL Final   11/22/2024 0.84 0.76 - 1.27 mg/dL Final   11/22/2024 0.94 0.76 - 1.27 mg/dL Final   11/22/2024 0.84 0.76 - 1.27 mg/dL Final   11/21/2024 1.01 0.76 - 1.27 mg/dL Final   11/21/2024 1.16 0.76 - 1.27 mg/dL Final   11/21/2024 0.95 0.76 - 1.27 mg/dL Final   11/21/2024 0.86 0.76 - 1.27 mg/dL Final   11/20/2024 1.06 0.76 - 1.27 mg/dL Final   11/20/2024 1.22 0.76 - 1.27 mg/dL Final   11/20/2024 1.37 (H) 0.76 - 1.27 mg/dL Final      Calcium Calcium   Date Value Ref Range Status   11/23/2024 7.9 (L) 8.6 - 10.5 mg/dL Final   11/23/2024 7.7 (L) 8.6 - 10.5 mg/dL Final   11/22/2024 8.1 (L) 8.6 - 10.5 mg/dL Final   11/22/2024 8.0 (L) 8.6 - 10.5 mg/dL Final   11/22/2024 7.9 (L) 8.6 - 10.5 mg/dL Final   11/21/2024 8.1  (L) 8.6 - 10.5 mg/dL Final   11/21/2024 7.8 (L) 8.6 - 10.5 mg/dL Final   11/21/2024 8.1 (L) 8.6 - 10.5 mg/dL Final   11/21/2024 7.4 (L) 8.6 - 10.5 mg/dL Final   11/20/2024 8.0 (L) 8.6 - 10.5 mg/dL Final   11/20/2024 8.1 (L) 8.6 - 10.5 mg/dL Final   11/20/2024 7.8 (L) 8.6 - 10.5 mg/dL Final      Magnesium Magnesium   Date Value Ref Range Status   11/23/2024 2.4 1.6 - 2.4 mg/dL Final   11/22/2024 2.6 (H) 1.6 - 2.4 mg/dL Final   11/22/2024 2.5 (H) 1.6 - 2.4 mg/dL Final   11/21/2024 2.5 (H) 1.6 - 2.4 mg/dL Final   11/21/2024 2.4 1.6 - 2.4 mg/dL Final   11/21/2024 2.8 (H) 1.6 - 2.4 mg/dL Final   11/20/2024 2.3 1.6 - 2.4 mg/dL Final   11/20/2024 2.3 1.6 - 2.4 mg/dL Final   11/20/2024 2.2 1.6 - 2.4 mg/dL Final        acetylcysteine, 3 mL, Nebulization, TID - RT  aspirin, 81 mg, Per G Tube, Daily  atorvastatin, 40 mg, Per G Tube, Nightly  busPIRone, 5 mg, Oral, TID  cefepime, 2,000 mg, Intravenous, Q8H  chlorhexidine, 15 mL, Mouth/Throat, Q12H  Ergocalciferol, 100 mcg, Per G Tube, Daily  heparin (porcine), 5,000 Units, Subcutaneous, Q8H  hydrocortisone-bacitracin-zinc oxide-nystatin, 1 Application, Topical, Q12H  insulin glargine, 20 Units, Subcutaneous, Daily  insulin regular, 2-7 Units, Subcutaneous, Q6H  ipratropium-albuterol, 3 mL, Nebulization, Q4H - RT  lansoprazole, 15 mg, Per G Tube, Q AM  micafungin (MYCAMINE) IV, 200 mg, Intravenous, Q24H  miconazole, 1 Application, Topical, Q12H  midodrine, 15 mg, Oral, TID AC  saccharomyces boulardii, 250 mg, Per G Tube, BID  sildenafil, 20 mg, Per G Tube, TID  sodium chloride, 10 mL, Intravenous, Q12H      amiodarone, 0.5 mg/min, Last Rate: 0.5 mg/min (11/22/24 2039)  dexmedetomidine, 0.2-1.5 mcg/kg/hr, Last Rate: Stopped (11/18/24 0400)  DOPamine, 2-20 mcg/kg/min  EPINEPHrine, 0.01 mcg/kg/min, Last Rate: Stopped (11/17/24 1051)  lidocaine in D5W, 1 mg/min, Last Rate: Stopped (11/05/24 1135)  milrinone, 0.25 mcg/kg/min, Last Rate: 0.125 mcg/kg/min (11/23/24  4433)  norepinephrine, 0.02-0.3 mcg/kg/min, Last Rate: 0.02 mcg/kg/min (11/22/24 2030)  phenylephrine, 0.2-2 mcg/kg/min  Phoxillum BK4/2.5, 1,500 mL/hr, Last Rate: 1,500 mL/hr (11/19/24 1121)  Phoxillum BK4/2.5, 1,500 mL/hr, Last Rate: 1,500 mL/hr (11/19/24 1121)  Phoxillum BK4/2.5, 1,500 mL/hr, Last Rate: 1,500 mL/hr (11/19/24 1121)  propofol, 5-50 mcg/kg/min, Last Rate: 10 mcg/kg/min (11/23/24 0049)  vasopressin, 0.02-0.1 Units/min, Last Rate: 0.06 Units/min (11/23/24 0453)          Prosthetic aortic valve stenosis    Essential hypertension    Permanent atrial fibrillation    S/P AVR    ICD (implantable cardioverter-defibrillator), dual, in situ    Achilles tendon rupture    Bacteremia    Stenosis of prosthetic aortic valve    Anemia      Assessment & Plan    - Prosthetic aortic valve stenosis, h/o AVR (tissue)/maze/DANICA ligation (2014)- s/p reoperative sternotomy AV root replacement 27mm cryopreserved homograft with right nuvia cabrol, AICD removal, IABP placement- [facility] [place] 10/31/2024  - S/p Sternal closure ---11/2  - Possible endocarditis, likely need JOLIE   - Bacteremia, blood cultures positive strep mitis---on penicillin G  - Atrial fibrillation, unable to tolerate anticoagulation  - Hypertension  - NICM status post [employer] AICD  - Anemia, s/p EGD/colonoscopy with esophagitis, polyp removal  - Right achilles tendon tear--- walking boot and PT per ortho at [facility]  - Morbid obesity  - Pre-diabetes -- improved at 5.3  - Post op anemia- expected acute blood loss, stable  - TCP post-op, resolved  - Respiratory failure--- s/p trach (Dr. Leija 11/16/2024), pulm following   - NATHANIEL--- nephrology following     POD24:     Repeat blood cultures with candida, micafungin added, ID following  Right IJ was dislodged yesterday, anesthesia placed new line   S/p trach, Vent per pulmonary   CRRT ongoing, Still requiring pressor/inotropic support  Echo this morning still with severe RV dysfunction  Plan to continue treatment but code  status has been updated to no CPR  Continue supportive care    Bryant Mcclure PA-C  11/23/24  07:43 EST

## 2024-11-23 NOTE — PLAN OF CARE
Goal Outcome Evaluation:         Continues on CRRT. Remains on amio, vaso, milrinone, prop, and levo off and on. New central line place. Repeat blood cultures. Trach care done multiple times d/t soiled dressings d/t drainage from trach- thorasic sx saw trach site. Daughter at bedside today. Care on going

## 2024-11-23 NOTE — PROGRESS NOTES
[facility] Progress Note      Patient Name: Woodrow Alejandro  : 1950  MRN: [id number]  Primary Care Physician:  Juan Perez MD  Date of admission: 10/23/2024    Subjective     Interval History:   Follow-up acute kidney injury and volume  Patient seen during CRRT this morning.  On Levophed, vasopressin, amiodarone and milrinone drip  Fluid being removed gently with CRRT, monitoring blood pressure closely    Review of Systems:   Not obtainable    Objective     Vitals:   Temp:  [99.1 °F (37.3 °C)-101.1 °F (38.4 °C)] 99.3 °F (37.4 °C)  Heart Rate:  [] 100  Resp:  [20-28] 28  BP: (108-138)/(40-63) 119/52  Arterial Line BP: ()/(38-59) 57/38  FiO2 (%):  [40 %] 40 %    Intake/Output Summary (Last 24 hours) at 2024 1148  Last data filed at 2024 1000  Gross per 24 hour   Intake 3231.12 ml   Output 4122.9 ml   Net -891.78 ml       Physical Exam:    General Appearance: On the ventilator, sedated chronically ill morbidly obese commands  Skin: warm and dry  HEENT: Oral mucosa is dry  Neck: Mild JVD, he has a tracheostomy  Lungs: Bilateral rhonchi, breathing effort not labored  Heart: Irregularly irregular.  No rub  Abdomen: soft, no guarding, distended.  Body wall edema.  Normoactive bowel  : Ugarte catheter  Extremities: 4+ upper and lower extremity with hip and thigh edema, he had femoral temporary dialysis catheter.  Upper extremity edema      Scheduled Meds:     acetylcysteine, 3 mL, Nebulization, TID - RT  aspirin, 81 mg, Per G Tube, Daily  atorvastatin, 40 mg, Per G Tube, Nightly  busPIRone, 5 mg, Oral, TID  cefepime, 2,000 mg, Intravenous, Q8H  chlorhexidine, 15 mL, Mouth/Throat, Q12H  Ergocalciferol, 100 mcg, Per G Tube, Daily  heparin (porcine), 5,000 Units, Subcutaneous, Q8H  hydrocortisone-bacitracin-zinc oxide-nystatin, 1 Application, Topical, Q12H  insulin glargine, 20 Units, Subcutaneous, Daily  insulin regular, 2-7 Units, Subcutaneous,  Q6H  ipratropium-albuterol, 3 mL, Nebulization, Q4H - RT  lansoprazole, 15 mg, Per G Tube, Q AM  [START ON 11/24/2024] micafungin (MYCAMINE) IV, 100 mg, Intravenous, Q24H  miconazole, 1 Application, Topical, Q12H  midodrine, 15 mg, Oral, TID AC  saccharomyces boulardii, 250 mg, Per G Tube, BID  sildenafil, 20 mg, Per G Tube, TID  sodium chloride, 10 mL, Intravenous, Q12H      IV Meds:   amiodarone, 0.5 mg/min, Last Rate: 0.5 mg/min (11/22/24 2039)  dexmedetomidine, 0.2-1.5 mcg/kg/hr, Last Rate: Stopped (11/18/24 0400)  DOPamine, 2-20 mcg/kg/min  EPINEPHrine, 0.01 mcg/kg/min, Last Rate: Stopped (11/17/24 1051)  lidocaine in D5W, 1 mg/min, Last Rate: Stopped (11/05/24 1135)  milrinone, 0.25 mcg/kg/min, Last Rate: 0.125 mcg/kg/min (11/23/24 0453)  norepinephrine, 0.02-0.3 mcg/kg/min, Last Rate: 0.02 mcg/kg/min (11/22/24 2030)  phenylephrine, 0.2-2 mcg/kg/min  Phoxillum BK4/2.5, 1,500 mL/hr, Last Rate: 1,500 mL/hr (11/19/24 1121)  Phoxillum BK4/2.5, 1,500 mL/hr, Last Rate: 1,500 mL/hr (11/19/24 1121)  Phoxillum BK4/2.5, 1,500 mL/hr, Last Rate: 1,500 mL/hr (11/19/24 1121)  propofol, 5-50 mcg/kg/min, Last Rate: 10 mcg/kg/min (11/23/24 1145)  vasopressin, 0.02-0.1 Units/min, Last Rate: 0.06 Units/min (11/23/24 1025)        Results Reviewed:   I have personally reviewed the results from the time of this admission to 11/23/2024 11:48 EST     Results from last 7 days   Lab Units 11/23/24  0756 11/23/24  0524 11/23/24  0047 11/22/24  0815 11/22/24  0526 11/21/24  0815 11/21/24  0416   SODIUM mmol/L 140 138 138   < > 135*   < > 137   POTASSIUM mmol/L 4.1 4.0 4.1   < > 3.8   < > 3.8   CHLORIDE mmol/L 105 103 104   < > 103   < > 107   CO2 mmol/L 20.0* 21.8* 21.0*   < > 19.6*   < > 18.6*   BUN mg/dL 18 17 18   < > 21   < > 26*   CREATININE mg/dL 0.69* 0.76 0.78   < > 0.84   < > 0.86   CALCIUM mg/dL 8.0* 7.9* 7.7*   < > 7.9*   < > 7.4*   BILIRUBIN mg/dL  --  1.5*  --   --  1.1  --  0.9   ALK PHOS U/L  --  162*  --   --  136*  --   115   ALT (SGPT) U/L  --  52*  --   --  29  --  15   AST (SGOT) U/L  --  131*  --   --  71*  --  24   GLUCOSE mg/dL 123* 127* 127*   < > 140*   < > 139*    < > = values in this interval not displayed.       Estimated Creatinine Clearance: 136.8 mL/min (A) (by C-G formula based on SCr of 0.69 mg/dL (L)).    Results from last 7 days   Lab Units 11/23/24  0756 11/23/24  0047 11/22/24  1638   MAGNESIUM mg/dL 2.4 2.4 2.6*   PHOSPHORUS mg/dL 3.7 3.6 3.8       Results from last 7 days   Lab Units 11/19/24  0429 11/18/24  0335   URIC ACID mg/dL 10.3* 9.5*       Results from last 7 days   Lab Units 11/23/24  0524 11/22/24  0526 11/21/24  0416 11/20/24  0349 11/19/24  2204 11/19/24  0429   WBC 10*3/mm3 16.42* 17.30* 15.94* 13.65*  --  16.38*   HEMOGLOBIN g/dL 9.2* 9.8* 8.5* 8.2* 8.0* 8.4*   PLATELETS 10*3/mm3 111* 136* 147 159  --  156             Assessment / Plan     ASSESSMENT:  Acute kidney injury, oliguric, currently on CRRT, his chemistry is much improved with CRRT, he remains hypervolemic   the patient had severe right-sided heart failure and probably increase enteral abdominal venous pressure leading to decreased GFR  Mixed metabolic acidosis and respiratory  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  Status post redo sternotomy AV root replacement, mitral valve repair, AICD removal intra-aortic balloon placement 10/31/2024.  Sternal closure 11/ 2.  3.  Bioprosthetic aortic valve endocarditis, bacteremia with strep mitis on vancomycin and cefepime.    Dr. Diaz following.  Currently A-fib  4.  Shock , remains pressor dependent.  5.  Anemia status post EGD 9/30/2024  And colonoscopy with esophagitis and polyp removal.  Hemoglobin today 9.8.  6.  Acute respiratory failure postoperatively on the ventilator.    7.  Atrial fibrillation.  With controlled rate  8.  Moderate to severe mitral regurgitation.  Severe RV enlargement and dysfunction postoperatively.  PLAN:  Continue  CRRT  Cautious fluid removal with dialysis.  Low blood pressure has been limiting factor.  On multiple vasopressors.  Surveillance labs    Copied text in this note has been reviewed and is accurate as of 11/23/24.     Thank you for involving us in the care of Woodrow Alejandro.  Please feel free to call with any questions.    Kvng Jacobo MD  11/23/24  11:48 [facility]    [facility]  [phone]    Please note that portions of this note were completed with a voice recognition program.

## 2024-11-24 NOTE — PROGRESS NOTES
Consult Daily Progress Note  [facility]   11/24/24      Patient Name:  Woodrow Alejandro  MRN:  [id number]   YOB: 1950  Age: 74 y.o.  Sex: male  LOS: 32    Reason for Consult:  Respiratory failure    Hospital Course:   74-year-old male with history of aortic valve replacement with prosthetic aortic valve, maze, left atrial appendage ligation in 2014 now status post reoperative sternotomy with aortic valve replacement, AICD removal, intra-aortic balloon pump placement (10/31/2024) and underwent sternal closure (11/2).  Hospital course has been complicated by respiratory failure with inability to liberate from the ventilator and tracheostomy (11/16/2024).  Also found to have Candida fungemia and on CRRT for renal failure.    Interval History:  Resolved fevers  CRRT with 5 L fluid removal over the past 24 hours  Off vasopressor support  Remains on milrinone  Intubated, sedated and unable to provide history    Physical Exam:  Vitals:    11/24/24 0700   BP: 135/49   Pulse: 110   Resp:    Temp: 99.5 °F (37.5 °C)   SpO2: 96%       Intake/Output    Intake/Output Summary (Last 24 hours) at 11/24/2024 0759  Last data filed at 11/24/2024 0700  Gross per 24 hour   Intake 3712.44 ml   Output 5367.8 ml   Net -1655.36 ml       General: Sedated, unresponsive  HEENT: NC/AT, EOMI, MMM  Neck: Supple, trached  Cardiac: RRR, no murmur, gallops, rubs  Pulmonary: Coarse bilaterally  GI: Soft, non-tender, non-distended, normal bowel sounds  Extremities: Warm, well perfused, no LE edema  Skin: no visible rash  Neuro: CN II - XII grossly intact    Data Review:  Results from last 7 days   Lab Units 11/24/24  0505 11/23/24  0524 11/22/24  0526 11/21/24  0416 11/20/24  0349 11/19/24  2204 11/19/24  0429 11/18/24  0528 11/18/24  0335   WBC 10*3/mm3 15.06* 16.42* 17.30* 15.94* 13.65*  --  16.38*  --  18.69*   HEMOGLOBIN g/dL 8.8* 9.2* 9.8* 8.5* 8.2* 8.0* 8.4*   < > 7.6*   PLATELETS 10*3/mm3 108* 111* 136* 147  159  --  156  --  165    < > = values in this interval not displayed.     Results from last 7 days   Lab Units 11/24/24  0505 11/24/24  0007 11/23/24  1755 11/23/24  0756 11/23/24  0524 11/23/24  0047 11/22/24  1638 11/22/24  0815 11/22/24  0526 11/21/24  2320   SODIUM mmol/L 137 137 137 140 138 138 137 135*   < > 134*   POTASSIUM mmol/L 4.2 4.3 4.2 4.1 4.0 4.1 4.1 3.9   < > 3.8   CHLORIDE mmol/L 103 102 103 105 103 104 103 104   < > 103   CO2 mmol/L 20.0* 20.0* 20.0* 20.0* 21.8* 21.0* 19.0* 20.2*   < > 20.1*   BUN mg/dL 18 19 18 18 17 18 18 20   < > 23   CREATININE mg/dL 0.63* 0.79 0.70* 0.69* 0.76 0.78 0.84 0.94   < > 1.01   GLUCOSE mg/dL 109* 111* 114* 123* 127* 127* 122* 130*   < > 142*   CALCIUM mg/dL 7.8* 8.0* 7.8* 8.0* 7.9* 7.7* 8.1* 8.0*   < > 8.1*   MAGNESIUM mg/dL  --  2.4 2.4 2.4  --  2.4 2.6* 2.5*  --  2.5*   PHOSPHORUS mg/dL 3.9 3.8 3.7 3.7  --  3.6 3.8 3.4  --  3.4    < > = values in this interval not displayed.   Estimated Creatinine Clearance: 147 mL/min (A) (by C-G formula based on SCr of 0.63 mg/dL (L)).    Results from last 7 days   Lab Units 11/24/24  0505 11/23/24  0524 11/22/24  0526 11/21/24  0416 11/18/24  0335 11/17/24  1300   AST (SGOT) U/L  --  131* 71* 24   < >  --    ALT (SGPT) U/L  --  52* 29 15   < >  --    PROCALCITONIN ng/mL  --   --   --  1.17*  --   --    LACTATE mmol/L  --   --   --   --   --  1.3   PLATELETS 10*3/mm3 108* 111* 136* 147   < >  --     < > = values in this interval not displayed.       Results from last 7 days   Lab Units 11/23/24  1004 11/22/24  1212 11/20/24  0702 11/18/24  0745 11/17/24  1249   PH, ARTERIAL pH units 7.464* 7.462* 7.461* 7.372 7.386   PCO2, ARTERIAL mm Hg 32.1* 31.4* 22.9* 23.7* 25.6*   PO2 ART mm Hg 100.0 86.9 110.1* 83.6 95.9   HCO3 ART mmol/L 23.1 22.5 16.3* 13.8* 15.4*         Imaging:  Reviewed chest images personally from past 3 days    ASSESSMENT  /  PLAN:    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  (10/31/2024), AICD removal, intra-aortic balloon pump placement  Status post prosthetic aortic valve, left atrial appendage ligation in 2014  Strep mitis bacteremia, 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  -Remains on mechanical ventilation, pressure support of 16 with tidal volumes of approximately 500.  Continue PEEP of 10 and FiO2 of 40%.  ABG stable post changes.  -Patient's compliance on ventilator is surprisingly decent at approximately 40, will monitor  -Weaned off vasopressin and Levophed.  Remains on milrinone for inotropic support.  -Candida fungemia suspected secondary to left internal jugular central line.  On micafungin for Candida and cefepime for strep mitis endocarditis (EOT 12/4)  -Respiratory cultures from bronchoscopy negative  -On sildenafil for any hypertension as per cardiothoracic surgery  -Nephrology following for renal failure, on CRRT  -No anticoagulation for DVT at this time as per cardiothoracic surgery  -Prognosis is guarded for this patient due to multiorgan failure.  Is now DNR however continues on full support as per family    Thank you for allowing us to participate in this patients care. Pulmonary will continue to follow.     Justus Cary MD  [facility]  Pulmonary and Critical Care Medicine, Interventional Pulmonology    Parts of this note may be an electronic transcription/translation of spoken language to printed text  using the Dragon dictation system.

## 2024-11-24 NOTE — PROGRESS NOTES
" LOS: 32 days   Patient Care Team:  Juan Perez MD as PCP - General (Internal Medicine)    Chief Complaint:   Post-op follow-up, s/p homograft    Subjective  Patient resting in bed this morning, family present    Vital Signs  Temp:  [98.8 °F (37.1 °C)-99.9 °F (37.7 °C)] 99.5 °F (37.5 °C)  Heart Rate:  [] 110  Resp:  [27-33] 31  BP: ()/(34-72) 135/49  Arterial Line BP: ()/(38-61) 63/54  FiO2 (%):  [40 %] 40 %      11/23/24  0600 11/23/24  1111 11/24/24  0413   Weight: (!) 151 kg (333 lb 5.4 oz) (!) 151 kg (333 lb) (!) 147 kg (324 lb 15.3 oz)     Body mass index is 47.99 kg/m².    Intake/Output Summary (Last 24 hours) at 11/24/2024 0726  Last data filed at 11/24/2024 0700  Gross per 24 hour   Intake 3712.44 ml   Output 5367.8 ml   Net -1655.36 ml     No intake/output data recorded.        Objective:  General Appearance:  Ill-appearing.    Vital signs: (most recent): Blood pressure 107/49, pulse 103, temperature 99.7 °F (37.6 °C), resp. rate 28, height 175.3 cm (69\"), weight (!) 147 kg (324 lb 15.3 oz), SpO2 96%.  No fever.    Lungs:  Normal effort and normal respiratory rate.  There are rales and wheezes.    Heart: Tachycardia.  Regular rhythm.    Abdomen: Bowel sounds are normal.     Extremities: There is dependent edema.    Skin:  Warm and dry.  There is ulceration.                  Results Review:      WBC WBC   Date Value Ref Range Status   11/24/2024 15.06 (H) 3.40 - 10.80 10*3/mm3 Final   11/23/2024 16.42 (H) 3.40 - 10.80 10*3/mm3 Final   11/22/2024 17.30 (H) 3.40 - 10.80 10*3/mm3 Final      HGB Hemoglobin   Date Value Ref Range Status   11/24/2024 8.8 (L) 13.0 - 17.7 g/dL Final   11/23/2024 9.2 (L) 13.0 - 17.7 g/dL Final   11/22/2024 9.8 (L) 13.0 - 17.7 g/dL Final      HCT Hematocrit   Date Value Ref Range Status   11/24/2024 26.7 (L) 37.5 - 51.0 % Final   11/23/2024 28.4 (L) 37.5 - 51.0 % Final   11/22/2024 29.8 (L) 37.5 - 51.0 % Final      Platelets Platelets   Date Value Ref " "Range Status   11/24/2024 108 (L) 140 - 450 10*3/mm3 Final   11/23/2024 111 (L) 140 - 450 10*3/mm3 Final   11/22/2024 136 (L) 140 - 450 10*3/mm3 Final        PT/INR:  No results found for: \"PROTIME\"/No results found for: \"INR\"    Sodium Sodium   Date Value Ref Range Status   11/24/2024 137 136 - 145 mmol/L Final   11/24/2024 137 136 - 145 mmol/L Final   11/23/2024 137 136 - 145 mmol/L Final   11/23/2024 140 136 - 145 mmol/L Final   11/23/2024 138 136 - 145 mmol/L Final   11/23/2024 138 136 - 145 mmol/L Final   11/22/2024 137 136 - 145 mmol/L Final   11/22/2024 135 (L) 136 - 145 mmol/L Final   11/22/2024 135 (L) 136 - 145 mmol/L Final   11/21/2024 134 (L) 136 - 145 mmol/L Final   11/21/2024 132 (L) 136 - 145 mmol/L Final   11/21/2024 135 (L) 136 - 145 mmol/L Final      Potassium Potassium   Date Value Ref Range Status   11/24/2024 4.2 3.5 - 5.2 mmol/L Final   11/24/2024 4.3 3.5 - 5.2 mmol/L Final   11/23/2024 4.2 3.5 - 5.2 mmol/L Final   11/23/2024 4.1 3.5 - 5.2 mmol/L Final   11/23/2024 4.0 3.5 - 5.2 mmol/L Final   11/23/2024 4.1 3.5 - 5.2 mmol/L Final   11/22/2024 4.1 3.5 - 5.2 mmol/L Final     Comment:     Slight hemolysis detected by analyzer. Result may be falsely elevated.   11/22/2024 3.9 3.5 - 5.2 mmol/L Final   11/22/2024 3.8 3.5 - 5.2 mmol/L Final   11/21/2024 3.8 3.5 - 5.2 mmol/L Final   11/21/2024 4.0 3.5 - 5.2 mmol/L Final   11/21/2024 4.3 3.5 - 5.2 mmol/L Final      Chloride Chloride   Date Value Ref Range Status   11/24/2024 103 98 - 107 mmol/L Final   11/24/2024 102 98 - 107 mmol/L Final   11/23/2024 103 98 - 107 mmol/L Final   11/23/2024 105 98 - 107 mmol/L Final   11/23/2024 103 98 - 107 mmol/L Final   11/23/2024 104 98 - 107 mmol/L Final   11/22/2024 103 98 - 107 mmol/L Final   11/22/2024 104 98 - 107 mmol/L Final   11/22/2024 103 98 - 107 mmol/L Final   11/21/2024 103 98 - 107 mmol/L Final   11/21/2024 102 98 - 107 mmol/L Final   11/21/2024 103 98 - 107 mmol/L Final      Bicarbonate CO2   Date " Value Ref Range Status   11/24/2024 20.0 (L) 22.0 - 29.0 mmol/L Final   11/24/2024 20.0 (L) 22.0 - 29.0 mmol/L Final   11/23/2024 20.0 (L) 22.0 - 29.0 mmol/L Final   11/23/2024 20.0 (L) 22.0 - 29.0 mmol/L Final   11/23/2024 21.8 (L) 22.0 - 29.0 mmol/L Final   11/23/2024 21.0 (L) 22.0 - 29.0 mmol/L Final   11/22/2024 19.0 (L) 22.0 - 29.0 mmol/L Final   11/22/2024 20.2 (L) 22.0 - 29.0 mmol/L Final   11/22/2024 19.6 (L) 22.0 - 29.0 mmol/L Final   11/21/2024 20.1 (L) 22.0 - 29.0 mmol/L Final   11/21/2024 19.4 (L) 22.0 - 29.0 mmol/L Final   11/21/2024 20.0 (L) 22.0 - 29.0 mmol/L Final      BUN BUN   Date Value Ref Range Status   11/24/2024 18 8 - 23 mg/dL Final   11/24/2024 19 8 - 23 mg/dL Final   11/23/2024 18 8 - 23 mg/dL Final   11/23/2024 18 8 - 23 mg/dL Final   11/23/2024 17 8 - 23 mg/dL Final   11/23/2024 18 8 - 23 mg/dL Final   11/22/2024 18 8 - 23 mg/dL Final   11/22/2024 20 8 - 23 mg/dL Final   11/22/2024 21 8 - 23 mg/dL Final   11/21/2024 23 8 - 23 mg/dL Final   11/21/2024 26 (H) 8 - 23 mg/dL Final   11/21/2024 23 8 - 23 mg/dL Final      Creatinine Creatinine   Date Value Ref Range Status   11/24/2024 0.63 (L) 0.76 - 1.27 mg/dL Final   11/24/2024 0.79 0.76 - 1.27 mg/dL Final   11/23/2024 0.70 (L) 0.76 - 1.27 mg/dL Final   11/23/2024 0.69 (L) 0.76 - 1.27 mg/dL Final   11/23/2024 0.76 0.76 - 1.27 mg/dL Final   11/23/2024 0.78 0.76 - 1.27 mg/dL Final   11/22/2024 0.84 0.76 - 1.27 mg/dL Final   11/22/2024 0.94 0.76 - 1.27 mg/dL Final   11/22/2024 0.84 0.76 - 1.27 mg/dL Final   11/21/2024 1.01 0.76 - 1.27 mg/dL Final   11/21/2024 1.16 0.76 - 1.27 mg/dL Final   11/21/2024 0.95 0.76 - 1.27 mg/dL Final      Calcium Calcium   Date Value Ref Range Status   11/24/2024 7.8 (L) 8.6 - 10.5 mg/dL Final   11/24/2024 8.0 (L) 8.6 - 10.5 mg/dL Final   11/23/2024 7.8 (L) 8.6 - 10.5 mg/dL Final   11/23/2024 8.0 (L) 8.6 - 10.5 mg/dL Final   11/23/2024 7.9 (L) 8.6 - 10.5 mg/dL Final   11/23/2024 7.7 (L) 8.6 - 10.5 mg/dL Final    11/22/2024 8.1 (L) 8.6 - 10.5 mg/dL Final   11/22/2024 8.0 (L) 8.6 - 10.5 mg/dL Final   11/22/2024 7.9 (L) 8.6 - 10.5 mg/dL Final   11/21/2024 8.1 (L) 8.6 - 10.5 mg/dL Final   11/21/2024 7.8 (L) 8.6 - 10.5 mg/dL Final   11/21/2024 8.1 (L) 8.6 - 10.5 mg/dL Final      Magnesium Magnesium   Date Value Ref Range Status   11/24/2024 2.4 1.6 - 2.4 mg/dL Final   11/23/2024 2.4 1.6 - 2.4 mg/dL Final   11/23/2024 2.4 1.6 - 2.4 mg/dL Final   11/23/2024 2.4 1.6 - 2.4 mg/dL Final   11/22/2024 2.6 (H) 1.6 - 2.4 mg/dL Final   11/22/2024 2.5 (H) 1.6 - 2.4 mg/dL Final   11/21/2024 2.5 (H) 1.6 - 2.4 mg/dL Final   11/21/2024 2.4 1.6 - 2.4 mg/dL Final   11/21/2024 2.8 (H) 1.6 - 2.4 mg/dL Final        acetylcysteine, 3 mL, Nebulization, TID - RT  aspirin, 81 mg, Per G Tube, Daily  atorvastatin, 40 mg, Per G Tube, Nightly  busPIRone, 5 mg, Oral, TID  cefepime, 2,000 mg, Intravenous, Q8H  chlorhexidine, 15 mL, Mouth/Throat, Q12H  Ergocalciferol, 100 mcg, Per G Tube, Daily  heparin (porcine), 5,000 Units, Subcutaneous, Q8H  hydrocortisone-bacitracin-zinc oxide-nystatin, 1 Application, Topical, Q12H  insulin glargine, 20 Units, Subcutaneous, Daily  insulin regular, 2-7 Units, Subcutaneous, Q6H  ipratropium-albuterol, 3 mL, Nebulization, Q4H - RT  lansoprazole, 15 mg, Per G Tube, Q AM  micafungin (MYCAMINE) IV, 100 mg, Intravenous, Q24H  miconazole, 1 Application, Topical, Q12H  midodrine, 15 mg, Oral, Q8H  saccharomyces boulardii, 250 mg, Per G Tube, BID  sildenafil, 20 mg, Per G Tube, TID  sodium chloride, 10 mL, Intravenous, Q12H      amiodarone, 0.5 mg/min, Last Rate: 0.5 mg/min (11/23/24 2046)  dexmedetomidine, 0.2-1.5 mcg/kg/hr, Last Rate: Stopped (11/18/24 0400)  DOPamine, 2-20 mcg/kg/min  EPINEPHrine, 0.01 mcg/kg/min, Last Rate: Stopped (11/17/24 1051)  lidocaine in D5W, 1 mg/min, Last Rate: Stopped (11/05/24 1135)  milrinone, 0.25 mcg/kg/min, Last Rate: 0.125 mcg/kg/min (11/23/24 2046)  norepinephrine, 0.02-0.3 mcg/kg/min, Last  Rate: Stopped (11/23/24 2205)  phenylephrine, 0.2-2 mcg/kg/min  Phoxillum BK4/2.5, 1,500 mL/hr, Last Rate: 1,500 mL/hr (11/24/24 0516)  Phoxillum BK4/2.5, 1,500 mL/hr, Last Rate: 1,500 mL/hr (11/24/24 0516)  Phoxillum BK4/2.5, 1,500 mL/hr, Last Rate: 1,500 mL/hr (11/24/24 0516)  propofol, 5-50 mcg/kg/min, Last Rate: 15 mcg/kg/min (11/24/24 0411)  vasopressin, 0.02-0.1 Units/min, Last Rate: Stopped (11/24/24 0631)          Prosthetic aortic valve stenosis    Essential hypertension    Permanent atrial fibrillation    S/P AVR    ICD (implantable cardioverter-defibrillator), dual, in situ    Achilles tendon rupture    Bacteremia    Stenosis of prosthetic aortic valve    Anemia      Assessment & Plan    - Prosthetic aortic valve stenosis, h/o AVR (tissue)/maze/DANICA ligation (2014)- s/p reoperative sternotomy AV root replacement 27mm cryopreserved homograft with right nuvia cabrol, AICD removal, IABP placement- [facility] 10/31/2024  - S/p Sternal closure ---11/2  - Possible endocarditis, likely need JOLIE   - Bacteremia, blood cultures positive strep mitis---on penicillin G  - Atrial fibrillation, unable to tolerate anticoagulation  - Hypertension  - NICM status post [employer] AICD  - Anemia, s/p EGD/colonoscopy with esophagitis, polyp removal  - Right achilles tendon tear--- walking boot and PT per ortho at [facility]  - Morbid obesity  - Pre-diabetes -- improved at 5.3  - Post op anemia- expected acute blood loss, stable  - TCP post-op, resolved  - Respiratory failure--- s/p trach (Dr. Leija 11/16/2024), pulm following   - NATHANIEL--- nephrology following     POD25:    Blood cultures with candida 11/21, bld cultures collected again on 11/22 currently NTD -- continues on micafungin, ID following  S/p trach, Vent per pulmonary   CRRT ongoing, Nephrology following   Still requiring pressor/inotropic support,currently on vaso 0.03 and 0.25 milrinone   Repeat echo tomorrow morning   Plan to continue treatment but code status has been updated  to no CPR  Continue supportive care    Bryant Mcclure PA-C  11/24/24  07:26 EST

## 2024-11-24 NOTE — PROGRESS NOTES
LOS: 32 days   Patient Care Team:  Juan Perez MD as PCP - General (Internal Medicine)    Chief Complaint: Follow-up bioprosthetic AVR endocarditis, mitral regurgitation, persistent atrial fibrillation.    Interval History: Remains critically ill, still requiring multiple pressors.  Repeat echo yesterday again showed severely reduced RV function in addition to severe TR.    Vital Signs:  Temp:  [98.8 °F (37.1 °C)-99.9 °F (37.7 °C)] 99.3 °F (37.4 °C)  Heart Rate:  [] 112  Resp:  [29-33] 29  BP: ()/(33-72) 90/36  Arterial Line BP: (63-90)/(46-61) 63/54  FiO2 (%):  [40 %] 40 %    Intake/Output Summary (Last 24 hours) at 11/24/2024 1142  Last data filed at 11/24/2024 1000  Gross per 24 hour   Intake 3367.14 ml   Output 5360.1 ml   Net -1992.96 ml       Physical Exam:   General Appearance:     Status post tracheostomy and on ventilator.     Lungs:     Rhonchi bilaterally anteriorly.    Heart:    Irregularly irregular rhythm with a tachycardic rate. II/VI SM throughout.   Abdomen:     Soft, nontender, nondistended.    Extremities:    2+ generalized edema and anasarca.     Results Review:    Results from last 7 days   Lab Units 11/24/24  0812   SODIUM mmol/L 137   POTASSIUM mmol/L 4.3   CHLORIDE mmol/L 103   CO2 mmol/L 21.0*   BUN mg/dL 19   CREATININE mg/dL 0.64*   GLUCOSE mg/dL 100*   CALCIUM mg/dL 7.8*         Results from last 7 days   Lab Units 11/24/24  0505   WBC 10*3/mm3 15.06*   HEMOGLOBIN g/dL 8.8*   HEMATOCRIT % 26.7*   PLATELETS 10*3/mm3 108*                 Results from last 7 days   Lab Units 11/24/24  0812   MAGNESIUM mg/dL 2.5*               I reviewed the patient's new clinical results.        Assessment:  1.  Status post bioprosthetic aortic valve replacement in 2014  2.  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  Moderate to severe mitral regurgitation  4.   Status post reoperative AV root replacement, mitral valve repair, ICD removal, SVG-RCA, and IABP placement on 10/30/2021  5.  Postoperative shock, multifactorial  6.  Acute kidney injury  7.  History of nonischemic cardiomyopathy with recovered ejection fraction  8.  Anemia, acute on chronic  9.  Postoperative thrombocytopenia  10.  Persistent atrial fibrillation  11.  Ventricular tachycardia postoperatively  12.  Grade C esophagitis by EGD on 9/30/2024  13.  Acute left lower extremity DVT on 10/24/2024.  Resolved on Dopplers on 11/8/2024  14.  Right Achilles tendon tear  15.  Postoperative junctional rhythm  16.  Hypoalbuminemia  17.  Thrombocytopenia  18.  Severe right ventricular enlargement and dysfunction post-op  19.  Fever noted on 11/8/2024  20.  Subacute DVT in the left axillary vein by Doppler on 11/10/2024  21.  Right groin hematoma versus abscess  22.  Status post tracheostomy on 11/16/2024  23.  Candida albicans fungemia    Plan:    -Persistent infection with now evidence of candidemia in addition to initial strep mitis endocarditis. Micafungin started.  Also on cefepime.    -Continue CRRT and volume removal per nephrology (ongoing for several days, minimal urine output).    -Remains on Levophed, vasopressin, and milrinone.  On midodrine 15 mg 3 times a day.    - Currently on Revatio 20 mg every 8 hours.  Right ventricular dysfunction remains a major issue. Repeat echo yesterday again showed severely reduced RV function in addition to severe TR.    -Atrial fibrillation rate is mainly in the lower 100s.  Really, the only option he has currently is amiodarone for rate control. Continue amiodarone drip at 0.5 mg/min with intermittent digoxin.    -No heparin for now per cardiovascular surgery.  The DVT in his left lower extremity was not present on the Dopplers on 11/8/2024.  He does have a likely subacute DVT in the left axillary vein by Doppler.  He has had intermittent thrombocytopenia postoperatively as  well.    -Tracheostomy without complications per vascular surgery.    -He remains critically ill.  Poor prognosis, impression discussed with family including daughter at bedside.  Defer discussion of ultimate goals of care including possible comfort care to CV surgery and family.    High risk due to age, frailty, critical illness with cardiogenic shock and severe RV dysfunction in the setting of recent aortic and mitral valve surgery for endocarditis in the setting of multiple comorbodities predisposing to major adverse events including myocardial infarction, stroke, hospitalization, and multi-agent drug therapy requiring intensive monitoring for toxicity    Noah Barnett MD  11/24/24  11:42 EST

## 2024-11-24 NOTE — PLAN OF CARE
Goal Outcome Evaluation:   Continues on CRRT, levophed off, vaso weaned down, continues on milrinone, amio and propofol. Pain medicine given prn per MAR, will continue with plan of care.

## 2024-11-24 NOTE — PROGRESS NOTES
[facility] Progress Note      Patient Name: Woodrow Alejandro  : 1950  MRN: [id number]  Primary Care Physician:  Juan Perez MD  Date of admission: 10/23/2024    Subjective     Interval History:   Follow-up acute kidney injury and volume    Patient seen during CRRT this morning.  Tolerating okay  Fluid removal around net 100 cc/h  Only on vasopressin drip for blood pressure support this morning  Family present at bedside      Review of Systems:   Not obtainable    Objective     Vitals:   Temp:  [98.8 °F (37.1 °C)-99.9 °F (37.7 °C)] 99.3 °F (37.4 °C)  Heart Rate:  [] 112  Resp:  [29-33] 29  BP: ()/(33-72) 90/36  Arterial Line BP: (63-84)/(49-61) 63/54  FiO2 (%):  [40 %] 40 %    Intake/Output Summary (Last 24 hours) at 2024 1204  Last data filed at 2024 1000  Gross per 24 hour   Intake 3204.34 ml   Output 5141.5 ml   Net -1937.16 ml       Physical Exam:    General Appearance: On the ventilator, sedated chronically ill morbidly obese commands  Skin: warm and dry  HEENT: Oral mucosa is dry  Neck: Mild JVD, he has a tracheostomy  Lungs: Bilateral rhonchi, breathing effort not labored  Heart: Irregularly irregular.  No rub  Abdomen: soft, no guarding, distended.  Body wall edema.  Normoactive bowel  : Ugarte catheter  Extremities: 4+ upper and lower extremity with hip and thigh edema, he had femoral temporary dialysis catheter.  Upper extremity edema      Scheduled Meds:     acetylcysteine, 3 mL, Nebulization, TID - RT  aspirin, 81 mg, Per G Tube, Daily  atorvastatin, 40 mg, Per G Tube, Nightly  busPIRone, 5 mg, Oral, TID  cefepime, 2,000 mg, Intravenous, Q8H  chlorhexidine, 15 mL, Mouth/Throat, Q12H  Ergocalciferol, 100 mcg, Per G Tube, Daily  heparin (porcine), 5,000 Units, Subcutaneous, Q8H  hydrocortisone-bacitracin-zinc oxide-nystatin, 1 Application, Topical, Q12H  insulin glargine, 20 Units, Subcutaneous, Daily  insulin regular, 2-7  Units, Subcutaneous, Q6H  ipratropium-albuterol, 3 mL, Nebulization, Q4H - RT  lansoprazole, 15 mg, Per G Tube, Q AM  micafungin (MYCAMINE) IV, 100 mg, Intravenous, Q24H  miconazole, 1 Application, Topical, Q12H  midodrine, 15 mg, Oral, Q8H  saccharomyces boulardii, 250 mg, Per G Tube, BID  sildenafil, 20 mg, Per G Tube, TID  sodium chloride, 10 mL, Intravenous, Q12H      IV Meds:   amiodarone, 0.5 mg/min, Last Rate: 0.5 mg/min (11/23/24 2046)  dexmedetomidine, 0.2-1.5 mcg/kg/hr, Last Rate: Stopped (11/18/24 0400)  DOPamine, 2-20 mcg/kg/min  EPINEPHrine, 0.01 mcg/kg/min, Last Rate: Stopped (11/17/24 1051)  lidocaine in D5W, 1 mg/min, Last Rate: Stopped (11/05/24 1135)  milrinone, 0.25 mcg/kg/min, Last Rate: 0.125 mcg/kg/min (11/23/24 2046)  norepinephrine, 0.02-0.3 mcg/kg/min, Last Rate: Stopped (11/23/24 2205)  phenylephrine, 0.2-2 mcg/kg/min  Phoxillum BK4/2.5, 1,500 mL/hr, Last Rate: 1,500 mL/hr (11/24/24 0516)  Phoxillum BK4/2.5, 1,500 mL/hr, Last Rate: 1,500 mL/hr (11/24/24 0516)  Phoxillum BK4/2.5, 1,500 mL/hr, Last Rate: 1,500 mL/hr (11/24/24 0516)  propofol, 5-50 mcg/kg/min, Last Rate: Stopped (11/24/24 1015)  vasopressin, 0.02-0.1 Units/min, Last Rate: 0.02 Units/min (11/24/24 1032)        Results Reviewed:   I have personally reviewed the results from the time of this admission to 11/24/2024 12:04 EST     Results from last 7 days   Lab Units 11/24/24  0812 11/24/24  0505 11/24/24  0007 11/23/24  0756 11/23/24  0524 11/22/24  0815 11/22/24  0526 11/21/24  0815 11/21/24  0416   SODIUM mmol/L 137 137 137   < > 138   < > 135*   < > 137   POTASSIUM mmol/L 4.3 4.2 4.3   < > 4.0   < > 3.8   < > 3.8   CHLORIDE mmol/L 103 103 102   < > 103   < > 103   < > 107   CO2 mmol/L 21.0* 20.0* 20.0*   < > 21.8*   < > 19.6*   < > 18.6*   BUN mg/dL 19 18 19   < > 17   < > 21   < > 26*   CREATININE mg/dL 0.64* 0.63* 0.79   < > 0.76   < > 0.84   < > 0.86   CALCIUM mg/dL 7.8* 7.8* 8.0*   < > 7.9*   < > 7.9*   < > 7.4*   BILIRUBIN  mg/dL  --   --   --   --  1.5*  --  1.1  --  0.9   ALK PHOS U/L  --   --   --   --  162*  --  136*  --  115   ALT (SGPT) U/L  --   --   --   --  52*  --  29  --  15   AST (SGOT) U/L  --   --   --   --  131*  --  71*  --  24   GLUCOSE mg/dL 100* 109* 111*   < > 127*   < > 140*   < > 139*    < > = values in this interval not displayed.       Estimated Creatinine Clearance: 144.7 mL/min (A) (by C-G formula based on SCr of 0.64 mg/dL (L)).    Results from last 7 days   Lab Units 11/24/24  0812 11/24/24  0505 11/24/24  0007 11/23/24  1755   MAGNESIUM mg/dL 2.5*  --  2.4 2.4   PHOSPHORUS mg/dL 3.7 3.9 3.8 3.7       Results from last 7 days   Lab Units 11/19/24  0429 11/18/24  0335   URIC ACID mg/dL 10.3* 9.5*       Results from last 7 days   Lab Units 11/24/24  0505 11/23/24  0524 11/22/24  0526 11/21/24  0416 11/20/24  0349   WBC 10*3/mm3 15.06* 16.42* 17.30* 15.94* 13.65*   HEMOGLOBIN g/dL 8.8* 9.2* 9.8* 8.5* 8.2*   PLATELETS 10*3/mm3 108* 111* 136* 147 159             Assessment / Plan     ASSESSMENT:  Acute kidney injury, oliguric ATN.  On CRRT.  Electrolytes okay.  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  Status post redo sternotomy AV root replacement, mitral valve repair, AICD removal intra-aortic balloon placement 10/31/2024.  Sternal closure 11/ 2.  3.  Bioprosthetic aortic valve endocarditis, bacteremia with strep mitis on vancomycin and cefepime.    Dr. Diaz following.    4.  Shock , remains pressor dependent.  5.  Anemia status post EGD 9/30/2024  And colonoscopy with esophagitis and polyp removal.  Hemoglobin today 8.8  6.  Acute respiratory failure postoperatively on the ventilator.    7.  Atrial fibrillation.  With controlled rate  8.  Moderate to severe mitral  regurgitation.  Severe RV enlargement and dysfunction postoperatively.  PLAN:  Continue CRRT  Fluid removal with dialysis as tolerated.  Low blood pressure has been limiting factor but better this morning.  Only on vasopressin this morning  Surveillance labs    Copied text in this note has been reviewed and is accurate as of 11/24/24.     Thank you for involving us in the care of Woodrow Alejandro.  Please feel free to call with any questions.    Kvng Jacobo MD  11/24/24  12:04 [facility]    [facility]  [phone]    Please note that portions of this note were completed with a voice recognition program.

## 2024-11-25 NOTE — PROGRESS NOTES
LOS: 33 days     Chief Complaint: Endocarditis    Interval History: Off vasopressor this morning.  Temperature curve much improved.  WBC down to 14.  Off CRRT and plans for intermittent hemodialysis.    Vital Signs  Temp:  [99.1 °F (37.3 °C)-100 °F (37.8 °C)] 99.7 °F (37.6 °C)  Heart Rate:  [] 113  Resp:  [23-29] 27  BP: ()/(33-76) 152/68  FiO2 (%):  [40 %-99 %] 40 %    Physical Exam:  General: Ill-appearing  HEENT: Tracheostomy present.  NG tube in place.  Respiratory: Coarse bilateral breath sounds on the ventilator.  : Ugarte catheter  Skin: Tracheostomy site clean and intact.  Access: Left groin HD line.  Peripheral line.    Antibiotics:  Anti-Infectives (From admission, onward)      Ordered     Dose/Rate Route Frequency Start Stop    11/25/24 0746  cefepime 2000 mg IVPB in 100 mL NS (MBP)        Ordering Provider: Gregg Diaz DO    2,000 mg  over 4 Hours Intravenous Every 24 Hours 11/25/24 2100 12/04/24 2059    11/24/24 1401  micafungin sodium (MYCAMINE) 200 mg in sodium chloride 0.9 % 100 mL IVPB        Ordering Provider: Bernadette Dueñas MD    200 mg  over 60 Minutes Intravenous Every 24 Hours 11/25/24 0300 11/30/24 0259    11/22/24 0328  micafungin sodium (MYCAMINE) 100 mg in sodium chloride 0.9 % 100 mL MBP        Ordering Provider: Jr Marty Doan MD    100 mg  over 60 Minutes Intravenous Every 24 Hours 11/22/24 0415 11/22/24 0446    11/17/24 1407  vancomycin 750 mg in sodium chloride 0.9 % 250 mL IVPB-VTB        Ordering Provider: Gregg Diaz DO    750 mg  333.3 mL/hr over 45 Minutes Intravenous Once 11/18/24 0600 11/18/24 0702    11/13/24 0914  cefepime 2000 mg IVPB in 100 mL NS (MBP)        Ordering Provider: Gregg Diaz DO    2,000 mg  over 30 Minutes Intravenous Once 11/13/24 1000 11/13/24 1235    11/12/24 1133  vancomycin 3000 mg/500 mL 0.9% NS IVPB ([facility])        Ordering Provider: Gregg Diaz,     20 mg/kg × 151 kg  over 180 Minutes  Intravenous Once 11/12/24 1230 11/12/24 1614    11/02/24 0918  vancomycin (VANCOCIN) 1,000 mg in sodium chloride 0.9 % 250 mL IVPB-VTB        Ordering Provider: Antwan Farmer MD    1,000 mg  250 mL/hr over 60 Minutes Intravenous Every 24 Hours 11/02/24 1015 11/03/24 1138    11/02/24 0912  Pharmacy to dose vancomycin        Ordering Provider: Samantha Salvador APRN     Not Applicable Continuous PRN 11/02/24 0912 11/04/24 0911    10/30/24 2306  ceFAZolin 2000 mg IVPB in 100 mL NS (MBP)        Ordering Provider: Samantha Salvador APRN    2,000 mg  over 30 Minutes Intravenous Every 8 Hours 10/31/24 0000 11/01/24 0922    10/29/24 1518  ceFAZolin 2000 mg IVPB in 100 mL NS (MBP)        Ordering Provider: Bryant Mcclure PA-C    2,000 mg  over 30 Minutes Intravenous Once 10/30/24 0600 10/30/24 1936    10/28/24 1034  vancomycin IVPB 2000 mg in 0.9% Sodium Chloride 500 mL        Ordering Provider: Samantha Salvador APRN    15 mg/kg × 142 kg Intravenous Once 10/28/24 1045 10/28/24 1356             Results Review:     I reviewed the patient's new clinical results.    Lab Results   Component Value Date    WBC 14.64 (H) 11/25/2024    HGB 8.4 (L) 11/25/2024    HCT 27.6 (L) 11/25/2024    MCV 92.6 11/25/2024    PLT 94 (L) 11/25/2024     Lab Results   Component Value Date    GLUCOSE 116 (H) 11/25/2024    BUN 21 11/25/2024    CREATININE 0.63 (L) 11/25/2024    BCR 33.3 (H) 11/25/2024    CO2 20.7 (L) 11/25/2024    CALCIUM 7.7 (L) 11/25/2024    ALBUMIN 2.5 (L) 11/25/2024    LABIL2 1.4 06/27/2019     (H) 11/23/2024    ALT 52 (H) 11/23/2024       Microbiology:  10/3 COVID-negative  10/10 COVID-negative  10/14 COVID-negative  10/15 blood cultures 2 out of 2 strep mitis  10/17 COVID-negative  10/17 blood cultures 2 out of 2 strep mitis  10/21 COVID-negative  10/23 blood cultures no growth today  10/30 operative cultures from the aortic valve no growth   11/9 respiratory culture no growth  11/9 catheter tip culture no growth to  date  11/10 catheter tip culture no growth to date  11/12 blood cultures no growth to date  11/12 respiratory culture no growth to date  11/13 respiratory culture no growth  11/13 genital culture of the penis Candida albicans  11/21 blood cultures 2 out of 2 Candida albicans  11/21 respiratory culture no growth  11/22 blood cultures no growth to date    Assessment    #Candidemia  #Strep mitis endocarditis  #Status post bioprosthetic aortic valve replacement 2014  #Status post aortic root replacement in the setting of prosthetic aortic valve endocarditis and annular abscess on 10/30  #Status post removal of pacemaker generator and intracardiac portion of the leads with retained venous leads  #Atrial fibrillation  #Achilles tendon rupture   #NATHANIEL now on CRRT  #Status post tracheostomy 11/16    Repeat blood cultures remain negative.  Continue micafungin 100 mg daily for candidemia.  Will avoid fluconazole for now given medication interactions.    Plan to stop cefepime today and transition back to penicillin G4 million units every 4 hours for strep mitis endocarditis.  Will plan to complete 6-week course through December 4.

## 2024-11-25 NOTE — PROGRESS NOTES
"Nutrition Services    Patient Name:  Woodrow Alejandro  YOB: 1950  MRN: [id number]  Admit Date:  10/23/2024    Assessment Date:  11/25/24    Summary: Consult for cortrak placement / TF Follow Up    Multiorgan failure, prognosis guarded.  Was off CRRT, but now being re-started per most recent MD notes.  Propofol has been stopped.      Current TF regimen via NG: Peptamen Intense VHP @ 45 mL/hr with 30 mL q 4 hour free water flushes.  Prosource BID.    Labs reviewed: Na 137, K 4.5, Gluc 166/139/134, Creat 0.63, Alb 2.5  Meds reviewed: lipitor, vit D, heparin, insulin, prevacid, florastor, precedex, levo    Plans/Recommendations:  Change TF to Novasource Renal @ 50 mL/hr.  30 mL q 4 hour free water flushes (to be further managed by nephrology)  Prosource BID.   Monitor for tolerance.    Initial Goal:  *initial goal conservative d/t risk of RFS     Novasource Renal at 20 mL/hr + 30 mL q 4 hr water flush      End Goal:    Novasource Renal at 50 mL/hr + 30 mL q 4 hr water flush      Calories  2200 kcals + 120 kcal from prosource BID = 2320 kcal (100%)    Protein  100 g + 30 g from prosource BID = 130 g (100%)    Free water  781 mL   Flushes  180 mL     The above end goal rate is for 22 hrs/day to assume interruptions for ADLs. Water flushes adjusted based on clinical picture + Rx flushes/IV fluids     Specialized formula chosen r/t CRRT, no longer on propofol     RD to follow.    CLINICAL NUTRITION ASSESSMENT      Reason for Assessment Follow-up Protocol     Diagnosis/Problem   POD 22 reoperative sternotomy AV root replacement 27mm cryopreserved homograft with right nuvia cabrol, AICD removal, IABP placement.  S/p trach 11/16  Now on CRRT     Anthropometrics        Current Height  Current Weight  BMI kg/m2 Height: 175 cm (68.9\")  Weight: (!) 145 kg (319 lb 10.7 oz) (11/25/24 1320)  Body mass index is 47.35 kg/m².   Adjusted BMI (if applicable)    BMI Category Obese, Class III (40 or higher)   Ideal Body " Weight (IBW) 171 lbs   Usual Body Weight (UBW) 330 lbs   Weight Trend Stable     Estimated/Assessed Needs        Energy Requirements    Weight for Calculation 78 kg IBW   Method for Estimation  25-30 kcal/kg   EST Needs (kcal/day) 9795-7504       Protein Requirements    Weight for Calculation 78 kg IBW   EST Protein Needs (g/kg) 1.5 - 2.0 gm/kg   EST Daily Needs (g/day) 117-156       Fluid Requirements     Method for Estimation 1 mL/kcal    Estimated Needs (mL/day)      Labs       Pertinent Labs    Results from last 7 days   Lab Units 11/25/24  0409 11/24/24 2317 11/24/24  1742 11/23/24  0756 11/23/24  0524 11/22/24  0815 11/22/24  0526 11/21/24  0815 11/21/24  0416   SODIUM mmol/L 137 136 137   < > 138   < > 135*   < > 137   POTASSIUM mmol/L 4.5 4.5 4.4   < > 4.0   < > 3.8   < > 3.8   CHLORIDE mmol/L 103 103 102   < > 103   < > 103   < > 107   CO2 mmol/L 20.7* 21.3* 21.0*   < > 21.8*   < > 19.6*   < > 18.6*   BUN mg/dL 21 21 20   < > 17   < > 21   < > 26*   CREATININE mg/dL 0.63* 0.64* 0.66*   < > 0.76   < > 0.84   < > 0.86   CALCIUM mg/dL 7.7* 7.6* 7.8*   < > 7.9*   < > 7.9*   < > 7.4*   BILIRUBIN mg/dL  --   --   --   --  1.5*  --  1.1  --  0.9   ALK PHOS U/L  --   --   --   --  162*  --  136*  --  115   ALT (SGPT) U/L  --   --   --   --  52*  --  29  --  15   AST (SGOT) U/L  --   --   --   --  131*  --  71*  --  24   GLUCOSE mg/dL 116* 116* 117*   < > 127*   < > 140*   < > 139*    < > = values in this interval not displayed.     Results from last 7 days   Lab Units 11/25/24  0409 11/24/24 2317 11/24/24  1742 11/24/24  0812   MAGNESIUM mg/dL  --  2.5* 2.5* 2.5*   PHOSPHORUS mg/dL 3.8 3.7 3.9 3.7   HEMOGLOBIN g/dL 8.4*  --   --   --    HEMATOCRIT % 27.6*  --   --   --    WBC 10*3/mm3 14.64*  --   --   --    ALBUMIN g/dL 2.5* 2.5* 2.5* 2.4*     Results from last 7 days   Lab Units 11/25/24  0409 11/24/24  0505 11/23/24  0524 11/22/24  0526 11/21/24  0416   PLATELETS 10*3/mm3 94* 108* 111* 136* 147     COVID19    Date Value Ref Range Status   10/21/2024 Not Detected Not Detected - Ref. Range Final     Lab Results   Component Value Date    HGBA1C 5.30 10/24/2024          Medications           Scheduled Medications acetylcysteine, 3 mL, Nebulization, TID - RT  aspirin, 81 mg, Per G Tube, Daily  atorvastatin, 40 mg, Per G Tube, Nightly  busPIRone, 5 mg, Oral, TID  carvedilol, 3.125 mg, Nasogastric, Q12H  chlorhexidine, 15 mL, Mouth/Throat, Q12H  Ergocalciferol, 100 mcg, Per G Tube, Daily  heparin (porcine), 5,000 Units, Subcutaneous, Q8H  hydrocortisone-bacitracin-zinc oxide-nystatin, 1 Application, Topical, Q12H  insulin glargine, 20 Units, Subcutaneous, Daily  insulin regular, 2-7 Units, Subcutaneous, Q6H  ipratropium-albuterol, 3 mL, Nebulization, Q4H - RT  lansoprazole, 15 mg, Per G Tube, Q AM  micafungin (MYCAMINE) IV, 200 mg, Intravenous, Q24H  miconazole, 1 Application, Topical, Q12H  midodrine, 10 mg, Oral, Q8H  penicillin g (potassium), 2 Million Units, Intravenous, Q4H  saccharomyces boulardii, 250 mg, Per G Tube, BID  sildenafil, 20 mg, Per G Tube, TID  sodium chloride, 10 mL, Intravenous, Q12H       Infusions amiodarone, 0.5 mg/min, Last Rate: Stopped (11/25/24 1634)  dexmedetomidine, 0.2-1.5 mcg/kg/hr, Last Rate: 0.2 mcg/kg/hr (11/25/24 1600)  DOPamine, 2-20 mcg/kg/min  EPINEPHrine, 0.01 mcg/kg/min, Last Rate: Stopped (11/17/24 1051)  lidocaine in D5W, 1 mg/min, Last Rate: Stopped (11/05/24 1135)  milrinone, 0.25 mcg/kg/min, Last Rate: 0.125 mcg/kg/min (11/25/24 1300)  norepinephrine, 0.02-0.3 mcg/kg/min, Last Rate: 0.03 mcg/kg/min (11/25/24 1600)  phenylephrine, 0.2-2 mcg/kg/min  Phoxillum BK4/2.5, 1,500 mL/hr  Phoxillum BK4/2.5, 1,500 mL/hr  Phoxillum BK4/2.5, 1,500 mL/hr  propofol, 5-50 mcg/kg/min, Last Rate: Stopped (11/24/24 1015)  vasopressin, 0.02-0.1 Units/min, Last Rate: 0.05 Units/min (11/25/24 1615)       PRN Medications   acetaminophen **OR** acetaminophen **OR** acetaminophen    albuterol     ALPRAZolam    bisacodyl    bisacodyl    Calcium Replacement - Follow Nurse / BPA Driven Protocol    cyclobenzaprine    dextrose    dextrose    DOPamine    EPINEPHrine    glucagon (human recombinant)    Glycerin-Hypromellose-    heparin (porcine)    heparin (porcine)    hydrALAZINE    HYDROcodone-acetaminophen    magnesium hydroxide    Magnesium Standard Dose Replacement - Follow Nurse / BPA Driven Protocol    milrinone    Morphine    [] Morphine **AND** naloxone    nitroglycerin    ondansetron    phenylephrine    Phosphorus Replacement - Follow Nurse / BPA Driven Protocol    polyethylene glycol    Potassium Replacement - Follow Nurse / BPA Driven Protocol    senna    vasopressin     Physical Findings          General Findings obese, responds/arouses to pain, ventilator support, other: trach   Oral/Mouth Cavity tooth or teeth missing   Edema  generalized, lower extremity , upper extremity, 1+ (trace), 2+ (mild), 3+ (moderate)   Gastrointestinal diarrhea, hypoactive bowel sounds, last bowel movement:    Skin  bruising, excoriation, pressure injury: sacral spine DTI, L groin st III, surgical incision: L clavicle, sternal, R anterior thigh, throat, location of wound: L heel   Tubes/Drains/Lines Cortrak, dialysis catheter, fecal management system, NG tube, bridle in place   NFPE Not indicated at this time   --  Current Nutrition Orders & Evaluation of Intake       Oral Nutrition     Food Allergies NKFA   Current PO Diet NPO Diet NPO Type: Tube Feeding   Supplement n/a   PO Evaluation     % PO Intake NPO    Factors Affecting Intake: Other: Intubated      Enteral Nutrition     Enteral Route NG    TF Delivery Method Continuous    Propofol Rate/Kcal     Current TF Order/Rate  Peptamen Intense VHP @ 45 mL/hr    TF Goal Rate 45 mL/hr    Current Water Flush 30 mL Q 4 hr     Modular None    TF Residual  no or minimal residual    TF Tolerance tolerating    TF Observation Verified correct TF and water flush  infusing per orders     PES STATEMENT / NUTRITION DIAGNOSIS      Nutrition Dx Problem  Problem: Needs Alternative Composition  Etiology: Medical Diagnosis - s/p reoperative sternotomy AV root replacement 27mm cryopreserved homograft with right nuvia cabrol, AICD removal, IABP placement.    Signs/Symptoms: Report/Observation   --  NUTRITION INTERVENTION / PLAN OF CARE      Intervention Goal(s) Maintain nutrition status, Establish goals of care, Reduce/improve symptoms, Meet estimated needs, Disease management/therapy, Tolerate TF/PN at goal, and Appropriate weight loss         RD Intervention/Action Continue to monitor and Care plan reviewed         Prescription/Orders:       PO Diet       Supplements       Enteral Nutrition    Enteral Prescription:     Enteral Route NG    TF Delivery Method Continuous    Enteral Product Novasource Renal    Modular Liquid Protein, BID    Propofol Rate/Kcal     TF Start Rate  20 mL/hr    TF Goal Rate  50 mL/hr    Free Water Flush 30 mL q 4 hr    Provision at Goal:          Calories 2200 kcal + 120 kcal prosource = 2320 kcal, meets 100% needs         Protein  100 g + 30 g prosource = 130 gm protein, meets 100% needs         Fluid (mL) 792 mL + 180 mL free water flushes         Parenteral Nutrition    New Prescription Ordered? Yes   --      Monitor/Evaluation Per protocol, Pertinent labs, EN delivery/tolerance, Weight, Skin status, GI status, Symptoms, POC/GOC, Swallow function   Discharge Plan/Needs No discharge needs identified at this time   --    RD to follow per protocol.      Electronically signed by:  Jenny Reynolds RD  11/25/24 17:11 EST

## 2024-11-25 NOTE — PLAN OF CARE
Goal Outcome Evaluation:                       Plan was for HD today. Pressors turned back on and nephrology decided to restart CRRT. Amio stopped per Dr. Florian. Pt respiratory rate increased to 34-36/min. Dex started per pulm- RR decreased. Echo done today. On milrinone, dex, vaso, and levo. Care on going.

## 2024-11-25 NOTE — PROGRESS NOTES
Tracheostomy rounding done by RT Supervisor.     Informational tracheostomy sign and obturator at head of bed.  Emergency airway bag hanging in room with spare cuffed tracheostomy of same size and size smaller (size 8 and size 6).       Verified correct inner cannulas were also at bedside.

## 2024-11-25 NOTE — NURSING NOTE
Arrived to bedside to complete ordered hd treatment, while setting up machine patient's bp started to drop, Melissa NOLEN had to restart IV pressors, md notified and Dr Gar changed orders to CRRT, HD canceled for today.

## 2024-11-25 NOTE — PROGRESS NOTES
LOS: 33 days   Patient Care Team:  Juan Perez MD as PCP - General (Internal Medicine)    Chief Complaint: Follow-up bioprosthetic AVR endocarditis, mitral regurgitation, persistent atrial fibrillation.    Interval History: Awake with trach in place.  He is off vasopressin and Levophed.  Remains on milrinone and IV amiodarone.  Rate has been a little high at times, mainly near 100.    Vital Signs:  Temp:  [99.1 °F (37.3 °C)-100.4 °F (38 °C)] 100.4 °F (38 °C)  Heart Rate:  [] 105  Resp:  [23-31] 31  BP: ()/(37-76) 152/68  FiO2 (%):  [40 %-99 %] 40 %    Intake/Output Summary (Last 24 hours) at 11/25/2024 1330  Last data filed at 11/25/2024 1200  Gross per 24 hour   Intake 2038.22 ml   Output 4693 ml   Net -2654.78 ml       Physical Exam:   General Appearance:    Status post tracheostomy and on ventilator.  Nods head.   Lungs:     Rhonchi bilaterally anteriorly.    Heart:    Irregularly irregular rhythm with a tachycardic rate. II/VI SM throughout.   Abdomen:     Soft, nontender, nondistended.    Extremities:    3+ generalized edema and anasarca.     Results Review:    Results from last 7 days   Lab Units 11/25/24  0409   SODIUM mmol/L 137   POTASSIUM mmol/L 4.5   CHLORIDE mmol/L 103   CO2 mmol/L 20.7*   BUN mg/dL 21   CREATININE mg/dL 0.63*   GLUCOSE mg/dL 116*   CALCIUM mg/dL 7.7*         Results from last 7 days   Lab Units 11/25/24  0409   WBC 10*3/mm3 14.64*   HEMOGLOBIN g/dL 8.4*   HEMATOCRIT % 27.6*   PLATELETS 10*3/mm3 94*                 Results from last 7 days   Lab Units 11/24/24  2317   MAGNESIUM mg/dL 2.5*               I reviewed the patient's new clinical results.        Assessment:  1.  Status post bioprosthetic aortic valve replacement in 2014  2.  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  Moderate to severe mitral regurgitation  4.  Status post reoperative  AV root replacement, mitral valve repair, ICD removal, SVG-RCA, and IABP placement on 10/30/2021  5.  Postoperative shock, multifactorial  6.  Acute kidney injury  7.  History of nonischemic cardiomyopathy with recovered ejection fraction  8.  Anemia, acute on chronic  9.  Postoperative thrombocytopenia  10.  Persistent atrial fibrillation  11.  Ventricular tachycardia postoperatively  12.  Grade C esophagitis by EGD on 9/30/2024  13.  Acute left lower extremity DVT on 10/24/2024.  Resolved on Dopplers on 11/8/2024  14.  Right Achilles tendon tear  15.  Postoperative junctional rhythm  16.  Hypoalbuminemia  17.  Thrombocytopenia  18.  Severe right ventricular enlargement and dysfunction post-op  19.  Fever noted on 11/8/2024  20.  Subacute DVT in the left axillary vein by Doppler on 11/10/2024  21.  Right groin hematoma versus abscess  22.  Status post tracheostomy on 11/16/2024  23.  Candida albicans fungemia    Plan:  -CRRT stopped.  Plan for routine hemodialysis later today.    -He is off pressors, and blood pressure is actually high at times.  Wean midodrine as tolerated.  Coreg started for atrial fibrillation.    -Antibiotic and antifungal therapy per infectious disease.    -Remains on milrinone.  Off vasopressin and Levophed.    -Currently on Revatio 20 mg every 8 hours.  Right ventricular dysfunction remains a major issue.  The limited echocardiogram from yesterday actually looked worse to me in terms of right ventricular function.  Left ventricular function is normal.  Repeat limited echo to evaluate.    -Continue amiodarone drip at 0.5 mg/min.  Rate is reasonable for the most part at around 100.  Give him 1 dose of IV digoxin today.    -No heparin for now per cardiovascular surgery.  The DVT in his left lower extremity was not present on the Dopplers on 11/8/2024.  He does have a likely subacute DVT in the left axillary vein by Doppler.  He has had intermittent thrombocytopenia postoperatively as  well.    -Tracheostomy without complications per vascular surgery.    -Remains very ill; however, some mild progress.    Antwan Farmer MD  11/25/24  13:30 EST

## 2024-11-25 NOTE — PROGRESS NOTES
Consult Daily Progress Note  [facility]   11/25/24      Patient Name:  Woodrow Alejandro  MRN:  [id number]   YOB: 1950  Age: 74 y.o.  Sex: male  LOS: 33    Reason for Consult:  Respiratory failure    Hospital Course:   74-year-old male with history of aortic valve replacement with prosthetic aortic valve, maze, left atrial appendage ligation in 2014 now status post reoperative sternotomy with aortic valve replacement, AICD removal, intra-aortic balloon pump placement (10/31/2024) and underwent sternal closure (11/2).  Hospital course has been complicated by respiratory failure with inability to liberate from the ventilator and tracheostomy (11/16/2024).  Also found to have Candida fungemia and on CRRT for renal failure.    Interval History:  No acute events overnight  Borderline febrile  Remains on milrinone  Net -3 L over the past 24 hours on CRRT  Remains off Levophed and vasopressin  Mental status remains unchanged    Physical Exam:  Vitals:    11/25/24 0900   BP:    Pulse:    Resp: 27   Temp:    SpO2:        Intake/Output    Intake/Output Summary (Last 24 hours) at 11/25/2024 0947  Last data filed at 11/25/2024 0600  Gross per 24 hour   Intake 2410.43 ml   Output 5332.3 ml   Net -2921.87 ml       General: Sedated, unresponsive  HEENT: NC/AT, EOMI, MMM  Neck: Supple, trached  Cardiac: RRR, no murmur, gallops, rubs  Pulmonary: Coarse bilaterally  GI: Soft, non-tender, non-distended, normal bowel sounds  Extremities: Warm, well perfused, no LE edema  Skin: no visible rash  Neuro: CN II - XII grossly intact    Data Review:  Results from last 7 days   Lab Units 11/25/24  0409 11/24/24  0505 11/23/24  0524 11/22/24  0526 11/21/24  0416 11/20/24  0349 11/19/24  2204 11/19/24  0429   WBC 10*3/mm3 14.64* 15.06* 16.42* 17.30* 15.94* 13.65*  --  16.38*   HEMOGLOBIN g/dL 8.4* 8.8* 9.2* 9.8* 8.5* 8.2* 8.0* 8.4*   PLATELETS 10*3/mm3 94* 108* 111* 136* 147 159  --  156     Results from last  7 days   Lab Units 11/25/24  0409 11/24/24  2317 11/24/24  1742 11/24/24  0812 11/24/24  0505 11/24/24  0007 11/23/24  1755 11/23/24  0756 11/23/24  0524 11/23/24  0047   SODIUM mmol/L 137 136 137 137 137 137 137 140   < > 138   POTASSIUM mmol/L 4.5 4.5 4.4 4.3 4.2 4.3 4.2 4.1   < > 4.1   CHLORIDE mmol/L 103 103 102 103 103 102 103 105   < > 104   CO2 mmol/L 20.7* 21.3* 21.0* 21.0* 20.0* 20.0* 20.0* 20.0*   < > 21.0*   BUN mg/dL 21 21 20 19 18 19 18 18   < > 18   CREATININE mg/dL 0.63* 0.64* 0.66* 0.64* 0.63* 0.79 0.70* 0.69*   < > 0.78   GLUCOSE mg/dL 116* 116* 117* 100* 109* 111* 114* 123*   < > 127*   CALCIUM mg/dL 7.7* 7.6* 7.8* 7.8* 7.8* 8.0* 7.8* 8.0*   < > 7.7*   MAGNESIUM mg/dL  --  2.5* 2.5* 2.5*  --  2.4 2.4 2.4  --  2.4   PHOSPHORUS mg/dL 3.8 3.7 3.9 3.7 3.9 3.8 3.7 3.7  --  3.6    < > = values in this interval not displayed.   Estimated Creatinine Clearance: 145.5 mL/min (A) (by C-G formula based on SCr of 0.63 mg/dL (L)).    Results from last 7 days   Lab Units 11/25/24 0409 11/24/24  0505 11/23/24  0524 11/22/24  0526 11/21/24  0416   AST (SGOT) U/L  --   --  131* 71* 24   ALT (SGPT) U/L  --   --  52* 29 15   PROCALCITONIN ng/mL  --   --   --   --  1.17*   PLATELETS 10*3/mm3 94* 108* 111* 136* 147       Results from last 7 days   Lab Units 11/23/24  1004 11/22/24  1212 11/20/24  0702   PH, ARTERIAL pH units 7.464* 7.462* 7.461*   PCO2, ARTERIAL mm Hg 32.1* 31.4* 22.9*   PO2 ART mm Hg 100.0 86.9 110.1*   HCO3 ART mmol/L 23.1 22.5 16.3*         Imaging:  Reviewed chest images personally from past 3 days    ASSESSMENT  /  PLAN:    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 (10/31/2024), AICD removal, intra-aortic balloon pump placement  Status post prosthetic aortic valve, left atrial appendage ligation in 2014  Strep mitis bacteremia, suspected endocarditis  Candida albicans bacteremia  Cardiogenic/septic shock requiring vasopressors  Respiratory failure on mechanical  ventilation  Nonischemic cardiomyopathy  Right ventricular dysfunction  Heart failure with preserved ejection fraction  Acute renal failure on CRRT  History of DVT  Atrial fibrillation  Postoperative anemia  Esophagitis  Super morbid obesity, BMI 48  Right Achilles tendon tear    -Patient with complicated hospital course and underwent aortic valve replacement complicated by respiratory failure necessitating tracheostomy and endocarditis and fungemia.  -Remains on mechanical ventilation, pressure support decreased to 12 as VBG demonstrated respiratory alkalosis and tidal volumes were elevated into the 6 and 700s.  Tidal volumes now improved into the 500s and minute ventilation dropped by 2 however patient continues to significantly overbreathing the vent at a rate of mid 20s.  Continue PEEP of 10 and FiO2 of 40%.   - Remains on milrinone for inotropic support.  -Candida fungemia suspected secondary to left internal jugular central line.  On micafungin for Candida and cefepime for strep mitis endocarditis (EOT 12/4)  -Respiratory cultures from bronchoscopy negative  -On sildenafil for any hypertension as per cardiothoracic surgery  -Nephrology following for renal failure, on CRRT  -No anticoagulation for DVT at this time as per cardiothoracic surgery  -Prognosis is guarded for this patient due to multiorgan failure.  Is now DNR however continues on full support as per family    Thank you for allowing us to participate in this patients care. Pulmonary will continue to follow.     Justus Cary MD  [facility]  Pulmonary and Critical Care Medicine, Interventional Pulmonology    Parts of this note may be an electronic transcription/translation of spoken language to printed text using the Dragon dictation system.

## 2024-11-25 NOTE — PLAN OF CARE
Goal Outcome Evaluation:      Vaso stopped at 2355, CRRT stopped at 0645, new orders received from SBP> 150, and to have hemodialysis today, will continue with plan of care.

## 2024-11-25 NOTE — PROGRESS NOTES
[facility] Progress Note      Patient Name: Woodrow Alejandro  : 1950  MRN: [id number]  Primary Care Physician:  Juan Perez MD  Date of admission: 10/23/2024    Subjective     Interval History:   F/u NATHANIEL    Review of Systems:   I/O 2.7/5.7 (UF 5.4); UOP 12 cc; on CRRT  CXR today: unchanged bilat alveolar/interstitial infiltrates; bilat effusions present  Off pressor, on milrinone   SBP 150s  On 40% FIo2    Objective     Vitals:   Temp:  [99.1 °F (37.3 °C)-100 °F (37.8 °C)] 99.3 °F (37.4 °C)  Heart Rate:  [] 108  Resp:  [23-29] 29  BP: ()/(33-76) 153/62  FiO2 (%):  [40 %-99 %] 99 %    Intake/Output Summary (Last 24 hours) at 2024 0623  Last data filed at 2024 0600  Gross per 24 hour   Intake 2838.27 ml   Output 6088 ml   Net -3249.73 ml       Physical Exam:    General Appearance: ill appearing WM on ventilator & CRRT  Neck: +trach, no JVD  Lungs: Coarse BS bilat  Heart: RRR, normal S1 and S2  Abdomen: soft, nontender, nondistended  Extremities: 2+ BLE edema, femoral shiley     Scheduled Meds:     acetylcysteine, 3 mL, Nebulization, TID - RT  aspirin, 81 mg, Per G Tube, Daily  atorvastatin, 40 mg, Per G Tube, Nightly  busPIRone, 5 mg, Oral, TID  cefepime, 2,000 mg, Intravenous, Q8H  chlorhexidine, 15 mL, Mouth/Throat, Q12H  Ergocalciferol, 100 mcg, Per G Tube, Daily  heparin (porcine), 5,000 Units, Subcutaneous, Q8H  hydrocortisone-bacitracin-zinc oxide-nystatin, 1 Application, Topical, Q12H  insulin glargine, 20 Units, Subcutaneous, Daily  insulin regular, 2-7 Units, Subcutaneous, Q6H  ipratropium-albuterol, 3 mL, Nebulization, Q4H - RT  lansoprazole, 15 mg, Per G Tube, Q AM  micafungin (MYCAMINE) IV, 200 mg, Intravenous, Q24H  miconazole, 1 Application, Topical, Q12H  midodrine, 15 mg, Oral, Q8H  saccharomyces boulardii, 250 mg, Per G Tube, BID  sildenafil, 20 mg, Per G Tube, TID  sodium chloride, 10 mL, Intravenous, Q12H      IV Meds:    amiodarone, 0.5 mg/min, Last Rate: 0.5 mg/min (11/25/24 0200)  dexmedetomidine, 0.2-1.5 mcg/kg/hr, Last Rate: Stopped (11/18/24 0400)  DOPamine, 2-20 mcg/kg/min  EPINEPHrine, 0.01 mcg/kg/min, Last Rate: Stopped (11/17/24 1051)  lidocaine in D5W, 1 mg/min, Last Rate: Stopped (11/05/24 1135)  milrinone, 0.25 mcg/kg/min, Last Rate: 0.125 mcg/kg/min (11/24/24 1857)  norepinephrine, 0.02-0.3 mcg/kg/min, Last Rate: Stopped (11/23/24 2205)  phenylephrine, 0.2-2 mcg/kg/min  Phoxillum BK4/2.5, 1,500 mL/hr, Last Rate: 1,500 mL/hr (11/24/24 0516)  Phoxillum BK4/2.5, 1,500 mL/hr, Last Rate: 1,500 mL/hr (11/24/24 0516)  Phoxillum BK4/2.5, 1,500 mL/hr, Last Rate: 1,500 mL/hr (11/24/24 0516)  propofol, 5-50 mcg/kg/min, Last Rate: Stopped (11/24/24 1015)  vasopressin, 0.02-0.1 Units/min, Last Rate: Stopped (11/24/24 2355)        Results Reviewed:   I have personally reviewed the results from the time of this admission to 11/25/2024 06:23 EST     Results from last 7 days   Lab Units 11/25/24  0409 11/24/24  2317 11/24/24  1742 11/23/24  0756 11/23/24  0524 11/22/24  0815 11/22/24  0526 11/21/24  0815 11/21/24  0416   SODIUM mmol/L 137 136 137   < > 138   < > 135*   < > 137   POTASSIUM mmol/L 4.5 4.5 4.4   < > 4.0   < > 3.8   < > 3.8   CHLORIDE mmol/L 103 103 102   < > 103   < > 103   < > 107   CO2 mmol/L 20.7* 21.3* 21.0*   < > 21.8*   < > 19.6*   < > 18.6*   BUN mg/dL 21 21 20   < > 17   < > 21   < > 26*   CREATININE mg/dL 0.63* 0.64* 0.66*   < > 0.76   < > 0.84   < > 0.86   CALCIUM mg/dL 7.7* 7.6* 7.8*   < > 7.9*   < > 7.9*   < > 7.4*   BILIRUBIN mg/dL  --   --   --   --  1.5*  --  1.1  --  0.9   ALK PHOS U/L  --   --   --   --  162*  --  136*  --  115   ALT (SGPT) U/L  --   --   --   --  52*  --  29  --  15   AST (SGOT) U/L  --   --   --   --  131*  --  71*  --  24   GLUCOSE mg/dL 116* 116* 117*   < > 127*   < > 140*   < > 139*    < > = values in this interval not displayed.     Estimated Creatinine Clearance: 145.5 mL/min (A)  (by C-G formula based on SCr of 0.63 mg/dL (L)).  Results from last 7 days   Lab Units 11/25/24  0409 11/24/24  2317 11/24/24  1742 11/24/24  0812   MAGNESIUM mg/dL  --  2.5* 2.5* 2.5*   PHOSPHORUS mg/dL 3.8 3.7 3.9 3.7     Results from last 7 days   Lab Units 11/19/24  0429   URIC ACID mg/dL 10.3*     Results from last 7 days   Lab Units 11/25/24  0409 11/24/24  0505 11/23/24  0524 11/22/24  0526 11/21/24  0416   WBC 10*3/mm3 14.64* 15.06* 16.42* 17.30* 15.94*   HEMOGLOBIN g/dL 8.4* 8.8* 9.2* 9.8* 8.5*   PLATELETS 10*3/mm3 94* 108* 111* 136* 147           Assessment / Plan     ASSESSMENT:  Acute kidney injury, oliguric ATN.  On CRRT.  Waste products low.  K normal.  Bicarb 20 with AG 14.  Vol overloaded and tolerating ultrafiltration, 3L NEG/24h and hemodynamically stable enough to allow for regular HD.  Needs tunnel catheter if consistent with goals of care.  SBP 150s off pressor.    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  Status post redo sternotomy AV root replacement, mitral valve repair, AICD removal intra-aortic balloon placement 10/31/2024.  Sternal closure 11/ 2.  3.  Bioprosthetic aortic valve endocarditis, bacteremia with strep mitis on vancomycin and cefepime.   ID following.   BCx NGTD from 11/22/24  4.  Candidemia, on micafungin  5.  Anemia status post EGD 9/30/2024 and colonoscopy with esophagitis and polyp removal.  Hemoglobin today 8.4  6.  Acute respiratory failure postoperatively on the ventilator.  40% FIO2.  Trach  7.  Atrial fibrillation.  With controlled rate on amio driop   8.  Moderate to severe mitral regurgitation.  Severe RV enlargement and dysfunction postoperatively.  9.  DNR status     PLAN:  Stop CRRT  Regular HD later today, remove 3L as tolerated  Wean midodrine 10 TID    Addendum: before attempt to transition to HD, BP worsened requiring resumption of multi pressors  so HD aborted and CRRT resumed.  Prognosis appears very poor.  Discussed with RN      Alberto Gar MD  11/25/24  06:23 [facility]    [facility]  [phone]

## 2024-11-25 NOTE — PLAN OF CARE
Goal Outcome Evaluation:        Propofol weaned off; vasopressin, amiodarone and milrinone infusing. Continues on CRRT.

## 2024-11-25 NOTE — PROGRESS NOTES
" LOS: 33 days   Patient Care Team:  Juan Perez MD as PCP - General (Internal Medicine)    Chief Complaint:   Post-op follow-up, s/p homograft    Subjective  Patient very drowsy, off sedation    Vital Signs  Temp:  [99.1 °F (37.3 °C)-100 °F (37.8 °C)] 99.7 °F (37.6 °C)  Heart Rate:  [] 113  Resp:  [23-29] 28  BP: ()/(33-76) 152/68  FiO2 (%):  [40 %-99 %] 40 %      11/23/24  1111 11/24/24  0413 11/25/24  0400   Weight: (!) 151 kg (333 lb) (!) 147 kg (324 lb 15.3 oz) (!) 145 kg (320 lb 1.7 oz)     Body mass index is 47.27 kg/m².    Intake/Output Summary (Last 24 hours) at 11/25/2024 0800  Last data filed at 11/25/2024 0600  Gross per 24 hour   Intake 2607.07 ml   Output 5537.3 ml   Net -2930.23 ml     No intake/output data recorded.        Objective:  General Appearance:  Ill-appearing.    Vital signs: (most recent): Blood pressure 99/48, pulse 104, temperature (!) 100.9 °F (38.3 °C), resp. rate (!) 31, height 175 cm (68.9\"), weight (!) 145 kg (319 lb 10.7 oz), SpO2 95%.  No fever.    Output: Producing stool.    Lungs:  Normal effort and normal respiratory rate.  There are rales and wheezes.    Heart: Tachycardia.  Irregular rhythm.    Abdomen: Bowel sounds are normal.     Extremities: There is dependent edema.    Skin:  Warm and dry.  There is ulceration.                  Results Review:      WBC WBC   Date Value Ref Range Status   11/25/2024 14.64 (H) 3.40 - 10.80 10*3/mm3 Final   11/24/2024 15.06 (H) 3.40 - 10.80 10*3/mm3 Final   11/23/2024 16.42 (H) 3.40 - 10.80 10*3/mm3 Final      HGB Hemoglobin   Date Value Ref Range Status   11/25/2024 8.4 (L) 13.0 - 17.7 g/dL Final   11/24/2024 8.8 (L) 13.0 - 17.7 g/dL Final   11/23/2024 9.2 (L) 13.0 - 17.7 g/dL Final      HCT Hematocrit   Date Value Ref Range Status   11/25/2024 27.6 (L) 37.5 - 51.0 % Final   11/24/2024 26.7 (L) 37.5 - 51.0 % Final   11/23/2024 28.4 (L) 37.5 - 51.0 % Final      Platelets Platelets   Date Value Ref Range Status " "  11/25/2024 94 (L) 140 - 450 10*3/mm3 Final   11/24/2024 108 (L) 140 - 450 10*3/mm3 Final   11/23/2024 111 (L) 140 - 450 10*3/mm3 Final        PT/INR:  No results found for: \"PROTIME\"/No results found for: \"INR\"    Sodium Sodium   Date Value Ref Range Status   11/25/2024 137 136 - 145 mmol/L Final   11/24/2024 136 136 - 145 mmol/L Final   11/24/2024 137 136 - 145 mmol/L Final   11/24/2024 137 136 - 145 mmol/L Final   11/24/2024 137 136 - 145 mmol/L Final   11/24/2024 137 136 - 145 mmol/L Final   11/23/2024 137 136 - 145 mmol/L Final   11/23/2024 140 136 - 145 mmol/L Final   11/23/2024 138 136 - 145 mmol/L Final   11/23/2024 138 136 - 145 mmol/L Final   11/22/2024 137 136 - 145 mmol/L Final   11/22/2024 135 (L) 136 - 145 mmol/L Final      Potassium Potassium   Date Value Ref Range Status   11/25/2024 4.5 3.5 - 5.2 mmol/L Final   11/24/2024 4.5 3.5 - 5.2 mmol/L Final   11/24/2024 4.4 3.5 - 5.2 mmol/L Final   11/24/2024 4.3 3.5 - 5.2 mmol/L Final   11/24/2024 4.2 3.5 - 5.2 mmol/L Final   11/24/2024 4.3 3.5 - 5.2 mmol/L Final   11/23/2024 4.2 3.5 - 5.2 mmol/L Final   11/23/2024 4.1 3.5 - 5.2 mmol/L Final   11/23/2024 4.0 3.5 - 5.2 mmol/L Final   11/23/2024 4.1 3.5 - 5.2 mmol/L Final   11/22/2024 4.1 3.5 - 5.2 mmol/L Final     Comment:     Slight hemolysis detected by analyzer. Result may be falsely elevated.   11/22/2024 3.9 3.5 - 5.2 mmol/L Final      Chloride Chloride   Date Value Ref Range Status   11/25/2024 103 98 - 107 mmol/L Final   11/24/2024 103 98 - 107 mmol/L Final   11/24/2024 102 98 - 107 mmol/L Final   11/24/2024 103 98 - 107 mmol/L Final   11/24/2024 103 98 - 107 mmol/L Final   11/24/2024 102 98 - 107 mmol/L Final   11/23/2024 103 98 - 107 mmol/L Final   11/23/2024 105 98 - 107 mmol/L Final   11/23/2024 103 98 - 107 mmol/L Final   11/23/2024 104 98 - 107 mmol/L Final   11/22/2024 103 98 - 107 mmol/L Final   11/22/2024 104 98 - 107 mmol/L Final      Bicarbonate CO2   Date Value Ref Range Status "   11/25/2024 20.7 (L) 22.0 - 29.0 mmol/L Final   11/24/2024 21.3 (L) 22.0 - 29.0 mmol/L Final   11/24/2024 21.0 (L) 22.0 - 29.0 mmol/L Final   11/24/2024 21.0 (L) 22.0 - 29.0 mmol/L Final   11/24/2024 20.0 (L) 22.0 - 29.0 mmol/L Final   11/24/2024 20.0 (L) 22.0 - 29.0 mmol/L Final   11/23/2024 20.0 (L) 22.0 - 29.0 mmol/L Final   11/23/2024 20.0 (L) 22.0 - 29.0 mmol/L Final   11/23/2024 21.8 (L) 22.0 - 29.0 mmol/L Final   11/23/2024 21.0 (L) 22.0 - 29.0 mmol/L Final   11/22/2024 19.0 (L) 22.0 - 29.0 mmol/L Final   11/22/2024 20.2 (L) 22.0 - 29.0 mmol/L Final      BUN BUN   Date Value Ref Range Status   11/25/2024 21 8 - 23 mg/dL Final   11/24/2024 21 8 - 23 mg/dL Final   11/24/2024 20 8 - 23 mg/dL Final   11/24/2024 19 8 - 23 mg/dL Final   11/24/2024 18 8 - 23 mg/dL Final   11/24/2024 19 8 - 23 mg/dL Final   11/23/2024 18 8 - 23 mg/dL Final   11/23/2024 18 8 - 23 mg/dL Final   11/23/2024 17 8 - 23 mg/dL Final   11/23/2024 18 8 - 23 mg/dL Final   11/22/2024 18 8 - 23 mg/dL Final   11/22/2024 20 8 - 23 mg/dL Final      Creatinine Creatinine   Date Value Ref Range Status   11/25/2024 0.63 (L) 0.76 - 1.27 mg/dL Final   11/24/2024 0.64 (L) 0.76 - 1.27 mg/dL Final   11/24/2024 0.66 (L) 0.76 - 1.27 mg/dL Final   11/24/2024 0.64 (L) 0.76 - 1.27 mg/dL Final   11/24/2024 0.63 (L) 0.76 - 1.27 mg/dL Final   11/24/2024 0.79 0.76 - 1.27 mg/dL Final   11/23/2024 0.70 (L) 0.76 - 1.27 mg/dL Final   11/23/2024 0.69 (L) 0.76 - 1.27 mg/dL Final   11/23/2024 0.76 0.76 - 1.27 mg/dL Final   11/23/2024 0.78 0.76 - 1.27 mg/dL Final   11/22/2024 0.84 0.76 - 1.27 mg/dL Final   11/22/2024 0.94 0.76 - 1.27 mg/dL Final      Calcium Calcium   Date Value Ref Range Status   11/25/2024 7.7 (L) 8.6 - 10.5 mg/dL Final   11/24/2024 7.6 (L) 8.6 - 10.5 mg/dL Final   11/24/2024 7.8 (L) 8.6 - 10.5 mg/dL Final   11/24/2024 7.8 (L) 8.6 - 10.5 mg/dL Final   11/24/2024 7.8 (L) 8.6 - 10.5 mg/dL Final   11/24/2024 8.0 (L) 8.6 - 10.5 mg/dL Final   11/23/2024 7.8  (L) 8.6 - 10.5 mg/dL Final   11/23/2024 8.0 (L) 8.6 - 10.5 mg/dL Final   11/23/2024 7.9 (L) 8.6 - 10.5 mg/dL Final   11/23/2024 7.7 (L) 8.6 - 10.5 mg/dL Final   11/22/2024 8.1 (L) 8.6 - 10.5 mg/dL Final   11/22/2024 8.0 (L) 8.6 - 10.5 mg/dL Final      Magnesium Magnesium   Date Value Ref Range Status   11/24/2024 2.5 (H) 1.6 - 2.4 mg/dL Final   11/24/2024 2.5 (H) 1.6 - 2.4 mg/dL Final   11/24/2024 2.5 (H) 1.6 - 2.4 mg/dL Final   11/24/2024 2.4 1.6 - 2.4 mg/dL Final   11/23/2024 2.4 1.6 - 2.4 mg/dL Final   11/23/2024 2.4 1.6 - 2.4 mg/dL Final   11/23/2024 2.4 1.6 - 2.4 mg/dL Final   11/22/2024 2.6 (H) 1.6 - 2.4 mg/dL Final   11/22/2024 2.5 (H) 1.6 - 2.4 mg/dL Final        acetylcysteine, 3 mL, Nebulization, TID - RT  aspirin, 81 mg, Per G Tube, Daily  atorvastatin, 40 mg, Per G Tube, Nightly  busPIRone, 5 mg, Oral, TID  carvedilol, 3.125 mg, Nasogastric, Q12H  cefepime, 2,000 mg, Intravenous, Q24H  chlorhexidine, 15 mL, Mouth/Throat, Q12H  Ergocalciferol, 100 mcg, Per G Tube, Daily  heparin (porcine), 5,000 Units, Subcutaneous, Q8H  hydrocortisone-bacitracin-zinc oxide-nystatin, 1 Application, Topical, Q12H  insulin glargine, 20 Units, Subcutaneous, Daily  insulin regular, 2-7 Units, Subcutaneous, Q6H  ipratropium-albuterol, 3 mL, Nebulization, Q4H - RT  lansoprazole, 15 mg, Per G Tube, Q AM  micafungin (MYCAMINE) IV, 200 mg, Intravenous, Q24H  miconazole, 1 Application, Topical, Q12H  midodrine, 10 mg, Oral, Q8H  saccharomyces boulardii, 250 mg, Per G Tube, BID  sildenafil, 20 mg, Per G Tube, TID  sodium chloride, 10 mL, Intravenous, Q12H      amiodarone, 0.5 mg/min, Last Rate: 0.5 mg/min (11/25/24 0200)  dexmedetomidine, 0.2-1.5 mcg/kg/hr, Last Rate: Stopped (11/18/24 0400)  DOPamine, 2-20 mcg/kg/min  EPINEPHrine, 0.01 mcg/kg/min, Last Rate: Stopped (11/17/24 1051)  lidocaine in D5W, 1 mg/min, Last Rate: Stopped (11/05/24 1135)  milrinone, 0.25 mcg/kg/min, Last Rate: 0.125 mcg/kg/min (11/24/24  0610)  norepinephrine, 0.02-0.3 mcg/kg/min, Last Rate: Stopped (11/23/24 2205)  phenylephrine, 0.2-2 mcg/kg/min  propofol, 5-50 mcg/kg/min, Last Rate: Stopped (11/24/24 1015)  vasopressin, 0.02-0.1 Units/min, Last Rate: Stopped (11/24/24 2355)          Prosthetic aortic valve stenosis    Essential hypertension    Permanent atrial fibrillation    S/P AVR    ICD (implantable cardioverter-defibrillator), dual, in situ    Achilles tendon rupture    Bacteremia    Stenosis of prosthetic aortic valve    Anemia      Assessment & Plan    - Prosthetic aortic valve stenosis, h/o AVR (tissue)/maze/DANICA ligation (2014)- s/p reoperative sternotomy AV root replacement 27mm cryopreserved homograft with right nuvia cabrol, AICD removal, IABP placement- Chiqui 10/31/2024  - S/p Sternal closure ---11/2  - Possible endocarditis, likely need JOLIE   - Bacteremia, blood cultures positive strep mitis---on penicillin G  - Atrial fibrillation, unable to tolerate anticoagulation  - Hypertension  - NICM status post [employer] AICD  - Anemia, s/p EGD/colonoscopy with esophagitis, polyp removal  - Right achilles tendon tear--- walking boot and PT per ortho at [facility]  - Morbid obesity  - Pre-diabetes -- improved at 5.3  - Post op anemia- expected acute blood loss, stable  - TCP post-op, resolved  - Respiratory failure--- s/p trach (Dr. Leija 11/16/2024), pulm following   - NATHANIEL--- nephrology following     POD26:     Patient weaned off pressor support last night, coreg started, on milrinone  Repeat echo completed today, still with severe RV dysfunction, Dr. Florian to review  Still on amiodarone gtt, Qtc of 532 today, will discuss with Dr. Florian   Blood cultures with candida 11/21, bld cultures collected again on 11/22 currently NTD -- continues on micafungin, ID following  S/p trach, Vent per pulmonary  Nephrology following, planning for HD today   Plan to continue treatment but code status has been updated to no CPR  Continue supportive care    Bryant  JULIO Mcclure  11/25/24  08:00 EST

## 2024-11-26 NOTE — CONSULTS
Purpose of the visit was to evaluate for: goals of care/advanced care planning and support for patient/family. Spoke with MD and RN as well as family and discussed palliative care, goals of care, care options, and resuscitation status.      Assessment:  Patient is 74 year old male seen in CVR today. He has had complicated hospital course following 10/30 sternotomy, aortic root replacement, mitral valve repair, AICD removal, intra-aortic balloon pump placement with Dr. Florian. Subsequent sternal wound washout and closure on 11/2 and 11/16 tracheostomy. He is being treated for bacteremia and suspected endocarditis/septic shock requiring pressors, respiratory failure requiring mechanical ventilation, and CRRT for acute renal failure. Patient on precedex, he is unable to participate in conversation. No acute distress noted. Tube feeds running. PPS 30%    Cultural and spiritual needs have been assessed. No needs identified at this time.     Recommendations/Plan: No changes in treatment plan at this time. Code status is NO CPR with full support. Family is aware of option for palliative care and will take the next few days to consider this as well as monitor patient's progress. Palliative care team will follow along for support.     Other Comments: Spoke with patient's daughter Abby, her  Wally, and patient's sister Alena along with bedside RN Jay, Dr. Thompson and Sintia RN with palliative care. We discussed goals of care, treatment options and code status in detail. Family has good understanding of situation. They acknowledge complicated hospital course and challenges to recovery. We did discuss option of transitioning to comfort measures only including discontinuation of CRRT, ventilator and pressor support, only giving patient medications needed for symptom management and to allow a natural death. Answered all questions and provided support, advised of ongoing availability.

## 2024-11-26 NOTE — PROGRESS NOTES
"  POST-OPERATIVE NOTE     Chief Complaint: Respiratory failure  S/P: Tracheostomy on 11/16/24 by Dr. Haylie Leija      Subjective  Sedated   Appears comfortable, No distress.     Objective:  General Appearance:  Comfortable, in no acute distress and ill-appearing.    Vital signs: (most recent): Blood pressure 148/53, pulse 95, temperature 99.5 °F (37.5 °C), resp. rate 23, height 175 cm (68.9\"), weight (!) 145 kg (319 lb 10.7 oz), SpO2 98%.    Lungs:  Normal effort.  He is not in respiratory distress.  (Vent, 40% FiO2, PEEP 10)  Heart: Normal rate.  Regular rhythm.    Chest: Symmetric chest wall expansion.   Neurological: (On sedation ).    Skin:  Warm and dry.  (Small amount of secretions around trach.   Skin breakdown inferior aspect of stoma, appears progressed. )          Tracheostomy in good position.       Results Review:     I reviewed the patient's new clinical results.  I reviewed the patient's new imaging results and agree with the interpretation.  I reviewed the patient's other test results and agree with the interpretation    Assessment & Plan   Mr. Alejandro is a pleasant 74-year-old gentleman who underwent tracheostomy on 11/16/2024 By Dr. Haylie Leija. Trach still with some thick secretions requiring frequent trach care per nursing. Asked to see the patient regarding progression of skin breakdown around stoma.     Does appear further progressed, slight widening of Inferior portion of stoma. Nursing providing frequent trache care and frequent changing of gauze to keep dry as possible    Trach remains in good position. Continue routine trach care and may paint peristomal area with betadine Q 8hrs. Will discuss with Dr. Leija. Palliative care seeing to establish goals of care.     JAVI Reynoso  [facility]  11/26/24  16:55 EST    Patient was seen and assessed while wearing personal protective equipment including facemask, protective eyewear and gloves.  Hand hygiene performed " prior to entering the room and upon exiting with doffing of gloves.

## 2024-11-26 NOTE — PLAN OF CARE
Goal Outcome Evaluation:      Continues on CRRT, levophed, vaso, milrinone, and dex. Will continue with plan of care.

## 2024-11-26 NOTE — CONSULTS
CONSULT NOTE    Palliative Care MD Consult  [facility]     Referring Provider: Dr Florian  Reason for Consultation: Discussion regarding GOC and prognosis  Chief complaint: Hypotension and metabolic encephalopathy    Subjective .     History of present illness: This is a 74-year-old gentleman with a history of atrial fibrillation coronary artery disease hypertension and obesity who is presently admitted in the cardiovascular recovery unit.  He has had a terribly complicated last few months.  He was admitted at the end of September through the beginning of October with an Achilles tendon rupture.  He was on a fluoroquinolone in the outpatient setting for possible infection and felt a pop in his leg and presented to the hospital with the tendon rupture.  He was recommended for conservative management by orthopedic surgery.  He was transition to a skilled nursing unit.  While in the skilled nursing facility it was noted patient had what appeared to be chronic leukocytosis evidently patient had extensive workup in the outpatient setting prior to admission with negative blood cultures and no identifiable source.  Further workup initiated in skilled nursing facility procalcitonin remain normal however repeat blood cultures returned positive for Streptococcus Mitis on 1015 and 1017.  Patient started on appropriate antibiotics 2D echocardiogram obtained which showed worsening aortic valve stenosis which is now severe cardiology consulted and has recommended the patient be transferred to tertiary care center for infectious disease consultation as well as for consultation with structural cardiologist and CT surgery.  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  A JOLIE was performed on October 26 that confirmed aortic valve vegetation and severe prosthetic valve stenosis.  Cardiology was consulted to evaluate his  coronary anatomy and stress test showed normal perfusion with no evidence of reversible ischemia.  On October 30 he was taken for redo sternotomy with extensive lysis of adhesions removal of his infected aortic valve and debridement of the periareolar abscess.  Aortic valve root replacement was done as well as a mitral valve repair.  His intracardiac pacemaker leads and ICD generator were removed.  This is very complicated long procedure.  He was found to have severe right ventricular dysfunction in the OR as well as moderate left ventricular dysfunction and developed significant coagulopathy requiring blood products and factor VII.  Over time following the procedure his left ventricular function improved however his right ventricular function has remained depressed.  He underwent tracheostomy placement on 11/16 and developed volume overload and progressive renal dysfunction requiring CRRT.  He is also had significant issues with blood pressure management and required large amounts of pressors throughout this hospitalization.  Repeat blood cultures during this hospitalization have grown Candida species and currently being treated with micafungin.  At this point they have been unable to wean from pressors.  They have been unable to transition from CRRT to any sort of intermittent dialysis.  He has had significant complications throughout this hospital stay and remains encephalopathic but awake.  The palliative care team's been consulted to discuss his overall prognosis and discussed his goals of care with his daughter, sister and other family members.  On the day of our evaluation patient is unable to participate in any interview.  Per family today seems to be a good day.  Per nursing he is more awake than he has been in the last few days.  They did try to take him off of pressure support yesterday but unfortunately his blood pressure began to fall in the afternoon and now he is back on to pressure support  medications.  He remains on the ventilator.  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  Discussed with treatment team.  His overall prognosis is pretty poor.  This has been a terribly complicated hospitalization a terribly complicated surgical procedure and already a difficult procedure to undergo a repeat valve replacement.  He has not done well following the procedure and has had continued complications with right ventricular failure and renal dysfunction.  His issues are further complicated by his morbid obesity.    Review of Systems  Unable to assess patient's review of systems secondary to sedation status    Past Medical History:   Diagnosis Date    Achilles tendon injury     right    Aortic valve replaced     Arthritis 2018    Atrial fibrillation     Coronary artery disease     History of transfusion     Hypertension      Past Surgical History:   Procedure Laterality Date    AORTIC VALVE CONDUIT N/A 10/30/2024    Procedure: AORTIC VALVE CONDUIT; possible homograft; ACID removal;  Surgeon: Javon Florian MD;  Location: [facility];  Service: Cardiothoracic;  Laterality: N/A;    AORTIC VALVE REPAIR/REPLACEMENT MITRAL VALVE REPAIR/REPLACEMENT N/A 10/30/2024    Procedure: REOP STERNOTOMY; TRANSESOPHOGEAL ECHOCARDIOGRAM;AORTIC ROOT REPLACEMENT WITH HOMOGRAFT; MITRAL VALVE REPAIR; OPEN VEIN HARVEST RIGHT SAPHENOUS VEIN; AICD GENERATOR EXPLANTATION; INTRA-AORTIC BALLOON PUMP PLACEMENT; PRP;  Surgeon: Javon Florian MD;  Location: [facility];  Service: Cardiothoracic;  Laterality: N/A;    CARDIAC SURGERY  2002    COLONOSCOPY      COLONOSCOPY N/A 9/30/2024    Procedure: COLONOSCOPY WITH HOT/COLD SNARE POLYPECTOMIES, ELEVIEW INJECTION,CLIPX1;  Surgeon: Kurt Mensah MD;  Location: [facility] ENDOSCOPY;  Service: Gastroenterology;  Laterality: N/A;  COLON POLYPS    ENDOSCOPY N/A 9/30/2024    Procedure: ESOPHAGOGASTRODUODENOSCOPY WITH BIOPSIES;  Surgeon: Rodrick  Kurt Cruz MD;  Location: [facility] ENDOSCOPY;  Service: Gastroenterology;  Laterality: N/A;  RETAINED FOOD IN STOMACH AND REFLUX ESOPHAGITIS    PACEMAKER IMPLANTATION      TRACHEOSTOMY N/A 2024    Procedure: TRACHEOSTOMY;  Surgeon: Haylie Leija MD;  Location: [facility] MAIN OR;  Service: Thoracic;  Laterality: N/A;    TRANSESOPHAGEAL ECHOCARDIOGRAM (JOLIE) N/A 10/30/2024    Procedure: TRANSESOPHAGEAL ECHOCARDIOGRAM WITH ANESTHESIA;  Surgeon: Javon Florian MD;  Location: [facility] CVOR;  Service: Cardiothoracic;  Laterality: N/A;    TRANSESOPHAGEAL ECHOCARDIOGRAM (JOLIE) N/A 2024    Procedure: TRANSESOPHAGEAL ECHOCARDIOGRAM WITH ANESTHESIA;  Surgeon: Javon Florian MD;  Location: [facility];  Service: Cardiothoracic;  Laterality: N/A;     Family History   Problem Relation Age of Onset    COPD Mother         Still living 93    Heart disease Mother     Arthritis Father     COPD Father          at 91.     Social History     Tobacco Use    Smoking status: Never     Passive exposure: Never    Smokeless tobacco: Never   Vaping Use    Vaping status: Never Used   Substance Use Topics    Alcohol use: Yes     Alcohol/week: 4.0 standard drinks of alcohol     Types: 4 Cans of beer per week    Drug use: Never     Medications Prior to Admission   Medication Sig Dispense Refill Last Dose/Taking    aspirin 325 MG tablet Take 1 tablet by mouth 2 (two) times a day.   10/23/2024 at  9:00 AM    dilTIAZem (CARDIZEM) 120 MG tablet Take 1 tablet by mouth 2 (Two) Times a Day. 180 tablet 3 10/23/2024 at  9:00 AM    furosemide (LASIX) 40 MG tablet Take 1 tablet by mouth Daily.   10/23/2024 at  9:00 AM    lisinopril (PRINIVIL,ZESTRIL) 10 MG tablet Take 1 tablet by mouth Daily.   10/23/2024 at  9:00 AM    metoprolol tartrate 75 MG tablet Take 75 mg by mouth 2 (Two) Times a Day for 30 days.   10/23/2024 at  9:00 AM    [] pantoprazole (PROTONIX) 40 MG EC tablet Take 1 tablet by mouth 2 (Two) Times a Day Before Meals  "for 30 days. 30 tablet 0 10/23/2024 at  6:00 AM    [] penicillin g 4 MU/100 mL 0.9% sodium chloride IVPB Infuse 100 mL into a venous catheter Every 4 (Four) Hours for 43 doses. Indications: Bacteria in the Blood 4300 mL 0 Noon     acetylcysteine, 3 mL, Nebulization, TID - RT  aspirin, 81 mg, Per G Tube, Daily  atorvastatin, 40 mg, Per G Tube, Nightly  busPIRone, 5 mg, Oral, TID  chlorhexidine, 15 mL, Mouth/Throat, Q12H  Ergocalciferol, 100 mcg, Per G Tube, Daily  heparin (porcine), 5,000 Units, Subcutaneous, Q8H  hydrocortisone-bacitracin-zinc oxide-nystatin, 1 Application, Topical, Q12H  insulin glargine, 20 Units, Subcutaneous, Daily  insulin regular, 2-7 Units, Subcutaneous, Q6H  ipratropium-albuterol, 3 mL, Nebulization, Q4H - RT  lansoprazole, 15 mg, Per G Tube, Q AM  micafungin (MYCAMINE) IV, 200 mg, Intravenous, Q24H  miconazole, 1 Application, Topical, Q12H  midodrine, 10 mg, Oral, Q8H  penicillin g (potassium), 2 Million Units, Intravenous, Q4H  saccharomyces boulardii, 250 mg, Per G Tube, BID  sildenafil, 20 mg, Per G Tube, TID  sodium chloride, 10 mL, Intravenous, Q12H      Allergies:   Diclofenac sodium    Objective     Vital Signs   Temp:  [98.4 °F (36.9 °C)-101.7 °F (38.7 °C)] 99 °F (37.2 °C)  Heart Rate:  [] 89  Resp:  [24-33] 24  BP: ()/(38-68) 109/38  FiO2 (%):  [40 %-41 %] 41 %    Intake/Output Summary (Last 24 hours) at 2024 1201  Last data filed at 2024 1100  Gross per 24 hour   Intake 2946.4 ml   Output 3094.2 ml   Net -147.8 ml     Flowsheet Rows      Flowsheet Row First Filed Value   Admission Height 182.9 cm (72\") Documented at 10/25/2024 0434   Admission Weight 142 kg (312 lb 3.2 oz) Documented at 10/23/2024 1711            Physical Exam:  Nursing notes reviewed vital signs reviewed  Currently on vasopressin and Levophed for blood pressure support, on propofol and Precedex for sedation  He is diaphoretic ill-appearing and does appear in some distress.  His eyes " are open he does track across the room but does not follow commands or really make meaningful movement.  He is ill has a right IJ Shiley.  Tracheostomy in his neck.  He is very short neck is obese difficult to assess for volume status secondary to body habitus.  Low inspiratory volumes but no crackles appreciated and respirations are even.  Regular rate and rhythm seen.  His abdomen is soft his bowel sounds are distant and irregular  Noted peripheral edema 1-2+  On the left foot also note that he has second toe dry gangrene and what appears to be progressive ischemia of the left great toe.    Results Review:   I reviewed the patient's new clinical results.  I reviewed the patient's new imaging results and agree with the interpretation.  I reviewed the patient's other test results and agree with the interpretation  I personally viewed and interpreted the patient's EKG/Telemetry data    Results from last 7 days   Lab Units 11/26/24  0453   WBC 10*3/mm3 12.67*   HEMOGLOBIN g/dL 8.1*   HEMATOCRIT % 25.6*   PLATELETS 10*3/mm3 87*     Results from last 7 days   Lab Units 11/26/24  1119   SODIUM mmol/L 137   POTASSIUM mmol/L 4.9   CHLORIDE mmol/L 103   CO2 mmol/L 20.7*   BUN mg/dL 28*   CREATININE mg/dL 0.70*   GLUCOSE mg/dL 121*   CALCIUM mg/dL 7.3*     Results from last 7 days   Lab Units 11/23/24  0524   ALK PHOS U/L 162*   BILIRUBIN mg/dL 1.5*   ALT (SGPT) U/L 52*   AST (SGOT) U/L 131*                 Assessment & Plan       Prosthetic aortic valve stenosis    Essential hypertension    Permanent atrial fibrillation    S/P AVR    ICD (implantable cardioverter-defibrillator), dual, in situ    Achilles tendon rupture    Bacteremia    Stenosis of prosthetic aortic valve    Anemia    This is a 74-year-old gentleman with a history of previous aortic valve replacement, coronary artery disease, anemia of chronic disease, morbid obesity hypertension and hyperlipidemia who presented to the hospital as a transfer for aortic valve  endocarditis and aortic valve failure.  -He remains on IV antibiotics treating for his underlying infection.  He will be on penicillin G through December 4 and duration of micafungin is 2 weeks of therapy.  -He is persistently febrile with leukocytosis despite appropriate antibiotic therapy.  His blood pressure has been very labile.  -He remains on 2 medications for blood pressure support and remains on dialysis with CRRT  -Also noted elevated LFTs and bilirubin likely related to right heart failure.  -Unfortunately this gentleman has multiple comorbid illness that do not necessarily mesh well together.  Right heart failure and renal failure is never real good combination especially if dialysis is needed.  Given his persistent need for blood pressure support I find it unlikely that he will ever make it off of CRRT and onto intermittent dialysis.  I am also concerned about the persistent fevers and low blood pressures.  While he is on appropriate antibiotic therapy there does not appear to be full resolution of his symptoms.  I think even if he were to just look at his right heart failure and his renal dysfunction the likelihood that he makes a meaningful recovery would be low.  It is likely that he would be dialysis dependent and would have continued hospitalizations and complications from cardiac and renal issues.  When he take into account his other factors including his immobility and his morbid obesity his persistent hypotension is chronic and acute respiratory failure issues the likelihood that he makes a meaningful recovery is pretty low.  It really determines what direction this goes is what the family and patient feel is a meaningful recovery.  -In my opinion this patient is palliative care appropriate.  I think even the best situation he likely has months remaining.  The big question this is what these months look like.  I think if the family were to pursue palliative care and comfort measures things will  likely deteriorate very quickly.  Discussed with multiple members of the treatment team and they are all in agreement that this gentleman's prognosis is extremely poor.      I discussed the patients findings and my recommendations with patient, family, and nursing staff    Thank you very much for allowing us to participate in your patient's care.    Victorino Thompson MD  11/26/24  12:01 EST    Time:  60    Advance care plan documentation  Met with the palliative care team and the patient's family in the conference room of the cardiac recovery unit.  An additional 45 minutes was spent with the patient's family and the patient and in record review and discussion with multiple providers regarding his overall prognosis and recommendations for treatments.  At this point it was explained to the patient's daughter that he has multiple medical issues at play here that likely set him up for a very poor prognosis long-term.  It is likely impossible that this gentleman ever returns to his prior level of function.  I think it best he is looking at living in a long-term acute care hospital or a long-term care facility.  This is even if he is able to approach and meet the benchmarks needed in order to be discharged from the hospital which we have continued to fail throughout this hospitalization.  It is pretty difficult to calculate someone's palliative performance scale while they are sedated and on the ventilator.  He is tube feed dependent he is not eating by mouth.  He does not follow commands he is not awake but is on sedating medications.  He is dependent on the ventilator and dependent on medications for blood pressure support.  He is currently a DNR after their discussion with Dr. Florian this morning.  We answered all her questions regarding his current condition and future prognosis.  We discussed options including transitioning to comfort measures and discontinuation of life-prolonging treatments including CRRT  ventilator support and blood pressure support.  We discussed continuing ongoing restorative and recovery therapy but discussed the limited prognosis available with this.  Ultimately his daughter and family want a few more days to evaluate the situation.  They feel like things have been improving over the last 48 hours and would like to see if make additional improvements.  I think that this is perfectly appropriate to continue to follow and see how things clinically progress.  I think if he were to deteriorate any further we should probably pursue not escalating treatment as I do not think we would add any further benefit to this gentleman's quality of life to escalate his treatment options moving forward.  Will discuss with my partner Dr. Lilly who will be available in the next 48 hours and I will check back in with the family on Friday.  Electronically signed by Victorino Thompson MD, 11/26/24, 2:49 PM EST.

## 2024-11-26 NOTE — PROGRESS NOTES
[facility] Progress Note      Patient Name: Woodrow Alejandro  : 1950  MRN: [id number]  Primary Care Physician:  Juan Perez MD  Date of admission: 10/23/2024    Subjective     Interval History:   F/u NATHANIEL    Review of Systems:   I/O 2.7/2.4 (UF 2.2)  CRRT resumed yesterday PM  Levophed weaned off; remains on vaso    Objective     Vitals:   Temp:  [98.4 °F (36.9 °C)-101.7 °F (38.7 °C)] 98.8 °F (37.1 °C)  Heart Rate:  [] 76  Resp:  [25-33] 26  BP: ()/(38-68) 131/50  FiO2 (%):  [40 %] 40 %    Intake/Output Summary (Last 24 hours) at 2024 0705  Last data filed at 2024 0657  Gross per 24 hour   Intake 2772.85 ml   Output 2395.2 ml   Net 377.65 ml       Physical Exam:    General Appearance: ill appearing lethargic WM on CRRT  Neck: supple, no JVD  Lungs: Coarse BS bilat  Heart: RRR, normal S1 and S2  Abdomen: soft, nontender, nondistended  Extremities: 1+ BLE edema, no cyanosis or clubbing       Scheduled Meds:     acetylcysteine, 3 mL, Nebulization, TID - RT  aspirin, 81 mg, Per G Tube, Daily  atorvastatin, 40 mg, Per G Tube, Nightly  busPIRone, 5 mg, Oral, TID  carvedilol, 3.125 mg, Nasogastric, Q12H  chlorhexidine, 15 mL, Mouth/Throat, Q12H  Ergocalciferol, 100 mcg, Per G Tube, Daily  heparin (porcine), 5,000 Units, Subcutaneous, Q8H  hydrocortisone-bacitracin-zinc oxide-nystatin, 1 Application, Topical, Q12H  insulin glargine, 20 Units, Subcutaneous, Daily  insulin regular, 2-7 Units, Subcutaneous, Q6H  ipratropium-albuterol, 3 mL, Nebulization, Q4H - RT  lansoprazole, 15 mg, Per G Tube, Q AM  micafungin (MYCAMINE) IV, 200 mg, Intravenous, Q24H  miconazole, 1 Application, Topical, Q12H  midodrine, 10 mg, Oral, Q8H  penicillin g (potassium), 2 Million Units, Intravenous, Q4H  saccharomyces boulardii, 250 mg, Per G Tube, BID  sildenafil, 20 mg, Per G Tube, TID  sodium chloride, 10 mL, Intravenous, Q12H      IV Meds:   amiodarone, 0.5 mg/min,  Last Rate: Stopped (11/25/24 1634)  dexmedetomidine, 0.2-1.5 mcg/kg/hr, Last Rate: 0.2 mcg/kg/hr (11/26/24 0401)  DOPamine, 2-20 mcg/kg/min  EPINEPHrine, 0.01 mcg/kg/min, Last Rate: Stopped (11/17/24 1051)  lidocaine in D5W, 1 mg/min, Last Rate: Stopped (11/05/24 1135)  milrinone, 0.25 mcg/kg/min, Last Rate: 0.125 mcg/kg/min (11/25/24 1300)  norepinephrine, 0.02-0.3 mcg/kg/min, Last Rate: Stopped (11/26/24 0420)  phenylephrine, 0.2-2 mcg/kg/min  Phoxillum BK4/2.5, 1,500 mL/hr, Last Rate: 1,500 mL/hr (11/26/24 0211)  Phoxillum BK4/2.5, 1,500 mL/hr, Last Rate: 1,500 mL/hr (11/26/24 0210)  Phoxillum BK4/2.5, 1,500 mL/hr, Last Rate: 1,500 mL/hr (11/26/24 0210)  propofol, 5-50 mcg/kg/min, Last Rate: Stopped (11/24/24 1015)  vasopressin, 0.02-0.1 Units/min, Last Rate: 0.04 Units/min (11/26/24 0654)        Results Reviewed:   I have personally reviewed the results from the time of this admission to 11/26/2024 07:05 EST     Results from last 7 days   Lab Units 11/26/24  0453 11/25/24  2328 11/25/24  1754 11/23/24  0756 11/23/24  0524 11/22/24  0815 11/22/24  0526 11/21/24  0815 11/21/24  0416   SODIUM mmol/L 137 137 136   < > 138   < > 135*   < > 137   POTASSIUM mmol/L 4.7 4.7 5.2   < > 4.0   < > 3.8   < > 3.8   CHLORIDE mmol/L 106 105 103   < > 103   < > 103   < > 107   CO2 mmol/L 21.1* 20.8* 20.8*   < > 21.8*   < > 19.6*   < > 18.6*   BUN mg/dL 31* 35* 39*   < > 17   < > 21   < > 26*   CREATININE mg/dL 0.90 1.09 1.19   < > 0.76   < > 0.84   < > 0.86   CALCIUM mg/dL 7.5* 7.8* 7.9*   < > 7.9*   < > 7.9*   < > 7.4*   BILIRUBIN mg/dL  --   --   --   --  1.5*  --  1.1  --  0.9   ALK PHOS U/L  --   --   --   --  162*  --  136*  --  115   ALT (SGPT) U/L  --   --   --   --  52*  --  29  --  15   AST (SGOT) U/L  --   --   --   --  131*  --  71*  --  24   GLUCOSE mg/dL 126* 122* 152*   < > 127*   < > 140*   < > 139*    < > = values in this interval not displayed.     Estimated Creatinine Clearance: 101.9 mL/min (by C-G formula  based on SCr of 0.9 mg/dL).  Results from last 7 days   Lab Units 11/26/24  0453 11/25/24  2328 11/25/24  1754   MAGNESIUM mg/dL 2.5* 2.6* 2.6*   PHOSPHORUS mg/dL 3.8 3.7 4.1         Results from last 7 days   Lab Units 11/26/24  0453 11/25/24  0409 11/24/24  0505 11/23/24  0524 11/22/24  0526   WBC 10*3/mm3 12.67* 14.64* 15.06* 16.42* 17.30*   HEMOGLOBIN g/dL 8.1* 8.4* 8.8* 9.2* 9.8*   PLATELETS 10*3/mm3 87* 94* 108* 111* 136*           Assessment / Plan     ASSESSMENT:  Acute kidney injury, oliguric ATN.  CRRT resumed yesterday PM (was going to attempt regular HD but became hypotensive and pressors resumed).  Waste products low.  K normal.  Bicarb 21 with normal AG.  Vol overloaded, will inc UFR  Vol overload   Shock, levo weaned off, on vaso + midodrine  Prosthetic valve aortic stenosis history of tissue AVR, DANICA ligation in 2014.  Status post redo sternotomy AV root replacement, mitral valve repair, AICD removal intra-aortic balloon placement 10/31/2024.  Sternal closure 11/ 2.  3.  Bioprosthetic aortic valve endocarditis, bacteremia with strep mitis on vancomycin and cefepime.   ID following.   BCx NGTD from 11/22/24  4.  Candidemia, on micafungin  5.  Anemia status post EGD 9/30/2024 and colonoscopy with esophagitis and polyp removal.  Hemoglobin today 8.1  6.  Acute respiratory failure postoperatively on the ventilator.  40% FIO2.  Trach  7.  Atrial fibrillation.      8.  Moderate to severe mitral regurgitation.  Severe RV enlargement and dysfunction postoperatively.  9.  DNR status     PLAN:  Continue CRRT; inc  cc/hr  Prognosis very guarded and goals of care discussions are in progress  per RN   Would only place tunnel catheter if stable enough to come off CRRT       Alberto Gar MD  11/26/24  07:05 RUST    [facility]  [phone]

## 2024-11-26 NOTE — PROGRESS NOTES
" LOS: 34 days   Patient Care Team:  Juan Perez MD as PCP - General (Internal Medicine)    Chief Complaint:   Post-op follow-up, s/p homograft    Subjective      Vital Signs  Temp:  [98.4 °F (36.9 °C)-101.7 °F (38.7 °C)] 99 °F (37.2 °C)  Heart Rate:  [] 76  Resp:  [25-33] 26  BP: ()/(38-68) 109/38  FiO2 (%):  [40 %] 40 %      11/25/24  0400 11/25/24  1320 11/26/24  0400   Weight: (!) 145 kg (320 lb 1.7 oz) (!) 145 kg (319 lb 10.7 oz) (!) 145 kg (319 lb 10.7 oz)     Body mass index is 47.35 kg/m².    Intake/Output Summary (Last 24 hours) at 11/26/2024 0801  Last data filed at 11/26/2024 0700  Gross per 24 hour   Intake 2772.85 ml   Output 2591.7 ml   Net 181.15 ml     No intake/output data recorded.        Objective:  General Appearance:  Ill-appearing.    Vital signs: (most recent): Blood pressure (!) 109/38, pulse 76, temperature 99 °F (37.2 °C), resp. rate 26, height 175 cm (68.9\"), weight (!) 145 kg (319 lb 10.7 oz), SpO2 99%.  No fever.    Output: Producing stool.    Lungs:  Normal effort and normal respiratory rate.  There are rales and wheezes.    Heart: Tachycardia.  Irregular rhythm.    Abdomen: Bowel sounds are normal.     Extremities: There is dependent edema.    Skin:  Warm and dry.  There is ulceration.                  Results Review:      WBC WBC   Date Value Ref Range Status   11/26/2024 12.67 (H) 3.40 - 10.80 10*3/mm3 Final   11/25/2024 14.64 (H) 3.40 - 10.80 10*3/mm3 Final   11/24/2024 15.06 (H) 3.40 - 10.80 10*3/mm3 Final      HGB Hemoglobin   Date Value Ref Range Status   11/26/2024 8.1 (L) 13.0 - 17.7 g/dL Final   11/25/2024 8.4 (L) 13.0 - 17.7 g/dL Final   11/24/2024 8.8 (L) 13.0 - 17.7 g/dL Final      HCT Hematocrit   Date Value Ref Range Status   11/26/2024 25.6 (L) 37.5 - 51.0 % Final   11/25/2024 27.6 (L) 37.5 - 51.0 % Final   11/24/2024 26.7 (L) 37.5 - 51.0 % Final      Platelets Platelets   Date Value Ref Range Status   11/26/2024 87 (L) 140 - 450 10*3/mm3 Final " "  11/25/2024 94 (L) 140 - 450 10*3/mm3 Final   11/24/2024 108 (L) 140 - 450 10*3/mm3 Final        PT/INR:  No results found for: \"PROTIME\"/No results found for: \"INR\"    Sodium Sodium   Date Value Ref Range Status   11/26/2024 137 136 - 145 mmol/L Final   11/25/2024 137 136 - 145 mmol/L Final   11/25/2024 136 136 - 145 mmol/L Final   11/25/2024 137 136 - 145 mmol/L Final   11/24/2024 136 136 - 145 mmol/L Final   11/24/2024 137 136 - 145 mmol/L Final   11/24/2024 137 136 - 145 mmol/L Final   11/24/2024 137 136 - 145 mmol/L Final   11/24/2024 137 136 - 145 mmol/L Final   11/23/2024 137 136 - 145 mmol/L Final      Potassium Potassium   Date Value Ref Range Status   11/26/2024 4.7 3.5 - 5.2 mmol/L Final   11/25/2024 4.7 3.5 - 5.2 mmol/L Final   11/25/2024 5.2 3.5 - 5.2 mmol/L Final   11/25/2024 4.5 3.5 - 5.2 mmol/L Final   11/24/2024 4.5 3.5 - 5.2 mmol/L Final   11/24/2024 4.4 3.5 - 5.2 mmol/L Final   11/24/2024 4.3 3.5 - 5.2 mmol/L Final   11/24/2024 4.2 3.5 - 5.2 mmol/L Final   11/24/2024 4.3 3.5 - 5.2 mmol/L Final   11/23/2024 4.2 3.5 - 5.2 mmol/L Final      Chloride Chloride   Date Value Ref Range Status   11/26/2024 106 98 - 107 mmol/L Final   11/25/2024 105 98 - 107 mmol/L Final   11/25/2024 103 98 - 107 mmol/L Final   11/25/2024 103 98 - 107 mmol/L Final   11/24/2024 103 98 - 107 mmol/L Final   11/24/2024 102 98 - 107 mmol/L Final   11/24/2024 103 98 - 107 mmol/L Final   11/24/2024 103 98 - 107 mmol/L Final   11/24/2024 102 98 - 107 mmol/L Final   11/23/2024 103 98 - 107 mmol/L Final      Bicarbonate CO2   Date Value Ref Range Status   11/26/2024 21.1 (L) 22.0 - 29.0 mmol/L Final   11/25/2024 20.8 (L) 22.0 - 29.0 mmol/L Final   11/25/2024 20.8 (L) 22.0 - 29.0 mmol/L Final   11/25/2024 20.7 (L) 22.0 - 29.0 mmol/L Final   11/24/2024 21.3 (L) 22.0 - 29.0 mmol/L Final   11/24/2024 21.0 (L) 22.0 - 29.0 mmol/L Final   11/24/2024 21.0 (L) 22.0 - 29.0 mmol/L Final   11/24/2024 20.0 (L) 22.0 - 29.0 mmol/L Final "   11/24/2024 20.0 (L) 22.0 - 29.0 mmol/L Final   11/23/2024 20.0 (L) 22.0 - 29.0 mmol/L Final      BUN BUN   Date Value Ref Range Status   11/26/2024 31 (H) 8 - 23 mg/dL Final   11/25/2024 35 (H) 8 - 23 mg/dL Final   11/25/2024 39 (H) 8 - 23 mg/dL Final   11/25/2024 21 8 - 23 mg/dL Final   11/24/2024 21 8 - 23 mg/dL Final   11/24/2024 20 8 - 23 mg/dL Final   11/24/2024 19 8 - 23 mg/dL Final   11/24/2024 18 8 - 23 mg/dL Final   11/24/2024 19 8 - 23 mg/dL Final   11/23/2024 18 8 - 23 mg/dL Final      Creatinine Creatinine   Date Value Ref Range Status   11/26/2024 0.90 0.76 - 1.27 mg/dL Final   11/25/2024 1.09 0.76 - 1.27 mg/dL Final   11/25/2024 1.19 0.76 - 1.27 mg/dL Final   11/25/2024 0.63 (L) 0.76 - 1.27 mg/dL Final   11/24/2024 0.64 (L) 0.76 - 1.27 mg/dL Final   11/24/2024 0.66 (L) 0.76 - 1.27 mg/dL Final   11/24/2024 0.64 (L) 0.76 - 1.27 mg/dL Final   11/24/2024 0.63 (L) 0.76 - 1.27 mg/dL Final   11/24/2024 0.79 0.76 - 1.27 mg/dL Final   11/23/2024 0.70 (L) 0.76 - 1.27 mg/dL Final      Calcium Calcium   Date Value Ref Range Status   11/26/2024 7.5 (L) 8.6 - 10.5 mg/dL Final   11/25/2024 7.8 (L) 8.6 - 10.5 mg/dL Final   11/25/2024 7.9 (L) 8.6 - 10.5 mg/dL Final   11/25/2024 7.7 (L) 8.6 - 10.5 mg/dL Final   11/24/2024 7.6 (L) 8.6 - 10.5 mg/dL Final   11/24/2024 7.8 (L) 8.6 - 10.5 mg/dL Final   11/24/2024 7.8 (L) 8.6 - 10.5 mg/dL Final   11/24/2024 7.8 (L) 8.6 - 10.5 mg/dL Final   11/24/2024 8.0 (L) 8.6 - 10.5 mg/dL Final   11/23/2024 7.8 (L) 8.6 - 10.5 mg/dL Final      Magnesium Magnesium   Date Value Ref Range Status   11/26/2024 2.5 (H) 1.6 - 2.4 mg/dL Final   11/25/2024 2.6 (H) 1.6 - 2.4 mg/dL Final   11/25/2024 2.6 (H) 1.6 - 2.4 mg/dL Final   11/24/2024 2.5 (H) 1.6 - 2.4 mg/dL Final   11/24/2024 2.5 (H) 1.6 - 2.4 mg/dL Final   11/24/2024 2.5 (H) 1.6 - 2.4 mg/dL Final   11/24/2024 2.4 1.6 - 2.4 mg/dL Final   11/23/2024 2.4 1.6 - 2.4 mg/dL Final        acetylcysteine, 3 mL, Nebulization, TID - RT  aspirin, 81  mg, Per G Tube, Daily  atorvastatin, 40 mg, Per G Tube, Nightly  busPIRone, 5 mg, Oral, TID  carvedilol, 3.125 mg, Nasogastric, Q12H  chlorhexidine, 15 mL, Mouth/Throat, Q12H  Ergocalciferol, 100 mcg, Per G Tube, Daily  heparin (porcine), 5,000 Units, Subcutaneous, Q8H  hydrocortisone-bacitracin-zinc oxide-nystatin, 1 Application, Topical, Q12H  insulin glargine, 20 Units, Subcutaneous, Daily  insulin regular, 2-7 Units, Subcutaneous, Q6H  ipratropium-albuterol, 3 mL, Nebulization, Q4H - RT  lansoprazole, 15 mg, Per G Tube, Q AM  micafungin (MYCAMINE) IV, 200 mg, Intravenous, Q24H  miconazole, 1 Application, Topical, Q12H  midodrine, 10 mg, Oral, Q8H  penicillin g (potassium), 2 Million Units, Intravenous, Q4H  saccharomyces boulardii, 250 mg, Per G Tube, BID  sildenafil, 20 mg, Per G Tube, TID  sodium chloride, 10 mL, Intravenous, Q12H      amiodarone, 0.5 mg/min, Last Rate: Stopped (11/25/24 1634)  dexmedetomidine, 0.2-1.5 mcg/kg/hr, Last Rate: 0.2 mcg/kg/hr (11/26/24 0401)  DOPamine, 2-20 mcg/kg/min  EPINEPHrine, 0.01 mcg/kg/min, Last Rate: Stopped (11/17/24 1051)  lidocaine in D5W, 1 mg/min, Last Rate: Stopped (11/05/24 1135)  milrinone, 0.25 mcg/kg/min, Last Rate: 0.125 mcg/kg/min (11/25/24 1300)  norepinephrine, 0.02-0.3 mcg/kg/min, Last Rate: Stopped (11/26/24 0420)  phenylephrine, 0.2-2 mcg/kg/min  Phoxillum BK4/2.5, 1,500 mL/hr, Last Rate: 1,500 mL/hr (11/26/24 0211)  Phoxillum BK4/2.5, 1,500 mL/hr, Last Rate: 1,500 mL/hr (11/26/24 0210)  Phoxillum BK4/2.5, 1,500 mL/hr, Last Rate: 1,500 mL/hr (11/26/24 0210)  propofol, 5-50 mcg/kg/min, Last Rate: Stopped (11/24/24 1015)  vasopressin, 0.02-0.1 Units/min, Last Rate: 0.05 Units/min (11/26/24 0708)          Prosthetic aortic valve stenosis    Essential hypertension    Permanent atrial fibrillation    S/P AVR    ICD (implantable cardioverter-defibrillator), dual, in situ    Achilles tendon rupture    Bacteremia    Stenosis of prosthetic aortic valve     Anemia      Assessment & Plan    - Prosthetic aortic valve stenosis, h/o AVR (tissue)/maze/DANICA ligation (2014)- s/p reoperative sternotomy AV root replacement 27mm cryopreserved homograft with right nuvia cabrol, AICD removal, IABP placement- Chiqui 10/31/2024  - S/p Sternal closure ---11/2  - Possible endocarditis, likely need JOLIE   - Bacteremia, blood cultures positive strep mitis---on penicillin G  - Atrial fibrillation, unable to tolerate anticoagulation  - Hypertension  - NICM status post [employer] AICD  - Anemia, s/p EGD/colonoscopy with esophagitis, polyp removal  - Right achilles tendon tear--- walking boot and PT per ortho at [facility]  - Morbid obesity  - Pre-diabetes -- improved at 5.3  - Post op anemia- expected acute blood loss, stable  - TCP post-op, resolved  - Respiratory failure--- s/p trach (Dr. Leija 11/16/2024), pulm following   - NATHANIEL--- nephrology following     POD27:     Back on pressors this morning.    still with severe RV dysfunction, Dr. Florian to review  Blood cultures with candida 11/21, bld cultures collected again on 11/22 currently NTD -- continues on micafungin, ID following  S/p trach, Vent per pulmonary  Nephrology following, planning for HD today   Dr. Florian discussed plan of care and poor prognosis with daughter this morning.  Plan to continue treatment but code status has been updated to no CPR.  Did offer her a palliative care consult if she was interested in discussing goals of care with them.    Continue supportive care    JAVI Nichole  11/26/24  08:01 EST

## 2024-11-26 NOTE — PLAN OF CARE
Goal Outcome Evaluation:                 CRRT continued. Off pressors for most of the day. Continues on milrinone. Dex off- tolerating ventilator well. More interactive today. Palliative care consult placed. Care meeting today with  palliative team. Daughter Abby would like time to think about options for a few days. Thoracic surgery paged to look at tracheostomy site. Trach care now q8 hrs. Pain medication given. Care on going.

## 2024-11-26 NOTE — PROGRESS NOTES
Consult Daily Progress Note  [facility]   11/26/24      Patient Name:  Woodrow Alejandro  MRN:  [id number]   YOB: 1950  Age: 74 y.o.  Sex: male  LOS: 34    Reason for Consult:  Respiratory failure    Hospital Course:   74-year-old male with history of aortic valve replacement with prosthetic aortic valve, maze, left atrial appendage ligation in 2014 now status post reoperative sternotomy with aortic valve replacement, AICD removal, intra-aortic balloon pump placement (10/31/2024) and underwent sternal closure (11/2).  Hospital course has been complicated by respiratory failure with inability to liberate from the ventilator and tracheostomy (11/16/2024).  Also found to have Candida fungemia and on CRRT for renal failure.    Interval History:  No acute events overnight  Febrile yesterday in the evening  Leukocytosis improving  Patient had to be resumed on Levophed and vasopressin  Mental status remains unchanged  Family is at bedside    Physical Exam:  Vitals:    11/26/24 0800   BP:    Pulse:    Resp: 26   Temp:    SpO2:        Intake/Output    Intake/Output Summary (Last 24 hours) at 11/26/2024 0927  Last data filed at 11/26/2024 0900  Gross per 24 hour   Intake 2767.97 ml   Output 2776.1 ml   Net -8.13 ml       General: Sedated, unresponsive  HEENT: NC/AT, EOMI, MMM  Neck: Supple, trached  Cardiac: RRR, no murmur, gallops, rubs  Pulmonary: Coarse bilaterally  GI: Soft, non-tender, non-distended, normal bowel sounds  Extremities: Warm, well perfused, no LE edema  Skin: no visible rash  Neuro: CN II - XII grossly intact    Data Review:  Results from last 7 days   Lab Units 11/26/24  0453 11/25/24  0409 11/24/24  0505 11/23/24  0524 11/22/24  0526 11/21/24  0416 11/20/24  0349   WBC 10*3/mm3 12.67* 14.64* 15.06* 16.42* 17.30* 15.94* 13.65*   HEMOGLOBIN g/dL 8.1* 8.4* 8.8* 9.2* 9.8* 8.5* 8.2*   PLATELETS 10*3/mm3 87* 94* 108* 111* 136* 147 159     Results from last 7 days   Lab Units  11/26/24  0453 11/25/24  2328 11/25/24  1754 11/25/24  0409 11/24/24  2317 11/24/24  1742 11/24/24  0812 11/24/24  0505 11/24/24  0007   SODIUM mmol/L 137 137 136 137 136 137 137   < > 137   POTASSIUM mmol/L 4.7 4.7 5.2 4.5 4.5 4.4 4.3   < > 4.3   CHLORIDE mmol/L 106 105 103 103 103 102 103   < > 102   CO2 mmol/L 21.1* 20.8* 20.8* 20.7* 21.3* 21.0* 21.0*   < > 20.0*   BUN mg/dL 31* 35* 39* 21 21 20 19   < > 19   CREATININE mg/dL 0.90 1.09 1.19 0.63* 0.64* 0.66* 0.64*   < > 0.79   GLUCOSE mg/dL 126* 122* 152* 116* 116* 117* 100*   < > 111*   CALCIUM mg/dL 7.5* 7.8* 7.9* 7.7* 7.6* 7.8* 7.8*   < > 8.0*   MAGNESIUM mg/dL 2.5* 2.6* 2.6*  --  2.5* 2.5* 2.5*  --  2.4   PHOSPHORUS mg/dL 3.8 3.7 4.1 3.8 3.7 3.9 3.7   < > 3.8    < > = values in this interval not displayed.   Estimated Creatinine Clearance: 101.9 mL/min (by C-G formula based on SCr of 0.9 mg/dL).    Results from last 7 days   Lab Units 11/26/24  0453 11/25/24  0409 11/24/24  0505 11/23/24  0524 11/22/24  0526 11/21/24  0416   AST (SGOT) U/L  --   --   --  131* 71* 24   ALT (SGPT) U/L  --   --   --  52* 29 15   PROCALCITONIN ng/mL  --   --   --   --   --  1.17*   PLATELETS 10*3/mm3 87* 94* 108* 111* 136* 147       Results from last 7 days   Lab Units 11/23/24  1004 11/22/24  1212 11/20/24  0702   PH, ARTERIAL pH units 7.464* 7.462* 7.461*   PCO2, ARTERIAL mm Hg 32.1* 31.4* 22.9*   PO2 ART mm Hg 100.0 86.9 110.1*   HCO3 ART mmol/L 23.1 22.5 16.3*         Imaging:  Reviewed chest images personally from past 3 days    ASSESSMENT  /  PLAN:    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 (10/31/2024), AICD removal, intra-aortic balloon pump placement  Status post prosthetic aortic valve, left atrial appendage ligation in 2014  Strep mitis bacteremia, 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  -Remains on mechanical ventilation, pressure support at 12 as VBG demonstrates mild  respiratory alkalosis. Tidal volumes stable in the 500s. Continue PEEP of 10 and FiO2 of 40%.   - Remains on milrinone for inotropic support.  -Unfortunately patient had to go back on vasopressin and Levophed  -Candida fungemia suspected secondary to left internal jugular central line.  On micafungin for Candida and cefepime for strep mitis endocarditis (EOT 12/4)  -Respiratory cultures from bronchoscopy negative  -On sildenafil for any hypertension as per cardiothoracic surgery  -Nephrology following for renal failure, on CRRT  -No anticoagulation for DVT at this time as per cardiothoracic surgery  -Patient's prognosis is extremely guarded in the setting of multiorgan failure.  He is currently DNR however full support otherwise.  Discussed with family that he is doing quite poorly due to the multitude of ongoing medical issues including respiratory failure, heart failure, renal failure, infection.    Thank you for allowing us to participate in this patients care. Pulmonary will continue to follow.     Justus Cary MD  [facility]  Pulmonary and Critical Care Medicine, Interventional Pulmonology    Parts of this note may be an electronic transcription/translation of spoken language to printed text using the Dragon dictation system.

## 2024-11-26 NOTE — PROGRESS NOTES
LOS: 34 days   Patient Care Team:  Juan Perez MD as PCP - General (Internal Medicine)    Chief Complaint: Follow-up bioprosthetic AVR endocarditis, mitral regurgitation, persistent atrial fibrillation.    Interval History: Awake with trach in place.  Fortunately, he is back on vasopressin and Levophed.  He did not tolerate hemodialysis yesterday, and is back on CRRT.  He is actually off of IV amiodarone, and his rate is controlled.    Vital Signs:  Temp:  [98.4 °F (36.9 °C)-101.7 °F (38.7 °C)] 99 °F (37.2 °C)  Heart Rate:  [] 76  Resp:  [25-33] 26  BP: ()/(38-68) 109/38  FiO2 (%):  [40 %] 40 %    Intake/Output Summary (Last 24 hours) at 11/26/2024 0806  Last data filed at 11/26/2024 0700  Gross per 24 hour   Intake 2772.85 ml   Output 2591.7 ml   Net 181.15 ml       Physical Exam:   General Appearance:    Status post tracheostomy and on ventilator.   Lungs:     Rhonchi bilaterally anteriorly.    Heart:    Irregularly irregular rhythm with a normal rate. II/VI SM throughout.   Abdomen:     Soft, nontender, nondistended.    Extremities:   1-2+ lower extremity edema.     Results Review:    Results from last 7 days   Lab Units 11/26/24  0453   SODIUM mmol/L 137   POTASSIUM mmol/L 4.7   CHLORIDE mmol/L 106   CO2 mmol/L 21.1*   BUN mg/dL 31*   CREATININE mg/dL 0.90   GLUCOSE mg/dL 126*   CALCIUM mg/dL 7.5*         Results from last 7 days   Lab Units 11/26/24  0453   WBC 10*3/mm3 12.67*   HEMOGLOBIN g/dL 8.1*   HEMATOCRIT % 25.6*   PLATELETS 10*3/mm3 87*                 Results from last 7 days   Lab Units 11/26/24  0453   MAGNESIUM mg/dL 2.5*               I reviewed the patient's new clinical results.        Assessment:  1.  Status post bioprosthetic aortic valve replacement in 2014  2.  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  Moderate to severe mitral regurgitation  4.   Status post reoperative AV root replacement, mitral valve repair, ICD removal, SVG-RCA, and IABP placement on 10/30/2021  5.  Postoperative shock, multifactorial  6.  Acute kidney injury  7.  History of nonischemic cardiomyopathy with recovered ejection fraction  8.  Anemia, acute on chronic  9.  Postoperative thrombocytopenia  10.  Persistent atrial fibrillation  11.  Ventricular tachycardia postoperatively  12.  Grade C esophagitis by EGD on 9/30/2024  13.  Acute left lower extremity DVT on 10/24/2024.  Resolved on Dopplers on 11/8/2024  14.  Right Achilles tendon tear  15.  Postoperative junctional rhythm  16.  Hypoalbuminemia  17.  Thrombocytopenia  18.  Severe right ventricular enlargement and dysfunction post-op  19.  Fever noted on 11/8/2024  20.  Subacute DVT in the left axillary vein by Doppler on 11/10/2024  21.  Right groin hematoma versus abscess  22.  Status post tracheostomy on 11/16/2024  23.  Candida albicans fungemia    Plan:  -Did not tolerate HD yesterday.  Plans noted for CRRT again today.    -Back: on Levophed and vasopressin.  Also remains on milrinone.    -Antibiotic and antifungal therapy per infectious disease.    -Heart rate is actually controlled today.  The IV amiodarone was stopped yesterday.  Coreg will need to be held because blood pressure is again low requiring pressors.  He did get a dose of IV digoxin yesterday.    -Limited echo yesterday again showed severely reduced right ventricular function (also with severe dilatation).  This is really unchanged.  LVEF is normal.  Continue Revatio 20 mg every 8 hours.    -No heparin for now per cardiovascular surgery.  The DVT in his left lower extremity was not present on the Dopplers on 11/8/2024.  He does have a likely subacute DVT in the left axillary vein by Doppler.  He has had intermittent thrombocytopenia as well.    -Tracheostomy without complications per vascular surgery.    -Still remains ill.    Antwan Farmer,  MD  11/26/24  08:06 EST

## 2024-11-26 NOTE — PROGRESS NOTES
LOS: 34 days     Chief Complaint: Endocarditis    Interval History: Did spike fever of 101.3 yesterday that is now improved.  Ventilator requirements remain the same.  WBC down to 12.    Vital Signs  Temp:  [98.4 °F (36.9 °C)-101.7 °F (38.7 °C)] 99 °F (37.2 °C)  Heart Rate:  [] 76  Resp:  [25-33] 26  BP: ()/(38-68) 109/38  FiO2 (%):  [40 %] 40 %    Physical Exam:  General: Ill-appearing  HEENT: Tracheostomy present.  NG tube in place.  Respiratory: Coarse bilateral breath sounds on the ventilator.  : Ugarte catheter  Skin: Tracheostomy site clean and intact.  Access: Left groin HD line.  Peripheral line.    Antibiotics:  Anti-Infectives (From admission, onward)      Ordered     Dose/Rate Route Frequency Start Stop    11/25/24 1114  penicillin G potassium 2 Million Units in sodium chloride 0.9 % 50 mL IVPB        Ordering Provider: Gregg Diaz DO    2 Million Units  over 30 Minutes Intravenous Every 4 Hours 11/25/24 1330 12/04/24 1129    11/24/24 1401  micafungin sodium (MYCAMINE) 200 mg in sodium chloride 0.9 % 100 mL IVPB        Ordering Provider: Bernadette Dueñas MD    200 mg  over 60 Minutes Intravenous Every 24 Hours 11/25/24 0300 11/30/24 0259    11/22/24 0328  micafungin sodium (MYCAMINE) 100 mg in sodium chloride 0.9 % 100 mL MBP        Ordering Provider: Jr Marty Doan MD    100 mg  over 60 Minutes Intravenous Every 24 Hours 11/22/24 0415 11/22/24 0446    11/17/24 1407  vancomycin 750 mg in sodium chloride 0.9 % 250 mL IVPB-VTB        Ordering Provider: Gregg Diaz DO    750 mg  333.3 mL/hr over 45 Minutes Intravenous Once 11/18/24 0600 11/18/24 0702    11/13/24 0914  cefepime 2000 mg IVPB in 100 mL NS (MBP)        Ordering Provider: Gregg Diaz DO    2,000 mg  over 30 Minutes Intravenous Once 11/13/24 1000 11/13/24 1235    11/12/24 1133  vancomycin 3000 mg/500 mL 0.9% NS IVPB ([facility])        Ordering Provider: Gregg Diaz DO    20 mg/kg × 151  kg  over 180 Minutes Intravenous Once 11/12/24 1230 11/12/24 1614    11/02/24 0918  vancomycin (VANCOCIN) 1,000 mg in sodium chloride 0.9 % 250 mL IVPB-VTB        Ordering Provider: Antwan Farmer MD    1,000 mg  250 mL/hr over 60 Minutes Intravenous Every 24 Hours 11/02/24 1015 11/03/24 1138    11/02/24 0912  Pharmacy to dose vancomycin        Ordering Provider: Samantha Salvador APRN     Not Applicable Continuous PRN 11/02/24 0912 11/04/24 0911    10/30/24 2306  ceFAZolin 2000 mg IVPB in 100 mL NS (MBP)        Ordering Provider: Samantha Salvador APRN    2,000 mg  over 30 Minutes Intravenous Every 8 Hours 10/31/24 0000 11/01/24 0922    10/29/24 1518  ceFAZolin 2000 mg IVPB in 100 mL NS (MBP)        Ordering Provider: Bryant Mcclure PA-C    2,000 mg  over 30 Minutes Intravenous Once 10/30/24 0600 10/30/24 1936    10/28/24 1034  vancomycin IVPB 2000 mg in 0.9% Sodium Chloride 500 mL        Ordering Provider: Samantha Salvador APRN    15 mg/kg × 142 kg Intravenous Once 10/28/24 1045 10/28/24 1356             Results Review:     I reviewed the patient's new clinical results.    Lab Results   Component Value Date    WBC 12.67 (H) 11/26/2024    HGB 8.1 (L) 11/26/2024    HCT 25.6 (L) 11/26/2024    MCV 93.4 11/26/2024    PLT 87 (L) 11/26/2024     Lab Results   Component Value Date    GLUCOSE 126 (H) 11/26/2024    BUN 31 (H) 11/26/2024    CREATININE 0.90 11/26/2024    BCR 34.4 (H) 11/26/2024    CO2 21.1 (L) 11/26/2024    CALCIUM 7.5 (L) 11/26/2024    ALBUMIN 2.2 (L) 11/26/2024    LABIL2 1.4 06/27/2019     (H) 11/23/2024    ALT 52 (H) 11/23/2024       Microbiology:  10/3 COVID-negative  10/10 COVID-negative  10/14 COVID-negative  10/15 blood cultures 2 out of 2 strep mitis  10/17 COVID-negative  10/17 blood cultures 2 out of 2 strep mitis  10/21 COVID-negative  10/23 blood cultures no growth today  10/30 operative cultures from the aortic valve no growth   11/9 respiratory culture no growth  11/9 catheter tip  culture no growth to date  11/10 catheter tip culture no growth to date  11/12 blood cultures no growth to date  11/12 respiratory culture no growth to date  11/13 respiratory culture no growth  11/13 genital culture of the penis Candida albicans  11/21 blood cultures 2 out of 2 Candida albicans  11/21 respiratory culture no growth  11/22 blood cultures no growth to date    Assessment    #Candidemia  #Strep mitis endocarditis  #Status post bioprosthetic aortic valve replacement 2014  #Status post aortic root replacement in the setting of prosthetic aortic valve endocarditis and annular abscess on 10/30  #Status post removal of pacemaker generator and intracardiac portion of the leads with retained venous leads  #Atrial fibrillation  #Achilles tendon rupture   #NATHANIEL now on HD  #Status post tracheostomy 11/16    Fever yesterday that is now improved.  Will continue to monitor and would repeat blood cultures if spikes another fever.    Repeat blood cultures have remained negative for further Candida.  Continue micafungin 100 mg daily.  Planning for 2-week course through minimum 12/6.    Continue penicillin G 2 million units every 4 hours for strep mitis endocarditis adjusted for hemodialysis.  Will plan to complete 6-week course through December 4.

## 2024-11-27 NOTE — PROGRESS NOTES
LOS: 35 days     Chief Complaint: Endocarditis    Interval History: Patient febrile again this morning to 100.8.  Unable to tolerate CRRT.  Also more tachycardic.    Vital Signs  Temp:  [99.3 °F (37.4 °C)-100.8 °F (38.2 °C)] 100.8 °F (38.2 °C)  Heart Rate:  [] 117  Resp:  [21-31] 31  BP: ()/(41-74) 99/55  FiO2 (%):  [40 %-41 %] 41 %    Physical Exam:  General: Ill-appearing  HEENT: Tracheostomy present.  NG tube in place.  Respiratory: Coarse bilateral breath sounds on the ventilator.  : Ugarte catheter  Skin: Tracheostomy site clean and intact.  Access: Left groin HD line.  Peripheral line.    Antibiotics:  Anti-Infectives (From admission, onward)      Ordered     Dose/Rate Route Frequency Start Stop    11/26/24 1431  penicillin G potassium 4 Million Units in sodium chloride 0.9 % 100 mL IVPB        Ordering Provider: Gregg Diaz DO    4 Million Units  over 30 Minutes Intravenous Every 4 Hours 11/26/24 1530 12/04/24 1129    11/24/24 1401  micafungin sodium (MYCAMINE) 200 mg in sodium chloride 0.9 % 100 mL IVPB        Ordering Provider: Gregg Diaz DO    200 mg  over 60 Minutes Intravenous Every 24 Hours 11/25/24 0300 12/06/24 2359    11/22/24 0328  micafungin sodium (MYCAMINE) 100 mg in sodium chloride 0.9 % 100 mL MBP        Ordering Provider: Jr Marty Doan MD    100 mg  over 60 Minutes Intravenous Every 24 Hours 11/22/24 0415 11/22/24 0446    11/17/24 1407  vancomycin 750 mg in sodium chloride 0.9 % 250 mL IVPB-VTB        Ordering Provider: Gregg Diaz DO    750 mg  333.3 mL/hr over 45 Minutes Intravenous Once 11/18/24 0600 11/18/24 0702    11/13/24 0914  cefepime 2000 mg IVPB in 100 mL NS (MBP)        Ordering Provider: Gregg Diaz DO    2,000 mg  over 30 Minutes Intravenous Once 11/13/24 1000 11/13/24 1235    11/12/24 1133  vancomycin 3000 mg/500 mL 0.9% NS IVPB ([facility])        Ordering Provider: Gregg Diaz DO    20 mg/kg × 151 kg  over  180 Minutes Intravenous Once 11/12/24 1230 11/12/24 1614    11/02/24 0918  vancomycin (VANCOCIN) 1,000 mg in sodium chloride 0.9 % 250 mL IVPB-VTB        Ordering Provider: Antwan Farmer MD    1,000 mg  250 mL/hr over 60 Minutes Intravenous Every 24 Hours 11/02/24 1015 11/03/24 1138    11/02/24 0912  Pharmacy to dose vancomycin        Ordering Provider: Samantha Salvador APRN     Not Applicable Continuous PRN 11/02/24 0912 11/04/24 0911    10/30/24 2306  ceFAZolin 2000 mg IVPB in 100 mL NS (MBP)        Ordering Provider: Samantha Salvador APRN    2,000 mg  over 30 Minutes Intravenous Every 8 Hours 10/31/24 0000 11/01/24 0922    10/29/24 1518  ceFAZolin 2000 mg IVPB in 100 mL NS (MBP)        Ordering Provider: Bryant Mcclure PA-C    2,000 mg  over 30 Minutes Intravenous Once 10/30/24 0600 10/30/24 1936    10/28/24 1034  vancomycin IVPB 2000 mg in 0.9% Sodium Chloride 500 mL        Ordering Provider: Samantha Salvador APRN    15 mg/kg × 142 kg Intravenous Once 10/28/24 1045 10/28/24 1356             Results Review:     I reviewed the patient's new clinical results.    Lab Results   Component Value Date    WBC 13.11 (H) 11/27/2024    HGB 8.2 (L) 11/27/2024    HCT 25.5 (L) 11/27/2024    MCV 90.4 11/27/2024    PLT 82 (L) 11/27/2024     Lab Results   Component Value Date    GLUCOSE 100 (H) 11/27/2024    BUN 24 (H) 11/27/2024    CREATININE 0.63 (L) 11/27/2024    BCR 38.1 (H) 11/27/2024    CO2 21.4 (L) 11/27/2024    CALCIUM 7.5 (L) 11/27/2024    ALBUMIN 2.0 (L) 11/27/2024    LABIL2 1.4 06/27/2019     (H) 11/23/2024    ALT 52 (H) 11/23/2024       Microbiology:  10/3 COVID-negative  10/10 COVID-negative  10/14 COVID-negative  10/15 blood cultures 2 out of 2 strep mitis  10/17 COVID-negative  10/17 blood cultures 2 out of 2 strep mitis  10/21 COVID-negative  10/23 blood cultures no growth today  10/30 operative cultures from the aortic valve no growth   11/9 respiratory culture no growth  11/9 catheter tip  culture no growth to date  11/10 catheter tip culture no growth to date  11/12 blood cultures no growth to date  11/12 respiratory culture no growth to date  11/13 respiratory culture no growth  11/13 genital culture of the penis Candida albicans  11/21 blood cultures 2 out of 2 Candida albicans  11/21 respiratory culture no growth  11/22 blood cultures no growth to date    Assessment    #Candidemia  #Strep mitis endocarditis  #Status post bioprosthetic aortic valve replacement 2014  #Status post aortic root replacement in the setting of prosthetic aortic valve endocarditis and annular abscess on 10/30  #Status post removal of pacemaker generator and intracardiac portion of the leads with retained venous leads  #Atrial fibrillation  #Achilles tendon rupture   #NATHANIEL now on HD  #Status post tracheostomy 11/16    Repeat blood cultures today given new fever and tachycardia.  Also repeat tracheal aspirate culture.  Chest x-ray with continued bilateral infiltrates.  May need to repeat CT imaging of the chest abdomen and pelvis if no obvious answer obtained on cultures.    Plans for trial of hemodialysis today.  Will stop penicillin and broaden antibiotic coverage to cefepime 2 g every 24 renally adjust for HD for now.  May need to be readjusted based on renal function.    Continue micafungin 100 mg daily.  Planning for 2-week course through minimum 12/6.    For strep mitis endocarditis, will plan to complete 6-week course through December 4.  Above antibiotic should be sufficient for now.     Patient

## 2024-11-27 NOTE — PROGRESS NOTES
LOS: 35 days   Patient Care Team:  Juan Perez MD as PCP - General (Internal Medicine)    Chief Complaint: Follow-up bioprosthetic AVR endocarditis, mitral regurgitation, persistent atrial fibrillation.    Interval History: Awake with trach in place.  Heart rate is up a little bit today.  Remains in atrial fibrillation.  Daughter was at bedside again today.    Vital Signs:  Temp:  [99.3 °F (37.4 °C)-100.9 °F (38.3 °C)] 100.9 °F (38.3 °C)  Heart Rate:  [] 105  Resp:  [21-31] 29  BP: ()/(41-74) 111/62  FiO2 (%):  [40 %-99 %] 99 %    Intake/Output Summary (Last 24 hours) at 11/27/2024 1205  Last data filed at 11/27/2024 0500  Gross per 24 hour   Intake 1756.95 ml   Output 4152.7 ml   Net -2395.75 ml       Physical Exam:   General Appearance:    Status post tracheostomy and on ventilator.   Lungs:     Rhonchi bilaterally anteriorly.    Heart:    Irregularly irregular rhythm with a normal rate. II/VI SM throughout.   Abdomen:     Soft, nontender, nondistended.    Extremities:   1-2+ lower extremity edema.     Results Review:    Results from last 7 days   Lab Units 11/27/24  0429   SODIUM mmol/L 138   POTASSIUM mmol/L 4.6   CHLORIDE mmol/L 104   CO2 mmol/L 21.4*   BUN mg/dL 24*   CREATININE mg/dL 0.63*   GLUCOSE mg/dL 100*   CALCIUM mg/dL 7.5*         Results from last 7 days   Lab Units 11/27/24  0429   WBC 10*3/mm3 13.11*   HEMOGLOBIN g/dL 8.2*   HEMATOCRIT % 25.5*   PLATELETS 10*3/mm3 82*                 Results from last 7 days   Lab Units 11/27/24  0429   MAGNESIUM mg/dL 2.5*               I reviewed the patient's new clinical results.        Assessment:  1.  Status post bioprosthetic aortic valve replacement in 2014  2.  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  Moderate to severe mitral regurgitation  4.  Status post reoperative AV root replacement, mitral valve repair,  ICD removal, SVG-RCA, and IABP placement on 10/30/2021  5.  Postoperative shock, multifactorial  6.  Acute kidney injury  7.  History of nonischemic cardiomyopathy with recovered ejection fraction  8.  Anemia, acute on chronic  9.  Postoperative thrombocytopenia  10.  Persistent atrial fibrillation  11.  Ventricular tachycardia postoperatively  12.  Grade C esophagitis by EGD on 9/30/2024  13.  Acute left lower extremity DVT on 10/24/2024.  Resolved on Dopplers on 11/8/2024  14.  Right Achilles tendon tear  15.  Postoperative junctional rhythm  16.  Hypoalbuminemia  17.  Thrombocytopenia  18.  Severe right ventricular enlargement and dysfunction post-op  19.  Fever noted on 11/8/2024  20.  Subacute DVT in the left axillary vein by Doppler on 11/10/2024  21.  Right groin hematoma versus abscess  22.  Status post tracheostomy on 11/16/2024  23.  Candida albicans fungemia    Plan:  -I originally ordered amiodarone because of his heart rate.  However, I was informed after I saw the patient that his daughter and family have elected for palliative care and comfort measures at this time.  At this point, I really do feel that this is appropriate as his chances for long-term meaningful recovery are very low.    -The amiodarone and drips are to be discontinued and comfort medications to be placed.    -I did go over his most recent echocardiogram in detail and showed the images to the patient's daughter at bedside, detailing the major role that the right ventricular dysfunction is playing here.    -Cardiology will sign off for now.  Happy to see again if needed.    Antwan Farmer MD  11/27/24  12:05 EST

## 2024-11-27 NOTE — PROGRESS NOTES
" LOS: 35 days   Patient Care Team:  Juan Perez MD as PCP - General (Internal Medicine)    Chief Complaint:   Post-op follow-up, s/p homograft    Subjective      Vital Signs  Temp:  [99.3 °F (37.4 °C)-100.8 °F (38.2 °C)] 100.8 °F (38.2 °C)  Heart Rate:  [] 117  Resp:  [21-31] 31  BP: ()/(41-74) 99/55  FiO2 (%):  [40 %-41 %] 41 %      11/25/24  1320 11/26/24  0400 11/27/24  0200   Weight: (!) 145 kg (319 lb 10.7 oz) (!) 145 kg (319 lb 10.7 oz) (!) 144 kg (317 lb 14.5 oz)     Body mass index is 47.09 kg/m².    Intake/Output Summary (Last 24 hours) at 11/27/2024 0725  Last data filed at 11/27/2024 0500  Gross per 24 hour   Intake 2086.77 ml   Output 4915.2 ml   Net -2828.43 ml     No intake/output data recorded.        Objective:  General Appearance:  Ill-appearing.    Vital signs: (most recent): Blood pressure 111/62, pulse 105, temperature (!) 100.9 °F (38.3 °C), resp. rate (!) 29, height 175 cm (68.9\"), weight (!) 144 kg (317 lb 14.5 oz), SpO2 97%.  No fever.    Output: Producing stool.    Lungs:  Normal effort and normal respiratory rate.  There are rales and wheezes.    Heart: Tachycardia.  Irregular rhythm.    Abdomen: Bowel sounds are normal.     Extremities: There is dependent edema.    Skin:  Warm and dry.  There is ulceration.                  Results Review:      WBC WBC   Date Value Ref Range Status   11/27/2024 13.11 (H) 3.40 - 10.80 10*3/mm3 Final   11/26/2024 12.67 (H) 3.40 - 10.80 10*3/mm3 Final   11/25/2024 14.64 (H) 3.40 - 10.80 10*3/mm3 Final      HGB Hemoglobin   Date Value Ref Range Status   11/27/2024 8.2 (L) 13.0 - 17.7 g/dL Final   11/26/2024 8.1 (L) 13.0 - 17.7 g/dL Final   11/25/2024 8.4 (L) 13.0 - 17.7 g/dL Final      HCT Hematocrit   Date Value Ref Range Status   11/27/2024 25.5 (L) 37.5 - 51.0 % Final   11/26/2024 25.6 (L) 37.5 - 51.0 % Final   11/25/2024 27.6 (L) 37.5 - 51.0 % Final      Platelets Platelets   Date Value Ref Range Status   11/27/2024 82 (L) 140 - 450 " "10*3/mm3 Final   11/26/2024 87 (L) 140 - 450 10*3/mm3 Final   11/25/2024 94 (L) 140 - 450 10*3/mm3 Final        PT/INR:  No results found for: \"PROTIME\"/No results found for: \"INR\"    Sodium Sodium   Date Value Ref Range Status   11/27/2024 138 136 - 145 mmol/L Final   11/26/2024 137 136 - 145 mmol/L Final   11/26/2024 137 136 - 145 mmol/L Final   11/26/2024 137 136 - 145 mmol/L Final   11/26/2024 137 136 - 145 mmol/L Final   11/25/2024 137 136 - 145 mmol/L Final   11/25/2024 136 136 - 145 mmol/L Final   11/25/2024 137 136 - 145 mmol/L Final   11/24/2024 136 136 - 145 mmol/L Final   11/24/2024 137 136 - 145 mmol/L Final   11/24/2024 137 136 - 145 mmol/L Final      Potassium Potassium   Date Value Ref Range Status   11/27/2024 4.6 3.5 - 5.2 mmol/L Final   11/26/2024 4.5 3.5 - 5.2 mmol/L Final   11/26/2024 4.3 3.5 - 5.2 mmol/L Final   11/26/2024 4.9 3.5 - 5.2 mmol/L Final   11/26/2024 4.7 3.5 - 5.2 mmol/L Final   11/25/2024 4.7 3.5 - 5.2 mmol/L Final   11/25/2024 5.2 3.5 - 5.2 mmol/L Final   11/25/2024 4.5 3.5 - 5.2 mmol/L Final   11/24/2024 4.5 3.5 - 5.2 mmol/L Final   11/24/2024 4.4 3.5 - 5.2 mmol/L Final   11/24/2024 4.3 3.5 - 5.2 mmol/L Final      Chloride Chloride   Date Value Ref Range Status   11/27/2024 104 98 - 107 mmol/L Final   11/26/2024 103 98 - 107 mmol/L Final   11/26/2024 104 98 - 107 mmol/L Final   11/26/2024 103 98 - 107 mmol/L Final   11/26/2024 106 98 - 107 mmol/L Final   11/25/2024 105 98 - 107 mmol/L Final   11/25/2024 103 98 - 107 mmol/L Final   11/25/2024 103 98 - 107 mmol/L Final   11/24/2024 103 98 - 107 mmol/L Final   11/24/2024 102 98 - 107 mmol/L Final   11/24/2024 103 98 - 107 mmol/L Final      Bicarbonate CO2   Date Value Ref Range Status   11/27/2024 21.4 (L) 22.0 - 29.0 mmol/L Final   11/26/2024 21.9 (L) 22.0 - 29.0 mmol/L Final   11/26/2024 21.0 (L) 22.0 - 29.0 mmol/L Final   11/26/2024 20.7 (L) 22.0 - 29.0 mmol/L Final   11/26/2024 21.1 (L) 22.0 - 29.0 mmol/L Final   11/25/2024 20.8 " (L) 22.0 - 29.0 mmol/L Final   11/25/2024 20.8 (L) 22.0 - 29.0 mmol/L Final   11/25/2024 20.7 (L) 22.0 - 29.0 mmol/L Final   11/24/2024 21.3 (L) 22.0 - 29.0 mmol/L Final   11/24/2024 21.0 (L) 22.0 - 29.0 mmol/L Final   11/24/2024 21.0 (L) 22.0 - 29.0 mmol/L Final      BUN BUN   Date Value Ref Range Status   11/27/2024 24 (H) 8 - 23 mg/dL Final   11/26/2024 24 (H) 8 - 23 mg/dL Final   11/26/2024 25 (H) 8 - 23 mg/dL Final   11/26/2024 28 (H) 8 - 23 mg/dL Final   11/26/2024 31 (H) 8 - 23 mg/dL Final   11/25/2024 35 (H) 8 - 23 mg/dL Final   11/25/2024 39 (H) 8 - 23 mg/dL Final   11/25/2024 21 8 - 23 mg/dL Final   11/24/2024 21 8 - 23 mg/dL Final   11/24/2024 20 8 - 23 mg/dL Final   11/24/2024 19 8 - 23 mg/dL Final      Creatinine Creatinine   Date Value Ref Range Status   11/27/2024 0.63 (L) 0.76 - 1.27 mg/dL Final   11/26/2024 0.60 (L) 0.76 - 1.27 mg/dL Final   11/26/2024 0.65 (L) 0.76 - 1.27 mg/dL Final   11/26/2024 0.70 (L) 0.76 - 1.27 mg/dL Final   11/26/2024 0.90 0.76 - 1.27 mg/dL Final   11/25/2024 1.09 0.76 - 1.27 mg/dL Final   11/25/2024 1.19 0.76 - 1.27 mg/dL Final   11/25/2024 0.63 (L) 0.76 - 1.27 mg/dL Final   11/24/2024 0.64 (L) 0.76 - 1.27 mg/dL Final   11/24/2024 0.66 (L) 0.76 - 1.27 mg/dL Final   11/24/2024 0.64 (L) 0.76 - 1.27 mg/dL Final      Calcium Calcium   Date Value Ref Range Status   11/27/2024 7.5 (L) 8.6 - 10.5 mg/dL Final   11/26/2024 7.7 (L) 8.6 - 10.5 mg/dL Final   11/26/2024 7.6 (L) 8.6 - 10.5 mg/dL Final   11/26/2024 7.3 (L) 8.6 - 10.5 mg/dL Final   11/26/2024 7.5 (L) 8.6 - 10.5 mg/dL Final   11/25/2024 7.8 (L) 8.6 - 10.5 mg/dL Final   11/25/2024 7.9 (L) 8.6 - 10.5 mg/dL Final   11/25/2024 7.7 (L) 8.6 - 10.5 mg/dL Final   11/24/2024 7.6 (L) 8.6 - 10.5 mg/dL Final   11/24/2024 7.8 (L) 8.6 - 10.5 mg/dL Final   11/24/2024 7.8 (L) 8.6 - 10.5 mg/dL Final      Magnesium Magnesium   Date Value Ref Range Status   11/27/2024 2.5 (H) 1.6 - 2.4 mg/dL Final   11/26/2024 2.4 1.6 - 2.4 mg/dL Final    11/26/2024 2.4 1.6 - 2.4 mg/dL Final   11/26/2024 2.4 1.6 - 2.4 mg/dL Final   11/26/2024 2.5 (H) 1.6 - 2.4 mg/dL Final   11/25/2024 2.6 (H) 1.6 - 2.4 mg/dL Final   11/25/2024 2.6 (H) 1.6 - 2.4 mg/dL Final   11/24/2024 2.5 (H) 1.6 - 2.4 mg/dL Final   11/24/2024 2.5 (H) 1.6 - 2.4 mg/dL Final   11/24/2024 2.5 (H) 1.6 - 2.4 mg/dL Final        acetylcysteine, 3 mL, Nebulization, TID - RT  aspirin, 81 mg, Per G Tube, Daily  atorvastatin, 40 mg, Per G Tube, Nightly  busPIRone, 5 mg, Oral, TID  chlorhexidine, 15 mL, Mouth/Throat, Q12H  Ergocalciferol, 100 mcg, Per G Tube, Daily  heparin (porcine), 5,000 Units, Subcutaneous, Q8H  hydrocortisone-bacitracin-zinc oxide-nystatin, 1 Application, Topical, Q12H  insulin glargine, 20 Units, Subcutaneous, Daily  insulin regular, 2-7 Units, Subcutaneous, Q6H  ipratropium-albuterol, 3 mL, Nebulization, Q4H - RT  lansoprazole, 15 mg, Per G Tube, Q AM  micafungin (MYCAMINE) IV, 200 mg, Intravenous, Q24H  miconazole, 1 Application, Topical, Q12H  midodrine, 10 mg, Oral, Q8H  penicillin g (potassium), 4 Million Units, Intravenous, Q4H  saccharomyces boulardii, 250 mg, Per G Tube, BID  sildenafil, 20 mg, Per G Tube, TID  sodium chloride, 10 mL, Intravenous, Q12H      dexmedetomidine, 0.2-1.5 mcg/kg/hr, Last Rate: Stopped (11/26/24 5930)  DOPamine, 2-20 mcg/kg/min  EPINEPHrine, 0.01 mcg/kg/min, Last Rate: Stopped (11/17/24 1051)  milrinone, 0.25 mcg/kg/min, Last Rate: 0.125 mcg/kg/min (11/26/24 1328)  norepinephrine, 0.02-0.3 mcg/kg/min, Last Rate: Stopped (11/26/24 0800)  phenylephrine, 0.2-2 mcg/kg/min  propofol, 5-50 mcg/kg/min, Last Rate: Stopped (11/24/24 1015)  vasopressin, 0.02-0.1 Units/min, Last Rate: Stopped (11/26/24 1420)          Prosthetic aortic valve stenosis    Essential hypertension    Permanent atrial fibrillation    S/P AVR    ICD (implantable cardioverter-defibrillator), dual, in situ    Achilles tendon rupture    Bacteremia    Stenosis of prosthetic aortic valve     Anemia      Assessment & Plan    - Prosthetic aortic valve stenosis, h/o AVR (tissue)/maze/DANICA ligation (2014)- s/p reoperative sternotomy AV root replacement 27mm cryopreserved homograft with right nuvia cabrol, AICD removal, IABP placement- Pagni 10/31/2024  - S/p Sternal closure ---11/2  - Possible endocarditis, likely need JOLIE   - Bacteremia, blood cultures positive strep mitis---on penicillin G  - Atrial fibrillation, unable to tolerate anticoagulation  - Hypertension  - NICM status post [employer] AICD  - Anemia, s/p EGD/colonoscopy with esophagitis, polyp removal  - Right achilles tendon tear--- walking boot and PT per ortho at [facility]  - Morbid obesity  - Pre-diabetes -- improved at 5.3  - Post op anemia- expected acute blood loss, stable  - TCP post-op, resolved  - Respiratory failure--- s/p trach (Dr. Leija 11/16/2024), pulm following   - NATHANIEL--- nephrology following     POD28:   Daughter has decided to transition to comfort care.    Will place palliative care order set.        Samantha Magana, APRN  11/27/24  07:25 EST

## 2024-11-27 NOTE — PLAN OF CARE
Goal Outcome Evaluation:   Continues on CRRT and primacor.                                          Statement Selected

## 2024-11-27 NOTE — PROGRESS NOTES
Consult Daily Progress Note  [facility]   11/27/24      Patient Name:  Woodrow Alejandro  MRN:  [id number]   YOB: 1950  Age: 74 y.o.  Sex: male  LOS: 35    Reason for Consult:  Respiratory failure    Hospital Course:   74-year-old male with history of aortic valve replacement with prosthetic aortic valve, maze, left atrial appendage ligation in 2014 now status post reoperative sternotomy with aortic valve replacement, AICD removal, intra-aortic balloon pump placement (10/31/2024) and underwent sternal closure (11/2).  Hospital course has been complicated by respiratory failure with inability to liberate from the ventilator and tracheostomy (11/16/2024).  Also found to have Candida fungemia and on CRRT for renal failure.    Interval History:  No acute events overnight  Remains febrile  Off vasopressor support  Mental status remains unchanged  Family remains at bedside    Physical Exam:  Vitals:    11/27/24 0800   BP:    Pulse:    Resp: (!) 31   Temp:    SpO2:        Intake/Output    Intake/Output Summary (Last 24 hours) at 11/27/2024 0916  Last data filed at 11/27/2024 0500  Gross per 24 hour   Intake 1913.65 ml   Output 4730.8 ml   Net -2817.15 ml       General: Sedated, unresponsive  HEENT: NC/AT, EOMI, MMM  Neck: Supple, trached  Cardiac: RRR, no murmur, gallops, rubs  Pulmonary: Coarse bilaterally  GI: Soft, non-tender, non-distended, normal bowel sounds  Extremities: Warm, well perfused, no LE edema  Skin: no visible rash  Neuro: CN II - XII grossly intact    Data Review:  Results from last 7 days   Lab Units 11/27/24  0429 11/26/24  0453 11/25/24  0409 11/24/24  0505 11/23/24  0524 11/22/24  0526 11/21/24  0416   WBC 10*3/mm3 13.11* 12.67* 14.64* 15.06* 16.42* 17.30* 15.94*   HEMOGLOBIN g/dL 8.2* 8.1* 8.4* 8.8* 9.2* 9.8* 8.5*   PLATELETS 10*3/mm3 82* 87* 94* 108* 111* 136* 147     Results from last 7 days   Lab Units 11/27/24  0429 11/26/24  5963 11/26/24  8247  11/26/24  1119 11/26/24  0453 11/25/24  2328 11/25/24  1754   SODIUM mmol/L 138 137 137 137 137 137 136   POTASSIUM mmol/L 4.6 4.5 4.3 4.9 4.7 4.7 5.2   CHLORIDE mmol/L 104 103 104 103 106 105 103   CO2 mmol/L 21.4* 21.9* 21.0* 20.7* 21.1* 20.8* 20.8*   BUN mg/dL 24* 24* 25* 28* 31* 35* 39*   CREATININE mg/dL 0.63* 0.60* 0.65* 0.70* 0.90 1.09 1.19   GLUCOSE mg/dL 100* 102* 96 121* 126* 122* 152*   CALCIUM mg/dL 7.5* 7.7* 7.6* 7.3* 7.5* 7.8* 7.9*   MAGNESIUM mg/dL 2.5* 2.4 2.4 2.4 2.5* 2.6* 2.6*   PHOSPHORUS mg/dL 3.3 3.4 3.4 3.7 3.8 3.7 4.1   Estimated Creatinine Clearance: 145.4 mL/min (A) (by C-G formula based on SCr of 0.63 mg/dL (L)).    Results from last 7 days   Lab Units 11/27/24  0429 11/26/24  0453 11/25/24  0409 11/24/24  0505 11/23/24  0524 11/22/24  0526 11/21/24  0416   AST (SGOT) U/L  --   --   --   --  131* 71* 24   ALT (SGPT) U/L  --   --   --   --  52* 29 15   PROCALCITONIN ng/mL  --   --   --   --   --   --  1.17*   PLATELETS 10*3/mm3 82* 87* 94*   < > 111* 136* 147    < > = values in this interval not displayed.       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  -Remains on mechanical ventilation, pressure support at 12 as VBG demonstrates mild  respiratory alkalosis. Tidal volumes stable in the 500s. Continue PEEP of 10 and FiO2 of 40%.  Decreased inspiratory time to further match patient's desired inspiratory time.  - Remains on milrinone for inotropic support.  -Candida fungemia suspected secondary to left internal jugular central line.  On micafungin for Candida and cefepime for strep mitis endocarditis (EOT 12/4)  -Respiratory cultures from bronchoscopy negative  -On sildenafil for any hypertension as per cardiothoracic surgery  -Nephrology following for renal failure, on CRRT  -No anticoagulation for DVT at this time as per cardiothoracic surgery  -Patient's prognosis is extremely guarded in the setting of multiorgan failure.  He is currently DNR however full support otherwise.  Discussed with family that he is doing quite poorly due to the multitude of ongoing medical issues including respiratory failure, heart failure, renal failure, infection.    Thank you for allowing us to participate in this patients care. Pulmonary will continue to follow.     Justus Cary MD  [facility]  Pulmonary and Critical Care Medicine, Interventional Pulmonology    Parts of this note may be an electronic transcription/translation of spoken language to printed text using the Dragon dictation system.

## 2024-11-27 NOTE — PROGRESS NOTES
Spoke with nurse and patient's family has elected to proceed with comfort measures.  Not much more to have a thoracic surgery standpoint at this time.  Will sign off.  Please call with any questions.    JAVI Reynoso  Thoracic Surgery

## 2024-11-27 NOTE — CONSULTS
Inpatient Palliative Care follow-up. I visited with patient's Abby vazquez. She has decided that she would like to change treatment plan to comfort care. I assured her that this is appropriate given her dad's complex clinical situation and overall poor prognosis. Process for withdrawal of aggressive measures was explained and support was offered. Bedside, nurse, Susanna is aware of plan for comfort measures. Palliative orders and [facility] consult were placed by JAVI Burnette.

## 2024-11-27 NOTE — CONSULTS
visited pt on rounding follow up visit.  In the room,  provided emotional and spiritual support via presence and dialogue.

## 2024-11-27 NOTE — PROGRESS NOTES
[facility] Progress Note      Patient Name: Woodrow Alejandro  : 1950  MRN: [id number]  Primary Care Physician:  Juan Perez MD  Date of admission: 10/23/2024    Subjective     Interval History:   F/u NATHANIEL    Review of Systems:   I/O 2.0/4.9 (UF 4.5)  On CRRT but circuit clotting  Off pressors     Objective     Vitals:   Temp:  [99.3 °F (37.4 °C)-100.8 °F (38.2 °C)] 100.8 °F (38.2 °C)  Heart Rate:  [] 117  Resp:  [21-31] 31  BP: ()/(41-74) 99/55  FiO2 (%):  [40 %-41 %] 41 %    Intake/Output Summary (Last 24 hours) at 2024 0705  Last data filed at 2024 0500  Gross per 24 hour   Intake 2086.77 ml   Output 4915.2 ml   Net -2828.43 ml       Physical Exam:    General Appearance: ill appearing lethargic WM on CRRT  Neck: Trach, no JVD  Lungs: Dec BS bila  Heart: RRR, normal S1 and S2  Abdomen: soft, nontender, nondistended   mayorga scant urine  Extremities: 2+ BLE edema, no cyanosis or clubbing    Scheduled Meds:     acetylcysteine, 3 mL, Nebulization, TID - RT  aspirin, 81 mg, Per G Tube, Daily  atorvastatin, 40 mg, Per G Tube, Nightly  busPIRone, 5 mg, Oral, TID  chlorhexidine, 15 mL, Mouth/Throat, Q12H  Ergocalciferol, 100 mcg, Per G Tube, Daily  heparin (porcine), 5,000 Units, Subcutaneous, Q8H  hydrocortisone-bacitracin-zinc oxide-nystatin, 1 Application, Topical, Q12H  insulin glargine, 20 Units, Subcutaneous, Daily  insulin regular, 2-7 Units, Subcutaneous, Q6H  ipratropium-albuterol, 3 mL, Nebulization, Q4H - RT  lansoprazole, 15 mg, Per G Tube, Q AM  micafungin (MYCAMINE) IV, 200 mg, Intravenous, Q24H  miconazole, 1 Application, Topical, Q12H  midodrine, 10 mg, Oral, Q8H  penicillin g (potassium), 4 Million Units, Intravenous, Q4H  saccharomyces boulardii, 250 mg, Per G Tube, BID  sildenafil, 20 mg, Per G Tube, TID  sodium chloride, 10 mL, Intravenous, Q12H      IV Meds:   dexmedetomidine, 0.2-1.5 mcg/kg/hr, Last Rate: Stopped  (11/26/24 0730)  DOPamine, 2-20 mcg/kg/min  EPINEPHrine, 0.01 mcg/kg/min, Last Rate: Stopped (11/17/24 1051)  milrinone, 0.25 mcg/kg/min, Last Rate: 0.125 mcg/kg/min (11/26/24 1328)  norepinephrine, 0.02-0.3 mcg/kg/min, Last Rate: Stopped (11/26/24 0800)  phenylephrine, 0.2-2 mcg/kg/min  propofol, 5-50 mcg/kg/min, Last Rate: Stopped (11/24/24 1015)  vasopressin, 0.02-0.1 Units/min, Last Rate: Stopped (11/26/24 1420)        Results Reviewed:   I have personally reviewed the results from the time of this admission to 11/27/2024 07:05 EST     Results from last 7 days   Lab Units 11/27/24  0429 11/26/24  2353 11/26/24  1719 11/23/24  0756 11/23/24  0524 11/22/24  0815 11/22/24  0526 11/21/24  0815 11/21/24  0416   SODIUM mmol/L 138 137 137   < > 138   < > 135*   < > 137   POTASSIUM mmol/L 4.6 4.5 4.3   < > 4.0   < > 3.8   < > 3.8   CHLORIDE mmol/L 104 103 104   < > 103   < > 103   < > 107   CO2 mmol/L 21.4* 21.9* 21.0*   < > 21.8*   < > 19.6*   < > 18.6*   BUN mg/dL 24* 24* 25*   < > 17   < > 21   < > 26*   CREATININE mg/dL 0.63* 0.60* 0.65*   < > 0.76   < > 0.84   < > 0.86   CALCIUM mg/dL 7.5* 7.7* 7.6*   < > 7.9*   < > 7.9*   < > 7.4*   BILIRUBIN mg/dL  --   --   --   --  1.5*  --  1.1  --  0.9   ALK PHOS U/L  --   --   --   --  162*  --  136*  --  115   ALT (SGPT) U/L  --   --   --   --  52*  --  29  --  15   AST (SGOT) U/L  --   --   --   --  131*  --  71*  --  24   GLUCOSE mg/dL 100* 102* 96   < > 127*   < > 140*   < > 139*    < > = values in this interval not displayed.     Estimated Creatinine Clearance: 145.4 mL/min (A) (by C-G formula based on SCr of 0.63 mg/dL (L)).  Results from last 7 days   Lab Units 11/27/24  0429 11/26/24  2353 11/26/24  1719   MAGNESIUM mg/dL 2.5* 2.4 2.4   PHOSPHORUS mg/dL 3.3 3.4 3.4         Results from last 7 days   Lab Units 11/27/24  0429 11/26/24  0453 11/25/24  0409 11/24/24  0505 11/23/24  0524   WBC 10*3/mm3 13.11* 12.67* 14.64* 15.06* 16.42*   HEMOGLOBIN g/dL 8.2* 8.1* 8.4*  8.8* 9.2*   PLATELETS 10*3/mm3 82* 87* 94* 108* 111*           Assessment / Plan     ASSESSMENT:  Acute kidney injury, oliguric ATN.  On CRRT.  Waste products low.  K 4.6.  Bicarb 21 with normal AG.  Circuit clotting off.  Off pressors again - reattempt HD.  Still has femoral shiley   Vol overload - tolerating UF, neg approx 3L/24h  Shock, off pressors, on midodrine   Prosthetic valve aortic stenosis history of tissue AVR, DANICA ligation in 2014.  Status post redo sternotomy AV root replacement, mitral valve repair, AICD removal intra-aortic balloon placement 10/31/2024.  Sternal closure 11/ 2.  3.  Bioprosthetic aortic valve endocarditis, bacteremia with strep mitis on vancomycin and cefepime.   ID following.   BCx NGTD from 11/22/24  4.  Candidemia, on micafungin  5.  Anemia status post EGD 9/30/2024 and colonoscopy with esophagitis and polyp removal.  Hemoglobin today 8.2  6.  Acute respiratory failure postoperatively on the ventilator.  40% FIO2.  Trach  7.  Atrial fibrillation.      8.  Moderate to severe mitral regurgitation.  Severe RV enlargement and dysfunction postoperatively.  9.  DNR status     PLAN:  Attempt HD today, remove 2.5L as tolerated; stop CRRT  Remove mayorga   Prognosis remains poor.  Palliative care eval noted.  Daughter still weighing goals of care - I will touch base with her today to further discuss, as he still has femoral shiley that needs removal (and replacement with tunnel catheter if aggressive care continues)    D/W RN and CTS     Alberto Gar MD  11/27/24  07:05 [facility]    [facility]  [phone]

## 2024-11-27 NOTE — SIGNIFICANT NOTE
Pt also had consults to Javon Florian. JAVI Salvador made aware at bedside about passing of patient.

## 2024-11-27 NOTE — CASE MANAGEMENT/SOCIAL WORK
Continued Stay Note  [facility]     Patient Name: Woodrow Alejandro  MRN: [id number]  Today's Date: 11/27/2024    Admit Date: 10/23/2024    Plan: Undetermined.   Discharge Plan       Row Name 11/27/24 1028       Plan    Plan Undetermined.    Plan Comments Per chart review,   Patient remains on mechanical ventilation, pressure support at 12.  Remains on milrinone for inotropic support and had to go back on vasopressin and Levophed.  Per Palliative Care note, family is aware of option for palliative care and will take the next few days to consider this as well as monitor patient's progress. Palliative care team will follow along for support.  Per nephrology- Continue CRRT for renal failure.  Prognosis very guarded and goals of care discussions are in progress.............Rosnana CARCAMO/AYE                   Discharge Codes    No documentation.                       Rosanna Aldrich RN

## 2024-11-28 NOTE — PROGRESS NOTES
CRRT stopped this AM at 0645. Plan was for HD. Pts daughter Abby at bedside at 0830. At 1030 Abby brought up wanting to start comfort care for her dad. Palliative care team called and Sintia NOLEN came to talk to family. Daughter Abby wanted to proceed with comfort care. Samantha CALDWELL updated about goals of care and palliative care orders placed. Extubated at 1200 and time of death 1409. ETHAN called. Abby and her  given support.Transferred out of unit at 1700.

## 2024-11-29 LAB
BACTERIA SPEC RESP CULT: NORMAL
GRAM STN SPEC: NORMAL

## 2024-12-02 LAB
BACTERIA SPEC AEROBE CULT: NORMAL
BACTERIA SPEC AEROBE CULT: NORMAL

## 2024-12-04 LAB
MYCOBACTERIUM SPEC CULT: NORMAL
NIGHT BLUE STAIN TISS: NORMAL

## 2024-12-11 LAB
MYCOBACTERIUM SPEC CULT: NORMAL
NIGHT BLUE STAIN TISS: NORMAL

## 2025-01-05 RX ORDER — METOPROLOL TARTRATE 100 MG/1
100 TABLET ORAL 2 TIMES DAILY
Qty: 180 TABLET | Refills: 0 | OUTPATIENT
Start: 2025-01-05

## (undated) DEVICE — CANN VESL CORNRY STR W/6MM BALN

## (undated) DEVICE — PK ENT 40

## (undated) DEVICE — ORGANIZER SUT SHELIGH 3T 213013

## (undated) DEVICE — KT CAP LD END

## (undated) DEVICE — Device: Brand: MEDEX

## (undated) DEVICE — 28 FR STRAIGHT – SOFT PVC CATHETER: Brand: PVC THORACIC CATHETERS

## (undated) DEVICE — INJ SUB/MUCUS ELEVIEW FOR/GI/ENDO/PROC AMPL/10ML

## (undated) DEVICE — Device

## (undated) DEVICE — CVR PROB 96IN LF STRL

## (undated) DEVICE — SINGLE-USE BIOPSY FORCEPS: Brand: RADIAL JAW 4

## (undated) DEVICE — PK PROC MAJ 40

## (undated) DEVICE — LABEL SHEET CUSTOM 2X2 YELLOW: Brand: MEDLINE INDUSTRIES, INC.

## (undated) DEVICE — SENSR CERBRL O2 PK/2

## (undated) DEVICE — 8 FOOT DISPOSABLE EXTENSION CABLE WITH SAFE CONNECT / ALLIGATOR CLIP

## (undated) DEVICE — SOL NACL 0.9PCT 1000ML

## (undated) DEVICE — DRAPE,REIN 53X77,STERILE: Brand: MEDLINE

## (undated) DEVICE — 28 FR RIGHT ANGLE – SOFT PVC CATHETER: Brand: PVC THORACIC CATHETERS

## (undated) DEVICE — ST PERFUS M/

## (undated) DEVICE — CANN VESL CORNRY STR W/4MM BALN

## (undated) DEVICE — TPE UMB 3/STRND 0.125X36IN

## (undated) DEVICE — DECANTER BAG 9": Brand: MEDLINE INDUSTRIES, INC.

## (undated) DEVICE — TTL1LYR 16FR10ML 100%SIL TMPST TR: Brand: MEDLINE

## (undated) DEVICE — SPNG GZ WOVN 4X4IN 12PLY 10/BX STRL

## (undated) DEVICE — CANN VESL FREE FLO 2MM

## (undated) DEVICE — SYR LL TP 10ML STRL

## (undated) DEVICE — YANKAUER,BULB TIP,W/O VENT,RIGID,STERILE: Brand: MEDLINE

## (undated) DEVICE — SKIN PREP TRAY W/CHG: Brand: MEDLINE INDUSTRIES, INC.

## (undated) DEVICE — CANN ART EOPA 3D NV W/CONN 20F

## (undated) DEVICE — SINGLE STAGE VENOUS RETURN CANNULA: Brand: THIN-FLEX SINGLE STAGE VENOUS DRAINAGE CANNULA

## (undated) DEVICE — CANN RETRGR STYLET RSCP 15F

## (undated) DEVICE — PK HEART OPN 40

## (undated) DEVICE — DRN WND CH RND FUL/FLUT NO/TROC 3/8IN 28F

## (undated) DEVICE — Device: Brand: SINGLE USE INJECTOR NM600/610

## (undated) DEVICE — TBG ART PRESS 60 IN

## (undated) DEVICE — CLAMP INSERT: Brand: STEALTH® CLAMP INSERT

## (undated) DEVICE — CATHETER,URETHRAL,REDRUBBER,STERILE,20FR: Brand: MEDLINE

## (undated) DEVICE — THIN-FLEX SINGLE STAGE VENOUS DRAINAGE CANNULA: Brand: THIN-FLEX SINGLE STAGE VENOUS DRAINAGE CANNULA

## (undated) DEVICE — ELECTRD BLD EZ CLN MOD XLNG 2.75IN

## (undated) DEVICE — SOL IRRG H2O PL/BG 1000ML STRL

## (undated) DEVICE — 450 ML BOTTLE OF 0.05% CHLORHEXIDINE GLUCONATE IN 99.95% STERILE WATER FOR IRRIGATION, USP AND APPLICATOR.: Brand: IRRISEPT ANTIMICROBIAL WOUND LAVAGE

## (undated) DEVICE — UNIT THERP PREVENAPLUS 125MMHG

## (undated) DEVICE — ST. SORBAVIEW ULTIMATE IJ SYSTEM A,C: Brand: CENTURION

## (undated) DEVICE — Device: Brand: DEFENDO AIR/WATER/SUCTION AND BIOPSY VALVE

## (undated) DEVICE — PK ATS CUST W CARDIOTOMY RESEVOIR

## (undated) DEVICE — THE SINGLE USE ETRAP – POLYP TRAP IS USED FOR SUCTION RETRIEVAL OF ENDOSCOPICALLY REMOVED POLYPS.: Brand: ETRAP

## (undated) DEVICE — SOLIDIFIER LIQLOC PLS 1500CC BT

## (undated) DEVICE — OASIS DRAIN, SINGLE, INLINE & ATS COMPATIBLE: Brand: OASIS

## (undated) DEVICE — SOL IRR NACL 0.9PCT BO 1000ML

## (undated) DEVICE — SOL ISO/ALC 70PCT 4OZ

## (undated) DEVICE — PENCL ES MEGADINE EZ/CLEAN BUTN W/HOLSTR 10FT

## (undated) DEVICE — BG TRANSF W/COUPLER SPK 600ML

## (undated) DEVICE — SAFEDGE 2108 SERIES SAGITTAL BLADE STERNUM REVISION (40.3 X 0.64 X 48.4MM): Brand: SAFEDGE

## (undated) DEVICE — DRP SLUSH WARMR MACH RECTG 66X44IN

## (undated) DEVICE — SUT PROLN 0 CT1 30IN 8424H

## (undated) DEVICE — HEMOCONCENTRATOR PERFUS LPS06

## (undated) DEVICE — DRSNG WND GZ PAD BORDERED 4X8IN STRL

## (undated) DEVICE — CONTAINER,SPECIMEN,OR STERILE,4OZ: Brand: MEDLINE

## (undated) DEVICE — ST TOURNI COMPL A/ 7IN

## (undated) DEVICE — GLV SURG BIOGEL LTX PF 7 1/2

## (undated) DEVICE — 3M™ IOBAN™ 2 ANTIMICROBIAL INCISE DRAPE 6650EZ: Brand: IOBAN™ 2

## (undated) DEVICE — BALN IAB SENSATION PLS F/O 50CC 8F 15CM SYS

## (undated) DEVICE — SYS PERFUS SEP PLATLT W TIPS CUST

## (undated) DEVICE — CULT AER/ANAEROB FASTIDIOUS BACT

## (undated) DEVICE — OPTIFOAM GENTLE SA, POSTOP, 4X12: Brand: MEDLINE

## (undated) DEVICE — CONN TBG Y 6 IN 1 LF STRL

## (undated) DEVICE — SYR LUERLOK 30CC

## (undated) DEVICE — ROTATING SURGICAL PUNCHES, 1 PER POUCH: Brand: A&E MEDICAL / ROTATING SURGICAL PUNCHES

## (undated) DEVICE — SUMP INTRACARDIAC W CONN 1/4IN 12F12IN

## (undated) DEVICE — TOWEL,OR,DSP,ST,BLUE,STD,4/PK,20PK/CS: Brand: MEDLINE

## (undated) DEVICE — CANN AORT ROOT DLP VNT 14G 7F

## (undated) DEVICE — BLOWER MISTER CLEARVIEW W/TBG

## (undated) DEVICE — SNAR E/S POLYP SNAREMASTER OVL/10MM 2.8X2300MM YEL

## (undated) DEVICE — JELLY,LUBE,STERILE,FLIP TOP,TUBE,4-OZ: Brand: MEDLINE

## (undated) DEVICE — STPLR SKIN VISISTAT WD 35CT

## (undated) DEVICE — PREP SOL POVIDONE/IODINE BT 4OZ

## (undated) DEVICE — GLV SURG BIOGEL LTX PF 6

## (undated) DEVICE — PREVENA INCISION MANAGEMENT SYSTEM- PEEL & PLACE DRESSING: Brand: PREVENA™ PEEL & PLACE™

## (undated) DEVICE — APPL CHLORAPREP HI/LITE 26ML ORNG

## (undated) DEVICE — 3M™ TEGADERM™ CHG DRESSING 25/CARTON 4 CARTONS/CASE 1658: Brand: TEGADERM™

## (undated) DEVICE — TUBING, SUCTION, 1/4" X 20', STRAIGHT: Brand: MEDLINE INDUSTRIES, INC.

## (undated) DEVICE — SUREFIT, DUAL DISPERSIVE ELECTRODE, CONTACT QUALITY MONITOR: Brand: SUREFIT

## (undated) DEVICE — PK PERFUS CUST W/CARDIOPLEGIA

## (undated) DEVICE — CANN AORT ROOT DLP VNT/8IN 14G 7F

## (undated) DEVICE — DRP INCISE CARDIO W/ADHS 115X85X151IN LG STRL